# Patient Record
Sex: MALE | Race: WHITE | NOT HISPANIC OR LATINO | Employment: OTHER | URBAN - METROPOLITAN AREA
[De-identification: names, ages, dates, MRNs, and addresses within clinical notes are randomized per-mention and may not be internally consistent; named-entity substitution may affect disease eponyms.]

---

## 2017-01-24 ENCOUNTER — HOSPITAL ENCOUNTER (OUTPATIENT)
Dept: NON INVASIVE DIAGNOSTICS | Facility: CLINIC | Age: 72
Discharge: HOME | End: 2017-01-24
Payer: MEDICARE

## 2017-01-24 DIAGNOSIS — I71.4 ABDOMINAL AORTIC ANEURYSM WITHOUT RUPTURE (HCC): ICD-10-CM

## 2017-01-24 PROCEDURE — 93978 VASCULAR STUDY: CPT

## 2017-01-27 ENCOUNTER — ALLSCRIPTS OFFICE VISIT (OUTPATIENT)
Dept: OTHER | Facility: OTHER | Age: 72
End: 2017-01-27

## 2017-07-27 ENCOUNTER — HOSPITAL ENCOUNTER (OUTPATIENT)
Dept: NON INVASIVE DIAGNOSTICS | Facility: CLINIC | Age: 72
Discharge: HOME | End: 2017-07-27
Payer: MEDICARE

## 2017-07-27 DIAGNOSIS — I71.4 ABDOMINAL AORTIC ANEURYSM WITHOUT RUPTURE (HCC): ICD-10-CM

## 2017-07-27 PROCEDURE — 93978 VASCULAR STUDY: CPT

## 2018-01-13 VITALS
BODY MASS INDEX: 38.36 KG/M2 | DIASTOLIC BLOOD PRESSURE: 62 MMHG | SYSTOLIC BLOOD PRESSURE: 130 MMHG | RESPIRATION RATE: 18 BRPM | WEIGHT: 315 LBS | HEIGHT: 76 IN | HEART RATE: 78 BPM

## 2018-02-05 ENCOUNTER — TRANSCRIBE ORDERS (OUTPATIENT)
Dept: ADMINISTRATIVE | Facility: HOSPITAL | Age: 73
End: 2018-02-05

## 2018-02-05 DIAGNOSIS — I71.4 AAA (ABDOMINAL AORTIC ANEURYSM) WITHOUT RUPTURE (HCC): Primary | ICD-10-CM

## 2018-02-06 ENCOUNTER — HOSPITAL ENCOUNTER (OUTPATIENT)
Dept: NON INVASIVE DIAGNOSTICS | Facility: CLINIC | Age: 73
Discharge: HOME | End: 2018-02-06
Payer: MEDICARE

## 2018-02-06 DIAGNOSIS — I71.4 AAA (ABDOMINAL AORTIC ANEURYSM) WITHOUT RUPTURE (HCC): ICD-10-CM

## 2018-02-06 PROCEDURE — 93978 VASCULAR STUDY: CPT

## 2018-02-06 PROCEDURE — 93979 VASCULAR STUDY: CPT | Performed by: SURGERY

## 2018-09-11 DIAGNOSIS — I71.4 AAA (ABDOMINAL AORTIC ANEURYSM) WITHOUT RUPTURE (HCC): Primary | ICD-10-CM

## 2018-09-18 ENCOUNTER — HOSPITAL ENCOUNTER (OUTPATIENT)
Dept: NON INVASIVE DIAGNOSTICS | Facility: CLINIC | Age: 73
Discharge: HOME | End: 2018-09-18
Payer: MEDICARE

## 2018-09-18 DIAGNOSIS — I71.4 AAA (ABDOMINAL AORTIC ANEURYSM) WITHOUT RUPTURE (HCC): ICD-10-CM

## 2018-09-18 PROCEDURE — 93978 VASCULAR STUDY: CPT | Performed by: SURGERY

## 2018-09-18 PROCEDURE — 93978 VASCULAR STUDY: CPT

## 2019-03-14 ENCOUNTER — TELEPHONE (OUTPATIENT)
Dept: ADMINISTRATIVE | Facility: HOSPITAL | Age: 74
End: 2019-03-14

## 2019-03-14 DIAGNOSIS — I71.4 ABDOMINAL AORTIC ANEURYSM WITHOUT RUPTURE (HCC): Primary | ICD-10-CM

## 2019-04-02 ENCOUNTER — HOSPITAL ENCOUNTER (OUTPATIENT)
Dept: NON INVASIVE DIAGNOSTICS | Facility: CLINIC | Age: 74
Discharge: HOME | End: 2019-04-02
Payer: MEDICARE

## 2019-04-02 DIAGNOSIS — I71.4 ABDOMINAL AORTIC ANEURYSM WITHOUT RUPTURE (HCC): ICD-10-CM

## 2019-04-02 PROCEDURE — 93978 VASCULAR STUDY: CPT

## 2019-04-02 PROCEDURE — 93978 VASCULAR STUDY: CPT | Performed by: SURGERY

## 2019-04-04 ENCOUNTER — OFFICE VISIT (OUTPATIENT)
Dept: VASCULAR SURGERY | Facility: CLINIC | Age: 74
End: 2019-04-04
Payer: MEDICARE

## 2019-04-04 VITALS
BODY MASS INDEX: 38.36 KG/M2 | HEIGHT: 76 IN | DIASTOLIC BLOOD PRESSURE: 50 MMHG | WEIGHT: 315 LBS | HEART RATE: 78 BPM | SYSTOLIC BLOOD PRESSURE: 120 MMHG | TEMPERATURE: 98.9 F

## 2019-04-04 DIAGNOSIS — R09.89 DECREASED PEDAL PULSES: ICD-10-CM

## 2019-04-04 DIAGNOSIS — I71.4 ABDOMINAL AORTIC ANEURYSM (AAA) WITHOUT RUPTURE (HCC): Primary | ICD-10-CM

## 2019-04-04 PROCEDURE — 99213 OFFICE O/P EST LOW 20 MIN: CPT | Performed by: NURSE PRACTITIONER

## 2019-04-04 RX ORDER — METOPROLOL SUCCINATE 100 MG/1
100 TABLET, EXTENDED RELEASE ORAL DAILY
Refills: 1 | COMMUNITY
Start: 2019-02-19 | End: 2020-06-24

## 2019-04-04 RX ORDER — ATORVASTATIN CALCIUM 40 MG/1
TABLET, FILM COATED ORAL
Refills: 3 | COMMUNITY
Start: 2019-02-24 | End: 2020-09-10 | Stop reason: SDUPTHER

## 2019-04-04 RX ORDER — INSULIN DETEMIR 100 [IU]/ML
INJECTION, SOLUTION SUBCUTANEOUS
Refills: 12 | COMMUNITY
Start: 2019-03-07 | End: 2020-07-31 | Stop reason: SDUPTHER

## 2019-04-04 RX ORDER — PIOGLITAZONEHYDROCHLORIDE 30 MG/1
30 TABLET ORAL DAILY
Refills: 12 | COMMUNITY
Start: 2019-02-14 | End: 2020-12-16

## 2019-04-04 RX ORDER — OMEPRAZOLE 20 MG/1
CAPSULE, DELAYED RELEASE ORAL
COMMUNITY
End: 2020-10-26 | Stop reason: SDUPTHER

## 2019-04-04 RX ORDER — INSULIN ASPART 100 [IU]/ML
5 INJECTION, SOLUTION INTRAVENOUS; SUBCUTANEOUS DAILY
Refills: 2 | COMMUNITY
Start: 2019-02-15 | End: 2021-01-29 | Stop reason: ALTCHOICE

## 2019-04-04 RX ORDER — LISINOPRIL 20 MG/1
TABLET ORAL
COMMUNITY
End: 2020-10-26 | Stop reason: SDUPTHER

## 2019-04-04 RX ORDER — FUROSEMIDE 20 MG/1
TABLET ORAL
COMMUNITY
End: 2020-07-31 | Stop reason: SDUPTHER

## 2019-04-04 RX ORDER — SEMAGLUTIDE 1.34 MG/ML
INJECTION, SOLUTION SUBCUTANEOUS
COMMUNITY
Start: 2019-01-25 | End: 2020-05-19

## 2019-04-04 RX ORDER — APIXABAN 5 MG/1
TABLET, FILM COATED ORAL
COMMUNITY
Start: 2019-03-21 | End: 2020-05-29

## 2019-10-03 ENCOUNTER — TELEPHONE (OUTPATIENT)
Dept: VASCULAR SURGERY | Facility: CLINIC | Age: 74
End: 2019-10-03

## 2019-10-03 NOTE — TELEPHONE ENCOUNTER
Scheduling Instructions   please schedule appt to review georgina and aoil in october     Called to schedule follow up but number is disconnected

## 2019-10-07 ENCOUNTER — HOSPITAL ENCOUNTER (OUTPATIENT)
Dept: NON INVASIVE DIAGNOSTICS | Facility: CLINIC | Age: 74
Discharge: HOME | End: 2019-10-07
Payer: MEDICARE

## 2019-10-07 DIAGNOSIS — I71.4 ABDOMINAL AORTIC ANEURYSM (AAA) WITHOUT RUPTURE (HCC): ICD-10-CM

## 2019-10-07 DIAGNOSIS — R09.89 DECREASED PEDAL PULSES: ICD-10-CM

## 2019-10-07 PROCEDURE — 93978 VASCULAR STUDY: CPT

## 2019-10-07 PROCEDURE — 93978 VASCULAR STUDY: CPT | Performed by: SURGERY

## 2020-03-03 LAB — HBA1C MFR BLD HPLC: 7.4 %

## 2020-04-23 DIAGNOSIS — R60.0 LOCALIZED EDEMA: Primary | ICD-10-CM

## 2020-04-24 RX ORDER — FUROSEMIDE 40 MG/1
TABLET ORAL
Qty: 90 TABLET | Refills: 1 | Status: SHIPPED | OUTPATIENT
Start: 2020-04-24 | End: 2020-07-31 | Stop reason: SDUPTHER

## 2020-05-19 DIAGNOSIS — E11.65 UNCONTROLLED TYPE 2 DIABETES MELLITUS WITH HYPERGLYCEMIA (HCC): Primary | ICD-10-CM

## 2020-05-19 RX ORDER — SEMAGLUTIDE 1.34 MG/ML
INJECTION, SOLUTION SUBCUTANEOUS
COMMUNITY
Start: 2020-02-15 | End: 2020-05-19

## 2020-05-20 RX ORDER — SEMAGLUTIDE 1.34 MG/ML
1 INJECTION, SOLUTION SUBCUTANEOUS WEEKLY
Qty: 9 PEN | Refills: 0 | Status: SHIPPED | OUTPATIENT
Start: 2020-05-20 | End: 2020-08-13

## 2020-05-29 ENCOUNTER — TELEPHONE (OUTPATIENT)
Dept: FAMILY MEDICINE CLINIC | Facility: CLINIC | Age: 75
End: 2020-05-29

## 2020-05-29 DIAGNOSIS — E11.65 TYPE 2 DIABETES MELLITUS WITH HYPERGLYCEMIA, WITH LONG-TERM CURRENT USE OF INSULIN (HCC): Primary | ICD-10-CM

## 2020-05-29 DIAGNOSIS — Z79.4 TYPE 2 DIABETES MELLITUS WITH HYPERGLYCEMIA, WITH LONG-TERM CURRENT USE OF INSULIN (HCC): Primary | ICD-10-CM

## 2020-05-29 DIAGNOSIS — I48.11 LONGSTANDING PERSISTENT ATRIAL FIBRILLATION (HCC): Primary | ICD-10-CM

## 2020-05-29 RX ORDER — APIXABAN 5 MG/1
TABLET, FILM COATED ORAL
Qty: 60 TABLET | Refills: 3 | Status: SHIPPED | OUTPATIENT
Start: 2020-05-29 | End: 2020-09-14

## 2020-06-01 RX ORDER — PEN NEEDLE, DIABETIC 31 GX5/16"
NEEDLE, DISPOSABLE MISCELLANEOUS DAILY
Qty: 100 EACH | Refills: 1 | Status: SHIPPED | OUTPATIENT
Start: 2020-06-01 | End: 2020-09-04 | Stop reason: SDUPTHER

## 2020-06-01 RX ORDER — PEN NEEDLE, DIABETIC 31 GX5/16"
NEEDLE, DISPOSABLE MISCELLANEOUS
COMMUNITY
Start: 2020-04-09 | End: 2020-06-01 | Stop reason: SDUPTHER

## 2020-06-24 DIAGNOSIS — I10 ESSENTIAL HYPERTENSION: Primary | ICD-10-CM

## 2020-06-24 RX ORDER — METOPROLOL SUCCINATE 100 MG/1
TABLET, EXTENDED RELEASE ORAL
Qty: 90 TABLET | Refills: 2 | Status: SHIPPED | OUTPATIENT
Start: 2020-06-24 | End: 2021-02-18 | Stop reason: SDUPTHER

## 2020-07-20 ENCOUNTER — TELEPHONE (OUTPATIENT)
Dept: ADMINISTRATIVE | Facility: HOSPITAL | Age: 75
End: 2020-07-20

## 2020-07-20 DIAGNOSIS — I71.4 ABDOMINAL AORTIC ANEURYSM WITHOUT RUPTURE (HCC): Primary | ICD-10-CM

## 2020-07-31 ENCOUNTER — OFFICE VISIT (OUTPATIENT)
Dept: FAMILY MEDICINE CLINIC | Facility: CLINIC | Age: 75
End: 2020-07-31
Payer: MEDICARE

## 2020-07-31 VITALS
RESPIRATION RATE: 18 BRPM | DIASTOLIC BLOOD PRESSURE: 64 MMHG | WEIGHT: 315 LBS | TEMPERATURE: 96.8 F | OXYGEN SATURATION: 94 % | HEIGHT: 76 IN | SYSTOLIC BLOOD PRESSURE: 138 MMHG | BODY MASS INDEX: 38.36 KG/M2 | HEART RATE: 92 BPM

## 2020-07-31 DIAGNOSIS — E11.65 TYPE 2 DIABETES MELLITUS WITH HYPERGLYCEMIA, WITH LONG-TERM CURRENT USE OF INSULIN (HCC): Primary | ICD-10-CM

## 2020-07-31 DIAGNOSIS — Z79.4 TYPE 2 DIABETES MELLITUS WITH HYPERGLYCEMIA, WITH LONG-TERM CURRENT USE OF INSULIN (HCC): Primary | ICD-10-CM

## 2020-07-31 DIAGNOSIS — G89.29 CHRONIC PAIN OF BOTH KNEES: ICD-10-CM

## 2020-07-31 DIAGNOSIS — R60.0 LOCALIZED EDEMA: ICD-10-CM

## 2020-07-31 DIAGNOSIS — M25.562 CHRONIC PAIN OF BOTH KNEES: ICD-10-CM

## 2020-07-31 DIAGNOSIS — Z28.39 IMMUNIZATION DEFICIENCY: ICD-10-CM

## 2020-07-31 DIAGNOSIS — I10 ESSENTIAL HYPERTENSION: ICD-10-CM

## 2020-07-31 DIAGNOSIS — L57.0 ACTINIC KERATOSIS OF SCALP: ICD-10-CM

## 2020-07-31 DIAGNOSIS — E78.2 MIXED HYPERLIPIDEMIA: ICD-10-CM

## 2020-07-31 DIAGNOSIS — I48.11 LONGSTANDING PERSISTENT ATRIAL FIBRILLATION (HCC): ICD-10-CM

## 2020-07-31 DIAGNOSIS — M25.561 CHRONIC PAIN OF BOTH KNEES: ICD-10-CM

## 2020-07-31 DIAGNOSIS — I71.4 ABDOMINAL AORTIC ANEURYSM (AAA) WITHOUT RUPTURE (HCC): ICD-10-CM

## 2020-07-31 DIAGNOSIS — C67.9 MALIGNANT NEOPLASM OF URINARY BLADDER, UNSPECIFIED SITE (HCC): ICD-10-CM

## 2020-07-31 PROCEDURE — 99214 OFFICE O/P EST MOD 30 MIN: CPT | Performed by: FAMILY MEDICINE

## 2020-07-31 PROCEDURE — 1160F RVW MEDS BY RX/DR IN RCRD: CPT | Performed by: FAMILY MEDICINE

## 2020-07-31 PROCEDURE — 3075F SYST BP GE 130 - 139MM HG: CPT | Performed by: FAMILY MEDICINE

## 2020-07-31 PROCEDURE — 1036F TOBACCO NON-USER: CPT | Performed by: FAMILY MEDICINE

## 2020-07-31 PROCEDURE — 3008F BODY MASS INDEX DOCD: CPT | Performed by: FAMILY MEDICINE

## 2020-07-31 PROCEDURE — 17003 DESTRUCT PREMALG LES 2-14: CPT | Performed by: FAMILY MEDICINE

## 2020-07-31 PROCEDURE — 17000 DESTRUCT PREMALG LESION: CPT | Performed by: FAMILY MEDICINE

## 2020-07-31 PROCEDURE — 4040F PNEUMOC VAC/ADMIN/RCVD: CPT | Performed by: FAMILY MEDICINE

## 2020-07-31 PROCEDURE — 3078F DIAST BP <80 MM HG: CPT | Performed by: FAMILY MEDICINE

## 2020-07-31 RX ORDER — INSULIN DETEMIR 100 [IU]/ML
INJECTION, SOLUTION SUBCUTANEOUS
Qty: 5 PEN | Refills: 12
Start: 2020-07-31 | End: 2020-09-27 | Stop reason: SDUPTHER

## 2020-07-31 RX ORDER — FUROSEMIDE 40 MG/1
TABLET ORAL
Qty: 90 TABLET | Refills: 1 | Status: SHIPPED | OUTPATIENT
Start: 2020-07-31 | End: 2020-08-18 | Stop reason: SDUPTHER

## 2020-07-31 NOTE — PROGRESS NOTES
Lesion Destruction  Date/Time: 7/31/2020 6:25 PM  Performed by: Mag Epps MD  Authorized by: Mag Epps MD     Procedure Details - Lesion Destruction:     Number of Lesions:  5  Lesion 1:     Body area:  Head/neck    Head/neck location:  Scalp    Initial size (mm):  5    Malignancy: pre-malignant lesion      Destruction method: cryotherapy    Lesion 2:     Body area:  [address]    Head/neck location:  Scalp    Malignancy: pre-malignant lesion      Destruction method: cryotherapy    Lesion 3:     Body area:  [address]    Head/neck location:  Scalp    Malignancy: pre-malignant lesion    Lesion 4:     Body area:  Head/neck    Head/neck location:  Scalp    Malignancy: pre-malignant lesion      Destruction method: cryotherapy    Lesion 5:     Body area:  Head/neck    Head/neck location:  Scalp    Malignancy: pre-malignant lesion      Destruction method: cryotherapy        Liquid nitrogen was applied for 10-12 seconds to the skin lesions= 5 AK on scalp and the expected blistering or scabbing reaction explained  Do not pick at the areas  Patient reminded to expect hypopigmented scars from the procedure  Return if lesions fail to fully resolve

## 2020-07-31 NOTE — PROGRESS NOTES
Assessment/Plan:    Treatment for AK on his scalp done today in office with verruca freeze 5 over noted total   Advised to decrease back on furosemide just 20 mg daily since he is in dry status now  Advised to follow-up in 3 months with blood work  Refer patient to urologist locally was seeing Mariah Nayak also refer patient to Orthopedic for knee pain     Problem List Items Addressed This Visit        Endocrine    Diabetes ([facility] ) - Primary    Relevant Medications    LEVEMIR FLEXTOUCH 100 units/mL injection pen    Other Relevant Orders    HEMOGLOBIN A1C W/ EAG ESTIMATION    Basic metabolic panel    CBC and differential    TSH, 3rd generation with Free T4 reflex       Cardiovascular and Mediastinum    Hypertension    Relevant Medications    furosemide (LASIX) 40 mg tablet    Other Relevant Orders    TSH, 3rd generation with Free T4 reflex    A-fib ([facility] )    AAA (abdominal aortic aneurysm) ([facility] )       Musculoskeletal and Integument    Actinic keratosis of scalp       Genitourinary    Bladder cancer ([facility] )    Relevant Orders    Ambulatory referral to Urology       Other    Hyperlipemia    Chronic pain of both knees    Relevant Orders    Ambulatory referral to Orthopedic Surgery      Other Visit Diagnoses     Immunization deficiency        Relevant Orders    Hepatitis A antibody, total    Hepatitis B surface antibody    Measles/Mumps/Rubella Immunity    Localized edema        Relevant Medications    furosemide (LASIX) 40 mg tablet            Subjective:      Patient ID: Juvencio Morgan is a 67 y o  male  76-year-old male follow-up with wife for type 2 diabetes hypertension congestive heart failure hyperlipidemia    He takes metoprolol 150 mg daily lisinopril 20 mg daily furosemide 40 mg daily for blood pressure he takes atorvastatin 40 mg daily for cholesterol he is on Eliquis 5 mg twice a day for AFib he takes metformin 1000 mg twice a day Ozempic 1 mg once a week pioglitazone 30 mg daily for his blood sugar hemoglobin A1c is 7 4 he complained feeling tired a lot lately and very dry mouth he uses CPAP at night and that even difficult to use because it is so dry  Recent does furosemide was increased from 20 mg daily to 40 mg daily due to edema in his legs has been using that for the past 6 months  He follow-up with cardiologist frequently for AFib  He has multiple dry scaly spot her scalp worsening in the summer he lives with his wife at home he is up-to-date with his vaccines he had history of bladder cancer with diagnosed in 2010 he had 3 years with BCG treatment and he was well used to follow with Dr Joe Donaldson and now he had to get cystoscope yearly however he does not want to go all the way to [place] to see Mar goal is since he moved he would like to have a low go urologist   He also complained of bilateral knee pain he does not want surgery but he would like to have other methods to help like injection versus stem cell replacement      The following portions of the patient's history were reviewed and updated as appropriate:   He has a past medical history of A-fib ([facility] ), AAA (abdominal aortic aneurysm) ([facility] ), Actinic keratosis of right temple (7/31/2020), Actinic keratosis of scalp (7/31/2020), Arthritis, Lay's esophagus, Bladder cancer ([address] ), CAD (coronary artery disease), Chronic low back pain, Chronic pain of both knees (7/31/2020), Diabetes ([address] ), Hyperlipemia, and Hypertension  ,  does not have any pertinent problems on file  ,   has a past surgical history that includes Back surgery; Bladder surgery; Cataract extraction, bilateral; Colonoscopy (01/29/2019); Coronary artery bypass graft (04/2016); Total shoulder replacement (Right, 2013); Total hip arthroplasty (Right, 2012); and EGD (06/2017)  ,  family history includes Alzheimer's disease in his mother; Arthritis in his father; Heart attack in his father; Heart disease in his father  ,   reports that he has quit smoking  His smoking use included cigarettes   He smoked 0 25 packs per day  He has never used smokeless tobacco  He reports that he drank alcohol  His drug history is not on file  ,  has No Known Allergies     Current Outpatient Medications   Medication Sig Dispense Refill    atorvastatin (LIPITOR) 40 mg tablet TAKE 1 TABLET(S) EVERY DAY BY ORAL ROUTE AS DIRECTED   3    ELIQUIS 5 MG TAKE 1 TABLET BY MOUTH TWICE A DAY 60 tablet 3    furosemide (LASIX) 40 mg tablet 0 5 tab daily (20 mg) 90 tablet 1    LEVEMIR FLEXTOUCH 100 units/mL injection pen 65 units bid 5 pen 12    lisinopril (ZESTRIL) 20 mg tablet Take by mouth      metFORMIN (GLUCOPHAGE) 1000 MG tablet Take 1,000 mg by mouth 2 (two) times a day  3    metoprolol succinate (TOPROL-XL) 100 mg 24 hr tablet TAKE 1 TABLET BY MOUTH EVERY DAY 90 tablet 2    omeprazole (PriLOSEC) 20 mg delayed release capsule Take by mouth      OZEMPIC, 1 MG/DOSE, 2 MG/1 5ML SOPN Inject 1 mg under the skin once a week 9 pen 0    pioglitazone (ACTOS) 30 mg tablet Take 30 mg by mouth daily  12    UNIFINE PENTIPS 31G X 8 MM MISC Inject under the skin daily Use as directed 100 each 1     No current facility-administered medications for this visit  Review of Systems   Constitutional: Positive for fatigue  Respiratory: Negative for cough, chest tightness and shortness of breath  Cardiovascular: Negative for chest pain and leg swelling  Endocrine: Negative for cold intolerance  Genitourinary: Negative for difficulty urinating and dysuria  Musculoskeletal: Positive for arthralgias  Skin: Negative for rash  Neurological: Negative for dizziness  Psychiatric/Behavioral: The patient is not nervous/anxious  Objective:  Vitals:    07/31/20 1216   BP: 138/64   BP Location: Left arm   Patient Position: Sitting   Cuff Size: Standard   Pulse: 92   Resp: 18   Temp: (!) 96 8 °F (36 °C)   TempSrc: Temporal   SpO2: 94%   Weight: (!) 148 kg (325 lb 9 6 oz)   Height: 6' 4" (1 93 m)     Body mass index is 39 63 kg/m²  Physical Exam   Constitutional: He is oriented to person, place, and time  He appears well-developed and well-nourished  HENT:   Head: Normocephalic and atraumatic  Eyes: EOM are normal    Cardiovascular: Normal rate, regular rhythm and normal heart sounds  Pulmonary/Chest: Effort normal and breath sounds normal    Abdominal: Soft  Bowel sounds are normal    Musculoskeletal: Normal range of motion  He exhibits no edema  Neurological: He is alert and oriented to person, place, and time  Skin: Skin is warm and dry  Multiple scaly raise skin lesion on his scalp resemble actinic keratosis about 5 noted   Psychiatric: He has a normal mood and affect  His behavior is normal  Judgment and thought content normal    Nursing note and vitals reviewed

## 2020-08-11 ENCOUNTER — APPOINTMENT (OUTPATIENT)
Dept: LAB | Facility: HOSPITAL | Age: 75
End: 2020-08-11
Payer: MEDICARE

## 2020-08-11 DIAGNOSIS — Z28.39 IMMUNIZATION DEFICIENCY: ICD-10-CM

## 2020-08-11 DIAGNOSIS — I10 ESSENTIAL HYPERTENSION: ICD-10-CM

## 2020-08-11 DIAGNOSIS — Z79.4 TYPE 2 DIABETES MELLITUS WITH HYPERGLYCEMIA, WITH LONG-TERM CURRENT USE OF INSULIN (HCC): ICD-10-CM

## 2020-08-11 DIAGNOSIS — E11.65 TYPE 2 DIABETES MELLITUS WITH HYPERGLYCEMIA, WITH LONG-TERM CURRENT USE OF INSULIN (HCC): ICD-10-CM

## 2020-08-11 LAB
ANION GAP SERPL CALCULATED.3IONS-SCNC: 8 MMOL/L (ref 4–13)
BASOPHILS # BLD AUTO: 0.02 THOUSANDS/ΜL (ref 0–0.1)
BASOPHILS NFR BLD AUTO: 0 % (ref 0–1)
BUN SERPL-MCNC: 15 MG/DL (ref 6–20)
CALCIUM SERPL-MCNC: 9.8 MG/DL (ref 8.4–10.2)
CHLORIDE SERPL-SCNC: 102 MMOL/L (ref 96–108)
CO2 SERPL-SCNC: 32 MMOL/L (ref 22–33)
CREAT SERPL-MCNC: 0.57 MG/DL (ref 0.5–1.2)
EOSINOPHIL # BLD AUTO: 0.15 THOUSAND/ΜL (ref 0–0.61)
EOSINOPHIL NFR BLD AUTO: 2 % (ref 0–6)
ERYTHROCYTE [DISTWIDTH] IN BLOOD BY AUTOMATED COUNT: 15.4 % (ref 11.6–15.1)
EST. AVERAGE GLUCOSE BLD GHB EST-MCNC: 157 MG/DL
GFR SERPL CREATININE-BSD FRML MDRD: 100 ML/MIN/1.73SQ M
GLUCOSE P FAST SERPL-MCNC: 120 MG/DL (ref 70–100)
HAV AB SER QL IA: NORMAL
HBA1C MFR BLD: 7.1 %
HBV SURFACE AB SER-ACNC: <3.1 MIU/ML
HCT VFR BLD AUTO: 43.8 % (ref 36.5–49.3)
HGB BLD-MCNC: 13.8 G/DL (ref 12–17)
IMM GRANULOCYTES # BLD AUTO: 0.04 THOUSAND/UL (ref 0–0.2)
IMM GRANULOCYTES NFR BLD AUTO: 1 % (ref 0–2)
LYMPHOCYTES # BLD AUTO: 2.21 THOUSANDS/ΜL (ref 0.6–4.47)
LYMPHOCYTES NFR BLD AUTO: 26 % (ref 14–44)
MCH RBC QN AUTO: 27.7 PG (ref 26.8–34.3)
MCHC RBC AUTO-ENTMCNC: 31.5 G/DL (ref 31.4–37.4)
MCV RBC AUTO: 88 FL (ref 82–98)
MONOCYTES # BLD AUTO: 0.68 THOUSAND/ΜL (ref 0.17–1.22)
MONOCYTES NFR BLD AUTO: 8 % (ref 4–12)
NEUTROPHILS # BLD AUTO: 5.41 THOUSANDS/ΜL (ref 1.85–7.62)
NEUTS SEG NFR BLD AUTO: 63 % (ref 43–75)
PLATELET # BLD AUTO: 223 THOUSANDS/UL (ref 149–390)
PMV BLD AUTO: 10 FL (ref 8.9–12.7)
POTASSIUM SERPL-SCNC: 4.2 MMOL/L (ref 3.5–5)
RBC # BLD AUTO: 4.98 MILLION/UL (ref 3.88–5.62)
RUBV IGG SERPL IA-ACNC: >175 IU/ML
SODIUM SERPL-SCNC: 142 MMOL/L (ref 133–145)
TSH SERPL DL<=0.05 MIU/L-ACNC: 1.18 UIU/ML (ref 0.34–5.6)
WBC # BLD AUTO: 8.51 THOUSAND/UL (ref 4.31–10.16)

## 2020-08-11 PROCEDURE — 86708 HEPATITIS A ANTIBODY: CPT

## 2020-08-11 PROCEDURE — 80048 BASIC METABOLIC PNL TOTAL CA: CPT

## 2020-08-11 PROCEDURE — 85025 COMPLETE CBC W/AUTO DIFF WBC: CPT

## 2020-08-11 PROCEDURE — 36415 COLL VENOUS BLD VENIPUNCTURE: CPT

## 2020-08-11 PROCEDURE — 86765 RUBEOLA ANTIBODY: CPT

## 2020-08-11 PROCEDURE — 86735 MUMPS ANTIBODY: CPT

## 2020-08-11 PROCEDURE — 86762 RUBELLA ANTIBODY: CPT

## 2020-08-11 PROCEDURE — 83036 HEMOGLOBIN GLYCOSYLATED A1C: CPT

## 2020-08-11 PROCEDURE — 86706 HEP B SURFACE ANTIBODY: CPT

## 2020-08-11 PROCEDURE — 84443 ASSAY THYROID STIM HORMONE: CPT

## 2020-08-13 DIAGNOSIS — E11.65 UNCONTROLLED TYPE 2 DIABETES MELLITUS WITH HYPERGLYCEMIA (HCC): ICD-10-CM

## 2020-08-13 LAB
MEV IGG SER QL: NORMAL
MUV IGG SER QL: NORMAL

## 2020-08-13 RX ORDER — SEMAGLUTIDE 1.34 MG/ML
1 INJECTION, SOLUTION SUBCUTANEOUS WEEKLY
Qty: 12 PEN | Refills: 3 | Status: SHIPPED | OUTPATIENT
Start: 2020-08-13 | End: 2020-12-16

## 2020-08-18 DIAGNOSIS — R60.0 LOCALIZED EDEMA: ICD-10-CM

## 2020-08-18 NOTE — TELEPHONE ENCOUNTER
Pt LM looking for a refill on furosemide  Can you please review since Dr Nessa Paiz is out of the office?

## 2020-08-19 RX ORDER — FUROSEMIDE 40 MG/1
TABLET ORAL
Qty: 90 TABLET | Refills: 1 | Status: SHIPPED | OUTPATIENT
Start: 2020-08-19 | End: 2020-10-30 | Stop reason: SDUPTHER

## 2020-08-31 ENCOUNTER — HOSPITAL ENCOUNTER (OUTPATIENT)
Dept: NON INVASIVE DIAGNOSTICS | Facility: CLINIC | Age: 75
Discharge: HOME | End: 2020-08-31
Payer: MEDICARE

## 2020-08-31 DIAGNOSIS — I71.4 ABDOMINAL AORTIC ANEURYSM WITHOUT RUPTURE (HCC): ICD-10-CM

## 2020-08-31 PROCEDURE — 93925 LOWER EXTREMITY STUDY: CPT

## 2020-08-31 PROCEDURE — 93925 LOWER EXTREMITY STUDY: CPT | Performed by: SURGERY

## 2020-08-31 PROCEDURE — 93978 VASCULAR STUDY: CPT | Performed by: SURGERY

## 2020-08-31 PROCEDURE — 93923 UPR/LXTR ART STDY 3+ LVLS: CPT

## 2020-08-31 PROCEDURE — 93922 UPR/L XTREMITY ART 2 LEVELS: CPT | Performed by: SURGERY

## 2020-08-31 PROCEDURE — 93978 VASCULAR STUDY: CPT

## 2020-09-02 ENCOUNTER — TELEPHONE (OUTPATIENT)
Dept: VASCULAR SURGERY | Facility: CLINIC | Age: 75
End: 2020-09-02

## 2020-09-03 ENCOUNTER — OFFICE VISIT (OUTPATIENT)
Dept: VASCULAR SURGERY | Facility: CLINIC | Age: 75
End: 2020-09-03
Payer: MEDICARE

## 2020-09-03 VITALS
WEIGHT: 315 LBS | SYSTOLIC BLOOD PRESSURE: 148 MMHG | HEART RATE: 89 BPM | TEMPERATURE: 99.6 F | DIASTOLIC BLOOD PRESSURE: 82 MMHG | BODY MASS INDEX: 38.36 KG/M2 | HEIGHT: 76 IN

## 2020-09-03 DIAGNOSIS — I71.4 ABDOMINAL AORTIC ANEURYSM (AAA) WITHOUT RUPTURE (HCC): Primary | ICD-10-CM

## 2020-09-03 PROCEDURE — 99214 OFFICE O/P EST MOD 30 MIN: CPT | Performed by: SURGERY

## 2020-09-03 RX ORDER — ASPIRIN 81 MG/1
81 TABLET ORAL
COMMUNITY
End: 2020-09-03 | Stop reason: ALTCHOICE

## 2020-09-03 NOTE — PROGRESS NOTES
Assessment/Plan:     Presents for risk factor management update and evaluation of AAA  He remains approximately 4 3 centimeters for several years now and is asymptomatic  No intervention at this time  Plan:  Follow-up AAA ultrasound in 6 months and as per patient request is recommending a [facility] cardiologist to assume his care as he continues to request [employer]  Diagnoses and all orders for this visit:    Abdominal aortic aneurysm (AAA) without rupture ([address] )  -     VAS abdominal aorta/iliacs; complete study; Future  -     Ambulatory referral to Cardiology; Future    Other orders  -     Discontinue: aspirin (Aspirin 81) 81 mg EC tablet; Take 81 mg by mouth        Subjective:      Patient ID: Giovani South is a 67 y o  male  Patient is here to [address] and BRAEDEN done on 8/31  Patient is asymptomatic at this time  Patient currently takes Eliquis, Lisinopril  HPI    The following portions of the patient's history were reviewed and updated as appropriate: allergies, current medications, past family history, past medical history, past social history, past surgical history and problem list     Review of Systems   Constitutional: Negative  Negative for chills and fever  HENT: Negative  Eyes: Negative  Respiratory: Negative  Negative for chest tightness and shortness of breath  Cardiovascular: Negative  Negative for chest pain  Gastrointestinal: Negative  Negative for abdominal distention and abdominal pain  Endocrine: Negative  Genitourinary: Negative  Negative for flank pain  Musculoskeletal: Negative  Negative for back pain  Skin: Negative  Negative for color change and wound  Allergic/Immunologic: Negative  Neurological: Negative  Negative for numbness  Hematological: Negative  Psychiatric/Behavioral: Negative            Objective:      Ht 6' 4" (1 93 m)   BMI 39 63 kg/m²          Physical Exam      Oriented x3 no evidence of clinical depression  Eyes:  Sclera non-icteric    Skin: normal without evidence of inflammation    Neck is supple carotid pulses equal bilaterally no bruits heard    Chest lungs clear, heart regular rhythm  Abdomen soft nontender no evidence of pulsatile masses  Globus abdomen    Pulses are palpable bilateral Femoral     Neurological exam intact cranial nerves 2-12 grossly intact no gross motor sensory deficits detected  Imaging viewed and reviewed with Patient    I have reviewed and made appropriate changes to the review of systems input by the medical assistant      Vitals:    09/03/20 1420   BP: 148/82   BP Location: Right arm   Patient Position: Sitting   Cuff Size: Large   Pulse: 89   Temp: 99 6 °F (37 6 °C)   TempSrc: Tympanic   Weight: (!) 150 kg (330 lb)   Height: 6' 4" (1 93 m)       Patient Active Problem List   Diagnosis    Hypertension    Hyperlipemia    Diabetes (HCC)    Chronic low back pain    CAD (coronary artery disease)    Bladder cancer (HCC)    Arthritis    A-fib (HCC)    AAA (abdominal aortic aneurysm) (HCC)    Decreased pedal pulses    Chronic pain of both knees    Actinic keratosis of right temple    Actinic keratosis of scalp       Past Surgical History:   Procedure Laterality Date    BACK SURGERY      BLADDER SURGERY      CATARACT EXTRACTION, BILATERAL      COLONOSCOPY  01/29/2019    next [address] BYPASS GRAFT  04/2016    Quadruple per Rita    EGD  06/2017    TOTAL HIP ARTHROPLASTY Right 2012    TOTAL SHOULDER REPLACEMENT Right 2013       Family History   Problem Relation Age of Onset    Heart disease Father     Arthritis Father     Heart attack Father     Alzheimer's disease Mother        Social History     Socioeconomic History    Marital status: /Civil Union     Spouse name: Not on file    Number of children: Not on file    Years of education: Not on file    Highest education level: Not on file   Occupational History    Not on file   Social Needs    Financial resource strain: Not on file    Food insecurity     Worry: Not on file     Inability: Not on file    Transportation needs     Medical: Not on file     Non-medical: Not on file   Tobacco Use    Smoking status: Former Smoker     Packs/day: 0 25     Types: Cigarettes    Smokeless tobacco: Never Used    Tobacco comment: smoked since age 21; per [place]   Substance and Sexual Activity    Alcohol use: Not Currently     Comment: No use    Drug use: Not on file     Comment: No use    Sexual activity: Not on file   Lifestyle    Physical activity     Days per week: Not on file     Minutes per session: Not on file    Stress: Not on file   Relationships    Social connections     Talks on phone: Not on file     Gets together: Not on file     Attends Jew service: Not on file     Active member of club or organization: Not on file     Attends meetings of clubs or organizations: Not on file     Relationship status: Not on file    Intimate partner violence     Fear of current or ex partner: Not on file     Emotionally abused: Not on file     Physically abused: Not on file     Forced sexual activity: Not on file   Other Topics Concern    Not on file   Social History Narrative    · Most recent tobacco use screenin2020      · Do you currently or have you served in [employer]:   No      · Were you activated, into active duty, as a member of the [employer] or as a Reservist:   No      · Live alone or with others:   with others        · Caffeine intake:   Occasional      · Illicit drugs:   No      · Diet:   Regular      · Exercise level: Moderate      · Advance directive: Yes      · Marital status:         · General stress level:   Medium      · Single or multi-level home/work:   multi level home      · Guns present in home:   No      · Seat belts used routinely:   Yes      · Sunscreen used routinely:   Yes      · Smoke alarm in home:    Yes No Known Allergies      Current Outpatient Medications:     atorvastatin (LIPITOR) 40 mg tablet, TAKE 1 TABLET(S) EVERY DAY BY ORAL ROUTE AS DIRECTED , Disp: , Rfl: 3    ELIQUIS 5 MG, TAKE 1 TABLET BY MOUTH TWICE A DAY, Disp: 60 tablet, Rfl: 3    furosemide (LASIX) 40 mg tablet, 0 5 tab daily (20 mg), Disp: 90 tablet, Rfl: 1    LEVEMIR FLEXTOUCH 100 units/mL injection pen, 65 units bid, Disp: 5 pen, Rfl: 12    lisinopril (ZESTRIL) 20 mg tablet, Take by mouth, Disp: , Rfl:     metFORMIN (GLUCOPHAGE) 1000 MG tablet, Take 1,000 mg by mouth 2 (two) times a day, Disp: , Rfl: 3    metoprolol succinate (TOPROL-XL) 100 mg 24 hr tablet, TAKE 1 TABLET BY MOUTH EVERY DAY, Disp: 90 tablet, Rfl: 2    omeprazole (PriLOSEC) 20 mg delayed release capsule, Take by mouth, Disp: , Rfl:     Ozempic, 1 MG/DOSE, 2 MG/1 5ML SOPN, INJECT 1 MG UNDER THE SKIN ONCE A WEEK, Disp: 12 pen, Rfl: 3    pioglitazone (ACTOS) 30 mg tablet, Take 30 mg by mouth daily, Disp: , Rfl: 12    UNIFINE PENTIPS 31G X 8 MM MISC, Inject under the skin daily Use as directed, Disp: 100 each, Rfl: 1

## 2020-09-03 NOTE — ASSESSMENT & PLAN NOTE
Presents for risk factor management update and evaluation of AAA  He remains approximately 4 3 centimeters for several years now and is asymptomatic  No intervention at this time  Plan:  Follow-up AAA ultrasound in 6 months and as per patient request is recommending a [facility]ero's cardiologist to assume his care as he continues to request [employer]

## 2020-09-03 NOTE — LETTER
September 3, 2020     Citlali Antunez MD  [address]  [address] [facility]  [address] [place] [address]    Patient: Raciel Garcia   YOB: 1945   Date of Visit: 9/3/2020       Dear Dr Marlon Parker:    Thank you for referring Sony Chairez to me for evaluation  Below are my notes for this consultation  If you have questions, please do not hesitate to call me  I look forward to following your patient along with you           Sincerely,        Philip Phelps MD        CC: No Recipients

## 2020-09-03 NOTE — PATIENT INSTRUCTIONS
Presents for risk factor management update and evaluation of AAA  He remains approximately 4 3 centimeters for several years now and is asymptomatic  No intervention at this time  Plan:  Follow-up AAA ultrasound in 6 months and as per patient request is recommending a [facility]ero's cardiologist to assume his care as he continues to request [employer]

## 2020-09-04 DIAGNOSIS — E11.65 TYPE 2 DIABETES MELLITUS WITH HYPERGLYCEMIA, WITH LONG-TERM CURRENT USE OF INSULIN (HCC): ICD-10-CM

## 2020-09-04 DIAGNOSIS — Z79.4 TYPE 2 DIABETES MELLITUS WITH HYPERGLYCEMIA, WITH LONG-TERM CURRENT USE OF INSULIN (HCC): ICD-10-CM

## 2020-09-04 RX ORDER — PEN NEEDLE, DIABETIC 31 GX5/16"
NEEDLE, DISPOSABLE MISCELLANEOUS DAILY
Qty: 100 EACH | Refills: 2 | Status: SHIPPED | OUTPATIENT
Start: 2020-09-04 | End: 2021-06-19 | Stop reason: SDUPTHER

## 2020-09-10 DIAGNOSIS — I25.10 CORONARY ARTERY DISEASE DUE TO LIPID RICH PLAQUE: Primary | ICD-10-CM

## 2020-09-10 DIAGNOSIS — I25.83 CORONARY ARTERY DISEASE DUE TO LIPID RICH PLAQUE: Primary | ICD-10-CM

## 2020-09-10 RX ORDER — ATORVASTATIN CALCIUM 40 MG/1
40 TABLET, FILM COATED ORAL DAILY
Qty: 90 TABLET | Refills: 3 | Status: SHIPPED | OUTPATIENT
Start: 2020-09-10 | End: 2021-11-22

## 2020-09-14 DIAGNOSIS — I48.11 LONGSTANDING PERSISTENT ATRIAL FIBRILLATION (HCC): ICD-10-CM

## 2020-09-14 RX ORDER — APIXABAN 5 MG/1
TABLET, FILM COATED ORAL
Qty: 60 TABLET | Refills: 3 | Status: SHIPPED | OUTPATIENT
Start: 2020-09-14 | End: 2021-01-04

## 2020-09-27 DIAGNOSIS — Z79.4 TYPE 2 DIABETES MELLITUS WITH HYPERGLYCEMIA, WITH LONG-TERM CURRENT USE OF INSULIN (HCC): ICD-10-CM

## 2020-09-27 DIAGNOSIS — E11.65 TYPE 2 DIABETES MELLITUS WITH HYPERGLYCEMIA, WITH LONG-TERM CURRENT USE OF INSULIN (HCC): ICD-10-CM

## 2020-09-30 RX ORDER — INSULIN DETEMIR 100 [IU]/ML
INJECTION, SOLUTION SUBCUTANEOUS
Qty: 5 PEN | Refills: 0
Start: 2020-09-30 | End: 2020-10-07 | Stop reason: SDUPTHER

## 2020-10-07 DIAGNOSIS — Z79.4 TYPE 2 DIABETES MELLITUS WITH HYPERGLYCEMIA, WITH LONG-TERM CURRENT USE OF INSULIN (HCC): ICD-10-CM

## 2020-10-07 DIAGNOSIS — E11.65 TYPE 2 DIABETES MELLITUS WITH HYPERGLYCEMIA, WITH LONG-TERM CURRENT USE OF INSULIN (HCC): ICD-10-CM

## 2020-10-07 RX ORDER — INSULIN DETEMIR 100 [IU]/ML
INJECTION, SOLUTION SUBCUTANEOUS
Qty: 15 PEN | Refills: 2 | Status: SHIPPED | OUTPATIENT
Start: 2020-10-07 | End: 2020-12-23 | Stop reason: SDUPTHER

## 2020-10-14 ENCOUNTER — TELEPHONE (OUTPATIENT)
Dept: CARDIOLOGY CLINIC | Facility: CLINIC | Age: 75
End: 2020-10-14

## 2020-10-15 ENCOUNTER — OFFICE VISIT (OUTPATIENT)
Dept: CARDIOLOGY CLINIC | Facility: CLINIC | Age: 75
End: 2020-10-15
Payer: MEDICARE

## 2020-10-15 VITALS
HEIGHT: 76 IN | SYSTOLIC BLOOD PRESSURE: 162 MMHG | WEIGHT: 315 LBS | DIASTOLIC BLOOD PRESSURE: 88 MMHG | BODY MASS INDEX: 38.36 KG/M2 | HEART RATE: 94 BPM | OXYGEN SATURATION: 91 %

## 2020-10-15 DIAGNOSIS — I25.10 CORONARY ARTERY DISEASE INVOLVING NATIVE CORONARY ARTERY OF NATIVE HEART WITHOUT ANGINA PECTORIS: ICD-10-CM

## 2020-10-15 DIAGNOSIS — I48.11 LONGSTANDING PERSISTENT ATRIAL FIBRILLATION (HCC): Primary | ICD-10-CM

## 2020-10-15 DIAGNOSIS — E11.65 TYPE 2 DIABETES MELLITUS WITH HYPERGLYCEMIA, WITH LONG-TERM CURRENT USE OF INSULIN (HCC): ICD-10-CM

## 2020-10-15 DIAGNOSIS — I10 ESSENTIAL HYPERTENSION: ICD-10-CM

## 2020-10-15 DIAGNOSIS — E78.2 MIXED HYPERLIPIDEMIA: ICD-10-CM

## 2020-10-15 DIAGNOSIS — Z79.4 TYPE 2 DIABETES MELLITUS WITH HYPERGLYCEMIA, WITH LONG-TERM CURRENT USE OF INSULIN (HCC): ICD-10-CM

## 2020-10-15 DIAGNOSIS — I71.4 ABDOMINAL AORTIC ANEURYSM (AAA) WITHOUT RUPTURE (HCC): ICD-10-CM

## 2020-10-15 PROCEDURE — 93000 ELECTROCARDIOGRAM COMPLETE: CPT | Performed by: INTERNAL MEDICINE

## 2020-10-15 PROCEDURE — 99204 OFFICE O/P NEW MOD 45 MIN: CPT | Performed by: INTERNAL MEDICINE

## 2020-10-15 RX ORDER — EZETIMIBE 10 MG/1
10 TABLET ORAL DAILY
Qty: 90 TABLET | Refills: 3 | Status: SHIPPED | OUTPATIENT
Start: 2020-10-15 | End: 2021-07-26 | Stop reason: SDUPTHER

## 2020-10-20 ENCOUNTER — OFFICE VISIT (OUTPATIENT)
Dept: UROLOGY | Facility: CLINIC | Age: 75
End: 2020-10-20
Payer: MEDICARE

## 2020-10-20 VITALS
HEART RATE: 106 BPM | SYSTOLIC BLOOD PRESSURE: 190 MMHG | HEIGHT: 76 IN | TEMPERATURE: 97.2 F | DIASTOLIC BLOOD PRESSURE: 82 MMHG | BODY MASS INDEX: 40.73 KG/M2

## 2020-10-20 DIAGNOSIS — C67.9 MALIGNANT NEOPLASM OF URINARY BLADDER, UNSPECIFIED SITE (HCC): Primary | ICD-10-CM

## 2020-10-20 LAB — POST-VOID RESIDUAL VOLUME, ML POC: 56 ML

## 2020-10-20 PROCEDURE — 99204 OFFICE O/P NEW MOD 45 MIN: CPT | Performed by: UROLOGY

## 2020-10-20 PROCEDURE — 51798 US URINE CAPACITY MEASURE: CPT | Performed by: UROLOGY

## 2020-10-20 RX ORDER — A/SINGAPORE/GP1908/2015 IVR-180 (AN A/MICHIGAN/45/2015 (H1N1)PDM09-LIKE VIRUS, A/HONG KONG/4801/2014, NYMC X-263B (H3N2) (AN A/HONG KONG/4801/2014-LIKE VIRUS), AND B/BRISBANE/60/2008, WILD TYPE (A B/BRISBANE/60/2008-LIKE VIRUS) 15; 15; 15 UG/.5ML; UG/.5ML; UG/.5ML
INJECTION, SUSPENSION INTRAMUSCULAR
COMMUNITY
Start: 2020-10-13 | End: 2021-12-30 | Stop reason: HOSPADM

## 2020-10-25 ENCOUNTER — PATIENT MESSAGE (OUTPATIENT)
Dept: FAMILY MEDICINE CLINIC | Facility: CLINIC | Age: 75
End: 2020-10-25

## 2020-10-25 DIAGNOSIS — K21.9 GASTROESOPHAGEAL REFLUX DISEASE WITHOUT ESOPHAGITIS: Primary | ICD-10-CM

## 2020-10-26 ENCOUNTER — TELEPHONE (OUTPATIENT)
Dept: CARDIOLOGY CLINIC | Facility: CLINIC | Age: 75
End: 2020-10-26

## 2020-10-26 DIAGNOSIS — I10 ESSENTIAL HYPERTENSION: Primary | ICD-10-CM

## 2020-10-26 RX ORDER — OMEPRAZOLE 20 MG/1
20 CAPSULE, DELAYED RELEASE ORAL DAILY
Qty: 30 CAPSULE | Refills: 1 | Status: SHIPPED | OUTPATIENT
Start: 2020-10-26 | End: 2021-10-13

## 2020-10-26 RX ORDER — LISINOPRIL 20 MG/1
20 TABLET ORAL DAILY
Qty: 90 TABLET | Refills: 3 | Status: SHIPPED | OUTPATIENT
Start: 2020-10-26 | End: 2021-07-26 | Stop reason: SDUPTHER

## 2020-10-30 ENCOUNTER — OFFICE VISIT (OUTPATIENT)
Dept: FAMILY MEDICINE CLINIC | Facility: CLINIC | Age: 75
End: 2020-10-30
Payer: MEDICARE

## 2020-10-30 VITALS
TEMPERATURE: 98.9 F | OXYGEN SATURATION: 92 % | WEIGHT: 315 LBS | DIASTOLIC BLOOD PRESSURE: 82 MMHG | HEART RATE: 101 BPM | HEIGHT: 76 IN | RESPIRATION RATE: 18 BRPM | SYSTOLIC BLOOD PRESSURE: 152 MMHG | BODY MASS INDEX: 38.36 KG/M2

## 2020-10-30 DIAGNOSIS — I48.11 LONGSTANDING PERSISTENT ATRIAL FIBRILLATION (HCC): ICD-10-CM

## 2020-10-30 DIAGNOSIS — I10 ESSENTIAL HYPERTENSION: ICD-10-CM

## 2020-10-30 DIAGNOSIS — Z28.39 IMMUNIZATION DEFICIENCY: ICD-10-CM

## 2020-10-30 DIAGNOSIS — Z23 ENCOUNTER FOR IMMUNIZATION: ICD-10-CM

## 2020-10-30 DIAGNOSIS — I71.4 ABDOMINAL AORTIC ANEURYSM (AAA) WITHOUT RUPTURE (HCC): ICD-10-CM

## 2020-10-30 DIAGNOSIS — E11.65 TYPE 2 DIABETES MELLITUS WITH HYPERGLYCEMIA, WITH LONG-TERM CURRENT USE OF INSULIN (HCC): ICD-10-CM

## 2020-10-30 DIAGNOSIS — L57.0 ACTINIC KERATOSIS OF SCALP: ICD-10-CM

## 2020-10-30 DIAGNOSIS — Z79.4 TYPE 2 DIABETES MELLITUS WITH HYPERGLYCEMIA, WITH LONG-TERM CURRENT USE OF INSULIN (HCC): ICD-10-CM

## 2020-10-30 DIAGNOSIS — Z78.9 NONIMMUNE TO HEPATITIS B VIRUS: Primary | ICD-10-CM

## 2020-10-30 DIAGNOSIS — R60.0 LOCALIZED EDEMA: ICD-10-CM

## 2020-10-30 PROCEDURE — 90746 HEPB VACCINE 3 DOSE ADULT IM: CPT | Performed by: FAMILY MEDICINE

## 2020-10-30 PROCEDURE — G0010 ADMIN HEPATITIS B VACCINE: HCPCS | Performed by: FAMILY MEDICINE

## 2020-10-30 PROCEDURE — 99214 OFFICE O/P EST MOD 30 MIN: CPT | Performed by: FAMILY MEDICINE

## 2020-10-30 RX ORDER — FUROSEMIDE 40 MG/1
40 TABLET ORAL DAILY
Qty: 90 TABLET | Refills: 1 | Status: ON HOLD
Start: 2020-10-30 | End: 2021-03-31

## 2020-12-03 ENCOUNTER — OFFICE VISIT (OUTPATIENT)
Dept: FAMILY MEDICINE CLINIC | Facility: CLINIC | Age: 75
End: 2020-12-03
Payer: MEDICARE

## 2020-12-03 ENCOUNTER — HOSPITAL ENCOUNTER (OUTPATIENT)
Dept: CT IMAGING | Facility: HOSPITAL | Age: 75
Discharge: HOME | End: 2020-12-03
Attending: FAMILY MEDICINE
Payer: MEDICARE

## 2020-12-03 ENCOUNTER — TELEPHONE (OUTPATIENT)
Dept: OTHER | Facility: OTHER | Age: 75
End: 2020-12-03

## 2020-12-03 VITALS
DIASTOLIC BLOOD PRESSURE: 84 MMHG | SYSTOLIC BLOOD PRESSURE: 180 MMHG | HEIGHT: 76 IN | BODY MASS INDEX: 38.36 KG/M2 | OXYGEN SATURATION: 91 % | TEMPERATURE: 98.7 F | HEART RATE: 85 BPM | RESPIRATION RATE: 20 BRPM | WEIGHT: 315 LBS

## 2020-12-03 DIAGNOSIS — I71.4 ABDOMINAL AORTIC ANEURYSM (AAA) WITHOUT RUPTURE (HCC): ICD-10-CM

## 2020-12-03 DIAGNOSIS — R14.3 EXCESSIVE GAS: ICD-10-CM

## 2020-12-03 DIAGNOSIS — R10.32 LEFT LOWER QUADRANT ABDOMINAL PAIN: ICD-10-CM

## 2020-12-03 DIAGNOSIS — I10 ESSENTIAL HYPERTENSION: ICD-10-CM

## 2020-12-03 DIAGNOSIS — I48.11 LONGSTANDING PERSISTENT ATRIAL FIBRILLATION (HCC): ICD-10-CM

## 2020-12-03 DIAGNOSIS — E66.01 MORBID OBESITY (HCC): ICD-10-CM

## 2020-12-03 DIAGNOSIS — Z79.4 TYPE 2 DIABETES MELLITUS WITH HYPERGLYCEMIA, WITH LONG-TERM CURRENT USE OF INSULIN (HCC): Primary | ICD-10-CM

## 2020-12-03 DIAGNOSIS — E11.65 TYPE 2 DIABETES MELLITUS WITH HYPERGLYCEMIA, WITH LONG-TERM CURRENT USE OF INSULIN (HCC): Primary | ICD-10-CM

## 2020-12-03 PROCEDURE — 74176 CT ABD & PELVIS W/O CONTRAST: CPT

## 2020-12-03 PROCEDURE — 99214 OFFICE O/P EST MOD 30 MIN: CPT | Performed by: FAMILY MEDICINE

## 2020-12-03 PROCEDURE — G1004 CDSM NDSC: HCPCS

## 2020-12-03 RX ADMIN — IOHEXOL 50 ML: 240 INJECTION, SOLUTION INTRATHECAL; INTRAVASCULAR; INTRAVENOUS; ORAL at 17:51

## 2020-12-04 ENCOUNTER — TELEPHONE (OUTPATIENT)
Dept: FAMILY MEDICINE CLINIC | Facility: CLINIC | Age: 75
End: 2020-12-04

## 2020-12-04 DIAGNOSIS — E27.8 MASS OF RIGHT ADRENAL GLAND (HCC): ICD-10-CM

## 2020-12-04 DIAGNOSIS — K52.9 COLITIS: Primary | ICD-10-CM

## 2020-12-04 RX ORDER — METRONIDAZOLE 500 MG/1
500 TABLET ORAL EVERY 8 HOURS SCHEDULED
Qty: 21 TABLET | Refills: 0 | Status: SHIPPED | OUTPATIENT
Start: 2020-12-04 | End: 2020-12-11

## 2020-12-16 ENCOUNTER — CONSULT (OUTPATIENT)
Dept: ENDOCRINOLOGY | Facility: CLINIC | Age: 75
End: 2020-12-16
Payer: MEDICARE

## 2020-12-16 VITALS
WEIGHT: 315 LBS | DIASTOLIC BLOOD PRESSURE: 78 MMHG | HEART RATE: 82 BPM | SYSTOLIC BLOOD PRESSURE: 180 MMHG | TEMPERATURE: 95 F | HEIGHT: 76 IN | BODY MASS INDEX: 38.36 KG/M2

## 2020-12-16 DIAGNOSIS — E27.8 MASS OF RIGHT ADRENAL GLAND (HCC): ICD-10-CM

## 2020-12-16 DIAGNOSIS — E11.65 TYPE 2 DIABETES MELLITUS WITH HYPERGLYCEMIA, WITH LONG-TERM CURRENT USE OF INSULIN (HCC): ICD-10-CM

## 2020-12-16 DIAGNOSIS — E27.9 LESION OF ADRENAL GLAND (HCC): Primary | ICD-10-CM

## 2020-12-16 DIAGNOSIS — Z79.4 TYPE 2 DIABETES MELLITUS WITH HYPERGLYCEMIA, WITH LONG-TERM CURRENT USE OF INSULIN (HCC): ICD-10-CM

## 2020-12-16 PROCEDURE — 99204 OFFICE O/P NEW MOD 45 MIN: CPT | Performed by: INTERNAL MEDICINE

## 2020-12-16 RX ORDER — DEXAMETHASONE 1 MG
TABLET ORAL
Qty: 1 TABLET | Refills: 0 | Status: SHIPPED | OUTPATIENT
Start: 2020-12-16 | End: 2021-01-18 | Stop reason: SDUPTHER

## 2020-12-21 ENCOUNTER — PROCEDURE VISIT (OUTPATIENT)
Dept: UROLOGY | Facility: CLINIC | Age: 75
End: 2020-12-21
Payer: MEDICARE

## 2020-12-21 ENCOUNTER — TELEPHONE (OUTPATIENT)
Dept: UROLOGY | Facility: CLINIC | Age: 75
End: 2020-12-21

## 2020-12-21 VITALS
HEIGHT: 76 IN | DIASTOLIC BLOOD PRESSURE: 90 MMHG | SYSTOLIC BLOOD PRESSURE: 142 MMHG | BODY MASS INDEX: 40.78 KG/M2 | HEART RATE: 103 BPM

## 2020-12-21 DIAGNOSIS — N40.0 BENIGN PROSTATIC HYPERPLASIA WITHOUT LOWER URINARY TRACT SYMPTOMS: ICD-10-CM

## 2020-12-21 DIAGNOSIS — C67.9 MALIGNANT NEOPLASM OF URINARY BLADDER, UNSPECIFIED SITE (HCC): Primary | ICD-10-CM

## 2020-12-21 LAB
SL AMB  POCT GLUCOSE, UA: NORMAL
SL AMB LEUKOCYTE ESTERASE,UA: NORMAL
SL AMB POCT BILIRUBIN,UA: NORMAL
SL AMB POCT BLOOD,UA: NORMAL
SL AMB POCT CLARITY,UA: CLEAR
SL AMB POCT COLOR,UA: YELLOW
SL AMB POCT KETONES,UA: NORMAL
SL AMB POCT NITRITE,UA: NORMAL
SL AMB POCT PH,UA: 6
SL AMB POCT SPECIFIC GRAVITY,UA: 1.01
SL AMB POCT URINE PROTEIN: NORMAL
SL AMB POCT UROBILINOGEN: 0.2

## 2020-12-21 PROCEDURE — 81002 URINALYSIS NONAUTO W/O SCOPE: CPT | Performed by: UROLOGY

## 2020-12-21 PROCEDURE — 99214 OFFICE O/P EST MOD 30 MIN: CPT | Performed by: UROLOGY

## 2020-12-21 PROCEDURE — 52000 CYSTOURETHROSCOPY: CPT | Performed by: UROLOGY

## 2020-12-21 RX ORDER — FINASTERIDE 5 MG/1
5 TABLET, FILM COATED ORAL DAILY
Qty: 90 TABLET | Refills: 3 | Status: SHIPPED | OUTPATIENT
Start: 2020-12-21 | End: 2021-12-30 | Stop reason: HOSPADM

## 2020-12-23 ENCOUNTER — LAB (OUTPATIENT)
Dept: LAB | Facility: HOSPITAL | Age: 75
End: 2020-12-23
Attending: INTERNAL MEDICINE
Payer: MEDICARE

## 2020-12-23 DIAGNOSIS — Z79.4 TYPE 2 DIABETES MELLITUS WITH HYPERGLYCEMIA, WITH LONG-TERM CURRENT USE OF INSULIN (HCC): ICD-10-CM

## 2020-12-23 DIAGNOSIS — E27.8 MASS OF RIGHT ADRENAL GLAND (HCC): ICD-10-CM

## 2020-12-23 DIAGNOSIS — E27.9 LESION OF ADRENAL GLAND (HCC): ICD-10-CM

## 2020-12-23 DIAGNOSIS — E11.65 TYPE 2 DIABETES MELLITUS WITH HYPERGLYCEMIA, WITH LONG-TERM CURRENT USE OF INSULIN (HCC): ICD-10-CM

## 2020-12-23 LAB
ALBUMIN SERPL BCP-MCNC: 4 G/DL (ref 3.4–4.8)
ALP SERPL-CCNC: 67.7 U/L (ref 10–129)
ALT SERPL W P-5'-P-CCNC: 21 U/L (ref 5–63)
ANION GAP SERPL CALCULATED.3IONS-SCNC: 6 MMOL/L (ref 4–13)
AST SERPL W P-5'-P-CCNC: 15 U/L (ref 15–41)
BACTERIA UR QL AUTO: ABNORMAL /HPF
BILIRUB SERPL-MCNC: 0.41 MG/DL (ref 0.3–1.2)
BILIRUB UR QL STRIP: NEGATIVE
BUN SERPL-MCNC: 16 MG/DL (ref 6–20)
CALCIUM SERPL-MCNC: 9.6 MG/DL (ref 8.4–10.2)
CHLORIDE SERPL-SCNC: 99 MMOL/L (ref 96–108)
CHOLEST SERPL-MCNC: 120 MG/DL
CLARITY UR: ABNORMAL
CO2 SERPL-SCNC: 37 MMOL/L (ref 22–33)
COLOR UR: ABNORMAL
CORTIS AM PEAK SERPL-MCNC: 12.2 UG/DL (ref 4.2–22.4)
CREAT SERPL-MCNC: 0.62 MG/DL (ref 0.5–1.2)
EST. AVERAGE GLUCOSE BLD GHB EST-MCNC: 169 MG/DL
GFR SERPL CREATININE-BSD FRML MDRD: 96 ML/MIN/1.73SQ M
GLUCOSE P FAST SERPL-MCNC: 163 MG/DL (ref 70–105)
GLUCOSE UR STRIP-MCNC: ABNORMAL MG/DL
HBA1C MFR BLD: 7.5 %
HDLC SERPL-MCNC: 38 MG/DL
HGB UR QL STRIP.AUTO: ABNORMAL
KETONES UR STRIP-MCNC: NEGATIVE MG/DL
LDLC SERPL CALC-MCNC: 53 MG/DL (ref 0–100)
LEUKOCYTE ESTERASE UR QL STRIP: ABNORMAL
NITRITE UR QL STRIP: NEGATIVE
NON-SQ EPI CELLS URNS QL MICRO: ABNORMAL /HPF
NONHDLC SERPL-MCNC: 82 MG/DL
PH UR STRIP.AUTO: 6 [PH]
POTASSIUM SERPL-SCNC: 4.4 MMOL/L (ref 3.5–5)
PROT SERPL-MCNC: 7.4 G/DL (ref 6.4–8.3)
PROT UR STRIP-MCNC: ABNORMAL MG/DL
RBC #/AREA URNS AUTO: ABNORMAL /HPF
SODIUM SERPL-SCNC: 142 MMOL/L (ref 133–145)
SP GR UR STRIP.AUTO: >=1.03 (ref 1–1.03)
TRIGL SERPL-MCNC: 146.8 MG/DL
UROBILINOGEN UR QL STRIP.AUTO: 1 E.U./DL
WBC #/AREA URNS AUTO: ABNORMAL /HPF

## 2020-12-23 PROCEDURE — 83835 ASSAY OF METANEPHRINES: CPT

## 2020-12-23 PROCEDURE — 83036 HEMOGLOBIN GLYCOSYLATED A1C: CPT

## 2020-12-23 PROCEDURE — 82043 UR ALBUMIN QUANTITATIVE: CPT

## 2020-12-23 PROCEDURE — 36415 COLL VENOUS BLD VENIPUNCTURE: CPT

## 2020-12-23 PROCEDURE — 82533 TOTAL CORTISOL: CPT

## 2020-12-23 PROCEDURE — 82570 ASSAY OF URINE CREATININE: CPT

## 2020-12-23 PROCEDURE — 81001 URINALYSIS AUTO W/SCOPE: CPT | Performed by: FAMILY MEDICINE

## 2020-12-23 PROCEDURE — 80061 LIPID PANEL: CPT

## 2020-12-23 PROCEDURE — 80053 COMPREHEN METABOLIC PANEL: CPT

## 2020-12-23 RX ORDER — INSULIN DETEMIR 100 [IU]/ML
INJECTION, SOLUTION SUBCUTANEOUS
Qty: 15 PEN | Refills: 0 | Status: SHIPPED | OUTPATIENT
Start: 2020-12-23 | End: 2021-01-23 | Stop reason: SDUPTHER

## 2020-12-24 LAB
CREAT UR-MCNC: 191 MG/DL
MICROALBUMIN UR-MCNC: 1020 MG/L (ref 0–20)
MICROALBUMIN/CREAT 24H UR: 534 MG/G CREATININE (ref 0–30)

## 2020-12-28 ENCOUNTER — TRANSCRIBE ORDERS (OUTPATIENT)
Dept: LAB | Facility: HOSPITAL | Age: 75
End: 2020-12-28

## 2020-12-28 DIAGNOSIS — E27.9 LESION OF ADRENAL GLAND (HCC): ICD-10-CM

## 2020-12-28 DIAGNOSIS — E11.65 TYPE 2 DIABETES MELLITUS WITH HYPERGLYCEMIA, WITH LONG-TERM CURRENT USE OF INSULIN (HCC): ICD-10-CM

## 2020-12-28 DIAGNOSIS — Z79.4 TYPE 2 DIABETES MELLITUS WITH HYPERGLYCEMIA, WITH LONG-TERM CURRENT USE OF INSULIN (HCC): ICD-10-CM

## 2020-12-28 DIAGNOSIS — E27.8 MASS OF RIGHT ADRENAL GLAND (HCC): Primary | ICD-10-CM

## 2020-12-29 ENCOUNTER — LAB (OUTPATIENT)
Dept: LAB | Facility: HOSPITAL | Age: 75
End: 2020-12-29
Attending: INTERNAL MEDICINE
Payer: MEDICARE

## 2020-12-29 DIAGNOSIS — E27.9 LESION OF ADRENAL GLAND (HCC): ICD-10-CM

## 2020-12-29 DIAGNOSIS — E11.65 TYPE 2 DIABETES MELLITUS WITH HYPERGLYCEMIA, WITH LONG-TERM CURRENT USE OF INSULIN (HCC): ICD-10-CM

## 2020-12-29 DIAGNOSIS — Z79.4 TYPE 2 DIABETES MELLITUS WITH HYPERGLYCEMIA, WITH LONG-TERM CURRENT USE OF INSULIN (HCC): ICD-10-CM

## 2020-12-29 DIAGNOSIS — E27.8 MASS OF RIGHT ADRENAL GLAND (HCC): ICD-10-CM

## 2020-12-29 PROCEDURE — 36415 COLL VENOUS BLD VENIPUNCTURE: CPT

## 2020-12-30 LAB
METANEPH FREE SERPL-MCNC: <10 PG/ML (ref 0–88)
NORMETANEPHRINE SERPL-MCNC: 37.1 PG/ML (ref 0–191.8)

## 2021-01-03 DIAGNOSIS — I48.11 LONGSTANDING PERSISTENT ATRIAL FIBRILLATION (HCC): ICD-10-CM

## 2021-01-04 RX ORDER — APIXABAN 5 MG/1
TABLET, FILM COATED ORAL
Qty: 60 TABLET | Refills: 3 | Status: SHIPPED | OUTPATIENT
Start: 2021-01-04 | End: 2021-05-10

## 2021-01-05 ENCOUNTER — HOSPITAL ENCOUNTER (OUTPATIENT)
Dept: CT IMAGING | Facility: HOSPITAL | Age: 76
Discharge: HOME | End: 2021-01-05
Attending: INTERNAL MEDICINE
Payer: MEDICARE

## 2021-01-05 DIAGNOSIS — E11.65 TYPE 2 DIABETES MELLITUS WITH HYPERGLYCEMIA, WITH LONG-TERM CURRENT USE OF INSULIN (HCC): ICD-10-CM

## 2021-01-05 DIAGNOSIS — E27.9 LESION OF ADRENAL GLAND (HCC): ICD-10-CM

## 2021-01-05 DIAGNOSIS — E27.8 MASS OF RIGHT ADRENAL GLAND (HCC): ICD-10-CM

## 2021-01-05 DIAGNOSIS — Z79.4 TYPE 2 DIABETES MELLITUS WITH HYPERGLYCEMIA, WITH LONG-TERM CURRENT USE OF INSULIN (HCC): ICD-10-CM

## 2021-01-05 PROCEDURE — 74170 CT ABD WO CNTRST FLWD CNTRST: CPT

## 2021-01-05 PROCEDURE — G1004 CDSM NDSC: HCPCS

## 2021-01-05 RX ADMIN — IOHEXOL 100 ML: 350 INJECTION, SOLUTION INTRAVENOUS at 18:23

## 2021-01-06 ENCOUNTER — TELEPHONE (OUTPATIENT)
Dept: ENDOCRINOLOGY | Facility: CLINIC | Age: 76
End: 2021-01-06

## 2021-01-06 NOTE — TELEPHONE ENCOUNTER
Please let patient know I reviewed blood sugar log, blood sugars look stable continue same regimen for now    Bring blood sugar log to next appointment in March

## 2021-01-08 ENCOUNTER — TELEPHONE (OUTPATIENT)
Dept: ENDOCRINOLOGY | Facility: CLINIC | Age: 76
End: 2021-01-08

## 2021-01-09 NOTE — TELEPHONE ENCOUNTER
Please let pt know his CT scan results    Right adrenal gland lesion is 4cm and is consistent with benign adrenal growth    His plasma metanephrines were normal, his cortisol was mildly high 12 2, did he take the dexamethasone pill the night before getting the labs done?     Aldosterone level still pending    A1C is stable 7 5%    Urine test shows some protein in it but hard to tell if relevant given he had some blood in the urine sample- follow up with urologist     Lung on CT scan showed calcified pleural plaques- see pulmonology for further evaluation    There was some fatty attenuation between atria in the heart- see cardiology for further evaluation

## 2021-01-11 NOTE — TELEPHONE ENCOUNTER
Spoke to patient, went over all lab results and CT results  advised to f/u with cardiology and urology  He advised he did take the dexamethasone pill the night before the labs were done

## 2021-01-18 DIAGNOSIS — Z79.4 TYPE 2 DIABETES MELLITUS WITH HYPERGLYCEMIA, WITH LONG-TERM CURRENT USE OF INSULIN (HCC): ICD-10-CM

## 2021-01-18 DIAGNOSIS — E27.8 MASS OF RIGHT ADRENAL GLAND (HCC): ICD-10-CM

## 2021-01-18 DIAGNOSIS — E27.8 MASS OF RIGHT ADRENAL GLAND (HCC): Primary | ICD-10-CM

## 2021-01-18 DIAGNOSIS — E27.9 LESION OF ADRENAL GLAND (HCC): ICD-10-CM

## 2021-01-18 DIAGNOSIS — E11.65 TYPE 2 DIABETES MELLITUS WITH HYPERGLYCEMIA, WITH LONG-TERM CURRENT USE OF INSULIN (HCC): ICD-10-CM

## 2021-01-18 RX ORDER — DEXAMETHASONE 1 MG
TABLET ORAL
Qty: 1 TABLET | Refills: 0 | Status: SHIPPED | OUTPATIENT
Start: 2021-01-18 | End: 2021-01-29 | Stop reason: ALTCHOICE

## 2021-01-18 NOTE — TELEPHONE ENCOUNTER
Repeat dexamethasone suppression testing    Please order 1 mg dexamethasone to be taken at 11:00 p m  And have 8:00 a m   Cortisol drawn the next morning

## 2021-01-18 NOTE — TELEPHONE ENCOUNTER
Spoke to patient, advised doctor wants to repeat dexamethasone test  Order has been placed and prescription will be sent to pharmacy  He understood

## 2021-01-20 ENCOUNTER — LAB (OUTPATIENT)
Dept: LAB | Facility: HOSPITAL | Age: 76
End: 2021-01-20
Attending: INTERNAL MEDICINE
Payer: MEDICARE

## 2021-01-20 DIAGNOSIS — E27.9 LESION OF ADRENAL GLAND (HCC): ICD-10-CM

## 2021-01-20 DIAGNOSIS — Z23 ENCOUNTER FOR IMMUNIZATION: ICD-10-CM

## 2021-01-20 DIAGNOSIS — E27.8 MASS OF RIGHT ADRENAL GLAND (HCC): ICD-10-CM

## 2021-01-20 LAB — CORTIS AM PEAK SERPL-MCNC: 23.1 UG/DL (ref 4.2–22.4)

## 2021-01-20 PROCEDURE — 36415 COLL VENOUS BLD VENIPUNCTURE: CPT

## 2021-01-20 PROCEDURE — 82533 TOTAL CORTISOL: CPT

## 2021-01-21 ENCOUNTER — IMMUNIZATIONS (OUTPATIENT)
Dept: FAMILY MEDICINE CLINIC | Facility: HOSPITAL | Age: 76
End: 2021-01-21

## 2021-01-21 DIAGNOSIS — Z23 ENCOUNTER FOR IMMUNIZATION: Primary | ICD-10-CM

## 2021-01-21 PROCEDURE — 91301 SARS-COV-2 / COVID-19 MRNA VACCINE (MODERNA) 100 MCG: CPT | Performed by: INTERNAL MEDICINE

## 2021-01-21 PROCEDURE — 0011A SARS-COV-2 / COVID-19 MRNA VACCINE (MODERNA) 100 MCG: CPT | Performed by: INTERNAL MEDICINE

## 2021-01-22 ENCOUNTER — LAB (OUTPATIENT)
Dept: LAB | Facility: AMBULARY SURGERY CENTER | Age: 76
End: 2021-01-22
Payer: MEDICARE

## 2021-01-22 ENCOUNTER — TELEPHONE (OUTPATIENT)
Dept: FAMILY MEDICINE CLINIC | Facility: CLINIC | Age: 76
End: 2021-01-22

## 2021-01-22 DIAGNOSIS — R14.3 EXCESSIVE GAS: ICD-10-CM

## 2021-01-22 DIAGNOSIS — R10.32 LEFT LOWER QUADRANT ABDOMINAL PAIN: ICD-10-CM

## 2021-01-22 LAB
ALBUMIN SERPL BCP-MCNC: 3.4 G/DL (ref 3.5–5)
ALP SERPL-CCNC: 91 U/L (ref 46–116)
ALT SERPL W P-5'-P-CCNC: 28 U/L (ref 12–78)
AMYLASE SERPL-CCNC: 26 IU/L (ref 25–115)
ANION GAP SERPL CALCULATED.3IONS-SCNC: 5 MMOL/L (ref 4–13)
AST SERPL W P-5'-P-CCNC: 14 U/L (ref 5–45)
BASOPHILS # BLD AUTO: 0.05 THOUSANDS/ΜL (ref 0–0.1)
BASOPHILS NFR BLD AUTO: 1 % (ref 0–1)
BILIRUB SERPL-MCNC: 0.52 MG/DL (ref 0.2–1)
BUN SERPL-MCNC: 18 MG/DL (ref 5–25)
CALCIUM ALBUM COR SERPL-MCNC: 10.4 MG/DL (ref 8.3–10.1)
CALCIUM SERPL-MCNC: 9.9 MG/DL (ref 8.3–10.1)
CHLORIDE SERPL-SCNC: 101 MMOL/L (ref 100–108)
CO2 SERPL-SCNC: 33 MMOL/L (ref 21–32)
CREAT SERPL-MCNC: 0.64 MG/DL (ref 0.6–1.3)
EOSINOPHIL # BLD AUTO: 0.12 THOUSAND/ΜL (ref 0–0.61)
EOSINOPHIL NFR BLD AUTO: 1 % (ref 0–6)
ERYTHROCYTE [DISTWIDTH] IN BLOOD BY AUTOMATED COUNT: 15.1 % (ref 11.6–15.1)
GFR SERPL CREATININE-BSD FRML MDRD: 94 ML/MIN/1.73SQ M
GLUCOSE P FAST SERPL-MCNC: 166 MG/DL (ref 65–99)
HCT VFR BLD AUTO: 46.5 % (ref 36.5–49.3)
HGB BLD-MCNC: 13.6 G/DL (ref 12–17)
IMM GRANULOCYTES # BLD AUTO: 0.03 THOUSAND/UL (ref 0–0.2)
IMM GRANULOCYTES NFR BLD AUTO: 0 % (ref 0–2)
LIPASE SERPL-CCNC: 73 U/L (ref 73–393)
LYMPHOCYTES # BLD AUTO: 2.03 THOUSANDS/ΜL (ref 0.6–4.47)
LYMPHOCYTES NFR BLD AUTO: 22 % (ref 14–44)
MCH RBC QN AUTO: 26.4 PG (ref 26.8–34.3)
MCHC RBC AUTO-ENTMCNC: 29.2 G/DL (ref 31.4–37.4)
MCV RBC AUTO: 90 FL (ref 82–98)
MONOCYTES # BLD AUTO: 0.85 THOUSAND/ΜL (ref 0.17–1.22)
MONOCYTES NFR BLD AUTO: 9 % (ref 4–12)
NEUTROPHILS # BLD AUTO: 6.05 THOUSANDS/ΜL (ref 1.85–7.62)
NEUTS SEG NFR BLD AUTO: 67 % (ref 43–75)
NRBC BLD AUTO-RTO: 0 /100 WBCS
PLATELET # BLD AUTO: 187 THOUSANDS/UL (ref 149–390)
PMV BLD AUTO: 10.8 FL (ref 8.9–12.7)
POTASSIUM SERPL-SCNC: 3.9 MMOL/L (ref 3.5–5.3)
PROT SERPL-MCNC: 7.4 G/DL (ref 6.4–8.2)
RBC # BLD AUTO: 5.15 MILLION/UL (ref 3.88–5.62)
SODIUM SERPL-SCNC: 139 MMOL/L (ref 136–145)
WBC # BLD AUTO: 9.13 THOUSAND/UL (ref 4.31–10.16)

## 2021-01-22 PROCEDURE — 82150 ASSAY OF AMYLASE: CPT

## 2021-01-22 PROCEDURE — 36415 COLL VENOUS BLD VENIPUNCTURE: CPT

## 2021-01-22 PROCEDURE — 80053 COMPREHEN METABOLIC PANEL: CPT

## 2021-01-22 PROCEDURE — 85025 COMPLETE CBC W/AUTO DIFF WBC: CPT

## 2021-01-22 PROCEDURE — 83690 ASSAY OF LIPASE: CPT

## 2021-01-22 NOTE — TELEPHONE ENCOUNTER
[facility] at [place] called - pt's wife dropped of a urine to be run but the order was marked as cancelled in the system  Should they run it or dump?

## 2021-01-23 DIAGNOSIS — Z79.4 TYPE 2 DIABETES MELLITUS WITH HYPERGLYCEMIA, WITH LONG-TERM CURRENT USE OF INSULIN (HCC): ICD-10-CM

## 2021-01-23 DIAGNOSIS — E11.65 TYPE 2 DIABETES MELLITUS WITH HYPERGLYCEMIA, WITH LONG-TERM CURRENT USE OF INSULIN (HCC): ICD-10-CM

## 2021-01-25 RX ORDER — INSULIN DETEMIR 100 [IU]/ML
INJECTION, SOLUTION SUBCUTANEOUS
Qty: 15 PEN | Refills: 3 | Status: SHIPPED | OUTPATIENT
Start: 2021-01-25 | End: 2021-03-17 | Stop reason: SDUPTHER

## 2021-01-29 ENCOUNTER — OFFICE VISIT (OUTPATIENT)
Dept: FAMILY MEDICINE CLINIC | Facility: CLINIC | Age: 76
End: 2021-01-29
Payer: MEDICARE

## 2021-01-29 VITALS
TEMPERATURE: 97.3 F | OXYGEN SATURATION: 92 % | WEIGHT: 315 LBS | HEIGHT: 76 IN | SYSTOLIC BLOOD PRESSURE: 126 MMHG | HEART RATE: 92 BPM | DIASTOLIC BLOOD PRESSURE: 64 MMHG | RESPIRATION RATE: 18 BRPM | BODY MASS INDEX: 38.36 KG/M2

## 2021-01-29 DIAGNOSIS — E11.65 TYPE 2 DIABETES MELLITUS WITH HYPERGLYCEMIA, WITH LONG-TERM CURRENT USE OF INSULIN (HCC): ICD-10-CM

## 2021-01-29 DIAGNOSIS — Z79.4 TYPE 2 DIABETES MELLITUS WITH HYPERGLYCEMIA, WITH LONG-TERM CURRENT USE OF INSULIN (HCC): ICD-10-CM

## 2021-01-29 DIAGNOSIS — R63.8 INCREASED BMI: ICD-10-CM

## 2021-01-29 DIAGNOSIS — L57.0 ACTINIC KERATOSIS OF SCALP: ICD-10-CM

## 2021-01-29 DIAGNOSIS — Z78.9 NONIMMUNE TO HEPATITIS B VIRUS: ICD-10-CM

## 2021-01-29 DIAGNOSIS — I74.3 EMBOLISM AND THROMBOSIS OF ARTERIES OF THE LOWER EXTREMITIES (HCC): ICD-10-CM

## 2021-01-29 DIAGNOSIS — I71.4 ABDOMINAL AORTIC ANEURYSM (AAA) WITHOUT RUPTURE (HCC): ICD-10-CM

## 2021-01-29 DIAGNOSIS — I10 ESSENTIAL HYPERTENSION: Primary | ICD-10-CM

## 2021-01-29 DIAGNOSIS — G47.33 OSA (OBSTRUCTIVE SLEEP APNEA): ICD-10-CM

## 2021-01-29 DIAGNOSIS — R10.32 LEFT LOWER QUADRANT ABDOMINAL PAIN: ICD-10-CM

## 2021-01-29 DIAGNOSIS — I25.10 CORONARY ARTERY DISEASE INVOLVING NATIVE CORONARY ARTERY OF NATIVE HEART WITHOUT ANGINA PECTORIS: ICD-10-CM

## 2021-01-29 DIAGNOSIS — E66.01 MORBID OBESITY (HCC): ICD-10-CM

## 2021-01-29 DIAGNOSIS — C67.9 MALIGNANT NEOPLASM OF URINARY BLADDER, UNSPECIFIED SITE (HCC): ICD-10-CM

## 2021-01-29 DIAGNOSIS — Z79.899 LONG-TERM USE OF HIGH-RISK MEDICATION: ICD-10-CM

## 2021-01-29 DIAGNOSIS — Z79.899 ON LONG TERM DRUG THERAPY: ICD-10-CM

## 2021-01-29 DIAGNOSIS — E27.8 MASS OF RIGHT ADRENAL GLAND (HCC): ICD-10-CM

## 2021-01-29 DIAGNOSIS — Z87.310 PERSONAL HISTORY OF (HEALED) OSTEOPOROSIS FRACTURE: ICD-10-CM

## 2021-01-29 DIAGNOSIS — K22.70 BARRETT'S ESOPHAGUS WITHOUT DYSPLASIA: ICD-10-CM

## 2021-01-29 DIAGNOSIS — R14.3 EXCESSIVE GAS: ICD-10-CM

## 2021-01-29 DIAGNOSIS — K52.9 COLITIS: ICD-10-CM

## 2021-01-29 DIAGNOSIS — Z13.820 SCREENING FOR OSTEOPOROSIS: ICD-10-CM

## 2021-01-29 DIAGNOSIS — I48.11 LONGSTANDING PERSISTENT ATRIAL FIBRILLATION (HCC): ICD-10-CM

## 2021-01-29 PROCEDURE — G0439 PPPS, SUBSEQ VISIT: HCPCS | Performed by: FAMILY MEDICINE

## 2021-01-29 PROCEDURE — 99214 OFFICE O/P EST MOD 30 MIN: CPT | Performed by: FAMILY MEDICINE

## 2021-01-29 RX ORDER — METRONIDAZOLE 500 MG/1
500 TABLET ORAL EVERY 8 HOURS SCHEDULED
Qty: 30 TABLET | Refills: 0 | Status: SHIPPED | OUTPATIENT
Start: 2021-01-29 | End: 2021-02-08

## 2021-01-29 NOTE — PROGRESS NOTES
Assessment and Plan:     Problem List Items Addressed This Visit        Digestive    Colitis    Relevant Medications    metroNIDAZOLE (FLAGYL) 500 mg tablet    Other Relevant Orders    Ambulatory referral to Gastroenterology       Endocrine    Diabetes ([facility] )       Respiratory    LUCIA (obstructive sleep apnea)    Relevant Orders    Ambulatory referral to Pulmonology       Cardiovascular and Mediastinum    Hypertension - Primary    CAD (coronary artery disease)    A-fib (HCC)    AAA (abdominal aortic aneurysm) (HCC)    Embolism and thrombosis of arteries of the lower extremities (HCC)       Musculoskeletal and Integument    Actinic keratosis of scalp       Genitourinary    Bladder cancer ([facility] )       Other    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    Left lower quadrant abdominal pain    Relevant Orders    Ambulatory referral to Gastroenterology    Excessive gas    Morbid obesity ([facility] )    Mass of right adrenal gland ([place] [address] )      Other Visit Diagnoses     Screening for osteoporosis        Lay's esophagus without dysplasia        Relevant Orders    Ambulatory referral to Gastroenterology    On long term drug therapy        Relevant Orders    DXA bone density spine hip and pelvis    Long-term use of high-risk medication        Relevant Orders    DXA bone density spine hip and pelvis    Personal history of (healed) osteoporosis fracture         Relevant Orders    DXA bone density spine hip and pelvis    Increased BMI            BMI Counseling: Body mass index is 38 71 kg/m²  The BMI is above normal  Nutrition recommendations include decreasing portion sizes, encouraging healthy choices of fruits and vegetables, limiting drinks that contain sugar and reducing intake of cholesterol  Exercise recommendations include exercising 3-5 times per week  No pharmacotherapy was ordered  Falls Plan of Care: balance, strength, and gait training instructions were provided  Recommended assistive device to help with gait and balance  Patient assessed for orthostatic hypotension  Medications that increase falls were reviewed  Assessed feet and footwear  Vitamin D supplementation was recommended  Assessed visual acuity  Home safety evaluation by OT recommended  Cognitive screening performed  Home safety education provided  Preventive health issues were discussed with patient, and age appropriate screening tests were ordered as noted in patient's After Visit Summary  Personalized health advice and appropriate referrals for health education or preventive services given if needed, as noted in patient's After Visit Summary       History of Present Illness:     Patient presents for Medicare Annual Wellness visit    Patient Care Team:  Gloria Otero MD as PCP - General (Family Medicine)  MD Anca Perdomo, MD Syd Griffith MD     Problem List:     Patient Active Problem List   Diagnosis    Hypertension    Hyperlipemia    Diabetes ([facility] )    Chronic low back pain    CAD (coronary artery disease)    Bladder cancer ([facility] )    Arthritis    A-fib ([facility] )    AAA (abdominal aortic aneurysm) ([facility] )    Decreased pedal pulses    Chronic pain of both knees    Actinic keratosis of right temple    Actinic keratosis of scalp    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    Immunization deficiency    Left lower quadrant abdominal pain    Excessive gas    Morbid obesity ([facility] )    Colitis    Mass of right adrenal gland ([facility] )    LUCIA (obstructive sleep apnea)    Embolism and thrombosis of arteries of the lower extremities ([facility] )      Past Medical and Surgical History:     Past Medical History:   Diagnosis Date    [facility])     AAA (abdominal aortic aneurysm) ([facility] )     Actinic keratosis of right temple 7/31/2020    Actinic keratosis of scalp 7/31/2020    Arthritis     Lay's esophagus     Bladder cancer ([facility] )     CAD (coronary artery disease)     Chronic low back pain     Chronic pain of both knees 7/31/2020    Colitis 12/4/2020    Diabetes [facility] )     Excessive gas 12/3/2020    Hyperlipemia     Hypertension     Immunization deficiency 10/30/2020    Hep a nonimmune    Left lower quadrant abdominal pain 12/3/2020    Mass of right adrenal gland ([facility] ) 12/4/2020    4 1 cm    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    LUCIA (obstructive sleep apnea) 1/29/2021     Past Surgical History:   Procedure Laterality Date    BACK SURGERY      BLADDER SURGERY      CATARACT EXTRACTION, BILATERAL      COLONOSCOPY  01/29/2019    next 2022 [id number] CORONARY ARTERY BYPASS GRAFT  04/2016    Quadruple per Chance Mercado    EGD  06/2017    TOTAL HIP ARTHROPLASTY Right 2012    TOTAL SHOULDER REPLACEMENT Right 2013      Family History:     Family History   Problem Relation Age of Onset    Heart disease Father     Arthritis Father     Heart attack Father     Alzheimer's disease Mother       Social History:        Social History     Socioeconomic History    Marital status: /Civil Union     Spouse name: None    Number of children: None    Years of education: None    Highest education level: None   Occupational History    None   Social Needs    Financial resource strain: None    Food insecurity     Worry: None     Inability: None    Transportation needs     Medical: None     Non-medical: None   Tobacco Use    Smoking status: Former Smoker     Packs/day: 0 25     Types: Cigarettes    Smokeless tobacco: Never Used    Tobacco comment: smoked since age 21; davon Mercado   Substance and Sexual Activity    Alcohol use: Not Currently     Comment: No use    Drug use: Never     Comment: No use    Sexual activity: None   Lifestyle    Physical activity     Days per week: None     Minutes per session: None    Stress: None   Relationships    Social connections     Talks on phone: None     Gets together: None     Attends Mandaen service: None     Active member of club or organization: None     Attends meetings of clubs or organizations: None     Relationship status: None    Intimate partner violence     Fear of current or ex partner: None     Emotionally abused: None     Physically abused: None     Forced sexual activity: None   Other Topics Concern    None   Social History Narrative    · Most recent tobacco use screenin2020      · Do you currently or have you served in [employer]:   No      · Were you activated, into active duty, as a member of the [employer] or as a Reservist:   No      · Live alone or with others:   with others        · Caffeine intake:   Occasional      · Illicit drugs:   No      · Diet:   Regular      · Exercise level: Moderate      · Advance directive:    Yes      · Marital status:         · General stress level:   Medium      · Single or multi-level home/work:   multi level home      · Guns present in home:   No      · Seat belts used routinely:   Yes      · Sunscreen used routinely:   Yes      · Smoke alarm in home:   Yes       Medications and Allergies:     Current Outpatient Medications   Medication Sig Dispense Refill    atorvastatin (LIPITOR) 40 mg tablet Take 1 tablet (40 mg total) by mouth daily 90 tablet 3    Eliquis 5 MG TAKE 1 TABLET BY MOUTH TWICE A DAY 60 tablet 3    ezetimibe (ZETIA) 10 mg tablet Take 1 tablet (10 mg total) by mouth daily 90 tablet 3    finasteride (PROSCAR) 5 mg tablet Take 1 tablet (5 mg total) by mouth daily 90 tablet 3    Fluad Quadrivalent 0 5 ML PRSY PHARMACY ADMINISTERED      furosemide (LASIX) 40 mg tablet Take 1 tablet (40 mg total) by mouth daily 90 tablet 1    Levemir FlexTouch 100 units/mL injection pen 65 units bid 15 pen 3    lisinopril (ZESTRIL) 20 mg tablet Take 1 tablet (20 mg total) by mouth daily 90 tablet 3    metFORMIN (GLUCOPHAGE) 1000 MG tablet Take 500 mg by mouth 2 (two) times a day   3    metoprolol succinate (TOPROL-XL) 100 mg 24 hr tablet TAKE 1 TABLET BY MOUTH EVERY DAY (Patient taking differently: Hold for heart rate less than 50 beats per minute ) 90 tablet 2    omeprazole (PriLOSEC) 20 mg delayed release capsule Take 1 capsule (20 mg total) by mouth daily 30 capsule 1    Unifine Pentips 31G X 8 MM MISC Inject under the skin daily Use as directed 100 each 2    metroNIDAZOLE (FLAGYL) 500 mg tablet Take 1 tablet (500 mg total) by mouth every 8 (eight) hours for 10 days 30 tablet 0     No current facility-administered medications for this visit  No Known Allergies   Immunizations:     Immunization History   Administered Date(s) Administered    Hep B, adult 10/30/2020    INFLUENZA 09/07/2011, 10/17/2012, 09/17/2013, 10/20/2014, 11/24/2015, 09/29/2016, 09/30/2016, 11/29/2018, 10/13/2020    Pneumococcal Conjugate 13-Valent 09/20/2016, 09/29/2016    Pneumococcal Polysaccharide PPV23 10/06/2009    SARS-CoV-2 / COVID-19 mRNA IM (Moderna) 01/21/2021    Td (adult), adsorbed 06/03/2011    Zoster 03/01/2010, 03/09/2010      Health Maintenance: There are no preventive care reminders to display for this patient  Topic Date Due    DTaP,Tdap,and Td Vaccines (1 - Tdap) 01/10/1966      Medicare Health Risk Assessment:     /64 (BP Location: Left arm, Patient Position: Sitting, Cuff Size: Standard)   Pulse 92   Temp (!) 97 3 °F (36 3 °C) (Temporal)   Resp 18   Ht 6' 4" (1 93 m)   Wt (!) 144 kg (318 lb)   SpO2 92%   BMI 38 71 kg/m²      Willian Suarez is here for his Subsequent Wellness visit  Last Medicare Wellness visit information reviewed, patient interviewed and updates made to the record today  Health Risk Assessment:   Patient rates overall health as fair  Patient feels that their physical health rating is same  Eyesight was rated as same  Hearing was rated as same  Patient feels that their emotional and mental health rating is same  Pain experienced in the last 7 days has been a lot  Patient's pain rating has been 7/10  Patient states that he has experienced weight loss or gain in last 6 months  Fall Risk Screening: In the past year, patient has experienced: no history of falling in past year      Home Safety:  Patient has trouble with stairs inside or outside of their home  Patient has working smoke alarms and has no working carbon monoxide detector  Home safety hazards include: none  Nutrition:   Current diet is Regular, Diabetic and Low Cholesterol  Medications:   Patient is currently taking over-the-counter supplements  OTC medications include: see medication list  Patient is able to manage medications  Activities of Daily Living (ADLs)/Instrumental Activities of Daily Living (IADLs):   Walk and transfer into and out of bed and chair?: Yes  Dress and groom yourself?: Yes    Bathe or shower yourself?: Yes    Feed yourself? Yes  Do your laundry/housekeeping?: Yes  Manage your money, pay your bills and track your expenses?: Yes  Make your own meals?: Yes    Do your own shopping?: Yes    Previous Hospitalizations:   Any hospitalizations or ED visits within the last 12 months?: No      Advance Care Planning:   Living will: Yes    Durable POA for healthcare:  Yes    Advanced directive: Yes    Five wishes given: Yes    End of Life Decisions reviewed with patient: Yes      Cognitive Screening:   Provider or family/friend/caregiver concerned regarding cognition?: No    PREVENTIVE SCREENINGS      Cardiovascular Screening:    General: History Lipid Disorder and Screening Current      Diabetes Screening:     General: History Diabetes and Screening Current      Colorectal Cancer Screening:     General: Screening Current      Prostate Cancer Screening:    General: Screening Current      Osteoporosis Screening:    General: Risks and Benefits Discussed    Due for: Bone Density Ultrasound      Abdominal Aortic Aneurysm (AAA) Screening:    Risk factors include: tobacco use        General: History AAA and Screening Current      Lung Cancer Screening:     General: Screening Current    Due for: Low Dose CT (LDCT)      Hepatitis C Screening:    General: Screening Current and Risks and Benefits Discussed    Hep C Screening Accepted: Yes        Preventive Screening Comments: Colonoscopy 2019=next 2022=dr susie rueda [facility]  Pneumovax 23 due 9/2021  dexa ordered  Monitor AAA 4 7 cm=f/u w vascular Dr Angelo Hammer  Psa=f/u Jonathan Fallen Dr Emma Wilcox for h/o bladder cancer  Non immune to hep a/b=check insurance for coverage  covid vaccine 1/2 done Moderna    Other Counseling Topics:   Alcohol use counseling, car/seat belt/driving safety, skin self-exam, sunscreen and calcium and vitamin D intake and regular weightbearing exercise         Carson Reyes MD

## 2021-01-29 NOTE — PATIENT INSTRUCTIONS

## 2021-01-29 NOTE — PROGRESS NOTES
Assessment/Plan:      Will refer patient to pulmonologist for evaluation of sleep and evaluation for calcified pleural plaque on his lung field  Refer patient back to gastroenterologist for GERD enlarged liver and left lower quadrant pain  Will do another round of metronidazole to check for colitis  Since that did improve last time  If GI evaluation normal will have to do vascular workup again since he had significant atherosclerosis will check for ischemia  Will check with urology for lymph node the pelvic region with history of bladder cancer  Appreciate endocrinology evaluation for the endocrine mass and diabetes  Patient will check with insurance for hep B and hep a coverage he will need that  However he needs to finish COVID vaccine dose series  Close monitor needed  I have spent 25 minutes with Patient and family today in which greater than 50% of this time was spent in counseling/coordination of care regarding Risks and benefits of tx options           Problem List Items Addressed This Visit        Digestive    Colitis    Relevant Medications    metroNIDAZOLE (FLAGYL) 500 mg tablet    Other Relevant Orders    Ambulatory referral to Gastroenterology       Endocrine    Diabetes ([facility] )       Respiratory    LUCIA (obstructive sleep apnea)    Relevant Orders    Ambulatory referral to Pulmonology       Cardiovascular and Mediastinum    Hypertension - Primary    CAD (coronary artery disease)    A-fib (HCC)    AAA (abdominal aortic aneurysm) (HCC)    Embolism and thrombosis of arteries of the lower extremities (HCC)       Musculoskeletal and Integument    Actinic keratosis of scalp       Genitourinary    Bladder cancer (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 )    Mass of right adrenal gland ([facility] )      Other Visit Diagnoses     Screening for osteoporosis Lay's esophagus without dysplasia        Relevant Orders    Ambulatory referral to Gastroenterology    On long term drug therapy        Relevant Orders    DXA bone density spine hip and pelvis    Long-term use of high-risk medication        Relevant Orders    DXA bone density spine hip and pelvis    Personal history of (healed) osteoporosis fracture         Relevant Orders    DXA bone density spine hip and pelvis    Increased BMI                Subjective:      Patient ID: Singh Ambrose is a 67 y o  male  25-year-old male follow-up type 2 diabetes hypertension abdominal pain  Patient recently been follow-up with endocrinologist for adrenal gland mass 4 0 cm, hormonal testing was normal   Dexamethasone suppression test came back normal   Decision was to conservative management due to patient's medical conditions  He had history of bladder cancer follow-up with Dr Courtney Townsend urologist   CT scan done last month due to left lower quadrant pain with poor appetite showed lymph node 2 cm  in the pelvic region  Kidney stone bilateral kidneys but no obstruction  His liver enlarged at 22 cm but no ascites  4 7 cm aortic abdominal aneurysm that is known he has follow-up with vascular surgeon Dr Rosenda Montes  Has been follow-up with [facility] doctor susie cheney for Lay's esophagus and had colonoscopy done 2019  Was diagnosed with GERD and he is on omeprazole for  Recently the past couple months he has been having symptom GERD again with burning sensation when he eats last about 15 minutes  Still  left lower quadrant pain achy pressure constant  He was treated with metronidazole last month in the pain did subside but now is returning although not as severe  He has sleep apnea will seen Dr Avelino Centeno and has sleep study done 8 years ago  He is on CPAP machine at 12 cm but recently he felt that is not working as well and he does not get a good night's sleep the pressures not the same    CT also showed calcified pleural plaque on the lung field suggest could be asbestosis  He also follow-up Dr Chelita Cole for cardiologist   His blood sugar has been stable  Actos was stopped  Ozempic was stopped due to abdominal pain  His liver enzyme lipase amylase is normal       The following portions of the patient's history were reviewed and updated as appropriate:   Past Medical History:  He has a past medical history of A-fib ([facility] ), AAA (abdominal aortic aneurysm) ([facility] ), Actinic keratosis of right temple (7/31/2020), Actinic keratosis of scalp (7/31/2020), Arthritis, Lay's esophagus, Bladder cancer ([facility] ), CAD (coronary artery disease), Chronic low back pain, Chronic pain of both knees (7/31/2020), Colitis (12/4/2020), [facility] ), Excessive gas (12/3/2020), Hyperlipemia, Hypertension, Immunization deficiency (10/30/2020), Left lower quadrant abdominal pain (12/3/2020), Mass of right adrenal gland ([facility] ) (12/4/2020), 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), and LUCIA (obstructive sleep apnea) (1/29/2021)  ,  _______________________________________________________________________  Medical Problems:  does not have any pertinent problems on file ,  _______________________________________________________________________  Past Surgical History:   has a past surgical history that includes Back surgery; Bladder surgery; Cataract extraction, bilateral; Colonoscopy (01/29/2019); Coronary artery bypass graft (04/2016); Total shoulder replacement (Right, 2013); Total hip arthroplasty (Right, 2012); and EGD (06/2017)  ,  _______________________________________________________________________  Family History:  family history includes Alzheimer's disease in his mother; Arthritis in his father; Heart attack in his father; Heart disease in his father ,  _______________________________________________________________________  Social History:   reports that he has quit smoking  His smoking use included cigarettes   He smoked 0 25 packs per day  He has never used smokeless tobacco  He reports previous alcohol use  He reports that he does not use drugs  ,  _______________________________________________________________________  Allergies:  has No Known Allergies     _______________________________________________________________________  Current Outpatient Medications   Medication Sig Dispense Refill    atorvastatin (LIPITOR) 40 mg tablet Take 1 tablet (40 mg total) by mouth daily 90 tablet 3    Eliquis 5 MG TAKE 1 TABLET BY MOUTH TWICE A DAY 60 tablet 3    ezetimibe (ZETIA) 10 mg tablet Take 1 tablet (10 mg total) by mouth daily 90 tablet 3    finasteride (PROSCAR) 5 mg tablet Take 1 tablet (5 mg total) by mouth daily 90 tablet 3    Fluad Quadrivalent 0 5 ML PRSY PHARMACY ADMINISTERED      furosemide (LASIX) 40 mg tablet Take 1 tablet (40 mg total) by mouth daily 90 tablet 1    Levemir FlexTouch 100 units/mL injection pen 65 units bid 15 pen 3    lisinopril (ZESTRIL) 20 mg tablet Take 1 tablet (20 mg total) by mouth daily 90 tablet 3    metFORMIN (GLUCOPHAGE) 1000 MG tablet Take 500 mg by mouth 2 (two) times a day   3    metoprolol succinate (TOPROL-XL) 100 mg 24 hr tablet TAKE 1 TABLET BY MOUTH EVERY DAY (Patient taking differently: Hold for heart rate less than 50 beats per minute ) 90 tablet 2    omeprazole (PriLOSEC) 20 mg delayed release capsule Take 1 capsule (20 mg total) by mouth daily 30 capsule 1    Unifine Pentips 31G X 8 MM MISC Inject under the skin daily Use as directed 100 each 2    metroNIDAZOLE (FLAGYL) 500 mg tablet Take 1 tablet (500 mg total) by mouth every 8 (eight) hours for 10 days 30 tablet 0     No current facility-administered medications for this visit       _______________________________________________________________________  Review of Systems   Constitutional: Negative for activity change, appetite change, fatigue, fever and unexpected weight change     HENT: Negative for dental problem and trouble swallowing  Eyes: Negative for photophobia and visual disturbance  Respiratory: Negative for cough and chest tightness  Cardiovascular: Positive for leg swelling  Negative for chest pain and palpitations  Gastrointestinal: Positive for abdominal pain  Negative for constipation and vomiting  Endocrine: Negative for cold intolerance, polydipsia and polyuria  Genitourinary: Negative for difficulty urinating, frequency and urgency  Musculoskeletal: Negative for arthralgias, joint swelling, myalgias and neck pain  Skin: Negative for color change, rash and wound  Allergic/Immunologic: Negative for environmental allergies  Neurological: Negative for dizziness, weakness and numbness  Hematological: Does not bruise/bleed easily  Psychiatric/Behavioral: Negative for decreased concentration, dysphoric mood, self-injury, sleep disturbance and suicidal ideas  Objective:  Vitals:    01/29/21 1456   BP: 126/64   BP Location: Left arm   Patient Position: Sitting   Cuff Size: Standard   Pulse: 92   Resp: 18   Temp: (!) 97 3 °F (36 3 °C)   TempSrc: Temporal   SpO2: 92%   Weight: (!) 144 kg (318 lb)   Height: 6' 4" (1 93 m)     Body mass index is 38 71 kg/m²  Physical Exam  Vitals signs and nursing note reviewed  Constitutional:       Appearance: He is well-developed  He is obese  HENT:      Head: Normocephalic and atraumatic  Eyes:      Pupils: Pupils are equal, round, and reactive to light  Neck:      Musculoskeletal: Normal range of motion and neck supple  Cardiovascular:      Rate and Rhythm: Normal rate and regular rhythm  Heart sounds: Normal heart sounds  Pulmonary:      Effort: Pulmonary effort is normal       Breath sounds: Normal breath sounds  Abdominal:      General: Bowel sounds are normal       Palpations: Abdomen is soft  Tenderness: There is abdominal tenderness        Comments: LLQ tender mild on palpation, no guarding   Musculoskeletal: Normal range of motion  Right lower leg: Edema present  Left lower leg: Edema present  Skin:     General: Skin is warm and dry  Neurological:      Mental Status: He is alert and oriented to person, place, and time  Mental status is at baseline  Psychiatric:         Mood and Affect: Mood normal          Behavior: Behavior normal          Thought Content:  Thought content normal          Judgment: Judgment normal

## 2021-02-03 ENCOUNTER — TELEPHONE (OUTPATIENT)
Dept: ENDOCRINOLOGY | Facility: CLINIC | Age: 76
End: 2021-02-03

## 2021-02-03 DIAGNOSIS — R79.89 ELEVATED MORNING SERUM CORTISOL LEVEL: ICD-10-CM

## 2021-02-03 DIAGNOSIS — E27.8 MASS OF RIGHT ADRENAL GLAND (HCC): Primary | ICD-10-CM

## 2021-02-03 DIAGNOSIS — I10 ESSENTIAL HYPERTENSION: ICD-10-CM

## 2021-02-03 DIAGNOSIS — I10 ESSENTIAL HYPERTENSION: Primary | ICD-10-CM

## 2021-02-03 RX ORDER — METOPROLOL SUCCINATE 50 MG/1
50 TABLET, EXTENDED RELEASE ORAL DAILY
Qty: 90 TABLET | Refills: 1 | Status: SHIPPED | OUTPATIENT
Start: 2021-02-03 | End: 2021-03-31 | Stop reason: HOSPADM

## 2021-02-03 RX ORDER — METOPROLOL SUCCINATE 100 MG/1
100 TABLET, EXTENDED RELEASE ORAL DAILY
Qty: 90 TABLET | Refills: 2 | OUTPATIENT
Start: 2021-02-03

## 2021-02-03 NOTE — TELEPHONE ENCOUNTER
Please let patient know his cortisol level in the morning was mildly elevated at 23, did he take the dexamethasone pill the night before?

## 2021-02-03 NOTE — TELEPHONE ENCOUNTER
Patient needs his Metoprolol Succinate 50mg refilled   Request sent to Dr Katerina Tadeo for approval

## 2021-02-03 NOTE — TELEPHONE ENCOUNTER
I would like him to have  2 midnight salivary cortisols done (ordered)- take slip to lab to get the saliva collection kits    Please do not eat at least 1 hour before having it done and do not brush the teeth before having it done     We are assessing if the adrenal gland lesion is producing too much cortisol which is a steroid which can result in higher blood sugars and blood pressure and weight gain

## 2021-02-03 NOTE — TELEPHONE ENCOUNTER
Spoke with patient and reviewed his lab results  He understood  He said he took the dexamethasone pill the night before

## 2021-02-08 ENCOUNTER — TELEPHONE (OUTPATIENT)
Dept: GASTROENTEROLOGY | Facility: CLINIC | Age: 76
End: 2021-02-08

## 2021-02-08 NOTE — TELEPHONE ENCOUNTER
Pt has referral in his chart to see Dr Rekha Ward  I LMOM for pt to call to get scheduled for OV  Pt is due for EGD, but referral states LLQ pain and colitis which does not go under the EGD  Pt needs an OV  Will try calling him back in 1 week

## 2021-02-08 NOTE — TELEPHONE ENCOUNTER
Patient had left message returning call  I returned his call and had to leave message for him to call back  Thanks!

## 2021-02-11 ENCOUNTER — LAB (OUTPATIENT)
Dept: LAB | Facility: AMBULARY SURGERY CENTER | Age: 76
End: 2021-02-11
Payer: MEDICARE

## 2021-02-11 DIAGNOSIS — R79.89 ELEVATED MORNING SERUM CORTISOL LEVEL: ICD-10-CM

## 2021-02-11 DIAGNOSIS — E27.8 MASS OF RIGHT ADRENAL GLAND (HCC): ICD-10-CM

## 2021-02-11 PROCEDURE — 82533 TOTAL CORTISOL: CPT

## 2021-02-12 DIAGNOSIS — I10 ESSENTIAL HYPERTENSION: ICD-10-CM

## 2021-02-12 RX ORDER — METOPROLOL SUCCINATE 100 MG/1
TABLET, EXTENDED RELEASE ORAL
Qty: 90 TABLET | Refills: 2 | OUTPATIENT
Start: 2021-02-12

## 2021-02-18 DIAGNOSIS — I10 ESSENTIAL HYPERTENSION: ICD-10-CM

## 2021-02-18 LAB
CORTIS BS SAL-MCNC: 0.18 UG/DL
CORTIS SP1 P CHAL SAL-MCNC: 0.09 UG/DL

## 2021-02-18 RX ORDER — METOPROLOL SUCCINATE 100 MG/1
100 TABLET, EXTENDED RELEASE ORAL DAILY
Qty: 90 TABLET | Refills: 0 | Status: SHIPPED | OUTPATIENT
Start: 2021-02-18 | End: 2021-03-31 | Stop reason: HOSPADM

## 2021-03-03 ENCOUNTER — IMMUNIZATIONS (OUTPATIENT)
Dept: FAMILY MEDICINE CLINIC | Facility: HOSPITAL | Age: 76
End: 2021-03-03

## 2021-03-03 DIAGNOSIS — Z23 ENCOUNTER FOR IMMUNIZATION: Primary | ICD-10-CM

## 2021-03-03 PROCEDURE — 0012A SARS-COV-2 / COVID-19 MRNA VACCINE (MODERNA) 100 MCG: CPT

## 2021-03-03 PROCEDURE — 91301 SARS-COV-2 / COVID-19 MRNA VACCINE (MODERNA) 100 MCG: CPT

## 2021-03-05 ENCOUNTER — OFFICE VISIT (OUTPATIENT)
Dept: GASTROENTEROLOGY | Facility: CLINIC | Age: 76
End: 2021-03-05
Payer: MEDICARE

## 2021-03-05 VITALS
SYSTOLIC BLOOD PRESSURE: 166 MMHG | HEART RATE: 106 BPM | HEIGHT: 76 IN | DIASTOLIC BLOOD PRESSURE: 86 MMHG | WEIGHT: 315 LBS | BODY MASS INDEX: 38.36 KG/M2

## 2021-03-05 DIAGNOSIS — K22.70 BARRETT'S ESOPHAGUS WITHOUT DYSPLASIA: ICD-10-CM

## 2021-03-05 DIAGNOSIS — K52.9 COLITIS: Primary | ICD-10-CM

## 2021-03-05 DIAGNOSIS — Z86.010 HISTORY OF COLON POLYPS: ICD-10-CM

## 2021-03-05 DIAGNOSIS — K21.9 GASTROESOPHAGEAL REFLUX DISEASE, UNSPECIFIED WHETHER ESOPHAGITIS PRESENT: ICD-10-CM

## 2021-03-05 DIAGNOSIS — K44.9 HIATAL HERNIA: ICD-10-CM

## 2021-03-05 DIAGNOSIS — K57.30 DIVERTICULOSIS LARGE INTESTINE W/O PERFORATION OR ABSCESS W/O BLEEDING: ICD-10-CM

## 2021-03-05 DIAGNOSIS — R10.32 LEFT LOWER QUADRANT ABDOMINAL PAIN: ICD-10-CM

## 2021-03-05 PROCEDURE — 99214 OFFICE O/P EST MOD 30 MIN: CPT | Performed by: INTERNAL MEDICINE

## 2021-03-05 RX ORDER — METRONIDAZOLE 500 MG/1
500 TABLET ORAL EVERY 8 HOURS SCHEDULED
Qty: 21 TABLET | Refills: 0 | Status: SHIPPED | OUTPATIENT
Start: 2021-03-05 | End: 2021-03-12

## 2021-03-05 RX ORDER — CIPROFLOXACIN 500 MG/1
500 TABLET, FILM COATED ORAL EVERY 12 HOURS SCHEDULED
Qty: 14 TABLET | Refills: 0 | Status: SHIPPED | OUTPATIENT
Start: 2021-03-05 | End: 2021-03-12

## 2021-03-05 NOTE — PATIENT INSTRUCTIONS
· Follow low residue diet x2 weeks, then increase to high fiber  · Take full course of antibiotics  · Increase Prilosec to twice a day before meals  · We will schedule for EGD/Colonoscopy to look at your stomach and colon at the end of April    Diverticulitis Diet   WHAT YOU NEED TO KNOW:   What is a diverticulitis diet? A diverticulitis diet includes foods that allow your intestines to rest while you have diverticulitis  Diverticulitis is a condition that causes diverticula (small pockets) along your intestine to become inflamed or infected  This is caused by hard bowel movement, food, or bacteria that get stuck in the pockets  Which foods may be recommended while I have diverticulitis? · A clear liquid diet may be recommended for 2 to 3 days  A clear liquid diet includes clear liquids, and foods that are liquid at room temperature  Examples include the following:     ? Water and clear juices (such as apple, cranberry, or grape), strained citrus juices or fruit punch    ? Coffee or tea (without cream or milk)    ? Clear sports drinks or soft drinks, such as ginger ale, lemon-lime soda, or club soda (no cola or root beer)    ? Clear broth, bouillon, or consommé    ? Plain popsicles (no popsicles with pureed fruit or fiber)    ? Flavored gelatin without fruit    · Low-fiber foods may be recommended until your symptoms improve  Examples include the following:     ? Cream of wheat and finely ground grits    ? White bread, white pasta, and white rice    ? Canned and well-cooked fruit without skins or seeds, and juice without pulp    ? Canned and well-cooked vegetables without skins or seeds, and vegetable juice    ? Cow's milk, lactose-free milk, soy milk, and rice milk    ? Yogurt, cottage cheese, and sherbet    ? Eggs, poultry (such as chicken and turkey), fish, and tender, ground, well-cooked beef     ? Tofu and smooth nut butters, such as peanut butter    ?  Broth and strained soups made of low-fiber foods    What do I need to know about high-fiber foods? High-fiber foods can help prevent diverticulosis and diverticulitis  Your healthcare provider will tell you when you can add high-fiber foods back into your diet  Examples include the following:  · Whole grains and breads, and cereals made with whole grains    · Dried fruit, fresh fruit with skin, and fruit pulp    · Raw vegetables    · Cooked greens, such as spinach    · Tough meat and meat with gristle    · Legumes, such as alfred beans and lentils    When should I contact my healthcare provider? · Your symptoms get worse or do not go away  · You have questions about the foods you should eat  · You have questions or concerns about your condition or care  CARE AGREEMENT:   You have the right to help plan your care  Learn about your health condition and how it may be treated  Discuss treatment options with your healthcare providers to decide what care you want to receive  You always have the right to refuse treatment  The above information is an  only  It is not intended as medical advice for individual conditions or treatments  Talk to your doctor, nurse or pharmacist before following any medical regimen to see if it is safe and effective for you  © Copyright [address] [place] [address] Drive Information is for End User's use only and may not be sold, redistributed or otherwise used for commercial purposes   All illustrations and images included in CareNotes® are the copyrighted property of [employer]  or [address] [place] [address]

## 2021-03-05 NOTE — PROGRESS NOTES
[facility] - Outpatient Follow-up Note  Aneta Rinaldi 67 y o  male MRN: [id number]  Encounter: [id number]          ASSESSMENT AND PLAN:      1  Colitis  -     Ambulatory referral to Gastroenterology  -     Colonoscopy; Future; Expected date: 03/05/2021    2  Left lower quadrant abdominal pain  -     Ambulatory referral to Gastroenterology  -     Colonoscopy; Future; Expected date: 03/05/2021  -     ciprofloxacin (CIPRO) 500 mg tablet; Take 1 tablet (500 mg total) by mouth every 12 (twelve) hours for 7 days  -     metroNIDAZOLE (FLAGYL) 500 mg tablet; Take 1 tablet (500 mg total) by mouth every 8 (eight) hours for 7 days    Patient complains of left lower quadrant pain for the last several months, he was treated with 2 courses of Flagyl for suspected diverticulitis versus colitis with initial improvement, but now he states the pain is worsening  Sometimes takes his breath away  He is tender on exam in the left lower quadrant and has history of sigmoid diverticulosis  He has been having intermittent loose stools  He has had 2  CT scans of the abdomen recently showing colonic diverticulosis but no evidence of diverticulitis  -  Low residue diet x2 weeks,  Education given  - complete 7 day course of Cipro 500 mg twice daily and Flagyl 500 mg 3 times daily  - We will do colonoscopy along with EGD at the end of April for further evaluation of the colon to rule out malignancy  MiraLax and Dulcolax prep handout given  This will be done at [facility] given patient has many co morbidities  - will need clearance to hold Eliquis for a day prior to procedure, patient states his primary doctor supplies his Eliquis for him  3  History of colon polyps  -     Colonoscopy;  Future; Expected date: 03/05/2021    History of tubular adenoma on last colonoscopy in January 2019, will repeat colonoscopy due to persistent left lower quadrant pain at the end of April after course of antibiotics  4  Diverticulosis large intestine w/o perforation or abscess w/o bleeding  -     Colonoscopy; Future; Expected date: 03/05/2021   History of diverticulosis in the sigmoid colon as seen on last colonoscopy and CT imaging  Patient complaining of left lower quadrant pain, see above  5  Gastroesophageal reflux disease, unspecified whether esophagitis present  -     EGD; Future; Expected date: 03/05/2021    Patient denies any reflux or heartburn on current regimen of Prilosec 20 mg daily  He does note epigastric burning which started a week ago and starts consistently 15-20 minutes after meals lasting 15-20 minutes  Epigastric burning may be secondary to reflux esophagitis, gastritis, PUD, or H pylori infection  Will obtain repeat EGD for Lay's esophagus surveillance as well as further evaluation of epigastric burning  Prep and procedure explained  Patient had 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 on last EGD in 2017  -   Increase Prilosec 20 mg to twice daily until EGD  - further recommendations pending EGD results    6  Lay's esophagus without dysplasia  -     Ambulatory referral to Gastroenterology  -     EGD; Future; Expected date: 03/05/2021   History of Lay's esophagus without dysplasia  He underwent RFA   in 2017 and he is due for repeat EGD  EGD ordered  7  Hiatal hernia  -     EGD; Future; Expected date: 03/05/2021     small sliding hiatal hernia noted on EGD from February 2017     ______________________________________________________________________    SUBJECTIVE:    This is a 79-year-old male with past medical history of AFib on Eliquis (Dr Keaton Onofre), bladder cancer treated with BCG 9 years ago, in remission, CAD,  CABG, diabetes, right hip arthroplasty, AAA, GERD with hiatal hernia, Lay's esophagus status post RFA, LUCIA, right adrenal mass who presents for follow-up with wife Mauricio Nyhan   Recently had a CT scan for left lower quadrant pain in January which showed 4 1 cm indeterminate right adrenal gland mass, atherosclerotic changes present in the infrarenal aortic aneurysm measuring 4 6 x 4 2,  Enlarged prostate gland, nonspecific subcutaneous edema in the lower ventral abdominal wall, common bilateral nonobstructing nephrolithiasis measuring up to 9 mm in the lower pole of the left kidney  No hydronephrosis  There is colonic diverticulosis without evidence of acute diverticulitis  He had repeat CT scan in January showing stable last 20 cm right adrenal nodule with imaging features consistent with benign adrenal adenoma  Patient is due for repeat EGD for history of Lay's esophagus status post RFA in 2017, however wanted to defer this due to left lower quadrant pain  LLQ cramping pain for several month about three times a day, progressively getting worse, sometimes it's so severe it makes him hold his breath  He was treated with a course of Flagyl which helped a little but didn't make the pain go away  Then, was treated with another course of Flagyl which was ineffective  He moves his bowels daily, no straining, no blood or black discoloration of stool  Few days ago he noticed stool was bright yellow and loose, but then it returned back to normal  Denies fevers/chills, no sick contacts  Denies any aggravating or alleviating factors to LLQ pain, he notices it more in the evening  Denies NSAID use, has been taking tylenol for LLQ pain  Last colonoscopy was 01/29/2019, he had evidence of left-sided diverticulosis and small to medium internal hemorrhoid,  2 polyps were removed ( 1 was benign colonic mucosa and 1 was tubular adenoma, with no dysplasia), repeat colonoscopy recommended in January 2022    He also notes a burning sensation in his epigastric region, started a week ago, it's after he eats  About 15-20min after  Lasts for 15-20 minutes  He has tried Tums which doesn't work  He takes Omeprazole 20mg daily on an empty stomach   Denies any heartburn or reflux  Denies any nausea vomiting  He has lost 17 pounds cutting back on calories  Last EGD was done 02/02/2017 showing small sliding hiatal hernia ultra short segment of Lay's esophagus and small duodenal polyp which was removed, he was recommended to repeat  EGD in February 2020  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, negative for malignancy  Antral biopsy showed mild chronic inactive gastritis negative for H pylori  Lower esophageal biopsy positive for Lay's esophagus without dysplasia          REVIEW OF SYSTEMS IS OTHERWISE NEGATIVE        Historical Information   Past Medical History:   Diagnosis Date    [facility]-fib [facility])     [facility]     Actinic keratosis of right temple 7/31/2020    Actinic keratosis of scalp 7/31/2020    Arthritis     Lay's esophagus     Bladder cancer (HCC)     CAD (coronary artery disease)     Chronic low back pain     Chronic pain of both knees 7/31/2020    Colitis 12/4/2020    Diabetes [facility]     Excessive gas 12/3/2020    Hyperlipemia     Hypertension     Immunization deficiency 10/30/2020    Hep a nonimmune    Left lower quadrant abdominal pain 12/3/2020    Mass of right adrenal gland [facility] ) 12/4/2020    4 1 cm    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    LUCIA (obstructive sleep apnea) 1/29/2021     Past Surgical History:   Procedure Laterality Date    BACK SURGERY      BLADDER SURGERY      CATARACT EXTRACTION, BILATERAL      COLONOSCOPY  01/29/2019    next 2022 [address] GRAFT  04/2016    Quadruple per [place]    EGD  06/2017    TOTAL HIP ARTHROPLASTY Right 2012    TOTAL SHOULDER REPLACEMENT Right 2013     Social History   Social History     Substance and Sexual Activity   Alcohol Use Not Currently    Comment: No use     Social History     Substance and Sexual Activity   Drug Use Never    Comment: No use     Social History     Tobacco Use   Smoking Status Former Smoker    Packs/day: 0 25    Types: Cigarettes   Smokeless Tobacco Never Used   Tobacco Comment    smoked since age 21; per [place]     Family History   Problem Relation Age of Onset    Heart disease Father     Arthritis Father     Heart attack Father     Alzheimer's disease Mother        Meds/Allergies       Current Outpatient Medications:     Eliquis 5 MG    ezetimibe (ZETIA) 10 mg tablet    finasteride (PROSCAR) 5 mg tablet    Fluad Quadrivalent 0 5 ML PRSY    Levemir FlexTouch 100 units/mL injection pen    lisinopril (ZESTRIL) 20 mg tablet    metFORMIN (GLUCOPHAGE) 1000 MG tablet    metoprolol succinate (TOPROL-XL) 100 mg 24 hr tablet    metoprolol succinate (TOPROL-XL) 50 mg 24 hr tablet    Unifine Pentips 31G X 8 MM MISC    atorvastatin (LIPITOR) 40 mg tablet    ciprofloxacin (CIPRO) 500 mg tablet    furosemide (LASIX) 40 mg tablet    metroNIDAZOLE (FLAGYL) 500 mg tablet    omeprazole (PriLOSEC) 20 mg delayed release capsule    No Known Allergies        Objective     Blood pressure 166/86, pulse (!) 106, height 6' 4" (1 93 m), weight (!) 144 kg (318 lb)  Body mass index is 38 71 kg/m²  PHYSICAL EXAM:      General Appearance:   Alert, cooperative, no distress   HEENT:   Normocephalic, atraumatic, anicteric  Neck:  Supple, symmetrical, trachea midline   Lungs:   Clear to auscultation bilaterally; no rales, rhonchi or wheezing; respirations unlabored    Heart[de-identified]   Regular rate and rhythm; no murmur  Abdomen:   Soft, non-tender, non-distended; normal bowel sounds; no masses, no organomegaly    Genitalia:   Deferred    Rectal:   Deferred    Extremities:  No cyanosis, clubbing or edema    Skin:  No jaundice, rashes, or lesions    Lymph nodes:  No palpable cervical lymphadenopathy        Lab Results:   No visits with results within 1 Day(s) from this visit     Latest known visit with results is:   Lab on 02/11/2021   Component Date Value    1 Salivary Cortisol 02/11/2021 0 184     2 SALIVARY CORTISOL 02/11/2021 0 088          Radiology Results:   No results found

## 2021-03-09 ENCOUNTER — TELEPHONE (OUTPATIENT)
Dept: GASTROENTEROLOGY | Facility: CLINIC | Age: 76
End: 2021-03-09

## 2021-03-09 NOTE — TELEPHONE ENCOUNTER
Sent cardiac clearance to hold eliquis one day prior to procedure to dr torri huerta   Will check back

## 2021-03-10 LAB
LEFT EYE DIABETIC RETINOPATHY: NORMAL
RIGHT EYE DIABETIC RETINOPATHY: NORMAL

## 2021-03-12 ENCOUNTER — APPOINTMENT (OUTPATIENT)
Dept: LAB | Facility: AMBULARY SURGERY CENTER | Age: 76
End: 2021-03-12
Payer: MEDICARE

## 2021-03-12 ENCOUNTER — TRANSCRIBE ORDERS (OUTPATIENT)
Dept: LAB | Facility: AMBULARY SURGERY CENTER | Age: 76
End: 2021-03-12

## 2021-03-12 DIAGNOSIS — E11.65 UNCONTROLLED TYPE 2 DIABETES MELLITUS WITH HYPERGLYCEMIA (HCC): ICD-10-CM

## 2021-03-12 DIAGNOSIS — E27.8 ABNORMALITY OF CORTISOL-BINDING GLOBULIN (HCC): ICD-10-CM

## 2021-03-12 DIAGNOSIS — Z79.4 ENCOUNTER FOR LONG-TERM (CURRENT) USE OF INSULIN (HCC): ICD-10-CM

## 2021-03-12 DIAGNOSIS — E27.8 ADRENAL MASS, RIGHT (HCC): Primary | ICD-10-CM

## 2021-03-12 DIAGNOSIS — E78.2 MIXED HYPERLIPIDEMIA: ICD-10-CM

## 2021-03-12 DIAGNOSIS — E27.9 LESION OF ADRENAL GLAND (HCC): ICD-10-CM

## 2021-03-12 LAB
ALBUMIN SERPL BCP-MCNC: 3.1 G/DL (ref 3.5–5)
ALP SERPL-CCNC: 81 U/L (ref 46–116)
ALT SERPL W P-5'-P-CCNC: 27 U/L (ref 12–78)
ANION GAP SERPL CALCULATED.3IONS-SCNC: 5 MMOL/L (ref 4–13)
AST SERPL W P-5'-P-CCNC: 15 U/L (ref 5–45)
BILIRUB SERPL-MCNC: 0.39 MG/DL (ref 0.2–1)
BUN SERPL-MCNC: 17 MG/DL (ref 5–25)
CALCIUM ALBUM COR SERPL-MCNC: 10 MG/DL (ref 8.3–10.1)
CALCIUM SERPL-MCNC: 9.3 MG/DL (ref 8.3–10.1)
CHLORIDE SERPL-SCNC: 103 MMOL/L (ref 100–108)
CHOLEST SERPL-MCNC: 122 MG/DL (ref 50–200)
CO2 SERPL-SCNC: 34 MMOL/L (ref 21–32)
CREAT SERPL-MCNC: 0.75 MG/DL (ref 0.6–1.3)
CREAT UR-MCNC: 33.7 MG/DL
EST. AVERAGE GLUCOSE BLD GHB EST-MCNC: 220 MG/DL
GFR SERPL CREATININE-BSD FRML MDRD: 88 ML/MIN/1.73SQ M
GLUCOSE P FAST SERPL-MCNC: 157 MG/DL (ref 65–99)
HBA1C MFR BLD: 9.3 %
HDLC SERPL-MCNC: 39 MG/DL
LDLC SERPL CALC-MCNC: 49 MG/DL (ref 0–100)
MICROALBUMIN UR-MCNC: 118 MG/L (ref 0–20)
MICROALBUMIN/CREAT 24H UR: 350 MG/G CREATININE (ref 0–30)
NONHDLC SERPL-MCNC: 83 MG/DL
POTASSIUM SERPL-SCNC: 4.2 MMOL/L (ref 3.5–5.3)
PROT SERPL-MCNC: 7.3 G/DL (ref 6.4–8.2)
SODIUM SERPL-SCNC: 142 MMOL/L (ref 136–145)
TRIGL SERPL-MCNC: 171 MG/DL

## 2021-03-12 PROCEDURE — 80053 COMPREHEN METABOLIC PANEL: CPT

## 2021-03-12 PROCEDURE — 82043 UR ALBUMIN QUANTITATIVE: CPT

## 2021-03-12 PROCEDURE — 80061 LIPID PANEL: CPT

## 2021-03-12 PROCEDURE — 82570 ASSAY OF URINE CREATININE: CPT

## 2021-03-12 PROCEDURE — 83036 HEMOGLOBIN GLYCOSYLATED A1C: CPT

## 2021-03-12 PROCEDURE — 36415 COLL VENOUS BLD VENIPUNCTURE: CPT

## 2021-03-12 NOTE — TELEPHONE ENCOUNTER
Received completed cardiac clearance form- pt is cleared to hold Eliquis 1 day prior to procedure -scanned into -[facility]

## 2021-03-17 ENCOUNTER — OFFICE VISIT (OUTPATIENT)
Dept: ENDOCRINOLOGY | Facility: CLINIC | Age: 76
End: 2021-03-17
Payer: MEDICARE

## 2021-03-17 VITALS
BODY MASS INDEX: 38.36 KG/M2 | WEIGHT: 315 LBS | HEART RATE: 85 BPM | DIASTOLIC BLOOD PRESSURE: 80 MMHG | TEMPERATURE: 97 F | SYSTOLIC BLOOD PRESSURE: 160 MMHG | HEIGHT: 76 IN

## 2021-03-17 DIAGNOSIS — R60.0 LOWER EXTREMITY EDEMA: ICD-10-CM

## 2021-03-17 DIAGNOSIS — E11.65 TYPE 2 DIABETES MELLITUS WITH HYPERGLYCEMIA, WITH LONG-TERM CURRENT USE OF INSULIN (HCC): ICD-10-CM

## 2021-03-17 DIAGNOSIS — E27.8 MASS OF RIGHT ADRENAL GLAND (HCC): Primary | ICD-10-CM

## 2021-03-17 DIAGNOSIS — Z79.4 TYPE 2 DIABETES MELLITUS WITH HYPERGLYCEMIA, WITH LONG-TERM CURRENT USE OF INSULIN (HCC): ICD-10-CM

## 2021-03-17 DIAGNOSIS — E27.9 LESION OF ADRENAL GLAND (HCC): ICD-10-CM

## 2021-03-17 PROCEDURE — 99214 OFFICE O/P EST MOD 30 MIN: CPT | Performed by: INTERNAL MEDICINE

## 2021-03-17 RX ORDER — GLIPIZIDE 5 MG/1
5 TABLET, FILM COATED, EXTENDED RELEASE ORAL DAILY
Qty: 90 TABLET | Refills: 1 | Status: SHIPPED | OUTPATIENT
Start: 2021-03-17 | End: 2021-07-28 | Stop reason: SDUPTHER

## 2021-03-17 RX ORDER — INSULIN DETEMIR 100 [IU]/ML
INJECTION, SOLUTION SUBCUTANEOUS
Qty: 120 ML | Refills: 1 | Status: SHIPPED | OUTPATIENT
Start: 2021-03-17 | End: 2021-07-28 | Stop reason: SDUPTHER

## 2021-03-17 NOTE — PATIENT INSTRUCTIONS
Continue same dose of levemir    Reduce levemir to 50 units in the morning and at bedtime starting the day before prep     Send me blood sugar log in 2-3 weeks    Have 24 hr urine cortisol test done    Have rest of labs done in 3 months

## 2021-03-17 NOTE — PROGRESS NOTES
ENDOCRINOLOGY  FOLLOW UP VISIT      Reason for Endocrine Consult/Chief Complaint: adrenal disorder follow up    ? Medical Decision Making:     Impression:  1  Right adrenal mass 4 1cm  2  HTN  3  HLD  4  Type 2 Diabetes  5  AAA  6  Atrial fibrillation on AC  7  Hx of non muscle invasive bladder CA in remission  8  Obesity BMI 40  9  CAD s/p CABG  10  LE edema b/l     Recommendations:     Right adrenal adenoma benign on CT with washout  Aldosterone and plasma metanephrines level normal  2 x dexamethasone suppression testing was elevated, 1 salivary cortisol was mildly elevated the other was normal, will check 24hr urine cortisol to assess for Cushing's syndrome      Repeat CT scan abdomen without contrast for size re-evaluation 1/2022     Type 2 DM- BGs are higher along with A1C 9 2% March 2021, will continue levemir 65 units qAM and qHS and metformin 500mg BID AC, added glipizide XL 5mg qday, instructed to send me BG log in 2-3 weeks for review, had nausea with ozempic in the past, had weight gain and fluid retention with actos, having colonoscopy done instructed to reduce levemir to 50 units the night before prep and continue 50 units twice a day until after the colonoscopy is complete     LE edema b/l- instructed to notify cardiologist to see if diuretic dose needs adjustment, has had significant >10lbs weight gain recently      RTC 3 months  Lydia LEO  Endocrinology        History of Present Illness:   Mr Fernandez Collier is a 67year old male who presents for adrenal disorder follow up   ?  PMH-adrenal mass, HTN, HLD, DM2, AAA, atrial fibrillation, bladder CA, Obesity BMI 40, CAD  PSH-CABG, shoulder surgery, bladder surgery, hip surgery  FHx-aunt with diabetes  SHx-former smoker quit 12 years, no ETOH use, retired worked at [employer] since last visit:     On levemir 65 units twice a day, metformin 500mg BID AC  This morning , 170-180s at bedtime   No hypoglycemia reported    ?   Review of Systems:     Review of Systems   Constitutional: Negative for appetite change, chills, diaphoresis, fatigue, fever and unexpected weight change  HENT: Negative for congestion, ear pain, hearing loss, rhinorrhea, sinus pressure, sinus pain, sore throat, trouble swallowing and voice change  Eyes: Negative for photophobia, redness and visual disturbance  Respiratory: Negative for apnea, cough, chest tightness, shortness of breath, wheezing and stridor  Cardiovascular: Negative for chest pain, palpitations and leg swelling  Gastrointestinal: Negative for abdominal distention, abdominal pain, constipation, diarrhea, nausea and vomiting  Endocrine: Negative for cold intolerance, heat intolerance, polydipsia, polyphagia and polyuria  Genitourinary: Negative for difficulty urinating, dysuria, flank pain, frequency, hematuria and urgency  Musculoskeletal: Negative for arthralgias, back pain, gait problem, joint swelling and myalgias  +LE edema b/l  Skin: Negative for color change, pallor, rash and wound  Allergic/Immunologic: Negative for immunocompromised state  Neurological: Negative for dizziness, tremors, syncope, weakness, light-headedness and headaches  Hematological: Negative for adenopathy  Does not bruise/bleed easily  Psychiatric/Behavioral: Negative for confusion and sleep disturbance  The patient is not nervous/anxious  ?   Patient History:     Past Medical History:   Diagnosis Date    [facility]fib [facility])     AAA (abdominal aortic aneurysm) ([address] )     Actinic keratosis of right temple 7/31/2020    Actinic keratosis of scalp 7/31/2020    Arthritis     Lay's esophagus     Bladder cancer (HCC)     CAD (coronary artery disease)     Chronic low back pain     Chronic pain of both knees 7/31/2020    Colitis 12/4/2020    Diabetes ([facility] [facility] )     Excessive gas 12/3/2020    Hyperlipemia     Hypertension     Immunization deficiency 10/30/2020    Hep a nonimmune    Left lower quadrant abdominal pain 12/3/2020    Mass of right adrenal gland ([address] ) 12/4/2020    4 1 cm    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    LUCIA (obstructive sleep apnea) 1/29/2021     Past Surgical History:   Procedure Laterality Date    BACK SURGERY      BLADDER SURGERY      CATARACT EXTRACTION, BILATERAL      COLONOSCOPY  01/29/2019    next 2022 [address] GRAFT  04/2016    Quadruple per [place]    EGD  06/2017    TOTAL HIP ARTHROPLASTY Right 2012    TOTAL SHOULDER REPLACEMENT Right 2013     Social History     Socioeconomic History    Marital status: /Civil Union     Spouse name: Not on file    Number of children: Not on file    Years of education: Not on file    Highest education level: Not on file   Occupational History    Not on file   Social Needs    Financial resource strain: Not on file    Food insecurity     Worry: Not on file     Inability: Not on file   [employer] needs     Medical: Not on file     Non-medical: Not on file   Tobacco Use    Smoking status: Former Smoker     Packs/day: 0 25     Types: Cigarettes    Smokeless tobacco: Never Used    Tobacco comment: smoked since age 21; per [place]   Substance and Sexual Activity    Alcohol use: Not Currently     Comment: No use    Drug use: Never     Comment: No use    Sexual activity: Not on file   Lifestyle    Physical activity     Days per week: Not on file     Minutes per session: Not on file    Stress: Not on file   Relationships    Social connections     Talks on phone: Not on file     Gets together: Not on file     Attends [employer] service: Not on file     Active member of club or organization: Not on file     Attends meetings of clubs or organizations: Not on file     Relationship status: Not on file    Intimate partner violence     Fear of current or ex partner: Not on file     Emotionally abused: Not on file     Physically abused: Not on file     Forced sexual activity: Not on file   Other Topics Concern    Not on file   Social History Narrative    · Most recent tobacco use screenin2020      · Do you currently or have you served in the [employer]:   No      · Were you activated, into active duty, as a member of the [employer] or as a Reservist:   No      · Live alone or with others:   with others        · Caffeine intake:   Occasional      · Illicit drugs:   No      · Diet:   Regular      · Exercise level: Moderate      · Advance directive: Yes      · Marital status:         · General stress level:   Medium      · Single or multi-level home/work:   multi level home      · Guns present in home:   No      · Seat belts used routinely:   Yes      · Sunscreen used routinely:   Yes      · Smoke alarm in home:   Yes      Family History   Problem Relation Age of Onset    Heart disease Father     Arthritis Father     Heart attack Father     Alzheimer's disease Mother        Current Medications: At the time this note was written these were the medications the patient was on    Current Outpatient Medications   Medication Sig Dispense Refill    atorvastatin (LIPITOR) 40 mg tablet Take 1 tablet (40 mg total) by mouth daily 90 tablet 3    Eliquis 5 MG TAKE 1 TABLET BY MOUTH TWICE A DAY 60 tablet 3    ezetimibe (ZETIA) 10 mg tablet Take 1 tablet (10 mg total) by mouth daily 90 tablet 3    finasteride (PROSCAR) 5 mg tablet Take 1 tablet (5 mg total) by mouth daily 90 tablet 3    Fluad Quadrivalent 0 5 ML PRSY PHARMACY ADMINISTERED      furosemide (LASIX) 40 mg tablet Take 1 tablet (40 mg total) by mouth daily 90 tablet 1    Levemir FlexTouch 100 units/mL injection pen 65 units bid 15 pen 3    lisinopril (ZESTRIL) 20 mg tablet Take 1 tablet (20 mg total) by mouth daily 90 tablet 3    metFORMIN (GLUCOPHAGE) 1000 MG tablet Take 500 mg by mouth 2 (two) times a day   3    metoprolol succinate (TOPROL-XL) 100 mg 24 hr tablet Take 1 tablet (100 mg total) by mouth daily 90 tablet 0    metoprolol succinate (TOPROL-XL) 50 mg 24 hr tablet Take 1 tablet (50 mg total) by mouth daily 50 mg daily in afternoon 90 tablet 1    omeprazole (PriLOSEC) 20 mg delayed release capsule Take 1 capsule (20 mg total) by mouth daily 30 capsule 1    Unifine Pentips 31G X 8 MM MISC Inject under the skin daily Use as directed 100 each 2     No current facility-administered medications for this visit  Allergies: Patient has no known allergies  Physical Exam:   Vital Signs:   /80   Pulse 85   Temp (!) 97 °F (36 1 °C)   Ht 6' 4" (1 93 m)   Wt (!) 151 kg (332 lb)   BMI 40 41 kg/m²     Physical Exam  Vitals signs reviewed  Constitutional:       General: He is not in acute distress  Appearance: Normal appearance  He is not ill-appearing, toxic-appearing or diaphoretic  HENT:      Head: Normocephalic and atraumatic  Right Ear: External ear normal       Left Ear: External ear normal       Nose: Nose normal    Eyes:      General: No scleral icterus  Extraocular Movements: Extraocular movements intact  Conjunctiva/sclera: Conjunctivae normal    Neck:      Musculoskeletal: Normal range of motion and neck supple  No muscular tenderness  Comments: No thyromegaly or nodules  Cardiovascular:      Rate and Rhythm: Normal rate and regular rhythm  Heart sounds: Normal heart sounds  No murmur  No friction rub  No gallop  Pulmonary:      Effort: Pulmonary effort is normal  No respiratory distress  Breath sounds: Normal breath sounds  No stridor  No wheezing, rhonchi or rales  Abdominal:      General: Bowel sounds are normal  There is no distension  Palpations: Abdomen is soft  There is no mass  Tenderness: There is no abdominal tenderness  There is no guarding or rebound  Hernia: No hernia is present  Musculoskeletal: Normal range of motion  General: Swelling present        Comments: 2+ LE edema b/l   Lymphadenopathy:      Cervical: No cervical adenopathy  Skin:     General: Skin is warm and dry  Coloration: Skin is not pale  Findings: No erythema or rash  Comments: +1cm abdominal striae light pink   Neurological:      General: No focal deficit present  Mental Status: He is alert and oriented to person, place, and time  Psychiatric:         Mood and Affect: Mood normal          Behavior: Behavior normal          Thought Content: Thought content normal          Judgment: Judgment normal             Labs and Imaging:      Component      Latest Ref Rng & Units 2/11/2021 3/12/2021   Sodium      136 - 145 mmol/L  142   Potassium      3 5 - 5 3 mmol/L  4 2   Chloride      100 - 108 mmol/L  103   CO2      21 - 32 mmol/L  34 (H)   Anion Gap      4 - 13 mmol/L  5   BUN      5 - 25 mg/dL  17   Creatinine      0 60 - 1 30 mg/dL  0 75   GLUCOSE FASTING      65 - 99 mg/dL  157 (H)   Calcium      8 3 - 10 1 mg/dL  9 3   CORRECTED CALCIUM      8 3 - 10 1 mg/dL  10 0   AST      5 - 45 U/L  15   ALT      12 - 78 U/L  27   Alkaline Phosphatase      46 - 116 U/L  81   Total Protein      6 4 - 8 2 g/dL  7 3   Albumin      3 5 - 5 0 g/dL  3 1 (L)   TOTAL BILIRUBIN      0 20 - 1 00 mg/dL  0 39   eGFR      ml/min/1 73sq m  88   Cholesterol      50 - 200 mg/dL  122   Triglycerides      <=150 mg/dL  171 (H)   HDL      >=40 mg/dL  39 (L)   LDL Calculated      0 - 100 mg/dL  49   Non-HDL Cholesterol      mg/dl  83   EXT Creatinine Urine      mg/dL  33 7   MICROALBUM ,U,RANDOM      0 0 - 20 0 mg/L  118 0 (H)   MICROALBUMIN/CREATININE RATIO      0 - 30 mg/g creatinine  350 (H)   1 SALIVARY CORTISOL      ug/dL 0 184    2 SALIVARY CORTISOL      ug/dL 0 088    Hemoglobin A1C      Normal 3 8-5 6%; PreDiabetic 5 7-6 4%;  Diabetic >=6 5%; Glycemic control for adults with diabetes <7 0% %  9 3 (H)   eAG, EST AVG Glucose      mg/dl  220

## 2021-03-18 DIAGNOSIS — R60.0 BILATERAL EDEMA OF LOWER EXTREMITY: Primary | ICD-10-CM

## 2021-03-22 ENCOUNTER — APPOINTMENT (OUTPATIENT)
Dept: LAB | Facility: AMBULARY SURGERY CENTER | Age: 76
End: 2021-03-22
Payer: MEDICARE

## 2021-03-22 DIAGNOSIS — E27.8 MASS OF RIGHT ADRENAL GLAND (HCC): ICD-10-CM

## 2021-03-22 DIAGNOSIS — Z79.4 TYPE 2 DIABETES MELLITUS WITH HYPERGLYCEMIA, WITH LONG-TERM CURRENT USE OF INSULIN (HCC): ICD-10-CM

## 2021-03-22 DIAGNOSIS — E27.9 LESION OF ADRENAL GLAND (HCC): ICD-10-CM

## 2021-03-22 DIAGNOSIS — E11.65 TYPE 2 DIABETES MELLITUS WITH HYPERGLYCEMIA, WITH LONG-TERM CURRENT USE OF INSULIN (HCC): ICD-10-CM

## 2021-03-22 PROCEDURE — 82530 CORTISOL FREE: CPT

## 2021-03-23 ENCOUNTER — HOSPITAL ENCOUNTER (OUTPATIENT)
Dept: RADIOLOGY | Age: 76
Discharge: HOME | DRG: 291 | End: 2021-03-23
Payer: MEDICARE

## 2021-03-23 ENCOUNTER — HOSPITAL ENCOUNTER (OUTPATIENT)
Dept: NON INVASIVE DIAGNOSTICS | Facility: CLINIC | Age: 76
Discharge: HOME | End: 2021-03-23
Payer: MEDICARE

## 2021-03-23 DIAGNOSIS — Z79.899 LONG-TERM USE OF HIGH-RISK MEDICATION: ICD-10-CM

## 2021-03-23 DIAGNOSIS — R60.0 BILATERAL EDEMA OF LOWER EXTREMITY: ICD-10-CM

## 2021-03-23 DIAGNOSIS — Z79.899 ON LONG TERM DRUG THERAPY: ICD-10-CM

## 2021-03-23 DIAGNOSIS — Z87.310 PERSONAL HISTORY OF (HEALED) OSTEOPOROSIS FRACTURE: ICD-10-CM

## 2021-03-23 PROCEDURE — C8929 TTE W OR WO FOL WCON,DOPPLER: HCPCS

## 2021-03-23 PROCEDURE — 77080 DXA BONE DENSITY AXIAL: CPT

## 2021-03-23 RX ADMIN — PERFLUTREN 0.4 ML/MIN: 6.52 INJECTION, SUSPENSION INTRAVENOUS at 15:59

## 2021-03-24 ENCOUNTER — OFFICE VISIT (OUTPATIENT)
Dept: PULMONOLOGY | Facility: CLINIC | Age: 76
End: 2021-03-24
Payer: MEDICARE

## 2021-03-24 VITALS
HEIGHT: 72 IN | BODY MASS INDEX: 42.66 KG/M2 | HEART RATE: 76 BPM | DIASTOLIC BLOOD PRESSURE: 70 MMHG | OXYGEN SATURATION: 87 % | SYSTOLIC BLOOD PRESSURE: 140 MMHG | WEIGHT: 315 LBS | TEMPERATURE: 98.2 F

## 2021-03-24 DIAGNOSIS — R09.02 HYPOXEMIA: Primary | ICD-10-CM

## 2021-03-24 DIAGNOSIS — G47.33 OSA (OBSTRUCTIVE SLEEP APNEA): ICD-10-CM

## 2021-03-24 LAB
Lab: NORMAL
Lab: NORMAL
MISCELLANEOUS LAB TEST RESULT: NORMAL

## 2021-03-24 PROCEDURE — 99204 OFFICE O/P NEW MOD 45 MIN: CPT | Performed by: INTERNAL MEDICINE

## 2021-03-24 PROCEDURE — 93306 TTE W/DOPPLER COMPLETE: CPT | Performed by: INTERNAL MEDICINE

## 2021-03-24 NOTE — PROGRESS NOTES
Assessment/Plan:    Hypoxemia  Patient saturation low on today's visit  I am concerned that his sleep apnea has been inadequately treated leading to obesity hypoventilation syndrome  Venous blood gas requested  Adjustments made on his the CPAP treatment and a new machine requested  We will follow through with compliance to ensure that there is improvement in sleep apnea treatment  Follow-up also on recently requested echo regarding pulmonary hypertension  Chest x-ray ordered as a screening study  Note made that recent abdominal CT seems to show some vague abnormalities in the lower chest and I will review those  LUCIA (obstructive sleep apnea)  Patient on therapy since 2014  Recently his recorded AHI has risen considerably  He also senses that his machine is not working well and that shots off a overnight reducing the airflow  New machine requested  Switch to auto CPAP and follow through with compliance to ensure that AHI is controlled  I am worried that he is developing obesity hypoventilation from inadequate sleep apnea therapy  Diagnoses and all orders for this visit:    Hypoxemia  -     Blood gas, venous; Future  -     XR chest pa & lateral; Future    LUCIA (obstructive sleep apnea)  -     CPAP Auto New DME  -     Blood gas, venous; Future          Subjective:      Patient ID: Will Jones is a 67 y o  male  This patient is here for review of sleep apnea treatment in specifically to request a new machine  He feels recently that his machine has woken him up at night several times because of inadequate airflow  Machine apparently is 9years old  In addition patient machine record AHI and had been routinely 2 or 3 and now recently has risen into the teens  I sense that his sleep apnea is not well treated at this point  However he does not really have the symptoms of daytime sleepiness headache or nocturia  Note made of hypoxemia on today's saturation    Patient does not really sense dyspnea on exertion under usual circumstances but his activities are considerably limited by severe back pain  Recently has had considerable increase in swelling in both legs  Not known to have liver or kidney disease  Previous heart disease requiring bypass surgery  Note echocardiogram just done yesterday demonstrating evidence of pulmonary hypertension and some mild diastolic dysfunction  The following portions of the patient's history were reviewed and updated as appropriate: allergies, current medications, past family history, past medical history, past social history, past surgical history and problem list     Review of Systems   Constitutional: Negative for activity change, appetite change and unexpected weight change  HENT: Positive for postnasal drip, sinus pressure and sneezing  Negative for congestion  Eyes: Positive for discharge ( watery at times)  Negative for redness and itching  Respiratory: Positive for apnea, cough and wheezing (Occasional)  Negative for shortness of breath  Cardiovascular: Positive for leg swelling  Negative for chest pain and palpitations  Gastrointestinal: Positive for abdominal pain ( intermittent pain, scheduled to undergo endoscopy soon)  Genitourinary: Negative for difficulty urinating and enuresis  Musculoskeletal: Positive for back pain ( severe)  Negative for arthralgias  Allergic/Immunologic: Positive for environmental allergies  Neurological: Negative  Hematological: Negative for adenopathy  Objective:      /70 (BP Location: Left arm, Patient Position: Sitting, Cuff Size: Large)   Pulse 76   Temp 98 2 °F (36 8 °C) (Tympanic)   Ht 6' (1 829 m)   Wt (!) 150 kg (331 lb 6 oz)   SpO2 (!) 87%   BMI 44 94 kg/m²          Physical Exam  Vitals signs reviewed  Constitutional:       Appearance: He is obese  He is not ill-appearing  Comments: Examined in wheelchair   Eyes:      General: No scleral icterus  Conjunctiva/sclera: Conjunctivae normal    Neck:      Musculoskeletal: No neck rigidity or muscular tenderness  Vascular: No JVD  Cardiovascular:      Rate and Rhythm: Normal rate and regular rhythm  Pulses:           Radial pulses are 2+ on the right side and 2+ on the left side  Heart sounds: Normal heart sounds  Comments: No obvious signs of pulmonary hypertension  Pulmonary:      Effort: Pulmonary effort is normal       Breath sounds: Rales (Faint by basilar) present  No wheezing or rhonchi  Abdominal:      General: There is no distension  Palpations: Abdomen is soft  There is no hepatomegaly or splenomegaly  Musculoskeletal:         General: Swelling ( left thigh) present  Right lower le+ Edema present  Left lower le+ Edema present  Lymphadenopathy:      Cervical: No cervical adenopathy  Skin:     General: Skin is warm and dry  Neurological:      Mental Status: He is alert and oriented to person, place, and time     Psychiatric:         Mood and Affect: Mood normal          Behavior: Behavior normal

## 2021-03-24 NOTE — ASSESSMENT & PLAN NOTE
Patient saturation low on today's visit  I am concerned that his sleep apnea has been inadequately treated leading to obesity hypoventilation syndrome  Venous blood gas requested  Adjustments made on his the CPAP treatment and a new machine requested  We will follow through with compliance to ensure that there is improvement in sleep apnea treatment  Follow-up also on recently requested echo regarding pulmonary hypertension  Chest x-ray ordered as a screening study  Note made that recent abdominal CT seems to show some vague abnormalities in the lower chest and I will review those

## 2021-03-24 NOTE — ASSESSMENT & PLAN NOTE
Patient on therapy since 2014  Recently his recorded AHI has risen considerably  He also senses that his machine is not working well and that shots off a overnight reducing the airflow  New machine requested  Switch to auto CPAP and follow through with compliance to ensure that AHI is controlled  I am worried that he is developing obesity hypoventilation from inadequate sleep apnea therapy

## 2021-03-25 ENCOUNTER — APPOINTMENT (EMERGENCY)
Dept: RADIOLOGY | Facility: HOSPITAL | Age: 76
DRG: 291 | End: 2021-03-25
Payer: MEDICARE

## 2021-03-25 ENCOUNTER — TELEPHONE (OUTPATIENT)
Dept: ENDOCRINOLOGY | Facility: CLINIC | Age: 76
End: 2021-03-25

## 2021-03-25 ENCOUNTER — TELEPHONE (OUTPATIENT)
Dept: GASTROENTEROLOGY | Facility: CLINIC | Age: 76
End: 2021-03-25

## 2021-03-25 ENCOUNTER — HOSPITAL ENCOUNTER (INPATIENT)
Facility: HOSPITAL | Age: 76
LOS: 6 days | Discharge: HOME HEALTH | DRG: 291 | End: 2021-03-31
Attending: EMERGENCY MEDICINE | Admitting: INTERNAL MEDICINE
Payer: MEDICARE

## 2021-03-25 ENCOUNTER — OFFICE VISIT (OUTPATIENT)
Dept: CARDIOLOGY CLINIC | Facility: CLINIC | Age: 76
End: 2021-03-25
Payer: MEDICARE

## 2021-03-25 VITALS
OXYGEN SATURATION: 87 % | HEART RATE: 86 BPM | SYSTOLIC BLOOD PRESSURE: 168 MMHG | BODY MASS INDEX: 42.66 KG/M2 | HEIGHT: 72 IN | WEIGHT: 315 LBS | DIASTOLIC BLOOD PRESSURE: 78 MMHG

## 2021-03-25 DIAGNOSIS — I50.31 ACUTE DIASTOLIC HEART FAILURE (HCC): Primary | ICD-10-CM

## 2021-03-25 DIAGNOSIS — G47.33 OSA (OBSTRUCTIVE SLEEP APNEA): ICD-10-CM

## 2021-03-25 DIAGNOSIS — R60.0 BILATERAL LEG EDEMA: Primary | ICD-10-CM

## 2021-03-25 DIAGNOSIS — I50.32 CHRONIC HEART FAILURE WITH PRESERVED EJECTION FRACTION (HCC): ICD-10-CM

## 2021-03-25 DIAGNOSIS — I48.11 LONGSTANDING PERSISTENT ATRIAL FIBRILLATION (HCC): ICD-10-CM

## 2021-03-25 DIAGNOSIS — J96.11 CHRONIC RESPIRATORY FAILURE WITH HYPOXIA (HCC): ICD-10-CM

## 2021-03-25 DIAGNOSIS — I50.33 ACUTE ON CHRONIC DIASTOLIC HEART FAILURE (HCC): ICD-10-CM

## 2021-03-25 DIAGNOSIS — E88.09 HYPOALBUMINEMIA: ICD-10-CM

## 2021-03-25 DIAGNOSIS — I48.91 ATRIAL FIBRILLATION, UNSPECIFIED TYPE (HCC): ICD-10-CM

## 2021-03-25 DIAGNOSIS — R60.0 LOCALIZED EDEMA: ICD-10-CM

## 2021-03-25 DIAGNOSIS — J96.01 ACUTE RESPIRATORY FAILURE WITH HYPOXIA (HCC): ICD-10-CM

## 2021-03-25 PROBLEM — R09.02 HYPOXIA: Status: ACTIVE | Noted: 2021-03-25

## 2021-03-25 PROBLEM — R91.1 LEFT UPPER LOBE PULMONARY NODULE: Status: ACTIVE | Noted: 2021-03-25

## 2021-03-25 LAB
ALBUMIN SERPL BCP-MCNC: 3 G/DL (ref 3.5–5)
ALP SERPL-CCNC: 82 U/L (ref 46–116)
ALT SERPL W P-5'-P-CCNC: 22 U/L (ref 12–78)
ANION GAP SERPL CALCULATED.3IONS-SCNC: 3 MMOL/L (ref 4–13)
AST SERPL W P-5'-P-CCNC: 12 U/L (ref 5–45)
BILIRUB SERPL-MCNC: 0.38 MG/DL (ref 0.2–1)
BUN SERPL-MCNC: 16 MG/DL (ref 5–25)
CALCIUM ALBUM COR SERPL-MCNC: 10.2 MG/DL (ref 8.3–10.1)
CALCIUM SERPL-MCNC: 9.4 MG/DL (ref 8.3–10.1)
CHLORIDE SERPL-SCNC: 102 MMOL/L (ref 100–108)
CO2 SERPL-SCNC: 35 MMOL/L (ref 21–32)
CREAT SERPL-MCNC: 0.7 MG/DL (ref 0.6–1.3)
ERYTHROCYTE [DISTWIDTH] IN BLOOD BY AUTOMATED COUNT: 15 % (ref 11.6–15.1)
GFR SERPL CREATININE-BSD FRML MDRD: 91 ML/MIN/1.73SQ M
GLUCOSE SERPL-MCNC: 159 MG/DL (ref 65–140)
HCT VFR BLD AUTO: 41.2 % (ref 36.5–49.3)
HGB BLD-MCNC: 12.5 G/DL (ref 12–17)
MCH RBC QN AUTO: 26.8 PG (ref 26.8–34.3)
MCHC RBC AUTO-ENTMCNC: 30.3 G/DL (ref 31.4–37.4)
MCV RBC AUTO: 88 FL (ref 82–98)
NT-PROBNP SERPL-MCNC: 127 PG/ML
PLATELET # BLD AUTO: 214 THOUSANDS/UL (ref 149–390)
PMV BLD AUTO: 10.3 FL (ref 8.9–12.7)
POTASSIUM SERPL-SCNC: 3.7 MMOL/L (ref 3.5–5.3)
PROT SERPL-MCNC: 7.3 G/DL (ref 6.4–8.2)
RBC # BLD AUTO: 4.67 MILLION/UL (ref 3.88–5.62)
SODIUM SERPL-SCNC: 140 MMOL/L (ref 136–145)
TROPONIN I SERPL-MCNC: <0.02 NG/ML
WBC # BLD AUTO: 9.23 THOUSAND/UL (ref 4.31–10.16)

## 2021-03-25 PROCEDURE — 93000 ELECTROCARDIOGRAM COMPLETE: CPT | Performed by: NURSE PRACTITIONER

## 2021-03-25 PROCEDURE — 71275 CT ANGIOGRAPHY CHEST: CPT

## 2021-03-25 PROCEDURE — 85027 COMPLETE CBC AUTOMATED: CPT | Performed by: EMERGENCY MEDICINE

## 2021-03-25 PROCEDURE — 83880 ASSAY OF NATRIURETIC PEPTIDE: CPT | Performed by: EMERGENCY MEDICINE

## 2021-03-25 PROCEDURE — 93005 ELECTROCARDIOGRAM TRACING: CPT

## 2021-03-25 PROCEDURE — 99223 1ST HOSP IP/OBS HIGH 75: CPT | Performed by: INTERNAL MEDICINE

## 2021-03-25 PROCEDURE — 84484 ASSAY OF TROPONIN QUANT: CPT | Performed by: EMERGENCY MEDICINE

## 2021-03-25 PROCEDURE — 1123F ACP DISCUSS/DSCN MKR DOCD: CPT | Performed by: EMERGENCY MEDICINE

## 2021-03-25 PROCEDURE — 71046 X-RAY EXAM CHEST 2 VIEWS: CPT

## 2021-03-25 PROCEDURE — 99285 EMERGENCY DEPT VISIT HI MDM: CPT

## 2021-03-25 PROCEDURE — 80053 COMPREHEN METABOLIC PANEL: CPT | Performed by: EMERGENCY MEDICINE

## 2021-03-25 PROCEDURE — G1004 CDSM NDSC: HCPCS

## 2021-03-25 PROCEDURE — 99285 EMERGENCY DEPT VISIT HI MDM: CPT | Performed by: EMERGENCY MEDICINE

## 2021-03-25 PROCEDURE — 99215 OFFICE O/P EST HI 40 MIN: CPT | Performed by: NURSE PRACTITIONER

## 2021-03-25 PROCEDURE — 36415 COLL VENOUS BLD VENIPUNCTURE: CPT | Performed by: EMERGENCY MEDICINE

## 2021-03-25 RX ORDER — FUROSEMIDE 10 MG/ML
40 INJECTION INTRAMUSCULAR; INTRAVENOUS ONCE
Status: COMPLETED | OUTPATIENT
Start: 2021-03-25 | End: 2021-03-25

## 2021-03-25 RX ADMIN — FUROSEMIDE 40 MG: 10 INJECTION, SOLUTION INTRAMUSCULAR; INTRAVENOUS at 22:52

## 2021-03-25 RX ADMIN — IOHEXOL 100 ML: 350 INJECTION, SOLUTION INTRAVENOUS at 21:14

## 2021-03-25 NOTE — ED PROVIDER NOTES
History  Chief Complaint   Patient presents with    Shortness of Breath     pt presents by hospital wheelchair with c/o dyspnea secondary to acute heart failure      Francoise Gomez is a 67y o  year old male with PMH of Afib, HTN, HLD, HFpEF presenting to the [facility] ED for dyspnea and bilateral leg swelling  Symptoms started one month prior to arrival  Patient reports progressive swelling in bilateral legs  Patient also feels short of breath with exertion and while laying flat  Patient on CPAP for sleep apnea though stills wakes up at night with dyspnea  No associated chest pain  Patient denies associated N/V/D  Patient denies fevers/chills, chest pain, change in frequency/consistency of cough  Patient went for 2D ECHO two days ago which demonstrated EF 65% and G1DD  Patient does not wear oxygen at home  Patient has been compliant with previously prescribed 40mg lasix daily  Patient also compliant with eliquis for Afib  Denies history of DVT/PE  History provided by:  Patient and medical records   used: No    Shortness of Breath  Associated symptoms: no abdominal pain, no chest pain, no cough, no fever, no headaches, no rash, no vomiting and no wheezing        Prior to Admission Medications   Prescriptions Last Dose Informant Patient Reported? Taking?    Eliquis 5 MG  Self No No   Sig: TAKE 1 TABLET BY MOUTH TWICE A DAY   Fluad Quadrivalent 0 5 ML PRSY  Self Yes No   Sig: PHARMACY ADMINISTERED   Levemir FlexTouch 100 units/mL injection pen  Self No No   Sig: Inject under the skin 65 units in the morning and at bedtime   Unifine Pentips 31G X 8 MM MISC  Self No No   Sig: Inject under the skin daily Use as directed   atorvastatin (LIPITOR) 40 mg tablet  Self No No   Sig: Take 1 tablet (40 mg total) by mouth daily   ezetimibe (ZETIA) 10 mg tablet  Self No No   Sig: Take 1 tablet (10 mg total) by mouth daily   finasteride (PROSCAR) 5 mg tablet  Self No No   Sig: Take 1 tablet (5 mg total) by mouth daily   furosemide (LASIX) 40 mg tablet  Self No No   Sig: Take 1 tablet (40 mg total) by mouth daily   glipiZIDE (GLUCOTROL XL) 5 mg 24 hr tablet  Self No No   Sig: Take 1 tablet (5 mg total) by mouth daily   lisinopril (ZESTRIL) 20 mg tablet  Self No No   Sig: Take 1 tablet (20 mg total) by mouth daily   metFORMIN (GLUCOPHAGE) 1000 MG tablet  Self Yes No   Sig: Take 500 mg by mouth 2 (two) times a day    metoprolol succinate (TOPROL-XL) 100 mg 24 hr tablet  Self No No   Sig: Take 1 tablet (100 mg total) by mouth daily   metoprolol succinate (TOPROL-XL) 50 mg 24 hr tablet  Self No No   Sig: Take 1 tablet (50 mg total) by mouth daily 50 mg daily in afternoon   omeprazole (PriLOSEC) 20 mg delayed release capsule  Self No No   Sig: Take 1 capsule (20 mg total) by mouth daily      Facility-Administered Medications: None       Past Medical History:   Diagnosis Date    A-fib [facility])     AAA (abdominal aortic aneurysm) (HCC)     Actinic keratosis of right temple 7/31/2020    Actinic keratosis of scalp 7/31/2020    Arthritis     Lay's esophagus     Bladder cancer (HCC)     CAD (coronary artery disease)     Chronic low back pain     Chronic pain of both knees 7/31/2020    Colitis 12/4/2020    Diabetes ([address] )     Excessive gas 12/3/2020    Hyperlipemia     Hypertension     Immunization deficiency 10/30/2020    Hep a nonimmune    Left lower quadrant abdominal pain 12/3/2020    Mass of right adrenal gland ([facility] [address] ) 12/4/2020    4 1 cm    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    LUCIA (obstructive sleep apnea) 1/29/2021       Past Surgical History:   Procedure Laterality Date    BACK SURGERY      BLADDER SURGERY      CATARACT EXTRACTION, BILATERAL      COLONOSCOPY  01/29/2019    next [address]22 [address] GRAFT  04/2016    Quadruple per [place]    EGD  06/2017    TOTAL HIP ARTHROPLASTY Right 2012    TOTAL SHOULDER REPLACEMENT Right 2013       Family History   Problem Relation Age of Onset    Heart disease Father     Arthritis Father     Heart attack Father     Alzheimer's disease Mother      I have reviewed and agree with the history as documented  E-Cigarette/Vaping    E-Cigarette Use Never User      E-Cigarette/Vaping Substances    Nicotine No     THC No     CBD No     Flavoring No     Other No     Unknown No      Social History     Tobacco Use    Smoking status: Former Smoker     Packs/day: 0 25     Types: Cigarettes     Quit date: 2009     Years since quittin 2    Smokeless tobacco: Never Used    Tobacco comment: smoked since age 21; per [place]   Substance Use Topics    Alcohol use: Not Currently     Comment: No use    Drug use: Never     Comment: No use        Review of Systems   Constitutional: Negative for chills, fatigue and fever  HENT: Negative for congestion, nosebleeds and rhinorrhea  Eyes: Negative for visual disturbance  Respiratory: Positive for shortness of breath  Negative for cough and wheezing  Cardiovascular: Positive for leg swelling  Negative for chest pain and palpitations  Gastrointestinal: Negative for abdominal distention, abdominal pain, diarrhea, nausea and vomiting  Endocrine: Negative for polyuria  Genitourinary: Negative for dysuria and hematuria  Musculoskeletal: Negative for arthralgias, gait problem and joint swelling  Skin: Negative for rash  Neurological: Negative for seizures, syncope, weakness, light-headedness and headaches  Hematological: Does not bruise/bleed easily  Psychiatric/Behavioral: Negative for behavioral problems and confusion  All other systems reviewed and are negative        Physical Exam  ED Triage Vitals [21 1806]   Temperature Pulse Respirations Blood Pressure SpO2   97 5 °F (36 4 °C) 74 20 152/73 (!) 87 %      Temp Source Heart Rate Source Patient Position - Orthostatic VS BP Location FiO2 (%)   Oral Monitor -- -- --      Pain Score       --             Orthostatic Vital Signs  Vitals:    03/25/21 2052 03/25/21 2100 03/25/21 2230 03/25/21 2252   BP: 158/79 132/75 149/82 152/70   Pulse: 80 72 76 76       Physical Exam  Vitals signs and nursing note reviewed  Constitutional:       General: He is not in acute distress  Appearance: He is well-developed  He is obese  He is not ill-appearing, toxic-appearing or diaphoretic  HENT:      Head: Normocephalic and atraumatic  Nose: No congestion or rhinorrhea  Eyes:      General:         Right eye: No discharge  Left eye: No discharge  Neck:      Musculoskeletal: Normal range of motion and neck supple  No neck rigidity  Vascular: No JVD  Cardiovascular:      Rate and Rhythm: Normal rate  Rhythm irregular  Heart sounds: No murmur  Pulmonary:      Effort: Pulmonary effort is normal  No tachypnea, accessory muscle usage, respiratory distress or retractions  Breath sounds: Examination of the right-lower field reveals decreased breath sounds  Examination of the left-lower field reveals decreased breath sounds  Decreased breath sounds present  No wheezing, rhonchi or rales  Abdominal:      General: Bowel sounds are normal       Palpations: Abdomen is soft  Tenderness: There is no abdominal tenderness  There is no right CVA tenderness, left CVA tenderness, guarding or rebound  Musculoskeletal:      Right lower leg: 3+ Edema present  Left lower leg: 3+ Edema present  Skin:     General: Skin is warm  Capillary Refill: Capillary refill takes less than 2 seconds  Findings: Rash (stasis dermatitis b/l LE) present  Neurological:      Mental Status: He is alert and oriented to person, place, and time     Psychiatric:         Mood and Affect: Mood normal          Behavior: Behavior normal          ED Medications  Medications   insulin detemir (LEVEMIR) subcutaneous injection 65 Units (has no administration in time range)   iohexol (OMNIPAQUE) 350 MG/ML injection (MULTI-DOSE) 100 mL (100 mL Intravenous Given 3/25/21 2114)   furosemide (LASIX) injection 40 mg (40 mg Intravenous Given 3/25/21 2252)       Diagnostic Studies  Results Reviewed     Procedure Component Value Units Date/Time    Troponin I [[id number]]  (Normal) Collected: 03/25/21 1857    Lab Status: Final result Specimen: Blood from Arm, Right Updated: 03/25/21 1940     Troponin I <0 02 ng/mL     NT-BNP PRO [[id number]]  (Normal) Collected: 03/25/21 1857    Lab Status: Final result Specimen: Blood from Arm, Right Updated: 03/25/21 1929     NT-proBNP 127 pg/mL     Comprehensive metabolic panel [[id number]]  (Abnormal) Collected: 03/25/21 1857    Lab Status: Final result Specimen: Blood from Arm, Right Updated: 03/25/21 1928     Sodium 140 mmol/L      Potassium 3 7 mmol/L      Chloride 102 mmol/L      CO2 35 mmol/L      ANION GAP 3 mmol/L      BUN 16 mg/dL      Creatinine 0 70 mg/dL      Glucose 159 mg/dL      Calcium 9 4 mg/dL      Corrected Calcium 10 2 mg/dL      AST 12 U/L      ALT 22 U/L      Alkaline Phosphatase 82 U/L      Total Protein 7 3 g/dL      Albumin 3 0 g/dL      Total Bilirubin 0 38 mg/dL      eGFR 91 ml/min/1 73sq m     Narrative:      [facility] guidelines for Chronic Kidney Disease (CKD):     Stage 1 with normal or high GFR (GFR > 90 mL/min/1 73 square meters)    Stage 2 Mild CKD (GFR = 60-89 mL/min/1 73 square meters)    Stage 3A Moderate CKD (GFR = 45-59 mL/min/1 73 square meters)    Stage 3B Moderate CKD (GFR = 30-44 mL/min/1 73 square meters)    Stage 4 Severe CKD (GFR = 15-29 mL/min/1 73 square meters)    Stage 5 End Stage CKD (GFR <15 mL/min/1 73 square meters)  Note: GFR calculation is accurate only with a steady state creatinine    CBC and Platelet [[id number]]  (Abnormal) Collected: 03/25/21 1857    Lab Status: Final result Specimen: Blood from Arm, Right Updated: 03/25/21 1910     WBC 9 23 Thousand/uL      RBC 4 67 Million/uL      Hemoglobin 12 5 g/dL      Hematocrit 41 2 %      MCV 88 fL MCH 26 8 pg      MCHC 30 3 g/dL      RDW 15 0 %      Platelets 394 Thousands/uL      MPV 10 3 fL                  CTA ED chest PE Study   Final Result by Lai Andrea MD (03/25 2215)      1  Motion limited examination  No evidence of pulmonary embolism to the proximal segmental level  2   Bibasilar atelectasis/scarring similar to prior examination  3   7 mm left upper lobe nodule   Based on current Fleischner Society 2017 Guidelines on incidental pulmonary nodule, followup non-contrast CT is recommended at 6-12 months from the initial examination and, if stable at that time, an additional followup    is recommended for 18-24 months from the initial examination  4   Stable calcified pleural plaques which may be due to asbestos exposure  5   Cardiomegaly  Lipomatous hypertrophy of the interatrial septum  6   Stable 4 cm right adrenal nodule  Workstation performed: [id number]         XR chest 2 views    (Results Pending)         Procedures  Procedures      ED Course  ED Course as of Mar 25 2335   Thu Mar 25, 2021   1900 Procedure Note: EKG  Date/Time: 03/25/21 6:58 PM   Interpreted by: Subhash Medrano DO  Indications / Diagnosis: Dyspnea  ECG reviewed by me, the ED Provider: yes   The EKG demonstrates:  Rhythm: atrial fibrillation 86 BPM  Intervals: QT interval prolonged 497ms  Axis: Right axis deviation  QRS/Blocks: RBBB  ST Changes: No acute ST/T waves changes  No CARRIE   No TWI  Comparison: Compared to prior EKG performed on 05/07/2014 1930 NT-proBNP: 127   1931 Creatinine: 0 70   1946 Troponin I: <0 02   2127 Albumin(!): 3 0                                 Wells' Criteria for PE      Most Recent Value   Wells' Criteria for PE   Clinical signs and symptoms of DVT  3 Filed at: 03/25/2021 2024   PE is primary diagnosis or equally likely  0 Filed at: 03/25/2021 2024   HR >100  0 Filed at: 03/25/2021 2024   Immobilization at least 3 days or Surgery in the previous 4 weeks  0 Filed at: 03/25/2021 2024   Previous, objectively diagnosed PE or DVT  0 Filed at: 03/25/2021 2024   Hemoptysis  0 Filed at: 03/25/2021 2024   Malignancy with treatment within 6 months or palliative  0 Filed at: 03/25/2021 2024   [place]' Criteria Total  3 Filed at: 03/25/2021 2024            MDM  Number of Diagnoses or Management Options  Bilateral leg edema:   Chronic heart failure with preserved ejection fraction [facility]):   Chronic respiratory failure with hypoxia [facility]):   Hypoalbuminemia:   Diagnosis management comments: 67 y o  male presenting for dyspnea and leg swelling  Chart reviewed, chronically hypoxic and not wearing O2 supplementation at baseline  Will order CBC, CMP, troponin, BNP, EKG, CXR, CTA PE to evaluate for ACS, PTX, pulmonary disease, widened mediastinum, PE, CHF  Edema possibly due to hypoalbuminemia  Possibly also pulmonary hypertension in the setting of pulmonary HTN  Due to progressive swelling and symptomatic hypoxia, will admit for further evaluation and management  Patient care discussed with Dr Esequiel Mittal who will assume care and admit patient to the hospital  I have discussed with the patient our recommendation of inpatient admission for further medical care  I have answered all of the patient's questions and concerns   The patient is in agreement with the plan to proceed with admission to the hospital         Amount and/or Complexity of Data Reviewed  Clinical lab tests: ordered and reviewed  Tests in the radiology section of CPT®: ordered and reviewed  Review and summarize past medical records: yes  Discuss the patient with other providers: yes  Independent visualization of images, tracings, or specimens: yes    Patient Progress  Patient progress: stable      Disposition  Final diagnoses:   Bilateral leg edema   Chronic respiratory failure with hypoxia (HCC)   Hypoalbuminemia   Chronic heart failure with preserved ejection fraction ([address] )     Time reflects when diagnosis was documented in both MDM as applicable and the Disposition within this note     Time User Action Codes Description Comment    3/25/2021  8:05 PM Xander Michel Add [J90] Pleural effusion on left     3/25/2021  8:05 PM Xander Michel Add [H74 632] Varicose veins of leg with swelling, bilateral     3/25/2021  8:05 PM Xander Michel Remove [W02 330] Varicose veins of leg with swelling, bilateral     3/25/2021  8:06 PM Xander Michel Add [R60 0] Bilateral leg edema     3/25/2021  8:06 PM Xander Michel Add [N11 27] Chronic respiratory failure with hypoxia ([facility] [address] )     3/25/2021 11:15 PM Nettie LEO Add [I50 33] Acute on chronic diastolic heart failure ([facility] [address] )     3/25/2021 11:32 PM Xander Michel Modify [R60 0] Bilateral leg edema     3/25/2021 11:32 PM Xander Michel Remove [J90] Pleural effusion on left     3/25/2021 11:32 PM Xander Michel Add [E88 09] Hypoalbuminemia     3/25/2021 11:33 PM Xander Michel Add [I50 32] Chronic heart failure with preserved ejection fraction [facility])       ED Disposition     ED Disposition Condition Date/Time Comment    Admit Stable Thu Mar 25, 2021  8:05 PM Case was discussed with Dr Jordan Manley and the patient's admission status was agreed to be Admission Status: inpatient status to the service of Dr Jordan Manley  Follow-up Information    None         Patient's Medications   Discharge Prescriptions    No medications on file     No discharge procedures on file  PDMP Review       Value Time User    PDMP Reviewed  Yes 3/25/2021 11:14 PM Joanne Crouch DO           ED Provider  Attending physically available and evaluated Albaro Meter  I managed the patient along with the ED Attending      Electronically Signed by         Edie Brittle, DO  03/25/21 5930

## 2021-03-25 NOTE — PROGRESS NOTES
Cardiology Follow Up    Bindu Villalobos  1945  [id number]  [address] [facility]  [address] [address] [phone]    No diagnosis found  Interval History:   Mr Lisa Browne presents to our office due to worsening rafa LE edema  According to Mr Steve Sanchez he has been experiencing worsening rafa LE over the past 6 week  He admits to occasional shortness with abdominal pain  He denies shortness of breath with exertion  Jane Brandt admits to recent orthopnea and then lying in his recliner at night to sleep  His CPAP is not working correctly  It appears Jane Brandt is eating foods high in sodium and over drinking fluids  According to Jane Brandt his weight is stable at 330 pounds  He does not weight himself at home  HPI:  Persistent atrial fibrillation  CAD  Hyperlipidemia 3/12/21 , , HDL 39, LDL 49   DM2 HgbA1C 9 3   CABG x 3 in 2016 LIM[place] to LAD, SVG to OM3, SVG to PDA  AAA 43mm  Morbid Obesity BMI 44 94  LUCIA on CPAP   3/23/21 LVEF 60%, grade 1 DD, RV mildly dilated, RV systolic function mildly reduced, PAP 45mmHg, LA mildly dilated, RA mildly dilated, trace MR, mod TR      Patient Active Problem List   Diagnosis    Hypertension    Hyperlipemia    Diabetes ([facility] )    Chronic low back pain    CAD (coronary artery disease)    Bladder cancer (HCC)    Arthritis    A-fib ([facility] )    AAA (abdominal aortic aneurysm) (HCC)    Decreased pedal pulses    Chronic pain of both knees    Actinic keratosis of right temple    Actinic keratosis of scalp    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    Immunization deficiency    Left lower quadrant abdominal pain    Excessive gas    Morbid obesity (HCC)    Colitis    Mass of right adrenal gland (HCC)    LUCIA (obstructive sleep apnea)    Embolism and thrombosis of arteries of the lower extremities (HCC)    Bilateral edema of lower extremity    Hypoxemia     Past Medical History: Diagnosis Date    A-fib [facility])     [facility]     Actinic keratosis of right temple 2020    Actinic keratosis of scalp 2020    Arthritis     Lay's esophagus     Bladder cancer (HCC)     CAD (coronary artery disease)     Chronic low back pain     Chronic pain of both knees 2020    Colitis 2020    Diabetes ([place] [address] )     Excessive gas 12/3/2020    Hyperlipemia     Hypertension     Immunization deficiency 10/30/2020    Hep a nonimmune    Left lower quadrant abdominal pain 12/3/2020    Mass of right adrenal gland ([place] [address] ) 2020    4 1 cm    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    LUCIA (obstructive sleep apnea) 2021     Social History     Socioeconomic History    Marital status: /Civil Union     Spouse name: Not on file    Number of children: Not on file    Years of education: Not on file    Highest education level: Not on file   Occupational History    Not on file   Social Needs    Financial resource strain: Not on file    Food insecurity     Worry: Not on file     Inability: Not on file   [employer] needs     Medical: Not on file     Non-medical: Not on file   Tobacco Use    Smoking status: Former Smoker     Packs/day: 0 25     Types: Cigarettes     Quit date: 2009     Years since quittin 2    Smokeless tobacco: Never Used    Tobacco comment: smoked since age 21; per [place]   Substance and Sexual Activity    Alcohol use: Not Currently     Comment: No use    Drug use: Never     Comment: No use    Sexual activity: Not on file   Lifestyle    Physical activity     Days per week: Not on file     Minutes per session: Not on file    Stress: Not on file   Relationships    Social connections     Talks on phone: Not on file     Gets together: Not on file     Attends Hoahaoism service: Not on file     Active member of club or organization: Not on file     Attends meetings of clubs or organizations: Not on file Relationship status: Not on file    Intimate partner violence     Fear of current or ex partner: Not on file     Emotionally abused: Not on file     Physically abused: Not on file     Forced sexual activity: Not on file   Other Topics Concern    Not on file   Social History Narrative    · Most recent tobacco use screenin2020      · Do you currently or have you served in the [employer]:   No      · Were you activated, into active duty, as a member of the [employer] or as a Reservist:   No      · Live alone or with others:   with others        · Caffeine intake:   Occasional      · Illicit drugs:   No      · Diet:   Regular      · Exercise level: Moderate      · Advance directive:    Yes      · Marital status:         · General stress level:   Medium      · Single or multi-level home/work:   multi level home      · Guns present in home:   No      · Seat belts used routinely:   Yes      · Sunscreen used routinely:   Yes      · Smoke alarm in home:   Yes       Family History   Problem Relation Age of Onset    Heart disease Father     Arthritis Father     Heart attack Father     Alzheimer's disease Mother      Past Surgical History:   Procedure Laterality Date    BACK SURGERY      BLADDER SURGERY      CATARACT EXTRACTION, BILATERAL      COLONOSCOPY  2019    next  [address] GRAFT  2016    Quadruple per Jade Santizo    EGD  2017    TOTAL HIP ARTHROPLASTY Right 2012    TOTAL SHOULDER REPLACEMENT Right 2013       Current Outpatient Medications:     atorvastatin (LIPITOR) 40 mg tablet, Take 1 tablet (40 mg total) by mouth daily, Disp: 90 tablet, Rfl: 3    Eliquis 5 MG, TAKE 1 TABLET BY MOUTH TWICE A DAY, Disp: 60 tablet, Rfl: 3    ezetimibe (ZETIA) 10 mg tablet, Take 1 tablet (10 mg total) by mouth daily, Disp: 90 tablet, Rfl: 3    finasteride (PROSCAR) 5 mg tablet, Take 1 tablet (5 mg total) by mouth daily, Disp: 90 tablet, Rfl: 3    Fluad Quadrivalent 0 5 ML PRSY, PHARMACY ADMINISTERED, Disp: , Rfl:     furosemide (LASIX) 40 mg tablet, Take 1 tablet (40 mg total) by mouth daily, Disp: 90 tablet, Rfl: 1    glipiZIDE (GLUCOTROL XL) 5 mg 24 hr tablet, Take 1 tablet (5 mg total) by mouth daily, Disp: 90 tablet, Rfl: 1    Levemir FlexTouch 100 units/mL injection pen, Inject under the skin 65 units in the morning and at bedtime, Disp: 120 mL, Rfl: 1    lisinopril (ZESTRIL) 20 mg tablet, Take 1 tablet (20 mg total) by mouth daily, Disp: 90 tablet, Rfl: 3    metFORMIN (GLUCOPHAGE) 1000 MG tablet, Take 500 mg by mouth 2 (two) times a day , Disp: , Rfl: 3    metoprolol succinate (TOPROL-XL) 100 mg 24 hr tablet, Take 1 tablet (100 mg total) by mouth daily, Disp: 90 tablet, Rfl: 0    metoprolol succinate (TOPROL-XL) 50 mg 24 hr tablet, Take 1 tablet (50 mg total) by mouth daily 50 mg daily in afternoon, Disp: 90 tablet, Rfl: 1    omeprazole (PriLOSEC) 20 mg delayed release capsule, Take 1 capsule (20 mg total) by mouth daily, Disp: 30 capsule, Rfl: 1    Unifine Pentips 31G X 8 MM MISC, Inject under the skin daily Use as directed, Disp: 100 each, Rfl: 2  No Known Allergies    Labs:  Appointment on 03/12/2021   Component Date Value    Sodium 03/12/2021 142     Potassium 03/12/2021 4 2     Chloride 03/12/2021 103     CO2 03/12/2021 34*    ANION GAP 03/12/2021 5     BUN 03/12/2021 17     Creatinine 03/12/2021 0 75     Glucose, Fasting 03/12/2021 157*    Calcium 03/12/2021 9 3     Corrected Calcium 03/12/2021 10 0     AST 03/12/2021 15     ALT 03/12/2021 27     Alkaline Phosphatase 03/12/2021 81     Total Protein 03/12/2021 7 3     Albumin 03/12/2021 3 1*    Total Bilirubin 03/12/2021 0 39     eGFR 03/12/2021 88     Hemoglobin A1C 03/12/2021 9 3*    EAG 03/12/2021 220     Cholesterol 03/12/2021 122     Triglycerides 03/12/2021 171*    HDL, Direct 03/12/2021 39*    LDL Calculated 03/12/2021 49     Non-HDL-Chol (CHOL-HDL) 03/12/2021 83  Creatinine, Ur 03/12/2021 33 7     Microalbum  ,U,Random 03/12/2021 118 0*    Microalb Creat Ratio 03/12/2021 350*   Lab on 02/11/2021   Component Date Value    1 Salivary Cortisol 02/11/2021 0 184     2 SALIVARY CORTISOL 02/11/2021 0 088      Imaging: Dxa Bone Density Spine Hip And Pelvis    Result Date: 3/25/2021  Narrative: DXA SCAN CLINICAL HISTORY:  50-year-old male  OTHER RISK FACTORS:  IDDM  Furosemide therapy for hypertension  Limited mobility  PHARMACOLOGIC THERAPY FOR OSTEOPOROSIS:  None  TECHNIQUE: Bone densitometry was performed using a Hologic Horizon A  bone densitometer  Regions of interest appear properly placed  COMPARISON: There are no prior DXA studies performed on this unit for comparison  RESULTS: LUMBAR SPINE: Not assessed because  scoliosis and generalized spondylosis result in fewer than two evaluable vertebrae  Russell Pipe LEFT  TOTAL HIP: BMD:  1 235  gm/cm2  T-score:  1 3 LEFT  FEMORAL NECK: BMD:  1 000  gm/cm2 T score: 0 5 LEFT  FOREARM:  33% RADIUS BMD:  0 981  gm/cm2 T-score:  2 7     Impression: 1  Normal bone density  2   The 10 year future fracture risk as calculated by the [facility]/WHO fracture risk assessment tool (FRAX) cannot be determined because the patient's weight exceeds the range included in the [facility] database  3   The current NOF guidelines recommend treating patients with a T-score of -2 5 or less in the lumbar spine or hips, or in post-menopausal women and men over the age of 48 with low bone mass (osteopenia) and a FRAX 10 year risk score of >3% for hip fracture and/or >20% for major osteoporotic fracture  4   The NOF recommends follow-up DXA in 1-2 years after initiating therapy for osteoporosis and every 2 years thereafter  More frequent evaluation is appropriate for patients with conditions associated with rapid bone loss, such as glucocorticoid therapy   The interval between DXA screenings may be longer for individuals without major risk factors and initial T-score in the normal or upper low bone mass range  WHO CLASSIFICATION: Normal (a T-score of -1 0 or higher) Low bone mineral density (a T-score of less than -1 0 but higher than -2 5) Osteoporosis (a T-score of -2 5 or less) Severe osteoporosis (a T-score of -2 5 or less with a fragility fracture) Workstation performed: [id number]       Review of Systems:  Review of Systems   Constitutional: Positive for fatigue  Respiratory: Positive for shortness of breath  Cardiovascular: Positive for leg swelling  Gastrointestinal: Positive for abdominal pain  Musculoskeletal: Positive for arthralgias, back pain, gait problem and myalgias  All other systems reviewed and are negative  Physical Exam:  Physical Exam  Vitals signs reviewed  Constitutional:       Appearance: He is obese  Eyes:      Pupils: Pupils are equal, round, and reactive to light  Neck:      Musculoskeletal: Normal range of motion  Comments: + JVD  Cardiovascular:      Rate and Rhythm: Rhythm irregular  Pulses: Normal pulses  Heart sounds: Normal heart sounds  Pulmonary:      Comments: Short of breath with talking, left base with rales   Abdominal:      General: Bowel sounds are normal       Palpations: Abdomen is soft  Musculoskeletal:      Right lower leg: Edema present  Left lower leg: Edema present  Comments: 3+ rafa LE edema to knees    Skin:     General: Skin is warm and dry  Capillary Refill: Capillary refill takes less than 2 seconds  Comments: rafa LE with erythema L>R   Neurological:      Mental Status: He is alert and oriented to person, place, and time  Mental status is at baseline  Psychiatric:         Mood and Affect: Mood normal          Discussion/Summary:  1  Acute combined RV systolic and LV diastolic heart failure LVEF 60%  NYHA Class III stage C - due to dietary indiscretion, over drinking fluids, CPAP not working correctly  Possible rafa LE cellulitis    I have instructed Mr Susi Gonzales to present to the emergency room for admission to Internal medicine for acute combined heart failure  Heart failure team consultation   2   Persistent atrial fibrillation rate controlled 86 BPM on  Metoprolol succinate 150 mg  Daily, Eliquis 5 mg b i d  for stroke prevention

## 2021-03-25 NOTE — TELEPHONE ENCOUNTER
Patient left message that he is going into the hospital for a few days so he wants to c/x procedure  Please return his call   Thank you

## 2021-03-25 NOTE — ED ATTENDING ATTESTATION
3/25/2021  I, Meera Hicks MD, saw and evaluated the patient  I have discussed the patient with the resident/non-physician practitioner and agree with the resident's/non-physician practitioner's findings, Plan of Care, and MDM as documented in the resident's/non-physician practitioner's note, except where noted  All available labs and Radiology studies were reviewed  I was present for key portions of any procedure(s) performed by the resident/non-physician practitioner and I was immediately available to provide assistance  At this point I agree with the current assessment done in the Emergency Department    I have conducted an independent evaluation of this patient a history and physical is as follows:  Here with sob and respiratory failure   CHF   On lasix 40 a day  History of afib on anticoagulants    Has had increased weight gain increased lower extremities Will swelling denies chest pain patient has chronic shortness of breath  The patient had echocardiogram which showed diastolic dysfunction left ventricular hypertrophy normal ejection fraction  Patient has had no fever  Exam:  The patient is in no acute distress is hypoxic 87% on room air up to 94% on 4 L  Neck no JVD lungs with diminished breath sounds on the right base approximately prison up left side with crackles  Heart irregular abdomen soft  Positive bowel sounds nontender  Extremities bilateral lower extremity edema significant no calf tenderness evidence of chronic venous stasis  Impression shortness of breath with hypoxia concern for pleural effusion on the right likely secondary to volume overload and heart failure  ED Course     Chest x-ray shows abnormalities in bilateral lung bases there is a small effusion on the right  No obvious explanation for the patient's hypoxia so CT scan of the chest pulmonary embolism protocol was obtained  No pulmonary embolism pleural plaques were noted consistent with asbestosis atelectasis small effusion  Patient will require admission for his hypoxia and O2 requirement  Critical Care Time  Procedures  The patient presented with a condition in which there was a high probability of imminent or life-threatening deterioration, and critical care services (excluding separately billable procedures and total teaching time ) total 31  minutes

## 2021-03-26 PROBLEM — J96.01 ACUTE RESPIRATORY FAILURE WITH HYPOXIA (HCC): Status: ACTIVE | Noted: 2021-03-25

## 2021-03-26 PROBLEM — E83.52 HYPERCALCEMIA: Status: ACTIVE | Noted: 2021-03-26

## 2021-03-26 LAB
ATRIAL RATE: 144 BPM
ERYTHROCYTE [DISTWIDTH] IN BLOOD BY AUTOMATED COUNT: 15.3 % (ref 11.6–15.1)
FLUAV RNA RESP QL NAA+PROBE: NEGATIVE
FLUBV RNA RESP QL NAA+PROBE: NEGATIVE
GLUCOSE SERPL-MCNC: 100 MG/DL (ref 65–140)
GLUCOSE SERPL-MCNC: 106 MG/DL (ref 65–140)
GLUCOSE SERPL-MCNC: 124 MG/DL (ref 65–140)
GLUCOSE SERPL-MCNC: 155 MG/DL (ref 65–140)
GLUCOSE SERPL-MCNC: 163 MG/DL (ref 65–140)
HCT VFR BLD AUTO: 42.2 % (ref 36.5–49.3)
HGB BLD-MCNC: 12.3 G/DL (ref 12–17)
MCH RBC QN AUTO: 26.3 PG (ref 26.8–34.3)
MCHC RBC AUTO-ENTMCNC: 29.1 G/DL (ref 31.4–37.4)
MCV RBC AUTO: 90 FL (ref 82–98)
PLATELET # BLD AUTO: 216 THOUSANDS/UL (ref 149–390)
PMV BLD AUTO: 10.6 FL (ref 8.9–12.7)
QRS AXIS: 192 DEGREES
QRSD INTERVAL: 162 MS
QT INTERVAL: 416 MS
QTC INTERVAL: 497 MS
RBC # BLD AUTO: 4.67 MILLION/UL (ref 3.88–5.62)
RSV RNA RESP QL NAA+PROBE: NEGATIVE
SARS-COV-2 RNA RESP QL NAA+PROBE: NEGATIVE
T WAVE AXIS: 14 DEGREES
VENTRICULAR RATE: 86 BPM
WBC # BLD AUTO: 9.67 THOUSAND/UL (ref 4.31–10.16)

## 2021-03-26 PROCEDURE — 94002 VENT MGMT INPAT INIT DAY: CPT

## 2021-03-26 PROCEDURE — 36415 COLL VENOUS BLD VENIPUNCTURE: CPT | Performed by: INTERNAL MEDICINE

## 2021-03-26 PROCEDURE — 82948 REAGENT STRIP/BLOOD GLUCOSE: CPT

## 2021-03-26 PROCEDURE — 0241U HB NFCT DS VIR RESP RNA 4 TRGT: CPT | Performed by: INTERNAL MEDICINE

## 2021-03-26 PROCEDURE — 99222 1ST HOSP IP/OBS MODERATE 55: CPT | Performed by: INTERNAL MEDICINE

## 2021-03-26 PROCEDURE — 93010 ELECTROCARDIOGRAM REPORT: CPT | Performed by: INTERNAL MEDICINE

## 2021-03-26 PROCEDURE — 94760 N-INVAS EAR/PLS OXIMETRY 1: CPT

## 2021-03-26 PROCEDURE — 99232 SBSQ HOSP IP/OBS MODERATE 35: CPT | Performed by: INTERNAL MEDICINE

## 2021-03-26 PROCEDURE — 85027 COMPLETE CBC AUTOMATED: CPT | Performed by: INTERNAL MEDICINE

## 2021-03-26 RX ORDER — LISINOPRIL 20 MG/1
20 TABLET ORAL DAILY
Status: DISCONTINUED | OUTPATIENT
Start: 2021-03-26 | End: 2021-03-28

## 2021-03-26 RX ORDER — ONDANSETRON 2 MG/ML
4 INJECTION INTRAMUSCULAR; INTRAVENOUS EVERY 6 HOURS PRN
Status: DISCONTINUED | OUTPATIENT
Start: 2021-03-26 | End: 2021-03-31 | Stop reason: HOSPADM

## 2021-03-26 RX ORDER — ACETAMINOPHEN 325 MG/1
650 TABLET ORAL EVERY 4 HOURS PRN
Status: DISCONTINUED | OUTPATIENT
Start: 2021-03-26 | End: 2021-03-31 | Stop reason: HOSPADM

## 2021-03-26 RX ORDER — METOPROLOL SUCCINATE 100 MG/1
100 TABLET, EXTENDED RELEASE ORAL DAILY
Status: DISCONTINUED | OUTPATIENT
Start: 2021-03-26 | End: 2021-03-28

## 2021-03-26 RX ORDER — METOPROLOL SUCCINATE 50 MG/1
50 TABLET, EXTENDED RELEASE ORAL DAILY
Status: DISCONTINUED | OUTPATIENT
Start: 2021-03-26 | End: 2021-03-28

## 2021-03-26 RX ORDER — ATORVASTATIN CALCIUM 40 MG/1
40 TABLET, FILM COATED ORAL
Status: DISCONTINUED | OUTPATIENT
Start: 2021-03-26 | End: 2021-03-31 | Stop reason: HOSPADM

## 2021-03-26 RX ORDER — FINASTERIDE 5 MG/1
5 TABLET, FILM COATED ORAL DAILY
Status: DISCONTINUED | OUTPATIENT
Start: 2021-03-26 | End: 2021-03-31 | Stop reason: HOSPADM

## 2021-03-26 RX ORDER — PANTOPRAZOLE SODIUM 40 MG/1
40 TABLET, DELAYED RELEASE ORAL
Status: DISCONTINUED | OUTPATIENT
Start: 2021-03-26 | End: 2021-03-31 | Stop reason: HOSPADM

## 2021-03-26 RX ORDER — EZETIMIBE 10 MG/1
10 TABLET ORAL DAILY
Status: DISCONTINUED | OUTPATIENT
Start: 2021-03-26 | End: 2021-03-31 | Stop reason: HOSPADM

## 2021-03-26 RX ORDER — FUROSEMIDE 10 MG/ML
40 INJECTION INTRAMUSCULAR; INTRAVENOUS
Status: DISCONTINUED | OUTPATIENT
Start: 2021-03-26 | End: 2021-03-28

## 2021-03-26 RX ADMIN — PANTOPRAZOLE SODIUM 40 MG: 40 TABLET, DELAYED RELEASE ORAL at 06:47

## 2021-03-26 RX ADMIN — METOPROLOL SUCCINATE 100 MG: 100 TABLET, EXTENDED RELEASE ORAL at 08:45

## 2021-03-26 RX ADMIN — EZETIMIBE 10 MG: 10 TABLET ORAL at 08:45

## 2021-03-26 RX ADMIN — ATORVASTATIN CALCIUM 40 MG: 40 TABLET, FILM COATED ORAL at 18:32

## 2021-03-26 RX ADMIN — METOPROLOL SUCCINATE 50 MG: 50 TABLET, EXTENDED RELEASE ORAL at 16:39

## 2021-03-26 RX ADMIN — INSULIN LISPRO 4 UNITS: 100 INJECTION, SOLUTION INTRAVENOUS; SUBCUTANEOUS at 16:39

## 2021-03-26 RX ADMIN — INSULIN DETEMIR 65 UNITS: 100 INJECTION, SOLUTION SUBCUTANEOUS at 21:41

## 2021-03-26 RX ADMIN — LISINOPRIL 20 MG: 20 TABLET ORAL at 08:45

## 2021-03-26 RX ADMIN — APIXABAN 5 MG: 5 TABLET, FILM COATED ORAL at 08:44

## 2021-03-26 RX ADMIN — INSULIN DETEMIR 65 UNITS: 100 INJECTION, SOLUTION SUBCUTANEOUS at 08:45

## 2021-03-26 RX ADMIN — FINASTERIDE 5 MG: 5 TABLET, FILM COATED ORAL at 08:45

## 2021-03-26 RX ADMIN — FUROSEMIDE 40 MG: 10 INJECTION, SOLUTION INTRAVENOUS at 16:39

## 2021-03-26 RX ADMIN — APIXABAN 5 MG: 5 TABLET, FILM COATED ORAL at 18:32

## 2021-03-26 RX ADMIN — ACETAMINOPHEN 650 MG: 325 TABLET, FILM COATED ORAL at 21:45

## 2021-03-26 RX ADMIN — FUROSEMIDE 40 MG: 10 INJECTION, SOLUTION INTRAVENOUS at 18:32

## 2021-03-26 RX ADMIN — FUROSEMIDE 40 MG: 10 INJECTION, SOLUTION INTRAVENOUS at 06:48

## 2021-03-26 RX ADMIN — INSULIN DETEMIR 65 UNITS: 100 INJECTION, SOLUTION SUBCUTANEOUS at 00:31

## 2021-03-26 NOTE — ASSESSMENT & PLAN NOTE
· Noted with hypoxia and recent pulmonology and Cardiology outpatient visits, with hypoxia on ED arrival requiring 4 L NC (normally on room air)  · CTA PE study without evidence of pulmonary embolism but with evidence of bilateral atelectasis, pleural plaques  · May be related to acute on chronic diastolic heart failure, additionally with suspected obesity hypoventilation and known LUCIA  · Diuresis as above  · Continue supplemental oxygen as needed to maintain SaO2 greater than 88%  · Will obtain ambulatory pulse oximetry once improved from volume standpoint

## 2021-03-26 NOTE — ASSESSMENT & PLAN NOTE
· Noted with hypoxia and recent pulmonology and Cardiology outpatient visits, with hypoxia on ED arrival requiring 4 L NC (normally on room air)  · CTA PE study without evidence of pulmonary embolism but with evidence of bilateral atelectasis, pleural plaques  · May be related to acute on chronic diastolic heart failure, additionally with suspected obesity hypoventilation and known LUCIA  · Diuresis as above  · Continue supplemental oxygen as needed to maintain SaO2 greater than 88%; according the patient, he is not being maintained on any oxygen at home  When I saw the patient, patient was still on 4 L of oxygen via nasal cannula with good oxygen saturations  Possibly titrate down and eventually wean off from the oxygen, keep oxygen saturations 90% and above    · May need ambulatory pulse oximetry or home oxygen portability study once improved from volume standpoint

## 2021-03-26 NOTE — INCIDENTAL FINDINGS
The following findings require follow up:  Radiographic finding   Findin mm left upper lobe nodule   Follow up required: Non-contrast CT chest   Follow up should be done within 6-12 month(s)    Please notify the following clinician to assist with the follow up:   Dr Irwin Lambert MD

## 2021-03-26 NOTE — PROGRESS NOTES
[address] [facility]  Progress Note - Reginold Fast 1945, 67 y o  male MRN: [id number]  Unit/Bed#: ED 02 Encounter: [id number]  Primary Care Provider: Mag Epps MD   Date and time admitted to hospital: 3/25/2021  6:14 PM    * Acute on chronic diastolic heart failure [facility])  Assessment & Plan  Wt Readings from Last 3 Encounters:   03/25/21 (!) 150 kg (331 lb)   03/25/21 (!) 150 kg (331 lb 8 oz)   03/24/21 (!) 150 kg (331 lb 6 oz)     · Several week history of increasing lower extremity edema, dyspnea, orthopnea  Sent in from Cardiology office with concern for acute exacerbation  Patient appearing volume overloaded on examination  · Echo 03/23/2021 EF 60% with evidence of G1DD  · Continue IV Lasix  · Heart failure Team consultation  · Monitor daily weights, I/O  · Low-sodium, fluid-restricted diet  · Monitor electrolytes, creatinine  · Oxygen p r n           Acute respiratory failure with hypoxia (HCC)  Assessment & Plan  · Noted with hypoxia and recent pulmonology and Cardiology outpatient visits, with hypoxia on ED arrival requiring 4 L NC (normally on room air)  · CTA PE study without evidence of pulmonary embolism but with evidence of bilateral atelectasis, pleural plaques  · May be related to acute on chronic diastolic heart failure, additionally with suspected obesity hypoventilation and known LUCIA  · Diuresis as above  · Continue supplemental oxygen as needed to maintain SaO2 greater than 88%; according the patient, he is not being maintained on any oxygen at home  When I saw the patient, patient was still on 4 L of oxygen via nasal cannula with good oxygen saturations  Possibly titrate down and eventually wean off from the oxygen, keep oxygen saturations 90% and above    · May need ambulatory pulse oximetry or home oxygen portability study once improved from volume standpoint    Left upper lobe pulmonary nodule  Assessment & Plan  · Incidentally noted on CTA PE study  · There also other incidental findings: Stable calcified pleural plaques which may be due to asbestos exposure  Stable 4 cm right adrenal nodule  · Recommend repeat CT imaging in 6-12 months  · According to the patient, he knows about this pulmonary nodule as well as pleural plaques even before  And that these been monitored before and he claims that there are no changes with these findings through the years  Patient admits to asbestos exposure in the past   I would still recommend monitoring these incidental findings as mentioned above  From my discussion with the patient, he will discuss this with his primary care physician if these findings still need to be monitored in the outpatient  Outpatient follow-up with primary care physician  LUCIA (obstructive sleep apnea)  Assessment & Plan  · Has been off CPAP due to malfunctioning machine; is to be re-initiated in outpatient setting    Morbid obesity ([facility] [address] )  Assessment & Plan  · Dietary and lifestyle modification discussed    A-fib ([facility] [address] )  Assessment & Plan  · Rate controlled on metoprolol  · Anticoagulated on Eliquis and will continue    CAD (coronary artery disease)  Assessment & Plan  · S/p prior CABG, without chest pain at this time  · Continue metoprolol and atorvastatin    Type 2 diabetes mellitus with hyperglycemia, with long-term current use of insulin [facility])  Assessment & Plan  Lab Results   Component Value Date    HGBA1C 9 3 (H) 03/12/2021       Recent Labs     03/26/21  0030 03/26/21  0658 03/26/21  1139   POCGLU 124 106 155*       Blood Sugar Average: Last 72 hrs:  · (P) 881 [id number] not controlled as noted with elevated A1c  · Continue Levemir 65 units b i d , hold home oral medication  · Add SSI with Accu-Cheks, carb controlled diet  · Presently, blood sugars at acceptable levels  · Adjust treatment accordingly  · Initiate hypoglycemia protocol    Hypercalcemia:  Very mild  Monitor    For further workup and management if this worsens significantly  VTE Pharmacologic Prophylaxis:   Pharmacologic: Apixaban (Eliquis)  Mechanical VTE Prophylaxis in Place: Yes    Patient Centered Rounds: I have performed bedside rounds with nursing staff today  Discussions with Specialists or Other Care Team Provider:     Education and Discussions with Family / Patient:  I discussed with the patient our findings, management and plans  Answered questions and concerns  He is okay with the management and plans  I offered to call patient's family, but patient declined  Time Spent for Care: 30 minutes  More than 50% of total time spent on counseling and coordination of care as described above  Current Length of Stay: 1 day(s)    Current Patient Status: Inpatient   Certification Statement: The patient will continue to require additional inpatient hospital stay due to Above findings and plans  Discharge Plan:  None yet today  Code Status: Level 3 - DNAR and DNI      Subjective:   Patient feels fine and better today  At the time I saw him, patient denies any shortness of breath at rest   However, patient was still in the ER at the time I saw him, thus patient really has not really move around yet  Patient still on oxygen at 4 L at the time I saw him  Patient told me that is not being maintained on any home oxygen  Patient still was noted to have significant bilateral lower extremity edema  Otherwise, patient does not have any other complaints  No pains  Objective:     Vitals:   Temp (24hrs), Av 5 °F (36 4 °C), Min:97 5 °F (36 4 °C), Max:97 5 °F (36 4 °C)    Temp:  [97 5 °F (36 4 °C)] 97 5 °F (36 4 °C)  HR:  [69-96] 69  Resp:  [15-20] 17  BP: (110-168)/(55-84) 110/55  SpO2:  [87 %-99 %] 93 %  Body mass index is 44 89 kg/m²  Input and Output Summary (last 24 hours):        Intake/Output Summary (Last 24 hours) at 3/26/2021 1202  Last data filed at 3/26/2021 0723  Gross per 24 hour   Intake 16 ml   Output 1100 ml   Net -1084 ml Physical Exam:     Physical Exam  Vitals signs reviewed  Constitutional:       General: He is not in acute distress  Appearance: He is obese  He is not ill-appearing, toxic-appearing or diaphoretic  Cardiovascular:      Rate and Rhythm: Normal rate  Rhythm irregular  Heart sounds: Normal heart sounds  No murmur  No friction rub  No gallop  Pulmonary:      Effort: Pulmonary effort is normal  No respiratory distress  Breath sounds: No stridor  Wheezing and rales present  No rhonchi  Musculoskeletal:         General: No tenderness  Right lower leg: Edema present  Left lower leg: Edema present  Skin:     General: Skin is warm  Coloration: Skin is not pale  Findings: Erythema present  No rash  Comments: Patient also has bilateral lower extremity erythema (possible chronic venous stasis skin changes/dermatitis), no abnormal warmth or tenderness  Neurological:      General: No focal deficit present  Mental Status: He is alert and oriented to person, place, and time  Psychiatric:         Mood and Affect: Mood normal          Behavior: Behavior normal          Thought Content: Thought content normal             Additional Data:     Labs:    Results from last 7 days   Lab Units 03/26/21  0655   WBC Thousand/uL 9 67   HEMOGLOBIN g/dL 12 3   HEMATOCRIT % 42 2   PLATELETS Thousands/uL 216     Results from last 7 days   Lab Units 03/25/21  1857   POTASSIUM mmol/L 3 7   CHLORIDE mmol/L 102   CO2 mmol/L 35*   BUN mg/dL 16   CREATININE mg/dL 0 70   CALCIUM mg/dL 9 4   ALK PHOS U/L 82   ALT U/L 22   AST U/L 12           * I Have Reviewed All Lab Data Listed Above  * Additional Pertinent Lab Tests Reviewed: [address] Admission Reviewed    Imaging:    Imaging Reports Reviewed Today Include:  Diagnostic imaging studies that were done on this admission  Imaging Personally Reviewed by Myself Includes:  None      Recent Cultures (last 7 days): Last 24 Hours Medication List:   Current Facility-Administered Medications   Medication Dose Route Frequency Provider Last Rate    acetaminophen  650 mg Oral Q4H PRN Joann Oyster, DO      apixaban  5 mg Oral BID Joann Oyster, DO      atorvastatin  40 mg Oral After Ardean Lady, DO      ezetimibe  10 mg Oral Daily Joann Oyster, DO      finasteride  5 mg Oral Daily Joann Oyster, DO      furosemide  40 mg Intravenous TID (diuretic) Joann Oyster, DO      insulin detemir  65 Units Subcutaneous Q12H [address] Joann Oyster, DO      insulin lispro  2-12 Units Subcutaneous HS Joann Oyster, DO      insulin lispro  4-20 Units Subcutaneous TID AC Joann Oyster, DO      lisinopril  20 mg Oral Daily Joann Oyster, DO      metoprolol succinate  100 mg Oral Daily Joann Oyster, DO      metoprolol succinate  50 mg Oral Daily Joann Oyster, DO      ondansetron  4 mg Intravenous Q6H PRN Joann Oyster, DO      pantoprazole  40 mg Oral Early Morning Joann Oyster, DO          Today, Patient Was Seen By: Amy Clinton MD    ** Please Note: Dragon 360 Dictation voice to text software may have been used in the creation of this document   **

## 2021-03-26 NOTE — ED NOTES
Pt placed in hospital bed, provided crackers and water    Pt denies any complaints at present time, VSS,pt states he feels pretty good, pt decreased to 4L NC prior to me taking over care     [address], RN  03/26/21 [id number]

## 2021-03-26 NOTE — ASSESSMENT & PLAN NOTE
Wt Readings from Last 3 Encounters:   03/25/21 (!) 150 kg (331 lb)   03/25/21 (!) 150 kg (331 lb 8 oz)   03/24/21 (!) 150 kg (331 lb 6 oz)     · Several week history of increasing lower extremity edema, dyspnea, orthopnea  Sent in from Cardiology office with concern for acute exacerbation  Patient appearing volume overloaded on examination  · Echo 03/23/2021 EF 60% with evidence of G1DD  · Continue IV Lasix  · Heart failure Team consultation  · Monitor daily weights, I/O  · Low-sodium, fluid-restricted diet  · Monitor electrolytes, creatinine  · Oxygen p r n  Kathrin Alba

## 2021-03-26 NOTE — ASSESSMENT & PLAN NOTE
Wt Readings from Last 3 Encounters:   03/25/21 (!) 150 kg (331 lb)   03/25/21 (!) 150 kg (331 lb 8 oz)   03/24/21 (!) 150 kg (331 lb 6 oz)     · Several week history of increasing lower extremity edema, dyspnea, orthopnea  Sent in from Cardiology office with concern for acute exacerbation    Patient appearing volume overloaded on examination  · Echo 03/23/2021 EF 60% with evidence of G1DD  · To received 40 mg IV Lasix, will continue IV diuresis  · Heart failure Team consultation  · Monitor daily weights, I/O  · Low-sodium, fluid-restricted diet  · Monitor electrolytes, creatinine

## 2021-03-26 NOTE — H&P
[facility]  H&P- Cole Rodas 1945, 67 y o  male MRN: [id number]  Unit/Bed#: ED 02 Encounter: [id number]  Primary Care Provider: Christiano Dumont MD   Date and time admitted to hospital: 3/25/2021  6:14 PM    * Acute on chronic diastolic heart failure [facility])  Assessment & Plan  Wt Readings from Last 3 Encounters:   03/25/21 (!) 150 kg (331 lb)   03/25/21 (!) 150 kg (331 lb 8 oz)   03/24/21 (!) 150 kg (331 lb 6 oz)     · Several week history of increasing lower extremity edema, dyspnea, orthopnea  Sent in from Cardiology office with concern for acute exacerbation    Patient appearing volume overloaded on examination  · Echo 03/23/2021 EF 60% with evidence of G1DD  · To received 40 mg IV Lasix, will continue IV diuresis  · Heart failure Team consultation  · Monitor daily weights, I/O  · Low-sodium, fluid-restricted diet  · Monitor electrolytes, creatinine        Hypoxia  Assessment & Plan  · Noted with hypoxia and recent pulmonology and Cardiology outpatient visits, with hypoxia on ED arrival requiring 4 L NC (normally on room air)  · CTA PE study without evidence of pulmonary embolism but with evidence of bilateral atelectasis, pleural plaques  · May be related to acute on chronic diastolic heart failure, additionally with suspected obesity hypoventilation and known LUCIA  · Diuresis as above  · Continue supplemental oxygen as needed to maintain SaO2 greater than 88%  · Will obtain ambulatory pulse oximetry once improved from volume standpoint    Left upper lobe pulmonary nodule  Assessment & Plan  · Incidentally noted on CTA PE study  · Recommend repeat CT imaging in 6-12 months    LUCIA (obstructive sleep apnea)  Assessment & Plan  · Has been off CPAP due to malfunctioning machine; is to be re-initiated in outpatient setting    Morbid obesity ([address] )  Assessment & Plan  · Dietary and lifestyle modification discussed    A-fib ([address] )  Assessment & Plan  · Rate controlled on metoprolol  · Anticoagulated on Eliquis and will continue    CAD (coronary artery disease)  Assessment & Plan  · S/p prior CABG, without chest pain at this time  · Continue metoprolol and atorvastatin    Type 2 diabetes mellitus with hyperglycemia, with long-term current use of insulin (HCC)  Assessment & Plan  Lab Results   Component Value Date    HGBA1C 9 3 (H) 03/12/2021       No results for input(s): POCGLU in the last 72 hours  Blood Sugar Average: Last 72 hrs:  ·  not controlled as noted with elevated A1c  · Continue Levemir 65 units b i d , hold home oral medication  · Add SSI with Accu-Cheks, carb controlled diet  · Initiate hypoglycemia protocol      VTE Prophylaxis: Apixaban (Eliquis)  / sequential compression device   Code Status: Level 3 - DNAR and DNI  POLST: POLST form is not discussed and not completed at this time  Anticipated Length of Stay:  Patient will be admitted on an Inpatient basis with an anticipated length of stay of  Greater than 2 midnights  Justification for Hospital Stay: Please see detailed plans noted above  Chief Complaint:     Shortness of breath and edema  History of Present Illness:  Chandrika Funes is a 67 y o  male who has a past medical history significant for CAD s/p CABG, chronic diastolic heart failure, type 2 diabetes on insulin therapy, hypertension, atrial fibrillation anticoagulated on Eliquis, and LUCIA not currently on CPAP due to broken machine  Presented from Cardiology office with concern for acute on chronic heart failure  For the past 6 weeks has noticed increasing bilateral lower extremity edema, occasional shortness of breath particularly with exertion or lying flat  Additionally endorsed orthopnea requiring use of recliner to sleep  States he is compliant with all medications, but does admit to some dietary indiscretion  Denies fevers/chills, chest pain/pressure, dizziness/lightheadedness, cough/wheezing, or nausea/vomiting/diarrhea    Additionally was noted hypoxic during outpatient pulmonology appointment 03/24/2021 for which CXR was to be obtained, and was noted hypoxic during cardiology appointment and given concerns was sent to ED for further evaluation and admission  He underwent CTA PE study which was negative for pulmonary embolism but reveal evidence of cardiomegaly and bilateral atelectasis  Received 40 mg IV Lasix is admitted for further management of suspected acute on chronic heart failure  Currently, patient is sitting up in bed in no acute distress wearing 4 L NC  Notes no shortness of breath at this time while at rest, and without other acute complaints at this time      Review of Systems:    Constitutional:  Denies fever or chills but endorses fatigue  Eyes:  Denies change in visual acuity   HENT:  Denies nasal congestion or sore throat   Respiratory:  Denies cough but endorses shortness of breath  Cardiovascular:  Denies chest pain but endorses edema  GI:  Denies abdominal pain, nausea, vomiting, bloody stools or diarrhea   :  Denies dysuria   Musculoskeletal:  Endorses back pain and arthralgias  Integument:  Denies rash   Neurologic:  Denies headache, focal weakness or sensory changes   Endocrine:  Denies polyuria or polydipsia   Lymphatic:  Denies swollen glands   Psychiatric:  Denies depression or anxiety     Past Medical and Surgical History:   Past Medical History:   Diagnosis Date    A-fib [facility])     AAA (abdominal aortic aneurysm) [address] [address]     Actinic keratosis of right temple 7/31/2020    Actinic keratosis of scalp 7/31/2020    Arthritis     Lay's esophagus     Bladder cancer (HCC)     CAD (coronary artery disease)     Chronic low back pain     Chronic pain of both knees 7/31/2020    Colitis 12/4/2020    Diabetes ([facility] )     Excessive gas 12/3/2020    Hyperlipemia     Hypertension     Immunization deficiency 10/30/2020    Hep a nonimmune    Left lower quadrant abdominal pain 12/3/2020    Mass of right adrenal gland ([facility] [address] ) 12/4/2020    4 1 cm    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    LUCIA (obstructive sleep apnea) 1/29/2021     Past Surgical History:   Procedure Laterality Date    BACK SURGERY      BLADDER SURGERY      CATARACT EXTRACTION, BILATERAL      COLONOSCOPY  01/29/2019    next [address]22 [address] GRAFT  04/2016    Quadruple per [place]    EGD  06/2017    TOTAL HIP ARTHROPLASTY Right 2012    TOTAL SHOULDER REPLACEMENT Right 2013       Meds/Allergies:    Current Facility-Administered Medications:     insulin detemir (LEVEMIR) subcutaneous injection 65 Units, 65 Units, Subcutaneous, Q12H [address], Daylene Falling, DO, 65 Units at 03/26/21 0031    Current Outpatient Medications:     atorvastatin (LIPITOR) 40 mg tablet, Take 1 tablet (40 mg total) by mouth daily, Disp: 90 tablet, Rfl: 3    Eliquis 5 MG, TAKE 1 TABLET BY MOUTH TWICE A DAY, Disp: 60 tablet, Rfl: 3    ezetimibe (ZETIA) 10 mg tablet, Take 1 tablet (10 mg total) by mouth daily, Disp: 90 tablet, Rfl: 3    finasteride (PROSCAR) 5 mg tablet, Take 1 tablet (5 mg total) by mouth daily, Disp: 90 tablet, Rfl: 3    Fluad Quadrivalent 0 5 ML PRSY, PHARMACY ADMINISTERED, Disp: , Rfl:     furosemide (LASIX) 40 mg tablet, Take 1 tablet (40 mg total) by mouth daily, Disp: 90 tablet, Rfl: 1    glipiZIDE (GLUCOTROL XL) 5 mg 24 hr tablet, Take 1 tablet (5 mg total) by mouth daily, Disp: 90 tablet, Rfl: 1    Levemir FlexTouch 100 units/mL injection pen, Inject under the skin 65 units in the morning and at bedtime, Disp: 120 mL, Rfl: 1    lisinopril (ZESTRIL) 20 mg tablet, Take 1 tablet (20 mg total) by mouth daily, Disp: 90 tablet, Rfl: 3    metFORMIN (GLUCOPHAGE) 1000 MG tablet, Take 500 mg by mouth 2 (two) times a day , Disp: , Rfl: 3    metoprolol succinate (TOPROL-XL) 100 mg 24 hr tablet, Take 1 tablet (100 mg total) by mouth daily, Disp: 90 tablet, Rfl: 0    metoprolol succinate (TOPROL-XL) 50 mg 24 hr tablet, Take 1 tablet (50 mg total) by mouth daily 50 mg daily in afternoon, Disp: 90 tablet, Rfl: 1    omeprazole (PriLOSEC) 20 mg delayed release capsule, Take 1 capsule (20 mg total) by mouth daily, Disp: 30 capsule, Rfl: 1    Unifine Pentips 31G X 8 MM MISC, Inject under the skin daily Use as directed, Disp: 100 each, Rfl: 2    Allergies: No Known Allergies  History:  Marital Status: /Civil Union     Substance Use History:   Social History     Substance and Sexual Activity   Alcohol Use Not Currently    Comment: No use     Social History     Tobacco Use   Smoking Status Former Smoker    Packs/day: 0 25    Types: Cigarettes    Quit date: 2009    Years since quittin 2   Smokeless Tobacco Never Used   Tobacco Comment    smoked since age 21; per Jessica Payne     Social History     Substance and Sexual Activity   Drug Use Never    Comment: No use       Family History:  Family History   Problem Relation Age of Onset    Heart disease Father     Arthritis Father     Heart attack Father     Alzheimer's disease Mother        Physical Exam:     Vitals:   Blood Pressure: 152/70 (21)  Pulse: 76 (21)  Temperature: 97 5 °F (36 4 °C) (21)  Temp Source: Oral (21)  Respirations: 20 (21)  Weight - Scale: (!) 150 kg (331 lb) (21)  SpO2: 99 % (21)    Constitutional:  Well developed, morbidly obese, no acute distress, non-toxic appearance   Eyes:  PERRL, conjunctiva normal   HENT:  Atraumatic, external ears normal, nose normal, oropharynx moist, no pharyngeal exudates   Neck- normal range of motion, no tenderness, supple   Respiratory:  No respiratory distress, decreased breath sounds bilaterally with left basilar rales, no wheezing   Cardiovascular:  Irregularly irregular rate and rhythm, no murmurs, no gallops, no rubs   GI:  Soft, nondistended, normal bowel sounds, nontender, no organomegaly, no mass, no rebound, no guarding   :  No costovertebral angle tenderness   Musculoskeletal:  Bilateral lower extremity edema to knees, no tenderness, no deformities  Back- no tenderness  Integument:  Well hydrated, bilateral lower extremity erythema   Lymphatic:  No lymphadenopathy noted   Neurologic:  Alert &awake, communicative, CN 2-12 normal, normal motor function, normal sensory function, no focal deficits noted   Psychiatric:  Speech and behavior appropriate       Lab Results: I have personally reviewed pertinent reports  Results from last 7 days   Lab Units 03/25/21  1857   WBC Thousand/uL 9 23   HEMOGLOBIN g/dL 12 5   HEMATOCRIT % 41 2   PLATELETS Thousands/uL 214     Results from last 7 days   Lab Units 03/25/21  1857   POTASSIUM mmol/L 3 7   CHLORIDE mmol/L 102   CO2 mmol/L 35*   BUN mg/dL 16   CREATININE mg/dL 0 70   CALCIUM mg/dL 9 4   ALK PHOS U/L 82   ALT U/L 22   AST U/L 12           EKG:  Atrial fibrillation, RBBB    Imaging: I have personally reviewed pertinent reports  Dxa Bone Density Spine Hip And Pelvis    Result Date: 3/25/2021  Narrative: DXA SCAN CLINICAL HISTORY:  24-year-old male  OTHER RISK FACTORS:  IDDM  Furosemide therapy for hypertension  Limited mobility  PHARMACOLOGIC THERAPY FOR OSTEOPOROSIS:  None  TECHNIQUE: Bone densitometry was performed using a Hologic Horizon A  bone densitometer  Regions of interest appear properly placed  COMPARISON: There are no prior DXA studies performed on this unit for comparison  RESULTS: LUMBAR SPINE: Not assessed because  scoliosis and generalized spondylosis result in fewer than two evaluable vertebrae  [place] Simple LEFT  TOTAL HIP: BMD:  1 235  gm/cm2  T-score:  1 3 LEFT  FEMORAL NECK: BMD:  1 000  gm/cm2 T score: 0 5 LEFT  FOREARM:  33% RADIUS BMD:  0 981  gm/cm2 T-score:  2 7     Impression: 1  Normal bone density   2   The 10 year future fracture risk as calculated by the [place]/WHO fracture risk assessment tool (FRAX) cannot be determined because the patient's weight exceeds the range included in the [facility] database  3   The current NOF guidelines recommend treating patients with a T-score of -2 5 or less in the lumbar spine or hips, or in post-menopausal women and men over the age of 48 with low bone mass (osteopenia) and a FRAX 10 year risk score of >3% for hip fracture and/or >20% for major osteoporotic fracture  4   The NOF recommends follow-up DXA in 1-2 years after initiating therapy for osteoporosis and every 2 years thereafter  More frequent evaluation is appropriate for patients with conditions associated with rapid bone loss, such as glucocorticoid therapy  The interval between DXA screenings may be longer for individuals without major risk factors and initial T-score in the normal or upper low bone mass range  WHO CLASSIFICATION: Normal (a T-score of -1 0 or higher) Low bone mineral density (a T-score of less than -1 0 but higher than -2 5) Osteoporosis (a T-score of -2 5 or less) Severe osteoporosis (a T-score of -2 5 or less with a fragility fracture) Workstation performed: [id number]     Cta Ed Chest Pe Study    Result Date: 3/25/2021  Narrative: CTA - CHEST WITH IV CONTRAST - PULMONARY ANGIOGRAM INDICATION:   Shortness of breath Hypoxia, dyspnea  COMPARISON: CT of abdomen on January 5, 2021  TECHNIQUE: CTA examination of the chest was performed using angiographic technique according to a protocol specifically tailored to evaluate for pulmonary embolism  Axial, sagittal, and coronal 2D reformatted images were created from the source data and  submitted for interpretation  In addition, coronal 3D MIP postprocessing was performed on the acquisition scanner  Radiation dose length product (DLP) for this visit:  1747 12 mGy-cm   This examination, like all CT scans performed in the [facility], was performed utilizing techniques to minimize radiation dose exposure, including the use of iterative reconstruction and automated exposure control   IV Contrast:  100 mL of iohexol (OMNIPAQUE)  was administered intravenously without immediate adverse reaction  FINDINGS: PULMONARY ARTERIAL TREE:  Motion limited examination  No main, lobar, or proximal segmental pulmonary embolism  LUNGS:  Bibasilar atelectasis/scarring similar to prior examination  Large calcification within the right lower lobe compatible with granuloma  There is a 0 7 x 0 5 cm juxtapleural nodule in the left upper lobe (series 2, image 70)  Minimal secretions  in the posterior trachea  PLEURA:  There are calcified pleural plaques which may be due to asbestos exposure  No pneumothorax or pleural effusion  HEART/GREAT VESSELS:  Cardiomegaly  Lipomatous hypertrophy of the interatrial septum  Coronary artery calcifications  No pericardial effusion  Status post median sternotomy  MEDIASTINUM AND SILKE:  Subcarinal lymph nodes measure up to 1 cm in short axis (series 2, image 119)  CHEST WALL AND LOWER NECK:   Unremarkable  VISUALIZED STRUCTURES IN THE UPPER ABDOMEN:  Stable 4 cm right adrenal nodule appears OSSEOUS STRUCTURES:  No acute fracture or destructive osseous lesion  Degenerative changes the osseous structures  Impression: 1  Motion limited examination  No evidence of pulmonary embolism to the proximal segmental level  2   Bibasilar atelectasis/scarring similar to prior examination  3   7 mm left upper lobe nodule   Based on current Fleischner Society 2017 Guidelines on incidental pulmonary nodule, followup non-contrast CT is recommended at 6-12 months from the initial examination and, if stable at that time, an additional followup is recommended for 18-24 months from the initial examination  4   Stable calcified pleural plaques which may be due to asbestos exposure  5   Cardiomegaly  Lipomatous hypertrophy of the interatrial septum  6   Stable 4 cm right adrenal nodule   Workstation performed: [id number]         ** Please Note: Dragon 360 Dictation voice to text software was used in the creation of this document   **

## 2021-03-26 NOTE — TELEPHONE ENCOUNTER
Left message for patient that I canceled procedure and I also to call back and reschedule when able  Also spoke w/Rebeca at [facility]

## 2021-03-26 NOTE — ASSESSMENT & PLAN NOTE
· Incidentally noted on CTA PE study  · There also other incidental findings: Stable calcified pleural plaques which may be due to asbestos exposure  Stable 4 cm right adrenal nodule  · Recommend repeat CT imaging in 6-12 months  · According to the patient, he knows about this pulmonary nodule as well as pleural plaques even before  And that these been monitored before and he claims that there are no changes with these findings through the years  Patient admits to asbestos exposure in the past   I would still recommend monitoring these incidental findings as mentioned above  From my discussion with the patient, he will discuss this with his primary care physician if these findings still need to be monitored in the outpatient  Outpatient follow-up with primary care physician

## 2021-03-26 NOTE — ASSESSMENT & PLAN NOTE
Lab Results   Component Value Date    HGBA1C 9 3 (H) 03/12/2021       Recent Labs     03/26/21  0030 03/26/21  0658 03/26/21  1139   POCGLU 124 106 155*       Blood Sugar Average: Last 72 hrs:  · (P) 499 [id number] not controlled as noted with elevated A1c  · Continue Levemir 65 units b i d , hold home oral medication  · Add SSI with Accu-Cheks, carb controlled diet  · Presently, blood sugars at acceptable levels  · Adjust treatment accordingly    · Initiate hypoglycemia protocol

## 2021-03-26 NOTE — CONSULTS
[facility], [address] 67 y o  male MRN: [id number]  Unit/Bed#: ED 02 Encounter: [id number]    Code Status: Level 3 - DNAR and DNI  Reason for Admission / Principal Problem:  Shortness of breath and bilateral lower extremity swelling  Reason for Consult:  Acute on chronic heart failure exacerbation      A/P:    Acute on chronic heart failure exacerbation- EF 60%  Acute respiratory failure from heart failure exacerbation  Type 2 diabetes  CAD status post CABG in 2016- LIMA to LAD SVG to OM 3 and SVG to PDA  Paroxysmal Afib  Left upper lobe pulmonary nodule, pleural plaques  LUCIA- recently noncompliant with CPAP  Obesity  Mild-to-moderate TR based on recent echo      1  Acute on chronic heart failure exacerbation  Patient presenting with bilateral lower extremity swelling and shortness of breath  Likely noncompliance with diet  NT pro  although has JVD and bilateral lower extremity swelling 2+  Home regimen includes Lasix 40 mg daily  Home weight 325 lbs  On presentation was 331 lbs  Plan:  · Will continue IV Lasix 40 mg t i d   · Continue home lisinopril 20 mg daily, Toprol  mg in the morning and 50 mg in the afternoon  Also continue Lipitor and ezetimibe  · Strict ins and outs  Daily weights  · Continue to monitor renal function and electrolytes daily  Replete as needed  · On tele  2  CAD status post CABG in 2016 LIMA to LAD SVG to OM 3 and SVG to PDA   :  Continue Lipitor 40 mg daily, ezetimibe 10 mg daily  Will inquire about why he is not on aspirin  3  Paroxysmal Afib:  Continue home Toprol  mg in the morning and 50 mg in the afternoon  On Eliquis 5 mg b i d       4  Recent echo showing mild-to-moderate TR:  Likely from right-sided volume overload and possibly noncompliance with CPAP for his LUCIA contributing          5  LUCIA:  Admits that he has not been using his CPAP since it is not working  Patient educated on compliance with it and was asked to call PCP to order a new one  HPI: Jesus Wakefield is a 67 y o  male with past medical history of CAD status post CABG LIMA to LAD SVG to OM 3 and SVG to PDA, paroxysmal AFib , mild-to-moderate TR, LUCIA on CPAP, type 2 diabetes, left pulmonary nodule, pleural plaques, obesity who presents with shortness of breath and bilateral lower extremity swelling  Patient was at his outpatient cardiology office where he was noticed to be short of breath and worsening lower extremity swelling  He was sent to ER for further evaluation  Patient has a history of heart failure although he admits that he does not follow low-salt diet  Admits to being compliant with his Lasix but not all the time  Has notice 1 week of worsening bilateral lower extremity swelling and shortness of breath  On presentation, NT proBNP not significantly elevated but patient has bilateral low extremity swelling with pulmonary congestion on x-ray and JVD  Patient received 40 mg Lasix with mild improvement  Has urinated 1 L as of this morning  EKG showed AFib without RVR and QT prolongation  Patient recently had an echo which showed EF 60% with grade 1 diastolic dysfunction, mild RV dilatation, PA pressure 45, mild calcification of mitral valve, mild-to-moderate TR  CTA showed left-sided 7 mm upper lobe nodule, no PE, bibasilar atelectasis/scarring, cardiomegaly, right adrenal nodule, stable calcified pleural plaques  Patient was admitted for acute on chronic heart failure exacerbation and is currently on IV Lasix  During my evaluation of the patient, patient was on 2 L nasal cannula and was comfortable  No crackles although he has JVD when sitting upright and has bilateral lower extremity swelling 2+  He overall feel comfortable  History obtained from patient and chart review      PMH:  Past Medical History:   Diagnosis Date  A-fib ([facility] )     AAA (abdominal aortic aneurysm) (HCC)     Actinic keratosis of right temple 2020    Actinic keratosis of scalp 2020    Arthritis     Lay's esophagus     Bladder cancer (HCC)     CAD (coronary artery disease)     Chronic low back pain     Chronic pain of both knees 2020    Colitis 2020    Diabetes ([facility] )     Excessive gas 12/3/2020    Hyperlipemia     Hypertension     Immunization deficiency 10/30/2020    Hep a nonimmune    Left lower quadrant abdominal pain 12/3/2020    [facility] 2020    4 1 cm    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    LUCIA (obstructive sleep apnea) 2021        PSH:  Past Surgical History:   Procedure Laterality Date    BACK SURGERY      BLADDER SURGERY      CATARACT EXTRACTION, BILATERAL      COLONOSCOPY  2019    next  [place]    CORONARY ARTERY BYPASS GRAFT  2016    Quadruple per [place]    EGD  2017    TOTAL HIP ARTHROPLASTY Right 2012    TOTAL SHOULDER REPLACEMENT Right 2013       Allergies: No Known Allergies    Family History:   Family History   Problem Relation Age of Onset    Heart disease Father     Arthritis Father     Heart attack Father     Alzheimer's disease Mother        Social History:   Social History     Tobacco Use   Smoking Status Former Smoker    Packs/day: 0 25    Types: Cigarettes    Quit date: 2009    Years since quittin 2   Smokeless Tobacco Never Used   Tobacco Comment    smoked since age 21; per Anna Hornbeck      Social History     Substance and Sexual Activity   Alcohol Use Not Currently    Comment: No use      Social History     Substance and Sexual Activity   Drug Use Never    Comment: No use        Home Medications:   Prior to Admission medications    Medication Sig Start Date End Date Taking?  Authorizing Provider   atorvastatin (LIPITOR) 40 mg tablet Take 1 tablet (40 mg total) by mouth daily 9/10/20 3/25/21  Edith Ferris MD Eliquis 5 MG TAKE 1 TABLET BY MOUTH TWICE A DAY 1/4/21   Bryson Shea MD   ezetimibe (ZETIA) 10 mg tablet Take 1 tablet (10 mg total) by mouth daily 10/15/20   Karl Dyer MD   finasteride (PROSCAR) 5 mg tablet Take 1 tablet (5 mg total) by mouth daily 12/21/20   Diana Hernández MD   Fluad Quadrivalent 0 5 ML [facility] ADMINISTERED 10/13/20   Historical Provider, MD   furosemide (LASIX) 40 mg tablet Take 1 tablet (40 mg total) by mouth daily 10/30/20 3/25/21  Bryson Shea MD   glipiZIDE (GLUCOTROL XL) 5 mg 24 hr tablet Take 1 tablet (5 mg total) by mouth daily 3/17/21   Akila Aguilar MD   Levemir FlexTouch 100 units/mL injection pen Inject under the skin 65 units in the morning and at bedtime 3/17/21   Akila Aguilar MD   lisinopril (ZESTRIL) 20 mg tablet Take 1 tablet (20 mg total) by mouth daily 10/26/20   Karl Dyer MD   metFORMIN (GLUCOPHAGE) 1000 MG tablet Take 500 mg by mouth 2 (two) times a day  2/17/19   Historical Provider, MD   metoprolol succinate (TOPROL-XL) 100 mg 24 hr tablet Take 1 tablet (100 mg total) by mouth daily 2/18/21   Bryson Shea MD   metoprolol succinate (TOPROL-XL) 50 mg 24 hr tablet Take 1 tablet (50 mg total) by mouth daily 50 mg daily in afternoon 2/3/21   Bryson Shea MD   omeprazole (PriLOSEC) 20 mg delayed release capsule Take 1 capsule (20 mg total) by mouth daily 10/26/20 3/25/21  KACEY Melo Pentips 31G X 8 MM MISC Inject under the skin daily Use as directed 9/4/20   Bryson Shea MD       ROS:   Review of Systems   Constitutional: Negative for activity change, chills, diaphoresis, fatigue and fever  HENT: Negative for congestion, rhinorrhea, sinus pressure and sinus pain  Respiratory: Positive for shortness of breath  Negative for apnea, cough and stridor  Cardiovascular: Negative for chest pain, palpitations and leg swelling     Gastrointestinal: Negative for abdominal distention, abdominal pain, blood in stool, constipation and diarrhea  Endocrine: Negative for cold intolerance, heat intolerance and polyuria  Genitourinary: Negative for difficulty urinating  Musculoskeletal: Negative for arthralgias, back pain, joint swelling and myalgias  Skin: Negative for color change, pallor, rash and wound  Neurological: Negative for dizziness, seizures, syncope, light-headedness, numbness and headaches  Psychiatric/Behavioral: Negative for agitation and hallucinations  The patient is not nervous/anxious and is not hyperactive  Vitals:   Vitals:    03/25/21 2230 03/25/21 2252 03/26/21 0212 03/26/21 0415   BP: 149/82 152/70 126/59 131/84   BP Location:   Right arm Right arm   Pulse: 76 76 96 84   Resp: 18 20 17 15   Temp:       TempSrc:       SpO2: 93% 99% 91% 92%   Weight:         Temp  Min: 97 5 °F (36 4 °C)  Max: 97 5 °F (36 4 °C)  IBW: -88 kg  Body mass index is 44 89 kg/m²  PHYSICAL EXAM:  Physical Exam  Constitutional:       General: He is not in acute distress  Appearance: He is not diaphoretic  HENT:      Head: Normocephalic and atraumatic  Nose: Nose normal       Mouth/Throat:      Pharynx: No oropharyngeal exudate  Eyes:      General: No scleral icterus  Conjunctiva/sclera: Conjunctivae normal    Neck:      Musculoskeletal: Neck supple  Thyroid: No thyromegaly  Vascular: No JVD  Trachea: No tracheal deviation  Cardiovascular:      Rate and Rhythm: Normal rate  Heart sounds: Normal heart sounds  No murmur  No friction rub  No gallop  Comments: JVD and b/l leg swelling  Pulmonary:      Effort: Pulmonary effort is normal  No respiratory distress  Breath sounds: Normal breath sounds  No stridor  No wheezing or rales  Chest:      Chest wall: No tenderness  Abdominal:      General: Bowel sounds are normal  There is no distension  Palpations: Abdomen is soft  There is no mass  Tenderness: There is no abdominal tenderness  There is no guarding     Skin: Coloration: Skin is not pale  Findings: No erythema or rash  Labs:   Results from last 7 days   Lab Units 03/26/21  0655 03/25/21  1857   WBC Thousand/uL 9 67 9 23   HEMOGLOBIN g/dL 12 3 12 5   HEMATOCRIT % 42 2 41 2   PLATELETS Thousands/uL 216 214     Results from last 7 days   Lab Units 03/25/21  1857   POTASSIUM mmol/L 3 7   CHLORIDE mmol/L 102   CO2 mmol/L 35*   BUN mg/dL 16   CREATININE mg/dL 0 70   CALCIUM mg/dL 9 4   ALK PHOS U/L 82   ALT U/L 22   AST U/L 12         No results found for: PHOS       No results found for: TROPONINT  ABG:No results found for: PHART, GIV8VUG, PO2ART, NBH0NPR, Q7NOCXVW, BEART, SOURCE    Imaging: I have personally reviewed pertinent reports  EKG: This was personally reviewed by myself     Micro:  Blood Culture: No results found for: BLOODCX  Urine Culture: No results found for: URINECX  Sputum Culture: No components found for: SPUTUMCX  Wound Culure: No results found for: WOUNDCULT    VTE Pharmacologic Prophylaxis: Reason for no pharmacologic prophylaxis on Eliquis  VTE Mechanical Prophylaxis: sequential compression device    Hayley Boone DO  3/26/2021 9:50 AM

## 2021-03-26 NOTE — ASSESSMENT & PLAN NOTE
Lab Results   Component Value Date    HGBA1C 9 3 (H) 03/12/2021       No results for input(s): POCGLU in the last 72 hours      Blood Sugar Average: Last 72 hrs:  ·  not controlled as noted with elevated A1c  · Continue Levemir 65 units b i d , hold home oral medication  · Add SSI with Accu-Cheks, carb controlled diet  · Initiate hypoglycemia protocol

## 2021-03-27 LAB
ALBUMIN SERPL BCP-MCNC: 3.1 G/DL (ref 3.5–5)
ALP SERPL-CCNC: 77 U/L (ref 46–116)
ALT SERPL W P-5'-P-CCNC: 20 U/L (ref 12–78)
ANION GAP SERPL CALCULATED.3IONS-SCNC: 1 MMOL/L (ref 4–13)
AST SERPL W P-5'-P-CCNC: 16 U/L (ref 5–45)
BILIRUB SERPL-MCNC: 0.39 MG/DL (ref 0.2–1)
BUN SERPL-MCNC: 16 MG/DL (ref 5–25)
CALCIUM ALBUM COR SERPL-MCNC: 10.2 MG/DL (ref 8.3–10.1)
CALCIUM SERPL-MCNC: 9.5 MG/DL (ref 8.3–10.1)
CHLORIDE SERPL-SCNC: 99 MMOL/L (ref 100–108)
CO2 SERPL-SCNC: 42 MMOL/L (ref 21–32)
CREAT SERPL-MCNC: 0.78 MG/DL (ref 0.6–1.3)
GFR SERPL CREATININE-BSD FRML MDRD: 87 ML/MIN/1.73SQ M
GLUCOSE SERPL-MCNC: 101 MG/DL (ref 65–140)
GLUCOSE SERPL-MCNC: 111 MG/DL (ref 65–140)
GLUCOSE SERPL-MCNC: 126 MG/DL (ref 65–140)
GLUCOSE SERPL-MCNC: 64 MG/DL (ref 65–140)
GLUCOSE SERPL-MCNC: 80 MG/DL (ref 65–140)
GLUCOSE SERPL-MCNC: 85 MG/DL (ref 65–140)
MAGNESIUM SERPL-MCNC: 2.1 MG/DL (ref 1.6–2.6)
POTASSIUM SERPL-SCNC: 3.9 MMOL/L (ref 3.5–5.3)
PROT SERPL-MCNC: 7.7 G/DL (ref 6.4–8.2)
SODIUM SERPL-SCNC: 142 MMOL/L (ref 136–145)

## 2021-03-27 PROCEDURE — 80053 COMPREHEN METABOLIC PANEL: CPT | Performed by: INTERNAL MEDICINE

## 2021-03-27 PROCEDURE — 82948 REAGENT STRIP/BLOOD GLUCOSE: CPT

## 2021-03-27 PROCEDURE — 99232 SBSQ HOSP IP/OBS MODERATE 35: CPT | Performed by: INTERNAL MEDICINE

## 2021-03-27 PROCEDURE — 94760 N-INVAS EAR/PLS OXIMETRY 1: CPT

## 2021-03-27 PROCEDURE — 83735 ASSAY OF MAGNESIUM: CPT | Performed by: INTERNAL MEDICINE

## 2021-03-27 PROCEDURE — 94660 CPAP INITIATION&MGMT: CPT

## 2021-03-27 PROCEDURE — 99232 SBSQ HOSP IP/OBS MODERATE 35: CPT | Performed by: NURSE PRACTITIONER

## 2021-03-27 RX ORDER — ACETAZOLAMIDE 500 MG/5ML
500 INJECTION, POWDER, LYOPHILIZED, FOR SOLUTION INTRAVENOUS ONCE
Status: COMPLETED | OUTPATIENT
Start: 2021-03-27 | End: 2021-03-27

## 2021-03-27 RX ADMIN — ATORVASTATIN CALCIUM 40 MG: 40 TABLET, FILM COATED ORAL at 17:19

## 2021-03-27 RX ADMIN — EZETIMIBE 10 MG: 10 TABLET ORAL at 08:54

## 2021-03-27 RX ADMIN — FUROSEMIDE 40 MG: 10 INJECTION, SOLUTION INTRAVENOUS at 05:36

## 2021-03-27 RX ADMIN — ACETAZOLAMIDE SODIUM 500 MG: 500 INJECTION, POWDER, LYOPHILIZED, FOR SOLUTION INTRAVENOUS at 15:29

## 2021-03-27 RX ADMIN — PANTOPRAZOLE SODIUM 40 MG: 40 TABLET, DELAYED RELEASE ORAL at 05:37

## 2021-03-27 RX ADMIN — FINASTERIDE 5 MG: 5 TABLET, FILM COATED ORAL at 08:54

## 2021-03-27 RX ADMIN — INSULIN DETEMIR 30 UNITS: 100 INJECTION, SOLUTION SUBCUTANEOUS at 21:34

## 2021-03-27 RX ADMIN — METOPROLOL SUCCINATE 100 MG: 100 TABLET, EXTENDED RELEASE ORAL at 08:53

## 2021-03-27 RX ADMIN — METOPROLOL SUCCINATE 50 MG: 50 TABLET, EXTENDED RELEASE ORAL at 15:28

## 2021-03-27 RX ADMIN — FUROSEMIDE 40 MG: 10 INJECTION, SOLUTION INTRAVENOUS at 12:09

## 2021-03-27 RX ADMIN — FUROSEMIDE 40 MG: 10 INJECTION, SOLUTION INTRAVENOUS at 17:19

## 2021-03-27 RX ADMIN — APIXABAN 5 MG: 5 TABLET, FILM COATED ORAL at 17:19

## 2021-03-27 RX ADMIN — LISINOPRIL 20 MG: 20 TABLET ORAL at 08:54

## 2021-03-27 RX ADMIN — APIXABAN 5 MG: 5 TABLET, FILM COATED ORAL at 08:53

## 2021-03-27 RX ADMIN — INSULIN DETEMIR 30 UNITS: 100 INJECTION, SOLUTION SUBCUTANEOUS at 09:04

## 2021-03-27 NOTE — PLAN OF CARE
Problem: Potential for Falls  Goal: Patient will remain free of falls  Description: INTERVENTIONS:  - Assess patient frequently for physical needs  -  Identify cognitive and physical deficits and behaviors that affect risk of falls    -  [place] fall precautions as indicated by assessment   - Educate patient/family on patient safety including physical limitations  - Instruct patient to call for assistance with activity based on assessment  - Modify environment to reduce risk of injury  - Consider OT/PT consult to assist with strengthening/mobility  Outcome: Progressing     Problem: CARDIOVASCULAR - ADULT  Goal: Maintains optimal cardiac output and hemodynamic stability  Description: INTERVENTIONS:  - Monitor I/O, vital signs and rhythm  - Monitor for S/S and trends of decreased cardiac output  - Administer and titrate ordered vasoactive medications to optimize hemodynamic stability  - Assess quality of pulses, skin color and temperature  - Assess for signs of decreased coronary artery perfusion  - Instruct patient to report change in severity of symptoms  Outcome: Progressing  Goal: Absence of cardiac dysrhythmias or at baseline rhythm  Description: INTERVENTIONS:  - Continuous cardiac monitoring, vital signs, obtain 12 lead EKG if ordered  - Administer antiarrhythmic and heart rate control medications as ordered  - Monitor electrolytes and administer replacement therapy as ordered  Outcome: Progressing     Problem: METABOLIC, FLUID AND ELECTROLYTES - ADULT  Goal: Electrolytes maintained within normal limits  Description: INTERVENTIONS:  - Monitor labs and assess patient for signs and symptoms of electrolyte imbalances  - Administer electrolyte replacement as ordered  - Monitor response to electrolyte replacements, including repeat lab results as appropriate  - Instruct patient on fluid and nutrition as appropriate  Outcome: Progressing  Goal: Fluid balance maintained  Description: INTERVENTIONS:  - Monitor labs - Monitor I/O and WT  - Instruct patient on fluid and nutrition as appropriate  - Assess for signs & symptoms of volume excess or deficit  Outcome: Progressing  Goal: Glucose maintained within target range  Description: INTERVENTIONS:  - Monitor Blood Glucose as ordered  - Assess for signs and symptoms of hyperglycemia and hypoglycemia  - Administer ordered medications to maintain glucose within target range  - Assess nutritional intake and initiate nutrition service referral as needed  Outcome: Progressing

## 2021-03-27 NOTE — ASSESSMENT & PLAN NOTE
· Incidentally noted on CTA PE study  · There also other incidental findings: Stable calcified pleural plaques which may be due to asbestos exposure  Stable 4 cm right adrenal nodule  · Per patient, he is aware of these pulmonary nodules as well as pleural plaques, notes prior asbestos exposure  · Recommend continued outpatient monitoring, repeat CT imaging in 6-12 months

## 2021-03-27 NOTE — ASSESSMENT & PLAN NOTE
· Slightly tachycardic today in the 100's  Prior to a m  Medication administration  Monitor    · Anticoagulated on Eliquis and will continue

## 2021-03-27 NOTE — DISCHARGE INSTR - DIET
Suggest outpatient diet counseling for management of diabetes to ensure pt is on track  Insurance/medicare will cover the cost, requires MD order for diabetic diet counseling  Pt can call Central Scheduling [phone] to make an appointment  and to discuss most convenient location for patient

## 2021-03-27 NOTE — PROGRESS NOTES
Urine output confirmed and verified with patient and PCA by this RN  Urine output from 0700- 1700 reflects accurate documentation  SLIM aware  Will continue to monitor UO and respiratory status

## 2021-03-27 NOTE — ASSESSMENT & PLAN NOTE
Wt Readings from Last 3 Encounters:   03/27/21 (!) 148 kg (325 lb 6 4 oz)   03/25/21 (!) 150 kg (331 lb 8 oz)   03/24/21 (!) 150 kg (331 lb 6 oz)     · Presented with several week history of increasing lower extremity edema, dyspnea, orthopnea  Sent in from Cardiology office with concern for acute exacerbation  Patient appearing volume overloaded on examination  · Echo 03/23/2021 EF 60% with evidence of G1DD  · Cardiology following, continue IV lasix     · Continue BB and ACE-inhibitor  · Monitor daily weights, I/O  · Low-sodium, fluid-restricted diet  · Monitor volume status

## 2021-03-27 NOTE — ASSESSMENT & PLAN NOTE
Lab Results   Component Value Date    HGBA1C 9 3 (H) 03/12/2021       Recent Labs     03/26/21  1139 03/26/21  1528 03/26/21  2103 03/27/21  0630   POCGLU 155* 163* 100 85       · Uncontrolled as evidenced by A1c   · Home dose Levemir 65 units b i d, decrase to 30 BID for now given BG below goal   Oral medications on hold  · Continue SSI with Accu-Cheks, carb controlled diet  · Adjust treatment accordingly    · Hypoglycemia protocol

## 2021-03-27 NOTE — PROGRESS NOTES
[facility]  Progress Note - Taj Lopez 1945, 67 y o  male MRN: [id number]  Unit/Bed#: -01 Encounter: [id number]  Primary Care Provider: Taty Ann MD   Date and time admitted to hospital: 3/25/2021  6:14 PM    * Acute on chronic diastolic heart failure [facility])  Assessment & Plan  Wt Readings from Last 3 Encounters:   03/27/21 (!) 148 kg (325 lb 6 4 oz)   03/25/21 (!) 150 kg (331 lb 8 oz)   03/24/21 (!) 150 kg (331 lb 6 oz)     · Presented with several week history of increasing lower extremity edema, dyspnea, orthopnea  Sent in from Cardiology office with concern for acute exacerbation  Patient appearing volume overloaded on examination  · Echo 03/23/2021 EF 60% with evidence of G1DD  · Cardiology following, continue IV lasix  · Continue BB and ACE-inhibitor  · Monitor daily weights, I/O  · Low-sodium, fluid-restricted diet  · Monitor volume status        Acute respiratory failure with hypoxia (HCC)  Assessment & Plan  · Noted with hypoxia and recent pulmonology and Cardiology outpatient visits  Hypoxic in ED arrival requiring 4 L NC (normally on room air)  Continues to require 4L  · CTA PE negative PE  Did show bilateral atelectasis, pleural plaques  · Possibly in the setting of volume overload however suspected obesity hypoventilation syndrome and does have known LUCIA  · Wean oxygen as tolerated, once adequately diuresed, will plan for home oxygen evaluation  A-fib ([facility] [address] )  Assessment & Plan  · Slightly tachycardic today in the 100's  Prior to a m  Medication administration  Monitor  · Anticoagulated on Eliquis and will continue    CAD (coronary artery disease)  Assessment & Plan  · S/p prior CABG no complaints of chest pain    · Continue metoprolol and atorvastatin    Left upper lobe pulmonary nodule  Assessment & Plan  · Incidentally noted on CTA PE study  · There also other incidental findings: Stable calcified pleural plaques which may be due to asbestos exposure  Stable 4 cm right adrenal nodule  · Per patient, he is aware of these pulmonary nodules as well as pleural plaques, notes prior asbestos exposure  · Recommend continued outpatient monitoring, repeat CT imaging in 6-12 months  Morbid obesity ([address] )  Assessment & Plan  · Dietary and lifestyle modification discussed    LUCIA (obstructive sleep apnea)  Assessment & Plan  · Has been off CPAP due to malfunctioning machine; is to be re-initiated in outpatient setting    Hypertension  Assessment & Plan  · Blood pressure acceptable  Continue lisinopril, metoprolol  Type 2 diabetes mellitus with hyperglycemia, with long-term current use of insulin [facility])  Assessment & Plan  Lab Results   Component Value Date    HGBA1C 9 3 (H) 03/12/2021       Recent Labs     03/26/21  1139 03/26/21  1528 03/26/21  2103 03/27/21  0630   POCGLU 155* 163* 100 85       · Uncontrolled as evidenced by A1c   · Home dose Levemir 65 units b i d, decrase to 30 BID for now given BG below goal   Oral medications on hold  · Continue SSI with Accu-Cheks, carb controlled diet  · Adjust treatment accordingly  · Hypoglycemia protocol      VTE Pharmacologic Prophylaxis: VTE Score: 6 Moderate Risk (Score 3-4) - Pharmacological DVT Prophylaxis Ordered: apixaban (Eliquis)  Patient Centered Rounds: I performed bedside rounds with nursing staff today  Discussions with Specialists or Other Care Team Provider: nursing    Education and Discussions with Family / Patient: Patient declined call to   Time Spent for Care: 30 minutes  More than 50% of total time spent on counseling and coordination of care as described above      Current Length of Stay: 2 day(s)  Current Patient Status: Inpatient   Certification Statement: The patient will continue to require additional inpatient hospital stay due to IV lasix, treatment of acute heart failure  Discharge Plan: likely several more days    Code Status: Level 3 - DNAR and DNI    Subjective:   Patient states that he feels a little shaky and lightheaded which he attributes to his blood sugar being in the 80s  Although this is a normal blood sugar, he typically runs much higher which is evidenced by his A1c being 9  He denies any shortness of breath, chest pain or palpitations  Feels that lower extremity edema has slightly improved  Objective:     Vitals:   Temp (24hrs), Av 1 °F (36 7 °C), Min:98 1 °F (36 7 °C), Max:98 1 °F (36 7 °C)    Temp:  [98 1 °F (36 7 °C)] 98 1 °F (36 7 °C)  HR:  [65-98] 98  Resp:  [17-20] 20  BP: (108-157)/(55-63) 127/61  SpO2:  [20 %-98 %] 90 %  Body mass index is 44 13 kg/m²  Input and Output Summary (last 24 hours): Intake/Output Summary (Last 24 hours) at 3/27/2021 0858  Last data filed at 3/27/2021 7183  Gross per 24 hour   Intake 480 ml   Output 1600 ml   Net -1120 ml       Physical Exam:   Physical Exam  Vitals signs and nursing note reviewed  Constitutional:       General: He is not in acute distress  Appearance: He is obese  Interventions: Nasal cannula in place  HENT:      Head: Normocephalic  Cardiovascular:      Rate and Rhythm: Tachycardia present  Rhythm irregular  Heart sounds: No murmur  Pulmonary:      Breath sounds: Decreased breath sounds present  Abdominal:      Palpations: Abdomen is soft  Tenderness: There is no abdominal tenderness  Musculoskeletal:         General: Swelling (Plus two pitting bilateral lower extremities) present  Skin:     General: Skin is warm  Findings: Erythema (Bilateral lower extremities) present  Neurological:      Mental Status: He is alert and oriented to person, place, and time  Mental status is at baseline     Psychiatric:         Mood and Affect: Mood normal          Additional Data:     Labs:  Results from last 7 days   Lab Units 21  0655   WBC Thousand/uL 9 67   HEMOGLOBIN g/dL 12 3   HEMATOCRIT % 42 2   PLATELETS Thousands/uL 216     Results from last 7 days   Lab Units 03/27/21  0447   SODIUM mmol/L 142   POTASSIUM mmol/L 3 9   CHLORIDE mmol/L 99*   CO2 mmol/L 42*   BUN mg/dL 16   CREATININE mg/dL 0 78   ANION GAP mmol/L 1*   CALCIUM mg/dL 9 5   ALBUMIN g/dL 3 1*   TOTAL BILIRUBIN mg/dL 0 39   ALK PHOS U/L 77   ALT U/L 20   AST U/L 16   GLUCOSE RANDOM mg/dL 64*         Results from last 7 days   Lab Units 03/27/21  0630 03/26/21  2103 03/26/21  1528 03/26/21  1139 03/26/21  0658 03/26/21  0030   POC GLUCOSE mg/dl 85 100 163* 155* 106 124               Lines/Drains:  Invasive Devices     Peripheral Intravenous Line            Peripheral IV 03/25/21 Right Antecubital 1 day                      Imaging: No pertinent imaging reviewed  Recent Cultures (last 7 days):         Last 24 Hours Medication List:   Current Facility-Administered Medications   Medication Dose Route Frequency Provider Last Rate    acetaminophen  650 mg Oral Q4H PRN Paulina Phan DO      apixaban  5 mg Oral BID Paulina Phan DO      atorvastatin  40 mg Oral After Don Esquivel, DO      ezetimibe  10 mg Oral Daily Paulina Phan DO      finasteride  5 mg Oral Daily Paulina Phan, DO      furosemide  40 mg Intravenous TID (diuretic) Paulina Phan DO      insulin detemir  30 Units Subcutaneous Q12H [facility] & half-way KACEY Jacobsen      insulin lispro  2-12 Units Subcutaneous HS Paulina Phan DO      insulin lispro  4-20 Units Subcutaneous TID AC Paulina Phan DO      lisinopril  20 mg Oral Daily Paulina Phan DO      metoprolol succinate  100 mg Oral Daily Paulina Phan DO      metoprolol succinate  50 mg Oral Daily Paulina Phan DO      ondansetron  4 mg Intravenous Q6H PRN Paulina Phan DO      pantoprazole  40 mg Oral Early Morning Paulina Phan DO          Today, Patient Was Seen By: KACEY Elizabeth    **Please Note: This note may have been constructed using a voice recognition system  **

## 2021-03-27 NOTE — PROGRESS NOTES
General Cardiology Progress Note   Yamilka Hockey 67 y o  male MRN: [id number]  Unit/Bed#: -01 Encounter: [id number]      Assessment:  Principal Problem:    Acute on chronic diastolic heart failure (HCC)  Active Problems:    Type 2 diabetes mellitus with hyperglycemia, with long-term current use of insulin (HCC)    CAD (coronary artery disease)    A-fib (HCC)    Morbid obesity (HCC)    LUCIA (obstructive sleep apnea)    Left upper lobe pulmonary nodule    Acute respiratory failure with hypoxia (HCC)    Hypercalcemia      Impression:     AoC Diastolic CHF  o 1 9 L urine output, possibly underestimated  o Bicarbonate up to 42   RV dilation  o Due to obesity and LUCIA   LUCIA , CPAP dysfunctional, sleep Physician aware outpt   CAD  o History of CABG   Afib - paroxysmal, in afib with CHF decompensation  o Considering RV dysfunction, likely to progress to persistent Afib    Plan:     1 dose of Diamox 500 mg today   Continue furosemide 40 mg IV t i d    Continue fluid restriction, sodium restriction   Remains grossly edematous   Weight is only down 1 lb    Subjective:   Patient seen and examined  Still swollen, a little bit dizzy, denies shortness of breath, has had reasonable urine output    Review of Systems   Constitution: Negative for diaphoresis, fever, malaise/fatigue and night sweats  Cardiovascular: Negative for chest pain, dyspnea on exertion, leg swelling, orthopnea, palpitations and syncope  Respiratory: Negative for cough, shortness of breath, sputum production and wheezing  Skin: Negative for itching and rash  Gastrointestinal: Negative for abdominal pain, change in bowel habit and diarrhea  Neurological: Negative for dizziness, focal weakness, light-headedness and weakness  Objective   Vitals: Blood pressure 128/61, pulse 84, temperature 97 5 °F (36 4 °C), temperature source Oral, resp  rate 18, height 6' (1 829 m), weight (!) 148 kg (325 lb 6 4 oz), SpO2 94 %  , Body mass index is 44 13 kg/m² , I/O last 3 completed shifts: In: 496 [P O :496]  Out: 2700 [Urine:2700]  No intake/output data recorded  Wt Readings from Last 3 Encounters:   03/27/21 (!) 148 kg (325 lb 6 4 oz)   03/25/21 (!) 150 kg (331 lb 8 oz)   03/24/21 (!) 150 kg (331 lb 6 oz)       Intake/Output Summary (Last 24 hours) at 3/27/2021 0800  Last data filed at 3/27/2021 0552  Gross per 24 hour   Intake 480 ml   Output 1600 ml   Net -1120 ml     I/O last 3 completed shifts: In: 80 [P O :496]  Out: 2700 [Urine:2700]    Telemetry shows controlled ventricular response in atrial fibrillation    Physical Exam  Vitals signs reviewed  Constitutional:       General: He is not in acute distress  Appearance: He is obese  He is not diaphoretic  HENT:      Head: Normocephalic and atraumatic  Neck:      Musculoskeletal: Normal range of motion and neck supple  Vascular: JVD present  Cardiovascular:      Rate and Rhythm: Normal rate  Rhythm irregular  Heart sounds: Normal heart sounds  No murmur  Pulmonary:      Effort: Pulmonary effort is normal  No respiratory distress  Breath sounds: Rales present  No wheezing  Abdominal:      General: Bowel sounds are normal  There is no distension  Palpations: Abdomen is soft  Tenderness: There is no abdominal tenderness  Musculoskeletal:      Right lower leg: Edema present  Left lower leg: Edema present  Skin:     General: Skin is warm and dry  Neurological:      Mental Status: He is alert and oriented to person, place, and time           Meds/Allergies   No Known Allergies    Current Facility-Administered Medications:     acetaminophen (TYLENOL) tablet 650 mg, 650 mg, Oral, Q4H PRN, Sandro Loyda, DO, 650 mg at 03/26/21 2145    apixaban (ELIQUIS) tablet 5 mg, 5 mg, Oral, BID, Sandro Loyda, DO, 5 mg at 03/26/21 1832    atorvastatin (LIPITOR) tablet 40 mg, 40 mg, Oral, After Danne Favio, DO, 40 mg at 03/26/21 1832    ezetimibe (ZETIA) tablet 10 mg, 10 mg, Oral, Daily, Sandro Murphyey, DO, 10 mg at 03/26/21 0845    finasteride (PROSCAR) tablet 5 mg, 5 mg, Oral, Daily, Sandro Murphyey, DO, 5 mg at 03/26/21 0845    furosemide (LASIX) injection 40 mg, 40 mg, Intravenous, TID (diuretic), Sandro Scales, DO, 40 mg at 03/27/21 0536    insulin detemir (LEVEMIR) subcutaneous injection 65 Units, 65 Units, Subcutaneous, Q12H [address], Sandro Murphyey, DO, 65 Units at 03/26/21 2141    insulin lispro (HumaLOG) 100 units/mL subcutaneous injection 2-12 Units, 2-12 Units, Subcutaneous, HS, Sandro Murphyey, DO, Stopped at 03/26/21 0120    insulin lispro (HumaLOG) 100 units/mL subcutaneous injection 4-20 Units, 4-20 Units, Subcutaneous, TID AC, 4 Units at 03/26/21 1639 **AND** Fingerstick Glucose (POCT), , , TID AC, Sandro Murphyey, DO    lisinopril (ZESTRIL) tablet 20 mg, 20 mg, Oral, Daily, Sandro Murphyey, DO, 20 mg at 03/26/21 0845    metoprolol succinate (TOPROL-XL) 24 hr tablet 100 mg, 100 mg, Oral, Daily, Sandro Loyda, DO, 100 mg at 03/26/21 0845    metoprolol succinate (TOPROL-XL) 24 hr tablet 50 mg, 50 mg, Oral, Daily, Sandro Murphyey, DO, 50 mg at 03/26/21 1639    ondansetron (ZOFRAN) injection 4 mg, 4 mg, Intravenous, Q6H PRN, Sandro Murphyey, DO    pantoprazole (PROTONIX) EC tablet 40 mg, 40 mg, Oral, Early Morning, Sandro Loyda, DO, 40 mg at 03/27/21 0537    Laboratory Results:  Results from last 7 days   Lab Units 03/25/21  1857   TROPONIN I ng/mL <0 02       CBC with diff:   Results from last 7 days   Lab Units 03/26/21  0655 03/25/21  1857   WBC Thousand/uL 9 67 9 23   HEMOGLOBIN g/dL 12 3 12 5   HEMATOCRIT % 42 2 41 2   MCV fL 90 88   PLATELETS Thousands/uL 216 214   MCH pg 26 3* 26 8   MCHC g/dL 29 1* 30 3*   RDW % 15 3* 15 0   MPV fL 10 6 10 3       CMP:  Results from last 7 days   Lab Units 03/27/21  0447 03/25/21  1857   POTASSIUM mmol/L 3 9 3 7   CHLORIDE mmol/L 99* 102   CO2 mmol/L 42* 35*   BUN mg/dL 16 16   CREATININE mg/dL 0 78 0 70   CALCIUM mg/dL 9 5 9 4   AST U/L 16 12   ALT U/L 20 22   ALK PHOS U/L 77 82   EGFR ml/min/1 73sq m 87 91       BMP:  Results from last 7 days   Lab Units 21  0447 21  1857   POTASSIUM mmol/L 3 9 3 7   CHLORIDE mmol/L 99* 102   CO2 mmol/L 42* 35*   BUN mg/dL 16 16   CREATININE mg/dL 0 78 0 70   CALCIUM mg/dL 9 5 9 4       NT-proBNP:   Recent Labs     21  1857   NTBNP 127        Magnesium:   Results from last 7 days   Lab Units 21  0447   MAGNESIUM mg/dL 2 1       Coags:       TSH:        Hemoglobin A1C )      Lipid Profile:   No results found for: CHOL  Lab Results   Component Value Date    HDL 39 (L) 2021    HDL 38 (L) 2020     Lab Results   Component Value Date    LDLCALC 49 2021    LDLCALC 53 2020     No results found for: LDLDIRECT  Lab Results   Component Value Date    TRIG 171 (H) 2021    TRIG 146 8 2020       Cardiac testing:   EKG personally reviewed by Theresa Salas MD      Results for orders placed during the hospital encounter of 21   Echo complete with contrast if indicated    Narrative [address]  [phone]    Transthoracic Echocardiogram  2D, M-mode, Doppler, and Color Doppler    Study date:  23-Mar-2021    Patient: Hortencia Koroma  MR number: [id number]  Account number: [de-identified]  : [id number]  Age: 67 years  Gender: Male  Status: Outpatient  Location: [address] [place] [address] [facility]  Height: 76 in  Weight: 331 3 lb  BP: 160/ 80 mmHg    Indications: Assess left ventricular function  Diagnoses: R60 9 - Edema, unspecified    Sonographer:  RODRIGO Deleon  Primary Physician:  Mag Epps MD  Referring Physician:  Theresa Salas MD  Group:  [facility]  Interpreting Physician:  Gable Leventhal, MD    SUMMARY    LEFT VENTRICLE:  Systolic function was normal by visual assessment  Ejection fraction was estimated to be 60 %    There were no regional wall motion abnormalities  Wall thickness was mildly increased  Doppler parameters were consistent with abnormal left ventricular relaxation (grade 1 diastolic dysfunction)  RIGHT VENTRICLE:  The ventricle was mildly dilated  Systolic function was mildly reduced  Systolic pressure was mildly increased  Estimated peak pressure was 45 mmHg  LEFT ATRIUM:  The atrium was mildly dilated  RIGHT ATRIUM:  The atrium was mildly dilated  MITRAL VALVE:  There was mild annular calcification  There was trace regurgitation  TRICUSPID VALVE:  There was mild to moderate regurgitation  HISTORY: PRIOR HISTORY: Leg edema, CAD, Afib, Hypertension, Hyperlipidemia    PROCEDURE: The study was performed in the [address] [place] [address] and [facility]  This was a routine study  The transthoracic approach was used  The study included complete 2D imaging, M-mode, complete spectral Doppler, and color Doppler  The  heart rate was 108 bpm, at the start of the study  Images were obtained from the parasternal, apical, subcostal, and suprasternal notch acoustic windows  Intravenous contrast ( 0 4 ml Definity/NSS) was administered  This was a technically  difficult study  LEFT VENTRICLE: Size was normal  Systolic function was normal by visual assessment  Ejection fraction was estimated to be 60 %  There were no regional wall motion abnormalities  Wall thickness was mildly increased  DOPPLER: There was an  increased relative contribution of atrial contraction to ventricular filling  Doppler parameters were consistent with abnormal left ventricular relaxation (grade 1 diastolic dysfunction)  RIGHT VENTRICLE: The ventricle was mildly dilated  Systolic function was mildly reduced  DOPPLER: Systolic pressure was mildly increased  Estimated peak pressure was 45 mmHg  LEFT ATRIUM: The atrium was mildly dilated  RIGHT ATRIUM: The atrium was mildly dilated  MITRAL VALVE: There was mild annular calcification   Valve structure was normal  There was normal leaflet separation  DOPPLER: The transmitral velocity was within the normal range  There was no evidence for stenosis  There was trace  regurgitation  AORTIC VALVE: The valve was trileaflet  Leaflets exhibited normal thickness, mild calcification, and normal cuspal separation  DOPPLER: Transaortic velocity was within the normal range  There was no evidence for stenosis  There was no  regurgitation  TRICUSPID VALVE: The valve structure was normal  There was normal leaflet separation  DOPPLER: The transtricuspid velocity was within the normal range  There was no evidence for stenosis  There was mild to moderate regurgitation  PULMONIC VALVE: Leaflets exhibited normal thickness, no calcification, and normal cuspal separation  DOPPLER: The transpulmonic velocity was within the normal range  There was no regurgitation  PERICARDIUM: There was no pericardial effusion  AORTA: The root exhibited normal size  SYSTEMIC VEINS: IVC: The inferior vena cava was normal in size and course   Respirophasic changes were normal     SYSTEM MEASUREMENT TABLES    2D  %FS: 36 5 %  Ao Diam: 3 93 cm  EDV(Teich): 161 22 ml  EF(Teich): 65 53 %  ESV(Teich): 55 57 ml  IVSd: 1 2 cm  LA Area: 28 91 cm2  LA Diam: 5 52 cm  LVEDV MOD A4C: 89 05 ml  LVEF MOD A4C: 60 77 %  LVESV MOD A4C: 34 93 ml  LVIDd: 5 72 cm  LVIDs: 3 63 cm  LVLd A4C: 7 78 cm  LVLs A4C: 6 46 cm  LVPWd: 1 21 cm  RA Area: 24 49 cm2  RVIDd: 4 38 cm  SV MOD A4C: 54 11 ml  SV(Teich): 105 65 ml    CW  TR MaxP 73 mmHg  TR Vmax: 3 34 m/s    MM  TAPSE: 1 91 cm    PW  E' Sept: 0 07 m/s  E/E' Sept: 13 16  MV A Teja: 1 14 m/s  MV Dec Kay: 4 59 m/s2  MV DecT: 193 44 ms  MV E Teja: 0 89 m/s  MV E/A Ratio: 0 78  MV PHT: 56 1 ms  MVA By PHT: 3 92 cm2    [employer] Accredited Echocardiography Laboratory    Prepared and electronically signed by    Yonis Meza MD  Signed 24-Mar-2021 08:42:46       No results found for this or any previous visit  No results found for this or any previous visit  No results found for this or any previous visit  No results found for this or any previous visit  No results found for this or any previous visit  Amanda Gao MD    Portions of the record may have been created with voice recognition software  Occasional wrong word or "sound a like" substitutions may have occurred due to the inherent limitations of voice recognition software  Read the chart carefully and recognize, using context, where substitutions have occurred

## 2021-03-27 NOTE — ASSESSMENT & PLAN NOTE
· Noted with hypoxia and recent pulmonology and Cardiology outpatient visits  Hypoxic in ED arrival requiring 4 L NC (normally on room air)  Continues to require 4L  · CTA PE negative PE  Did show bilateral atelectasis, pleural plaques  · Possibly in the setting of volume overload however suspected obesity hypoventilation syndrome and does have known LUCIA  · Wean oxygen as tolerated, once adequately diuresed, will plan for home oxygen evaluation

## 2021-03-28 LAB
ANION GAP SERPL CALCULATED.3IONS-SCNC: 2 MMOL/L (ref 4–13)
ATRIAL RATE: 79 BPM
BUN SERPL-MCNC: 22 MG/DL (ref 5–25)
CALCIUM SERPL-MCNC: 9.2 MG/DL (ref 8.3–10.1)
CHLORIDE SERPL-SCNC: 97 MMOL/L (ref 100–108)
CO2 SERPL-SCNC: 39 MMOL/L (ref 21–32)
CORTIS F 24H UR-MRATE: 47 UG/24 HR (ref 5–64)
CORTIS F UR-MCNC: 47 UG/L
CREAT SERPL-MCNC: 0.97 MG/DL (ref 0.6–1.3)
GFR SERPL CREATININE-BSD FRML MDRD: 75 ML/MIN/1.73SQ M
GLUCOSE SERPL-MCNC: 117 MG/DL (ref 65–140)
GLUCOSE SERPL-MCNC: 131 MG/DL (ref 65–140)
GLUCOSE SERPL-MCNC: 159 MG/DL (ref 65–140)
GLUCOSE SERPL-MCNC: 181 MG/DL (ref 65–140)
GLUCOSE SERPL-MCNC: 193 MG/DL (ref 65–140)
P AXIS: 62 DEGREES
POTASSIUM SERPL-SCNC: 4 MMOL/L (ref 3.5–5.3)
PR INTERVAL: 170 MS
QRS AXIS: 71 DEGREES
QRSD INTERVAL: 160 MS
QT INTERVAL: 426 MS
QTC INTERVAL: 488 MS
SODIUM SERPL-SCNC: 138 MMOL/L (ref 136–145)
T WAVE AXIS: 22 DEGREES
VENTRICULAR RATE: 79 BPM

## 2021-03-28 PROCEDURE — 80048 BASIC METABOLIC PNL TOTAL CA: CPT | Performed by: NURSE PRACTITIONER

## 2021-03-28 PROCEDURE — 94760 N-INVAS EAR/PLS OXIMETRY 1: CPT

## 2021-03-28 PROCEDURE — 93010 ELECTROCARDIOGRAM REPORT: CPT | Performed by: INTERNAL MEDICINE

## 2021-03-28 PROCEDURE — 99232 SBSQ HOSP IP/OBS MODERATE 35: CPT | Performed by: NURSE PRACTITIONER

## 2021-03-28 PROCEDURE — 94660 CPAP INITIATION&MGMT: CPT

## 2021-03-28 PROCEDURE — 82948 REAGENT STRIP/BLOOD GLUCOSE: CPT

## 2021-03-28 PROCEDURE — 99232 SBSQ HOSP IP/OBS MODERATE 35: CPT | Performed by: INTERNAL MEDICINE

## 2021-03-28 PROCEDURE — 93005 ELECTROCARDIOGRAM TRACING: CPT

## 2021-03-28 RX ORDER — DIGOXIN 125 MCG
125 TABLET ORAL DAILY
Status: DISCONTINUED | OUTPATIENT
Start: 2021-03-29 | End: 2021-03-31 | Stop reason: HOSPADM

## 2021-03-28 RX ORDER — DIGOXIN 0.25 MG/ML
500 INJECTION INTRAMUSCULAR; INTRAVENOUS ONCE
Status: COMPLETED | OUTPATIENT
Start: 2021-03-28 | End: 2021-03-28

## 2021-03-28 RX ORDER — FUROSEMIDE 10 MG/ML
80 INJECTION INTRAMUSCULAR; INTRAVENOUS ONCE
Status: COMPLETED | OUTPATIENT
Start: 2021-03-28 | End: 2021-03-28

## 2021-03-28 RX ORDER — FUROSEMIDE 10 MG/ML
80 INJECTION INTRAMUSCULAR; INTRAVENOUS
Status: DISCONTINUED | OUTPATIENT
Start: 2021-03-28 | End: 2021-03-31

## 2021-03-28 RX ADMIN — FUROSEMIDE 80 MG: 10 INJECTION, SOLUTION INTRAVENOUS at 17:29

## 2021-03-28 RX ADMIN — FUROSEMIDE 40 MG: 10 INJECTION, SOLUTION INTRAVENOUS at 05:48

## 2021-03-28 RX ADMIN — INSULIN DETEMIR 30 UNITS: 100 INJECTION, SOLUTION SUBCUTANEOUS at 09:04

## 2021-03-28 RX ADMIN — PANTOPRAZOLE SODIUM 40 MG: 40 TABLET, DELAYED RELEASE ORAL at 05:48

## 2021-03-28 RX ADMIN — APIXABAN 5 MG: 5 TABLET, FILM COATED ORAL at 17:29

## 2021-03-28 RX ADMIN — EZETIMIBE 10 MG: 10 TABLET ORAL at 09:06

## 2021-03-28 RX ADMIN — INSULIN LISPRO 4 UNITS: 100 INJECTION, SOLUTION INTRAVENOUS; SUBCUTANEOUS at 12:52

## 2021-03-28 RX ADMIN — INSULIN DETEMIR 30 UNITS: 100 INJECTION, SOLUTION SUBCUTANEOUS at 21:11

## 2021-03-28 RX ADMIN — FINASTERIDE 5 MG: 5 TABLET, FILM COATED ORAL at 09:06

## 2021-03-28 RX ADMIN — DIGOXIN 500 MCG: 250 INJECTION, SOLUTION INTRAMUSCULAR; INTRAVENOUS; PARENTERAL at 12:09

## 2021-03-28 RX ADMIN — INSULIN LISPRO 2 UNITS: 100 INJECTION, SOLUTION INTRAVENOUS; SUBCUTANEOUS at 21:10

## 2021-03-28 RX ADMIN — FUROSEMIDE 80 MG: 10 INJECTION, SOLUTION INTRAVENOUS at 12:16

## 2021-03-28 RX ADMIN — ATORVASTATIN CALCIUM 40 MG: 40 TABLET, FILM COATED ORAL at 17:29

## 2021-03-28 RX ADMIN — APIXABAN 5 MG: 5 TABLET, FILM COATED ORAL at 09:06

## 2021-03-28 NOTE — CASE MANAGEMENT
Not a readmission  Not a bundle  Met with pt & explained CM role  Pt lives with wife in ranch home with 2 dimitri  Reports was Joyce Mcguire, retired & drives  DME rw  No h/o vna  H/o OP cardiac rehab  Denies any MH,D&A tx  Uses [facility]  Emergency contact, wife Wendie Dan   CM reviewed d/c planning process including the following: identifying help at home, patient preference for d/c planning needs, Discharge Lounge, [facility] Meds to Bed program, availability of treatment team to discuss questions or concerns patient and/or family may have regarding understanding medications and recognizing signs and symptoms once discharged  CM also encouraged patient to follow up with all recommended appointments after discharge  Patient advised of importance for patient and family to participate in managing patients medical well being

## 2021-03-28 NOTE — ASSESSMENT & PLAN NOTE
· Has been off CPAP due to malfunctioning machine; is to be re-initiated in outpatient setting  · Continue CPAP q h s  here in hospital

## 2021-03-28 NOTE — ASSESSMENT & PLAN NOTE
Lab Results   Component Value Date    HGBA1C 9 3 (H) 03/12/2021       Recent Labs     03/27/21  1118 03/27/21  1612 03/27/21 2056 03/28/21  0634   POCGLU 111 101 126 131       · Uncontrolled as evidenced by A1c   · Home dose Levemir 65 units b i d, decreased to 30 BID for now given BG below goal   Oral medications on hold  · Continue SSI with Accu-Cheks, carb controlled diet  · Adjust treatment accordingly    · Hypoglycemia protocol

## 2021-03-28 NOTE — ASSESSMENT & PLAN NOTE
Wt Readings from Last 3 Encounters:   03/28/21 (!) 148 kg (325 lb 13 4 oz)   03/25/21 (!) 150 kg (331 lb 8 oz)   03/24/21 (!) 150 kg (331 lb 6 oz)     · Presented with several week history of increasing lower extremity edema, dyspnea, orthopnea  Sent in from Cardiology office with concern for acute exacerbation  Patient continues to examine significantly volume overloaded  · Echo 03/23/2021 EF 60% with evidence of G1DD  · Cardiology following, continue IV lasix  Was given additional dose of Diamox yesterday  No change in weight today  Minimal diuresis with only 800 out  Will discuss with Cardiology    · Continue BB and ACE-inhibitor  · Monitor daily weights, I/O  · Low-sodium, fluid-restricted diet  · Monitor volume status

## 2021-03-28 NOTE — PROGRESS NOTES
General Cardiology Progress Note   Giovani South 67 y o  male MRN: [id number]  Unit/Bed#: -01 Encounter: [id number]      Assessment:  Principal Problem:    Acute on chronic diastolic heart failure ([address] )  Active Problems:    Hypertension    Type 2 diabetes mellitus with hyperglycemia, with long-term current use of insulin (HCC)    CAD (coronary artery disease)    A-fib (HCC)    Morbid obesity (HCC)    LUCIA (obstructive sleep apnea)    Left upper lobe pulmonary nodule    Acute respiratory failure with hypoxia (HCC)      Impression:     AoC Diastolic CHF  o  minimal urine output, only 1 L despite IV furosemide 40 mg t i d  and 1 dose of Diamox   o Weights have unchanged    RV dilation  o Due to obesity and LUCIA   LUCIA , CPAP dysfunctional, sleep Physician aware outpt   CAD  o History of CABG  o No ischemic symptoms   o LV function  Normal   Afib - paroxysmal, in afib with CHF decompensation  o Likely to become persistent/ permanent atrial fibrillation considering his RV dysfunction, obesity and longstanding sleep apnea    Plan:    Hold beta-blocker  Check EKG as rhythm sounds regular  Consider digoxin for rate control considering hypotension  Hold lisinopril  Trial furosemide 80 mg IV x1, urine output to be reported to me within 2 hours  Placed patient in bed with legs up    Subjective:   Patient seen and examined  Still a little bit dizzy, hypotensive this morning, beta-blocker, lisinopril, diuretic held  Most recent blood pressure 709 systolic  Still very edematous  No significant response to diuresis yesterday, although bicarb improved    Review of Systems   Constitution: Positive for malaise/fatigue  Negative for diaphoresis, fever and night sweats  Cardiovascular: Positive for leg swelling  Negative for chest pain, dyspnea on exertion, orthopnea, palpitations and syncope  Respiratory: Positive for shortness of breath  Negative for cough, sputum production and wheezing      Skin: Negative for itching and rash  Gastrointestinal: Negative for abdominal pain, change in bowel habit and diarrhea  Neurological: Positive for dizziness  Negative for focal weakness, light-headedness and weakness  Objective   Vitals: Blood pressure 107/60, pulse 70, temperature (!) 97 3 °F (36 3 °C), resp  rate 19, height 6' (1 829 m), weight (!) 148 kg (325 lb 13 4 oz), SpO2 91 % , Body mass index is 44 19 kg/m² , I/O last 3 completed shifts: In: 1320 [P O :1320]  Out: 1765 [Urine:1765]  No intake/output data recorded  Wt Readings from Last 3 Encounters:   03/28/21 (!) 148 kg (325 lb 13 4 oz)   03/25/21 (!) 150 kg (331 lb 8 oz)   03/24/21 (!) 150 kg (331 lb 6 oz)       Intake/Output Summary (Last 24 hours) at 3/28/2021 0853  Last data filed at 3/28/2021 0601  Gross per 24 hour   Intake 840 ml   Output 1165 ml   Net -325 ml     I/O last 3 completed shifts: In: 36 [P O :1320]  Out: 1396 [Urine:1765]    No longer on telemetry    Physical Exam  Constitutional:       General: He is not in acute distress  Appearance: He is well-developed  He is not diaphoretic  HENT:      Head: Normocephalic and atraumatic  Nose: Nose normal    Eyes:      General: No scleral icterus  Conjunctiva/sclera: Conjunctivae normal       Pupils: Pupils are equal, round, and reactive to light  Neck:      Musculoskeletal: Normal range of motion and neck supple  Thyroid: No thyromegaly  Vascular: Hepatojugular reflux present  No JVD  Trachea: No tracheal deviation  Cardiovascular:      Rate and Rhythm: Normal rate  Rhythm irregular  Heart sounds: Normal heart sounds  No murmur  No gallop  Pulmonary:      Effort: Pulmonary effort is normal  No respiratory distress  Breath sounds: Normal breath sounds  No wheezing or rales  Abdominal:      General: Bowel sounds are normal  There is no distension  Palpations: Abdomen is soft  Tenderness: There is no abdominal tenderness     Musculoskeletal: Normal range of motion  General: No tenderness or deformity  Right lower leg: Edema present  Left lower leg: Edema present  Skin:     General: Skin is warm and dry  Capillary Refill: Capillary refill takes less than 2 seconds  Findings: No erythema or rash  Neurological:      Mental Status: He is alert and oriented to person, place, and time  Cranial Nerves: No cranial nerve deficit        Coordination: Coordination normal    Psychiatric:         Behavior: Behavior normal          Meds/Allergies   No Known Allergies    Current Facility-Administered Medications:     acetaminophen (TYLENOL) tablet 650 mg, 650 mg, Oral, Q4H PRN, Divine Rehmane, DO, 650 mg at 03/26/21 2145    apixaban (ELIQUIS) tablet 5 mg, 5 mg, Oral, BID, Divine Rehmane, DO, 5 mg at 03/27/21 1719    atorvastatin (LIPITOR) tablet 40 mg, 40 mg, Oral, [employer], DO, 40 mg at 03/27/21 1719    ezetimibe (ZETIA) tablet 10 mg, 10 mg, Oral, Daily, Divine Rehmane, DO, 10 mg at 03/27/21 0854    finasteride (PROSCAR) tablet 5 mg, 5 mg, Oral, Daily, Divine Rehmane, DO, 5 mg at 03/27/21 0854    furosemide (LASIX) injection 80 mg, 80 mg, Intravenous, Once, KACEY Jacobsen    insulin detemir (LEVEMIR) subcutaneous injection 30 Units, 30 Units, Subcutaneous, Q12H [facility], KACEY Jacobsen, 30 Units at 03/27/21 2134    insulin lispro (HumaLOG) 100 units/mL subcutaneous injection 2-12 Units, 2-12 Units, Subcutaneous, HS, Divine Burris DO, Stopped at 03/26/21 0120    insulin lispro (HumaLOG) 100 units/mL subcutaneous injection 4-20 Units, 4-20 Units, Subcutaneous, TID AC, 4 Units at 03/26/21 1639 **AND** Fingerstick Glucose (POCT), , , TID AC, Divine Burris DO    lisinopril (ZESTRIL) tablet 20 mg, 20 mg, Oral, Daily, Divine Burris, DO, 20 mg at 03/27/21 0854    metoprolol succinate (TOPROL-XL) 24 hr tablet 100 mg, 100 mg, Oral, Daily, Divine Burris DO, 100 mg at 03/27/21 0853    metoprolol succinate (TOPROL-XL) 24 hr tablet 50 mg, 50 mg, Oral, Daily, Demetrius Hails, DO, 50 mg at 03/27/21 1528    ondansetron (ZOFRAN) injection 4 mg, 4 mg, Intravenous, Q6H PRN, Demetrius Hails, DO    pantoprazole (PROTONIX) EC tablet 40 mg, 40 mg, Oral, Early Morning, Demetrius Hails, DO, 40 mg at 03/28/21 0548    Laboratory Results:  Results from last 7 days   Lab Units 03/25/21  1857   TROPONIN I ng/mL <0 02       CBC with diff:   Results from last 7 days   Lab Units 03/26/21  0655 03/25/21  1857   WBC Thousand/uL 9 67 9 23   HEMOGLOBIN g/dL 12 3 12 5   HEMATOCRIT % 42 2 41 2   MCV fL 90 88   PLATELETS Thousands/uL 216 214   MCH pg 26 3* 26 8   MCHC g/dL 29 1* 30 3*   RDW % 15 3* 15 0   MPV fL 10 6 10 3       CMP:  Results from last 7 days   Lab Units 03/28/21  0445 03/27/21  0447 03/25/21  1857   POTASSIUM mmol/L 4 0 3 9 3 7   CHLORIDE mmol/L 97* 99* 102   CO2 mmol/L 39* 42* 35*   BUN mg/dL 22 16 16   CREATININE mg/dL 0 97 0 78 0 70   CALCIUM mg/dL 9 2 9 5 9 4   AST U/L  --  16 12   ALT U/L  --  20 22   ALK PHOS U/L  --  77 82   EGFR ml/min/1 73sq m 75 87 91       BMP:  Results from last 7 days   Lab Units 03/28/21  0445 03/27/21 0447 03/25/21  1857   POTASSIUM mmol/L 4 0 3 9 3 7   CHLORIDE mmol/L 97* 99* 102   CO2 mmol/L 39* 42* 35*   BUN mg/dL 22 16 16   CREATININE mg/dL 0 97 0 78 0 70   CALCIUM mg/dL 9 2 9 5 9 4       NT-proBNP:   Recent Labs     03/25/21  1857   NTBNP 127        Magnesium:   Results from last 7 days   Lab Units 03/27/21  0447   MAGNESIUM mg/dL 2 1       Coags:       TSH:        Hemoglobin A1C )      Lipid Profile:   No results found for: CHOL  Lab Results   Component Value Date    HDL 39 (L) 03/12/2021    HDL 38 (L) 12/23/2020     Lab Results   Component Value Date    LDLCALC 49 03/12/2021    LDLCALC 53 12/23/2020     No results found for: LDLDIRECT  Lab Results   Component Value Date    TRIG 171 (H) 03/12/2021    TRIG 146 8 12/23/2020       Cardiac testing:   EKG personally reviewed by Kashmir Evans Teresa Parisi MD      Results for orders placed during the hospital encounter of 21   Echo complete with contrast if indicated    Narrative [facility], [facility]  [phone]    Transthoracic Echocardiogram  2D, M-mode, Doppler, and Color Doppler    Study date:  23-Mar-2021    Patient: Vamsi Morel  MR number: [id number]  Account number: [de-identified]  : [id number]  Age: 67 years  Gender: Male  Status: Outpatient  Location: [address] [place] [address]  Height: 76 in  Weight: 331 3 lb  BP: 160/ 80 mmHg    Indications: Assess left ventricular function  Diagnoses: R60 9 - Edema, unspecified    Sonographer:  RODRIGO Natarajan  Primary Physician:  Ann Griffith MD  Referring Physician:  Gilberto Montgomery MD  Group:  [facility]  Interpreting Physician:  Sergio Sanchez MD    SUMMARY    LEFT VENTRICLE:  Systolic function was normal by visual assessment  Ejection fraction was estimated to be 60 %  There were no regional wall motion abnormalities  Wall thickness was mildly increased  Doppler parameters were consistent with abnormal left ventricular relaxation (grade 1 diastolic dysfunction)  RIGHT VENTRICLE:  The ventricle was mildly dilated  Systolic function was mildly reduced  Systolic pressure was mildly increased  Estimated peak pressure was 45 mmHg  LEFT ATRIUM:  The atrium was mildly dilated  RIGHT ATRIUM:  The atrium was mildly dilated  MITRAL VALVE:  There was mild annular calcification  There was trace regurgitation  TRICUSPID VALVE:  There was mild to moderate regurgitation  HISTORY: PRIOR HISTORY: Leg edema, CAD, Afib, Hypertension, Hyperlipidemia    PROCEDURE: The study was performed in the [address] [place] [address]  This was a routine study  The transthoracic approach was used  The study included complete 2D imaging, M-mode, complete spectral Doppler, and color Doppler   The  heart rate was 108 bpm, at the start of the study  Images were obtained from the parasternal, apical, subcostal, and suprasternal notch acoustic windows  Intravenous contrast ( 0 4 ml Definity/NSS) was administered  This was a technically  difficult study  LEFT VENTRICLE: Size was normal  Systolic function was normal by visual assessment  Ejection fraction was estimated to be 60 %  There were no regional wall motion abnormalities  Wall thickness was mildly increased  DOPPLER: There was an  increased relative contribution of atrial contraction to ventricular filling  Doppler parameters were consistent with abnormal left ventricular relaxation (grade 1 diastolic dysfunction)  RIGHT VENTRICLE: The ventricle was mildly dilated  Systolic function was mildly reduced  DOPPLER: Systolic pressure was mildly increased  Estimated peak pressure was 45 mmHg  LEFT ATRIUM: The atrium was mildly dilated  RIGHT ATRIUM: The atrium was mildly dilated  MITRAL VALVE: There was mild annular calcification  Valve structure was normal  There was normal leaflet separation  DOPPLER: The transmitral velocity was within the normal range  There was no evidence for stenosis  There was trace  regurgitation  AORTIC VALVE: The valve was trileaflet  Leaflets exhibited normal thickness, mild calcification, and normal cuspal separation  DOPPLER: Transaortic velocity was within the normal range  There was no evidence for stenosis  There was no  regurgitation  TRICUSPID VALVE: The valve structure was normal  There was normal leaflet separation  DOPPLER: The transtricuspid velocity was within the normal range  There was no evidence for stenosis  There was mild to moderate regurgitation  PULMONIC VALVE: Leaflets exhibited normal thickness, no calcification, and normal cuspal separation  DOPPLER: The transpulmonic velocity was within the normal range  There was no regurgitation  PERICARDIUM: There was no pericardial effusion      AORTA: The root exhibited normal size  SYSTEMIC VEINS: IVC: The inferior vena cava was normal in size and course  Respirophasic changes were normal     SYSTEM MEASUREMENT TABLES    2D  %FS: 36 5 %  Ao Diam: 3 93 cm  EDV(Teich): 161 22 ml  EF(Teich): 65 53 %  ESV(Teich): 55 57 ml  IVSd: 1 2 cm  LA Area: 28 91 cm2  LA Diam: 5 52 cm  LVEDV MOD A4C: 89 05 ml  LVEF MOD A4C: 60 77 %  LVESV MOD A4C: 34 93 ml  LVIDd: 5 72 cm  LVIDs: 3 63 cm  LVLd A4C: 7 78 cm  LVLs A4C: 6 46 cm  LVPWd: 1 21 cm  RA Area: 24 49 cm2  RVIDd: 4 38 cm  SV MOD A4C: 54 11 ml  SV(Teich): 105 65 ml    CW  TR MaxP 73 mmHg  TR Vmax: 3 34 m/s    MM  TAPSE: 1 91 cm    PW  E' Sept: 0 07 m/s  E/E' Sept: 13 16  MV A Teja: 1 14 m/s  MV Dec [place]: 4 59 m/s2  MV DecT: 193 44 ms  MV E Teja: 0 89 m/s  MV E/A Ratio: 0 78  MV PHT: 56 1 ms  MVA By PHT: 3 92 cm2    [employer] Accredited Echocardiography Laboratory    Prepared and electronically signed by    Yonis Meza MD  Signed 24-Mar-2021 08:42:46       No results found for this or any previous visit  No results found for this or any previous visit  No results found for this or any previous visit  No results found for this or any previous visit  No results found for this or any previous visit  Hieu Patterson MD    Portions of the record may have been created with voice recognition software  Occasional wrong word or "sound a like" substitutions may have occurred due to the inherent limitations of voice recognition software  Read the chart carefully and recognize, using context, where substitutions have occurred

## 2021-03-28 NOTE — PROGRESS NOTES
[facility]  Progress Note - Antonino Alcantar 1945, 67 y o  male MRN: [id number]  Unit/Bed#: -01 Encounter: [id number]  Primary Care Provider: Ed Osuna MD   Date and time admitted to hospital: 3/25/2021  6:14 PM    * Acute on chronic diastolic heart failure [facility])  Assessment & Plan  Wt Readings from Last 3 Encounters:   03/28/21 (!) 148 kg (325 lb 13 4 oz)   03/25/21 (!) 150 kg (331 lb 8 oz)   03/24/21 (!) 150 kg (331 lb 6 oz)     · Presented with several week history of increasing lower extremity edema, dyspnea, orthopnea  Sent in from Cardiology office with concern for acute exacerbation  Patient continues to examine significantly volume overloaded  · Echo 03/23/2021 EF 60% with evidence of G1DD  · Cardiology following, continue IV lasix  Was given additional dose of Diamox yesterday  No change in weight today  Minimal diuresis with only 800 out  Will discuss with Cardiology  · Continue BB and ACE-inhibitor  · Monitor daily weights, I/O  · Low-sodium, fluid-restricted diet  · Monitor volume status        Acute respiratory failure with hypoxia (HCC)  Assessment & Plan  · Noted with hypoxia and recent pulmonology and Cardiology outpatient visits  Hypoxic in ED arrival requiring 4 L NC (normally on room air)  Continues to require 4L  · CTA PE negative PE  Did show bilateral atelectasis, pleural plaques  · Possibly in the setting of volume overload however suspected obesity hypoventilation syndrome and does have known LUCIA  · Wean oxygen as tolerated, once adequately diuresed, will plan for home oxygen evaluation  A-fib [facility])  Assessment & Plan  · Rate controlled on metoprolol  · Anticoagulated on Eliquis and will continue    CAD (coronary artery disease)  Assessment & Plan  · S/p prior CABG no complaints of chest pain    · Continue metoprolol and atorvastatin    Left upper lobe pulmonary nodule  Assessment & Plan  · Incidentally noted on CTA PE study  · There also other incidental findings: Stable calcified pleural plaques which may be due to asbestos exposure  Stable 4 cm right adrenal nodule  · Per patient, he is aware of these pulmonary nodules as well as pleural plaques, notes prior asbestos exposure  · Recommend continued outpatient monitoring, repeat CT imaging in 6-12 months  Morbid obesity ([address] )  Assessment & Plan  · Dietary and lifestyle modification discussed    LUCIA (obstructive sleep apnea)  Assessment & Plan  · Has been off CPAP due to malfunctioning machine; is to be re-initiated in outpatient setting  · Continue CPAP q h s  here in hospital    Hypertension  Assessment & Plan  · Blood pressure acceptable  Continue lisinopril, metoprolol  Type 2 diabetes mellitus with hyperglycemia, with long-term current use of insulin [facility])  Assessment & Plan  Lab Results   Component Value Date    HGBA1C 9 3 (H) 03/12/2021       Recent Labs     03/27/21  1118 03/27/21  1612 03/27/21  2056 03/28/21  0634   POCGLU 111 101 126 131       · Uncontrolled as evidenced by A1c   · Home dose Levemir 65 units b i d, decreased to 30 BID for now given BG below goal   Oral medications on hold  · Continue SSI with Accu-Cheks, carb controlled diet  · Adjust treatment accordingly  · Hypoglycemia protocol      VTE Pharmacologic Prophylaxis: VTE Score: 6 Moderate Risk (Score 3-4) - Pharmacological DVT Prophylaxis Ordered: apixaban (Eliquis)  Patient Centered Rounds: I performed bedside rounds with nursing staff today  Discussions with Specialists or Other Care Team Provider: nursing, cardiology     Education and Discussions with Family / Patient: Patient declined call to   Time Spent for Care: 30 minutes  More than 50% of total time spent on counseling and coordination of care as described above      Current Length of Stay: 3 day(s)  Current Patient Status: Inpatient   Certification Statement: The patient will continue to require additional inpatient hospital stay due to Intravenous diuresis  Discharge Plan: Anticipate discharge in 48-72 hrs to home  Code Status: Level 3 - DNAR and DNI    Subjective:   Patient seen and examined sitting out of bed in the chair  Denies any shortness of breath but has not been doing much activities  He denies any chest pain  Continues to have lower extremity edema which remains unchanged  Objective:     Vitals:   Temp (24hrs), Av 7 °F (36 5 °C), Min:97 2 °F (36 2 °C), Max:98 1 °F (36 7 °C)    Temp:  [97 2 °F (36 2 °C)-98 1 °F (36 7 °C)] 97 3 °F (36 3 °C)  HR:  [41-83] 70  Resp:  [15-19] 19  BP: (103-141)/(46-65) 107/60  SpO2:  [91 %-98 %] 91 %  Body mass index is 44 19 kg/m²  Input and Output Summary (last 24 hours): Intake/Output Summary (Last 24 hours) at 3/28/2021 0829  Last data filed at 3/28/2021 0601  Gross per 24 hour   Intake 840 ml   Output 1165 ml   Net -325 ml       Physical Exam:   Physical Exam  Vitals signs and nursing note reviewed  Constitutional:       General: He is not in acute distress  Appearance: He is obese  Interventions: Nasal cannula in place  Cardiovascular:      Rate and Rhythm: Normal rate  Rhythm irregular  Pulmonary:      Breath sounds: Decreased breath sounds present  Abdominal:      Tenderness: There is no abdominal tenderness  Musculoskeletal:         General: Swelling (Plus two pitting edema bilateral lower extremities) present  Skin:     Findings: Erythema (Bilateral lower extremities, somewhat chronic per patient however worse with edema) present  Neurological:      Mental Status: He is alert and oriented to person, place, and time  Mental status is at baseline     Psychiatric:         Mood and Affect: Mood normal          Additional Data:     Labs:  Results from last 7 days   Lab Units 21  0655   WBC Thousand/uL 9 67   HEMOGLOBIN g/dL 12 3   HEMATOCRIT % 42 2   PLATELETS Thousands/uL 216     Results from last 7 days   Lab Units 03/28/21  0445 03/27/21  0447   SODIUM mmol/L 138 142   POTASSIUM mmol/L 4 0 3 9   CHLORIDE mmol/L 97* 99*   CO2 mmol/L 39* 42*   BUN mg/dL 22 16   CREATININE mg/dL 0 97 0 78   ANION GAP mmol/L 2* 1*   CALCIUM mg/dL 9 2 9 5   ALBUMIN g/dL  --  3 1*   TOTAL BILIRUBIN mg/dL  --  0 39   ALK PHOS U/L  --  77   ALT U/L  --  20   AST U/L  --  16   GLUCOSE RANDOM mg/dL 117 64*         Results from last 7 days   Lab Units 03/28/21  0634 03/27/21  2056 03/27/21  1612 03/27/21  1118 03/27/21  0846 03/27/21  0630 03/26/21  2103 03/26/21  1528 03/26/21  1139 03/26/21  0658 03/26/21  0030   POC GLUCOSE mg/dl 131 126 101 111 80 85 100 163* 155* 106 124               Lines/Drains:  Invasive Devices     Peripheral Intravenous Line            Peripheral IV 03/28/21 Distal;Left;Upper;Ventral (anterior) Arm less than 1 day                      Imaging: No pertinent imaging reviewed      Recent Cultures (last 7 days):         Last 24 Hours Medication List:   Current Facility-Administered Medications   Medication Dose Route Frequency Provider Last Rate    acetaminophen  650 mg Oral Q4H PRN Milta Halsted, DO      apixaban  5 mg Oral BID Milta Halsted, DO      atorvastatin  40 mg Oral After Patrick Barboza, DO      ezetimibe  10 mg Oral Daily Milta Halsted, DO      finasteride  5 mg Oral Daily Milta Halsted, DO      furosemide  40 mg Intravenous TID (diuretic) Milta Halsted, DO      insulin detemir  30 Units Subcutaneous Q12H [facility] KACEY Jacobsen      insulin lispro  2-12 Units Subcutaneous HS Milta Halsted, DO      insulin lispro  4-20 Units Subcutaneous TID AC Milta Halsted, DO      lisinopril  20 mg Oral Daily Milta Halsted, DO      metoprolol succinate  100 mg Oral Daily Milta Halsted, DO      metoprolol succinate  50 mg Oral Daily Milta Halsted, DO      ondansetron  4 mg Intravenous Q6H PRN Milta Halsted, DO      pantoprazole  40 mg Oral Early Morning Milta Halsted, DO          Today, Patient Was Seen By: KACEY Moura    **Please Note: This note may have been constructed using a voice recognition system  **

## 2021-03-29 PROBLEM — R23.4: Status: ACTIVE | Noted: 2021-03-29

## 2021-03-29 LAB
ALBUMIN SERPL BCP-MCNC: 3.1 G/DL (ref 3.5–5)
ALP SERPL-CCNC: 74 U/L (ref 46–116)
ALT SERPL W P-5'-P-CCNC: 19 U/L (ref 12–78)
ANION GAP SERPL CALCULATED.3IONS-SCNC: 1 MMOL/L (ref 4–13)
AST SERPL W P-5'-P-CCNC: 18 U/L (ref 5–45)
BASOPHILS # BLD AUTO: 0.05 THOUSANDS/ΜL (ref 0–0.1)
BASOPHILS NFR BLD AUTO: 1 % (ref 0–1)
BILIRUB SERPL-MCNC: 0.48 MG/DL (ref 0.2–1)
BUN SERPL-MCNC: 24 MG/DL (ref 5–25)
CALCIUM ALBUM COR SERPL-MCNC: 9.7 MG/DL (ref 8.3–10.1)
CALCIUM SERPL-MCNC: 9 MG/DL (ref 8.3–10.1)
CHLORIDE SERPL-SCNC: 98 MMOL/L (ref 100–108)
CO2 SERPL-SCNC: 40 MMOL/L (ref 21–32)
CREAT SERPL-MCNC: 0.74 MG/DL (ref 0.6–1.3)
EOSINOPHIL # BLD AUTO: 0.1 THOUSAND/ΜL (ref 0–0.61)
EOSINOPHIL NFR BLD AUTO: 1 % (ref 0–6)
ERYTHROCYTE [DISTWIDTH] IN BLOOD BY AUTOMATED COUNT: 15.2 % (ref 11.6–15.1)
GFR SERPL CREATININE-BSD FRML MDRD: 89 ML/MIN/1.73SQ M
GLUCOSE SERPL-MCNC: 123 MG/DL (ref 65–140)
GLUCOSE SERPL-MCNC: 142 MG/DL (ref 65–140)
GLUCOSE SERPL-MCNC: 154 MG/DL (ref 65–140)
GLUCOSE SERPL-MCNC: 159 MG/DL (ref 65–140)
GLUCOSE SERPL-MCNC: 167 MG/DL (ref 65–140)
HCT VFR BLD AUTO: 39.5 % (ref 36.5–49.3)
HGB BLD-MCNC: 11.5 G/DL (ref 12–17)
IMM GRANULOCYTES # BLD AUTO: 0.03 THOUSAND/UL (ref 0–0.2)
IMM GRANULOCYTES NFR BLD AUTO: 0 % (ref 0–2)
LYMPHOCYTES # BLD AUTO: 1.38 THOUSANDS/ΜL (ref 0.6–4.47)
LYMPHOCYTES NFR BLD AUTO: 18 % (ref 14–44)
MAGNESIUM SERPL-MCNC: 2.1 MG/DL (ref 1.6–2.6)
MCH RBC QN AUTO: 26.4 PG (ref 26.8–34.3)
MCHC RBC AUTO-ENTMCNC: 29.1 G/DL (ref 31.4–37.4)
MCV RBC AUTO: 91 FL (ref 82–98)
MONOCYTES # BLD AUTO: 0.92 THOUSAND/ΜL (ref 0.17–1.22)
MONOCYTES NFR BLD AUTO: 12 % (ref 4–12)
NEUTROPHILS # BLD AUTO: 5.19 THOUSANDS/ΜL (ref 1.85–7.62)
NEUTS SEG NFR BLD AUTO: 68 % (ref 43–75)
NRBC BLD AUTO-RTO: 0 /100 WBCS
PHOSPHATE SERPL-MCNC: 3.2 MG/DL (ref 2.3–4.1)
PLATELET # BLD AUTO: 184 THOUSANDS/UL (ref 149–390)
PMV BLD AUTO: 10.8 FL (ref 8.9–12.7)
POTASSIUM SERPL-SCNC: 3.8 MMOL/L (ref 3.5–5.3)
PROT SERPL-MCNC: 7.3 G/DL (ref 6.4–8.2)
RBC # BLD AUTO: 4.35 MILLION/UL (ref 3.88–5.62)
SODIUM SERPL-SCNC: 139 MMOL/L (ref 136–145)
WBC # BLD AUTO: 7.67 THOUSAND/UL (ref 4.31–10.16)

## 2021-03-29 PROCEDURE — 82948 REAGENT STRIP/BLOOD GLUCOSE: CPT

## 2021-03-29 PROCEDURE — 99232 SBSQ HOSP IP/OBS MODERATE 35: CPT | Performed by: NURSE PRACTITIONER

## 2021-03-29 PROCEDURE — 84100 ASSAY OF PHOSPHORUS: CPT | Performed by: STUDENT IN AN ORGANIZED HEALTH CARE EDUCATION/TRAINING PROGRAM

## 2021-03-29 PROCEDURE — 80053 COMPREHEN METABOLIC PANEL: CPT | Performed by: STUDENT IN AN ORGANIZED HEALTH CARE EDUCATION/TRAINING PROGRAM

## 2021-03-29 PROCEDURE — 83735 ASSAY OF MAGNESIUM: CPT | Performed by: STUDENT IN AN ORGANIZED HEALTH CARE EDUCATION/TRAINING PROGRAM

## 2021-03-29 PROCEDURE — 94660 CPAP INITIATION&MGMT: CPT

## 2021-03-29 PROCEDURE — 94760 N-INVAS EAR/PLS OXIMETRY 1: CPT

## 2021-03-29 PROCEDURE — 85025 COMPLETE CBC W/AUTO DIFF WBC: CPT | Performed by: STUDENT IN AN ORGANIZED HEALTH CARE EDUCATION/TRAINING PROGRAM

## 2021-03-29 PROCEDURE — 99232 SBSQ HOSP IP/OBS MODERATE 35: CPT | Performed by: INTERNAL MEDICINE

## 2021-03-29 RX ADMIN — DIGOXIN 125 MCG: 125 TABLET ORAL at 08:33

## 2021-03-29 RX ADMIN — FUROSEMIDE 80 MG: 10 INJECTION, SOLUTION INTRAVENOUS at 17:34

## 2021-03-29 RX ADMIN — FUROSEMIDE 80 MG: 10 INJECTION, SOLUTION INTRAVENOUS at 13:16

## 2021-03-29 RX ADMIN — EZETIMIBE 10 MG: 10 TABLET ORAL at 08:33

## 2021-03-29 RX ADMIN — FINASTERIDE 5 MG: 5 TABLET, FILM COATED ORAL at 08:32

## 2021-03-29 RX ADMIN — INSULIN DETEMIR 30 UNITS: 100 INJECTION, SOLUTION SUBCUTANEOUS at 08:34

## 2021-03-29 RX ADMIN — PANTOPRAZOLE SODIUM 40 MG: 40 TABLET, DELAYED RELEASE ORAL at 05:56

## 2021-03-29 RX ADMIN — INSULIN LISPRO 2 UNITS: 100 INJECTION, SOLUTION INTRAVENOUS; SUBCUTANEOUS at 22:02

## 2021-03-29 RX ADMIN — ACETAMINOPHEN 650 MG: 325 TABLET, FILM COATED ORAL at 15:00

## 2021-03-29 RX ADMIN — INSULIN LISPRO 4 UNITS: 100 INJECTION, SOLUTION INTRAVENOUS; SUBCUTANEOUS at 12:24

## 2021-03-29 RX ADMIN — INSULIN LISPRO 4 UNITS: 100 INJECTION, SOLUTION INTRAVENOUS; SUBCUTANEOUS at 08:34

## 2021-03-29 RX ADMIN — FUROSEMIDE 80 MG: 10 INJECTION, SOLUTION INTRAVENOUS at 05:56

## 2021-03-29 RX ADMIN — APIXABAN 5 MG: 5 TABLET, FILM COATED ORAL at 17:34

## 2021-03-29 RX ADMIN — ATORVASTATIN CALCIUM 40 MG: 40 TABLET, FILM COATED ORAL at 17:34

## 2021-03-29 RX ADMIN — INSULIN DETEMIR 30 UNITS: 100 INJECTION, SOLUTION SUBCUTANEOUS at 22:02

## 2021-03-29 RX ADMIN — APIXABAN 5 MG: 5 TABLET, FILM COATED ORAL at 08:32

## 2021-03-29 NOTE — ASSESSMENT & PLAN NOTE
Lab Results   Component Value Date    HGBA1C 9 3 (H) 03/12/2021       Recent Labs     03/28/21 2018 03/29/21  0654 03/29/21  1029 03/29/21  1700   POCGLU 193* 154* 167* 142*       · Uncontrolled as evidenced by A1c   · Home dose Levemir 65 units b i d, decreased to 30 BID for now given BG below goal   Oral medications on hold  · Continue SSI with Accu-Cheks, carb controlled diet  · Adjust treatment accordingly    · Hypoglycemia protocol

## 2021-03-29 NOTE — ASSESSMENT & PLAN NOTE
Wt Readings from Last 3 Encounters:   03/29/21 (!) 145 kg (319 lb 10 7 oz)   03/25/21 (!) 150 kg (331 lb 8 oz)   03/24/21 (!) 150 kg (331 lb 6 oz)     · Presented with several week history of increasing lower extremity edema, dyspnea, orthopnea  Sent in from Cardiology office with concern for acute exacerbation  Patient continues to examine significantly volume overloaded  · Echo 03/23/2021 EF 60% with evidence of G1DD  · Cardiology following, continue IV lasix  Was given additional dose of Diamox yesterday  No change in weight today  Minimal diuresis with only 800 out  Will discuss with Cardiology    · Continue BB and ACE-inhibitor  · Monitor daily weights, I/O  · Low-sodium, fluid-restricted diet  · Monitor volume status

## 2021-03-29 NOTE — ASSESSMENT & PLAN NOTE
bl lower extremities, likely secondary to acute on chronic heart failure  · Ongoing diuresis   · offloading   · Appreciate wound care :   - Plan:   1  Apply hydraguard to b/l heels, buttocks and sacrum BID and PRN for prevention and protection  2  Apply skin nourishing cream the entire skin daily for moisture  3  Turn and reposition patient every  2 hours   4  Elevate heels off of bed with pillows to offload pressure   · 5   Apply EHOB waffle cushion to chair when OOB, if able

## 2021-03-29 NOTE — PROGRESS NOTES
[address]    Progress note- Cardiology    Carmen Sahu 67 y o  male MRN: [id number]  Unit/Bed#: -01 Encounter: [id number]    Code Status: Level 3 - DNAR and DNI  Reason for Admission / Principal Problem:  Shortness of breath and bilateral lower extremity swelling  Reason for Consult:  Acute on chronic heart failure exacerbation      A/P:    Acute on chronic heart failure exacerbation- EF 60%  Acute respiratory failure from heart failure exacerbation  Type 2 diabetes  CAD status post CABG in 2016- LIMA to LAD SVG to OM 3 and SVG to PDA  Paroxysmal Afib  Left upper lobe pulmonary nodule, pleural plaques  LUCIA- recently noncompliant with CPAP  Obesity  Mild-to-moderate TR based on recent echo        1  Acute on chronic heart failure exacerbation  Patient presenting with bilateral lower extremity swelling and shortness of breath  Likely noncompliance with diet  NT pro  although has JVD and bilateral lower extremity swelling 2+  Home regimen includes Lasix 40 mg daily  Home weight 325 lbs  On presentation was 331 lbs  Plan:  · Will continue IV Lasix 80 mg t i d   · S/p single dose of Diamox for elevated bicarb  · Home medications lisinopril and beta-blocker on hold given hypotension over the weekend  · Patient loaded with digoxin on 03/28 and started on maintenance digoxin 125 mcg daily on 3/29 for AFib  · Strict ins and outs  · Daily weights  Wt seems to be improving now 319 lbs  · Continue to monitor renal function and electrolytes daily  Replete as needed  · On nasal cannula to keep oxygen >92%  2  CAD status post CABG in 2016 LIMA to LAD SVG to OM 3 and SVG to PDA :  Continue Lipitor 40 mg daily, ezetimibe 10 mg daily  3  Paroxysmal Afib:  Toprol  mg in the morning and 50 mg in the afternoon stopped given hypotension over the weekend  Now on digoxin 125 mcg daily    On Eliquis 5 mg b i d for anticoagulation  4  Recent echo showing mild-to-moderate TR:  Likely from right-sided volume overload and possibly noncompliance with CPAP for his LUCIA contributing  5  LUCIA:  Admits that he has not been using his CPAP since it is not working  Patient educated on compliance with it and was asked to call PCP to order a new one  Subjective: Raciel Garcia is a 67 y o  male with past medical history of CAD status post CABG LIMA to LAD SVG to OM 3 and SVG to PDA, paroxysmal AFib , mild-to-moderate TR, LUCIA on CPAP, type 2 diabetes, left pulmonary nodule, pleural plaques, obesity who presents with shortness of breath and bilateral lower extremity swelling  Patient was at his outpatient cardiology office where he was noticed to be short of breath and worsening lower extremity swelling  He was sent to ER for further evaluation  Patient has been admitted for acute on chronic heart failure exacerbation likely in setting of noncompliance with medications, diet and CPAP not working          PMH:  Past Medical History:   Diagnosis Date    A-fib [facility])     AAA (abdominal aortic aneurysm) ([facility] [address] )     Actinic keratosis of right temple 7/31/2020    Actinic keratosis of scalp 7/31/2020    Arthritis     Lay's esophagus     Bladder cancer (HCC)     CAD (coronary artery disease)     Chronic low back pain     Chronic pain of both knees 7/31/2020    Colitis 12/4/2020    Diabetes ([facility] [address] )     Excessive gas 12/3/2020    Hyperlipemia     Hypertension     Immunization deficiency 10/30/2020    Hep a nonimmune    Left lower quadrant abdominal pain 12/3/2020    Mass of right adrenal gland ([facility] [address] ) 12/4/2020    4 1 cm    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    LUCIA (obstructive sleep apnea) 1/29/2021        PSH:  Past Surgical History:   Procedure Laterality Date    BACK SURGERY      BLADDER SURGERY      CATARACT EXTRACTION, BILATERAL      COLONOSCOPY  01/29/2019    next 2022 [facility] [address] CORONARY ARTERY BYPASS GRAFT  2016    Quadruple per Andrés Neely    EGD  2017    TOTAL HIP ARTHROPLASTY Right 2012    TOTAL SHOULDER REPLACEMENT Right 2013       Allergies: No Known Allergies    Family History:   Family History   Problem Relation Age of Onset    Heart disease Father     Arthritis Father     Heart attack Father     Alzheimer's disease Mother        Social History:   Social History     Tobacco Use   Smoking Status Former Smoker    Packs/day: 0 25    Types: Cigarettes    Quit date: 2009    Years since quittin 2   Smokeless Tobacco Never Used   Tobacco Comment    smoked since age 21; per Andrés Neely      Social History     Substance and Sexual Activity   Alcohol Use Not Currently    Comment: No use      Social History     Substance and Sexual Activity   Drug Use Never    Comment: No use        Home Medications:   Prior to Admission medications    Medication Sig Start Date End Date Taking?  Authorizing Provider   atorvastatin (LIPITOR) 40 mg tablet Take 1 tablet (40 mg total) by mouth daily 9/10/20 3/25/21  Bryson Shea MD   Eliquis 5 MG TAKE 1 TABLET BY MOUTH TWICE A DAY 21   Bryson Shea MD   ezetimibe (ZETIA) 10 mg tablet Take 1 tablet (10 mg total) by mouth daily 10/15/20   Merary Hodgson MD   finasteride (PROSCAR) 5 mg tablet Take 1 tablet (5 mg total) by mouth daily 20   Eleonora Alejo MD   Fluad Quadrivalent 0 5 ML [facility] ADMINISTERED 10/13/20   Historical Provider, MD   furosemide (LASIX) 40 mg tablet Take 1 tablet (40 mg total) by mouth daily 10/30/20 3/25/21  Bryson Shea MD   glipiZIDE (GLUCOTROL XL) 5 mg 24 hr tablet Take 1 tablet (5 mg total) by mouth daily 3/17/21   Sendy Quinones MD   Levemir FlexTouch 100 units/mL injection pen Inject under the skin 65 units in the morning and at bedtime 3/17/21   Sendy Quinones MD   lisinopril (ZESTRIL) 20 mg tablet Take 1 tablet (20 mg total) by mouth daily 10/26/20   Merary Hodgson MD   metFORMIN (GLUCOPHAGE) 1000 MG tablet Take 500 mg by mouth 2 (two) times a day  2/17/19   Historical Provider, MD   metoprolol succinate (TOPROL-XL) 100 mg 24 hr tablet Take 1 tablet (100 mg total) by mouth daily 2/18/21   Bryson Shea MD   metoprolol succinate (TOPROL-XL) 50 mg 24 hr tablet Take 1 tablet (50 mg total) by mouth daily 50 mg daily in afternoon 2/3/21   Bryson Shea MD   omeprazole (PriLOSEC) 20 mg delayed release capsule Take 1 capsule (20 mg total) by mouth daily 10/26/20 3/25/21  KACEY Franco   Unifine Pentips 31G X 8 MM MISC Inject under the skin daily Use as directed 9/4/20   Bryson Shea MD       ROS:   Review of Systems   Constitutional: Negative for activity change, chills, diaphoresis, fatigue and fever  HENT: Negative for congestion, rhinorrhea, sinus pressure and sinus pain  Respiratory: Negative for apnea, cough, shortness of breath and stridor  Cardiovascular: Negative for chest pain, palpitations and leg swelling  Gastrointestinal: Negative for abdominal distention, abdominal pain, blood in stool, constipation and diarrhea  Endocrine: Negative for cold intolerance, heat intolerance and polyuria  Genitourinary: Negative for difficulty urinating  Musculoskeletal: Negative for arthralgias, back pain, joint swelling and myalgias  Skin: Negative for color change, pallor, rash and wound  Neurological: Negative for dizziness, seizures, syncope, light-headedness, numbness and headaches  Psychiatric/Behavioral: Negative for agitation and hallucinations  The patient is not nervous/anxious and is not hyperactive          Vitals:   Vitals:    03/28/21 1733 03/28/21 2354 03/29/21 0600 03/29/21 0737   BP: 118/71 127/65  128/64   BP Location:       Pulse: 71 78  67   Resp:  18  20   Temp:  97 8 °F (36 6 °C)  (!) 97 1 °F (36 2 °C)   TempSrc:       SpO2: 91% 90%  (!) 88%   Weight:   (!) 145 kg (319 lb 10 7 oz)    Height:         Temp  Min: 97 1 °F (36 2 °C)  Max: 98 5 °F (36 9 °C)  IBW: 77 6 kg  Body mass index is 43 35 kg/m²  PHYSICAL EXAM:  Physical Exam  Constitutional:       General: He is not in acute distress  Appearance: He is not diaphoretic  HENT:      Head: Normocephalic and atraumatic  Nose: Nose normal       Mouth/Throat:      Pharynx: No oropharyngeal exudate  Eyes:      General: No scleral icterus  Conjunctiva/sclera: Conjunctivae normal    Neck:      Musculoskeletal: Neck supple  Thyroid: No thyromegaly  Vascular: No JVD  Trachea: No tracheal deviation  Cardiovascular:      Rate and Rhythm: Normal rate  Heart sounds: Normal heart sounds  No murmur  No friction rub  No gallop  Comments: JVD and b/l leg swelling which seems to be improving compared to admission  Pulmonary:      Effort: Pulmonary effort is normal  No respiratory distress  Breath sounds: Normal breath sounds  No stridor  No wheezing or rales  Chest:      Chest wall: No tenderness  Abdominal:      General: Bowel sounds are normal  There is no distension  Palpations: Abdomen is soft  There is no mass  Tenderness: There is no abdominal tenderness  There is no guarding  Skin:     Coloration: Skin is not pale  Findings: No erythema or rash           Labs:   Results from last 7 days   Lab Units 03/29/21  0607 03/26/21  0655 03/25/21  1857   WBC Thousand/uL 7 67 9 67 9 23   HEMOGLOBIN g/dL 11 5* 12 3 12 5   HEMATOCRIT % 39 5 42 2 41 2   PLATELETS Thousands/uL 184 216 214   NEUTROS PCT % 68  --   --    MONOS PCT % 12  --   --      Results from last 7 days   Lab Units 03/29/21  0607 03/28/21  0445 03/27/21  0447 03/25/21  1857   POTASSIUM mmol/L 3 8 4 0 3 9 3 7   CHLORIDE mmol/L 98* 97* 99* 102   CO2 mmol/L 40* 39* 42* 35*   BUN mg/dL 24 22 16 16   CREATININE mg/dL 0 74 0 97 0 78 0 70   CALCIUM mg/dL 9 0 9 2 9 5 9 4   ALK PHOS U/L 74  --  77 82   ALT U/L 19  --  20 22   AST U/L 18  --  16 12     Results from last 7 days   Lab Units 03/29/21  0607 03/27/21  0447   MAGNESIUM mg/dL 2 1 2 1     Lab Results   Component Value Date    PHOS 3 2 03/29/2021          No results found for: TROPONINT  ABG:No results found for: PHART, LSO4DRC, PO2ART, QKV8YAC, O8EQGLTI, BEART, SOURCE    Imaging: I have personally reviewed pertinent reports  EKG: This was personally reviewed by myself     Micro:  Blood Culture: No results found for: BLOODCX  Urine Culture: No results found for: URINECX  Sputum Culture: No components found for: SPUTUMCX  Wound Culure: No results found for: WOUNDCULT    VTE Pharmacologic Prophylaxis: Reason for no pharmacologic prophylaxis on Eliquis  VTE Mechanical Prophylaxis: sequential compression device    Shruthi Galloway DO  3/29/2021 9:15 AM

## 2021-03-29 NOTE — PROGRESS NOTES
[address] [facility]  Progress Note - Lawerncbetty Rowe 1945, 67 y o  male MRN: [id number]  Unit/Bed#: -01 Encounter: [id number]  Primary Care Provider: Alice Delgado MD   Date and time admitted to hospital: 3/25/2021  6:14 PM    * Acute on chronic diastolic heart failure [facility])  Assessment & Plan  Wt Readings from Last 3 Encounters:   03/29/21 (!) 145 kg (319 lb 10 7 oz)   03/25/21 (!) 150 kg (331 lb 8 oz)   03/24/21 (!) 150 kg (331 lb 6 oz)     · Presented with several week history of increasing lower extremity edema, dyspnea, orthopnea  Sent in from Cardiology office with concern for acute exacerbation  Patient continues to examine significantly volume overloaded  · Echo 03/23/2021 EF 60% with evidence of G1DD  · Cardiology following, continue IV lasix  Was given additional dose of Diamox yesterday  No change in weight today  Minimal diuresis with only 800 out  Will discuss with Cardiology  · Continue BB and ACE-inhibitor  · Monitor daily weights, I/O  · Low-sodium, fluid-restricted diet  · Monitor volume status        Scaly skin on examination  Assessment & Plan  bl lower extremities, likely secondary to acute on chronic heart failure  · Ongoing diuresis   · offloading   · Appreciate wound care :   - Plan:   1  Apply hydraguard to b/l heels, buttocks and sacrum BID and PRN for prevention and protection  2  Apply skin nourishing cream the entire skin daily for moisture  3  Turn and reposition patient every  2 hours   4  Elevate heels off of bed with pillows to offload pressure   · 5  Apply EHOB waffle cushion to chair when OOB, if able      Acute respiratory failure with hypoxia [facility])  Assessment & Plan  · Noted with hypoxia and recent pulmonology and Cardiology outpatient visits  Hypoxic in ED arrival requiring 4 L NC (normally on room air)  Continues to require 4L  · CTA PE negative PE  Did show bilateral atelectasis, pleural plaques     · Possibly in the setting of volume overload however suspected obesity hypoventilation syndrome and does have known LUCIA  · Wean oxygen as tolerated, once adequately diuresed, will plan for home oxygen evaluation  Type 2 diabetes mellitus with hyperglycemia, with long-term current use of insulin [facility])  Assessment & Plan  Lab Results   Component Value Date    HGBA1C 9 3 (H) 03/12/2021       Recent Labs     03/28/21  2018 03/29/21  0654 03/29/21  1029 03/29/21  1700   POCGLU 193* 154* 167* 142*       · Uncontrolled as evidenced by A1c   · Home dose Levemir 65 units b i d, decreased to 30 BID for now given BG below goal   Oral medications on hold  · Continue SSI with Accu-Cheks, carb controlled diet  · Adjust treatment accordingly  · Hypoglycemia protocol    Left upper lobe pulmonary nodule  Assessment & Plan  · Incidentally noted on CTA PE study  · There also other incidental findings: Stable calcified pleural plaques which may be due to asbestos exposure  Stable 4 cm right adrenal nodule  · Per patient, he is aware of these pulmonary nodules as well as pleural plaques, notes prior asbestos exposure  · Recommend continued outpatient monitoring, repeat CT imaging in 6-12 months  LUCIA (obstructive sleep apnea)  Assessment & Plan  · Has been off CPAP due to malfunctioning machine; is to be re-initiated in outpatient setting  · Continue CPAP q h s  here in hospital    Morbid obesity ([facility] [address] )  Assessment & Plan  · Dietary and lifestyle modification discussed    A-fib ([facility] [address] )  Assessment & Plan  · Rate controlled on metoprolol  · Anticoagulated on Eliquis and will continue    CAD (coronary artery disease)  Assessment & Plan  · S/p prior CABG no complaints of chest pain  · Continue metoprolol and atorvastatin    Hypertension  Assessment & Plan  · Blood pressure acceptable  Continue lisinopril, metoprolol        VTE Pharmacologic Prophylaxis:   Pharmacologic: Apixaban (Eliquis)  Mechanical VTE Prophylaxis in Place: Yes    Patient Centered Rounds: I have performed bedside rounds with nursing staff today  Discussions with Specialists or Other Care Team Provider: nursing     Education and Discussions with Family / Patient: patient reports he is updated family     Time Spent for Care: 45 minutes  More than 50% of total time spent on counseling and coordination of care as described above  Current Length of Stay: 4 day(s)    Current Patient Status: Inpatient   Certification Statement: The patient will continue to require additional inpatient hospital stay due to ongoing diuresis     Discharge Plan: home when medically stable     Code Status: Level 3 - DNAR and DNI      Subjective:   ptoob in chair recliner legs elevated  Pt with generalized erythema bl legs   Recommend fu outpt heart failure  No n/v/d/ little sob on 4 liters oxygen not his baseline     Objective:     Vitals:   Temp (24hrs), Av 6 °F (36 4 °C), Min:97 1 °F (36 2 °C), Max:97 8 °F (36 6 °C)    Temp:  [97 1 °F (36 2 °C)-97 8 °F (36 6 °C)] 97 8 °F (36 6 °C)  HR:  [67-88] 88  Resp:  [18-20] 20  BP: (120-128)/(64-72) 120/72  SpO2:  [88 %-94 %] 94 %  Body mass index is 43 35 kg/m²  Input and Output Summary (last 24 hours): Intake/Output Summary (Last 24 hours) at 3/29/2021 2237  Last data filed at 3/29/2021 1940  Gross per 24 hour   Intake 180 ml   Output 3340 ml   Net -3160 ml       Physical Exam:     Physical Exam  Constitutional:       Appearance: He is obese  He is not toxic-appearing or diaphoretic  HENT:      Head: Normocephalic and atraumatic  Nose: No congestion or rhinorrhea  Eyes:      Conjunctiva/sclera: Conjunctivae normal    Cardiovascular:      Rate and Rhythm: Normal rate  Heart sounds: No murmur  No friction rub  No gallop  Pulmonary:      Effort: No respiratory distress  Breath sounds: No stridor  No wheezing, rhonchi or rales  Chest:      Chest wall: No tenderness  Abdominal:      General: There is no distension        Palpations: There is no mass  Tenderness: There is no abdominal tenderness  There is no right CVA tenderness, left CVA tenderness, guarding or rebound  Hernia: No hernia is present  Musculoskeletal:         General: Tenderness present  No swelling, deformity or signs of injury  Right lower leg: Edema present  Left lower leg: Edema present  Comments: bl pitting edema with erythema    Skin:     Coloration: Skin is not jaundiced or pale  Findings: Erythema present  No bruising, lesion or rash  Neurological:      Mental Status: He is oriented to person, place, and time  Mental status is at baseline  Psychiatric:         Behavior: Behavior normal            Additional Data:     Labs:    Results from last 7 days   Lab Units 03/29/21  0607   WBC Thousand/uL 7 67   HEMOGLOBIN g/dL 11 5*   HEMATOCRIT % 39 5   PLATELETS Thousands/uL 184   NEUTROS PCT % 68   LYMPHS PCT % 18   MONOS PCT % 12   EOS PCT % 1     Results from last 7 days   Lab Units 03/29/21  0607   SODIUM mmol/L 139   POTASSIUM mmol/L 3 8   CHLORIDE mmol/L 98*   CO2 mmol/L 40*   BUN mg/dL 24   CREATININE mg/dL 0 74   ANION GAP mmol/L 1*   CALCIUM mg/dL 9 0   ALBUMIN g/dL 3 1*   TOTAL BILIRUBIN mg/dL 0 48   ALK PHOS U/L 74   ALT U/L 19   AST U/L 18   GLUCOSE RANDOM mg/dL 123         Results from last 7 days   Lab Units 03/29/21  2130 03/29/21  1700 03/29/21  1029 03/29/21  0654 03/28/21  2018 03/28/21  1612 03/28/21  1059 03/28/21  0634 03/27/21  2056 03/27/21  1612 03/27/21  1118 03/27/21  0846   POC GLUCOSE mg/dl 159* 142* 167* 154* 193* 159* 181* 131 126 101 111 80                   * I Have Reviewed All Lab Data Listed Above  * Additional Pertinent Lab Tests Reviewed:  All Labs Within Last 24 Hours Reviewed    Imaging:    Imaging Reports Reviewed Today Include: reviewed     Recent Cultures (last 7 days):           Last 24 Hours Medication List:   Current Facility-Administered Medications   Medication Dose Route Frequency Provider Last Rate    acetaminophen  650 mg Oral Q4H PRN Divine Burris, DO      apixaban  5 mg Oral BID Divine Burris, DO      atorvastatin  40 mg Oral After Miladykathy Alvarado, DO      digoxin  125 mcg Oral Daily Fabiola Arndt MD      ezetimibe  10 mg Oral Daily Divine Burris, DO      finasteride  5 mg Oral Daily Divine Burris, DO      furosemide  80 mg Intravenous TID (diuretic) KACEY Webster      insulin detemir  30 Units Subcutaneous Q12H [address] KACEY Jacobsen      insulin lispro  2-12 Units Subcutaneous HS Divine Burris,       insulin lispro  4-20 Units Subcutaneous TID AC Divine Burris, DO      ondansetron  4 mg Intravenous Q6H PRN Divine Burris, DO      pantoprazole  40 mg Oral Early Morning Divine Burris DO          Today, Patient Was Seen By: KACEY Skinner    ** Please Note: Dictation voice to text software may have been used in the creation of this document   **

## 2021-03-29 NOTE — WOUND OSTOMY CARE
Progress Note - Wound   Tonie Devries 67 y o  male MRN: [id number]  Unit/Bed#: -01 Encounter: [id number]      Assessment:  Wound care consulted for assessment of b/l lower extremity venous dermatitis  Patient admitted with acute on chronic diastolic heart failure  Patient history of - a-fib, AAA, arthritis, vega's esophagus, bladder CA, CAD, chronic back pain, colitis, DM, HTN, HLD, and LUCIA  Patient seen in bed, cooperative and agreeable for the assessment  Alert and oriented x 4, continent of bowel and bladder and is ambulatory per self report  Patient declined a full skin assessment, no other wounds reported by the patient or noted in the chart  Patient is currently on IV lasix  B/l heels are intact with no redness or wounds, dry skin noted  1  B/l lower extremities are intact with dry scaly skin  No redness/warmth  No open aspects or weeping noted  -recommend to moisturize the skin with skin nourishing cream and elevation of the extremities for edema control      Educated patient on the importance of frequent offloading of pressure via turning, repositioning and weight-shifting  Verbalized understanding of plan of care  Plan:   1  Apply hydraguard to b/l heels, buttocks and sacrum BID and PRN for prevention and protection  2  Apply skin nourishing cream the entire skin daily for moisture  3  Turn and reposition patient every  2 hours   4  Elevate heels off of bed with pillows to offload pressure   5  Apply EHOB waffle cushion to chair when OOB, if able    Objective:    Vitals: Blood pressure 120/72, pulse 88, temperature 97 8 °F (36 6 °C), resp  rate 20, height 6' (1 829 m), weight (!) 145 kg (319 lb 10 7 oz), SpO2 92 %  ,Body mass index is 43 35 kg/m²  Wound care will sign off, please re-consult if needed    Yesenia Hopkins RN BSN [facility]

## 2021-03-30 ENCOUNTER — TELEPHONE (OUTPATIENT)
Dept: ENDOCRINOLOGY | Facility: CLINIC | Age: 76
End: 2021-03-30

## 2021-03-30 LAB
ANION GAP SERPL CALCULATED.3IONS-SCNC: 4 MMOL/L (ref 4–13)
BASOPHILS # BLD AUTO: 0.05 THOUSANDS/ΜL (ref 0–0.1)
BASOPHILS NFR BLD AUTO: 1 % (ref 0–1)
BUN SERPL-MCNC: 25 MG/DL (ref 5–25)
CALCIUM SERPL-MCNC: 9.3 MG/DL (ref 8.3–10.1)
CHLORIDE SERPL-SCNC: 97 MMOL/L (ref 100–108)
CO2 SERPL-SCNC: 38 MMOL/L (ref 21–32)
CREAT SERPL-MCNC: 0.72 MG/DL (ref 0.6–1.3)
EOSINOPHIL # BLD AUTO: 0.13 THOUSAND/ΜL (ref 0–0.61)
EOSINOPHIL NFR BLD AUTO: 2 % (ref 0–6)
ERYTHROCYTE [DISTWIDTH] IN BLOOD BY AUTOMATED COUNT: 15.1 % (ref 11.6–15.1)
GFR SERPL CREATININE-BSD FRML MDRD: 90 ML/MIN/1.73SQ M
GLUCOSE SERPL-MCNC: 138 MG/DL (ref 65–140)
GLUCOSE SERPL-MCNC: 141 MG/DL (ref 65–140)
GLUCOSE SERPL-MCNC: 146 MG/DL (ref 65–140)
GLUCOSE SERPL-MCNC: 183 MG/DL (ref 65–140)
GLUCOSE SERPL-MCNC: 185 MG/DL (ref 65–140)
HCT VFR BLD AUTO: 42.5 % (ref 36.5–49.3)
HGB BLD-MCNC: 12.4 G/DL (ref 12–17)
IMM GRANULOCYTES # BLD AUTO: 0.02 THOUSAND/UL (ref 0–0.2)
IMM GRANULOCYTES NFR BLD AUTO: 0 % (ref 0–2)
LYMPHOCYTES # BLD AUTO: 1.27 THOUSANDS/ΜL (ref 0.6–4.47)
LYMPHOCYTES NFR BLD AUTO: 17 % (ref 14–44)
MCH RBC QN AUTO: 26.1 PG (ref 26.8–34.3)
MCHC RBC AUTO-ENTMCNC: 29.2 G/DL (ref 31.4–37.4)
MCV RBC AUTO: 90 FL (ref 82–98)
MONOCYTES # BLD AUTO: 0.88 THOUSAND/ΜL (ref 0.17–1.22)
MONOCYTES NFR BLD AUTO: 12 % (ref 4–12)
NEUTROPHILS # BLD AUTO: 5.03 THOUSANDS/ΜL (ref 1.85–7.62)
NEUTS SEG NFR BLD AUTO: 68 % (ref 43–75)
NRBC BLD AUTO-RTO: 0 /100 WBCS
PLATELET # BLD AUTO: 194 THOUSANDS/UL (ref 149–390)
PMV BLD AUTO: 10.5 FL (ref 8.9–12.7)
POTASSIUM SERPL-SCNC: 3.9 MMOL/L (ref 3.5–5.3)
RBC # BLD AUTO: 4.75 MILLION/UL (ref 3.88–5.62)
SODIUM SERPL-SCNC: 139 MMOL/L (ref 136–145)
WBC # BLD AUTO: 7.38 THOUSAND/UL (ref 4.31–10.16)

## 2021-03-30 PROCEDURE — 99232 SBSQ HOSP IP/OBS MODERATE 35: CPT | Performed by: NURSE PRACTITIONER

## 2021-03-30 PROCEDURE — 94660 CPAP INITIATION&MGMT: CPT

## 2021-03-30 PROCEDURE — 82948 REAGENT STRIP/BLOOD GLUCOSE: CPT

## 2021-03-30 PROCEDURE — 99232 SBSQ HOSP IP/OBS MODERATE 35: CPT | Performed by: INTERNAL MEDICINE

## 2021-03-30 PROCEDURE — 85025 COMPLETE CBC W/AUTO DIFF WBC: CPT | Performed by: NURSE PRACTITIONER

## 2021-03-30 PROCEDURE — 94761 N-INVAS EAR/PLS OXIMETRY MLT: CPT

## 2021-03-30 PROCEDURE — 94760 N-INVAS EAR/PLS OXIMETRY 1: CPT

## 2021-03-30 PROCEDURE — 80048 BASIC METABOLIC PNL TOTAL CA: CPT | Performed by: NURSE PRACTITIONER

## 2021-03-30 RX ORDER — POTASSIUM CHLORIDE 20 MEQ/1
20 TABLET, EXTENDED RELEASE ORAL ONCE
Status: COMPLETED | OUTPATIENT
Start: 2021-03-30 | End: 2021-03-30

## 2021-03-30 RX ADMIN — DIGOXIN 125 MCG: 125 TABLET ORAL at 09:34

## 2021-03-30 RX ADMIN — INSULIN DETEMIR 30 UNITS: 100 INJECTION, SOLUTION SUBCUTANEOUS at 09:35

## 2021-03-30 RX ADMIN — FUROSEMIDE 80 MG: 10 INJECTION, SOLUTION INTRAVENOUS at 11:42

## 2021-03-30 RX ADMIN — FINASTERIDE 5 MG: 5 TABLET, FILM COATED ORAL at 09:32

## 2021-03-30 RX ADMIN — INSULIN LISPRO 4 UNITS: 100 INJECTION, SOLUTION INTRAVENOUS; SUBCUTANEOUS at 11:42

## 2021-03-30 RX ADMIN — INSULIN LISPRO 2 UNITS: 100 INJECTION, SOLUTION INTRAVENOUS; SUBCUTANEOUS at 21:44

## 2021-03-30 RX ADMIN — FUROSEMIDE 80 MG: 10 INJECTION, SOLUTION INTRAVENOUS at 17:30

## 2021-03-30 RX ADMIN — PANTOPRAZOLE SODIUM 40 MG: 40 TABLET, DELAYED RELEASE ORAL at 05:40

## 2021-03-30 RX ADMIN — APIXABAN 5 MG: 5 TABLET, FILM COATED ORAL at 09:31

## 2021-03-30 RX ADMIN — INSULIN DETEMIR 30 UNITS: 100 INJECTION, SOLUTION SUBCUTANEOUS at 21:44

## 2021-03-30 RX ADMIN — APIXABAN 5 MG: 5 TABLET, FILM COATED ORAL at 17:30

## 2021-03-30 RX ADMIN — EZETIMIBE 10 MG: 10 TABLET ORAL at 09:34

## 2021-03-30 RX ADMIN — POTASSIUM CHLORIDE 20 MEQ: 1500 TABLET, EXTENDED RELEASE ORAL at 09:33

## 2021-03-30 RX ADMIN — ATORVASTATIN CALCIUM 40 MG: 40 TABLET, FILM COATED ORAL at 17:31

## 2021-03-30 RX ADMIN — FUROSEMIDE 80 MG: 10 INJECTION, SOLUTION INTRAVENOUS at 05:49

## 2021-03-30 NOTE — ASSESSMENT & PLAN NOTE
Lab Results   Component Value Date    HGBA1C 9 3 (H) 03/12/2021       Recent Labs     03/29/21  1700 03/29/21  2130 03/30/21  0616 03/30/21  1112   POCGLU 142* 159* 141* 185*       · Uncontrolled as evidenced by A1c   · Home dose Levemir 65 units b i d, decreased to 30 BID for now given BG below goal   Oral medications on hold  · Continue SSI with Accu-Cheks, carb controlled diet  · Adjust treatment accordingly    · Hypoglycemia protocol

## 2021-03-30 NOTE — CASE MANAGEMENT
Per respiratory study, pt in need of 6L supplemental 02 at home  CM met with pt at bedside who informed that he gets his cpap through [address] and requested CM order home 02 through [address] as well  Order placed via [place] and sent to Dr Marietta Pallas for signature  Pt requested a referral to [facility] for skilled nursing and reported that he has no preference for VNA agency if [facility] unable to accept  Referral to [facility] placed via [facility], awaiting response  Pt stated that his wife is currently staying with their daughter, Sylvia Fairchild ([phone]) and asked CM to reach out to her so that family can assist in coordinating delivery of home 02  CM called and left a voicemail for pt's daughter requesting a return call  56    CM spoke with pt's daughter who reported that she is home all day tomorrow and can accept 02 delivery at pt's home  She also reported that she will be able to provide transportation for pt at time of discharge  Continuing to search for accepting VNA agency at this time

## 2021-03-30 NOTE — ASSESSMENT & PLAN NOTE
· Blood pressure acceptable  Hypotension over the weekend improved with discontinuing lisinopril and Toprol

## 2021-03-30 NOTE — PROGRESS NOTES
[facility] [facility]  Progress Note - Carin French 1945, 67 y o  male MRN: [id number]  Unit/Bed#: -01 Encounter: [id number]  Primary Care Provider: Barbara Santoyo MD   Date and time admitted to hospital: 3/25/2021  6:14 PM    * Acute on chronic diastolic heart failure [facility])  Assessment & Plan  Wt Readings from Last 3 Encounters:   03/30/21 (!) 143 kg (316 lb 3 2 oz)   03/25/21 (!) 150 kg (331 lb 8 oz)   03/24/21 (!) 150 kg (331 lb 6 oz)     · Presented with several week history of increasing lower extremity edema, dyspnea, orthopnea  Sent in from Cardiology office with concern for acute exacerbation  Patient continues to examine significantly volume overloaded  · Echo 03/23/2021 EF 60% with evidence of G1DD  · Cardiology following, continue IV lasix  · BB and ACEI on hold 2/2 low BP over weekend  · Monitor daily weights, I/O  · Low-sodium, fluid-restricted diet  · Monitor volume status        Acute respiratory failure with hypoxia (HCC)  Assessment & Plan  · Noted with hypoxia and recent pulmonology and Cardiology outpatient visits  Hypoxic in ED arrival requiring O2  · CTA PE negative PE  Did show bilateral atelectasis, pleural plaques  · Possibly in the setting of volume overload however suspected obesity hypoventilation syndrome and does have known LUCIA  · Continues to require 4L at rest and 6L with ambulation  · Will need home oxygen, case management following  A-fib [facility])  Assessment & Plan  · Now on digoxin  Beta-blocker on hold secondary to hypotension over the weekend which has now improved  · Anticoagulated on Eliquis and will continue    CAD (coronary artery disease)  Assessment & Plan  · S/p prior CABG no complaints of chest pain  · Continue statin  Beta-blocker on hold as above      Left upper lobe pulmonary nodule  Assessment & Plan  · Incidentally noted on CTA PE study  · There also other incidental findings: Stable calcified pleural plaques which may be due to asbestos exposure  Stable 4 cm right adrenal nodule  · Per patient, he is aware of these pulmonary nodules as well as pleural plaques, notes prior asbestos exposure  · Recommend continued outpatient monitoring, repeat CT imaging in 6-12 months  Morbid obesity ([address] )  Assessment & Plan  · Dietary and lifestyle modification discussed    LUCIA (obstructive sleep apnea)  Assessment & Plan  · Has been off CPAP due to malfunctioning machine; is to be re-initiated in outpatient setting  · Continue CPAP q h s  here in hospital    Hypertension  Assessment & Plan  · Blood pressure acceptable  Hypotension over the weekend improved with discontinuing lisinopril and Toprol  Type 2 diabetes mellitus with hyperglycemia, with long-term current use of insulin [facility])  Assessment & Plan  Lab Results   Component Value Date    HGBA1C 9 3 (H) 03/12/2021       Recent Labs     03/29/21  1700 03/29/21  2130 03/30/21  0616 03/30/21  1112   POCGLU 142* 159* 141* 185*       · Uncontrolled as evidenced by A1c   · Home dose Levemir 65 units b i d, decreased to 30 BID for now given BG below goal   Oral medications on hold  · Continue SSI with Accu-Cheks, carb controlled diet  · Adjust treatment accordingly  · Hypoglycemia protocol      VTE Pharmacologic Prophylaxis: VTE Score: 6 Moderate Risk (Score 3-4) - Pharmacological DVT Prophylaxis Ordered: apixaban (Eliquis)  Patient Centered Rounds: I performed bedside rounds with nursing staff today  Discussions with Specialists or Other Care Team Provider: nursing, cm, cards    Education and Discussions with Family / Patient: Patient declined call to   Time Spent for Care: 30 minutes  More than 50% of total time spent on counseling and coordination of care as described above      Current Length of Stay: 5 day(s)  Current Patient Status: Inpatient     Certification Statement: The patient will continue to require additional inpatient hospital stay due to Intravenous diuresis     Discharge Plan: Anticipate discharge tomorrow to home  if cleared by Cardiology and home oxygen coordinated  Code Status: Level 3 - DNAR and DNI    Subjective:   Patient reports that he was up ambulating with respiratory therapy this morning, he denies shortness of breath during this time but did feel slightly lightheaded  States this was his 1st time doing activity within the last several days and lightheadedness subsided after sitting down  Lower extremity edema improving  Agreeable to home oxygen  Objective:     Vitals:   Temp (24hrs), Av °F (36 7 °C), Min:97 8 °F (36 6 °C), Max:98 1 °F (36 7 °C)    Temp:  [97 8 °F (36 6 °C)-98 1 °F (36 7 °C)] 98 1 °F (36 7 °C)  HR:  [88] 88  Resp:  [16-20] 16  BP: (119-139)/(62-72) 137/71  SpO2:  [89 %-95 %] 89 %  Body mass index is 42 88 kg/m²  Input and Output Summary (last 24 hours): Intake/Output Summary (Last 24 hours) at 3/30/2021 1233  Last data filed at 3/30/2021 0901  Gross per 24 hour   Intake 120 ml   Output 2440 ml   Net -2320 ml       Physical Exam:   Physical Exam  Vitals signs and nursing note reviewed  Constitutional:       General: He is not in acute distress  Appearance: He is obese  Interventions: Nasal cannula in place  Cardiovascular:      Rate and Rhythm: Normal rate  Rhythm irregular  Pulmonary:      Breath sounds: Decreased breath sounds present  Abdominal:      Tenderness: There is no abdominal tenderness  Musculoskeletal:         General: Swelling (Lower extremities, improved) present  Skin:     General: Skin is warm  Neurological:      Mental Status: He is alert and oriented to person, place, and time  Mental status is at baseline     Psychiatric:         Mood and Affect: Mood normal           Additional Data:     Labs:  Results from last 7 days   Lab Units 21  0532   WBC Thousand/uL 7 38   HEMOGLOBIN g/dL 12 4   HEMATOCRIT % 42 5   PLATELETS Thousands/uL 194   NEUTROS PCT % 68 LYMPHS PCT % 17   MONOS PCT % 12   EOS PCT % 2     Results from last 7 days   Lab Units 03/30/21  0532 03/29/21  0607   SODIUM mmol/L 139 139   POTASSIUM mmol/L 3 9 3 8   CHLORIDE mmol/L 97* 98*   CO2 mmol/L 38* 40*   BUN mg/dL 25 24   CREATININE mg/dL 0 72 0 74   ANION GAP mmol/L 4 1*   CALCIUM mg/dL 9 3 9 0   ALBUMIN g/dL  --  3 1*   TOTAL BILIRUBIN mg/dL  --  0 48   ALK PHOS U/L  --  74   ALT U/L  --  19   AST U/L  --  18   GLUCOSE RANDOM mg/dL 138 123         Results from last 7 days   Lab Units 03/30/21  1112 03/30/21  0616 03/29/21  2130 03/29/21  1700 03/29/21  1029 03/29/21  0654 03/28/21  2018 03/28/21  1612 03/28/21  1059 03/28/21  0634 03/27/21 2056 03/27/21  1612   POC GLUCOSE mg/dl 185* 141* 159* 142* 167* 154* 193* 159* 181* 131 126 101               Lines/Drains:  Invasive Devices     Peripheral Intravenous Line            Peripheral IV 03/28/21 Distal;Left;Upper;Ventral (anterior) Arm 2 days                      Imaging: No pertinent imaging reviewed      Recent Cultures (last 7 days):         Last 24 Hours Medication List:   Current Facility-Administered Medications   Medication Dose Route Frequency Provider Last Rate    acetaminophen  650 mg Oral Q4H PRN Toula Slight, DO      apixaban  5 mg Oral BID Toula Slight, DO      atorvastatin  40 mg Oral After Trini Silevr, DO      digoxin  125 mcg Oral Daily Lamine Hanson MD      ezetimibe  10 mg Oral Daily T[place]a Slight, DO      finasteride  5 mg Oral Daily [place]a Slight, DO      furosemide  80 mg Intravenous TID (diuretic) KACEY Campbell      insulin detemir  30 Units Subcutaneous Q12H [address] KACEY Jacobsen      insulin lispro  2-12 Units Subcutaneous HS [place]a Slight, DO      insulin lispro  4-20 Units Subcutaneous TID AC Toula Slight, DO      ondansetron  4 mg Intravenous Q6H PRN Toula Slight, DO      pantoprazole  40 mg Oral Early Morning Toula Slight, DO          Today, Patient Was Seen By: Wero Grady KACEY    **Please Note: This note may have been constructed using a voice recognition system  **

## 2021-03-30 NOTE — ASSESSMENT & PLAN NOTE
· Now on digoxin  Beta-blocker on hold secondary to hypotension over the weekend which has now improved    · Anticoagulated on Eliquis and will continue

## 2021-03-30 NOTE — PLAN OF CARE
Problem: Potential for Falls  Goal: Patient will remain free of falls  Description: INTERVENTIONS:  - Assess patient frequently for physical needs  -  Identify cognitive and physical deficits and behaviors that affect risk of falls    -  [place] fall precautions as indicated by assessment   - Educate patient/family on patient safety including physical limitations  - Instruct patient to call for assistance with activity based on assessment  - Modify environment to reduce risk of injury  - Consider OT/PT consult to assist with strengthening/mobility  Outcome: Progressing     Problem: CARDIOVASCULAR - ADULT  Goal: Maintains optimal cardiac output and hemodynamic stability  Description: INTERVENTIONS:  - Monitor I/O, vital signs and rhythm  - Monitor for S/S and trends of decreased cardiac output  - Administer and titrate ordered vasoactive medications to optimize hemodynamic stability  - Assess quality of pulses, skin color and temperature  - Assess for signs of decreased coronary artery perfusion  - Instruct patient to report change in severity of symptoms  Outcome: Progressing  Goal: Absence of cardiac dysrhythmias or at baseline rhythm  Description: INTERVENTIONS:  - Continuous cardiac monitoring, vital signs, obtain 12 lead EKG if ordered  - Administer antiarrhythmic and heart rate control medications as ordered  - Monitor electrolytes and administer replacement therapy as ordered  Outcome: Progressing     Problem: METABOLIC, FLUID AND ELECTROLYTES - ADULT  Goal: Electrolytes maintained within normal limits  Description: INTERVENTIONS:  - Monitor labs and assess patient for signs and symptoms of electrolyte imbalances  - Administer electrolyte replacement as ordered  - Monitor response to electrolyte replacements, including repeat lab results as appropriate  - Instruct patient on fluid and nutrition as appropriate  Outcome: Progressing  Goal: Fluid balance maintained  Description: INTERVENTIONS:  - Monitor labs - Monitor I/O and WT  - Instruct patient on fluid and nutrition as appropriate  - Assess for signs & symptoms of volume excess or deficit  Outcome: Progressing  Goal: Glucose maintained within target range  Description: INTERVENTIONS:  - Monitor Blood Glucose as ordered  - Assess for signs and symptoms of hyperglycemia and hypoglycemia  - Administer ordered medications to maintain glucose within target range  - Assess nutritional intake and initiate nutrition service referral as needed  Outcome: Progressing

## 2021-03-30 NOTE — ASSESSMENT & PLAN NOTE
Wt Readings from Last 3 Encounters:   03/30/21 (!) 143 kg (316 lb 3 2 oz)   03/25/21 (!) 150 kg (331 lb 8 oz)   03/24/21 (!) 150 kg (331 lb 6 oz)     · Presented with several week history of increasing lower extremity edema, dyspnea, orthopnea  Sent in from Cardiology office with concern for acute exacerbation  Patient continues to examine significantly volume overloaded  · Echo 03/23/2021 EF 60% with evidence of G1DD  · Cardiology following, continue IV lasix    · BB and ACEI on hold 2/2 low BP over weekend  · Monitor daily weights, I/O  · Low-sodium, fluid-restricted diet  · Monitor volume status

## 2021-03-30 NOTE — ASSESSMENT & PLAN NOTE
· Noted with hypoxia and recent pulmonology and Cardiology outpatient visits  Hypoxic in ED arrival requiring O2  · CTA PE negative PE  Did show bilateral atelectasis, pleural plaques  · Possibly in the setting of volume overload however suspected obesity hypoventilation syndrome and does have known LUCIA  · Continues to require 4L at rest and 6L with ambulation  · Will need home oxygen, case management following

## 2021-03-30 NOTE — RESPIRATORY THERAPY NOTE
Home Oxygen Qualifying Test       Patient name: Analy Nicholson        : 1945   Date of Test:  2021  Diagnosis:      Home Oxygen Test:    **Medicare Guidelines require item(s) 1-5 on all ambulatory patients or 1 and 2 on non-ambulatory patients  1   Baseline SPO2 on Room Air at rest 86 %  2   SPO2 during exercise on Room Air 85 %  During exercise monitor SpO2  If SPO2 increases >=89% with ambulation do not add supplemental             oxygen  If <= 88% on room air add O2 via NC and titrate patient  Patient must be ambulated with O2 and titrated to > 88% with exertion  3   SPO2 on Oxygen at rest 91 % 4 lpm     4   SPO2 during exercise on Oxygen  89% a liter flow of 6 lpm     5   Exercise performed:         walking, duration 6 (min)          [x]  Supplemental Home Oxygen is indicated  []  Client does not qualify for home oxygen  Respiratory Additional Notes- Pt  Required 6L O2 for sats 89% walking     Ed Diaz, RT

## 2021-03-31 ENCOUNTER — TRANSITIONAL CARE MANAGEMENT (OUTPATIENT)
Dept: FAMILY MEDICINE CLINIC | Facility: CLINIC | Age: 76
End: 2021-03-31

## 2021-03-31 VITALS
BODY MASS INDEX: 42.34 KG/M2 | HEIGHT: 72 IN | DIASTOLIC BLOOD PRESSURE: 64 MMHG | TEMPERATURE: 97.9 F | HEART RATE: 96 BPM | RESPIRATION RATE: 19 BRPM | SYSTOLIC BLOOD PRESSURE: 132 MMHG | WEIGHT: 312.61 LBS | OXYGEN SATURATION: 94 %

## 2021-03-31 LAB
ANION GAP SERPL CALCULATED.3IONS-SCNC: 5 MMOL/L (ref 4–13)
BUN SERPL-MCNC: 26 MG/DL (ref 5–25)
CALCIUM SERPL-MCNC: 9.5 MG/DL (ref 8.3–10.1)
CHLORIDE SERPL-SCNC: 95 MMOL/L (ref 100–108)
CO2 SERPL-SCNC: 38 MMOL/L (ref 21–32)
CREAT SERPL-MCNC: 0.91 MG/DL (ref 0.6–1.3)
GFR SERPL CREATININE-BSD FRML MDRD: 81 ML/MIN/1.73SQ M
GLUCOSE SERPL-MCNC: 117 MG/DL (ref 65–140)
GLUCOSE SERPL-MCNC: 119 MG/DL (ref 65–140)
GLUCOSE SERPL-MCNC: 210 MG/DL (ref 65–140)
POTASSIUM SERPL-SCNC: 4.1 MMOL/L (ref 3.5–5.3)
SODIUM SERPL-SCNC: 138 MMOL/L (ref 136–145)

## 2021-03-31 PROCEDURE — 82948 REAGENT STRIP/BLOOD GLUCOSE: CPT

## 2021-03-31 PROCEDURE — 94760 N-INVAS EAR/PLS OXIMETRY 1: CPT

## 2021-03-31 PROCEDURE — 99232 SBSQ HOSP IP/OBS MODERATE 35: CPT | Performed by: INTERNAL MEDICINE

## 2021-03-31 PROCEDURE — 94660 CPAP INITIATION&MGMT: CPT

## 2021-03-31 PROCEDURE — 99239 HOSP IP/OBS DSCHRG MGMT >30: CPT | Performed by: NURSE PRACTITIONER

## 2021-03-31 PROCEDURE — 80048 BASIC METABOLIC PNL TOTAL CA: CPT | Performed by: NURSE PRACTITIONER

## 2021-03-31 RX ORDER — METOPROLOL SUCCINATE 100 MG/1
100 TABLET, EXTENDED RELEASE ORAL
Status: DISCONTINUED | OUTPATIENT
Start: 2021-03-31 | End: 2021-03-31

## 2021-03-31 RX ORDER — METOPROLOL SUCCINATE 50 MG/1
50 TABLET, EXTENDED RELEASE ORAL EVERY EVENING
Status: DISCONTINUED | OUTPATIENT
Start: 2021-03-31 | End: 2021-03-31

## 2021-03-31 RX ORDER — FUROSEMIDE 80 MG
80 TABLET ORAL 2 TIMES DAILY
Qty: 60 TABLET | Refills: 0 | Status: SHIPPED | OUTPATIENT
Start: 2021-03-31 | End: 2021-04-27 | Stop reason: SDUPTHER

## 2021-03-31 RX ORDER — POTASSIUM CHLORIDE 20 MEQ/1
20 TABLET, EXTENDED RELEASE ORAL DAILY
Qty: 30 TABLET | Refills: 0 | Status: SHIPPED | OUTPATIENT
Start: 2021-04-01 | End: 2021-04-16 | Stop reason: SDUPTHER

## 2021-03-31 RX ORDER — DIGOXIN 125 MCG
125 TABLET ORAL DAILY
Qty: 30 TABLET | Refills: 0 | Status: SHIPPED | OUTPATIENT
Start: 2021-04-01 | End: 2021-04-27 | Stop reason: SDUPTHER

## 2021-03-31 RX ORDER — LISINOPRIL 20 MG/1
20 TABLET ORAL DAILY
Status: DISCONTINUED | OUTPATIENT
Start: 2021-03-31 | End: 2021-03-31 | Stop reason: HOSPADM

## 2021-03-31 RX ORDER — POTASSIUM CHLORIDE 20 MEQ/1
20 TABLET, EXTENDED RELEASE ORAL DAILY
Status: DISCONTINUED | OUTPATIENT
Start: 2021-03-31 | End: 2021-03-31 | Stop reason: HOSPADM

## 2021-03-31 RX ORDER — FUROSEMIDE 80 MG
80 TABLET ORAL
Status: DISCONTINUED | OUTPATIENT
Start: 2021-03-31 | End: 2021-03-31 | Stop reason: HOSPADM

## 2021-03-31 RX ADMIN — LISINOPRIL 20 MG: 20 TABLET ORAL at 08:32

## 2021-03-31 RX ADMIN — METOPROLOL SUCCINATE 100 MG: 100 TABLET, EXTENDED RELEASE ORAL at 08:32

## 2021-03-31 RX ADMIN — APIXABAN 5 MG: 5 TABLET, FILM COATED ORAL at 08:32

## 2021-03-31 RX ADMIN — INSULIN LISPRO 8 UNITS: 100 INJECTION, SOLUTION INTRAVENOUS; SUBCUTANEOUS at 11:57

## 2021-03-31 RX ADMIN — PANTOPRAZOLE SODIUM 40 MG: 40 TABLET, DELAYED RELEASE ORAL at 05:01

## 2021-03-31 RX ADMIN — INSULIN DETEMIR 30 UNITS: 100 INJECTION, SOLUTION SUBCUTANEOUS at 08:33

## 2021-03-31 RX ADMIN — FUROSEMIDE 80 MG: 10 INJECTION, SOLUTION INTRAVENOUS at 05:01

## 2021-03-31 RX ADMIN — DIGOXIN 125 MCG: 125 TABLET ORAL at 08:33

## 2021-03-31 RX ADMIN — FINASTERIDE 5 MG: 5 TABLET, FILM COATED ORAL at 08:33

## 2021-03-31 RX ADMIN — POTASSIUM CHLORIDE 20 MEQ: 1500 TABLET, EXTENDED RELEASE ORAL at 12:15

## 2021-03-31 RX ADMIN — EZETIMIBE 10 MG: 10 TABLET ORAL at 08:33

## 2021-03-31 NOTE — CASE MANAGEMENT
BASILIO spoke with [employer] rep, [facility] ([phone]) who informed that she delivered tank to pt's room however needs CM to send another order through parachute  Order yesterday was sent for portable, however pt needs tanks as he requires 6L 02 on ambulation  Order updated via parachute and sent to Dr Tarik Easley for signature  Per [email or web address], Ivette Blair 77 able to accept for skilled nursing

## 2021-03-31 NOTE — PROGRESS NOTES
[address]    Progress note- Cardiology    Renetta Dorsey 67 y o  male MRN: [id number]  Unit/Bed#: -01 Encounter: [id number]    Code Status: Level 3 - DNAR and DNI  Reason for Admission / Principal Problem:  Shortness of breath and bilateral lower extremity swelling  Reason for Consult:  Acute on chronic heart failure exacerbation      A/P:    Acute on chronic heart failure exacerbation- EF 60%  Acute respiratory failure from heart failure exacerbation  Type 2 diabetes  CAD status post CABG in 2016- LIMA to LAD SVG to OM 3 and SVG to PDA  Paroxysmal Afib  Left upper lobe pulmonary nodule, pleural plaques  LUCIA- recently noncompliant with CPAP  Obesity  Mild-to-moderate TR based on recent echo        1  Acute on chronic heart failure exacerbation  Patient presenting with bilateral lower extremity swelling and shortness of breath  Likely noncompliance with diet  NT pro  although has JVD and bilateral lower extremity swelling 2+  Home regimen includes Lasix 40 mg daily  Home weight 325 lbs  On presentation was 331 lbs  Plan:  · Switched to PO Lasix 80 mg BID and restarted home Lisnopril 20 mg daily  · Patient loaded with digoxin on 03/28 and started on maintenance digoxin 125 mcg daily on 3/29 for AFib  · Strict ins and outs  · Daily weights  Wt seems to be improving now 312 lbs  · Continue to monitor renal function and electrolytes daily  Replete as needed  · On nasal cannula to keep oxygen >92%  Will be discharged with home oxygen  · Likely dc today from cardiology standpoint  · Patient educated on low-salt diet and fluid restriction at home  · Will set up outpatient appt with cardiology  2  CAD status post CABG in 2016 LIMA to LAD SVG to OM 3 and SVG to PDA :  Continue Lipitor 40 mg daily, ezetimibe 10 mg daily            3  Paroxysmal Afib:  Toprol  mg in the morning and 50 mg in the afternoon stopped given hypotension over the weekend  Now on digoxin 125 mcg daily  On Eliquis 5 mg b i d for anticoagulation  4  Recent echo showing mild-to-moderate TR:  Likely from right-sided volume overload and possibly noncompliance with CPAP for his LUCIA contributing  5  LUCIA:  Admits that he has not been using his CPAP since it is not working  Patient educated on compliance with it and was asked to call PCP to order a new one  Subjective: Elba Brown is a 67 y o  male with past medical history of CAD status post CABG LIMA to LAD SVG to OM 3 and SVG to PDA, paroxysmal AFib , mild-to-moderate TR, LUCIA on CPAP, type 2 diabetes, left pulmonary nodule, pleural plaques, obesity who presents with shortness of breath and bilateral lower extremity swelling  Patient was at his outpatient cardiology office where he was noticed to be short of breath and worsening lower extremity swelling  He was sent to ER for further evaluation  Patient has been admitted for acute on chronic heart failure exacerbation likely in setting of noncompliance with medications, diet and CPAP not working          PMH:  Past Medical History:   Diagnosis Date    A-fib [facility])     AAA (abdominal aortic aneurysm) [address] [address] [facility]     Actinic keratosis of right temple 7/31/2020    Actinic keratosis of scalp 7/31/2020    Arthritis     Lay's esophagus     Bladder cancer (HCC)     CAD (coronary artery disease)     Chronic low back pain     Chronic pain of both knees 7/31/2020    Colitis 12/4/2020    Diabetes ([facility] )     Excessive gas 12/3/2020    Hyperlipemia     Hypertension     Immunization deficiency 10/30/2020    Hep a nonimmune    Left lower quadrant abdominal pain 12/3/2020    [facility] 12/4/2020    4 1 cm    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    LUCIA (obstructive sleep apnea) 1/29/2021        PSH:  Past Surgical History:   Procedure Laterality Date    BACK SURGERY      BLADDER SURGERY      CATARACT EXTRACTION, BILATERAL      COLONOSCOPY  2019    next  [place]    CORONARY ARTERY BYPASS GRAFT  2016    Quadruple per [place]    EGD  2017    TOTAL HIP ARTHROPLASTY Right 2012    TOTAL SHOULDER REPLACEMENT Right 2013       Allergies: No Known Allergies    Family History:   Family History   Problem Relation Age of Onset    Heart disease Father     Arthritis Father     Heart attack Father     Alzheimer's disease Mother        Social History:   Social History     Tobacco Use   Smoking Status Former Smoker    Packs/day: 0 25    Types: Cigarettes    Quit date: 2009    Years since quittin 2   Smokeless Tobacco Never Used   Tobacco Comment    smoked since age 21; per [place]      Social History     Substance and Sexual Activity   Alcohol Use Not Currently    Comment: No use      Social History     Substance and Sexual Activity   Drug Use Never    Comment: No use        Home Medications:   Prior to Admission medications    Medication Sig Start Date End Date Taking?  Authorizing Provider   atorvastatin (LIPITOR) 40 mg tablet Take 1 tablet (40 mg total) by mouth daily 9/10/20 3/25/21  Bryson Shea MD   Eliquis 5 MG TAKE 1 TABLET BY MOUTH TWICE A DAY 21   Bryson Shea MD   ezetimibe (ZETIA) 10 mg tablet Take 1 tablet (10 mg total) by mouth daily 10/15/20   Luis F Turcios MD   finasteride (PROSCAR) 5 mg tablet Take 1 tablet (5 mg total) by mouth daily 20   Gigi Rodriguez MD   Fluad Quadrivalent 0 5 ML [facility] ADMINISTERED 10/13/20   Historical Provider, MD   furosemide (LASIX) 40 mg tablet Take 1 tablet (40 mg total) by mouth daily 10/30/20 3/25/21  Bryson Shea MD   glipiZIDE (GLUCOTROL XL) 5 mg 24 hr tablet Take 1 tablet (5 mg total) by mouth daily 3/17/21   Helena Pickard MD   Levemir FlexTouch 100 units/mL injection pen Inject under the skin 65 units in the morning and at bedtime 3/17/21   Helena Pickard MD   lisinopril (ZESTRIL) 20 mg tablet Take 1 tablet (20 mg total) by mouth daily 10/26/20   Merayr Hodgson MD   metFORMIN (GLUCOPHAGE) 1000 MG tablet Take 500 mg by mouth 2 (two) times a day  2/17/19   Historical Provider, MD   metoprolol succinate (TOPROL-XL) 100 mg 24 hr tablet Take 1 tablet (100 mg total) by mouth daily 2/18/21   Bryson Shea MD   metoprolol succinate (TOPROL-XL) 50 mg 24 hr tablet Take 1 tablet (50 mg total) by mouth daily 50 mg daily in afternoon 2/3/21   Bryson Shea MD   omeprazole (PriLOSEC) 20 mg delayed release capsule Take 1 capsule (20 mg total) by mouth daily 10/26/20 3/25/21  KACEY Hyman Pentips 31G X 8 MM MISC Inject under the skin daily Use as directed 9/4/20   Bryson Shea MD       ROS:   Review of Systems   Constitutional: Negative for activity change, chills, diaphoresis, fatigue and fever  HENT: Negative for congestion, rhinorrhea, sinus pressure and sinus pain  Respiratory: Negative for apnea, cough, shortness of breath and stridor  Cardiovascular: Negative for chest pain, palpitations and leg swelling  Gastrointestinal: Negative for abdominal distention, abdominal pain, blood in stool, constipation and diarrhea  Endocrine: Negative for cold intolerance, heat intolerance and polyuria  Genitourinary: Negative for difficulty urinating  Musculoskeletal: Negative for arthralgias, back pain, joint swelling and myalgias  Skin: Negative for color change, pallor, rash and wound  Neurological: Negative for dizziness, seizures, syncope, light-headedness, numbness and headaches  Psychiatric/Behavioral: Negative for agitation and hallucinations  The patient is not nervous/anxious and is not hyperactive          Vitals:   Vitals:    03/31/21 0054 03/31/21 0451 03/31/21 0455 03/31/21 0656   BP:   133/64 132/64   Pulse:    96   Resp:    19   Temp:    97 9 °F (36 6 °C)   TempSrc:    Oral   SpO2: 93%   94%   Weight:  (!) 142 kg (312 lb 9 8 oz)     Height:         Temp  Min: 97 1 °F (36 2 °C)  Max: 98 5 °F (36 9 °C)  IBW: 77 6 kg  Body mass index is 42 4 kg/m²  PHYSICAL EXAM:  Physical Exam  Constitutional:       General: He is not in acute distress  Appearance: He is not diaphoretic  HENT:      Head: Normocephalic and atraumatic  Nose: Nose normal       Mouth/Throat:      Pharynx: No oropharyngeal exudate  Eyes:      General: No scleral icterus  Conjunctiva/sclera: Conjunctivae normal    Neck:      Musculoskeletal: Neck supple  Thyroid: No thyromegaly  Vascular: No JVD  Trachea: No tracheal deviation  Cardiovascular:      Rate and Rhythm: Normal rate  Heart sounds: Normal heart sounds  No murmur  No friction rub  No gallop  Comments: JVD and b/l leg swelling which seems to be improving significantly compared to admission  Leg swelling seems to be at baseline now  Pulmonary:      Effort: Pulmonary effort is normal  No respiratory distress  Breath sounds: Normal breath sounds  No stridor  No wheezing or rales  Chest:      Chest wall: No tenderness  Abdominal:      General: Bowel sounds are normal  There is no distension  Palpations: Abdomen is soft  There is no mass  Tenderness: There is no abdominal tenderness  There is no guarding  Skin:     Coloration: Skin is not pale  Findings: No erythema or rash           Labs:   Results from last 7 days   Lab Units 03/30/21  0532 03/29/21  0607 03/26/21  0655   WBC Thousand/uL 7 38 7 67 9 67   HEMOGLOBIN g/dL 12 4 11 5* 12 3   HEMATOCRIT % 42 5 39 5 42 2   PLATELETS Thousands/uL 194 184 216   NEUTROS PCT % 68 68  --    MONOS PCT % 12 12  --      Results from last 7 days   Lab Units 03/31/21  0448 03/30/21  0532 03/29/21  0607  03/27/21  0447 03/25/21  1857   POTASSIUM mmol/L 4 1 3 9 3 8   < > 3 9 3 7   CHLORIDE mmol/L 95* 97* 98*   < > 99* 102   CO2 mmol/L 38* 38* 40*   < > 42* 35*   BUN mg/dL 26* 25 24   < > 16 16   CREATININE mg/dL 0 91 0 72 0 74   < > 0 78 0 70 CALCIUM mg/dL 9 5 9 3 9 0   < > 9 5 9 4   ALK PHOS U/L  --   --  74  --  77 82   ALT U/L  --   --  19  --  20 22   AST U/L  --   --  18  --  16 12    < > = values in this interval not displayed  Results from last 7 days   Lab Units 03/29/21  0607 03/27/21  0447   MAGNESIUM mg/dL 2 1 2 1     Lab Results   Component Value Date    PHOS 3 2 03/29/2021          No results found for: TROPONINT  ABG:No results found for: PHART, SZD5IYV, PO2ART, PEL8IDX, W7SOMCJK, BEART, SOURCE    Imaging: I have personally reviewed pertinent reports  EKG: This was personally reviewed by myself     Micro:  Blood Culture: No results found for: BLOODCX  Urine Culture: No results found for: URINECX  Sputum Culture: No components found for: SPUTUMCX  Wound Culure: No results found for: WOUNDCULT    VTE Pharmacologic Prophylaxis: Reason for no pharmacologic prophylaxis on Eliquis  VTE Mechanical Prophylaxis: sequential compression device    Samantha Che DO  3/31/2021 7:19 AM

## 2021-03-31 NOTE — PROGRESS NOTES
Attempted to visit patient  Introduced self and cultivated a relationship of care and support  Patient declined  support today       03/31/21 1100   Clinical Encounter Type   Visited With Patient   Routine Visit Introduction

## 2021-03-31 NOTE — DISCHARGE INSTR - AVS FIRST PAGE
Dear Cole Rodas,     It was our pleasure to care for you here at [facility]  It is our hope that we were always able to exceed the expected standards for your care during your stay  You were hospitalized due to heart failure  You were cared for on the 5th  floor by KACEY Choi under the service of Baron Hicks MD with the [facility] who covers for your primary care physician (PCP), Christiano Dumont MD, while you were hospitalized  If you have any questions or concerns related to this hospitalization, you may contact us at [phone]  For follow up as well as any medication refills, we recommend that you follow up with your primary care physician  However, at this time we provide for you here, the most important instructions / recommendations at discharge:     · Notable Medication Adjustments -   · Stop metoprolol  · Start digoxin  · Start Potassium daily  · Increase Lasix to 80 mg 2 times per day  · Testing Required after Discharge -   · BMP (blood work) 1 day before cardiology follow-up appointment  Can go to any [facility]'s lab, the order is in the system  · Important follow up information -   · See below  · Other Instructions -   · Weigh  yourself daily  Call the office if you gain more than 2-3 lb in 24 hours or more than 5 lb in 1 week  · Please review this entire after visit summary as additional general instructions including medication list, appointments, activity, diet, any pertinent wound care, and other additional recommendations from your care team that may be provided for you        Sincerely,     Maeve Islas

## 2021-03-31 NOTE — DISCHARGE SUMMARY
Discharge Summary - [facility]    Patient Information: Brown Bloch 67 y o  male MRN: [id number]  Unit/Bed#: -01 Encounter: [id number]    Discharging Physician / Practitioner: KACEY Hampton  PCP: Irwin Lambert MD  Admission Date: 3/25/2021  Discharge Date: 03/31/21    Reason for Admission:  Acute on chronic diastolic heart failure, acute respiratory failure with hypoxia    Discharge Diagnoses:     Principal Problem:    Acute on chronic diastolic heart failure ([address] )  Active Problems:    Acute respiratory failure with hypoxia ([address] )    A-fib ([facility] )    CAD (coronary artery disease)    Left upper lobe pulmonary nodule    Morbid obesity ([facility] )    Hypertension    LUCIA (obstructive sleep apnea)    Type 2 diabetes mellitus with hyperglycemia, with long-term current use of insulin ([facility] )    Scaly skin on examination  Resolved Problems:    * No resolved hospital problems  *      Consultations During Hospital Stay:  · Cardiology  · Case management    Procedures Performed:     · Home oxygen screening    Significant Findings / Test Results:     · Chest x-ray mild pulmonary interstitial edema  Mild pulmonary inflation suggesting underlying emphysema  · CTA chest PE study: Motion limited examination  No evidence of pulmonary embolism to the proximal segmental level  Bibasilar atelectasis/scarring similar to prior examination  7 mm left upper lobe nodule   Based on current Fleischner Society 2017 Guidelines on incidental pulmonary nodule, followup non-contrast CT is recommended at 6-12 months from the initial examination and, if stable at that time, an additional followup  is recommended for 18-24 months from the initial examination  Stable calcified pleural plaques which may be due to asbestos exposure  Cardiomegaly  Lipomatous hypertrophy of the interatrial septum  Stable 4 cm right adrenal nodule  ·     Incidental Findings:   ·  7 mm left upper lobe nodule    Based on current [facility]  2017 Guidelines on incidental pulmonary nodule, followup non-contrast CT is recommended at 6-12 months from the initial examination and, if stable at that time, an additional followup  is recommended for 18-24 months from the initial examination  Test Results Pending at Discharge (will require follow up): · None     Outpatient Tests Requested:  · Outpatient follow-up with PCP  · Outpatient follow-up with Cardiology and Pulmonary as scheduled  Patient is aware that he needs BMP prior to cardiology follow-up  · VNA for home oxygen    Complications:  None    Hospital Course:     Yamilka Crockett is a 67 y o  male patient with past medical history of diastolic heart failure, atrial fibrillation on Eliquis, CAD status post prior CABG, left upper lobe pulmonary nodule, morbid obesity, LUCIA, hypertension, diabetes type 2 who originally presented to the hospital on 3/25/2021 due to shortness of breath and worsening lower extremity edema  Patient was noted to be volume overloaded, Cardiology was consulted  He was diuresed with intravenous diuretics  His metoprolol has been stopped secondary to hypotension and he has been started on digoxin given his history of atrial fibrillation  His diuretic dose will also be increased on discharge  Patient has continued to require oxygen  Notably, he was hypoxic on outpatient visits recently  Home oxygen desaturations screen done with respiratory therapist   Will require 4 L nasal cannula at rest and 6 with ambulation on discharge  I suspect this is secondary to heart failure, prior asbestos exposure with plaques noted on CT scan, obesity hypoventilation syndrome, LUCIA as well as possible emphysema  Patient is known to Pulmonary and has appointment scheduled for May  Case management has arranged VNA secondary to new oxygen requirements  Patient is medically stable for discharge with home oxygen and VNA    He will follow-up with Cardiology, these appointments have already been made  He will require a BMP prior to visit  This was discussed with patient  Medication changes were also discussed with patient  His insulin requirements were about half as what they are at home  Patient frequently monitors his blood glucose and adjust his regimen based on endocrinology recommendations  He was advised to continue doing same  Med changes on discharge:  Increase Lasix to 80 mg p o  B i d   Stop metoprolol  Start digoxin 125 mcg daily    Condition at Discharge: stable     Discharge Day Visit / Exam:     Subjective:  Patient offers no acute complaints  Feels significantly better than admission  No shortness of breath, lower extremity edema has almost completely resolved  Vitals: Blood Pressure: 132/64 (03/31/21 0656)  Pulse: 96 (03/31/21 0656)  Temperature: 97 9 °F (36 6 °C) (03/31/21 0656)  Temp Source: Oral (03/31/21 0656)  Respirations: 19 (03/31/21 0656)  Height: 6' (182 9 cm) (03/26/21 2024)  Weight - Scale: (!) 142 kg (312 lb 9 8 oz) (03/31/21 0451)  SpO2: 94 % (03/31/21 0656)     Exam:   Physical Exam  Vitals signs and nursing note reviewed  Constitutional:       General: He is not in acute distress  Appearance: He is obese  HENT:      Head: Normocephalic  Cardiovascular:      Rate and Rhythm: Normal rate  Rhythm irregular  Pulmonary:      Breath sounds: Decreased breath sounds present  Abdominal:      Tenderness: There is no abdominal tenderness  Musculoskeletal:         General: No swelling  Skin:     General: Skin is warm  Neurological:      Mental Status: He is alert and oriented to person, place, and time  Mental status is at baseline  Psychiatric:         Mood and Affect: Mood normal          Discussion with Family:  Patient  Refused family update  Discharge instructions/Information to patient and family:   See after visit summary for information provided to patient and family        Provisions for Follow-Up Care:  See after visit summary for information related to follow-up care and any pertinent home health orders  Disposition:     Home with VNA Services (Reminder: Complete face to face encounter)    For Discharges to [facility]:   · Not Applicable to this Patient - Not Applicable to this Patient    Planned Readmission: no     Discharge Statement:  I spent 40 minutes discharging the patient  This time was spent on the day of discharge  I had direct contact with the patient on the day of discharge  Greater than 50% of the total time was spent examining patient, answering all patient questions, arranging and discussing plan of care with patient as well as directly providing post-discharge instructions  Additional time then spent on discharge activities  Discharge Medications:  See after visit summary for reconciled discharge medications provided to patient and family        ** Please Note: This note has been constructed using a voice recognition system **

## 2021-03-31 NOTE — PLAN OF CARE
Problem: Potential for Falls  Goal: Patient will remain free of falls  Description: INTERVENTIONS:  - Assess patient frequently for physical needs  -  Identify cognitive and physical deficits and behaviors that affect risk of falls    -  [place] fall precautions as indicated by assessment   - Educate patient/family on patient safety including physical limitations  - Instruct patient to call for assistance with activity based on assessment  - Modify environment to reduce risk of injury  - Consider OT/PT consult to assist with strengthening/mobility  Outcome: Progressing     Problem: CARDIOVASCULAR - ADULT  Goal: Maintains optimal cardiac output and hemodynamic stability  Description: INTERVENTIONS:  - Monitor I/O, vital signs and rhythm  - Monitor for S/S and trends of decreased cardiac output  - Administer and titrate ordered vasoactive medications to optimize hemodynamic stability  - Assess quality of pulses, skin color and temperature  - Assess for signs of decreased coronary artery perfusion  - Instruct patient to report change in severity of symptoms  Outcome: Progressing  Goal: Absence of cardiac dysrhythmias or at baseline rhythm  Description: INTERVENTIONS:  - Continuous cardiac monitoring, vital signs, obtain 12 lead EKG if ordered  - Administer antiarrhythmic and heart rate control medications as ordered  - Monitor electrolytes and administer replacement therapy as ordered  Outcome: Progressing     Problem: METABOLIC, FLUID AND ELECTROLYTES - ADULT  Goal: Electrolytes maintained within normal limits  Description: INTERVENTIONS:  - Monitor labs and assess patient for signs and symptoms of electrolyte imbalances  - Administer electrolyte replacement as ordered  - Monitor response to electrolyte replacements, including repeat lab results as appropriate  - Instruct patient on fluid and nutrition as appropriate  Outcome: Progressing  Goal: Fluid balance maintained  Description: INTERVENTIONS:  - Monitor labs - Monitor I/O and WT  - Instruct patient on fluid and nutrition as appropriate  - Assess for signs & symptoms of volume excess or deficit  Outcome: Progressing  Goal: Glucose maintained within target range  Description: INTERVENTIONS:  - Monitor Blood Glucose as ordered  - Assess for signs and symptoms of hyperglycemia and hypoglycemia  - Administer ordered medications to maintain glucose within target range  - Assess nutritional intake and initiate nutrition service referral as needed  Outcome: Progressing

## 2021-04-01 ENCOUNTER — TELEPHONE (OUTPATIENT)
Dept: FAMILY MEDICINE CLINIC | Facility: CLINIC | Age: 76
End: 2021-04-01

## 2021-04-01 ENCOUNTER — PATIENT OUTREACH (OUTPATIENT)
Dept: CASE MANAGEMENT | Facility: HOSPITAL | Age: 76
End: 2021-04-01

## 2021-04-01 LAB
DME PARACHUTE DELIVERY DATE ACTUAL: NORMAL
DME PARACHUTE DELIVERY DATE REQUESTED: NORMAL
DME PARACHUTE ITEM DESCRIPTION: NORMAL
DME PARACHUTE ORDER STATUS: NORMAL
DME PARACHUTE SUPPLIER NAME: NORMAL
DME PARACHUTE SUPPLIER PHONE: NORMAL

## 2021-04-01 NOTE — TELEPHONE ENCOUNTER
Patient was released from hospital yesterday ekta alcantar called she would like him seen as soon as possible his 18 with ambulation goes down to 87   ---it does come back up with rest   ----she would like to make sure you will sign for nursing and physical therapy

## 2021-04-02 ENCOUNTER — TELEPHONE (OUTPATIENT)
Dept: PULMONOLOGY | Facility: CLINIC | Age: 76
End: 2021-04-02

## 2021-04-02 ENCOUNTER — TELEPHONE (OUTPATIENT)
Dept: FAMILY MEDICINE CLINIC | Facility: CLINIC | Age: 76
End: 2021-04-02

## 2021-04-02 ENCOUNTER — PATIENT OUTREACH (OUTPATIENT)
Dept: CASE MANAGEMENT | Facility: HOSPITAL | Age: 76
End: 2021-04-02

## 2021-04-02 NOTE — TELEPHONE ENCOUNTER
Tried calling patient to inform him he has an appointment with Dr Alease Severs on 4/30/21 for Medicare AW  He called requesting a follow up appointment with her after he is discharged from the hospital so I was trying to see if he was ok with keeping his original appointment or if he needs to be moved up  If he calls back, please ask patient and make changes if necessary

## 2021-04-02 NOTE — TELEPHONE ENCOUNTER
Per Manju order is in process  Patient was notified that he should get a call within the next couple of days

## 2021-04-02 NOTE — PROGRESS NOTES
CM contacted patient to introduce self, explain the role of the OP CM and purpose of this phone call is to assess patient's general wellbeing or for any assistance needed with follow-up care  Explanation provided about patient's enrollment in a free medicare bundle program that allows for added telephonic nursing support for 90 days to help coordinate and manage further care, as needed  CM informed patient that outreach would be weekly / biweekly / monthly to ensure patient/caregiver is progressing and has understanding about their illness and medications, provide appointment reminders along with working closely with the VNA to prevent unplanned readmission to the hospital   Also explained that CM would conduct a one time telephonic assessment to collect medical and social history of patient  Patient agreed to future outreach of this CM and encouraged to contact CM as needed  Direct call back number of CM provided to patient  Patient reports he was diagnosed with "heart failure "  States he had echo as outpatient and it was at his appointment with the vascular center he was recommended to go to the ED due to the swelling in his legs  Gisselle Bolanos reports he was told the right side of the heart wasn't working as it should and this was the first time being diagnosed with congestive heart failure  Patient admits to having the fluid build up for some time -- guesses at least one month  Gisselle Bolanos informed CM he was given material at the hospital on what to watch for and was able to state that he was told by his doctor to call a 2# weight gain in 1 day  CM emphasized patient to call doctor for 1) weight gain of 3 pounds in day or 5 pounds in 1 week, 2) edema to BLE or abdomen and 3) SOB that does not resolve with rest   Patient verbalized understanding stating her was told of the same from his doctor        Gisselle Bolanos reports compliance with his cardiac and diabetic diet, medication compliance, checking his blood sugar  before breakfast along with taking his blood pressure daily  States his pressure runs between 120-125s/60s  States his blood sugar is also in the 120 range  Education provided on hypo and hyperglycemia and when to call the doctor  Also discussed symptoms and interventions for each  Patient able to verbalize S&S and appropriate interventions for hypo / hyper glycemia  He reports he recently saw the endocrinologist and started taking the medications prescribed from that visit  Gisselle Bolanos reports he has been happily  for 61years old and is from the [facility], moved to [place] for his job and  worked for there for more than 25 years  He states he is now retired adding he and his wife have four children, three of which are in the [facility] and one in [place]  Patient is under the care of endocrinology, cardiology, pulmonologist, urology, gastro enterology, Podiatry, and ophthalmology  [facility] states he was scheduled for a colonoscopy and endoscopy the day he was admitted to the hospital  He states he needs to call an reschedule his appointment  He adds that he is also waiting for a return call from Dr Glenn Marcus office to schedule an appointment  At time of this call, patient denies any difficulty paying [id number] bills or prescriptions  CM encouraged patient to outreach as needed if prescriptions or medical costs become problematic as he may be eligible for prescription or financial assistance  Patient verbalized understanding  Patient agreeable to this CM outreaching next week to complete telephonic assessment  Patient encouraged to call CM with any questions or concerns

## 2021-04-08 NOTE — PATIENT INSTRUCTIONS
Complete blood work early next week  Please weigh yourself every day, and contact the Heart Failure program at [phone] if you gain 3 lbs overnight or 5 lbs in 5-7 days  Limit daily sodium/salt intake to 4193-2293 mg daily to prevent fluid retention  Avoid canned foods, [place] food, and processed meat (hot dogs, lunch meat, and sausage etc )  Limit daily fluid intake to 2000 mL or 2L (about 60 ounces) daily  Bring complete list of medications to your follow-up appointment

## 2021-04-08 NOTE — PROGRESS NOTES
General Cardiology Outpatient Progress Note    Francoise Race 67 y o  male   MRN: [id number]  Encounter: [id number]    Assessment:  Patient Active Problem List    Diagnosis Date Noted    Scaly skin on examination 03/29/2021    Acute on chronic diastolic heart failure ([facility] ) 03/25/2021    Left upper lobe pulmonary nodule 03/25/2021    Acute respiratory failure with hypoxia ([facility] ) 03/25/2021    Hypoxemia 03/24/2021    Bilateral edema of lower extremity 03/18/2021    LUCIA (obstructive sleep apnea) 01/29/2021    Embolism and thrombosis of arteries of the lower extremities ([facility] ) 01/29/2021    Colitis 12/04/2020    Mass of right adrenal gland ([facility] ) 12/04/2020    Left lower quadrant abdominal pain 12/03/2020    Excessive gas 12/03/2020    Morbid obesity ([facility] ) 12/03/2020    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    Immunization deficiency 10/30/2020    Chronic pain of both knees 07/31/2020    Actinic keratosis of right temple 07/31/2020    Actinic keratosis of scalp 07/31/2020    Decreased pedal pulses 04/04/2019    Hypertension     Hyperlipemia     Type 2 diabetes mellitus with hyperglycemia, with long-term current use of insulin (HCC)     Chronic low back pain     CAD (coronary artery disease)     Bladder cancer (HCC)     Arthritis     A-fib ([facility] )     AAA (abdominal aortic aneurysm) (HCC)        Today's Plan:   Continue current medications   BP today limiting addition of BB back to regimen   Check post hospital BMP in 5-7 days  Plan:  Chronic HFpEF; LVEF 60%; LVIDd 5 72 cm; NYHA II/III; ACC/AHA Stage B/C   Etiology: HTN, Afib, LUCIA phenotype  TTE 03/23/2021: LVEF 60%  LVIDd 5 72 cm  IVSd 1 20 cm  Grade 1 DD  Mildly dilated RV with mildly reduced RVSF  Mild JONNY  Trace MR  Mild to moderate TR  Reviewed importance of low sodium diet and fluid restriction  Weight of 312 lbs on 03/31 (day of discharge)  Today, weighs 318 lbs      Most recent BMP from 03/31/2021: sodium 138; potassium 4 1; BUN 26; creatinine 0 91; eGFR 81  Neurohormonal Blockade:  --Beta Blocker: No    --ARNi / ACEi / ARB: lisinopril 20 mg daily  --Aldosterone Antagonist: No    --SGLT2 Inhibitor: No    --Diuretic: Lasix 80 mg BID with potassium 20 mEq daily  Sudden Cardiac Death Risk Reduction:  --LVEF >35%  Electrical Resynchronization:  --Candidacy for BiV device: RBBB with  ms  Advanced Therapies: Will continue to monitor  Coronary artery disease   Without active chest pain  S/p CABG (LIMA-LAD; SVG-OM3; SVG-PDA) in 04/2016  Continues on statin  BB discontinued during 03/2021 admission due to hypotension  Atrial fibrillation, persistent   [id number] = 6 (age, HF, HTN, CAD, DM)  Anticoagulation on Eliquis  Continues on digoxin 125 mcg daily  Hypertension   BP of 100/58 mmHg in office today  Continue on medications as above  Chronic respiratory failure with hypoxia   Continues with continuous oxygen; 3-4L/min at rest and 6L/min with exertion  Hyperlipidemia, mixed   Most recent lipid panel from 03/12/2021: cholesterol 122; ; HDL 39; calculated LDL 49  Continue on atorvastatin 40 mg daily and ezetimibe 10 mg daily  Diabetes mellitus, type II     Insulin dependent  Most recent hemoglobin A1c of 9 3 from 03/12/2021  Abdominal aortic aneurysm  Obstructive sleep apnea: compliant with CPAP  Lay's esophagus  History of bladder cancer  History of asbestos exposure    HPI:   Carl Rico is a 76-year-old man with a PMH as above who presents to the office for hospital follow-up  Patient follows with KACEY Ruffin and Dr Fer Salvador as outpatient  Admitted to [facility] from 03/25 to 03/31/2021 after being seen in cardiology office by Milena Ruffin and was noted to be in an acute CHF exacerbation with 3+ LE edema, rales, JVD and possible LE cellulitis  Was started on IV Lasix, and cardiology was consulted   Lisinopril and BB were held due to hypotension  Was loaded with digoxin and started on daily dosing on 03/29  IV Lasix increased to 80 mg TID  Was transitioned to PO Lasix 80 mg BID, and lisinopril was restarted on day of discharge  Met criteria for home oxygen (4L/min at rest and 6L/min with exertion)  Lost 19 lbs during this admission  BB was discontinued  04/09/2021: Presents for hospital follow-up  Feeling well overall  Continues to have some LE swelling and abdominal bloating but notes this was significant reduced s/p hospitalization  Is completing daily weights; up and down 0 5-1 lbs from day to day  Has cut out salt and fast food from his diet  Using Mrs  Dash and KCl-based seasoning  Advised caution with KCl given PO potassium prescription  Drinking about 50-55 oz daily  Continues on continuous oxygen, decreased down to 3L/min per therapist recommendations  Hopeful that he can be weaned off oxygen  Visiting nurses to come to home starting next week  Past Medical History:   Diagnosis Date    A-fib [facility])     AAA (abdominal aortic aneurysm) ([facility] )     Actinic keratosis of right temple 7/31/2020    Actinic keratosis of scalp 7/31/2020    Arthritis     Lay's esophagus     Bladder cancer (HCC)     CAD (coronary artery disease)     Chronic low back pain     Chronic pain of both knees 7/31/2020    Colitis 12/4/2020    [facility]     Excessive gas 12/3/2020    Hyperlipemia     Hypertension     Immunization deficiency 10/30/2020    Hep a nonimmune    Left lower quadrant abdominal pain 12/3/2020    [facility] of [facility] gland [facility] ) 12/4/2020    4 1 cm    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    LUCIA (obstructive sleep apnea) 1/29/2021       Review of Systems   Constitutional: Negative for activity change, appetite change, fatigue, fever and unexpected weight change  HENT: Negative for congestion, postnasal drip, rhinorrhea, sneezing, sore throat and trouble swallowing  Eyes: Negative      Respiratory: Negative for cough, chest tightness and shortness of breath  Cardiovascular: Positive for leg swelling  Negative for chest pain and palpitations  Gastrointestinal: Positive for abdominal distention  Negative for abdominal pain, diarrhea, nausea and vomiting  Endocrine: Negative  Genitourinary: Negative for decreased urine volume, difficulty urinating, dysuria and urgency  Musculoskeletal: Negative  Skin: Negative  Allergic/Immunologic: Negative  Neurological: Negative for dizziness, tremors, syncope, weakness, light-headedness and headaches  Hematological: Negative  Psychiatric/Behavioral: Negative for agitation, confusion and sleep disturbance  The patient is not nervous/anxious  14-point ROS completed and negative except as stated above and/or in the HPI      No Known Allergies    Current Outpatient Medications:     atorvastatin (LIPITOR) 40 mg tablet, Take 1 tablet (40 mg total) by mouth daily, Disp: 90 tablet, Rfl: 3    digoxin (LANOXIN) 0 125 mg tablet, Take 1 tablet (125 mcg total) by mouth daily, Disp: 30 tablet, Rfl: 0    Eliquis 5 MG, TAKE 1 TABLET BY MOUTH TWICE A DAY, Disp: 60 tablet, Rfl: 3    ezetimibe (ZETIA) 10 mg tablet, Take 1 tablet (10 mg total) by mouth daily, Disp: 90 tablet, Rfl: 3    finasteride (PROSCAR) 5 mg tablet, Take 1 tablet (5 mg total) by mouth daily, Disp: 90 tablet, Rfl: 3    furosemide (LASIX) 80 mg tablet, Take 1 tablet (80 mg total) by mouth 2 (two) times a day, Disp: 60 tablet, Rfl: 0    glipiZIDE (GLUCOTROL XL) 5 mg 24 hr tablet, Take 1 tablet (5 mg total) by mouth daily, Disp: 90 tablet, Rfl: 1    Levemir FlexTouch 100 units/mL injection pen, Inject under the skin 65 units in the morning and at bedtime, Disp: 120 mL, Rfl: 1    lisinopril (ZESTRIL) 20 mg tablet, Take 1 tablet (20 mg total) by mouth daily, Disp: 90 tablet, Rfl: 3    metFORMIN (GLUCOPHAGE) 1000 MG tablet, Take 500 mg by mouth 2 (two) times a day , Disp: , Rfl: 3    omeprazole (PriLOSEC) 20 mg delayed release capsule, Take 1 capsule (20 mg total) by mouth daily, Disp: 30 capsule, Rfl: 1    potassium chloride (K-DUR,KLOR-CON) 20 mEq tablet, Take 1 tablet (20 mEq total) by mouth daily, Disp: 30 tablet, Rfl: 0    Fluad Quadrivalent 0 5 ML PRSY, PHARMACY ADMINISTERED, Disp: , Rfl:     Unifine Pentips 31G X 8 MM MISC, Inject under the skin daily Use as directed, Disp: 100 each, Rfl: 2    Social History     Socioeconomic History    Marital status: /Civil Union     Spouse name: Not on file    Number of children: Not on file    Years of education: Not on file    Highest education level: Not on file   Occupational History    Not on file   Social Needs    Financial resource strain: Not on file    Food insecurity     Worry: Not on file     Inability: Not on file   PANOSOL needs     Medical: Not on file     Non-medical: Not on file   Tobacco Use    Smoking status: Former Smoker     Packs/day: 0 25     Types: Cigarettes     Quit date: 2009     Years since quittin 2    Smokeless tobacco: Never Used    Tobacco comment: smoked since age 21; per [place]   Substance and Sexual Activity    Alcohol use: Not Currently     Comment: No use    Drug use: Never     Comment: No use    Sexual activity: Not on file   Lifestyle    Physical activity     Days per week: Not on file     Minutes per session: Not on file    Stress: Not on file   Relationships    Social connections     Talks on phone: Not on file     Gets together: Not on file     Attends [employer] service: Not on file     Active member of club or organization: Not on file     Attends meetings of clubs or organizations: Not on file     Relationship status: Not on file    Intimate partner violence     Fear of current or ex partner: Not on file     Emotionally abused: Not on file     Physically abused: Not on file     Forced sexual activity: Not on file   Other Topics Concern    Not on file   Social History Narrative    · Most recent tobacco use screenin2020      · Do you currently or have you served in the [employer]:   No      · Were you activated, into active duty, as a member of the [employer] or as a Reservist:   No      · Live alone or with others:   with others        · Caffeine intake:   Occasional      · Illicit drugs:   No      · Diet:   Regular      · Exercise level: Moderate      · Advance directive: Yes      · Marital status:         · General stress level:   Medium      · Single or multi-level home/work:   multi level home      · Guns present in home:   No      · Seat belts used routinely:   Yes      · Sunscreen used routinely:   Yes      · Smoke alarm in home:   Yes      Family History   Problem Relation Age of Onset    Heart disease Father     Arthritis Father     Heart attack Father     Alzheimer's disease Mother        Vitals:   Blood pressure 100/58, pulse 88, height 6' (1 829 m), weight (!) 144 kg (318 lb), SpO2 94 %  Body mass index is 43 13 kg/m²  Wt Readings from Last 3 Encounters:   21 (!) 144 kg (318 lb)   21 (!) 142 kg (312 lb 9 8 oz)   21 (!) 150 kg (331 lb 8 oz)     Vitals:    21 1003   BP: 100/58   BP Location: Right arm   Patient Position: Sitting   Cuff Size: Large   Pulse: 88   SpO2: 94%   Weight: (!) 144 kg (318 lb)   Height: 6' (1 829 m)       Physical Exam  Vitals signs reviewed  Constitutional:       General: He is awake  He is not in acute distress  Appearance: Normal appearance  He is well-developed and overweight  Interventions: Nasal cannula in place  Comments:   Face mask present  Seated in wheelchair   HENT:      Head: Normocephalic  Nose: Nose normal       Mouth/Throat:      Mouth: Mucous membranes are moist    Eyes:      General: No scleral icterus  Conjunctiva/sclera: Conjunctivae normal    Neck:      Musculoskeletal: Neck supple  Vascular: JVD present        Trachea: No tracheal deviation  Cardiovascular:      Rate and Rhythm: Normal rate and regular rhythm  No extrasystoles are present  Heart sounds: Murmur present  Pulmonary:      Effort: Pulmonary effort is normal  No tachypnea, bradypnea, accessory muscle usage or respiratory distress  Breath sounds: Normal air entry  Examination of the left-lower field reveals decreased breath sounds  Decreased breath sounds present  No wheezing or rales  Abdominal:      General: Bowel sounds are normal  There is distension (mild)  Palpations: Abdomen is soft  Tenderness: There is no abdominal tenderness  Musculoskeletal:      Right lower le+ Edema (hitting mid calf) present  Left lower le+ Edema (hitting mid calf) present  Skin:     General: Skin is warm and dry  Coloration: Skin is not jaundiced or pale  Neurological:      General: No focal deficit present  Mental Status: He is alert and oriented to person, place, and time  Psychiatric:         Attention and Perception: Attention normal          Mood and Affect: Mood and affect normal          Speech: Speech normal          Behavior: Behavior normal  Behavior is cooperative  Thought Content: Thought content normal        Labs & Results:  Lab Results   Component Value Date    WBC 7 38 2021    HGB 12 4 2021    HCT 42 5 2021    MCV 90 2021     2021     Lab Results   Component Value Date    SODIUM 138 2021    K 4 1 2021    CL 95 (L) 2021    CO2 38 (H) 2021    BUN 26 (H) 2021    CREATININE 0 91 2021    GLUC 117 2021    CALCIUM 9 5 2021     No results found for: INR, PROTIME  Lab Results   Component Value Date    NTBNP 127 2021      EKG personally reviewed by RILEY Finn PA-C

## 2021-04-09 ENCOUNTER — OFFICE VISIT (OUTPATIENT)
Dept: CARDIOLOGY CLINIC | Facility: CLINIC | Age: 76
End: 2021-04-09
Payer: MEDICARE

## 2021-04-09 ENCOUNTER — OFFICE VISIT (OUTPATIENT)
Dept: FAMILY MEDICINE CLINIC | Facility: CLINIC | Age: 76
End: 2021-04-09
Payer: MEDICARE

## 2021-04-09 VITALS
BODY MASS INDEX: 42.66 KG/M2 | WEIGHT: 315 LBS | SYSTOLIC BLOOD PRESSURE: 126 MMHG | DIASTOLIC BLOOD PRESSURE: 72 MMHG | OXYGEN SATURATION: 98 % | TEMPERATURE: 97 F | HEIGHT: 72 IN | HEART RATE: 100 BPM

## 2021-04-09 VITALS
DIASTOLIC BLOOD PRESSURE: 58 MMHG | HEART RATE: 88 BPM | BODY MASS INDEX: 42.66 KG/M2 | SYSTOLIC BLOOD PRESSURE: 100 MMHG | OXYGEN SATURATION: 94 % | WEIGHT: 315 LBS | HEIGHT: 72 IN

## 2021-04-09 DIAGNOSIS — E11.65 TYPE 2 DIABETES MELLITUS WITH HYPERGLYCEMIA, WITH LONG-TERM CURRENT USE OF INSULIN (HCC): ICD-10-CM

## 2021-04-09 DIAGNOSIS — Z79.4 TYPE 2 DIABETES MELLITUS WITH HYPERGLYCEMIA, WITH LONG-TERM CURRENT USE OF INSULIN (HCC): Primary | ICD-10-CM

## 2021-04-09 DIAGNOSIS — I48.11 LONGSTANDING PERSISTENT ATRIAL FIBRILLATION (HCC): ICD-10-CM

## 2021-04-09 DIAGNOSIS — Z79.4 TYPE 2 DIABETES MELLITUS WITH HYPERGLYCEMIA, WITH LONG-TERM CURRENT USE OF INSULIN (HCC): ICD-10-CM

## 2021-04-09 DIAGNOSIS — J96.01 ACUTE RESPIRATORY FAILURE WITH HYPOXIA (HCC): ICD-10-CM

## 2021-04-09 DIAGNOSIS — E78.2 MIXED HYPERLIPIDEMIA: ICD-10-CM

## 2021-04-09 DIAGNOSIS — C67.9 MALIGNANT NEOPLASM OF URINARY BLADDER, UNSPECIFIED SITE (HCC): ICD-10-CM

## 2021-04-09 DIAGNOSIS — E66.01 MORBID OBESITY (HCC): ICD-10-CM

## 2021-04-09 DIAGNOSIS — I50.32 CHRONIC HEART FAILURE WITH PRESERVED EJECTION FRACTION (HCC): Primary | ICD-10-CM

## 2021-04-09 DIAGNOSIS — Z09 HOSPITAL DISCHARGE FOLLOW-UP: ICD-10-CM

## 2021-04-09 DIAGNOSIS — I25.10 CORONARY ARTERY DISEASE INVOLVING NATIVE CORONARY ARTERY OF NATIVE HEART WITHOUT ANGINA PECTORIS: ICD-10-CM

## 2021-04-09 DIAGNOSIS — J96.11 CHRONIC RESPIRATORY FAILURE WITH HYPOXIA (HCC): ICD-10-CM

## 2021-04-09 DIAGNOSIS — I10 HYPERTENSION, UNSPECIFIED TYPE: ICD-10-CM

## 2021-04-09 DIAGNOSIS — E11.65 TYPE 2 DIABETES MELLITUS WITH HYPERGLYCEMIA, WITH LONG-TERM CURRENT USE OF INSULIN (HCC): Primary | ICD-10-CM

## 2021-04-09 DIAGNOSIS — R60.0 BILATERAL EDEMA OF LOWER EXTREMITY: ICD-10-CM

## 2021-04-09 DIAGNOSIS — I48.19 PERSISTENT ATRIAL FIBRILLATION (HCC): ICD-10-CM

## 2021-04-09 DIAGNOSIS — R91.1 LEFT UPPER LOBE PULMONARY NODULE: ICD-10-CM

## 2021-04-09 DIAGNOSIS — I10 ESSENTIAL HYPERTENSION: ICD-10-CM

## 2021-04-09 PROCEDURE — 99496 TRANSJ CARE MGMT HIGH F2F 7D: CPT | Performed by: FAMILY MEDICINE

## 2021-04-09 PROCEDURE — 99215 OFFICE O/P EST HI 40 MIN: CPT | Performed by: PHYSICIAN ASSISTANT

## 2021-04-09 NOTE — PROGRESS NOTES
Assessment/Plan:     No problem-specific Assessment & Plan notes found for this encounter  Diagnoses and all orders for this visit:    Type 2 diabetes mellitus with hyperglycemia, with long-term current use of insulin ([facility] )    Acute respiratory failure with hypoxia ([facility] )    Essential hypertension    Coronary artery disease involving native coronary artery of native heart without angina pectoris    Longstanding persistent atrial fibrillation (HCC)    Malignant neoplasm of urinary bladder, unspecified site [facility])    Morbid obesity (HCC)    Bilateral edema of lower extremity    Left upper lobe pulmonary nodule         Subjective:     Patient ID: Chandrika Funes is a 67 y o  male  78-year-old male for transition care management  Patient was admitted March 25th discharged March 31st for acute on chronic congestive heart failure  Patient's baseline weight is 325 lb admitted weight was 331 lb  Episode shortness of breath came on suddenly with couple days history of lower extremity edema  in the hospital he was admitted under the service hospitalist and Cardiology consult  He was put on Lasix 80 mg 3 times a day IV then transition to b i d  Oral Lasix 80 mg  He was put back on lisinopril  His blood pressure run low so metoprolol was stopped and digoxin was started  He has AFib on Eliquis  He was found to have hypoxia oxygen dropped to 88%  He was put on oxygen supplementation 3-4 L at rest and 6 L on exertion  Since being home his oxygenation run from 92-98% he never had to use 6 L of oxygen  CT lung was negative for pulm embolism  He feels better he just saw cardiologist today  He feels that he still has fluid in his lungs and still fluid in his legs but he feels better than before  Home visiting nurse will come tomorrow to help him with physical therapy  He has type 2 diabetes he could not tolerate Ozempic, it gave him abdominal pain therefore he is only on metformin and glipizide    Hemoglobin A1c was 7 went up to 9 8 recently  He followed Dr Elen Lombardi for  bladder cancer  He follow-up with Dr Jamison Payan for abdominal aortic aneurysm  Follow-up with Dr Lindsey Jones for GERD and colitis  There was recommendation to get EGD colonoscopy however his colitis episode resolved after being on antibiotic, at this point he is not in any rush to get EGD colonoscopy yet due to high risk   He follow-up with dr Austin Figueroa for Cardiology  He has an appointment next week  Follow-up with pulmonologist Dr Areli Carroll for emphysema he was found to have a 7 mm lung nodule in the left upper lobe recommendation follow-up CT lung in 6 months  Option for medication to help prevent heart failure and with optimize his blood sugar and protect his heart will be Ev Martinez  Concern for insurance coverage  Will notify his cardiologist and endocrinologist of this option  If it is a possible option then he can be off glipizide because that tend to cause hypoglycemic events  Right now his GFR is above 50  Concern for Lasix high dose can cause increase in creatinine and caused worsening of dehydration  Hopefully we can lower the oxygen need for him that he can be maintained on just 2 L of oxygen or less  Will need to monitor bmp/dig level  Review of Systems   Constitutional: Negative for activity change, appetite change, fatigue, fever and unexpected weight change  HENT: Negative for dental problem and trouble swallowing  Eyes: Negative for photophobia and visual disturbance  Respiratory: Positive for cough  Negative for chest tightness  Cardiovascular: Positive for leg swelling  Negative for chest pain and palpitations  Gastrointestinal: Negative for abdominal pain, constipation and vomiting  Endocrine: Negative for cold intolerance, polydipsia and polyuria  Genitourinary: Negative for difficulty urinating, frequency and urgency  Musculoskeletal: Negative for arthralgias, joint swelling, myalgias and neck pain  Skin: Negative for color change, rash and wound  Allergic/Immunologic: Negative for environmental allergies  Neurological: Negative for dizziness, weakness and numbness  Hematological: Does not bruise/bleed easily  Psychiatric/Behavioral: Negative for decreased concentration, dysphoric mood, self-injury, sleep disturbance and suicidal ideas  Objective:     Physical Exam  Vitals signs and nursing note reviewed  Constitutional:       Appearance: Normal appearance  He is well-developed  He is obese  HENT:      Head: Normocephalic and atraumatic  Right Ear: Tympanic membrane normal       Left Ear: Tympanic membrane normal    Eyes:      Pupils: Pupils are equal, round, and reactive to light  Neck:      Musculoskeletal: Normal range of motion and neck supple  Cardiovascular:      Rate and Rhythm: Normal rate and regular rhythm  Pulses: Normal pulses  Heart sounds: Normal heart sounds  Pulmonary:      Effort: Pulmonary effort is normal       Breath sounds: Normal breath sounds  Abdominal:      General: Bowel sounds are normal       Palpations: Abdomen is soft  Musculoskeletal: Normal range of motion  General: Swelling present  Right lower leg: Edema present  Left lower leg: Edema present  Skin:     General: Skin is warm and dry  Capillary Refill: Capillary refill takes less than 2 seconds  Neurological:      General: No focal deficit present  Mental Status: He is alert and oriented to person, place, and time  Mental status is at baseline  Psychiatric:         Mood and Affect: Mood normal          Behavior: Behavior normal          Thought Content:  Thought content normal          Judgment: Judgment normal            Vitals:    04/09/21 1609   BP: 126/72   BP Location: Left arm   Patient Position: Sitting   Cuff Size: Large   Pulse: 100   Temp: (!) 97 °F (36 1 °C)   TempSrc: Temporal   SpO2: 98%   Weight: (!) 145 kg (319 lb)   Height: 6' (1 829 m)       Transitional Care Management Review:  Elba Brown is a 67 y o  male here for TCM follow up       During the TCM phone call patient stated:    TCM Call (since 3/9/2021)     Date and time call was made  3/31/2021  4:28 PM    Hospital care reviewed  Records reviewed    [facility]        Date of Admission  03/26/21    Date of discharge  03/31/21    Diagnosis  Acute on chronic heart failure    Disposition  Home; Home health services    Were the patients medications reviewed and updated  No      TCM Call (since 3/9/2021)     Patients specialists  Cardiologist    Cardiologist name  Dr Keyon Mcneill    Cardiologist contact #  [phone]    I have advised the patient to call PCP with any new or worsening symptoms  RAMONE Ordonez MD

## 2021-04-13 ENCOUNTER — PATIENT OUTREACH (OUTPATIENT)
Dept: CASE MANAGEMENT | Facility: HOSPITAL | Age: 76
End: 2021-04-13

## 2021-04-13 ENCOUNTER — APPOINTMENT (OUTPATIENT)
Dept: LAB | Facility: AMBULARY SURGERY CENTER | Age: 76
End: 2021-04-13
Payer: MEDICARE

## 2021-04-13 DIAGNOSIS — I50.33 ACUTE ON CHRONIC DIASTOLIC HEART FAILURE (HCC): ICD-10-CM

## 2021-04-13 DIAGNOSIS — I48.11 LONGSTANDING PERSISTENT ATRIAL FIBRILLATION (HCC): ICD-10-CM

## 2021-04-13 DIAGNOSIS — R91.1 LEFT UPPER LOBE PULMONARY NODULE: ICD-10-CM

## 2021-04-13 DIAGNOSIS — G47.33 OSA (OBSTRUCTIVE SLEEP APNEA): Primary | ICD-10-CM

## 2021-04-13 DIAGNOSIS — J96.01 ACUTE RESPIRATORY FAILURE WITH HYPOXIA (HCC): ICD-10-CM

## 2021-04-13 DIAGNOSIS — I50.32 CHRONIC HEART FAILURE WITH PRESERVED EJECTION FRACTION (HCC): ICD-10-CM

## 2021-04-13 LAB
ANION GAP SERPL CALCULATED.3IONS-SCNC: 4 MMOL/L (ref 4–13)
BUN SERPL-MCNC: 28 MG/DL (ref 5–25)
CALCIUM SERPL-MCNC: 9.8 MG/DL (ref 8.3–10.1)
CHLORIDE SERPL-SCNC: 102 MMOL/L (ref 100–108)
CHOLEST SERPL-MCNC: 121 MG/DL (ref 50–200)
CO2 SERPL-SCNC: 32 MMOL/L (ref 21–32)
CREAT SERPL-MCNC: 0.88 MG/DL (ref 0.6–1.3)
DME PARACHUTE DELIVERY DATE ACTUAL: NORMAL
DME PARACHUTE DELIVERY DATE REQUESTED: NORMAL
DME PARACHUTE ITEM DESCRIPTION: NORMAL
DME PARACHUTE ORDER STATUS: NORMAL
DME PARACHUTE SUPPLIER NAME: NORMAL
DME PARACHUTE SUPPLIER PHONE: NORMAL
GFR SERPL CREATININE-BSD FRML MDRD: 83 ML/MIN/1.73SQ M
GLUCOSE P FAST SERPL-MCNC: 159 MG/DL (ref 65–99)
HDLC SERPL-MCNC: 35 MG/DL
LDLC SERPL CALC-MCNC: 43 MG/DL (ref 0–100)
NONHDLC SERPL-MCNC: 86 MG/DL
POTASSIUM SERPL-SCNC: 4.6 MMOL/L (ref 3.5–5.3)
SODIUM SERPL-SCNC: 138 MMOL/L (ref 136–145)
TRIGL SERPL-MCNC: 216 MG/DL

## 2021-04-13 PROCEDURE — 80048 BASIC METABOLIC PNL TOTAL CA: CPT

## 2021-04-13 PROCEDURE — 80061 LIPID PANEL: CPT

## 2021-04-13 PROCEDURE — 36415 COLL VENOUS BLD VENIPUNCTURE: CPT

## 2021-04-13 NOTE — PROGRESS NOTES
Patient returned my call  We dicussed oxygen portability options when a lower liter flow is sufficient  Ammy Salinas currently uses @ 4-5 lpm O2 and doesn't qualify for portability, based on the intermittent flow of the conserving device / POC  Ammy Salinas will see his Pulmonologist on May 3rd, at which time he may be tested to see if his O2 needs have decreased  He is compliant with his new CPAP    We reviewed the need to take all medication as directed and follow MD advice  Once the swelling decreases, he may not require as much oxygen, and that will be determined as he recovers from his hospitalization and follows up with Pulmonary  We reviewed the testing process and intermittent flow of the portable O2 devices  Ammy Salinas understands and is hopeful  I will help facilitate this process if he will be eligible for portable O2 in the future

## 2021-04-13 NOTE — PROGRESS NOTES
Attempted to contact patient as per referral from Ishan Dinh, [facility] Anil RN to discuss oxygenation, portable O2 and use of CPAP  Message left on voicemail with contact information for return call

## 2021-04-13 NOTE — PROGRESS NOTES
Chart review completed  Outside records through [address] [place] [address] Loop requested and refreshed  Future appointments:  4/16   Cardiology  4/30  PCP  5/3   Pulmonology    CM spoke with patient today who reports he is doing well, is compliant with taking all prescribed medications, checks his FBS each am, adheres to a cardiac and diabetic diet, and weighs himself regularly  He denies any FOB, Edema, or weight gain  Yamileth Marcial informed CM he just saw his PCP a couple days ago replying the appointment went well replying  PCP will talk with cardiologist and endocrinologist regarding a potential diabetic medication  He stated there were no medication changes at that time  Furthermore, he acknowledges the above appointments  CM reminded patient to call doctor for 1) weight gain of 3 pounds in day or 5 pounds in 1 week, 2) edema to BLE or abdomen and 3) SOB that does not resolve with rest  Patient verbalized same  Patient states compliance with wearing oxygen at 4 liters continuously and wearing his see pap faithfully each evening  Yamileth Marcial informed CM he has been wearing a CPAP for 7 years and recently Dr Gordon Harris with getting a newer machine that starts and stops automatically and records stuff    since receiving the newer machine, patient admits to sleeping better and longer  Yamileth Marcial is agreeable for CM to place referral to Department respiratory therapist, Aimee Duran, to assist with managing oxygen equipment  Patient stated goal of reducing oxygen down to at least 2 L if not getting rid of it altogether  He informed the CM that he is not eligible for a portable oxygen concentrator since his flow rate is too high  He states it would need to be 2 liters or below for a POC  Telephonic assessment completed during this encounter (except review of medications)  Patient requested CM call back next to discuss medications, CM agreed        Placed to Ambulatory referral to Respiratory Therapist Care Management Program   BASILIO routed this encounter to Dawson Minor, Joaquint OP CM RT       This CM will continue to monitor electronically via chart review, outreach and [facility]

## 2021-04-15 ENCOUNTER — TELEPHONE (OUTPATIENT)
Dept: CARDIOLOGY CLINIC | Facility: CLINIC | Age: 76
End: 2021-04-15

## 2021-04-15 NOTE — TELEPHONE ENCOUNTER
----- Message from Nini Golden MD sent at 4/14/2021  9:43 AM EDT -----  TG mildly elevated otherwise look okay   Dietary changes would help TG>

## 2021-04-16 ENCOUNTER — OFFICE VISIT (OUTPATIENT)
Dept: CARDIOLOGY CLINIC | Facility: CLINIC | Age: 76
End: 2021-04-16
Payer: MEDICARE

## 2021-04-16 VITALS
DIASTOLIC BLOOD PRESSURE: 66 MMHG | HEIGHT: 72 IN | OXYGEN SATURATION: 96 % | HEART RATE: 101 BPM | SYSTOLIC BLOOD PRESSURE: 132 MMHG | BODY MASS INDEX: 42.66 KG/M2 | WEIGHT: 315 LBS

## 2021-04-16 DIAGNOSIS — R60.0 BILATERAL EDEMA OF LOWER EXTREMITY: ICD-10-CM

## 2021-04-16 DIAGNOSIS — I25.10 CORONARY ARTERY DISEASE INVOLVING NATIVE CORONARY ARTERY OF NATIVE HEART WITHOUT ANGINA PECTORIS: ICD-10-CM

## 2021-04-16 DIAGNOSIS — I71.4 ABDOMINAL AORTIC ANEURYSM (AAA) WITHOUT RUPTURE (HCC): ICD-10-CM

## 2021-04-16 DIAGNOSIS — I50.33 ACUTE ON CHRONIC DIASTOLIC HEART FAILURE (HCC): Primary | ICD-10-CM

## 2021-04-16 DIAGNOSIS — E78.2 MIXED HYPERLIPIDEMIA: ICD-10-CM

## 2021-04-16 DIAGNOSIS — I48.0 PAROXYSMAL ATRIAL FIBRILLATION (HCC): ICD-10-CM

## 2021-04-16 PROBLEM — J96.01 ACUTE RESPIRATORY FAILURE WITH HYPOXIA (HCC): Status: RESOLVED | Noted: 2021-03-25 | Resolved: 2021-04-16

## 2021-04-16 PROCEDURE — 99215 OFFICE O/P EST HI 40 MIN: CPT | Performed by: INTERNAL MEDICINE

## 2021-04-16 PROCEDURE — 93000 ELECTROCARDIOGRAM COMPLETE: CPT | Performed by: INTERNAL MEDICINE

## 2021-04-16 RX ORDER — METOPROLOL SUCCINATE 25 MG/1
12.5 TABLET, EXTENDED RELEASE ORAL DAILY
Qty: 45 TABLET | Refills: 3 | Status: SHIPPED | OUTPATIENT
Start: 2021-04-16 | End: 2021-05-06

## 2021-04-16 RX ORDER — METOLAZONE 2.5 MG/1
TABLET ORAL
Qty: 15 TABLET | Refills: 6 | Status: SHIPPED | OUTPATIENT
Start: 2021-04-16 | End: 2021-04-28 | Stop reason: ALTCHOICE

## 2021-04-16 RX ORDER — POTASSIUM CHLORIDE 20 MEQ/1
TABLET, EXTENDED RELEASE ORAL
Qty: 135 TABLET | Refills: 3 | Status: SHIPPED | OUTPATIENT
Start: 2021-04-16 | End: 2021-04-27 | Stop reason: SDUPTHER

## 2021-04-16 NOTE — PROGRESS NOTES
55 [address] [facility]  Office progress note  Raciel Garcia 67 y o  male MRN: [id number]        Problems    1  Acute on chronic diastolic heart failure (HCC)  metoprolol succinate (TOPROL-XL) 25 mg 24 hr tablet    potassium chloride (K-DUR,KLOR-CON) 20 mEq tablet    metolazone (ZAROXOLYN) 2 5 mg tablet    Basic metabolic panel   2  Paroxysmal atrial fibrillation (HCC)  POCT ECG   3  Coronary artery disease involving native coronary artery of native heart without angina pectoris     4  Mixed hyperlipidemia     5  Abdominal aortic aneurysm (AAA) without rupture ([facility] [address] )     6   Bilateral edema of lower extremity         Impression:       Coronary artery disease  o CABG x3 in 2016, LIMA to LAD, SVG to OM3, SVG to PDA  o His moderate ischemic cardiomyopathy recovered by 2017 up to 55%  o He has no recent complaints of ischemic disease  o Echo 3/23/2021-LVEF 60%, but RV mildly dilated with mildly reduced function   Acute on chronic diastolic/right-sided CHF  o Recent admission 3/21  o Was in atrial fibrillation at the time which may have exacerbated the development of CHF  o He did revert to normal sinus rhythm with aggressive diuresis  o Obesity, obstructive sleep apnea and recent CPAP device dysfunction might have precipitated the above as well  o Discharge weight was 312 lb, discharge creatinine 0 91  o Home weights are stable at 318 lb  o Significant edema remains, 2 to 3+, with a small wound due to minor trauma to the shin without induration, redness, and he denies fever   AAA  o Up to 47 mm by recent testing by CT of the abdomen 1/5/2021, but was a noncontrast study  o A slow change in size  o Follows with vascular   Lower extremity atherosclerosis  o Occluded dorsalis pedis bilaterally  o Followed closely by vascular   Mixed hyperlipidemia  o Much improved, LDL 43, HDL 35, total 121, triglycerides remain slightly elevated 216   Type 2 diabetes  o uncontrolled at 7 5 in the setting of significant obesity   Hypertension  o Well controlled  o Metoprolol was discontinued during hospitalization because of low blood pressure likely due to aggressive diuresis in the presence of atrial fibrillation at the time   Atrial fibrillation  o Paroxysmal  o Occurred post CABG  o Short course of amiodarone in 2016  o Had not had any recent recurrence until his hospitalization 3/21  o Continues on chads Vasc high risk appropriate Eliquis  o Appears to be in sinus rhythm with a heart rate of 100 today  o Currently taking digoxin    Plan     I am very happy that his weight is stable at home, but he still has pretty significant 2 to 3+ edema to the knees bilaterally, and now was small leg wound on the right side due to mild trauma, with a bit of weeping, but no significant redness or induration to suggest infection   He is going to need additional diuresis overnight above the furosemide 80 mg twice a day   He does have complaints of dry mouth which unfortunately a side effect of the current diuretic doses   I am recommending he add metolazone 3 times a week 2 5 mg, 30 minutes before the a m  Furosemide dose, and double up the potassium on the days he is taking metolazone   With a basic metabolic panel planned for 1 week after initiation   Reintroduce metoprolol succinate at 12 5 mg daily      I have spent 40 minutes with Patient and family today in which greater than 50% of this time was spent in counseling/coordination of care regarding Diagnostic results, Prognosis, Risks and benefits of tx options, Intructions for management, Patient and family education and Importance of tx compliance  HPI: Parminder Rowe is a 67y o  year old male who had been cared for by [facility], Dr Carmencita Borjas, switching care to my office today    His history is detailed, with commentary below, he has no acute complaints for me today, but does say that he is unable to do much exercise due to significant orthopedic limitations, gait instability, and use of a walker  He also has a shoulder replacement  He was recently hospitalized for acute on chronic diastolic/right-sided heart failure, LVEF 60% by echo, RV mildly dysfunctional with mild dilation  Preceding this he had dysfunctional CPAP, had used it for about 6 weeks  He also presented with AFib with RVR, and with aggressive diuresis hypotension precluded ongoing use of his metoprolol which was discontinued in favor of digoxin  On the day of discharge he did revert to sinus rhythm, digoxin was kept on board in case he should recur, and metoprolol has not been restarted yet  His follow-up blood work reveals a bicarbonate down to 32, it had been as high as 40, his creatinine is normal/stable, and he is currently taking furosemide 80 mg p o  B i d  With pretty significant edema still, although no significant respiratory symptoms, and by EKG appears to be in sinus rhythm albeit with a heart rate of 100 today   Review of Systems   Constitutional: Negative  HENT: Negative  Eyes: Negative  Respiratory: Negative for chest tightness and shortness of breath  Cardiovascular: Positive for leg swelling  Negative for chest pain and palpitations  Gastrointestinal: Negative  Endocrine: Negative  Genitourinary: Negative  Musculoskeletal: Negative  Skin: Negative  Neurological: Negative  Hematological: Negative  Psychiatric/Behavioral: Negative            Past Medical History:   Diagnosis Date    [facility]-fib [facility])     [facility]     Actinic keratosis of right temple 7/31/2020    Actinic keratosis of scalp 7/31/2020    Acute respiratory failure with hypoxia (HCC) 3/25/2021    Arthritis     Lay's esophagus     Bladder cancer (HCC)     CAD (coronary artery disease)     Chronic low back pain     Chronic pain of both knees 7/31/2020    Colitis 12/4/2020    Diabetes ([facility] [address] )     Excessive gas 12/3/2020  Hyperlipemia     Hypertension     Immunization deficiency 10/30/2020    Hep a nonimmune    Left lower quadrant abdominal pain 12/3/2020    Mass of right adrenal gland ([address] ) 2020    4 1 cm    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    LUCIA (obstructive sleep apnea) 2021     Past Surgical History:   Procedure Laterality Date    BACK SURGERY      BLADDER SURGERY      CATARACT EXTRACTION, BILATERAL      COLONOSCOPY  2019    next  [address] GRAFT  2016    Quadruple per [place]    EGD  2017    TOTAL HIP ARTHROPLASTY Right 2012    TOTAL SHOULDER REPLACEMENT Right 2013     Social History     Substance and Sexual Activity   Alcohol Use Not Currently    Comment: No use     Social History     Substance and Sexual Activity   Drug Use Never    Comment: No use     Social History     Tobacco Use   Smoking Status Former Smoker    Packs/day: 0 25    Types: Cigarettes    Quit date: 2009    Years since quittin 2   Smokeless Tobacco Never Used   Tobacco Comment    smoked since age 21; per [place]     Family History   Problem Relation Age of Onset    Heart disease Father     Arthritis Father     Heart attack Father     Alzheimer's disease Mother        Allergies:  No Known Allergies    Medications:     Current Outpatient Medications:     atorvastatin (LIPITOR) 40 mg tablet, Take 1 tablet (40 mg total) by mouth daily, Disp: 90 tablet, Rfl: 3    digoxin (LANOXIN) 0 125 mg tablet, Take 1 tablet (125 mcg total) by mouth daily, Disp: 30 tablet, Rfl: 0    Eliquis 5 MG, TAKE 1 TABLET BY MOUTH TWICE A DAY, Disp: 60 tablet, Rfl: 3    ezetimibe (ZETIA) 10 mg tablet, Take 1 tablet (10 mg total) by mouth daily, Disp: 90 tablet, Rfl: 3    finasteride (PROSCAR) 5 mg tablet, Take 1 tablet (5 mg total) by mouth daily, Disp: 90 tablet, Rfl: 3    Fluad Quadrivalent 0 5 ML PRSY, PHARMACY ADMINISTERED, Disp: , Rfl:     furosemide (LASIX) 80 mg tablet, Take 1 tablet (80 mg total) by mouth 2 (two) times a day, Disp: 60 tablet, Rfl: 0    glipiZIDE (GLUCOTROL XL) 5 mg 24 hr tablet, Take 1 tablet (5 mg total) by mouth daily, Disp: 90 tablet, Rfl: 1    Levemir FlexTouch 100 units/mL injection pen, Inject under the skin 65 units in the morning and at bedtime, Disp: 120 mL, Rfl: 1    lisinopril (ZESTRIL) 20 mg tablet, Take 1 tablet (20 mg total) by mouth daily, Disp: 90 tablet, Rfl: 3    metFORMIN (GLUCOPHAGE) 1000 MG tablet, Take 500 mg by mouth 2 (two) times a day , Disp: , Rfl: 3    omeprazole (PriLOSEC) 20 mg delayed release capsule, Take 1 capsule (20 mg total) by mouth daily, Disp: 30 capsule, Rfl: 1    potassium chloride (K-DUR,KLOR-CON) 20 mEq tablet, Take 2 tablets M, W, F and 1 tablet Tues, Thurs, Sat, Sun, Disp: 135 tablet, Rfl: 3    Unifine Pentips 31G X 8 MM MISC, Inject under the skin daily Use as directed, Disp: 100 each, Rfl: 2    metolazone (ZAROXOLYN) 2 5 mg tablet, Take 30min before AM furosemide on M, W, F , Disp: 15 tablet, Rfl: 6    metoprolol succinate (TOPROL-XL) 25 mg 24 hr tablet, Take 0 5 tablets (12 5 mg total) by mouth daily, Disp: 45 tablet, Rfl: 3      Vitals:    04/16/21 0902   BP: 132/66   Pulse: 101   SpO2: 96%     Weight (last 2 days)     Date/Time   Weight    04/16/21 0902   (!) 146 (322)            Physical Exam  Vitals signs reviewed  Constitutional:       General: He is not in acute distress  Appearance: He is well-developed  He is obese  He is not diaphoretic  HENT:      Head: Normocephalic and atraumatic  Eyes:      General: No scleral icterus  Conjunctiva/sclera: Conjunctivae normal       Pupils: Pupils are equal, round, and reactive to light  Neck:      Musculoskeletal: Normal range of motion and neck supple  Thyroid: No thyromegaly  Vascular: No JVD  Cardiovascular:      Rate and Rhythm: Normal rate and regular rhythm  Heart sounds: Normal heart sounds  No murmur  No friction rub  No gallop  Pulmonary:      Effort: Pulmonary effort is normal  No respiratory distress  Breath sounds: Normal breath sounds  No wheezing or rales  Abdominal:      General: Bowel sounds are normal  There is no distension  Palpations: Abdomen is soft  Tenderness: There is no abdominal tenderness  Musculoskeletal: Normal range of motion  General: No deformity  Right lower leg: Edema present  Left lower leg: Edema present  Skin:     General: Skin is warm and dry  Findings: No erythema or rash  Neurological:      General: No focal deficit present  Mental Status: He is alert and oriented to person, place, and time  Cranial Nerves: No cranial nerve deficit  Motor: No weakness  Laboratory Studies:    Laboratory studies were all personally reviewed    Cardiac testing:     EKG reviewed personally:   10/20-sinus rhythm, frequent ectopy, right bundle branch block  4/21-sinus tachycardia, right bundle branch block    Echocardiogram:  2017-EF 55%  3/21-EF 60%, mild LVH, mild RV dilation and mild RV dysfunction, mild-to-moderate TR with mild-to-moderate pulmonary hypertension    Stress test:      Holter:      Cardiac catheterization:        Polo Mcdonald MD    Portions of the record may have been created with voice recognition software  Occasional wrong word or "sound a like" substitutions may have occurred due to the inherent limitations of voice recognition software  Read the chart carefully and recognize, using context, where substitutions have occurred

## 2021-04-16 NOTE — PATIENT INSTRUCTIONS
Starting tomorrow morning, I want you to take metolazone 2 5 mg 30 minutes before the morning furosemide dose  Then only take it Monday, Wednesday, Friday morning  Blood work due next week Friday  Double up the potassium on Monday, Wednesday, Friday, as well as tomorrow because were going to have you take the metolazone starting tomorrow  I think an additional 10-15 lb of water is still in your legs  Additionally, to change her risk of all of this reoccurring, significant weight loss is needed  I am keeping you on digoxin for now, even though it appears you probably are normal rhythm today, as your blood pressure during hospitalization was quite low in your beta-blocker/metoprolol had to be discontinued    I would like you to try to get back on a little bit of metoprolol, I am prescribing 25 mg, please take half a tablet a day  I will have you return to see me in about 2 weeks

## 2021-04-19 ENCOUNTER — APPOINTMENT (OUTPATIENT)
Dept: LAB | Facility: AMBULARY SURGERY CENTER | Age: 76
End: 2021-04-19
Payer: MEDICARE

## 2021-04-19 DIAGNOSIS — E27.9 LESION OF ADRENAL GLAND (HCC): ICD-10-CM

## 2021-04-19 DIAGNOSIS — I50.33 ACUTE ON CHRONIC DIASTOLIC HEART FAILURE (HCC): ICD-10-CM

## 2021-04-19 DIAGNOSIS — R60.0 BILATERAL EDEMA OF LOWER EXTREMITY: ICD-10-CM

## 2021-04-19 DIAGNOSIS — E11.65 TYPE 2 DIABETES MELLITUS WITH HYPERGLYCEMIA, WITH LONG-TERM CURRENT USE OF INSULIN (HCC): ICD-10-CM

## 2021-04-19 DIAGNOSIS — R09.02 HYPOXEMIA: ICD-10-CM

## 2021-04-19 DIAGNOSIS — G47.33 OSA (OBSTRUCTIVE SLEEP APNEA): ICD-10-CM

## 2021-04-19 DIAGNOSIS — E27.8 MASS OF RIGHT ADRENAL GLAND (HCC): ICD-10-CM

## 2021-04-19 DIAGNOSIS — Z79.4 TYPE 2 DIABETES MELLITUS WITH HYPERGLYCEMIA, WITH LONG-TERM CURRENT USE OF INSULIN (HCC): ICD-10-CM

## 2021-04-19 LAB
BASE EX.OXY STD BLDV CALC-SCNC: 70.6 % (ref 60–80)
BASE EXCESS BLDV CALC-SCNC: 3.3 MMOL/L
HCO3 BLDV-SCNC: 31.2 MMOL/L (ref 24–30)
O2 CT BLDV-SCNC: 12.6 ML/DL
PCO2 BLDV: 63.6 MM HG (ref 42–50)
PH BLDV: 7.31 [PH] (ref 7.3–7.4)
PO2 BLDV: 40.8 MM HG (ref 35–45)

## 2021-04-19 PROCEDURE — 36415 COLL VENOUS BLD VENIPUNCTURE: CPT

## 2021-04-19 PROCEDURE — 82805 BLOOD GASES W/O2 SATURATION: CPT

## 2021-04-21 ENCOUNTER — TELEPHONE (OUTPATIENT)
Dept: ENDOCRINOLOGY | Facility: CLINIC | Age: 76
End: 2021-04-21

## 2021-04-22 ENCOUNTER — APPOINTMENT (EMERGENCY)
Dept: RADIOLOGY | Facility: HOSPITAL | Age: 76
DRG: 872 | End: 2021-04-22
Payer: MEDICARE

## 2021-04-22 ENCOUNTER — APPOINTMENT (EMERGENCY)
Dept: CT IMAGING | Facility: HOSPITAL | Age: 76
DRG: 872 | End: 2021-04-22
Payer: MEDICARE

## 2021-04-22 ENCOUNTER — HOSPITAL ENCOUNTER (INPATIENT)
Facility: HOSPITAL | Age: 76
LOS: 1 days | DRG: 872 | End: 2021-04-23
Attending: SURGERY | Admitting: INTERNAL MEDICINE
Payer: MEDICARE

## 2021-04-22 DIAGNOSIS — A02.1: ICD-10-CM

## 2021-04-22 DIAGNOSIS — G72.81: ICD-10-CM

## 2021-04-22 DIAGNOSIS — N20.1 URETERAL CALCULI: ICD-10-CM

## 2021-04-22 DIAGNOSIS — N17.9 AKI (ACUTE KIDNEY INJURY) (HCC): ICD-10-CM

## 2021-04-22 DIAGNOSIS — R65.20: ICD-10-CM

## 2021-04-22 DIAGNOSIS — I50.22 CHRONIC SYSTOLIC CONGESTIVE HEART FAILURE (HCC): ICD-10-CM
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
## 2021-04-22 LAB
BASE EXCESS BLDA CALC-SCNC: 2 MMOL/L (ref -2–3)
GLUCOSE SERPL-MCNC: 237 MG/DL (ref 65–140)
HCO3 BLDA-SCNC: 28.3 MMOL/L (ref 24–30)
HCT VFR BLD CALC: 39 % (ref 36.5–49.3)
HGB BLDA-MCNC: 13.3 G/DL (ref 12–17)
PCO2 BLD: 30 MMOL/L (ref 21–32)
PCO2 BLD: 48.1 MM HG (ref 42–50)
PH BLD: 7.38 [PH] (ref 7.3–7.4)
PO2 BLD: 30 MM HG (ref 35–45)
POTASSIUM BLD-SCNC: 4.8 MMOL/L (ref 3.5–5.3)
SAO2 % BLD FROM PO2: 55 % (ref 60–85)
SODIUM BLD-SCNC: 132 MMOL/L (ref 136–145)
SPECIMEN SOURCE: ABNORMAL

## 2021-04-22 PROCEDURE — NC001 PR NO CHARGE: Performed by: EMERGENCY MEDICINE

## 2021-04-22 PROCEDURE — 80048 BASIC METABOLIC PNL TOTAL CA: CPT | Performed by: PHYSICIAN ASSISTANT

## 2021-04-22 PROCEDURE — 84295 ASSAY OF SERUM SODIUM: CPT

## 2021-04-22 PROCEDURE — 83880 ASSAY OF NATRIURETIC PEPTIDE: CPT | Performed by: PHYSICIAN ASSISTANT

## 2021-04-22 PROCEDURE — 76705 ECHO EXAM OF ABDOMEN: CPT | Performed by: SURGERY

## 2021-04-22 PROCEDURE — 99285 EMERGENCY DEPT VISIT HI MDM: CPT

## 2021-04-22 PROCEDURE — 72125 CT NECK SPINE W/O DYE: CPT

## 2021-04-22 PROCEDURE — 85014 HEMATOCRIT: CPT

## 2021-04-22 PROCEDURE — 71045 X-RAY EXAM CHEST 1 VIEW: CPT

## 2021-04-22 PROCEDURE — 80061 LIPID PANEL: CPT | Performed by: INTERNAL MEDICINE

## 2021-04-22 PROCEDURE — 99285 EMERGENCY DEPT VISIT HI MDM: CPT | Performed by: SURGERY

## 2021-04-22 PROCEDURE — 93308 TTE F-UP OR LMTD: CPT | Performed by: SURGERY

## 2021-04-22 PROCEDURE — 82803 BLOOD GASES ANY COMBINATION: CPT

## 2021-04-22 PROCEDURE — 84132 ASSAY OF SERUM POTASSIUM: CPT

## 2021-04-22 PROCEDURE — 70450 CT HEAD/BRAIN W/O DYE: CPT

## 2021-04-22 PROCEDURE — 74177 CT ABD & PELVIS W/CONTRAST: CPT

## 2021-04-22 PROCEDURE — 85025 COMPLETE CBC W/AUTO DIFF WBC: CPT | Performed by: PHYSICIAN ASSISTANT

## 2021-04-22 PROCEDURE — 82947 ASSAY GLUCOSE BLOOD QUANT: CPT

## 2021-04-22 PROCEDURE — 36415 COLL VENOUS BLD VENIPUNCTURE: CPT | Performed by: PHYSICIAN ASSISTANT

## 2021-04-22 PROCEDURE — 83036 HEMOGLOBIN GLYCOSYLATED A1C: CPT | Performed by: INTERNAL MEDICINE

## 2021-04-22 PROCEDURE — 71260 CT THORAX DX C+: CPT

## 2021-04-22 PROCEDURE — 80076 HEPATIC FUNCTION PANEL: CPT | Performed by: PHYSICIAN ASSISTANT

## 2021-04-22 RX ADMIN — IOHEXOL 100 ML: 350 INJECTION, SOLUTION INTRAVENOUS at 23:21

## 2021-04-23 ENCOUNTER — HOSPITAL ENCOUNTER (INPATIENT)
Facility: HOSPITAL | Age: 76
LOS: 1 days | Discharge: HOME HEALTH | DRG: 854 | End: 2021-04-25
Attending: FAMILY MEDICINE | Admitting: FAMILY MEDICINE
Payer: MEDICARE

## 2021-04-23 ENCOUNTER — PATIENT OUTREACH (OUTPATIENT)
Dept: CASE MANAGEMENT | Facility: HOSPITAL | Age: 76
End: 2021-04-23

## 2021-04-23 VITALS
TEMPERATURE: 98.2 F | DIASTOLIC BLOOD PRESSURE: 55 MMHG | BODY MASS INDEX: 42.64 KG/M2 | OXYGEN SATURATION: 94 % | HEIGHT: 72 IN | RESPIRATION RATE: 18 BRPM | SYSTOLIC BLOOD PRESSURE: 125 MMHG | WEIGHT: 314.82 LBS | HEART RATE: 103 BPM

## 2021-04-23 DIAGNOSIS — N20.1 URETERAL CALCULI: Primary | ICD-10-CM

## 2021-04-23 DIAGNOSIS — R10.32 LEFT LOWER QUADRANT ABDOMINAL PAIN: ICD-10-CM

## 2021-04-23 DIAGNOSIS — N17.9 AKI (ACUTE KIDNEY INJURY) (HCC): ICD-10-CM

## 2021-04-23 PROBLEM — A41.9 SEPSIS (HCC): Status: ACTIVE | Noted: 2021-04-23

## 2021-04-23 PROBLEM — E87.1 HYPONATREMIA: Status: ACTIVE | Noted: 2021-04-23

## 2021-04-23 PROBLEM — I50.9 CHF (CONGESTIVE HEART FAILURE) (HCC): Status: ACTIVE | Noted: 2021-04-23
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
## 2021-04-23 PROBLEM — E83.52 HYPERCALCEMIA: Status: ACTIVE | Noted: 2021-04-23

## 2021-04-23 LAB
ALBUMIN SERPL BCP-MCNC: 3.4 G/DL (ref 3.5–5)
ALP SERPL-CCNC: 85 U/L (ref 46–116)
ALT SERPL W P-5'-P-CCNC: 24 U/L (ref 12–78)
AMORPH PHOS CRY URNS QL MICRO: NORMAL /HPF
ANION GAP SERPL CALCULATED.3IONS-SCNC: 9 MMOL/L (ref 4–13)
AST SERPL W P-5'-P-CCNC: 17 U/L (ref 5–45)
ATRIAL RATE: 136 BPM
BACTERIA UR QL AUTO: NORMAL /HPF
BASOPHILS # BLD AUTO: 0.05 THOUSANDS/ΜL (ref 0–0.1)
BASOPHILS NFR BLD AUTO: 0 % (ref 0–1)
BILIRUB DIRECT SERPL-MCNC: 0.11 MG/DL (ref 0–0.2)
BILIRUB SERPL-MCNC: 0.31 MG/DL (ref 0.2–1)
BILIRUB UR QL STRIP: NEGATIVE
BUN SERPL-MCNC: 44 MG/DL (ref 5–25)
CALCIUM SERPL-MCNC: 10.3 MG/DL (ref 8.3–10.1)
CHLORIDE SERPL-SCNC: 94 MMOL/L (ref 100–108)
CHOLEST SERPL-MCNC: 147 MG/DL (ref 50–200)
CLARITY UR: CLEAR
CO2 SERPL-SCNC: 28 MMOL/L (ref 21–32)
COLOR UR: ABNORMAL
CREAT SERPL-MCNC: 1.66 MG/DL (ref 0.6–1.3)
EOSINOPHIL # BLD AUTO: 0.04 THOUSAND/ΜL (ref 0–0.61)
EOSINOPHIL NFR BLD AUTO: 0 % (ref 0–6)
ERYTHROCYTE [DISTWIDTH] IN BLOOD BY AUTOMATED COUNT: 16.1 % (ref 11.6–15.1)
EST. AVERAGE GLUCOSE BLD GHB EST-MCNC: 194 MG/DL
GFR SERPL CREATININE-BSD FRML MDRD: 39 ML/MIN/1.73SQ M
GLUCOSE SERPL-MCNC: 209 MG/DL (ref 65–140)
GLUCOSE SERPL-MCNC: 213 MG/DL (ref 65–140)
GLUCOSE SERPL-MCNC: 215 MG/DL (ref 65–140)
GLUCOSE SERPL-MCNC: 229 MG/DL (ref 65–140)
GLUCOSE SERPL-MCNC: 235 MG/DL (ref 65–140)
GLUCOSE SERPL-MCNC: 261 MG/DL (ref 65–140)
GLUCOSE UR STRIP-MCNC: NEGATIVE MG/DL
HBA1C MFR BLD: 8.4 %
HCT VFR BLD AUTO: 38.2 % (ref 36.5–49.3)
HDLC SERPL-MCNC: 39 MG/DL
HGB BLD-MCNC: 11.9 G/DL (ref 12–17)
HGB UR QL STRIP.AUTO: ABNORMAL
IMM GRANULOCYTES # BLD AUTO: 0.12 THOUSAND/UL (ref 0–0.2)
IMM GRANULOCYTES NFR BLD AUTO: 1 % (ref 0–2)
INR PPP: 1.11 (ref 0.84–1.19)
KETONES UR STRIP-MCNC: NEGATIVE MG/DL
LACTATE SERPL-SCNC: 1.4 MMOL/L (ref 0.5–2)
LDLC SERPL CALC-MCNC: 44 MG/DL (ref 0–100)
LEUKOCYTE ESTERASE UR QL STRIP: NEGATIVE
LYMPHOCYTES # BLD AUTO: 1.53 THOUSANDS/ΜL (ref 0.6–4.47)
LYMPHOCYTES NFR BLD AUTO: 10 % (ref 14–44)
MCH RBC QN AUTO: 26.7 PG (ref 26.8–34.3)
MCHC RBC AUTO-ENTMCNC: 31.2 G/DL (ref 31.4–37.4)
MCV RBC AUTO: 86 FL (ref 82–98)
MONOCYTES # BLD AUTO: 1.72 THOUSAND/ΜL (ref 0.17–1.22)
MONOCYTES NFR BLD AUTO: 11 % (ref 4–12)
NEUTROPHILS # BLD AUTO: 11.75 THOUSANDS/ΜL (ref 1.85–7.62)
NEUTS SEG NFR BLD AUTO: 78 % (ref 43–75)
NITRITE UR QL STRIP: NEGATIVE
NON-SQ EPI CELLS URNS QL MICRO: NORMAL /HPF
NRBC BLD AUTO-RTO: 0 /100 WBCS
NT-PROBNP SERPL-MCNC: 165 PG/ML
PH UR STRIP.AUTO: 5.5 [PH]
PLATELET # BLD AUTO: 191 THOUSANDS/UL (ref 149–390)
PMV BLD AUTO: 10.6 FL (ref 8.9–12.7)
POTASSIUM SERPL-SCNC: 4.8 MMOL/L (ref 3.5–5.3)
PROCALCITONIN SERPL-MCNC: 0.23 NG/ML
PROT SERPL-MCNC: 8.5 G/DL (ref 6.4–8.2)
PROT UR STRIP-MCNC: NEGATIVE MG/DL
PROTHROMBIN TIME: 14.4 SECONDS (ref 11.6–14.5)
QRS AXIS: 104 DEGREES
QRSD INTERVAL: 162 MS
QT INTERVAL: 424 MS
QTC INTERVAL: 597 MS
RBC # BLD AUTO: 4.45 MILLION/UL (ref 3.88–5.62)
RBC #/AREA URNS AUTO: NORMAL /HPF
SARS-COV-2 RNA RESP QL NAA+PROBE: NEGATIVE
SODIUM SERPL-SCNC: 131 MMOL/L (ref 136–145)
SP GR UR STRIP.AUTO: 1.01 (ref 1–1.03)
T WAVE AXIS: 20 DEGREES
TRIGL SERPL-MCNC: 319 MG/DL
UROBILINOGEN UR QL STRIP.AUTO: 0.2 E.U./DL
VENTRICULAR RATE: 119 BPM
WBC # BLD AUTO: 15.21 THOUSAND/UL (ref 4.31–10.16)
WBC #/AREA URNS AUTO: NORMAL /HPF

## 2021-04-23 PROCEDURE — 93010 ELECTROCARDIOGRAM REPORT: CPT | Performed by: INTERNAL MEDICINE

## 2021-04-23 PROCEDURE — 97167 OT EVAL HIGH COMPLEX 60 MIN: CPT

## 2021-04-23 PROCEDURE — U0003 INFECTIOUS AGENT DETECTION BY NUCLEIC ACID (DNA OR RNA); SEVERE ACUTE RESPIRATORY SYNDROME CORONAVIRUS 2 (SARS-COV-2) (CORONAVIRUS DISEASE [COVID-19]), AMPLIFIED PROBE TECHNIQUE, MAKING USE OF HIGH THROUGHPUT TECHNOLOGIES AS DESCRIBED BY CMS-2020-01-R: HCPCS | Performed by: PHYSICIAN ASSISTANT

## 2021-04-23 PROCEDURE — 82397 CHEMILUMINESCENT ASSAY: CPT | Performed by: INTERNAL MEDICINE

## 2021-04-23 PROCEDURE — 93005 ELECTROCARDIOGRAM TRACING: CPT

## 2021-04-23 PROCEDURE — 81001 URINALYSIS AUTO W/SCOPE: CPT | Performed by: PHYSICIAN ASSISTANT

## 2021-04-23 PROCEDURE — 82948 REAGENT STRIP/BLOOD GLUCOSE: CPT

## 2021-04-23 PROCEDURE — 99223 1ST HOSP IP/OBS HIGH 75: CPT | Performed by: INTERNAL MEDICINE

## 2021-04-23 PROCEDURE — 90715 TDAP VACCINE 7 YRS/> IM: CPT | Performed by: PHYSICIAN ASSISTANT

## 2021-04-23 PROCEDURE — 97163 PT EVAL HIGH COMPLEX 45 MIN: CPT

## 2021-04-23 PROCEDURE — 87040 BLOOD CULTURE FOR BACTERIA: CPT | Performed by: PHYSICIAN ASSISTANT

## 2021-04-23 PROCEDURE — 36415 COLL VENOUS BLD VENIPUNCTURE: CPT | Performed by: PHYSICIAN ASSISTANT

## 2021-04-23 PROCEDURE — 99223 1ST HOSP IP/OBS HIGH 75: CPT | Performed by: NURSE PRACTITIONER

## 2021-04-23 PROCEDURE — 83605 ASSAY OF LACTIC ACID: CPT | Performed by: PHYSICIAN ASSISTANT

## 2021-04-23 PROCEDURE — 85610 PROTHROMBIN TIME: CPT | Performed by: PHYSICIAN ASSISTANT

## 2021-04-23 PROCEDURE — 84145 PROCALCITONIN (PCT): CPT

## 2021-04-23 PROCEDURE — U0005 INFEC AGEN DETEC AMPLI PROBE: HCPCS | Performed by: PHYSICIAN ASSISTANT

## 2021-04-23 PROCEDURE — 97116 GAIT TRAINING THERAPY: CPT

## 2021-04-23 PROCEDURE — 99222 1ST HOSP IP/OBS MODERATE 55: CPT | Performed by: INTERNAL MEDICINE

## 2021-04-23 PROCEDURE — 99233 SBSQ HOSP IP/OBS HIGH 50: CPT | Performed by: SURGERY

## 2021-04-23 RX ORDER — GLIPIZIDE 2.5 MG/1
5 TABLET, EXTENDED RELEASE ORAL DAILY
Status: DISCONTINUED | OUTPATIENT
Start: 2021-04-23 | End: 2021-04-23

## 2021-04-23 RX ORDER — FUROSEMIDE 40 MG/1
80 TABLET ORAL
Status: DISCONTINUED | OUTPATIENT
Start: 2021-04-23 | End: 2021-04-23

## 2021-04-23 RX ORDER — ACETAMINOPHEN 325 MG/1
650 TABLET ORAL EVERY 6 HOURS PRN
Status: DISCONTINUED | OUTPATIENT
Start: 2021-04-23 | End: 2021-04-23 | Stop reason: HOSPADM

## 2021-04-23 RX ORDER — ATORVASTATIN CALCIUM 40 MG/1
40 TABLET, FILM COATED ORAL DAILY
Status: CANCELLED | OUTPATIENT
Start: 2021-04-24

## 2021-04-23 RX ORDER — TAMSULOSIN HYDROCHLORIDE 0.4 MG/1
0.4 CAPSULE ORAL ONCE
Status: COMPLETED | OUTPATIENT
Start: 2021-04-23 | End: 2021-04-23

## 2021-04-23 RX ORDER — EZETIMIBE 10 MG/1
10 TABLET ORAL DAILY
Status: CANCELLED | OUTPATIENT
Start: 2021-04-24

## 2021-04-23 RX ORDER — FINASTERIDE 5 MG/1
5 TABLET, FILM COATED ORAL DAILY
Status: DISCONTINUED | OUTPATIENT
Start: 2021-04-23 | End: 2021-04-23 | Stop reason: HOSPADM

## 2021-04-23 RX ORDER — METOPROLOL SUCCINATE 25 MG/1
12.5 TABLET, EXTENDED RELEASE ORAL DAILY
Status: CANCELLED | OUTPATIENT
Start: 2021-04-24

## 2021-04-23 RX ORDER — DIGOXIN 125 MCG
125 TABLET ORAL DAILY
Status: DISCONTINUED | OUTPATIENT
Start: 2021-04-23 | End: 2021-04-23 | Stop reason: HOSPADM

## 2021-04-23 RX ORDER — SODIUM CHLORIDE, SODIUM GLUCONATE, SODIUM ACETATE, POTASSIUM CHLORIDE, MAGNESIUM CHLORIDE, SODIUM PHOSPHATE, DIBASIC, AND POTASSIUM PHOSPHATE .53; .5; .37; .037; .03; .012; .00082 G/100ML; G/100ML; G/100ML; G/100ML; G/100ML; G/100ML; G/100ML
75 INJECTION, SOLUTION INTRAVENOUS CONTINUOUS
Status: DISCONTINUED | OUTPATIENT
Start: 2021-04-23 | End: 2021-04-23 | Stop reason: HOSPADM

## 2021-04-23 RX ORDER — ATORVASTATIN CALCIUM 40 MG/1
40 TABLET, FILM COATED ORAL DAILY
Status: DISCONTINUED | OUTPATIENT
Start: 2021-04-23 | End: 2021-04-23 | Stop reason: HOSPADM

## 2021-04-23 RX ORDER — PANTOPRAZOLE SODIUM 20 MG/1
20 TABLET, DELAYED RELEASE ORAL
Status: CANCELLED | OUTPATIENT
Start: 2021-04-24

## 2021-04-23 RX ORDER — EZETIMIBE 10 MG/1
10 TABLET ORAL DAILY
Status: DISCONTINUED | OUTPATIENT
Start: 2021-04-23 | End: 2021-04-23 | Stop reason: HOSPADM

## 2021-04-23 RX ORDER — ACETAMINOPHEN 325 MG/1
650 TABLET ORAL EVERY 6 HOURS PRN
Status: CANCELLED | OUTPATIENT
Start: 2021-04-23

## 2021-04-23 RX ORDER — DIGOXIN 125 MCG
125 TABLET ORAL DAILY
Status: CANCELLED | OUTPATIENT
Start: 2021-04-24

## 2021-04-23 RX ORDER — FINASTERIDE 5 MG/1
5 TABLET, FILM COATED ORAL DAILY
Status: CANCELLED | OUTPATIENT
Start: 2021-04-24

## 2021-04-23 RX ORDER — FUROSEMIDE 80 MG
80 TABLET ORAL
Status: DISCONTINUED | OUTPATIENT
Start: 2021-04-23 | End: 2021-04-23

## 2021-04-23 RX ORDER — PANTOPRAZOLE SODIUM 20 MG/1
20 TABLET, DELAYED RELEASE ORAL
Status: DISCONTINUED | OUTPATIENT
Start: 2021-04-23 | End: 2021-04-23 | Stop reason: HOSPADM

## 2021-04-23 RX ORDER — METOPROLOL SUCCINATE 25 MG/1
12.5 TABLET, EXTENDED RELEASE ORAL DAILY
Status: DISCONTINUED | OUTPATIENT
Start: 2021-04-23 | End: 2021-04-23 | Stop reason: HOSPADM

## 2021-04-23 RX ORDER — SODIUM CHLORIDE, SODIUM GLUCONATE, SODIUM ACETATE, POTASSIUM CHLORIDE, MAGNESIUM CHLORIDE, SODIUM PHOSPHATE, DIBASIC, AND POTASSIUM PHOSPHATE .53; .5; .37; .037; .03; .012; .00082 G/100ML; G/100ML; G/100ML; G/100ML; G/100ML; G/100ML; G/100ML
75 INJECTION, SOLUTION INTRAVENOUS CONTINUOUS
Status: CANCELLED | OUTPATIENT
Start: 2021-04-23

## 2021-04-23 RX ADMIN — TETANUS TOXOID, REDUCED DIPHTHERIA TOXOID AND ACELLULAR PERTUSSIS VACCINE, ADSORBED 0.5 ML: 5; 2.5; 8; 8; 2.5 SUSPENSION INTRAMUSCULAR at 01:04

## 2021-04-23 RX ADMIN — APIXABAN 5 MG: 5 TABLET, FILM COATED ORAL at 09:37

## 2021-04-23 RX ADMIN — FINASTERIDE 5 MG: 5 TABLET, FILM COATED ORAL at 09:37

## 2021-04-23 RX ADMIN — INSULIN DETEMIR 65 UNITS: 100 INJECTION, SOLUTION SUBCUTANEOUS at 03:36

## 2021-04-23 RX ADMIN — SODIUM CHLORIDE, SODIUM GLUCONATE, SODIUM ACETATE, POTASSIUM CHLORIDE, MAGNESIUM CHLORIDE, SODIUM PHOSPHATE, DIBASIC, AND POTASSIUM PHOSPHATE 75 ML/HR: .53; .5; .37; .037; .03; .012; .00082 INJECTION, SOLUTION INTRAVENOUS at 20:35

## 2021-04-23 RX ADMIN — ATORVASTATIN CALCIUM 40 MG: 40 TABLET, FILM COATED ORAL at 09:37

## 2021-04-23 RX ADMIN — INSULIN DETEMIR 65 UNITS: 100 INJECTION, SOLUTION SUBCUTANEOUS at 22:20

## 2021-04-23 RX ADMIN — SODIUM CHLORIDE, SODIUM GLUCONATE, SODIUM ACETATE, POTASSIUM CHLORIDE, MAGNESIUM CHLORIDE, SODIUM PHOSPHATE, DIBASIC, AND POTASSIUM PHOSPHATE 75 ML/HR: .53; .5; .37; .037; .03; .012; .00082 INJECTION, SOLUTION INTRAVENOUS at 01:44

## 2021-04-23 RX ADMIN — INSULIN LISPRO 2 UNITS: 100 INJECTION, SOLUTION INTRAVENOUS; SUBCUTANEOUS at 17:52

## 2021-04-23 RX ADMIN — CEFTRIAXONE 2000 MG: 2 INJECTION, POWDER, FOR SOLUTION INTRAMUSCULAR; INTRAVENOUS at 01:52

## 2021-04-23 RX ADMIN — PANTOPRAZOLE SODIUM 20 MG: 20 TABLET, DELAYED RELEASE ORAL at 06:35

## 2021-04-23 RX ADMIN — EZETIMIBE 10 MG: 10 TABLET ORAL at 09:37

## 2021-04-23 RX ADMIN — TAMSULOSIN HYDROCHLORIDE 0.4 MG: 0.4 CAPSULE ORAL at 17:52

## 2021-04-23 RX ADMIN — INSULIN LISPRO 2 UNITS: 100 INJECTION, SOLUTION INTRAVENOUS; SUBCUTANEOUS at 09:44

## 2021-04-23 RX ADMIN — APIXABAN 5 MG: 5 TABLET, FILM COATED ORAL at 17:50

## 2021-04-23 RX ADMIN — ACETAMINOPHEN 650 MG: 325 TABLET, FILM COATED ORAL at 04:36

## 2021-04-23 RX ADMIN — DIGOXIN 125 MCG: 125 TABLET ORAL at 09:37

## 2021-04-23 NOTE — PLAN OF CARE
Problem: PHYSICAL THERAPY ADULT  Goal: Performs mobility at highest level of function for planned discharge setting  See evaluation for individualized goals  Description: Treatment/Interventions: Functional transfer training, LE strengthening/ROM, Elevations, Therapeutic exercise, Endurance training, Patient/family training, Equipment eval/education, Bed mobility, Gait training  Equipment Recommended: Mary Socks       See flowsheet documentation for full assessment, interventions and recommendations  Note: Prognosis: Fair  Problem List: Decreased strength, Decreased endurance, Impaired balance, Decreased mobility, Obesity, Pain, Impaired sensation  Assessment: Giovani South is a 67 y o  Male who [facility] on 4/22/2021 from home w/ c/o s/p fall on bottom and diagnosis of sepsis  Orders for PT eval and treat received, w/ fall precautions  Pt presents w/ comorbidities of DMII, CAD, Afib, LUCIA, CHF, hx of AAA, hx of bladder cancer and personal factors including: advanced age, mobilizing w/ assistive device, stair(s) to enter home and positive fall history  At baseline, pt mobilizes independently w/ rollator, and reports this as his only fall in the last 6 months  Upon evaluation, pt presents w/ the following deficits: weakness, edema of extremities, impaired balance, decreased endurance and pain limiting functional mobility  Pt requires S for bed mobility, S for transfers, and S for gait and S for stairs  Pt's clinical presentation is unstable/unpredictable due to need for assist w/ all phases of mobility when usually mobilizing independently, need for supplemental oxygen in order to maintain oxygen saturation, need for input for task focus and mobility technique and recent history of falls  Pt is at an increased risk of falls due to physical deficits  Given the above findings, discharge recommendation is for Home w/ HHPT   During this admission, pt would benefit from skilled acute inpatient PT in order to address the abovementioned deficits to maximize function and mobility before DC from acute care  PT Discharge Recommendation: Home with home health rehabilitation          See flowsheet documentation for full assessment

## 2021-04-23 NOTE — PLAN OF CARE
Problem: Potential for Falls  Goal: Patient will remain free of falls  Description: INTERVENTIONS:  - Assess patient frequently for physical needs  -  Identify cognitive and physical deficits and behaviors that affect risk of falls    -  [place] fall precautions as indicated by assessment   - Educate patient/family on patient safety including physical limitations  - Instruct patient to call for assistance with activity based on assessment  - Modify environment to reduce risk of injury  - Consider OT/PT consult to assist with strengthening/mobility  Outcome: Progressing     Problem: PAIN - ADULT  Goal: Verbalizes/displays adequate comfort level or baseline comfort level  Description: Interventions:  - Encourage patient to monitor pain and request assistance  - Assess pain using appropriate pain scale  - Administer analgesics based on type and severity of pain and evaluate response  - Implement non-pharmacological measures as appropriate and evaluate response  - Consider cultural and social influences on pain and pain management  - Notify physician/advanced practitioner if interventions unsuccessful or patient reports new pain  Outcome: Progressing     Problem: INFECTION - ADULT  Goal: Absence or prevention of progression during hospitalization  Description: INTERVENTIONS:  - Assess and monitor for signs and symptoms of infection  - Monitor lab/diagnostic results  - Monitor all insertion sites, i e  indwelling lines, tubes, and drains  - Monitor endotracheal if appropriate and nasal secretions for changes in amount and color  - [place] appropriate cooling/warming therapies per order  - Administer medications as ordered  - Instruct and encourage patient and family to use good hand hygiene technique  - Identify and instruct in appropriate isolation precautions for identified infection/condition  Outcome: Progressing     Problem: SAFETY ADULT  Goal: Patient will remain free of falls  Description: INTERVENTIONS:  - Assess patient frequently for physical needs  -  Identify cognitive and physical deficits and behaviors that affect risk of falls    -  [place] fall precautions as indicated by assessment   - Educate patient/family on patient safety including physical limitations  - Instruct patient to call for assistance with activity based on assessment  - Modify environment to reduce risk of injury  - Consider OT/PT consult to assist with strengthening/mobility  Outcome: Progressing  Goal: Maintain or return to baseline ADL function  Description: INTERVENTIONS:  -  Assess patient's ability to carry out ADLs; assess patient's baseline for ADL function and identify physical deficits which impact ability to perform ADLs (bathing, care of mouth/teeth, toileting, grooming, dressing, etc )  - Assess/evaluate cause of self-care deficits   - Assess range of motion  - Assess patient's mobility; develop plan if impaired  - Assess patient's need for assistive devices and provide as appropriate  - Encourage maximum independence but intervene and supervise when necessary  - Involve family in performance of ADLs  - Assess for home care needs following discharge   - Consider OT consult to assist with ADL evaluation and planning for discharge  - Provide patient education as appropriate  Outcome: Progressing  Goal: Maintain or return mobility status to optimal level  Description: INTERVENTIONS:  - Assess patient's baseline mobility status (ambulation, transfers, stairs, etc )    - Identify cognitive and physical deficits and behaviors that affect mobility  - Identify mobility aids required to assist with transfers and/or ambulation (gait belt, sit-to-stand, lift, walker, cane, etc )  - [place] fall precautions as indicated by assessment  - Record patient progress and toleration of activity level on Mobility SBAR; progress patient to next Phase/Stage  - Instruct patient to call for assistance with activity based on assessment  - Consider rehabilitation consult to assist with strengthening/weightbearing, etc   Outcome: Progressing     Problem: DISCHARGE PLANNING  Goal: Discharge to home or other facility with appropriate resources  Description: INTERVENTIONS:  - Identify barriers to discharge w/patient and caregiver  - Arrange for needed discharge resources and transportation as appropriate  - Identify discharge learning needs (meds, wound care, etc )  - Arrange for interpretive services to assist at discharge as needed  - Refer to Case Management Department for coordinating discharge planning if the patient needs post-hospital services based on physician/advanced practitioner order or complex needs related to functional status, cognitive ability, or social support system  Outcome: Progressing     Problem: Knowledge Deficit  Goal: Patient/family/caregiver demonstrates understanding of disease process, treatment plan, medications, and discharge instructions  Description: Complete learning assessment and assess knowledge base    Interventions:  - Provide teaching at level of understanding  - Provide teaching via preferred learning methods  Outcome: Progressing

## 2021-04-23 NOTE — ASSESSMENT & PLAN NOTE
· Fall on Eliquis  · Patient reports he stood up from urinating and fell backwards after his legs gave out  · He denies head strike or LOC  · Admit to medical service with significant cardiac history and recent admission for CHF and aggressive outpatient diuresis    · PT/OT  · Recommend geriatric consult

## 2021-04-23 NOTE — QUICK NOTE
Cervical Collar Clearance: The patient had a CT scan of the cervical spine demonstrating no acute injury  On exam, the patient had no midline point tenderness or paresthesias/numbness/weakness in the extremities  The patient had full range of motion (was then able to flex, extend, and rotate head laterally) without pain  There were no distracting injuries and the patient was not intoxicated  The patient's cervical spine was cleared radiologically and clinically  Cervical collar removed at this time       Yareli Maya PA-C  4/23/2021 1:17 AM

## 2021-04-23 NOTE — PROCEDURES
POC FAST US    Date/Time: 4/23/2021 1:09 AM  Performed by: Marisa Hein PA-C  Authorized by: Marisa Hein PA-C     Patient location:  Trauma  Procedure details:     Exam Type:  Diagnostic    Indications: blunt abdominal trauma      Assess for:  Intra-abdominal fluid and pericardial effusion    Technique: FAST      Views obtained:  Heart - Pericardial sac, RUQ - Denny's Pouch, LUQ - Splenorenal space, Suprapubic - Pouch of [place] and Left thorax    Image quality: diagnostic      Image availability:  Images available in PACS  FAST Findings:     RUQ (Hepatorenal) free fluid: absent      LUQ (Splenorenal) free fluid: absent      Suprapubic free fluid: absent      Cardiac wall motion: identified      Pericardial effusion: absent    Interpretation:     Impressions: negative

## 2021-04-23 NOTE — ASSESSMENT & PLAN NOTE
· Hypoxemia with persistent home O2 requirement since hospitalization 1 month ago suspected a mix of LUCIA, emphysema, obesity hypoventilation symptom, and CHF   · Continue O2 as needed to keep oxygen sats >88%

## 2021-04-23 NOTE — ASSESSMENT & PLAN NOTE
Wt Readings from Last 3 Encounters:   04/22/21 (!) 143 kg (314 lb 13 1 oz)   04/16/21 (!) 146 kg (322 lb)   04/09/21 (!) 145 kg (319 lb)     · Admitted late March to [facility] with CHF exacerbation with A fib which converted to normal sinus with aggressive diuresis  He was seen by his cardiologist 1 week ago who started him on metolazone 3 times a week with his lasix 80mg BID doses  Current weight is down 7lbs from office visit 1 week ago     · Currently in sinus tach

## 2021-04-23 NOTE — H&P
[facility]  H&P- Juvencio Morgan 1945, 67 y o  male MRN: [id number]  Unit/Bed#: ED 06 Encounter: [id number]  Primary Care Provider: Virginie Hare MD   Date and time admitted to hospital: 4/22/2021 10:56 PM    * Sepsis [facility])  Assessment & Plan  POA  Febrile at 100 6 (resolved without intervention), Tachycardia, leukocytosis and end-organ damage a/e/b LACY  Lactic acid within normal limits  Likely reactive verses less likely UTI verses other infectious process    Plan:   Follow-up urine culture, blood culture, procalcitonin   Antibiotics:  Patient started on ceftriaxone  Will hold off further antibiotics until urine culture, blood culture or procalcitonin come back positive   IV fluid hydration: Isolyte at 75 mL/hour for 12 hours   Monitor WBC and fever curve       Fall  Assessment & Plan  Patient had fall without loss of consciousness and without hitting his head  Patient fell on his buttocks  No hematoma present, hemoglobin stable  No evidence of fractures on imaging  Patient's fall is likely secondary to orthostatic hypotension 2/2 over-diuresis verses    Plan:  · Trauma on board, will evaluate tomorrow a m  · Will hold diuretics  · PT/OT  · Continue ambulation with walker    LACY (acute kidney injury) ([facility] [address] )  Assessment & Plan  POA  Creatinine of: 1 66, baseline creatinine 0 8-0 9   Urinalysis:  bland   CT abdomen: bilateral nephrolithiasis causing obstructive uropathy    Etiology:   This is likely pre renal from dehydration 2/2 over-diuresis versus postrenal obstruction    Plan:   UA with microscopic, Urinary retention protocol, Bladder scan, Daily weights, Intake and output   IV Fluids:  Isolyte at 75 ml/hr   Monitor BMP daily and observe for downward trend of creatinine   Avoid hypoperfusion of the kidneys, minimize nephrotoxins  o Medications being held:  Lisinopril      Paroxysmal atrial fibrillation (HCC)  Assessment & Plan  · Currently tachycardic and sinus rhythm  · Anticoagulated with Eliquis 5 mg b i d  Martha Clements · Continue Metoprolol 12 5 mg q d  · Continue digoxin 125 mcg  · Will check dig level    CHF (congestive heart failure) [facility])  Assessment & Plan  Patient was recently discharged from [facility] 1 week ago  Patient was diuresed, and had increase Lasix to 80 mg b i d , and recent addition of 5 mg of metolazone 3 times a week by patient's cardiologist  Patient's weight at discharge was 322 lb  Patient's weight at home has been between 315-317 lb  Patient's weight on admission is 314 lb  Patient examines euvolemic, JVD not present, trace chronic edema       · It is likely that the patient was over diuresed  · Will hold metolazone  · Will hold morning dose of Lasix  · Gentle hydration  · Re-evaluate tomorrow morning        Type 2 diabetes mellitus with hyperglycemia, with long-term current use of insulin ([facility] [address] )  Assessment & Plan  · Patient's last A1c is 9 3  · Will recheck A1c and lipid panel  · Will hold oral antihyperglycemics  · Currently on 65 units of Levemir b i d  · Sliding scale algorithm 1  · Carb restricted diet  · Hypoglycemic protocol    Hyponatremia  Assessment & Plan  · Likely secondary to decreased p o  Intake  · IV fluids  · BMP tomorrow a m  Hypoxemia  Assessment & Plan  Patient is chronically hypoxic and is on 2-3 L at home  Continue home oxygen  CAD (coronary artery disease)  Assessment & Plan  Prior history of MI s/p CABG x4  Continue lipitor 40      VTE Prophylaxis: Apixaban (Eliquis)  / sequential compression device   Code Status:  Level 3  POLST: POLST form is not discussed and not completed at this time  Anticipated Length of Stay:  Patient will be admitted on an Inpatient basis with an anticipated length of stay of  < 2 midnights     Justification for Hospital Stay:  Sepsis with LACY    Chief Complaint:   Fall    History of Present Illness:    Taj Lopez is a 67 y o  male past medical history of ischemic cardiomyopathy, CHFpEF 60%, atrial fibrillation on anticoagulation, T2DM who presents with a fall  Patient stated that he was getting up to walk to the bathroom, after a couple of steps he felt his legs giving out, before he could get something to set on he had fallen down on the his buttocks  Patient did not lose consciousness at this time, did not hit his head  Patient had recently been admitted at [facility] for acute heart failure  Patient was diuresed, and sent home with Lasix 80 mg b i d  Patient also had the addition of metolazone 3 times a week  Patient stated that every time he was taking the metolazone he started to feel lightheaded  Patient did not take the metolazone today  Since patient's discharge patient has been weighing himself and has been ranging between 315-317 lb  Patient also has completely stopped his salt intake, and has reduced his water intake to 1 bottle of water a day  On admission patient was found to be febrile, tachycardic with leukocytosis, CT abdomen showed bilateral nephrolithiasis with obstructive uropathy  Patient denies any complaints of fevers, chills, dysuria, pyuria or urinary urgency  Review of Systems:    Review of Systems   Constitutional: Negative for chills, fatigue and fever  HENT: Negative for ear discharge, ear pain and sore throat  Eyes: Negative for pain and discharge  Respiratory: Negative for apnea, chest tightness, shortness of breath and wheezing  Cardiovascular: Negative for chest pain and palpitations  Gastrointestinal: Negative for abdominal distention and abdominal pain  Endocrine: Negative for polyphagia and polyuria  Genitourinary: Negative for difficulty urinating, dysuria and frequency  Musculoskeletal: Negative for arthralgias and joint swelling  Neurological: Negative for dizziness, tremors, syncope, facial asymmetry, weakness, light-headedness and headaches  Hematological: Does not bruise/bleed easily  Psychiatric/Behavioral: Negative for agitation and behavioral problems  Past Medical and Surgical History:     Past Medical History:   Diagnosis Date    A-fib [facility])     AAA (abdominal aortic aneurysm) ([facility] [address] )     Actinic keratosis of right temple 7/31/2020    Actinic keratosis of scalp 7/31/2020    Acute respiratory failure with hypoxia (HCC) 3/25/2021    Arthritis     Lay's esophagus     Bladder cancer (HCC)     CAD (coronary artery disease)     Chronic low back pain     Chronic pain of both knees 7/31/2020    Colitis 12/4/2020    [facility]     Excessive gas 12/3/2020    Hyperlipemia     Hypertension     Immunization deficiency 10/30/2020    Hep a nonimmune    Left lower quadrant abdominal pain 12/3/2020    Mass of right adrenal gland [facility] ) 12/4/2020    4 1 cm    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    LUCIA (obstructive sleep apnea) 1/29/2021       Past Surgical History:   Procedure Laterality Date    BACK SURGERY      BLADDER SURGERY      CATARACT EXTRACTION, BILATERAL      COLONOSCOPY  01/29/2019    next [address]22 [address] GRAFT  04/2016    Quadruple per Rita    EGD  06/2017    TOTAL HIP ARTHROPLASTY Right 2012    TOTAL SHOULDER REPLACEMENT Right 2013       Meds/Allergies:    Prior to Admission medications    Medication Sig Start Date End Date Taking?  Authorizing Provider   atorvastatin (LIPITOR) 40 mg tablet Take 1 tablet (40 mg total) by mouth daily 9/10/20  Yes Bryson Shea MD   digoxin (LANOXIN) 0 125 mg tablet Take 1 tablet (125 mcg total) by mouth daily 4/1/21  Yes KACEY Jacobsen   Eliquis 5 MG TAKE 1 TABLET BY MOUTH TWICE A DAY 1/4/21  Yes Bryson Shea MD   ezetimibe (ZETIA) 10 mg tablet Take 1 tablet (10 mg total) by mouth daily 10/15/20  Yes Gilberto Montgomery MD   finasteride (PROSCAR) 5 mg tablet Take 1 tablet (5 mg total) by mouth daily 12/21/20  Yes Carlie Ashraf MD   furosemide (LASIX) 80 mg tablet Take 1 tablet (80 mg total) by mouth 2 (two) times a day 3/31/21  Yes KACEY Jacobsen   glipiZIDE (GLUCOTROL XL) 5 mg 24 hr tablet Take 1 tablet (5 mg total) by mouth daily 3/17/21  Yes Sheryle Hamlet, MD   Levemir FlexTouch 100 units/mL injection pen Inject under the skin 65 units in the morning and at bedtime 3/17/21  Yes Earl OKEEFE MD   metFORMIN (GLUCOPHAGE) 1000 MG tablet Take 500 mg by mouth 2 (two) times a day  19  Yes Historical Provider, MD   metolazone (ZAROXOLYN) 2 5 mg tablet Take 30min before AM furosemide on M, W, F  21  Yes Jamie Hinson MD   metoprolol succinate (TOPROL-XL) 25 mg 24 hr tablet Take 0 5 tablets (12 5 mg total) by mouth daily  Patient taking differently: Take 12 5 mg by mouth daily 12 5 mg two times a day 21  Yes Jamie Hinson MD   omeprazole (PriLOSEC) 20 mg delayed release capsule Take 1 capsule (20 mg total) by mouth daily 10/26/20  Yes Author KACEY Aguilar   Fluad Quadrivalent 0 5 ML R [address] 10/13/20   Historical Provider, MD   lisinopril (ZESTRIL) 20 mg tablet Take 1 tablet (20 mg total) by mouth daily 10/26/20   Jamie Hinson MD   potassium chloride (K-DUR,KLOR-CON) 20 mEq tablet Take 2 tablets M, W, F and 1 tablet Tues, Thurs, Sat, Sun 21   Jamie Hinson MD   Unifine Pentips 31G X 8 MM MISC Inject under the skin daily Use as directed 20   Coleen Degroot MD     I have reviewed home medications with patient personally      Allergies: No Known Allergies    Social History:     Marital Status: /Civil Union   Occupation:   Patient Pre-hospital Living Situation:   Patient Pre-hospital Level of Mobility:   Patient Pre-hospital Diet Restrictions:   Substance Use History:   Social History     Substance and Sexual Activity   Alcohol Use Not Currently    Comment: No use     Social History     Tobacco Use   Smoking Status Former Smoker    Packs/day: 0 25    Types: Cigarettes    Quit date: 2009    Years since quittin 3   Smokeless Tobacco Never Used   Tobacco Comment    smoked since age 21; per Mychal Lange     Social History     Substance and Sexual Activity   Drug Use Never    Comment: No use       Family History:    non-contributory    Physical Exam:     Vitals:   Blood Pressure: 102/52 (04/23/21 0230)  Pulse: (!) 106 (04/23/21 0230)  Temperature: 98 1 °F (36 7 °C) (04/22/21 2345)  Temp Source: Oral (04/22/21 2345)  Respirations: 20 (04/23/21 0230)  Height: 6' (182 9 cm) (04/23/21 0100)  Weight - Scale: (!) 143 kg (314 lb 13 1 oz) (04/22/21 2259)  SpO2: 93 % (04/23/21 0230)    Physical Exam  Vitals signs reviewed  Constitutional:       General: He is not in acute distress  Appearance: Normal appearance  HENT:      Head: Normocephalic and atraumatic  Right Ear: Tympanic membrane normal       Left Ear: Tympanic membrane normal       Nose: Nose normal       Mouth/Throat:      Mouth: Mucous membranes are dry  Eyes:      Extraocular Movements: Extraocular movements intact  Pupils: Pupils are equal, round, and reactive to light  Cardiovascular:      Rate and Rhythm: Normal rate and regular rhythm  Pulses: Normal pulses  Heart sounds: No murmur  No gallop  Pulmonary:      Effort: Pulmonary effort is normal  No respiratory distress  Breath sounds: No stridor  No wheezing  Chest:      Chest wall: No tenderness  Abdominal:      General: Abdomen is flat  Bowel sounds are normal  There is no distension  Palpations: Abdomen is soft  Tenderness: There is no abdominal tenderness  Musculoskeletal:         General: No swelling  Right lower leg: No edema  Left lower leg: No edema  Skin:     General: Skin is warm and dry  Neurological:      General: No focal deficit present  Mental Status: He is alert and oriented to person, place, and time  Cranial Nerves: No cranial nerve deficit  Motor: No weakness     Psychiatric:         Mood and Affect: Mood normal          Behavior: Behavior normal  Additional Data:     Lab Results: I have personally reviewed pertinent reports  Results from last 7 days   Lab Units 04/22/21  2342   WBC Thousand/uL 15 21*   HEMOGLOBIN g/dL 11 9*   HEMATOCRIT % 38 2   PLATELETS Thousands/uL 191   NEUTROS PCT % 78*   LYMPHS PCT % 10*   MONOS PCT % 11   EOS PCT % 0     Results from last 7 days   Lab Units 04/22/21  2342 04/22/21  2312   POTASSIUM mmol/L 4 8  --    CHLORIDE mmol/L 94*  --    CO2 mmol/L 28  --    CO2, I-STAT mmol/L  --  30   BUN mg/dL 44*  --    CREATININE mg/dL 1 66*  --    CALCIUM mg/dL 10 3*  --    ALK PHOS U/L 85  --    ALT U/L 24  --    AST U/L 17  --    GLUCOSE, ISTAT mg/dl  --  237*     Results from last 7 days   Lab Units 04/23/21  0109   INR  1 11       Imaging: I have personally reviewed pertinent reports  Xr Chest 2 Views    Result Date: 3/26/2021  Narrative: CHEST INDICATION:   Dyspnea, hypoxia  Patient has suspected COVID-19  COMPARISON:  6/1/2012; 5/30/2012 EXAM PERFORMED/VIEWS:  XR CHEST PA & LATERAL FINDINGS: Sternotomy wires and mediastinal clips are noted, compatible with prior CABG  Heart is enlarged  Aorta atherosclerotic  Mildly prominence of the pulmonary interstitial markings  Small amount of bandlike densities in the left midlung may represent subsegmental atelectasis and/or scarring  There is slight blunting of the right costophrenic sulcus  Lateral view demonstrates pulmonary hyperinflation  Mild to moderate spondylotic changes noted  Impression: 1  Mild pulmonary interstitial edema suspected  2   Mild pulmonary inflation suggestive of underlying emphysema  Workstation performed: [id number]     Trauma - Ct Head Wo Contrast    Result Date: 4/23/2021  Narrative: CT BRAIN - WITHOUT CONTRAST INDICATION:   TRAUMA  COMPARISON:  None  TECHNIQUE:  CT examination of the brain was performed  In addition to axial images, sagittal and coronal 2D reformatted images were created and submitted for interpretation   Radiation dose length product (DLP) for this visit:  [phone] 1558-663 mGy-cm  (accession [id number]), 1534 mGy-cm  (accession [id number]), 640 mGy-cm  (accession [id number])  This examination, like all CT scans performed in the [facility], was performed utilizing techniques to minimize radiation dose exposure, including the use of iterative reconstruction and automated exposure control  IMAGE QUALITY:  Diagnostic  FINDINGS: PARENCHYMA: Decreased attenuation is noted in periventricular and subcortical white matter demonstrating an appearance that is statistically most likely to represent mild microangiopathic change  Gray-white differentiation appears maintained  No CT signs of acute territorial infarction  No intracranial mass, mass effect or midline shift  No acute parenchymal hemorrhage  Mild parenchymal atrophy VENTRICLES AND EXTRA-AXIAL SPACES:  Ventricles and extra-axial CSF spaces are prominent commensurate with the degree of volume loss  No hydrocephalus  No acute extra-axial hemorrhage  VISUALIZED ORBITS AND PARANASAL SINUSES:  Unremarkable  CALVARIUM AND EXTRACRANIAL SOFT TISSUES:  Normal      Impression: No calvarial fracture or acute intracranial abnormality is seen  Other findings as above  CT CERVICAL SPINE - WITHOUT CONTRAST INDICATION:   TRAUMA  Fall COMPARISON:  None  TECHNIQUE:  CT examination of the cervical spine was performed without intravenous contrast   Contiguous axial images were obtained  Sagittal and coronal reconstructions were performed  Radiation dose length product (DLP) for this visit:  [phone] 1558-663 mGy-cm  (accession [id number]), 4049 mGy-cm  (accession [id number]), 640 mGy-cm  (accession [id number])  This examination, like all CT scans performed in the [facility], was performed utilizing techniques to minimize radiation dose exposure, including the use of iterative reconstruction and automated exposure control  IMAGE QUALITY:  Diagnostic   FINDINGS: ALIGNMENT:  Normal alignment of the cervical spine  No subluxation  VERTEBRAL BODIES:  No acute fracture  DEGENERATIVE CHANGES:  Intervertebral disc space narrowing at C5/C6, C6/C7, and C7/T1 with anterior, marginal, and uncovertebral osteophytosis at these levels  Multilevel facet joint arthropathy  PREVERTEBRAL AND PARASPINAL SOFT TISSUES:  Unremarkable  THORACIC INLET:  Please refer to the concurrent chest, abdomen, and pelvic CT report for description of the thoracic inlet findings  IMPRESSION: Degenerative changes as described no evidence of acute cervical spine injury  CT CHEST, ABDOMEN AND PELVIS WITH IV CONTRAST INDICATION: Trauma  Fall COMPARISON: None  TECHNIQUE: CT examination of the chest, abdomen and pelvis was performed  Reformatted images were created in axial, sagittal, and coronal planes  Radiation dose length product (DLP) for this visit:  [phone] 1558-663 mGy-cm  (accession [id number]), 4304 mGy-cm  (accession [id number]), 640 mGy-cm  (accession [id number])  This examination, like all CT scans performed in the [facility], was performed utilizing techniques to minimize radiation dose exposure, including the use of iterative reconstruction and automated exposure control  IV Contrast:  100 mL of iohexol (OMNIPAQUE) Enteric Contrast:  Enteric contrast was not administered  3 CHEST LUNGS:  Suspected round atelectasis in the posterior right lower lobe  Linear atelectasis/scarring noted bilaterally  5 mm subpleural nodule in the left upper lung field (axial image 17, series 2)  Several subcentimeter calcified granulomas scattered in the right lung  Lungs otherwise appear grossly clear  PLEURA:  Pleural thickening with pleural calcifications in the dependent portion of the right mid and lower lung field, 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  HEART/GREAT VESSELS: Moderate to severe coronary atherosclerosis  Mild aortic atherosclerosis  No thoracic aortic aneurysm  The heart appears normal in size    Suspected lipoma of the interatrial septum measures approximately 5 3 cm in size  MEDIASTINUM AND SILKE:  Several subcentimeter calcified lymph nodes in the right hilum  Large airways appear patent  No mediastinal mass is seen  Evidence of prior CABG  CHEST WALL AND LOWER NECK: Right shoulder arthroplasty  Chest wall appears intact  Median sternotomy wires are noted  Otherwise grossly unremarkable  ABDOMEN LIVER/BILIARY TREE:  Unremarkable  GALLBLADDER:  No calcified gallstones  No pericholecystic inflammatory change  SPLEEN:  Unremarkable  PANCREAS:  Mild atrophy; otherwise grossly unremarkable ADRENAL GLANDS:  4 cm right adrenal nodule redemonstrated, stable  Left adrenal gland appears normal  KIDNEYS/URETERS:  Several subcentimeter nonobstructing stones in the left kidney  Right kidney otherwise appears grossly unremarkable  Several subcentimeter nonobstructing stones in the left kidney  Several obstructing stones in the distal left ureter  measuring approximately 6 mm in size  Mild left hydroureteronephrosis and periureteral and perinephric fat stranding  STOMACH AND BOWEL:  Colonic diverticulosis  No discrete evidence of acute diverticulitis  No evidence of bowel obstruction  Otherwise grossly unremarkable  APPENDIX:  No findings to suggest appendicitis  ABDOMINOPELVIC CAVITY:  No ascites or free intraperitoneal air  No lymphadenopathy  VESSELS:  Moderate atherosclerosis  Fusiform aneurysm of the distal abdominal aorta measures approximately 5 cm in maximal dimensions  PELVIS REPRODUCTIVE ORGANS:  Enlarged prostate with prostate volume estimated at 93 mL  URINARY BLADDER:  Grossly unremarkable ABDOMINAL WALL/INGUINAL REGIONS:  Mild body wall edema OSSEOUS STRUCTURES:  Multilevel degenerative changes of the spine, most prominent in the lumbar region  Total right hip arthroplasty, visualized hardware appears intact  Moderate to severe degenerative changes of the left hip    No acute fracture or destructive osseous lesion identified  IMPRESSION:  No evidence of acute traumatic injury in the chest, abdomen, or pelvis  Bilateral nephrolithiasis with several obstructing stones in the distal left ureter, measuring approximately 6 mm in size with associated left hydroureteronephrosis and obstructive uropathy  Pleural thickening with pleural calcifications in the dependent portion of the right mid and lower lung field, 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  Suspected round atelectasis in the posterior right lower lobe  Coronary atherosclerosis, history of prior CABG  4 cm right adrenal nodule redemonstrated, stable  Moderate aortic atherosclerosis  Fusiform aneurysm of the distal abdominal aorta measures approximately 5 cm in maximal dimensions  5 mm subpleural nodule in the left upper lung field (axial image 17, series 2)  Based on current Fleischner Society 2017 Guidelines on incidental pulmonary nodule, 12 month follow-up non-contrast chest CT is recommended  Enlarged prostate, degenerative changes of the spine, right shoulder and right total hip arthroplasty, and other findings as above  Findings discussed with Dr Emmie Waters by Dr Justin Garibay at 12:30 PM on 4/23/2021  Workstation performed: [id number]     Trauma - Ct Spine Cervical Wo Contrast    Result Date: 4/23/2021  Narrative: CT BRAIN - WITHOUT CONTRAST INDICATION:   TRAUMA  COMPARISON:  None  TECHNIQUE:  CT examination of the brain was performed  In addition to axial images, sagittal and coronal 2D reformatted images were created and submitted for interpretation  Radiation dose length product (DLP) for this visit:  [phone] 1558-663 mGy-cm  (accession [id number]), 1642 mGy-cm  (accession [id number]), 640 mGy-cm  (accession [id number])  This examination, like all CT scans performed in the [facility], was performed utilizing techniques to minimize radiation dose exposure, including the use of iterative reconstruction and automated exposure control    IMAGE QUALITY:  Diagnostic  FINDINGS: PARENCHYMA: Decreased attenuation is noted in periventricular and subcortical white matter demonstrating an appearance that is statistically most likely to represent mild microangiopathic change  Gray-white differentiation appears maintained  No CT signs of acute territorial infarction  No intracranial mass, mass effect or midline shift  No acute parenchymal hemorrhage  Mild parenchymal atrophy VENTRICLES AND EXTRA-AXIAL SPACES:  Ventricles and extra-axial CSF spaces are prominent commensurate with the degree of volume loss  No hydrocephalus  No acute extra-axial hemorrhage  VISUALIZED ORBITS AND PARANASAL SINUSES:  Unremarkable  CALVARIUM AND EXTRACRANIAL SOFT TISSUES:  Normal      Impression: No calvarial fracture or acute intracranial abnormality is seen  Other findings as above  CT CERVICAL SPINE - WITHOUT CONTRAST INDICATION:   TRAUMA  Fall COMPARISON:  None  TECHNIQUE:  CT examination of the cervical spine was performed without intravenous contrast   Contiguous axial images were obtained  Sagittal and coronal reconstructions were performed  Radiation dose length product (DLP) for this visit:  [phone] 1558-663 mGy-cm  (accession [id number]), 9413 mGy-cm  (accession [id number]), 640 mGy-cm  (accession [id number])  This examination, like all CT scans performed in the [facility], was performed utilizing techniques to minimize radiation dose exposure, including the use of iterative reconstruction and automated exposure control  IMAGE QUALITY:  Diagnostic  FINDINGS: ALIGNMENT:  Normal alignment of the cervical spine  No subluxation  VERTEBRAL BODIES:  No acute fracture  DEGENERATIVE CHANGES:  Intervertebral disc space narrowing at C5/C6, C6/C7, and C7/T1 with anterior, marginal, and uncovertebral osteophytosis at these levels  Multilevel facet joint arthropathy  PREVERTEBRAL AND PARASPINAL SOFT TISSUES:  Unremarkable   THORACIC INLET:  Please refer to the concurrent chest, abdomen, and pelvic CT report for description of the thoracic inlet findings  IMPRESSION: Degenerative changes as described no evidence of acute cervical spine injury  CT CHEST, ABDOMEN AND PELVIS WITH IV CONTRAST INDICATION: Trauma  Fall COMPARISON: None  TECHNIQUE: CT examination of the chest, abdomen and pelvis was performed  Reformatted images were created in axial, sagittal, and coronal planes  Radiation dose length product (DLP) for this visit:  [phone] 1558-663 mGy-cm  (accession [id number]), 1368 mGy-cm  (accession [id number]), 640 mGy-cm  (accession [id number])  This examination, like all CT scans performed in the [facility], was performed utilizing techniques to minimize radiation dose exposure, including the use of iterative reconstruction and automated exposure control  IV Contrast:  100 mL of iohexol (OMNIPAQUE) Enteric Contrast:  Enteric contrast was not administered  3 CHEST LUNGS:  Suspected round atelectasis in the posterior right lower lobe  Linear atelectasis/scarring noted bilaterally  5 mm subpleural nodule in the left upper lung field (axial image 17, series 2)  Several subcentimeter calcified granulomas scattered in the right lung  Lungs otherwise appear grossly clear  PLEURA:  Pleural thickening with pleural calcifications in the dependent portion of the right mid and lower lung field, 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  HEART/GREAT VESSELS: Moderate to severe coronary atherosclerosis  Mild aortic atherosclerosis  No thoracic aortic aneurysm  The heart appears normal in size  Suspected lipoma of the interatrial septum measures approximately 5 3 cm in size  MEDIASTINUM AND SILKE:  Several subcentimeter calcified lymph nodes in the right hilum  Large airways appear patent  No mediastinal mass is seen  Evidence of prior CABG  CHEST WALL AND LOWER NECK: Right shoulder arthroplasty  Chest wall appears intact  Median sternotomy wires are noted  Otherwise grossly unremarkable  ABDOMEN LIVER/BILIARY TREE:  Unremarkable  GALLBLADDER:  No calcified gallstones  No pericholecystic inflammatory change  SPLEEN:  Unremarkable  PANCREAS:  Mild atrophy; otherwise grossly unremarkable ADRENAL GLANDS:  4 cm right adrenal nodule redemonstrated, stable  Left adrenal gland appears normal  KIDNEYS/URETERS:  Several subcentimeter nonobstructing stones in the left kidney  Right kidney otherwise appears grossly unremarkable  Several subcentimeter nonobstructing stones in the left kidney  Several obstructing stones in the distal left ureter  measuring approximately 6 mm in size  Mild left hydroureteronephrosis and periureteral and perinephric fat stranding  STOMACH AND BOWEL:  Colonic diverticulosis  No discrete evidence of acute diverticulitis  No evidence of bowel obstruction  Otherwise grossly unremarkable  APPENDIX:  No findings to suggest appendicitis  ABDOMINOPELVIC CAVITY:  No ascites or free intraperitoneal air  No lymphadenopathy  VESSELS:  Moderate atherosclerosis  Fusiform aneurysm of the distal abdominal aorta measures approximately 5 cm in maximal dimensions  PELVIS REPRODUCTIVE ORGANS:  Enlarged prostate with prostate volume estimated at 93 mL  URINARY BLADDER:  Grossly unremarkable ABDOMINAL WALL/INGUINAL REGIONS:  Mild body wall edema OSSEOUS STRUCTURES:  Multilevel degenerative changes of the spine, most prominent in the lumbar region  Total right hip arthroplasty, visualized hardware appears intact  Moderate to severe degenerative changes of the left hip  No acute fracture or destructive osseous lesion identified  IMPRESSION:  No evidence of acute traumatic injury in the chest, abdomen, or pelvis  Bilateral nephrolithiasis with several obstructing stones in the distal left ureter, measuring approximately 6 mm in size with associated left hydroureteronephrosis and obstructive uropathy   Pleural thickening with pleural calcifications in the dependent portion of the right mid and lower lung field, 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  Suspected round atelectasis in the posterior right lower lobe  Coronary atherosclerosis, history of prior CABG  4 cm right adrenal nodule redemonstrated, stable  Moderate aortic atherosclerosis  Fusiform aneurysm of the distal abdominal aorta measures approximately 5 cm in maximal dimensions  5 mm subpleural nodule in the left upper lung field (axial image 17, series 2)  Based on current Fleischner Society 2017 Guidelines on incidental pulmonary nodule, 12 month follow-up non-contrast chest CT is recommended  Enlarged prostate, degenerative changes of the spine, right shoulder and right total hip arthroplasty, and other findings as above  Findings discussed with Dr Lindsey Pantoja by Dr Ida Cesar at 12:30 PM on 4/23/2021  Workstation performed: [id number]     Ct Chest Abdomen Pelvis W Contrast    Result Date: 4/23/2021  Narrative: CT BRAIN - WITHOUT CONTRAST INDICATION:   TRAUMA  COMPARISON:  None  TECHNIQUE:  CT examination of the brain was performed  In addition to axial images, sagittal and coronal 2D reformatted images were created and submitted for interpretation  Radiation dose length product (DLP) for this visit:  [phone] 1558-663 mGy-cm  (accession [id number]), 6660 mGy-cm  (accession [id number]), 640 mGy-cm  (accession [id number])  This examination, like all CT scans performed in the [facility], was performed utilizing techniques to minimize radiation dose exposure, including the use of iterative reconstruction and automated exposure control  IMAGE QUALITY:  Diagnostic  FINDINGS: PARENCHYMA: Decreased attenuation is noted in periventricular and subcortical white matter demonstrating an appearance that is statistically most likely to represent mild microangiopathic change  Gray-white differentiation appears maintained  No CT signs of acute territorial infarction  No intracranial mass, mass effect or midline shift    No acute parenchymal hemorrhage  Mild parenchymal atrophy VENTRICLES AND EXTRA-AXIAL SPACES:  Ventricles and extra-axial CSF spaces are prominent commensurate with the degree of volume loss  No hydrocephalus  No acute extra-axial hemorrhage  VISUALIZED ORBITS AND PARANASAL SINUSES:  Unremarkable  CALVARIUM AND EXTRACRANIAL SOFT TISSUES:  Normal      Impression: No calvarial fracture or acute intracranial abnormality is seen  Other findings as above  CT CERVICAL SPINE - WITHOUT CONTRAST INDICATION:   TRAUMA  Fall COMPARISON:  None  TECHNIQUE:  CT examination of the cervical spine was performed without intravenous contrast   Contiguous axial images were obtained  Sagittal and coronal reconstructions were performed  Radiation dose length product (DLP) for this visit:  [phone] 1558-663 mGy-cm  (accession [id number]), 9401 mGy-cm  (accession [id number]), 640 mGy-cm  (accession [id number])  This examination, like all CT scans performed in the [facility], was performed utilizing techniques to minimize radiation dose exposure, including the use of iterative reconstruction and automated exposure control  IMAGE QUALITY:  Diagnostic  FINDINGS: ALIGNMENT:  Normal alignment of the cervical spine  No subluxation  VERTEBRAL BODIES:  No acute fracture  DEGENERATIVE CHANGES:  Intervertebral disc space narrowing at C5/C6, C6/C7, and C7/T1 with anterior, marginal, and uncovertebral osteophytosis at these levels  Multilevel facet joint arthropathy  PREVERTEBRAL AND PARASPINAL SOFT TISSUES:  Unremarkable  THORACIC INLET:  Please refer to the concurrent chest, abdomen, and pelvic CT report for description of the thoracic inlet findings  IMPRESSION: Degenerative changes as described no evidence of acute cervical spine injury  CT CHEST, ABDOMEN AND PELVIS WITH IV CONTRAST INDICATION: Trauma  Fall COMPARISON: None  TECHNIQUE: CT examination of the chest, abdomen and pelvis was performed   Reformatted images were created in axial, sagittal, and coronal planes  Radiation dose length product (DLP) for this visit:  [phone] 1558-663 mGy-cm  (accession [id number]), 6438 mGy-cm  (accession [id number]), 640 mGy-cm  (accession [id number])  This examination, like all CT scans performed in the [facility], was performed utilizing techniques to minimize radiation dose exposure, including the use of iterative reconstruction and automated exposure control  IV Contrast:  100 mL of iohexol (OMNIPAQUE) Enteric Contrast:  Enteric contrast was not administered  3 CHEST LUNGS:  Suspected round atelectasis in the posterior right lower lobe  Linear atelectasis/scarring noted bilaterally  5 mm subpleural nodule in the left upper lung field (axial image 17, series 2)  Several subcentimeter calcified granulomas scattered in the right lung  Lungs otherwise appear grossly clear  PLEURA:  Pleural thickening with pleural calcifications in the dependent portion of the right mid and lower lung field, 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  HEART/GREAT VESSELS: Moderate to severe coronary atherosclerosis  Mild aortic atherosclerosis  No thoracic aortic aneurysm  The heart appears normal in size  Suspected lipoma of the interatrial septum measures approximately 5 3 cm in size  MEDIASTINUM AND SILKE:  Several subcentimeter calcified lymph nodes in the right hilum  Large airways appear patent  No mediastinal mass is seen  Evidence of prior CABG  CHEST WALL AND LOWER NECK: Right shoulder arthroplasty  Chest wall appears intact  Median sternotomy wires are noted  Otherwise grossly unremarkable  ABDOMEN LIVER/BILIARY TREE:  Unremarkable  GALLBLADDER:  No calcified gallstones  No pericholecystic inflammatory change  SPLEEN:  Unremarkable  PANCREAS:  Mild atrophy; otherwise grossly unremarkable ADRENAL GLANDS:  4 cm right adrenal nodule redemonstrated, stable  Left adrenal gland appears normal  KIDNEYS/URETERS:  Several subcentimeter nonobstructing stones in the left kidney    Right kidney otherwise appears grossly unremarkable  Several subcentimeter nonobstructing stones in the left kidney  Several obstructing stones in the distal left ureter  measuring approximately 6 mm in size  Mild left hydroureteronephrosis and periureteral and perinephric fat stranding  STOMACH AND BOWEL:  Colonic diverticulosis  No discrete evidence of acute diverticulitis  No evidence of bowel obstruction  Otherwise grossly unremarkable  APPENDIX:  No findings to suggest appendicitis  ABDOMINOPELVIC CAVITY:  No ascites or free intraperitoneal air  No lymphadenopathy  VESSELS:  Moderate atherosclerosis  Fusiform aneurysm of the distal abdominal aorta measures approximately 5 cm in maximal dimensions  PELVIS REPRODUCTIVE ORGANS:  Enlarged prostate with prostate volume estimated at 93 mL  URINARY BLADDER:  Grossly unremarkable ABDOMINAL WALL/INGUINAL REGIONS:  Mild body wall edema OSSEOUS STRUCTURES:  Multilevel degenerative changes of the spine, most prominent in the lumbar region  Total right hip arthroplasty, visualized hardware appears intact  Moderate to severe degenerative changes of the left hip  No acute fracture or destructive osseous lesion identified  IMPRESSION:  No evidence of acute traumatic injury in the chest, abdomen, or pelvis  Bilateral nephrolithiasis with several obstructing stones in the distal left ureter, measuring approximately 6 mm in size with associated left hydroureteronephrosis and obstructive uropathy  Pleural thickening with pleural calcifications in the dependent portion of the right mid and lower lung field, 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  Suspected round atelectasis in the posterior right lower lobe  Coronary atherosclerosis, history of prior CABG  4 cm right adrenal nodule redemonstrated, stable  Moderate aortic atherosclerosis  Fusiform aneurysm of the distal abdominal aorta measures approximately 5 cm in maximal dimensions   5 mm subpleural nodule in the left upper lung field (axial image 17, series 2)  Based on current Fleischner Society 2017 Guidelines on incidental pulmonary nodule, 12 month follow-up non-contrast chest CT is recommended  Enlarged prostate, degenerative changes of the spine, right shoulder and right total hip arthroplasty, and other findings as above  Findings discussed with Dr Mavis Barclay by Dr Sagar Bryant at 12:30 PM on 4/23/2021  Workstation performed: [id number]     Cta Ed Chest Pe Study    Result Date: 3/25/2021  Narrative: CTA - CHEST WITH IV CONTRAST - PULMONARY ANGIOGRAM INDICATION:   Shortness of breath Hypoxia, dyspnea  COMPARISON: CT of abdomen on January 5, 2021  TECHNIQUE: CTA examination of the chest was performed using angiographic technique according to a protocol specifically tailored to evaluate for pulmonary embolism  Axial, sagittal, and coronal 2D reformatted images were created from the source data and  submitted for interpretation  In addition, coronal 3D MIP postprocessing was performed on the acquisition scanner  Radiation dose length product (DLP) for this visit:  1747 12 mGy-cm   This examination, like all CT scans performed in the [facility], was performed utilizing techniques to minimize radiation dose exposure, including the use of iterative reconstruction and automated exposure control  IV Contrast:  100 mL of iohexol (OMNIPAQUE)  was administered intravenously without immediate adverse reaction  FINDINGS: PULMONARY ARTERIAL TREE:  Motion limited examination  No main, lobar, or proximal segmental pulmonary embolism  LUNGS:  Bibasilar atelectasis/scarring similar to prior examination  Large calcification within the right lower lobe compatible with granuloma  There is a 0 7 x 0 5 cm juxtapleural nodule in the left upper lobe (series 2, image 70)  Minimal secretions  in the posterior trachea  PLEURA:  There are calcified pleural plaques which may be due to asbestos exposure   No pneumothorax or pleural effusion  HEART/GREAT VESSELS:  Cardiomegaly  Lipomatous hypertrophy of the interatrial septum  Coronary artery calcifications  No pericardial effusion  Status post median sternotomy  MEDIASTINUM AND SILKE:  Subcarinal lymph nodes measure up to 1 cm in short axis (series 2, image 119)  CHEST WALL AND LOWER NECK:   Unremarkable  VISUALIZED STRUCTURES IN THE UPPER ABDOMEN:  Stable 4 cm right adrenal nodule appears OSSEOUS STRUCTURES:  No acute fracture or destructive osseous lesion  Degenerative changes the osseous structures  Impression: 1  Motion limited examination  No evidence of pulmonary embolism to the proximal segmental level  2   Bibasilar atelectasis/scarring similar to prior examination  3   7 mm left upper lobe nodule   Based on current Fleischner Society 2017 Guidelines on incidental pulmonary nodule, followup non-contrast CT is recommended at 6-12 months from the initial examination and, if stable at that time, an additional followup is recommended for 18-24 months from the initial examination  4   Stable calcified pleural plaques which may be due to asbestos exposure  5   Cardiomegaly  Lipomatous hypertrophy of the interatrial septum  6   Stable 4 cm right adrenal nodule  Workstation performed: [id number]     Us Bedside Procedure    Result Date: 4/22/2021  Narrative: 1 2 [id number]  4 [id number]  8 [id number]      EKG, Pathology, and Other Studies Reviewed on Admission:   · EKG:  Sinus tachycardia    Epic / Care Everywhere Records Reviewed: Yes     ** Please Note: This note has been constructed using a voice recognition system   **

## 2021-04-23 NOTE — ASSESSMENT & PLAN NOTE
· With prior history of CABG x 4  · CHF and PAF  · Metoprolol dose initially stopped due to hypotension and now restarted to 12 5mg   · Continue digoxin and atorvastatin

## 2021-04-23 NOTE — ASSESSMENT & PLAN NOTE
Patient was recently discharged from [facility] 1 week ago  Patient was diuresed, and had increase Lasix to 80 mg b i d , and recent addition of 5 mg of metolazone 3 times a week by patient's cardiologist  Patient's weight at discharge was 322 lb  Patient's weight at home has been between 315-317 lb  Patient's weight on admission is 314 lb  Patient examines euvolemic, JVD not present, trace chronic edema       · It is likely that the patient was over diuresed  · Will hold metolazone  · Will hold morning dose of Lasix  · Gentle hydration  · Re-evaluate tomorrow morning

## 2021-04-23 NOTE — H&P (VIEW-ONLY)
CONSULT    Patient Name: Carmen Sahu  Patient MRN: [id number]  Date of Service: 4/23/2021   Date / Time Note Created: 4/23/2021 3:26 PM   Referring Provider:   Provider Creating Note: Milena Manzano    PCP: Taz Cervantes  Attending Provider:  Alexandru Lee MD    Reason for Consult: Flank Pain    HPI:  Carmen Sahu is a pleasant 50-year-old male, previously known to Dr Seymour Youngblood for history of non muscle invasive bladder cancer diagnosed approximately 12 years ago for outpatient workup for microscopic hematuria  He underwent regular surveillance cystoscopic examinations and subsequently developed disease recurrence He completed induction and maintenance BCG and recently transferred urologic care to [facility] and is under the management of Dr Leann Muhammad  He is status post cystoscopic examination 12/21/2020 without evidence of abnormal or malignant findings  He was admitted to the Internal Medicine service after sustaining fall  Patient denied any fever, chills, flank, abdominal, suprapubic, groin or testicular pain, dysuria or gross hematuria; but expressed sensation of general unwellness, malaise and weakness prior to arrival   He is maintain low-grade fevers  However, he remains hemodynamically stable and minimally symptomatic  He had mild leukocytosis  But overall urinalysis did not strongly suggest UTI  Nevertheless, he was started on empiric ceftriaxone  CT was obtained to rule out trauma with incidental finding of bilateral nephrolithiasis and several obstructing stones measuring up to approximately 6 mm with resultant left hydronephrosis  Our service was contacted against CT findings for further management recommendations         Active Problems:    Patient Active Problem List   Diagnosis    Hypertension    Hyperlipemia    Type 2 diabetes mellitus with hyperglycemia, with long-term current use of insulin (HCC)    Chronic low back pain    CAD (coronary artery disease)    Bladder cancer ([facility]    Arthritis    Paroxysmal atrial fibrillation ([facility] )    AAA (abdominal aortic aneurysm) (HCC)    Decreased pedal pulses    Chronic pain of both knees    Actinic keratosis of right temple    Actinic keratosis of scalp    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    Immunization deficiency    Left lower quadrant abdominal pain    Excessive gas    Morbid obesity (HCC)    Colitis    Mass of right adrenal gland (HCC)    LUCIA (obstructive sleep apnea)    Embolism and thrombosis of arteries of the lower extremities (HCC)    Bilateral edema of lower extremity    Hypoxemia    Acute on chronic diastolic heart failure (HCC)    Left upper lobe pulmonary nodule    Scaly skin on examination    CHF (congestive heart failure) (HCC)    Fall    Sepsis ([facility]    [facility]    Hyponatremia            Impressions  · Left Ureteral Calculi  · Left Hydronephrosis  · History of bladder cancer    Recommendations  1  Initiate aggressive IVFs   2  Flomax  3  Analgesia/Narcotics   4  Anti-emetics   5  ATBs empirically while awaiting culture   6  Strain urine   7  NPO for OR if no spontaneous expulsion achieved  Will necessitate transfer to [facility] for  surgeon availability  Explained risk, benefits and potential complications of ureteroscopic stone extraction  Patient has verbalized understanding of possible need for ureteral stent only due to infection  requiring staged ureteroscopy electively once recovered and infection free as OP  Formal consent by surgeon          Past Medical History:   Diagnosis Date    A-fib [facility])     AAA (abdominal aortic aneurysm) ([facility] )     Actinic keratosis of right temple 7/31/2020    Actinic keratosis of scalp 7/31/2020    Acute respiratory failure with hypoxia (HCC) 3/25/2021    Arthritis     Lay's esophagus     Bladder cancer (HCC)     CAD (coronary artery disease)     Chronic low back pain     Chronic pain of both knees 2020    Colitis 2020    Diabetes ([facility]     Excessive gas 12/3/2020    Hyperlipemia     Hypertension     Immunization deficiency 10/30/2020    Hep a nonimmune    Left lower quadrant abdominal pain 12/3/2020    Mass of right adrenal gland ([facility] ) 2020    4 1 cm    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    LUCIA (obstructive sleep apnea) 2021       Past Surgical History:   Procedure Laterality Date    BACK SURGERY      BLADDER SURGERY      CATARACT EXTRACTION, BILATERAL      COLONOSCOPY  2019    next 2022 [id number] CORONARY ARTERY BYPASS GRAFT  2016    Quadruple per Rita    EGD  2017    TOTAL HIP ARTHROPLASTY Right 2012    TOTAL SHOULDER REPLACEMENT Right 2013       Family History   Problem Relation Age of Onset    Heart disease Father     Arthritis Father     Heart attack Father     Alzheimer's disease Mother        Social History     Socioeconomic History    Marital status: /Civil Union     Spouse name: Janneth Landa Number of children: 3    Years of education: Not on file    Highest education level: 12th grade   Occupational History    Not on file   Social Needs    Financial resource strain: Not hard at all   [place] insecurity     Worry: Never true     Inability: Never true    Transportation needs     Medical: No     Non-medical: No   Tobacco Use    Smoking status: Former Smoker     Packs/day: 0 25     Types: Cigarettes     Quit date: 2009     Years since quittin 3    Smokeless tobacco: Never Used    Tobacco comment: smoked since age 21; per Jessica Payne   Substance and Sexual Activity    Alcohol use: Not Currently     Comment: No use    Drug use: Never     Comment: No use    Sexual activity: Not Currently   Lifestyle    Physical activity     Days per week: 4 days     Minutes per session: 10 min    Stress: Not at all   Relationships    Social connections     Talks on phone: More than three times a week     Gets together: Twice a week Attends [employer] service: Never     Active member of club or organization: No     Attends meetings of clubs or organizations: Never     Relationship status:     Intimate partner violence     Fear of current or ex partner: No     Emotionally abused: No     Physically abused: No     Forced sexual activity: No   Other Topics Concern    Not on file   Social History Narrative    · Most recent tobacco use screenin2020      · Do you currently or have you served in the [employer]:   No      · Were you activated, into active duty, as a member of the [employer] or as a Reservist:   No      · Live alone or with others:   with others        · Caffeine intake:   Occasional      · Illicit drugs:   No      · Diet:   Regular      · Exercise level: Moderate      · Advance directive:    Yes      · Marital status:         · General stress level:   Medium      · Single or multi-level home/work:   multi level home      · Guns present in home:   No      · Seat belts used routinely:   Yes      · Sunscreen used routinely:   Yes      · Smoke alarm in home:   Yes        No Known Allergies    Review of Systems  10 point review of systems negative except as noted in HPI  Constitutional:   positive for  - chills and fever  negative for - fatigue or malaise  Cardiovascular:   no chest pain or dyspnea on exertion  Gastrointestinal:   no abdominal pain, change in bowel habits, or black or bloody stools  Genito-Urinary:   no dysuria, trouble voiding, or hematuria  Neurological:   positive for - dizziness, gait disturbance and weakness     Chart Review   Allergies, current medications, history, problem list    Vital Signs  /52   Pulse 102   Temp 99 9 °F (37 7 °C)   Resp 18   Ht 6' (1 829 m)   Wt (!) 143 kg (314 lb 13 1 oz)   SpO2 91%   BMI 42 70 kg/m²     Physical Exam  General appearance: alert and oriented, in no acute distress, appears stated age, cooperative and morbidly obese  Head: Normocephalic, without obvious abnormality, atraumatic  Neck: no adenopathy, no carotid bruit, no JVD, supple, symmetrical, trachea midline and thyroid not enlarged, symmetric, no tenderness/mass/nodules  Lungs: diminished breath sounds  Heart: regular rate and rhythm, S1, S2 normal, no murmur, click, rub or gallop  Abdomen: soft, non-tender; bowel sounds normal; no masses,  no organomegaly  Extremities: extremities normal, warm and well-perfused; no cyanosis, clubbing, or edema  Pulses: 2+ and symmetric  Neurologic: Grossly normal  No urinary drains     Laboratory Studies  Lab Results   Component Value Date    HGBA1C 8 4 (H) 04/22/2021    K 4 8 04/22/2021    CL 94 (L) 04/22/2021    CO2 28 04/22/2021    CO2 30 04/22/2021    GLUCOSE 237 (H) 04/22/2021    CREATININE 1 66 (H) 04/22/2021    BUN 44 (H) 04/22/2021    MG 2 1 03/29/2021    PHOS 3 2 03/29/2021     Lab Results   Component Value Date    WBC 15 21 (H) 04/22/2021    RBC 4 45 04/22/2021    HGB 11 9 (L) 04/22/2021    HCT 38 2 04/22/2021    MCV 86 04/22/2021    MCH 26 7 (L) 04/22/2021    RDW 16 1 (H) 04/22/2021     04/22/2021         Imaging and Other Studies  )  Xr Chest 2 Views    Result Date: 3/26/2021  Narrative: CHEST INDICATION:   Dyspnea, hypoxia  Patient has suspected COVID-19  COMPARISON:  6/1/2012; 5/30/2012 EXAM PERFORMED/VIEWS:  XR CHEST PA & LATERAL FINDINGS: Sternotomy wires and mediastinal clips are noted, compatible with prior CABG  Heart is enlarged  Aorta atherosclerotic  Mildly prominence of the pulmonary interstitial markings  Small amount of bandlike densities in the left midlung may represent subsegmental atelectasis and/or scarring  There is slight blunting of the right costophrenic sulcus  Lateral view demonstrates pulmonary hyperinflation  Mild to moderate spondylotic changes noted  Impression: 1  Mild pulmonary interstitial edema suspected  2   Mild pulmonary inflation suggestive of underlying emphysema   Workstation performed: [id number]     Trauma - Ct Head Wo Contrast    Result Date: 4/23/2021  Narrative: CT BRAIN - WITHOUT CONTRAST INDICATION:   TRAUMA  COMPARISON:  None  TECHNIQUE:  CT examination of the brain was performed  In addition to axial images, sagittal and coronal 2D reformatted images were created and submitted for interpretation  Radiation dose length product (DLP) for this visit:  [phone] 1558-663 mGy-cm  (accession [id number]), 5268 mGy-cm  (accession [id number]), 640 mGy-cm  (accession [id number])  This examination, like all CT scans performed in the [facility], was performed utilizing techniques to minimize radiation dose exposure, including the use of iterative reconstruction and automated exposure control  IMAGE QUALITY:  Diagnostic  FINDINGS: PARENCHYMA: Decreased attenuation is noted in periventricular and subcortical white matter demonstrating an appearance that is statistically most likely to represent mild microangiopathic change  Gray-white differentiation appears maintained  No CT signs of acute territorial infarction  No intracranial mass, mass effect or midline shift  No acute parenchymal hemorrhage  Mild parenchymal atrophy VENTRICLES AND EXTRA-AXIAL SPACES:  Ventricles and extra-axial CSF spaces are prominent commensurate with the degree of volume loss  No hydrocephalus  No acute extra-axial hemorrhage  VISUALIZED ORBITS AND PARANASAL SINUSES:  Unremarkable  CALVARIUM AND EXTRACRANIAL SOFT TISSUES:  Normal      Impression: No calvarial fracture or acute intracranial abnormality is seen  Other findings as above  CT CERVICAL SPINE - WITHOUT CONTRAST INDICATION:   TRAUMA  Fall COMPARISON:  None  TECHNIQUE:  CT examination of the cervical spine was performed without intravenous contrast   Contiguous axial images were obtained  Sagittal and coronal reconstructions were performed    Radiation dose length product (DLP) for this visit:  3149 mGy-cm  (accession [id number]), 1620 mGy-cm (accession [id number]), 640 mGy-cm  (accession [id number])  This examination, like all CT scans performed in the [facility], was performed utilizing techniques to minimize radiation dose exposure, including the use of iterative reconstruction and automated exposure control  IMAGE QUALITY:  Diagnostic  FINDINGS: ALIGNMENT:  Normal alignment of the cervical spine  No subluxation  VERTEBRAL BODIES:  No acute fracture  DEGENERATIVE CHANGES:  Intervertebral disc space narrowing at C5/C6, C6/C7, and C7/T1 with anterior, marginal, and uncovertebral osteophytosis at these levels  Multilevel facet joint arthropathy  PREVERTEBRAL AND PARASPINAL SOFT TISSUES:  Unremarkable  THORACIC INLET:  Please refer to the concurrent chest, abdomen, and pelvic CT report for description of the thoracic inlet findings  IMPRESSION: Degenerative changes as described no evidence of acute cervical spine injury  CT CHEST, ABDOMEN AND PELVIS WITH IV CONTRAST INDICATION: Trauma  Fall COMPARISON: None  TECHNIQUE: CT examination of the chest, abdomen and pelvis was performed  Reformatted images were created in axial, sagittal, and coronal planes  Radiation dose length product (DLP) for this visit:  [phone] 1558-663 mGy-cm  (accession [id number]), 4719 mGy-cm  (accession [id number]), 640 mGy-cm  (accession [id number])  This examination, like all CT scans performed in the [facility], was performed utilizing techniques to minimize radiation dose exposure, including the use of iterative reconstruction and automated exposure control  IV Contrast:  100 mL of iohexol (OMNIPAQUE) Enteric Contrast:  Enteric contrast was not administered  3 CHEST LUNGS:  Suspected round atelectasis in the posterior right lower lobe  Linear atelectasis/scarring noted bilaterally  5 mm subpleural nodule in the left upper lung field (axial image 17, series 2)  Several subcentimeter calcified granulomas scattered in the right lung    Lungs otherwise appear grossly clear  PLEURA:  Pleural thickening with pleural calcifications in the dependent portion of the right mid and lower lung field, 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  HEART/GREAT VESSELS: Moderate to severe coronary atherosclerosis  Mild aortic atherosclerosis  No thoracic aortic aneurysm  The heart appears normal in size  Suspected lipoma of the interatrial septum measures approximately 5 3 cm in size  MEDIASTINUM AND SILKE:  Several subcentimeter calcified lymph nodes in the right hilum  Large airways appear patent  No mediastinal mass is seen  Evidence of prior CABG  CHEST WALL AND LOWER NECK: Right shoulder arthroplasty  Chest wall appears intact  Median sternotomy wires are noted  Otherwise grossly unremarkable  ABDOMEN LIVER/BILIARY TREE:  Unremarkable  GALLBLADDER:  No calcified gallstones  No pericholecystic inflammatory change  SPLEEN:  Unremarkable  PANCREAS:  Mild atrophy; otherwise grossly unremarkable ADRENAL GLANDS:  4 cm right adrenal nodule redemonstrated, stable  Left adrenal gland appears normal  KIDNEYS/URETERS:  Several subcentimeter nonobstructing stones in the left kidney  Right kidney otherwise appears grossly unremarkable  Several subcentimeter nonobstructing stones in the left kidney  Several obstructing stones in the distal left ureter  measuring approximately 6 mm in size  Mild left hydroureteronephrosis and periureteral and perinephric fat stranding  STOMACH AND BOWEL:  Colonic diverticulosis  No discrete evidence of acute diverticulitis  No evidence of bowel obstruction  Otherwise grossly unremarkable  APPENDIX:  No findings to suggest appendicitis  ABDOMINOPELVIC CAVITY:  No ascites or free intraperitoneal air  No lymphadenopathy  VESSELS:  Moderate atherosclerosis  Fusiform aneurysm of the distal abdominal aorta measures approximately 5 cm in maximal dimensions  PELVIS REPRODUCTIVE ORGANS:  Enlarged prostate with prostate volume estimated at 93 mL  URINARY BLADDER:  Grossly unremarkable ABDOMINAL WALL/INGUINAL REGIONS:  Mild body wall edema OSSEOUS STRUCTURES:  Multilevel degenerative changes of the spine, most prominent in the lumbar region  Total right hip arthroplasty, visualized hardware appears intact  Moderate to severe degenerative changes of the left hip  No acute fracture or destructive osseous lesion identified  IMPRESSION:  No evidence of acute traumatic injury in the chest, abdomen, or pelvis  Bilateral nephrolithiasis with several obstructing stones in the distal left ureter, measuring approximately 6 mm in size with associated left hydroureteronephrosis and obstructive uropathy  Pleural thickening with pleural calcifications in the dependent portion of the right mid and lower lung field, 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  Suspected round atelectasis in the posterior right lower lobe  Coronary atherosclerosis, history of prior CABG  4 cm right adrenal nodule redemonstrated, stable  Moderate aortic atherosclerosis  Fusiform aneurysm of the distal abdominal aorta measures approximately 5 cm in maximal dimensions  5 mm subpleural nodule in the left upper lung field (axial image 17, series 2)  Based on current Fleischner Society 2017 Guidelines on incidental pulmonary nodule, 12 month follow-up non-contrast chest CT is recommended  Enlarged prostate, degenerative changes of the spine, right shoulder and right total hip arthroplasty, and other findings as above  Findings discussed with Dr Giovani Rdz by Dr Dalton Lewis at 12:30 PM on 4/23/2021  Workstation performed: [id number]     Trauma - Ct Spine Cervical Wo Contrast    Result Date: 4/23/2021  Narrative: CT BRAIN - WITHOUT CONTRAST INDICATION:   TRAUMA  COMPARISON:  None  TECHNIQUE:  CT examination of the brain was performed  In addition to axial images, sagittal and coronal 2D reformatted images were created and submitted for interpretation   Radiation dose length product (DLP) for this visit:  [phone] 1558-663 mGy-cm  (accession [id number]), 0202 mGy-cm  (accession [id number]), 640 mGy-cm  (accession [id number])  This examination, like all CT scans performed in the [facility], was performed utilizing techniques to minimize radiation dose exposure, including the use of iterative reconstruction and automated exposure control  IMAGE QUALITY:  Diagnostic  FINDINGS: PARENCHYMA: Decreased attenuation is noted in periventricular and subcortical white matter demonstrating an appearance that is statistically most likely to represent mild microangiopathic change  Gray-white differentiation appears maintained  No CT signs of acute territorial infarction  No intracranial mass, mass effect or midline shift  No acute parenchymal hemorrhage  Mild parenchymal atrophy VENTRICLES AND EXTRA-AXIAL SPACES:  Ventricles and extra-axial CSF spaces are prominent commensurate with the degree of volume loss  No hydrocephalus  No acute extra-axial hemorrhage  VISUALIZED ORBITS AND PARANASAL SINUSES:  Unremarkable  CALVARIUM AND EXTRACRANIAL SOFT TISSUES:  Normal      Impression: No calvarial fracture or acute intracranial abnormality is seen  Other findings as above  CT CERVICAL SPINE - WITHOUT CONTRAST INDICATION:   TRAUMA  Fall COMPARISON:  None  TECHNIQUE:  CT examination of the cervical spine was performed without intravenous contrast   Contiguous axial images were obtained  Sagittal and coronal reconstructions were performed  Radiation dose length product (DLP) for this visit:  [phone] 1558-663 mGy-cm  (accession [id number]), 0918 mGy-cm  (accession [id number]), 640 mGy-cm  (accession [id number])  This examination, like all CT scans performed in the [facility], was performed utilizing techniques to minimize radiation dose exposure, including the use of iterative reconstruction and automated exposure control  IMAGE QUALITY:  Diagnostic  FINDINGS: ALIGNMENT:  Normal alignment of the cervical spine   No subluxation  VERTEBRAL BODIES:  No acute fracture  DEGENERATIVE CHANGES:  Intervertebral disc space narrowing at C5/C6, C6/C7, and C7/T1 with anterior, marginal, and uncovertebral osteophytosis at these levels  Multilevel facet joint arthropathy  PREVERTEBRAL AND PARASPINAL SOFT TISSUES:  Unremarkable  THORACIC INLET:  Please refer to the concurrent chest, abdomen, and pelvic CT report for description of the thoracic inlet findings  IMPRESSION: Degenerative changes as described no evidence of acute cervical spine injury  CT CHEST, ABDOMEN AND PELVIS WITH IV CONTRAST INDICATION: Trauma  Fall COMPARISON: None  TECHNIQUE: CT examination of the chest, abdomen and pelvis was performed  Reformatted images were created in axial, sagittal, and coronal planes  Radiation dose length product (DLP) for this visit:  [phone]) 1558-663 mGy-cm  (accession [id number]), 1947 mGy-cm  (accession [id number]), 640 mGy-cm  (accession [id number])  This examination, like all CT scans performed in the [facility], was performed utilizing techniques to minimize radiation dose exposure, including the use of iterative reconstruction and automated exposure control  IV Contrast:  100 mL of iohexol (OMNIPAQUE) Enteric Contrast:  Enteric contrast was not administered  3 CHEST LUNGS:  Suspected round atelectasis in the posterior right lower lobe  Linear atelectasis/scarring noted bilaterally  5 mm subpleural nodule in the left upper lung field (axial image 17, series 2)  Several subcentimeter calcified granulomas scattered in the right lung  Lungs otherwise appear grossly clear  PLEURA:  Pleural thickening with pleural calcifications in the dependent portion of the right mid and lower lung field, 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  HEART/GREAT VESSELS: Moderate to severe coronary atherosclerosis  Mild aortic atherosclerosis  No thoracic aortic aneurysm  The heart appears normal in size    Suspected lipoma of the interatrial septum measures approximately 5 3 cm in size  MEDIASTINUM AND SILKE:  Several subcentimeter calcified lymph nodes in the right hilum  Large airways appear patent  No mediastinal mass is seen  Evidence of prior CABG  CHEST WALL AND LOWER NECK: Right shoulder arthroplasty  Chest wall appears intact  Median sternotomy wires are noted  Otherwise grossly unremarkable  ABDOMEN LIVER/BILIARY TREE:  Unremarkable  GALLBLADDER:  No calcified gallstones  No pericholecystic inflammatory change  SPLEEN:  Unremarkable  PANCREAS:  Mild atrophy; otherwise grossly unremarkable ADRENAL GLANDS:  4 cm right adrenal nodule redemonstrated, stable  Left adrenal gland appears normal  KIDNEYS/URETERS:  Several subcentimeter nonobstructing stones in the left kidney  Right kidney otherwise appears grossly unremarkable  Several subcentimeter nonobstructing stones in the left kidney  Several obstructing stones in the distal left ureter  measuring approximately 6 mm in size  Mild left hydroureteronephrosis and periureteral and perinephric fat stranding  STOMACH AND BOWEL:  Colonic diverticulosis  No discrete evidence of acute diverticulitis  No evidence of bowel obstruction  Otherwise grossly unremarkable  APPENDIX:  No findings to suggest appendicitis  ABDOMINOPELVIC CAVITY:  No ascites or free intraperitoneal air  No lymphadenopathy  VESSELS:  Moderate atherosclerosis  Fusiform aneurysm of the distal abdominal aorta measures approximately 5 cm in maximal dimensions  PELVIS REPRODUCTIVE ORGANS:  Enlarged prostate with prostate volume estimated at 93 mL  URINARY BLADDER:  Grossly unremarkable ABDOMINAL WALL/INGUINAL REGIONS:  Mild body wall edema OSSEOUS STRUCTURES:  Multilevel degenerative changes of the spine, most prominent in the lumbar region  Total right hip arthroplasty, visualized hardware appears intact  Moderate to severe degenerative changes of the left hip  No acute fracture or destructive osseous lesion identified  IMPRESSION:  No evidence of acute traumatic injury in the chest, abdomen, or pelvis  Bilateral nephrolithiasis with several obstructing stones in the distal left ureter, measuring approximately 6 mm in size with associated left hydroureteronephrosis and obstructive uropathy  Pleural thickening with pleural calcifications in the dependent portion of the right mid and lower lung field, 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  Suspected round atelectasis in the posterior right lower lobe  Coronary atherosclerosis, history of prior CABG  4 cm right adrenal nodule redemonstrated, stable  Moderate aortic atherosclerosis  Fusiform aneurysm of the distal abdominal aorta measures approximately 5 cm in maximal dimensions  5 mm subpleural nodule in the left upper lung field (axial image 17, series 2)  Based on current Fleischner Society 2017 Guidelines on incidental pulmonary nodule, 12 month follow-up non-contrast chest CT is recommended  Enlarged prostate, degenerative changes of the spine, right shoulder and right total hip arthroplasty, and other findings as above  Findings discussed with Dr Jimi Mancini by Dr Ena Araujo at 12:30 PM on 4/23/2021  Workstation performed: [id number]     Ct Chest Abdomen Pelvis W Contrast    Result Date: 4/23/2021  Narrative: CT BRAIN - WITHOUT CONTRAST INDICATION:   TRAUMA  COMPARISON:  None  TECHNIQUE:  CT examination of the brain was performed  In addition to axial images, sagittal and coronal 2D reformatted images were created and submitted for interpretation  Radiation dose length product (DLP) for this visit:  [phone] [phone] mGy-cm  (accession [id number]), 1983 mGy-cm  (accession [id number]), 640 mGy-cm  (accession [id number])  This examination, like all CT scans performed in the [facility], was performed utilizing techniques to minimize radiation dose exposure, including the use of iterative reconstruction and automated exposure control  IMAGE QUALITY:  Diagnostic   FINDINGS: PARENCHYMA: Decreased attenuation is noted in periventricular and subcortical white matter demonstrating an appearance that is statistically most likely to represent mild microangiopathic change  Gray-white differentiation appears maintained  No CT signs of acute territorial infarction  No intracranial mass, mass effect or midline shift  No acute parenchymal hemorrhage  Mild parenchymal atrophy VENTRICLES AND EXTRA-AXIAL SPACES:  Ventricles and extra-axial CSF spaces are prominent commensurate with the degree of volume loss  No hydrocephalus  No acute extra-axial hemorrhage  VISUALIZED ORBITS AND PARANASAL SINUSES:  Unremarkable  CALVARIUM AND EXTRACRANIAL SOFT TISSUES:  Normal      Impression: No calvarial fracture or acute intracranial abnormality is seen  Other findings as above  CT CERVICAL SPINE - WITHOUT CONTRAST INDICATION:   TRAUMA  Fall COMPARISON:  None  TECHNIQUE:  CT examination of the cervical spine was performed without intravenous contrast   Contiguous axial images were obtained  Sagittal and coronal reconstructions were performed  Radiation dose length product (DLP) for this visit:  [phone] 1558-663 mGy-cm  (accession [id number]), 4409 mGy-cm  (accession [id number]), 640 mGy-cm  (accession [id number])  This examination, like all CT scans performed in the [facility], was performed utilizing techniques to minimize radiation dose exposure, including the use of iterative reconstruction and automated exposure control  IMAGE QUALITY:  Diagnostic  FINDINGS: ALIGNMENT:  Normal alignment of the cervical spine  No subluxation  VERTEBRAL BODIES:  No acute fracture  DEGENERATIVE CHANGES:  Intervertebral disc space narrowing at C5/C6, C6/C7, and C7/T1 with anterior, marginal, and uncovertebral osteophytosis at these levels  Multilevel facet joint arthropathy  PREVERTEBRAL AND PARASPINAL SOFT TISSUES:  Unremarkable   THORACIC INLET:  Please refer to the concurrent chest, abdomen, and pelvic CT report for description of the thoracic inlet findings  IMPRESSION: Degenerative changes as described no evidence of acute cervical spine injury  CT CHEST, ABDOMEN AND PELVIS WITH IV CONTRAST INDICATION: Trauma  Fall COMPARISON: None  TECHNIQUE: CT examination of the chest, abdomen and pelvis was performed  Reformatted images were created in axial, sagittal, and coronal planes  Radiation dose length product (DLP) for this visit:  [phone] 1558-663 mGy-cm  (accession [id number]), 4348 mGy-cm  (accession [id number]), 640 mGy-cm  (accession [id number])  This examination, like all CT scans performed in the [facility], was performed utilizing techniques to minimize radiation dose exposure, including the use of iterative reconstruction and automated exposure control  IV Contrast:  100 mL of iohexol (OMNIPAQUE) Enteric Contrast:  Enteric contrast was not administered  3 CHEST LUNGS:  Suspected round atelectasis in the posterior right lower lobe  Linear atelectasis/scarring noted bilaterally  5 mm subpleural nodule in the left upper lung field (axial image 17, series 2)  Several subcentimeter calcified granulomas scattered in the right lung  Lungs otherwise appear grossly clear  PLEURA:  Pleural thickening with pleural calcifications in the dependent portion of the right mid and lower lung field, 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  HEART/GREAT VESSELS: Moderate to severe coronary atherosclerosis  Mild aortic atherosclerosis  No thoracic aortic aneurysm  The heart appears normal in size  Suspected lipoma of the interatrial septum measures approximately 5 3 cm in size  MEDIASTINUM AND SILKE:  Several subcentimeter calcified lymph nodes in the right hilum  Large airways appear patent  No mediastinal mass is seen  Evidence of prior CABG  CHEST WALL AND LOWER NECK: Right shoulder arthroplasty  Chest wall appears intact  Median sternotomy wires are noted  Otherwise grossly unremarkable   ABDOMEN LIVER/BILIARY TREE: Unremarkable  GALLBLADDER:  No calcified gallstones  No pericholecystic inflammatory change  SPLEEN:  Unremarkable  PANCREAS:  Mild atrophy; otherwise grossly unremarkable ADRENAL GLANDS:  4 cm right adrenal nodule redemonstrated, stable  Left adrenal gland appears normal  KIDNEYS/URETERS:  Several subcentimeter nonobstructing stones in the left kidney  Right kidney otherwise appears grossly unremarkable  Several subcentimeter nonobstructing stones in the left kidney  Several obstructing stones in the distal left ureter  measuring approximately 6 mm in size  Mild left hydroureteronephrosis and periureteral and perinephric fat stranding  STOMACH AND BOWEL:  Colonic diverticulosis  No discrete evidence of acute diverticulitis  No evidence of bowel obstruction  Otherwise grossly unremarkable  APPENDIX:  No findings to suggest appendicitis  ABDOMINOPELVIC CAVITY:  No ascites or free intraperitoneal air  No lymphadenopathy  VESSELS:  Moderate atherosclerosis  Fusiform aneurysm of the distal abdominal aorta measures approximately 5 cm in maximal dimensions  PELVIS REPRODUCTIVE ORGANS:  Enlarged prostate with prostate volume estimated at 93 mL  URINARY BLADDER:  Grossly unremarkable ABDOMINAL WALL/INGUINAL REGIONS:  Mild body wall edema OSSEOUS STRUCTURES:  Multilevel degenerative changes of the spine, most prominent in the lumbar region  Total right hip arthroplasty, visualized hardware appears intact  Moderate to severe degenerative changes of the left hip  No acute fracture or destructive osseous lesion identified  IMPRESSION:  No evidence of acute traumatic injury in the chest, abdomen, or pelvis  Bilateral nephrolithiasis with several obstructing stones in the distal left ureter, measuring approximately 6 mm in size with associated left hydroureteronephrosis and obstructive uropathy   Pleural thickening with pleural calcifications in the dependent portion of the right mid and lower lung field, 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  Suspected round atelectasis in the posterior right lower lobe  Coronary atherosclerosis, history of prior CABG  4 cm right adrenal nodule redemonstrated, stable  Moderate aortic atherosclerosis  Fusiform aneurysm of the distal abdominal aorta measures approximately 5 cm in maximal dimensions  5 mm subpleural nodule in the left upper lung field (axial image 17, series 2)  Based on current Fleischner Society 2017 Guidelines on incidental pulmonary nodule, 12 month follow-up non-contrast chest CT is recommended  Enlarged prostate, degenerative changes of the spine, right shoulder and right total hip arthroplasty, and other findings as above  Findings discussed with Dr Rodrigue Castellanos by Dr Larissa Reyes at 12:30 PM on 4/23/2021  Workstation performed: [id number]     Xr Trauma Multiple    Result Date: 4/23/2021  Narrative: TRAUMA SERIES INDICATION:  trauma  COMPARISON:  None VIEWS:  XR TRAUMA MULTIPLE  FINDINGS: CHEST: Supine frontal view of the chest is obtained  Cardiomediastinal silhouette is within normal limits accounting for technique and patient positioning  Bilateral pleural thickening noted with areas of pleural calcification as correlated with follow-up CT  No definite pneumothorax  No pneumothorax is seen on this supine film  Upright images are more sensitive to detect anterior pneumothoraces if relevant  Status post right shoulder arthroplasty  1 view reviewed  Impression: No acute cardiopulmonary disease within limitations of supine imaging  Workstation performed: [id number]     Cta Ed Chest Pe Study    Result Date: 3/25/2021  Narrative: CTA - CHEST WITH IV CONTRAST - PULMONARY ANGIOGRAM INDICATION:   Shortness of breath Hypoxia, dyspnea  COMPARISON: CT of abdomen on January 5, 2021  TECHNIQUE: CTA examination of the chest was performed using angiographic technique according to a protocol specifically tailored to evaluate for pulmonary embolism    Axial, sagittal, and coronal 2D reformatted images were created from the source data and  submitted for interpretation  In addition, coronal 3D MIP postprocessing was performed on the acquisition scanner  Radiation dose length product (DLP) for this visit:  1747 12 mGy-cm   This examination, like all CT scans performed in the [facility], was performed utilizing techniques to minimize radiation dose exposure, including the use of iterative reconstruction and automated exposure control  IV Contrast:  100 mL of iohexol (OMNIPAQUE)  was administered intravenously without immediate adverse reaction  FINDINGS: PULMONARY ARTERIAL TREE:  Motion limited examination  No main, lobar, or proximal segmental pulmonary embolism  LUNGS:  Bibasilar atelectasis/scarring similar to prior examination  Large calcification within the right lower lobe compatible with granuloma  There is a 0 7 x 0 5 cm juxtapleural nodule in the left upper lobe (series 2, image 70)  Minimal secretions  in the posterior trachea  PLEURA:  There are calcified pleural plaques which may be due to asbestos exposure  No pneumothorax or pleural effusion  HEART/GREAT VESSELS:  Cardiomegaly  Lipomatous hypertrophy of the interatrial septum  Coronary artery calcifications  No pericardial effusion  Status post median sternotomy  MEDIASTINUM AND SILKE:  Subcarinal lymph nodes measure up to 1 cm in short axis (series 2, image 119)  CHEST WALL AND LOWER NECK:   Unremarkable  VISUALIZED STRUCTURES IN THE UPPER ABDOMEN:  Stable 4 cm right adrenal nodule appears OSSEOUS STRUCTURES:  No acute fracture or destructive osseous lesion  Degenerative changes the osseous structures  Impression: 1  Motion limited examination  No evidence of pulmonary embolism to the proximal segmental level  2   Bibasilar atelectasis/scarring similar to prior examination  3   7 mm left upper lobe nodule    Based on current Fleischner Society 2017 Guidelines on incidental pulmonary nodule, followup non-contrast CT is recommended at 6-12 months from the initial examination and, if stable at that time, an additional followup is recommended for 18-24 months from the initial examination  4   Stable calcified pleural plaques which may be due to asbestos exposure  5   Cardiomegaly  Lipomatous hypertrophy of the interatrial septum  6   Stable 4 cm right adrenal nodule  Workstation performed: [id number]     Us Bedside Procedure    Result Date: 4/22/2021  Narrative: 1 [id number] [id number]  9 [id number]  1 [id number]      Medications   Current Facility-Administered Medications   Medication Dose Route Frequency Provider Last Rate    acetaminophen  650 mg Oral Q6H PRN Jessica Read MD      apixaban  5 mg Oral BID Jessica Read MD      atorvastatin  40 mg Oral Daily Jessica Read MD      digoxin  125 mcg Oral Daily Jessica Read MD      ezetimibe  10 mg Oral Daily Jessica Read MD      finasteride  5 mg Oral Daily Jessica Read MD      insulin detemir  65 Units Subcutaneous Q12H [facility] Jessica eRad MD      insulin lispro  1-6 Units Subcutaneous TID AC Jessica Read MD      metoprolol succinate  12 5 mg Oral Daily Jessica Read MD      multi-electrolyte  75 mL/hr Intravenous Continuous Chica Sarmiento PA-C 75 mL/hr (04/23/21 0144)    pantoprazole  20 mg Oral Early Morning MD Nancy Oneil CRNP

## 2021-04-23 NOTE — PHYSICAL THERAPY NOTE
PHYSICAL THERAPY EVALUATION NOTE    Patient Name: Raciel Garcia  CCITJ'O Date: 2021     AGE:   67 y o  Mrn:   [id number]  ADMIT DX:  Back pain [M54 9]  LACY (acute kidney injury) ([facility] ) [M79 0]  Chronic systolic congestive heart failure ([facility] ) [I50 22]  Fall, initial encounter [W19  XXXA]    Past Medical History:   Diagnosis Date    A-fib [facility])     AAA (abdominal aortic aneurysm) ([facility] )     Actinic keratosis of right temple 2020    Actinic keratosis of scalp 2020    Acute respiratory failure with hypoxia ([facility] ) 3/25/2021    Arthritis     Lay's esophagus     Bladder cancer (HCC)     CAD (coronary artery disease)     Chronic low back pain     Chronic pain of both knees 2020    Colitis 2020    Diabetes ([facility] )     Excessive gas 12/3/2020    Hyperlipemia     Hypertension     Immunization deficiency 10/30/2020    Hep a nonimmune    Left lower quadrant abdominal pain 12/3/2020    Mass of right adrenal gland ([facility] ) 2020    4 1 cm    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    LUCIA (obstructive sleep apnea) 2021     Length Of Stay: 0  PHYSICAL THERAPY EVALUATION :   Patient's identity confirmed via 2 patient identifiers (full name and ) at start of session       21 1336   PT Last Visit   PT Visit Date 21   Note Type   Note type Evaluation   Pain Assessment   Pain Assessment Tool FLACC   Pain Score No Pain   Pain Location/Orientation Location: Back   Pain Onset/Description   (chronic LBP)   Pain Rating: FLACC (Rest) - Face 0   Pain Rating: FLACC (Rest) - Legs 0   Pain Rating: FLACC (Rest) - Activity 0   Pain Rating: FLACC (Rest) - Cry 0   Pain Rating: FLACC (Rest) - Consolability 0   Score: FLACC (Rest) 0   Pain Rating: FLACC (Activity) - Face 0   Pain Rating: FLACC (Activity) - Legs 0   Pain Rating: FLACC (Activity) - Activity 0   Pain Rating: FLACC (Activity) - Cry 1   Pain Rating: FLACC (Activity) - Consolability 1   Score: FLACC (Activity) 2   Home Living   Type of Home House   Home Layout One level;Stairs to enter with rails   Bathroom Shower/Tub Tub/shower unit   Bathroom Toilet Raised   Home Equipment Walker  (RW and 2 rollators; O2 )   Additional Comments Pt reports having a RW, and 2 rollators ("a josephine and a eleazar"), uses the rollator for outside  Prior Function   Level of [place] Independent with ADLs and functional mobility   Lives With Spouse   Receives Help From Family;Home health   ADL Assistance Independent   IADLs Needs assistance   Falls in the last 6 months 1 to 4   Vocational Retired   Comments Pt reports just being placed on O2 since DC from [facility] the end of March  Restrictions/Precautions   Weight Bearing Precautions Per Order No   Other Precautions Chair Alarm; Bed Alarm;Telemetry;O2;Fall Risk  (5L NC O2)   General   Family/Caregiver Present Yes   Cognition   Overall Cognitive Status WFL   Arousal/Participation Alert   Orientation Level Oriented X4  (Pt identified by full name and )   Memory Within functional limits   Following Commands Follows multistep commands with increased time or repetition   Comments Pt seated EOB talking to family upon arrival   RLE Assessment   RLE Assessment WFL   Strength RLE   RLE Overall Strength 4/5   LLE Assessment   LLE Assessment WFL   Strength LLE   LLE Overall Strength 4/5   Bed Mobility   Supine to Sit Unable to assess   Transfers   Sit to Stand 5  Supervision   Additional items Assist x 1; Increased time required   Stand to Sit 5  Supervision   Additional items Assist x 1; Increased time required   Ambulation/Elevation   Gait pattern Decreased foot clearance; Short stride   Gait Assistance 5  Supervision   Additional items Assist x 1   Assistive Device Rolling walker   Distance 40 ft   Stair Management Assistance 4  Minimal assist   Additional items Assist x 1  (CGA/steadying A)   Balance   Static Sitting Good Static Standing Fair   Ambulatory Fair -  (w /RW)   Activity Tolerance   Activity Tolerance Patient limited by pain   Medical Staff Zenaida coordination w/ OT Cherelle Chanel to [facility] to DINAH Hernández   Assessment   Prognosis Fair   Problem List Decreased strength;Decreased endurance; Impaired balance;Decreased mobility;Obesity;Pain; Impaired sensation   Assessment Agnieszka Hdz is a 67 y o  Male who [facility]22[facility]2021 from home w/ c/o s/p fall on bottom and diagnosis of sepsis  Orders for PT eval and treat received, w/ fall precautions  Pt presents w/ comorbidities of DMII, CAD, Afib, LUCIA, CHF, hx of AAA, hx of bladder cancer and personal factors including: advanced age, mobilizing w/ assistive device, stair(s) to enter home and positive fall history  At baseline, pt mobilizes independently w/ rollator, and reports this as his only fall in the last 6 months  Upon evaluation, pt presents w/ the following deficits: weakness, edema of extremities, impaired balance, decreased endurance and pain limiting functional mobility  Pt requires S for bed mobility, S for transfers, and S for gait and S for stairs  Pt's clinical presentation is unstable/unpredictable due to need for assist w/ all phases of mobility when usually mobilizing independently, need for supplemental oxygen in order to maintain oxygen saturation, need for input for task focus and mobility technique and recent history of falls  Pt is at an increased risk of falls due to physical deficits  Given the above findings, discharge recommendation is for Home w/ HHPT  During this admission, pt would benefit from skilled acute inpatient PT in order to address the abovementioned deficits to maximize function and mobility before DC from acute care  Goals   Patient Goals to go home   STG Expiration Date 05/03/21   Short Term Goal #1 Patient will:  Increase bilateral LE strength 1/2 grade to facilitate independent mobility, Perform all bed mobility tasks independently to decrease fall risk factors, Perform all transfers independently to improve independence, Ambulate at least 150 ft  with roller walker modified independent w/o LOB, Navigate 2 stairs w/ supervision with bilateral handrails to facilitate return to previous living environment, Increase all balance 1/2 grade to decrease risk for falls and Complete exercise program independently   PT Treatment Day 1  (see treatment note below)   Plan   Treatment/Interventions Functional transfer training;LE strengthening/ROM; Elevations; Therapeutic exercise; Endurance training;Patient/family training;Equipment eval/education; Bed mobility;Gait training   PT Frequency Other (Comment)  (3-5x/wk)   Recommendation   PT Discharge Recommendation Home with home health rehabilitation   [address] [place] [address] Mobility Inpatient   Turning in Bed Without Bedrails 3   Lying on Back to Sitting on Edge of Flat Bed 3   Moving Bed to Chair 3   Standing Up From Chair 3   Walk in Room 3   Climb 3-5 Stairs 3   Basic Mobility Inpatient Raw Score 18   Basic Mobility Standardized Score 41 05   Barthel Index   Feeding 10   Bathing 0   Grooming Score 5   Dressing Score 5   Bladder Score 10   Bowels Score 10   Toilet Use Score 5   Transfers (Bed/Chair) Score 10   Mobility (Level Surface) Score 0   Stairs Score 5   Barthel Index Score 60            PHYSICAL THERAPY TREATMENT NOTE    Name: Elba Brown  : 1945  Time in: 1328  Time out: 1336  Total treat time: 8 min    S: Pt seated EOB  He reports some LBP but this is per baseline  SpO2 >90% throughout session on 5L    O: Pt S w/ increased time for STS from EOB  Pt is able to ambulate 75 ft w/ RW w/ supervision w/ multiple turns and navigating large obstacles in hallway  Pt returned to recliner chair after ambulation w/ supervision and cues for proper hand placement and controlled descent   Pt seated OOB in recliner chair w/ chair alarm activated, call bell and room phone within reach w/ all needs met  Pt reports he feels he is ambulating better than yesterday when he was feeling "off"  A: Pt is agreeable to participate in PT intervention  Pt is able to ambulate further this session than previous w/ S and RW  Pt continues to be limited due to his LBP but reports this is chronic  SpO2 is maintained at >90% throughout session on 5L NC O2  Recommend continued practice w/ RW and stairs in upcoming sessions to facilitate return to previous living environment       P: Continue per evaluation above    Skilled inpatient PT is recommended during this admission in order to progress patient toward goals so patient is able to maximize independence    Andrew Oakley, PT

## 2021-04-23 NOTE — ASSESSMENT & PLAN NOTE
· Currently tachycardic and sinus rhythm  · Anticoagulated with Eliquis 5 mg b i d  Mike Po · Continue Metoprolol 12 5 mg q d    · Continue digoxin 125 mcg  · Will check dig level

## 2021-04-23 NOTE — PLAN OF CARE
Problem: OCCUPATIONAL THERAPY ADULT  Goal: Performs self-care activities at highest level of function for planned discharge setting  See evaluation for individualized goals  Description: Treatment Interventions: ADL retraining, Functional transfer training, UE strengthening/ROM, Endurance training, Patient/family training, Equipment evaluation/education, Compensatory technique education, Energy conservation, Activityengagement          See flowsheet documentation for full assessment, interventions and recommendations  4/23/2021 1509 by VIRA Daigle  Note: Limitation: Decreased ADL status, Decreased UE strength, Decreased endurance, Decreased Safe judgement during ADL, Decreased high-level ADLs, Decreased self-care trans  Prognosis: Good  Assessment: Patient is a 67 y o  male admitted to [facility] on 4/22/2021 due to Sepsis [facility]  Comorbidities affecting pt's physical performance at time of assessment include DM II, CAD, a fib, LUCIA, hypoxemia, CHF, fall  Patient has active OT orders  PTA pt living with wife in [facility], pt (I) with ADLs and shares IADLs with wife, pt use of RW at baseline, (+)fall  Personal factors affecting pt at time of IE include:steps to enter environment, difficulty performing ADLS and difficulty performing IADLS   At the time of evaluation patient currently requires (S) for UB ADLs, (S) for LB ADLs, (S) for functional transfers, and (S) for functional mobility  The following deficits affected patient's occupational performance weakness, decreased functional strength, decreased functional balance, decreased activity tolerance, decreased safety awareness and decreased endurance  Patient would benefit from skilled OT services while in the hospital to address above deficits   Occupational performance areas to be addressed include ADL retraining, bed mobility, functional transfer training, endurance training, patient/family training, equipment evaluation/education, compensatory technique education, activity engagement and activity tolerance in order to maximize patient's level of function  The patient's raw score on the AM-PAC Daily Activity inpatient short form is 21, standardized score is 44 27, greater than 39 4  Patients at this level are likely to benefit from discharge to home  Recommend d/c to home with continued [facility] therapy when medically cleared  Will continue to follow 1-2/wk to address goals listed below        OT Discharge Recommendation: Home with home health rehabilitation

## 2021-04-23 NOTE — ASSESSMENT & PLAN NOTE
· With tachycardia and leukocytosis possible fever  · With LACY possibly secondary to sepsis vs obstructing ureteral stone  · Suspected source urine with left hydronephrosis and left ureteral stone, consider Covid

## 2021-04-23 NOTE — H&P
[facility]  H&P- Carl Rico 1945, 67 y o  male MRN: [id number]  Unit/Bed#: ED 06 Encounter: [id number]  Primary Care Provider: Edith Ferris MD   Date and time admitted to hospital: 4/22/2021 10:56 PM    Sepsis [facility])  Assessment & Plan  · With tachycardia and leukocytosis possible fever  · Suspected source urine with left hydronephrosis and left ureteral stone, consider Covid     Fall  Assessment & Plan  · Fall on Eliquis  · Patient reports he stood up from urinating and fell backwards after his legs gave out  · He denies head strike or LOC  · Admit to medical service with significant cardiac history and recent admission for CHF and aggressive outpatient diuresis  · PT/OT  · Recommend geriatric consult    CHF (congestive heart failure) ([facility] [address] )  Assessment & Plan  Wt Readings from Last 3 Encounters:   04/22/21 (!) 143 kg (314 lb 13 1 oz)   04/16/21 (!) 146 kg (322 lb)   04/09/21 (!) 145 kg (319 lb)     · Admitted late March to [facility] with CHF exacerbation with A fib which converted to normal sinus with aggressive diuresis  He was seen by his cardiologist 1 week ago who started him on metolazone 3 times a week with his lasix 80mg BID doses  Current weight is down 7lbs from office visit 1 week ago     · Currently in sinus tach    Hypoxemia  Assessment & Plan  · Hypoxemia with persistent home O2 requirement since hospitalization 1 month ago suspected a mix of LUCIA, emphysema, obesity hypoventilation symptom, and CHF   · Continue O2 as needed to keep oxygen sats >88%    A-fib ([facility])  Assessment & Plan  · History of PAF currently in sinus tach   · Previously treated with metoprolol which was stopped due to hypotension, restarted on 12 5mg last week  · Currently on digoxin and metoprolol 12 5mg    CAD (coronary artery disease)  Assessment & Plan  · With prior history of CABG x 4  · CHF and PAF  · Metoprolol dose initially stopped due to hypotension and now restarted to 12 5mg   · Continue digoxin and atorvastatin      Assessment/Plan   Trauma Alert: Level B  Model of Arrival: Ambulance  Trauma Team: Attending Meera Chow and ADAM Adkins 49  Consultants: Trauma    Chief Complaint: Patient denies complaints    History of Present Illness   HPI:  Giovani South is a 67 y o  male with PMH of CHF, PAF, Hypoxemia, Obesity, CAD, who [facility] to [facility] as a level B trauma following a fall  He reports he was standing up from the toilet after urinating today when he felt his legs give out and fell backwards to his buttock  He denies striking his head or lose consciousness  He was unable to get up on his own require the assistance of EMS  He denies chest pain, worsening shortness of breath, abdominal pain, nausea, vomiting, dysuria, or extremity pain  He denies recent illness  He does admit to "gas pains" on the left side of his abdomen which he described as intermittent and dull/achey  He reports the resolved on their own  Mechanism:Fall    Review of Systems   Constitutional: Negative for activity change, appetite change, chills, fatigue and fever  HENT: Negative for congestion, postnasal drip, rhinorrhea, sinus pressure and sinus pain  Eyes: Negative for photophobia and visual disturbance  Respiratory: Negative for cough, chest tightness, shortness of breath and wheezing  Cardiovascular: Negative for chest pain and palpitations  Gastrointestinal: Positive for abdominal pain (see HPI)  Negative for abdominal distention, blood in stool, constipation, diarrhea, nausea and vomiting  Genitourinary: Negative for dysuria, flank pain, hematuria and urgency  Musculoskeletal: Positive for back pain (chronic)  Negative for neck pain and neck stiffness  Neurological: Negative for dizziness, weakness, light-headedness, numbness and headaches  12-point, complete review of systems was reviewed and negative except as stated above         Historical Information       Past Medical History:   Diagnosis Date    A-fib [facility])     AAA (abdominal aortic aneurysm) ([facility] )     Actinic keratosis of right temple 2020    Actinic keratosis of scalp 2020    Acute respiratory failure with hypoxia ([facility] ) 3/25/2021    Arthritis     Lay's esophagus     Bladder cancer (HCC)     CAD (coronary artery disease)     Chronic low back pain     Chronic pain of both knees 2020    Colitis 2020    Diabetes ([facility] )     Excessive gas 12/3/2020    Hyperlipemia     Hypertension     Immunization deficiency 10/30/2020    Hep a nonimmune    Left lower quadrant abdominal pain 12/3/2020    Mass of right adrenal gland ([facility] ) 2020    4 1 cm    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    LUCIA (obstructive sleep apnea) 2021     Past Surgical History:   Procedure Laterality Date    BACK SURGERY      BLADDER SURGERY      CATARACT EXTRACTION, BILATERAL      COLONOSCOPY  2019    next  [address] GRAFT  2016    Quadruple per Tanisha Conteh    EGD  2017    TOTAL HIP ARTHROPLASTY Right 2012    TOTAL SHOULDER REPLACEMENT Right 2013     Social History   Social History     Substance and Sexual Activity   Alcohol Use Not Currently    Comment: No use     Social History     Substance and Sexual Activity   Drug Use Never    Comment: No use     Social History     Tobacco Use   Smoking Status Former Smoker    Packs/day: 0 25    Types: Cigarettes    Quit date: 2009    Years since quittin 3   Smokeless Tobacco Never Used   Tobacco Comment    smoked since age 21; per Tanisha Conteh     E-Cigarette/Vaping    E-Cigarette Use Never User      E-Cigarette/Vaping Substances    Nicotine No     THC No     CBD No     Flavoring No     Other No     Unknown No      Immunization History   Administered Date(s) Administered    Hep B, adult 10/30/2020    INFLUENZA 2011, 10/17/2012, 2013, 10/20/2014, 2015, 2016, 2016, 2018, 10/13/2020    Pneumococcal Conjugate 13-Valent 09/20/2016, 09/29/2016    Pneumococcal Polysaccharide PPV23 10/06/2009    SARS-CoV-2 / COVID-19 mRNA IM ([facility]) 01/21/2021, 03/03/2021    Td (adult), adsorbed 06/03/2011    Zoster 03/01/2010, 03/09/2010     Last Tetanus: 2011  Family History: Non-contributory      Meds/Allergies   all current active meds have been reviewed    No Known Allergies      PHYSICAL EXAM    Objective   Vitals:   First set: Temperature: 98 5 °F (36 9 °C) (04/22/21 2259)  Pulse: (!) 114 (04/22/21 2259)  Respirations: 20 (04/22/21 2259)  Blood Pressure: 130/62 (04/22/21 2259)    Primary Survey:   (A) Airway: Intact  (B) Breathing: Equal bilaterally  (C) Circulation: Pulses:   pedal  2/4, radial  2/4 and femoral  2/4  (D) Disabliity:  GCS Total:  15  (E) Expose:  Completed    Secondary Survey: (Click on Physical Exam tab above)  Physical Exam  Vitals signs and nursing note reviewed  Constitutional:       General: He is not in acute distress  Appearance: Normal appearance  He is not ill-appearing or toxic-appearing  HENT:      Head: Normocephalic and atraumatic  Right Ear: Tympanic membrane normal       Left Ear: Tympanic membrane normal       Nose: No congestion or rhinorrhea  Mouth/Throat:      Mouth: Mucous membranes are moist       Pharynx: Oropharynx is clear  No oropharyngeal exudate  Eyes:      Extraocular Movements: Extraocular movements intact  Pupils: Pupils are equal, round, and reactive to light  Neck:      Musculoskeletal: Normal range of motion  No neck rigidity or muscular tenderness  Cardiovascular:      Rate and Rhythm: Regular rhythm  Tachycardia present  Heart sounds: No murmur  No friction rub  No gallop  Pulmonary:      Effort: Pulmonary effort is normal       Breath sounds: No wheezing, rhonchi or rales  Abdominal:      Palpations: Abdomen is soft  Tenderness: There is no abdominal tenderness  There is no guarding or rebound     Musculoskeletal: General: No swelling, deformity or signs of injury  Skin:     General: Skin is warm and dry  Capillary Refill: Capillary refill takes less than 2 seconds  Neurological:      General: No focal deficit present  Mental Status: He is alert and oriented to person, place, and time  Motor: No weakness           Invasive Devices     Peripheral Intravenous Line            Peripheral IV 04/22/21 Left Antecubital less than 1 day                Lab Results:   BMP/CMP:   Lab Results   Component Value Date    SODIUM 131 (L) 04/22/2021    K 4 8 04/22/2021    CL 94 (L) 04/22/2021    CO2 28 04/22/2021    CO2 30 04/22/2021    BUN 44 (H) 04/22/2021    CREATININE 1 66 (H) 04/22/2021    GLUCOSE 237 (H) 04/22/2021    CALCIUM 10 3 (H) 04/22/2021    EGFR 39 04/22/2021   , CBC:   Lab Results   Component Value Date    WBC 15 21 (H) 04/22/2021    HGB 11 9 (L) 04/22/2021    HCT 38 2 04/22/2021    MCV 86 04/22/2021     04/22/2021    MCH 26 7 (L) 04/22/2021    MCHC 31 2 (L) 04/22/2021    RDW 16 1 (H) 04/22/2021    MPV 10 6 04/22/2021    NRBC 0 04/22/2021    and Urinalysis:   Lab Results   Component Value Date    COLORU Light Yellow 04/23/2021    CLARITYU Clear 04/23/2021    SPECGRAV 1 010 04/23/2021    PHUR 5 5 04/23/2021    LEUKOCYTESUR Negative 04/23/2021    NITRITE Negative 04/23/2021    GLUCOSEU Negative 04/23/2021    KETONESU Negative 04/23/2021    BILIRUBINUR Negative 04/23/2021    BLOODU Trace-Intact (A) 04/23/2021     Imaging/EKG Studies: Chest X-Ray: Pleural effusion, CT Scan Head: No intracrainal hemorrhage or skull fracture, CT Scan C-Spine: No fractures or traumatic malalignment, CT Chest: bilateral pleural effusions, no fractures, CT Scan Abdomen/Pelvis: renal stones with obstruction and hydronephritis on the left  Other Studies: none    Code Status: Prior

## 2021-04-23 NOTE — ASSESSMENT & PLAN NOTE
Patient had fall without loss of consciousness and without hitting his head  Patient fell on his buttocks  No hematoma present, hemoglobin stable  No evidence of fractures on imaging  Patient's orthostatics were positive  Patient's fall is likely secondary to orthostatic hypotension 2/2 over-diuresis verses    Plan:  · Trauma on board, will evaluate tomorrow a m    · Will hold diuretics  · PT/OT  · Continue ambulation with walker

## 2021-04-23 NOTE — ED PROVIDER NOTES
History  Chief Complaint   Patient presents with    Fall     per ems pt coming from home for a fall aprrox 1 hour ago  He was feeling off balance all day, he fell onto his butt  No LOC, no head strike  Pt takes eliquis daily  Pt chronically wears 3 L for CHF     HPI      Of note, performed initial evaluation of the patient to assess for possible trauma alert  NONE OF THE FOLLOWING CRITERIA TO MEET TRAUMA ALERT ON INITIAL ASSESSMENT:    LEVEL A   - GCS </= 13 or dropping GCS by 2 points    - A SBP < 90 or < than 70+ (2 x age) for pt <10 yo  - Respiratory rate <10 or >29  - Intubated patient and/or potential airway compromise  - Inhalation injury  - Any physiologic deterioration or lack of pre-hospital information  - Penetrating injury to the head, neck, torso, extremities proximal to the elbow/knee  - Suspected pneumothorax or chest wall deformity  - 2 or more long bone fractures  - Traumatic amputation proximal to the wrist/ankle  - Crush, degloved or mangled pulseless extremity  - Pelvis fracture  - Open or depressed skull fracture  - Paralysis or suspected spinal cord injury  - Transfer from another hospital receiving blood/intubated with respiratory compromise     LEVEL B   - Fall adults >20 ft fall or children >10 ft or 2-3 times the height of the child  - Falls on AC/anti-platelet therapy w/ head trauma, head strike or an abnormal neurological exam:  GCS < 15; reported LOC; amnesia; headaches; nausea/vomiting  - High risk MVC with   - Intrusion > 12 in on occupants side or greater than 18 in in any side   - Ejection from the vehicle, ATV or motorcycle   - Death of passenger in same passenger compartment   - Vehicle telemetry data consistent with high risk injury  - Auto versus pedestrian/bicyclist thrown or run over w/ >20 mph impact  - High energy electrical injury/blast/ explosion  - Burns > 10%  - Blunt abdominal injury with firmer distended abdomen or with seatbelt sign  - Suspicion for hypothermia, drowning, hanging, non accidental trauma  - Age <12 or >71  - AC/anti-platelet agents/aspirin bleeding disorders  - Unknown baseline mental status  - Known cardiac/respiratory disease, cirrhosis, diabetes  - Immunosuppression  - Pregnancy  - Morbid obesity  - MD/pre-hospital judgment     LEVEL C   Falls on aspirin with head trauma, head strike and GCS of >/= to 14             Prior to Admission Medications   Prescriptions Last Dose Informant Patient Reported? Taking?    Eliquis 5 MG  Self No No   Sig: TAKE 1 TABLET BY MOUTH TWICE A DAY   Fluad Quadrivalent 0 5 ML PRSY  Self Yes No   Sig: PHARMACY ADMINISTERED   Levemir FlexTouch 100 units/mL injection pen  Self No No   Sig: Inject under the skin 65 units in the morning and at bedtime   Unifine Pentips 31G X 8 MM MISC  Self No No   Sig: Inject under the skin daily Use as directed   atorvastatin (LIPITOR) 40 mg tablet  Self No No   Sig: Take 1 tablet (40 mg total) by mouth daily   digoxin (LANOXIN) 0 125 mg tablet  Self No No   Sig: Take 1 tablet (125 mcg total) by mouth daily   ezetimibe (ZETIA) 10 mg tablet  Self No No   Sig: Take 1 tablet (10 mg total) by mouth daily   finasteride (PROSCAR) 5 mg tablet  Self No No   Sig: Take 1 tablet (5 mg total) by mouth daily   furosemide (LASIX) 80 mg tablet  Self No No   Sig: Take 1 tablet (80 mg total) by mouth 2 (two) times a day   glipiZIDE (GLUCOTROL XL) 5 mg 24 hr tablet  Self No No   Sig: Take 1 tablet (5 mg total) by mouth daily   lisinopril (ZESTRIL) 20 mg tablet  Self No No   Sig: Take 1 tablet (20 mg total) by mouth daily   metFORMIN (GLUCOPHAGE) 1000 MG tablet  Self Yes No   Sig: Take 500 mg by mouth 2 (two) times a day    metolazone (ZAROXOLYN) 2 5 mg tablet   No No   Sig: Take 30min before AM furosemide on M, W, F    metoprolol succinate (TOPROL-XL) 25 mg 24 hr tablet   No No   Sig: Take 0 5 tablets (12 5 mg total) by mouth daily   omeprazole (PriLOSEC) 20 mg delayed release capsule  Self No No   Sig: Take 1 capsule (20 mg total) by mouth daily   potassium chloride (K-DUR,KLOR-CON) 20 mEq tablet   No No   Sig: Take 2 tablets M, W, F and 1 tablet Tues, Thurs, Sat, Sun      Facility-Administered Medications: None       Past Medical History:   Diagnosis Date    A-fib [facility])     AAA (abdominal aortic aneurysm) ([facility] )     Actinic keratosis of right temple 2020    Actinic keratosis of scalp 2020    [facility] 3[facility]25/2021    Arthritis     Lay's esophagus     Bladder cancer (HCC)     CAD (coronary artery disease)     Chronic low back pain     Chronic pain of both knees 2020    Colitis 2020    [facility]     Excessive gas 12/3/2020    Hyperlipemia     Hypertension     Immunization deficiency 10/30/2020    Hep a nonimmune    Left lower quadrant abdominal pain 12/3/2020    [facility] 2020    4 1 cm    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    LUCIA (obstructive sleep apnea) 2021       Past Surgical History:   Procedure Laterality Date    BACK SURGERY      BLADDER SURGERY      CATARACT EXTRACTION, BILATERAL      COLONOSCOPY  2019    next  [address] GRAFT  2016    Quadruple per [place]    EGD  2017    TOTAL HIP ARTHROPLASTY Right 2012    TOTAL SHOULDER REPLACEMENT Right 2013       Family History   Problem Relation Age of Onset    Heart disease Father     Arthritis Father     Heart attack Father     Alzheimer's disease Mother      I have reviewed and agree with the history as documented      E-Cigarette/Vaping    E-Cigarette Use Never User      E-Cigarette/Vaping Substances    Nicotine No     THC No     CBD No     Flavoring No     Other No     Unknown No      Social History     Tobacco Use    Smoking status: Former Smoker     Packs/day: 0 25     Types: Cigarettes     Quit date: 2009     Years since quittin 3    Smokeless tobacco: Never Used    Tobacco comment: smoked since age 21; per [place]   Substance Use Topics    Alcohol use: Not Currently     Comment: No use    Drug use: Never     Comment: No use       Review of Systems    Physical Exam  Physical Exam    Vital Signs  ED Triage Vitals [04/22/21 2259]   Temperature Pulse Respirations Blood Pressure SpO2   98 5 °F (36 9 °C) (!) 114 20 130/62 90 %      Temp Source Heart Rate Source Patient Position - Orthostatic VS BP Location FiO2 (%)   Oral -- Sitting Right arm --      Pain Score       No Pain           Vitals:    04/22/21 2259   BP: 130/62   Pulse: (!) 114   Patient Position - Orthostatic VS: Sitting         Visual Acuity      ED Medications  Medications - No data to display    Diagnostic Studies  Results Reviewed     None                 No orders to display              Procedures  Procedures         ED Course                                           MDM    Disposition  Final diagnoses:   None     ED Disposition     None      Follow-up Information    None         Patient's Medications   Discharge Prescriptions    No medications on file     No discharge procedures on file      PDMP Review       Value Time User    PDMP Reviewed  Yes 3/25/2021 11:14 PM Daylene Falling, DO          ED Provider  Electronically Signed by

## 2021-04-23 NOTE — ASSESSMENT & PLAN NOTE
POA   Febrile at 100 6 (resolved without intervention), Tachycardia, leukocytosis and end-organ damage a/e/b LACY  Lactic acid within normal limits  Likely reactive verses less likely UTI verses other infectious process    Plan:   Follow-up urine culture, blood culture, procalcitonin   Antibiotics:  Patient started on ceftriaxone  Will hold off further antibiotics until urine culture, blood culture or procalcitonin come back positive     IV fluid hydration: Isolyte at 75 mL/hour for 12 hours   Monitor WBC and fever curve

## 2021-04-23 NOTE — OCCUPATIONAL THERAPY NOTE
Occupational Therapy Evaluation     Patient Name: Elba Brown  [id number] Date: 4/23/2021  Problem List  Principal Problem:    Sepsis ([facility] )  Active Problems:    Type 2 diabetes mellitus with hyperglycemia, with long-term current use of insulin (HCC)    CAD (coronary artery disease)    Paroxysmal atrial fibrillation (HCC)    LUCIA (obstructive sleep apnea)    Hypoxemia    CHF (congestive heart failure) ([facility] )    Fall    LACY (acute kidney injury) ([facility] )    Hyponatremia    Past Medical History  Past Medical History:   Diagnosis Date    A-fib [facility])     AAA (abdominal aortic aneurysm) ([facility] )     Actinic keratosis of right temple 7/31/2020    Actinic keratosis of scalp 7/31/2020    Acute respiratory failure with hypoxia ([place] [address] ) 3/25/2021    Arthritis     Lay's esophagus     Bladder cancer (HCC)     CAD (coronary artery disease)     Chronic low back pain     Chronic pain of both knees 7/31/2020    Colitis 12/4/2020    Diabetes ([facility] )     Excessive gas 12/3/2020    Hyperlipemia     Hypertension     Immunization deficiency 10/30/2020    Hep a nonimmune    Left lower quadrant abdominal pain 12/3/2020    Mass of right adrenal gland ([facility] ) 12/4/2020    4 1 cm    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    LUCIA (obstructive sleep apnea) 1/29/2021     Past Surgical History  Past Surgical History:   Procedure Laterality Date    BACK SURGERY      BLADDER SURGERY      CATARACT EXTRACTION, BILATERAL      COLONOSCOPY  01/29/2019    next 2022 [address] GRAFT  04/2016    Quadruple per [employer]    EGD  06/2017    TOTAL HIP ARTHROPLASTY Right 2012    TOTAL SHOULDER REPLACEMENT Right 2013             04/23/21 1314   OT Last Visit   OT Visit Date 04/23/21   Note Type   Note type Evaluation   Restrictions/Precautions   Weight Bearing Precautions Per Order No   Other Precautions Chair Alarm; Bed Alarm;Telemetry;O2;Fall Risk  (5L O2 via NC)   Pain Assessment   Pain Assessment Tool FLACC Pain Score No Pain   Pain Location/Orientation Orientation: Lower; Location: Back   Pain Rating: FLACC (Rest) - Face 0   Pain Rating: FLACC (Rest) - Legs 0   Pain Rating: FLACC (Rest) - Activity 0   Pain Rating: FLACC (Rest) - Cry 0   Pain Rating: FLACC (Rest) - Consolability 0   Score: FLACC (Rest) 0   Pain Rating: FLACC (Activity) - Face 0   Pain Rating: FLACC (Activity) - Legs 0   Pain Rating: FLACC (Activity) - Activity 0   Pain Rating: FLACC (Activity) - Cry 1   Pain Rating: FLACC (Activity) - Consolability 1   Score: FLACC (Activity) 2   Home Living   Type of Home House   Home Layout One level;Stairs to enter with rails  (2 [address])   Bathroom Shower/Tub Tub/shower unit   [employer] Raised   Bathroom Equipment Grab bars in shower; Shower chair   [address] Walker  (2 rollators)   Additional Comments use of RW and rollator at baseline   Prior Function   Lives With [facility]   IADLs Needs assistance   Falls in the last 6 months 1 to 4  (reason for admission)   Vocational Retired  (worked in [place])   [address]   [address] [facility] PTA pt living with wife in [facility], pt (I) with ADLs and shares IADLs with wife, pt use of RW at baseline, (+)fall   Reciprocal Relationships supportive daughter and wife present in room at time of eval   [employer] retired, works 48 years for the [employer] "I am from [place], and moved to [place] and now here in this area to be with family and the good healthcare"   ADL   Eating Assistance 7  Independent   Grooming Assistance 7  [address]  5  Marv  66  5  Supervision/Setup   LB Dressing Deficit Don/doff R sock; Don/doff L sock   Toileting Assistance  5  Supervision/Setup   Bed Mobility   Additional Comments Sitting EOB upon arrival and in chair at end of session   Transfers   Sit to Stand 5  Supervision   Additional items Increased time required   Stand to Sit 5  Supervision   Additional items Increased time required   Additional Comments use of RW   Functional Mobility   Functional Mobility 5  Supervision   Additional Comments functional household distance   Additional items Rolling walker   Balance   Static Sitting Good   Dynamic Sitting Good   Static Standing Fair +   Dynamic Standing Fair   Ambulatory Fair -   Activity Tolerance   Activity Tolerance Patient limited by pain   Medical Staff Made Aware PT Julianne, BASILIO Grimes, DINAH Hernández   RUE Assessment   RUE Assessment WFL   LUE Assessment   LUE Assessment WFL   Hand Function   Gross Motor Coordination Functional   Fine Motor Coordination Functional   Sensation   Light Touch No apparent deficits   Sharp/Dull No apparent deficits   Cognition   Overall Cognitive Status WFL   Arousal/Participation Alert; Cooperative   Attention Within functional limits   Orientation Level Oriented X4   Memory Within functional limits   Following Commands Follows one step commands without difficulty   Comments Pleasant and cooperative   Assessment       Prognosis Good   Assessment: Patient is a 67 y o  male admitted to [address] [place] [address] on 4/22/2021 due to Sepsis [facility])  Comorbidities affecting pt's physical performance at time of assessment include DM II, CAD, a fib, LUCIA, hypoxemia, CHF, fall  Patient has active OT orders  PTA pt living with wife in [facility], pt (I) with ADLs and shares IADLs with wife, pt use of RW at baseline, (+)fall  Personal factors affecting pt at time of IE include:steps to enter environment, difficulty performing ADLS and difficulty performing IADLS   At the time of evaluation patient currently requires (S) for UB ADLs, (S) for LB ADLs, (S) for functional transfers, and (S) for functional mobility   The following deficits affected patient's occupational performance weakness, decreased functional strength, decreased functional balance, decreased activity tolerance, decreased safety awareness and decreased endurance  Patient would benefit from skilled OT services while in the hospital to address above deficits  Occupational performance areas to be addressed include ADL retraining, bed mobility, functional transfer training, endurance training, patient/family training, equipment evaluation/education, compensatory technique education, activity engagement and activity tolerance in order to maximize patient's level of function  The patient's raw score on the AM-PAC Daily Activity inpatient short form is 21, standardized score is 44 27, greater than 39 4  Patients at this level are likely to benefit from discharge to home  Recommend d/c to home with continued [facility] therapy when medically cleared  Will continue to follow 1-2/wk to address goals listed below  Goals   Patient Goals to go home   LTG Time Frame 10-14   Long Term Goal see goals listed below   Plan   Treatment Interventions ADL retraining;Functional transfer training;UE strengthening/ROM; Endurance training;Patient/family training;Equipment evaluation/education; Compensatory technique education; Energy conservation; Activityengagement   Goal Expiration Date 05/07/21   OT Treatment Day 0   OT Frequency 1-2x/wk   Recommendation   OT Discharge Recommendation Home with home health rehabilitation   AM-[facility] Daily Activity Inpatient   Lower Body Dressing 3   Bathing 3   Toileting 3   Upper Body Dressing 4   Grooming 4   Eating 4   Daily Activity Raw Score 21   Daily Activity Standardized Score (Calc for Raw Score >=11) 44 27   AM-[facility] Applied Cognition Inpatient   Following a Speech/Presentation 4   Understanding Ordinary Conversation 4   Taking Medications 4   Remembering Where Things Are Placed or Put Away 4   Remembering List of 4-5 Errands 4   Taking Care of Complicated Tasks 4   Applied Cognition Raw Score 24   Applied Cognition Standardized Score 62 21          Goals    -Patient will complete UB ADLs w/ mod I using AE and AD as needed    -Patient will complete LB ADLs w/ mod I using AE and AD as needed    -Patient will complete toileting w/ mod I w/ G hygiene/thoroughness    -Patient will complete bed mobility with Mod I without use of bed rails    -Patient will tolerate therapeutic activities for greater than 15 min, in order to increase tolerance for functional activities      -Patient will perform functional transfers with Mod I to/from all surfaces using DME as needed    -Patient will complete functional mobility during ADL/IADL/leisure tasks with Mod I             At the end of the session, all needs met and pt seated in bedside chair, chair alarm activated, LEs elevated  and call bell within reach    [facility], OTR/L

## 2021-04-23 NOTE — ASSESSMENT & PLAN NOTE
Results for Tere Verdin (MRN [id number]) as of 4/23/2021 08:52   Ref   Range 4/22/2021 23:42   BUN Latest Ref Range: 5 - 25 mg/dL 44 (H)   Creatinine Latest Ref Range: 0 60 - 1 30 mg/dL 1 66 (H)     Isolyte running at 75 cc/hr

## 2021-04-23 NOTE — ASSESSMENT & PLAN NOTE
Lab Results   Component Value Date    HGBA1C 8 4 (H) 04/22/2021       Recent Labs     04/23/21  0100 04/23/21  0706   POCGLU 261* 213*       Blood Sugar Average: Last 72 hrs:  (P) 237

## 2021-04-23 NOTE — ASSESSMENT & PLAN NOTE
· Patient's last A1c is 9 3    · Will recheck A1c and lipid panel  · Will hold oral antihyperglycemics  · Currently on 65 units of Levemir b i d  · Sliding scale algorithm 1  · Carb restricted diet  · Hypoglycemic protocol

## 2021-04-23 NOTE — ASSESSMENT & PLAN NOTE
· Currently tachycardic and sinus rhythm  · Anticoagulated with Eliquis 5 mg b i d  Yasmani Beckman · Continue Metoprolol 12 5 mg q d    · Continue digoxin 125 mcg  · Will check dig level

## 2021-04-23 NOTE — ASSESSMENT & PLAN NOTE
· History of PAF currently in sinus tach   · Previously treated with metoprolol which was stopped due to hypotension, restarted on 12 5mg last week  · Currently on digoxin and metoprolol 12 5mg

## 2021-04-23 NOTE — CONSULTS
Consultation - Cardiology   Daniel [place]son 67 y o  male MRN: [id number]  Unit/Bed#: S -01 Encounter: [id number]    Assessment/Plan     Principal Problem:    Sepsis ([place] [address] )  Active Problems:    Type 2 diabetes mellitus with hyperglycemia, with long-term current use of insulin (HCC)    CAD (coronary artery disease)    Paroxysmal atrial fibrillation (HCC)    LUCIA (obstructive sleep apnea)    Hypoxemia    CHF (congestive heart failure) ([place] [address] )    Fall    LACY (acute kidney injury) ([place] [address] )    Hyponatremia      Assessment/Plan    1  Sepsis-POA  Febrile, tachycardia, leukocytosis, AK I  Follow-up urine culture, blood culture  Procalcitonin-fairly low 0 23  Started on ceftriaxone  W/U per SLIM    2  AK I/hyponatremia-creatinine 1 6 with a baseline 0 8-0 9  Felt to be a component pre renal from  diuresis /decreased fluid intake and obstructive  Hold diuretics for now  Been taking Lasix 80 b i d  And metolazone 5 mg 3 times this past week  Has had increasing lightheadedness  Receiving fluids - isolyte 75 cc an hour, follow BMP  CT abdomen-bilateral nephrolithiasis with several obstructing stones in the distal left ureter with associated left hydroureteronephrosis and obstructive uropathy    Avoid nephrotoxic agents-lisinopril on hold    3  PAF-appears to be maintaining NSR with PACs-present was times tachycardia but with fluids heart rate improving  BB-Toprol 12 5 daily  Digoxin 125 mcg daily  AC-Eliquis 5 b i d     4  Chronic diastolic heart failure  ProBNP 165  Would hold diuretics  Patient was taking Lasix 80 b i d  And for the past week Zaroxolyn 5 mg 3 days a week  He has had 3 doses  Last hospital discharge weight 312  Currently 314 lb by bed scale  He does have mild leg edema but he said is pretty chronic  Legs/swelling are much improved from prior   5  Chronic hypoxic respiratory failure  Worse 2-3 L at home  Feels at baseline  Currently on 4 L  Does have some nasal congestion      6  CAD with prior CABG x3 2016  On beta-blocker/statin    7  HLD-atorvastatin 40 mg  LDL 44    8  Fall-said it felt like his knees got numb and he fell to the ground when he went to stand up to urinate, no LOC  Suspect orthostatic      History of Present Illness   Physician Requesting Consult: Polo Espinoza MD  Reason for Consult / Principal Problem:  Diuretic management    HPI: Taj Lopez is a 67y o  year old male with chronic hypoxic respiratory failure, obesity, LUCIA ,chronic diastolic heart failure, PAF, CAD s/p CABG X3 2016 with LIMA to LAD, SVG OM3 and SVG PDA), ICMP recovered by 2017-55%, HLD, AAA   , PAD , type 2 diabetes, HTN who presents with a fall  Legs got weak and he fell when he tried to stand up to urinate, no LOC  He is followed by Dr Grayson Quevedo and last seen in office 4/16/2021  Felt to be volume overload  Weight up a few lb from his recent hospital discharge  Two to 3+ pitting edema  He was prescribed Zaroxolyn 5 mg 3 times a week  Took 3 doses this past week  He has had increasing lightheadedness  Yesterday sort of out of the blue he felt his knees get weak and he fell to the ground  No LOC  He was unable to get  His wife called EMS  He was found to be  febrile, tachycardic and had a leukocytosis  CT abdomen showed bilateral nephrolithiasis with obstructive uropathy  No chest pain or pressure  Chronic stable exertional dyspnea  Sleeps with head of bed up about 45° chronically  Utilizes CPAP  Has nasal congestion  Recent admission for acute on chronic diastolic and right-sided heart failure  In atrial fibrillation at the time felt to exacerbate by  symptoms  Reverted to normal sinus rhythm with aggressive diuresis  Discharge weight 312 lb with a creatinine of 0 91  He said Dr Grayson Quevedo weight was 318 with 2 to 3+ pitting edema  Taking Lasix 80 mg b i d  Metolazone 3 times a week was added  He reintroduced Toprol 12 5 daily  This had previously been stopped due to hypotension  Patient also decreased his intake of fluid significantly  Inpatient consult to Cardiology  Consult performed by: KACEY Mccollum  Consult ordered by: Syed Olivares MD          Review of Systems   Constitutional: Negative  HENT: Positive for congestion  Eyes: Negative  Respiratory: Positive for shortness of breath  Cardiovascular: Positive for leg swelling  Negative for chest pain and palpitations  Gastrointestinal: Negative  Endocrine: Negative  Genitourinary: Negative  Musculoskeletal: Positive for back pain and gait problem  Skin: Negative  Neurological: Positive for weakness and light-headedness  Negative for syncope  Hematological: Bruises/bleeds easily  Psychiatric/Behavioral: Negative  All other systems reviewed and are negative        Historical Information   Past Medical History:   Diagnosis Date    A-fib [facility])     AAA (abdominal aortic aneurysm) ([facility] [address] )     Actinic keratosis of right temple 7/31/2020    Actinic keratosis of scalp 7/31/2020    Acute respiratory failure with hypoxia (HCC) 3/25/2021    Arthritis     Lay's esophagus     Bladder cancer (HCC)     CAD (coronary artery disease)     Chronic low back pain     Chronic pain of both knees 7/31/2020    Colitis 12/4/2020    Diabetes ([facility] [address] )     Excessive gas 12/3/2020    Hyperlipemia     Hypertension     Immunization deficiency 10/30/2020    Hep a nonimmune    Left lower quadrant abdominal pain 12/3/2020    Mass of right adrenal gland ([facility] [address] ) 12/4/2020    4 1 cm    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    LUCIA (obstructive sleep apnea) 1/29/2021     Past Surgical History:   Procedure Laterality Date    BACK SURGERY      BLADDER SURGERY      CATARACT EXTRACTION, BILATERAL      COLONOSCOPY  01/29/2019    next [address]22 [address] GRAFT  04/2016    Quadruple per Rita    EGD  06/2017    TOTAL HIP ARTHROPLASTY Right 2012    TOTAL SHOULDER REPLACEMENT Right 2013 Social History     Substance and Sexual Activity   Alcohol Use Not Currently    Comment: No use     Social History     Substance and Sexual Activity   Drug Use Never    Comment: No use     Social History     Tobacco Use   Smoking Status Former Smoker    Packs/day: 0 25    Types: Cigarettes    Quit date: 2009    Years since quittin 3   Smokeless Tobacco Never Used   Tobacco Comment    smoked since age 21; per Melvina Goodwin     Family History:   Family History   Problem Relation Age of Onset    Heart disease Father     Arthritis Father     Heart attack Father     Alzheimer's disease Mother        Meds/Allergies   current meds:   Current Facility-Administered Medications   Medication Dose Route Frequency    acetaminophen (TYLENOL) tablet 650 mg  650 mg Oral Q6H PRN    apixaban (ELIQUIS) tablet 5 mg  5 mg Oral BID    atorvastatin (LIPITOR) tablet 40 mg  40 mg Oral Daily    digoxin (LANOXIN) tablet 125 mcg  125 mcg Oral Daily    ezetimibe (ZETIA) tablet 10 mg  10 mg Oral Daily    finasteride (PROSCAR) tablet 5 mg  5 mg Oral Daily    furosemide (LASIX) tablet 80 mg  80 mg Oral BID (diuretic)    insulin detemir (LEVEMIR) subcutaneous injection 65 Units  65 Units Subcutaneous Q12H [facility]    insulin lispro (HumaLOG) 100 units/mL subcutaneous injection 1-6 Units  1-6 Units Subcutaneous TID AC    metoprolol succinate (TOPROL-XL) 24 hr tablet 12 5 mg  12 5 mg Oral Daily    multi-electrolyte (PLASMALYTE-A/ISOLYTE-S PH 7 4) IV solution  75 mL/hr Intravenous Continuous    pantoprazole (PROTONIX) EC tablet 20 mg  20 mg Oral Early Morning    and PTA meds:    Medications Prior to Admission   Medication    atorvastatin (LIPITOR) 40 mg tablet    digoxin (LANOXIN) 0 125 mg tablet    Eliquis 5 MG    ezetimibe (ZETIA) 10 mg tablet    finasteride (PROSCAR) 5 mg tablet    furosemide (LASIX) 80 mg tablet    glipiZIDE (GLUCOTROL XL) 5 mg 24 hr tablet    Levemir FlexTouch 100 units/mL injection pen    metFORMIN (GLUCOPHAGE) 1000 MG tablet    metolazone (ZAROXOLYN) 2 5 mg tablet    metoprolol succinate (TOPROL-XL) 25 mg 24 hr tablet    omeprazole (PriLOSEC) 20 mg delayed release capsule    Fluad Quadrivalent 0 5 ML PRSY    lisinopril (ZESTRIL) 20 mg tablet    potassium chloride (K-DUR,KLOR-CON) 20 mEq tablet    Unifine Pentips 31G X 8 MM MISC     No Known Allergies    Objective   Vitals: Blood pressure 124/60, pulse 91, temperature 97 8 °F (36 6 °C), temperature source Oral, resp  rate 18, height 6' (1 829 m), weight (!) 143 kg (314 lb 13 1 oz), SpO2 90 %    Orthostatic Blood Pressures      Most Recent Value   Blood Pressure  124/60 filed at 04/23/2021 0818   Patient Position - Orthostatic VS  Sitting filed at 04/23/2021 0818            Intake/Output Summary (Last 24 hours) at 4/23/2021 0851  Last data filed at 4/23/2021 0336  Gross per 24 hour   Intake 50 ml   Output 925 ml   Net -875 ml       Invasive Devices     Peripheral Intravenous Line            Peripheral IV 04/22/21 Left Antecubital less than 1 day    Peripheral IV 04/23/21 Left Forearm less than 1 day    Peripheral IV 04/23/21 Right Forearm less than 1 day                Physical Exam: /60 (BP Location: Left arm)   Pulse 91   Temp 97 8 °F (36 6 °C) (Oral)   Resp 18   Ht 6' (1 829 m)   Wt (!) 143 kg (314 lb 13 1 oz)   SpO2 90%   BMI 42 70 kg/m²      General Appearance:    Alert, cooperative, no distress, appears stated age   Head:    Normocephalic, no scleral icterus   Eyes:    PERRL   Nose:   Nares normal, septum midline, mucosa normal, no drainage    Throat:   Lips, mucosa, and tongue normal   Neck:   Supple, symmetrical, trachea midline     no   JVD         Lungs:     Few crackles r side to auscultation bilaterally, respirations unlabored        Heart:    Regular rate and rhythm, S1 and S2 normal, no murmur, rub   or gallop   Abdomen:     Soft, non-tender   Extremities:   Extremities normal, atraumatic, no cyanosis , mild  Edema knees and moderate feet       Skin:   Skin mild erythema lower extremities knees down   Neurologic:   Alert and oriented to person place and time   No focal deficits       Lab Results:   Recent Results (from the past 72 hour(s))   POCT Blood Gas (CG8+)    Collection Time: 04/22/21 11:12 PM   Result Value Ref Range    ph, Fran ISTAT 7 377 7 300 - 7 400    pCO2, Fran i-STAT 48 1 42 0 - 50 0 mm HG    pO2, Fran i-STAT 30 0 (L) 35 0 - 45 0 mm HG    BE, i-STAT 2 -2 - 3 mmol/L    HCO3, Fran i-STAT 28 3 24 0 - 30 0 mmol/L    CO2, i-STAT 30 21 - 32 mmol/L    O2 Sat, i-STAT 55 (L) 60 - 85 %    SODIUM, I-STAT 132 (L) 136 - 145 mmol/l    Potassium, i-STAT 4 8 3 5 - 5 3 mmol/L    Hct, i-STAT 39 36 5 - 49 3 %    Hgb, i-STAT 13 3 12 0 - 17 0 g/dl    Glucose, i-STAT 237 (H) 65 - 140 mg/dl    Specimen Type VENOUS    CBC and differential    Collection Time: 04/22/21 11:42 PM   Result Value Ref Range    WBC 15 21 (H) 4 31 - 10 16 Thousand/uL    RBC 4 45 3 88 - 5 62 Million/uL    Hemoglobin 11 9 (L) 12 0 - 17 0 g/dL    Hematocrit 38 2 36 5 - 49 3 %    MCV 86 82 - 98 fL    MCH 26 7 (L) 26 8 - 34 3 pg    MCHC 31 2 (L) 31 4 - 37 4 g/dL    RDW 16 1 (H) 11 6 - 15 1 %    MPV 10 6 8 9 - 12 7 fL    Platelets 975 265 - 461 Thousands/uL    nRBC 0 /100 WBCs    Neutrophils Relative 78 (H) 43 - 75 %    Immat GRANS % 1 0 - 2 %    Lymphocytes Relative 10 (L) 14 - 44 %    Monocytes Relative 11 4 - 12 %    Eosinophils Relative 0 0 - 6 %    Basophils Relative 0 0 - 1 %    Neutrophils Absolute 11 75 (H) 1 85 - 7 62 Thousands/µL    Immature Grans Absolute 0 12 0 00 - 0 20 Thousand/uL    Lymphocytes Absolute 1 53 0 60 - 4 47 Thousands/µL    Monocytes Absolute 1 72 (H) 0 17 - 1 22 Thousand/µL    Eosinophils Absolute 0 04 0 00 - 0 61 Thousand/µL    Basophils Absolute 0 05 0 00 - 0 10 Thousands/µL   Basic metabolic panel    Collection Time: 04/22/21 11:42 PM   Result Value Ref Range    Sodium 131 (L) 136 - 145 mmol/L    Potassium 4 8 3 5 - 5 3 mmol/L    Chloride 94 (L) 100 - 108 mmol/L    CO2 28 21 - 32 mmol/L    ANION GAP 9 4 - 13 mmol/L    BUN 44 (H) 5 - 25 mg/dL    Creatinine 1 66 (H) 0 60 - 1 30 mg/dL    Glucose 235 (H) 65 - 140 mg/dL    Calcium 10 3 (H) 8 3 - 10 1 mg/dL    eGFR 39 ml/min/1 73sq m   NT-BNP PRO    Collection Time: 04/22/21 11:42 PM   Result Value Ref Range    NT-proBNP 165 <450 pg/mL   Hepatic function panel    Collection Time: 04/22/21 11:42 PM   Result Value Ref Range    Total Bilirubin 0 31 0 20 - 1 00 mg/dL    Bilirubin, Direct 0 11 0 00 - 0 20 mg/dL    Alkaline Phosphatase 85 46 - 116 U/L    AST 17 5 - 45 U/L    ALT 24 12 - 78 U/L    Total Protein 8 5 (H) 6 4 - 8 2 g/dL    Albumin 3 4 (L) 3 5 - 5 0 g/dL   Lipid Panel with Direct LDL reflex    Collection Time: 04/22/21 11:42 PM   Result Value Ref Range    Cholesterol 147 50 - 200 mg/dL    Triglycerides 319 (H) <=150 mg/dL    HDL, Direct 39 (L) >=40 mg/dL    LDL Calculated 44 0 - 100 mg/dL   Hemoglobin A1C w/ EAG Estimation    Collection Time: 04/22/21 11:42 PM   Result Value Ref Range    Hemoglobin A1C 8 4 (H) Normal 3 8-5 6%; PreDiabetic 5 7-6 4%;  Diabetic >=6 5%; Glycemic control for adults with diabetes <7 0% %     mg/dl   ECG 12 lead    Collection Time: 04/23/21 12:21 AM   Result Value Ref Range    Ventricular Rate 119 BPM    Atrial Rate 136 BPM    ND Interval  ms    QRSD Interval 162 ms    QT Interval 424 ms    QTC Interval 597 ms    P Axis  degrees    QRS Axis 104 degrees    T Wave Axis 20 degrees   UA w Reflex to Microscopic w Reflex to Culture    Collection Time: 04/23/21 12:23 AM    Specimen: Urine, Clean Catch   Result Value Ref Range    Color, UA Light Yellow     Clarity, UA Clear     Specific [place], UA 1 010 1 003 - 1 030    pH, UA 5 5 4 5, 5 0, 5 5, 6 0, 6 5, 7 0, 7 5, 8 0    Leukocytes, UA Negative Negative    Nitrite, UA Negative Negative    Protein, UA Negative Negative mg/dl    Glucose, UA Negative Negative mg/dl    Ketones, UA Negative Negative mg/dl    Urobilinogen, UA 0 2 0 2, 1 0 E U /dl E U /dl    Bilirubin, UA Negative Negative    Blood, UA Trace-Intact (A) Negative   Urine Microscopic    Collection Time: 04/23/21 12:23 AM   Result Value Ref Range    RBC, UA 0-1 None Seen, 0-1, 1-2, 2-4, 0-5 /hpf    WBC, UA 2-4 None Seen, 0-1, 1-2, 0-5, 2-4 /hpf    Epithelial Cells Occasional None Seen, Occasional /hpf    Bacteria, UA Occasional None Seen, Occasional /hpf    AMORPH PHOSPATES Occasional /hpf   Fingerstick Glucose (POCT)    Collection Time: 04/23/21  1:00 AM   Result Value Ref Range    POC Glucose 261 (H) 65 - 140 mg/dl   Novel Coronavirus (Covid-19),PCR [facility]    Collection Time: 04/23/21  1:05 AM    Specimen: Nose; Nares   Result Value Ref Range    SARS-CoV-2 Negative Negative   Lactic acid, plasma    Collection Time: 04/23/21  1:09 AM   Result Value Ref Range    LACTIC ACID 1 4 0 5 - 2 0 mmol/L   Protime-INR    Collection Time: 04/23/21  1:09 AM   Result Value Ref Range    Protime 14 4 11 6 - 14 5 seconds    INR 1 11 0 84 - 1 19   Procalcitonin with AM Reflex    Collection Time: 04/23/21  1:09 AM   Result Value Ref Range    Procalcitonin 0 23 <=0 25 ng/ml   Fingerstick Glucose (POCT)    Collection Time: 04/23/21  7:06 AM   Result Value Ref Range    POC Glucose 213 (H) 65 - 140 mg/dl     Imaging: I have personally reviewed pertinent reports  EKG:  Sinus tachycardia PVCs RBBB  VTE Prophylaxis: eliquis    Code Status: Level 3 - DNAR and DNI  Advance Directive and Living Will:      Power of :    POLST:      Counseling / Coordination of Care  Total floor / unit time spent today 45 minutes  Greater than 50% of total time was spent with the patient and / or family counseling and / or coordination of care

## 2021-04-23 NOTE — ASSESSMENT & PLAN NOTE
Patient had fall without loss of consciousness and without hitting his head  Patient fell on his buttocks  No hematoma present, hemoglobin stable  No evidence of fractures on imaging  Patient's fall is likely secondary to orthostatic hypotension 2/2 over-diuresis verses    Plan:  · Trauma on board, will evaluate tomorrow a m    · Will hold diuretics  · PT/OT  · Continue ambulation with walker

## 2021-04-23 NOTE — PROGRESS NOTES
Review of ED encounter indicates Patient presented after mechanical fall at home after being off balance  Patient arrived via EMS, tachycardic and febrile at 100 6  WBC elevated along with glucose, BUN and creatinine  C Spine negative  Patient is admitted under Sepsis pathway  BPCI form and care coordination note updated  This CM will continue to monitor via chart review throughout hospitalization

## 2021-04-23 NOTE — PROGRESS NOTES
4/22/21:  ADT notification received for patient being triaged at [facility]) Emergency Department

## 2021-04-23 NOTE — ASSESSMENT & PLAN NOTE
Patient was recently discharged from [facility] 1 week ago  Patient was diuresed, and had increase Lasix to 80 mg b i d , and recent addition of 5 mg of metolazone 3 times a week by patient's cardiologist  Patient's weight at discharge was 322 lb  Patient's weight at home has been between 315-317 lb  Patient's weight on admission is 314 lb  Patient examines euvolemic, JVD not present, trace chronic edema       · It is likely that the patient was over diuresed  · Will hold metolazone  · Will hold morning dose of Lasix  · Gentle hydration  · Re-evaluate tomorrow morning

## 2021-04-23 NOTE — ASSESSMENT & PLAN NOTE
Wt Readings from Last 3 Encounters:   04/22/21 (!) 143 kg (314 lb 13 1 oz)   04/16/21 (!) 146 kg (322 lb)   04/09/21 (!) 145 kg (319 lb)     · [facility] with CHF exacerbation with A fib which converted to normal sinus with aggressive diuresis  He was seen by his cardiologist 1 week ago who started him on metolazone 3 times a week with his lasix 80mg BID doses  Current weight is down 7lbs from office visit 1 week ago     · Currently in sinus tach  · Today Heart rate is 80's to 90's

## 2021-04-23 NOTE — ASSESSMENT & PLAN NOTE
POA   Creatinine of: 1 66, baseline creatinine 0 8-0 9   Urinalysis:  bland   CT abdomen: bilateral nephrolithiasis causing obstructive uropathy    Etiology:   This is likely pre renal from dehydration 2/2 over-diuresis versus postrenal obstruction    Plan:   UA with microscopic, Urinary retention protocol, Bladder scan, Daily weights, Intake and output   IV Fluids:  Isolyte at 75 ml/hr   Monitor BMP daily and observe for downward trend of creatinine   Avoid hypoperfusion of the kidneys, minimize nephrotoxins  o Medications being held:  Lisinopril

## 2021-04-23 NOTE — PROGRESS NOTES
[facility]  Progress Note - Jesus Wakefield 1945, 67 y o  male MRN: [id number]  Unit/Bed#: S -01 Encounter: [id number]  Primary Care Provider: Ken Goel MD   Date and time admitted to hospital: 4/22/2021 10:56 PM    Hyponatremia  Assessment & Plan  No morning labs as of yet  Isolyte fluids  GCS - 15    LACY (acute kidney injury) [facility])  Assessment & Plan  Results for Camilo Burton (MRN [id number]) as of 4/23/2021 08:52   Ref  Range 4/22/2021 23:42   BUN Latest Ref Range: 5 - 25 mg/dL 44 (H)   Creatinine Latest Ref Range: 0 60 - 1 30 mg/dL 1 66 (H)     Isolyte running at 75 cc/hr    Fall  Assessment & Plan  · Fall on Eliquis  · Patient reports he stood up from urinating and fell backwards after his legs gave out  · He denies head strike or LOC  · Admit to medical service with significant cardiac history and recent admission for CHF and aggressive outpatient diuresis  · PT/OT  · Recommend geriatric consult    CHF (congestive heart failure) ([facility] [address] )  Assessment & Plan  Wt Readings from Last 3 Encounters:   04/22/21 (!) 143 kg (314 lb 13 1 oz)   04/16/21 (!) 146 kg (322 lb)   04/09/21 (!) 145 kg (319 lb)     · Admitted late March to [facility] with CHF exacerbation with A fib which converted to normal sinus with aggressive diuresis  He was seen by his cardiologist 1 week ago who started him on metolazone 3 times a week with his lasix 80mg BID doses  Current weight is down 7lbs from office visit 1 week ago     · Currently in sinus tach  · Today Heart rate is 80's to 90's    Hypoxemia  Assessment & Plan  · Hypoxemia with persistent home O2 requirement since hospitalization 1 month ago suspected a mix of LUCIA, emphysema, obesity hypoventilation symptom, and CHF   · Continue O2 as needed to keep oxygen sats >88%    Paroxysmal atrial fibrillation (HCC)  Assessment & Plan  · History of PAF currently in sinus tach   · Previously treated with metoprolol which was stopped due to hypotension, restarted on 12 5mg last week  · Currently on digoxin and metoprolol 12 5mg    CAD (coronary artery disease)  Assessment & Plan  · With prior history of CABG x 4  · CHF and PAF  · Metoprolol dose initially stopped due to hypotension and now restarted to 12 5mg   · Continue digoxin and atorvastatin    Type 2 diabetes mellitus with hyperglycemia, with long-term current use of insulin [facility])  Assessment & Plan  Lab Results   Component Value Date    HGBA1C 8 4 (H) 04/22/2021       Recent Labs     04/23/21  0100 04/23/21  0706   POCGLU 261* 213*       Blood Sugar Average: Last 72 hrs:  (P) 237    * Sepsis ([address] )  Assessment & Plan  · With tachycardia and leukocytosis possible fever  · With LACY possibly secondary to sepsis vs obstructing ureteral stone  · Suspected source urine with left hydronephrosis and left ureteral stone, consider Covid         TERTIARY TRAUMA SURVEY NOTE    Prophylaxis: Sequential compression device (Venodyne) , eliqous    Disposition: home vs rehab    Code status:  Level 3 - DNAR and DNI    Consultants: Medicine Primary, Geriatrics, Cardiology    Is the patient 72 years or older?: YES:    1  Before the illness or injury that brought you to the Emergency, did you need someone to help you on a regular basis? 1=Yes   2  Since the illness or injury that brought you to the Emergency, have you needed more help than usual to take care of yourself? 1=Yes   3  Have you been hospitalized for one or more nights during the past 6 months (excluding a stay in the Emergency Department)? 1=Yes   4  In general, do you see well? 0=Yes   5  In general, do you have serious problems with your memory? 0=No   6  Do you take more than three different medications everyday?  1=Yes   TOTAL   4     Did you order a geriatric consult if the score was 2 or greater?: yes          SUBJECTIVE:     Transfer from: home  Outside Films Received: no  Tertiary Exam Due on: 4/23/21    Mechanism of Injury:Fall    Details related to Injury: +LOC: no    Chief Complaint: weakness as legs gave out    HPI/Last 24 hour events:Arrived as a trauma, sepsis alert  No traumatic injuries at this time  Patient admitted to medicine and trauma completed a tertiary exam this morning  No new injuries and trauma will sign off      Active medications:           Current Facility-Administered Medications:     acetaminophen (TYLENOL) tablet 650 mg, 650 mg, Oral, Q6H PRN, 650 mg at 04/23/21 0436    apixaban (ELIQUIS) tablet 5 mg, 5 mg, Oral, BID    atorvastatin (LIPITOR) tablet 40 mg, 40 mg, Oral, Daily    digoxin (LANOXIN) tablet 125 mcg, 125 mcg, Oral, Daily    ezetimibe (ZETIA) tablet 10 mg, 10 mg, Oral, Daily    finasteride (PROSCAR) tablet 5 mg, 5 mg, Oral, Daily    furosemide (LASIX) tablet 80 mg, 80 mg, Oral, BID (diuretic)    insulin detemir (LEVEMIR) subcutaneous injection 65 Units, 65 Units, Subcutaneous, Q12H [facility], 65 Units at 04/23/21 0336    insulin lispro (HumaLOG) 100 units/mL subcutaneous injection 1-6 Units, 1-6 Units, Subcutaneous, TID AC **AND** Fingerstick Glucose (POCT), , , TID AC    metoprolol succinate (TOPROL-XL) 24 hr tablet 12 5 mg, 12 5 mg, Oral, Daily    multi-electrolyte (PLASMALYTE-A/ISOLYTE-S PH 7 4) IV solution, 75 mL/hr, Intravenous, Continuous, 75 mL/hr at 04/23/21 0144    pantoprazole (PROTONIX) EC tablet 20 mg, 20 mg, Oral, Early Morning, 20 mg at 04/23/21 0635      OBJECTIVE:     Vitals:   Vitals:    04/23/21 0821   BP:    Pulse:    Resp:    Temp:    SpO2: 90%       Physical Exam:   GENERAL APPEARANCE: comfortable, nasal O2 is on  NEURO: intact with GCS - 15  HEENT: EOm's intact  CV: RRR< no complaint of chest pain, heart sounds are distant  LUNGS: CTA bilaterally, no shortness of breath observed  GI: tolerating a diet  : voiding  MSK: moves extremities  SKIN: warm and dry    I/O:   I/O       04/21 0701 - 04/22 0700 04/22 0701 - 04/23 0700 04/23 0701 - 04/24 0700    IV Piggyback  50     Total Intake(mL/kg)  50 (0 3)     Urine (mL/kg/hr)  925     Total Output  925     Net  -875                  Invasive Devices: Invasive Devices     Peripheral Intravenous Line            Peripheral IV 04/22/21 Left Antecubital less than 1 day    Peripheral IV 04/23/21 Left Forearm less than 1 day    Peripheral IV 04/23/21 Right Forearm less than 1 day                  Imaging:   Trauma - Ct Head Wo Contrast    Result Date: 4/23/2021  Impression: No calvarial fracture or acute intracranial abnormality is seen  Other findings as above  CT CERVICAL SPINE - WITHOUT CONTRAST INDICATION:   TRAUMA  Fall COMPARISON:  None  TECHNIQUE:  CT examination of the cervical spine was performed without intravenous contrast   Contiguous axial images were obtained  Sagittal and coronal reconstructions were performed  Radiation dose length product (DLP) for this visit:  [phone] 1558-663 mGy-cm  (accession [id number]), 8435 mGy-cm  (accession [id number]), 640 mGy-cm  (accession [id number])  This examination, like all CT scans performed in the [facility], was performed utilizing techniques to minimize radiation dose exposure, including the use of iterative reconstruction and automated exposure control  IMAGE QUALITY:  Diagnostic  FINDINGS: ALIGNMENT:  Normal alignment of the cervical spine  No subluxation  VERTEBRAL BODIES:  No acute fracture  DEGENERATIVE CHANGES:  Intervertebral disc space narrowing at C5/C6, C6/C7, and C7/T1 with anterior, marginal, and uncovertebral osteophytosis at these levels  Multilevel facet joint arthropathy  PREVERTEBRAL AND PARASPINAL SOFT TISSUES:  Unremarkable  THORACIC INLET:  Please refer to the concurrent chest, abdomen, and pelvic CT report for description of the thoracic inlet findings  IMPRESSION: Degenerative changes as described no evidence of acute cervical spine injury  CT CHEST, ABDOMEN AND PELVIS WITH IV CONTRAST INDICATION: Trauma  Fall COMPARISON: None  TECHNIQUE: CT examination of the chest, abdomen and pelvis was performed  Reformatted images were created in axial, sagittal, and coronal planes  Radiation dose length product (DLP) for this visit:  [phone] 1558-663 mGy-cm  (accession [id number]), 8824 mGy-cm  (accession [id number]), 640 mGy-cm  (accession [id number])  This examination, like all CT scans performed in the [facility], was performed utilizing techniques to minimize radiation dose exposure, including the use of iterative reconstruction and automated exposure control  IV Contrast:  100 mL of iohexol (OMNIPAQUE) Enteric Contrast:  Enteric contrast was not administered  3 CHEST LUNGS:  Suspected round atelectasis in the posterior right lower lobe  Linear atelectasis/scarring noted bilaterally  5 mm subpleural nodule in the left upper lung field (axial image 17, series 2)  Several subcentimeter calcified granulomas scattered in the right lung  Lungs otherwise appear grossly clear  PLEURA:  Pleural thickening with pleural calcifications in the dependent portion of the right mid and lower lung field, 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  HEART/GREAT VESSELS: Moderate to severe coronary atherosclerosis  Mild aortic atherosclerosis  No thoracic aortic aneurysm  The heart appears normal in size  Suspected lipoma of the interatrial septum measures approximately 5 3 cm in size  MEDIASTINUM AND SILKE:  Several subcentimeter calcified lymph nodes in the right hilum  Large airways appear patent  No mediastinal mass is seen  Evidence of prior CABG  CHEST WALL AND LOWER NECK: Right shoulder arthroplasty  Chest wall appears intact  Median sternotomy wires are noted  Otherwise grossly unremarkable  ABDOMEN LIVER/BILIARY TREE:  Unremarkable  GALLBLADDER:  No calcified gallstones  No pericholecystic inflammatory change  SPLEEN:  Unremarkable  PANCREAS:  Mild atrophy; otherwise grossly unremarkable ADRENAL GLANDS:  4 cm right adrenal nodule redemonstrated, stable    Left adrenal gland appears normal  KIDNEYS/URETERS:  Several subcentimeter nonobstructing stones in the left kidney  Right kidney otherwise appears grossly unremarkable  Several subcentimeter nonobstructing stones in the left kidney  Several obstructing stones in the distal left ureter  measuring approximately 6 mm in size  Mild left hydroureteronephrosis and periureteral and perinephric fat stranding  STOMACH AND BOWEL:  Colonic diverticulosis  No discrete evidence of acute diverticulitis  No evidence of bowel obstruction  Otherwise grossly unremarkable  APPENDIX:  No findings to suggest appendicitis  ABDOMINOPELVIC CAVITY:  No ascites or free intraperitoneal air  No lymphadenopathy  VESSELS:  Moderate atherosclerosis  Fusiform aneurysm of the distal abdominal aorta measures approximately 5 cm in maximal dimensions  PELVIS REPRODUCTIVE ORGANS:  Enlarged prostate with prostate volume estimated at 93 mL  URINARY BLADDER:  Grossly unremarkable ABDOMINAL WALL/INGUINAL REGIONS:  Mild body wall edema OSSEOUS STRUCTURES:  Multilevel degenerative changes of the spine, most prominent in the lumbar region  Total right hip arthroplasty, visualized hardware appears intact  Moderate to severe degenerative changes of the left hip  No acute fracture or destructive osseous lesion identified  IMPRESSION:  No evidence of acute traumatic injury in the chest, abdomen, or pelvis  Bilateral nephrolithiasis with several obstructing stones in the distal left ureter, measuring approximately 6 mm in size with associated left hydroureteronephrosis and obstructive uropathy  Pleural thickening with pleural calcifications in the dependent portion of the right mid and lower lung field, 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  Suspected round atelectasis in the posterior right lower lobe  Coronary atherosclerosis, history of prior CABG  4 cm right adrenal nodule redemonstrated, stable  Moderate aortic atherosclerosis    Fusiform aneurysm of the distal abdominal aorta measures approximately 5 cm in maximal dimensions  5 mm subpleural nodule in the left upper lung field (axial image 17, series 2)  Based on current Fleischner Society 2017 Guidelines on incidental pulmonary nodule, 12 month follow-up non-contrast chest CT is recommended  Enlarged prostate, degenerative changes of the spine, right shoulder and right total hip arthroplasty, and other findings as above  Findings discussed with Dr Ana Allen by Dr Baron Bethea at 12:30 PM on 4/23/2021  Workstation performed: [id number]     Trauma - Ct Spine Cervical Wo Contrast    Result Date: 4/23/2021  Impression: No calvarial fracture or acute intracranial abnormality is seen  Other findings as above  CT CERVICAL SPINE - WITHOUT CONTRAST INDICATION:   TRAUMA  Fall COMPARISON:  None  TECHNIQUE:  CT examination of the cervical spine was performed without intravenous contrast   Contiguous axial images were obtained  Sagittal and coronal reconstructions were performed  Radiation dose length product (DLP) for this visit:  [phone] 1558-663 mGy-cm  (accession [id number]), 1190 mGy-cm  (accession [id number]), 640 mGy-cm  (accession [id number])  This examination, like all CT scans performed in the [facility], was performed utilizing techniques to minimize radiation dose exposure, including the use of iterative reconstruction and automated exposure control  IMAGE QUALITY:  Diagnostic  FINDINGS: ALIGNMENT:  Normal alignment of the cervical spine  No subluxation  VERTEBRAL BODIES:  No acute fracture  DEGENERATIVE CHANGES:  Intervertebral disc space narrowing at C5/C6, C6/C7, and C7/T1 with anterior, marginal, and uncovertebral osteophytosis at these levels  Multilevel facet joint arthropathy  PREVERTEBRAL AND PARASPINAL SOFT TISSUES:  Unremarkable  THORACIC INLET:  Please refer to the concurrent chest, abdomen, and pelvic CT report for description of the thoracic inlet findings   IMPRESSION: Degenerative changes as described no evidence of acute cervical spine injury  CT CHEST, ABDOMEN AND PELVIS WITH IV CONTRAST INDICATION: Trauma  Fall COMPARISON: None  TECHNIQUE: CT examination of the chest, abdomen and pelvis was performed  Reformatted images were created in axial, sagittal, and coronal planes  Radiation dose length product (DLP) for this visit:  [phone] 1558-663 mGy-cm  (accession [id number]), 2327 mGy-cm  (accession [id number]), 640 mGy-cm  (accession [id number])  This examination, like all CT scans performed in the [facility], was performed utilizing techniques to minimize radiation dose exposure, including the use of iterative reconstruction and automated exposure control  IV Contrast:  100 mL of iohexol (OMNIPAQUE) Enteric Contrast:  Enteric contrast was not administered  3 CHEST LUNGS:  Suspected round atelectasis in the posterior right lower lobe  Linear atelectasis/scarring noted bilaterally  5 mm subpleural nodule in the left upper lung field (axial image 17, series 2)  Several subcentimeter calcified granulomas scattered in the right lung  Lungs otherwise appear grossly clear  PLEURA:  Pleural thickening with pleural calcifications in the dependent portion of the right mid and lower lung field, 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  HEART/GREAT VESSELS: Moderate to severe coronary atherosclerosis  Mild aortic atherosclerosis  No thoracic aortic aneurysm  The heart appears normal in size  Suspected lipoma of the interatrial septum measures approximately 5 3 cm in size  MEDIASTINUM AND SILKE:  Several subcentimeter calcified lymph nodes in the right hilum  Large airways appear patent  No mediastinal mass is seen  Evidence of prior CABG  CHEST WALL AND LOWER NECK: Right shoulder arthroplasty  Chest wall appears intact  Median sternotomy wires are noted  Otherwise grossly unremarkable  ABDOMEN LIVER/BILIARY TREE:  Unremarkable  GALLBLADDER:  No calcified gallstones  No pericholecystic inflammatory change  SPLEEN:  Unremarkable  PANCREAS:  Mild atrophy; otherwise grossly unremarkable ADRENAL GLANDS:  4 cm right adrenal nodule redemonstrated, stable  Left adrenal gland appears normal  KIDNEYS/URETERS:  Several subcentimeter nonobstructing stones in the left kidney  Right kidney otherwise appears grossly unremarkable  Several subcentimeter nonobstructing stones in the left kidney  Several obstructing stones in the distal left ureter  measuring approximately 6 mm in size  Mild left hydroureteronephrosis and periureteral and perinephric fat stranding  STOMACH AND BOWEL:  Colonic diverticulosis  No discrete evidence of acute diverticulitis  No evidence of bowel obstruction  Otherwise grossly unremarkable  APPENDIX:  No findings to suggest appendicitis  ABDOMINOPELVIC CAVITY:  No ascites or free intraperitoneal air  No lymphadenopathy  VESSELS:  Moderate atherosclerosis  Fusiform aneurysm of the distal abdominal aorta measures approximately 5 cm in maximal dimensions  PELVIS REPRODUCTIVE ORGANS:  Enlarged prostate with prostate volume estimated at 93 mL  URINARY BLADDER:  Grossly unremarkable ABDOMINAL WALL/INGUINAL REGIONS:  Mild body wall edema OSSEOUS STRUCTURES:  Multilevel degenerative changes of the spine, most prominent in the lumbar region  Total right hip arthroplasty, visualized hardware appears intact  Moderate to severe degenerative changes of the left hip  No acute fracture or destructive osseous lesion identified  IMPRESSION:  No evidence of acute traumatic injury in the chest, abdomen, or pelvis  Bilateral nephrolithiasis with several obstructing stones in the distal left ureter, measuring approximately 6 mm in size with associated left hydroureteronephrosis and obstructive uropathy  Pleural thickening with pleural calcifications in the dependent portion of the right mid and lower lung field, 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  Suspected round atelectasis in the posterior right lower lobe   Coronary atherosclerosis, history of prior CABG  4 cm right adrenal nodule redemonstrated, stable  Moderate aortic atherosclerosis  Fusiform aneurysm of the distal abdominal aorta measures approximately 5 cm in maximal dimensions  5 mm subpleural nodule in the left upper lung field (axial image 17, series 2)  Based on current Fleischner Society 2017 Guidelines on incidental pulmonary nodule, 12 month follow-up non-contrast chest CT is recommended  Enlarged prostate, degenerative changes of the spine, right shoulder and right total hip arthroplasty, and other findings as above  Findings discussed with Dr Anne Kussmaul by Dr Syd Moay at 12:30 PM on 4/23/2021  Workstation performed: [id number]     Ct Chest Abdomen Pelvis W Contrast    Result Date: 4/23/2021  Impression: No calvarial fracture or acute intracranial abnormality is seen  Other findings as above  CT CERVICAL SPINE - WITHOUT CONTRAST INDICATION:   TRAUMA  Fall COMPARISON:  None  TECHNIQUE:  CT examination of the cervical spine was performed without intravenous contrast   Contiguous axial images were obtained  Sagittal and coronal reconstructions were performed  Radiation dose length product (DLP) for this visit:  [phone] 1558-663 mGy-cm  (accession [id number]), 8182 mGy-cm  (accession [id number]), 640 mGy-cm  (accession [id number])  This examination, like all CT scans performed in the [facility], was performed utilizing techniques to minimize radiation dose exposure, including the use of iterative reconstruction and automated exposure control  IMAGE QUALITY:  Diagnostic  FINDINGS: ALIGNMENT:  Normal alignment of the cervical spine  No subluxation  VERTEBRAL BODIES:  No acute fracture  DEGENERATIVE CHANGES:  Intervertebral disc space narrowing at C5/C6, C6/C7, and C7/T1 with anterior, marginal, and uncovertebral osteophytosis at these levels  Multilevel facet joint arthropathy  PREVERTEBRAL AND PARASPINAL SOFT TISSUES:  Unremarkable   THORACIC INLET:  Please refer to the concurrent chest, abdomen, and pelvic CT report for description of the thoracic inlet findings  IMPRESSION: Degenerative changes as described no evidence of acute cervical spine injury  CT CHEST, ABDOMEN AND PELVIS WITH IV CONTRAST INDICATION: Trauma  Fall COMPARISON: None  TECHNIQUE: CT examination of the chest, abdomen and pelvis was performed  Reformatted images were created in axial, sagittal, and coronal planes  Radiation dose length product (DLP) for this visit:  [phone] 1558-663 mGy-cm  (accession [id number]), 2679 mGy-cm  (accession [id number]), 640 mGy-cm  (accession [id number])  This examination, like all CT scans performed in the [facility], was performed utilizing techniques to minimize radiation dose exposure, including the use of iterative reconstruction and automated exposure control  IV Contrast:  100 mL of iohexol (OMNIPAQUE) Enteric Contrast:  Enteric contrast was not administered  3 CHEST LUNGS:  Suspected round atelectasis in the posterior right lower lobe  Linear atelectasis/scarring noted bilaterally  5 mm subpleural nodule in the left upper lung field (axial image 17, series 2)  Several subcentimeter calcified granulomas scattered in the right lung  Lungs otherwise appear grossly clear  PLEURA:  Pleural thickening with pleural calcifications in the dependent portion of the right mid and lower lung field, 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  HEART/GREAT VESSELS: Moderate to severe coronary atherosclerosis  Mild aortic atherosclerosis  No thoracic aortic aneurysm  The heart appears normal in size  Suspected lipoma of the interatrial septum measures approximately 5 3 cm in size  MEDIASTINUM AND SILKE:  Several subcentimeter calcified lymph nodes in the right hilum  Large airways appear patent  No mediastinal mass is seen  Evidence of prior CABG  CHEST WALL AND LOWER NECK: Right shoulder arthroplasty  Chest wall appears intact  Median sternotomy wires are noted  Otherwise grossly unremarkable  ABDOMEN LIVER/BILIARY TREE:  Unremarkable  GALLBLADDER:  No calcified gallstones  No pericholecystic inflammatory change  SPLEEN:  Unremarkable  PANCREAS:  Mild atrophy; otherwise grossly unremarkable ADRENAL GLANDS:  4 cm right adrenal nodule redemonstrated, stable  Left adrenal gland appears normal  KIDNEYS/URETERS:  Several subcentimeter nonobstructing stones in the left kidney  Right kidney otherwise appears grossly unremarkable  Several subcentimeter nonobstructing stones in the left kidney  Several obstructing stones in the distal left ureter  measuring approximately 6 mm in size  Mild left hydroureteronephrosis and periureteral and perinephric fat stranding  STOMACH AND BOWEL:  Colonic diverticulosis  No discrete evidence of acute diverticulitis  No evidence of bowel obstruction  Otherwise grossly unremarkable  APPENDIX:  No findings to suggest appendicitis  ABDOMINOPELVIC CAVITY:  No ascites or free intraperitoneal air  No lymphadenopathy  VESSELS:  Moderate atherosclerosis  Fusiform aneurysm of the distal abdominal aorta measures approximately 5 cm in maximal dimensions  PELVIS REPRODUCTIVE ORGANS:  Enlarged prostate with prostate volume estimated at 93 mL  URINARY BLADDER:  Grossly unremarkable ABDOMINAL WALL/INGUINAL REGIONS:  Mild body wall edema OSSEOUS STRUCTURES:  Multilevel degenerative changes of the spine, most prominent in the lumbar region  Total right hip arthroplasty, visualized hardware appears intact  Moderate to severe degenerative changes of the left hip  No acute fracture or destructive osseous lesion identified  IMPRESSION:  No evidence of acute traumatic injury in the chest, abdomen, or pelvis  Bilateral nephrolithiasis with several obstructing stones in the distal left ureter, measuring approximately 6 mm in size with associated left hydroureteronephrosis and obstructive uropathy   Pleural thickening with pleural calcifications in the dependent portion of the right mid and lower lung field, 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  Suspected round atelectasis in the posterior right lower lobe  Coronary atherosclerosis, history of prior CABG  4 cm right adrenal nodule redemonstrated, stable  Moderate aortic atherosclerosis  Fusiform aneurysm of the distal abdominal aorta measures approximately 5 cm in maximal dimensions  5 mm subpleural nodule in the left upper lung field (axial image 17, series 2)  Based on current Fleischner Society 2017 Guidelines on incidental pulmonary nodule, 12 month follow-up non-contrast chest CT is recommended  Enlarged prostate, degenerative changes of the spine, right shoulder and right total hip arthroplasty, and other findings as above  Findings discussed with Dr Champion Led by Dr Sushil Moura at 12:30 PM on 4/23/2021  Workstation performed: [id number]       Labs: Results for Debi Vargas (MRN [id number]) as of 4/23/2021 09:24   Ref  Range 4/23/2021 01:00 4/23/2021 01:05 4/23/2021 01:08 4/23/2021 01:09 4/23/2021 07:06   POC Glucose Latest Ref Range: 65 - 140 mg/dl 261 (H)    213 (H)   LACTIC ACID Latest Ref Range: 0 5 - 2 0 mmol/L    1 4    Protime Latest Ref Range: 11 6 - 14 5 seconds    14 4    INR Latest Ref Range: 0 84 - 1 19     1 11    SARS-COV-2 Latest Ref Range: Negative   Negative      BLOOD CULTURE Unknown   Rpt Rpt    NOVEL CORONAVIRUS (COVID-19), PCR [facility] Unknown  Rpt      Procalcitonin Latest Ref Range: <=0 25 ng/ml    0 23          Trauma will sign off for now  Please call if any questions or concerns

## 2021-04-23 NOTE — ED PROVIDER NOTES
Emergency Department Airway Evaluation and Management Form    History  Obtained from: patient  Patient has no known allergies  Chief Complaint   Patient presents with    Fall     per ems pt coming from home for a fall aprrox 1 hour ago  He was feeling off balance all day, he fell onto his butt  No LOC, no head strike  Pt takes eliquis daily  Pt chronically wears 3 L for CHF     HPI    Patient is a 67 yom who presents after a fall on eliquis  Estefania Hamilton back in his bathroom  No headache  No loc  MDM pleasant 67 yom, tachycardic, on 4L O2, febrile - airway intact, will hand off to trauma team, likely medical component as well           Past Medical History:   Diagnosis Date    [facility]-fib [facility])     AAA (abdominal aortic aneurysm) ([facility] [address] )     Actinic keratosis of right temple 7/31/2020    Actinic keratosis of scalp 7/31/2020    Acute respiratory failure with hypoxia (HCC) 3/25/2021    Arthritis     Lay's esophagus     Bladder cancer (HCC)     CAD (coronary artery disease)     Chronic low back pain     Chronic pain of both knees 7/31/2020    Colitis 12/4/2020    Diabetes ([address] )     Excessive gas 12/3/2020    Hyperlipemia     Hypertension     Immunization deficiency 10/30/2020    Hep a nonimmune    Left lower quadrant abdominal pain 12/3/2020    Mass of right adrenal gland ([facility] [address] ) 12/4/2020    4 1 cm    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    LUCIA (obstructive sleep apnea) 1/29/2021     Past Surgical History:   Procedure Laterality Date    BACK SURGERY      BLADDER SURGERY      CATARACT EXTRACTION, BILATERAL      COLONOSCOPY  01/29/2019    next 2022 [address] GRAFT  04/2016    Quadruple per [place]    EGD  06/2017    TOTAL HIP ARTHROPLASTY Right 2012    TOTAL SHOULDER REPLACEMENT Right 2013     Family History   Problem Relation Age of Onset    Heart disease Father     Arthritis Father     Heart attack Father     Alzheimer's disease Mother      Social History     Tobacco Use    Smoking status: Former Smoker     Packs/day: 0 25     Types: Cigarettes     Quit date: 2009     Years since quittin 3    Smokeless tobacco: Never Used    Tobacco comment: smoked since age 21; per Cinthya Canales   Substance Use Topics    Alcohol use: Not Currently     Comment: No use    Drug use: Never     Comment: No use     I have reviewed and agree with the history as documented  Review of Systems    See trauma h/p             Physical Exam  /69   Pulse (!) 109   Temp 98 5 °F (36 9 °C) (Oral)   Resp 20   Wt (!) 143 kg (314 lb 13 1 oz)   SpO2 91%   BMI 42 70 kg/m²     Physical Exam    See trauma h/p          ED Medications  Medications - No data to display    Intubation  Procedures    Notes      Final Diagnosis  Final diagnoses:   None       ED Provider  Electronically Signed by     Demarco Bah MD  [id number]

## 2021-04-23 NOTE — CONSULTS
CONSULT    Patient Name: Linda Borges  Patient MRN: [id number]  Date of Service: 4/23/2021   Date / Time Note Created: 4/23/2021 3:26 PM   Referring Provider:   Provider Creating Note: Carlton Zhao [address] Mag[address] Campos    PCP: Quincy Beth  Attending Provider:  Trixie Morales MD    Reason for Consult: Flank Pain    HPI:  Linda Borges is a pleasant 69-year-old male, previously known to Dr Miko Cartagena for history of non muscle invasive bladder cancer diagnosed approximately 12 years ago for outpatient workup for microscopic hematuria  He underwent regular surveillance cystoscopic examinations and subsequently developed disease recurrence He completed induction and maintenance BCG and recently transferred urologic care to [facility] and is under the management of Dr Myesha Espinal  He is status post cystoscopic examination 12/21/2020 without evidence of abnormal or malignant findings  He was admitted to the Internal Medicine service after sustaining fall  Patient denied any fever, chills, flank, abdominal, suprapubic, groin or testicular pain, dysuria or gross hematuria; but expressed sensation of general unwellness, malaise and weakness prior to arrival   He is maintain low-grade fevers  However, he remains hemodynamically stable and minimally symptomatic  He had mild leukocytosis  But overall urinalysis did not strongly suggest UTI  Nevertheless, he was started on empiric ceftriaxone  CT was obtained to rule out trauma with incidental finding of bilateral nephrolithiasis and several obstructing stones measuring up to approximately 6 mm with resultant left hydronephrosis  Our service was contacted against CT findings for further management recommendations         Active Problems:    Patient Active Problem List   Diagnosis    Hypertension    Hyperlipemia    Type 2 diabetes mellitus with hyperglycemia, with long-term current use of insulin (HCC)    Chronic low back pain    CAD (coronary artery disease)    Bladder cancer ([facility] )    Arthritis    Paroxysmal atrial fibrillation ([facility] )    AAA (abdominal aortic aneurysm) (HCC)    Decreased pedal pulses    Chronic pain of both knees    Actinic keratosis of right temple    Actinic keratosis of scalp    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    Immunization deficiency    Left lower quadrant abdominal pain    Excessive gas    Morbid obesity (HCC)    Colitis    Mass of right adrenal gland (HCC)    LUCIA (obstructive sleep apnea)    Embolism and thrombosis of arteries of the lower extremities (HCC)    Bilateral edema of lower extremity    Hypoxemia    Acute on chronic diastolic heart failure (HCC)    Left upper lobe pulmonary nodule    Scaly skin on examination    CHF (congestive heart failure) (HCC)    Fall    Sepsis ([facility]    LACY (acute kidney injury) ([facility] )    Hyponatremia            Impressions  · Left Ureteral Calculi  · Left Hydronephrosis  · History of bladder cancer    Recommendations  1  Initiate aggressive IVFs   2  Flomax  3  Analgesia/Narcotics   4  Anti-emetics   5  ATBs empirically while awaiting culture   6  Strain urine   7  NPO for OR if no spontaneous expulsion achieved  Will necessitate transfer to [place] for  surgeon availability  Explained risk, benefits and potential complications of ureteroscopic stone extraction  Patient has verbalized understanding of possible need for ureteral stent only due to infection  requiring staged ureteroscopy electively once recovered and infection free as OP  Formal consent by surgeon          Past Medical History:   Diagnosis Date    A-fib [facility])     AAA (abdominal aortic aneurysm) ([facility] )     Actinic keratosis of right temple 7/31/2020    Actinic keratosis of scalp 7/31/2020    Acute respiratory failure with hypoxia (HCC) 3/25/2021    Arthritis     Lay's esophagus     Bladder cancer (HCC)     CAD (coronary artery disease)     Chronic low back pain     Chronic pain of both knees 2020    Colitis 2020    Diabetes ([facility] )     Excessive gas 12/3/2020    Hyperlipemia     Hypertension     Immunization deficiency 10/30/2020    Hep a nonimmune    Left lower quadrant abdominal pain 12/3/2020    Mass of right adrenal gland ([facility] ) 2020    4 1 cm    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    LUCIA (obstructive sleep apnea) 2021       Past Surgical History:   Procedure Laterality Date    BACK SURGERY      BLADDER SURGERY      CATARACT EXTRACTION, BILATERAL      COLONOSCOPY  2019    next 2022 [address] CORONARY ARTERY BYPASS GRAFT  2016    Quadruple per Rita    EGD  2017    TOTAL HIP ARTHROPLASTY Right 2012    TOTAL SHOULDER REPLACEMENT Right 2013       Family History   Problem Relation Age of Onset    Heart disease Father     Arthritis Father     Heart attack Father     Alzheimer's disease Mother        Social History     Socioeconomic History    Marital status: /Civil Union     Spouse name: Ashly Tapia Number of children: 3    Years of education: Not on file    Highest education level: 12th grade   Occupational History    Not on file   Social Needs    Financial resource strain: Not hard at all   [place] insecurity     Worry: Never true     Inability: Never true    Transportation needs     Medical: No     Non-medical: No   Tobacco Use    Smoking status: Former Smoker     Packs/day: 0 25     Types: Cigarettes     Quit date: 2009     Years since quittin 3    Smokeless tobacco: Never Used    Tobacco comment: smoked since age 21; per [place]   Substance and Sexual Activity    Alcohol use: Not Currently     Comment: No use    Drug use: Never     Comment: No use    Sexual activity: Not Currently   Lifestyle    Physical activity     Days per week: 4 days     Minutes per session: 10 min    Stress: Not at all   Relationships    Social connections     Talks on phone: More than three times a week     Gets together: Twice a week Attends Sabianism service: Never     Active member of club or organization: No     Attends meetings of clubs or organizations: Never     Relationship status:     Intimate partner violence     Fear of current or ex partner: No     Emotionally abused: No     Physically abused: No     Forced sexual activity: No   Other Topics Concern    Not on file   Social History Narrative    · Most recent tobacco use screenin2020      · Do you currently or have you served in the [employer]:   No      · Were you activated, into active duty, as a member of the [employer] or as a Reservist:   No      · Live alone or with others:   with others        · Caffeine intake:   Occasional      · Illicit drugs:   No      · Diet:   Regular      · Exercise level: Moderate      · Advance directive:    Yes      · Marital status:         · General stress level:   Medium      · Single or multi-level home/work:   multi level home      · Guns present in home:   No      · Seat belts used routinely:   Yes      · Sunscreen used routinely:   Yes      · Smoke alarm in home:   Yes        No Known Allergies    Review of Systems  10 point review of systems negative except as noted in HPI  Constitutional:   positive for  - chills and fever  negative for - fatigue or malaise  Cardiovascular:   no chest pain or dyspnea on exertion  Gastrointestinal:   no abdominal pain, change in bowel habits, or black or bloody stools  Genito-Urinary:   no dysuria, trouble voiding, or hematuria  Neurological:   positive for - dizziness, gait disturbance and weakness     Chart Review   Allergies, current medications, history, problem list    Vital Signs  /52   Pulse 102   Temp 99 9 °F (37 7 °C)   Resp 18   Ht 6' (1 829 m)   Wt (!) 143 kg (314 lb 13 1 oz)   SpO2 91%   BMI 42 70 kg/m²     Physical Exam  General appearance: alert and oriented, in no acute distress, appears stated age, cooperative and morbidly obese  Head: Normocephalic, without obvious abnormality, atraumatic  Neck: no adenopathy, no carotid bruit, no JVD, supple, symmetrical, trachea midline and thyroid not enlarged, symmetric, no tenderness/mass/nodules  Lungs: diminished breath sounds  Heart: regular rate and rhythm, S1, S2 normal, no murmur, click, rub or gallop  Abdomen: soft, non-tender; bowel sounds normal; no masses,  no organomegaly  Extremities: extremities normal, warm and well-perfused; no cyanosis, clubbing, or edema  Pulses: 2+ and symmetric  Neurologic: Grossly normal  No urinary drains     Laboratory Studies  Lab Results   Component Value Date    HGBA1C 8 4 (H) 04/22/2021    K 4 8 04/22/2021    CL 94 (L) 04/22/2021    CO2 28 04/22/2021    CO2 30 04/22/2021    GLUCOSE 237 (H) 04/22/2021    CREATININE 1 66 (H) 04/22/2021    BUN 44 (H) 04/22/2021    MG 2 1 03/29/2021    PHOS 3 2 03/29/2021     Lab Results   Component Value Date    WBC 15 21 (H) 04/22/2021    RBC 4 45 04/22/2021    HGB 11 9 (L) 04/22/2021    HCT 38 2 04/22/2021    MCV 86 04/22/2021    MCH 26 7 (L) 04/22/2021    RDW 16 1 (H) 04/22/2021     04/22/2021         Imaging and Other Studies  )  Xr Chest 2 Views    Result Date: 3/26/2021  Narrative: CHEST INDICATION:   Dyspnea, hypoxia  Patient has suspected COVID-19  COMPARISON:  6/1/2012; 5/30/2012 EXAM PERFORMED/VIEWS:  XR CHEST PA & LATERAL FINDINGS: Sternotomy wires and mediastinal clips are noted, compatible with prior CABG  Heart is enlarged  Aorta atherosclerotic  Mildly prominence of the pulmonary interstitial markings  Small amount of bandlike densities in the left midlung may represent subsegmental atelectasis and/or scarring  There is slight blunting of the right costophrenic sulcus  Lateral view demonstrates pulmonary hyperinflation  Mild to moderate spondylotic changes noted  Impression: 1  Mild pulmonary interstitial edema suspected  2   Mild pulmonary inflation suggestive of underlying emphysema   Workstation performed: [id number]     Trauma - Ct Head Wo Contrast    Result Date: 4/23/2021  Narrative: CT BRAIN - WITHOUT CONTRAST INDICATION:   TRAUMA  COMPARISON:  None  TECHNIQUE:  CT examination of the brain was performed  In addition to axial images, sagittal and coronal 2D reformatted images were created and submitted for interpretation  Radiation dose length product (DLP) for this visit:  [phone] 1558-663 mGy-cm  (accession [id number]), 9700 mGy-cm  (accession [id number]), 640 mGy-cm  (accession [id number])  This examination, like all CT scans performed in the [facility], was performed utilizing techniques to minimize radiation dose exposure, including the use of iterative reconstruction and automated exposure control  IMAGE QUALITY:  Diagnostic  FINDINGS: PARENCHYMA: Decreased attenuation is noted in periventricular and subcortical white matter demonstrating an appearance that is statistically most likely to represent mild microangiopathic change  Gray-white differentiation appears maintained  No CT signs of acute territorial infarction  No intracranial mass, mass effect or midline shift  No acute parenchymal hemorrhage  Mild parenchymal atrophy VENTRICLES AND EXTRA-AXIAL SPACES:  Ventricles and extra-axial CSF spaces are prominent commensurate with the degree of volume loss  No hydrocephalus  No acute extra-axial hemorrhage  VISUALIZED ORBITS AND PARANASAL SINUSES:  Unremarkable  CALVARIUM AND EXTRACRANIAL SOFT TISSUES:  Normal      Impression: No calvarial fracture or acute intracranial abnormality is seen  Other findings as above  CT CERVICAL SPINE - WITHOUT CONTRAST INDICATION:   TRAUMA  Fall COMPARISON:  None  TECHNIQUE:  CT examination of the cervical spine was performed without intravenous contrast   Contiguous axial images were obtained  Sagittal and coronal reconstructions were performed    Radiation dose length product (DLP) for this visit:  5934 mGy-cm  (accession [id number]), 1620 mGy-cm (accession [id number]), 640 mGy-cm  (accession [id number])  This examination, like all CT scans performed in the [facility], was performed utilizing techniques to minimize radiation dose exposure, including the use of iterative reconstruction and automated exposure control  IMAGE QUALITY:  Diagnostic  FINDINGS: ALIGNMENT:  Normal alignment of the cervical spine  No subluxation  VERTEBRAL BODIES:  No acute fracture  DEGENERATIVE CHANGES:  Intervertebral disc space narrowing at C5/C6, C6/C7, and C7/T1 with anterior, marginal, and uncovertebral osteophytosis at these levels  Multilevel facet joint arthropathy  PREVERTEBRAL AND PARASPINAL SOFT TISSUES:  Unremarkable  THORACIC INLET:  Please refer to the concurrent chest, abdomen, and pelvic CT report for description of the thoracic inlet findings  IMPRESSION: Degenerative changes as described no evidence of acute cervical spine injury  CT CHEST, ABDOMEN AND PELVIS WITH IV CONTRAST INDICATION: Trauma  Fall COMPARISON: None  TECHNIQUE: CT examination of the chest, abdomen and pelvis was performed  Reformatted images were created in axial, sagittal, and coronal planes  Radiation dose length product (DLP) for this visit:  [phone] 1558-663 mGy-cm  (accession [id number]), 2181 mGy-cm  (accession [id number]), 640 mGy-cm  (accession [id number])  This examination, like all CT scans performed in the [facility], was performed utilizing techniques to minimize radiation dose exposure, including the use of iterative reconstruction and automated exposure control  IV Contrast:  100 mL of iohexol (OMNIPAQUE) Enteric Contrast:  Enteric contrast was not administered  3 CHEST LUNGS:  Suspected round atelectasis in the posterior right lower lobe  Linear atelectasis/scarring noted bilaterally  5 mm subpleural nodule in the left upper lung field (axial image 17, series 2)  Several subcentimeter calcified granulomas scattered in the right lung    Lungs otherwise appear grossly clear  PLEURA:  Pleural thickening with pleural calcifications in the dependent portion of the right mid and lower lung field, 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  HEART/GREAT VESSELS: Moderate to severe coronary atherosclerosis  Mild aortic atherosclerosis  No thoracic aortic aneurysm  The heart appears normal in size  Suspected lipoma of the interatrial septum measures approximately 5 3 cm in size  MEDIASTINUM AND SILKE:  Several subcentimeter calcified lymph nodes in the right hilum  Large airways appear patent  No mediastinal mass is seen  Evidence of prior CABG  CHEST WALL AND LOWER NECK: Right shoulder arthroplasty  Chest wall appears intact  Median sternotomy wires are noted  Otherwise grossly unremarkable  ABDOMEN LIVER/BILIARY TREE:  Unremarkable  GALLBLADDER:  No calcified gallstones  No pericholecystic inflammatory change  SPLEEN:  Unremarkable  PANCREAS:  Mild atrophy; otherwise grossly unremarkable ADRENAL GLANDS:  4 cm right adrenal nodule redemonstrated, stable  Left adrenal gland appears normal  KIDNEYS/URETERS:  Several subcentimeter nonobstructing stones in the left kidney  Right kidney otherwise appears grossly unremarkable  Several subcentimeter nonobstructing stones in the left kidney  Several obstructing stones in the distal left ureter  measuring approximately 6 mm in size  Mild left hydroureteronephrosis and periureteral and perinephric fat stranding  STOMACH AND BOWEL:  Colonic diverticulosis  No discrete evidence of acute diverticulitis  No evidence of bowel obstruction  Otherwise grossly unremarkable  APPENDIX:  No findings to suggest appendicitis  ABDOMINOPELVIC CAVITY:  No ascites or free intraperitoneal air  No lymphadenopathy  VESSELS:  Moderate atherosclerosis  Fusiform aneurysm of the distal abdominal aorta measures approximately 5 cm in maximal dimensions  PELVIS REPRODUCTIVE ORGANS:  Enlarged prostate with prostate volume estimated at 93 mL  URINARY BLADDER:  Grossly unremarkable ABDOMINAL WALL/INGUINAL REGIONS:  Mild body wall edema OSSEOUS STRUCTURES:  Multilevel degenerative changes of the spine, most prominent in the lumbar region  Total right hip arthroplasty, visualized hardware appears intact  Moderate to severe degenerative changes of the left hip  No acute fracture or destructive osseous lesion identified  IMPRESSION:  No evidence of acute traumatic injury in the chest, abdomen, or pelvis  Bilateral nephrolithiasis with several obstructing stones in the distal left ureter, measuring approximately 6 mm in size with associated left hydroureteronephrosis and obstructive uropathy  Pleural thickening with pleural calcifications in the dependent portion of the right mid and lower lung field, 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  Suspected round atelectasis in the posterior right lower lobe  Coronary atherosclerosis, history of prior CABG  4 cm right adrenal nodule redemonstrated, stable  Moderate aortic atherosclerosis  Fusiform aneurysm of the distal abdominal aorta measures approximately 5 cm in maximal dimensions  5 mm subpleural nodule in the left upper lung field (axial image 17, series 2)  Based on current Fleischner Society 2017 Guidelines on incidental pulmonary nodule, 12 month follow-up non-contrast chest CT is recommended  Enlarged prostate, degenerative changes of the spine, right shoulder and right total hip arthroplasty, and other findings as above  Findings discussed with Dr Rachelle Gonzalez by Dr Mariano Ott at 12:30 PM on 4/23/2021  Workstation performed: [id number]     Trauma - Ct Spine Cervical Wo Contrast    Result Date: 4/23/2021  Narrative: CT BRAIN - WITHOUT CONTRAST INDICATION:   TRAUMA  COMPARISON:  None  TECHNIQUE:  CT examination of the brain was performed  In addition to axial images, sagittal and coronal 2D reformatted images were created and submitted for interpretation   Radiation dose length product (DLP) for this visit:  [phone] 1558-663 mGy-cm  (accession [id number]), 7828 mGy-cm  (accession [id number]), 640 mGy-cm  (accession [id number])  This examination, like all CT scans performed in the [facility], was performed utilizing techniques to minimize radiation dose exposure, including the use of iterative reconstruction and automated exposure control  IMAGE QUALITY:  Diagnostic  FINDINGS: PARENCHYMA: Decreased attenuation is noted in periventricular and subcortical white matter demonstrating an appearance that is statistically most likely to represent mild microangiopathic change  Gray-white differentiation appears maintained  No CT signs of acute territorial infarction  No intracranial mass, mass effect or midline shift  No acute parenchymal hemorrhage  Mild parenchymal atrophy VENTRICLES AND EXTRA-AXIAL SPACES:  Ventricles and extra-axial CSF spaces are prominent commensurate with the degree of volume loss  No hydrocephalus  No acute extra-axial hemorrhage  VISUALIZED ORBITS AND PARANASAL SINUSES:  Unremarkable  CALVARIUM AND EXTRACRANIAL SOFT TISSUES:  Normal      Impression: No calvarial fracture or acute intracranial abnormality is seen  Other findings as above  CT CERVICAL SPINE - WITHOUT CONTRAST INDICATION:   TRAUMA  Fall COMPARISON:  None  TECHNIQUE:  CT examination of the cervical spine was performed without intravenous contrast   Contiguous axial images were obtained  Sagittal and coronal reconstructions were performed  Radiation dose length product (DLP) for this visit:  [phone] 1558-663 mGy-cm  (accession [id number]), 6765 mGy-cm  (accession [id number]), 640 mGy-cm  (accession [id number])  This examination, like all CT scans performed in the [facility], was performed utilizing techniques to minimize radiation dose exposure, including the use of iterative reconstruction and automated exposure control  IMAGE QUALITY:  Diagnostic  FINDINGS: ALIGNMENT:  Normal alignment of the cervical spine   No subluxation  VERTEBRAL BODIES:  No acute fracture  DEGENERATIVE CHANGES:  Intervertebral disc space narrowing at C5/C6, C6/C7, and C7/T1 with anterior, marginal, and uncovertebral osteophytosis at these levels  Multilevel facet joint arthropathy  PREVERTEBRAL AND PARASPINAL SOFT TISSUES:  Unremarkable  THORACIC INLET:  Please refer to the concurrent chest, abdomen, and pelvic CT report for description of the thoracic inlet findings  IMPRESSION: Degenerative changes as described no evidence of acute cervical spine injury  CT CHEST, ABDOMEN AND PELVIS WITH IV CONTRAST INDICATION: Trauma  Fall COMPARISON: None  TECHNIQUE: CT examination of the chest, abdomen and pelvis was performed  Reformatted images were created in axial, sagittal, and coronal planes  Radiation dose length product (DLP) for this visit:  [phone]) 1558-663 mGy-cm  (accession [id number]), 7824 mGy-cm  (accession [id number]), 640 mGy-cm  (accession [id number])  This examination, like all CT scans performed in the [facility], was performed utilizing techniques to minimize radiation dose exposure, including the use of iterative reconstruction and automated exposure control  IV Contrast:  100 mL of iohexol (OMNIPAQUE) Enteric Contrast:  Enteric contrast was not administered  3 CHEST LUNGS:  Suspected round atelectasis in the posterior right lower lobe  Linear atelectasis/scarring noted bilaterally  5 mm subpleural nodule in the left upper lung field (axial image 17, series 2)  Several subcentimeter calcified granulomas scattered in the right lung  Lungs otherwise appear grossly clear  PLEURA:  Pleural thickening with pleural calcifications in the dependent portion of the right mid and lower lung field, 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  HEART/GREAT VESSELS: Moderate to severe coronary atherosclerosis  Mild aortic atherosclerosis  No thoracic aortic aneurysm  The heart appears normal in size    Suspected lipoma of the interatrial septum measures approximately 5 3 cm in size  MEDIASTINUM AND SILKE:  Several subcentimeter calcified lymph nodes in the right hilum  Large airways appear patent  No mediastinal mass is seen  Evidence of prior CABG  CHEST WALL AND LOWER NECK: Right shoulder arthroplasty  Chest wall appears intact  Median sternotomy wires are noted  Otherwise grossly unremarkable  ABDOMEN LIVER/BILIARY TREE:  Unremarkable  GALLBLADDER:  No calcified gallstones  No pericholecystic inflammatory change  SPLEEN:  Unremarkable  PANCREAS:  Mild atrophy; otherwise grossly unremarkable ADRENAL GLANDS:  4 cm right adrenal nodule redemonstrated, stable  Left adrenal gland appears normal  KIDNEYS/URETERS:  Several subcentimeter nonobstructing stones in the left kidney  Right kidney otherwise appears grossly unremarkable  Several subcentimeter nonobstructing stones in the left kidney  Several obstructing stones in the distal left ureter  measuring approximately 6 mm in size  Mild left hydroureteronephrosis and periureteral and perinephric fat stranding  STOMACH AND BOWEL:  Colonic diverticulosis  No discrete evidence of acute diverticulitis  No evidence of bowel obstruction  Otherwise grossly unremarkable  APPENDIX:  No findings to suggest appendicitis  ABDOMINOPELVIC CAVITY:  No ascites or free intraperitoneal air  No lymphadenopathy  VESSELS:  Moderate atherosclerosis  Fusiform aneurysm of the distal abdominal aorta measures approximately 5 cm in maximal dimensions  PELVIS REPRODUCTIVE ORGANS:  Enlarged prostate with prostate volume estimated at 93 mL  URINARY BLADDER:  Grossly unremarkable ABDOMINAL WALL/INGUINAL REGIONS:  Mild body wall edema OSSEOUS STRUCTURES:  Multilevel degenerative changes of the spine, most prominent in the lumbar region  Total right hip arthroplasty, visualized hardware appears intact  Moderate to severe degenerative changes of the left hip  No acute fracture or destructive osseous lesion identified  IMPRESSION:  No evidence of acute traumatic injury in the chest, abdomen, or pelvis  Bilateral nephrolithiasis with several obstructing stones in the distal left ureter, measuring approximately 6 mm in size with associated left hydroureteronephrosis and obstructive uropathy  Pleural thickening with pleural calcifications in the dependent portion of the right mid and lower lung field, 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  Suspected round atelectasis in the posterior right lower lobe  Coronary atherosclerosis, history of prior CABG  4 cm right adrenal nodule redemonstrated, stable  Moderate aortic atherosclerosis  Fusiform aneurysm of the distal abdominal aorta measures approximately 5 cm in maximal dimensions  5 mm subpleural nodule in the left upper lung field (axial image 17, series 2)  Based on current Fleischner Society 2017 Guidelines on incidental pulmonary nodule, 12 month follow-up non-contrast chest CT is recommended  Enlarged prostate, degenerative changes of the spine, right shoulder and right total hip arthroplasty, and other findings as above  Findings discussed with Dr Emmie Waters by Dr Justin Garibay at 12:30 PM on 4/23/2021  Workstation performed: [id number]     Ct Chest Abdomen Pelvis W Contrast    Result Date: 4/23/2021  Narrative: CT BRAIN - WITHOUT CONTRAST INDICATION:   TRAUMA  COMPARISON:  None  TECHNIQUE:  CT examination of the brain was performed  In addition to axial images, sagittal and coronal 2D reformatted images were created and submitted for interpretation  Radiation dose length product (DLP) for this visit:  [phone] 1558-663 mGy-cm  (accession [id number]), 3621 mGy-cm  (accession [id number]), 640 mGy-cm  (accession [id number])  This examination, like all CT scans performed in the [facility], was performed utilizing techniques to minimize radiation dose exposure, including the use of iterative reconstruction and automated exposure control  IMAGE QUALITY:  Diagnostic   FINDINGS: PARENCHYMA: Decreased attenuation is noted in periventricular and subcortical white matter demonstrating an appearance that is statistically most likely to represent mild microangiopathic change  Gray-white differentiation appears maintained  No CT signs of acute territorial infarction  No intracranial mass, mass effect or midline shift  No acute parenchymal hemorrhage  Mild parenchymal atrophy VENTRICLES AND EXTRA-AXIAL SPACES:  Ventricles and extra-axial CSF spaces are prominent commensurate with the degree of volume loss  No hydrocephalus  No acute extra-axial hemorrhage  VISUALIZED ORBITS AND PARANASAL SINUSES:  Unremarkable  CALVARIUM AND EXTRACRANIAL SOFT TISSUES:  Normal      Impression: No calvarial fracture or acute intracranial abnormality is seen  Other findings as above  CT CERVICAL SPINE - WITHOUT CONTRAST INDICATION:   TRAUMA  Fall COMPARISON:  None  TECHNIQUE:  CT examination of the cervical spine was performed without intravenous contrast   Contiguous axial images were obtained  Sagittal and coronal reconstructions were performed  Radiation dose length product (DLP) for this visit:  [phone] 1558-663 mGy-cm  (accession [id number]), 4782 mGy-cm  (accession [id number]), 640 mGy-cm  (accession [id number])  This examination, like all CT scans performed in the [facility], was performed utilizing techniques to minimize radiation dose exposure, including the use of iterative reconstruction and automated exposure control  IMAGE QUALITY:  Diagnostic  FINDINGS: ALIGNMENT:  Normal alignment of the cervical spine  No subluxation  VERTEBRAL BODIES:  No acute fracture  DEGENERATIVE CHANGES:  Intervertebral disc space narrowing at C5/C6, C6/C7, and C7/T1 with anterior, marginal, and uncovertebral osteophytosis at these levels  Multilevel facet joint arthropathy  PREVERTEBRAL AND PARASPINAL SOFT TISSUES:  Unremarkable   THORACIC INLET:  Please refer to the concurrent chest, abdomen, and pelvic CT report for description of the thoracic inlet findings  IMPRESSION: Degenerative changes as described no evidence of acute cervical spine injury  CT CHEST, ABDOMEN AND PELVIS WITH IV CONTRAST INDICATION: Trauma  Fall COMPARISON: None  TECHNIQUE: CT examination of the chest, abdomen and pelvis was performed  Reformatted images were created in axial, sagittal, and coronal planes  Radiation dose length product (DLP) for this visit:  [phone] 1558-663 mGy-cm  (accession [id number]), 7079 mGy-cm  (accession [id number]), 640 mGy-cm  (accession [id number])  This examination, like all CT scans performed in the [facility], was performed utilizing techniques to minimize radiation dose exposure, including the use of iterative reconstruction and automated exposure control  IV Contrast:  100 mL of iohexol (OMNIPAQUE) Enteric Contrast:  Enteric contrast was not administered  3 CHEST LUNGS:  Suspected round atelectasis in the posterior right lower lobe  Linear atelectasis/scarring noted bilaterally  5 mm subpleural nodule in the left upper lung field (axial image 17, series 2)  Several subcentimeter calcified granulomas scattered in the right lung  Lungs otherwise appear grossly clear  PLEURA:  Pleural thickening with pleural calcifications in the dependent portion of the right mid and lower lung field, 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  HEART/GREAT VESSELS: Moderate to severe coronary atherosclerosis  Mild aortic atherosclerosis  No thoracic aortic aneurysm  The heart appears normal in size  Suspected lipoma of the interatrial septum measures approximately 5 3 cm in size  MEDIASTINUM AND SILKE:  Several subcentimeter calcified lymph nodes in the right hilum  Large airways appear patent  No mediastinal mass is seen  Evidence of prior CABG  CHEST WALL AND LOWER NECK: Right shoulder arthroplasty  Chest wall appears intact  Median sternotomy wires are noted  Otherwise grossly unremarkable   ABDOMEN LIVER/BILIARY TREE: Unremarkable  GALLBLADDER:  No calcified gallstones  No pericholecystic inflammatory change  SPLEEN:  Unremarkable  PANCREAS:  Mild atrophy; otherwise grossly unremarkable ADRENAL GLANDS:  4 cm right adrenal nodule redemonstrated, stable  Left adrenal gland appears normal  KIDNEYS/URETERS:  Several subcentimeter nonobstructing stones in the left kidney  Right kidney otherwise appears grossly unremarkable  Several subcentimeter nonobstructing stones in the left kidney  Several obstructing stones in the distal left ureter  measuring approximately 6 mm in size  Mild left hydroureteronephrosis and periureteral and perinephric fat stranding  STOMACH AND BOWEL:  Colonic diverticulosis  No discrete evidence of acute diverticulitis  No evidence of bowel obstruction  Otherwise grossly unremarkable  APPENDIX:  No findings to suggest appendicitis  ABDOMINOPELVIC CAVITY:  No ascites or free intraperitoneal air  No lymphadenopathy  VESSELS:  Moderate atherosclerosis  Fusiform aneurysm of the distal abdominal aorta measures approximately 5 cm in maximal dimensions  PELVIS REPRODUCTIVE ORGANS:  Enlarged prostate with prostate volume estimated at 93 mL  URINARY BLADDER:  Grossly unremarkable ABDOMINAL WALL/INGUINAL REGIONS:  Mild body wall edema OSSEOUS STRUCTURES:  Multilevel degenerative changes of the spine, most prominent in the lumbar region  Total right hip arthroplasty, visualized hardware appears intact  Moderate to severe degenerative changes of the left hip  No acute fracture or destructive osseous lesion identified  IMPRESSION:  No evidence of acute traumatic injury in the chest, abdomen, or pelvis  Bilateral nephrolithiasis with several obstructing stones in the distal left ureter, measuring approximately 6 mm in size with associated left hydroureteronephrosis and obstructive uropathy   Pleural thickening with pleural calcifications in the dependent portion of the right mid and lower lung field, 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  Suspected round atelectasis in the posterior right lower lobe  Coronary atherosclerosis, history of prior CABG  4 cm right adrenal nodule redemonstrated, stable  Moderate aortic atherosclerosis  Fusiform aneurysm of the distal abdominal aorta measures approximately 5 cm in maximal dimensions  5 mm subpleural nodule in the left upper lung field (axial image 17, series 2)  Based on current Fleischner Society 2017 Guidelines on incidental pulmonary nodule, 12 month follow-up non-contrast chest CT is recommended  Enlarged prostate, degenerative changes of the spine, right shoulder and right total hip arthroplasty, and other findings as above  Findings discussed with Dr Claudean Croak by Dr Junita Severance at 12:30 PM on 4/23/2021  Workstation performed: [id number]     Xr Trauma Multiple    Result Date: 4/23/2021  Narrative: TRAUMA SERIES INDICATION:  trauma  COMPARISON:  None VIEWS:  XR TRAUMA MULTIPLE  FINDINGS: CHEST: Supine frontal view of the chest is obtained  Cardiomediastinal silhouette is within normal limits accounting for technique and patient positioning  Bilateral pleural thickening noted with areas of pleural calcification as correlated with follow-up CT  No definite pneumothorax  No pneumothorax is seen on this supine film  Upright images are more sensitive to detect anterior pneumothoraces if relevant  Status post right shoulder arthroplasty  1 view reviewed  Impression: No acute cardiopulmonary disease within limitations of supine imaging  Workstation performed: [id number]     Cta Ed Chest Pe Study    Result Date: 3/25/2021  Narrative: CTA - CHEST WITH IV CONTRAST - PULMONARY ANGIOGRAM INDICATION:   Shortness of breath Hypoxia, dyspnea  COMPARISON: CT of abdomen on January 5, 2021  TECHNIQUE: CTA examination of the chest was performed using angiographic technique according to a protocol specifically tailored to evaluate for pulmonary embolism    Axial, sagittal, and coronal 2D reformatted images were created from the source data and  submitted for interpretation  In addition, coronal 3D MIP postprocessing was performed on the acquisition scanner  Radiation dose length product (DLP) for this visit:  1747 12 mGy-cm   This examination, like all CT scans performed in the [facility], was performed utilizing techniques to minimize radiation dose exposure, including the use of iterative reconstruction and automated exposure control  IV Contrast:  100 mL of iohexol (OMNIPAQUE)  was administered intravenously without immediate adverse reaction  FINDINGS: PULMONARY ARTERIAL TREE:  Motion limited examination  No main, lobar, or proximal segmental pulmonary embolism  LUNGS:  Bibasilar atelectasis/scarring similar to prior examination  Large calcification within the right lower lobe compatible with granuloma  There is a 0 7 x 0 5 cm juxtapleural nodule in the left upper lobe (series 2, image 70)  Minimal secretions  in the posterior trachea  PLEURA:  There are calcified pleural plaques which may be due to asbestos exposure  No pneumothorax or pleural effusion  HEART/GREAT VESSELS:  Cardiomegaly  Lipomatous hypertrophy of the interatrial septum  Coronary artery calcifications  No pericardial effusion  Status post median sternotomy  MEDIASTINUM AND SILKE:  Subcarinal lymph nodes measure up to 1 cm in short axis (series 2, image 119)  CHEST WALL AND LOWER NECK:   Unremarkable  VISUALIZED STRUCTURES IN THE UPPER ABDOMEN:  Stable 4 cm right adrenal nodule appears OSSEOUS STRUCTURES:  No acute fracture or destructive osseous lesion  Degenerative changes the osseous structures  Impression: 1  Motion limited examination  No evidence of pulmonary embolism to the proximal segmental level  2   Bibasilar atelectasis/scarring similar to prior examination  3   7 mm left upper lobe nodule    Based on current Fleischner Society 2017 Guidelines on incidental pulmonary nodule, followup non-contrast CT is recommended at 6-12 months from the initial examination and, if stable at that time, an additional followup is recommended for 18-24 months from the initial examination  4   Stable calcified pleural plaques which may be due to asbestos exposure  5   Cardiomegaly  Lipomatous hypertrophy of the interatrial septum  6   Stable 4 cm right adrenal nodule  Workstation performed: [id number]     Us Bedside Procedure    Result Date: 4/22/2021  Narrative: 1 2 [id number]  2 [id number]  1 [id number]      Medications   Current Facility-Administered Medications   Medication Dose Route Frequency Provider Last Rate    acetaminophen  650 mg Oral Q6H PRN Arminda Edmond MD      apixaban  5 mg Oral BID Arminda Edmond MD      atorvastatin  40 mg Oral Daily Arminda Edmond MD      digoxin  125 mcg Oral Daily Arminda Edmond MD      ezetimibe  10 mg Oral Daily Arminda Edmond MD      finasteride  5 mg Oral Daily Arminda Edmond MD      insulin detemir  65 Units Subcutaneous Q12H [facility] Arminda Edmond MD      insulin lispro  1-6 Units Subcutaneous TID AC Arminda Edmond MD      metoprolol succinate  12 5 mg Oral Daily Arminda Edmond MD      multi-electrolyte  75 mL/hr Intravenous Continuous Chica Sarmiento PA-C 75 mL/hr (04/23/21 0144)    pantoprazole  20 mg Oral Early Morning MD Josh Hammer CRNP

## 2021-04-23 NOTE — ASSESSMENT & PLAN NOTE
· With tachycardia and leukocytosis possible fever  · Suspected source urine with left hydronephrosis and left ureteral stone, consider Covid

## 2021-04-24 ENCOUNTER — ANESTHESIA EVENT (INPATIENT)
Dept: PERIOP | Facility: HOSPITAL | Age: 76
DRG: 854 | End: 2021-04-24
Payer: MEDICARE

## 2021-04-24 ENCOUNTER — ANESTHESIA (INPATIENT)
Dept: PERIOP | Facility: HOSPITAL | Age: 76
DRG: 854 | End: 2021-04-24
Payer: MEDICARE

## 2021-04-24 ENCOUNTER — APPOINTMENT (INPATIENT)
Dept: RADIOLOGY | Facility: HOSPITAL | Age: 76
DRG: 854 | End: 2021-04-24
Payer: MEDICARE

## 2021-04-24 PROBLEM — N20.0 NEPHROLITHIASIS: Status: ACTIVE | Noted: 2021-04-24

## 2021-04-24 LAB
ALBUMIN SERPL BCP-MCNC: 2.7 G/DL (ref 3.5–5)
ALP SERPL-CCNC: 68 U/L (ref 46–116)
ALT SERPL W P-5'-P-CCNC: 19 U/L (ref 12–78)
ANION GAP SERPL CALCULATED.3IONS-SCNC: 4 MMOL/L (ref 4–13)
AST SERPL W P-5'-P-CCNC: 10 U/L (ref 5–45)
BILIRUB SERPL-MCNC: 0.32 MG/DL (ref 0.2–1)
BUN SERPL-MCNC: 48 MG/DL (ref 5–25)
CALCIUM ALBUM COR SERPL-MCNC: 10.1 MG/DL (ref 8.3–10.1)
CALCIUM SERPL-MCNC: 9.1 MG/DL (ref 8.3–10.1)
CHLORIDE SERPL-SCNC: 101 MMOL/L (ref 100–108)
CO2 SERPL-SCNC: 29 MMOL/L (ref 21–32)
CREAT SERPL-MCNC: 1.3 MG/DL (ref 0.6–1.3)
ERYTHROCYTE [DISTWIDTH] IN BLOOD BY AUTOMATED COUNT: 16.3 % (ref 11.6–15.1)
GFR SERPL CREATININE-BSD FRML MDRD: 53 ML/MIN/1.73SQ M
GLUCOSE SERPL-MCNC: 171 MG/DL (ref 65–140)
GLUCOSE SERPL-MCNC: 173 MG/DL (ref 65–140)
GLUCOSE SERPL-MCNC: 176 MG/DL (ref 65–140)
GLUCOSE SERPL-MCNC: 178 MG/DL (ref 65–140)
GLUCOSE SERPL-MCNC: 191 MG/DL (ref 65–140)
GLUCOSE SERPL-MCNC: 238 MG/DL (ref 65–140)
HCT VFR BLD AUTO: 34.9 % (ref 36.5–49.3)
HGB BLD-MCNC: 10.8 G/DL (ref 12–17)
MCH RBC QN AUTO: 26.7 PG (ref 26.8–34.3)
MCHC RBC AUTO-ENTMCNC: 30.9 G/DL (ref 31.4–37.4)
MCV RBC AUTO: 86 FL (ref 82–98)
PLATELET # BLD AUTO: 160 THOUSANDS/UL (ref 149–390)
PMV BLD AUTO: 10.3 FL (ref 8.9–12.7)
POTASSIUM SERPL-SCNC: 4.6 MMOL/L (ref 3.5–5.3)
PROCALCITONIN SERPL-MCNC: 0.26 NG/ML
PROT SERPL-MCNC: 7.6 G/DL (ref 6.4–8.2)
RBC # BLD AUTO: 4.05 MILLION/UL (ref 3.88–5.62)
SODIUM SERPL-SCNC: 134 MMOL/L (ref 136–145)
WBC # BLD AUTO: 14.58 THOUSAND/UL (ref 4.31–10.16)

## 2021-04-24 PROCEDURE — 0T778DZ DILATION OF LEFT URETER WITH INTRALUMINAL DEVICE, VIA NATURAL OR ARTIFICIAL OPENING ENDOSCOPIC: ICD-10-PCS | Performed by: UROLOGY

## 2021-04-24 PROCEDURE — 87086 URINE CULTURE/COLONY COUNT: CPT | Performed by: FAMILY MEDICINE

## 2021-04-24 PROCEDURE — 99223 1ST HOSP IP/OBS HIGH 75: CPT | Performed by: FAMILY MEDICINE

## 2021-04-24 PROCEDURE — C1758 CATHETER, URETERAL: HCPCS | Performed by: UROLOGY

## 2021-04-24 PROCEDURE — C1769 GUIDE WIRE: HCPCS | Performed by: UROLOGY

## 2021-04-24 PROCEDURE — 80053 COMPREHEN METABOLIC PANEL: CPT | Performed by: FAMILY MEDICINE

## 2021-04-24 PROCEDURE — 85027 COMPLETE CBC AUTOMATED: CPT | Performed by: FAMILY MEDICINE

## 2021-04-24 PROCEDURE — NC001 PR NO CHARGE: Performed by: NURSE PRACTITIONER

## 2021-04-24 PROCEDURE — C2617 STENT, NON-COR, TEM W/O DEL: HCPCS | Performed by: UROLOGY

## 2021-04-24 PROCEDURE — 74018 RADEX ABDOMEN 1 VIEW: CPT

## 2021-04-24 PROCEDURE — 52332 CYSTOSCOPY AND TREATMENT: CPT | Performed by: UROLOGY

## 2021-04-24 PROCEDURE — 82948 REAGENT STRIP/BLOOD GLUCOSE: CPT

## 2021-04-24 PROCEDURE — 99233 SBSQ HOSP IP/OBS HIGH 50: CPT | Performed by: INTERNAL MEDICINE

## 2021-04-24 PROCEDURE — 84145 PROCALCITONIN (PCT): CPT | Performed by: FAMILY MEDICINE

## 2021-04-24 DEVICE — INLAY OPTIMA URETERAL STENT W/O GUIDEWIRE
Type: IMPLANTABLE DEVICE | Site: URETER | Status: FUNCTIONAL
Brand: BARD® INLAY OPTIMA® URETERAL STENT

## 2021-04-24 RX ORDER — HYDROCODONE BITARTRATE AND ACETAMINOPHEN 5; 325 MG/1; MG/1
1-2 TABLET ORAL EVERY 4 HOURS PRN
Qty: 20 TABLET | Refills: 0 | Status: SHIPPED | OUTPATIENT
Start: 2021-04-24 | End: 2021-04-28 | Stop reason: ALTCHOICE

## 2021-04-24 RX ORDER — PROPOFOL 10 MG/ML
INJECTION, EMULSION INTRAVENOUS AS NEEDED
Status: DISCONTINUED | OUTPATIENT
Start: 2021-04-24 | End: 2021-04-24

## 2021-04-24 RX ORDER — SODIUM CHLORIDE, SODIUM GLUCONATE, SODIUM ACETATE, POTASSIUM CHLORIDE, MAGNESIUM CHLORIDE, SODIUM PHOSPHATE, DIBASIC, AND POTASSIUM PHOSPHATE .53; .5; .37; .037; .03; .012; .00082 G/100ML; G/100ML; G/100ML; G/100ML; G/100ML; G/100ML; G/100ML
50 INJECTION, SOLUTION INTRAVENOUS CONTINUOUS
Status: DISCONTINUED | OUTPATIENT
Start: 2021-04-24 | End: 2021-04-24

## 2021-04-24 RX ORDER — PANTOPRAZOLE SODIUM 20 MG/1
20 TABLET, DELAYED RELEASE ORAL
Status: DISCONTINUED | OUTPATIENT
Start: 2021-04-24 | End: 2021-04-25 | Stop reason: HOSPADM

## 2021-04-24 RX ORDER — EZETIMIBE 10 MG/1
10 TABLET ORAL DAILY
Status: DISCONTINUED | OUTPATIENT
Start: 2021-04-24 | End: 2021-04-25 | Stop reason: HOSPADM

## 2021-04-24 RX ORDER — DIGOXIN 125 MCG
125 TABLET ORAL DAILY
Status: DISCONTINUED | OUTPATIENT
Start: 2021-04-24 | End: 2021-04-25 | Stop reason: HOSPADM

## 2021-04-24 RX ORDER — FENTANYL CITRATE/PF 50 MCG/ML
25 SYRINGE (ML) INJECTION
Status: DISCONTINUED | OUTPATIENT
Start: 2021-04-24 | End: 2021-04-24 | Stop reason: HOSPADM

## 2021-04-24 RX ORDER — ECHINACEA PURPUREA EXTRACT 125 MG
1 TABLET ORAL
Status: DISCONTINUED | OUTPATIENT
Start: 2021-04-24 | End: 2021-04-25 | Stop reason: HOSPADM

## 2021-04-24 RX ORDER — LIDOCAINE HYDROCHLORIDE 10 MG/ML
INJECTION, SOLUTION EPIDURAL; INFILTRATION; INTRACAUDAL; PERINEURAL AS NEEDED
Status: DISCONTINUED | OUTPATIENT
Start: 2021-04-24 | End: 2021-04-24

## 2021-04-24 RX ORDER — MAGNESIUM HYDROXIDE 1200 MG/15ML
LIQUID ORAL AS NEEDED
Status: DISCONTINUED | OUTPATIENT
Start: 2021-04-24 | End: 2021-04-24 | Stop reason: HOSPADM

## 2021-04-24 RX ORDER — ONDANSETRON 2 MG/ML
4 INJECTION INTRAMUSCULAR; INTRAVENOUS ONCE AS NEEDED
Status: DISCONTINUED | OUTPATIENT
Start: 2021-04-24 | End: 2021-04-24 | Stop reason: HOSPADM

## 2021-04-24 RX ORDER — SODIUM CHLORIDE, SODIUM LACTATE, POTASSIUM CHLORIDE, CALCIUM CHLORIDE 600; 310; 30; 20 MG/100ML; MG/100ML; MG/100ML; MG/100ML
20 INJECTION, SOLUTION INTRAVENOUS CONTINUOUS
Status: DISCONTINUED | OUTPATIENT
Start: 2021-04-24 | End: 2021-04-25 | Stop reason: HOSPADM

## 2021-04-24 RX ORDER — ACETAMINOPHEN 325 MG/1
650 TABLET ORAL EVERY 6 HOURS PRN
Status: DISCONTINUED | OUTPATIENT
Start: 2021-04-24 | End: 2021-04-25 | Stop reason: HOSPADM

## 2021-04-24 RX ORDER — METOPROLOL SUCCINATE 25 MG/1
12.5 TABLET, EXTENDED RELEASE ORAL DAILY
Status: DISCONTINUED | OUTPATIENT
Start: 2021-04-24 | End: 2021-04-25 | Stop reason: HOSPADM

## 2021-04-24 RX ORDER — ATORVASTATIN CALCIUM 40 MG/1
40 TABLET, FILM COATED ORAL DAILY
Status: DISCONTINUED | OUTPATIENT
Start: 2021-04-24 | End: 2021-04-25 | Stop reason: HOSPADM

## 2021-04-24 RX ORDER — MIDAZOLAM HYDROCHLORIDE 2 MG/2ML
INJECTION, SOLUTION INTRAMUSCULAR; INTRAVENOUS AS NEEDED
Status: DISCONTINUED | OUTPATIENT
Start: 2021-04-24 | End: 2021-04-24

## 2021-04-24 RX ORDER — ONDANSETRON 2 MG/ML
INJECTION INTRAMUSCULAR; INTRAVENOUS AS NEEDED
Status: DISCONTINUED | OUTPATIENT
Start: 2021-04-24 | End: 2021-04-24

## 2021-04-24 RX ORDER — FINASTERIDE 5 MG/1
5 TABLET, FILM COATED ORAL DAILY
Status: DISCONTINUED | OUTPATIENT
Start: 2021-04-24 | End: 2021-04-25 | Stop reason: HOSPADM

## 2021-04-24 RX ADMIN — PROPOFOL 200 MG: 10 INJECTION, EMULSION INTRAVENOUS at 08:00

## 2021-04-24 RX ADMIN — ONDANSETRON 4 MG: 2 INJECTION INTRAMUSCULAR; INTRAVENOUS at 08:10

## 2021-04-24 RX ADMIN — INSULIN DETEMIR 20 UNITS: 100 INJECTION, SOLUTION SUBCUTANEOUS at 21:41

## 2021-04-24 RX ADMIN — CEFTRIAXONE SODIUM 2000 MG: 10 INJECTION, POWDER, FOR SOLUTION INTRAVENOUS at 01:57

## 2021-04-24 RX ADMIN — LIDOCAINE HYDROCHLORIDE 50 MG: 10 INJECTION, SOLUTION EPIDURAL; INFILTRATION; INTRACAUDAL; PERINEURAL at 08:00

## 2021-04-24 RX ADMIN — PHENYLEPHRINE HYDROCHLORIDE 200 MCG: 10 INJECTION INTRAVENOUS at 08:04

## 2021-04-24 RX ADMIN — APIXABAN 5 MG: 5 TABLET, FILM COATED ORAL at 17:13

## 2021-04-24 RX ADMIN — SODIUM CHLORIDE, SODIUM LACTATE, POTASSIUM CHLORIDE, AND CALCIUM CHLORIDE 20 ML/HR: .6; .31; .03; .02 INJECTION, SOLUTION INTRAVENOUS at 17:13

## 2021-04-24 RX ADMIN — PHENYLEPHRINE HYDROCHLORIDE 200 MCG: 10 INJECTION INTRAVENOUS at 08:09

## 2021-04-24 RX ADMIN — PHENYLEPHRINE HYDROCHLORIDE 200 MCG: 10 INJECTION INTRAVENOUS at 08:07

## 2021-04-24 RX ADMIN — PHENYLEPHRINE HYDROCHLORIDE 200 MCG: 10 INJECTION INTRAVENOUS at 08:11

## 2021-04-24 RX ADMIN — INSULIN LISPRO 2 UNITS: 100 INJECTION, SOLUTION INTRAVENOUS; SUBCUTANEOUS at 18:12

## 2021-04-24 RX ADMIN — MIDAZOLAM 2 MG: 1 INJECTION INTRAMUSCULAR; INTRAVENOUS at 07:54

## 2021-04-24 NOTE — ASSESSMENT & PLAN NOTE
· Present on admission    Creatinine was 1 66 at the time of presentation to [facility] with baseline creatinine 0 8-0 9  · CT scan showed bilateral nephrolithiasis causing hydronephrosis-possible obstructive uropathy  · Urinalysis reveals  · IV hydration  · improved

## 2021-04-24 NOTE — INTERVAL H&P NOTE
H&P reviewed  After examining the patient I find no changes in the patients condition since the H&P had been written  Plan cysto, left ureteral stent placement in OR for distal left ureteral calculi and leukocytosis,fever  Procedure and risks of bleeding, infection, inability to place stent and need for additional procedures explained  This is a staged procedure      Vitals:    04/24/21 0031   BP: 119/62   Pulse: 91   Resp: 16   Temp: (!) 97 2 °F (36 2 °C)   SpO2: (!) 88%

## 2021-04-24 NOTE — ASSESSMENT & PLAN NOTE
· Present on admission to [facility]  · Patient febrile at the time presentation 100 6 with tachycardia and leukocytosis with end-organ damage with acute kidney injury    Likely source is urinary tract infection given the CT scan finding  · Follow-up on the cultures  · Continue with the current antibiotics  · IV hydration  · Trend leukocytosis

## 2021-04-24 NOTE — ASSESSMENT & PLAN NOTE
· Patient with history of bladder off non invasive bladder cancer  · Recent cystoscopy showed no evidence of recurrence of the disease  · Follow-up with Urology as an outpatient

## 2021-04-24 NOTE — PROGRESS NOTES
[facility]  Progress Note - Carl Rico 1945, 67 y o  male MRN: [id number]  Unit/Bed#: [facility] 816-01 Encounter: [id number]  Primary Care Provider: Edith Ferris MD   Date and time admitted to hospital: 4/23/2021 11:50 PM    Nephrolithiasis  Assessment & Plan  CT scan showed -Bilateral nephrolithiasis with several obstructing stones in the distal left ureter, measuring approximately 6 mm in size with associated left hydroureteronephrosis and obstructive uropathy  · Patient was evaluated by Urology and plan for cystoscopy with stent placement noted      Hypercalcemia  Assessment & Plan  · Calcium is 10 3 at the time of presentation  · Secondary to dehydration  · Monitor with IV fluids    Hyponatremia  Assessment & Plan  · Sodium 131 at the time of presentation  · Monitor sodium  · Gentle hydration    LACY (acute kidney injury) ([facility] )  Assessment & Plan  · Present on admission  Creatinine was 1 66 at the time of presentation to [facility] with baseline creatinine 0 8-0 9  · CT scan showed bilateral nephrolithiasis causing hydronephrosis-possible obstructive uropathy  · Urinalysis reveals  · IV hydration  · improved    Fall  Assessment & Plan  · Patient had a fall before coming to the hospital without loss of consciousness  No head injury  Patient evaluated by trauma patient  · Trauma workup is negative  · PT OT evaluation    CHF (congestive heart failure) ([facility])  Assessment & Plan  Wt Readings from Last 3 Encounters:   04/24/21 (!) 140 kg (309 lb 8 4 oz)   04/22/21 (!) 143 kg (314 lb 13 1 oz)   04/16/21 (!) 146 kg (322 lb)         Patient with known history of congestive heart failure  Patient was admitted from 3/25-3/31 for CHF exacerbation  Patient was on Lasix 80 mg b i d   And also added Zaroxolyn as an outpatient by his cardiologist   Patient's discharge weight was 322 lb and at the time of presentation patient actually weighed 314 lb  Patient also with acute kidney injury  Holding diuretics  Gentle hydration  Recheck blood work in the morning  Intake and output  Daily weight  Patient was evaluated by Cardiology in [facility]  Do not appear to be in acute congestive heart failure  Recommended to start Lasix at 40 mg b i d   Once the creatinine normalizes and avoid Zaroxolyn  Patient was on IV fluid since last night we will DC IV fluids and monitor given lower extremity edema    Morbid obesity (HCC)  Assessment & Plan  · Lifestyle changes as outpatient    Paroxysmal atrial fibrillation (HCC)  Assessment & Plan  · Patient was tachycardic at the time of presentation to the hospital   · Currently in sinus rhythm  · Anticoagulated with Eliquis  · Continue metoprolol  · Continue digoxin    Bladder cancer ([facility] [address] )  Assessment & Plan  · Patient with history of bladder off non invasive bladder cancer  · Recent cystoscopy showed no evidence of recurrence of the disease  · Follow-up with Urology as an outpatient    CAD (coronary artery disease)  Assessment & Plan  · 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  · Continue Lipitor  · Follow-up with cardiology as an outpatient    Type 2 diabetes mellitus with hyperglycemia, with long-term current use of insulin [facility])  Assessment & Plan  Lab Results   Component Value Date    HGBA1C 8 4 (H) 04/22/2021       Recent Labs     04/23/21  2047 04/24/21  0636 04/24/21  0830 04/24/21  1133   POCGLU 229* 178* 173* 176*       Blood Sugar Average: Last 72 hrs:  (P) 664 [id number] patient's last hemoglobin A1c is 8 4 showing uncontrolled type 2 diabetes mellitus  Hold oral hypoglycemic agents while inpatient  Continue with the Levemir  Mealtime insulin started   Insulin sliding scale  Hypoglycemia protocol    Hypertension  Assessment & Plan  Monitor blood pressure  Continue with the current care    * Sepsis [facility])  Assessment & Plan  · Present on admission to [facility]  · Patient febrile at the time presentation 100 6 with tachycardia and leukocytosis with end-organ damage with acute kidney injury  Likely source is urinary tract infection given the CT scan finding  · Follow-up on the cultures  · Continue with the current antibiotics  · IV hydration  · Trend leukocytosis           VTE Pharmacologic Prophylaxis:   Pharmacologic: Apixaban (Eliquis)  Mechanical VTE Prophylaxis in Place: Yes    Patient Centered Rounds: I have performed bedside rounds with nursing staff today  Discussions with Specialists or Other Care Team Provider: urology     Education and Discussions with Family / Patient: patient     Time Spent for Care: 45 minutes  More than 50% of total time spent on counseling and coordination of care as described above  Current Length of Stay: 1 day(s)    Current Patient Status: Inpatient   Certification Statement: The patient will continue to require additional inpatient hospital stay due to possible infection     Discharge Plan: pending cultures     Code Status: Level 3 - DNAR and DNI      Subjective:   Patient is doing well  Objective:     Vitals:   Temp (24hrs), Av 7 °F (36 5 °C), Min:97 2 °F (36 2 °C), Max:98 2 °F (36 8 °C)    Temp:  [97 2 °F (36 2 °C)-98 2 °F (36 8 °C)] 98 1 °F (36 7 °C)  HR:  [] 75  Resp:  [16-22] 16  BP: (101-125)/(43-62) 114/50  SpO2:  [88 %-96 %] 90 %  Body mass index is 39 74 kg/m²  Input and Output Summary (last 24 hours): Intake/Output Summary (Last 24 hours) at 2021 1556  Last data filed at 2021 1300  Gross per 24 hour   Intake 660 ml   Output 1300 ml   Net -640 ml       Physical Exam:     Physical Exam  Vitals signs and nursing note reviewed  Constitutional:       Appearance: Normal appearance  HENT:      Head: Normocephalic and atraumatic  Nose: Nose normal  No congestion or rhinorrhea  Mouth/Throat:      Mouth: Mucous membranes are moist       Pharynx: No oropharyngeal exudate or posterior oropharyngeal erythema  Eyes:      General: No scleral icterus  Right eye: No discharge  Left eye: No discharge  Extraocular Movements: Extraocular movements intact  Conjunctiva/sclera: Conjunctivae normal       Pupils: Pupils are equal, round, and reactive to light  Neck:      Musculoskeletal: Normal range of motion and neck supple  Cardiovascular:      Rate and Rhythm: Normal rate  Pulmonary:      Effort: Pulmonary effort is normal       Breath sounds: Normal breath sounds  Abdominal:      General: Bowel sounds are normal  There is no distension  Palpations: Abdomen is soft  There is no mass  Tenderness: There is no abdominal tenderness  Hernia: No hernia is present  Musculoskeletal: Normal range of motion  General: No swelling, tenderness, deformity or signs of injury  Lymphadenopathy:      Cervical: No cervical adenopathy  Skin:     General: Skin is warm and dry  Coloration: Skin is not jaundiced or pale  Findings: No bruising or erythema  Neurological:      General: No focal deficit present  Mental Status: He is alert and oriented to person, place, and time  Cranial Nerves: No cranial nerve deficit  Sensory: No sensory deficit  Motor: No weakness  Coordination: Coordination normal    Psychiatric:         Mood and Affect: Mood normal          Behavior: Behavior normal          Thought Content:  Thought content normal          Judgment: Judgment normal          Additional Data:     Labs:    Results from last 7 days   Lab Units 04/24/21  0527 04/22/21  2342   WBC Thousand/uL 14 58* 15 21*   HEMOGLOBIN g/dL 10 8* 11 9*   HEMATOCRIT % 34 9* 38 2   PLATELETS Thousands/uL 160 191   NEUTROS PCT %  --  78*   LYMPHS PCT %  --  10*   MONOS PCT %  --  11   EOS PCT %  --  0     Results from last 7 days   Lab Units 04/24/21  0527  04/22/21  2312   POTASSIUM mmol/L 4 6   < >  --    CHLORIDE mmol/L 101   < >  --    CO2 mmol/L 29   < >  --    CO2, I-STAT mmol/L  --   --  30   BUN mg/dL 48*   < >  -- CREATININE mg/dL 1 30   < >  --    CALCIUM mg/dL 9 1   < >  --    ALK PHOS U/L 68   < >  --    ALT U/L 19   < >  --    AST U/L 10   < >  --    GLUCOSE, ISTAT mg/dl  --   --  237*    < > = values in this interval not displayed  Results from last 7 days   Lab Units 04/23/21  0109   INR  1 11       * I Have Reviewed All Lab Data Listed Above  * Additional Pertinent Lab Tests Reviewed: [address] Admission Reviewed    Imaging:    Imaging Reports Reviewed Today Include:    Imaging Personally Reviewed by Myself Includes:      Recent Cultures (last 7 days):     Results from last 7 days   Lab Units 04/23/21  0109 04/23/21  0108   BLOOD CULTURE  No Growth at 24 hrs  No Growth at 24 hrs  Last 24 Hours Medication List:   Current Facility-Administered Medications   Medication Dose Route Frequency Provider Last Rate    acetaminophen  650 mg Oral Q6H PRN Guillermina Antunez MD      apixaban  5 mg Oral BID Guillermina Antunez MD      atorvastatin  40 mg Oral Daily MD Parth Bolaños ON 4/25/2021] cefTRIAXone  1,000 mg Intravenous Q24H Guillermina Antunez MD      digoxin  125 mcg Oral Daily Guillermina Antunez MD      ezetimibe  10 mg Oral Daily Guillermina Antunez MD      finasteride  5 mg Oral Daily Guillermina Antunez MD      insulin detemir  20 Units Subcutaneous Q12H [address] Guillermina Antunez MD      insulin lispro  1-6 Units Subcutaneous TID [facility] Guillermina Antunez MD      lactated ringers  20 mL/hr Intravenous Continuous Kelvin Marus, CRNA      metoprolol succinate  12 5 mg Oral Daily Guillermina Antunez MD      pantoprazole  20 mg Oral Early Morning Guillermina Antunez MD      sodium chloride  1 spray Each Nare Q1H PRN Guillermina Antunez MD          Today, Patient Was Seen By: Eunice Theodore MD    ** Please Note: Dictation voice to text software may have been used in the creation of this document   **

## 2021-04-24 NOTE — ASSESSMENT & PLAN NOTE
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

## 2021-04-24 NOTE — OP NOTE
Brown Bloch     Date of birth 1945    [id number]    Date:  4/24/2021    Preoperative Diagnosis:Pre-Op Diagnosis Codes:     * Sepsis due to Salmonella species with critical illness myopathy, unspecified whether septic shock present ([facility] [address] ) [A02 1, R65 20, G72 81]     * Ureteral calculi [N20 1] left    Postoperative Diagnosis:  Left ureteral calculi, as above    Procedure: Cystoscopy, left Ureteral Stent Placement    Surgeon: Jannelle Goldberg MD    First Assistant: None                                                       Resident:None    Anesthesia: General    EBL: Minimal    Specimens:None    Findings:  Radiopaque stones in the ureter at the pelvic brim  Complications:None      Course of Procedure: Brown Bloch  has been scheduled for cystoscopy and decompressive stenting of the left ureter  The indications for the procedure are left ureteral calculi causing sepsis with fever    The risks of bleeding, infection, damage to the urinary tract and adjacent organs were discussed with the patient and informed consent was given  He understands that this is a staged procedure and he will need definitive cystoscopy left ureteroscopy laser lithotripsy and left ureteral stent exchange in the future  The patient was brought to the operating room and identified  After general anesthesia was induced, the patient was prepared and draped in the dorsolithotomy position in the usual fashion, with standard care taken to protect pressure points  A time out was performed  Rigid cystoscopy was carried out with a 22 fr cysto sheath with 30/70 degree lens  The urethra was normal without stricture  The prostate was nonobstructive in appearance  The bladder was 2+ trabeculated, and there were no stones, tumors, or other lesions-he has a history of bladder cancer and there was no recurrence    The 0 035" guide wire was fed into the left ureteral orifice and fed under direct and fluoroscopic guidance into the renal pelvis  The stone could/not be seen fluoroscopically  After the wire was placed, a 6 Polish JJ ureteral stent was placed over the wire with no string attached  Coils were established in the renal pelvis and bladder as the wire was withdrawn, confirmed fluoroscopically  The bladder was drained, the patient extubated and transferred to the PACU in good condition

## 2021-04-24 NOTE — ASSESSMENT & PLAN NOTE
Lab Results   Component Value Date    HGBA1C 8 4 (H) 04/22/2021       Recent Labs     04/23/21  0706 04/23/21  1108 04/23/21  1619 04/23/21 2047   POCGLU 213* 209* 215* 229*       Blood Sugar Average: Last 72 hrs:   patient's last hemoglobin A1c is 8 4 showing uncontrolled type 2 diabetes mellitus  Hold oral hypoglycemic agents while inpatient  Continue with the Levemir  Mealtime insulin once the patient is no longer  NPO  Insulin sliding scale  Hypoglycemia protocol

## 2021-04-24 NOTE — PLAN OF CARE
Problem: PAIN - ADULT  Goal: Verbalizes/displays adequate comfort level or baseline comfort level  Description: Interventions:  - Encourage patient to monitor pain and request assistance  - Assess pain using appropriate pain scale  - Administer analgesics based on type and severity of pain and evaluate response  - Implement non-pharmacological measures as appropriate and evaluate response  - Consider cultural and social influences on pain and pain management  - Notify physician/advanced practitioner if interventions unsuccessful or patient reports new pain  Outcome: Progressing     Problem: INFECTION - ADULT  Goal: Absence or prevention of progression during hospitalization  Description: INTERVENTIONS:  - Assess and monitor for signs and symptoms of infection  - Monitor lab/diagnostic results  - Monitor all insertion sites, i e  indwelling lines, tubes, and drains  - Monitor endotracheal if appropriate and nasal secretions for changes in amount and color  - [place] appropriate cooling/warming therapies per order  - Administer medications as ordered  - Instruct and encourage patient and family to use good hand hygiene technique  - Identify and instruct in appropriate isolation precautions for identified infection/condition  Outcome: Progressing  Goal: Absence of fever/infection during neutropenic period  Description: INTERVENTIONS:  - Monitor WBC    Outcome: Progressing     Problem: SAFETY ADULT  Goal: Patient will remain free of falls  Description: INTERVENTIONS:  - Assess patient frequently for physical needs  -  Identify cognitive and physical deficits and behaviors that affect risk of falls    -  [place] fall precautions as indicated by assessment   - Educate patient/family on patient safety including physical limitations  - Instruct patient to call for assistance with activity based on assessment  - Modify environment to reduce risk of injury  - Consider OT/PT consult to assist with strengthening/mobility  Outcome: Progressing  Goal: Maintain or return to baseline ADL function  Description: INTERVENTIONS:  -  Assess patient's ability to carry out ADLs; assess patient's baseline for ADL function and identify physical deficits which impact ability to perform ADLs (bathing, care of mouth/teeth, toileting, grooming, dressing, etc )  - Assess/evaluate cause of self-care deficits   - Assess range of motion  - Assess patient's mobility; develop plan if impaired  - Assess patient's need for assistive devices and provide as appropriate  - Encourage maximum independence but intervene and supervise when necessary  - Involve family in performance of ADLs  - Assess for home care needs following discharge   - Consider OT consult to assist with ADL evaluation and planning for discharge  - Provide patient education as appropriate  Outcome: Progressing  Goal: Maintain or return mobility status to optimal level  Description: INTERVENTIONS:  - Assess patient's baseline mobility status (ambulation, transfers, stairs, etc )    - Identify cognitive and physical deficits and behaviors that affect mobility  - Identify mobility aids required to assist with transfers and/or ambulation (gait belt, sit-to-stand, lift, walker, cane, etc )  - [place] fall precautions as indicated by assessment  - Record patient progress and toleration of activity level on Mobility SBAR; progress patient to next Phase/Stage  - Instruct patient to call for assistance with activity based on assessment  - Consider rehabilitation consult to assist with strengthening/weightbearing, etc   Outcome: Progressing     Problem: DISCHARGE PLANNING  Goal: Discharge to home or other facility with appropriate resources  Description: INTERVENTIONS:  - Identify barriers to discharge w/patient and caregiver  - Arrange for needed discharge resources and transportation as appropriate  - Identify discharge learning needs (meds, wound care, etc )  - Arrange for interpretive services to assist at discharge as needed  - Refer to Case Management Department for coordinating discharge planning if the patient needs post-hospital services based on physician/advanced practitioner order or complex needs related to functional status, cognitive ability, or social support system  Outcome: Progressing     Problem: Knowledge Deficit  Goal: Patient/family/caregiver demonstrates understanding of disease process, treatment plan, medications, and discharge instructions  Description: Complete learning assessment and assess knowledge base    Interventions:  - Provide teaching at level of understanding  - Provide teaching via preferred learning methods  Outcome: Progressing

## 2021-04-24 NOTE — ASSESSMENT & PLAN NOTE
Lab Results   Component Value Date    HGBA1C 8 4 (H) 04/22/2021       Recent Labs     04/23/21  2047 04/24/21  0636 04/24/21  0830 04/24/21  1133   POCGLU 229* 178* 173* 176*       Blood Sugar Average: Last 72 hrs:  (P) 233 [id number] patient's last hemoglobin A1c is 8 4 showing uncontrolled type 2 diabetes mellitus  Hold oral hypoglycemic agents while inpatient  Continue with the Levemir  Mealtime insulin started   Insulin sliding scale  Hypoglycemia protocol

## 2021-04-24 NOTE — ASSESSMENT & PLAN NOTE
· Present on admission to [address] [place] [address]  · Patient febrile at the time presentation 100 6 with tachycardia and leukocytosis with end-organ damage with acute kidney injury    Likely source is urinary tract infection given the CT scan finding  · Follow-up on the cultures  · Continue with the current antibiotics  · IV hydration  · Trend leukocytosis

## 2021-04-24 NOTE — ASSESSMENT & PLAN NOTE
· Patient had a fall before coming to the hospital without loss of consciousness  No head injury    Patient evaluated by trauma patient  · Trauma workup is negative  · PT OT evaluation

## 2021-04-24 NOTE — QUICK NOTE
Left ureteral stent successfully placed  He will need an outpatient cystoscopy, left ureteroscopy laser lithotripsy and left ureteral stent exchange  A case request has been placed for this and our office will call him to schedule this  They have already been messaged to do so  I have already called in a prescription for [place] to his pharmacy for pain  From my standpoint may be discharged when afebrile and stable  Postoperative instructions have been left in epic

## 2021-04-24 NOTE — ASSESSMENT & PLAN NOTE
· Patient was tachycardic at the time of presentation to the hospital   · Currently in sinus rhythm  · Anticoagulated with Eliquis  · Continue metoprolol  · Continue digoxin

## 2021-04-24 NOTE — ASSESSMENT & PLAN NOTE
Wt Readings from Last 3 Encounters:   04/22/21 (!) 143 kg (314 lb 13 1 oz)   04/16/21 (!) 146 kg (322 lb)   04/09/21 (!) 145 kg (319 lb)         Patient with known history of congestive heart failure  Patient was admitted from 3/25-3/31 for CHF exacerbation  Patient was on Lasix 80 mg b i d  And also added Zaroxolyn as an outpatient by his cardiologist   Patient's discharge weight was 322 lb and at the time of presentation patient actually weighed 314 lb  Patient also with acute kidney injury  Holding diuretics  Gentle hydration  Recheck blood work in the morning  Intake and output  Daily weight  Patient was evaluated by Cardiology in [address] [place]  Do not appear to be in acute congestive heart failure  Recommended to start Lasix at 40 mg b i d   Once the creatinine normalizes and avoid Zaroxolyn  Patient was on IV fluid since last night we will DC IV fluids and monitor given lower extremity edema

## 2021-04-24 NOTE — ASSESSMENT & PLAN NOTE
· Present on admission    Creatinine was 1 66 at the time of presentation to [address] [place] [address] with baseline creatinine 0 8-0 9  · CT scan showed bilateral nephrolithiasis causing hydronephrosis-possible obstructive uropathy  · Urinalysis reveals  · IV hydration  · Recheck BMP in the morning

## 2021-04-24 NOTE — ASSESSMENT & PLAN NOTE
CT scan showed -Bilateral nephrolithiasis with several obstructing stones in the distal left ureter, measuring approximately 6 mm in size with associated left hydroureteronephrosis and obstructive uropathy  · Patient was evaluated by Urology and plan for cystoscopy with stent placement noted

## 2021-04-24 NOTE — ANESTHESIA PREPROCEDURE EVALUATION
Procedure:  CYSTOSCOPY RETROGRADE PYELOGRAM WITH INSERTION STENT URETERAL (Left Bladder)    Relevant Problems   CARDIO   (+) AAA (abdominal aortic aneurysm) ([facility])   (+) CAD (coronary artery disease)   (+) CHF (congestive heart failure) ([facility])   (+) Hyperlipemia   (+) Hypertension   (+) Paroxysmal atrial fibrillation ([facility])      ENDO   (+) Type 2 diabetes mellitus with hyperglycemia, with long-term current use of insulin ([facility])      /RENAL   (+) LACY (acute kidney injury) ([facility] [address] )   (+) Nephrolithiasis      MUSCULOSKELETAL   (+) Arthritis   (+) Chronic low back pain      PULMONARY   (+) LUCIA (obstructive sleep apnea)      Other   (+) Sepsis ([facility])      Bilateral nephrolithiasis w/ left sided obstruction, hydroureteronephrosis, and obstructive uropathy  Sepsis criteria without progression to severe sepsis  CAD s/p CABGx4 2015    Lab Results   Component Value Date    WBC 14 58 (H) 04/24/2021    HGB 10 8 (L) 04/24/2021    HCT 34 9 (L) 04/24/2021    MCV 86 04/24/2021     04/24/2021     Lab Results   Component Value Date    K 4 6 04/24/2021    CO2 29 04/24/2021     04/24/2021    BUN 48 (H) 04/24/2021    CREATININE 1 30 04/24/2021     Lab Results   Component Value Date    INR 1 11 04/23/2021    PROTIME 14 4 04/23/2021     Lab Results   Component Value Date    GLUCOSE 237 (H) 04/22/2021     Lab Results   Component Value Date    HGBA1C 8 4 (H) 04/22/2021     TTE 3/23/21  SUMMARY     LEFT VENTRICLE:  Systolic function was normal by visual assessment  Ejection fraction was estimated to be 60 %  There were no regional wall motion abnormalities  Wall thickness was mildly increased  Doppler parameters were consistent with abnormal left ventricular relaxation (grade 1 diastolic dysfunction)      RIGHT VENTRICLE:  The ventricle was mildly dilated  Systolic function was mildly reduced  Systolic pressure was mildly increased   Estimated peak pressure was 45 mmHg      LEFT ATRIUM:  The atrium was mildly dilated      RIGHT ATRIUM:  The atrium was mildly dilated      MITRAL VALVE:  There was mild annular calcification  There was trace regurgitation      TRICUSPID VALVE:  There was mild to moderate regurgitation  Physical Exam    Airway    Mallampati score: III  TM Distance: >3 FB  Neck ROM: full     Dental   upper dentures,     Cardiovascular  Rhythm: irregular, Rate: normal,     Pulmonary      Other Findings        Anesthesia Plan  ASA Score- 3     Anesthesia Type- general with ASA Monitors  Additional Monitors:   Airway Plan: LMA  Plan Factors-Exercise tolerance (METS): >4 METS  Chart reviewed  Patient summary reviewed  Induction- intravenous  Postoperative Plan-     Informed Consent- Anesthetic plan and risks discussed with patient  I personally reviewed this patient with the CRNA  Discussed and agreed on the Anesthesia Plan with the CRNA  Jane Liang

## 2021-04-24 NOTE — H&P
[facility]  H&P- Carl Rico 1945, 67 y o  male MRN: [id number]  Unit/Bed#: [facility] 816-01 Encounter: [id number]  Primary Care Provider: Edith Ferris MD   Date and time admitted to hospital: 4/23/2021 11:50 PM    Nephrolithiasis  Assessment & Plan  CT scan showed -Bilateral nephrolithiasis with several obstructing stones in the distal left ureter, measuring approximately 6 mm in size with associated left hydroureteronephrosis and obstructive uropathy  · Patient was evaluated by Urology and plan for cystoscopy with stent placement noted      * Sepsis [facility])  Assessment & Plan  · Present on admission to [facility]  · Patient febrile at the time presentation 100 6 with tachycardia and leukocytosis with end-organ damage with acute kidney injury  Likely source is urinary tract infection given the CT scan finding  · Follow-up on the cultures  · Continue with the current antibiotics  · IV hydration  · Trend leukocytosis    Hypercalcemia  Assessment & Plan  · Calcium is 10 3 at the time of presentation  · Secondary to dehydration  · Monitor with IV fluids    Hyponatremia  Assessment & Plan  · Sodium 131 at the time of presentation  · Monitor sodium  · Gentle hydration    LACY (acute kidney injury) ([facility] [address] )  Assessment & Plan  · Present on admission  Creatinine was 1 66 at the time of presentation to [facility] with baseline creatinine 0 8-0 9  · CT scan showed bilateral nephrolithiasis causing hydronephrosis-possible obstructive uropathy  · Urinalysis reveals  · IV hydration  · Recheck BMP in the morning    Fall  Assessment & Plan  · Patient had a fall before coming to the hospital without loss of consciousness  No head injury    Patient evaluated by trauma patient  · Trauma workup is negative  · PT OT evaluation    CHF (congestive heart failure) ([facility] )  Assessment & Plan  Wt Readings from Last 3 Encounters:   04/22/21 (!) 143 kg (314 lb 13 1 oz)   04/16/21 (!) 146 kg (322 lb)   04/09/21 (!) 145 kg (319 lb)         Patient with known history of congestive heart failure  Patient was admitted from 3/25-3/31 for CHF exacerbation  Patient was on Lasix 80 mg b i d   And also added Zaroxolyn as an outpatient by his cardiologist   Patient's discharge weight was 322 lb and at the time of presentation patient actually weighed 314 lb  Patient also with acute kidney injury  Holding diuretics  Gentle hydration  Recheck blood work in the morning  Intake and output  Daily weight    Morbid obesity ([facility] )  Assessment & Plan  · Lifestyle changes as outpatient    Paroxysmal atrial fibrillation ([facility] )  Assessment & Plan  · Patient was tachycardic at the time of presentation to the hospital   · Currently in sinus rhythm  · Anticoagulated with Eliquis  · Continue metoprolol  · Continue digoxin    Bladder cancer ([facility] )  Assessment & Plan  · Patient with history of bladder off non invasive bladder cancer  · Recent cystoscopy showed no evidence of recurrence of the disease  · Follow-up with Urology as an outpatient    CAD (coronary artery disease)  Assessment & Plan  · 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  · Continue Lipitor  · Follow-up with cardiology as an outpatient    Type 2 diabetes mellitus with hyperglycemia, with long-term current use of insulin [facility])  Assessment & Plan  Lab Results   Component Value Date    HGBA1C 8 4 (H) 04/22/2021       Recent Labs     04/23/21  0706 04/23/21  1108 04/23/21  1619 04/23/21  2047   POCGLU 213* 209* 215* 229*       Blood Sugar Average: Last 72 hrs:   patient's last hemoglobin A1c is 8 4 showing uncontrolled type 2 diabetes mellitus  Hold oral hypoglycemic agents while inpatient  Continue with the Levemir  Mealtime insulin once the patient is no longer  NPO  Insulin sliding scale  Hypoglycemia protocol    Hypertension  Assessment & Plan  Monitor blood pressure  Continue with the current care    VTE Prophylaxis: Apixaban (Eliquis)  / sequential compression device Code Status:   POLST: POLST form is not discussed and not completed at this time  Discussion with family:     Anticipated Length of Stay:  Patient will be admitted on an Inpatient basis with an anticipated length of stay of  > 2 midnights  Justification for Hospital Stay:  Nephrolithiasis    Total Time for Visit, including Counseling / Coordination of Care: 70 minutes  Greater than 50% of this total time spent on direct patient counseling and coordination of care  Chief Complaint:   Status post fall    History of Present Illness:    Antonino Alcantar is a 67 y o  male who presents as a transfer from [address] [place] [address]  Patient initially presented to the hospital after a fall at home  Patient was febrile at the time of presentation and met the criteria for sepsis  Patient had trauma workup done and CT scan of the abdomen showed nephrolithiasis with left-sided hydronephrosis suggesting obstructive uropathy  Patient was evaluated by Urology and was sent to the hospital   Patient denied any flank pain /hematuria  Patient with history of bladder cancer treated with BCG in the past   Will keep the patient NPO after midnight  Trauma workup is essentially unremarkable  Patient was recently admitted to the hospital with CHF exacerbation  Patient was walking to the bathroom his legs gave out PE and he fell down  No head injury no loss of consciousness  Review of Systems:    Review of Systems   Constitutional: Positive for activity change and fatigue  Negative for appetite change  HENT: Negative  Eyes: Negative  Cardiovascular: Positive for leg swelling  Gastrointestinal: Negative  Endocrine: Negative  Genitourinary: Negative  Musculoskeletal: Positive for gait problem  Skin: Positive for wound  Neurological: Negative for dizziness  Hematological: Negative  Psychiatric/Behavioral: Negative          Past Medical and Surgical History:     Past Medical History: Diagnosis Date    A-fib [facility])     AAA (abdominal aortic aneurysm) ([facility] )     Actinic keratosis of right temple 7/31/2020    Actinic keratosis of scalp 7/31/2020    Acute respiratory failure with hypoxia (HCC) 3/25/2021    Arthritis     Lay's esophagus     Bladder cancer (HCC)     CAD (coronary artery disease)     Chronic low back pain     Chronic pain of both knees 7/31/2020    Colitis 12/4/2020    Diabetes ([facility] )     Excessive gas 12/3/2020    Hyperlipemia     Hypertension     Immunization deficiency 10/30/2020    Hep a nonimmune    Left lower quadrant abdominal pain 12/3/2020    Mass of right adrenal gland ([facility] ) 12/4/2020    4 1 cm    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    LUCIA (obstructive sleep apnea) 1/29/2021       Past Surgical History:   Procedure Laterality Date    BACK SURGERY      BLADDER SURGERY      CATARACT EXTRACTION, BILATERAL      COLONOSCOPY  01/29/2019    next [address] GRAFT  04/2016    Quadruple per [place]    EGD  06/2017    TOTAL HIP ARTHROPLASTY Right 2012    TOTAL SHOULDER REPLACEMENT Right 2013       Meds/Allergies:    Prior to Admission medications    Medication Sig Start Date End Date Taking?  Authorizing Provider   atorvastatin (LIPITOR) 40 mg tablet Take 1 tablet (40 mg total) by mouth daily 9/10/20   Bryson Shea MD   digoxin (LANOXIN) 0 125 mg tablet Take 1 tablet (125 mcg total) by mouth daily 4/1/21   KACEY Jacobsen   Eliquis 5 MG TAKE 1 TABLET BY MOUTH TWICE A DAY 1/4/21   Bryson Shea MD   ezetimibe (ZETIA) 10 mg tablet Take 1 tablet (10 mg total) by mouth daily 10/15/20   Cherelle Raman MD   finasteride (PROSCAR) 5 mg tablet Take 1 tablet (5 mg total) by mouth daily 12/21/20   Pepe Rao MD   Fluad Quadrivalent 0 5 ML R [address] 10/13/20   Historical Provider, MD   furosemide (LASIX) 80 mg tablet Take 1 tablet (80 mg total) by mouth 2 (two) times a day 3/31/21   Jorge A President, KACEY   glipiZIDE (GLUCOTROL XL) 5 mg 24 hr tablet Take 1 tablet (5 mg total) by mouth daily 3/17/21   Marjan Rea MD   Levemir FlexTouch 100 units/mL injection pen Inject under the skin 65 units in the morning and at bedtime 3/17/21   Marjan Rea MD   lisinopril (ZESTRIL) 20 mg tablet Take 1 tablet (20 mg total) by mouth daily 10/26/20   Polo Mcdonald MD   metFORMIN (GLUCOPHAGE) 1000 MG tablet Take 500 mg by mouth 2 (two) times a day  19   Historical Provider, MD   metolazone (ZAROXOLYN) 2 5 mg tablet Take 30min before AM furosemide on M, W, F  21   Polo Mcdonald MD   metoprolol succinate (TOPROL-XL) 25 mg 24 hr tablet Take 0 5 tablets (12 5 mg total) by mouth daily  Patient taking differently: Take 12 5 mg by mouth daily 12 5 mg two times a day 21   Polo Mcdonald MD   omeprazole (PriLOSEC) 20 mg delayed release capsule Take 1 capsule (20 mg total) by mouth daily 10/26/20   KACEY Ragsdale   potassium chloride (K-DUR,KLOR-CON) 20 mEq tablet Take 2 tablets , ,  and 1 tablet Mary Beth, Sat, Sun 21   Polo Mcdonald MD   Unifine Pentips 31G X 8 MM MISC Inject under the skin daily Use as directed 20   Barbara Santoyo MD     I have reviewed home medications with patient personally      Allergies: No Known Allergies    Social History:     Marital Status: /Civil Union   Occupation: retired  Patient Pre-hospital Living Situation:  Lives at home  Patient Pre-hospital Level of Mobility:  Independent  Patient Pre-hospital Diet Restrictions:   Substance Use History:   Social History     Substance and Sexual Activity   Alcohol Use Not Currently    Comment: No use     Social History     Tobacco Use   Smoking Status Former Smoker    Packs/day: 0 25    Types: Cigarettes    Quit date: 2009    Years since quittin 3   Smokeless Tobacco Never Used   Tobacco Comment    smoked since age 21; per [place]     Social History     Substance and Sexual Activity   Drug Use Never    Comment: No use       Family History:    Family History   Problem Relation Age of Onset    Heart disease Father     Arthritis Father     Heart attack Father     Alzheimer's disease Mother        Physical Exam:     Vitals:   Blood Pressure: 119/62 (04/24/21 0031)  Pulse: 91 (04/24/21 0031)  Temperature: (!) 97 2 °F (36 2 °C) (04/24/21 0031)  Respirations: 16 (04/24/21 0031)  Height: 6' 2" (188 cm) (04/24/21 0031)  Weight - Scale: (!) 140 kg (309 lb 8 4 oz) (04/24/21 0031)  SpO2: (!) 88 % (04/24/21 0031)    Physical Exam  Constitutional:       General: He is not in acute distress  Appearance: He is obese  He is not ill-appearing  HENT:      Head: Normocephalic and atraumatic  Nose: Nose normal    Eyes:      General: No scleral icterus  Neck:      Musculoskeletal: Normal range of motion and neck supple  Cardiovascular:      Rate and Rhythm: Normal rate and regular rhythm  Pulses: Normal pulses  Heart sounds: No murmur  Pulmonary:      Comments: Decreased breath sounds bilateral  Abdominal:      Tenderness: There is no abdominal tenderness  There is no right CVA tenderness or left CVA tenderness  Comments: Obese   Musculoskeletal:      Right lower leg: Edema present  Left lower leg: Edema present  Comments: Patient with bilateral chronic lower extremity edema with chronic venous stasis changes  Lower extremity wound covered in dressing   Skin:     General: Skin is warm  Neurological:      General: No focal deficit present  Mental Status: He is alert and oriented to person, place, and time  Additional Data:     Lab Results: I have personally reviewed pertinent reports        Results from last 7 days   Lab Units 04/22/21  2342   WBC Thousand/uL 15 21*   HEMOGLOBIN g/dL 11 9*   HEMATOCRIT % 38 2   PLATELETS Thousands/uL 191   NEUTROS PCT % 78*   LYMPHS PCT % 10*   MONOS PCT % 11   EOS PCT % 0     Results from last 7 days   Lab Units 04/22/21  2342   SODIUM mmol/L 131* POTASSIUM mmol/L 4 8   CHLORIDE mmol/L 94*   CO2 mmol/L 28   BUN mg/dL 44*   CREATININE mg/dL 1 66*   ANION GAP mmol/L 9   CALCIUM mg/dL 10 3*   ALBUMIN g/dL 3 4*   TOTAL BILIRUBIN mg/dL 0 31   ALK PHOS U/L 85   ALT U/L 24   AST U/L 17   GLUCOSE RANDOM mg/dL 235*     Results from last 7 days   Lab Units 04/23/21  0109   INR  1 11     Results from last 7 days   Lab Units 04/23/21  2047 04/23/21  1619 04/23/21  1108 04/23/21  0706 04/23/21  0100   POC GLUCOSE mg/dl 229* 215* 209* 213* 261*     Results from last 7 days   Lab Units 04/22/21  2342   HEMOGLOBIN A1C % 8 4*     Results from last 7 days   Lab Units 04/23/21  0109   LACTIC ACID mmol/L 1 4   PROCALCITONIN ng/ml 0 23       Imaging: I have personally reviewed pertinent reports  No orders to display       EKG, Pathology, and Other Studies Reviewed on Admission:   · EKG:     [facility] Records Reviewed: Yes     ** Please Note: This note has been constructed using a voice recognition system   **

## 2021-04-24 NOTE — DISCHARGE INSTRUCTIONS
Expect to see blood in the urine, and to experience urgency/frequency/burning with urination and dribbling  This is normal after urological procedures  Is also normal to experience some nausea after these procedures  Go back your regular diet carefully  It is normal to feel pain in the kidney when urinating and when the bladder is filling due to urine refluxing up to the kidney because of the open stent  The stent is necessary to keep the ureter open to allow clots and swelling to resolve and to allow the kidney to drain properly after instrumentation  You will need a second procedure for cystoscopy, left ureteroscopy laser lithotripsy and left ureteral stent exchange in the future after the infection has been treated  Call for fever greater that 101 5, inability to urinate, prolonged nausea and vomiting, or severe pain not relieved by pain medications         No driving/operating machinery for 24 hours, and while taking narcotics  Take over the counter remedy of choice to avoid constipation  Drink plenty of fluids

## 2021-04-24 NOTE — ANESTHESIA POSTPROCEDURE EVALUATION
Post-Op Assessment Note    CV Status:  Stable  Pain Score: 0    Pain management: adequate     Mental Status:  Alert   Hydration Status:  Stable   PONV Controlled:  None   Airway Patency:  Patent      Post Op Vitals Reviewed: Yes      Staff: CRNA         No complications documented      BP   111/54   Temp  36 6   Pulse  71   Resp   15   SpO2  98

## 2021-04-24 NOTE — ASSESSMENT & PLAN NOTE
Wt Readings from Last 3 Encounters:   04/24/21 (!) 140 kg (309 lb 8 4 oz)   04/22/21 (!) 143 kg (314 lb 13 1 oz)   04/16/21 (!) 146 kg (322 lb)         Patient with known history of congestive heart failure  Patient was admitted from 3/25-3/31 for CHF exacerbation  Patient was on Lasix 80 mg b i d  And also added Zaroxolyn as an outpatient by his cardiologist   Patient's discharge weight was 322 lb and at the time of presentation patient actually weighed 314 lb  Patient also with acute kidney injury  Holding diuretics  Gentle hydration  Recheck blood work in the morning  Intake and output  Daily weight  Patient was evaluated by Cardiology in [facility]  Do not appear to be in acute congestive heart failure  Recommended to start Lasix at 40 mg b i d   Once the creatinine normalizes and avoid Zaroxolyn  Patient was on IV fluid since last night we will DC IV fluids and monitor given lower extremity edema

## 2021-04-25 VITALS
WEIGHT: 309.53 LBS | RESPIRATION RATE: 16 BRPM | HEART RATE: 82 BPM | SYSTOLIC BLOOD PRESSURE: 113 MMHG | OXYGEN SATURATION: 93 % | DIASTOLIC BLOOD PRESSURE: 56 MMHG | BODY MASS INDEX: 39.72 KG/M2 | HEIGHT: 74 IN | TEMPERATURE: 98 F

## 2021-04-25 LAB
BACTERIA UR CULT: NORMAL
DIGITOXIN SERPL-MCNC: <5 NG/ML (ref 10–25)
GLUCOSE SERPL-MCNC: 208 MG/DL (ref 65–140)

## 2021-04-25 PROCEDURE — 99239 HOSP IP/OBS DSCHRG MGMT >30: CPT | Performed by: INTERNAL MEDICINE

## 2021-04-25 PROCEDURE — 99232 SBSQ HOSP IP/OBS MODERATE 35: CPT | Performed by: UROLOGY

## 2021-04-25 PROCEDURE — 82948 REAGENT STRIP/BLOOD GLUCOSE: CPT

## 2021-04-25 RX ADMIN — INSULIN LISPRO 2 UNITS: 100 INJECTION, SOLUTION INTRAVENOUS; SUBCUTANEOUS at 07:53

## 2021-04-25 RX ADMIN — CEFTRIAXONE SODIUM 1000 MG: 10 INJECTION, POWDER, FOR SOLUTION INTRAVENOUS at 00:28

## 2021-04-25 RX ADMIN — ATORVASTATIN CALCIUM 40 MG: 40 TABLET, FILM COATED ORAL at 07:54

## 2021-04-25 RX ADMIN — APIXABAN 5 MG: 5 TABLET, FILM COATED ORAL at 07:54

## 2021-04-25 RX ADMIN — METOPROLOL SUCCINATE 12.5 MG: 25 TABLET, FILM COATED, EXTENDED RELEASE ORAL at 07:55

## 2021-04-25 RX ADMIN — EZETIMIBE 10 MG: 10 TABLET ORAL at 07:54

## 2021-04-25 RX ADMIN — PANTOPRAZOLE SODIUM 20 MG: 20 TABLET, DELAYED RELEASE ORAL at 06:38

## 2021-04-25 RX ADMIN — INSULIN DETEMIR 20 UNITS: 100 INJECTION, SOLUTION SUBCUTANEOUS at 08:01

## 2021-04-25 RX ADMIN — FINASTERIDE 5 MG: 5 TABLET, FILM COATED ORAL at 07:55

## 2021-04-25 RX ADMIN — DIGOXIN 125 MCG: 125 TABLET ORAL at 07:54

## 2021-04-25 NOTE — CASE MANAGEMENT
CM confirmed with the Pt DC plan with the Pt  Pt will be DC home this afternoon  CM confirmed HHC is through [facility]  CM confirmed [facility] is willing to accept the Pt who is open to them previously  Pt will be picked up by his son at time of DC  CM is following Pt closely

## 2021-04-25 NOTE — PROGRESS NOTES
UROLOGY PROGRESS NOTE    Date:  4/25/2021    Gavin Fairchild [id number]    Assessment:  Left ureteral calculi, stented  Had infection with this  Is afebrile and cultures are negative  Plan:  Okay for discharge from my standpoint  Antibiotics have been stopped  My office will contact him with a surgery date for cystoscopy left ureteroscopy laser lithotripsy and left ureteral stent exchange in the near future  They already been contacted  Subjective:  No complaints    Objective:  Voiding well  Urine lv  Afebrile, vital signs stable  Stone postoperative day 1  Status post cystoscopy left ureteral stent placement for left ureteral calculi  PE:     Awake, alert and in no apparent distress  Bilateral lower extremity edema which is chronic  ROS:As per HPI    Labs, Imaging, other studies:I have personally reviewed pertinent lab results    , CBC: No results found for: WBC, HGB, HCT, MCV, PLT, ADJUSTEDWBC, MCH, MCHC, RDW, MPV, NRBC, CMP: No results found for: SODIUM, K, CL, CO2, ANIONGAP, BUN, CREATININE, GLUCOSE, CALCIUM, AST, ALT, ALKPHOS, PROT, BILITOT, EGFR, Coagulation: No results found for: PT, INR, APTT, Urinalysis: No results found for: Glenn Barroso, [address], [address], [address], NITRITE, PROTEINUA, GLUCOSEU, Cherylene Cuff, Alondra Mcallister

## 2021-04-25 NOTE — CASE MANAGEMENT
IMM reviewed with patient  patient agree with discharge determination  CM will file on behalf of the Pt in Medical Records

## 2021-04-25 NOTE — CASE MANAGEMENT
CM confirmed with MD Gabe Bourgeois that Pt will be medically stable for DC home today  Pt will need [facility] PT/OT- and visiting RN  CM will send referral with [facility], [facility] in which Pt is already open with  CM will wait for acceptance  Pt will need a ride from his son to get home via car pickup  CM will call son to arrange

## 2021-04-26 ENCOUNTER — PATIENT OUTREACH (OUTPATIENT)
Dept: CASE MANAGEMENT | Facility: HOSPITAL | Age: 76
End: 2021-04-26

## 2021-04-26 ENCOUNTER — TELEPHONE (OUTPATIENT)
Dept: CARDIOLOGY CLINIC | Facility: CLINIC | Age: 76
End: 2021-04-26

## 2021-04-26 ENCOUNTER — TRANSITIONAL CARE MANAGEMENT (OUTPATIENT)
Dept: FAMILY MEDICINE CLINIC | Facility: CLINIC | Age: 76
End: 2021-04-26

## 2021-04-26 DIAGNOSIS — Z79.4 TYPE 2 DIABETES MELLITUS WITH HYPERGLYCEMIA, WITH LONG-TERM CURRENT USE OF INSULIN (HCC): ICD-10-CM

## 2021-04-26 DIAGNOSIS — Z79.4 TYPE 2 DIABETES MELLITUS WITH HYPERGLYCEMIA, WITH LONG-TERM CURRENT USE OF INSULIN (HCC): Primary | ICD-10-CM

## 2021-04-26 DIAGNOSIS — E11.65 TYPE 2 DIABETES MELLITUS WITH HYPERGLYCEMIA, WITH LONG-TERM CURRENT USE OF INSULIN (HCC): Primary | ICD-10-CM

## 2021-04-26 DIAGNOSIS — E11.65 TYPE 2 DIABETES MELLITUS WITH HYPERGLYCEMIA, WITH LONG-TERM CURRENT USE OF INSULIN (HCC): ICD-10-CM

## 2021-04-26 NOTE — PROGRESS NOTES
Call placed to [facility] in which staff member Kiera Rich confirmed [facility] for tomorrow

## 2021-04-26 NOTE — TELEPHONE ENCOUNTER
Pt was in the hospital over the weekend for a fall after being lightheaded  They had told him they may switch him from Lasix to Torsemide  Also they put metolazone to prn instead of 3 days a week  They held digoxin in the hospital AVS states to resume  Will need refill  Has F/u appt on 5/6/21 with you  Did you want him to stay on Lasix or switch? Labs 4/24/21 Cr-1 30( 1 66 on 4/23/21) K 4 6(4 8)  Na 134( 131)      Please advise

## 2021-04-27 DIAGNOSIS — I48.11 LONGSTANDING PERSISTENT ATRIAL FIBRILLATION (HCC): ICD-10-CM

## 2021-04-27 DIAGNOSIS — I50.33 ACUTE ON CHRONIC DIASTOLIC HEART FAILURE (HCC): ICD-10-CM

## 2021-04-27 DIAGNOSIS — R60.0 LOCALIZED EDEMA: ICD-10-CM

## 2021-04-27 RX ORDER — POTASSIUM CHLORIDE 20 MEQ/1
20 TABLET, EXTENDED RELEASE ORAL DAILY
Qty: 30 TABLET | Refills: 11
Start: 2021-04-27 | End: 2021-10-13

## 2021-04-27 RX ORDER — FUROSEMIDE 80 MG
80 TABLET ORAL 2 TIMES DAILY
Qty: 60 TABLET | Refills: 11 | Status: SHIPPED | OUTPATIENT
Start: 2021-04-27 | End: 2022-02-09 | Stop reason: SDUPTHER

## 2021-04-27 RX ORDER — DIGOXIN 125 MCG
125 TABLET ORAL DAILY
Qty: 30 TABLET | Refills: 0
Start: 2021-04-27 | End: 2021-05-06

## 2021-04-27 NOTE — TELEPHONE ENCOUNTER
S/w Maynor advised of recommendations-- verbally understood     Needs refill of Lasix-- will send to co-sign

## 2021-04-27 NOTE — TELEPHONE ENCOUNTER
I called and s/w Darrius Rodarte  Torsemide was only recommended during admit but was not switched  Weight today 311 lbs  Weight at last o/v 4/16 322 lbs    Diuresing well  Voiding large amounts  Feels dry-- mouth is very dry  Denies any SOB or LE edema  Denies any lightheadedness or falls  's/50's-60's  HR 70's-80's    Currently taking Lasix 80 mg BID and Potassium 20 meq daily (ordered to take extra 20 meq on Metolazone days)  Patient does not want to resume Metolazone-- causes him to be lightheaded and unsteady on his feet  Do you still want to switch patient to Torsemide or decrease/continue  Lasix and monitor?

## 2021-04-28 ENCOUNTER — OFFICE VISIT (OUTPATIENT)
Dept: FAMILY MEDICINE CLINIC | Facility: CLINIC | Age: 76
End: 2021-04-28
Payer: MEDICARE

## 2021-04-28 ENCOUNTER — PATIENT OUTREACH (OUTPATIENT)
Dept: CASE MANAGEMENT | Facility: HOSPITAL | Age: 76
End: 2021-04-28

## 2021-04-28 ENCOUNTER — TELEPHONE (OUTPATIENT)
Dept: UROLOGY | Facility: CLINIC | Age: 76
End: 2021-04-28

## 2021-04-28 VITALS
HEIGHT: 74 IN | SYSTOLIC BLOOD PRESSURE: 132 MMHG | TEMPERATURE: 97 F | WEIGHT: 313.6 LBS | HEART RATE: 102 BPM | DIASTOLIC BLOOD PRESSURE: 60 MMHG | BODY MASS INDEX: 40.25 KG/M2 | OXYGEN SATURATION: 96 %
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
## 2021-04-28 DIAGNOSIS — S80.822A BLISTER OF LEFT LOWER EXTREMITY, INITIAL ENCOUNTER: ICD-10-CM

## 2021-04-28 DIAGNOSIS — N13.4 HYDROURETER ON LEFT: ICD-10-CM

## 2021-04-28 DIAGNOSIS — Z79.4 TYPE 2 DIABETES MELLITUS WITH HYPERGLYCEMIA, WITH LONG-TERM CURRENT USE OF INSULIN (HCC): ICD-10-CM

## 2021-04-28 DIAGNOSIS — I50.33 ACUTE ON CHRONIC DIASTOLIC HEART FAILURE (HCC): ICD-10-CM

## 2021-04-28 DIAGNOSIS — G72.81: ICD-10-CM

## 2021-04-28 DIAGNOSIS — A02.1: ICD-10-CM

## 2021-04-28 DIAGNOSIS — N20.0 NEPHROLITHIASIS: Primary | ICD-10-CM

## 2021-04-28 DIAGNOSIS — E11.65 TYPE 2 DIABETES MELLITUS WITH HYPERGLYCEMIA, WITH LONG-TERM CURRENT USE OF INSULIN (HCC): ICD-10-CM

## 2021-04-28 DIAGNOSIS — N17.9 AKI (ACUTE KIDNEY INJURY) (HCC): ICD-10-CM

## 2021-04-28 DIAGNOSIS — R65.20: ICD-10-CM

## 2021-04-28 LAB
BACTERIA BLD CULT: NORMAL
BACTERIA BLD CULT: NORMAL

## 2021-04-28 PROCEDURE — 99215 OFFICE O/P EST HI 40 MIN: CPT | Performed by: FAMILY MEDICINE

## 2021-04-28 NOTE — PROGRESS NOTES
Please call [facility] and have them pay attention to pt's blister on left anterior leg and cracked toe nail on R foot 4th toe   I called in silvadene to put on the blister

## 2021-04-28 NOTE — PROGRESS NOTES
In basket from PCP asking CM to contact [facility] regarding prescription called in for Jasmina  PCP would like [facility] staff to check blister on left anterior leg and cracked toe nail on R foot 4th toe  CM called agency and informed staff of above  Information given to [facility] who informed CM she will follow up with patient's  and clinical supervisor

## 2021-04-28 NOTE — TELEPHONE ENCOUNTER
----- Message from Keny Giordano MD sent at 4/28/2021 12:12 PM EDT -----  Note received from PCP  Pt had stent placedby Dr Abraham Urbina  Needs followup w AP for ureteroscopy planning      thx

## 2021-04-28 NOTE — PROGRESS NOTES
Assessment/Plan:   Patient currently has a blister on his left leg  Advised to use Silvadene on it will notify controlled home care to know to monitor patient  He also have a crack nail on his right foot suspect from the fall  Will keep an eye close follow-up to  At this point will start patient on for C go to see if insurance will cover  Advised to start once Urology clear him  Will monitor his CBC BMP in urine culture  Close monitor needed for patient will see him back in 1 month sooner if needed  Once insurance cover for C get and he get clearance from Urology then we can take him off other diabetic medication like glipizide  I have spent 50 minutes with Patient and family today in which greater than 50% of this time was spent in counseling/coordination of care regarding Diagnostic results  No problem-specific Assessment & Plan notes found for this encounter  Diagnoses and all orders for this visit:    Nephrolithiasis  -     Basic metabolic panel; Future  -     Urine culture; Future    LACY (acute kidney injury) ([facility] [address] )  -     Basic metabolic panel; Future  -     Urine culture; Future    Sepsis due to Salmonella species with critical illness myopathy, unspecified whether septic shock present ([facility] )  -     CBC and differential; Future  -     Basic metabolic panel; Future  -     Urine culture; Future    Fall, initial encounter    Type 2 diabetes mellitus with hyperglycemia, with long-term current use of insulin (HCC)  -     Dapagliflozin Propanediol 5 MG TABS; Take 1 tablet (5 mg total) by mouth daily    Acute on chronic diastolic heart failure (HCC)    Blister of left lower extremity, initial encounter  -     silver sulfadiazine (SILVADENE,SSD) 1 % cream; Apply topically 2 (two) times a day Apply to blister on left leg    Hydroureter on left         Subjective:     Patient ID: Giovani South is a 67 y o  male  27-year-old male for transition care visit    At home he fell his legs very weak feel like jelly and gave out on him and he fell on his buttock did not hit his head  He could not get up  EMS was called and thought he had head trauma and sent him to the ER  At hospitalization he found to have a high fever 100 8 and he was septic  He was admitted April 22nd to April 23rd for sepsis  CT scan show several obstructing stone on the left side of his kidney cause  hydronephrosis  Urine culture did not show any infection  White blood cell count very high at 14,000, creatinine high at 1 77 which is new for him  He has CT spine CT neck CT brain all normal   On April 23rd he was transferred to [facility] to be on the urology service  He had stent placed on the left side  He was placed on ceftriaxone for antibiotic coverage  [facility]  He needs outpatient cystoscopy with left ureteroscopy laser with  lithotripsy  and left ureteral stent exchange  His cardiologist discontinue digoxin chlorthalidone and metalazone  He is currently on Lasix 80 mg twice a day for congestive heart failure  He has admits to feeling very dry  He also have hypoxia oxygen-dependent  He supposedly in 4-6 L oxygen a merritt    since home physical therapy has been weaning down to 3 L of oxygen  Patient goal is to wean down to 2 L a day so be more convenient for portable oxygen carry  He has appointment May 3rd with pulmonology May 6 with cardiologist   Urologist have not contact patient for appointment follow-up  His blood sugar is improved he did lose 20 lb water weight  Currently he feels fine  no complaints this time  Review of Systems   Constitutional: Negative for activity change, appetite change, fatigue, fever and unexpected weight change  HENT: Negative for dental problem and trouble swallowing  Eyes: Negative for photophobia and visual disturbance  Respiratory: Negative for cough and chest tightness      Cardiovascular: Negative for chest pain, palpitations and leg swelling  Gastrointestinal: Negative for abdominal pain, constipation and vomiting  Endocrine: Negative for cold intolerance, polydipsia and polyuria  Genitourinary: Negative for difficulty urinating, frequency and urgency  Musculoskeletal: Negative for arthralgias, joint swelling, myalgias and neck pain  Skin: Negative for color change, rash and wound  Allergic/Immunologic: Negative for environmental allergies  Neurological: Negative for dizziness, weakness and numbness  Hematological: Does not bruise/bleed easily  Psychiatric/Behavioral: Negative for decreased concentration, dysphoric mood, self-injury, sleep disturbance and suicidal ideas  Objective:     Physical Exam  Vitals signs and nursing note reviewed  Constitutional:       Appearance: Normal appearance  He is well-developed  He is obese  HENT:      Head: Normocephalic and atraumatic  Right Ear: Tympanic membrane normal       Left Ear: Tympanic membrane normal    Eyes:      Pupils: Pupils are equal, round, and reactive to light  Neck:      Musculoskeletal: Normal range of motion and neck supple  Cardiovascular:      Rate and Rhythm: Normal rate and regular rhythm  Heart sounds: Normal heart sounds  Pulmonary:      Effort: Pulmonary effort is normal       Breath sounds: Normal breath sounds  Abdominal:      General: Bowel sounds are normal       Palpations: Abdomen is soft  Musculoskeletal: Normal range of motion  General: Swelling present  Right lower leg: Edema present  Left lower leg: Edema present  Skin:     General: Skin is warm and dry  Comments: 5 mm blister on left anterior leg  Dried blood with cracked 4th toe nail right foot   Neurological:      General: No focal deficit present  Mental Status: He is alert and oriented to person, place, and time  Mental status is at baseline     Psychiatric:         Mood and Affect: Mood normal          Behavior: Behavior normal          Thought Content: Thought content normal          Judgment: Judgment normal            Vitals:    04/28/21 0844   BP: 132/60   BP Location: Left arm   Patient Position: Sitting   Cuff Size: Standard   Pulse: 102   Temp: (!) 97 °F (36 1 °C)   TempSrc: Temporal   SpO2: 96%   Weight: (!) 142 kg (313 lb 9 6 oz)   Height: 6' 2" (1 88 m)       Transitional Care Management Review:  Anette Viera is a 67 y o  male here for TCM follow up  During the TCM phone call patient stated:    TCM Call (since 3/28/2021)     Date and time call was made  4/26/2021 12:13 PM    Hospital care reviewed  Records reviewed    Patient was hospitialized at  [facility]        Date of Admission  04/23/21    Date of discharge  04/25/21    Diagnosis  Sepsis    Disposition  Home    Were the patients medications reviewed and updated  No    Current Symptoms  None      TCM Call (since 3/28/2021)     Post hospital issues  None    Scheduled for follow up?   Yes    Patients specialists  Cardiologist    Cardiologist name  Dr Radha Lassiter    Cardiologist contact #  [phone]    Did you obtain your prescribed medications  No    Why were you unable to obtain your medications  Still waiting to hear from specialist    Do you need help managing your prescriptions or medications  No    Is transportation to your appointment needed  No    I have advised the patient to call PCP with any new or worsening symptoms  SHABBIR Tellez MD

## 2021-04-29 ENCOUNTER — APPOINTMENT (OUTPATIENT)
Dept: LAB | Facility: AMBULARY SURGERY CENTER | Age: 76
End: 2021-04-29
Payer: MEDICARE

## 2021-04-29 ENCOUNTER — TELEPHONE (OUTPATIENT)
Dept: UROLOGY | Facility: AMBULATORY SURGERY CENTER | Age: 76
End: 2021-04-29

## 2021-04-29 DIAGNOSIS — R65.20: ICD-10-CM

## 2021-04-29 DIAGNOSIS — N20.0 NEPHROLITHIASIS: ICD-10-CM

## 2021-04-29 DIAGNOSIS — G72.81: ICD-10-CM

## 2021-04-29 DIAGNOSIS — N17.9 AKI (ACUTE KIDNEY INJURY) (HCC): ICD-10-CM

## 2021-04-29 DIAGNOSIS — A02.1: ICD-10-CM

## 2021-04-29 LAB
ANION GAP SERPL CALCULATED.3IONS-SCNC: 3 MMOL/L (ref 4–13)
BASOPHILS # BLD AUTO: 0.07 THOUSANDS/ΜL (ref 0–0.1)
BASOPHILS NFR BLD AUTO: 1 % (ref 0–1)
BUN SERPL-MCNC: 29 MG/DL (ref 5–25)
CALCIUM SERPL-MCNC: 9.8 MG/DL (ref 8.3–10.1)
CHLORIDE SERPL-SCNC: 103 MMOL/L (ref 100–108)
CO2 SERPL-SCNC: 30 MMOL/L (ref 21–32)
CREAT SERPL-MCNC: 0.99 MG/DL (ref 0.6–1.3)
EOSINOPHIL # BLD AUTO: 0.24 THOUSAND/ΜL (ref 0–0.61)
EOSINOPHIL NFR BLD AUTO: 2 % (ref 0–6)
ERYTHROCYTE [DISTWIDTH] IN BLOOD BY AUTOMATED COUNT: 16 % (ref 11.6–15.1)
GFR SERPL CREATININE-BSD FRML MDRD: 73 ML/MIN/1.73SQ M
GLUCOSE SERPL-MCNC: 151 MG/DL (ref 65–140)
HCT VFR BLD AUTO: 37.8 % (ref 36.5–49.3)
HGB BLD-MCNC: 11.5 G/DL (ref 12–17)
IMM GRANULOCYTES # BLD AUTO: 0.15 THOUSAND/UL (ref 0–0.2)
IMM GRANULOCYTES NFR BLD AUTO: 1 % (ref 0–2)
LYMPHOCYTES # BLD AUTO: 2.41 THOUSANDS/ΜL (ref 0.6–4.47)
LYMPHOCYTES NFR BLD AUTO: 23 % (ref 14–44)
MCH RBC QN AUTO: 27.3 PG (ref 26.8–34.3)
MCHC RBC AUTO-ENTMCNC: 30.4 G/DL (ref 31.4–37.4)
MCV RBC AUTO: 90 FL (ref 82–98)
MONOCYTES # BLD AUTO: 0.78 THOUSAND/ΜL (ref 0.17–1.22)
MONOCYTES NFR BLD AUTO: 8 % (ref 4–12)
NEUTROPHILS # BLD AUTO: 6.74 THOUSANDS/ΜL (ref 1.85–7.62)
NEUTS SEG NFR BLD AUTO: 65 % (ref 43–75)
NRBC BLD AUTO-RTO: 0 /100 WBCS
PLATELET # BLD AUTO: 286 THOUSANDS/UL (ref 149–390)
PMV BLD AUTO: 10.6 FL (ref 8.9–12.7)
POTASSIUM SERPL-SCNC: 4.8 MMOL/L (ref 3.5–5.3)
RBC # BLD AUTO: 4.21 MILLION/UL (ref 3.88–5.62)
SODIUM SERPL-SCNC: 136 MMOL/L (ref 136–145)
WBC # BLD AUTO: 10.39 THOUSAND/UL (ref 4.31–10.16)

## 2021-04-29 PROCEDURE — 36415 COLL VENOUS BLD VENIPUNCTURE: CPT

## 2021-04-29 PROCEDURE — 80048 BASIC METABOLIC PNL TOTAL CA: CPT

## 2021-04-29 PROCEDURE — 85025 COMPLETE CBC W/AUTO DIFF WBC: CPT

## 2021-04-29 PROCEDURE — 87086 URINE CULTURE/COLONY COUNT: CPT

## 2021-04-29 PROCEDURE — 87186 SC STD MICRODIL/AGAR DIL: CPT

## 2021-04-29 PROCEDURE — 87077 CULTURE AEROBIC IDENTIFY: CPT

## 2021-04-29 NOTE — TELEPHONE ENCOUNTER
Patient called me back to discuss scheduling surgery  Patient stated that he would like to have his surgery done at the [facility]  I will review what is available and I will give him a call back to confirm  I know patient is waiting to be scheduled in the office to follow up, so I will keep a look out for that appt as well  Patient is aware that the office called and will be calling back to schedule that

## 2021-04-29 NOTE — TELEPHONE ENCOUNTER
----- Message from Darrian Restrepo MD sent at 4/24/2021  8:23 AM EDT -----  Please call patient with a surgery date for cystoscopy left ureteroscopy laser lithotripsy and left ureteral stent exchange  I stented him today  He is normally seen by Dr Corrine Meza and if he wishes to have it done by him he can have it done by him  Case request has been placed

## 2021-04-30 ENCOUNTER — PREP FOR PROCEDURE (OUTPATIENT)
Dept: UROLOGY | Facility: AMBULATORY SURGERY CENTER | Age: 76
End: 2021-04-30

## 2021-04-30 DIAGNOSIS — N20.0 CALCULUS OF KIDNEY: Primary | ICD-10-CM

## 2021-04-30 DIAGNOSIS — R39.89 SUSPECTED UTI: ICD-10-CM

## 2021-04-30 DIAGNOSIS — Z01.818 PRE-OPERATIVE EXAMINATION: ICD-10-CM

## 2021-04-30 NOTE — TELEPHONE ENCOUNTER
I called and spoke with Марина Ibrahim from [address] [place] [address] Cardiology regarding a cardiac clearance needed for patient prior to surgery on 5/21 with Dr Sylvain Higuera  Patient already has an appt scheduled on 5/6, and they can also clear him at that appt

## 2021-05-02 LAB — BACTERIA UR CULT: ABNORMAL

## 2021-05-03 ENCOUNTER — OFFICE VISIT (OUTPATIENT)
Dept: PULMONOLOGY | Facility: CLINIC | Age: 76
End: 2021-05-03
Payer: MEDICARE

## 2021-05-03 ENCOUNTER — TELEPHONE (OUTPATIENT)
Dept: ENDOCRINOLOGY | Facility: CLINIC | Age: 76
End: 2021-05-03

## 2021-05-03 VITALS
TEMPERATURE: 99.2 F | WEIGHT: 311 LBS | BODY MASS INDEX: 37.87 KG/M2 | DIASTOLIC BLOOD PRESSURE: 60 MMHG | HEART RATE: 85 BPM | RESPIRATION RATE: 18 BRPM | HEIGHT: 76 IN | SYSTOLIC BLOOD PRESSURE: 132 MMHG | OXYGEN SATURATION: 90 %

## 2021-05-03 DIAGNOSIS — N39.0 URINARY TRACT INFECTION WITH HEMATURIA, SITE UNSPECIFIED: Primary | ICD-10-CM

## 2021-05-03 DIAGNOSIS — G47.33 OSA (OBSTRUCTIVE SLEEP APNEA): Primary | ICD-10-CM

## 2021-05-03 DIAGNOSIS — R31.9 URINARY TRACT INFECTION WITH HEMATURIA, SITE UNSPECIFIED: Primary | ICD-10-CM

## 2021-05-03 PROCEDURE — 99213 OFFICE O/P EST LOW 20 MIN: CPT | Performed by: INTERNAL MEDICINE

## 2021-05-03 RX ORDER — CIPROFLOXACIN 250 MG/1
250 TABLET, FILM COATED ORAL EVERY 12 HOURS SCHEDULED
Qty: 14 TABLET | Refills: 0 | Status: SHIPPED | OUTPATIENT
Start: 2021-05-03 | End: 2021-05-10

## 2021-05-03 NOTE — TELEPHONE ENCOUNTER
I reviewed your prior note  Might want to consider holding off on starting Morven Nipper until urological procedures are complete and urine infection has cleared  I can certainly discuss with him further at his next appointment the risks and benefits of SGLT-2 inhibitors       Kayla LEO

## 2021-05-03 NOTE — TELEPHONE ENCOUNTER
Thank you! I didn't start him on it yet   I echoed same thought and told pt wanted uro to clear him first  I prescribed to see if insurance will cover med

## 2021-05-03 NOTE — ASSESSMENT & PLAN NOTE
Now sleep apnea adequately treated based on current compliance measurements  Measured AHI is 4 per hour of CPAP use  This is much improved from his previous machine which was not function well with AHI is in the 20s  I am convinced that his poorly treated sleep apnea led to obesity hypoventilation syndrome with hypoventilation and pulmonary hypertension  I am hopeful that with continued adequate treatment he will improve in this regard  Repeat venous blood gas in a month  For now he needs oxygen only with activity  I am hopeful the next visit will be able to get rid of the oxygen entirely

## 2021-05-03 NOTE — PROGRESS NOTES
Assessment/Plan:    LUCIA (obstructive sleep apnea)  Now sleep apnea adequately treated based on current compliance measurements  Measured AHI is 4 per hour of CPAP use  This is much improved from his previous machine which was not function well with AHI is in the 20s  I am convinced that his poorly treated sleep apnea led to obesity hypoventilation syndrome with hypoventilation and pulmonary hypertension  I am hopeful that with continued adequate treatment he will improve in this regard  A repeat venous blood gas in the a month  For now he needs oxygen only with activity  I am hopeful the next visit will be able to get rid of the oxygen entirely  Diagnoses and all orders for this visit:    LUCIA (obstructive sleep apnea)  -     Blood gas, venous; Future          Subjective:      Patient ID: Carmen Sahu is a 67 y o  male  This patient returns for obstructive sleep apnea and concern about obesity hypoventilation syndrome with hypercarbia and perhaps pulmonary hypertension  Since last visit patient was hospitalized for leg edema  This still is a major issue  Has lost considerable weight relative to diuresis  Now has a new CPAP machine which she has had since April 6  Senses that it works well and he is finds the sleep is quite restful  Pressures recorded on the auto CPAP very appropriate for the pressure range selected  AHI now is down to 4  Patient senses that his sleep is quite restful  Note that he has been not been using oxygen during CPAP therapy  Does not have any major respiratory symptoms at this point  Has been using oxygen during the daytime and asked about portable oxygen  Room air saturation today my check is 90-92%  For seems not likely the patient will require longstanding oxygen I suspect that his CO2 is coming down with adequate CPAP treatment          The following portions of the patient's history were reviewed and updated as appropriate: allergies, current medications, past family history, past medical history, past social history, past surgical history and problem list     Review of Systems   Constitutional: Positive for activity change  Negative for unexpected weight change  Respiratory: Positive for apnea  Negative for cough, shortness of breath and wheezing  Cardiovascular: Positive for leg swelling  Negative for palpitations  Objective:      /60 (BP Location: Right arm, Patient Position: Sitting, Cuff Size: Adult)   Pulse 85   Temp 99 2 °F (37 3 °C)   Resp 18   Ht 6' 4" (1 93 m)   Wt (!) 141 kg (311 lb)   SpO2 90% Comment: Room air at rest  BMI 37 86 kg/m²          Physical Exam  Vitals signs reviewed  Constitutional:       Appearance: He is obese  He is not ill-appearing  Neck:      Musculoskeletal: No neck rigidity or muscular tenderness  Cardiovascular:      Rate and Rhythm: Normal rate  Rhythm irregular  Pulses:           Radial pulses are 3+ on the right side and 3+ on the left side  Heart sounds: Normal heart sounds  Pulmonary:      Effort: Pulmonary effort is normal       Breath sounds: Normal breath sounds  No wheezing or rales  Musculoskeletal:         General: Swelling present  Right lower leg: Edema present  Left lower leg: Edema present  Lymphadenopathy:      Cervical: No cervical adenopathy  Skin:     General: Skin is warm and dry  Neurological:      Mental Status: He is alert and oriented to person, place, and time     Psychiatric:         Mood and Affect: Mood normal          Behavior: Behavior normal

## 2021-05-04 ENCOUNTER — PATIENT OUTREACH (OUTPATIENT)
Dept: CASE MANAGEMENT | Facility: HOSPITAL | Age: 76
End: 2021-05-04

## 2021-05-04 NOTE — PROGRESS NOTES
Attempted to contact Maynor to discuss oxygen needs and Pulmonary visit yesterday  Tiera Moncada is successfully using his new CPAP  It was noted that he may not need O2 in the future and that will be determined at the next Pulmonary appt  RT will help facilitate the Portable O2 if needed at that time  Detailed message left on VM with contact information

## 2021-05-05 ENCOUNTER — PATIENT OUTREACH (OUTPATIENT)
Dept: CASE MANAGEMENT | Facility: HOSPITAL | Age: 76
End: 2021-05-05

## 2021-05-05 NOTE — PHYSICIAN ADVISOR
Current patient class: Inpatient  The patient is currently on Hospital Day: 3 at [facility]      The patient was admitted to the hospital at [facility] on 4/24/21 for the following diagnosis:  Sepsis ([facility] [address] ) [A41 [id number]]       There was documentation in the medical record of an expected length of stay of at least 2 midnights  The patient was therefore expected to satisfy the 2 midnight benchmark and given the 2 midnight presumption was appropriate for INPATIENT ADMISSION  Given this expectation of a satisfying stay, CMS instructs us that the patient is most often appropriate for inpatient admission under part A provided medical necessity is documented in the chart  After review of the relevant documentation, labs, vital signs and test results, the patient is appropriate for INPATIENT ADMISSION  Admission to the hospital as an inpatient is a complex decision making process which requires the practitioner to consider the patients presenting complaint, history and physical examination and all relevant testing  With this in mind, in this case, the patient was deemed appropriate for INPATIENT ADMISSION  After review of the documentation and testing available at the time of the admission, I concur with this clinical determination of medical necessity  Rationale is as follows: The patient was hospitalized as an inpatient from April 22nd until April 23, 2021 at the [facility]  He arrived to the [facility] around midnight on April 24, 2021  He was transferred due to fever, sepsis, left-sided hydronephrosis suggesting obstructive uropathy  Upon arrival to [place], two midnights were anticipated  He has uncontrolled diabetes along with other risk factors and comorbidities  He was maintained on intravenous antibiotics and hydration  The patient underwent stent placement by Urology  The note does indicate that he had infection with the ureteral calculi    He was able to be discharged after one midnight at [place], but was hospitalized for three midnights total within the network for appropriate management of his conditions      The patients vitals on arrival were   ED Triage Vitals   Temperature Pulse Respirations Blood Pressure SpO2   04/24/21 0031 04/24/21 0031 04/24/21 0031 04/24/21 0031 04/24/21 0031   (!) 97 2 °F (36 2 °C) 91 16 119/62 (!) 88 %      Temp src Heart Rate Source Patient Position - Orthostatic VS BP Location FiO2 (%)   -- -- -- -- --             Pain Score       04/24/21 0100       No Pain           Past Medical History:   Diagnosis Date    A-fib [facility])     AAA (abdominal aortic aneurysm) (HCC)     Actinic keratosis of right temple 7/31/2020    Actinic keratosis of scalp 7/31/2020    Acute respiratory failure with hypoxia (HCC) 3/25/2021    Arthritis     Lay's esophagus     Bladder cancer (HCC)     Blister of left leg 4/28/2021    CAD (coronary artery disease)     Chronic low back pain     Chronic pain of both knees 7/31/2020    Colitis 12/4/2020    Diabetes ([address] )     Excessive gas 12/3/2020    Hydroureter on left 4/28/2021    Hyperlipemia     Hypertension     Immunization deficiency 10/30/2020    Hep a nonimmune    Left lower quadrant abdominal pain 12/3/2020    Mass of right adrenal gland ([address] ) 12/4/2020    4 1 cm    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    LUCIA (obstructive sleep apnea) 1/29/2021    Urinary tract infection with hematuria 5/3/2021    u cx 4/2021=pseudomonas R to bactrim and cefdinir, S to cipro     Past Surgical History:   Procedure Laterality Date    BACK SURGERY      BLADDER SURGERY      CATARACT EXTRACTION, BILATERAL      COLONOSCOPY  01/29/2019    next [address] GRAFT  04/2016    Quadruple per Rita    EGD  06/2017    IL CYSTOURETHROSCOPY,URETER CATHETER Left 4/24/2021    Procedure: CYSTOSCOPY  WITH INSERTION STENT URETERAL;  Surgeon: Jose Luis Lyons MD;  Location: [facility];  Service: Urology    TOTAL HIP ARTHROPLASTY Right 2012    TOTAL SHOULDER REPLACEMENT Right 2013           Consults have been placed to:   IP CONSULT TO UROLOGY    Vitals:    04/24/21 1557 04/24/21 1930 04/24/21 2214 04/25/21 0749   BP: 128/64  (!) 111/48 113/56   Pulse:  60 66 82   Resp:    16   Temp:   97 9 °F (36 6 °C) 98 °F (36 7 °C)   SpO2:  98% 95% 93%   Weight:       Height:           Most recent labs:    No results for input(s): WBC, HGB, HCT, PLT, K, NA, CALCIUM, BUN, CREATININE, LIPASE, AMYLASE, INR, TROPONINI, CKTOTAL, AST, ALT, ALKPHOS, BILITOT in the last 72 hours  Scheduled Meds:  Continuous Infusions:No current facility-administered medications for this encounter  PRN Meds:      Surgical procedures (if appropriate):  Procedure(s):  CYSTOSCOPY  WITH INSERTION STENT URETERAL

## 2021-05-05 NOTE — PROGRESS NOTES
Future appointments:  5/6 Cardiology  5/14  Urology  5/21  Cytoscopy/lithotripsy  5/23 PCP    CM spoke with patient who reports he is doing well, his breathing is much better and feels as if he is back to normal   States Dr Ayan Mcmahon informed him he does not have to wear the oxygen during the day unless he exerts himself with exercise or therapy in which he'll put it on at 2 liters  Gail Ott reports compliance with wearing the CPAP at night  He reports compliance with medications and diet and checks the following daily:  BP, HR, weight and blood sugar  Patient acknowledged the above appointments  Gail Ott encouraged to outreach CM as needed and patient agreed

## 2021-05-06 ENCOUNTER — OFFICE VISIT (OUTPATIENT)
Dept: CARDIOLOGY CLINIC | Facility: CLINIC | Age: 76
End: 2021-05-06
Payer: MEDICARE

## 2021-05-06 VITALS
BODY MASS INDEX: 37.75 KG/M2 | HEIGHT: 76 IN | HEART RATE: 90 BPM | DIASTOLIC BLOOD PRESSURE: 52 MMHG | OXYGEN SATURATION: 93 % | WEIGHT: 310 LBS | SYSTOLIC BLOOD PRESSURE: 112 MMHG

## 2021-05-06 DIAGNOSIS — N20.1 URETERAL CALCULI: ICD-10-CM

## 2021-05-06 DIAGNOSIS — Z01.818 PRE-OP EXAM: Primary | ICD-10-CM

## 2021-05-06 DIAGNOSIS — R60.0 BILATERAL EDEMA OF LOWER EXTREMITY: ICD-10-CM

## 2021-05-06 DIAGNOSIS — I50.33 ACUTE ON CHRONIC DIASTOLIC HEART FAILURE (HCC): ICD-10-CM

## 2021-05-06 DIAGNOSIS — I48.0 PAROXYSMAL ATRIAL FIBRILLATION (HCC): ICD-10-CM

## 2021-05-06 PROCEDURE — 93000 ELECTROCARDIOGRAM COMPLETE: CPT | Performed by: INTERNAL MEDICINE

## 2021-05-06 PROCEDURE — 99215 OFFICE O/P EST HI 40 MIN: CPT | Performed by: INTERNAL MEDICINE

## 2021-05-06 RX ORDER — METOPROLOL SUCCINATE 25 MG/1
12.5 TABLET, EXTENDED RELEASE ORAL DAILY
Qty: 45 TABLET | Refills: 3 | Status: SHIPPED | OUTPATIENT
Start: 2021-05-06 | End: 2021-08-06 | Stop reason: SDUPTHER

## 2021-05-06 NOTE — ASSESSMENT & PLAN NOTE
· Present on admission    Creatinine was 1 66 at the time of presentation to [facility] with baseline creatinine 0 8-0 9  · CT scan showed bilateral nephrolithiasis causing hydronephrosis-possible obstructive uropathy  · Urinalysis reveals  · IV hydration  · improved

## 2021-05-06 NOTE — ASSESSMENT & PLAN NOTE
Wt Readings from Last 3 Encounters:   05/03/21 (!) 141 kg (311 lb)   04/28/21 (!) 142 kg (313 lb 9 6 oz)   04/24/21 (!) 140 kg (309 lb 8 4 oz)         Patient with known history of congestive heart failure  Patient was admitted from 3/25-3/31 for CHF exacerbation  Patient was on Lasix 80 mg b i d  And also added Zaroxolyn as an outpatient by his cardiologist   Patient's discharge weight was 322 lb and at the time of presentation patient actually weighed 314 lb  Patient also with acute kidney injury  Holding diuretics  Gentle hydration  Recheck blood work in the morning  Intake and output  Daily weight  Patient was evaluated by Cardiology in [address]  Do not appear to be in acute congestive heart failure  Recommended to start Lasix at 40 mg b i d   Once the creatinine normalizes and avoid Zaroxolyn  Patient was on IV fluid since last night we will DC IV fluids and monitor given lower extremity edema

## 2021-05-06 NOTE — PROGRESS NOTES
[facility]  Office progress note  Cyndi Party 67 y o  male MRN: [id number]        Problems    1  Pre-op exam  POCT ECG   2  Ureteral calculi  Ambulatory referral to Cardiology   3  Acute on chronic diastolic heart failure (HCC)     4  Paroxysmal atrial fibrillation (HCC)         Impression:       Coronary artery disease  o CABG x3 in 2016, LIMA to LAD, SVG to OM3, SVG to PDA  o His moderate ischemic cardiomyopathy recovered by 2017 up to 55%  o No ischemic symptoms  o Echo 3/23/2021-LVEF 60%, but RV mildly dilated with mildly reduced function   Acute on chronic diastolic/right-sided CHF  o 1+ leg edema currently  o Metolazone intolerance with severe lightheadedness and falling  o Currently stable on furosemide 80 mg p o  B i d   o Continue to discuss the need for aggressive caloric weight loss to improve his heart failure symptoms  o Will not be overly aggressive with his current diuresis any further than he currently is taking as above  o Renal function is normal, electrolytes stable, taking potassium supplementation   AAA  o Up to 47 mm by recent testing by CT of the abdomen 1/5/2021, but was a noncontrast study  o Not addressed today   Lower extremity atherosclerosis  o Occluded dorsalis pedis bilaterally  o Continues to be followed by vascular   Mixed hyperlipidemia  o Improved but triglycerides still remain elevated   Type 2 diabetes  o Uncontrolled, recent A1c 7 5   Hypertension  o Low normal, but no lightheadedness after metolazone was discontinued   Atrial fibrillation  o Paroxysmal, asymptomatic, occurred post CABG, was on a short course of amiodarone at that time  o Recently recurred during CHF rehospitalization in March  o He is on Eliquis  o No longer on digoxin  o Low-dose metoprolol succinate 12 5 mg daily    Plan     Continue the furosemide 80 mg p o  B i d   With potassium supplementation   I am hoping with continued weight loss his now 1+ leg edema will continue to improve   I have asked him to use compression stockings or Ace wraps for the residual edema/occasional weeping   I would like him to undergo a ZIO Patch before the next appointment to assess the degree of recurrence of atrial fibrillation considering it is asymptomatic   If further diuretic therapy is necessary, we may discontinue potassium chloride and advocate spironolactone considering his intolerance to metolazone with lightheadedness   He may hold Eliquis for 2 days prior to cystoscopy/lithotripsy and he is cleared to proceed   2 month follow-up    I have spent 40 minutes with Patient and family today in which greater than 50% of this time was spent in counseling/coordination of care regarding Diagnostic results, Prognosis, Risks and benefits of tx options, Intructions for management, Patient and family education and Impressions  HPI: Giovani South is a 67y o  year old male with CAD, atrial fibrillation, diastolic/right-sided CHF, obesity, hypertension, diabetes, mixed hyperlipidemia, LUCIA returns for a follow-up appointment    Darrius Rodarte was hospitalized 2/94, right/diastolic CHF, PAF recurrence, started on Eliquis, started on digoxin, but has maintained sinus rhythm since then, and digoxin discontinued amidst recent return to the ER due to fall  He is taking low-dose metoprolol and tolerating it  Metolazone was poorly tolerated caused frequent and recurrent lightheadedness even after the 1st dose  This was discontinued  He continues on furosemide 80 mg p o  B i d  He still has 1+ leg edema and occasional weeping, he has not quite gotten his weight or legs down to the point to be able to use compression stockings, and we did discuss Ace wraps today  Electrolytes and renal function are normal   His blood pressure is low normal but asymptomatic  He is on lisinopril 20 mg daily  He denies any ischemic complaints  He will be going for cystoscopy with lithotripsy, requests an Eliquis 2 day hold    He denies palpitations, but his previous AFib was asymptomatic  Review of Systems   Respiratory: Positive for shortness of breath  Cardiovascular: Positive for leg swelling  All other systems reviewed and are negative          Past Medical History:   Diagnosis Date    A-fib [facility])     AAA (abdominal aortic aneurysm) ([facility] [address] )     Actinic keratosis of right temple 7/31/2020    Actinic keratosis of scalp 7/31/2020    Acute respiratory failure with hypoxia ([facility] [address] ) 3/25/2021    Arthritis     Lay's esophagus     Bladder cancer (HCC)     Blister of left leg 4/28/2021    CAD (coronary artery disease)     Chronic low back pain     Chronic pain of both knees 7/31/2020    Colitis 12/4/2020    Diabetes ([facility] [address] )     Excessive gas 12/3/2020    Hydroureter on left 4/28/2021    Hyperlipemia     Hypertension     Immunization deficiency 10/30/2020    Hep a nonimmune    Left lower quadrant abdominal pain 12/3/2020    Mass of right adrenal gland ([facility] [address] ) 12/4/2020    4 1 cm    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    LUCIA (obstructive sleep apnea) 1/29/2021    Urinary tract infection with hematuria 5/3/2021    u cx 4/2021=pseudomonas R to bactrim and cefdinir, S to cipro     Past Surgical History:   Procedure Laterality Date    BACK SURGERY      BLADDER SURGERY      CATARACT EXTRACTION, BILATERAL      COLONOSCOPY  01/29/2019    next [address]22 [address] GRAFT  04/2016    Quadruple per Rita    EGD  06/2017    CT CYSTOURETHROSCOPY,URETER CATHETER Left 4/24/2021    Procedure: CYSTOSCOPY  WITH INSERTION STENT URETERAL;  Surgeon: Yee Bills MD;  [facility];  Service: Urology    TOTAL HIP ARTHROPLASTY Right 2012    TOTAL SHOULDER REPLACEMENT Right 2013     Social History     Substance and Sexual Activity   Alcohol Use Not Currently    Comment: No use     Social History     Substance and Sexual Activity   Drug Use Never    Comment: No use     Social History     Tobacco Use Smoking Status Former Smoker    Packs/day: 0 25    Years: 21 00    Pack years: 5 25    Types: Cigarettes    Start date:     Quit date: 2009    Years since quittin 3   Smokeless Tobacco Never Used   Tobacco Comment    smoked since age 21; per Dominique Zelaya     Family History   Problem Relation Age of Onset    Heart disease Father     Arthritis Father     Heart attack Father     Alzheimer's disease Mother        Allergies:  No Known Allergies    Medications:     Current Outpatient Medications:     atorvastatin (LIPITOR) 40 mg tablet, Take 1 tablet (40 mg total) by mouth daily, Disp: 90 tablet, Rfl: 3    ciprofloxacin (CIPRO) 250 mg tablet, Take 1 tablet (250 mg total) by mouth every 12 (twelve) hours for 7 days, Disp: 14 tablet, Rfl: 0    Dapagliflozin Propanediol 5 MG TABS, Take 1 tablet (5 mg total) by mouth daily, Disp: 30 tablet, Rfl: 1    digoxin (LANOXIN) 0 125 mg tablet, Take 1 tablet (125 mcg total) by mouth daily On hold as of  per Dr Teddy Epley, Disp: 30 tablet, Rfl: 0    Eliquis 5 MG, TAKE 1 TABLET BY MOUTH TWICE A DAY, Disp: 60 tablet, Rfl: 3    ezetimibe (ZETIA) 10 mg tablet, Take 1 tablet (10 mg total) by mouth daily, Disp: 90 tablet, Rfl: 3    finasteride (PROSCAR) 5 mg tablet, Take 1 tablet (5 mg total) by mouth daily, Disp: 90 tablet, Rfl: 3    furosemide (LASIX) 80 mg tablet, Take 1 tablet (80 mg total) by mouth 2 (two) times a day, Disp: 60 tablet, Rfl: 11    glipiZIDE (GLUCOTROL XL) 5 mg 24 hr tablet, Take 1 tablet (5 mg total) by mouth daily, Disp: 90 tablet, Rfl: 1    Levemir FlexTouch 100 units/mL injection pen, Inject under the skin 65 units in the morning and at bedtime, Disp: 120 mL, Rfl: 1    lisinopril (ZESTRIL) 20 mg tablet, Take 1 tablet (20 mg total) by mouth daily, Disp: 90 tablet, Rfl: 3    metFORMIN (GLUCOPHAGE) 1000 MG tablet, TAKE ONE TABLET (500 MG) TWICE A DAY WITH MEALS, Disp: 180 tablet, Rfl: 1    metoprolol succinate (TOPROL-XL) 25 mg 24 hr tablet, Take 0 5 tablets (12 5 mg total) by mouth daily (Patient taking differently: Take 12 5 mg by mouth daily 12 5 mg two times a day), Disp: 45 tablet, Rfl: 3    omeprazole (PriLOSEC) 20 mg delayed release capsule, Take 1 capsule (20 mg total) by mouth daily, Disp: 30 capsule, Rfl: 1    potassium chloride (K-DUR,KLOR-CON) 20 mEq tablet, Take 1 tablet (20 mEq total) by mouth daily, Disp: 30 tablet, Rfl: 11    silver sulfadiazine (SILVADENE,SSD) 1 % cream, Apply topically 2 (two) times a day Apply to blister on left leg, Disp: 50 g, Rfl: 1    Unifine Pentips 31G X 8 MM MISC, Inject under the skin daily Use as directed, Disp: 100 each, Rfl: 2    Fluad Quadrivalent 0 5 ML PRSY, PHARMACY ADMINISTERED, Disp: , Rfl:       Vitals:    05/06/21 0836   BP: 112/52   Pulse: 90   SpO2: 93%     Weight (last 2 days)     Date/Time   Weight    05/06/21 0836   (!) 141 (310)            Physical Exam  Constitutional:       General: He is not in acute distress  Appearance: Normal appearance  He is well-developed  He is not diaphoretic  HENT:      Head: Normocephalic and atraumatic  Eyes:      General: No scleral icterus  Conjunctiva/sclera: Conjunctivae normal       Pupils: Pupils are equal, round, and reactive to light  Neck:      Musculoskeletal: Normal range of motion and neck supple  Thyroid: No thyromegaly  Vascular: No JVD  Cardiovascular:      Rate and Rhythm: Normal rate and regular rhythm  Heart sounds: Normal heart sounds  No murmur  No friction rub  No gallop  Pulmonary:      Effort: Pulmonary effort is normal  No respiratory distress  Breath sounds: Normal breath sounds  No wheezing or rales  Abdominal:      General: Bowel sounds are normal  There is no distension  Palpations: Abdomen is soft  Tenderness: There is no abdominal tenderness  Musculoskeletal: Normal range of motion  General: No deformity  Right lower leg: Edema present        Left lower leg: Edema present  Skin:     General: Skin is warm and dry  Findings: No erythema or rash  Neurological:      General: No focal deficit present  Mental Status: He is alert and oriented to person, place, and time  Cranial Nerves: No cranial nerve deficit  Motor: No weakness  Gait: Gait abnormal            Laboratory Studies:    Laboratory studies all personally reviewed    Cardiac testing:     EKG reviewed personally:   10/20-sinus rhythm, frequent ectopy, right bundle branch block  4/21-sinus tachycardia, right bundle branch block  Results for orders placed or performed in visit on 05/06/21   POCT ECG    Impression    Sinus rhythm, right bundle branch block, cannot rule out inferior infarct         Echocardiogram:  2017-EF 55%  3/21-EF 60%, mild LVH, mild RV dilation and mild RV dysfunction, mild-to-moderate TR with mild-to-moderate pulmonary hypertension    Stress test:      Holter:      Cardiac catheterization:        Amanda Gao MD    Portions of the record may have been created with voice recognition software  Occasional wrong word or "sound a like" substitutions may have occurred due to the inherent limitations of voice recognition software  Read the chart carefully and recognize, using context, where substitutions have occurred

## 2021-05-06 NOTE — ASSESSMENT & PLAN NOTE
Lab Results   Component Value Date    HGBA1C 8 4 (H) 04/22/2021       No results for input(s): POCGLU in the last 72 hours      Blood Sugar Average: Last 72 hrs:   patient's last hemoglobin A1c is 8 4 showing uncontrolled type 2 diabetes mellitus  Hold oral hypoglycemic agents while inpatient  Continue with the Levemir  Mealtime insulin started   Insulin sliding scale  Hypoglycemia protocol

## 2021-05-06 NOTE — DISCHARGE SUMMARY
[address] [facility]  Discharge- Carmelita Pickup 1945, 67 y o  male MRN: [id number]  Unit/Bed#: [facility] 816-01 Encounter: [id number]  Primary Care Provider: Jose West MD   Date and time admitted to hospital: 4/23/2021 11:50 PM    Nephrolithiasis  Assessment & Plan  CT scan showed -Bilateral nephrolithiasis with several obstructing stones in the distal left ureter, measuring approximately 6 mm in size with associated left hydroureteronephrosis and obstructive uropathy  · Patient was evaluated by Urology and plan for cystoscopy with stent placement noted      Hypercalcemia  Assessment & Plan  · Calcium is 10 3 at the time of presentation  · Secondary to dehydration  · Monitor with IV fluids    Hyponatremia  Assessment & Plan  · Sodium 131 at the time of presentation  · Monitor sodium  · Gentle hydration    LACY (acute kidney injury) ([facility] [address] )  Assessment & Plan  · Present on admission  Creatinine was 1 66 at the time of presentation to [facility] with baseline creatinine 0 8-0 9  · CT scan showed bilateral nephrolithiasis causing hydronephrosis-possible obstructive uropathy  · Urinalysis reveals  · IV hydration  · improved    Fall  Assessment & Plan  · Patient had a fall before coming to the hospital without loss of consciousness  No head injury  Patient evaluated by trauma patient  · Trauma workup is negative  · PT OT evaluation    CHF (congestive heart failure) ([facility])  Assessment & Plan  Wt Readings from Last 3 Encounters:   05/03/21 (!) 141 kg (311 lb)   04/28/21 (!) 142 kg (313 lb 9 6 oz)   04/24/21 (!) 140 kg (309 lb 8 4 oz)         Patient with known history of congestive heart failure  Patient was admitted from 3/25-3/31 for CHF exacerbation  Patient was on Lasix 80 mg b i d   And also added Zaroxolyn as an outpatient by his cardiologist   Patient's discharge weight was 322 lb and at the time of presentation patient actually weighed 314 lb  Patient also with acute kidney injury  Holding diuretics  Gentle hydration  Recheck blood work in the morning  Intake and output  Daily weight  Patient was evaluated by Cardiology in [facility]  Do not appear to be in acute congestive heart failure  Recommended to start Lasix at 40 mg b i d  Once the creatinine normalizes and avoid Zaroxolyn  Patient was on IV fluid since last night we will DC IV fluids and monitor given lower extremity edema    Morbid obesity (HCC)  Assessment & Plan  · Lifestyle changes as outpatient    Paroxysmal atrial fibrillation (HCC)  Assessment & Plan  · Patient was tachycardic at the time of presentation to the hospital   · Currently in sinus rhythm  · Anticoagulated with Eliquis  · Continue metoprolol  · Continue digoxin    Bladder cancer ([facility] )  Assessment & Plan  · Patient with history of bladder off non invasive bladder cancer  · Recent cystoscopy showed no evidence of recurrence of the disease  · Follow-up with Urology as an outpatient    Type 2 diabetes mellitus with hyperglycemia, with long-term current use of insulin ([facility] )  Assessment & Plan  Lab Results   Component Value Date    HGBA1C 8 4 (H) 04/22/2021       No results for input(s): POCGLU in the last 72 hours  Blood Sugar Average: Last 72 hrs:   patient's last hemoglobin A1c is 8 4 showing uncontrolled type 2 diabetes mellitus  Hold oral hypoglycemic agents while inpatient  Continue with the Levemir  Mealtime insulin started   Insulin sliding scale  Hypoglycemia protocol    Hypertension  Assessment & Plan  Monitor blood pressure  Continue with the current care    * Sepsis [facility])  Assessment & Plan  · Present on admission to [facility]  · Patient febrile at the time presentation 100 6 with tachycardia and leukocytosis with end-organ damage with acute kidney injury    Likely source is urinary tract infection given the CT scan finding  · Follow-up on the cultures  · Continue with the current antibiotics  · IV hydration  · Trend leukocytosis Discharging Physician / Practitioner: Heather Bower MD  PCP: Rachelle Downing MD  Admission Date:   Admission Orders (From admission, onward)     Ordered        04/24/21 0041  Inpatient Admission  Once                   Discharge Date: 04/25/2021    Resolved Problems  Date Reviewed: 5/5/2021    None          Consultations During Hospital Stay:  ·  urology     Procedures Performed:   · Cystoscopy with stent placement  · CT scan showed Bilateral nephrolithiasis with several obstructing stones in the distal left ureter, measuring approximately 6 mm in size with associated left hydroureteronephrosis and obstructive uropathy  · Hypercalcemia  · Hyponatremia  · LACY 1 66  · Head CT negative  · CT spine     Significant Findings / Test Results:   · As above     Incidental Findings:   · 5 mm of subpleural nodule repeat CT in 12 months  · Enlarged prostate  · Aortic atherosclerosis 5 cm dilated      Test Results Pending at Discharge (will require follow up):   ·  none      Outpatient Tests Requested:  *none   Complications:  None     Reason for Admission: fall     Hospital Course:     Mike Rodgers is a 67 y o  male patient who originally presented to the hospital on 4/23/2021 due to fall  He was cleared by trauma after CT head and C spine  He was seen by urology found to have hydronephrosis with bilateral nephrolithiasis with several obstructing stones in the distal ureter, measuring 6 mm  A stent was placed by urology  his pain is under control  Improved renal function  Antibiotics were stopped by urology and they recommended discharge home  Please see above list of diagnoses and related plan for additional information       Condition at Discharge: stable     Discharge Day Visit / Exam:     * Please refer to separate progress note for these details *    Discussion with Family: none    Discharge instructions/Information to patient and family:   See after visit summary for information provided to patient and family  Provisions for Follow-Up Care:  See after visit summary for information related to follow-up care and any pertinent home health orders  Disposition:     Home with [facility]NA Services (Reminder: Complete face to face encounter)    For Discharges to [facility]:   · Not Applicable to this Patient - Not Applicable to this Patient    Planned Readmission: no     Discharge Statement:  I spent 35 minutes discharging the patient  This time was spent on the day of discharge  I had direct contact with the patient on the day of discharge  Greater than 50% of the total time was spent examining patient, answering all patient questions, arranging and discussing plan of care with patient as well as directly providing post-discharge instructions  Additional time then spent on discharge activities  Discharge Medications:  See after visit summary for reconciled discharge medications provided to patient and family        ** Please Note: This note has been constructed using a voice recognition system **

## 2021-05-06 NOTE — ASSESSMENT & PLAN NOTE
· Present on admission to [facility]  · Patient febrile at the time presentation 100 6 with tachycardia and leukocytosis with end-organ damage with acute kidney injury    Likely source is urinary tract infection given the CT scan finding  · Follow-up on the cultures  · Continue with the current antibiotics  · IV hydration  · Trend leukocytosis

## 2021-05-09 DIAGNOSIS — I48.11 LONGSTANDING PERSISTENT ATRIAL FIBRILLATION (HCC): ICD-10-CM

## 2021-05-10 RX ORDER — APIXABAN 5 MG/1
TABLET, FILM COATED ORAL
Qty: 60 TABLET | Refills: 3 | Status: SHIPPED | OUTPATIENT
Start: 2021-05-10 | End: 2021-11-08

## 2021-05-13 ENCOUNTER — APPOINTMENT (OUTPATIENT)
Dept: LAB | Facility: AMBULARY SURGERY CENTER | Age: 76
End: 2021-05-13
Attending: UROLOGY
Payer: MEDICARE

## 2021-05-13 DIAGNOSIS — N20.0 CALCULUS OF KIDNEY: ICD-10-CM

## 2021-05-13 DIAGNOSIS — Z01.818 PRE-OPERATIVE EXAMINATION: ICD-10-CM

## 2021-05-13 DIAGNOSIS — R39.89 SUSPECTED UTI: ICD-10-CM

## 2021-05-13 PROCEDURE — 87086 URINE CULTURE/COLONY COUNT: CPT

## 2021-05-14 ENCOUNTER — OFFICE VISIT (OUTPATIENT)
Dept: UROLOGY | Facility: CLINIC | Age: 76
End: 2021-05-14
Payer: MEDICARE

## 2021-05-14 VITALS
SYSTOLIC BLOOD PRESSURE: 128 MMHG | DIASTOLIC BLOOD PRESSURE: 62 MMHG | HEIGHT: 76 IN | BODY MASS INDEX: 37.75 KG/M2 | WEIGHT: 310 LBS

## 2021-05-14 DIAGNOSIS — N20.1 URETERAL CALCULI: ICD-10-CM

## 2021-05-14 LAB — BACTERIA UR CULT: NORMAL

## 2021-05-14 PROCEDURE — 99213 OFFICE O/P EST LOW 20 MIN: CPT | Performed by: PHYSICIAN ASSISTANT

## 2021-05-14 NOTE — PROGRESS NOTES
1  Ureteral calculi  Ambulatory referral to Urology         Assessment and plan:       1  Non muscle invasive bladder cancer  -distant history of non muscle invasive disease with 1 recurrence  -treated with BCG 7 years ago  - no recurrence since that time  -MYA cystoscopy 12/2020    2  BPH with intermittent gross hematuria  - otherwise asymptomatic  - no upper tract sources of hematuria identified on recent CT  - recommended finasteride, prescribed today    3  4 1 cm right adrenal mass  - s/p workup and surveillance with endocrinology    4  6mm left ureteral stone s/p left ureteral stent 4/24/2021  - scheduled for left ureteroscopy 5/21/2021 with Dr Radha Martinez  -   Patient's urine culture still pending  Patient will be notified if antibiotics need initiation   -   We reviewed the risks of his upcoming procedure including but not limited to cardiopulmonary complications, VTE, bleeding, infection, ureteral stricture, need for additional procedures  Patient and spouse verbalized understanding  Charisma Rayo PA-C      Chief Complaint     Bladder cancer follow-up, new episode of gross hematuria and adrenal lesion      History of Present Illness     Carmne Sahu is a 67 y o  male returns in follow-up    In brief, He is a very pleasant 58-year-old male with a history of non muscle invasive bladder cancer  He was originally diagnosed approximately 12 years ago on evaluation for microscopic hematuria  He underwent regular cystoscopy after his initial resection ultimately did develop recurrence which was managed with a 2nd resection and subsequently induction BCG  He also completed maintenance BCG for total of 3 years  He was previously managed by Dr Seymour Youngblood    He does have a known history of BPH and has had hematuria from the level of the prostate in the past   He also recently completed a CT for evaluation of left lower quadrant pain    No upper tract renal parenchymal lesions were noted however incidental finding of a 4 1 cm indeterminate right adrenal mass was present  Patient is scheduled for an adrenal protocol CT appropriately by his PCP and referral to endocrinology has also been placed for metabolic evaluation  Underwent metabolic evaluation with endocrinology  More recently patient had acute fall and presented to the hospital   He was found to have a urinary infection in the setting an obstructing stone  Underwent cystoscopy and stent placement at the end of April  Is scheduled for upcoming ureteroscopy  Patient had urine culture yesterday which is still pending  Review of Systems     Review of Systems   Constitutional: Negative for activity change and fatigue  HENT: Negative for congestion  Eyes: Negative for visual disturbance  Respiratory: Negative for shortness of breath and wheezing  Cardiovascular: Negative for chest pain and leg swelling  Gastrointestinal: Negative for abdominal pain  Endocrine: Negative for polyuria  Genitourinary: Negative for dysuria, flank pain, hematuria and urgency  Musculoskeletal: Negative for back pain  Allergic/Immunologic: Negative for immunocompromised state  Neurological: Negative for dizziness and numbness  Psychiatric/Behavioral: Negative for dysphoric mood  All other systems reviewed and are negative  Allergies     No Known Allergies    Physical Exam     Physical Exam  Constitutional:       General: He is not in acute distress  Appearance: Normal appearance  He is well-developed  He is obese  He is not ill-appearing, toxic-appearing or diaphoretic  Comments: Ambulates with a walker   HENT:      Head: Normocephalic and atraumatic  Neck:      Musculoskeletal: Normal range of motion  Cardiovascular:      Rate and Rhythm: Normal rate and regular rhythm  Heart sounds: Normal heart sounds  No murmur  No friction rub        Comments: Obese  Pulmonary:      Effort: Pulmonary effort is normal  No respiratory distress  Breath sounds: Normal breath sounds  No stridor  No wheezing or rhonchi  Abdominal:      Palpations: Abdomen is soft  Genitourinary:     Comments: Negative CVA tenderness  Musculoskeletal:      Comments: Ambulating with wheelchair assistance   Skin:     General: Skin is warm  Neurological:      Mental Status: He is alert and oriented to person, place, and time     Psychiatric:         Behavior: Behavior normal              Vital Signs  Vitals:    05/14/21 0747   BP: 128/62   BP Location: Left arm   Patient Position: Sitting   Cuff Size: Adult   Weight: (!) 141 kg (310 lb)   Height: 6' 4" (1 93 m)         Current Medications       Current Outpatient Medications:     atorvastatin (LIPITOR) 40 mg tablet, Take 1 tablet (40 mg total) by mouth daily, Disp: 90 tablet, Rfl: 3    Eliquis 5 MG, TAKE 1 TABLET BY MOUTH TWICE A DAY, Disp: 60 tablet, Rfl: 3    ezetimibe (ZETIA) 10 mg tablet, Take 1 tablet (10 mg total) by mouth daily, Disp: 90 tablet, Rfl: 3    finasteride (PROSCAR) 5 mg tablet, Take 1 tablet (5 mg total) by mouth daily, Disp: 90 tablet, Rfl: 3    Fluad Quadrivalent 0 5 ML PRSY, PHARMACY ADMINISTERED, Disp: , Rfl:     furosemide (LASIX) 80 mg tablet, Take 1 tablet (80 mg total) by mouth 2 (two) times a day, Disp: 60 tablet, Rfl: 11    glipiZIDE (GLUCOTROL XL) 5 mg 24 hr tablet, Take 1 tablet (5 mg total) by mouth daily, Disp: 90 tablet, Rfl: 1    Levemir FlexTouch 100 units/mL injection pen, Inject under the skin 65 units in the morning and at bedtime, Disp: 120 mL, Rfl: 1    lisinopril (ZESTRIL) 20 mg tablet, Take 1 tablet (20 mg total) by mouth daily, Disp: 90 tablet, Rfl: 3    metFORMIN (GLUCOPHAGE) 1000 MG tablet, TAKE ONE TABLET (500 MG) TWICE A DAY WITH MEALS, Disp: 180 tablet, Rfl: 1    metoprolol succinate (TOPROL-XL) 25 mg 24 hr tablet, Take 0 5 tablets (12 5 mg total) by mouth daily, Disp: 45 tablet, Rfl: 3    omeprazole (PriLOSEC) 20 mg delayed release capsule, Take 1 capsule (20 mg total) by mouth daily, Disp: 30 capsule, Rfl: 1    potassium chloride (K-DUR,KLOR-CON) 20 mEq tablet, Take 1 tablet (20 mEq total) by mouth daily, Disp: 30 tablet, Rfl: 11    silver sulfadiazine (SILVADENE,SSD) 1 % cream, Apply topically 2 (two) times a day Apply to blister on left leg, Disp: 50 g, Rfl: 1    Unifine Pentips 31G X 8 MM MISC, Inject under the skin daily Use as directed, Disp: 100 each, Rfl: 2    Dapagliflozin Propanediol 5 MG TABS, Take 1 tablet (5 mg total) by mouth daily (Patient not taking: Reported on 5/14/2021), Disp: 30 tablet, Rfl: 1      Active Problems     Patient Active Problem List   Diagnosis    Hypertension    Hyperlipemia    Type 2 diabetes mellitus with hyperglycemia, with long-term current use of insulin (HCC)    Chronic low back pain    CAD (coronary artery disease)    Bladder cancer (HCC)    Arthritis    Paroxysmal atrial fibrillation (HCC)    AAA (abdominal aortic aneurysm) (HCC)    Decreased pedal pulses    Chronic pain of both knees    Actinic keratosis of right temple    Actinic keratosis of scalp    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    Immunization deficiency    Left lower quadrant abdominal pain    Excessive gas    Morbid obesity (HCC)    Colitis    Mass of right adrenal gland (HCC)    LUCIA (obstructive sleep apnea)    Embolism and thrombosis of arteries of the lower extremities (HCC)    Bilateral edema of lower extremity    Hypoxemia    Acute on chronic diastolic heart failure (HCC)    Left upper lobe pulmonary nodule    Scaly skin on examination    CHF (congestive heart failure) ([address] )    Fall    Sepsis ([address] )    LACY (acute kidney injury) ([address] )    Hyponatremia    Hypercalcemia    Nephrolithiasis    Blister of left leg    Hydroureter on left    Urinary tract infection with hematuria         Past Medical History     Past Medical History:   Diagnosis Date    A-fib [facility])     AAA (abdominal aortic aneurysm) ([address] )  Actinic keratosis of right temple 2020    Actinic keratosis of scalp 2020    Acute respiratory failure with hypoxia (HCC) 3/25/2021    Arthritis     Lay's esophagus     Bladder cancer (HCC)     Blister of left leg 2021    CAD (coronary artery disease)     Chronic low back pain     Chronic pain of both knees 2020    Colitis 2020    Diabetes ([address] )     Excessive gas 12/3/2020    Hydroureter on left 2021    Hyperlipemia     Hypertension     Immunization deficiency 10/30/2020    Hep a nonimmune    Left lower quadrant abdominal pain 12/3/2020    Mass of right adrenal gland ([address] ) 2020    4 1 cm    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    LUCIA (obstructive sleep apnea) 2021    Urinary tract infection with hematuria 5/3/2021    u cx 2021=pseudomonas R to bactrim and cefdinir, S to cipro         Surgical History     Past Surgical History:   Procedure Laterality Date    BACK SURGERY      BLADDER SURGERY      CATARACT EXTRACTION, BILATERAL      COLONOSCOPY  2019    next  [address] GRAFT  2016    Quadruple per Rita    EGD  2017    TN CYSTOURETHROSCOPY,URETER CATHETER Left 2021    Procedure: CYSTOSCOPY  WITH INSERTION STENT URETERAL;  Surgeon: Samantha Bejarano MD;  Location[facility];  Service: Urology    TOTAL HIP ARTHROPLASTY Right 2012    TOTAL SHOULDER REPLACEMENT Right 2013         Family History     Family History   Problem Relation Age of Onset    Heart disease Father     Arthritis Father     Heart attack Father     Alzheimer's disease Mother          Social History     Social History     Social History     Tobacco Use   Smoking Status Former Smoker    Packs/day: 0 25    Years: 21 00    Pack years: 5 25    Types: Cigarettes    Start date:     Quit date: 2009    Years since quittin 3   Smokeless Tobacco Never Used   Tobacco Comment    smoked since age 21; per [place] Pertinent Lab Values     Lab Results   Component Value Date    CREATININE 0 99 04/29/2021       No results found for: PSA    @RESULTRCNT(1H])@      Pertinent Imaging      - n/a    Portions of the record may have been created with voice recognition software   Occasional wrong word or "sound a like" substitutions may have occurred due to the inherent limitations of voice recognition software   Read the chart carefully and recognize, using context, where substitutions have occurred

## 2021-05-19 NOTE — PRE-PROCEDURE INSTRUCTIONS
Pre-Surgery Instructions:   Medication Instructions    atorvastatin (LIPITOR) 40 mg tablet Instructed patient per Anesthesia Guidelines   Eliquis 5 MG Patient was instructed by Physician and understands   ezetimibe (ZETIA) 10 mg tablet Instructed patient per Anesthesia Guidelines   finasteride (PROSCAR) 5 mg tablet Instructed patient per Anesthesia Guidelines   furosemide (LASIX) 80 mg tablet Instructed patient per Anesthesia Guidelines   glipiZIDE (GLUCOTROL XL) 5 mg 24 hr tablet Instructed patient per Anesthesia Guidelines   Levemir FlexTouch 100 units/mL injection pen Instructed patient per Anesthesia Guidelines   lisinopril (ZESTRIL) 20 mg tablet Instructed patient per Anesthesia Guidelines   metFORMIN (GLUCOPHAGE) 1000 MG tablet Instructed patient per Anesthesia Guidelines   metoprolol succinate (TOPROL-XL) 25 mg 24 hr tablet Patient was instructed to contact Physician for medication instruction   omeprazole (PriLOSEC) 20 mg delayed release capsule Instructed patient per Anesthesia Guidelines   potassium chloride (K-DUR,KLOR-CON) 20 mEq tablet Instructed patient per Anesthesia Guidelines  Spoke with patient medication list reviewed  Pt will stop all vitamins and nsaids he has already stopped elaquis as instructed by MD  Pt will ONLY  take levemir and omeprozole DOS  Pt understands shower instructions 1 adult visitor policy and the need for a ride home after surgery  Denies covid symptoms has been fully vaccinated 3/2021 npo after midnight  [facility] will call with arrival time on thrusday between 2p-7p   Directions were given

## 2021-05-20 ENCOUNTER — ANESTHESIA EVENT (OUTPATIENT)
Dept: PERIOP | Facility: HOSPITAL | Age: 76
End: 2021-05-20
Payer: MEDICARE

## 2021-05-20 NOTE — ANESTHESIA PREPROCEDURE EVALUATION
Procedure:  CYSTOSCOPY URETEROSCOPY WITH LITHOTRIPSY HOLMIUM LASER, RETROGRADE PYELOGRAM AND INSERTION STENT URETERAL (Left Ureter)    Relevant Problems   ANESTHESIA (within normal limits)      CARDIO   (+) AAA (abdominal aortic aneurysm) (HCC)   (+) CAD (coronary artery disease) (CABG x3 in 2016, LIMA to LAD, SVG to OM3, SVG to PDA)   (+) CHF (congestive heart failure) (HCC)   (+) Embolism and thrombosis of arteries of the lower extremities (HCC)   (+) Hyperlipemia   (+) Hypertension   (+) Myocardial infarction (HCC)   (+) Paroxysmal atrial fibrillation (HCC)      ENDO   (+) Type 2 diabetes mellitus with hyperglycemia, with long-term current use of insulin (HCC)      /RENAL   (+) LACY (acute kidney injury) ([address] )   (+) Nephrolithiasis      HEMATOLOGY (within normal limits)      MUSCULOSKELETAL   (+) Arthritis   (+) Chronic low back pain      NEURO/PSYCH (within normal limits)      PULMONARY   (+) LUCIA (obstructive sleep apnea)      Other   (+) Acute on chronic diastolic heart failure (HCC)   (+) Bilateral edema of lower extremity   (+) Bladder cancer (HCC)   (+) Left upper lobe pulmonary nodule   (+) Mass of right adrenal gland (HCC)   (+) Morbid obesity (HCC)     Levemir 32 U this AM (1/2 usual dose)   Eliquis last taken Tuesday PM    EKG 4/23/2021:  Normal sinus rhythm  Right bundle branch block  Possible Inferior infarct (cited on or before 25-MAR-2021)  Abnormal ECG  When compared with ECG of 28-MAR-2021 12:02,  Premature atrial complexes are no longer Present  Vent  rate has increased BY  40 BPM    TTE 3/2021:  LEFT VENTRICLE:  Systolic function was normal by visual assessment  Ejection fraction was estimated to be 60 %  There were no regional wall motion abnormalities  Wall thickness was mildly increased  Doppler parameters were consistent with abnormal left ventricular relaxation (grade 1 diastolic dysfunction)      RIGHT VENTRICLE:  The ventricle was mildly dilated    Systolic function was mildly reduced  Systolic pressure was mildly increased  Estimated peak pressure was 45 mmHg      LEFT ATRIUM:  The atrium was mildly dilated      RIGHT ATRIUM:  The atrium was mildly dilated      MITRAL VALVE:  There was mild annular calcification  There was trace regurgitation      TRICUSPID VALVE:  There was mild to moderate regurgitation  Lab Results   Component Value Date    WBC 10 39 (H) 04/29/2021    HGB 11 5 (L) 04/29/2021    HCT 37 8 04/29/2021    MCV 90 04/29/2021     04/29/2021     Lab Results   Component Value Date    SODIUM 136 04/29/2021    K 4 8 04/29/2021     04/29/2021    CO2 30 04/29/2021    BUN 29 (H) 04/29/2021    CREATININE 0 99 04/29/2021    GLUC 151 (H) 04/29/2021    CALCIUM 9 8 04/29/2021     Lab Results   Component Value Date    INR 1 11 04/23/2021    PROTIME 14 4 04/23/2021     Lab Results   Component Value Date    HGBA1C 8 4 (H) 04/22/2021            Physical Exam    Airway    Mallampati score: II  TM Distance: >3 FB  Neck ROM: full     Dental   upper dentures,     Cardiovascular  Cardiovascular exam normal    Pulmonary  Pulmonary exam normal     Other Findings        Anesthesia Plan  ASA Score- 3     Anesthesia Type- general with ASA Monitors  Additional Monitors:   Airway Plan: LMA  Plan Factors-    Chart reviewed  EKG reviewed  Existing labs reviewed  Patient summary reviewed  Induction- intravenous  Postoperative Plan-     Informed Consent- Anesthetic plan and risks discussed with patient  I personally reviewed this patient with the CRNA  Discussed and agreed on the Anesthesia Plan with the CRNA  Raghu Mendieta

## 2021-05-21 ENCOUNTER — HOSPITAL ENCOUNTER (OUTPATIENT)
Facility: HOSPITAL | Age: 76
Setting detail: OUTPATIENT SURGERY
Discharge: HOME | End: 2021-05-21
Attending: UROLOGY | Admitting: UROLOGY
Payer: MEDICARE

## 2021-05-21 ENCOUNTER — PATIENT OUTREACH (OUTPATIENT)
Dept: CASE MANAGEMENT | Facility: HOSPITAL | Age: 76
End: 2021-05-21

## 2021-05-21 ENCOUNTER — APPOINTMENT (OUTPATIENT)
Dept: RADIOLOGY | Facility: HOSPITAL | Age: 76
End: 2021-05-21
Payer: MEDICARE

## 2021-05-21 ENCOUNTER — ANESTHESIA (OUTPATIENT)
Dept: PERIOP | Facility: HOSPITAL | Age: 76
End: 2021-05-21
Payer: MEDICARE

## 2021-05-21 ENCOUNTER — TELEPHONE (OUTPATIENT)
Dept: UROLOGY | Facility: MEDICAL CENTER | Age: 76
End: 2021-05-21

## 2021-05-21 VITALS
DIASTOLIC BLOOD PRESSURE: 63 MMHG | TEMPERATURE: 96.2 F | HEART RATE: 94 BPM | SYSTOLIC BLOOD PRESSURE: 128 MMHG | OXYGEN SATURATION: 96 % | RESPIRATION RATE: 16 BRPM

## 2021-05-21 DIAGNOSIS — N20.1 URETERAL CALCULI: ICD-10-CM

## 2021-05-21 DIAGNOSIS — C67.9 MALIGNANT NEOPLASM OF URINARY BLADDER, UNSPECIFIED SITE (HCC): Primary | ICD-10-CM

## 2021-05-21 LAB
GLUCOSE SERPL-MCNC: 166 MG/DL (ref 65–140)
GLUCOSE SERPL-MCNC: 198 MG/DL (ref 65–140)

## 2021-05-21 PROCEDURE — 74018 RADEX ABDOMEN 1 VIEW: CPT

## 2021-05-21 PROCEDURE — C2617 STENT, NON-COR, TEM W/O DEL: HCPCS | Performed by: UROLOGY

## 2021-05-21 PROCEDURE — C1769 GUIDE WIRE: HCPCS | Performed by: UROLOGY

## 2021-05-21 PROCEDURE — 82948 REAGENT STRIP/BLOOD GLUCOSE: CPT

## 2021-05-21 PROCEDURE — C1758 CATHETER, URETERAL: HCPCS | Performed by: UROLOGY

## 2021-05-21 PROCEDURE — NC001 PR NO CHARGE: Performed by: UROLOGY

## 2021-05-21 PROCEDURE — 52356 CYSTO/URETERO W/LITHOTRIPSY: CPT | Performed by: UROLOGY

## 2021-05-21 RX ORDER — SODIUM CHLORIDE, SODIUM LACTATE, POTASSIUM CHLORIDE, CALCIUM CHLORIDE 600; 310; 30; 20 MG/100ML; MG/100ML; MG/100ML; MG/100ML
125 INJECTION, SOLUTION INTRAVENOUS CONTINUOUS
Status: DISCONTINUED | OUTPATIENT
Start: 2021-05-21 | End: 2021-05-21 | Stop reason: HOSPADM

## 2021-05-21 RX ORDER — PROPOFOL 10 MG/ML
INJECTION, EMULSION INTRAVENOUS AS NEEDED
Status: DISCONTINUED | OUTPATIENT
Start: 2021-05-21 | End: 2021-05-21

## 2021-05-21 RX ORDER — PHENAZOPYRIDINE HYDROCHLORIDE 100 MG/1
200 TABLET, FILM COATED ORAL ONCE
Status: DISCONTINUED | OUTPATIENT
Start: 2021-05-21 | End: 2021-05-21 | Stop reason: HOSPADM

## 2021-05-21 RX ORDER — FENTANYL CITRATE/PF 50 MCG/ML
25 SYRINGE (ML) INJECTION
Status: DISCONTINUED | OUTPATIENT
Start: 2021-05-21 | End: 2021-05-21 | Stop reason: HOSPADM

## 2021-05-21 RX ORDER — LEVOFLOXACIN 5 MG/ML
750 INJECTION, SOLUTION INTRAVENOUS EVERY 24 HOURS
Status: CANCELLED | OUTPATIENT
Start: 2021-05-21

## 2021-05-21 RX ORDER — FENTANYL CITRATE 50 UG/ML
INJECTION, SOLUTION INTRAMUSCULAR; INTRAVENOUS AS NEEDED
Status: DISCONTINUED | OUTPATIENT
Start: 2021-05-21 | End: 2021-05-21

## 2021-05-21 RX ORDER — MAGNESIUM HYDROXIDE 1200 MG/15ML
LIQUID ORAL AS NEEDED
Status: DISCONTINUED | OUTPATIENT
Start: 2021-05-21 | End: 2021-05-21 | Stop reason: HOSPADM

## 2021-05-21 RX ORDER — HYDROCODONE BITARTRATE AND ACETAMINOPHEN 5; 325 MG/1; MG/1
1 TABLET ORAL EVERY 6 HOURS PRN
Status: DISCONTINUED | OUTPATIENT
Start: 2021-05-21 | End: 2021-05-21 | Stop reason: HOSPADM

## 2021-05-21 RX ORDER — PHENAZOPYRIDINE HYDROCHLORIDE 200 MG/1
200 TABLET, FILM COATED ORAL 3 TIMES DAILY PRN
Qty: 30 TABLET | Refills: 0 | Status: SHIPPED | OUTPATIENT
Start: 2021-05-21 | End: 2021-10-13

## 2021-05-21 RX ORDER — ONDANSETRON 2 MG/ML
INJECTION INTRAMUSCULAR; INTRAVENOUS AS NEEDED
Status: DISCONTINUED | OUTPATIENT
Start: 2021-05-21 | End: 2021-05-21

## 2021-05-21 RX ORDER — LEVOFLOXACIN 500 MG/1
500 TABLET, FILM COATED ORAL EVERY 24 HOURS
Qty: 3 TABLET | Refills: 0 | Status: SHIPPED | OUTPATIENT
Start: 2021-05-21 | End: 2021-05-24

## 2021-05-21 RX ORDER — LIDOCAINE HYDROCHLORIDE 10 MG/ML
INJECTION, SOLUTION EPIDURAL; INFILTRATION; INTRACAUDAL; PERINEURAL AS NEEDED
Status: DISCONTINUED | OUTPATIENT
Start: 2021-05-21 | End: 2021-05-21

## 2021-05-21 RX ORDER — LIDOCAINE HYDROCHLORIDE 10 MG/ML
0.5 INJECTION, SOLUTION EPIDURAL; INFILTRATION; INTRACAUDAL; PERINEURAL ONCE AS NEEDED
Status: DISCONTINUED | OUTPATIENT
Start: 2021-05-21 | End: 2021-05-21 | Stop reason: HOSPADM

## 2021-05-21 RX ORDER — HYDROCODONE BITARTRATE AND ACETAMINOPHEN 5; 325 MG/1; MG/1
1-2 TABLET ORAL EVERY 4 HOURS PRN
Qty: 20 TABLET | Refills: 0 | Status: SHIPPED | OUTPATIENT
Start: 2021-05-21 | End: 2021-05-31

## 2021-05-21 RX ORDER — ONDANSETRON 2 MG/ML
4 INJECTION INTRAMUSCULAR; INTRAVENOUS ONCE AS NEEDED
Status: DISCONTINUED | OUTPATIENT
Start: 2021-05-21 | End: 2021-05-21 | Stop reason: HOSPADM

## 2021-05-21 RX ADMIN — PHENYLEPHRINE HYDROCHLORIDE 100 MCG: 10 INJECTION INTRAVENOUS at 13:51

## 2021-05-21 RX ADMIN — PHENYLEPHRINE HYDROCHLORIDE 100 MCG: 10 INJECTION INTRAVENOUS at 13:42

## 2021-05-21 RX ADMIN — FENTANYL CITRATE 50 MCG: 50 INJECTION INTRAMUSCULAR; INTRAVENOUS at 13:36

## 2021-05-21 RX ADMIN — ONDANSETRON 4 MG: 2 INJECTION INTRAMUSCULAR; INTRAVENOUS at 14:26

## 2021-05-21 RX ADMIN — SODIUM CHLORIDE, SODIUM LACTATE, POTASSIUM CHLORIDE, AND CALCIUM CHLORIDE: .6; .31; .03; .02 INJECTION, SOLUTION INTRAVENOUS at 13:33

## 2021-05-21 RX ADMIN — PHENYLEPHRINE HYDROCHLORIDE 100 MCG: 10 INJECTION INTRAVENOUS at 13:59

## 2021-05-21 RX ADMIN — PROPOFOL 150 MG: 10 INJECTION, EMULSION INTRAVENOUS at 13:42

## 2021-05-21 RX ADMIN — PHENYLEPHRINE HYDROCHLORIDE 100 MCG: 10 INJECTION INTRAVENOUS at 13:46

## 2021-05-21 RX ADMIN — LEVOFLOXACIN 750 MG: 5 INJECTION, SOLUTION INTRAVENOUS at 13:46

## 2021-05-21 RX ADMIN — PHENYLEPHRINE HYDROCHLORIDE 50 MCG/MIN: 10 INJECTION INTRAVENOUS at 13:59

## 2021-05-21 RX ADMIN — LIDOCAINE HYDROCHLORIDE 50 MG: 10 INJECTION, SOLUTION EPIDURAL; INFILTRATION; INTRACAUDAL; PERINEURAL at 13:42

## 2021-05-21 NOTE — TELEPHONE ENCOUNTER
----- Message from Zheng Castanon MD sent at 5/21/2021  2:50 PM EDT -----  Please make sure the patient has an appointment for stent removal by pulling on the string at the [facility] office next week, then follow-up with his regular urologist in 6 months for cystoscopy and renal ultrasound done before

## 2021-05-21 NOTE — OP NOTE
OPERATIVE REPORT  PATIENT NAME: Holland Sutton    :  1945  MRN: [id number]  Pt Location: [facility] CYSTO ROOM 01    SURGERY DATE: 2021    Surgeon(s) and Role:     * Kym Wheat MD - Primary    Preop Diagnosis:  Ureteral calculi [N20 1] left    Post-Op Diagnosis Codes:     * Ureteral calculi [N20 1] left    Procedure(s) (LRB):  CYSTOSCOPY URETEROSCOPY WITH LITHOTRIPSY HOLMIUM LASER, AND INSERTION STENT URETERAL/EXCHANGE (Left)    Specimen(s):  * No specimens in log *    Estimated Blood Loss:   Minimal    Drains:  * No LDAs found *    Anesthesia Type:   General    Operative Indications:  Ureteral calculi [N20 1]  Left    Operative Findings:  A total of 3 fairly large ureteral calculi, all broken up into tiny fragments  Too large to basket  Complications:   None    Procedure and Technique:  65-year-old man status post decompressive stenting by me a month ago for an obstructing left ureteral calculus with fever  He has recovered from that and he is here now for definitive therapy for cystoscopy left ureteroscopy laser lithotripsy and left ureteral stent exchange  The risks of bleeding infection damage to the urinary tract and need for additional procedures have been explained he gives informed consent  Patient was brought to the operating room and identified properly  LMA was induced, the patient is prepped and draped in dorsal lithotomy position usual fashion  A time-out was performed, and cystoscopy was carried out with a 22 [place] cystoscopy sheath and 30 degree lens  The urethra was normal without stricture  The prostate was nonocclusive  The bladder was somewhat trabeculated and showed no signs of recurrence of the bladder cancer that he is known to have  Left ureteral stent was visualized, and on fluoroscopy I thought I could see some radiopaque stones along the course of the ureter down below the pelvic brim    I removed the left ureteral stent and I tried to do this over wire but I lost access so I had to feed another wire up to the left renal pelvis without difficulty  I then performed rigid ureteroscopy and saw a stone approximately 2 cm above the UVJ  I saw another 1 just behind it and I tried to basket the larger 1 but this was unsuccessful  I therefore took the 200 micron holmium laser fiber and broke up all the stones I found there was a total of 3  These were left to be passed on their own  I then placed a 6 [place] by 26 cm stent over the wire and coils were established in renal pelvis and the bladder  The stent string was taped to the dorsal penis the bladder was drained with the cystoscopy sheath       I was present for the entire procedure and A qualified resident physician was not available    Patient Disposition:  PACU  and extubated and stable    SIGNATURE: Cheryl Scott MD  DATE: May 21, 2021  TIME: 2:43 PM

## 2021-05-21 NOTE — PROGRESS NOTES
ADT notification received for patient admission for scheduled Cystoscopy ureteroscopy with lithotripsy procedure today  This CM will continue to monitor via chart review throughout hospitalization

## 2021-05-21 NOTE — TELEPHONE ENCOUNTER
Pt is currently and inpatient at this time       Will route to the [facility] clinical team to monitor for discharge and to arrange for stent with string removal

## 2021-05-21 NOTE — ANESTHESIA POSTPROCEDURE EVALUATION
Post-Op Assessment Note    CV Status:  Stable    Pain management: adequate     Mental Status:  Alert, awake and sleepy   Hydration Status:  Euvolemic   PONV Controlled:  Controlled   Airway Patency:  Patent      Post Op Vitals Reviewed: Yes      Staff: Anesthesiologist, CRNA         No complications documented      /54 (05/21/21 1435)    Temp (!) 97 1 °F (36 2 °C) (05/21/21 1435)    Pulse 90 (05/21/21 1435)   Resp 20 (05/21/21 1435)    SpO2 95 % (05/21/21 1435)
None

## 2021-05-21 NOTE — H&P
Slade Andersen PA-C   Physician Assistant   Specialty:  Urology   Progress Notes       Attested   Encounter Date:  5/14/2021            H/P- updated by Anette Horan MD 5/21/2021- no changes    Attestation signed by Anette Horan MD at 5/14/2021 3:39 PM   I was the supervising/collaborating physician for this visit     I agree with the provider's documentation and plan  Expand All Collapse All      1  Ureteral calculi  Ambulatory referral to Urology            Assessment and plan:         1  Non muscle invasive bladder cancer  -distant history of non muscle invasive disease with 1 recurrence  -treated with BCG 7 years ago  - no recurrence since that time  -MYA cystoscopy 12/2020     2  BPH with intermittent gross hematuria  - otherwise asymptomatic  - no upper tract sources of hematuria identified on recent CT  - recommended finasteride, prescribed today     3  4 1 cm right adrenal mass  - s/p workup and surveillance with endocrinology     4  6mm left ureteral stone s/p left ureteral stent 4/24/2021  - scheduled for left ureteroscopy 5/21/2021 with Dr Tamar Harvey  -   Patient's urine culture still pending  Patient will be notified if antibiotics need initiation   -   We reviewed the risks of his upcoming procedure including but not limited to cardiopulmonary complications, VTE, bleeding, infection, ureteral stricture, need for additional procedures  Patient and spouse verbalized understanding         Subhash Miles PA-C        Chief Complaint      Bladder cancer follow-up, new episode of gross hematuria and adrenal lesion        History of Present Illness      Cali Sanchez is a 67 y o  male returns in follow-up     In brief, He is a very pleasant 53-year-old male with a history of non muscle invasive bladder cancer  He was originally diagnosed approximately 12 years ago on evaluation for microscopic hematuria    He underwent regular cystoscopy after his initial resection ultimately did develop recurrence which was managed with a 2nd resection and subsequently induction BCG  He also completed maintenance BCG for total of 3 years  He was previously managed by Dr Melvi Birmingham     He does have a known history of BPH and has had hematuria from the level of the prostate in the past   He also recently completed a CT for evaluation of left lower quadrant pain  No upper tract renal parenchymal lesions were noted however incidental finding of a 4 1 cm indeterminate right adrenal mass was present  Patient is scheduled for an adrenal protocol CT appropriately by his PCP and referral to endocrinology has also been placed for metabolic evaluation  Underwent metabolic evaluation with endocrinology        More recently patient had acute fall and presented to the hospital   He was found to have a urinary infection in the setting an obstructing stone  Underwent cystoscopy and stent placement at the end of April  Is scheduled for upcoming ureteroscopy      Patient had urine culture yesterday which is still pending       Review of Systems      Review of Systems   Constitutional: Negative for activity change and fatigue  HENT: Negative for congestion  Eyes: Negative for visual disturbance  Respiratory: Negative for shortness of breath and wheezing  Cardiovascular: Negative for chest pain and leg swelling  Gastrointestinal: Negative for abdominal pain  Endocrine: Negative for polyuria  Genitourinary: Negative for dysuria, flank pain, hematuria and urgency  Musculoskeletal: Negative for back pain  Allergic/Immunologic: Negative for immunocompromised state  Neurological: Negative for dizziness and numbness  Psychiatric/Behavioral: Negative for dysphoric mood  All other systems reviewed and are negative         Allergies      No Known Allergies     Physical Exam      Physical Exam  Constitutional:       General: He is not in acute distress  Appearance: Normal appearance   He is well-developed  He is obese  He is not ill-appearing, toxic-appearing or diaphoretic  Comments: Ambulates with a walker   HENT:      Head: Normocephalic and atraumatic  Neck:      Musculoskeletal: Normal range of motion  Cardiovascular:      Rate and Rhythm: Normal rate and regular rhythm  Heart sounds: Normal heart sounds  No murmur  No friction rub  Comments: Obese  Pulmonary:      Effort: Pulmonary effort is normal  No respiratory distress  Breath sounds: Normal breath sounds  No stridor  No wheezing or rhonchi  Abdominal:      Palpations: Abdomen is soft  Genitourinary:     Comments: Negative CVA tenderness  Musculoskeletal:      Comments: Ambulating with wheelchair assistance   Skin:     General: Skin is warm  Neurological:      Mental Status: He is alert and oriented to person, place, and time     Psychiatric:         Behavior: Behavior normal                   Vital Signs  Vitals       Vitals:     05/14/21 0747   BP: 128/62   BP Location: Left arm   Patient Position: Sitting   Cuff Size: Adult   Weight: (!) 141 kg (310 lb)   Height: 6' 4" (1 93 m)              Current Medications         Current Outpatient Medications:     atorvastatin (LIPITOR) 40 mg tablet, Take 1 tablet (40 mg total) by mouth daily, Disp: 90 tablet, Rfl: 3    Eliquis 5 MG, TAKE 1 TABLET BY MOUTH TWICE A DAY, Disp: 60 tablet, Rfl: 3    ezetimibe (ZETIA) 10 mg tablet, Take 1 tablet (10 mg total) by mouth daily, Disp: 90 tablet, Rfl: 3    finasteride (PROSCAR) 5 mg tablet, Take 1 tablet (5 mg total) by mouth daily, Disp: 90 tablet, Rfl: 3    Fluad Quadrivalent 0 5 ML PRSY, PHARMACY ADMINISTERED, Disp: , Rfl:     furosemide (LASIX) 80 mg tablet, Take 1 tablet (80 mg total) by mouth 2 (two) times a day, Disp: 60 tablet, Rfl: 11    glipiZIDE (GLUCOTROL XL) 5 mg 24 hr tablet, Take 1 tablet (5 mg total) by mouth daily, Disp: 90 tablet, Rfl: 1    Levemir FlexTouch 100 units/mL injection pen, Inject under the skin 65 units in the morning and at bedtime, Disp: 120 mL, Rfl: 1    lisinopril (ZESTRIL) 20 mg tablet, Take 1 tablet (20 mg total) by mouth daily, Disp: 90 tablet, Rfl: 3    metFORMIN (GLUCOPHAGE) 1000 MG tablet, TAKE ONE TABLET (500 MG) TWICE A DAY WITH MEALS, Disp: 180 tablet, Rfl: 1    metoprolol succinate (TOPROL-XL) 25 mg 24 hr tablet, Take 0 5 tablets (12 5 mg total) by mouth daily, Disp: 45 tablet, Rfl: 3    omeprazole (PriLOSEC) 20 mg delayed release capsule, Take 1 capsule (20 mg total) by mouth daily, Disp: 30 capsule, Rfl: 1    potassium chloride (K-DUR,KLOR-CON) 20 mEq tablet, Take 1 tablet (20 mEq total) by mouth daily, Disp: 30 tablet, Rfl: 11    silver sulfadiazine (SILVADENE,SSD) 1 % cream, Apply topically 2 (two) times a day Apply to blister on left leg, Disp: 50 g, Rfl: 1    Unifine Pentips 31G X 8 MM MISC, Inject under the skin daily Use as directed, Disp: 100 each, Rfl: 2    Dapagliflozin Propanediol 5 MG TABS, Take 1 tablet (5 mg total) by mouth daily (Patient not taking: Reported on 5/14/2021), Disp: 30 tablet, Rfl: 1        Active Problems          Patient Active Problem List   Diagnosis    Hypertension    Hyperlipemia    Type 2 diabetes mellitus with hyperglycemia, with long-term current use of insulin (HCC)    Chronic low back pain    CAD (coronary artery disease)    Bladder cancer (HCC)    Arthritis    Paroxysmal atrial fibrillation (HCC)    AAA (abdominal aortic aneurysm) (HCC)    Decreased pedal pulses    Chronic pain of both knees    Actinic keratosis of right temple    Actinic keratosis of scalp    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    Immunization deficiency    Left lower quadrant abdominal pain    Excessive gas    Morbid obesity (HCC)    Colitis    Mass of right adrenal gland (HCC)    LUCIA (obstructive sleep apnea)    Embolism and thrombosis of arteries of the lower extremities (HCC)    Bilateral edema of lower extremity    Hypoxemia    Acute on chronic diastolic heart failure (HCC)    Left upper lobe pulmonary nodule    Scaly skin on examination    [facility]congestive heart failure) [facility]    Fall    Sepsis ([facility]    LACY (acute kidney injury) ([facility] )    Hyponatremia    Hypercalcemia    Nephrolithiasis    Blister of left leg    Hydroureter on left    Urinary tract infection with hematuria            Past Medical History      Medical History        Past Medical History:   Diagnosis Date    A-fib [facility])      AAA (abdominal aortic aneurysm) (HCC)      Actinic keratosis of right temple 7/31/2020    Actinic keratosis of scalp 7/31/2020    Acute respiratory failure with hypoxia (HCC) 3/25/2021    Arthritis      Lay's esophagus      Bladder cancer (HCC)      Blister of left leg 4/28/2021    CAD (coronary artery disease)      Chronic low back pain      Chronic pain of both knees 7/31/2020    Colitis 12/4/2020    Diabetes ([facility] )      Excessive gas 12/3/2020    Hydroureter on left 4/28/2021    Hyperlipemia      Hypertension      Immunization deficiency 10/30/2020     Hep a nonimmune    Left lower quadrant abdominal pain 12/3/2020    Mass of right adrenal gland ([facility] ) 12/4/2020     4 1 cm    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    LUCIA (obstructive sleep apnea) 1/29/2021    Urinary tract infection with hematuria 5/3/2021     u cx 4/2021=pseudomonas R to bactrim and cefdinir, S to cipro              Surgical History      Surgical History         Past Surgical History:   Procedure Laterality Date    BACK SURGERY        BLADDER SURGERY        CATARACT EXTRACTION, BILATERAL        COLONOSCOPY   01/29/2019     next [address] GRAFT   04/2016     Quadruple per [place]    EGD   06/2017    FL CYSTOURETHROSCOPY,URETER CATHETER Left 4/24/2021     Procedure: CYSTOSCOPY  WITH INSERTION STENT URETERAL;  Surgeon: Kristina Mcdonald MD;  [facility];  Service: Urology    TOTAL HIP ARTHROPLASTY Right 2012    TOTAL SHOULDER REPLACEMENT Right 2013              Family History      Family History   Problem Relation Age of Onset    Heart disease Father      Arthritis Father      Heart attack Father      Alzheimer's disease Mother              Social History      Social History            Social History           Tobacco Use   Smoking Status Former Smoker    Packs/day: 0 25    Years: 21 00    Pack years: 5 25    Types: Cigarettes    Start date:     Quit date: 2009    Years since quittin 3   Smokeless Tobacco Never Used   Tobacco Comment     smoked since age 21; per Rita            Pertinent Lab Values            Lab Results   Component Value Date     CREATININE 0 99 2021         No results found for: PSA     @RESULTRCNT(1H])@        Pertinent Imaging       - n/a     Portions of the record may have been created with voice recognition software   Occasional wrong word or "sound a like" substitutions may have occurred due to the inherent limitations of voice recognition software   Read the chart carefully and recognize, using context, where substitutions have occurred        Electronically signed by Julia Castillo PA-C at 2021  9:40 AM   Electronically signed by Deepti See MD at 2021  3:39 PM

## 2021-05-21 NOTE — DISCHARGE INSTRUCTIONS
Expect to see blood in the urine, and to experience urgency/frequency/burning with urination and dribbling  This is normal after urological procedures  Is also normal to experience some nausea after these procedures  Go back your regular diet carefully  It is normal to feel pain in the kidney when urinating and when the bladder is filling due to urine refluxing up to the kidney because of the open stent  The stent is necessary to keep the ureter open to allow clots and swelling to resolve and to allow the kidney to drain properly after instrumentation  Some stones may pass around the stent, but most stones pass after the stent is removed  The presence of the stent makes the ureter wider while it is in and after has been removed, allowing passage of larger fragments  Call for fever greater that 101 5, inability to urinate, prolonged nausea and vomiting, or severe pain not relieved by pain medications  Ann Whyte Keep stent string taped- if it becomes dislodged or gets pulled out early, that is OK- simply remove it completely by pulling on string until it is completely out  No driving/operating machinery for 24 hours, and while taking narcotics  Take over the counter remedy of choice to avoid constipation  Drink plenty of fluids  Our office will call you with the appointment to have the stent removed by pulling on the string, then see your regular urologist for cystoscopy and a renal ultrasound in 6 months

## 2021-05-24 ENCOUNTER — PATIENT OUTREACH (OUTPATIENT)
Dept: CASE MANAGEMENT | Facility: HOSPITAL | Age: 76
End: 2021-05-24

## 2021-05-24 NOTE — PROGRESS NOTES
ADT notification received for patient discharged from [facility]    triaged at [facility]  Patient spent (4 hours) 5/21/2021 at [address] for cystoscopy left ureteroscopy laser lithotripsy and left ureteral stent exchange  No complications noted  Future appointments:  5/26  PCP  6/14  [facility]  6/15   [facility] [address] with patient today who reports everything is just fine  Jose pain with urination or bleeding  Patient encouraged to call urologist for any unusual symptoms --patient agreed  Gail Ott states he will be contacting the urologist today to schedule his one week follow up for stent removal        He states he stopped wearing the oxygen during the day about two weeks ago and denies any shortness of breath  Additionally he reports that his weight is the same daily and he denies edema to BLE  This CM will continue to monitor throughout bundle episode

## 2021-05-25 NOTE — TELEPHONE ENCOUNTER
Called and left detailed message per communication consent  Advised office calling to ensure stent removed successfully, asking for a call back to confirm  When patient returns call please ensure stent removed intact

## 2021-05-25 NOTE — TELEPHONE ENCOUNTER
Pt called to inform clinical he removed stent intact without any problems,no pain no discomfort,no questions for clinical

## 2021-05-26 ENCOUNTER — OFFICE VISIT (OUTPATIENT)
Dept: FAMILY MEDICINE CLINIC | Facility: CLINIC | Age: 76
End: 2021-05-26
Payer: MEDICARE

## 2021-05-26 VITALS
HEART RATE: 105 BPM | BODY MASS INDEX: 37.8 KG/M2 | HEIGHT: 76 IN | SYSTOLIC BLOOD PRESSURE: 130 MMHG | TEMPERATURE: 97 F | WEIGHT: 310.4 LBS | OXYGEN SATURATION: 93 % | DIASTOLIC BLOOD PRESSURE: 58 MMHG

## 2021-05-26 DIAGNOSIS — E11.65 TYPE 2 DIABETES MELLITUS WITH HYPERGLYCEMIA, WITH LONG-TERM CURRENT USE OF INSULIN (HCC): Primary | ICD-10-CM

## 2021-05-26 DIAGNOSIS — R60.0 BILATERAL EDEMA OF LOWER EXTREMITY: ICD-10-CM

## 2021-05-26 DIAGNOSIS — I25.10 CORONARY ARTERY DISEASE INVOLVING NATIVE CORONARY ARTERY OF NATIVE HEART WITHOUT ANGINA PECTORIS: ICD-10-CM

## 2021-05-26 DIAGNOSIS — S80.821A BLISTER OF RIGHT LOWER EXTREMITY, INITIAL ENCOUNTER: ICD-10-CM

## 2021-05-26 DIAGNOSIS — L89.891: ICD-10-CM

## 2021-05-26 DIAGNOSIS — I50.22 CHRONIC SYSTOLIC CONGESTIVE HEART FAILURE (HCC): ICD-10-CM

## 2021-05-26 DIAGNOSIS — I50.33 ACUTE ON CHRONIC DIASTOLIC HEART FAILURE (HCC): ICD-10-CM

## 2021-05-26 DIAGNOSIS — G47.33 OSA (OBSTRUCTIVE SLEEP APNEA): ICD-10-CM

## 2021-05-26 DIAGNOSIS — I48.0 PAROXYSMAL ATRIAL FIBRILLATION (HCC): ICD-10-CM

## 2021-05-26 DIAGNOSIS — I87.2 VENOUS STASIS DERMATITIS OF BOTH LOWER EXTREMITIES: ICD-10-CM

## 2021-05-26 DIAGNOSIS — I10 ESSENTIAL HYPERTENSION: ICD-10-CM

## 2021-05-26 DIAGNOSIS — R31.9 URINARY TRACT INFECTION WITH HEMATURIA, SITE UNSPECIFIED: ICD-10-CM

## 2021-05-26 DIAGNOSIS — N39.0 URINARY TRACT INFECTION WITH HEMATURIA, SITE UNSPECIFIED: ICD-10-CM

## 2021-05-26 DIAGNOSIS — Z79.4 TYPE 2 DIABETES MELLITUS WITH HYPERGLYCEMIA, WITH LONG-TERM CURRENT USE OF INSULIN (HCC): Primary | ICD-10-CM

## 2021-05-26 PROCEDURE — 99214 OFFICE O/P EST MOD 30 MIN: CPT | Performed by: FAMILY MEDICINE

## 2021-06-01 ENCOUNTER — TELEPHONE (OUTPATIENT)
Dept: ENDOCRINOLOGY | Facility: CLINIC | Age: 76
End: 2021-06-01

## 2021-06-03 ENCOUNTER — APPOINTMENT (OUTPATIENT)
Dept: LAB | Facility: CLINIC | Age: 76
End: 2021-06-03
Payer: MEDICARE

## 2021-06-03 ENCOUNTER — TRANSCRIBE ORDERS (OUTPATIENT)
Dept: LAB | Facility: CLINIC | Age: 76
End: 2021-06-03

## 2021-06-03 DIAGNOSIS — G47.33 OSA (OBSTRUCTIVE SLEEP APNEA): ICD-10-CM

## 2021-06-03 LAB
ALBUMIN SERPL BCP-MCNC: 3 G/DL (ref 3.5–5)
ALP SERPL-CCNC: 86 U/L (ref 46–116)
ALT SERPL W P-5'-P-CCNC: 23 U/L (ref 12–78)
ANION GAP SERPL CALCULATED.3IONS-SCNC: 5 MMOL/L (ref 4–13)
AST SERPL W P-5'-P-CCNC: 15 U/L (ref 5–45)
BASE EX.OXY STD BLDV CALC-SCNC: 44.8 % (ref 60–80)
BASE EXCESS BLDV CALC-SCNC: 1.8 MMOL/L
BILIRUB SERPL-MCNC: 0.23 MG/DL (ref 0.2–1)
BUN SERPL-MCNC: 49 MG/DL (ref 5–25)
CALCIUM ALBUM COR SERPL-MCNC: 10.7 MG/DL (ref 8.3–10.1)
CALCIUM SERPL-MCNC: 9.9 MG/DL (ref 8.3–10.1)
CHLORIDE SERPL-SCNC: 104 MMOL/L (ref 100–108)
CHOLEST SERPL-MCNC: 127 MG/DL (ref 50–200)
CO2 SERPL-SCNC: 30 MMOL/L (ref 21–32)
CREAT SERPL-MCNC: 1.22 MG/DL (ref 0.6–1.3)
EST. AVERAGE GLUCOSE BLD GHB EST-MCNC: 197 MG/DL
GFR SERPL CREATININE-BSD FRML MDRD: 57 ML/MIN/1.73SQ M
GLUCOSE SERPL-MCNC: 187 MG/DL (ref 65–140)
HBA1C MFR BLD: 8.5 %
HCO3 BLDV-SCNC: 28.9 MMOL/L (ref 24–30)
HDLC SERPL-MCNC: 35 MG/DL
LDLC SERPL CALC-MCNC: 57 MG/DL (ref 0–100)
NONHDLC SERPL-MCNC: 92 MG/DL
NT-PROBNP SERPL-MCNC: 87 PG/ML
O2 CT BLDV-SCNC: 7.7 ML/DL
PCO2 BLDV: 57.3 MM HG (ref 42–50)
PH BLDV: 7.32 [PH] (ref 7.3–7.4)
PO2 BLDV: 27.3 MM HG (ref 35–45)
POTASSIUM SERPL-SCNC: 5.4 MMOL/L (ref 3.5–5.3)
PROT SERPL-MCNC: 8.3 G/DL (ref 6.4–8.2)
SODIUM SERPL-SCNC: 139 MMOL/L (ref 136–145)
TRIGL SERPL-MCNC: 173 MG/DL

## 2021-06-03 PROCEDURE — 83036 HEMOGLOBIN GLYCOSYLATED A1C: CPT

## 2021-06-03 PROCEDURE — 82805 BLOOD GASES W/O2 SATURATION: CPT

## 2021-06-03 PROCEDURE — 83880 ASSAY OF NATRIURETIC PEPTIDE: CPT

## 2021-06-03 PROCEDURE — 80061 LIPID PANEL: CPT

## 2021-06-03 PROCEDURE — 36415 COLL VENOUS BLD VENIPUNCTURE: CPT

## 2021-06-03 PROCEDURE — 80053 COMPREHEN METABOLIC PANEL: CPT

## 2021-06-04 ENCOUNTER — TELEPHONE (OUTPATIENT)
Dept: CARDIOLOGY CLINIC | Facility: CLINIC | Age: 76
End: 2021-06-04

## 2021-06-04 ENCOUNTER — TELEPHONE (OUTPATIENT)
Dept: ENDOCRINOLOGY | Facility: CLINIC | Age: 76
End: 2021-06-04

## 2021-06-04 DIAGNOSIS — E83.52 HYPERCALCEMIA: Primary | ICD-10-CM

## 2021-06-04 NOTE — TELEPHONE ENCOUNTER
Called and spoke with Gail Ott in regards to stopping his potassium chloride as per Dr Venus Ott verbalized understanding and will call the office with any further questions or concerns

## 2021-06-04 NOTE — TELEPHONE ENCOUNTER
Spoke with patient and reviewed lab results    He mentioned that he wasn't having all the labs done and he didn't fast   Repeat orders mailed to be done in 4 weeks

## 2021-06-04 NOTE — TELEPHONE ENCOUNTER
Please let pt know his labs    Potassium level mildly elevated 5 4- reduce intake of tomatoes, potatoes, spinach, oranges, bananas--- contact your cardiologist to modify your lasix, lisinopril and potassium supplementation    LDL bad cholesterol was low at 57, triglycerides mildly elevated 173    A1C 8 5%     Calcium corrected level was mildly elevated 10 7, stay well hydrated, will repeat calcium labs in 4 weeks before next appt (ordered) No

## 2021-06-04 NOTE — TELEPHONE ENCOUNTER
----- Message from Andrey Parada MD sent at 6/3/2021  5:15 PM EDT -----  Please inform patient to stop potassium chloride immediately

## 2021-06-14 ENCOUNTER — OFFICE VISIT (OUTPATIENT)
Dept: PULMONOLOGY | Facility: CLINIC | Age: 76
End: 2021-06-14
Payer: MEDICARE

## 2021-06-14 VITALS
HEIGHT: 76 IN | DIASTOLIC BLOOD PRESSURE: 64 MMHG | OXYGEN SATURATION: 93 % | HEART RATE: 103 BPM | BODY MASS INDEX: 38.11 KG/M2 | SYSTOLIC BLOOD PRESSURE: 148 MMHG | WEIGHT: 313 LBS | TEMPERATURE: 99.1 F

## 2021-06-14 DIAGNOSIS — G47.33 OSA (OBSTRUCTIVE SLEEP APNEA): Primary | ICD-10-CM

## 2021-06-14 PROCEDURE — 99213 OFFICE O/P EST LOW 20 MIN: CPT | Performed by: INTERNAL MEDICINE

## 2021-06-14 NOTE — ASSESSMENT & PLAN NOTE
Improved symptoms with new machine,  as the old 1 was probably malfunctioning  Compliance with this machine is excellent and measured AHI is low at 2 7  Uses the machine nearly 6 hours per night  Last venous CO2 was slightly reduced at 57 from previous 61  Oxygen saturation today satisfactory at 93%  I would predict that with adequate treatment of sleep apnea his the hypoventilation will improve slowly over months  This also allow his oxygen saturation to increase  As patient does not use oxygen he will discontinue it  It is possible that the metabolic alkalosis from high doses of furosemide may perpetuate some of the CO2 retention

## 2021-06-14 NOTE — PROGRESS NOTES
Assessment/Plan:    LUCIA (obstructive sleep apnea)  Improved symptoms with new machine,  as the old 1 was probably malfuncting  Compliance with this machine is excellent and measured AHI is low at 2 7  Uses the machine nearly 6 hours per night  Last venous CO2 was slightly reduced at 57 from previous 61  Oxygen saturation today satisfactory at 93%  I would predict that with adequate treatment of sleep apnea his the hypoventilation will improve slowly over months  This also allow his oxygen saturation to increase  As patient does not use oxygen he more will discontinue it  It is possible that the metabolic alkalosis from high doses of furosemide may perpetuate some of the CO2 retention  Diagnoses and all orders for this visit:    LUCIA (obstructive sleep apnea)  -     Discontinue home oxygen          Subjective:      Patient ID: Chandrika Funes is a 67 y o  male  This patient returns for obstructive sleep apnea  Concern was that poor treatment of this disorder was causing hypoventilation and pulmonary hypertension  Sleep zaira has been going much better recently on auto CPAP  Patient does awaken several times over the night but is able to go right back asleep  His main complaint is dry mouth  We talked about some of the technical aspects of the humidity control and tubing temperature control  Patient's senses that there is no daytime sleepiness at this point  No headaches and no significant nocturia  Breathing seems satisfactory  Able to do moderate activity, the latter limited only by back pain  No apparent episodes of true heart failure in the last several months  Recent outpatient urologic procedure without incident  Note made of pleural plaques on recent CT of chest   Rarely pleural plaques or pleural thickening from asbestos exposure can cause for restrictive impairment of pulmonary function        The following portions of the patient's history were reviewed and updated as appropriate: allergies, current medications, past family history, past medical history, past social history, past surgical history and problem list     Review of Systems   Constitutional: Negative for activity change and unexpected weight change  Respiratory: Positive for apnea  Negative for shortness of breath and wheezing  Cardiovascular: Positive for palpitations  Negative for leg swelling (Improving)  Genitourinary: Negative for enuresis  Neurological: Negative for headaches  Objective:      /64 (BP Location: Left arm, Patient Position: Sitting, Cuff Size: Large)   Pulse 103   Temp 99 1 °F (37 3 °C) (Tympanic)   Ht 6' 4" (1 93 m)   Wt (!) 142 kg (313 lb)   SpO2 93%   BMI 38 10 kg/m²          Physical Exam  Vitals reviewed  Constitutional:       Appearance: He is obese  He is not ill-appearing  Cardiovascular:      Rate and Rhythm: Normal rate and regular rhythm  Pulses:           Radial pulses are 3+ on the right side and 3+ on the left side  Heart sounds: Normal heart sounds  Comments:  No definite evidence pulmonary hypertension  Pulmonary:      Effort: Pulmonary effort is normal       Breath sounds: Normal breath sounds  No wheezing or rales  Musculoskeletal:         General: No swelling  Cervical back: No rigidity or tenderness  Right lower leg: No edema  Left lower leg: No edema  Lymphadenopathy:      Cervical: No cervical adenopathy  Skin:     General: Skin is warm and dry  Neurological:      Mental Status: He is alert and oriented to person, place, and time     Psychiatric:         Mood and Affect: Mood normal          Behavior: Behavior normal

## 2021-06-15 ENCOUNTER — HOSPITAL ENCOUNTER (OUTPATIENT)
Dept: NON INVASIVE DIAGNOSTICS | Facility: CLINIC | Age: 76
Discharge: HOME | End: 2021-06-15
Payer: MEDICARE

## 2021-06-15 ENCOUNTER — PATIENT OUTREACH (OUTPATIENT)
Dept: CASE MANAGEMENT | Facility: HOSPITAL | Age: 76
End: 2021-06-15

## 2021-06-15 DIAGNOSIS — I71.4 ABDOMINAL AORTIC ANEURYSM (AAA) WITHOUT RUPTURE (HCC): ICD-10-CM

## 2021-06-15 PROCEDURE — 93978 VASCULAR STUDY: CPT | Performed by: SURGERY

## 2021-06-15 PROCEDURE — 93978 VASCULAR STUDY: CPT

## 2021-06-15 NOTE — PROGRESS NOTES
Chart review completed  Outside records through [facility] requested and refreshed  Patient presented 5/26 to PCP appointment-- no med changes noted  Provider placed order for Urine Culture  [facility]  Provider indicates patient with noted improved sleep apnea symptoms - likely due to new machine  Patient is no longer using O2 throughout the day therefore provider discontinued it  Future appointments:   6/15/2021 9:00 AM VAS ABDOMINAL AORTA/ILIACS; COMPLETE STUDY   7/7/2021 12:50 PM  Mehran Perales MD at [facility] Aissatou Shah 7/14/2021 9:30 AM  Bryson Bunch MD at [facility]     Unsuccessful attempt at reaching patient  Left name, call back number and message informing patient that Medicare bundled program will soon close and this is the final outreach of this CM  Message included callback number and encouragement for patient to outreach as needed beyond June 28th  This CM will continue to monitor via chart review throughout bundle episode

## 2021-06-16 ENCOUNTER — PATIENT OUTREACH (OUTPATIENT)
Dept: CASE MANAGEMENT | Facility: HOSPITAL | Age: 76
End: 2021-06-16

## 2021-06-16 NOTE — PROGRESS NOTES
Chart review indicates successful use of new CPAP, and follow-up appointment with Pulmonary  Oxygen D/c'd  No further RT needs at this time

## 2021-06-18 ENCOUNTER — TELEPHONE (OUTPATIENT)
Dept: PULMONOLOGY | Facility: CLINIC | Age: 76
End: 2021-06-18

## 2021-06-18 NOTE — TELEPHONE ENCOUNTER
6/18 Pt LM saying Dr Wilfredo Monson told him he could discontinue oxygen, but his DME co said they need a script from Dr Wilfredo Monson to stop the oxygen and return the equipment  Please review and ask Dr to submit script if appropriate

## 2021-06-19 DIAGNOSIS — E11.65 TYPE 2 DIABETES MELLITUS WITH HYPERGLYCEMIA, WITH LONG-TERM CURRENT USE OF INSULIN (HCC): ICD-10-CM

## 2021-06-19 DIAGNOSIS — Z79.4 TYPE 2 DIABETES MELLITUS WITH HYPERGLYCEMIA, WITH LONG-TERM CURRENT USE OF INSULIN (HCC): ICD-10-CM

## 2021-06-21 ENCOUNTER — CLINICAL SUPPORT (OUTPATIENT)
Dept: CARDIOLOGY CLINIC | Facility: CLINIC | Age: 76
End: 2021-06-21
Payer: MEDICARE

## 2021-06-21 DIAGNOSIS — I48.0 PAROXYSMAL ATRIAL FIBRILLATION (HCC): ICD-10-CM

## 2021-06-21 PROCEDURE — 93248 EXT ECG>7D<15D REV&INTERPJ: CPT | Performed by: INTERNAL MEDICINE

## 2021-06-21 RX ORDER — PEN NEEDLE, DIABETIC 31 GX5/16"
NEEDLE, DISPOSABLE MISCELLANEOUS DAILY
Qty: 100 EACH | Refills: 0 | Status: SHIPPED | OUTPATIENT
Start: 2021-06-21 | End: 2021-10-13

## 2021-06-21 RX ORDER — PEN NEEDLE, DIABETIC 31 GX5/16"
NEEDLE, DISPOSABLE MISCELLANEOUS DAILY
Qty: 100 EACH | Refills: 2 | Status: SHIPPED | OUTPATIENT
Start: 2021-06-21 | End: 2022-02-07 | Stop reason: SDUPTHER

## 2021-06-28 ENCOUNTER — PATIENT OUTREACH (OUTPATIENT)
Dept: CASE MANAGEMENT | Facility: HOSPITAL | Age: 76
End: 2021-06-28

## 2021-07-02 ENCOUNTER — APPOINTMENT (OUTPATIENT)
Dept: LAB | Facility: AMBULARY SURGERY CENTER | Age: 76
End: 2021-07-02
Payer: MEDICARE

## 2021-07-02 DIAGNOSIS — E83.52 HYPERCALCEMIA: ICD-10-CM

## 2021-07-02 LAB
25(OH)D3 SERPL-MCNC: 30.7 NG/ML (ref 30–100)
ALBUMIN SERPL BCP-MCNC: 3 G/DL (ref 3.5–5)
ALP SERPL-CCNC: 77 U/L (ref 46–116)
ALT SERPL W P-5'-P-CCNC: 22 U/L (ref 12–78)
ANION GAP SERPL CALCULATED.3IONS-SCNC: 5 MMOL/L (ref 4–13)
AST SERPL W P-5'-P-CCNC: 13 U/L (ref 5–45)
BILIRUB SERPL-MCNC: 0.48 MG/DL (ref 0.2–1)
BUN SERPL-MCNC: 31 MG/DL (ref 5–25)
CALCIUM ALBUM COR SERPL-MCNC: 10.6 MG/DL (ref 8.3–10.1)
CALCIUM SERPL-MCNC: 9.8 MG/DL (ref 8.3–10.1)
CHLORIDE SERPL-SCNC: 105 MMOL/L (ref 100–108)
CO2 SERPL-SCNC: 28 MMOL/L (ref 21–32)
CREAT SERPL-MCNC: 0.82 MG/DL (ref 0.6–1.3)
GFR SERPL CREATININE-BSD FRML MDRD: 85 ML/MIN/1.73SQ M
GLUCOSE P FAST SERPL-MCNC: 138 MG/DL (ref 65–99)
MAGNESIUM SERPL-MCNC: 1.8 MG/DL (ref 1.6–2.6)
PHOSPHATE SERPL-MCNC: 3.5 MG/DL (ref 2.3–4.1)
POTASSIUM SERPL-SCNC: 3.9 MMOL/L (ref 3.5–5.3)
PROT SERPL-MCNC: 8 G/DL (ref 6.4–8.2)
PTH-INTACT SERPL-MCNC: 65.5 PG/ML (ref 18.4–80.1)
SODIUM SERPL-SCNC: 138 MMOL/L (ref 136–145)

## 2021-07-02 PROCEDURE — 80053 COMPREHEN METABOLIC PANEL: CPT

## 2021-07-02 PROCEDURE — 83970 ASSAY OF PARATHORMONE: CPT

## 2021-07-02 PROCEDURE — 83735 ASSAY OF MAGNESIUM: CPT

## 2021-07-02 PROCEDURE — 84100 ASSAY OF PHOSPHORUS: CPT

## 2021-07-02 PROCEDURE — 36415 COLL VENOUS BLD VENIPUNCTURE: CPT

## 2021-07-02 PROCEDURE — 82306 VITAMIN D 25 HYDROXY: CPT

## 2021-07-06 ENCOUNTER — APPOINTMENT (OUTPATIENT)
Dept: LAB | Facility: AMBULARY SURGERY CENTER | Age: 76
End: 2021-07-06
Payer: MEDICARE

## 2021-07-06 DIAGNOSIS — N39.0 URINARY TRACT INFECTION WITH HEMATURIA, SITE UNSPECIFIED: ICD-10-CM

## 2021-07-06 DIAGNOSIS — R31.9 URINARY TRACT INFECTION WITH HEMATURIA, SITE UNSPECIFIED: ICD-10-CM

## 2021-07-06 PROCEDURE — 87086 URINE CULTURE/COLONY COUNT: CPT

## 2021-07-07 ENCOUNTER — OFFICE VISIT (OUTPATIENT)
Dept: ENDOCRINOLOGY | Facility: CLINIC | Age: 76
End: 2021-07-07
Payer: MEDICARE

## 2021-07-07 VITALS
HEIGHT: 76 IN | DIASTOLIC BLOOD PRESSURE: 80 MMHG | TEMPERATURE: 97 F | WEIGHT: 312 LBS | BODY MASS INDEX: 37.99 KG/M2 | HEART RATE: 100 BPM | SYSTOLIC BLOOD PRESSURE: 160 MMHG

## 2021-07-07 DIAGNOSIS — E27.8 MASS OF RIGHT ADRENAL GLAND (HCC): ICD-10-CM

## 2021-07-07 DIAGNOSIS — E11.65 TYPE 2 DIABETES MELLITUS WITH HYPERGLYCEMIA, WITH LONG-TERM CURRENT USE OF INSULIN (HCC): ICD-10-CM

## 2021-07-07 DIAGNOSIS — Z79.4 TYPE 2 DIABETES MELLITUS WITH HYPERGLYCEMIA, WITH LONG-TERM CURRENT USE OF INSULIN (HCC): ICD-10-CM

## 2021-07-07 DIAGNOSIS — E27.9 LESION OF ADRENAL GLAND (HCC): ICD-10-CM

## 2021-07-07 DIAGNOSIS — E83.52 HYPERCALCEMIA: Primary | ICD-10-CM

## 2021-07-07 DIAGNOSIS — N20.0 KIDNEY STONES: ICD-10-CM

## 2021-07-07 LAB — BACTERIA UR CULT: NORMAL

## 2021-07-07 PROCEDURE — 99214 OFFICE O/P EST MOD 30 MIN: CPT | Performed by: INTERNAL MEDICINE

## 2021-07-07 RX ORDER — OMEPRAZOLE 20 MG/1
20 CAPSULE, DELAYED RELEASE ORAL DAILY
COMMUNITY
End: 2021-12-08

## 2021-07-07 NOTE — PATIENT INSTRUCTIONS
See kidney doctor    Have 24hr urine calcium done    Have Labs done in 2 months    Follow up in 2 months

## 2021-07-07 NOTE — PROGRESS NOTES
ENDOCRINOLOGY  FOLLOW UP VISIT      Reason for Endocrine Consult/Chief Complaint: adrenal and diabetes follow up     ? Medical Decision Making:     Impression:  1  Right adrenal mass 4 1cm  2  HTN  3  HLD  4  Type 2 Diabetes  5  AAA  6  Atrial fibrillation on AC  7  Hx of non muscle invasive bladder CA in remission  8  Obesity BMI 37  9  CAD s/p CABG  10  LE edema b/l  11  Hypercalcemia , recent kidney stones      Recommendations:     Right adrenal adenoma benign on CT with washout  Aldosterone and plasma metanephrines level normal  2 x dexamethasone suppression testing was elevated, 1 salivary cortisol was mildly elevated the other was normal, 24hr urine cortisol was normal   Will check DHEAS and ACTH level to see if low which would indicate adrenal excess cortisol production       Repeat CT scan abdomen without contrast for size re-evaluation 1/2022     Type 2 DM- A1C 8 5% June 2021, will continue levemir 65 units qAM and qHS and metformin 500mg BID AC, glipizide XL 5mg qday,  had nausea with ozempic in the past, had weight gain and fluid retention with actos, BGs reported are improved continue same regimen for now, would avoid SGLT-2 inhibitors given recent kidney stones and urosepsis     Hypercalcemia- repeat calcium still mildly elevated corrected 10 6 July 2021, PTH 65, possibly has primary hyperparathyroidism,  DEXA normal density 3/2021 including forearm, surgical criteria met given recent kidney stones, will check 24hr urine calcium, not on vitamin D or calcium supplements, referred to nephrology for stone evaluation and given has proteinuria      RTC 2 months  Macy LEO    Endocrinology           History of Present Illness:   Mr Guy Murray is a 67year old male who presents for adrenal disorder follow up   ?  PMH-adrenal mass, HTN, HLD, DM2, AAA, atrial fibrillation, bladder CA, Obesity BMI 40, CAD  PSH-CABG, shoulder surgery, bladder surgery, hip surgery  FHx-aunt with diabetes  SHx-former smoker quit 12 years, no ETOH use, retired worked at [employer] since last visit:     On levemir 65 units twice a day, metformin 500mg BID AC, glipizide 5mg XL qday  Average 7 day 119, 14 day average 129, 30 day average 135, 90 day average 160 FBG numbers   No hypoglycemia reported    No kidney stone issues in the past      Weight stable over several years, does have some abdominal striae      ? Review of Systems:     Review of Systems   Constitutional: Negative for appetite change, chills, diaphoresis, fatigue, fever and unexpected weight change  HENT: Negative for congestion, ear pain, hearing loss, rhinorrhea, sinus pressure, sinus pain, sore throat, trouble swallowing and voice change  Eyes: Negative for photophobia, redness and visual disturbance  Respiratory: Negative for apnea, cough, chest tightness, shortness of breath, wheezing and stridor  Cardiovascular: Negative for chest pain, palpitations and leg swelling  Gastrointestinal: Negative for abdominal distention, abdominal pain, constipation, diarrhea, nausea and vomiting  Endocrine: Negative for cold intolerance, heat intolerance, polydipsia, polyphagia and polyuria  Genitourinary: Negative for difficulty urinating, dysuria, flank pain, frequency, hematuria and urgency  Musculoskeletal: Negative for arthralgias, back pain, gait problem, joint swelling and myalgias  Skin: Negative for color change, pallor, rash and wound  Allergic/Immunologic: Negative for immunocompromised state  Neurological: Negative for dizziness, tremors, syncope, weakness, light-headedness and headaches  Hematological: Negative for adenopathy  Does not bruise/bleed easily  Psychiatric/Behavioral: Negative for confusion and sleep disturbance  The patient is not nervous/anxious  ?   Patient History:     Past Medical History:   Diagnosis Date    [facility])     [facility]     Actinic keratosis of right temple 7/31/2020  Actinic keratosis of scalp 7/31/2020    Acute respiratory failure with hypoxia (HCC) 3/25/2021    Arthritis     Lay's esophagus     Bladder cancer (HCC)     Blister of left leg 4/28/2021    Blister of right leg 5/26/2021    CAD (coronary artery disease)     Chronic low back pain     Chronic pain of both knees 7/31/2020    Colitis 12/4/2020    CPAP (continuous positive airway pressure) dependence     Diabetes ([address] )     Excessive gas 12/3/2020    Hydroureter on left 4/28/2021    Hyperlipemia     Hypertension     Immunization deficiency 10/30/2020    Hep a nonimmune    Left lower quadrant abdominal pain 12/3/2020    Mass of right adrenal gland ([facility] ) 12/4/2020    4 1 cm    Myocardial infarction ([facility] )     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    LUCIA (obstructive sleep apnea) 1/29/2021    Pressure ulcer of right leg, stage 1 5/26/2021    Urinary tract infection with hematuria 5/3/2021    u cx 4/2021=pseudomonas R to bactrim and cefdinir, S to cipro    Venous stasis dermatitis of both lower extremities 5/26/2021     Past Surgical History:   Procedure Laterality Date    BACK SURGERY      BLADDER SURGERY      CATARACT EXTRACTION, BILATERAL      COLONOSCOPY  01/29/2019    next 2022 [address] GRAFT  04/2016    Quadruple per [place]    EGD  06/2017    [place] CYSTO/URETERO W/LITHOTRIPSY &INDWELL STENT INSRT Left 5/21/2021    Procedure: CYSTOSCOPY URETEROSCOPY WITH LITHOTRIPSY HOLMIUM LASER, AND INSERTION STENT URETERAL/EXCHANGE;  Surgeon: Robert Flood MD;  [facility];  Service: Urology    [place] CYSTOURETHROSCOPY,URETER CATHETER Left 4/24/2021    Procedure: CYSTOSCOPY  WITH INSERTION STENT URETERAL;  Surgeon: Robert Flood MD;  [facility];  Service: Urology    TOTAL HIP ARTHROPLASTY Right 2012    TOTAL SHOULDER REPLACEMENT Right 2013     Social History     Socioeconomic History    Marital status: /Civil Union     Spouse name: Beltran Mason Number of children: 3    Years of education: Not on file    Highest education level: 12th grade   Occupational History    Not on file   Tobacco Use    Smoking status: Former Smoker     Packs/day: 0 25     Years: 21 00     Pack years: 5 25     Types: Cigarettes     Start date: 64     Quit date: 2009     Years since quittin 5    Smokeless tobacco: Never Used    Tobacco comment: smoked since age 21; per David Bruce   Vaping Use    Vaping Use: Never used   Substance and Sexual Activity    Alcohol use: Not Currently     Comment: No use    Drug use: Never     Comment: No use    Sexual activity: Not Currently   Other Topics Concern    Not on file   Social History Narrative    · Most recent tobacco use screenin2020      · Do you currently or have you served in the [employer]:   No      · Were you activated, into active duty, as a member of the [employer] or as a Reservist:   No      · Live alone or with others:   with others        · Caffeine intake:   Occasional      · Illicit drugs:   No      · Diet:   Regular      · Exercise level: Moderate      · Advance directive: Yes      · Marital status:         · General stress level:   Medium      · Single or multi-level home/work:   multi level home      · Guns present in home:   No      · Seat belts used routinely:   Yes      · Sunscreen used routinely:   Yes      · Smoke alarm in home:   Yes      Social Determinants of Health     Financial Resource Strain: Low Risk     Difficulty of Paying Living Expenses: Not hard at all   Food Insecurity: No Food Insecurity    Worried About Running Out of Food in the Last Year: Never true    Fabi of Food in the Last Year: Never true   Transportation Needs: No Transportation Needs    Lack of Transportation (Medical): No    Lack of Transportation (Non-Medical):  No   Physical Activity: Insufficiently Active    Days of Exercise per Week: 4 days    Minutes of Exercise per Session: 10 min Stress: No Stress Concern Present    Feeling of Stress : Not at all   Social Connections: Moderately Isolated    Frequency of Communication with Friends and Family: More than three times a week    Frequency of Social Gatherings with Friends and Family: Twice a week    Attends Congregation Services: Never    Active Member of Clubs or Organizations: No    Attends Club or Organization Meetings: Never    Marital Status:    Intimate Partner Violence: Not At Risk    Fear of Current or Ex-Partner: No    Emotionally Abused: No    Physically Abused: No    Sexually Abused: No     Family History   Problem Relation Age of Onset    Heart disease Father     Arthritis Father     Heart attack Father     Alzheimer's disease Mother        Current Medications: At the time this note was written these were the medications the patient was on    Current Outpatient Medications   Medication Sig Dispense Refill    atorvastatin (LIPITOR) 40 mg tablet Take 1 tablet (40 mg total) by mouth daily 90 tablet 3    Eliquis 5 MG TAKE 1 TABLET BY MOUTH TWICE A DAY 60 tablet 3    ezetimibe (ZETIA) 10 mg tablet Take 1 tablet (10 mg total) by mouth daily 90 tablet 3    finasteride (PROSCAR) 5 mg tablet Take 1 tablet (5 mg total) by mouth daily 90 tablet 3    Fluad Quadrivalent 0 5 ML PRSY PHARMACY ADMINISTERED      furosemide (LASIX) 80 mg tablet Take 1 tablet (80 mg total) by mouth 2 (two) times a day 60 tablet 11    glipiZIDE (GLUCOTROL XL) 5 mg 24 hr tablet Take 1 tablet (5 mg total) by mouth daily 90 tablet 1    Levemir FlexTouch 100 units/mL injection pen Inject under the skin 65 units in the morning and at bedtime (Patient taking differently: every 12 (twelve) hours Inject under the skin 65 units in the morning and at bedtime) 120 mL 1    lisinopril (ZESTRIL) 20 mg tablet Take 1 tablet (20 mg total) by mouth daily 90 tablet 3    metFORMIN (GLUCOPHAGE) 1000 MG tablet TAKE ONE TABLET (500 MG) TWICE A DAY WITH MEALS 180 tablet 1    metoprolol succinate (TOPROL-XL) 25 mg 24 hr tablet Take 0 5 tablets (12 5 mg total) by mouth daily 45 tablet 3    omeprazole (PriLOSEC) 20 mg delayed release capsule Take 1 capsule (20 mg total) by mouth daily 30 capsule 1    phenazopyridine (PYRIDIUM) 200 mg tablet Take 1 tablet (200 mg total) by mouth 3 (three) times a day as needed for bladder spasms 30 tablet 0    potassium chloride (K-DUR,KLOR-CON) 20 mEq tablet Take 1 tablet (20 mEq total) by mouth daily 30 tablet 11    Unifine Pentips 31G X 8 MM MISC INJECT UNDER THE SKIN DAILY USE AS DIRECTED 100 each 2    Unifine Pentips 31G X 8 MM MISC Inject under the skin daily Use as directed 100 each 0     No current facility-administered medications for this visit  Allergies: Patient has no known allergies  Physical Exam:   Vital Signs:   /80   Pulse 100   Temp (!) 97 °F (36 1 °C)   Ht 6' 4" (1 93 m)   Wt (!) 142 kg (312 lb)   BMI 37 98 kg/m²     Physical Exam  Vitals reviewed  Constitutional:       General: He is not in acute distress  Appearance: Normal appearance  He is not ill-appearing, toxic-appearing or diaphoretic  HENT:      Head: Normocephalic and atraumatic  Right Ear: External ear normal       Left Ear: External ear normal       Nose: Nose normal    Eyes:      General: No scleral icterus  Extraocular Movements: Extraocular movements intact  Conjunctiva/sclera: Conjunctivae normal    Neck:      Comments: No thyromegaly or nodules  Cardiovascular:      Rate and Rhythm: Normal rate and regular rhythm  Heart sounds: Normal heart sounds  No murmur heard  No friction rub  No gallop  Pulmonary:      Effort: Pulmonary effort is normal  No respiratory distress  Breath sounds: Normal breath sounds  No stridor  No wheezing, rhonchi or rales  Abdominal:      General: Bowel sounds are normal  There is no distension  Palpations: Abdomen is soft  There is no mass  Tenderness:  There is no abdominal tenderness  There is no guarding or rebound  Hernia: No hernia is present  Musculoskeletal:         General: No swelling  Normal range of motion  Cervical back: Normal range of motion and neck supple  No tenderness  Lymphadenopathy:      Cervical: No cervical adenopathy  Skin:     General: Skin is warm and dry  Coloration: Skin is not pale  Findings: No erythema or rash  Neurological:      General: No focal deficit present  Mental Status: He is alert and oriented to person, place, and time  Psychiatric:         Mood and Affect: Mood normal          Behavior: Behavior normal          Thought Content:  Thought content normal          Judgment: Judgment normal             Labs and Imaging:      Component      Latest Ref Rng & Units 7/2/2021   Sodium      136 - 145 mmol/L 138   Potassium      3 5 - 5 3 mmol/L 3 9   Chloride      100 - 108 mmol/L 105   CO2      21 - 32 mmol/L 28   Anion Gap      4 - 13 mmol/L 5   BUN      5 - 25 mg/dL 31 (H)   Creatinine      0 60 - 1 30 mg/dL 0 82   GLUCOSE FASTING      65 - 99 mg/dL 138 (H)   Calcium      8 3 - 10 1 mg/dL 9 8   CORRECTED CALCIUM      8 3 - 10 1 mg/dL 10 6 (H)   AST      5 - 45 U/L 13   ALT      12 - 78 U/L 22   Alkaline Phosphatase      46 - 116 U/L 77   Total Protein      6 4 - 8 2 g/dL 8 0   Albumin      3 5 - 5 0 g/dL 3 0 (L)   TOTAL BILIRUBIN      0 20 - 1 00 mg/dL 0 48   eGFR      ml/min/1 73sq m 85   Vit D, 25-Hydroxy      30 0 - 100 0 ng/mL 30 7   Magnesium      1 6 - 2 6 mg/dL 1 8   Phosphorus      2 3 - 4 1 mg/dL 3 5   PARATHYROID HORMONE      18 4 - 80 1 pg/mL 65 5

## 2021-07-09 ENCOUNTER — TELEPHONE (OUTPATIENT)
Dept: FAMILY MEDICINE CLINIC | Facility: CLINIC | Age: 76
End: 2021-07-09

## 2021-07-09 NOTE — TELEPHONE ENCOUNTER
----- Message from Kiah Awad MD sent at 7/8/2021  6:06 PM EDT -----  Please notify pt of negative urine culture, infection is gone

## 2021-07-12 ENCOUNTER — APPOINTMENT (OUTPATIENT)
Dept: LAB | Facility: AMBULARY SURGERY CENTER | Age: 76
End: 2021-07-12
Payer: MEDICARE

## 2021-07-12 DIAGNOSIS — E83.52 HYPERCALCEMIA: ICD-10-CM

## 2021-07-12 DIAGNOSIS — Z79.4 TYPE 2 DIABETES MELLITUS WITH HYPERGLYCEMIA, WITH LONG-TERM CURRENT USE OF INSULIN (HCC): ICD-10-CM

## 2021-07-12 DIAGNOSIS — E27.9 LESION OF ADRENAL GLAND (HCC): ICD-10-CM

## 2021-07-12 DIAGNOSIS — E11.65 TYPE 2 DIABETES MELLITUS WITH HYPERGLYCEMIA, WITH LONG-TERM CURRENT USE OF INSULIN (HCC): ICD-10-CM

## 2021-07-12 DIAGNOSIS — E27.8 MASS OF RIGHT ADRENAL GLAND (HCC): ICD-10-CM

## 2021-07-12 LAB
CALCIUM 24H UR-MCNC: 126.5 MG/24 HRS (ref 42–353)
CREAT 24H UR-MRATE: 1.3 G/24HR (ref 0.8–1.8)
SPECIMEN VOL UR: 2300 ML
SPECIMEN VOL UR: 2300 ML

## 2021-07-12 PROCEDURE — 82570 ASSAY OF URINE CREATININE: CPT

## 2021-07-12 PROCEDURE — 82340 ASSAY OF CALCIUM IN URINE: CPT

## 2021-07-14 ENCOUNTER — OFFICE VISIT (OUTPATIENT)
Dept: FAMILY MEDICINE CLINIC | Facility: CLINIC | Age: 76
End: 2021-07-14
Payer: MEDICARE

## 2021-07-14 VITALS
OXYGEN SATURATION: 92 % | SYSTOLIC BLOOD PRESSURE: 102 MMHG | HEART RATE: 109 BPM | BODY MASS INDEX: 38.24 KG/M2 | DIASTOLIC BLOOD PRESSURE: 60 MMHG | WEIGHT: 314 LBS | TEMPERATURE: 98.5 F | HEIGHT: 76 IN

## 2021-07-14 DIAGNOSIS — I87.2 VENOUS STASIS DERMATITIS OF BOTH LOWER EXTREMITIES: ICD-10-CM

## 2021-07-14 DIAGNOSIS — Z23 NEED FOR PNEUMOCOCCAL VACCINATION: ICD-10-CM

## 2021-07-14 DIAGNOSIS — S80.821A BLISTER OF RIGHT LOWER EXTREMITY, INITIAL ENCOUNTER: ICD-10-CM

## 2021-07-14 DIAGNOSIS — I48.0 PAROXYSMAL ATRIAL FIBRILLATION (HCC): ICD-10-CM

## 2021-07-14 DIAGNOSIS — Z79.4 TYPE 2 DIABETES MELLITUS WITH HYPERGLYCEMIA, WITH LONG-TERM CURRENT USE OF INSULIN (HCC): Primary | ICD-10-CM

## 2021-07-14 DIAGNOSIS — L73.9 FOLLICULITIS: ICD-10-CM

## 2021-07-14 DIAGNOSIS — N64.4 NIPPLE PAIN: ICD-10-CM

## 2021-07-14 DIAGNOSIS — Z23 NEED FOR VACCINATION: ICD-10-CM

## 2021-07-14 DIAGNOSIS — Z78.9 NONIMMUNE TO HEPATITIS B VIRUS: ICD-10-CM

## 2021-07-14 DIAGNOSIS — E11.65 TYPE 2 DIABETES MELLITUS WITH HYPERGLYCEMIA, WITH LONG-TERM CURRENT USE OF INSULIN (HCC): Primary | ICD-10-CM

## 2021-07-14 PROCEDURE — 99214 OFFICE O/P EST MOD 30 MIN: CPT | Performed by: FAMILY MEDICINE

## 2021-07-14 PROCEDURE — G0009 ADMIN PNEUMOCOCCAL VACCINE: HCPCS | Performed by: FAMILY MEDICINE

## 2021-07-14 PROCEDURE — G0010 ADMIN HEPATITIS B VACCINE: HCPCS | Performed by: FAMILY MEDICINE

## 2021-07-14 PROCEDURE — 90732 PPSV23 VACC 2 YRS+ SUBQ/IM: CPT | Performed by: FAMILY MEDICINE

## 2021-07-14 PROCEDURE — 90746 HEPB VACCINE 3 DOSE ADULT IM: CPT | Performed by: FAMILY MEDICINE

## 2021-07-14 RX ORDER — LANCETS
1 EACH MISCELLANEOUS 2 TIMES DAILY
Qty: 102 EACH | Refills: 3 | Status: SHIPPED | OUTPATIENT
Start: 2021-07-14 | End: 2021-08-03 | Stop reason: SDUPTHER

## 2021-07-14 RX ORDER — LANCETS
1 EACH MISCELLANEOUS 2 TIMES DAILY
COMMUNITY
End: 2021-07-14 | Stop reason: SDUPTHER

## 2021-07-14 RX ORDER — CLINDAMYCIN PHOSPHATE 10 MG/G
GEL TOPICAL 2 TIMES DAILY
Qty: 60 G | Refills: 2 | Status: SHIPPED | OUTPATIENT
Start: 2021-07-14 | End: 2021-10-14 | Stop reason: SDUPTHER

## 2021-07-14 NOTE — PROGRESS NOTES
Assessment/Plan:  Second hep B vaccine given to him today 3rd hep B vaccine will be done 4 months from now  Pneumovax 23 given to him today will be done every 5 years  Order for ultrasound left breast with nipple pain  Folliculitis will put him on clindamycin gel for breast and for folliculitis  It advised follow-up 3 months for legs swelling blood pressure  Meds refill for him  I have spent 25 minutes with Patient and family today in which greater than 50% of this time was spent in counseling/coordination of care regarding Prognosis, Risks and benefits of tx options and Intructions for management  Problem List Items Addressed This Visit        Endocrine    Type 2 diabetes mellitus with hyperglycemia, with long-term current use of insulin (HCC) - Primary    Relevant Medications    glucose blood test strip    Lancets (accu-chek multiclix) lancets       Cardiovascular and Mediastinum    Paroxysmal atrial fibrillation (HCC)       Musculoskeletal and Integument    Venous stasis dermatitis of both lower extremities    Relevant Medications    clindamycin (CLINDAGEL) 1 % gel    Blister of right leg    Relevant Medications    clindamycin (CLINDAGEL) 1 % gel    Folliculitis    Relevant Medications    clindamycin (CLINDAGEL) 1 % gel       Other    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    Nipple pain    Relevant Medications    clindamycin (CLINDAGEL) 1 % gel    Other Relevant Orders    US breast left complete (abus)      Other Visit Diagnoses     Need for pneumococcal vaccination        Relevant Orders    PNEUMOCOCCAL POLYSACCHARIDE VACCINE 23-VALENT =>1YO SQ IM (Completed)    Need for vaccination        Relevant Orders    HEPATITIS B VACCINE ADULT IM (Completed)            Subjective:      Patient ID: Kelsey Ramirez is a 67 y o  male  19-year-old male follow-up type 2 diabetes hypertension AFib and lower extremity edema and stasis dermatitis    He has visiting nurse visit twice a week to check his legs he is wearing compression stocking and is much better than before blisters have healed  He complained of left nipple pain for the past couple weeks  Burning sensation no discharge  He also have recurred rash on his buttock that will bleed and irritate  He saw endocrinologist recently  Had more blood work done and refer to nephrologist   Was advised against SG LT 2 due to kidney stones urosepsis in the past   He is doing well on atorvastatin 40 mg daily Eliquis 5 mg twice a day Zetia Proscar Lasix glipizide Levemir 65 units twice a day lisinopril 20 mg daily metformin metoprolol Prilosec  He is not immune to hepatitis-B vaccine  He has 1 dose already  He is due for Pneumovax  The following portions of the patient's history were reviewed and updated as appropriate:   Past Medical History:  He has a past medical history of A-fib ([facility] ), AAA (abdominal aortic aneurysm) ([facility] ), Actinic keratosis of right temple (7/31/2020), Actinic keratosis of scalp (7/31/2020), Acute respiratory failure with hypoxia ([facility] ) (3/25/2021), Arthritis, Lay's esophagus, Bladder cancer ([facility] ), Blister of left leg (4/28/2021), Blister of right leg (5/26/2021), CAD (coronary artery disease), Chronic low back pain, Chronic pain of both knees (7/31/2020), Colitis (12/4/2020), CPAP (continuous positive airway pressure) dependence, Diabetes ([facility] ), Excessive gas (12/3/2020), Hydroureter on left (4/28/2021), Hyperlipemia, Hypertension, Immunization deficiency (10/30/2020), Left lower quadrant abdominal pain (12/3/2020), Mass of right adrenal gland ([facility] ) (12/4/2020), Myocardial infarction ([facility] ), 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), LUCIA (obstructive sleep apnea) (1/29/2021), Pressure ulcer of right leg, stage 1 (5/26/2021), Urinary tract infection with hematuria (5/3/2021), and Venous stasis dermatitis of both lower extremities (5/26/2021)  ,  _______________________________________________________________________  Medical Problems:  does not have any pertinent problems on file ,  _______________________________________________________________________  Past Surgical History:   has a past surgical history that includes Back surgery; Bladder surgery; Cataract extraction, bilateral; Colonoscopy (01/29/2019); Coronary artery bypass graft (04/2016); Total shoulder replacement (Right, 2013); Total hip arthroplasty (Right, 2012); EGD (06/2017); pr cystourethroscopy,ureter catheter (Left, 4/24/2021); and pr cysto/uretero w/lithotripsy &indwell stent insrt (Left, 5/21/2021)  ,  _______________________________________________________________________  Family History:  family history includes Alzheimer's disease in his mother; Arthritis in his father; Heart attack in his father; Heart disease in his father ,  _______________________________________________________________________  Social History:   reports that he quit smoking about 12 years ago  His smoking use included cigarettes  He started smoking about 56 years ago  He has a 5 25 pack-year smoking history  He has never used smokeless tobacco  He reports previous alcohol use  He reports that he does not use drugs  ,  _______________________________________________________________________  Allergies:  has No Known Allergies     _______________________________________________________________________  Current Outpatient Medications   Medication Sig Dispense Refill    atorvastatin (LIPITOR) 40 mg tablet Take 1 tablet (40 mg total) by mouth daily 90 tablet 3    Eliquis 5 MG TAKE 1 TABLET BY MOUTH TWICE A DAY 60 tablet 3    ezetimibe (ZETIA) 10 mg tablet Take 1 tablet (10 mg total) by mouth daily 90 tablet 3    finasteride (PROSCAR) 5 mg tablet Take 1 tablet (5 mg total) by mouth daily 90 tablet 3    Fluad Quadrivalent 0 5 ML PRSY PHARMACY ADMINISTERED      furosemide (LASIX) 80 mg tablet Take 1 tablet (80 mg total) by mouth 2 (two) times a day 60 tablet 11    glipiZIDE (GLUCOTROL XL) 5 mg 24 hr tablet Take 1 tablet (5 mg total) by mouth daily 90 tablet 1    glucose blood test strip Use 2 each daily 100 strip 2    Lancets (accu-chek multiclix) lancets Use 1 each 2 (two) times a day Use 2 times daily to test blood glucose 102 each 3    Levemir FlexTouch 100 units/mL injection pen Inject under the skin 65 units in the morning and at bedtime (Patient taking differently: every 12 (twelve) hours Inject under the skin 65 units in the morning and at bedtime) 120 mL 1    lisinopril (ZESTRIL) 20 mg tablet Take 1 tablet (20 mg total) by mouth daily 90 tablet 3    metFORMIN (GLUCOPHAGE) 1000 MG tablet TAKE ONE TABLET (500 MG) TWICE A DAY WITH MEALS 180 tablet 1    metoprolol succinate (TOPROL-XL) 25 mg 24 hr tablet Take 0 5 tablets (12 5 mg total) by mouth daily 45 tablet 3    omeprazole (PriLOSEC) 20 mg delayed release capsule Take 1 capsule (20 mg total) by mouth daily 30 capsule 1    omeprazole (PriLOSEC) 20 mg delayed release capsule Take 20 mg by mouth daily       Unifine Pentips 31G X 8 MM MISC Inject under the skin daily Use as directed 100 each 0    clindamycin (CLINDAGEL) 1 % gel Apply topically 2 (two) times a day Apply on left nipple and buttock 60 g 2    phenazopyridine (PYRIDIUM) 200 mg tablet Take 1 tablet (200 mg total) by mouth 3 (three) times a day as needed for bladder spasms 30 tablet 0    potassium chloride (K-DUR,KLOR-CON) 20 mEq tablet Take 1 tablet (20 mEq total) by mouth daily 30 tablet 11    Unifine Pentips 31G X 8 MM MISC INJECT UNDER THE SKIN DAILY USE AS DIRECTED 100 each 2     No current facility-administered medications for this visit      _______________________________________________________________________  Review of Systems   Constitutional: Negative for activity change, appetite change, fatigue, fever and unexpected weight change  HENT: Negative for dental problem and trouble swallowing  Eyes: Negative for photophobia and visual disturbance     Respiratory: Negative for cough and chest tightness  Cardiovascular: Negative for chest pain, palpitations and leg swelling  Gastrointestinal: Negative for abdominal pain, constipation and vomiting  Endocrine: Negative for cold intolerance, polydipsia and polyuria  Genitourinary: Negative for difficulty urinating, frequency and urgency  Musculoskeletal: Negative for arthralgias, joint swelling, myalgias and neck pain  Skin: Negative for color change, rash and wound  Allergic/Immunologic: Negative for environmental allergies  Neurological: Negative for dizziness, weakness and numbness  Hematological: Does not bruise/bleed easily  Psychiatric/Behavioral: Negative for decreased concentration, dysphoric mood, self-injury, sleep disturbance and suicidal ideas  Objective:  Vitals:    07/14/21 0945   BP: 102/60   BP Location: Left arm   Patient Position: Sitting   Cuff Size: Standard   Pulse: (!) 109   Temp: 98 5 °F (36 9 °C)   TempSrc: Tympanic   SpO2: 92%   Weight: (!) 142 kg (314 lb)   Height: 6' 4" (1 93 m)     Body mass index is 38 22 kg/m²  Physical Exam  Vitals and nursing note reviewed  Constitutional:       Appearance: He is well-developed  He is obese  HENT:      Head: Normocephalic and atraumatic  Eyes:      Pupils: Pupils are equal, round, and reactive to light  Cardiovascular:      Rate and Rhythm: Normal rate  Rhythm irregular  Heart sounds: Normal heart sounds  Pulmonary:      Effort: Pulmonary effort is normal       Breath sounds: Normal breath sounds  Chest:      Comments: Left nipple no mass mild tender on palpation no erythema no warmth no drainage  Abdominal:      General: Bowel sounds are normal       Palpations: Abdomen is soft  Musculoskeletal:         General: Normal range of motion  Cervical back: Normal range of motion and neck supple  Skin:     General: Skin is warm and dry        Comments: Dry scaly skin irritated bilateral buttock with 1 irritated follicles   Neurological: General: No focal deficit present  Mental Status: He is alert and oriented to person, place, and time  Mental status is at baseline  Psychiatric:         Mood and Affect: Mood normal          Behavior: Behavior normal          Thought Content:  Thought content normal          Judgment: Judgment normal

## 2021-07-19 ENCOUNTER — TELEPHONE (OUTPATIENT)
Dept: ENDOCRINOLOGY | Facility: CLINIC | Age: 76
End: 2021-07-19

## 2021-07-20 ENCOUNTER — CONSULT (OUTPATIENT)
Dept: NEPHROLOGY | Facility: CLINIC | Age: 76
End: 2021-07-20
Payer: MEDICARE

## 2021-07-20 VITALS
HEART RATE: 61 BPM | WEIGHT: 312 LBS | SYSTOLIC BLOOD PRESSURE: 118 MMHG | HEIGHT: 76 IN | BODY MASS INDEX: 37.99 KG/M2 | DIASTOLIC BLOOD PRESSURE: 62 MMHG

## 2021-07-20 DIAGNOSIS — R80.1 PERSISTENT PROTEINURIA: ICD-10-CM

## 2021-07-20 DIAGNOSIS — I50.32 CHRONIC DIASTOLIC CHF (CONGESTIVE HEART FAILURE) (HCC): ICD-10-CM

## 2021-07-20 DIAGNOSIS — E83.52 HYPERCALCEMIA: Primary | ICD-10-CM

## 2021-07-20 DIAGNOSIS — N20.0 NEPHROLITHIASIS: ICD-10-CM

## 2021-07-20 DIAGNOSIS — I10 ESSENTIAL HYPERTENSION: ICD-10-CM

## 2021-07-20 PROCEDURE — 99204 OFFICE O/P NEW MOD 45 MIN: CPT | Performed by: INTERNAL MEDICINE

## 2021-07-20 NOTE — PROGRESS NOTES
NEPHROLOGY OUTPATIENT CONSULTATION   Kaia Bedolla 67 y o  male MRN: [id number]  Date: 7/20/2021  Reason for consultation:   Chief Complaint   Patient presents with    Consult    Nephrolithiasis    Abnormal Calcium       ASSESSMENT AND PLAN:  Hypercalcemia  -calcium has been elevated since January 2021  Last calcium level on correction was 10 6 , previous 1 from June 2021 was 10 7  -DEXA scan with normal intensity  -ionized calcium not checked, will check with next blood work  -PTH level was 65 5, most likely non PTH mediated hypercalcemia  Also 24 hour urine calcium was 126 5 mg  Patient denies any family history of hypercalcemia  -vitamin D wnl  -would consider further workup for non PTH mediated hypercalcemia, checking SPEP UPEP light chain ratio, checking thyroid panel, 125 vitamin-D  Will also check ionized calcium   -adrenal insufficiency could also be a rare cause for hypercalcemia but patient has finding suggestive of elevated a m  cortisol   -CT chest abd pelvis:  Was suggestive of  pleural thickening with calcification also finding of calcified granuloma scattered in the right lung  Nephrolithiasis  -CT abdomen from April 2021 suggestive of several subcentimeter nonobstructing stone in the left kidney  Several obstructing stones in the distal left ureter  measuring approximately 6 mm in size  Mild left hydroureteronephrosis and periureteral and perinephric fat stranding  -s/p 6mm left ureteral calculus left ureteral stent in April 24th 2021  -status post ureteroscopy with lithotripsy and stent on the left side on 05/21/21 with Dr April Martinez  -stone analysis was not done  -checking 24 hour urine stone risk profile, also checking uric acid level, PTH was within normal limit, so less likely primary hyperparathyroidism  Urine 24 hour calcium was within normal limit   -not able to stress on oral hydration as patient requiring high dose of Lasix for CHF    Did discuss about low oxalate diet, list was provided, discussed about common types of kidney stones  Further recommendations after 24 hour urine stone risk profile    Primary Hypertension:   -Current medication: lisinopril 20 mg, toprol xl 12 5 mg daily and lasix 80 mg bid      -Current blood pressure: Controlled and at goal  -Plan:    ·  continue same medications  Continue home monitoring of blood pressure  -Recommend 2 g sodium diet  -Recommend daily exercise and weight loss  -Discussed home monitoring of BP and maintaining a log of blood pressure   -Recommend goal BP less than 130/80  Proteinuria, persistent  -found to have elevated microalbumin creatinine ratio, last micro albumin creatinine ratio was 350 milligram/gram in March 2021  -likely due to diabetic nephropathy as well as hypertensive nephrosclerosis  -continue to monitor, continue ACE inhibitor  -quantify proteinuria again     Chronic diastolic CHF  - on lasix 80 mg bid   -clinically appears euvolemic, continue follow-up with Cardiology    Hyperlipidemia , mixed  -Lipitor 40 mg+ zetia  -last lipid panel from Lisa 3, 2021 showed LDL at goal at 57, goal LDL for CKD is less than 100  -triglyceride slightly elevated, recommend lifestyle modification, continue to monitor per PCP    Bladder cancer diagnosed in 2010-s/p BCG 7 yrs ago   -negative cystoscopy in dec 2020   -currently on Proscar per Urology    Adrenal mass   - CT abdomen from April 2021 showed ADRENAL GLANDS:  4 cm right adrenal nodule redemonstrated, stable  Left adrenal gland appears normal   -following with endocrinology    Reviewed endocrinology note from July 7, 2021-found to have normal aldosterone and plasma metanephrines but found to have slightly elevated dexamethasone suppression testing  -noted plan for repeat CT abdomen pelvis in January 2022 by endocrinology and plan to monitor DHEA S and ACTH level    LUCIA on CPAP    Diagnoses and all orders for this visit:    Hypercalcemia  -     Ambulatory referral to Nephrology  - Basic metabolic panel; Future  -     Protein / creatinine ratio, urine; Future  -     Protein electrophoresis, urine; Future  -     Protein electrophoresis, serum; Future  -     Immunoglobulin free LT chains blood; Future  -     Uric acid; Future  -     Calcium, ionized; Future  -     Vitamin D 1,25 dihydroxy; Future  -     TSH, 3rd generation with Free T4 reflex; Future  -     Basic metabolic panel; Future  -     Calcium, ionized; Future  -     Basic metabolic panel; Future  -     Phosphorus; Future  -     Protein / creatinine ratio, urine; Future  -     PTH, intact; Future  -     PTH-related peptide; Future    Essential hypertension  -     Basic metabolic panel; Future  -     Basic metabolic panel; Future  -     Magnesium; Future  -     Phosphorus; Future  -     Protein / creatinine ratio, urine; Future  -     PTH, intact; Future    Persistent proteinuria  -     Protein / creatinine ratio, urine; Future  -     Protein / creatinine ratio, urine; Future    Nephrolithiasis  -     Stone risk profile; Future    Chronic diastolic CHF (congestive heart failure) ([facility] [address] )        Patient Instructions     Ordered work up for hypercalcemia  BP stable  Continue lasix   Ordered 24 hrs urine test for kidney stones    Repeat labs in one month and follow up in October with labs       Kidney Stones   AMBULATORY CARE:   Kidney stones  form in the urinary system when the water and waste in your urine are out of balance  When this happens, certain types of waste crystals separate from the urine  The crystals build up and form kidney stones  Kidney stones can be made of uric acid, calcium, phosphate, or oxalate crystals  You may have 1 or more kidney stones       Common symptoms include the following:   · Pain in the middle of your back that moves across to your side or that may spread to your groin    · Nausea and vomiting    · Urge to urinate often, burning feeling when you urinate, or pink or red urine    · Tenderness in your lower back, side, or stomach    Seek care immediately if:   · You are vomiting and it is not relieved with medicine  Call your doctor or kidney specialist if:   · You have a fever  · You have trouble urinating  · You see blood in your urine  · You have severe pain  · You have any questions or concerns about your condition or care  Treatment for kidney stones  may include any of the following:  · NSAIDs , such as ibuprofen, help decrease swelling, pain, and fever  This medicine is available with or without a doctor's order  NSAIDs can cause stomach bleeding or kidney problems in certain people  If you take blood thinner medicine, always ask your healthcare provider if NSAIDs are safe for you  Always read the medicine label and follow directions  · Acetaminophen  decreases pain and fever  It is available without a doctor's order  Ask how much to take and how often to take it  Follow directions  Read the labels of all other medicines you are using to see if they also contain acetaminophen, or ask your doctor or pharmacist  Acetaminophen can cause liver damage if not taken correctly  Do not use more than 4 grams (4,000 milligrams) total of acetaminophen in one day  · Prescription pain medicine  may be given  Ask your healthcare provider how to take this medicine safely  Some prescription pain medicines contain acetaminophen  Do not take other medicines that contain acetaminophen without talking to your healthcare provider  Too much acetaminophen may cause liver damage  Prescription pain medicine may cause constipation  Ask your healthcare provider how to prevent or treat constipation  · Medicines  to balance your electrolytes may be needed  · A procedure or surgery  to remove the kidney stones may be needed if they do not pass on their own  Your treatment will depend on the size and location of your kidney stones  What you can do to manage kidney stones:   · Drink more liquids    Your healthcare provider may tell you to drink at least 8 to 12 (eight-ounce) cups of liquids each day  This helps flush out the kidney stones when you urinate  Water is the best liquid to drink  · Strain your urine every time you go to the bathroom  Urinate through a strainer or a piece of thin cloth to catch the stones  Take the stones to your healthcare provider so they can be sent to the lab for tests  This will help your healthcare providers plan the best treatment for you  · Eat a variety of healthy foods  Healthy foods include fruits, vegetables, whole-grain breads, low-fat dairy products, beans, and fish  You may need to limit how much sodium (salt) or protein you eat  Ask for information about the best foods for you  · Be physically active as directed  Your stones may pass more easily if you stay active  Physical activity can also help you manage your weight  Ask about the best activities for you  After you pass the kidney stones: Your healthcare provider may  order a 24-hour urine test  Results from a 24-hour urine test will help your healthcare provider plan ways to prevent more stones from forming  Your healthcare provider will give you more instructions  Follow up with your doctor or kidney specialist as directed:  Write down your questions so you remember to ask them during your visits  © Copyright [employer] 2021 Information is for End User's use only and may not be sold, redistributed or otherwise used for commercial purposes  All illustrations and images included in CareNotes® are the copyrighted property of A D A M , Inc  or [facility] Jeffy French   The above information is an  only  It is not intended as medical advice for individual conditions or treatments  Talk to your doctor, nurse or pharmacist before following any medical regimen to see if it is safe and effective for you  Low Oxalate Diet   WHAT YOU NEED TO KNOW:   What is a low-oxalate diet?   A low-oxalate diet is a meal plan that is low in oxalate  Oxalate is a chemical found in plant foods  You may need to eat foods that are low in oxalate to help clear kidney stones or prevent them from forming  People who have had kidney stones are at a higher risk of forming kidney stones again  The most common type of kidney stone is made up of crystals that contain calcium and oxalate  Your healthcare provider or dietitian may recommend that you limit oxalate if you get this type of kidney stone often  What foods should I include? Include the following foods that have a low to medium amount of oxalate  · Grains:      ? Egg noodles    ? Daniel crackers    ? Pancakes and waffles    ? Cooked and dry cereals without nuts or bran    ? White or wild rice    ? White bread, cornbread, bagels, and white English muffins (medium oxalate)    ? Saltine or soda crackers and vanilla wafers (medium oxalate)    ? Brown rice, spaghetti, and other noodles and pastas (medium oxalate)    · Fruit:      ? Apples, bananas, grapes    ? Grapefruit and cranberries    ? Peaches, nectarines, apricots, and pears    ? Papayas and strawberries    ? Melons and pineapples    ? Blackberries, blueberries, mangoes, and prunes (medium oxalate)    · Vegetables:      ? Artichokes, asparagus, and brussels sprouts    ? Broccoli and cauliflower    ? Kale, endive, cabbage, and lettuce    ? Cucumbers, peas, and zucchini    ? Mushrooms, onions, and peppers    ? Potatoes and corn    ? Carrots, celery, and green beans (medium oxalate)    ? Parsnips, summer squash, tomatoes, and turnips (medium oxalate)    · Dairy:      ? American cheese, Swiss cheese, cottage cheese, ricotta cheese, and cheddar cheese    ? Milk and buttermilk    ? Yogurt    · Protein foods:      ? Meat, fish, shellfish, chicken, and turkey    ? Lunch meat and ham (medium oxalate)    ? Hot dogs, bratwurst, mauro, and sausage (medium oxalate)    · Drinks and desserts:      ? Coffee    ?  Fruit punch and lemonade or limeade without added vitamin C    · Desserts:      ? Cookies, cakes, and ice cream    ? Pudding without chocolate    What foods should I limit or avoid? Limit the following foods that are high in oxalate  · Grains:      ? Wheat bran, wheat germ, and barley    ? Grits and bran cereal    ? White corn flour and buckwheat flour    ? Whole wheat bread    · Fruit:      ? Dried apricots    ? Red currants, figs, and rhubarb    ? Kiwi    · Vegetables:      ? Jeison greens, leeks, okra, and spinach    ? Wax beans     ? Eggplant    ? Beets and beet greens    ? Swiss chard, escarole, parsley, and rutabagas    ? Tomato paste    · Protein foods:      ? Baked beans with tomato sauce    ? Nut butters and nuts (peanuts, almonds, pecans, cashews, hazelnuts)    ? Soy burgers    ? Miso    ? Dried beans    · Desserts:      ? Fruitcake    ? Chocolate    ? Carob and marmalade    · Beverages:      ? Chocolate drink mixes    ? Soy milk    ? Instant iced tea    · Other foods:      ? Sesame seeds and tahini (paste made of sesame seeds)    ? Poppy seeds    What other dietary guidelines should I follow? · Drink about 12 to 16 (eight-ounce) cups of liquid each day  Liquids help clear kidney stones and prevent them from forming again  Water is the best liquid to drink  You may need more liquid if you are physically active  Ask your healthcare provider or dietitian how much liquid you need to drink each day  · Your healthcare provider may suggest that you make other diet changes to help prevent kidney stones  This may include decreasing the amount of sodium you eat each day  CARE AGREEMENT:   You have the right to help plan your care  Discuss treatment options with your healthcare provider to decide what care you want to receive  You always have the right to refuse treatment  The above information is an  only  It is not intended as medical advice for individual conditions or treatments   Talk to your doctor, nurse or pharmacist before following any medical regimen to see if it is safe and effective for you  © Copyright [employer] 2021 Information is for End User's use only and may not be sold, redistributed or otherwise used for commercial purposes  All illustrations and images included in CareNotes® are the copyrighted property of A D A M , Inc  or [address]      24 Hour Urine Collection   AMBULATORY CARE:   What you need to know about a 24 hour urine collection:  A 24 hour urine collection is a test to save and measure all the urine you pass in a 24 hour period  The collection may be used to help your healthcare provider identify or treat a medical condition  How to prepare for a 24 hour urine collection:  Your healthcare provider will explain the test and why you need it  He will tell you when to start the urine test and any instructions you need  He will give you a container to collect and store your urine  Eat and drink what you usually would during the collection  How a 24 hour urine collection is done:  Keep your stored urine cool  Put the container in the refrigerator or in a pan with ice  Do not let your urine come in contact with toilet paper or your bowel movement  These will make the urine unusable  · On the morning you start the collection, urinate in the toilet, and flush the first urine you pass  Write down the date and time  That is the start time for the collection  · Collect all urine you pass throughout the day and night  Include the first urine you pass the following morning  That is the end of the collection  · Give the container with the collected urine to your healthcare provider  Make sure to keep it cool  Do not let it sit at room temperature for more than 2 hours  What you need to know about your test results: Your healthcare provider will discuss your test results with you     © [employer] 2021 Information is for End User's use only and may not be sold, redistributed or otherwise used for commercial purposes  All illustrations and images included in CareNotes® are the copyrighted property of [employer]  or Mikki French   The above information is an  only  It is not intended as medical advice for individual conditions or treatments  Talk to your doctor, nurse or pharmacist before following any medical regimen to see if it is safe and effective for you  HISTORY OF PRESENT ILLNESS:  Ady Marie is a 67y o  year old male with medical issues of hypertension x 10 yrs, diabetes mellitus type 2 x 1994, hyperlipidemia, AAA, AFib on anticoagulation, history of muscle invasive bladder cancer, CHF, CAD status post CABG, right adrenal adenoma, nephrolithiasis s/p left ureteral stent in 4/24/21 who presents for initial consultation for hypercalcemia    Patient has been following with endocrinology for right adrenal adenoma 4 1 cm in size, aldosterone and metanephrines level normal as per endocrinology note  Was found to have dexamethasone suppression testing elevated and cell a very cortisol level mildly elevated but 24 hour urine cortisol normal   Noted plan to check DHEA S and ACTH level  Also patient was having hypercalcemia with corrected calcium level 10 6, PTH was 65  As per endocrinology note, possibly primary hyperparathyroidism but urine calcium level was not elevated  Was referred to Nephrology for stone evaluation and proteinuria  It has been on Lasix 80 mg twice daily since hospital admission with CHF exacerbation in March 2021  Has elevated calcium from jan 2021  Labs from 7/2/21: cr 0 82 , calcium 10 6  Denies any flank pain  Denies any lower extremity edema  No shortness of breath    REVIEW OF SYSTEMS:    Review of Systems   Constitutional: Negative for chills and fever  HENT: Negative for ear pain and sore throat  Eyes: Negative for pain and visual disturbance     Respiratory: Negative for cough and shortness of breath  Cardiovascular: Negative for chest pain and palpitations  Gastrointestinal: Negative for abdominal pain and vomiting  Genitourinary: Negative for dysuria and hematuria  Musculoskeletal: Negative for arthralgias and back pain  Skin: Negative for color change and rash  Neurological: Negative for seizures and syncope  All other systems reviewed and are negative  More than 10 point review of systems were obtained and discussed in length with the patient       PAST MEDICAL HISTORY:  Past Medical History:   Diagnosis Date    A-fib [facility])     AAA [facility]abdominal aortic aneurysm) ([facility] [address] )     Actinic keratosis of right temple 7/31/2020    Actinic keratosis of scalp 7/31/2020    Acute respiratory failure with hypoxia ([facility] [address] ) 3/25/2021    Arthritis     Lay's esophagus     Bladder cancer (HCC)     Blister of left leg 4/28/2021    Blister of right leg 5/26/2021    CAD (coronary artery disease)     Cancer (HCC) 02/2010    Bladder    CHF [facility]congestive heart failure) ([facility])     Chronic low back pain     Chronic pain of both knees 7/31/2020    Colitis 12/4/2020    CPAP (continuous positive airway pressure) dependence     Diabetes ([facility] [address] )     Diabetes mellitus ([facility] [address] ) 10/1993    Excessive gas 12/3/2020    Hydroureter on left 4/28/2021    Hyperlipemia     Hypertension     Immunization deficiency 10/30/2020    Hep a nonimmune    Kidney stone     Left lower quadrant abdominal pain 12/3/2020    Mass of right adrenal gland ([facility] [address] ) 12/4/2020    4 1 cm    Myocardial infarction ([facility] )     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    LUCIA (obstructive sleep apnea) 1/29/2021    Pressure ulcer of right leg, stage 1 5/26/2021    Urinary tract infection with hematuria 5/3/2021    u cx 4/2021=pseudomonas R to bactrim and cefdinir, S to cipro    Venous stasis dermatitis of both lower extremities 5/26/2021       PAST SURGICAL HISTORY:  Past Surgical History:   Procedure Laterality Date    BACK SURGERY      BLADDER SURGERY      CATARACT EXTRACTION, BILATERAL      COLONOSCOPY  2019    next  [place]    CORONARY ARTERY BYPASS GRAFT  2016    Quadruple per [place]    EGD  2017    KIDNEY STONE SURGERY      IN CYSTO/URETERO W/LITHOTRIPSY &INDWELL STENT INSRT Left 2021    Procedure: CYSTOSCOPY URETEROSCOPY WITH LITHOTRIPSY HOLMIUM LASER, AND INSERTION STENT URETERAL/EXCHANGE;  Surgeon: Radha Rubio MD;  Location: [facility];  Service: Urology    IN CYSTOURETHROSCOPY,URETER CATHETER Left 2021    Procedure: CYSTOSCOPY  WITH INSERTION STENT URETERAL;  Surgeon: Radha Rubio MD;  [facility];  Service: Urology    TOTAL HIP ARTHROPLASTY Right 2012    TOTAL SHOULDER REPLACEMENT Right 2013       ALLERGIES:  No Known Allergies    SOCIAL HISTORY:  Social History     Substance and Sexual Activity   Alcohol Use Not Currently    Alcohol/week: 0 0 standard drinks    Comment: No use     Social History     Substance and Sexual Activity   Drug Use Never    Comment: No use     Social History     Tobacco Use   Smoking Status Former Smoker    Packs/day: 0 25    Years: 20 00    Pack years: 5 00    Types: Cigarettes    Start date:     Quit date: 2010    Years since quittin 5   Smokeless Tobacco Never Used   Tobacco Comment    Quit several times for up to 6 years         FAMILY HISTORY:  Family History   Problem Relation Age of Onset    Heart disease Father     Arthritis Father     Heart attack Father     Alzheimer's disease Mother        MEDICATIONS:    Current Outpatient Medications:     atorvastatin (LIPITOR) 40 mg tablet, Take 1 tablet (40 mg total) by mouth daily, Disp: 90 tablet, Rfl: 3    clindamycin (CLINDAGEL) 1 % gel, Apply topically 2 (two) times a day Apply on left nipple and buttock, Disp: 60 g, Rfl: 2    Eliquis 5 MG, TAKE 1 TABLET BY MOUTH TWICE A DAY, Disp: 60 tablet, Rfl: 3    ezetimibe (ZETIA) 10 mg tablet, Take 1 tablet (10 mg total) by mouth daily, Disp: 90 tablet, Rfl: 3    finasteride (PROSCAR) 5 mg tablet, Take 1 tablet (5 mg total) by mouth daily, Disp: 90 tablet, Rfl: 3    furosemide (LASIX) 80 mg tablet, Take 1 tablet (80 mg total) by mouth 2 (two) times a day, Disp: 60 tablet, Rfl: 11    glipiZIDE (GLUCOTROL XL) 5 mg 24 hr tablet, Take 1 tablet (5 mg total) by mouth daily, Disp: 90 tablet, Rfl: 1    glucose blood test strip, Use 2 each daily, Disp: 100 strip, Rfl: 2    Lancets (accu-chek multiclix) lancets, Use 1 each 2 (two) times a day Use 2 times daily to test blood glucose, Disp: 102 each, Rfl: 3    Levemir FlexTouch 100 units/mL injection pen, Inject under the skin 65 units in the morning and at bedtime (Patient taking differently: every 12 (twelve) hours Inject under the skin 65 units in the morning and at bedtime), Disp: 120 mL, Rfl: 1    lisinopril (ZESTRIL) 20 mg tablet, Take 1 tablet (20 mg total) by mouth daily, Disp: 90 tablet, Rfl: 3    metFORMIN (GLUCOPHAGE) 1000 MG tablet, TAKE ONE TABLET (500 MG) TWICE A DAY WITH MEALS, Disp: 180 tablet, Rfl: 1    metoprolol succinate (TOPROL-XL) 25 mg 24 hr tablet, Take 0 5 tablets (12 5 mg total) by mouth daily, Disp: 45 tablet, Rfl: 3    omeprazole (PriLOSEC) 20 mg delayed release capsule, Take 1 capsule (20 mg total) by mouth daily, Disp: 30 capsule, Rfl: 1    Unifine Pentips 31G X 8 MM MISC, Inject under the skin daily Use as directed, Disp: 100 each, Rfl: 0    Fluad Quadrivalent 0 5 ML PRSY, PHARMACY ADMINISTERED (Patient not taking: Reported on 7/20/2021), Disp: , Rfl:     omeprazole (PriLOSEC) 20 mg delayed release capsule, Take 20 mg by mouth daily  (Patient not taking: Reported on 7/20/2021), Disp: , Rfl:     phenazopyridine (PYRIDIUM) 200 mg tablet, Take 1 tablet (200 mg total) by mouth 3 (three) times a day as needed for bladder spasms, Disp: 30 tablet, Rfl: 0    potassium chloride (K-DUR,KLOR-CON) 20 mEq tablet, Take 1 tablet (20 mEq total) by mouth daily (Patient not taking: Reported on 7/20/2021), Disp: 30 tablet, Rfl: 11    Unifine Pentips 31G X 8 MM MISC, INJECT UNDER THE SKIN DAILY USE AS DIRECTED, Disp: 100 each, Rfl: 2      PHYSICAL EXAM:  Vitals:    07/20/21 1319   BP: 118/62   BP Location: Left arm   Patient Position: Sitting   Cuff Size: Large   Pulse: 61   Weight: (!) 142 kg (312 lb)   Height: 6' 4" (1 93 m)     Body mass index is 37 98 kg/m²  Physical Exam  Constitutional:       General: He is not in acute distress  Appearance: He is well-developed  He is not diaphoretic  HENT:      Head: Normocephalic and atraumatic  Mouth/Throat:      Mouth: Mucous membranes are moist    Eyes:      General: No scleral icterus  Conjunctiva/sclera: Conjunctivae normal       Pupils: Pupils are equal, round, and reactive to light  Neck:      Thyroid: No thyromegaly  Cardiovascular:      Rate and Rhythm: Normal rate and regular rhythm  Heart sounds: Normal heart sounds  No murmur heard  No friction rub  Pulmonary:      Effort: Pulmonary effort is normal  No respiratory distress  Breath sounds: Normal breath sounds  No wheezing or rales  Abdominal:      General: Bowel sounds are normal  There is no distension  Palpations: Abdomen is soft  Tenderness: There is no abdominal tenderness  Musculoskeletal:         General: No deformity  Cervical back: Neck supple  Right lower leg: No edema  Left lower leg: No edema  Lymphadenopathy:      Cervical: No cervical adenopathy  Skin:     Coloration: Skin is not pale  Nails: There is no clubbing  Neurological:      Mental Status: He is alert and oriented to person, place, and time  He is not disoriented  Psychiatric:         Mood and Affect: Mood normal  Mood is not anxious  Affect is not inappropriate  Behavior: Behavior normal          Thought Content:  Thought content normal            Lab Results:   Results for orders placed or performed in visit on 07/12/21 Calcium, urine, 24 hour Lab Collect   Result Value Ref Range    24H Urine Volume 2,300 mL    Calcium, 24H Urine 126 5 42 - 353 mg/24 hrs   Creatinine, urine, 24 hour Lab Collect   Result Value Ref Range    Creatinine, 24H Ur 1 3 0 8 - 1 8 g/24Hr    TOTAL URINE VOLUME 2,300 ml             Invalid input(s): ALBUMIN    Lab Results:         Invalid input(s): ALBUMIN    Laboratory Results:  Lab Results   Component Value Date    WBC 10 39 (H) 04/29/2021    HGB 11 5 (L) 04/29/2021    HCT 37 8 04/29/2021    MCV 90 04/29/2021     04/29/2021     Lab Results   Component Value Date    SODIUM 138 07/02/2021    K 3 9 07/02/2021     07/02/2021    CO2 28 07/02/2021    BUN 31 (H) 07/02/2021    CREATININE 0 82 07/02/2021    GLUC 187 (H) 06/03/2021    CALCIUM 9 8 07/02/2021     Lab Results   Component Value Date    CALCIUM 9 8 07/02/2021    PHOS 3 5 07/02/2021     No results found for: LABPROT  [unfilled]  Lab Results   Component Value Date    PTH 65 5 07/02/2021    CALCIUM 9 8 07/02/2021    PHOS 3 5 07/02/2021     [unfilled]    Portions of the record may have been created with voice recognition software  Occasional wrong word or "sound a like" substitutions may have occurred due to the inherent limitations of voice recognition software  Read the chart carefully and recognize, using context, where substitutions have occurred

## 2021-07-20 NOTE — PATIENT INSTRUCTIONS
Ordered work up for hypercalcemia  BP stable  Continue lasix   Ordered 24 hrs urine test for kidney stones    Repeat labs in one month and follow up in October with labs       Kidney Stones   AMBULATORY CARE:   Kidney stones  form in the urinary system when the water and waste in your urine are out of balance  When this happens, certain types of waste crystals separate from the urine  The crystals build up and form kidney stones  Kidney stones can be made of uric acid, calcium, phosphate, or oxalate crystals  You may have 1 or more kidney stones  Common symptoms include the following:   · Pain in the middle of your back that moves across to your side or that may spread to your groin    · Nausea and vomiting    · Urge to urinate often, burning feeling when you urinate, or pink or red urine    · Tenderness in your lower back, side, or stomach    Seek care immediately if:   · You are vomiting and it is not relieved with medicine  Call your doctor or kidney specialist if:   · You have a fever  · You have trouble urinating  · You see blood in your urine  · You have severe pain  · You have any questions or concerns about your condition or care  Treatment for kidney stones  may include any of the following:  · NSAIDs , such as ibuprofen, help decrease swelling, pain, and fever  This medicine is available with or without a doctor's order  NSAIDs can cause stomach bleeding or kidney problems in certain people  If you take blood thinner medicine, always ask your healthcare provider if NSAIDs are safe for you  Always read the medicine label and follow directions  · Acetaminophen  decreases pain and fever  It is available without a doctor's order  Ask how much to take and how often to take it  Follow directions  Read the labels of all other medicines you are using to see if they also contain acetaminophen, or ask your doctor or pharmacist  Acetaminophen can cause liver damage if not taken correctly  Do not use more than 4 grams (4,000 milligrams) total of acetaminophen in one day  · Prescription pain medicine  may be given  Ask your healthcare provider how to take this medicine safely  Some prescription pain medicines contain acetaminophen  Do not take other medicines that contain acetaminophen without talking to your healthcare provider  Too much acetaminophen may cause liver damage  Prescription pain medicine may cause constipation  Ask your healthcare provider how to prevent or treat constipation  · Medicines  to balance your electrolytes may be needed  · A procedure or surgery  to remove the kidney stones may be needed if they do not pass on their own  Your treatment will depend on the size and location of your kidney stones  What you can do to manage kidney stones:   · Drink more liquids  Your healthcare provider may tell you to drink at least 8 to 12 (eight-ounce) cups of liquids each day  This helps flush out the kidney stones when you urinate  Water is the best liquid to drink  · Strain your urine every time you go to the bathroom  Urinate through a strainer or a piece of thin cloth to catch the stones  Take the stones to your healthcare provider so they can be sent to the lab for tests  This will help your healthcare providers plan the best treatment for you  · Eat a variety of healthy foods  Healthy foods include fruits, vegetables, whole-grain breads, low-fat dairy products, beans, and fish  You may need to limit how much sodium (salt) or protein you eat  Ask for information about the best foods for you  · Be physically active as directed  Your stones may pass more easily if you stay active  Physical activity can also help you manage your weight  Ask about the best activities for you  After you pass the kidney stones:   Your healthcare provider may  order a 24-hour urine test  Results from a 24-hour urine test will help your healthcare provider plan ways to prevent more stones from forming  Your healthcare provider will give you more instructions  Follow up with your doctor or kidney specialist as directed:  Write down your questions so you remember to ask them during your visits  © Copyright [employer] 2021 Information is for End User's use only and may not be sold, redistributed or otherwise used for commercial purposes  All illustrations and images included in CareNotes® are the copyrighted property of A D A M [employer]  or [facility] Jeffy French   The above information is an  only  It is not intended as medical advice for individual conditions or treatments  Talk to your doctor, nurse or pharmacist before following any medical regimen to see if it is safe and effective for you  Low Oxalate Diet   WHAT YOU NEED TO KNOW:   What is a low-oxalate diet? A low-oxalate diet is a meal plan that is low in oxalate  Oxalate is a chemical found in plant foods  You may need to eat foods that are low in oxalate to help clear kidney stones or prevent them from forming  People who have had kidney stones are at a higher risk of forming kidney stones again  The most common type of kidney stone is made up of crystals that contain calcium and oxalate  Your healthcare provider or dietitian may recommend that you limit oxalate if you get this type of kidney stone often  What foods should I include? Include the following foods that have a low to medium amount of oxalate  · Grains:      ? Egg noodles    ? Daniel crackers    ? Pancakes and waffles    ? Cooked and dry cereals without nuts or bran    ? White or wild rice    ? White bread, cornbread, bagels, and white English muffins (medium oxalate)    ? Saltine or soda crackers and vanilla wafers (medium oxalate)    ? Brown rice, spaghetti, and other noodles and pastas (medium oxalate)    · Fruit:      ? Apples, bananas, grapes    ? Grapefruit and cranberries    ? Peaches, nectarines, apricots, and pears    ?  Papayas and strawberries    ? Melons and pineapples    ? Blackberries, blueberries, mangoes, and prunes (medium oxalate)    · Vegetables:      ? Artichokes, asparagus, and brussels sprouts    ? Broccoli and cauliflower    ? Kale, endive, cabbage, and lettuce    ? Cucumbers, peas, and zucchini    ? Mushrooms, onions, and peppers    ? Potatoes and corn    ? Carrots, celery, and green beans (medium oxalate)    ? Parsnips, summer squash, tomatoes, and turnips (medium oxalate)    · Dairy:      ? American cheese, Swiss cheese, cottage cheese, ricotta cheese, and cheddar cheese    ? Milk and buttermilk    ? Yogurt    · Protein foods:      ? Meat, fish, shellfish, chicken, and turkey    ? Lunch meat and ham (medium oxalate)    ? Hot dogs, bratwurst, mauro, and sausage (medium oxalate)    · Drinks and desserts:      ? Coffee    ? Fruit punch and lemonade or limeade without added vitamin C    · Desserts:      ? Cookies, cakes, and ice cream    ? Pudding without chocolate    What foods should I limit or avoid? Limit the following foods that are high in oxalate  · Grains:      ? Wheat bran, wheat germ, and barley    ? Grits and bran cereal    ? White corn flour and buckwheat flour    ? Whole wheat bread    · Fruit:      ? Dried apricots    ? Red currants, figs, and rhubarb    ? Kiwi    · Vegetables:      ? Jeison greens, leeks, okra, and spinach    ? Wax beans     ? Eggplant    ? Beets and beet greens    ? Swiss chard, escarole, parsley, and rutabagas    ? Tomato paste    · Protein foods:      ? Baked beans with tomato sauce    ? Nut butters and nuts (peanuts, almonds, pecans, cashews, hazelnuts)    ? Soy burgers    ? Miso    ? Dried beans    · Desserts:      ? Fruitcake    ? Chocolate    ? Carob and marmalade    · Beverages:      ? Chocolate drink mixes    ? Soy milk    ? Instant iced tea    · Other foods:      ? Sesame seeds and tahini (paste made of sesame seeds)    ? Poppy seeds    What other dietary guidelines should I follow? · Drink about 12 to 16 (eight-ounce) cups of liquid each day  Liquids help clear kidney stones and prevent them from forming again  Water is the best liquid to drink  You may need more liquid if you are physically active  Ask your healthcare provider or dietitian how much liquid you need to drink each day  · Your healthcare provider may suggest that you make other diet changes to help prevent kidney stones  This may include decreasing the amount of sodium you eat each day  CARE AGREEMENT:   You have the right to help plan your care  Discuss treatment options with your healthcare provider to decide what care you want to receive  You always have the right to refuse treatment  The above information is an  only  It is not intended as medical advice for individual conditions or treatments  Talk to your doctor, nurse or pharmacist before following any medical regimen to see if it is safe and effective for you  © Copyright [employer] 2021 Information is for End User's use only and may not be sold, redistributed or otherwise used for commercial purposes  All illustrations and images included in CareNotes® are the copyrighted property of A D A M , Inc  or [address] [place] [address]      24 Hour Urine Collection   AMBULATORY CARE:   What you need to know about a 24 hour urine collection:  A 24 hour urine collection is a test to save and measure all the urine you pass in a 24 hour period  The collection may be used to help your healthcare provider identify or treat a medical condition  How to prepare for a 24 hour urine collection:  Your healthcare provider will explain the test and why you need it  He will tell you when to start the urine test and any instructions you need  He will give you a container to collect and store your urine  Eat and drink what you usually would during the collection  How a 24 hour urine collection is done:  Keep your stored urine cool   Put the container in the refrigerator or in a pan with ice  Do not let your urine come in contact with toilet paper or your bowel movement  These will make the urine unusable  · On the morning you start the collection, urinate in the toilet, and flush the first urine you pass  Write down the date and time  That is the start time for the collection  · Collect all urine you pass throughout the day and night  Include the first urine you pass the following morning  That is the end of the collection  · Give the container with the collected urine to your healthcare provider  Make sure to keep it cool  Do not let it sit at room temperature for more than 2 hours  What you need to know about your test results: Your healthcare provider will discuss your test results with you  © Copyright [employer] 2021 Information is for End User's use only and may not be sold, redistributed or otherwise used for commercial purposes  All illustrations and images included in CareNotes® are the copyrighted property of A [employer] M [employer] Inc  or [facility] Jeffy French   The above information is an  only  It is not intended as medical advice for individual conditions or treatments  Talk to your doctor, nurse or pharmacist before following any medical regimen to see if it is safe and effective for you

## 2021-07-22 ENCOUNTER — HOSPITAL ENCOUNTER (OUTPATIENT)
Dept: RADIOLOGY | Facility: HOSPITAL | Age: 76
Discharge: HOME | End: 2021-07-22
Payer: MEDICARE

## 2021-07-22 VITALS — BODY MASS INDEX: 37.99 KG/M2 | HEIGHT: 76 IN | WEIGHT: 312 LBS

## 2021-07-22 DIAGNOSIS — N64.4 NIPPLE PAIN: ICD-10-CM

## 2021-07-22 DIAGNOSIS — N64.4 BREAST PAIN IN MALE: ICD-10-CM

## 2021-07-22 PROCEDURE — 77066 DX MAMMO INCL CAD BI: CPT

## 2021-07-22 PROCEDURE — 76642 ULTRASOUND BREAST LIMITED: CPT

## 2021-07-22 PROCEDURE — G0279 TOMOSYNTHESIS, MAMMO: HCPCS

## 2021-07-23 ENCOUNTER — TELEPHONE (OUTPATIENT)
Dept: FAMILY MEDICINE CLINIC | Facility: CLINIC | Age: 76
End: 2021-07-23

## 2021-07-23 NOTE — TELEPHONE ENCOUNTER
----- Message from Mahsa Holm MD sent at 7/22/2021  8:06 PM EDT -----  Please notify pt of normal breast imagings   No cause found for pt's pain, most likely muscular

## 2021-07-26 ENCOUNTER — PATIENT MESSAGE (OUTPATIENT)
Dept: FAMILY MEDICINE CLINIC | Facility: CLINIC | Age: 76
End: 2021-07-26

## 2021-07-26 ENCOUNTER — LAB (OUTPATIENT)
Dept: LAB | Facility: AMBULARY SURGERY CENTER | Age: 76
End: 2021-07-26
Payer: MEDICARE

## 2021-07-26 DIAGNOSIS — E27.9 LESION OF ADRENAL GLAND (HCC): ICD-10-CM

## 2021-07-26 DIAGNOSIS — I10 ESSENTIAL HYPERTENSION: ICD-10-CM

## 2021-07-26 DIAGNOSIS — E83.52 HYPERCALCEMIA: ICD-10-CM

## 2021-07-26 DIAGNOSIS — N20.0 KIDNEY STONES: ICD-10-CM

## 2021-07-26 DIAGNOSIS — N20.0 NEPHROLITHIASIS: ICD-10-CM

## 2021-07-26 DIAGNOSIS — Z79.4 TYPE 2 DIABETES MELLITUS WITH HYPERGLYCEMIA, WITH LONG-TERM CURRENT USE OF INSULIN (HCC): ICD-10-CM

## 2021-07-26 DIAGNOSIS — E27.8 MASS OF RIGHT ADRENAL GLAND (HCC): ICD-10-CM

## 2021-07-26 DIAGNOSIS — E78.2 MIXED HYPERLIPIDEMIA: ICD-10-CM

## 2021-07-26 DIAGNOSIS — E11.65 TYPE 2 DIABETES MELLITUS WITH HYPERGLYCEMIA, WITH LONG-TERM CURRENT USE OF INSULIN (HCC): ICD-10-CM

## 2021-07-26 DIAGNOSIS — R80.1 PERSISTENT PROTEINURIA: ICD-10-CM

## 2021-07-26 LAB
ANION GAP SERPL CALCULATED.3IONS-SCNC: 2 MMOL/L (ref 4–13)
BUN SERPL-MCNC: 30 MG/DL (ref 5–25)
CA-I BLD-SCNC: 1.19 MMOL/L (ref 1.12–1.32)
CALCIUM SERPL-MCNC: 9.8 MG/DL (ref 8.3–10.1)
CHLORIDE SERPL-SCNC: 103 MMOL/L (ref 100–108)
CO2 SERPL-SCNC: 32 MMOL/L (ref 21–32)
CREAT SERPL-MCNC: 0.95 MG/DL (ref 0.6–1.3)
CREAT UR-MCNC: 71.1 MG/DL
GFR SERPL CREATININE-BSD FRML MDRD: 77 ML/MIN/1.73SQ M
GLUCOSE P FAST SERPL-MCNC: 140 MG/DL (ref 65–99)
POTASSIUM SERPL-SCNC: 4.2 MMOL/L (ref 3.5–5.3)
PROT UR-MCNC: 10 MG/DL
PROT/CREAT UR: 0.14 MG/G{CREAT} (ref 0–0.1)
SODIUM SERPL-SCNC: 137 MMOL/L (ref 136–145)
TSH SERPL DL<=0.05 MIU/L-ACNC: 1.3 UIU/ML (ref 0.36–3.74)
URATE SERPL-MCNC: 8.6 MG/DL (ref 4.2–8)

## 2021-07-26 PROCEDURE — 82131 AMINO ACIDS SINGLE QUANT: CPT

## 2021-07-26 PROCEDURE — 84300 ASSAY OF URINE SODIUM: CPT

## 2021-07-26 PROCEDURE — 82507 ASSAY OF CITRATE: CPT

## 2021-07-26 PROCEDURE — 81003 URINALYSIS AUTO W/O SCOPE: CPT

## 2021-07-26 PROCEDURE — 84165 PROTEIN E-PHORESIS SERUM: CPT | Performed by: PATHOLOGY

## 2021-07-26 PROCEDURE — 84443 ASSAY THYROID STIM HORMONE: CPT

## 2021-07-26 PROCEDURE — 83945 ASSAY OF OXALATE: CPT

## 2021-07-26 PROCEDURE — 36415 COLL VENOUS BLD VENIPUNCTURE: CPT

## 2021-07-26 PROCEDURE — 84105 ASSAY OF URINE PHOSPHORUS: CPT

## 2021-07-26 PROCEDURE — 84156 ASSAY OF PROTEIN URINE: CPT

## 2021-07-26 PROCEDURE — 84550 ASSAY OF BLOOD/URIC ACID: CPT

## 2021-07-26 PROCEDURE — 83735 ASSAY OF MAGNESIUM: CPT

## 2021-07-26 PROCEDURE — 84133 ASSAY OF URINE POTASSIUM: CPT

## 2021-07-26 PROCEDURE — 83883 ASSAY NEPHELOMETRY NOT SPEC: CPT

## 2021-07-26 PROCEDURE — 82652 VIT D 1 25-DIHYDROXY: CPT

## 2021-07-26 PROCEDURE — 84392 ASSAY OF URINE SULFATE: CPT

## 2021-07-26 PROCEDURE — 82340 ASSAY OF CALCIUM IN URINE: CPT

## 2021-07-26 PROCEDURE — 84166 PROTEIN E-PHORESIS/URINE/CSF: CPT

## 2021-07-26 PROCEDURE — 82140 ASSAY OF AMMONIA: CPT

## 2021-07-26 PROCEDURE — 82436 ASSAY OF URINE CHLORIDE: CPT

## 2021-07-26 PROCEDURE — 84560 ASSAY OF URINE/URIC ACID: CPT

## 2021-07-26 PROCEDURE — 82397 CHEMILUMINESCENT ASSAY: CPT

## 2021-07-26 PROCEDURE — 84166 PROTEIN E-PHORESIS/URINE/CSF: CPT | Performed by: PATHOLOGY

## 2021-07-26 PROCEDURE — 80048 BASIC METABOLIC PNL TOTAL CA: CPT

## 2021-07-26 PROCEDURE — 82330 ASSAY OF CALCIUM: CPT

## 2021-07-26 PROCEDURE — 83935 ASSAY OF URINE OSMOLALITY: CPT

## 2021-07-26 PROCEDURE — 82570 ASSAY OF URINE CREATININE: CPT

## 2021-07-26 PROCEDURE — 84165 PROTEIN E-PHORESIS SERUM: CPT

## 2021-07-27 LAB
KAPPA LC FREE SER-MCNC: 81.8 MG/L (ref 3.3–19.4)
KAPPA LC FREE/LAMBDA FREE SER: 2.57 {RATIO} (ref 0.26–1.65)
LAMBDA LC FREE SERPL-MCNC: 31.8 MG/L (ref 5.7–26.3)

## 2021-07-28 ENCOUNTER — TELEPHONE (OUTPATIENT)
Dept: NEPHROLOGY | Facility: CLINIC | Age: 76
End: 2021-07-28

## 2021-07-28 LAB
1,25(OH)2D3 SERPL-MCNC: 51 PG/ML (ref 19.9–79.3)
ALBUMIN SERPL ELPH-MCNC: 4.01 G/DL (ref 3.5–5)
ALBUMIN SERPL ELPH-MCNC: 50.8 % (ref 52–65)
ALBUMIN UR ELPH-MCNC: 100 %
ALPHA1 GLOB MFR UR ELPH: 0 %
ALPHA1 GLOB SERPL ELPH-MCNC: 0.37 G/DL (ref 0.1–0.4)
ALPHA1 GLOB SERPL ELPH-MCNC: 4.7 % (ref 2.5–5)
ALPHA2 GLOB MFR UR ELPH: 0 %
ALPHA2 GLOB SERPL ELPH-MCNC: 1.08 G/DL (ref 0.4–1.2)
ALPHA2 GLOB SERPL ELPH-MCNC: 13.7 % (ref 7–13)
B-GLOBULIN MFR UR ELPH: 0 %
BETA GLOB ABNORMAL SERPL ELPH-MCNC: 0.51 G/DL (ref 0.4–0.8)
BETA1 GLOB SERPL ELPH-MCNC: 6.5 % (ref 5–13)
BETA2 GLOB SERPL ELPH-MCNC: 6.4 % (ref 2–8)
BETA2+GAMMA GLOB SERPL ELPH-MCNC: 0.51 G/DL (ref 0.2–0.5)
GAMMA GLOB ABNORMAL SERPL ELPH-MCNC: 1.41 G/DL (ref 0.5–1.6)
GAMMA GLOB MFR UR ELPH: 0 %
GAMMA GLOB SERPL ELPH-MCNC: 17.9 % (ref 12–22)
IGG/ALB SER: 1.03 {RATIO} (ref 1.1–1.8)
PROT PATTERN SERPL ELPH-IMP: ABNORMAL
PROT PATTERN UR ELPH-IMP: NORMAL
PROT SERPL-MCNC: 7.9 G/DL (ref 6.4–8.2)
PROT UR-MCNC: 10 MG/DL

## 2021-07-28 NOTE — TELEPHONE ENCOUNTER
Discussed labs with patient, and calcium is still high at 10 6 using albumin from previous blood work from 3 0, uncorrected calcium was 9 8, ionized calcium within normal range  Renal function is stable, uric acid was slightly elevated at 8 6, will follow-up results of 24 hour urine stone risk profile before deciding on allopurinol    Recommended decreasing intake of high purine diet    Still pending, will 125 vitamin-D and PTH level

## 2021-07-30 LAB — PTH RELATED PROT SERPL-SCNC: <2 PMOL/L

## 2021-08-02 RX ORDER — EZETIMIBE 10 MG/1
10 TABLET ORAL DAILY
Qty: 30 TABLET | Refills: 11 | Status: SHIPPED | OUTPATIENT
Start: 2021-08-02 | End: 2022-02-05 | Stop reason: SDUPTHER

## 2021-08-02 RX ORDER — LISINOPRIL 20 MG/1
20 TABLET ORAL DAILY
Qty: 30 TABLET | Refills: 11 | Status: SHIPPED | OUTPATIENT
Start: 2021-08-02 | End: 2022-08-10

## 2021-08-03 DIAGNOSIS — Z79.4 TYPE 2 DIABETES MELLITUS WITH HYPERGLYCEMIA, WITH LONG-TERM CURRENT USE OF INSULIN (HCC): ICD-10-CM

## 2021-08-03 DIAGNOSIS — E11.65 TYPE 2 DIABETES MELLITUS WITH HYPERGLYCEMIA, WITH LONG-TERM CURRENT USE OF INSULIN (HCC): ICD-10-CM

## 2021-08-03 NOTE — TELEPHONE ENCOUNTER
----- Message from Smith Herr on behalf of Loius Lyle sent at 8/3/2021  8:50 AM EDT -----  Regarding: Prescription Question  Contact: [phone]  This message is being sent by mSith Herr on behalf of Daljit Tadeo and I need refills for the following medications:  Gabriela= Klor-Con 20 mg  Home= Accu check lancets fast clix drum, Accu check Guide test strips  Please sent refills to [facility]    Thank you

## 2021-08-04 LAB
AMMONIA 24H UR-MRATE: 27 MEQ/24 HR
AMMONIA UR-SCNC: ABNORMAL UG/DL
CA H2 PHOS DIHYD CRY URNS QL MICRO: 0.08 RATIO (ref 0–3)
CALCIUM 24H UR-MCNC: 3.4 MG/DL
CALCIUM 24H UR-MRATE: 61.2 MG/24 HR (ref 100–300)
CHLORIDE 24H UR-SCNC: 48 MMOL/L
CHLORIDE 24H UR-SRATE: 86 MMOL/24 HR (ref 110–250)
CITRATE 24H UR-MCNC: 64 MG/L
CITRATE 24H UR-MRATE: 115 MG/24 HR (ref 320–1240)
COM CRY STONE QL IR: 4.19 RATIO (ref 0–6)
CREAT 24H UR-MCNC: 68.5 MG/DL
CREAT 24H UR-MRATE: 1233 MG/24 HR (ref 1000–2000)
CYSTINE 24H UR-MCNC: 12.14 MG/L
CYSTINE 24H UR-MRATE: 21.85 MG/24 HR (ref 10–100)
MAGNESIUM 24H UR-MRATE: 59 MG/24 HR (ref 12–293)
MAGNESIUM UR-MCNC: 3.3 MG/DL
NA URATE CRY STONE QL IR: 0.57 RATIO (ref 0–4)
OSMOLALITY UR: 373 MOSMOL/KG (ref 300–900)
OXALATE 24H UR-MRATE: 47 MG/24 HR (ref 7–44)
OXALATE UR-MCNC: 26 MG/L
PH 24H UR: 5.3 [PH]
PHOSPHATE 24H UR-MCNC: 31.9 MG/DL
PHOSPHATE 24H UR-MRATE: 574.2 MG/24 HR (ref 400–1300)
PLEASE NOTE (STONE RISK): ABNORMAL
POTASSIUM 24H UR-SCNC: 48.6 MMOL/24 HR (ref 25–125)
POTASSIUM UR-SCNC: 27 MMOL/L
PRESERVED URINE: 1800 ML/24 HR (ref 800–1800)
SODIUM 24H UR-SCNC: 60 MMOL/L
SODIUM 24H UR-SRATE: 108 MMOL/24 HR (ref 58–337)
SPECIMEN VOL 24H UR: 1800 ML/24 HR (ref 800–1800)
SULFATE 24H UR-MCNC: 23 MEQ/24 HR (ref 0–30)
SULFATE UR-MCNC: 13 MEQ/L
TRI-PHOS CRY STONE MICRO: 0.01 RATIO (ref 0–1)
URATE 24H UR-MCNC: 15.8 MG/DL
URATE 24H UR-MRATE: 284 MG/24 HR (ref 250–750)
URATE DIHYD CRY STONE QL IR: 1.89 RATIO (ref 0–1.2)

## 2021-08-04 RX ORDER — LANCETS
1 EACH MISCELLANEOUS 2 TIMES DAILY
Qty: 102 EACH | Refills: 3 | Status: SHIPPED | OUTPATIENT
Start: 2021-08-04

## 2021-08-05 ENCOUNTER — TELEPHONE (OUTPATIENT)
Dept: NEPHROLOGY | Facility: CLINIC | Age: 76
End: 2021-08-05

## 2021-08-05 NOTE — TELEPHONE ENCOUNTER
8/5/2021 LM for patient to call and schedule October f/u appointment w/ Dr Raoul Rodriges in [facility]

## 2021-08-06 ENCOUNTER — TELEPHONE (OUTPATIENT)
Dept: OTHER | Facility: HOSPITAL | Age: 76
End: 2021-08-06

## 2021-08-06 ENCOUNTER — OFFICE VISIT (OUTPATIENT)
Dept: CARDIOLOGY CLINIC | Facility: CLINIC | Age: 76
End: 2021-08-06
Payer: MEDICARE

## 2021-08-06 VITALS
HEIGHT: 76 IN | BODY MASS INDEX: 38.11 KG/M2 | SYSTOLIC BLOOD PRESSURE: 118 MMHG | OXYGEN SATURATION: 93 % | DIASTOLIC BLOOD PRESSURE: 60 MMHG | HEART RATE: 99 BPM | WEIGHT: 313 LBS

## 2021-08-06 DIAGNOSIS — I48.0 PAROXYSMAL ATRIAL FIBRILLATION (HCC): Primary | ICD-10-CM

## 2021-08-06 DIAGNOSIS — I25.10 CORONARY ARTERY DISEASE INVOLVING NATIVE CORONARY ARTERY OF NATIVE HEART WITHOUT ANGINA PECTORIS: ICD-10-CM

## 2021-08-06 DIAGNOSIS — R82.991 HYPOCITRATURIA: Primary | ICD-10-CM

## 2021-08-06 DIAGNOSIS — I21.A9 OTHER TYPE OF MYOCARDIAL INFARCTION (HCC): ICD-10-CM

## 2021-08-06 DIAGNOSIS — E78.2 MIXED HYPERLIPIDEMIA: ICD-10-CM

## 2021-08-06 DIAGNOSIS — R60.0 BILATERAL EDEMA OF LOWER EXTREMITY: ICD-10-CM

## 2021-08-06 DIAGNOSIS — I71.4 ABDOMINAL AORTIC ANEURYSM (AAA) WITHOUT RUPTURE (HCC): ICD-10-CM

## 2021-08-06 DIAGNOSIS — S80.822A BLISTER OF LEFT LOWER EXTREMITY, INITIAL ENCOUNTER: ICD-10-CM

## 2021-08-06 DIAGNOSIS — I50.32 CHRONIC DIASTOLIC CHF (CONGESTIVE HEART FAILURE) (HCC): ICD-10-CM

## 2021-08-06 DIAGNOSIS — I50.33 ACUTE ON CHRONIC DIASTOLIC HEART FAILURE (HCC): ICD-10-CM

## 2021-08-06 PROBLEM — I50.9 CHF (CONGESTIVE HEART FAILURE) (HCC): Status: RESOLVED | Noted: 2021-04-23 | Resolved: 2021-08-06

## 2021-08-06 PROCEDURE — 93000 ELECTROCARDIOGRAM COMPLETE: CPT | Performed by: INTERNAL MEDICINE

## 2021-08-06 PROCEDURE — 99215 OFFICE O/P EST HI 40 MIN: CPT | Performed by: INTERNAL MEDICINE

## 2021-08-06 RX ORDER — POTASSIUM CITRATE 10 MEQ/1
10 TABLET, EXTENDED RELEASE ORAL 2 TIMES DAILY
Qty: 180 TABLET | Refills: 3 | Status: SHIPPED | OUTPATIENT
Start: 2021-08-06

## 2021-08-06 RX ORDER — METOPROLOL SUCCINATE 25 MG/1
25 TABLET, EXTENDED RELEASE ORAL DAILY
Qty: 90 TABLET | Refills: 3 | Status: SHIPPED | OUTPATIENT
Start: 2021-08-06 | End: 2021-12-30 | Stop reason: HOSPADM

## 2021-08-06 NOTE — TELEPHONE ENCOUNTER
Not able to reach patient, left message  Started on potassium citrate 10 meq twice daily  Stressed on fluid intake as urine volume was only is 1800 mL  Recommend low oxalate diet as 24 hour urine oxalate was slightly elevated    Check BMP in 1 month to monitor potassium level, if potassium level trends up, would stop oral potassium chloride    Based on medication decreased it appears patient is not taking potassium chloride at this time

## 2021-08-06 NOTE — PROGRESS NOTES
[facility]  Office progress note  Desiree Umaña 67 y o  male MRN: [id number]        Problems    1  Paroxysmal atrial fibrillation (HCC)  POCT ECG   2  Acute on chronic diastolic heart failure (HCC)  metoprolol succinate (TOPROL-XL) 25 mg 24 hr tablet   3  Coronary artery disease involving native coronary artery of native heart without angina pectoris     4  Chronic diastolic CHF (congestive heart failure) ([facility] )     5  Mixed hyperlipidemia     6  Other type of myocardial infarction ([facility] )     7  Abdominal aortic aneurysm (AAA) without rupture ([facility] )     8  Bilateral edema of lower extremity     9   Blister of left lower extremity, initial encounter         Impression:       Coronary artery disease  o CABG x3 in 2016, LIMA to LAD, SVG to OM3, SVG to PDA  o He had full recovery of his moderate ischemic cardiomyopathy  o At this time he experiences no ischemic symptoms  o Echo 3/23/2021-LVEF 60%, but RV mildly dilated with mildly reduced function   Chronic diastolic/right-sided CHF  o Still has about 1+ leg edema with occasional blistering on the left, using compression stockings but only at 15-20 mmHg  o Metolazone cause severe lightheadedness, is not well tolerated  o Currently fairly stable on furosemide 80 mg p o  B i d   o Still needs aggressive calorie restriction, dietary modification as his weight is really have not changed at about 311 lb  o Creatinine stable  o Electrolytes stable  o Advised Ace wraps or higher compression stockings to his legs   AAA  o Unchanged at 47 mm by last CT, noncontrast of the abdomen 1/5/2021  o Eventually will need follow-up in 2022   Lower extremity atherosclerosis  o Follows with vascular, he has occluded dorsalis pedis bilaterally but no significant claudication   Mixed hyperlipidemia  o Under reasonable control other than elevated triglycerides which remain elevated due to obesity   Type 2 diabetes  o Continues to worsen, previously 7 5, now 8 5   Hypertension  o Well controlled   Atrial fibrillation  o Paroxysmal, asymptomatic  o Recurred during heart failure exacerbation 3/21  o Continues on Eliquis  o Only on low-dose metoprolol succinate, had previously been on digoxin which was discontinued  o Heart rates show room for escalation of metoprolol today    Plan    Will increase Toprol to 25 mg daily  Recommend Ace wraps/compression stockings of 20-30 mmHg  Continue leg elevation  Continue low-sodium diet  Continue calorie restriction and working on dietary modification for overall weight loss  Three-month follow-up with labs    I have spent 40 minutes with Patient and family today in which greater than 50% of this time was spent in counseling/coordination of care regarding Diagnostic results, Risks and benefits of tx options, Intructions for management, Patient and family education, Importance of tx compliance and Impressions  HPI: Tomasz Decker is a 67y o  year old male with CAD, atrial fibrillation, diastolic/right-sided CHF, obesity, hypertension, diabetes, mixed hyperlipidemia, LUCIA returns for a follow-up appointment    AFib has not recurred, at least from a symptomatic standpoint, sinus rhythm with PACs on EKG today  Stable on Eliquis  Only on low-dose Toprol, previously on digoxin which was stopped due to maintaining sinus rhythm, heart rates are slightly higher today around 85, and certainly room to increased Toprol from a heart failure standpoint  Denies lightheadedness or dizziness  Still has leg edema, occasional blistering of the left leg, nursing is still dealing with wound care at the house, he is wearing 15/20 mm per mercury compression stockings, keeping his legs elevated as much as possible, but at 311 lb remains still severely overweight unsuccessful weight loss despite voicing to me his attempts at changing his diet  No recent hospitalizations from a CHF standpoint    Renal function is stable, electrolytes stable  Unfortunately diabetes is worse with an A1c up to 8 5 despite what he tells me is dietary modification        Review of Systems   Respiratory: Positive for shortness of breath  Cardiovascular: Positive for leg swelling  All other systems reviewed and are negative          Past Medical History:   Diagnosis Date    [facility]-fib [facility])     AAA (abdominal aortic aneurysm) ([facility] )     Actinic keratosis of right temple 7/31/2020    Actinic keratosis of scalp 7/31/2020    Acute respiratory failure with hypoxia (HCC) 3/25/2021    Arthritis     Lay's esophagus     Bladder cancer (HCC)     Blister of left leg 4/28/2021    Blister of right leg 5/26/2021    CAD (coronary artery disease)     Cancer (HCC) 02/2010    Bladder    [facility]     Chronic low back pain     Chronic pain of both knees 7/31/2020    Colitis 12/4/2020    CPAP (continuous positive airway pressure) dependence     Diabetes ([facility] )     Diabetes mellitus ([facility] ) 10/1993    Excessive gas 12/3/2020    History of chemotherapy     Hydroureter on left 4/28/2021    Hyperlipemia     Hypertension     Immunization deficiency 10/30/2020    Hep a nonimmune    Kidney stone     Left lower quadrant abdominal pain 12/3/2020    Mass of right adrenal gland ([facility] ) 12/4/2020    4 1 cm    Myocardial infarction ([facility] )     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    LUCIA (obstructive sleep apnea) 1/29/2021    Pressure ulcer of right leg, stage 1 5/26/2021    Urinary tract infection with hematuria 5/3/2021    u cx 4/2021=pseudomonas R to bactrim and cefdinir, S to cipro    Venous stasis dermatitis of both lower extremities 5/26/2021     Past Surgical History:   Procedure Laterality Date    BACK SURGERY      BLADDER SURGERY      CATARACT EXTRACTION, BILATERAL      COLONOSCOPY  01/29/2019    next 2022 [address] GRAFT  04/2016    Quadruple per [place]    EGD  06/2017    KIDNEY STONE SURGERY  SD CYSTO/URETERO W/LITHOTRIPSY &INDWELL STENT INSRT Left 2021    Procedure: CYSTOSCOPY URETEROSCOPY WITH LITHOTRIPSY HOLMIUM LASER, AND INSERTION STENT URETERAL/EXCHANGE;  Surgeon: Padma Del Angel MD;  [facility]: [facility];  Service: Urology    SD CYSTOURETHROSCOPY,URETER CATHETER Left 2021    Procedure: CYSTOSCOPY  WITH INSERTION STENT URETERAL;  Surgeon: Padma Del Angel MD;  Location: [facility];  Service: Urology    TOTAL HIP ARTHROPLASTY Right 2012    TOTAL SHOULDER REPLACEMENT Right 2013     Social History     Substance and Sexual Activity   Alcohol Use Not Currently    Alcohol/week: 0 0 standard drinks    Comment: No use     Social History     Substance and Sexual Activity   Drug Use Never    Comment: No use     Social History     Tobacco Use   Smoking Status Former Smoker    Packs/day: 0 25    Years: 20 00    Pack years: 5 00    Types: Cigarettes    Start date:     Quit date: 2010    Years since quittin 6   Smokeless Tobacco Never Used   Tobacco Comment    Quit several times for up to 6 years       Family History   Problem Relation Age of Onset    Heart disease Father     Arthritis Father     Heart attack Father     Alzheimer's disease Mother        Allergies:  No Known Allergies    Medications:     Current Outpatient Medications:     atorvastatin (LIPITOR) 40 mg tablet, Take 1 tablet (40 mg total) by mouth daily, Disp: 90 tablet, Rfl: 3    Eliquis 5 MG, TAKE 1 TABLET BY MOUTH TWICE A DAY, Disp: 60 tablet, Rfl: 3    ezetimibe (ZETIA) 10 mg tablet, Take 1 tablet (10 mg total) by mouth daily, Disp: 30 tablet, Rfl: 11    finasteride (PROSCAR) 5 mg tablet, Take 1 tablet (5 mg total) by mouth daily, Disp: 90 tablet, Rfl: 3    furosemide (LASIX) 80 mg tablet, Take 1 tablet (80 mg total) by mouth 2 (two) times a day, Disp: 60 tablet, Rfl: 11    glipiZIDE (GLUCOTROL XL) 5 mg 24 hr tablet, Take 1 tablet (5 mg total) by mouth daily, Disp: 90 tablet, Rfl: 1    glucose blood test strip, Use 2 each daily, Disp: 100 strip, Rfl: 2    Lancets (accu-chek multiclix) lancets, Use 1 each 2 (two) times a day Use 2 times daily to test blood glucose, Disp: 102 each, Rfl: 3    Levemir FlexTouch 100 units/mL injection pen, Inject 65 Units under the skin every 12 (twelve) hours Inject under the skin 65 units in the morning and at bedtime, Disp: 117 mL, Rfl: 1    lisinopril (ZESTRIL) 20 mg tablet, Take 1 tablet (20 mg total) by mouth daily, Disp: 30 tablet, Rfl: 11    metFORMIN (GLUCOPHAGE) 1000 MG tablet, TAKE ONE TABLET (500 MG) TWICE A DAY WITH MEALS, Disp: 180 tablet, Rfl: 1    metoprolol succinate (TOPROL-XL) 25 mg 24 hr tablet, Take 1 tablet (25 mg total) by mouth daily, Disp: 90 tablet, Rfl: 3    omeprazole (PriLOSEC) 20 mg delayed release capsule, Take 1 capsule (20 mg total) by mouth daily, Disp: 30 capsule, Rfl: 1    Unifine Pentips 31G X 8 MM MISC, INJECT UNDER THE SKIN DAILY USE AS DIRECTED, Disp: 100 each, Rfl: 2    Unifine Pentips 31G X 8 MM MISC, Inject under the skin daily Use as directed, Disp: 100 each, Rfl: 0    clindamycin (CLINDAGEL) 1 % gel, Apply topically 2 (two) times a day Apply on left nipple and buttock (Patient not taking: Reported on 8/6/2021), Disp: 60 g, Rfl: 2    Fluad Quadrivalent 0 5 ML PRSY, PHARMACY ADMINISTERED (Patient not taking: Reported on 7/20/2021), Disp: , Rfl:     omeprazole (PriLOSEC) 20 mg delayed release capsule, Take 20 mg by mouth daily  (Patient not taking: Reported on 7/20/2021), Disp: , Rfl:     phenazopyridine (PYRIDIUM) 200 mg tablet, Take 1 tablet (200 mg total) by mouth 3 (three) times a day as needed for bladder spasms (Patient not taking: Reported on 8/6/2021), Disp: 30 tablet, Rfl: 0    potassium chloride (K-DUR,KLOR-CON) 20 mEq tablet, Take 1 tablet (20 mEq total) by mouth daily (Patient not taking: Reported on 7/20/2021), Disp: 30 tablet, Rfl: 11      Vitals:    08/06/21 1124   BP: 118/60   Pulse: 99   SpO2: 93%     Weight (last 2 days)     Date/Time   Weight    08/06/21 1124   (!) 142 (313)            Physical Exam  Constitutional:       General: He is not in acute distress  Appearance: Normal appearance  He is well-developed  He is not diaphoretic  HENT:      Head: Normocephalic and atraumatic  Eyes:      General: No scleral icterus  Conjunctiva/sclera: Conjunctivae normal       Pupils: Pupils are equal, round, and reactive to light  Neck:      Thyroid: No thyromegaly  Vascular: No JVD  Cardiovascular:      Rate and Rhythm: Normal rate and regular rhythm  Heart sounds: Normal heart sounds  No murmur heard  No friction rub  No gallop  Pulmonary:      Effort: Pulmonary effort is normal  No respiratory distress  Breath sounds: Normal breath sounds  No wheezing or rales  Abdominal:      General: Bowel sounds are normal  There is no distension  Palpations: Abdomen is soft  Tenderness: There is no abdominal tenderness  Musculoskeletal:         General: No deformity  Normal range of motion  Cervical back: Normal range of motion and neck supple  Right lower leg: Edema present  Left lower leg: Edema present  Skin:     General: Skin is warm and dry  Findings: No erythema or rash  Neurological:      General: No focal deficit present  Mental Status: He is alert and oriented to person, place, and time  Cranial Nerves: No cranial nerve deficit  Motor: No weakness  Gait: Gait abnormal            Laboratory Studies:    Laboratory testing personally reviewed    Cardiac testing:     EKG reviewed personally:   10/20-sinus rhythm, frequent ectopy, right bundle branch block  4/21-sinus tachycardia, right bundle branch block  Results for orders placed or performed in visit on 08/06/21   POCT ECG    Impression    NSR, PAC's, RBBB   LPFB         Echocardiogram:  2017-EF 55%  3/21-EF 60%, mild LVH, mild RV dilation and mild RV dysfunction, mild-to-moderate TR with mild-to-moderate pulmonary hypertension    Stress test:      Holter:      Cardiac catheterization:        Yazan Lomas MD    Portions of the record may have been created with voice recognition software  Occasional wrong word or "sound a like" substitutions may have occurred due to the inherent limitations of voice recognition software  Read the chart carefully and recognize, using context, where substitutions have occurred

## 2021-08-09 NOTE — TELEPHONE ENCOUNTER
Patient called the office I went over all instructions , patient verbalized understanding  And had no further questions       Labs and diet have been mailed out

## 2021-08-09 NOTE — TELEPHONE ENCOUNTER
I left detailed message for patient asking to mya our office back at earliest convenience so that we can go over message  Mailed script and low oxalate diet  Thank you

## 2021-08-10 ENCOUNTER — APPOINTMENT (OUTPATIENT)
Dept: LAB | Facility: AMBULARY SURGERY CENTER | Age: 76
End: 2021-08-10
Payer: MEDICARE

## 2021-08-10 ENCOUNTER — TELEPHONE (OUTPATIENT)
Dept: CARDIOLOGY CLINIC | Facility: CLINIC | Age: 76
End: 2021-08-10

## 2021-08-10 DIAGNOSIS — I50.32 CHRONIC DIASTOLIC CHF (CONGESTIVE HEART FAILURE) (HCC): ICD-10-CM

## 2021-08-10 LAB
ANION GAP SERPL CALCULATED.3IONS-SCNC: 9 MMOL/L (ref 4–13)
BUN SERPL-MCNC: 21 MG/DL (ref 5–25)
CALCIUM SERPL-MCNC: 9.7 MG/DL (ref 8.3–10.1)
CHLORIDE SERPL-SCNC: 104 MMOL/L (ref 100–108)
CO2 SERPL-SCNC: 27 MMOL/L (ref 21–32)
CREAT SERPL-MCNC: 0.84 MG/DL (ref 0.6–1.3)
GFR SERPL CREATININE-BSD FRML MDRD: 84 ML/MIN/1.73SQ M
GLUCOSE SERPL-MCNC: 161 MG/DL (ref 65–140)
POTASSIUM SERPL-SCNC: 4 MMOL/L (ref 3.5–5.3)
SODIUM SERPL-SCNC: 140 MMOL/L (ref 136–145)

## 2021-08-10 PROCEDURE — 36415 COLL VENOUS BLD VENIPUNCTURE: CPT

## 2021-08-10 PROCEDURE — 80048 BASIC METABOLIC PNL TOTAL CA: CPT

## 2021-08-10 NOTE — TELEPHONE ENCOUNTER
Received a call from Morgan, a [facility] nurse who is with Marquis Mendoza at his home today  Pt was seen on 8/6 for an ov  Weight at ov  was 313 lbs  Today at home, weight is 312, without clothing  Marquis Ledesmangoc said that on 8/2 he weighed 309 lbs at home  Nurse said on arrival, pt's O2 sat was between 83% - 87%  Not more sob, but c/o increased orthopnea  By the time I spoke to the nurse, sat was up to 91%  At ov it was 93%  Nurse thought he might benefit from an extra dose of diuretic  Currently taking:    Lasix 80 mg BID    potassium citrate 10 meq BID  You ordered a BMP which he did not have drawn  Last creatinine on 7/26- 0 95,  K 4 2      Please advise

## 2021-08-20 ENCOUNTER — APPOINTMENT (OUTPATIENT)
Dept: LAB | Facility: AMBULARY SURGERY CENTER | Age: 76
End: 2021-08-20
Payer: MEDICARE

## 2021-08-20 LAB
ANION GAP SERPL CALCULATED.3IONS-SCNC: 6 MMOL/L (ref 4–13)
BUN SERPL-MCNC: 21 MG/DL (ref 5–25)
CA-I BLD-SCNC: 1.19 MMOL/L (ref 1.12–1.32)
CALCIUM SERPL-MCNC: 9.5 MG/DL (ref 8.3–10.1)
CHLORIDE SERPL-SCNC: 100 MMOL/L (ref 100–108)
CO2 SERPL-SCNC: 31 MMOL/L (ref 21–32)
CREAT SERPL-MCNC: 0.86 MG/DL (ref 0.6–1.3)
GFR SERPL CREATININE-BSD FRML MDRD: 84 ML/MIN/1.73SQ M
GLUCOSE SERPL-MCNC: 240 MG/DL (ref 65–140)
POTASSIUM SERPL-SCNC: 3.8 MMOL/L (ref 3.5–5.3)
SODIUM SERPL-SCNC: 137 MMOL/L (ref 136–145)

## 2021-08-20 PROCEDURE — 36415 COLL VENOUS BLD VENIPUNCTURE: CPT

## 2021-08-20 PROCEDURE — 82330 ASSAY OF CALCIUM: CPT

## 2021-08-20 PROCEDURE — 80048 BASIC METABOLIC PNL TOTAL CA: CPT

## 2021-09-02 DIAGNOSIS — G47.33 OSA (OBSTRUCTIVE SLEEP APNEA): Primary | ICD-10-CM

## 2021-09-07 ENCOUNTER — APPOINTMENT (OUTPATIENT)
Dept: LAB | Facility: AMBULARY SURGERY CENTER | Age: 76
End: 2021-09-07
Payer: MEDICARE

## 2021-09-07 DIAGNOSIS — R82.991 HYPOCITRATURIA: ICD-10-CM

## 2021-09-07 LAB
ANION GAP SERPL CALCULATED.3IONS-SCNC: 4 MMOL/L (ref 4–13)
BUN SERPL-MCNC: 21 MG/DL (ref 5–25)
CALCIUM SERPL-MCNC: 9.3 MG/DL (ref 8.3–10.1)
CHLORIDE SERPL-SCNC: 101 MMOL/L (ref 100–108)
CO2 SERPL-SCNC: 33 MMOL/L (ref 21–32)
CREAT SERPL-MCNC: 0.78 MG/DL (ref 0.6–1.3)
GFR SERPL CREATININE-BSD FRML MDRD: 87 ML/MIN/1.73SQ M
GLUCOSE SERPL-MCNC: 150 MG/DL (ref 65–140)
POTASSIUM SERPL-SCNC: 3.3 MMOL/L (ref 3.5–5.3)
SODIUM SERPL-SCNC: 138 MMOL/L (ref 136–145)

## 2021-09-07 PROCEDURE — 80048 BASIC METABOLIC PNL TOTAL CA: CPT

## 2021-09-07 PROCEDURE — 36415 COLL VENOUS BLD VENIPUNCTURE: CPT

## 2021-09-10 ENCOUNTER — TELEPHONE (OUTPATIENT)
Dept: NEPHROLOGY | Facility: CLINIC | Age: 76
End: 2021-09-10

## 2021-09-10 DIAGNOSIS — R80.1 PERSISTENT PROTEINURIA: Primary | ICD-10-CM

## 2021-09-10 DIAGNOSIS — E87.6 HYPOKALEMIA: ICD-10-CM

## 2021-09-10 NOTE — TELEPHONE ENCOUNTER
----- Message from [place], [place] sent at 9/9/2021 10:12 AM EDT -----  Please call patient  Looks like he is only taking potassium citrate (not potassium chloride)- please clarify this is accurate  Potassium level is low  Have him increase potassium in his diet- can provide list of high potassium foods to eat  If unable to do this, we can start potassium chloride 10meq daily  Repeat BMP in 2 weeks

## 2021-09-10 NOTE — TELEPHONE ENCOUNTER
Confirmed with patient he is currently taking the Potassium citrate 10 meq BID- Patient states he was having stomach issues and did stop it but started taking it again as of a few weeks ago  States he will increase potassium in his diet and get repeat BMP in 2 weeks  Thank you

## 2021-09-23 ENCOUNTER — TELEPHONE (OUTPATIENT)
Dept: CARDIOLOGY CLINIC | Facility: CLINIC | Age: 76
End: 2021-09-23

## 2021-09-27 DIAGNOSIS — N39.0 URINARY TRACT INFECTION WITH HEMATURIA, SITE UNSPECIFIED: ICD-10-CM

## 2021-09-27 DIAGNOSIS — Z12.5 SCREENING PSA (PROSTATE SPECIFIC ANTIGEN): ICD-10-CM

## 2021-09-27 DIAGNOSIS — N17.9 AKI (ACUTE KIDNEY INJURY) (HCC): ICD-10-CM

## 2021-09-27 DIAGNOSIS — E11.65 TYPE 2 DIABETES MELLITUS WITH HYPERGLYCEMIA, WITH LONG-TERM CURRENT USE OF INSULIN (HCC): ICD-10-CM

## 2021-09-27 DIAGNOSIS — R31.9 URINARY TRACT INFECTION WITH HEMATURIA, SITE UNSPECIFIED: ICD-10-CM

## 2021-09-27 DIAGNOSIS — Z79.4 TYPE 2 DIABETES MELLITUS WITH HYPERGLYCEMIA, WITH LONG-TERM CURRENT USE OF INSULIN (HCC): ICD-10-CM

## 2021-09-27 DIAGNOSIS — I50.32 CHRONIC DIASTOLIC CHF (CONGESTIVE HEART FAILURE) (HCC): ICD-10-CM

## 2021-09-27 DIAGNOSIS — E27.8 MASS OF RIGHT ADRENAL GLAND (HCC): Primary | ICD-10-CM

## 2021-10-06 ENCOUNTER — APPOINTMENT (OUTPATIENT)
Dept: LAB | Facility: AMBULARY SURGERY CENTER | Age: 76
End: 2021-10-06
Payer: MEDICARE

## 2021-10-06 DIAGNOSIS — E87.6 HYPOKALEMIA: ICD-10-CM

## 2021-10-06 DIAGNOSIS — Z79.4 TYPE 2 DIABETES MELLITUS WITH HYPERGLYCEMIA, WITH LONG-TERM CURRENT USE OF INSULIN (HCC): ICD-10-CM

## 2021-10-06 DIAGNOSIS — N17.9 AKI (ACUTE KIDNEY INJURY) (HCC): ICD-10-CM

## 2021-10-06 DIAGNOSIS — N39.0 URINARY TRACT INFECTION WITH HEMATURIA, SITE UNSPECIFIED: ICD-10-CM

## 2021-10-06 DIAGNOSIS — E83.52 HYPERCALCEMIA: ICD-10-CM

## 2021-10-06 DIAGNOSIS — E78.2 MIXED HYPERLIPIDEMIA: ICD-10-CM

## 2021-10-06 DIAGNOSIS — R80.1 PERSISTENT PROTEINURIA: ICD-10-CM

## 2021-10-06 DIAGNOSIS — E27.9 LESION OF ADRENAL GLAND (HCC): ICD-10-CM

## 2021-10-06 DIAGNOSIS — E27.8 MASS OF RIGHT ADRENAL GLAND (HCC): ICD-10-CM

## 2021-10-06 DIAGNOSIS — E11.65 TYPE 2 DIABETES MELLITUS WITH HYPERGLYCEMIA, WITH LONG-TERM CURRENT USE OF INSULIN (HCC): ICD-10-CM

## 2021-10-06 DIAGNOSIS — I50.32 CHRONIC DIASTOLIC CHF (CONGESTIVE HEART FAILURE) (HCC): ICD-10-CM

## 2021-10-06 DIAGNOSIS — R31.9 URINARY TRACT INFECTION WITH HEMATURIA, SITE UNSPECIFIED: ICD-10-CM

## 2021-10-06 DIAGNOSIS — Z12.5 SCREENING PSA (PROSTATE SPECIFIC ANTIGEN): ICD-10-CM

## 2021-10-06 LAB
25(OH)D3 SERPL-MCNC: 25.6 NG/ML (ref 30–100)
ALBUMIN SERPL BCP-MCNC: 3.3 G/DL (ref 3.5–5)
ALP SERPL-CCNC: 92 U/L (ref 46–116)
ALT SERPL W P-5'-P-CCNC: 22 U/L (ref 12–78)
ANION GAP SERPL CALCULATED.3IONS-SCNC: 2 MMOL/L (ref 4–13)
AST SERPL W P-5'-P-CCNC: 13 U/L (ref 5–45)
BACTERIA UR QL AUTO: ABNORMAL /HPF
BASOPHILS # BLD AUTO: 0.06 THOUSANDS/ΜL (ref 0–0.1)
BASOPHILS NFR BLD AUTO: 1 % (ref 0–1)
BILIRUB SERPL-MCNC: 0.46 MG/DL (ref 0.2–1)
BILIRUB UR QL STRIP: NEGATIVE
BUN SERPL-MCNC: 17 MG/DL (ref 5–25)
CALCIUM ALBUM COR SERPL-MCNC: 10.2 MG/DL (ref 8.3–10.1)
CALCIUM SERPL-MCNC: 9.6 MG/DL (ref 8.3–10.1)
CAOX CRY URNS QL MICRO: ABNORMAL /HPF
CHLORIDE SERPL-SCNC: 100 MMOL/L (ref 100–108)
CHOLEST SERPL-MCNC: 142 MG/DL (ref 50–200)
CLARITY UR: CLEAR
CO2 SERPL-SCNC: 33 MMOL/L (ref 21–32)
COLOR UR: YELLOW
CORTIS AM PEAK SERPL-MCNC: 19.3 UG/DL (ref 4.2–22.4)
CREAT SERPL-MCNC: 0.87 MG/DL (ref 0.6–1.3)
CREAT UR-MCNC: 72.5 MG/DL
EOSINOPHIL # BLD AUTO: 0.14 THOUSAND/ΜL (ref 0–0.61)
EOSINOPHIL NFR BLD AUTO: 2 % (ref 0–6)
ERYTHROCYTE [DISTWIDTH] IN BLOOD BY AUTOMATED COUNT: 15.3 % (ref 11.6–15.1)
EST. AVERAGE GLUCOSE BLD GHB EST-MCNC: 212 MG/DL
GFR SERPL CREATININE-BSD FRML MDRD: 83 ML/MIN/1.73SQ M
GLUCOSE P FAST SERPL-MCNC: 145 MG/DL (ref 65–99)
GLUCOSE UR STRIP-MCNC: ABNORMAL MG/DL
HBA1C MFR BLD: 9 %
HCT VFR BLD AUTO: 42.2 % (ref 36.5–49.3)
HDLC SERPL-MCNC: 37 MG/DL
HGB BLD-MCNC: 12.5 G/DL (ref 12–17)
HGB UR QL STRIP.AUTO: NEGATIVE
HYALINE CASTS #/AREA URNS LPF: ABNORMAL /LPF
IMM GRANULOCYTES # BLD AUTO: 0.03 THOUSAND/UL (ref 0–0.2)
IMM GRANULOCYTES NFR BLD AUTO: 0 % (ref 0–2)
KETONES UR STRIP-MCNC: NEGATIVE MG/DL
LDLC SERPL CALC-MCNC: 65 MG/DL (ref 0–100)
LEUKOCYTE ESTERASE UR QL STRIP: ABNORMAL
LYMPHOCYTES # BLD AUTO: 2.38 THOUSANDS/ΜL (ref 0.6–4.47)
LYMPHOCYTES NFR BLD AUTO: 28 % (ref 14–44)
MAGNESIUM SERPL-MCNC: 1.9 MG/DL (ref 1.6–2.6)
MCH RBC QN AUTO: 25.5 PG (ref 26.8–34.3)
MCHC RBC AUTO-ENTMCNC: 29.6 G/DL (ref 31.4–37.4)
MCV RBC AUTO: 86 FL (ref 82–98)
MONOCYTES # BLD AUTO: 0.8 THOUSAND/ΜL (ref 0.17–1.22)
MONOCYTES NFR BLD AUTO: 10 % (ref 4–12)
MUCOUS THREADS UR QL AUTO: ABNORMAL
NEUTROPHILS # BLD AUTO: 4.96 THOUSANDS/ΜL (ref 1.85–7.62)
NEUTS SEG NFR BLD AUTO: 59 % (ref 43–75)
NITRITE UR QL STRIP: NEGATIVE
NON-SQ EPI CELLS URNS QL MICRO: ABNORMAL /HPF
NONHDLC SERPL-MCNC: 105 MG/DL
NRBC BLD AUTO-RTO: 0 /100 WBCS
NT-PROBNP SERPL-MCNC: 37 PG/ML
PH UR STRIP.AUTO: 6 [PH]
PHOSPHATE SERPL-MCNC: 3.5 MG/DL (ref 2.3–4.1)
PLATELET # BLD AUTO: 237 THOUSANDS/UL (ref 149–390)
PMV BLD AUTO: 10.8 FL (ref 8.9–12.7)
POTASSIUM SERPL-SCNC: 3.7 MMOL/L (ref 3.5–5.3)
PROT SERPL-MCNC: 8 G/DL (ref 6.4–8.2)
PROT UR STRIP-MCNC: NEGATIVE MG/DL
PROT UR-MCNC: 12 MG/DL
PROT/CREAT UR: 0.17 MG/G{CREAT} (ref 0–0.1)
PSA SERPL-MCNC: 0.9 NG/ML (ref 0–4)
PTH-INTACT SERPL-MCNC: 111.5 PG/ML (ref 18.4–80.1)
RBC # BLD AUTO: 4.9 MILLION/UL (ref 3.88–5.62)
RBC #/AREA URNS AUTO: ABNORMAL /HPF
SODIUM SERPL-SCNC: 135 MMOL/L (ref 136–145)
SP GR UR STRIP.AUTO: 1.01 (ref 1–1.03)
TRIGL SERPL-MCNC: 201 MG/DL
UROBILINOGEN UR QL STRIP.AUTO: 0.2 E.U./DL
WBC # BLD AUTO: 8.37 THOUSAND/UL (ref 4.31–10.16)
WBC #/AREA URNS AUTO: ABNORMAL /HPF

## 2021-10-06 PROCEDURE — 82306 VITAMIN D 25 HYDROXY: CPT

## 2021-10-06 PROCEDURE — 85025 COMPLETE CBC W/AUTO DIFF WBC: CPT

## 2021-10-06 PROCEDURE — 83735 ASSAY OF MAGNESIUM: CPT

## 2021-10-06 PROCEDURE — 80053 COMPREHEN METABOLIC PANEL: CPT

## 2021-10-06 PROCEDURE — 36415 COLL VENOUS BLD VENIPUNCTURE: CPT

## 2021-10-06 PROCEDURE — 82626 DEHYDROEPIANDROSTERONE: CPT

## 2021-10-06 PROCEDURE — 83970 ASSAY OF PARATHORMONE: CPT

## 2021-10-06 PROCEDURE — 82570 ASSAY OF URINE CREATININE: CPT

## 2021-10-06 PROCEDURE — 83036 HEMOGLOBIN GLYCOSYLATED A1C: CPT

## 2021-10-06 PROCEDURE — 81001 URINALYSIS AUTO W/SCOPE: CPT

## 2021-10-06 PROCEDURE — G0103 PSA SCREENING: HCPCS

## 2021-10-06 PROCEDURE — 83880 ASSAY OF NATRIURETIC PEPTIDE: CPT

## 2021-10-06 PROCEDURE — 82627 DEHYDROEPIANDROSTERONE: CPT

## 2021-10-06 PROCEDURE — 84100 ASSAY OF PHOSPHORUS: CPT

## 2021-10-06 PROCEDURE — 82024 ASSAY OF ACTH: CPT

## 2021-10-06 PROCEDURE — 80061 LIPID PANEL: CPT

## 2021-10-06 PROCEDURE — 82533 TOTAL CORTISOL: CPT

## 2021-10-06 PROCEDURE — 84156 ASSAY OF PROTEIN URINE: CPT

## 2021-10-07 ENCOUNTER — TELEPHONE (OUTPATIENT)
Dept: NEPHROLOGY | Facility: CLINIC | Age: 76
End: 2021-10-07

## 2021-10-07 LAB
ACTH PLAS-MCNC: 11.3 PG/ML (ref 7.2–63.3)
DHEA-S SERPL-MCNC: 24.5 UG/DL (ref 20.8–226.4)

## 2021-10-08 ENCOUNTER — TELEPHONE (OUTPATIENT)
Dept: ENDOCRINOLOGY | Facility: CLINIC | Age: 76
End: 2021-10-08

## 2021-10-11 ENCOUNTER — OFFICE VISIT (OUTPATIENT)
Dept: ENDOCRINOLOGY | Facility: CLINIC | Age: 76
End: 2021-10-11
Payer: MEDICARE

## 2021-10-11 VITALS — SYSTOLIC BLOOD PRESSURE: 128 MMHG | DIASTOLIC BLOOD PRESSURE: 62 MMHG | HEART RATE: 95 BPM

## 2021-10-11 DIAGNOSIS — E27.9 LESION OF ADRENAL GLAND (HCC): ICD-10-CM

## 2021-10-11 DIAGNOSIS — E27.8 MASS OF RIGHT ADRENAL GLAND (HCC): ICD-10-CM

## 2021-10-11 DIAGNOSIS — E11.65 TYPE 2 DIABETES MELLITUS WITH HYPERGLYCEMIA, WITH LONG-TERM CURRENT USE OF INSULIN (HCC): Primary | ICD-10-CM

## 2021-10-11 DIAGNOSIS — E78.2 MIXED HYPERLIPIDEMIA: ICD-10-CM

## 2021-10-11 DIAGNOSIS — I10 PRIMARY HYPERTENSION: ICD-10-CM

## 2021-10-11 DIAGNOSIS — E55.9 VITAMIN D DEFICIENCY: ICD-10-CM

## 2021-10-11 DIAGNOSIS — Z79.4 TYPE 2 DIABETES MELLITUS WITH HYPERGLYCEMIA, WITH LONG-TERM CURRENT USE OF INSULIN (HCC): Primary | ICD-10-CM

## 2021-10-11 DIAGNOSIS — E83.52 HYPERCALCEMIA: ICD-10-CM

## 2021-10-11 PROCEDURE — 99215 OFFICE O/P EST HI 40 MIN: CPT | Performed by: PHYSICIAN ASSISTANT

## 2021-10-11 RX ORDER — INSULIN ASPART 100 [IU]/ML
15 INJECTION, SOLUTION INTRAVENOUS; SUBCUTANEOUS
Qty: 15 ML | Refills: 1 | Status: SHIPPED | OUTPATIENT
Start: 2021-10-11 | End: 2021-10-25 | Stop reason: SDUPTHER

## 2021-10-11 RX ORDER — INSULIN DETEMIR 100 [IU]/ML
35 INJECTION, SOLUTION SUBCUTANEOUS EVERY 12 HOURS SCHEDULED
Qty: 117 ML | Refills: 1
Start: 2021-10-11 | End: 2021-10-19 | Stop reason: SDUPTHER

## 2021-10-13 ENCOUNTER — OFFICE VISIT (OUTPATIENT)
Dept: FAMILY MEDICINE CLINIC | Facility: CLINIC | Age: 76
End: 2021-10-13
Payer: MEDICARE

## 2021-10-13 VITALS
HEART RATE: 102 BPM | OXYGEN SATURATION: 93 % | TEMPERATURE: 99.1 F | DIASTOLIC BLOOD PRESSURE: 72 MMHG | SYSTOLIC BLOOD PRESSURE: 138 MMHG | BODY MASS INDEX: 37.53 KG/M2 | HEIGHT: 76 IN | WEIGHT: 308.2 LBS

## 2021-10-13 DIAGNOSIS — I50.32 CHRONIC DIASTOLIC CHF (CONGESTIVE HEART FAILURE) (HCC): ICD-10-CM

## 2021-10-13 DIAGNOSIS — E83.52 HYPERCALCEMIA: ICD-10-CM

## 2021-10-13 DIAGNOSIS — E66.01 MORBID OBESITY (HCC): ICD-10-CM

## 2021-10-13 DIAGNOSIS — E78.2 MIXED HYPERLIPIDEMIA: ICD-10-CM

## 2021-10-13 DIAGNOSIS — Z79.4 TYPE 2 DIABETES MELLITUS WITH HYPERGLYCEMIA, WITH LONG-TERM CURRENT USE OF INSULIN (HCC): Primary | ICD-10-CM

## 2021-10-13 DIAGNOSIS — Z23 NEED FOR INFLUENZA VACCINATION: ICD-10-CM

## 2021-10-13 DIAGNOSIS — I87.2 VENOUS STASIS DERMATITIS OF BOTH LOWER EXTREMITIES: ICD-10-CM

## 2021-10-13 DIAGNOSIS — I10 PRIMARY HYPERTENSION: ICD-10-CM

## 2021-10-13 DIAGNOSIS — I48.0 PAROXYSMAL ATRIAL FIBRILLATION (HCC): ICD-10-CM

## 2021-10-13 DIAGNOSIS — E11.65 TYPE 2 DIABETES MELLITUS WITH HYPERGLYCEMIA, WITH LONG-TERM CURRENT USE OF INSULIN (HCC): Primary | ICD-10-CM

## 2021-10-13 LAB — DHEA SERPL-MCNC: 28 NG/DL (ref 31–701)

## 2021-10-13 PROCEDURE — G0008 ADMIN INFLUENZA VIRUS VAC: HCPCS

## 2021-10-13 PROCEDURE — 90662 IIV NO PRSV INCREASED AG IM: CPT

## 2021-10-13 PROCEDURE — 99214 OFFICE O/P EST MOD 30 MIN: CPT

## 2021-10-14 DIAGNOSIS — N64.4 NIPPLE PAIN: ICD-10-CM

## 2021-10-14 DIAGNOSIS — L73.9 FOLLICULITIS: ICD-10-CM

## 2021-10-14 RX ORDER — CLINDAMYCIN PHOSPHATE 10 MG/G
GEL TOPICAL 2 TIMES DAILY
Qty: 60 G | Refills: 2 | Status: SHIPPED | OUTPATIENT
Start: 2021-10-14 | End: 2021-12-30 | Stop reason: HOSPADM

## 2021-10-15 ENCOUNTER — TELEPHONE (OUTPATIENT)
Dept: CARDIOLOGY CLINIC | Facility: CLINIC | Age: 76
End: 2021-10-15

## 2021-10-15 RX ORDER — GLIPIZIDE 5 MG/1
5 TABLET ORAL DAILY
COMMUNITY
End: 2021-10-15 | Stop reason: ALTCHOICE

## 2021-10-15 RX ORDER — CALCIUM CARBONATE 200(500)MG
1 TABLET,CHEWABLE ORAL DAILY
COMMUNITY
End: 2021-10-25

## 2021-10-15 RX ORDER — DIGOXIN 125 MCG
125 TABLET ORAL DAILY
COMMUNITY
End: 2022-02-02 | Stop reason: SDUPTHER

## 2021-10-19 ENCOUNTER — OFFICE VISIT (OUTPATIENT)
Dept: NEPHROLOGY | Facility: CLINIC | Age: 76
End: 2021-10-19
Payer: MEDICARE

## 2021-10-19 VITALS
DIASTOLIC BLOOD PRESSURE: 70 MMHG | SYSTOLIC BLOOD PRESSURE: 140 MMHG | HEART RATE: 68 BPM | BODY MASS INDEX: 37.52 KG/M2 | HEIGHT: 76 IN

## 2021-10-19 DIAGNOSIS — Z79.4 TYPE 2 DIABETES MELLITUS WITH HYPERGLYCEMIA, WITH LONG-TERM CURRENT USE OF INSULIN (HCC): ICD-10-CM

## 2021-10-19 DIAGNOSIS — E87.6 HYPOKALEMIA: ICD-10-CM

## 2021-10-19 DIAGNOSIS — N20.0 NEPHROLITHIASIS: ICD-10-CM

## 2021-10-19 DIAGNOSIS — R80.1 PERSISTENT PROTEINURIA: ICD-10-CM

## 2021-10-19 DIAGNOSIS — E83.52 HYPERCALCEMIA: Primary | ICD-10-CM

## 2021-10-19 DIAGNOSIS — E27.8 MASS OF RIGHT ADRENAL GLAND (HCC): ICD-10-CM

## 2021-10-19 DIAGNOSIS — I50.32 CHRONIC DIASTOLIC CHF (CONGESTIVE HEART FAILURE) (HCC): ICD-10-CM

## 2021-10-19 DIAGNOSIS — E27.9 LESION OF ADRENAL GLAND (HCC): ICD-10-CM

## 2021-10-19 DIAGNOSIS — E11.65 TYPE 2 DIABETES MELLITUS WITH HYPERGLYCEMIA, WITH LONG-TERM CURRENT USE OF INSULIN (HCC): ICD-10-CM

## 2021-10-19 PROCEDURE — 99214 OFFICE O/P EST MOD 30 MIN: CPT | Performed by: INTERNAL MEDICINE

## 2021-10-19 RX ORDER — INSULIN DETEMIR 100 [IU]/ML
INJECTION, SOLUTION SUBCUTANEOUS
Qty: 117 ML | Refills: 1
Start: 2021-10-19 | End: 2021-11-05 | Stop reason: SDUPTHER

## 2021-10-20 ENCOUNTER — TELEPHONE (OUTPATIENT)
Dept: UROLOGY | Facility: MEDICAL CENTER | Age: 76
End: 2021-10-20

## 2021-10-21 DIAGNOSIS — Z79.4 TYPE 2 DIABETES MELLITUS WITH HYPERGLYCEMIA, WITH LONG-TERM CURRENT USE OF INSULIN (HCC): Primary | ICD-10-CM

## 2021-10-21 DIAGNOSIS — E11.65 TYPE 2 DIABETES MELLITUS WITH HYPERGLYCEMIA, WITH LONG-TERM CURRENT USE OF INSULIN (HCC): Primary | ICD-10-CM

## 2021-10-25 DIAGNOSIS — Z79.4 TYPE 2 DIABETES MELLITUS WITH HYPERGLYCEMIA, WITH LONG-TERM CURRENT USE OF INSULIN (HCC): ICD-10-CM

## 2021-10-25 DIAGNOSIS — E11.65 TYPE 2 DIABETES MELLITUS WITH HYPERGLYCEMIA, WITH LONG-TERM CURRENT USE OF INSULIN (HCC): ICD-10-CM

## 2021-10-25 RX ORDER — INSULIN ASPART 100 [IU]/ML
20 INJECTION, SOLUTION INTRAVENOUS; SUBCUTANEOUS
Qty: 18 ML | Refills: 0 | Status: SHIPPED | OUTPATIENT
Start: 2021-10-25 | End: 2021-11-05 | Stop reason: SDUPTHER

## 2021-11-02 NOTE — PROGRESS NOTES
Assessment/Plan: At this point will put bacitracin ointment w Silvadene and triple paste on the ulcer in his right leg suspect due to stasis dermatitis and edema  Will have visiting nurse given watch out  for the ulcer if it is worse will consider wound care  Will see  patient back in 6 weeks at that time will do urine culture prior if urine culture is negative will start for C get for him  Close monitor needed  I have spent 25 minutes with Patient and family today in which greater than 50% of this time was spent in counseling/coordination of care regarding Risks and benefits of tx options  Problem List Items Addressed This Visit        Endocrine    Type 2 diabetes mellitus with hyperglycemia, with long-term current use of insulin ([facility]) - Primary       Respiratory    LUCIA (obstructive sleep apnea)       Cardiovascular and Mediastinum    Hypertension    CAD (coronary artery disease)    Paroxysmal atrial fibrillation ([facility])    Acute on chronic diastolic heart failure ([facility])    CHF (congestive heart failure) ([facility])       Musculoskeletal and Integument    Venous stasis dermatitis of both lower extremities    Blister of right leg    RESOLVED: Pressure ulcer of right leg, stage 1       Genitourinary    Urinary tract infection with hematuria    Relevant Orders    Urine culture       Other    Bilateral edema of lower extremity            Subjective:      Patient ID: Carmelita Katz is a 67 y o  male  11-year-old male follow-up with wife for UTI diabetes hypertension edema blister in his legs  Since he saw pulmonologist Dr Dae Jc had his CPAP adjusted it was faulty the whole time  Thought that it was causing him to have heart failure  He saw cardiologist had ZIO patch placed  His diuretic was adjusted  He recently saw Dr Frederic Niño urologist had his stent  removed had a kidney stone repeat moved there were 2 of them  Urine culture prior to cystoscope show Pseudomonas resistant to Bactrim and cefdinir    He was treated  Urine culture repeat was negative  He is off oxygen supplementation  He start wearing compression stocking  The blister on his left leg resolved  He has been using Silvadene  He feels better  He is losing weight  His blood sugar stable  There was consideration for him to start for C get to help decrease heart failure and diabetes controlled will get a green light from cardiologist however will need the green light from urologist       The following portions of the patient's history were reviewed and updated as appropriate:   Past Medical History:  He has a past medical history of A-fib ([facility] ), AAA (abdominal aortic aneurysm) ([facility] ), Actinic keratosis of right temple (7/31/2020), Actinic keratosis of scalp (7/31/2020), Acute respiratory failure with hypoxia ([facility] ) (3/25/2021), Arthritis, Lay's esophagus, Bladder cancer ([facility] ), Blister of left leg (4/28/2021), Blister of right leg (5/26/2021), CAD (coronary artery disease), Chronic low back pain, Chronic pain of both knees (7/31/2020), Colitis (12/4/2020), CPAP (continuous positive airway pressure) dependence, Diabetes ([facility] ), Excessive gas (12/3/2020), Hydroureter on left (4/28/2021), Hyperlipemia, Hypertension, Immunization deficiency (10/30/2020), Left lower quadrant abdominal pain (12/3/2020), Mass of right adrenal gland ([facility] ) (12/4/2020), Myocardial infarction ([facility] ), 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), LUCIA (obstructive sleep apnea) (1/29/2021), Pressure ulcer of right leg, stage 1 (5/26/2021), Urinary tract infection with hematuria (5/3/2021), and Venous stasis dermatitis of both lower extremities (5/26/2021)  ,  _______________________________________________________________________  Medical Problems:  does not have any pertinent problems on file ,  _______________________________________________________________________  Past Surgical History:   has a past surgical history that includes Back surgery; Bladder surgery;  Cataract extraction, bilateral; Colonoscopy (01/29/2019); Coronary artery bypass graft (04/2016); Total shoulder replacement (Right, 2013); Total hip arthroplasty (Right, 2012); EGD (06/2017); pr cystourethroscopy,ureter catheter (Left, 4/24/2021); and pr cysto/uretero w/lithotripsy &indwell stent insrt (Left, 5/21/2021)  ,  _______________________________________________________________________  Family History:  family history includes Alzheimer's disease in his mother; Arthritis in his father; Heart attack in his father; Heart disease in his father ,  _______________________________________________________________________  Social History:   reports that he quit smoking about 12 years ago  His smoking use included cigarettes  He started smoking about 56 years ago  He has a 5 25 pack-year smoking history  He has never used smokeless tobacco  He reports previous alcohol use  He reports that he does not use drugs  ,  _______________________________________________________________________  Allergies:  has No Known Allergies     _______________________________________________________________________  Current Outpatient Medications   Medication Sig Dispense Refill    atorvastatin (LIPITOR) 40 mg tablet Take 1 tablet (40 mg total) by mouth daily 90 tablet 3    Eliquis 5 MG TAKE 1 TABLET BY MOUTH TWICE A DAY 60 tablet 3    ezetimibe (ZETIA) 10 mg tablet Take 1 tablet (10 mg total) by mouth daily 90 tablet 3    finasteride (PROSCAR) 5 mg tablet Take 1 tablet (5 mg total) by mouth daily 90 tablet 3    Fluad Quadrivalent 0 5 ML PRSY PHARMACY ADMINISTERED      furosemide (LASIX) 80 mg tablet Take 1 tablet (80 mg total) by mouth 2 (two) times a day 60 tablet 11    glipiZIDE (GLUCOTROL XL) 5 mg 24 hr tablet Take 1 tablet (5 mg total) by mouth daily 90 tablet 1    HYDROcodone-acetaminophen (NORCO) 5-325 mg per tablet Take 1-2 tablets by mouth every 4 (four) hours as needed for pain for up to 10 daysMax Daily Amount: 12 tablets 20 tablet 0    Levemir FlexTouch 100 units/mL injection pen Inject under the skin 65 units in the morning and at bedtime (Patient taking differently: every 12 (twelve) hours Inject under the skin 65 units in the morning and at bedtime) 120 mL 1    lisinopril (ZESTRIL) 20 mg tablet Take 1 tablet (20 mg total) by mouth daily 90 tablet 3    metFORMIN (GLUCOPHAGE) 1000 MG tablet TAKE ONE TABLET (500 MG) TWICE A DAY WITH MEALS 180 tablet 1    metoprolol succinate (TOPROL-XL) 25 mg 24 hr tablet Take 0 5 tablets (12 5 mg total) by mouth daily 45 tablet 3    omeprazole (PriLOSEC) 20 mg delayed release capsule Take 1 capsule (20 mg total) by mouth daily 30 capsule 1    phenazopyridine (PYRIDIUM) 200 mg tablet Take 1 tablet (200 mg total) by mouth 3 (three) times a day as needed for bladder spasms 30 tablet 0    potassium chloride (K-DUR,KLOR-CON) 20 mEq tablet Take 1 tablet (20 mEq total) by mouth daily 30 tablet 11    Unifine Pentips 31G X 8 MM MISC Inject under the skin daily Use as directed 100 each 2     No current facility-administered medications for this visit       _______________________________________________________________________  Review of Systems   Constitutional: Negative for chills and fever  HENT: Negative for ear pain and sore throat  Eyes: Negative for pain and visual disturbance  Respiratory: Negative for cough and shortness of breath  Cardiovascular: Positive for leg swelling  Negative for chest pain and palpitations  Gastrointestinal: Negative for abdominal pain and vomiting  Genitourinary: Negative for dysuria and hematuria  Musculoskeletal: Negative for arthralgias and back pain  Skin: Negative for color change and rash  Neurological: Negative for seizures and syncope  All other systems reviewed and are negative          Objective:  Vitals:    05/26/21 1004   BP: 130/58   BP Location: Left arm   Patient Position: Sitting   Cuff Size: Standard   Pulse: 105   Temp: (!) 97 °F (36 1 °C)   TempSrc: Temporal   SpO2: 93%   Weight: (!) 141 kg (310 lb 6 4 oz)   Height: 6' 4" (1 93 m)     Body mass index is 37 78 kg/m²  Physical Exam  Vitals signs and nursing note reviewed  Constitutional:       Appearance: Normal appearance  He is well-developed  He is obese  HENT:      Head: Normocephalic and atraumatic  Right Ear: Tympanic membrane, ear canal and external ear normal       Left Ear: Tympanic membrane, ear canal and external ear normal    Eyes:      Pupils: Pupils are equal, round, and reactive to light  Neck:      Musculoskeletal: Normal range of motion and neck supple  Cardiovascular:      Rate and Rhythm: Normal rate  Rhythm irregular  Heart sounds: Normal heart sounds  Pulmonary:      Effort: Pulmonary effort is normal       Breath sounds: Normal breath sounds  Abdominal:      General: Bowel sounds are normal       Palpations: Abdomen is soft  Musculoskeletal: Normal range of motion  Skin:     General: Skin is warm and dry  Findings: Erythema and lesion present  Comments: Right leg 10x3 mm ulcer anterior shin stage I granulation tissues   Neurological:      General: No focal deficit present  Mental Status: He is alert and oriented to person, place, and time  Mental status is at baseline  Psychiatric:         Behavior: Behavior normal          Thought Content:  Thought content normal          Judgment: Judgment normal  Otolaryngologist Procedure Text (A): After obtaining clear surgical margins the patient was sent to otolaryngology for surgical repair.  The patient understands they will receive post-surgical care and follow-up from the referring physician's office.

## 2021-11-04 NOTE — CASE MANAGEMENT
LOS: 0 DAYS  PATIENT IS NOT A BUNDLE  PATIENT IS A READMISSION  UNPLANNED RISK OF READMISSION: 21     CM met with patient and spouse at bedside to introduce self, explain role, complete CM assessment, and discuss DC planning  Patient alert and oriented and sitting in recliner  Lives [facility]: [facility]  Lives with: spouse  Supports: spouse, son who lives nearby, [facility]  Home Type & Entry: ranch home with 2 [address]  DME: utilizes a RW at home, also has CPAP and O2 through [facility]  He reports he's on 3L 24/7, has home concentrator and portable tanks  ADLs: self  VNA history: current with [facility] for SN/PT/OT    STR history: none  Pharmacy Preference & Coverage: [facility]  Has Rx plan and is able to afford most medications  He does report the Eliquis is costly until he hits the donut hole  Will discuss with PCP re: alternatives if he can no longer afford this  Mental Health/Drug/ETOH history: none  POA/AD/LW: patient identifies his spouse Sherre Scheuermann as Sophie [employer] Juliet; also has completed AD  Copy requested for his records  Income/Employment: SSI, 2 pension  Transportation: patient normally drives in the community  PCP: Dr Jossue Henry; normally follows up every few months  Transport Home: son     DCP: CM reviewed PT/OT evaluation and recommendation for [place] [id number] services  Patient would like to continue same with [facility] and referral sent via [facility] for SN/PT/OT at his request  CM reviewed 30 day readmission with patient as well  Patient discharged from hospitals on 3/31 following an admission for volume overload/CHF  [facility] for an unrelated cause, which patient identifies as a fall at home  Patient doesn't believe he was prescribed any new medications upon discharge but reports he's compliant with all that is prescribed and would like any discharge medications sent to his [facility] pharmacy  CM Dept will continue to follow patient      CM reviewed discharge planning process including the following: identifying caregivers at [Dear  ___] : Dear  [unfilled], [Courtesy Letter:] : I had the pleasure of seeing your patient, [unfilled], in my office today. home, preference for d/c planning needs, availability of treatment team to discuss questions or concerns patient and/or family may have regarding diagnosis, plan of care, old or new medications and discharge planning   CM will continue to follow for care coordination and update assessment as appropriate  [Please see my note below.] : Please see my note below. [Consult Closing:] : Thank you very much for allowing me to participate in the care of this patient.  If you have any questions, please do not hesitate to contact me. [Sincerely,] : Sincerely, [FreeTextEntry3] : Heather Montana MD\par Director, Pediatric Endocrinology\par [facility]\par  of Pediatrics \par [employer] [facility]\par

## 2021-11-05 DIAGNOSIS — E27.8 MASS OF RIGHT ADRENAL GLAND (HCC): ICD-10-CM

## 2021-11-05 DIAGNOSIS — Z79.4 TYPE 2 DIABETES MELLITUS WITH HYPERGLYCEMIA, WITH LONG-TERM CURRENT USE OF INSULIN (HCC): ICD-10-CM

## 2021-11-05 DIAGNOSIS — E11.65 TYPE 2 DIABETES MELLITUS WITH HYPERGLYCEMIA, WITH LONG-TERM CURRENT USE OF INSULIN (HCC): ICD-10-CM

## 2021-11-05 DIAGNOSIS — E27.9 LESION OF ADRENAL GLAND (HCC): ICD-10-CM

## 2021-11-05 RX ORDER — INSULIN DETEMIR 100 [IU]/ML
INJECTION, SOLUTION SUBCUTANEOUS
Qty: 117 ML | Refills: 1
Start: 2021-11-05 | End: 2021-12-21 | Stop reason: SDUPTHER

## 2021-11-05 RX ORDER — INSULIN ASPART 100 [IU]/ML
22 INJECTION, SOLUTION INTRAVENOUS; SUBCUTANEOUS
Qty: 19.8 ML | Refills: 0 | Status: ON HOLD
Start: 2021-11-05 | End: 2021-12-30

## 2021-11-06 DIAGNOSIS — I48.11 LONGSTANDING PERSISTENT ATRIAL FIBRILLATION (HCC): ICD-10-CM

## 2021-11-08 RX ORDER — APIXABAN 5 MG/1
TABLET, FILM COATED ORAL
Qty: 60 TABLET | Refills: 0 | Status: SHIPPED | OUTPATIENT
Start: 2021-11-08 | End: 2021-12-06

## 2021-11-20 DIAGNOSIS — I25.10 CORONARY ARTERY DISEASE DUE TO LIPID RICH PLAQUE: ICD-10-CM

## 2021-11-20 DIAGNOSIS — I25.83 CORONARY ARTERY DISEASE DUE TO LIPID RICH PLAQUE: ICD-10-CM

## 2021-11-21 ENCOUNTER — HOSPITAL ENCOUNTER (OUTPATIENT)
Dept: ULTRASOUND IMAGING | Facility: HOSPITAL | Age: 76
Discharge: HOME | End: 2021-11-21
Attending: UROLOGY
Payer: MEDICARE

## 2021-11-21 DIAGNOSIS — N20.1 URETERAL CALCULI: ICD-10-CM

## 2021-11-21 PROCEDURE — 76770 US EXAM ABDO BACK WALL COMP: CPT

## 2021-11-22 RX ORDER — ATORVASTATIN CALCIUM 40 MG/1
TABLET, FILM COATED ORAL
Qty: 90 TABLET | Refills: 0 | Status: SHIPPED | OUTPATIENT
Start: 2021-11-22 | End: 2022-02-21

## 2021-12-04 DIAGNOSIS — I48.11 LONGSTANDING PERSISTENT ATRIAL FIBRILLATION (HCC): ICD-10-CM

## 2021-12-06 RX ORDER — APIXABAN 5 MG/1
TABLET, FILM COATED ORAL
Qty: 60 TABLET | Refills: 0 | Status: SHIPPED | OUTPATIENT
Start: 2021-12-06 | End: 2022-01-10

## 2021-12-08 DIAGNOSIS — K21.9 GASTROESOPHAGEAL REFLUX DISEASE, UNSPECIFIED WHETHER ESOPHAGITIS PRESENT: Primary | ICD-10-CM

## 2021-12-08 RX ORDER — OMEPRAZOLE 20 MG/1
CAPSULE, DELAYED RELEASE ORAL
Qty: 90 CAPSULE | Refills: 0 | Status: SHIPPED | OUTPATIENT
Start: 2021-12-08 | End: 2022-03-04 | Stop reason: SDUPTHER

## 2021-12-15 ENCOUNTER — PROCEDURE VISIT (OUTPATIENT)
Dept: UROLOGY | Facility: CLINIC | Age: 76
End: 2021-12-15
Payer: MEDICARE

## 2021-12-15 VITALS
BODY MASS INDEX: 37.52 KG/M2 | HEIGHT: 76 IN | RESPIRATION RATE: 20 BRPM | HEART RATE: 112 BPM | DIASTOLIC BLOOD PRESSURE: 68 MMHG | SYSTOLIC BLOOD PRESSURE: 130 MMHG

## 2021-12-15 DIAGNOSIS — N20.1 URETERAL CALCULI: Primary | ICD-10-CM

## 2021-12-15 DIAGNOSIS — C67.9 MALIGNANT NEOPLASM OF URINARY BLADDER, UNSPECIFIED SITE (HCC): ICD-10-CM

## 2021-12-15 DIAGNOSIS — E27.8 ADRENAL NODULE (HCC): ICD-10-CM

## 2021-12-15 DIAGNOSIS — N40.0 BENIGN PROSTATIC HYPERPLASIA WITHOUT LOWER URINARY TRACT SYMPTOMS: ICD-10-CM

## 2021-12-15 PROBLEM — E27.9 ADRENAL NODULE (HCC): Status: ACTIVE | Noted: 2020-12-04

## 2021-12-15 PROCEDURE — 99214 OFFICE O/P EST MOD 30 MIN: CPT | Performed by: UROLOGY

## 2021-12-15 PROCEDURE — 52000 CYSTOURETHROSCOPY: CPT | Performed by: UROLOGY

## 2021-12-21 DIAGNOSIS — E27.9 LESION OF ADRENAL GLAND (HCC): ICD-10-CM

## 2021-12-21 DIAGNOSIS — E11.65 TYPE 2 DIABETES MELLITUS WITH HYPERGLYCEMIA, WITH LONG-TERM CURRENT USE OF INSULIN (HCC): ICD-10-CM

## 2021-12-21 DIAGNOSIS — E27.8 MASS OF RIGHT ADRENAL GLAND (HCC): ICD-10-CM

## 2021-12-21 DIAGNOSIS — Z79.4 TYPE 2 DIABETES MELLITUS WITH HYPERGLYCEMIA, WITH LONG-TERM CURRENT USE OF INSULIN (HCC): ICD-10-CM

## 2021-12-21 RX ORDER — INSULIN DETEMIR 100 [IU]/ML
INJECTION, SOLUTION SUBCUTANEOUS
Qty: 117 ML | Refills: 1
Start: 2021-12-21 | End: 2021-12-21 | Stop reason: SDUPTHER

## 2021-12-21 RX ORDER — INSULIN DETEMIR 100 [IU]/ML
INJECTION, SOLUTION SUBCUTANEOUS
Qty: 117 ML | Refills: 1
Start: 2021-12-21 | End: 2022-02-02 | Stop reason: ALTCHOICE

## 2021-12-27 ENCOUNTER — HOSPITAL ENCOUNTER (INPATIENT)
Facility: HOSPITAL | Age: 76
LOS: 3 days | Discharge: HOME | DRG: 603 | End: 2021-12-30
Attending: EMERGENCY MEDICINE | Admitting: INTERNAL MEDICINE
Payer: MEDICARE

## 2021-12-27 ENCOUNTER — APPOINTMENT (EMERGENCY)
Dept: RADIOLOGY | Facility: HOSPITAL | Age: 76
DRG: 603 | End: 2021-12-27
Payer: MEDICARE

## 2021-12-27 DIAGNOSIS — Z79.4 TYPE 2 DIABETES MELLITUS WITH HYPERGLYCEMIA, WITH LONG-TERM CURRENT USE OF INSULIN (HCC): ICD-10-CM

## 2021-12-27 DIAGNOSIS — L02.416 CELLULITIS AND ABSCESS OF LEFT LOWER EXTREMITY: ICD-10-CM

## 2021-12-27 DIAGNOSIS — E83.42 HYPOMAGNESEMIA: ICD-10-CM

## 2021-12-27 DIAGNOSIS — E11.65 TYPE 2 DIABETES MELLITUS WITH HYPERGLYCEMIA, WITH LONG-TERM CURRENT USE OF INSULIN (HCC): ICD-10-CM

## 2021-12-27 DIAGNOSIS — L03.116 CELLULITIS AND ABSCESS OF LEFT LOWER EXTREMITY: ICD-10-CM

## 2021-12-27 DIAGNOSIS — I48.91 ATRIAL FIBRILLATION WITH RAPID VENTRICULAR RESPONSE (HCC): Primary | ICD-10-CM

## 2021-12-27 DIAGNOSIS — L02.416 ABSCESS OF LEFT LEG: ICD-10-CM

## 2021-12-27 LAB
2HR DELTA HS TROPONIN: 10 NG/L
4HR DELTA HS TROPONIN: 10 NG/L
ALBUMIN SERPL BCP-MCNC: 3.5 G/DL (ref 3.4–4.8)
ALP SERPL-CCNC: 69.3 U/L (ref 10–129)
ALT SERPL W P-5'-P-CCNC: 12 U/L (ref 5–63)
ANION GAP SERPL CALCULATED.3IONS-SCNC: 10 MMOL/L (ref 4–13)
AST SERPL W P-5'-P-CCNC: 13 U/L (ref 15–41)
ATRIAL RATE: 138 BPM
BACTERIA UR QL AUTO: ABNORMAL /HPF
BASE EX.OXY STD BLDV CALC-SCNC: 83.5 % (ref 60–80)
BASE EXCESS BLDV CALC-SCNC: 3.1 MMOL/L
BETA-HYDROXYBUTYRATE: 0.3 MMOL/L
BILIRUB SERPL-MCNC: 0.42 MG/DL (ref 0.3–1.2)
BILIRUB UR QL STRIP: NEGATIVE
BUN SERPL-MCNC: 26 MG/DL (ref 6–20)
CALCIUM SERPL-MCNC: 9.7 MG/DL (ref 8.4–10.2)
CARDIAC TROPONIN I PNL SERPL HS: 34 NG/L
CARDIAC TROPONIN I PNL SERPL HS: 44 NG/L
CARDIAC TROPONIN I PNL SERPL HS: 44 NG/L
CHLORIDE SERPL-SCNC: 99 MMOL/L (ref 96–108)
CLARITY UR: CLEAR
CO2 SERPL-SCNC: 29 MMOL/L (ref 22–33)
COLOR UR: YELLOW
CREAT SERPL-MCNC: 0.87 MG/DL (ref 0.5–1.2)
DIGOXIN SERPL-MCNC: <0.5 NG/ML (ref 0.8–2)
ERYTHROCYTE [DISTWIDTH] IN BLOOD BY AUTOMATED COUNT: 16.3 % (ref 11.6–15.1)
GFR SERPL CREATININE-BSD FRML MDRD: 83 ML/MIN/1.73SQ M
GLUCOSE SERPL-MCNC: 216 MG/DL (ref 65–140)
GLUCOSE SERPL-MCNC: 293 MG/DL (ref 65–140)
GLUCOSE SERPL-MCNC: 306 MG/DL (ref 65–140)
GLUCOSE UR STRIP-MCNC: ABNORMAL MG/DL
HCO3 BLDV-SCNC: 29 MMOL/L (ref 24–30)
HCT VFR BLD AUTO: 39.6 % (ref 36.5–49.3)
HGB BLD-MCNC: 12.3 G/DL (ref 12–17)
HGB UR QL STRIP.AUTO: NEGATIVE
HYALINE CASTS #/AREA URNS LPF: ABNORMAL /LPF
KETONES UR STRIP-MCNC: NEGATIVE MG/DL
LACTATE SERPL-SCNC: 1.5 MMOL/L (ref 0–2)
LACTATE SERPL-SCNC: 2.1 MMOL/L (ref 0–2)
LACTATE SERPL-SCNC: 2.4 MMOL/L (ref 0–2)
LEUKOCYTE ESTERASE UR QL STRIP: ABNORMAL
MAGNESIUM SERPL-MCNC: 1.4 MG/DL (ref 1.6–2.6)
MCH RBC QN AUTO: 25.3 PG (ref 26.8–34.3)
MCHC RBC AUTO-ENTMCNC: 31.1 G/DL (ref 31.4–37.4)
MCV RBC AUTO: 81 FL (ref 82–98)
MUCOUS THREADS UR QL AUTO: ABNORMAL
NITRITE UR QL STRIP: NEGATIVE
NON-SQ EPI CELLS URNS QL MICRO: ABNORMAL /HPF
O2 CT BLDV-SCNC: 15.6 ML/DL
P AXIS: -6 DEGREES
PCO2 BLDV: 49.6 MM HG (ref 42–50)
PH BLDV: 7.38 [PH] (ref 7.3–7.4)
PH UR STRIP.AUTO: 6 [PH]
PLATELET # BLD AUTO: 238 THOUSANDS/UL (ref 149–390)
PMV BLD AUTO: 10.8 FL (ref 8.9–12.7)
PO2 BLDV: 51.6 MM HG (ref 35–45)
POTASSIUM SERPL-SCNC: 3.8 MMOL/L (ref 3.5–5)
PR INTERVAL: 61 MS
PROT SERPL-MCNC: 7.2 G/DL (ref 6.4–8.3)
PROT UR STRIP-MCNC: NEGATIVE MG/DL
QRS AXIS: 85 DEGREES
QRSD INTERVAL: 171 MS
QT INTERVAL: 372 MS
QTC INTERVAL: 558 MS
RBC # BLD AUTO: 4.87 MILLION/UL (ref 3.88–5.62)
RBC #/AREA URNS AUTO: ABNORMAL /HPF
SODIUM SERPL-SCNC: 138 MMOL/L (ref 133–145)
SP GR UR STRIP.AUTO: 1.01 (ref 1–1.03)
T WAVE AXIS: -28 DEGREES
UROBILINOGEN UR QL STRIP.AUTO: 0.2 E.U./DL
VENTRICULAR RATE: 135 BPM
WBC # BLD AUTO: 11.65 THOUSAND/UL (ref 4.31–10.16)
WBC #/AREA URNS AUTO: ABNORMAL /HPF

## 2021-12-27 PROCEDURE — 83605 ASSAY OF LACTIC ACID: CPT | Performed by: PHYSICIAN ASSISTANT

## 2021-12-27 PROCEDURE — 82010 KETONE BODYS QUAN: CPT | Performed by: PHYSICIAN ASSISTANT

## 2021-12-27 PROCEDURE — 99291 CRITICAL CARE FIRST HOUR: CPT | Performed by: PHYSICIAN ASSISTANT

## 2021-12-27 PROCEDURE — 96375 TX/PRO/DX INJ NEW DRUG ADDON: CPT

## 2021-12-27 PROCEDURE — 93005 ELECTROCARDIOGRAM TRACING: CPT

## 2021-12-27 PROCEDURE — 80162 ASSAY OF DIGOXIN TOTAL: CPT | Performed by: PHYSICIAN ASSISTANT

## 2021-12-27 PROCEDURE — 71045 X-RAY EXAM CHEST 1 VIEW: CPT

## 2021-12-27 PROCEDURE — 87070 CULTURE OTHR SPECIMN AEROBIC: CPT | Performed by: PHYSICIAN ASSISTANT

## 2021-12-27 PROCEDURE — 10061 I&D ABSCESS COMP/MULTIPLE: CPT | Performed by: PHYSICIAN ASSISTANT

## 2021-12-27 PROCEDURE — 87040 BLOOD CULTURE FOR BACTERIA: CPT | Performed by: PHYSICIAN ASSISTANT

## 2021-12-27 PROCEDURE — 85027 COMPLETE CBC AUTOMATED: CPT | Performed by: PHYSICIAN ASSISTANT

## 2021-12-27 PROCEDURE — 80053 COMPREHEN METABOLIC PANEL: CPT | Performed by: PHYSICIAN ASSISTANT

## 2021-12-27 PROCEDURE — 0241U HB NFCT DS VIR RESP RNA 4 TRGT: CPT | Performed by: PHYSICIAN ASSISTANT

## 2021-12-27 PROCEDURE — 87205 SMEAR GRAM STAIN: CPT | Performed by: PHYSICIAN ASSISTANT

## 2021-12-27 PROCEDURE — 96366 THER/PROPH/DIAG IV INF ADDON: CPT

## 2021-12-27 PROCEDURE — 84484 ASSAY OF TROPONIN QUANT: CPT | Performed by: PHYSICIAN ASSISTANT

## 2021-12-27 PROCEDURE — 36415 COLL VENOUS BLD VENIPUNCTURE: CPT | Performed by: PHYSICIAN ASSISTANT

## 2021-12-27 PROCEDURE — 83735 ASSAY OF MAGNESIUM: CPT | Performed by: PHYSICIAN ASSISTANT

## 2021-12-27 PROCEDURE — 96365 THER/PROPH/DIAG IV INF INIT: CPT

## 2021-12-27 PROCEDURE — 84443 ASSAY THYROID STIM HORMONE: CPT | Performed by: INTERNAL MEDICINE

## 2021-12-27 PROCEDURE — 93010 ELECTROCARDIOGRAM REPORT: CPT | Performed by: INTERNAL MEDICINE

## 2021-12-27 PROCEDURE — 82805 BLOOD GASES W/O2 SATURATION: CPT | Performed by: PHYSICIAN ASSISTANT

## 2021-12-27 PROCEDURE — 82948 REAGENT STRIP/BLOOD GLUCOSE: CPT

## 2021-12-27 PROCEDURE — 81001 URINALYSIS AUTO W/SCOPE: CPT | Performed by: PHYSICIAN ASSISTANT

## 2021-12-27 PROCEDURE — 99285 EMERGENCY DEPT VISIT HI MDM: CPT

## 2021-12-27 PROCEDURE — 0J9P0ZZ DRAINAGE OF LEFT LOWER LEG SUBCUTANEOUS TISSUE AND FASCIA, OPEN APPROACH: ICD-10-PCS | Performed by: PHYSICIAN ASSISTANT

## 2021-12-27 PROCEDURE — 99223 1ST HOSP IP/OBS HIGH 75: CPT | Performed by: INTERNAL MEDICINE

## 2021-12-27 PROCEDURE — 96361 HYDRATE IV INFUSION ADD-ON: CPT

## 2021-12-27 RX ORDER — PANTOPRAZOLE SODIUM 40 MG/1
40 TABLET, DELAYED RELEASE ORAL
Status: DISCONTINUED | OUTPATIENT
Start: 2021-12-28 | End: 2021-12-30 | Stop reason: HOSPADM

## 2021-12-27 RX ORDER — METOPROLOL TARTRATE 5 MG/5ML
5 INJECTION INTRAVENOUS ONCE
Status: COMPLETED | OUTPATIENT
Start: 2021-12-27 | End: 2021-12-27

## 2021-12-27 RX ORDER — SODIUM CHLORIDE 9 MG/ML
2000 INJECTION, SOLUTION INTRAVENOUS CONTINUOUS
Status: DISCONTINUED | OUTPATIENT
Start: 2021-12-27 | End: 2021-12-27

## 2021-12-27 RX ORDER — DIGOXIN 0.25 MG/ML
250 INJECTION INTRAMUSCULAR; INTRAVENOUS ONCE
Status: COMPLETED | OUTPATIENT
Start: 2021-12-27 | End: 2021-12-27

## 2021-12-27 RX ORDER — LISINOPRIL 20 MG/1
20 TABLET ORAL DAILY
Status: DISCONTINUED | OUTPATIENT
Start: 2021-12-28 | End: 2021-12-28

## 2021-12-27 RX ORDER — DIGOXIN 125 MCG
125 TABLET ORAL DAILY
Status: DISCONTINUED | OUTPATIENT
Start: 2021-12-28 | End: 2021-12-30 | Stop reason: HOSPADM

## 2021-12-27 RX ORDER — MAGNESIUM SULFATE HEPTAHYDRATE 40 MG/ML
2 INJECTION, SOLUTION INTRAVENOUS ONCE
Status: COMPLETED | OUTPATIENT
Start: 2021-12-27 | End: 2021-12-27

## 2021-12-27 RX ORDER — BUPIVACAINE HYDROCHLORIDE 5 MG/ML
10 INJECTION, SOLUTION EPIDURAL; INTRACAUDAL ONCE
Status: COMPLETED | OUTPATIENT
Start: 2021-12-27 | End: 2021-12-27

## 2021-12-27 RX ORDER — LIDOCAINE HYDROCHLORIDE 10 MG/ML
10 INJECTION, SOLUTION EPIDURAL; INFILTRATION; INTRACAUDAL; PERINEURAL ONCE
Status: COMPLETED | OUTPATIENT
Start: 2021-12-27 | End: 2021-12-27

## 2021-12-27 RX ORDER — SODIUM CHLORIDE 9 MG/ML
3 INJECTION INTRAVENOUS
Status: DISCONTINUED | OUTPATIENT
Start: 2021-12-27 | End: 2021-12-30 | Stop reason: HOSPADM

## 2021-12-27 RX ORDER — EZETIMIBE 10 MG/1
10 TABLET ORAL DAILY
Status: DISCONTINUED | OUTPATIENT
Start: 2021-12-28 | End: 2021-12-30 | Stop reason: HOSPADM

## 2021-12-27 RX ORDER — METOPROLOL SUCCINATE 25 MG/1
25 TABLET, EXTENDED RELEASE ORAL DAILY
Status: DISCONTINUED | OUTPATIENT
Start: 2021-12-28 | End: 2021-12-28

## 2021-12-27 RX ORDER — ACETAMINOPHEN 325 MG/1
650 TABLET ORAL EVERY 6 HOURS PRN
Status: DISCONTINUED | OUTPATIENT
Start: 2021-12-27 | End: 2021-12-30 | Stop reason: HOSPADM

## 2021-12-27 RX ORDER — ATORVASTATIN CALCIUM 40 MG/1
40 TABLET, FILM COATED ORAL DAILY
Status: DISCONTINUED | OUTPATIENT
Start: 2021-12-28 | End: 2021-12-30 | Stop reason: HOSPADM

## 2021-12-27 RX ORDER — FINASTERIDE 5 MG/1
5 TABLET, FILM COATED ORAL DAILY
Status: DISCONTINUED | OUTPATIENT
Start: 2021-12-28 | End: 2021-12-30 | Stop reason: HOSPADM

## 2021-12-27 RX ORDER — DILTIAZEM HYDROCHLORIDE 5 MG/ML
15 INJECTION INTRAVENOUS ONCE
Status: DISCONTINUED | OUTPATIENT
Start: 2021-12-27 | End: 2021-12-27

## 2021-12-27 RX ORDER — DILTIAZEM HYDROCHLORIDE 5 MG/ML
25 INJECTION INTRAVENOUS ONCE
Status: COMPLETED | OUTPATIENT
Start: 2021-12-27 | End: 2021-12-27

## 2021-12-27 RX ORDER — SODIUM CHLORIDE 9 MG/ML
500 INJECTION, SOLUTION INTRAVENOUS CONTINUOUS
Status: DISCONTINUED | OUTPATIENT
Start: 2021-12-27 | End: 2021-12-27

## 2021-12-27 RX ORDER — LIDOCAINE 50 MG/G
1 PATCH TOPICAL
Status: DISCONTINUED | OUTPATIENT
Start: 2021-12-27 | End: 2021-12-30 | Stop reason: HOSPADM

## 2021-12-27 RX ORDER — FUROSEMIDE 80 MG
80 TABLET ORAL 2 TIMES DAILY
Status: DISCONTINUED | OUTPATIENT
Start: 2021-12-27 | End: 2021-12-30 | Stop reason: HOSPADM

## 2021-12-27 RX ORDER — SODIUM CHLORIDE 9 MG/ML
250 INJECTION, SOLUTION INTRAVENOUS CONTINUOUS
Status: DISCONTINUED | OUTPATIENT
Start: 2021-12-27 | End: 2021-12-27

## 2021-12-27 RX ADMIN — METOROPROLOL TARTRATE 5 MG: 5 INJECTION, SOLUTION INTRAVENOUS at 15:08

## 2021-12-27 RX ADMIN — INSULIN LISPRO 3 UNITS: 100 INJECTION, SOLUTION INTRAVENOUS; SUBCUTANEOUS at 23:20

## 2021-12-27 RX ADMIN — FUROSEMIDE 80 MG: 80 TABLET ORAL at 22:40

## 2021-12-27 RX ADMIN — ACETAMINOPHEN 650 MG: 325 TABLET, FILM COATED ORAL at 19:37

## 2021-12-27 RX ADMIN — SODIUM CHLORIDE 250 ML/HR: 0.9 INJECTION, SOLUTION INTRAVENOUS at 19:03

## 2021-12-27 RX ADMIN — LIDOCAINE HYDROCHLORIDE 10 ML: 10 INJECTION, SOLUTION EPIDURAL; INFILTRATION; INTRACAUDAL; PERINEURAL at 11:38

## 2021-12-27 RX ADMIN — DILTIAZEM HYDROCHLORIDE 5 MG/HR: 5 INJECTION INTRAVENOUS at 12:12

## 2021-12-27 RX ADMIN — SODIUM CHLORIDE 2000 ML/HR: 0.9 INJECTION, SOLUTION INTRAVENOUS at 11:39

## 2021-12-27 RX ADMIN — SODIUM CHLORIDE 500 ML/HR: 0.9 INJECTION, SOLUTION INTRAVENOUS at 14:59

## 2021-12-27 RX ADMIN — MAGNESIUM SULFATE HEPTAHYDRATE 2 G: 40 INJECTION, SOLUTION INTRAVENOUS at 16:50

## 2021-12-27 RX ADMIN — VANCOMYCIN HYDROCHLORIDE 1500 MG: 1 INJECTION, POWDER, LYOPHILIZED, FOR SOLUTION INTRAVENOUS at 11:43

## 2021-12-27 RX ADMIN — APIXABAN 5 MG: 5 TABLET, FILM COATED ORAL at 22:40

## 2021-12-27 RX ADMIN — PIPERACILLIN AND TAZOBACTAM 3.38 G: 3; .375 INJECTION, POWDER, FOR SOLUTION INTRAVENOUS at 16:33

## 2021-12-27 RX ADMIN — BUPIVACAINE HYDROCHLORIDE 10 ML: 5 INJECTION, SOLUTION EPIDURAL; INTRACAUDAL at 11:38

## 2021-12-27 RX ADMIN — DIGOXIN 250 MCG: 0.25 INJECTION INTRAMUSCULAR; INTRAVENOUS at 13:48

## 2021-12-27 NOTE — PROGRESS NOTES
Vancomycin Assessment    Brown Bloch is a 67 y o  male who is currently receiving vancomycin 2000mg IV q12h for skin-soft tissue infection     Relevant clinical data and objective history reviewed:  Creatinine   Date Value Ref Range Status   12/27/2021 0 87 0 50 - 1 20 mg/dL Final     Comment:     Standardized to IDMS reference method   10/06/2021 0 87 0 60 - 1 30 mg/dL Final     Comment:     Standardized to IDMS reference method   09/07/2021 0 78 0 60 - 1 30 mg/dL Final     Comment:     Standardized to IDMS reference method     BP 95/66 (BP Location: Right arm)   Pulse 103   Temp 99 °F (37 2 °C) (Oral)   Resp 15   SpO2 96%   No intake/output data recorded  Lab Results   Component Value Date/Time    BUN 26 (H) 12/27/2021 11:18 AM    WBC 11 65 (H) 12/27/2021 11:18 AM    HGB 12 3 12/27/2021 11:18 AM    HCT 39 6 12/27/2021 11:18 AM    MCV 81 (L) 12/27/2021 11:18 AM     12/27/2021 11:18 AM     Temp Readings from Last 3 Encounters:   12/27/21 99 °F (37 2 °C) (Oral)   10/13/21 99 1 °F (37 3 °C) (Tympanic)   07/14/21 98 5 °F (36 9 °C) (Tympanic)     Vancomycin Days of Therapy: 1    Assessment/Plan  The patient is currently on vancomycin utilizing scheduled dosing based on adjusted body weight (due to obesity)  Baseline risks associated with therapy include: concomitant nephrotoxic medications and advanced age  The patient is currently receiving 2000mg IV q12h and is clinically appropriate and dose will be continued  Pharmacy will also follow closely for s/sx of nephrotoxicity, infusion reactions, and appropriateness of therapy  BMP and CBC will be ordered per protocol  Plan for trough as patient approaches steady state, prior to the 5th  dose at approximately 11:30 on 12/29/2021  Due to infection severity, will target a trough of 15-20 (appropriate for most indications)   Pharmacy will continue to follow the patients culture results and clinical progress daily      Luh Topete, Pharmacist

## 2021-12-27 NOTE — ASSESSMENT & PLAN NOTE
Wt Readings from Last 3 Encounters:   10/13/21 (!) 140 kg (308 lb 3 2 oz)   08/06/21 (!) 142 kg (313 lb)   07/22/21 (!) 142 kg (312 lb)         Currently euvolemic  Continue home meds

## 2021-12-27 NOTE — H&P
Ana M U  65   H&P- Ivis Hinton 1945, 67 y o  male MRN: [id number]  Unit/Bed#: ED 15 Encounter: [id number]  Primary Care Provider: Aruna Garcia MD   Date and time admitted to hospital: 12/27/2021 10:46 AM    * Atrial fibrillation with rapid ventricular response (HCC)  Assessment & Plan  Presented with heart rate approximately 140  Currently on Cardizem drip  Heart rate improving  Cardiology consult  Check echo  Continuous tele    Cellulitis and abscess of left lower extremity  Assessment & Plan  Presented with left lower extremity cellulitis with abscess formation  I and D performed emergency department  Follow-up fluid culture  Will place on IV vancomycin for now    Chronic diastolic CHF (congestive heart failure) ([facility] )  Assessment & Plan  Wt Readings from Last 3 Encounters:   10/13/21 (!) 140 kg (308 lb 3 2 oz)   08/06/21 (!) 142 kg (313 lb)   07/22/21 (!) 142 kg (312 lb)         Currently euvolemic  Continue home meds    CAD (coronary artery disease)  Assessment & Plan  Continue home beta-blocker, statin    Type 2 diabetes mellitus with hyperglycemia, with long-term current use of insulin ([facility])  Assessment & Plan    Lab Results   Component Value Date    HGBA1C 9 0 (H) 10/06/2021   Continue home insulin regimen  Sliding scale insulin    Hyperlipemia  Assessment & Plan  Continue home statin    Hypertension  Assessment & Plan  Continue home BP meds  BP currently controlled    VTE Prophylaxis: Apixaban (Eliquis)  / sequential compression device   Code Status: full code  POLST: POLST form is on file already (pre-hospital)  Discussion with family: pt    Anticipated Length of Stay:  Patient will be admitted on an Emergency basis with an anticipated length of stay of  > 2 midnights  Justification for Hospital Stay:  Atrial fibrillation with RVR    Total Time for Visit, including Counseling / Coordination of Care: 60 minutes    Greater than 50% of this total time spent on direct patient counseling and coordination of care  Chief Complaint:   Shortness of breath    History of Present Illness:    Ayaz Jaramillo is a 67 y o  male with past medical history significant congestive heart failure, coronary artery disease, atrial fibrillation on Eliquis initially presented with dizziness  Patient reports dizziness for the past 2 days  He otherwise denies acute complaints  Review of Systems:    Review of Systems   Constitutional: Negative for appetite change, chills, diaphoresis, fatigue, fever and unexpected weight change  HENT: Negative for congestion, rhinorrhea and sore throat  Eyes: Negative for photophobia and visual disturbance  Respiratory: Negative for cough, shortness of breath and wheezing  Cardiovascular: Negative for chest pain, palpitations and leg swelling  Gastrointestinal: Negative for abdominal pain, anal bleeding, blood in stool, constipation, diarrhea, nausea and vomiting  Genitourinary: Negative for decreased urine volume, difficulty urinating, dysuria, flank pain, frequency, hematuria and urgency  Musculoskeletal: Negative for arthralgias, back pain, joint swelling and myalgias  Skin: Negative for color change and rash  Neurological: Positive for dizziness  Negative for seizures, facial asymmetry, speech difficulty, numbness and headaches  Psychiatric/Behavioral: Negative for agitation, confusion and decreased concentration  The patient is not nervous/anxious          Past Medical and Surgical History:     Past Medical History:   Diagnosis Date    A-fib [facility])     AAA [facility]     Actinic keratosis of right temple 7/31/2020    Actinic keratosis of scalp 7/31/2020    Acute respiratory failure with hypoxia ([facility] ) 3/25/2021    Allergic 1960    Arthritis     Lay's esophagus     Bladder cancer (HCC)     Blister of left leg 4/28/2021    Blister of right leg 5/26/2021    CAD (coronary artery disease)     Cancer [facility] 02/2010 Bladder    CHF (congestive heart failure) (HCC)     Chronic low back pain     Chronic pain of both knees 7/31/2020    Colitis 12/4/2020    CPAP (continuous positive airway pressure) dependence     Diabetes ([facility] )     Diabetes mellitus ([facility] ) 10/1993    Excessive gas 12/3/2020    Heart murmur     History of chemotherapy     Hydroureter on left 4/28/2021    Hyperlipemia     Hypertension     Immunization deficiency 10/30/2020    Hep a nonimmune    Kidney stone     Left lower quadrant abdominal pain 12/3/2020    Mass of right adrenal gland ([facility] ) 12/4/2020    4 1 cm    Myocardial infarction ([facility] )     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    Obesity 2000    LUCIA (obstructive sleep apnea) 1/29/2021    Pressure ulcer of right leg, stage 1 5/26/2021    Urinary tract infection with hematuria 5/3/2021    u cx 4/2021=pseudomonas R to bactrim and cefdinir, S to cipro    Venous stasis dermatitis of both lower extremities 5/26/2021       Past Surgical History:   Procedure Laterality Date    BACK SURGERY      BLADDER SURGERY      CATARACT EXTRACTION, BILATERAL      COLONOSCOPY  01/29/2019    next [address]22 [address] GRAFT  04/2016    Quadruple per [place]    EGD  06/2017    EYE SURGERY  11/2016    Cataracts    JOINT REPLACEMENT  [id number]    Hip and shoulder    KIDNEY STONE SURGERY      CT CYSTO/URETERO W/LITHOTRIPSY &INDWELL STENT INSRT Left 5/21/2021    Procedure: CYSTOSCOPY URETEROSCOPY WITH LITHOTRIPSY HOLMIUM LASER, AND INSERTION STENT URETERAL/EXCHANGE;  Surgeon: Jose Luis Lyons MD;  Location: [facility];  Service: Urology    CT CYSTOURETHROSCOPY,URETER CATHETER Left 4/24/2021    Procedure: CYSTOSCOPY  WITH INSERTION STENT URETERAL;  Surgeon: Jose Luis Lyons MD;  Location: [facility];  Service: Urology    TOTAL HIP ARTHROPLASTY Right 2012    TOTAL SHOULDER REPLACEMENT Right 2013    VASECTOMY  1971       Meds/Allergies:    Prior to Admission medications    Medication Sig Start Date End Date Taking?  Authorizing Provider   atorvastatin (LIPITOR) 40 mg tablet TAKE ONE TABLET BY MOUTH ONCE DAILY 11/22/21   Bryson Shea MD   clindamycin (CLINDAGEL) 1 % gel Apply topically 2 (two) times a day Apply on left nipple and buttock 10/14/21   Bryson Shea MD   digoxin (Digox) 0 125 mg tablet Take 125 mcg by mouth daily  Patient not taking: Reported on 10/19/2021     Historical Provider, MD   Eliquis 5 MG TAKE ONE TABLET BY MOUTH TWICE DAILY 12/6/21   Bryson Shea MD   ezetimibe (ZETIA) 10 mg tablet Take 1 tablet (10 mg total) by mouth daily 8/2/21   Nury Modi MD   finasteride (PROSCAR) 5 mg tablet Take 1 tablet (5 mg total) by mouth daily 12/21/20   Verner Squires, MD   Fluad Quadrivalent 0 5  [facility] [address] ADMINISTERED 10/13/20   Historical Provider, MD   furosemide (LASIX) 80 mg tablet Take 1 tablet (80 mg total) by mouth 2 (two) times a day 4/27/21   Nury Modi MD   glucose blood test strip Use 2 each daily 8/4/21   Bryson Shea MD   insulin aspart (NovoLOG FlexPen) 100 UNIT/ML injection pen Inject 22 Units under the skin 3 (three) times a day with meals 11/5/21 12/5/21  Alayna Rodriguez PA-C   Lancets (accu-chek multiclix) lancets Use 1 each 2 (two) times a day Use 2 times daily to test blood glucose 8/4/21   Bryson Shea MD   Levemir FlexTouch 100 units/mL injection pen 50 units twice per day 12/21/21   Alayna Rodriguez PA-C   lisinopril (ZESTRIL) 20 mg tablet Take 1 tablet (20 mg total) by mouth daily 8/2/21   Nury Modi MD   metFORMIN (GLUCOPHAGE) 500 mg tablet Take 1 tablet (500 mg total) by mouth 2 (two) times a day with meals 10/21/21 4/19/22  Alayna Rodriguez PA-C   metoprolol succinate (TOPROL-XL) 25 mg 24 hr tablet Take 1 tablet (25 mg total) by mouth daily 8/6/21   Nury Modi MD   omeprazole (PriLOSEC) 20 mg delayed release capsule TAKE ONE CAPSULE BY MOUTH ONCE DAILY 12/8/21   Estevan Valentine Mc, MD   potassium citrate (UROCIT-K) 10 mEq Take 1 tablet (10 mEq total) by mouth 2 (two) times a day 21   Buzz Philip MD   Unifine Pentips 31G X 8 MM MISC INJECT UNDER THE SKIN DAILY USE AS DIRECTED 21   Bryson Shea MD     I have reviewed home medications with patient personally  Allergies: No Known Allergies    Social History:     Marital Status: /Civil Union     Patient Pre-hospital Living Situation: home  Patient Pre-hospital Level of Mobility: independent  Patient Pre-hospital Diet Restrictions: none  Substance Use History:   Social History     Substance and Sexual Activity   Alcohol Use Not Currently    Alcohol/week: 0 0 standard drinks    Comment: No use     Social History     Tobacco Use   Smoking Status Former Smoker    Packs/day: 0 25    Years: 20 00    Pack years: 5 00    Types: Cigarettes    Start date:     Quit date: 2010    Years since quittin 9   Smokeless Tobacco Never Used   Tobacco Comment    Quit several times for up to 6 years  Social History     Substance and Sexual Activity   Drug Use Never    Comment: No use       Family History:    Family History   Problem Relation Age of Onset    Heart disease Father     Arthritis Father     Heart attack Father     Alzheimer's disease Mother     Dementia Mother        Physical Exam:     Vitals:   Blood Pressure: 95/66 (21 1635)  Pulse: 103 (21 1635)  Temperature: 99 °F (37 2 °C) (21 1638)  Temp Source: Oral (21 1638)  Respirations: 15 (21 1635)  SpO2: 96 % (21 1635)    Physical Exam  Constitutional:       General: He is not in acute distress  Appearance: He is well-developed  He is not diaphoretic  HENT:      Head: Normocephalic and atraumatic  Nose: Nose normal       Mouth/Throat:      Pharynx: No oropharyngeal exudate  Eyes:      General: No scleral icterus  Conjunctiva/sclera: Conjunctivae normal    Cardiovascular:      Rate and Rhythm: Normal rate and regular rhythm        Heart sounds: Normal heart sounds  No murmur heard  No friction rub  No gallop  Pulmonary:      Effort: Pulmonary effort is normal  No respiratory distress  Breath sounds: Normal breath sounds  No wheezing or rales  Chest:      Chest wall: No tenderness  Abdominal:      General: Bowel sounds are normal  There is no distension  Palpations: Abdomen is soft  Tenderness: There is no abdominal tenderness  There is no guarding  Musculoskeletal:         General: No tenderness or deformity  Normal range of motion  Cervical back: Normal range of motion and neck supple  Skin:     General: Skin is warm and dry  Findings: No erythema  Neurological:      Mental Status: He is alert  Mental status is at baseline  Additional Data:     Lab Results: I have personally reviewed pertinent reports  Results from last 7 days   Lab Units 12/27/21  1118   WBC Thousand/uL 11 65*   HEMOGLOBIN g/dL 12 3   HEMATOCRIT % 39 6   PLATELETS Thousands/uL 238     Results from last 7 days   Lab Units 12/27/21  1118   SODIUM mmol/L 138   POTASSIUM mmol/L 3 8   CHLORIDE mmol/L 99   CO2 mmol/L 29   BUN mg/dL 26*   CREATININE mg/dL 0 87   ANION GAP mmol/L 10   CALCIUM mg/dL 9 7   ALBUMIN g/dL 3 5   TOTAL BILIRUBIN mg/dL 0 42   ALK PHOS U/L 69 3   ALT U/L 12   AST U/L 13*   GLUCOSE RANDOM mg/dL 306*                 Results from last 7 days   Lab Units 12/27/21  1339 12/27/21  1118   LACTIC ACID mmol/L 2 1* 2 4*       Imaging: I have personally reviewed pertinent reports  XR chest 1 view portable   Final Result by Sumi Cabral MD (12/27 1202)      Development of mild vascular congestion      Status post median sternotomy  Persistent mild cardiomegaly                  Workstation performed: [id number]             EKG, Pathology, and Other Studies Reviewed on Admission:   · EKG: reviewed    Allscripts / Epic Records Reviewed: Yes     ** Please Note: This note has been constructed using a voice recognition system   **

## 2021-12-27 NOTE — ASSESSMENT & PLAN NOTE
Presented with heart rate approximately 140  Currently on Cardizem drip  Heart rate improving  Cardiology consult  Check echo  Continuous tele

## 2021-12-27 NOTE — ED PROVIDER NOTES
History  Chief Complaint   Patient presents with    Rapid Heart Rate     pt with a hx of afib, presents with a two day hx of dizziness, palpitations and sob     48-year-old male with history of AFib anticoagulated on Eliquis, diabetes, hypertension, hyperlipidemia, congestive heart failure, prior MI comes in today complaining of lightheadedness that began last night associated with dyspnea on exertion  He found his heart rate to be in the 130s to 140s  EMS found him to be the same  He received Cardizem 25 mg prior to arrival for his AFib with RVR  Patient also notes he has a "boil" on his LLE x 3 days  His BS has been running "high" around 200s, but he reports today it was the highest it has been  EMS found it to be 370s  He notes he did not take his novolog after eating breakfast           Prior to Admission Medications   Prescriptions Last Dose Informant Patient Reported? Taking?    Eliquis 5 MG  Self No No   Sig: TAKE ONE TABLET BY MOUTH TWICE DAILY   Fluad Quadrivalent 0 5 ML PRSY  Self Yes No   Sig: PHARMACY ADMINISTERED   Lancets (accu-chek multiclix) lancets  Self No No   Sig: Use 1 each 2 (two) times a day Use 2 times daily to test blood glucose   Levemir FlexTouch 100 units/mL injection pen   No No   Si units twice per day   Unifine Pentips 31G X 8 MM MISC  Self No No   Sig: INJECT UNDER THE SKIN DAILY USE AS DIRECTED   atorvastatin (LIPITOR) 40 mg tablet 2021 at Unknown time Self No Yes   Sig: TAKE ONE TABLET BY MOUTH ONCE DAILY   clindamycin (CLINDAGEL) 1 % gel  Self No No   Sig: Apply topically 2 (two) times a day Apply on left nipple and buttock   digoxin (Digox) 0 125 mg tablet  Self Yes No   Sig: Take 125 mcg by mouth daily   Patient not taking: Reported on 10/19/2021    ezetimibe (ZETIA) 10 mg tablet  Self No No   Sig: Take 1 tablet (10 mg total) by mouth daily   finasteride (PROSCAR) 5 mg tablet  Self No No   Sig: Take 1 tablet (5 mg total) by mouth daily   furosemide (LASIX) 80 mg tablet  Self No No   Sig: Take 1 tablet (80 mg total) by mouth 2 (two) times a day   glucose blood test strip  Self No No   Sig: Use 2 each daily   insulin aspart (NovoLOG FlexPen) 100 UNIT/ML injection pen   No No   Sig: Inject 22 Units under the skin 3 (three) times a day with meals   lisinopril (ZESTRIL) 20 mg tablet  Self No No   Sig: Take 1 tablet (20 mg total) by mouth daily   metFORMIN (GLUCOPHAGE) 500 mg tablet  Self No No   Sig: Take 1 tablet (500 mg total) by mouth 2 (two) times a day with meals   metoprolol succinate (TOPROL-XL) 25 mg 24 hr tablet  Self No No   Sig: Take 1 tablet (25 mg total) by mouth daily   omeprazole (PriLOSEC) 20 mg delayed release capsule  Self No No   Sig: TAKE ONE CAPSULE BY MOUTH ONCE DAILY   potassium citrate (UROCIT-K) 10 mEq  Self No No   Sig: Take 1 tablet (10 mEq total) by mouth 2 (two) times a day      Facility-Administered Medications: None       Past Medical History:   Diagnosis Date    A-fib ([place] [address] )     AAA [facility]ab[facility] aneurysm) ([facility])     Actinic keratosis of right temple 7/31/2020    Actinic keratosis of scalp 7/31/2020    Acute respiratory failure with hypoxia ([facility]) 3/25/2021    Allergic 1960    Arthritis     Lay's esophagus     Bladder cancer ([facility])     Blister of left leg 4/28/2021    Blister of right leg 5/26/2021    CAD (coronary artery disease)     Cancer ([facility]) 02/2010    Bladder    CHF (congestive heart failure) ([facility])     Chronic low back pain     Chronic pain of both knees 7/31/2020    Colitis 12/4/2020    CPAP (continuous positive airway pressure) dependence     [facility]     Diabetes mellitus ([facility] ) 10/1993    Excessive gas 12/3/2020    Heart murmur     History of chemotherapy     Hydroureter on left 4/28/2021    Hyperlipemia     Hypertension     Immunization deficiency 10/30/2020    Hep a nonimmune    Kidney stone     Left lower quadrant abdominal pain 12/3/2020    Mass of right adrenal gland ([facility] ) 12/4/2020    4 1 cm  Myocardial infarction ([facility] )     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    Obesity 2000    LUCIA (obstructive sleep apnea) 2021    Pressure ulcer of right leg, stage 1 2021    Urinary tract infection with hematuria 5/3/2021    u cx 2021=pseudomonas R to bactrim and cefdinir, S to cipro    Venous stasis dermatitis of both lower extremities 2021       Past Surgical History:   Procedure Laterality Date    BACK SURGERY      BLADDER SURGERY      CATARACT EXTRACTION, BILATERAL      COLONOSCOPY  2019    next  [address] GRAFT  2016    Quadruple per [place]    EGD  2017    EYE SURGERY  2016    Cataracts    JOINT REPLACEMENT  [id number]    Hip and shoulder    KIDNEY STONE SURGERY      MN CYSTO/URETERO W/LITHOTRIPSY &INDWELL STENT INSRT Left 2021    Procedure: CYSTOSCOPY URETEROSCOPY WITH LITHOTRIPSY HOLMIUM LASER, AND INSERTION STENT URETERAL/EXCHANGE;  Surgeon: Zheng Castanon MD;  [facility]: [facility];  Service: Urology    MN CYSTOURETHROSCOPY,URETER CATHETER Left 2021    Procedure: CYSTOSCOPY  WITH INSERTION STENT URETERAL;  Surgeon: Zheng Castanon MD;  [facility]: [facility];  Service: Urology    TOTAL HIP ARTHROPLASTY Right 2012    TOTAL SHOULDER REPLACEMENT Right 2013    VASECTOMY  1971       Family History   Problem Relation Age of Onset    Heart disease Father     Arthritis Father     Heart attack Father     Alzheimer's disease Mother     Dementia Mother      I have reviewed and agree with the history as documented      E-Cigarette/Vaping    E-Cigarette Use Never User      E-Cigarette/Vaping Substances    Nicotine No     THC No     CBD No     Flavoring No     Other No     Unknown No      Social History     Tobacco Use    Smoking status: Former Smoker     Packs/day: 0 25     Years: 20 00     Pack years: 5 00     Types: Cigarettes     Start date:      Quit date: 2010     Years since quittin 9    Smokeless tobacco: Never Used    Tobacco comment: Quit several times for up to 6 years  Vaping Use    Vaping Use: Never used   Substance Use Topics    Alcohol use: Not Currently     Alcohol/week: 0 0 standard drinks     Comment: No use    Drug use: Never     Comment: No use       Review of Systems   Constitutional: Negative for fever  HENT: Negative for nosebleeds  Eyes: Negative for redness  Respiratory: Positive for shortness of breath  Cardiovascular: Negative for chest pain and palpitations  Gastrointestinal: Negative for blood in stool  Genitourinary: Negative for hematuria  Musculoskeletal: Negative for gait problem  Skin: Negative for rash  Neurological: Positive for dizziness and light-headedness  Negative for seizures  Psychiatric/Behavioral: Negative for behavioral problems  Physical Exam  Physical Exam  Constitutional:       Appearance: Normal appearance  He is morbidly obese  HENT:      Head: Normocephalic and atraumatic  Right Ear: External ear normal       Left Ear: External ear normal    Eyes:      Extraocular Movements: Extraocular movements intact  Cardiovascular:      Rate and Rhythm: Tachycardia present  Rhythm irregular  Pulmonary:      Effort: Respiratory distress (mild with exertion) present  Musculoskeletal:         General: Normal range of motion  Cervical back: Normal range of motion  Skin:     General: Skin is warm and dry  Findings: Erythema (Venous stasis changes to bilateral lower extremities) and lesion (Left lower extremity proximal lateral thigh there is an abscess about 6 cm in diameter) present  Neurological:      General: No focal deficit present  Mental Status: He is alert     Psychiatric:         Mood and Affect: Mood normal          Behavior: Behavior normal              Vital Signs  ED Triage Vitals   Temperature Pulse Respirations Blood Pressure SpO2   12/27/21 1043 12/27/21 1043 12/27/21 1043 12/27/21 1043 12/27/21 1043 98 °F (36 7 °C) (!) 138 18 130/57 93 %      Temp Source Heart Rate Source Patient Position - Orthostatic VS BP Location FiO2 (%)   12/27/21 1043 12/27/21 1043 12/27/21 1043 12/27/21 1043 --   Oral Monitor Lying Right arm       Pain Score       12/27/21 1937       7           Vitals:    12/27/21 1900 12/27/21 2241 12/28/21 0050 12/28/21 0500   BP: 97/64 110/61 118/58 99/57   Pulse: (!) 140 (!) 138 89 (!) 120   Patient Position - Orthostatic VS: Lying            Visual Acuity      ED Medications  Medications   sodium chloride (PF) 0 9 % injection 3 mL (has no administration in time range)   diltiazem (CARDIZEM) 125 mg in sodium chloride 0 9 % 125 mL infusion (10 mg/hr Intravenous Rate/Dose Change 12/28/21 0400)   acetaminophen (TYLENOL) tablet 650 mg (650 mg Oral Given 12/27/21 1937)   insulin lispro (HumaLOG) 100 units/mL subcutaneous injection 1-6 Units (has no administration in time range)   insulin lispro (HumaLOG) 100 units/mL subcutaneous injection 1-5 Units (3 Units Subcutaneous Given 12/27/21 2320)   atorvastatin (LIPITOR) tablet 40 mg (has no administration in time range)   digoxin (LANOXIN) tablet 125 mcg (has no administration in time range)   apixaban (ELIQUIS) tablet 5 mg (5 mg Oral Given 12/27/21 2240)   ezetimibe (ZETIA) tablet 10 mg (has no administration in time range)   finasteride (PROSCAR) tablet 5 mg (has no administration in time range)   furosemide (LASIX) tablet 80 mg (80 mg Oral Given 12/27/21 2240)   insulin detemir (LEVEMIR) subcutaneous injection 50 Units (50 Units Subcutaneous Given 12/28/21 0019)   lisinopril (ZESTRIL) tablet 20 mg (has no administration in time range)   metoprolol succinate (TOPROL-XL) 24 hr tablet 25 mg (has no administration in time range)   pantoprazole (PROTONIX) EC tablet 40 mg (40 mg Oral Given 12/28/21 0600)   vancomycin (VANCOCIN) 2,000 mg in sodium chloride 0 9 % 500 mL IVPB (2,000 mg Intravenous New Bag 12/28/21 0200)   lidocaine (LIDODERM) 5 % patch 1 patch (has no administration in time range)   diltiazem (CARDIZEM) injection 25 mg (0 mg Intravenous Given to EMS 12/27/21 1103)   lidocaine (PF) (XYLOCAINE-MPF) 1 % injection 10 mL (10 mL Infiltration Given 12/27/21 1138)   bupivacaine (PF) (MARCAINE) 0 5 % injection 10 mL (10 mL Infiltration Given 12/27/21 1138)   vancomycin (VANCOCIN) 1500 mg in sodium chloride 0 9% 250 mL IVPB (0 mg/kg × 108 kg (Adjusted) Intravenous Stopped 12/27/21 1337)   diltiazem (CARDIZEM) 125 mg in sodium chloride 0 9 % 125 mL infusion (15 mg/hr Intravenous Rate/Dose Change 12/27/21 1500)   magnesium sulfate 2 g/50 mL IVPB (premix) 2 g (0 g Intravenous Stopped 12/27/21 1900)   digoxin (LANOXIN) injection 250 mcg (250 mcg Intravenous Given 12/27/21 1348)   metoprolol (LOPRESSOR) injection 5 mg (5 mg Intravenous Given 12/27/21 1508)   piperacillin-tazobactam (ZOSYN) 3 375 g in sodium chloride 0 9 % 100 mL IVPB (0 g Intravenous Stopped 12/27/21 1650)       Diagnostic Studies  Results Reviewed     Procedure Component Value Units Date/Time    Basic metabolic panel [[id number]]     Lab Status: No result Specimen: Blood     CBC and differential [[id number]]     Lab Status: No result Specimen: Blood     COVID/FLU/RSV [[id number]]  (Normal) Collected: 12/27/21 7422    Lab Status: Final result Specimen: Nares from Nasopharyngeal Swab Updated: 12/28/21 0056     SARS-CoV-2 Negative     INFLUENZA A PCR Negative     INFLUENZA B PCR Negative     RSV PCR Negative    Narrative:      FOR PEDIATRIC PATIENTS - copy/paste COVID Guidelines URL to browser: [email or web address]  [employer]     This test has been authorized by FDA under an EUA (Emergency Use Assay) for use by authorized laboratories  Clinical caution and judgement should be used with the interpretation of these results with consideration of the clinical impression and other laboratory testing    Testing reported as "Positive" or "Negative" has been proven to be accurate according to standard laboratory validation requirements  All testing is performed with control materials showing appropriate reactivity at standard intervals  Fingerstick Glucose (POCT) [[id number]]  (Abnormal) Collected: 12/27/21 2322    Lab Status: Final result Updated: 12/27/21 2331     POC Glucose 293 mg/dl     Fingerstick Glucose (POCT) [[id number]]  (Abnormal) Collected: 12/27/21 1950    Lab Status: Final result Updated: 12/27/21 2030     POC Glucose 216 mg/dl     Lactic acid [[id number]]  (Normal) Collected: 12/27/21 1642    Lab Status: Final result Specimen: Blood from Arm, Right Updated: 12/27/21 1707     LACTIC ACID 1 5 mmol/L     Narrative:      Result may be elevated if tourniquet was used during collection  Blood culture - 1st Set [[id number]] Collected: 12/27/21 1118    Lab Status: Preliminary result Specimen: Blood from Arm, Right Updated: 12/27/21 1701     Blood Culture Received in Microbiology Lab  Culture in Progress  Blood culture - 2nd set [[id number]] Collected: 12/27/21 1118    Lab Status: Preliminary result Specimen: Blood from Hand, Right Updated: 12/27/21 1701     Blood Culture Received in Microbiology Lab  Culture in Progress  HS Troponin I 4hr [[id number]] Collected: 12/27/21 1529    Lab Status: Final result Specimen: Blood from Arm, Right Updated: 12/27/21 1604     hs TnI 4hr 44 ng/L      Delta 4hr hsTnI 10 ng/L     HS Troponin I 2hr [[id number]] Collected: 12/27/21 1339    Lab Status: Final result Specimen: Blood from Arm, Right Updated: 12/27/21 1415     hs TnI 2hr 44 ng/L      Delta 2hr hsTnI 10 ng/L     Lactic acid 2 Hours [[id number]]  (Abnormal) Collected: 12/27/21 1339    Lab Status: Final result Specimen: Blood from Arm, Right Updated: 12/27/21 1408     LACTIC ACID 2 1 mmol/L     Narrative:      Result may be elevated if tourniquet was used during collection      Urine Microscopic [[id number]]  (Abnormal) Collected: 12/27/21 1330    Lab Status: Final result Specimen: Urine, Clean Catch Updated: 12/27/21 1401     RBC, UA 0-1 /hpf      WBC, UA 10-20 /hpf      Epithelial Cells None Seen /hpf      Bacteria, UA Occasional /hpf      Hyaline Casts, UA 4-10 /lpf      MUCUS THREADS Occasional    UA (URINE) with reflex to Scope [[id number]]  (Abnormal) Collected: 12/27/21 1330    Lab Status: Final result Specimen: Urine, Clean Catch Updated: 12/27/21 1338     Color, UA Yellow     Clarity, UA Clear     Specific Gravity, UA 1 015     pH, UA 6 0     Leukocytes, UA Trace     Nitrite, UA Negative     Protein, UA Negative mg/dl      Glucose, UA 2+ mg/dl      Ketones, UA Negative mg/dl      Urobilinogen, UA 0 2 E U /dl      Bilirubin, UA Negative     Blood, UA Negative    Wound culture and Gram stain [[id number]] Collected: 12/27/21 1235    Lab Status:  In process Specimen: Wound from Leg, Left Updated: 12/27/21 1244    Digoxin level [[id number]]  (Abnormal) Collected: 12/27/21 1118    Lab Status: Final result Specimen: Blood from Arm, Right Updated: 12/27/21 1206     Digoxin Lvl <0 5 ng/mL     HS Troponin 0hr (reflex protocol) [[id number]]  (Normal) Collected: 12/27/21 1118    Lab Status: Final result Specimen: Blood from Arm, Right Updated: 12/27/21 1200     hs TnI 0hr 34 ng/L     Comprehensive metabolic panel [[id number]]  (Abnormal) Collected: 12/27/21 1118    Lab Status: Final result Specimen: Blood from Arm, Right Updated: 12/27/21 1156     Sodium 138 mmol/L      Potassium 3 8 mmol/L      Chloride 99 mmol/L      CO2 29 mmol/L      ANION GAP 10 mmol/L      BUN 26 mg/dL      Creatinine 0 87 mg/dL      Glucose 306 mg/dL      Calcium 9 7 mg/dL      AST 13 U/L      ALT 12 U/L      Alkaline Phosphatase 69 3 U/L      Total Protein 7 2 g/dL      Albumin 3 5 g/dL      Total Bilirubin 0 42 mg/dL      eGFR 83 ml/min/1 73sq m     Narrative:      [facility] guidelines for Chronic Kidney Disease (CKD):     Stage 1 with normal or high GFR (GFR > 90 mL/min/1 73 square meters)    Stage 2 Mild CKD (GFR = 60-89 mL/min/1 73 square meters)    Stage 3A Moderate CKD (GFR = 45-59 mL/min/1 73 square meters)    Stage 3B Moderate CKD (GFR = 30-44 mL/min/1 73 square meters)    Stage 4 Severe CKD (GFR = 15-29 mL/min/1 73 square meters)    Stage 5 End Stage CKD (GFR <15 mL/min/1 73 square meters)  Note: GFR calculation is accurate only with a steady state creatinine    Magnesium [[id number]]  (Abnormal) Collected: 12/27/21 1118    Lab Status: Final result Specimen: Blood from Arm, Right Updated: 12/27/21 1156     Magnesium 1 4 mg/dL     Beta Hydroxybutyrate [[id number]]  (Normal) Collected: 12/27/21 1118    Lab Status: Final result Specimen: Blood from Arm, Right Updated: 12/27/21 1155     BETA-HYDROXYBUTYRATE 0 3 mmol/L     Lactic acid, plasma [[id number]]  (Abnormal) Collected: 12/27/21 1118    Lab Status: Final result Specimen: Blood from Arm, Right Updated: 12/27/21 1152     LACTIC ACID 2 4 mmol/L     Narrative:      Result may be elevated if tourniquet was used during collection  Blood gas, venous [[id number]]  (Abnormal) Collected: 12/27/21 1118    Lab Status: Final result Specimen: Blood from Arm, Right Updated: 12/27/21 1139     pH, Fran 7 385     pCO2, Fran 49 6 mm Hg      pO2, Fran 51 6 mm Hg      HCO3, Fran 29 0 mmol/L      Base Excess, Fran 3 1 mmol/L      O2 Content, Fran 15 6 ml/dL      O2 HGB, VENOUS 83 5 %     CBC [[id number]]  (Abnormal) Collected: 12/27/21 1118    Lab Status: Final result Specimen: Blood from Arm, Right Updated: 12/27/21 1137     WBC 11 65 Thousand/uL      RBC 4 87 Million/uL      Hemoglobin 12 3 g/dL      Hematocrit 39 6 %      MCV 81 fL      MCH 25 3 pg      MCHC 31 1 g/dL      RDW 16 3 %      Platelets 756 Thousands/uL      MPV 10 8 fL                  XR chest 1 view portable   Final Result by Carol Aquino MD (12/27 1202)      Development of mild vascular congestion      Status post median sternotomy   Persistent mild cardiomegaly                  Workstation performed: [id number]                    Procedures  Incision and drain    Date/Time: 12/27/2021 12:07 PM  Performed by: Buck Steinberg PA-C  Authorized by: Buck Steinberg PA-C   Universal Protocol:  Consent: Verbal consent obtained  Risks and benefits: risks, benefits and alternatives were discussed  Consent given by: patient  Patient identity confirmed: verbally with patient      Patient location:  Bedside  Location:     Type:  Abscess    Size:  6    Location:  Lower extremity    Lower extremity location:  L leg  Pre-procedure details:     Skin preparation:  Betadine  Anesthesia (see MAR for exact dosages): Anesthesia method:  Local infiltration    Local anesthetic:  Lidocaine 1% w/o epi and bupivacaine 0 5% w/o epi  Procedure details:     Complexity:  Simple    Needle aspiration: no      Incision types:  Single straight (T)    Scalpel blade:  11    Approach:  Open    Incision depth:  Subcutaneous    Wound management:  Probed and deloculated and irrigated with saline    Irrigation with saline:  100 cc    Drainage:  Bloody and purulent (sebaceous sac)    Drainage amount:  Copious    Wound treatment:  Packing placed    Packing material: 1" iodoform  Post-procedure details:     Patient tolerance of procedure:   Tolerated well, no immediate complications    CriticalCare Time  Performed by: Buck Steinberg PA-C  Authorized by: Buck Steinberg PA-C     Critical care provider statement:     Critical care time (minutes):  60    Critical care time was exclusive of:  Separately billable procedures and treating other patients    Critical care was necessary to treat or prevent imminent or life-threatening deterioration of the following conditions:  Cardiac failure, circulatory failure, shock, sepsis and dehydration    Critical care was time spent personally by me on the following activities:  Blood draw for specimens, obtaining history from patient or surrogate, development of treatment plan with patient or surrogate, discussions with consultants, discussions with primary provider, evaluation of patient's response to treatment, examination of patient, review of old charts, re-evaluation of patient's condition, ordering and review of radiographic studies, ordering and review of laboratory studies, ordering and performing treatments and interventions and interpretation of cardiac output measurements             ED Course                            Initial Sepsis Screening     Row Name 12/27/21 1200                Is the patient's history suggestive of a new or worsening infection? Yes (Proceed)  -SS        Suspected source of infection soft tissue  -SS        Are two or more of the following signs & symptoms of infection both present and new to the patient? No  -SS        Indicate SIRS criteria Tachycardia > 90 bpm  -SS        If the answer is yes to both questions, suspicion of sepsis is present --        If severe sepsis is present AND tissue hypoperfusion perists in the hour after fluid resuscitation or lactate > 4, the patient meets criteria for SEPTIC SHOCK --        Are any of the following organ dysfunction criteria present within 6 hours of suspected infection and SIRS criteria that are NOT considered to be chronic conditions?  --        Organ dysfunction --        Date of presentation of severe sepsis --        Time of presentation of severe sepsis --        Tissue hypoperfusion persists in the hour after crystalloid fluid administration, evidenced, by either: --        Was hypotension present within one hour of the conclusion of crystalloid fluid administration? --        Date of presentation of septic shock --        Time of presentation of septic shock --              User Key  (r) = Recorded By, (t) = Taken By, (c) = Cosigned By    [address] Name Provider Type    EVA Woodall PA-C Physician Assistant                SBIRT 20yo+      Most Recent Value   SBIRT (23 yo +)    In order to provide better care to our patients, we are screening all of our patients for alcohol and drug use  Would it be okay to ask you these screening questions? Yes Filed at: 12/27/2021 1104   Initial Alcohol Screen: US AUDIT-C     1  How often do you have a drink containing alcohol? 0 Filed at: 12/27/2021 1104   2  How many drinks containing alcohol do you have on a typical day you are drinking? 0 Filed at: 12/27/2021 1104   3a  Male UNDER 65: How often do you have five or more drinks on one occasion? 0 Filed at: 12/27/2021 1104   3b  FEMALE Any Age, or MALE 65+: How often do you have 4 or more drinks on one occassion? 0 Filed at: 12/27/2021 1104   Audit-C Score 0 Filed at: 12/27/2021 1104   CHRIS: How many times in the past year have you    Used an illegal drug or used a prescription medication for non-medical reasons? Never Filed at: 12/27/2021 1104                    MDM  Number of Diagnoses or Management Options  Atrial fibrillation with rapid ventricular response (HCC)  Diagnosis management comments: 67 y o  male with known hx of afib anticoagulated on Eliquis  Has been compliant with meds  No CP  Presents in afib with RVR likely secondary to abscess on LLE which I I&D'd in the ED  He is a diabetic, not in DKA  Likely once infection is better controlled, his HR will come down  Pt given vanco to empirically treat for MRSA  Wound culture obtained  Patient was discussed with Dr Julia Alvarado, cardiology who agreed a reasonable HR goal was 110-120  After dig   25 mg and metoprolol 5 mg and fluids, HR is 100-120  Will admit for close monitoring        Disposition  Final diagnoses:   Atrial fibrillation with rapid ventricular response ([address] )   Abscess of left leg   Hypomagnesemia     Time reflects when diagnosis was documented in both MDM as applicable and the Disposition within this note     Time User Action Codes Description Comment    12/27/2021  4:46 PM Edu Marquez Add [I48 91] Atrial fibrillation with rapid [facility] response [facility]     12/27/2021  4:50 PM Anselmo Molina Modify [L14 01] Atrial fibrillation with rapid ventricular response ([facility] )     12/28/2021  7:42 AM Anselmo Molina Add [L02 416] Abscess of left leg     12/28/2021  7:42 AM Anselmo Molina Add [E83 42] Hypomagnesemia       ED Disposition     ED Disposition Condition Date/Time Comment    Admit Stable Mon Dec 27, 2021  4:50 PM Case was discussed with Dr Peggy Leblanc and the patient's admission status was agreed to be Admission Status: inpatient status to the service of Dr Peggy Leblanc   Follow-up Information    None         Patient's Medications   Discharge Prescriptions    No medications on file       No discharge procedures on file      PDMP Review       Value Time User    PDMP Reviewed  Yes 5/21/2021  2:46 PM Guillermina Antunez MD          ED Provider  Electronically Signed by           Jaydon Burgess PA-C  12/28/21 0664

## 2021-12-27 NOTE — ASSESSMENT & PLAN NOTE
Lab Results   Component Value Date    HGBA1C 9 0 (H) 10/06/2021   Continue home insulin regimen  Sliding scale insulin

## 2021-12-27 NOTE — ASSESSMENT & PLAN NOTE
Presented with left lower extremity cellulitis with abscess formation  I and D performed emergency department  Follow-up fluid culture  Will place on IV vancomycin for now

## 2021-12-28 ENCOUNTER — APPOINTMENT (INPATIENT)
Dept: NON INVASIVE DIAGNOSTICS | Facility: HOSPITAL | Age: 76
DRG: 603 | End: 2021-12-28
Payer: MEDICARE

## 2021-12-28 LAB
ANION GAP SERPL CALCULATED.3IONS-SCNC: 7 MMOL/L (ref 4–13)
AORTIC ROOT: 3.9 CM
APICAL FOUR CHAMBER EJECTION FRACTION: 57 %
BASOPHILS # BLD AUTO: 0.03 THOUSANDS/ΜL (ref 0–0.1)
BASOPHILS NFR BLD AUTO: 0 % (ref 0–1)
BUN SERPL-MCNC: 15 MG/DL (ref 6–20)
CALCIUM SERPL-MCNC: 8.9 MG/DL (ref 8.4–10.2)
CHLORIDE SERPL-SCNC: 103 MMOL/L (ref 96–108)
CO2 SERPL-SCNC: 30 MMOL/L (ref 22–33)
CREAT SERPL-MCNC: 0.82 MG/DL (ref 0.5–1.2)
EOSINOPHIL # BLD AUTO: 0.17 THOUSAND/ΜL (ref 0–0.61)
EOSINOPHIL NFR BLD AUTO: 2 % (ref 0–6)
ERYTHROCYTE [DISTWIDTH] IN BLOOD BY AUTOMATED COUNT: 16.4 % (ref 11.6–15.1)
FLUAV RNA RESP QL NAA+PROBE: NEGATIVE
FLUBV RNA RESP QL NAA+PROBE: NEGATIVE
FRACTIONAL SHORTENING: 28 % (ref 28–44)
GFR SERPL CREATININE-BSD FRML MDRD: 85 ML/MIN/1.73SQ M
GLUCOSE SERPL-MCNC: 106 MG/DL (ref 65–140)
GLUCOSE SERPL-MCNC: 245 MG/DL (ref 65–140)
GLUCOSE SERPL-MCNC: 260 MG/DL (ref 65–140)
GLUCOSE SERPL-MCNC: 276 MG/DL (ref 65–140)
GLUCOSE SERPL-MCNC: 94 MG/DL (ref 65–140)
HCT VFR BLD AUTO: 36.4 % (ref 36.5–49.3)
HGB BLD-MCNC: 11.2 G/DL (ref 12–17)
IMM GRANULOCYTES # BLD AUTO: 0.02 THOUSAND/UL (ref 0–0.2)
IMM GRANULOCYTES NFR BLD AUTO: 0 % (ref 0–2)
INTERVENTRICULAR SEPTUM IN DIASTOLE (PARASTERNAL SHORT AXIS VIEW): 1.4 CM
LAAS-AP4: 28.9 CM2
LEFT INTERNAL DIMENSION IN SYSTOLE: 3.9 CM (ref 2.1–4)
LEFT VENTRICULAR INTERNAL DIMENSION IN DIASTOLE: 5.4 CM (ref 28.12–41.93)
LEFT VENTRICULAR POSTERIOR WALL IN END DIASTOLE: 1.4 CM
LEFT VENTRICULAR STROKE VOLUME: 77 ML
LYMPHOCYTES # BLD AUTO: 1.48 THOUSANDS/ΜL (ref 0.6–4.47)
LYMPHOCYTES NFR BLD AUTO: 19 % (ref 14–44)
MCH RBC QN AUTO: 25.6 PG (ref 26.8–34.3)
MCHC RBC AUTO-ENTMCNC: 30.8 G/DL (ref 31.4–37.4)
MCV RBC AUTO: 83 FL (ref 82–98)
MONOCYTES # BLD AUTO: 0.77 THOUSAND/ΜL (ref 0.17–1.22)
MONOCYTES NFR BLD AUTO: 10 % (ref 4–12)
NEUTROPHILS # BLD AUTO: 5.44 THOUSANDS/ΜL (ref 1.85–7.62)
NEUTS SEG NFR BLD AUTO: 69 % (ref 43–75)
NRBC BLD AUTO-RTO: 0 /100 WBCS
PLATELET # BLD AUTO: 218 THOUSANDS/UL (ref 149–390)
PMV BLD AUTO: 10.1 FL (ref 8.9–12.7)
POTASSIUM SERPL-SCNC: 3.8 MMOL/L (ref 3.5–5)
RBC # BLD AUTO: 4.38 MILLION/UL (ref 3.88–5.62)
RIGHT ATRIAL 2D VOLUME: 119 ML
RIGHT ATRIUM AREA SYSTOLE A4C: 34.2 CM2
RIGHT VENTRICLE ID DIMENSION: 4.9 CM
RSV RNA RESP QL NAA+PROBE: NEGATIVE
RV PSP: 39 MMHG
SARS-COV-2 RNA RESP QL NAA+PROBE: NEGATIVE
SL CV LV EF: 55
SL CV PED ECHO LEFT VENTRICLE DIASTOLIC VOLUME (MOD BIPLANE) 2D: 142 ML
SL CV PED ECHO LEFT VENTRICLE SYSTOLIC VOLUME (MOD BIPLANE) 2D: 65 ML
SODIUM SERPL-SCNC: 140 MMOL/L (ref 133–145)
TRICUSPID VALVE PEAK REGURGITATION VELOCITY: 2.92 M/S
TRICUSPID VALVE S': 80 CM/S
TSH SERPL DL<=0.05 MIU/L-ACNC: 0.98 UIU/ML (ref 0.34–5.6)
TV PEAK GRADIENT: 34 MMHG
WBC # BLD AUTO: 7.91 THOUSAND/UL (ref 4.31–10.16)
Z-SCORE OF LEFT VENTRICULAR DIMENSION IN END SYSTOLE: -18.5

## 2021-12-28 PROCEDURE — C8929 TTE W OR WO FOL WCON,DOPPLER: HCPCS

## 2021-12-28 PROCEDURE — 99233 SBSQ HOSP IP/OBS HIGH 50: CPT | Performed by: INTERNAL MEDICINE

## 2021-12-28 PROCEDURE — 36415 COLL VENOUS BLD VENIPUNCTURE: CPT | Performed by: INTERNAL MEDICINE

## 2021-12-28 PROCEDURE — 80048 BASIC METABOLIC PNL TOTAL CA: CPT | Performed by: INTERNAL MEDICINE

## 2021-12-28 PROCEDURE — 99222 1ST HOSP IP/OBS MODERATE 55: CPT | Performed by: INTERNAL MEDICINE

## 2021-12-28 PROCEDURE — 85025 COMPLETE CBC W/AUTO DIFF WBC: CPT | Performed by: INTERNAL MEDICINE

## 2021-12-28 PROCEDURE — 93306 TTE W/DOPPLER COMPLETE: CPT | Performed by: INTERNAL MEDICINE

## 2021-12-28 PROCEDURE — 94660 CPAP INITIATION&MGMT: CPT

## 2021-12-28 PROCEDURE — 82948 REAGENT STRIP/BLOOD GLUCOSE: CPT

## 2021-12-28 RX ORDER — METOPROLOL TARTRATE 5 MG/5ML
5 INJECTION INTRAVENOUS EVERY 6 HOURS PRN
Status: DISCONTINUED | OUTPATIENT
Start: 2021-12-28 | End: 2021-12-30 | Stop reason: HOSPADM

## 2021-12-28 RX ADMIN — INSULIN LISPRO 4 UNITS: 100 INJECTION, SOLUTION INTRAVENOUS; SUBCUTANEOUS at 09:24

## 2021-12-28 RX ADMIN — METOPROLOL TARTRATE 25 MG: 25 TABLET, FILM COATED ORAL at 23:33

## 2021-12-28 RX ADMIN — FUROSEMIDE 80 MG: 80 TABLET ORAL at 09:20

## 2021-12-28 RX ADMIN — EZETIMIBE 10 MG: 10 TABLET ORAL at 09:20

## 2021-12-28 RX ADMIN — VANCOMYCIN HYDROCHLORIDE 2000 MG: 1 INJECTION, POWDER, LYOPHILIZED, FOR SOLUTION INTRAVENOUS at 02:00

## 2021-12-28 RX ADMIN — FUROSEMIDE 80 MG: 80 TABLET ORAL at 17:23

## 2021-12-28 RX ADMIN — DIGOXIN 125 MCG: 0.12 TABLET ORAL at 09:21

## 2021-12-28 RX ADMIN — APIXABAN 5 MG: 5 TABLET, FILM COATED ORAL at 17:23

## 2021-12-28 RX ADMIN — INSULIN DETEMIR 16 UNITS: 100 INJECTION, SOLUTION SUBCUTANEOUS at 21:49

## 2021-12-28 RX ADMIN — APIXABAN 5 MG: 5 TABLET, FILM COATED ORAL at 09:20

## 2021-12-28 RX ADMIN — PANTOPRAZOLE SODIUM 40 MG: 40 TABLET, DELAYED RELEASE ORAL at 06:00

## 2021-12-28 RX ADMIN — METOPROLOL TARTRATE 25 MG: 25 TABLET, FILM COATED ORAL at 17:23

## 2021-12-28 RX ADMIN — DILTIAZEM HYDROCHLORIDE 15 MG/HR: 5 INJECTION INTRAVENOUS at 17:16

## 2021-12-28 RX ADMIN — VANCOMYCIN HYDROCHLORIDE 2000 MG: 1 INJECTION, POWDER, LYOPHILIZED, FOR SOLUTION INTRAVENOUS at 14:05

## 2021-12-28 RX ADMIN — METOPROLOL TARTRATE 25 MG: 25 TABLET, FILM COATED ORAL at 11:40

## 2021-12-28 RX ADMIN — INSULIN LISPRO 15 UNITS: 100 INJECTION, SOLUTION INTRAVENOUS; SUBCUTANEOUS at 13:49

## 2021-12-28 RX ADMIN — INSULIN LISPRO 4 UNITS: 100 INJECTION, SOLUTION INTRAVENOUS; SUBCUTANEOUS at 13:47

## 2021-12-28 RX ADMIN — INSULIN DETEMIR 50 UNITS: 100 INJECTION, SOLUTION SUBCUTANEOUS at 09:25

## 2021-12-28 RX ADMIN — METOPROLOL SUCCINATE 25 MG: 25 TABLET, EXTENDED RELEASE ORAL at 09:21

## 2021-12-28 RX ADMIN — ATORVASTATIN CALCIUM 40 MG: 40 TABLET, FILM COATED ORAL at 09:20

## 2021-12-28 RX ADMIN — PERFLUTREN 0.6 ML/MIN: 6.52 INJECTION, SUSPENSION INTRAVENOUS at 12:22

## 2021-12-28 RX ADMIN — INSULIN DETEMIR 50 UNITS: 100 INJECTION, SOLUTION SUBCUTANEOUS at 00:19

## 2021-12-28 NOTE — CONSULTS
[facility]                                              Cardiology Consult  Parminder Rowe 67 y o  male   YOB: 1945 MRN: [id number]  Unit/Bed#: ED 15 Encounter: [id number]      Physician Requesting Consult: Lilliam Alejandro MD  Reason for Consult / Principal Problem: Atrial flutter    Assessments  Principal Problem:    Atrial fibrillation with rapid ventricular response ([address] )  Active Problems:    Hypertension    Hyperlipemia    Type 2 diabetes mellitus with hyperglycemia, with long-term current use of insulin ([facility])    CAD (coronary artery disease)    Chronic diastolic CHF (congestive heart failure) ([facility])    Cellulitis and abscess of left lower extremity      Plan  Paroxysmal Atrial flutter with RVR  · Currently in atrial flutter with RVR, based on review of telemetry  · HR fixed in 140s, 120s, 90s for short periods indicating the degree of AV blocks  · HR remains difficult to control, was down to 100-120s overnight, but now back up to 140s when cardizem drip was titrated down overnight  · Currently on Cardizem drip @15, Metoprolol XL 25  · Continue cardizem drip for now  · Titrate up metoprolol in the meantime - increase to metoprolol 25 q6h + use additional IV metoprolol 5 mg q6h PRN for persistent HR > 130  · Wean down cardizem drip as able, and will bridge to PO cardizem as well  · Also on Digoxin 0 125 mg daily  · Treatment of abscess or infection of lower extremity per primary service  · Continue eliquis for anticoagulation    Chronic diastolic heart failure  · Appears mildly volume overloaded  · Home diuretic: Lasix 80 mg bid  · No shortness of breath, orthopnea or PND  · Continue same for now    ECG: Personally reviewed  Atrial flutter with RVR  Telemetry: Personally reviewed  Atrial flutter  HR in       History of Present Illness   HPI: Parminder Rowe is a 67y o  year old male who presents with symptoms of palpitations and dizziness which has been ongoing for about 2 days   He was in his usual state of health a few days ago, when he 1st noted palpitations and elevated heart rates similar to what he had experienced several months ago  It continued on and worsened on the day of presentation with heart rate being persistently elevated in the 130s to 140s  This was associated with some shortness of breath with exertion  No other complains of chest pain, near-syncope or syncope  Due to persistent symptoms, EMS was called and he was brought into the ED for evaluation  He was noted to be in atrial fibrillation and was started on Cardizem drip  Additionally was noted to have a boil on his left lower extremity, which had worsened to an abscess and this was treated in the ED as well        Past Medical History:   Diagnosis Date    A-fib [facility])     AAA (abdominal aortic aneurysm) [facility]     Actinic keratosis of right temple 7/31/2020    Actinic keratosis of scalp 7/31/2020    Acute respiratory failure with hypoxia ([address] ) 3/25/2021    Allergic 1960    Arthritis     Lay's esophagus     Bladder cancer (HCC)     Blister of left leg 4/28/2021    Blister of right leg 5/26/2021    CAD (coronary artery disease)     Cancer (HCC) 02/2010    Bladder    CHF (congestive heart failure) (HCC)     Chronic low back pain     Chronic pain of both knees 7/31/2020    Colitis 12/4/2020    CPAP (continuous positive airway pressure) dependence     Diabetes ([address] )     Diabetes mellitus ([address] ) 10/1993    Excessive gas 12/3/2020    Heart murmur     History of chemotherapy     Hydroureter on left 4/28/2021    Hyperlipemia     Hypertension     Immunization deficiency 10/30/2020    Hep a nonimmune    Kidney stone     Left lower quadrant abdominal pain 12/3/2020    Mass of right adrenal gland ([address] ) 12/4/2020    4 1 cm    Myocardial infarction ([address] )     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    Obesity 2000    LUCIA (obstructive sleep apnea) 1/29/2021    Pressure ulcer of right leg, stage 1 5/26/2021    Urinary tract infection with hematuria 5/3/2021    u cx 4/2021=pseudomonas R to bactrim and cefdinir, S to cipro    Venous stasis dermatitis of both lower extremities 5/26/2021     Past Surgical History:   Procedure Laterality Date    BACK SURGERY      BLADDER SURGERY      CATARACT EXTRACTION, BILATERAL      COLONOSCOPY  01/29/2019    next [address]22 [address] GRAFT  04/2016    Quadruple per Rita    EGD  06/2017    EYE SURGERY  11/2016    Cataracts    JOINT REPLACEMENT  [id number]    Hip and shoulder    KIDNEY STONE SURGERY      AK CYSTO/URETERO W/LITHOTRIPSY &INDWELL STENT INSRT Left 5/21/2021    Procedure: CYSTOSCOPY URETEROSCOPY WITH LITHOTRIPSY HOLMIUM LASER, AND INSERTION STENT URETERAL/EXCHANGE;  Surgeon: Zheng Castanon MD;  [facility];  Service: Urology    AK CYSTOURETHROSCOPY,URETER CATHETER Left 4/24/2021    Procedure: CYSTOSCOPY  WITH INSERTION STENT URETERAL;  Surgeon: Zheng Castanon MD;  [facility];  Service: Urology    TOTAL HIP ARTHROPLASTY Right 2012    TOTAL SHOULDER REPLACEMENT Right 2013    VASECTOMY  1971     Family History   Problem Relation Age of Onset    Heart disease Father     Arthritis Father     Heart attack Father     Alzheimer's disease Mother     Dementia Mother      Meds/Allergies   all current active meds have been reviewed and current meds:   Current Facility-Administered Medications   Medication Dose Route Frequency    acetaminophen (TYLENOL) tablet 650 mg  650 mg Oral Q6H PRN    apixaban (ELIQUIS) tablet 5 mg  5 mg Oral BID    atorvastatin (LIPITOR) tablet 40 mg  40 mg Oral Daily    digoxin (LANOXIN) tablet 125 mcg  125 mcg Oral Daily    diltiazem (CARDIZEM) 125 mg in sodium chloride 0 9 % 125 mL infusion  1-15 mg/hr Intravenous Continuous    ezetimibe (ZETIA) tablet 10 mg  10 mg Oral Daily    finasteride (PROSCAR) tablet 5 mg  5 mg Oral Daily    furosemide (LASIX) tablet 80 mg  80 mg Oral BID    insulin detemir (LEVEMIR) subcutaneous injection 50 Units  50 Units Subcutaneous Q12H [facility]    insulin lispro (HumaLOG) 100 units/mL subcutaneous injection 1-5 Units  1-5 Units Subcutaneous HS    insulin lispro (HumaLOG) 100 units/mL subcutaneous injection 1-6 Units  1-6 Units Subcutaneous TID AC    lidocaine (LIDODERM) 5 % patch 1 patch  1 patch Topical HS    lisinopril (ZESTRIL) tablet 20 mg  20 mg Oral Daily    metoprolol succinate (TOPROL-XL) 24 hr tablet 25 mg  25 mg Oral Daily    pantoprazole (PROTONIX) EC tablet 40 mg  40 mg Oral Early Morning    sodium chloride (PF) 0 9 % injection 3 mL  3 mL Intravenous Q1H PRN    vancomycin (VANCOCIN) 2,000 mg in sodium chloride 0 9 % 500 mL IVPB  2,000 mg Intravenous Q12H     (Not in a hospital admission)    No Known Allergies  Social History     Socioeconomic History    Marital status: /Civil Union     Spouse name: Gayatri Lobo Number of children: 3    Years of education: Not on file    Highest education level: 12th grade   Occupational History    Not on file   Tobacco Use    Smoking status: Former Smoker     Packs/day: 0 25     Years: 20 00     Pack years: 5 00     Types: Cigarettes     Start date:      Quit date: 2010     Years since quittin 9    Smokeless tobacco: Never Used    Tobacco comment: Quit several times for up to 6 years     Vaping Use    Vaping Use: Never used   Substance and Sexual Activity    Alcohol use: Not Currently     Alcohol/week: 0 0 standard drinks     Comment: No use    Drug use: Never     Comment: No use    Sexual activity: Not Currently     Partners: Female   Other Topics Concern    Not on file   Social History Narrative    · Most recent tobacco use screenin2020      · Do you currently or have you served in [employer]:   No      · Were you activated, into active duty, as a member of the [employer] or as a Reservist:   No      · Live alone or with others:   with others · Caffeine intake:   Occasional      · Illicit drugs:   No      · Diet:   Regular      · Exercise level: Moderate      · Advance directive: Yes      · Marital status:         · General stress level:   Medium      · Single or multi-level home/work:   multi level home      · Guns present in home:   No      · Seat belts used routinely:   Yes      · Sunscreen used routinely:   Yes      · Smoke alarm in home:   Yes      Social Determinants of Health     Financial Resource Strain: Low Risk     Difficulty of Paying Living Expenses: Not hard at all   Food Insecurity: No Food Insecurity    Worried About Running Out of Food in the Last Year: Never true    Fabi of Food in the Last Year: Never true   Transportation Needs: No Transportation Needs    Lack of Transportation (Medical): No    Lack of Transportation (Non-Medical): No   Physical Activity: Insufficiently Active    Days of Exercise per Week: 4 days    Minutes of Exercise per Session: 10 min   Stress: No Stress Concern Present    Feeling of Stress : Not at all   Social Connections: Moderately Isolated    Frequency of Communication with Friends and Family: More than three times a week    Frequency of Social Gatherings with Friends and Family: Twice a week    Attends Scientology Services: Never    Active Member of Clubs or Organizations: No    Attends Club or Organization Meetings: Never    Marital Status:    Intimate Partner Violence: Not At Risk    Fear of Current or Ex-Partner: No    Emotionally Abused: No    Physically Abused: No    Sexually Abused: No   Housing Stability: Not on file         Review of Systems   All other systems reviewed and are negative          Vitals:    12/28/21 0050 12/28/21 0500 12/28/21 0731 12/28/21 0831   BP: 118/58 99/57  109/66   BP Location:       Pulse: 89 (!) 120  (!) 140   Resp: 18   16   Temp:       TempSrc:       SpO2: 97%  96% 92%     Orthostatic Blood Pressures      Most Recent Value   Blood Pressure 109/66 filed at 12/28/2021 0831   Patient Position - Orthostatic VS Lying filed at 12/27/2021 1900        There is no height or weight on file to calculate BMI  Wt Readings from Last 5 Encounters:   10/13/21 (!) 140 kg (308 lb 3 2 oz)   08/06/21 (!) 142 kg (313 lb)   07/22/21 (!) 142 kg (312 lb)   07/20/21 (!) 142 kg (312 lb)   07/14/21 (!) 142 kg (314 lb)     I/O last 3 completed shifts: In: 0253 [I V :5000; IV Piggyback:150]  Out: -       Physical Exam  Vitals and nursing note reviewed  Constitutional:       General: He is not in acute distress  Appearance: He is well-developed  He is obese  He is not ill-appearing or diaphoretic  HENT:      Head: Normocephalic and atraumatic  Nose: No congestion  Eyes:      General: No scleral icterus  Conjunctiva/sclera: Conjunctivae normal    Neck:      Vascular: No carotid bruit or JVD  Cardiovascular:      Rate and Rhythm: Regular rhythm  Tachycardia present  Heart sounds: Normal heart sounds  No murmur heard  No friction rub  No gallop  Pulmonary:      Effort: Pulmonary effort is normal  No respiratory distress  Breath sounds: Normal breath sounds  No wheezing or rales  Chest:      Chest wall: No tenderness  Abdominal:      General: There is no distension  Palpations: Abdomen is soft  Tenderness: There is no abdominal tenderness  Musculoskeletal:         General: No swelling, tenderness or deformity  Cervical back: Neck supple  No muscular tenderness  Right lower leg: Edema present  Left lower leg: Edema present  Comments: B/L 1+ edema, erythema over both shins   Skin:     General: Skin is warm  Neurological:      General: No focal deficit present  Mental Status: He is alert and oriented to person, place, and time  Mental status is at baseline  Psychiatric:         Mood and Affect: Mood normal          Behavior: Behavior normal          Thought Content:  Thought content normal  Labs:  Results from last 7 days   Lab Units 21  1118   WBC Thousand/uL 11 65*   HEMOGLOBIN g/dL 12 3   HEMATOCRIT % 39 6   RDW % 16 3*   PLATELETS Thousands/uL 238     Results from last 7 days   Lab Units 21  1118   POTASSIUM mmol/L 3 8   CHLORIDE mmol/L 99   CO2 mmol/L 29   MAGNESIUM mg/dL 1 4*   BUN mg/dL 26*   CREATININE mg/dL 0 87   CALCIUM mg/dL 9 7   AST U/L 13*   ALT U/L 12   ALK PHOS U/L 69 3                         Imaging: XR chest 1 view portable    Result Date: 2021  Narrative: CHEST INDICATION: Tachycardia COMPARISON:  3/25/2021 EXAM PERFORMED/VIEWS:  XR CHEST PORTABLE Single view FINDINGS: Persistent midline sternotomy and mild cardiomegaly Development of mild vascular congestion  Persistent linear scarring left mid chest  No pneumothorax or pleural effusion  Osseous structures appear within normal limits for patient age  Impression: Development of mild vascular congestion Status post median sternotomy  Persistent mild cardiomegaly Workstation performed: [id number]       Cardiac testing:   Results for orders placed during the hospital encounter of 21    Echo complete with contrast if indicated    Narrative  [facility], [address] [facility]  [phone]    Transthoracic Echocardiogram  2D, M-mode, Doppler, and Color Doppler    Study date:  23-Mar-2021    Patient: Narayan Gipson  MR number: [id number]  Account number: [de-identified]  : 10-Herson-1945  Age: 67 years  Gender: Male  Status: Outpatient  Location: [address] [place] [address] and Vascular Center  Height: 76 in  Weight: 331 3 lb  BP: 160/ 80 mmHg    Indications: Assess left ventricular function      Diagnoses: R60 9 - Edema, unspecified    Sonographer:  RODRIGO French  Primary Physician:  Aruna Garcia MD  Referring Physician:  Hattie Milligan MD  Group:  [facility]  Interpreting Physician:  Gustavo Villalba MD    SUMMARY    LEFT VENTRICLE:  Systolic function was normal by visual assessment  Ejection fraction was estimated to be 60 %  There were no regional wall motion abnormalities  Wall thickness was mildly increased  Doppler parameters were consistent with abnormal left ventricular relaxation (grade 1 diastolic dysfunction)  RIGHT VENTRICLE:  The ventricle was mildly dilated  Systolic function was mildly reduced  Systolic pressure was mildly increased  Estimated peak pressure was 45 mmHg  LEFT ATRIUM:  The atrium was mildly dilated  RIGHT ATRIUM:  The atrium was mildly dilated  MITRAL VALVE:  There was mild annular calcification  There was trace regurgitation  TRICUSPID VALVE:  There was mild to moderate regurgitation  HISTORY: PRIOR HISTORY: Leg edema, CAD, Afib, Hypertension, Hyperlipidemia    PROCEDURE: The study was performed in the [facility] and [facility]  This was a routine study  The transthoracic approach was used  The study included complete 2D imaging, M-mode, complete spectral Doppler, and color Doppler  The  heart rate was 108 bpm, at the start of the study  Images were obtained from the parasternal, apical, subcostal, and suprasternal notch acoustic windows  Intravenous contrast ( 0 4 ml Definity/NSS) was administered  This was a technically  difficult study  LEFT VENTRICLE: Size was normal  Systolic function was normal by visual assessment  Ejection fraction was estimated to be 60 %  There were no regional wall motion abnormalities  Wall thickness was mildly increased  DOPPLER: There was an  increased relative contribution of atrial contraction to ventricular filling  Doppler parameters were consistent with abnormal left ventricular relaxation (grade 1 diastolic dysfunction)  RIGHT VENTRICLE: The ventricle was mildly dilated  Systolic function was mildly reduced  DOPPLER: Systolic pressure was mildly increased  Estimated peak pressure was 45 mmHg  LEFT ATRIUM: The atrium was mildly dilated      RIGHT ATRIUM: The atrium was mildly dilated  MITRAL VALVE: There was mild annular calcification  Valve structure was normal  There was normal leaflet separation  DOPPLER: The transmitral velocity was within the normal range  There was no evidence for stenosis  There was trace  regurgitation  AORTIC VALVE: The valve was trileaflet  Leaflets exhibited normal thickness, mild calcification, and normal cuspal separation  DOPPLER: Transaortic velocity was within the normal range  There was no evidence for stenosis  There was no  regurgitation  TRICUSPID VALVE: The valve structure was normal  There was normal leaflet separation  DOPPLER: The transtricuspid velocity was within the normal range  There was no evidence for stenosis  There was mild to moderate regurgitation  PULMONIC VALVE: Leaflets exhibited normal thickness, no calcification, and normal cuspal separation  DOPPLER: The transpulmonic velocity was within the normal range  There was no regurgitation  PERICARDIUM: There was no pericardial effusion  AORTA: The root exhibited normal size  SYSTEMIC VEINS: IVC: The inferior vena cava was normal in size and course   Respirophasic changes were normal     SYSTEM MEASUREMENT TABLES    2D  %FS: 36 5 %  Ao Diam: 3 93 cm  EDV(Teich): 161 22 ml  EF(Teich): 65 53 %  ESV(Teich): 55 57 ml  IVSd: 1 2 cm  LA Area: 28 91 cm2  LA Diam: 5 52 cm  LVEDV MOD A4C: 89 05 ml  LVEF MOD A4C: 60 77 %  LVESV MOD A4C: 34 93 ml  LVIDd: 5 72 cm  LVIDs: 3 63 cm  LVLd A4C: 7 78 cm  LVLs A4C: 6 46 cm  LVPWd: 1 21 cm  RA Area: 24 49 cm2  RVIDd: 4 38 cm  SV MOD A4C: 54 11 ml  SV(Teich): 105 65 ml    CW  TR MaxP 73 mmHg  TR Vmax: 3 34 m/s    MM  TAPSE: 1 91 cm    PW  E' Sept: 0 07 m/s  E/E' Sept: 13 16  MV A Teja: 1 14 m/s  MV Dec Dukes: 4 59 m/s2  MV DecT: 193 44 ms  MV E Teja: 0 89 m/s  MV E/A Ratio: 0 78  MV PHT: 56 1 ms  MVA By PHT: 3 92 cm2    [employer] Accredited Echocardiography Laboratory    Prepared and electronically signed by    Beck Hurd MD  Signed 24-Mar-2021 08:42:46    No results found for this or any previous visit  No results found for this or any previous visit  No results found for this or any previous visit

## 2021-12-28 NOTE — PROGRESS NOTES
Vancomycin IV Pharmacy-to-Dose Consultation    Elba Brown is a 67 y o  male who is currently receiving Vancomycin IV with management by the Pharmacy Consult service  Assessment/Plan:  The patient was reviewed  Renal function is stable and no signs or symptoms of nephrotoxicity and/or infusion reactions were documented in the chart  Based on todays assessment, continue current vancomycin (day # 2) dosing of 2000mg IV q12h, with a plan for trough to be drawn at 11:30 on 12/29/2021  We will continue to follow the patients culture results and clinical progress daily      Alondra Izaguirre, Pharmacist

## 2021-12-28 NOTE — PROGRESS NOTES
[place] [facility]  65   Progress Note - Ivis Hinton 1945, 67 y o  male MRN: [id number]  Unit/Bed#: ED 15 Encounter: [id number]  Primary Care Provider: Aruna Garcia MD   Date and time admitted to hospital: 12/27/2021 10:46 AM    Cellulitis and abscess of left lower extremity  Assessment & Plan  Presented with left lower extremity cellulitis with abscess formation  I and D performed emergency department  Follow-up fluid culture  Continue IV vancomycin for now     Chronic diastolic CHF (congestive heart failure) (HCC)  Assessment & Plan  Wt Readings from Last 3 Encounters:   10/13/21 (!) 140 kg (308 lb 3 2 oz)   08/06/21 (!) 142 kg (313 lb)   07/22/21 (!) 142 kg (312 lb)         Currently euvolemic  Continue home meds    CAD (coronary artery disease)  Assessment & Plan  Continue home beta-blocker, statin    Type 2 diabetes mellitus with hyperglycemia, with long-term current use of insulin [facility])  Assessment & Plan  Lab Results   Component Value Date    HGBA1C 9 0 (H) 10/06/2021       Recent Labs     12/27/21  1950 12/27/21  2322 12/28/21  0842   POCGLU 216* 293* 245*       Blood Sugar Average: Last 72 hrs:  (P) 425 [id number]   Continue Levemir 50 u BID, will start Lispro 15 u TIDAC  Continue SSI   Monitor glucose levels qid     Hyperlipemia  Assessment & Plan  Continue home statin    Hypertension  Assessment & Plan  Continue home BP meds  BP currently controlled    * Atrial fibrillation with rapid ventricular response (HCC)  Assessment & Plan  Presented with heart rate approximately 140  Was on Cardizem drip  Heart rate improving  Cardiology consult, appreciate their recommendations   Check echo  Continuous tele          VTE Pharmacologic Prophylaxis:   VTE Score: 6 Moderate Risk (Score 3-4) - Pharmacological DVT Prophylaxis Ordered: Apixaban (Eliquis)      Mechanical VTE Prophylaxis in Place: Yes    Patient Centered Rounds: I have performed bedside rounds with nursing staff today     Discussions with Specialists or Other Care Team Provider: yes    Education and Discussions with Family / Patient: yes    Current Length of Stay: 1 day(s)    Current Patient Status: Inpatient     Discharge Plan / Estimated Discharge Date: Anticipate discharge in 48-72 hrs to discharge location to be determined pending rehab evaluations  Code Status: Level 1 - Full Code      Subjective:   Patient was seen and examined this morning, he feels better after I&D of the abscess  No chest pain or SOB  No other additional complains  Objective:     Vitals:   Temp (24hrs), Av 6 °F (37 °C), Min:98 2 °F (36 8 °C), Max:99 °F (37 2 °C)    Temp:  [98 2 °F (36 8 °C)-99 °F (37 2 °C)] 99 °F (37 2 °C)  HR:  [] 144  Resp:  [15-22] 18  BP: ()/(54-80) 127/80  SpO2:  [92 %-97 %] 95 %  There is no height or weight on file to calculate BMI  Input and Output Summary (last 24 hours): Intake/Output Summary (Last 24 hours) at 2021 1048  Last data filed at 2021 1900  Gross per 24 hour   Intake 5150 ml   Output --   Net 5150 ml       Physical Exam:     Physical Exam  Constitutional:       General: He is not in acute distress  Appearance: He is obese  He is not ill-appearing, toxic-appearing or diaphoretic  HENT:      Head: Normocephalic  Nose: Nose normal       Mouth/Throat:      Mouth: Mucous membranes are moist    Eyes:      Conjunctiva/sclera: Conjunctivae normal    Cardiovascular:      Rate and Rhythm: Tachycardia present  Rhythm irregular  Pulmonary:      Breath sounds: No wheezing, rhonchi or rales  Abdominal:      General: Bowel sounds are normal  There is no distension  Palpations: Abdomen is soft  Tenderness: There is no abdominal tenderness  There is no guarding  Musculoskeletal:      Cervical back: No tenderness  Right lower leg: Edema present  Left lower leg: Edema present  Skin:     General: Skin is warm        Capillary Refill: Capillary refill takes less than 2 seconds  Findings: Erythema present  Neurological:      General: No focal deficit present  Mental Status: He is alert and oriented to person, place, and time  Psychiatric:         Mood and Affect: Mood normal          Behavior: Behavior normal           Additional Data:     Labs:  Results from last 7 days   Lab Units 12/27/21  1118   WBC Thousand/uL 11 65*   HEMOGLOBIN g/dL 12 3   HEMATOCRIT % 39 6   PLATELETS Thousands/uL 238     Results from last 7 days   Lab Units 12/27/21  1118   SODIUM mmol/L 138   POTASSIUM mmol/L 3 8   CHLORIDE mmol/L 99   CO2 mmol/L 29   BUN mg/dL 26*   CREATININE mg/dL 0 87   ANION GAP mmol/L 10   CALCIUM mg/dL 9 7   ALBUMIN g/dL 3 5   TOTAL BILIRUBIN mg/dL 0 42   ALK PHOS U/L 69 3   ALT U/L 12   AST U/L 13*   GLUCOSE RANDOM mg/dL 306*         Results from last 7 days   Lab Units 12/28/21  0842 12/27/21  2322 12/27/21  1950   POC GLUCOSE mg/dl 245* 293* 216*         Results from last 7 days   Lab Units 12/27/21  1642 12/27/21  1339 12/27/21  1118   LACTIC ACID mmol/L 1 5 2 1* 2 4*       Imaging: Reviewed radiology reports from this admission including: chest xray    Recent Cultures (last 7 days):     Results from last 7 days   Lab Units 12/27/21  1235 12/27/21  1118   BLOOD CULTURE   --  Received in Microbiology Lab  Culture in Progress  Received in Microbiology Lab  Culture in Progress     GRAM STAIN RESULT  Rare Polys*  4+ Gram positive cocci in pairs, chains and clusters*  1+ Gram negative rods*  Rare Gram positive rods*  --        Lines/Drains:  Invasive Devices  Report    Peripheral Intravenous Line            Peripheral IV 12/27/21 Left Antecubital <1 day    Peripheral IV 12/27/21 Right Forearm <1 day                Telemetry:   Telemetry Orders (From admission, onward)             48 Hour Telemetry Monitoring  Continuous x 48 hours        References:    Telemetry Guidelines   Question:  Reason for 48 Hour Telemetry  Answer:  Arrhythmias Requiring Medical Therapy (eg  SVT, Vtach/fib, Bradycardia, Uncontrolled A-fib)                    Last 24 Hours Medication List:   Current Facility-Administered Medications   Medication Dose Route Frequency Provider Last Rate    acetaminophen  650 mg Oral Q6H PRN Edu Marquez MD      apixaban  5 mg Oral BID Edu Marquez MD      atorvastatin  40 mg Oral Daily Edu Marquez MD      digoxin  125 mcg Oral Daily Edu Marquez MD      diltiazem  1-15 mg/hr Intravenous Continuous Edu Marquez MD 10 mg/hr (12/28/21 0400)    ezetimibe  10 mg Oral Daily Edu Marquez MD      finasteride  5 mg Oral Daily Edu Marquez MD      furosemide  80 mg Oral BID Edu Marquez MD      insulin detemir  50 Units Subcutaneous Q12H [facility] Edu Marquez MD      insulin lispro  1-5 Units Subcutaneous HS Edu Marquez MD      insulin lispro  1-6 Units Subcutaneous TID AC Edu Marquez MD      insulin lispro  15 Units Subcutaneous TID With Meals Caitlyn Chance MD      lidocaine  1 patch Topical HS Matteo Medrano PA-C      lisinopril  20 mg Oral Daily Edu Marquez MD      metoprolol succinate  25 mg Oral Daily Edu Marquez MD      pantoprazole  40 mg Oral Early Morning Edu Marquez MD      sodium chloride (PF)  3 mL Intravenous Q1H PRN Chris Hagen PA-C      vancomycin  2,000 mg Intravenous Q12H Edu Marquez MD 2,000 mg (12/28/21 0200)        Today, Patient Was Seen By: Connor Baltazar MD    ** Please Note: This note has been constructed using a voice recognition system   **

## 2021-12-28 NOTE — ASSESSMENT & PLAN NOTE
Presented with heart rate approximately 140  Was on Cardizem drip  Heart rate improving  Cardiology consult, appreciate their recommendations   Check echo  Continuous tele

## 2021-12-28 NOTE — SEPSIS NOTE
Sepsis Note   Daniel Gonzalez 67 y o  male MRN: [id number]  Unit/Bed#: ED 15 Encounter: [id number]       qSOFA     [address] Name 12/28/21 0500 12/28/21 0050 12/27/21 2241 12/27/21 1900 12/27/21 1635    Altered mental status GCS < 15 -- -- -- -- --    Respiratory Rate > / =22 -- 0 0 0 0    Systolic BP < / =825 1 0 0 1 1    Q Sofa Score -- 0 0 1 1    Row Name 12/27/21 1426 12/27/21 1335 12/27/21 1334 12/27/21 1306 12/27/21 1043    Altered mental status GCS < 15 -- -- -- -- --    Respiratory Rate > / =22 0 1 0 -- 0    Systolic BP < / =178 0 0 0 1 0    Q Sofa Score 0 1 0 1 0               Initial Sepsis Screening     Row Name 12/27/21 1200                Is the patient's history suggestive of a new or worsening infection? Yes (Proceed)  -SS        Suspected source of infection soft tissue  -SS        Are two or more of the following signs & symptoms of infection both present and new to the patient? No  -SS        Indicate SIRS criteria Tachycardia > 90 bpm  -SS        If the answer is yes to both questions, suspicion of sepsis is present --        If severe sepsis is present AND tissue hypoperfusion perists in the hour after fluid resuscitation or lactate > 4, the patient meets criteria for SEPTIC SHOCK --        Are any of the following organ dysfunction criteria present within 6 hours of suspected infection and SIRS criteria that are NOT considered to be chronic conditions?  --        Organ dysfunction --        Date of presentation of severe sepsis --        Time of presentation of severe sepsis --        Tissue hypoperfusion persists in the hour after crystalloid fluid administration, evidenced, by either: --        Was hypotension present within one hour of the conclusion of crystalloid fluid administration? --        Date of presentation of septic shock --        Time of presentation of septic shock --              User Key  (r) = Recorded By, (t) = Taken By, (c) = Cosigned By    Initials Name Provider Type    SS Radha Cortes PA-C Physician Assistant

## 2021-12-28 NOTE — ASSESSMENT & PLAN NOTE
Presented with left lower extremity cellulitis with abscess formation  I and D performed emergency department  Follow-up fluid culture  Continue IV vancomycin for now

## 2021-12-28 NOTE — PLAN OF CARE
Problem: MOBILITY - ADULT  Goal: Maintain or return to baseline ADL function  Description: INTERVENTIONS:  -  Assess patient's ability to carry out ADLs; assess patient's baseline for ADL function and identify physical deficits which impact ability to perform ADLs (bathing, care of mouth/teeth, toileting, grooming, dressing, etc )  - Assess/evaluate cause of self-care deficits   - Assess range of motion  - Assess patient's mobility; develop plan if impaired  - Assess patient's need for assistive devices and provide as appropriate  - Encourage maximum independence but intervene and supervise when necessary  - Involve family in performance of ADLs  - Assess for home care needs following discharge   - Consider OT consult to assist with ADL evaluation and planning for discharge  - Provide patient education as appropriate  Outcome: Progressing  Goal: Maintains/Returns to pre admission functional level  Description: INTERVENTIONS:  - Perform BMAT or MOVE assessment daily    - Set and communicate daily mobility goal to care team and patient/family/caregiver  - Collaborate with rehabilitation services on mobility goals if consulted  - Perform Range of Motion 3 times a day  - Reposition patient every 2 hours    - Out of bed for meals 3 times a day  - Out of bed for toileting  - Record patient progress and toleration of activity level   Outcome: Progressing

## 2021-12-28 NOTE — ASSESSMENT & PLAN NOTE
Lab Results   Component Value Date    HGBA1C 9 0 (H) 10/06/2021       Recent Labs     12/27/21  1950 12/27/21  2322 12/28/21  0842   POCGLU 216* 293* 245*       Blood Sugar Average: Last 72 hrs:  (P) 243 [id number]   Continue Levemir 50 u BID, will start Lispro 15 u TIDAC  Continue SSI   Monitor glucose levels qid

## 2021-12-29 LAB
ANION GAP SERPL CALCULATED.3IONS-SCNC: 11 MMOL/L (ref 4–13)
ATRIAL RATE: 288 BPM
BASOPHILS # BLD AUTO: 0.06 THOUSANDS/ΜL (ref 0–0.1)
BASOPHILS NFR BLD AUTO: 1 % (ref 0–1)
BUN SERPL-MCNC: 16 MG/DL (ref 6–20)
CALCIUM SERPL-MCNC: 9.2 MG/DL (ref 8.4–10.2)
CHLORIDE SERPL-SCNC: 103 MMOL/L (ref 96–108)
CO2 SERPL-SCNC: 30 MMOL/L (ref 22–33)
CREAT SERPL-MCNC: 0.98 MG/DL (ref 0.5–1.2)
EOSINOPHIL # BLD AUTO: 0.23 THOUSAND/ΜL (ref 0–0.61)
EOSINOPHIL NFR BLD AUTO: 2 % (ref 0–6)
ERYTHROCYTE [DISTWIDTH] IN BLOOD BY AUTOMATED COUNT: 16.1 % (ref 11.6–15.1)
GFR SERPL CREATININE-BSD FRML MDRD: 74 ML/MIN/1.73SQ M
GLUCOSE SERPL-MCNC: 149 MG/DL (ref 65–140)
GLUCOSE SERPL-MCNC: 155 MG/DL (ref 65–140)
GLUCOSE SERPL-MCNC: 158 MG/DL (ref 65–140)
GLUCOSE SERPL-MCNC: 193 MG/DL (ref 65–140)
GLUCOSE SERPL-MCNC: 211 MG/DL (ref 65–140)
HCT VFR BLD AUTO: 37.3 % (ref 36.5–49.3)
HGB BLD-MCNC: 11.2 G/DL (ref 12–17)
IMM GRANULOCYTES # BLD AUTO: 0.03 THOUSAND/UL (ref 0–0.2)
IMM GRANULOCYTES NFR BLD AUTO: 0 % (ref 0–2)
LYMPHOCYTES # BLD AUTO: 2.38 THOUSANDS/ΜL (ref 0.6–4.47)
LYMPHOCYTES NFR BLD AUTO: 25 % (ref 14–44)
MAGNESIUM SERPL-MCNC: 1.7 MG/DL (ref 1.6–2.6)
MCH RBC QN AUTO: 25.5 PG (ref 26.8–34.3)
MCHC RBC AUTO-ENTMCNC: 30 G/DL (ref 31.4–37.4)
MCV RBC AUTO: 85 FL (ref 82–98)
MONOCYTES # BLD AUTO: 0.96 THOUSAND/ΜL (ref 0.17–1.22)
MONOCYTES NFR BLD AUTO: 10 % (ref 4–12)
NEUTROPHILS # BLD AUTO: 6.07 THOUSANDS/ΜL (ref 1.85–7.62)
NEUTS SEG NFR BLD AUTO: 62 % (ref 43–75)
NRBC BLD AUTO-RTO: 0 /100 WBCS
P AXIS: 253 DEGREES
PLATELET # BLD AUTO: 267 THOUSANDS/UL (ref 149–390)
PMV BLD AUTO: 11 FL (ref 8.9–12.7)
POTASSIUM SERPL-SCNC: 3.6 MMOL/L (ref 3.5–5)
PR INTERVAL: 46 MS
QRS AXIS: 70 DEGREES
QRSD INTERVAL: 179 MS
QT INTERVAL: 362 MS
QTC INTERVAL: 503 MS
RBC # BLD AUTO: 4.4 MILLION/UL (ref 3.88–5.62)
SODIUM SERPL-SCNC: 144 MMOL/L (ref 133–145)
T WAVE AXIS: 9 DEGREES
VANCOMYCIN TROUGH SERPL-MCNC: 16.7 UG/ML (ref 10–20)
VENTRICULAR RATE: 116 BPM
WBC # BLD AUTO: 9.73 THOUSAND/UL (ref 4.31–10.16)

## 2021-12-29 PROCEDURE — 82948 REAGENT STRIP/BLOOD GLUCOSE: CPT

## 2021-12-29 PROCEDURE — 93005 ELECTROCARDIOGRAM TRACING: CPT

## 2021-12-29 PROCEDURE — 80202 ASSAY OF VANCOMYCIN: CPT | Performed by: INTERNAL MEDICINE

## 2021-12-29 PROCEDURE — 94660 CPAP INITIATION&MGMT: CPT

## 2021-12-29 PROCEDURE — 99232 SBSQ HOSP IP/OBS MODERATE 35: CPT | Performed by: INTERNAL MEDICINE

## 2021-12-29 PROCEDURE — 99232 SBSQ HOSP IP/OBS MODERATE 35: CPT | Performed by: PHYSICIAN ASSISTANT

## 2021-12-29 PROCEDURE — 83735 ASSAY OF MAGNESIUM: CPT | Performed by: INTERNAL MEDICINE

## 2021-12-29 PROCEDURE — 93010 ELECTROCARDIOGRAM REPORT: CPT | Performed by: INTERNAL MEDICINE

## 2021-12-29 PROCEDURE — 80048 BASIC METABOLIC PNL TOTAL CA: CPT | Performed by: INTERNAL MEDICINE

## 2021-12-29 PROCEDURE — 85025 COMPLETE CBC W/AUTO DIFF WBC: CPT | Performed by: INTERNAL MEDICINE

## 2021-12-29 RX ORDER — DILTIAZEM HYDROCHLORIDE 60 MG/1
60 TABLET, FILM COATED ORAL EVERY 6 HOURS SCHEDULED
Status: DISCONTINUED | OUTPATIENT
Start: 2021-12-29 | End: 2021-12-30

## 2021-12-29 RX ORDER — MAGNESIUM SULFATE 1 G/100ML
1 INJECTION INTRAVENOUS ONCE
Status: COMPLETED | OUTPATIENT
Start: 2021-12-29 | End: 2021-12-29

## 2021-12-29 RX ORDER — METOPROLOL TARTRATE 50 MG/1
50 TABLET, FILM COATED ORAL EVERY 6 HOURS
Status: DISCONTINUED | OUTPATIENT
Start: 2021-12-29 | End: 2021-12-30

## 2021-12-29 RX ADMIN — METOPROLOL TARTRATE 50 MG: 50 TABLET, FILM COATED ORAL at 17:18

## 2021-12-29 RX ADMIN — INSULIN LISPRO 15 UNITS: 100 INJECTION, SOLUTION INTRAVENOUS; SUBCUTANEOUS at 11:56

## 2021-12-29 RX ADMIN — EZETIMIBE 10 MG: 10 TABLET ORAL at 08:49

## 2021-12-29 RX ADMIN — APIXABAN 5 MG: 5 TABLET, FILM COATED ORAL at 17:18

## 2021-12-29 RX ADMIN — METOPROLOL TARTRATE 25 MG: 25 TABLET, FILM COATED ORAL at 05:42

## 2021-12-29 RX ADMIN — INSULIN LISPRO 15 UNITS: 100 INJECTION, SOLUTION INTRAVENOUS; SUBCUTANEOUS at 08:50

## 2021-12-29 RX ADMIN — INSULIN LISPRO 1 UNITS: 100 INJECTION, SOLUTION INTRAVENOUS; SUBCUTANEOUS at 17:18

## 2021-12-29 RX ADMIN — MAGNESIUM SULFATE HEPTAHYDRATE 1 G: 1 INJECTION, SOLUTION INTRAVENOUS at 08:57

## 2021-12-29 RX ADMIN — DIGOXIN 125 MCG: 0.12 TABLET ORAL at 08:49

## 2021-12-29 RX ADMIN — DILTIAZEM HYDROCHLORIDE 10 MG/HR: 5 INJECTION INTRAVENOUS at 09:24

## 2021-12-29 RX ADMIN — INSULIN LISPRO 2 UNITS: 100 INJECTION, SOLUTION INTRAVENOUS; SUBCUTANEOUS at 08:49

## 2021-12-29 RX ADMIN — FUROSEMIDE 80 MG: 80 TABLET ORAL at 08:49

## 2021-12-29 RX ADMIN — FUROSEMIDE 80 MG: 80 TABLET ORAL at 17:18

## 2021-12-29 RX ADMIN — INSULIN DETEMIR 40 UNITS: 100 INJECTION, SOLUTION SUBCUTANEOUS at 23:15

## 2021-12-29 RX ADMIN — INSULIN LISPRO 2 UNITS: 100 INJECTION, SOLUTION INTRAVENOUS; SUBCUTANEOUS at 11:56

## 2021-12-29 RX ADMIN — INSULIN DETEMIR 50 UNITS: 100 INJECTION, SOLUTION SUBCUTANEOUS at 08:49

## 2021-12-29 RX ADMIN — VANCOMYCIN HYDROCHLORIDE 2000 MG: 1 INJECTION, POWDER, LYOPHILIZED, FOR SOLUTION INTRAVENOUS at 00:52

## 2021-12-29 RX ADMIN — DILTIAZEM HYDROCHLORIDE 60 MG: 60 TABLET, FILM COATED ORAL at 17:18

## 2021-12-29 RX ADMIN — METOPROLOL TARTRATE 50 MG: 50 TABLET, FILM COATED ORAL at 23:14

## 2021-12-29 RX ADMIN — DILTIAZEM HYDROCHLORIDE 60 MG: 60 TABLET, FILM COATED ORAL at 12:50

## 2021-12-29 RX ADMIN — METOPROLOL TARTRATE 50 MG: 50 TABLET, FILM COATED ORAL at 11:55

## 2021-12-29 RX ADMIN — PANTOPRAZOLE SODIUM 40 MG: 40 TABLET, DELAYED RELEASE ORAL at 05:42

## 2021-12-29 RX ADMIN — INSULIN LISPRO 15 UNITS: 100 INJECTION, SOLUTION INTRAVENOUS; SUBCUTANEOUS at 17:19

## 2021-12-29 RX ADMIN — ACETAMINOPHEN 650 MG: 325 TABLET, FILM COATED ORAL at 03:20

## 2021-12-29 RX ADMIN — ATORVASTATIN CALCIUM 40 MG: 40 TABLET, FILM COATED ORAL at 08:49

## 2021-12-29 RX ADMIN — VANCOMYCIN HYDROCHLORIDE 2000 MG: 1 INJECTION, POWDER, LYOPHILIZED, FOR SOLUTION INTRAVENOUS at 12:50

## 2021-12-29 RX ADMIN — APIXABAN 5 MG: 5 TABLET, FILM COATED ORAL at 08:49

## 2021-12-29 NOTE — ASSESSMENT & PLAN NOTE
Wt Readings from Last 3 Encounters:   12/28/21 (!) 143 kg (316 lb 5 8 oz)   10/13/21 (!) 140 kg (308 lb 3 2 oz)   08/06/21 (!) 142 kg (313 lb)     · Home diuretic: Lasix 80 mg BID  · ECHO showed EF 84%, normal systolic function  · Continue home Lasix, Cardiology following

## 2021-12-29 NOTE — PLAN OF CARE
Problem: MOBILITY - ADULT  Goal: Maintain or return to baseline ADL function  Description: INTERVENTIONS:  -  Assess patient's ability to carry out ADLs; assess patient's baseline for ADL function and identify physical deficits which impact ability to perform ADLs (bathing, care of mouth/teeth, toileting, grooming, dressing, etc )  - Assess/evaluate cause of self-care deficits   - Assess range of motion  - Assess patient's mobility; develop plan if impaired  - Assess patient's need for assistive devices and provide as appropriate  - Encourage maximum independence but intervene and supervise when necessary  - Involve family in performance of ADLs  - Assess for home care needs following discharge   - Consider OT consult to assist with ADL evaluation and planning for discharge  - Provide patient education as appropriate  12/29/2021 1239 by Pili Londono RN  Outcome: Progressing  12/29/2021 1238 by Pili Londono RN  Outcome: Progressing  Goal: Maintains/Returns to pre admission functional level  Description: INTERVENTIONS:  - Perform BMAT or MOVE assessment daily    - Set and communicate daily mobility goal to care team and patient/family/caregiver  - Collaborate with rehabilitation services on mobility goals if consulted  - Perform Range of Motion 3 times a day  - Reposition patient every 2 hours    - Dangle patient 3 times a day  - Stand patient 3 times a day  - Ambulate patient 3  times a day  - Out of bed to chair 3 times a day   - Out of bed for meals  3  times a day  - Out of bed for toileting  - Record patient progress and toleration of activity level   12/29/2021 1239 by Pili Londono RN  Outcome: Progressing  12/29/2021 1238 by Pili Londono RN  Outcome: Progressing     Problem: Potential for Falls  Goal: Patient will remain free of falls  Description: INTERVENTIONS:  - Educate patient/family on patient safety including physical limitations  - Instruct patient to call for assistance with activity   - Consult OT/PT to assist with strengthening/mobility   - Keep Call bell within reach  - Keep bed low and locked with side rails adjusted as appropriate  - Keep care items and personal belongings within reach  - Initiate and maintain comfort rounds  - Make Fall Risk Sign visible to staff  - Offer Toileting every 2 Hours, in advance of need  - Initiate/Maintain  bed and chair alarm  - Apply yellow socks and bracelet for high fall risk patients  - Consider moving patient to room near nurses station  12/29/2021 1239 by Colton Orozco RN  Outcome: Progressing  12/29/2021 1238 by Colton Orozco RN  Outcome: Progressing     Problem: CARDIOVASCULAR - ADULT  Goal: Maintains optimal cardiac output and hemodynamic stability  Description: INTERVENTIONS:  - Monitor I/O, vital signs and rhythm  - Monitor for S/S and trends of decreased cardiac output  - Administer and titrate ordered vasoactive medications to optimize hemodynamic stability  - Assess quality of pulses, skin color and temperature  - Assess for signs of decreased coronary artery perfusion  - Instruct patient to report change in severity of symptoms  12/29/2021 1239 by Colton Orozco RN  Outcome: Progressing  12/29/2021 1238 by Colton Orozco RN  Outcome: Progressing  Goal: Absence of cardiac dysrhythmias or at baseline rhythm  Description: INTERVENTIONS:  - Continuous cardiac monitoring, vital signs, obtain 12 lead EKG if ordered  - Administer antiarrhythmic and heart rate control medications as ordered  - Monitor electrolytes and administer replacement therapy as ordered  12/29/2021 1239 by Colton Orozco RN  Outcome: Progressing  12/29/2021 1238 by Colton Orozco RN  Outcome: Progressing     Problem: PAIN - ADULT  Goal: Verbalizes/displays adequate comfort level or baseline comfort level  Description: Interventions:  - Encourage patient to monitor pain and request assistance  - Assess pain using appropriate pain scale  - Administer analgesics based on type and severity of pain and evaluate response  - Implement non-pharmacological measures as appropriate and evaluate response  - Consider cultural and social influences on pain and pain management  - Notify physician/advanced practitioner if interventions unsuccessful or patient reports new pain  Outcome: Progressing     Problem: INFECTION - ADULT  Goal: Absence or prevention of progression during hospitalization  Description: INTERVENTIONS:  - Assess and monitor for signs and symptoms of infection  - Monitor lab/diagnostic results  - Administer medications as ordered- Instruct and encourage patient and family to use good hand hygiene technique  - Identify and instruct in appropriate isolation precautions for identified infection/condition  Outcome: Progressing  Goal: Absence of fever/infection during neutropenic period  Description: INTERVENTIONS:  - Monitor WBC    Outcome: Progressing     Problem: DISCHARGE PLANNING  Goal: Discharge to home or other facility with appropriate resources  Description: INTERVENTIONS:  - Identify barriers to discharge w/patient and caregiver  - Arrange for needed discharge resources and transportation as appropriate  - Identify discharge learning needs (meds, wound care, etc )  - Arrange for interpretive services to assist at discharge as needed  - Refer to Case Management Department for coordinating discharge planning if the patient needs post-hospital services based on physician/advanced practitioner order or complex needs related to functional status, cognitive ability, or social support system  Outcome: Progressing     Problem: Knowledge Deficit  Goal: Patient/family/caregiver demonstrates understanding of disease process, treatment plan, medications, and discharge instructions  Description: Complete learning assessment and assess knowledge base    Interventions:  - Provide teaching at level of understanding  - Provide teaching via preferred learning methods  Outcome: Progressing

## 2021-12-29 NOTE — WOUND OSTOMY CARE
Progress Note - Wound   Yoon Asai 67 y o  male MRN: [id number]  Unit/Bed#: -01 Encounter: [id number]      Assessment:  Wound care consulted for assessment of L thigh abscess s/p I&D in the ED  Patient admitted with a-fib with RVR  History of - CHF, CAD, DM, HLD, obesity, and HTN  Patient seen in bed, alert and oriented x 4, cooperative and agreeable for the assessment  Patient is independent for care, self reports he is ambulatory and continent  B/l heels, buttocks and sacrum are intact with no redness or wounds  No maceration in the sacral intergluteal crease  Will recommend a preventative skin care plan  Patient reports worsening pain to the abscess location prior to admission  Patient reports history of "boils" in the past and one to the same location a few years ago that spontaneously burst and drained  Patient is agreeable to following up with the wound care center  1  L lateral thigh - irregular shaped full thickness wound  Visible wound base is approx: 50% moist yellow tissue and 50% moist red tissue  Edges fragile and attached, no maceration  Undermining present circumferentially 12-12 ranging in depth from 1 5-2 0cm  Cynthia-wound is intact with blanchable pink skin, some dry peeling aspects noted from the swelling  Patient reports the area is no longer painful    -wound repacked, one piece of packing removed and one piece of packing replaced  -recommend 1/4 iodoform packing due to small opening/undermining, and foam dressing to cover   -patient will need VNA and/or outpatient wound care follow-up  Patient verbalized understanding of plan of care  No induration, fluctuance, odor, warmth/temperature differences, redness, or purulence noted to the above noted wounds and skin areas assssed  New dressings applied  Patient tolerated well- denies pain  Primary nurse aware of plan of care  See flow sheets for more detailed assessment findings  Will follow along      Discussed assessment findings, and plan of care/recommendations with VERÓNICA Bhatt provider  Plan:   1  Apply hydraguard to b/l heels, buttocks and sacrum BID and PRN for prevention and protection  2  Apply skin nourishing cream the entire skin daily for moisture  3  Turn and reposition patient every  2 hours   4  Elevate heels off of bed with pillows to offload pressure   5  Apply EHOB waffle cushion to chair when OOB, if able  6  Cleanse L lateral thigh wound with NSS, pat dry, and gently pack the wound with iodoform packing  Tape tail to rachel-wound area  Cover with small square bordered foam dressing  Amari dressing with  Change dressings daily and PRN for soilage/dislodgement  7  Wound care will follow along with patient weekly, please call or tiger text with questions and concerns  8  Follow-up with the [facility] as an outpatient - please call [phone] for an appointment  Objective:    Vitals: Blood pressure 132/66, pulse (!) 140, temperature 98 2 °F (36 8 °C), temperature source Tympanic, resp  rate 20, height 6' 4" (1 93 m), weight (!) 143 kg (316 lb 5 8 oz), SpO2 90 %  ,Body mass index is 38 51 kg/m²  Wound 12/29/21 Thigh Left;Posterior;Proximal (Active)   Wound Image   12/29/21 1234   Wound Description Beefy red;Yellow 12/29/21 1234   Rachel-wound Assessment Dry; Intact;Fragile;Pink 12/29/21 1234   Wound Length (cm) 1 3 cm 12/29/21 1234   Wound Width (cm) 1 2 cm 12/29/21 1234   Wound Depth (cm) 0 9 cm 12/29/21 1234   Wound Surface Area (cm^2) 1 56 cm^2 12/29/21 1234   Wound Volume (cm^3) 1 404 cm^3 12/29/21 1234   Calculated Wound Volume (cm^3) 1 4 cm^3 12/29/21 1234   Tunneling 0 cm 12/29/21 1234   Tunneling in depth located at 0 12/29/21 1234   Undermining 2 12/29/21 1234   Undermining is depth extending from 12-12 12/29/21 1234   Drainage Amount Small 12/29/21 1234   Drainage Description Serosanguineous; Bloody 12/29/21 1234   Non-staged Wound Description Full thickness 12/29/21 1231 Treatments Cleansed;Irrigation with NSS;Site care 12/29/21 1234   Dressing Packings; Foam, Silicon (eg  Allevyn, etc) 12/29/21 1234   Wound packed? Yes 12/29/21 1234   Packing- # removed 1 12/29/21 1234   Packing- # inserted 1 12/29/21 1234   Dressing Changed New 12/29/21 1234   Patient Tolerance Tolerated well 12/29/21 1234   Dressing Status Clean;Dry; Intact 12/29/21 1234     Recommendations written as orders  AVS updated    Wilson Hyde RN BSN CWON

## 2021-12-29 NOTE — ASSESSMENT & PLAN NOTE
· Continue home beta-blocker, statin  · Patient appears to have possible P 80 on lower extremities, consider evaluation

## 2021-12-29 NOTE — DISCHARGE INSTR - OTHER ORDERS
1  Apply hydraguard to b/l heels, buttocks and sacrum BID and PRN for prevention and protection  2  Apply skin nourishing cream the entire skin daily for moisture  3  Turn and reposition patient every  2 hours   4  Elevate heels off of bed with pillows to offload pressure   5  Apply EHOB waffle cushion to chair when OOB, if able  6  Cleanse L lateral thigh wound with NSS, pat dry, and gently pack the wound with iodoform packing  Tape tail to rachel-wound area  Cover with small square bordered foam dressing  Amari dressing with  Change dressings daily and PRN for soilage/dislodgement  7  Follow-up with the [facility] wound care center as an outpatient - please call [phone] for an appointment

## 2021-12-29 NOTE — PROGRESS NOTES
Vancomycin Assessment    Carin French is a 67 y o  male who is currently receiving vancomycin 2000mg IV q12hrs for skin-soft tissue infection  Goal trough 15-20  Vanco trough today was 16 7 ug/mL  Relevant clinical data and objective history reviewed:  Creatinine   Date Value Ref Range Status   12/29/2021 0 98 0 50 - 1 20 mg/dL Final     Comment:     Standardized to IDMS reference method   12/28/2021 0 82 0 50 - 1 20 mg/dL Final     Comment:     Standardized to IDMS reference method   12/27/2021 0 87 0 50 - 1 20 mg/dL Final     Comment:     Standardized to IDMS reference method     /66   Pulse (!) 140   Temp 98 2 °F (36 8 °C) (Tympanic)   Resp 20   Ht 6' 4" (1 93 m)   Wt (!) 143 kg (316 lb 5 8 oz)   SpO2 90%   BMI 38 51 kg/m²   No intake/output data recorded  Lab Results   Component Value Date/Time    BUN 16 12/29/2021 05:32 AM    WBC 9 73 12/29/2021 05:32 AM    HGB 11 2 (L) 12/29/2021 05:32 AM    HCT 37 3 12/29/2021 05:32 AM    MCV 85 12/29/2021 05:32 AM     12/29/2021 05:32 AM     Temp Readings from Last 3 Encounters:   12/29/21 98 2 °F (36 8 °C) (Tympanic)   10/13/21 99 1 °F (37 3 °C) (Tympanic)   07/14/21 98 5 °F (36 9 °C) (Tympanic)     Vancomycin Days of Therapy: 3    Assessment/Plan  The patient is currently on vancomycin utilizing scheduled dosing  Baseline risks associated with therapy include: concomitant nephrotoxic medications and advanced age  The patient is receiving 2000mg IV q12hrs with the most recent vancomycin level being at steady-state and therapeutic based on a goal of 15-20 (appropriate for most indications) ; therefore, is clinically appropriate and dose will be continued   Pharmacy will continue to follow closely for s/sx of nephrotoxicity, infusion reactions, and appropriateness of therapy  BMP and CBC will be ordered per protocol  Plan for trough as patient approaches steady state, prior to the other  dose at approximately 1100 on 12/31/21   Pharmacy will continue to follow the patients culture results and clinical progress daily      Amber Dewitt, Pharmacist

## 2021-12-29 NOTE — PROGRESS NOTES
[facility]    Cardiology Progress Note  Alisa Pitt 67 y o  male   YOB: 1945 MRN: [id number]  Unit/Bed#: -Cathie Encounter: [id number]      Subjective:   No significant events overnight  No acute complains of chest pain, shortness of breath, palpitations or dizziness  Overall feels well    Assessments  Principal Problem:    Atrial fibrillation with rapid ventricular response (HCC)  Active Problems:    Hypertension    Hyperlipemia    Type 2 diabetes mellitus with hyperglycemia, with long-term current use of insulin ([facility])    CAD (coronary artery disease)    Chronic diastolic CHF (congestive heart failure) ([facility])    Cellulitis and abscess of left lower extremity      Plan  Paroxysmal Atrial flutter with RVR  · Currently in atrial flutter with RVR, based on review of telemetry  · HR fixed in 140s, 120s, 90s for short periods indicating the degree of AV blocks  · Heart rate improving and appears to be responding better to the metoprolol  · Cardizem drip has been weaned down, has been 5-10 over the last 24 hours, and is currently down to 5 mg/hr --> will switch to PO cardizem 60 q6h, and titrate off drip thereafter  · Heart rate reasonably controlled, but still elevated  · Will increase metoprolol further to 50 mg q 6h  · Also on Digoxin 0 125 mg daily  · Treatment of abscess or infection of lower extremity per primary service  · Continue eliquis for anticoagulation     Chronic diastolic heart failure  · Home diuretic: Lasix 80 mg bid  · No shortness of breath, orthopnea or PND  · He jaime have mild lower extremity edema  · Continue lasix 80 bid for now    D/w primary service    Review of Systems   All other systems reviewed and are negative  Telemetry Review: Atrial flutter  HR in   Mostly in 90-100s, goes up to 120s with activity  Objective:   Vitals: Blood pressure 113/74, pulse 59, temperature 98 3 °F (36 8 °C), temperature source Tympanic, resp   rate 20, height 6' 4" (1 93 m), weight (!) 143 kg (316 lb 5 8 oz), SpO2 90 %  , Body mass index is 38 51 kg/m² ,   Orthostatic Blood Pressures      Most Recent Value   Blood Pressure 113/74 filed at 2021 0836   Patient Position - Orthostatic VS Sitting filed at 2021 3013         Systolic (09EOY), IKH:914 , Min:99 , UPO:753     Diastolic (88YGY), DZN:83, Min:49, Max:80    Wt Readings from Last 5 Encounters:   21 (!) 143 kg (316 lb 5 8 oz)   10/13/21 (!) 140 kg (308 lb 3 2 oz)   21 (!) 142 kg (313 lb)   21 (!) 142 kg (312 lb)   21 (!) 142 kg (312 lb)     I/O        0701   0700  0701   0700  0701   0700    I V  (mL/kg) 5000      IV Piggyback 150      Total Intake(mL/kg) 5150      Net +5150                       Physical Exam  Vitals and nursing note reviewed  Constitutional:       General: He is not in acute distress  Appearance: He is well-developed  He is obese  He is not ill-appearing or diaphoretic  HENT:      Head: Normocephalic and atraumatic  Nose: No congestion  Eyes:      General: No scleral icterus  Conjunctiva/sclera: Conjunctivae normal    Neck:      Vascular: No carotid bruit or JVD  Cardiovascular:      Rate and Rhythm: Tachycardia present  Rhythm irregular  Heart sounds: Normal heart sounds  No murmur heard  No friction rub  No gallop  Pulmonary:      Effort: Pulmonary effort is normal  No respiratory distress  Breath sounds: Normal breath sounds  No wheezing or rales  Chest:      Chest wall: No tenderness  Abdominal:      General: There is no distension  Palpations: Abdomen is soft  Tenderness: There is no abdominal tenderness  Musculoskeletal:         General: No swelling, tenderness or deformity  Cervical back: Neck supple  No muscular tenderness  Right lower le+ Pitting Edema present  Left lower le+ Pitting Edema present  Skin:     General: Skin is warm        Findings: Erythema present  Neurological:      General: No focal deficit present  Mental Status: He is alert and oriented to person, place, and time  Mental status is at baseline  Psychiatric:         Mood and Affect: Mood normal          Behavior: Behavior normal          Thought Content: Thought content normal            Laboratory Results: personally reviewed        CBC with diff:   Results from last 7 days   Lab Units 12/29/21  0532 12/28/21  1143 12/27/21  1118   WBC Thousand/uL 9 73 7 91 11 65*   HEMOGLOBIN g/dL 11 2* 11 2* 12 3   HEMATOCRIT % 37 3 36 4* 39 6   MCV fL 85 83 81*   PLATELETS Thousands/uL 267 218 238   MCH pg 25 5* 25 6* 25 3*   MCHC g/dL 30 0* 30 8* 31 1*   RDW % 16 1* 16 4* 16 3*   MPV fL 11 0 10 1 10 8   NRBC AUTO /100 WBCs 0 0  --          CMP:  Results from last 7 days   Lab Units 12/29/21  0532 12/28/21  1143 12/27/21  1118   POTASSIUM mmol/L 3 6 3 8 3 8   CHLORIDE mmol/L 103 103 99   CO2 mmol/L 30 30 29   BUN mg/dL 16 15 26*   CREATININE mg/dL 0 98 0 82 0 87   CALCIUM mg/dL 9 2 8 9 9 7   AST U/L  --   --  13*   ALT U/L  --   --  12   ALK PHOS U/L  --   --  69 3   EGFR ml/min/1 73sq m 74 85 83         BMP:  Results from last 7 days   Lab Units 12/29/21  0532 12/28/21  1143 12/27/21  1118   POTASSIUM mmol/L 3 6 3 8 3 8   CHLORIDE mmol/L 103 103 99   CO2 mmol/L 30 30 29   BUN mg/dL 16 15 26*   CREATININE mg/dL 0 98 0 82 0 87   CALCIUM mg/dL 9 2 8 9 9 7       BNP: No results for input(s): BNP in the last 72 hours      Magnesium:   Results from last 7 days   Lab Units 12/29/21  0532 12/27/21  1118   MAGNESIUM mg/dL 1 7 1 4*       Coags:       TSH:        Hemoglobin A1C       Lipid Profile:       Meds/Allergies   all current active meds have been reviewed and current meds:   Current Facility-Administered Medications   Medication Dose Route Frequency    acetaminophen (TYLENOL) tablet 650 mg  650 mg Oral Q6H PRN    apixaban (ELIQUIS) tablet 5 mg  5 mg Oral BID    atorvastatin (LIPITOR) tablet 40 mg  40 mg Oral Daily    digoxin (LANOXIN) tablet 125 mcg  125 mcg Oral Daily    diltiazem (CARDIZEM) 125 mg in sodium chloride 0 9 % 125 mL infusion  1-15 mg/hr Intravenous Continuous    ezetimibe (ZETIA) tablet 10 mg  10 mg Oral Daily    finasteride (PROSCAR) tablet 5 mg  5 mg Oral Daily    furosemide (LASIX) tablet 80 mg  80 mg Oral BID    insulin detemir (LEVEMIR) subcutaneous injection 50 Units  50 Units Subcutaneous Q12H MELISSA    insulin lispro (HumaLOG) 100 units/mL subcutaneous injection 1-5 Units  1-5 Units Subcutaneous HS    insulin lispro (HumaLOG) 100 units/mL subcutaneous injection 1-6 Units  1-6 Units Subcutaneous TID AC    insulin lispro (HumaLOG) 100 units/mL subcutaneous injection 15 Units  15 Units Subcutaneous TID With Meals    lidocaine (LIDODERM) 5 % patch 1 patch  1 patch Topical HS    metoprolol (LOPRESSOR) injection 5 mg  5 mg Intravenous Q6H PRN    metoprolol tartrate (LOPRESSOR) tablet 50 mg  50 mg Oral Q6H    pantoprazole (PROTONIX) EC tablet 40 mg  40 mg Oral Early Morning    sodium chloride (PF) 0 9 % injection 3 mL  3 mL Intravenous Q1H PRN    vancomycin (VANCOCIN) 2,000 mg in sodium chloride 0 9 % 500 mL IVPB  2,000 mg Intravenous Q12H     Medications Prior to Admission   Medication    atorvastatin (LIPITOR) 40 mg tablet    clindamycin (CLINDAGEL) 1 % gel    digoxin (Digox) 0 125 mg tablet    Eliquis 5 MG    ezetimibe (ZETIA) 10 mg tablet    finasteride (PROSCAR) 5 mg tablet    Fluad Quadrivalent 0 5 ML PRSY    furosemide (LASIX) 80 mg tablet    glucose blood test strip    insulin aspart (NovoLOG FlexPen) 100 UNIT/ML injection pen    Lancets (accu-chek multiclix) lancets    Levemir FlexTouch 100 units/mL injection pen    lisinopril (ZESTRIL) 20 mg tablet    metFORMIN (GLUCOPHAGE) 500 mg tablet    metoprolol succinate (TOPROL-XL) 25 mg 24 hr tablet    omeprazole (PriLOSEC) 20 mg delayed release capsule    potassium citrate (UROCIT-K) 10 mEq    Unifine Pentips 31G X 8 MM MISC     diltiazem, 1-15 mg/hr, Last Rate: 7 5 mg/hr (21 1003)          Cardiac testing: reviewed  Results for orders placed during the hospital encounter of 21    Echo complete with contrast if indicated    Narrative  [address], [facility]  [phone]    Transthoracic Echocardiogram  2D, M-mode, Doppler, and Color Doppler    Study date:  23-Mar-2021    Patient: Jensen Jauregui  MR number: [id number]  Account number: [de-identified]  : 10-Herson-1945  Age: 67 years  Gender: Male  Status: Outpatient  Location: [address] [place] [address]  Height: 76 in  Weight: 331 3 lb  BP: 160/ 80 mmHg    Indications: Assess left ventricular function  Diagnoses: R60 9 - Edema, unspecified    Sonographer:  RODRIGO Childers  Primary Physician:  Sanford Hart MD  Referring Physician:  Jose Borges MD  Group:  [facility]  Interpreting Physician:  Sonia Do MD    SUMMARY    LEFT VENTRICLE:  Systolic function was normal by visual assessment  Ejection fraction was estimated to be 60 %  There were no regional wall motion abnormalities  Wall thickness was mildly increased  Doppler parameters were consistent with abnormal left ventricular relaxation (grade 1 diastolic dysfunction)  RIGHT VENTRICLE:  The ventricle was mildly dilated  Systolic function was mildly reduced  Systolic pressure was mildly increased  Estimated peak pressure was 45 mmHg  LEFT ATRIUM:  The atrium was mildly dilated  RIGHT ATRIUM:  The atrium was mildly dilated  MITRAL VALVE:  There was mild annular calcification  There was trace regurgitation  TRICUSPID VALVE:  There was mild to moderate regurgitation  HISTORY: PRIOR HISTORY: Leg edema, CAD, Afib, Hypertension, Hyperlipidemia    PROCEDURE: The study was performed in the [address] [place] [address]  This was a routine study  The transthoracic approach was used   The study included complete 2D imaging, M-mode, complete spectral Doppler, and color Doppler  The  heart rate was 108 bpm, at the start of the study  Images were obtained from the parasternal, apical, subcostal, and suprasternal notch acoustic windows  Intravenous contrast ( 0 4 ml Definity/NSS) was administered  This was a technically  difficult study  LEFT VENTRICLE: Size was normal  Systolic function was normal by visual assessment  Ejection fraction was estimated to be 60 %  There were no regional wall motion abnormalities  Wall thickness was mildly increased  DOPPLER: There was an  increased relative contribution of atrial contraction to ventricular filling  Doppler parameters were consistent with abnormal left ventricular relaxation (grade 1 diastolic dysfunction)  RIGHT VENTRICLE: The ventricle was mildly dilated  Systolic function was mildly reduced  DOPPLER: Systolic pressure was mildly increased  Estimated peak pressure was 45 mmHg  LEFT ATRIUM: The atrium was mildly dilated  RIGHT ATRIUM: The atrium was mildly dilated  MITRAL VALVE: There was mild annular calcification  Valve structure was normal  There was normal leaflet separation  DOPPLER: The transmitral velocity was within the normal range  There was no evidence for stenosis  There was trace  regurgitation  AORTIC VALVE: The valve was trileaflet  Leaflets exhibited normal thickness, mild calcification, and normal cuspal separation  DOPPLER: Transaortic velocity was within the normal range  There was no evidence for stenosis  There was no  regurgitation  TRICUSPID VALVE: The valve structure was normal  There was normal leaflet separation  DOPPLER: The transtricuspid velocity was within the normal range  There was no evidence for stenosis  There was mild to moderate regurgitation  PULMONIC VALVE: Leaflets exhibited normal thickness, no calcification, and normal cuspal separation  DOPPLER: The transpulmonic velocity was within the normal range   There was no regurgitation  PERICARDIUM: There was no pericardial effusion  AORTA: The root exhibited normal size  SYSTEMIC VEINS: IVC: The inferior vena cava was normal in size and course  Respirophasic changes were normal     SYSTEM MEASUREMENT TABLES    2D  %FS: 36 5 %  Ao Diam: 3 93 cm  EDV(Teich): 161 22 ml  EF(Teich): 65 53 %  ESV(Teich): 55 57 ml  IVSd: 1 2 cm  LA Area: 28 91 cm2  LA Diam: 5 52 cm  LVEDV MOD A4C: 89 05 ml  LVEF MOD A4C: 60 77 %  LVESV MOD A4C: 34 93 ml  LVIDd: 5 72 cm  LVIDs: 3 63 cm  LVLd A4C: 7 78 cm  LVLs A4C: 6 46 cm  LVPWd: 1 21 cm  RA Area: 24 49 cm2  RVIDd: 4 38 cm  SV MOD A4C: 54 11 ml  SV(Teich): 105 65 ml    CW  TR MaxP 73 mmHg  TR Vmax: 3 34 m/s    MM  TAPSE: 1 91 cm    PW  E' Sept: 0 07 m/s  E/E' Sept: 13 16  MV A Teja: 1 14 m/s  MV Dec [place]: 4 59 m/s2  MV DecT: 193 44 ms  MV E Teja: 0 89 m/s  MV E/A Ratio: 0 78  MV PHT: 56 1 ms  MVA By PHT: 3 92 cm2    [employer] Accredited Echocardiography Laboratory    Prepared and electronically signed by    Luisa Alejandro MD  Signed 24-Mar-2021 08:42:46    No results found for this or any previous visit  No results found for this or any previous visit  No results found for this or any previous visit

## 2021-12-29 NOTE — PROGRESS NOTES
[facility]   Progress Note - Carl Rico 1945, 67 y o  male MRN: [id number]  Unit/Bed#: -01 Encounter: [id number]  Primary Care Provider: Edith Ferris MD   Date and time admitted to hospital: 12/27/2021 10:46 AM    * Atrial fibrillation with rapid ventricular response ([facility] )  Assessment & Plan  · Presented with heart rate approximately 140  · Started on Cardizem drip 12/27  · Cardiology following:  · Increase metoprolol to 50 milligram q 6 hours  · Will switch to p o  Cardizem 60 milligram q 6 hours, titrate off drip  · Continue digoxin, Eliquis  · Telemetry reviewed, shows AFib with HR fluctuating between 90-140s, although patient is asymptomatic  · Continuous tele    Cellulitis and abscess of left lower extremity  Assessment & Plan  · Presented with left lower extremity cellulitis with abscess formation  · I&D performed emergency department  · Blood cultures x2 show no growth to date  · Wound culture so far shows mixed skin lisandra, with 4+ Gram positive cocci in pairs, chains and clusters, 1+ Gram negative rods, Rare Gram positive rods  · Received 1 dose IV Zosyn in ED, Day 3 IV vancomycin  · Patient currently afebrile without leukocytosis, consider deescalating antibiotics if remains stable, for now continue IV vancomycin and check CBC in a m    · Wound care following    Chronic diastolic CHF (congestive heart failure) ([facility] )  Assessment & Plan  Wt Readings from Last 3 Encounters:   12/28/21 (!) 143 kg (316 lb 5 8 oz)   10/13/21 (!) 140 kg (308 lb 3 2 oz)   08/06/21 (!) 142 kg (313 lb)     · Home diuretic: Lasix 80 mg BID  · ECHO showed EF 46%, normal systolic function  · Continue home Lasix, Cardiology following    CAD (coronary artery disease)  Assessment & Plan  · Continue home beta-blocker, statin  · Patient appears to have possible P 80 on lower extremities, consider evaluation    Type 2 diabetes mellitus with hyperglycemia, with long-term current use of insulin [facility])  Assessment & Plan    Lab Results   Component Value Date    HGBA1C 9 0 (H) 10/06/2021   · Continue home insulin regimen  · Sliding scale insulin    Hyperlipemia  Assessment & Plan  · Continue home statin    Hypertension  Assessment & Plan  · Continue home BP meds  · BP currently controlled      VTE Pharmacologic Prophylaxis: VTE Score: 6 High Risk (Score >/= 5) - Pharmacological DVT Prophylaxis Ordered: apixaban (Eliquis)  Sequential Compression Devices Ordered  Patient Centered Rounds: I performed bedside rounds with nursing staff today  Discussions with Specialists or Other Care Team Provider:  Cardiology    Education and Discussions with Family / Patient: Patient declined call to   Time Spent for Care: 45 minutes  More than 50% of total time spent on counseling and coordination of care as described above  Current Length of Stay: 2 day(s)  Current Patient Status: Inpatient   Certification Statement: The patient will continue to require additional inpatient hospital stay due to AFib with RVR  Discharge Plan: Anticipate discharge in 48-72 hrs to Pending clinical improvement    Code Status: Level 1 - Full Code    Subjective:   Patient is seen at bedside this a m , denies chest pain, palpitations, does state that he feels mildly short of breath with ambulation/exertion  Denies lightheadedness/dizziness, fever, chills, sweats  Objective:     Vitals:   Temp (24hrs), Av 5 °F (36 9 °C), Min:97 9 °F (36 6 °C), Max:99 4 °F (37 4 °C)    Temp:  [97 9 °F (36 6 °C)-99 4 °F (37 4 °C)] 98 2 °F (36 8 °C)  HR:  [] 140  Resp:  [18-20] 20  BP: (113-144)/(60-80) 132/66  SpO2:  [90 %-96 %] 90 %  Body mass index is 38 51 kg/m²  Input and Output Summary (last 24 hours):   No intake or output data in the 24 hours ending 21 1414    Physical Exam:   Physical Exam  Constitutional:       General: He is not in acute distress  Appearance: Normal appearance   He is not ill-appearing, toxic-appearing or diaphoretic  Cardiovascular:      Rate and Rhythm: Tachycardia present  Rhythm irregular  Pulses: Normal pulses  Heart sounds: Normal heart sounds  Pulmonary:      Effort: Pulmonary effort is normal  No respiratory distress  Breath sounds: Normal breath sounds  Musculoskeletal:         General: Swelling present  No tenderness  Comments: Mild bilateral lower extremity edema   Skin:     General: Skin is warm  Coloration: Skin is not pale  Comments: Bilateral lower legs with shiny skin, mild dusky redness, pulses palpable without pain or coolness to touch   Neurological:      General: No focal deficit present  Mental Status: He is alert and oriented to person, place, and time  Psychiatric:         Mood and Affect: Mood normal          Behavior: Behavior normal            Additional Data:     Labs:  Results from last 7 days   Lab Units 12/29/21  0532   WBC Thousand/uL 9 73   HEMOGLOBIN g/dL 11 2*   HEMATOCRIT % 37 3   PLATELETS Thousands/uL 267   NEUTROS PCT % 62   LYMPHS PCT % 25   MONOS PCT % 10   EOS PCT % 2     Results from last 7 days   Lab Units 12/29/21  0532 12/28/21  1143 12/27/21  1118   SODIUM mmol/L 144   < > 138   POTASSIUM mmol/L 3 6   < > 3 8   CHLORIDE mmol/L 103   < > 99   CO2 mmol/L 30   < > 29   BUN mg/dL 16   < > 26*   CREATININE mg/dL 0 98   < > 0 87   ANION GAP mmol/L 11   < > 10   CALCIUM mg/dL 9 2   < > 9 7   ALBUMIN g/dL  --   --  3 5   TOTAL BILIRUBIN mg/dL  --   --  0 42   ALK PHOS U/L  --   --  69 3   ALT U/L  --   --  12   AST U/L  --   --  13*   GLUCOSE RANDOM mg/dL 155*   < > 306*    < > = values in this interval not displayed           Results from last 7 days   Lab Units 12/29/21  1118 12/29/21  0709 12/29/21  0203 12/28/21  2026 12/28/21  1659 12/28/21  1143 12/28/21  0842 12/27/21  2322 12/27/21  1950   POC GLUCOSE mg/dl 193* 211* 149* 106 94 276* 245* 293* 216*         Results from last 7 days   Lab Units 12/27/21  1642 12/27/21  1339 12/27/21  1118   LACTIC ACID mmol/L 1 5 2 1* 2 4*       Lines/Drains:  Invasive Devices  Report    Peripheral Intravenous Line            Peripheral IV 12/27/21 Left Antecubital 2 days    Peripheral IV 12/27/21 Right Forearm 2 days    Peripheral IV 12/28/21 Left;Ventral (anterior) Forearm 1 day                  Telemetry:  Telemetry Orders (From admission, onward)             48 Hour Telemetry Monitoring  Continuous x 48 hours        Expiring   References:    Telemetry Guidelines   Question:  Reason for 48 Hour Telemetry  Answer:  Arrhythmias Requiring Medical Therapy (eg  SVT, Vtach/fib, Bradycardia, Uncontrolled A-fib)                 Telemetry Reviewed: Atrial fibrillation  HR averaging 90-140s  Indication for Continued Telemetry Use: Arrthymias requiring medical therapy             Imaging: Reviewed radiology reports from this admission including: chest xray    Recent Cultures (last 7 days):   Results from last 7 days   Lab Units 12/27/21  1235 12/27/21  1118   BLOOD CULTURE   --  No Growth at 24 hrs  No Growth at 24 hrs     GRAM STAIN RESULT  Rare Polys*  4+ Gram positive cocci in pairs, chains and clusters*  1+ Gram negative rods*  Rare Gram positive rods*  --    WOUND CULTURE  3+ Growth of   --        Last 24 Hours Medication List:   Current Facility-Administered Medications   Medication Dose Route Frequency Provider Last Rate    acetaminophen  650 mg Oral Q6H PRN Edu Marquez MD      apixaban  5 mg Oral BID Edu Marquez MD      atorvastatin  40 mg Oral Daily Edu Marquez MD      digoxin  125 mcg Oral Daily Edu Marquez MD      diltiazem  60 mg Oral Q6H [address] Marlon Rich MD      ezetimibe  10 mg Oral Daily Edu Marquez MD      finasteride  5 mg Oral Daily Edu Marquez MD      furosemide  80 mg Oral BID Edu Marquez MD      insulin detemir  50 Units Subcutaneous Q12H [address] Edu Marquez MD      insulin lispro  1-5 Units Subcutaneous HS MD Renay Josue insulin lispro  1-6 Units Subcutaneous TID AC Edu Marquez MD      insulin lispro  15 Units Subcutaneous TID With Meals Ana Dirk Kawasaki, MD      lidocaine  1 patch Topical HS Matteo Medrano PA-C      metoprolol  5 mg Intravenous Q6H PRN Edwar Mixon MD      metoprolol tartrate  50 mg Oral Q6H Ewa Agee PA-C      pantoprazole  40 mg Oral Early Morning Edu Marquez MD      sodium chloride (PF)  3 mL Intravenous Q1H PRN Mabel Arreola PA-C      vancomycin  2,000 mg Intravenous Q12H Edu Marquez MD 2,000 mg (12/29/21 1250)        Today, Patient Was Seen By: Vanessa Murphy PA-C    **Please Note: This note may have been constructed using a voice recognition system  **

## 2021-12-29 NOTE — ASSESSMENT & PLAN NOTE
· Presented with left lower extremity cellulitis with abscess formation  · I&D performed emergency department  · Blood cultures x2 show no growth to date  · Wound culture so far shows mixed skin lisandra, with 4+ Gram positive cocci in pairs, chains and clusters, 1+ Gram negative rods, Rare Gram positive rods  · Received 1 dose IV Zosyn in ED, Day 3 IV vancomycin  · Patient currently afebrile without leukocytosis, consider deescalating antibiotics if remains stable, for now continue IV vancomycin and check CBC in a m    · Wound care following

## 2021-12-29 NOTE — ASSESSMENT & PLAN NOTE
Lab Results   Component Value Date    HGBA1C 9 0 (H) 10/06/2021   · Continue home insulin regimen  · Sliding scale insulin

## 2021-12-29 NOTE — ASSESSMENT & PLAN NOTE
· Presented with heart rate approximately 140  · Started on Cardizem drip 12/27  · Cardiology following:  · Increase metoprolol to 50 milligram q 6 hours  · Will switch to p o   Cardizem 60 milligram q 6 hours, titrate off drip  · Continue digoxin, Eliquis  · Telemetry reviewed, shows AFib with HR fluctuating between 90-140s, although patient is asymptomatic  · Continuous tele

## 2021-12-30 VITALS
SYSTOLIC BLOOD PRESSURE: 136 MMHG | RESPIRATION RATE: 20 BRPM | HEIGHT: 76 IN | DIASTOLIC BLOOD PRESSURE: 46 MMHG | OXYGEN SATURATION: 90 % | BODY MASS INDEX: 38.36 KG/M2 | TEMPERATURE: 98 F | WEIGHT: 315 LBS | HEART RATE: 75 BPM

## 2021-12-30 LAB
ANION GAP SERPL CALCULATED.3IONS-SCNC: 7 MMOL/L (ref 4–13)
BACTERIA WND AEROBE CULT: ABNORMAL
BASOPHILS # BLD AUTO: 0.05 THOUSANDS/ΜL (ref 0–0.1)
BASOPHILS NFR BLD AUTO: 1 % (ref 0–1)
BUN SERPL-MCNC: 18 MG/DL (ref 6–20)
CALCIUM SERPL-MCNC: 9 MG/DL (ref 8.4–10.2)
CHLORIDE SERPL-SCNC: 102 MMOL/L (ref 96–108)
CO2 SERPL-SCNC: 33 MMOL/L (ref 22–33)
CREAT SERPL-MCNC: 0.99 MG/DL (ref 0.5–1.2)
EOSINOPHIL # BLD AUTO: 0.21 THOUSAND/ΜL (ref 0–0.61)
EOSINOPHIL NFR BLD AUTO: 2 % (ref 0–6)
ERYTHROCYTE [DISTWIDTH] IN BLOOD BY AUTOMATED COUNT: 16 % (ref 11.6–15.1)
GFR SERPL CREATININE-BSD FRML MDRD: 73 ML/MIN/1.73SQ M
GLUCOSE SERPL-MCNC: 104 MG/DL (ref 65–140)
GLUCOSE SERPL-MCNC: 135 MG/DL (ref 65–140)
GLUCOSE SERPL-MCNC: 197 MG/DL (ref 65–140)
GLUCOSE SERPL-MCNC: 91 MG/DL (ref 65–140)
GLUCOSE SERPL-MCNC: 93 MG/DL (ref 65–140)
GRAM STN SPEC: ABNORMAL
HCT VFR BLD AUTO: 37.1 % (ref 36.5–49.3)
HGB BLD-MCNC: 11.1 G/DL (ref 12–17)
IMM GRANULOCYTES # BLD AUTO: 0.02 THOUSAND/UL (ref 0–0.2)
IMM GRANULOCYTES NFR BLD AUTO: 0 % (ref 0–2)
LYMPHOCYTES # BLD AUTO: 1.73 THOUSANDS/ΜL (ref 0.6–4.47)
LYMPHOCYTES NFR BLD AUTO: 18 % (ref 14–44)
MAGNESIUM SERPL-MCNC: 1.8 MG/DL (ref 1.6–2.6)
MCH RBC QN AUTO: 25 PG (ref 26.8–34.3)
MCHC RBC AUTO-ENTMCNC: 29.9 G/DL (ref 31.4–37.4)
MCV RBC AUTO: 84 FL (ref 82–98)
MONOCYTES # BLD AUTO: 1.05 THOUSAND/ΜL (ref 0.17–1.22)
MONOCYTES NFR BLD AUTO: 11 % (ref 4–12)
NEUTROPHILS # BLD AUTO: 6.32 THOUSANDS/ΜL (ref 1.85–7.62)
NEUTS SEG NFR BLD AUTO: 68 % (ref 43–75)
NRBC BLD AUTO-RTO: 0 /100 WBCS
PLATELET # BLD AUTO: 237 THOUSANDS/UL (ref 149–390)
PMV BLD AUTO: 10.5 FL (ref 8.9–12.7)
POTASSIUM SERPL-SCNC: 3.6 MMOL/L (ref 3.5–5)
RBC # BLD AUTO: 4.44 MILLION/UL (ref 3.88–5.62)
SODIUM SERPL-SCNC: 142 MMOL/L (ref 133–145)
WBC # BLD AUTO: 9.38 THOUSAND/UL (ref 4.31–10.16)

## 2021-12-30 PROCEDURE — 99239 HOSP IP/OBS DSCHRG MGMT >30: CPT | Performed by: INTERNAL MEDICINE

## 2021-12-30 PROCEDURE — 82948 REAGENT STRIP/BLOOD GLUCOSE: CPT

## 2021-12-30 PROCEDURE — 99232 SBSQ HOSP IP/OBS MODERATE 35: CPT | Performed by: INTERNAL MEDICINE

## 2021-12-30 PROCEDURE — 83735 ASSAY OF MAGNESIUM: CPT | Performed by: INTERNAL MEDICINE

## 2021-12-30 PROCEDURE — 80048 BASIC METABOLIC PNL TOTAL CA: CPT | Performed by: INTERNAL MEDICINE

## 2021-12-30 PROCEDURE — 94760 N-INVAS EAR/PLS OXIMETRY 1: CPT

## 2021-12-30 PROCEDURE — 85025 COMPLETE CBC W/AUTO DIFF WBC: CPT | Performed by: INTERNAL MEDICINE

## 2021-12-30 PROCEDURE — 94761 N-INVAS EAR/PLS OXIMETRY MLT: CPT

## 2021-12-30 RX ORDER — DOXYCYCLINE 100 MG/1
100 TABLET ORAL 2 TIMES DAILY
Qty: 10 TABLET | Refills: 0 | Status: SHIPPED | OUTPATIENT
Start: 2021-12-30 | End: 2022-01-04

## 2021-12-30 RX ORDER — METOPROLOL SUCCINATE 100 MG/1
100 TABLET, EXTENDED RELEASE ORAL EVERY 12 HOURS
Status: DISCONTINUED | OUTPATIENT
Start: 2021-12-30 | End: 2021-12-30 | Stop reason: HOSPADM

## 2021-12-30 RX ORDER — DIGOXIN 125 MCG
125 TABLET ORAL DAILY
Qty: 30 TABLET | Refills: 0 | Status: SHIPPED | OUTPATIENT
Start: 2021-12-31 | End: 2022-01-28

## 2021-12-30 RX ORDER — DILTIAZEM HYDROCHLORIDE 240 MG/1
240 CAPSULE, COATED, EXTENDED RELEASE ORAL DAILY
Status: DISCONTINUED | OUTPATIENT
Start: 2021-12-30 | End: 2021-12-30 | Stop reason: HOSPADM

## 2021-12-30 RX ORDER — INSULIN ASPART 100 [IU]/ML
10 INJECTION, SOLUTION INTRAVENOUS; SUBCUTANEOUS
Qty: 19.8 ML | Refills: 0
Start: 2021-12-30 | End: 2022-01-17

## 2021-12-30 RX ORDER — DILTIAZEM HYDROCHLORIDE 240 MG/1
240 CAPSULE, COATED, EXTENDED RELEASE ORAL DAILY
Qty: 30 CAPSULE | Refills: 0 | Status: SHIPPED | OUTPATIENT
Start: 2021-12-30 | End: 2022-01-28

## 2021-12-30 RX ORDER — METOPROLOL SUCCINATE 100 MG/1
100 TABLET, EXTENDED RELEASE ORAL EVERY 12 HOURS
Qty: 60 TABLET | Refills: 0 | Status: SHIPPED | OUTPATIENT
Start: 2021-12-30 | End: 2022-01-28

## 2021-12-30 RX ORDER — METOPROLOL TARTRATE 50 MG/1
50 TABLET, FILM COATED ORAL ONCE
Status: COMPLETED | OUTPATIENT
Start: 2021-12-30 | End: 2021-12-30

## 2021-12-30 RX ADMIN — ATORVASTATIN CALCIUM 40 MG: 40 TABLET, FILM COATED ORAL at 08:12

## 2021-12-30 RX ADMIN — FINASTERIDE 5 MG: 5 TABLET, FILM COATED ORAL at 08:12

## 2021-12-30 RX ADMIN — DILTIAZEM HYDROCHLORIDE 60 MG: 60 TABLET, FILM COATED ORAL at 05:30

## 2021-12-30 RX ADMIN — VANCOMYCIN HYDROCHLORIDE 2000 MG: 1 INJECTION, POWDER, LYOPHILIZED, FOR SOLUTION INTRAVENOUS at 12:11

## 2021-12-30 RX ADMIN — FUROSEMIDE 80 MG: 80 TABLET ORAL at 08:12

## 2021-12-30 RX ADMIN — APIXABAN 5 MG: 5 TABLET, FILM COATED ORAL at 08:12

## 2021-12-30 RX ADMIN — INSULIN LISPRO 2 UNITS: 100 INJECTION, SOLUTION INTRAVENOUS; SUBCUTANEOUS at 12:10

## 2021-12-30 RX ADMIN — VANCOMYCIN HYDROCHLORIDE 2000 MG: 1 INJECTION, POWDER, LYOPHILIZED, FOR SOLUTION INTRAVENOUS at 00:11

## 2021-12-30 RX ADMIN — PANTOPRAZOLE SODIUM 40 MG: 40 TABLET, DELAYED RELEASE ORAL at 05:30

## 2021-12-30 RX ADMIN — DIGOXIN 125 MCG: 0.12 TABLET ORAL at 08:12

## 2021-12-30 RX ADMIN — DILTIAZEM HYDROCHLORIDE 240 MG: 240 CAPSULE, COATED, EXTENDED RELEASE ORAL at 12:10

## 2021-12-30 RX ADMIN — METOPROLOL TARTRATE 50 MG: 50 TABLET, FILM COATED ORAL at 05:30

## 2021-12-30 RX ADMIN — METOPROLOL TARTRATE 50 MG: 50 TABLET, FILM COATED ORAL at 12:10

## 2021-12-30 RX ADMIN — INSULIN LISPRO 15 UNITS: 100 INJECTION, SOLUTION INTRAVENOUS; SUBCUTANEOUS at 12:10

## 2021-12-30 RX ADMIN — DILTIAZEM HYDROCHLORIDE 60 MG: 60 TABLET, FILM COATED ORAL at 00:11

## 2021-12-30 RX ADMIN — EZETIMIBE 10 MG: 10 TABLET ORAL at 08:12

## 2021-12-30 NOTE — RESPIRATORY THERAPY NOTE
Home Oxygen Qualifying Test       Patient name: Margaret Sandoval        : 1945   Date of Test:  2021  Diagnosis:      Home Oxygen Test:    **Medicare Guidelines require item(s) 1-5 on all ambulatory patients or 1 and 2 on non-ambulatory patients  1   Baseline SPO2 on Room Air at rest 92%  2   SPO2 during exercise on Room Air 90%  During exercise monitor SpO2  If SPO2 increases >=89% with ambulation do not add supplemental oxygen  If <= 88% on room air add O2 via NC and titrate patient  Patient must be ambulated with O2 and titrated to > 88% with exertion  3   SPO2 on Oxygen at rest N/A% N/A lpm     4   SPO2 during exercise on Oxygen N/A% a liter flow of N/A lpm     5   Exercise performed:          walking, distance 50 (feet)          []  Supplemental Home Oxygen is indicated  [x]  Client does not qualify for home oxygen  Respiratory Additional Notes- Found pt on RA with a SPO2 of 92%  Walked pt on RA  Pt walks with a walker  Pt was able to walk on RA and maintain oxygen saturations  Pt was only able to walk a short distance due to back pain  Walked pt back to the room  Pt does not qualify for home oxygen      López Ramirez RT

## 2021-12-30 NOTE — ASSESSMENT & PLAN NOTE
· Presented with heart rate approximately 140  · Started on Cardizem drip 12/27  · Cardiology following:  · Increase metoprolol to 50 milligram q 6 hours  · Will switch to p o  Cardizem 60 milligram q 6 hours, titrate off drip  · Continue digoxin, Eliquis  · Telemetry reviewed, shows AFib with HR fluctuating between 90-140s, although patient is asymptomatic  · Continuous tele  · Plan was discussed with Cardiology and change Cardizem to 240 mg daily and Toprol  mg b i d

## 2021-12-30 NOTE — ASSESSMENT & PLAN NOTE
Wt Readings from Last 3 Encounters:   12/28/21 (!) 143 kg (316 lb 5 8 oz)   10/13/21 (!) 140 kg (308 lb 3 2 oz)   08/06/21 (!) 142 kg (313 lb)     · Home diuretic: Lasix 80 mg BID  · ECHO showed EF 01%, normal systolic function  · Continue home Lasix, Cardiology following

## 2021-12-30 NOTE — DISCHARGE SUMMARY
[address]  Discharge- Jesus Wakefield 1945, 67 y o  male MRN: [id number]  Unit/Bed#: -01 Encounter: [id number]  Primary Care Provider: Ken Goel MD   Date and time admitted to hospital: 12/27/2021 10:46 AM    Cellulitis and abscess of left lower extremity  Assessment & Plan  · Presented with left lower extremity cellulitis with abscess formation  · I&D performed emergency department  · Blood cultures x2 show no growth to date  · Wound culture so far shows mixed skin lisandra, with 4+ Gram positive cocci in pairs, chains and clusters, 1+ Gram negative rods, Rare Gram positive rods  · Received 1 dose IV Zosyn in ED, Day 3 IV vancomycin  · Patient currently afebrile without leukocytosis, consider deescalating antibiotics if remains stable, for now continue IV vancomycin and check CBC in a m  · Wound care following  · Final cultures were reviewed from the wound which shows 4+ Gram-positive cocci in pairs chains and clusters and Gram-positive and negative rods  Will change IV antibiotic to doxycycline 400 mg b i d  For 5 days  Wound care advised      Chronic diastolic CHF (congestive heart failure) (HCC)  Assessment & Plan  Wt Readings from Last 3 Encounters:   12/28/21 (!) 143 kg (316 lb 5 8 oz)   10/13/21 (!) 140 kg (308 lb 3 2 oz)   08/06/21 (!) 142 kg (313 lb)     · Home diuretic: Lasix 80 mg BID  · ECHO showed EF 47%, normal systolic function  · Continue home Lasix, Cardiology following    CAD (coronary artery disease)  Assessment & Plan  · Continue home beta-blocker, statin  · Patient appears to have possible P 80 on lower extremities, consider evaluation    Type 2 diabetes mellitus with hyperglycemia, with long-term current use of insulin ([facility] [address] )  Assessment & Plan    Lab Results   Component Value Date    HGBA1C 9 0 (H) 10/06/2021   · Continue home insulin regimen  · Sliding scale insulin    Hyperlipemia  Assessment & Plan  · Continue home statin    Hypertension  Assessment & Plan  · Continue home BP meds  · BP currently controlled    * Atrial fibrillation with rapid ventricular response (HCC)  Assessment & Plan  · Presented with heart rate approximately 140  · Started on Cardizem drip 12/27  · Cardiology following:  · Increase metoprolol to 50 milligram q 6 hours  · Will switch to p o  Cardizem 60 milligram q 6 hours, titrate off drip  · Continue digoxin, Eliquis  · Telemetry reviewed, shows AFib with HR fluctuating between 90-140s, although patient is asymptomatic  · Continuous tele  · Plan was discussed with Cardiology and change Cardizem to 240 mg daily and Toprol  mg b i d               Medical Problems             Resolved Problems  Date Reviewed: 12/30/2021    None                Admission Date:   Admission Orders (From admission, onward)     Ordered        12/27/21 1646  Inpatient Admission  Once                        Admitting Diagnosis: Hypomagnesemia [E83 42]  Rapid heart rate [R00 0]  Atrial fibrillation with rapid ventricular response (HCC) [I48 91]  Abscess of left leg [L02 416]    HPI:   Carmen Sahu is a 67 y o  male with past medical history significant congestive heart failure, coronary artery disease, atrial fibrillation on Eliquis initially presented with dizziness  Patient reports dizziness for the past 2 days  He otherwise denies acute complaints  Procedures Performed:   Orders Placed This Encounter   Procedures    Critical Care    Incision and Drainage       Summary of Hospital Course:        patient admitted with left thigh cellulitis and abscess  Patient underwent I and D of the abscess and was placed on IV antibiotic with vancomycin  Clinically improved redness resolved and wound care followed  Patient noted to have atrial fibrillation with rapid ventricular rate initially and was on Cardizem drip and heart rate was controlled and cardiology input appreciated and medications adjusted with Cardizem p o  240 mg daily and Toprol- mg b i d  Patient also had home oxygen evaluation done and does not need oxygen at home  Antibiotics will be changed to p o  Doxycycline for 5 days  HEENT-PERRLA, moist oral mucosa  Neck-supple, no JVD elevation   Respiratory-equal air entry bilaterally, no rales or rhonchi  Cardiovascular system-S1, S2 heard, irregular   Abdomen-soft, nontender, no guarding or rigidity, bowel sounds heard  Extremities-b/l LE  pedal edema   Peripheral pulses palpable  Musculoskeletal-no contractures, left thigh wound is packed-appears clean and redness has improved  Central nervous system-no acute focal neurological deficit ,no sensory or motor deficit noted  Skin-no rash noted        Significant Findings, Care, Treatment and Services Provided: I and D OF ABCESS ON LEFT THIGH     Complications: nil  Condition at Discharge: fair         Discharge instructions/Information to patient and family:   See after visit summary for information provided to patient and family  Provisions for Follow-Up Care:  See after visit summary for information related to follow-up care and any pertinent home health orders  PCP: Emilee Andersen MD    Disposition: Home    Planned Readmission: No    Discharge Statement   I spent 35 minutes discharging the patient  This time was spent on the day of discharge  I had direct contact with the patient on the day of discharge  Additional documentation is required if more than 30 minutes were spent on discharge  Discharge Medications:  See after visit summary for reconciled discharge medications provided to patient and family

## 2021-12-30 NOTE — PROGRESS NOTES
[facility]    Cardiology Progress Note  Cyndi Party 67 y o  male   YOB: 1945 MRN: [id number]  Unit/Bed#: -Cathie Encounter: [id number]      Subjective:   No significant events overnight  No acute complains of chest pain, shortness of breath, palpitations or dizziness  Overall feels well    Assessments  Principal Problem:    Atrial fibrillation with rapid ventricular response (HCC)  Active Problems:    Hypertension    Hyperlipemia    Type 2 diabetes mellitus with hyperglycemia, with long-term current use of insulin ([facility])    CAD (coronary artery disease)    Chronic diastolic CHF (congestive heart failure) ([facility])    Cellulitis and abscess of left lower extremity      Plan  Paroxysmal Atrial flutter with RVR  · Currently in atrial flutter with RVR, based on review of telemetry  · HR much better controlled on current oral meds - 70-90 yesterday  · Cardizem drip was discontinued yesterday at 2 pm, and HR has continued to be controlled after this  · Currently on cardizem 60 q6, metoprolol 50 q6, digoxin 0 125  · Will switch meds to longer-acting - cardizem  mg, metoprolol  mg bid, digoxin 0 125  · Suggest eventual outpatient cardioversion attempt - he has follow up with his regular cardiologist Maicol Hanson on 1/6/22 - can follow up and setup after that  · Treatment of abscess or infection of lower extremity per primary service  · Continue eliquis for anticoagulation     Chronic diastolic heart failure  · Home diuretic: Lasix 80 mg bid  · No shortness of breath, orthopnea or PND  · He does have mild lower extremity edema, which appears to be chronic  · Continue lasix 80 bid for now    Stable for discharge from cardiac standpoint  D/w primary service Dr Bueno    Review of Systems   All other systems reviewed and are negative  Telemetry Review: Atrial flutter  HR in   Mostly in 90-100s, goes up to 120s with activity      Objective:   Vitals: Blood pressure (!) 136/46, pulse 75, temperature 98 °F (36 7 °C), temperature source Tympanic, resp  rate 20, height 6' 4" (1 93 m), weight (!) 143 kg (316 lb 5 8 oz), SpO2 90 %  , Body mass index is 38 51 kg/m² ,   Orthostatic Blood Pressures      Most Recent Value   Blood Pressure 136/46 filed at 2021 1126   Patient Position - Orthostatic VS Sitting filed at 2021 3788         Systolic (81CKL), SGH:747 , Min:109 , TYF:834     Diastolic (16GEA), LPC:06, Min:46, Max:81    Wt Readings from Last 5 Encounters:   21 (!) 143 kg (316 lb 5 8 oz)   10/13/21 (!) 140 kg (308 lb 3 2 oz)   21 (!) 142 kg (313 lb)   21 (!) 142 kg (312 lb)   21 (!) 142 kg (312 lb)     I/O        0701   0700  0701   0700  0701   0700    I V  (mL/kg) 5000      IV Piggyback 150      Total Intake(mL/kg) 5150      Net +5150                     Physical Exam  Vitals and nursing note reviewed  Constitutional:       General: He is not in acute distress  Appearance: He is well-developed  He is obese  He is not ill-appearing or diaphoretic  HENT:      Head: Normocephalic and atraumatic  Nose: No congestion  Eyes:      General: No scleral icterus  Conjunctiva/sclera: Conjunctivae normal    Neck:      Vascular: No carotid bruit or JVD  Cardiovascular:      Rate and Rhythm: Tachycardia present  Rhythm irregular  Heart sounds: Normal heart sounds  No murmur heard  No friction rub  No gallop  Pulmonary:      Effort: Pulmonary effort is normal  No respiratory distress  Breath sounds: Normal breath sounds  No wheezing or rales  Chest:      Chest wall: No tenderness  Abdominal:      General: There is no distension  Palpations: Abdomen is soft  Tenderness: There is no abdominal tenderness  Musculoskeletal:         General: No swelling, tenderness or deformity  Cervical back: Neck supple  No muscular tenderness  Right lower le+ Pitting Edema present  Left lower le+ Pitting Edema present  Skin:     General: Skin is warm  Findings: Erythema present  Neurological:      General: No focal deficit present  Mental Status: He is alert and oriented to person, place, and time  Mental status is at baseline  Psychiatric:         Mood and Affect: Mood normal          Behavior: Behavior normal          Thought Content: Thought content normal            Laboratory Results: personally reviewed        CBC with diff:   Results from last 7 days   Lab Units 21  0619 21  0532 21  1143 21  1118   WBC Thousand/uL 9 38 9 73 7 91 11 65*   HEMOGLOBIN g/dL 11 1* 11 2* 11 2* 12 3   HEMATOCRIT % 37 1 37 3 36 4* 39 6   MCV fL 84 85 83 81*   PLATELETS Thousands/uL 237 267 218 238   MCH pg 25 0* 25 5* 25 6* 25 3*   MCHC g/dL 29 9* 30 0* 30 8* 31 1*   RDW % 16 0* 16 1* 16 4* 16 3*   MPV fL 10 5 11 0 10 1 10 8   NRBC AUTO /100 WBCs 0 0 0  --          CMP:  Results from last 7 days   Lab Units 21  0619 21  0532 21  1143 21  1118   POTASSIUM mmol/L 3 6 3 6 3 8 3 8   CHLORIDE mmol/L 102 103 103 99   CO2 mmol/L 33 30 30 29   BUN mg/dL 18 16 15 26*   CREATININE mg/dL 0 99 0 98 0 82 0 87   CALCIUM mg/dL 9 0 9 2 8 9 9 7   AST U/L  --   --   --  13*   ALT U/L  --   --   --  12   ALK PHOS U/L  --   --   --  69 3   EGFR ml/min/1 73sq m 73 74 85 83         BMP:  Results from last 7 days   Lab Units 21  0619 21  0532 21  1143 21  1118   POTASSIUM mmol/L 3 6 3 6 3 8 3 8   CHLORIDE mmol/L 102 103 103 99   CO2 mmol/L 33 30 30 29   BUN mg/dL 18 16 15 26*   CREATININE mg/dL 0 99 0 98 0 82 0 87   CALCIUM mg/dL 9 0 9 2 8 9 9 7       BNP: No results for input(s): BNP in the last 72 hours      Magnesium:   Results from last 7 days   Lab Units 21  0619 21  0532 21  1118   MAGNESIUM mg/dL 1 8 1 7 1 4*       Coags:       TSH:        Hemoglobin A1C       Lipid Profile:       Meds/Allergies   all current active meds have been reviewed and current meds:   Current Facility-Administered Medications   Medication Dose Route Frequency    acetaminophen (TYLENOL) tablet 650 mg  650 mg Oral Q6H PRN    apixaban (ELIQUIS) tablet 5 mg  5 mg Oral BID    atorvastatin (LIPITOR) tablet 40 mg  40 mg Oral Daily    digoxin (LANOXIN) tablet 125 mcg  125 mcg Oral Daily    diltiazem (CARDIZEM) tablet 60 mg  60 mg Oral Q6H [address]    ezetimibe (ZETIA) tablet 10 mg  10 mg Oral Daily    finasteride (PROSCAR) tablet 5 mg  5 mg Oral Daily    furosemide (LASIX) tablet 80 mg  80 mg Oral BID    insulin detemir (LEVEMIR) subcutaneous injection 50 Units  50 Units Subcutaneous Q12H MELISSA    insulin lispro (HumaLOG) 100 units/mL subcutaneous injection 1-5 Units  1-5 Units Subcutaneous HS    insulin lispro (HumaLOG) 100 units/mL subcutaneous injection 1-6 Units  1-6 Units Subcutaneous TID AC    insulin lispro (HumaLOG) 100 units/mL subcutaneous injection 15 Units  15 Units Subcutaneous TID With Meals    lidocaine (LIDODERM) 5 % patch 1 patch  1 patch Topical HS    metoprolol (LOPRESSOR) injection 5 mg  5 mg Intravenous Q6H PRN    metoprolol tartrate (LOPRESSOR) tablet 50 mg  50 mg Oral Q6H    pantoprazole (PROTONIX) EC tablet 40 mg  40 mg Oral Early Morning    sodium chloride (PF) 0 9 % injection 3 mL  3 mL Intravenous Q1H PRN    vancomycin (VANCOCIN) 2,000 mg in sodium chloride 0 9 % 500 mL IVPB  2,000 mg Intravenous Q12H     Medications Prior to Admission   Medication    atorvastatin (LIPITOR) 40 mg tablet    clindamycin (CLINDAGEL) 1 % gel    digoxin (Digox) 0 125 mg tablet    Eliquis 5 MG    ezetimibe (ZETIA) 10 mg tablet    finasteride (PROSCAR) 5 mg tablet    Fluad Quadrivalent 0 5 ML PRSY    furosemide (LASIX) 80 mg tablet    glucose blood test strip    insulin aspart (NovoLOG FlexPen) 100 UNIT/ML injection pen    Lancets (accu-chek multiclix) lancets    Levemir FlexTouch 100 units/mL injection pen    lisinopril (ZESTRIL) 20 mg tablet    metFORMIN (GLUCOPHAGE) 500 mg tablet    metoprolol succinate (TOPROL-XL) 25 mg 24 hr tablet    omeprazole (PriLOSEC) 20 mg delayed release capsule    potassium citrate (UROCIT-K) 10 mEq    Unifine Pentips 31G X 8 MM MISC            Cardiac testing: reviewed  Results for orders placed during the hospital encounter of 21    Echo complete with contrast if indicated    Narrative  [facility], [address] [facility]  [phone]    Transthoracic Echocardiogram  2D, M-mode, Doppler, and Color Doppler    Study date:  23-Mar-2021    Patient: Haley Liang  MR number: [id number]  Account number: [de-identified]  : 10-Herson-1945  Age: 67 years  Gender: Male  Status: Outpatient  Location: [address] [place] [address] [facility]  Height: 76 in  Weight: 331 3 lb  BP: 160/ 80 mmHg    Indications: Assess left ventricular function  Diagnoses: R60 9 - Edema, unspecified    Sonographer:  RODRIGO Camacho  Primary Physician:  Edith Ferris MD  Referring Physician:  Heather Marmolejo MD  Group:  [facility]  Interpreting Physician:  Ryne Araujo MD    SUMMARY    LEFT VENTRICLE:  Systolic function was normal by visual assessment  Ejection fraction was estimated to be 60 %  There were no regional wall motion abnormalities  Wall thickness was mildly increased  Doppler parameters were consistent with abnormal left ventricular relaxation (grade 1 diastolic dysfunction)  RIGHT VENTRICLE:  The ventricle was mildly dilated  Systolic function was mildly reduced  Systolic pressure was mildly increased  Estimated peak pressure was 45 mmHg  LEFT ATRIUM:  The atrium was mildly dilated  RIGHT ATRIUM:  The atrium was mildly dilated  MITRAL VALVE:  There was mild annular calcification  There was trace regurgitation  TRICUSPID VALVE:  There was mild to moderate regurgitation      HISTORY: PRIOR HISTORY: Leg edema, CAD, Afib, Hypertension, Hyperlipidemia    PROCEDURE: The study was performed in the [facility]  This was a routine study  The transthoracic approach was used  The study included complete 2D imaging, M-mode, complete spectral Doppler, and color Doppler  The  heart rate was 108 bpm, at the start of the study  Images were obtained from the parasternal, apical, subcostal, and suprasternal notch acoustic windows  Intravenous contrast ( 0 4 ml Definity/NSS) was administered  This was a technically  difficult study  LEFT VENTRICLE: Size was normal  Systolic function was normal by visual assessment  Ejection fraction was estimated to be 60 %  There were no regional wall motion abnormalities  Wall thickness was mildly increased  DOPPLER: There was an  increased relative contribution of atrial contraction to ventricular filling  Doppler parameters were consistent with abnormal left ventricular relaxation (grade 1 diastolic dysfunction)  RIGHT VENTRICLE: The ventricle was mildly dilated  Systolic function was mildly reduced  DOPPLER: Systolic pressure was mildly increased  Estimated peak pressure was 45 mmHg  LEFT ATRIUM: The atrium was mildly dilated  RIGHT ATRIUM: The atrium was mildly dilated  MITRAL VALVE: There was mild annular calcification  Valve structure was normal  There was normal leaflet separation  DOPPLER: The transmitral velocity was within the normal range  There was no evidence for stenosis  There was trace  regurgitation  AORTIC VALVE: The valve was trileaflet  Leaflets exhibited normal thickness, mild calcification, and normal cuspal separation  DOPPLER: Transaortic velocity was within the normal range  There was no evidence for stenosis  There was no  regurgitation  TRICUSPID VALVE: The valve structure was normal  There was normal leaflet separation  DOPPLER: The transtricuspid velocity was within the normal range  There was no evidence for stenosis   There was mild to moderate regurgitation  PULMONIC VALVE: Leaflets exhibited normal thickness, no calcification, and normal cuspal separation  DOPPLER: The transpulmonic velocity was within the normal range  There was no regurgitation  PERICARDIUM: There was no pericardial effusion  AORTA: The root exhibited normal size  SYSTEMIC VEINS: IVC: The inferior vena cava was normal in size and course  Respirophasic changes were normal     SYSTEM MEASUREMENT TABLES    2D  %FS: 36 5 %  Ao Diam: 3 93 cm  EDV(Teich): 161 22 ml  EF(Teich): 65 53 %  ESV(Teich): 55 57 ml  IVSd: 1 2 cm  LA Area: 28 91 cm2  LA Diam: 5 52 cm  LVEDV MOD A4C: 89 05 ml  LVEF MOD A4C: 60 77 %  LVESV MOD A4C: 34 93 ml  LVIDd: 5 72 cm  LVIDs: 3 63 cm  LVLd A4C: 7 78 cm  LVLs A4C: 6 46 cm  LVPWd: 1 21 cm  RA Area: 24 49 cm2  RVIDd: 4 38 cm  SV MOD A4C: 54 11 ml  SV(Teich): 105 65 ml    CW  TR MaxP 73 mmHg  TR Vmax: 3 34 m/s    MM  TAPSE: 1 91 cm    PW  E' Sept: 0 07 m/s  E/E' Sept: 13 16  MV A Teja: 1 14 m/s  MV Dec Burnett: 4 59 m/s2  MV DecT: 193 44 ms  MV E Teja: 0 89 m/s  MV E/A Ratio: 0 78  MV PHT: 56 1 ms  MVA By PHT: 3 92 cm2    [facility] Accredited Echocardiography Laboratory    Prepared and electronically signed by    Surjit Garibay MD  Signed 24-Mar-2021 08:42:46    No results found for this or any previous visit  No results found for this or any previous visit  No results found for this or any previous visit

## 2021-12-30 NOTE — ASSESSMENT & PLAN NOTE
· Presented with left lower extremity cellulitis with abscess formation  · I&D performed emergency department  · Blood cultures x2 show no growth to date  · Wound culture so far shows mixed skin lisandra, with 4+ Gram positive cocci in pairs, chains and clusters, 1+ Gram negative rods, Rare Gram positive rods  · Received 1 dose IV Zosyn in ED, Day 3 IV vancomycin  · Patient currently afebrile without leukocytosis, consider deescalating antibiotics if remains stable, for now continue IV vancomycin and check CBC in a m  · Wound care following  · Final cultures were reviewed from the wound which shows 4+ Gram-positive cocci in pairs chains and clusters and Gram-positive and negative rods  Will change IV antibiotic to doxycycline 400 mg b i d  For 5 days  Wound care advised

## 2022-01-01 LAB
BACTERIA BLD CULT: NORMAL
BACTERIA BLD CULT: NORMAL

## 2022-01-03 ENCOUNTER — TRANSITIONAL CARE MANAGEMENT (OUTPATIENT)
Dept: FAMILY MEDICINE CLINIC | Facility: CLINIC | Age: 77
End: 2022-01-03

## 2022-01-06 ENCOUNTER — OFFICE VISIT (OUTPATIENT)
Dept: CARDIOLOGY CLINIC | Facility: CLINIC | Age: 77
End: 2022-01-06
Payer: MEDICARE

## 2022-01-06 ENCOUNTER — ANESTHESIA EVENT (OUTPATIENT)
Dept: NON INVASIVE DIAGNOSTICS | Facility: HOSPITAL | Age: 77
End: 2022-01-06
Payer: MEDICARE

## 2022-01-06 VITALS
WEIGHT: 309 LBS | BODY MASS INDEX: 37.63 KG/M2 | SYSTOLIC BLOOD PRESSURE: 88 MMHG | HEART RATE: 141 BPM | OXYGEN SATURATION: 92 % | DIASTOLIC BLOOD PRESSURE: 66 MMHG | HEIGHT: 76 IN

## 2022-01-06 DIAGNOSIS — Z79.4 TYPE 2 DIABETES MELLITUS WITH HYPERGLYCEMIA, WITH LONG-TERM CURRENT USE OF INSULIN (HCC): ICD-10-CM

## 2022-01-06 DIAGNOSIS — I48.0 PAROXYSMAL ATRIAL FIBRILLATION (HCC): Primary | ICD-10-CM

## 2022-01-06 DIAGNOSIS — I71.4 ABDOMINAL AORTIC ANEURYSM (AAA) WITHOUT RUPTURE (HCC): ICD-10-CM

## 2022-01-06 DIAGNOSIS — I50.32 CHRONIC DIASTOLIC CHF (CONGESTIVE HEART FAILURE) (HCC): ICD-10-CM

## 2022-01-06 DIAGNOSIS — I48.91 ATRIAL FIBRILLATION WITH RAPID VENTRICULAR RESPONSE (HCC): ICD-10-CM

## 2022-01-06 DIAGNOSIS — E66.01 MORBID OBESITY (HCC): ICD-10-CM

## 2022-01-06 DIAGNOSIS — I10 PRIMARY HYPERTENSION: ICD-10-CM

## 2022-01-06 DIAGNOSIS — I25.10 CORONARY ARTERY DISEASE INVOLVING NATIVE CORONARY ARTERY OF NATIVE HEART WITHOUT ANGINA PECTORIS: ICD-10-CM

## 2022-01-06 DIAGNOSIS — E78.00 PURE HYPERCHOLESTEROLEMIA: ICD-10-CM

## 2022-01-06 DIAGNOSIS — I21.A9 OTHER TYPE OF MYOCARDIAL INFARCTION (HCC): ICD-10-CM

## 2022-01-06 DIAGNOSIS — E11.65 TYPE 2 DIABETES MELLITUS WITH HYPERGLYCEMIA, WITH LONG-TERM CURRENT USE OF INSULIN (HCC): ICD-10-CM

## 2022-01-06 PROBLEM — A41.9 SEPSIS (HCC): Status: RESOLVED | Noted: 2021-04-23 | Resolved: 2022-01-06

## 2022-01-06 PROBLEM — N17.9 AKI (ACUTE KIDNEY INJURY) (HCC): Status: RESOLVED | Noted: 2021-04-23 | Resolved: 2022-01-06

## 2022-01-06 PROBLEM — E87.1 HYPONATREMIA: Status: RESOLVED | Noted: 2021-04-23 | Resolved: 2022-01-06

## 2022-01-06 PROCEDURE — 93000 ELECTROCARDIOGRAM COMPLETE: CPT | Performed by: INTERNAL MEDICINE

## 2022-01-06 PROCEDURE — 99215 OFFICE O/P EST HI 40 MIN: CPT | Performed by: INTERNAL MEDICINE

## 2022-01-06 NOTE — PATIENT INSTRUCTIONS
As soon as you get home, please drink 16 oz of water  Please take an extra digoxin 125 mcg dose today and tomorrow  Please do not take any more lisinopril until after the cardioversion  Please check her blood pressure at lunchtime, and again at 4:00 p m , the goal is to make sure your blood pressure comes back over 100 as the top number  I am going to schedule a cardioversion for you, please make sure you keep taking Eliquis without interruption  Please keep an eye on your Fitbit, if your heart rate will not stay under 100 or will not get under 100 through the day today, please call my office or go to the ER this evening

## 2022-01-06 NOTE — PROGRESS NOTES
[facility]  Office progress note  Holland Sutton 67 y o  male MRN: [id number]        Problems    1  Paroxysmal atrial fibrillation (HCC)  POCT ECG    Cardioversion (non invasive only)   2  Pure hypercholesterolemia     3  Coronary artery disease involving native coronary artery of native heart without angina pectoris     4  Chronic diastolic CHF (congestive heart failure) ([facility])     5  Other type of myocardial infarction ([place] [address] )     6  Atrial fibrillation with rapid ventricular response ([facility])     7  Primary hypertension     8  Type 2 diabetes mellitus with hyperglycemia, with long-term current use of insulin ([facility])     9  Abdominal aortic aneurysm (AAA) without rupture ([facility] )     10  Morbid obesity ([place] [address] )         Impression:    Hospitalized last week with AFib/RVR, diltiazem added, metoprolol up titrated, digoxin given but not fully loaded, and presents today with AFib/RVR, blood pressure hypotensive but asymptomatic, he assures me when he awoke this morning his blood pressure was 855 systolic and his heart rate was 90  This is the 1st elevated heart rate he has had  He wants to avoid hospitalization at this time considering the excessive weight times in the ER, and the fact that he feels relatively well       Coronary artery disease  o CABG x3 in 2016, LIMA to LAD, SVG to OM3, SVG to PDA  o He had full recovery of his moderate ischemic cardiomyopathy  o Repeat echo 12/28/2021 shows EF 60%, mildly dilated RV, mild MR  o He has no ischemic symptoms today   Chronic diastolic/right-sided CHF  o No exacerbation  o Baseline weight has been around 309 lb  o He received IV fluids during hospitalization and weight increased to 317 lb, but he has gotten about all of that off in the last week at home   o Compression stockings on the legs advised   o Furosemide 80 mg p o  b i d  is his baseline Khoi   AAA  o 45 mm by last abdominal ultrasound  o Was 47 mm by CT in early 2021   o Follows with vascular   Lower extremity atherosclerosis  o Continues to follow with vascular, does not offer any worsening or rest related claudication today   Mixed hyperlipidemia  o A mixed disorder which remains under reasonable control, with triglycerides 201, LDL 65   Type 2 diabetes  o Unfortunately A1c continues to worsen, most recently up to 9   Hypertension  o Hypotensive with recent changes due to AFib with RVR   Atrial fibrillation  o Paroxysmal, traditionally asymptomatic  o Recurred during most recent hospitalization 12/27/2021  o He had beta-blocker up titrated, diltiazem added, presents today in AFib with RVR with hypotension  o Continues on uninterrupted Eliquis  o Required digoxin in the past for appropriate rate controlled  o Ongoing significant obesity remains a barrier to rhythm maintenance  o Definitely in need of cardia    Plan    Hold lisinopril  Double digoxin today and tomorrow   Continue diltiazem and metoprolol at current dose   Drink 16 oz water immediately at home  Risk benefit of sending him to the ER now considering the high ER weight times, the COVID cases, and the fact that earlier this morning he was normotensive with a heart rate less than 100 at home, makes me feel like we can probably proceed with an outpatient cardioversion, the discussion with him and his wife this morning puts him in agreement with my plan      Risks and benefits of cardioversion were discussed in detail   I probably favor him having his cardioversion at [place] tomorrow just because of the dedicated staff  1 week follow-up        HPI: Albaro Figueredo is a 67y o  year old male with CAD, atrial fibrillation, diastolic/right-sided CHF, obesity, hypertension, diabetes, mixed hyperlipidemia, LUCIA returns for a follow-up appointment    Hospitalized last week with lightheadedness, palpitations and shortness of breath, EF remained normal, RV mildly dilated, mild MR, mildly elevated pulmonary pressures by echo, he had addition of Cardizem, increase metoprolol, addition of digoxin without complete loading, although did have improvement in heart rates, and at home heart rates have been high running 75-95 beats per minute on his Fitbit, but presents today mildly hypotensive, asymptomatic, with a heart rate of 141 in atrial flutter with 2-1 conduction, no complaints of palpitations, lightheadedness, dizziness, shortness of breath today  He has not drank any water today, he had a cup coffee this morning, he said since discharge last week his heart rates have been well controlled and his blood pressure quite stable  He was given IV fluids during hospitalization, weight was up to 317 lb by the time he got home with a baseline weight of 309 lb in chronic diastolic CHF, and dietary restrictions and continue his furosemide at home as dropped his weight back down to its appropriate baseline weight today  His edema is minimal   His lipids are well controlled, he has continued uninterrupted Eliquis  Review of Systems   Constitutional: Negative  HENT: Negative  Eyes: Negative  Respiratory: Negative  Cardiovascular: Negative  Gastrointestinal: Negative  Endocrine: Negative  Genitourinary: Negative  Musculoskeletal: Negative  Skin: Negative  Neurological: Negative  Hematological: Negative  Psychiatric/Behavioral: Negative            Past Medical History:   Diagnosis Date    A-fib [facility])     AAA (abdominal [facility] aneurysm) ([facility] )     Actinic keratosis of right temple 7/31/2020    Actinic keratosis of scalp 7/31/2020    Acute respiratory failure with hypoxia ([facility] ) 3/25/2021    Allergic 1960    Arthritis     Lay's esophagus     Bladder cancer (HCC)     Blister of left leg 4/28/2021    Blister of right leg 5/26/2021    CAD (coronary artery disease)     Cancer ([facility] ) 02/2010    Bladder    CHF (congestive heart failure) ([facility])     Chronic low back pain     Chronic pain of both knees 7/31/2020    Colitis 12/4/2020    CPAP (continuous positive airway pressure) dependence     Diabetes [facility] )     Diabetes mellitus ([facility] ) 10/1993    Excessive gas 12/3/2020    Heart murmur     History of chemotherapy     Hydroureter on left 4/28/2021    Hyperlipemia     Hypertension     Immunization deficiency 10/30/2020    Hep a nonimmune    Kidney stone     Left lower quadrant abdominal pain 12/3/2020    Mass of right adrenal gland ([facility] ) 12/4/2020    4 1 cm    Myocardial infarction ([facility] )     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    Obesity 2000    LUCIA (obstructive sleep apnea) 1/29/2021    Pressure ulcer of right leg, stage 1 5/26/2021    Urinary tract infection with hematuria 5/3/2021    u cx 4/2021=pseudomonas R to bactrim and cefdinir, S to cipro    Venous stasis dermatitis of both lower extremities 5/26/2021     Past Surgical History:   Procedure Laterality Date    BACK SURGERY      BLADDER SURGERY      CATARACT EXTRACTION, BILATERAL      COLONOSCOPY  01/29/2019    next 2022 [address] GRAFT  04/2016    Quadruple per Rita    EGD  06/2017    EYE SURGERY  11/2016    Cataracts    JOINT REPLACEMENT  [id number]    Hip and shoulder    KIDNEY STONE SURGERY      OH CYSTO/URETERO W/LITHOTRIPSY &INDWELL STENT INSRT Left 5/21/2021    Procedure: CYSTOSCOPY URETEROSCOPY WITH LITHOTRIPSY HOLMIUM LASER, AND INSERTION STENT URETERAL/EXCHANGE;  Surgeon: Rob Dudley MD;  Location: [facility];  Service: Urology    OH CYSTOURETHROSCOPY,URETER CATHETER Left 4/24/2021    Procedure: CYSTOSCOPY  WITH INSERTION STENT URETERAL;  Surgeon: Rob Dudley MD;  [facility];  Service: Urology    TOTAL HIP ARTHROPLASTY Right 2012    TOTAL SHOULDER REPLACEMENT Right 2013    VASECTOMY  1971     Social History     Substance and Sexual Activity   Alcohol Use Not Currently    Alcohol/week: 0 0 standard drinks    Comment: No use     Social History     Substance and Sexual Activity   Drug Use Never Comment: No use     Social History     Tobacco Use   Smoking Status Former Smoker    Packs/day: 0 25    Years: 20 00    Pack years: 5 00    Types: Cigarettes    Start date:     Quit date: 2010    Years since quittin 0   Smokeless Tobacco Never Used   Tobacco Comment    Quit several times for up to 6 years       Family History   Problem Relation Age of Onset    Heart disease Father     Arthritis Father     Heart attack Father     Alzheimer's disease Mother     Dementia Mother        Allergies:  No Known Allergies    Medications:     Current Outpatient Medications:     atorvastatin (LIPITOR) 40 mg tablet, TAKE ONE TABLET BY MOUTH ONCE DAILY, Disp: 90 tablet, Rfl: 0    digoxin (Digox) 0 125 mg tablet, Take 1 tablet (125 mcg total) by mouth daily, Disp: 30 tablet, Rfl: 0    diltiazem (CARDIZEM CD) 240 mg 24 hr capsule, Take 1 capsule (240 mg total) by mouth daily, Disp: 30 capsule, Rfl: 0    Eliquis 5 MG, TAKE ONE TABLET BY MOUTH TWICE DAILY, Disp: 60 tablet, Rfl: 0    ezetimibe (ZETIA) 10 mg tablet, Take 1 tablet (10 mg total) by mouth daily, Disp: 30 tablet, Rfl: 11    furosemide (LASIX) 80 mg tablet, Take 1 tablet (80 mg total) by mouth 2 (two) times a day, Disp: 60 tablet, Rfl: 11    insulin aspart (NovoLOG FlexPen) 100 UNIT/ML injection pen, Inject 10 Units under the skin 3 (three) times a day with meals, Disp: 19 8 mL, Rfl: 0    Lancets (accu-chek multiclix) lancets, Use 1 each 2 (two) times a day Use 2 times daily to test blood glucose, Disp: 102 each, Rfl: 3    Levemir FlexTouch 100 units/mL injection pen, 50 units twice per day, Disp: 117 mL, Rfl: 1    lisinopril (ZESTRIL) 20 mg tablet, Take 1 tablet (20 mg total) by mouth daily, Disp: 30 tablet, Rfl: 11    metFORMIN (GLUCOPHAGE) 500 mg tablet, Take 1 tablet (500 mg total) by mouth 2 (two) times a day with meals, Disp: 180 tablet, Rfl: 1    metoprolol succinate (TOPROL-XL) 100 mg 24 hr tablet, Take 1 tablet (100 mg total) by mouth every 12 (twelve) hours, Disp: 60 tablet, Rfl: 0    omeprazole (PriLOSEC) 20 mg delayed release capsule, TAKE ONE CAPSULE BY MOUTH ONCE DAILY, Disp: 90 capsule, Rfl: 0    potassium citrate (UROCIT-K) 10 mEq, Take 1 tablet (10 mEq total) by mouth 2 (two) times a day, Disp: 180 tablet, Rfl: 3    Unifine Pentips 31G X 8 MM MISC, INJECT UNDER THE SKIN DAILY USE AS DIRECTED, Disp: 100 each, Rfl: 2    digoxin (Digox) 0 125 mg tablet, Take 125 mcg by mouth daily (Patient not taking: Reported on 10/19/2021 ), Disp: , Rfl:       Vitals:    01/06/22 0950   BP: (!) 88/66   Pulse: (!) 141   SpO2: 92%     Weight (last 2 days)     Date/Time Weight    01/06/22 0950 140 (309)        Physical Exam  Vitals reviewed  Constitutional:       General: He is not in acute distress  Appearance: Normal appearance  He is well-developed  He is obese  He is not diaphoretic  HENT:      Head: Normocephalic and atraumatic  Eyes:      General: No scleral icterus  Conjunctiva/sclera: Conjunctivae normal       Pupils: Pupils are equal, round, and reactive to light  Neck:      Thyroid: No thyromegaly  Vascular: No JVD  Cardiovascular:      Rate and Rhythm: Tachycardia present  Rhythm irregular  Heart sounds: Normal heart sounds  No murmur heard  No friction rub  No gallop  Pulmonary:      Effort: Pulmonary effort is normal  No respiratory distress  Breath sounds: Normal breath sounds  No wheezing or rales  Abdominal:      General: Bowel sounds are normal  There is no distension  Palpations: Abdomen is soft  Tenderness: There is no abdominal tenderness  Musculoskeletal:         General: No deformity  Normal range of motion  Cervical back: Normal range of motion and neck supple  Right lower leg: Edema present  Left lower leg: Edema present  Skin:     General: Skin is warm and dry  Findings: No erythema or rash  Neurological:      General: No focal deficit present  Mental Status: He is alert and oriented to person, place, and time  Cranial Nerves: No cranial nerve deficit  Motor: No weakness  Laboratory Studies:    Laboratory testing personally reviewed    Cardiac testing:     EKG reviewed personally:   Results for orders placed or performed in visit on 01/06/22   POCT ECG    Impression    Atrial flutter with RVR, heart rate 141         Echocardiogram:  2017-EF 55%  3/21-EF 60%, mild LVH, mild RV dilation and mild RV dysfunction, mild-to-moderate TR with mild-to-moderate pulmonary hypertension  12/28/21-EF 55%, moderate concentric hypertrophy, RV dilated, left atrium dilated, right atrium dilated, mild MR, mild TR, mildly elevated pulmonary pressures, mildly dilated ascending aorta-personally reviewed    Stress test:      Holter:      Cardiac catheterization:        Teodora Dubin, MD    Portions of the record may have been created with voice recognition software  Occasional wrong word or "sound a like" substitutions may have occurred due to the inherent limitations of voice recognition software  Read the chart carefully and recognize, using context, where substitutions have occurred

## 2022-01-06 NOTE — H&P (VIEW-ONLY)
[facility]  Office progress note  Cyndi Party 67 y o  male MRN: [id number]        Problems    1  Paroxysmal atrial fibrillation (HCC)  POCT ECG    Cardioversion (non invasive only)   2  Pure hypercholesterolemia     3  Coronary artery disease involving native coronary artery of native heart without angina pectoris     4  Chronic diastolic CHF (congestive heart failure) ([facility])     5  Other type of myocardial infarction ([place] [address] )     6  Atrial fibrillation with rapid ventricular response ([facility])     7  Primary hypertension     8  Type 2 diabetes mellitus with hyperglycemia, with long-term current use of insulin ([facility])     9  Abdominal aortic aneurysm (AAA) without rupture ([place] [address] )     10  Morbid obesity ([place] [address] )         Impression:    Hospitalized last week with AFib/RVR, diltiazem added, metoprolol up titrated, digoxin given but not fully loaded, and presents today with AFib/RVR, blood pressure hypotensive but asymptomatic, he assures me when he awoke this morning his blood pressure was 320 systolic and his heart rate was 90  This is the 1st elevated heart rate he has had  He wants to avoid hospitalization at this time considering the excessive weight times in the ER, and the fact that he feels relatively well       Coronary artery disease  o CABG x3 in 2016, LIMA to LAD, SVG to OM3, SVG to PDA  o He had full recovery of his moderate ischemic cardiomyopathy  o Repeat echo 12/28/2021 shows EF 60%, mildly dilated RV, mild MR  o He has no ischemic symptoms today   Chronic diastolic/right-sided CHF  o No exacerbation  o Baseline weight has been around 309 lb  o He received IV fluids during hospitalization and weight increased to 317 lb, but he has gotten about all of that off in the last week at home   o Compression stockings on the legs advised   o Furosemide 80 mg p o  b i d  is his baseline Khoi   AAA  o 45 mm by last abdominal ultrasound  o Was 47 mm by CT in early 2021   o Follows with vascular   Lower extremity atherosclerosis  o Continues to follow with vascular, does not offer any worsening or rest related claudication today   Mixed hyperlipidemia  o A mixed disorder which remains under reasonable control, with triglycerides 201, LDL 65   Type 2 diabetes  o Unfortunately A1c continues to worsen, most recently up to 9   Hypertension  o Hypotensive with recent changes due to AFib with RVR   Atrial fibrillation  o Paroxysmal, traditionally asymptomatic  o Recurred during most recent hospitalization 12/27/2021  o He had beta-blocker up titrated, diltiazem added, presents today in AFib with RVR with hypotension  o Continues on uninterrupted Eliquis  o Required digoxin in the past for appropriate rate controlled  o Ongoing significant obesity remains a barrier to rhythm maintenance  o Definitely in need of cardia    Plan    Hold lisinopril  Double digoxin today and tomorrow   Continue diltiazem and metoprolol at current dose   Drink 16 oz water immediately at home  Risk benefit of sending him to the ER now considering the high ER weight times, the COVID cases, and the fact that earlier this morning he was normotensive with a heart rate less than 100 at home, makes me feel like we can probably proceed with an outpatient cardioversion, the discussion with him and his wife this morning puts him in agreement with my plan      Risks and benefits of cardioversion were discussed in detail   I probably favor him having his cardioversion at [facility] tomorrow just because of the dedicated staff  1 week follow-up        HPI: Xiomara Tarango is a 67y o  year old male with CAD, atrial fibrillation, diastolic/right-sided CHF, obesity, hypertension, diabetes, mixed hyperlipidemia, LUCIA returns for a follow-up appointment    Hospitalized last week with lightheadedness, palpitations and shortness of breath, EF remained normal, RV mildly dilated, mild MR, mildly elevated pulmonary pressures by echo, he had addition of Cardizem, increase metoprolol, addition of digoxin without complete loading, although did have improvement in heart rates, and at home heart rates have been high running 75-95 beats per minute on his Fitbit, but presents today mildly hypotensive, asymptomatic, with a heart rate of 141 in atrial flutter with 2-1 conduction, no complaints of palpitations, lightheadedness, dizziness, shortness of breath today  He has not drank any water today, he had a cup coffee this morning, he said since discharge last week his heart rates have been well controlled and his blood pressure quite stable  He was given IV fluids during hospitalization, weight was up to 317 lb by the time he got home with a baseline weight of 309 lb in chronic diastolic CHF, and dietary restrictions and continue his furosemide at home as dropped his weight back down to its appropriate baseline weight today  His edema is minimal   His lipids are well controlled, he has continued uninterrupted Eliquis  Review of Systems   Constitutional: Negative  HENT: Negative  Eyes: Negative  Respiratory: Negative  Cardiovascular: Negative  Gastrointestinal: Negative  Endocrine: Negative  Genitourinary: Negative  Musculoskeletal: Negative  Skin: Negative  Neurological: Negative  Hematological: Negative  Psychiatric/Behavioral: Negative            Past Medical History:   Diagnosis Date    A-fib [facility])     [facility]     Actinic keratosis of right temple 7/31/2020    Actinic keratosis of scalp 7/31/2020    Acute respiratory failure with hypoxia ([facility] ) 3/25/2021    Allergic 1960    Arthritis     Lay's esophagus     Bladder cancer (HCC)     Blister of left leg 4/28/2021    Blister of right leg 5/26/2021    CAD (coronary artery disease)     Cancer [facility] 02/2010    Bladder    [facility]     Chronic low back pain     Chronic pain of both knees 7/31/2020    Colitis 12/4/2020    CPAP (continuous positive airway pressure) dependence     [facility]     Diabetes mellitus ([facility] ) 10/1993    Excessive gas 12/3/2020    Heart murmur     History of chemotherapy     Hydroureter on left 4/28/2021    Hyperlipemia     Hypertension     Immunization deficiency 10/30/2020    Hep a nonimmune    Kidney stone     Left lower quadrant abdominal pain 12/3/2020    Mass of right adrenal gland ([facility] ) 12/4/2020    4 1 cm    [facility]     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    Obesity 2000    LUCIA (obstructive sleep apnea) 1/29/2021    Pressure ulcer of right leg, stage 1 5/26/2021    Urinary tract infection with hematuria 5/3/2021    u cx 4/2021=pseudomonas R to bactrim and cefdinir, S to cipro    Venous stasis dermatitis of both lower extremities 5/26/2021     Past Surgical History:   Procedure Laterality Date    BACK SURGERY      BLADDER SURGERY      CATARACT EXTRACTION, BILATERAL      COLONOSCOPY  01/29/2019    next [address]22 [address] GRAFT  04/2016    Quadruple per Rita    EGD  06/2017    EYE SURGERY  11/2016    Cataracts    JOINT REPLACEMENT  [id number]    Hip and shoulder    KIDNEY STONE SURGERY      WA CYSTO/URETERO W/LITHOTRIPSY &INDWELL STENT INSRT Left 5/21/2021    Procedure: CYSTOSCOPY URETEROSCOPY WITH LITHOTRIPSY HOLMIUM LASER, AND INSERTION STENT URETERAL/EXCHANGE;  Surgeon: Sameera Dockery MD;  [facility]: [facility];  Service: Urology    WA CYSTOURETHROSCOPY,URETER CATHETER Left 4/24/2021    Procedure: CYSTOSCOPY  WITH INSERTION STENT URETERAL;  Surgeon: Sameera Dockery MD;  [facility];  Service: Urology    TOTAL HIP ARTHROPLASTY Right 2012    TOTAL SHOULDER REPLACEMENT Right 2013    VASECTOMY  1971     Social History     Substance and Sexual Activity   Alcohol Use Not Currently    Alcohol/week: 0 0 standard drinks    Comment: No use     Social History     Substance and Sexual Activity   Drug Use Never Comment: No use     Social History     Tobacco Use   Smoking Status Former Smoker    Packs/day: 0 25    Years: 20 00    Pack years: 5 00    Types: Cigarettes    Start date:     Quit date: 2010    Years since quittin 0   Smokeless Tobacco Never Used   Tobacco Comment    Quit several times for up to 6 years       Family History   Problem Relation Age of Onset    Heart disease Father     Arthritis Father     Heart attack Father     Alzheimer's disease Mother     Dementia Mother        Allergies:  No Known Allergies    Medications:     Current Outpatient Medications:     atorvastatin (LIPITOR) 40 mg tablet, TAKE ONE TABLET BY MOUTH ONCE DAILY, Disp: 90 tablet, Rfl: 0    digoxin (Digox) 0 125 mg tablet, Take 1 tablet (125 mcg total) by mouth daily, Disp: 30 tablet, Rfl: 0    diltiazem (CARDIZEM CD) 240 mg 24 hr capsule, Take 1 capsule (240 mg total) by mouth daily, Disp: 30 capsule, Rfl: 0    Eliquis 5 MG, TAKE ONE TABLET BY MOUTH TWICE DAILY, Disp: 60 tablet, Rfl: 0    ezetimibe (ZETIA) 10 mg tablet, Take 1 tablet (10 mg total) by mouth daily, Disp: 30 tablet, Rfl: 11    furosemide (LASIX) 80 mg tablet, Take 1 tablet (80 mg total) by mouth 2 (two) times a day, Disp: 60 tablet, Rfl: 11    insulin aspart (NovoLOG FlexPen) 100 UNIT/ML injection pen, Inject 10 Units under the skin 3 (three) times a day with meals, Disp: 19 8 mL, Rfl: 0    Lancets (accu-chek multiclix) lancets, Use 1 each 2 (two) times a day Use 2 times daily to test blood glucose, Disp: 102 each, Rfl: 3    Levemir FlexTouch 100 units/mL injection pen, 50 units twice per day, Disp: 117 mL, Rfl: 1    lisinopril (ZESTRIL) 20 mg tablet, Take 1 tablet (20 mg total) by mouth daily, Disp: 30 tablet, Rfl: 11    metFORMIN (GLUCOPHAGE) 500 mg tablet, Take 1 tablet (500 mg total) by mouth 2 (two) times a day with meals, Disp: 180 tablet, Rfl: 1    metoprolol succinate (TOPROL-XL) 100 mg 24 hr tablet, Take 1 tablet (100 mg total) by mouth every 12 (twelve) hours, Disp: 60 tablet, Rfl: 0    omeprazole (PriLOSEC) 20 mg delayed release capsule, TAKE ONE CAPSULE BY MOUTH ONCE DAILY, Disp: 90 capsule, Rfl: 0    potassium citrate (UROCIT-K) 10 mEq, Take 1 tablet (10 mEq total) by mouth 2 (two) times a day, Disp: 180 tablet, Rfl: 3    Unifine Pentips 31G X 8 MM MISC, INJECT UNDER THE SKIN DAILY USE AS DIRECTED, Disp: 100 each, Rfl: 2    digoxin (Digox) 0 125 mg tablet, Take 125 mcg by mouth daily (Patient not taking: Reported on 10/19/2021 ), Disp: , Rfl:       Vitals:    01/06/22 0950   BP: (!) 88/66   Pulse: (!) 141   SpO2: 92%     Weight (last 2 days)     Date/Time Weight    01/06/22 0950 140 (309)        Physical Exam  Vitals reviewed  Constitutional:       General: He is not in acute distress  Appearance: Normal appearance  He is well-developed  He is obese  He is not diaphoretic  HENT:      Head: Normocephalic and atraumatic  Eyes:      General: No scleral icterus  Conjunctiva/sclera: Conjunctivae normal       Pupils: Pupils are equal, round, and reactive to light  Neck:      Thyroid: No thyromegaly  Vascular: No JVD  Cardiovascular:      Rate and Rhythm: Tachycardia present  Rhythm irregular  Heart sounds: Normal heart sounds  No murmur heard  No friction rub  No gallop  Pulmonary:      Effort: Pulmonary effort is normal  No respiratory distress  Breath sounds: Normal breath sounds  No wheezing or rales  Abdominal:      General: Bowel sounds are normal  There is no distension  Palpations: Abdomen is soft  Tenderness: There is no abdominal tenderness  Musculoskeletal:         General: No deformity  Normal range of motion  Cervical back: Normal range of motion and neck supple  Right lower leg: Edema present  Left lower leg: Edema present  Skin:     General: Skin is warm and dry  Findings: No erythema or rash  Neurological:      General: No focal deficit present  Mental Status: He is alert and oriented to person, place, and time  Cranial Nerves: No cranial nerve deficit  Motor: No weakness  Laboratory Studies:    Laboratory testing personally reviewed    Cardiac testing:     EKG reviewed personally:   Results for orders placed or performed in visit on 01/06/22   POCT ECG    Impression    Atrial flutter with RVR, heart rate 141         Echocardiogram:  2017-EF 55%  3/21-EF 60%, mild LVH, mild RV dilation and mild RV dysfunction, mild-to-moderate TR with mild-to-moderate pulmonary hypertension  12/28/21-EF 55%, moderate concentric hypertrophy, RV dilated, left atrium dilated, right atrium dilated, mild MR, mild TR, mildly elevated pulmonary pressures, mildly dilated ascending aorta-personally reviewed    Stress test:      Holter:      Cardiac catheterization:        Clyde Patrick MD    Portions of the record may have been created with voice recognition software  Occasional wrong word or "sound a like" substitutions may have occurred due to the inherent limitations of voice recognition software  Read the chart carefully and recognize, using context, where substitutions have occurred

## 2022-01-07 ENCOUNTER — HOSPITAL ENCOUNTER (OUTPATIENT)
Dept: NON INVASIVE DIAGNOSTICS | Facility: HOSPITAL | Age: 77
Discharge: HOME | End: 2022-01-07
Attending: INTERNAL MEDICINE | Admitting: INTERNAL MEDICINE
Payer: MEDICARE

## 2022-01-07 ENCOUNTER — ANESTHESIA (OUTPATIENT)
Dept: NON INVASIVE DIAGNOSTICS | Facility: HOSPITAL | Age: 77
End: 2022-01-07
Payer: MEDICARE

## 2022-01-07 VITALS
HEIGHT: 76 IN | WEIGHT: 309 LBS | HEART RATE: 74 BPM | SYSTOLIC BLOOD PRESSURE: 155 MMHG | RESPIRATION RATE: 17 BRPM | OXYGEN SATURATION: 92 % | BODY MASS INDEX: 37.63 KG/M2 | DIASTOLIC BLOOD PRESSURE: 67 MMHG | TEMPERATURE: 97.9 F

## 2022-01-07 DIAGNOSIS — I48.0 PAROXYSMAL ATRIAL FIBRILLATION (HCC): ICD-10-CM

## 2022-01-07 LAB
ATRIAL RATE: 277 BPM
ATRIAL RATE: 72 BPM
P AXIS: 64 DEGREES
P AXIS: 77 DEGREES
PR INTERVAL: 206 MS
QRS AXIS: 44 DEGREES
QRS AXIS: 68 DEGREES
QRSD INTERVAL: 172 MS
QRSD INTERVAL: 176 MS
QT INTERVAL: 444 MS
QT INTERVAL: 448 MS
QTC INTERVAL: 450 MS
QTC INTERVAL: 492 MS
T WAVE AXIS: 13 DEGREES
T WAVE AXIS: 39 DEGREES
VENTRICULAR RATE: 61 BPM
VENTRICULAR RATE: 74 BPM

## 2022-01-07 PROCEDURE — 92960 CARDIOVERSION ELECTRIC EXT: CPT | Performed by: INTERNAL MEDICINE

## 2022-01-07 PROCEDURE — 92960 CARDIOVERSION ELECTRIC EXT: CPT

## 2022-01-07 PROCEDURE — 93010 ELECTROCARDIOGRAM REPORT: CPT | Performed by: INTERNAL MEDICINE

## 2022-01-07 PROCEDURE — 93005 ELECTROCARDIOGRAM TRACING: CPT

## 2022-01-07 RX ORDER — SODIUM CHLORIDE 9 MG/ML
INJECTION, SOLUTION INTRAVENOUS CONTINUOUS PRN
Status: DISCONTINUED | OUTPATIENT
Start: 2022-01-07 | End: 2022-01-07

## 2022-01-07 RX ORDER — SODIUM CHLORIDE 9 MG/ML
30 INJECTION, SOLUTION INTRAVENOUS CONTINUOUS
Status: DISCONTINUED | OUTPATIENT
Start: 2022-01-07 | End: 2022-01-07 | Stop reason: HOSPADM

## 2022-01-07 RX ORDER — PROPOFOL 10 MG/ML
INJECTION, EMULSION INTRAVENOUS AS NEEDED
Status: DISCONTINUED | OUTPATIENT
Start: 2022-01-07 | End: 2022-01-07

## 2022-01-07 RX ADMIN — PROPOFOL 20 MG: 10 INJECTION, EMULSION INTRAVENOUS at 08:43

## 2022-01-07 RX ADMIN — PROPOFOL 50 MG: 10 INJECTION, EMULSION INTRAVENOUS at 08:17

## 2022-01-07 RX ADMIN — SODIUM CHLORIDE: 9 INJECTION, SOLUTION INTRAVENOUS at 08:17

## 2022-01-07 NOTE — INTERVAL H&P NOTE
H&P reviewed  After examining the patient I find no changes in the patients condition since the H&P had been written  Vitals:    01/07/22 0725   BP: 157/73   Pulse: 69   Resp: 17   Temp: 98 7 °F (37 1 °C)   SpO2: 93%        Patient reports feeling well today and has no complaints  Risks and benefits of cardioversion were reviewed with the patient and he agrees to proceed

## 2022-01-07 NOTE — DISCHARGE INSTRUCTIONS
Cardioversion   WHAT YOU NEED TO KNOW:   Cardioversion is a procedure that uses medicine or electrical shocks to correct arrhythmias  An arrhythmias is a heartbeat that is too slow, too fast, or irregular  It may prevent your body from getting the blood and oxygen it needs  Your heart has 4 chambers, called the atria and ventricles  The atria are at the top of your heart, and the ventricles are at the bottom of your heart  Most arrhythmias that need cardioversion start in the atria  DISCHARGE INSTRUCTIONS:   Call 911 for any of the following:   · You have any of the following signs of a heart attack:      ? Squeezing, pressure, or pain in your chest    ? You may  also have any of the following:     § Discomfort or pain in your back, neck, jaw, stomach, or arm    § Shortness of breath    § Nausea or vomiting    § Lightheadedness or a sudden cold sweat    · You have any of the following signs of a stroke:      ? Numbness or drooping on one side of your face     ? Weakness in an arm or leg    ? Confusion or difficulty speaking    ? Dizziness, a severe headache, or vision loss    · You feel lightheaded, short of breath, and have chest pain  · You cough up blood  · You have trouble breathing  Seek care immediately if:   · You feel your heart beating fast or fluttering  · You feel weak or faint  · Your leg or arm is larger than usual, painful, and warm  Contact your healthcare provider if:   · Your skin is itchy, swollen, or you have a rash  · You have questions or concerns about your condition or care  Medicines: You may need any of the following:  · Heart medicines  help control your heart rate and rhythm  · Blood thinners  help prevent blood clots  Clots can cause strokes, heart attacks, and death  The following are general safety guidelines to follow while you are taking a blood thinner:    ? Watch for bleeding and bruising while you take blood thinners   Watch for bleeding from your gums or nose  Watch for blood in your urine and bowel movements  Use a soft washcloth on your skin, and a soft toothbrush to brush your teeth  This can keep your skin and gums from bleeding  If you shave, use an electric shaver  Do not play contact sports  ? Tell your dentist and other healthcare providers that you take a blood thinner  Wear a bracelet or necklace that says you take this medicine  ? Do not start or stop any other medicines unless your healthcare provider tells you to  Many medicines cannot be used with blood thinners  ? Take your blood thinner exactly as prescribed by your healthcare provider  Do not skip does or take less than prescribed  Tell your provider right away if you forget to take your blood thinner, or if you take too much  ? Warfarin  is a blood thinner that you may need to take  The following are things you should be aware of if you take warfarin:     § Foods and medicines can affect the amount of warfarin in your blood  Do not make major changes to your diet while you take warfarin  Warfarin works best when you eat about the same amount of vitamin K every day  Vitamin K is found in green leafy vegetables and certain other foods  Ask for more information about what to eat when you are taking warfarin  § You will need to see your healthcare provider for follow-up visits when you are on warfarin  You will need regular blood tests  These tests are used to decide how much medicine you need  · Take your medicine as directed  Contact your healthcare provider if you think your medicine is not helping or if you have side effects  Tell him or her if you are allergic to any medicine  Keep a list of the medicines, vitamins, and herbs you take  Include the amounts, and when and why you take them  Bring the list or the pill bottles to follow-up visits  Carry your medicine list with you in case of an emergency  Self-care:   · Rest as directed    Do not drive for at least 24 hours  Ask your healthcare provider when you can return to your normal activities  · Check your heart rate and blood pressure as directed  Ask your healthcare provider what your heart rate and blood pressure should be  · Do not smoke  Nicotine and other chemicals in cigarettes and cigars can cause heart and lung damage  They can also increase your risk for another arrhythmia  Ask your healthcare provider for information if you currently smoke and need help to quit  E-cigarettes or smokeless tobacco still contain nicotine  Talk to your healthcare provider before you use these products  · Eat heart healthy foods  These include fruits, vegetables, whole-grain breads, low-fat dairy products, beans, lean meats, and fish  Replace butter and margarine with heart-healthy oils such as olive oil and canola oil  · Maintain a healthy weight  Ask your healthcare provider how much you should weigh  Ask him to help you create a weight loss plan if you are overweight  Follow up with your doctor as directed:  Write down your questions so you remember to ask them during your visits  © Copyright [address] 2021 Information is for End User's use only and may not be sold, redistributed or otherwise used for commercial purposes  All illustrations and images included in CareNotes® are the copyrighted property of [employer] M [employer]  or [employer]  The above information is an  only  It is not intended as medical advice for individual conditions or treatments  Talk to your doctor, nurse or pharmacist before following any medical regimen to see if it is safe and effective for you

## 2022-01-07 NOTE — ANESTHESIA PREPROCEDURE EVALUATION
Procedure:  CARDIOVERSION (NON INVASIVE ONLY)    Relevant Problems   CARDIO   (+) AAA (abdominal aortic aneurysm) (HCC)   (+) Atrial fibrillation with rapid ventricular response (HCC)   (+) CAD (coronary artery disease)   (+) Hyperlipemia   (+) Hypertension   (+) Myocardial infarction (HCC)   (+) Nipple pain   (+) Paroxysmal atrial fibrillation (HCC)      ENDO   (+) Type 2 diabetes mellitus with hyperglycemia, with long-term current use of insulin (HCC)      /RENAL   (+) Benign prostatic hyperplasia without lower urinary tract symptoms   (+) Nephrolithiasis      MUSCULOSKELETAL   (+) Arthritis   (+) Chronic low back pain      PULMONARY   (+) LUCIA (obstructive sleep apnea)        Physical Exam    Airway    Mallampati score: II         Dental   upper dentures and lower dentures,     Cardiovascular  Rhythm: regular, Rate: normal,     Pulmonary  Pulmonary exam normal     Other Findings        Anesthesia Plan  ASA Score- 3     Anesthesia Type- IV sedation with anesthesia with ASA Monitors  Additional Monitors:   Airway Plan:           Plan Factors-Exercise tolerance (METS): >4 METS  Chart reviewed  Patient summary reviewed  Patient is not a current smoker  Patient not instructed to abstain from smoking on day of procedure  Patient did not smoke on day of surgery  Induction- intravenous  Postoperative Plan-     Informed Consent- Anesthetic plan and risks discussed with patient  I personally reviewed this patient with the CRNA  Discussed and agreed on the Anesthesia Plan with the CRNA  Brett Fleming

## 2022-01-07 NOTE — ANESTHESIA POSTPROCEDURE EVALUATION
Post-Op Assessment Note    CV Status:  Stable  Pain Score: 0    Pain management: adequate     Mental Status:  Alert and awake   Hydration Status:  Euvolemic   PONV Controlled:  Controlled   Airway Patency:  Patent      Post Op Vitals Reviewed: Yes      Staff: CRNA         No complications documented      /72 (01/07/22 0852)    Temp      Pulse 62 (01/07/22 0852)   Resp 18 (01/07/22 0852)    SpO2 93 % (2L NC) (01/07/22 1407)

## 2022-01-09 DIAGNOSIS — I48.11 LONGSTANDING PERSISTENT ATRIAL FIBRILLATION (HCC): ICD-10-CM

## 2022-01-10 RX ORDER — APIXABAN 5 MG/1
TABLET, FILM COATED ORAL
Qty: 60 TABLET | Refills: 0 | Status: SHIPPED | OUTPATIENT
Start: 2022-01-10 | End: 2022-02-05 | Stop reason: SDUPTHER

## 2022-01-14 ENCOUNTER — APPOINTMENT (OUTPATIENT)
Dept: LAB | Facility: AMBULARY SURGERY CENTER | Age: 77
End: 2022-01-14
Payer: MEDICARE

## 2022-01-14 DIAGNOSIS — Z79.4 TYPE 2 DIABETES MELLITUS WITH HYPERGLYCEMIA, WITH LONG-TERM CURRENT USE OF INSULIN (HCC): ICD-10-CM

## 2022-01-14 DIAGNOSIS — E55.9 VITAMIN D DEFICIENCY: ICD-10-CM

## 2022-01-14 DIAGNOSIS — E11.65 TYPE 2 DIABETES MELLITUS WITH HYPERGLYCEMIA, WITH LONG-TERM CURRENT USE OF INSULIN (HCC): ICD-10-CM

## 2022-01-14 DIAGNOSIS — E83.52 HYPERCALCEMIA: ICD-10-CM

## 2022-01-14 LAB
25(OH)D3 SERPL-MCNC: 35.1 NG/ML (ref 30–100)
ALBUMIN SERPL BCP-MCNC: 3 G/DL (ref 3.5–5)
ALP SERPL-CCNC: 83 U/L (ref 46–116)
ALT SERPL W P-5'-P-CCNC: 16 U/L (ref 12–78)
ANION GAP SERPL CALCULATED.3IONS-SCNC: 7 MMOL/L (ref 4–13)
AST SERPL W P-5'-P-CCNC: 13 U/L (ref 5–45)
BILIRUB SERPL-MCNC: 0.63 MG/DL (ref 0.2–1)
BUN SERPL-MCNC: 19 MG/DL (ref 5–25)
CALCIUM ALBUM COR SERPL-MCNC: 10.5 MG/DL (ref 8.3–10.1)
CALCIUM SERPL-MCNC: 9.7 MG/DL (ref 8.3–10.1)
CHLORIDE SERPL-SCNC: 98 MMOL/L (ref 100–108)
CO2 SERPL-SCNC: 34 MMOL/L (ref 21–32)
CREAT SERPL-MCNC: 0.87 MG/DL (ref 0.6–1.3)
EST. AVERAGE GLUCOSE BLD GHB EST-MCNC: 214 MG/DL
GFR SERPL CREATININE-BSD FRML MDRD: 82 ML/MIN/1.73SQ M
GLUCOSE P FAST SERPL-MCNC: 209 MG/DL (ref 65–99)
HBA1C MFR BLD: 9.1 %
PHOSPHATE SERPL-MCNC: 2.7 MG/DL (ref 2.3–4.1)
POTASSIUM SERPL-SCNC: 3.4 MMOL/L (ref 3.5–5.3)
PROT SERPL-MCNC: 8.2 G/DL (ref 6.4–8.2)
PTH-INTACT SERPL-MCNC: 82.4 PG/ML (ref 18.4–80.1)
SODIUM SERPL-SCNC: 139 MMOL/L (ref 136–145)

## 2022-01-14 PROCEDURE — 82306 VITAMIN D 25 HYDROXY: CPT

## 2022-01-14 PROCEDURE — 36415 COLL VENOUS BLD VENIPUNCTURE: CPT

## 2022-01-14 PROCEDURE — 80053 COMPREHEN METABOLIC PANEL: CPT

## 2022-01-14 PROCEDURE — 83970 ASSAY OF PARATHORMONE: CPT

## 2022-01-14 PROCEDURE — 83036 HEMOGLOBIN GLYCOSYLATED A1C: CPT

## 2022-01-14 PROCEDURE — 84100 ASSAY OF PHOSPHORUS: CPT

## 2022-01-28 DIAGNOSIS — I48.91 ATRIAL FIBRILLATION WITH RAPID VENTRICULAR RESPONSE (HCC): ICD-10-CM

## 2022-01-28 RX ORDER — METOPROLOL SUCCINATE 100 MG/1
TABLET, EXTENDED RELEASE ORAL
Qty: 60 TABLET | Refills: 0 | Status: SHIPPED | OUTPATIENT
Start: 2022-01-28 | End: 2022-02-21

## 2022-01-28 RX ORDER — DILTIAZEM HYDROCHLORIDE 240 MG/1
CAPSULE, COATED, EXTENDED RELEASE ORAL
Qty: 30 CAPSULE | Refills: 0 | Status: SHIPPED | OUTPATIENT
Start: 2022-01-28 | End: 2022-02-02 | Stop reason: ALTCHOICE

## 2022-01-28 RX ORDER — DIGOXIN 125 MCG
TABLET ORAL
Qty: 30 TABLET | Refills: 0 | Status: SHIPPED | OUTPATIENT
Start: 2022-01-28 | End: 2022-02-21

## 2022-02-02 ENCOUNTER — OFFICE VISIT (OUTPATIENT)
Dept: NEPHROLOGY | Facility: CLINIC | Age: 77
End: 2022-02-02
Payer: MEDICARE

## 2022-02-02 ENCOUNTER — TELEPHONE (OUTPATIENT)
Dept: ADMINISTRATIVE | Facility: OTHER | Age: 77
End: 2022-02-02

## 2022-02-02 ENCOUNTER — TELEPHONE (OUTPATIENT)
Dept: ENDOCRINOLOGY | Facility: CLINIC | Age: 77
End: 2022-02-02

## 2022-02-02 ENCOUNTER — OFFICE VISIT (OUTPATIENT)
Dept: ENDOCRINOLOGY | Facility: CLINIC | Age: 77
End: 2022-02-02
Payer: MEDICARE

## 2022-02-02 VITALS
DIASTOLIC BLOOD PRESSURE: 70 MMHG | SYSTOLIC BLOOD PRESSURE: 110 MMHG | HEIGHT: 76 IN | BODY MASS INDEX: 37.61 KG/M2 | HEART RATE: 84 BPM

## 2022-02-02 VITALS — HEIGHT: 76 IN | BODY MASS INDEX: 37.61 KG/M2 | SYSTOLIC BLOOD PRESSURE: 120 MMHG | DIASTOLIC BLOOD PRESSURE: 62 MMHG

## 2022-02-02 DIAGNOSIS — N20.0 NEPHROLITHIASIS: ICD-10-CM

## 2022-02-02 DIAGNOSIS — E27.8 ADRENAL NODULE (HCC): ICD-10-CM

## 2022-02-02 DIAGNOSIS — R80.1 PERSISTENT PROTEINURIA: ICD-10-CM

## 2022-02-02 DIAGNOSIS — E78.2 MIXED HYPERLIPIDEMIA: ICD-10-CM

## 2022-02-02 DIAGNOSIS — I10 ESSENTIAL HYPERTENSION: ICD-10-CM

## 2022-02-02 DIAGNOSIS — E83.52 HYPERCALCEMIA: ICD-10-CM

## 2022-02-02 DIAGNOSIS — Z79.4 TYPE 2 DIABETES MELLITUS WITH HYPERGLYCEMIA, WITH LONG-TERM CURRENT USE OF INSULIN (HCC): Primary | ICD-10-CM

## 2022-02-02 DIAGNOSIS — E83.52 HYPERCALCEMIA: Primary | ICD-10-CM

## 2022-02-02 DIAGNOSIS — I10 PRIMARY HYPERTENSION: ICD-10-CM

## 2022-02-02 DIAGNOSIS — I50.32 CHRONIC DIASTOLIC CHF (CONGESTIVE HEART FAILURE) (HCC): ICD-10-CM

## 2022-02-02 DIAGNOSIS — E11.65 TYPE 2 DIABETES MELLITUS WITH HYPERGLYCEMIA, WITH LONG-TERM CURRENT USE OF INSULIN (HCC): Primary | ICD-10-CM

## 2022-02-02 DIAGNOSIS — E87.6 HYPOKALEMIA: ICD-10-CM

## 2022-02-02 PROCEDURE — 99215 OFFICE O/P EST HI 40 MIN: CPT

## 2022-02-02 PROCEDURE — 99214 OFFICE O/P EST MOD 30 MIN: CPT | Performed by: INTERNAL MEDICINE

## 2022-02-02 RX ORDER — INSULIN DEGLUDEC INJECTION 100 U/ML
85 INJECTION, SOLUTION SUBCUTANEOUS DAILY
Qty: 15 ML | Refills: 1 | Status: SHIPPED | OUTPATIENT
Start: 2022-02-02 | End: 2022-03-07 | Stop reason: SDUPTHER

## 2022-02-02 RX ORDER — INSULIN ASPART 100 [IU]/ML
25 INJECTION, SOLUTION INTRAVENOUS; SUBCUTANEOUS
Qty: 15 ML | Refills: 0 | Status: SHIPPED | OUTPATIENT
Start: 2022-02-02 | End: 2022-03-07 | Stop reason: SDUPTHER

## 2022-02-02 NOTE — PATIENT INSTRUCTIONS
Renal function stable  BP stable  Stressed on oral hydration   BMP and ionized calcium in two months  Follow up in 6 months with labs and 24 hrs stone risk profile  Hypokalemia   WHAT YOU NEED TO KNOW:   What is hypokalemia? Hypokalemia is a low level of potassium in your blood  Potassium helps control how your muscles, heart, and digestive system work  Hypokalemia occurs when your body loses too much potassium or does not absorb enough from food  What causes hypokalemia? · Diarrhea or vomiting    · Medicines, such as diuretics, blood pressure medicines, or antibiotics    · Excessive use of laxatives    · Anorexia or bulimia nervosa    · Medical conditions, such as Cushing syndrome or kidney disease    · Not eating enough foods that contain potassium    What are the signs and symptoms of hypokalemia? You may not have any signs or symptoms if you have mild hypokalemia  You may have any of the following if it is more severe:  · Fatigue    · Constipation    · Frequent urination or urinating large amounts    · Muscle cramps or skin tingling    · Muscle weakness    · Fast or irregular heartbeat    How is hypokalemia diagnosed? · An EKG  test records your heart rhythm and how fast your heart beats  It is used to check for an irregular heartbeat  · Blood tests  are done to check your potassium level  How is hypokalemia treated? You will receive potassium to bring your levels back to normal  This may be given as a pill or IV  The amount of potassium you will be given depends on your potassium level  What foods are high in potassium? Foods that are high in potassium include bananas, oranges, tomatoes, potatoes, and avocado  Carpenter beans, turkey, salmon, lean beef, yogurt, and milk are also high in potassium  Ask your healthcare provider or dietitian for more information about foods that are high in potassium  When should I seek immediate care? · You cannot move your arm or leg      · You have a fast or irregular heartbeat  · You are too tired or weak to stand up  When should I contact my healthcare provider? · You are vomiting, or you have diarrhea  · You have numbness or tingling in your arms or legs  · Your symptoms do not go away or they get worse  · You have questions or concerns about your condition or care  CARE AGREEMENT:   You have the right to help plan your care  Learn about your health condition and how it may be treated  Discuss treatment options with your healthcare providers to decide what care you want to receive  You always have the right to refuse treatment  The above information is an  only  It is not intended as medical advice for individual conditions or treatments  Talk to your doctor, nurse or pharmacist before following any medical regimen to see if it is safe and effective for you  © Copyright [employer] 2021 Information is for End User's use only and may not be sold, redistributed or otherwise used for commercial purposes   All illustrations and images included in CareNotes® are the copyrighted property of A D A M , [employer]  or [address] [place] [address]

## 2022-02-02 NOTE — LETTER
Diabetic Eye Exam Form    Date Requested: 22  Patient: Tonie Devries  Patient : [id number]   Referring Provider: Fady:  Emma Parks    Dilated Retinal Exam, Optomap-Iris Exam, or Fundus Photography Done         Yes ([place] one above)         No     Date of Diabetic Eye Exam ______________________________  Left Eye      Exam did show retinopathy    Exam did not show retinopathy         Mild       Moderate       None       Proliferative       Severe     Right Eye     Exam did show retinopathy    Exam did not show retinopathy         Mild       Moderate       None       Proliferative       Severe     Comments __________________________________________________________    Practice Providing Exam ______________________________________________    Exam Performed By (print name) _______________________________________      Provider Signature ___________________________________________________      These reports are needed for  compliance  Please fax this completed form and a copy of the Diabetic Eye Exam report to our office located at [place] [address] as soon as possible via [phone] attention Constance: Phone [phone]    We thank you for your assistance in treating our mutual patient     (Sent to [facility] for [place])

## 2022-02-02 NOTE — ASSESSMENT & PLAN NOTE
A1C still above goal  BGs still not adequately controlled  Will switch to [place] 85 units daily to see if better response over levemir  Increase Novolog dose before meals to 20-25 depending on carb intake  He is interested in CGM and would be a good candidate  The patient is a candidate for CGM use: Indications: Diabetes Type 2  The patient is treated with 3 or more injections of insulin daily  The patients performs SMBG at least 3 times daily   SMBG data is being used to make adjustments to the insulin regimen  Repeat A1C, CMP and microalbumin before next visit  Referral ordered for education once he receives [place] [employer] supplies     Lab Results   Component Value Date    HGBA1C 9 1 (H) 01/14/2022

## 2022-02-02 NOTE — PATIENT INSTRUCTIONS
Start Tresiba 85 units daily  Send blood sugar readings in 2 weeks after starting medication  Call if having persistent high or low blood sugars  Increase Novolog with meals 20-25 units

## 2022-02-02 NOTE — ASSESSMENT & PLAN NOTE
Corrected calcium slight increase to 10 5 from 10 2  In last year has been ranging from 10-10  6  PTH has decreased since last visit after starting 1,000 units vitamin d daily  24-hour urine showed normal calcium level  No new kidney stones since last visit  DEXA scan is unremarkable  At this time, I recommend to continue to monitor calcium and PTH levels  If continue to worsen or kidney stones continue to be an issue will think about parathyroid scan

## 2022-02-02 NOTE — TELEPHONE ENCOUNTER
----- Message from Suzi Benavidez sent at 2/2/2022  8:54 AM EST -----  Regarding: HM DM EYE EXAM  02/02/22 8:55 AM    Hello, our patient Linda Borges has had Diabetic Eye Exam completed/performed  Please assist in updating the patient chart by calling [facility] #[phone];  Fax: [phone]          Thank you,  Sagrario Bland  [facility]

## 2022-02-02 NOTE — TELEPHONE ENCOUNTER
Upon review of the In Basket request and the patient's chart, initial outreach has been made via fax, please see Contacts section for details       Thank you  Rehana Weaver MA

## 2022-02-02 NOTE — ASSESSMENT & PLAN NOTE
Work-up has been negative  Most recent imaging done 1/2021 showed stable adenoma  No further work up needed at this time

## 2022-02-02 NOTE — PROGRESS NOTES
Established Patient Progress Note      Chief Complaint   Patient presents with    Diabetes Type 2    Hyperparathyroidism        History of Present Illness:   Agnieszka Hdz is a 65 y o  male with HTN, HLD, sleep apnea, adrenal adenoma, and type 2 diabetes with long term use of insulin since about 27 years ago  Last seen in the office 10/11/2021  Reports complications of none  Last A1C was 9 1  [facility] 12/27/2021-12/30/2021 with left lower extremity cellulitis with abscess formation and rapid atrial fibrillation  I & D was done, started on antibiotics, and cardizem and Topol XL were adjusted  Also in the hospital 1/7/2022 for rapid atrial fibrillation, cardioversion was completed and was successful  He denies issues with atrial fibrillation since cardioversion  He wears a fitbit to monitor HR at all times  Denies recent severe hypoglycemic or severe hyperglycemic episodes  He feels that his current regimen is not bringing down BGs enough  He is rotating sites, holding insulin in for at least 10 seconds and taking with every meal before he eats  He did not tolerate higher doses of metformin  Previously treated with Pioglitazone which was discontinued due to edema  Did not tolerate GLP-1 agonist due to nausea  SGLT2 inhibitors were avoided due to history of urosepsis/nephrolithiasis  Home glucose monitoring: are performed regularly 3-4 times a day  He reports his BGs range 180-220  Log was not brought to visit  He is interested in CGM  He is really trying to work on diet, he is consuming more vegetables and less carbs  He has lost 30 pounds in last few months and would like to continue to lose weight  He usually consumes 15-30 carbs with each meal      He also has a 4 1cm adrenal adenoma which has been evaluated by Dr Sherry Augustin  Reviewed Dr Azam Cloud workup from feb/march 2021     Aldosterone and [place] metanephrines were normal  2/3/2021 dexamethasone suppression testing did not suppress x 2 so additional testing was performed  DHEAS and ACTH were normal  1 salivary cortisol mildly elevated, 1 was normal    24-hour urine cortisol was normal  Repeat imaging done 1/2021 showed unchanged right adrenal nodule  Calcium has been elevated  PTH elevated but has improved  24 hour urine was completed by nephrology and showed normal calcium level, which does not support diagnosis of primary hyperparathyroidism  Has a history of kidney stones  Has not had any kidney stones since last visit  Nephrology started him on potassium citrate  Vitamin D level was low at last visit, started OTC Vitamin D 1,000 units daily and increased dietary calcium intake  Vitamin D level has improved  DEXA done 3/2021 showed normal bone density  Denies recent falls or fractures  Current regimen:   Metformin 500 mg BID   Levemir 50 units BID   Novolog 15-20 units with every meal depending on carb amount    Last Eye Exam: apt   In April, reports he has no retinopathy   Last Foot Exam: 10/13/2021, sees podiatry every 6 weeks     Has hypertension: Taking lisinopril and Toprol XL  Has hyperlipidemia: Taking atorvastatin and zetia   Vitamin D Deficiency: Taking 1,000 units daily     Patient Active Problem List   Diagnosis    Hypertension    Hyperlipemia    Type 2 diabetes mellitus with hyperglycemia, with long-term current use of insulin (HCC)    Chronic low back pain    CAD (coronary artery disease)    Malignant neoplasm of urinary bladder (HCC)    Arthritis    Paroxysmal atrial fibrillation (HCC)    AAA (abdominal aortic aneurysm) (HCC)    Decreased pedal pulses    Chronic pain of both knees    Actinic keratosis of right temple    Actinic keratosis of scalp    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    Immunization deficiency    Left lower quadrant abdominal pain    Excessive gas    Morbid obesity (HCC)    Colitis    Adrenal nodule (HCC)    LUCIA (obstructive sleep apnea)    Embolism and thrombosis of arteries of the lower extremities ([address] )    Bilateral edema of lower extremity    Hypoxemia    Chronic diastolic CHF (congestive heart failure) (HCC)    Left upper lobe pulmonary nodule    Scaly skin on examination    Fall    Hypercalcemia    Nephrolithiasis    Blister of left leg    Hydroureter on left    Urinary tract infection with hematuria    Myocardial infarction (HCC)    Venous stasis dermatitis of both lower extremities    Blister of right leg    Nipple pain    Folliculitis    Persistent proteinuria    Hypokalemia    Ureteral calculi    Benign prostatic hyperplasia without lower urinary tract symptoms    Atrial fibrillation with rapid ventricular response (HCC)    Cellulitis and abscess of left lower extremity      Past Medical History:   Diagnosis Date    A-fib [facility])     AAA (abdominal aortic aneurysm) ([address] )     Actinic keratosis of right temple 7/31/2020    Actinic keratosis of scalp 7/31/2020    Acute respiratory failure with hypoxia ([address] ) 3/25/2021    Allergic 1960    Arthritis     Lay's esophagus     Bladder cancer (HCC)     Blister of left leg 4/28/2021    Blister of right leg 5/26/2021    CAD (coronary artery disease)     Cancer (HCC) 02/2010    Bladder    CHF (congestive heart failure) (HCC)     Chronic low back pain     Chronic pain of both knees 7/31/2020    Colitis 12/4/2020    CPAP (continuous positive airway pressure) dependence     Diabetes ([address] )     Diabetes mellitus ([facility] ) 10/1993    Excessive gas 12/3/2020    Heart murmur     History of chemotherapy     Hydroureter on left 4/28/2021    Hyperlipemia     Hypertension     Immunization deficiency 10/30/2020    Hep a nonimmune    Kidney stone     Left lower quadrant abdominal pain 12/3/2020    Mass of right adrenal gland ([facility] ) 12/4/2020    4 1 cm    Myocardial infarction ([facility] )     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    Obesity 2000    LUCIA (obstructive sleep apnea) 1/29/2021    Pressure ulcer of right leg, stage 1 5/26/2021  Urinary tract infection with hematuria 5/3/2021    u cx 2021=pseudomonas R to bactrim and cefdinir, S to cipro    Venous stasis dermatitis of both lower extremities 2021      Past Surgical History:   Procedure Laterality Date    BACK SURGERY      BLADDER SURGERY      CATARACT EXTRACTION, BILATERAL      COLONOSCOPY  2019    next [address] GRAFT  2016    Quadruple per [place]    EGD  2017    EYE SURGERY  2016    Cataracts    JOINT REPLACEMENT  [id number]    Hip and shoulder    KIDNEY STONE SURGERY      IL CYSTO/URETERO W/LITHOTRIPSY &INDWELL STENT INSRT Left 2021    Procedure: CYSTOSCOPY URETEROSCOPY WITH LITHOTRIPSY HOLMIUM LASER, AND INSERTION STENT URETERAL/EXCHANGE;  Surgeon: Raine Oakley MD;  Location: [facility];  Service: Urology    IL CYSTOURETHROSCOPY,URETER CATHETER Left 2021    Procedure: CYSTOSCOPY  WITH INSERTION STENT URETERAL;  Surgeon: Raine Oakley MD;  [facility]: [facility];  Service: Urology    TOTAL HIP ARTHROPLASTY Right 2012    TOTAL SHOULDER REPLACEMENT Right 2013    VASECTOMY  1971      Family History   Problem Relation Age of Onset    Heart disease Father     Arthritis Father     Heart attack Father     Alzheimer's disease Mother     Dementia Mother      Social History     Tobacco Use    Smoking status: Former Smoker     Packs/day: 0 25     Years: 20 00     Pack years: 5 00     Types: Cigarettes     Start date:      Quit date: 2010     Years since quittin 0    Smokeless tobacco: Never Used    Tobacco comment: Quit several times for up to 6 years     Substance Use Topics    Alcohol use: Not Currently     Alcohol/week: 0 0 standard drinks     Comment: No use     No Known Allergies      Current Outpatient Medications:     atorvastatin (LIPITOR) 40 mg tablet, TAKE ONE TABLET BY MOUTH ONCE DAILY, Disp: 90 tablet, Rfl: 0    digoxin (LANOXIN) 0 125 mg tablet, TAKE ONE TABLET BY MOUTH ONCE DAILY, Disp: 30 tablet, Rfl: 0    Eliquis 5 MG, TAKE ONE TABLET BY MOUTH TWICE DAILY, Disp: 60 tablet, Rfl: 0    ezetimibe (ZETIA) 10 mg tablet, Take 1 tablet (10 mg total) by mouth daily, Disp: 30 tablet, Rfl: 11    furosemide (LASIX) 80 mg tablet, Take 1 tablet (80 mg total) by mouth 2 (two) times a day, Disp: 60 tablet, Rfl: 11    insulin aspart (NovoLOG FlexPen) 100 UNIT/ML injection pen, Inject 25 Units under the skin 3 (three) times a day with meals Inject 20-25 units under the skin 3 times day with meals depending on carbohydrate amount , Disp: 15 mL, Rfl: 0    Lancets (accu-chek multiclix) lancets, Use 1 each 2 (two) times a day Use 2 times daily to test blood glucose, Disp: 102 each, Rfl: 3    lisinopril (ZESTRIL) 20 mg tablet, Take 1 tablet (20 mg total) by mouth daily, Disp: 30 tablet, Rfl: 11    metFORMIN (GLUCOPHAGE) 500 mg tablet, Take 1 tablet (500 mg total) by mouth 2 (two) times a day with meals, Disp: 180 tablet, Rfl: 1    metoprolol succinate (TOPROL-XL) 100 mg 24 hr tablet, TAKE 1 TABLET BY MOUTH EVERY 12 HOURS, Disp: 60 tablet, Rfl: 0    omeprazole (PriLOSEC) 20 mg delayed release capsule, TAKE ONE CAPSULE BY MOUTH ONCE DAILY, Disp: 90 capsule, Rfl: 0    potassium citrate (UROCIT-K) 10 mEq, Take 1 tablet (10 mEq total) by mouth 2 (two) times a day, Disp: 180 tablet, Rfl: 3    Unifine Pentips 31G X 8 MM MISC, INJECT UNDER THE SKIN DAILY USE AS DIRECTED, Disp: 100 each, Rfl: 2    insulin degludec Rheta Nunnery FlexTouch) 100 units/mL injection pen, Inject 85 Units under the skin daily, Disp: 15 mL, Rfl: 1    Review of Systems   Constitutional: Positive for activity change (walking more) and unexpected weight change (weight loss - trying )  Negative for appetite change, chills, fatigue and fever  HENT: Negative for sore throat, trouble swallowing and voice change  Eyes: Negative for visual disturbance  Respiratory: Negative for chest tightness and shortness of breath      Cardiovascular: Negative for chest pain, palpitations and leg swelling  Gastrointestinal: Negative for constipation, diarrhea, nausea and vomiting  Endocrine: Negative for cold intolerance, heat intolerance, polydipsia, polyphagia and polyuria  Genitourinary: Negative for frequency  Musculoskeletal: Positive for gait problem  Negative for arthralgias, back pain and joint swelling  Skin: Negative for color change, pallor, rash and wound  Neurological: Negative for dizziness, tremors, weakness, numbness and headaches  All other systems reviewed and are negative  Physical Exam:  Body mass index is 37 61 kg/m²  /70 (BP Location: Left arm, Patient Position: Sitting, Cuff Size: Large)   Pulse 84   Ht 6' 4" (1 93 m)   BMI 37 61 kg/m²    Wt Readings from Last 3 Encounters:   01/07/22 (!) 140 kg (309 lb)   01/06/22 (!) 140 kg (309 lb)   12/28/21 (!) 143 kg (316 lb 5 8 oz)       Physical Exam  Vitals reviewed  Constitutional:       Appearance: Normal appearance  He is obese  HENT:      Head: Normocephalic  Eyes:      Extraocular Movements: Extraocular movements intact  Conjunctiva/sclera: Conjunctivae normal       Pupils: Pupils are equal, round, and reactive to light  Cardiovascular:      Rate and Rhythm: Normal rate and regular rhythm  Pulses: Normal pulses  Heart sounds: Murmur heard  No friction rub  No gallop  Pulmonary:      Effort: Pulmonary effort is normal  No respiratory distress  Breath sounds: Normal breath sounds  No stridor  No wheezing, rhonchi or rales  Abdominal:      General: Bowel sounds are normal  There is no distension  Palpations: Abdomen is soft  Tenderness: There is no abdominal tenderness  Musculoskeletal:         General: No swelling, tenderness or signs of injury  Normal range of motion  Cervical back: Normal range of motion and neck supple  Right lower leg: No edema  Left lower leg: No edema        Comments: Uses wheelchair for long distances   Skin:     General: Skin is warm and dry  Coloration: Skin is not jaundiced  Findings: No bruising or erythema  Neurological:      General: No focal deficit present  Mental Status: He is alert and oriented to person, place, and time  Cranial Nerves: No cranial nerve deficit  Sensory: No sensory deficit  Motor: No weakness  Coordination: Coordination normal       Gait: Gait normal    Psychiatric:         Mood and Affect: Mood normal          Behavior: Behavior normal          Thought Content: Thought content normal          Judgment: Judgment normal        Labs:   Lab Results   Component Value Date    HGBA1C 9 1 (H) 01/14/2022    HGBA1C 9 0 (H) 10/06/2021    HGBA1C 8 5 (H) 06/03/2021     Lab Results   Component Value Date    CREATININE 0 87 01/14/2022    CREATININE 0 99 12/30/2021    CREATININE 0 98 12/29/2021    BUN 19 01/14/2022    K 3 4 (L) 01/14/2022    CL 98 (L) 01/14/2022    CO2 34 (H) 01/14/2022     eGFR   Date Value Ref Range Status   01/14/2022 82 ml/min/1 73sq m Final     Lab Results   Component Value Date    HDL 37 (L) 10/06/2021    TRIG 201 (H) 10/06/2021     Lab Results   Component Value Date    ALT 16 01/14/2022    AST 13 01/14/2022    ALKPHOS 83 01/14/2022     Lab Results   Component Value Date    UPL7TJZHGRJW 0 984 12/27/2021    SDS2FDGZNZXY 1 300 07/26/2021    NVS6JLLJYMXW 1 184 08/11/2020       Impression & Plan:    Problem List Items Addressed This Visit        Endocrine    Type 2 diabetes mellitus with hyperglycemia, with long-term current use of insulin (HCC) - Primary     A1C still above goal  BGs still not adequately controlled  Will switch to [place] 85 units daily to see if better response over levemir  Increase Novolog dose before meals to 20-25 depending on carb intake  He is interested in CGM and would be a good candidate  The patient is a candidate for CGM use:   Indications: Diabetes Type 2  The patient is treated with 3 or more injections of insulin daily  The patients performs SMBG at least 3 times daily   SMBG data is being used to make adjustments to the insulin regimen  Repeat A1C, CMP and microalbumin before next visit  Referral ordered for education once he receives [employer] supplies  Lab Results   Component Value Date    HGBA1C 9 1 (H) 01/14/2022            Relevant Medications    insulin degludec [place] FlexTouch) 100 units/mL injection pen    insulin aspart (NovoLOG FlexPen) 100 UNIT/ML injection pen    Other Relevant Orders    Ambulatory referral to Ophthalmology    Ambulatory referral to Diabetic Education    HEMOGLOBIN A1C W/ EAG ESTIMATION Lab Collect    Comprehensive metabolic panel Lab Collect    Microalbumin / creatinine urine ratio Lab Collect       Cardiovascular and Mediastinum    Hypertension     BP at goal today  Continue current medication regimen  Other    Hyperlipemia     Most recent lipid panel done 4/2021, LDL was at goal range  Continue medication regimen  Repeat lipid panel  Relevant Orders    Lipid panel Lab Collect Lab Collect    Adrenal nodule ([address] )     Work-up has been negative  Most recent imaging done 1/2021 showed stable adenoma  No further work up needed at this time  Hypercalcemia     Corrected calcium slight increase to 10 5 from 10 2  In last year has been ranging from 10-10  6  PTH has decreased since last visit after starting 1,000 units vitamin d daily  24-hour urine showed normal calcium level  No new kidney stones since last visit  DEXA scan is unremarkable  At this time, I recommend to continue to monitor calcium and PTH levels  If continue to worsen or kidney stones continue to be an issue will think about parathyroid scan            Relevant Orders    PTH, intact Lab Collect Lab Collect          Orders Placed This Encounter   Procedures    HEMOGLOBIN A1C W/ EAG ESTIMATION Lab Collect     Standing Status:   Future     Standing Expiration Date:   2/2/2023   [facility] Comprehensive metabolic panel Lab Collect     This is a patient instruction: Patient fasting for 8 hours or longer recommended  Standing Status:   Future     Standing Expiration Date:   2/2/2023    Lipid panel Lab Collect Lab Collect     This is a patient instruction: This test requires patient fasting for 10-12 hours or longer  Drinking of black coffee or black tea is acceptable  Standing Status:   Future     Standing Expiration Date:   2/2/2023    Microalbumin / creatinine urine ratio Lab Collect     Standing Status:   Future     Standing Expiration Date:   2/2/2023    PTH, intact Lab Collect Lab Collect     Standing Status:   Future     Standing Expiration Date:   2/2/2023   [facility] Ambulatory referral to Ophthalmology     Standing Status:   Future     Standing Expiration Date:   2/2/2023     Referral Priority:   Routine     Referral Type:   Consult - AMB     Referral Reason:   Specialty Services Required     Referred to Provider:   [address]     Requested Specialty:   Ophthalmology     Number of Visits Requested:   1     Expiration Date:   2/2/2023    Ambulatory referral to Diabetic Education     Standing Status:   Future     Standing Expiration Date:   2/2/2023     Referral Priority:   Routine     Referral Type:   Consult - AMB     Referral Reason:   Specialty Services Required     Requested Specialty:   Diabetes Services     Number of Visits Requested:   1     Expiration Date:   2/2/2023       Patient Instructions   Start [place] 85 units daily  Send blood sugar readings in 2 weeks after starting medication  Call if having persistent high or low blood sugars  Increase Novolog with meals 20-25 units       Discussed with the patient and all questioned fully answered  He will call me if any problems arise      KACEY Rosado

## 2022-02-02 NOTE — PROGRESS NOTES
NEPHROLOGY OUTPATIENT PROGRESS NOTE   Jesus Wakefield 65 y o  male MRN: [id number]  DATE: 2/2/2022  Reason for visit:   Chief Complaint   Patient presents with    Follow-up     hypercalcemia       ASSESSMENT and PLAN:  Hypercalcemia  -calcium has been elevated since January 2021     -corrected calcium level used to be 10 6-10 7 in June to July 2021, then calcium trended down to 10 2 with stopping multivitamin with calcium  -previously check ionized calcium was at normal range in July 2021  -workup shows PTH level  now again trending down to 82 4 from previous level of 111 5  Vitamin-D level was at normal range at 35 with use of oral vitamin-D  -24 hour urine calcium is actually on the lower side     -Recently check stone risk profile in July 2021 showed 24 hour urine calcium low at 61 2  Previously check 24 hour urine calcium was 126 5 mg  Patient denies any family history of hypercalcemia, otherwise would also consider possibility of familial hypocalciuric hypercalcemia  -other workup:  SPEP and UPEP negative for monoclonal protein  Kappa lambda ratio 2 57   1,25 vitamin-D 51 0 which is normal   TSH within normal limit  PTHrP less than 2 0  -CT chest abd pelvis:  Was suggestive of  pleural thickening with calcification also finding of calcified granuloma scattered in the right lung   -Plan:  Less likely primary hyperparathyroidism as PTH level trending down with vitamin-D supplementation which was started by endocrinology  Certainly,  low 24 hour urine calcium does not go in favor of primary hyperparathyroidism  Would consider possibility of familial hypercalciuric hypercalcemia  Would continue to monitor, check ionized calcium and BMP in 2 months follow-up in 6 months with repeat labs  Also offered genetic studies with renasite, patient will let me know if interested      Nephrolithiasis  -CT abdomen from April 2021 suggestive of several subcentimeter nonobstructing stone in the left kidney  Several obstructing stones in the distal left ureter  measuring approximately 6 mm in size   Mild left hydroureteronephrosis and periureteral and perinephric fat stranding  -s/p 6mm left ureteral calculus left ureteral stent in April 24th 2021  -status post ureteroscopy with lithotripsy and stent on the left side on 05/21/21 with Dr Sylvain Higuera  -stone analysis was not done  -24 hour urine study for stone risk profile, was found to have total urine volume 1800 mL and was told to increase oral hydration  Also found to have low 24 hour citrate at 115 mg and was started on potassium citrate 10 mEq twice daily in July 2021  24 hour urine calcium was low at 61 2   24 hour oxalate was also elevated at 47 mg and was recommended low oxalate diet  Uric acid super saturation was elevated but 24 hour urine uric acid was only 284 mg  -last renal ultrasound showed 6 millimeter right renal calculus in November 2021  -plan continue oral hydration, continue potassium citrate 10 mEq twice daily , urine pH is 6 0    not increasing the dose of potassium citrate as bicarb level is already at 33-34 mEq/liter  Continue low oxalate diet, list was previously provided  Will recheck 24 hour stone risk profile before the next office visit    Hypokalemia: Potassium level 3 4, stressed on high potassium diet, continue potassium citrate, check blood work in 2 months     Primary Hypertension:   -Current medication: lisinopril 20 mg, toprol xl 100 mg twice daily and lasix 80 mg bid      -Current blood pressure: stable and at goal   -Plan:    ? Continue same medications  Continue home monitoring of blood pressure  -Recommend 2 g sodium diet      -Recommend daily exercise and weight loss  -Discussed home monitoring of BP and maintaining a log of blood pressure   -Recommend goal BP less than 130/80      Proteinuria, persistent  -found to have elevated microalbumin creatinine ratio, micro albumin creatinine ratio was 350 milligram/gram in March 2021  -now [facility] 0 17 on last urine test in October 2021  -likely due to diabetic nephropathy as well as hypertensive nephrosclerosis  -continue to monitor, continue ACE inhibitor  -quantify proteinuria again before the next office visit     Chronic diastolic CHF  - on lasix 80 mg bid   -clinically appears euvolemic, continue follow-up with Cardiology     Hyperlipidemia , mixed  -Lipitor 40 mg+ zetia  -last lipid panel from October 2021 showed LDL 65, -TG trending  Stressed on daily exercise  -recommend lifestyle modification, continue to monitor per PCP     Bladder cancer diagnosed in 2010-s/p BCG 7 yrs ago   -negative cystoscopy in dec 2020   -currently on Proscar per Urology     Adrenal mass   - CT abdomen from April 2021 showed ADRENAL GLANDS:  4 cm right adrenal nodule redemonstrated, stable   Left adrenal gland appears normal   -following with endocrinology  Reviewed endocrinology note from July 7, 2021-found to have normal aldosterone and plasma metanephrines but found to have slightly elevated dexamethasone suppression testing  Reviewed last endocrine note from October 2021, was found to have normal DHEA S and ACTH level  Noted plan to repeat adrenal imaging in January 2022   -would endocrinology note from today, less likely primary hyperparathyroidism  Was recommended to continue same dose of vitamin-D  Imaging from January 2021 did not show any changes in the right adrenal nodule      LUCIA on CPAP    [id number] on insulin and metformin  -last A1c was 9 1  Continue to follow-up with endocrinology and management per Endocrine    Patient Instructions   Renal function stable  BP stable  Stressed on oral hydration   BMP and ionized calcium in two months  Follow up in 6 months with labs and 24 hrs stone risk profile  Hypokalemia   WHAT YOU NEED TO KNOW:   What is hypokalemia? Hypokalemia is a low level of potassium in your blood  Potassium helps control how your muscles, heart, and digestive system work  Hypokalemia occurs when your body loses too much potassium or does not absorb enough from food  What causes hypokalemia? · Diarrhea or vomiting    · Medicines, such as diuretics, blood pressure medicines, or antibiotics    · Excessive use of laxatives    · Anorexia or bulimia nervosa    · Medical conditions, such as Cushing syndrome or kidney disease    · Not eating enough foods that contain potassium    What are the signs and symptoms of hypokalemia? You may not have any signs or symptoms if you have mild hypokalemia  You may have any of the following if it is more severe:  · Fatigue    · Constipation    · Frequent urination or urinating large amounts    · Muscle cramps or skin tingling    · Muscle weakness    · Fast or irregular heartbeat    How is hypokalemia diagnosed? · An EKG  test records your heart rhythm and how fast your heart beats  It is used to check for an irregular heartbeat  · Blood tests  are done to check your potassium level  How is hypokalemia treated? You will receive potassium to bring your levels back to normal  This may be given as a pill or IV  The amount of potassium you will be given depends on your potassium level  What foods are high in potassium? Foods that are high in potassium include bananas, oranges, tomatoes, potatoes, and avocado  Carpenter beans, turkey, salmon, lean beef, yogurt, and milk are also high in potassium  Ask your healthcare provider or dietitian for more information about foods that are high in potassium  When should I seek immediate care? · You cannot move your arm or leg  · You have a fast or irregular heartbeat  · You are too tired or weak to stand up  When should I contact my healthcare provider? · You are vomiting, or you have diarrhea  · You have numbness or tingling in your arms or legs  · Your symptoms do not go away or they get worse  · You have questions or concerns about your condition or care      CARE AGREEMENT:   You have the right to help plan your care  Learn about your health condition and how it may be treated  Discuss treatment options with your healthcare providers to decide what care you want to receive  You always have the right to refuse treatment  The above information is an  only  It is not intended as medical advice for individual conditions or treatments  Talk to your doctor, nurse or pharmacist before following any medical regimen to see if it is safe and effective for you  © Copyright [employer] [facility] 2021 Information is for End User's use only and may not be sold, redistributed or otherwise used for commercial purposes  All illustrations and images included in CareNotes® are the copyrighted property of [employer] M , [employer]  or [address] [place] [address] St      Diagnoses and all orders for this visit:    Hypercalcemia  -     Basic metabolic panel; Future  -     Calcium, ionized; Future  -     Basic metabolic panel; Future  -     Protein / creatinine ratio, urine; Future  -     PTH, intact; Future  -     Vitamin D 25 hydroxy; Future  -     Urinalysis with microscopic; Future  -     Phosphorus; Future  -     Magnesium; Future  -     Stone risk profile; Future    Nephrolithiasis  -     Basic metabolic panel; Future  -     Calcium, ionized; Future  -     Basic metabolic panel; Future  -     Stone risk profile; Future    Essential hypertension  -     Basic metabolic panel; Future    Persistent proteinuria  -     Protein / creatinine ratio, urine; Future    Chronic diastolic CHF (congestive heart failure) (HCC)  -     Basic metabolic panel; Future    Hypokalemia  -     Basic metabolic panel;  Future            SUBJECTIVE / HPI:  Familia Tomlinson is a 65 y o   male  with medical issues of hypertension x 10 yrs, diabetes mellitus type 2 x 1994, hyperlipidemia, AAA, AFib on anticoagulation, history of muscle invasive bladder cancer, CHF, CAD status post CABG, right adrenal adenoma, nephrolithiasis s/p left ureteral stent in 4/24/21 who presents for follow-up of hypercalcemia and nephrolithiasis     Patient has been following with endocrinology for right adrenal adenoma 4 1 cm in size, aldosterone and metanephrines level normal as per endocrinology note  Was found to have dexamethasone suppression testing elevated and cell a very cortisol level mildly elevated but 24 hour urine cortisol normal    Also patient was having hypercalcemia with corrected calcium level 10 6, PTH was 65 and so was referred to Nephrology  As per endocrinology note, possibly primary hyperparathyroidism but urine calcium level was not elevated  Was referred to Nephrology for stone evaluation and proteinuria  Has elevated calcium since jan 2021  After initial office visit he underwent 24 hour urine study for stone risk profile, was found to have total urine volume 1800 mL and was told to increase oral hydration  Also found to have low 24 hour citrate at 115 mg and was started on potassium citrate 10 mEq twice daily in July 2021  24 hour urine calcium was low at 61 2   24 hour oxalate was also elevated at 47 mg and was recommended low oxalate diet  Uric acid super saturation was elevated but 24 hour urine uric acid was only 284 mg    Was at [facility] in dec for cellulitis on left leg, and also had Afib with RVR - was placed on digoxin and increased metoprolol     Reviewed blood work from January 14, 2022:  Renal function stable at creatinine 0 87, potassium was 3 4 with bicarb of 34  EGFR 82, calcium was elevated at 10 5  Vitamin-D 35 1  Hemoglobin A1c 9 1, PTH 82 4, trending down from previous level of 111 5  Upc ratio 0 17, urine microscopy showed 0-1 RBC per high-power field as well as 10-20 WBC per high-power field, 2+ glucose at A1c was 9 1    No new complaints    Denies any recent renal colic    REVIEW OF SYSTEMS:    Review of Systems   Constitutional: Negative for chills and fever  HENT: Negative for ear pain and sore throat      Eyes: Negative for pain and visual disturbance  Respiratory: Negative for cough and shortness of breath  Cardiovascular: Negative for chest pain and palpitations  Gastrointestinal: Negative for abdominal pain and vomiting  Genitourinary: Negative for dysuria and hematuria  Musculoskeletal: Negative for arthralgias and back pain  Skin: Negative for color change and rash  Neurological: Negative for seizures and syncope  All other systems reviewed and are negative  More than 10 point review of systems were obtained and discussed in length with the patient  Complete review of systems were negative / unremarkable except mentioned above         PAST MEDICAL HISTORY:  Past Medical History:   Diagnosis Date    A-fib [facility])     AAA (abdominal aortic aneurysm) ([address] )     Actinic keratosis of right temple 7/31/2020    Actinic keratosis of scalp 7/31/2020    Acute respiratory failure with hypoxia ([address] ) 3/25/2021    Allergic 1960    Arthritis     Lay's esophagus     Bladder cancer (HCC)     Blister of left leg 4/28/2021    Blister of right leg 5/26/2021    CAD (coronary artery disease)     Cancer (HCC) 02/2010    Bladder    CHF (congestive heart failure) (HCC)     Chronic low back pain     Chronic pain of both knees 7/31/2020    Colitis 12/4/2020    CPAP (continuous positive airway pressure) dependence     Diabetes ([facility] )     Diabetes mellitus ([facility] ) 10/1993    Excessive gas 12/3/2020    Heart murmur     History of chemotherapy     Hydroureter on left 4/28/2021    Hyperlipemia     Hypertension     Immunization deficiency 10/30/2020    Hep a nonimmune    Kidney stone     Left lower quadrant abdominal pain 12/3/2020    Mass of right adrenal gland ([facility] [address] [facility] ) 12/4/2020    4 1 cm    [facility]     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    Obesity 2000    LUCIA (obstructive sleep apnea) 1/29/2021    Pressure ulcer of right leg, stage 1 5/26/2021    Urinary tract infection with hematuria 5/3/2021    u cx 2021=pseudomonas R to bactrim and cefdinir, S to cipro    Venous stasis dermatitis of both lower extremities 2021       PAST SURGICAL HISTORY:  Past Surgical History:   Procedure Laterality Date    BACK SURGERY      BLADDER SURGERY      CATARACT EXTRACTION, BILATERAL      COLONOSCOPY  2019    next  [address] GRAFT  2016    Quadruple per Rita    EGD  2017    EYE SURGERY  2016    Cataracts    JOINT REPLACEMENT  [id number]    Hip and shoulder    KIDNEY STONE SURGERY      AL CYSTO/URETERO W/LITHOTRIPSY &INDWELL STENT INSRT Left 2021    Procedure: CYSTOSCOPY URETEROSCOPY WITH LITHOTRIPSY HOLMIUM LASER, AND INSERTION STENT URETERAL/EXCHANGE;  Surgeon: Sabina Lund MD;  [facility]: [facility];  Service: Urology    AL CYSTOURETHROSCOPY,URETER CATHETER Left 2021    Procedure: CYSTOSCOPY  WITH INSERTION STENT URETERAL;  Surgeon: Sabina Lund MD;  [facility];  Service: Urology    TOTAL HIP ARTHROPLASTY Right 2012    TOTAL SHOULDER REPLACEMENT Right 2013    VASECTOMY  1971       SOCIAL HISTORY:  Social History     Substance and Sexual Activity   Alcohol Use Not Currently    Alcohol/week: 0 0 standard drinks    Comment: No use     Social History     Substance and Sexual Activity   Drug Use Never    Comment: No use     Social History     Tobacco Use   Smoking Status Former Smoker    Packs/day: 0 25    Years: 20 00    Pack years: 5 00    Types: Cigarettes    Start date:     Quit date: 2010    Years since quittin 0   Smokeless Tobacco Never Used   Tobacco Comment    Quit several times for up to 6 years         FAMILY HISTORY:  Family History   Problem Relation Age of Onset    Heart disease Father     Arthritis Father     Heart attack Father     Alzheimer's disease Mother     Dementia Mother        MEDICATIONS:    Current Outpatient Medications:     atorvastatin (LIPITOR) 40 mg tablet, TAKE ONE TABLET BY MOUTH ONCE DAILY, Disp: 90 tablet, Rfl: 0    digoxin (LANOXIN) 0 125 mg tablet, TAKE ONE TABLET BY MOUTH ONCE DAILY, Disp: 30 tablet, Rfl: 0    Eliquis 5 MG, TAKE ONE TABLET BY MOUTH TWICE DAILY, Disp: 60 tablet, Rfl: 0    ezetimibe (ZETIA) 10 mg tablet, Take 1 tablet (10 mg total) by mouth daily, Disp: 30 tablet, Rfl: 11    furosemide (LASIX) 80 mg tablet, Take 1 tablet (80 mg total) by mouth 2 (two) times a day, Disp: 60 tablet, Rfl: 11    insulin aspart (NovoLOG FlexPen) 100 UNIT/ML injection pen, Inject 25 Units under the skin 3 (three) times a day with meals Inject 20-25 units under the skin 3 times day with meals depending on carbohydrate amount , Disp: 15 mL, Rfl: 0    insulin degludec Jetmore Gu FlexTouch) 100 units/mL injection pen, Inject 85 Units under the skin daily, Disp: 15 mL, Rfl: 1    Lancets (accu-chek multiclix) lancets, Use 1 each 2 (two) times a day Use 2 times daily to test blood glucose, Disp: 102 each, Rfl: 3    lisinopril (ZESTRIL) 20 mg tablet, Take 1 tablet (20 mg total) by mouth daily, Disp: 30 tablet, Rfl: 11    metFORMIN (GLUCOPHAGE) 500 mg tablet, Take 1 tablet (500 mg total) by mouth 2 (two) times a day with meals, Disp: 180 tablet, Rfl: 1    metoprolol succinate (TOPROL-XL) 100 mg 24 hr tablet, TAKE 1 TABLET BY MOUTH EVERY 12 HOURS, Disp: 60 tablet, Rfl: 0    omeprazole (PriLOSEC) 20 mg delayed release capsule, TAKE ONE CAPSULE BY MOUTH ONCE DAILY, Disp: 90 capsule, Rfl: 0    potassium citrate (UROCIT-K) 10 mEq, Take 1 tablet (10 mEq total) by mouth 2 (two) times a day, Disp: 180 tablet, Rfl: 3    Unifine Pentips 31G X 8 MM MISC, INJECT UNDER THE SKIN DAILY USE AS DIRECTED, Disp: 100 each, Rfl: 2      PHYSICAL EXAM:  Vitals:    02/02/22 1235   BP: 120/62   BP Location: Left arm   Patient Position: Sitting   Cuff Size: Large   Height: 6' 4" (1 93 m)     Body mass index is 37 61 kg/m²      Physical Exam  Constitutional:       General: He is not in acute distress  Appearance: He is well-developed  He is not diaphoretic  HENT:      Head: Normocephalic and atraumatic  Mouth/Throat:      Mouth: Mucous membranes are moist    Eyes:      General: No scleral icterus  Conjunctiva/sclera: Conjunctivae normal       Pupils: Pupils are equal, round, and reactive to light  Neck:      Thyroid: No thyromegaly  Cardiovascular:      Rate and Rhythm: Normal rate and regular rhythm  Heart sounds: Normal heart sounds  No murmur heard  No friction rub  Pulmonary:      Effort: Pulmonary effort is normal  No respiratory distress  Breath sounds: Normal breath sounds  No wheezing or rales  Abdominal:      General: Bowel sounds are normal  There is no distension  Palpations: Abdomen is soft  Tenderness: There is no abdominal tenderness  Musculoskeletal:         General: No deformity  Cervical back: Neck supple  Right lower leg: No edema  Left lower leg: No edema  Lymphadenopathy:      Cervical: No cervical adenopathy  Skin:     Coloration: Skin is not pale  Nails: There is no clubbing  Neurological:      Mental Status: He is alert and oriented to person, place, and time  He is not disoriented  Psychiatric:         Mood and Affect: Mood normal  Mood is not anxious  Affect is not inappropriate  Behavior: Behavior normal          Thought Content: Thought content normal          Lab Results:   Results for orders placed or performed in visit on 01/14/22   Hemoglobin A1C   Result Value Ref Range    Hemoglobin A1C 9 1 (H) Normal 3 8-5 6%; PreDiabetic 5 7-6 4%;  Diabetic >=6 5%; Glycemic control for adults with diabetes <7 0% %     mg/dl   Comprehensive metabolic panel   Result Value Ref Range    Sodium 139 136 - 145 mmol/L    Potassium 3 4 (L) 3 5 - 5 3 mmol/L    Chloride 98 (L) 100 - 108 mmol/L    CO2 34 (H) 21 - 32 mmol/L    ANION GAP 7 4 - 13 mmol/L    BUN 19 5 - 25 mg/dL    Creatinine 0 87 0 60 - 1 30 mg/dL Glucose, Fasting 209 (H) 65 - 99 mg/dL    Calcium 9 7 8 3 - 10 1 mg/dL    Corrected Calcium 10 5 (H) 8 3 - 10 1 mg/dL    AST 13 5 - 45 U/L    ALT 16 12 - 78 U/L    Alkaline Phosphatase 83 46 - 116 U/L    Total Protein 8 2 6 4 - 8 2 g/dL    Albumin 3 0 (L) 3 5 - 5 0 g/dL    Total Bilirubin 0 63 0 20 - 1 00 mg/dL    eGFR 82 ml/min/1 73sq m   PTH, intact Lab Collect Lab Collect   Result Value Ref Range    PTH 82 4 (H) 18 4 - 80 1 pg/mL   Phosphorus Lab Collect   Result Value Ref Range    Phosphorus 2 7 2 3 - 4 1 mg/dL   Vitamin D 25 hydroxy Lab Collect   Result Value Ref Range    Vit D, 25-Hydroxy 35 1 30 0 - 100 0 ng/mL     Lab Results:         Invalid input(s): ALBUMIN    Laboratory Results:  Lab Results   Component Value Date    WBC 9 38 12/30/2021    HGB 11 1 (L) 12/30/2021    HCT 37 1 12/30/2021    MCV 84 12/30/2021     12/30/2021     Lab Results   Component Value Date    SODIUM 139 01/14/2022    K 3 4 (L) 01/14/2022    CL 98 (L) 01/14/2022    CO2 34 (H) 01/14/2022    BUN 19 01/14/2022    CREATININE 0 87 01/14/2022    GLUC 104 12/30/2021    CALCIUM 9 7 01/14/2022     Lab Results   Component Value Date    CALCIUM 9 7 01/14/2022    PHOS 2 7 01/14/2022     No results found for: LABPROT  [unfilled]  Lab Results   Component Value Date    PTH 82 4 (H) 01/14/2022    CALCIUM 9 7 01/14/2022    PHOS 2 7 01/14/2022     [unfilled]

## 2022-02-02 NOTE — ASSESSMENT & PLAN NOTE
Most recent lipid panel done 4/2021, LDL was at goal range  Continue medication regimen  Repeat lipid panel

## 2022-02-02 NOTE — TELEPHONE ENCOUNTER
----- [facility]  [facility] sent at 2/2/2022 12:37 PM EST -----  Regarding: [facility] for Rosalie Mitchell  Can we please send a request to [facility] for this patient for Rosalie Mitchell 2  My note is finished for him and he injects himself 4 times a day for insulin  Thanks!

## 2022-02-05 DIAGNOSIS — I48.11 LONGSTANDING PERSISTENT ATRIAL FIBRILLATION (HCC): ICD-10-CM

## 2022-02-05 DIAGNOSIS — E78.2 MIXED HYPERLIPIDEMIA: ICD-10-CM

## 2022-02-07 DIAGNOSIS — E11.65 TYPE 2 DIABETES MELLITUS WITH HYPERGLYCEMIA, WITH LONG-TERM CURRENT USE OF INSULIN (HCC): ICD-10-CM

## 2022-02-07 DIAGNOSIS — I48.11 LONGSTANDING PERSISTENT ATRIAL FIBRILLATION (HCC): ICD-10-CM

## 2022-02-07 DIAGNOSIS — Z79.4 TYPE 2 DIABETES MELLITUS WITH HYPERGLYCEMIA, WITH LONG-TERM CURRENT USE OF INSULIN (HCC): ICD-10-CM

## 2022-02-07 RX ORDER — PEN NEEDLE, DIABETIC 31 GX5/16"
NEEDLE, DISPOSABLE MISCELLANEOUS DAILY
Qty: 100 EACH | Refills: 0 | Status: SHIPPED | OUTPATIENT
Start: 2022-02-07 | End: 2022-02-09 | Stop reason: SDUPTHER

## 2022-02-07 NOTE — TELEPHONE ENCOUNTER
Upon review of the In Basket request we were able to locate, review, and update the patient chart as requested for Diabetic Eye Exam     Any additional questions or concerns should be emailed to the Practice Liaisons via [email or web address] email, please do not reply via In Basket      Thank you  Paula Vásquez MA

## 2022-02-08 ENCOUNTER — TELEPHONE (OUTPATIENT)
Dept: FAMILY MEDICINE CLINIC | Facility: CLINIC | Age: 77
End: 2022-02-08

## 2022-02-08 DIAGNOSIS — I48.11 LONGSTANDING PERSISTENT ATRIAL FIBRILLATION (HCC): ICD-10-CM

## 2022-02-08 NOTE — TELEPHONE ENCOUNTER
[facility] Jimenez called they received a prescription for eliquest   Then it was cancelled  They need either confirmation it is cancelled or a new scrip sent over if it is not thank you   yelena   [phone]

## 2022-02-09 ENCOUNTER — OFFICE VISIT (OUTPATIENT)
Dept: CARDIOLOGY CLINIC | Facility: CLINIC | Age: 77
End: 2022-02-09
Payer: MEDICARE

## 2022-02-09 VITALS
WEIGHT: 302.1 LBS | BODY MASS INDEX: 36.79 KG/M2 | HEART RATE: 66 BPM | SYSTOLIC BLOOD PRESSURE: 116 MMHG | DIASTOLIC BLOOD PRESSURE: 56 MMHG | HEIGHT: 76 IN | OXYGEN SATURATION: 94 %

## 2022-02-09 DIAGNOSIS — I50.32 CHRONIC DIASTOLIC CHF (CONGESTIVE HEART FAILURE) (HCC): ICD-10-CM

## 2022-02-09 DIAGNOSIS — I74.3 EMBOLISM AND THROMBOSIS OF ARTERIES OF THE LOWER EXTREMITIES (HCC): ICD-10-CM

## 2022-02-09 DIAGNOSIS — I48.0 PAROXYSMAL ATRIAL FIBRILLATION (HCC): Primary | ICD-10-CM

## 2022-02-09 DIAGNOSIS — I48.11 LONGSTANDING PERSISTENT ATRIAL FIBRILLATION (HCC): ICD-10-CM

## 2022-02-09 DIAGNOSIS — E11.65 TYPE 2 DIABETES MELLITUS WITH HYPERGLYCEMIA, WITH LONG-TERM CURRENT USE OF INSULIN (HCC): ICD-10-CM

## 2022-02-09 DIAGNOSIS — I48.91 ATRIAL FIBRILLATION WITH RAPID VENTRICULAR RESPONSE (HCC): ICD-10-CM

## 2022-02-09 DIAGNOSIS — Z79.4 TYPE 2 DIABETES MELLITUS WITH HYPERGLYCEMIA, WITH LONG-TERM CURRENT USE OF INSULIN (HCC): ICD-10-CM

## 2022-02-09 DIAGNOSIS — I25.10 CORONARY ARTERY DISEASE INVOLVING NATIVE CORONARY ARTERY OF NATIVE HEART WITHOUT ANGINA PECTORIS: ICD-10-CM

## 2022-02-09 DIAGNOSIS — R60.0 LOCALIZED EDEMA: ICD-10-CM

## 2022-02-09 DIAGNOSIS — I10 PRIMARY HYPERTENSION: ICD-10-CM

## 2022-02-09 DIAGNOSIS — I21.A9 OTHER TYPE OF MYOCARDIAL INFARCTION (HCC): ICD-10-CM

## 2022-02-09 PROCEDURE — 99215 OFFICE O/P EST HI 40 MIN: CPT | Performed by: INTERNAL MEDICINE

## 2022-02-09 PROCEDURE — 93000 ELECTROCARDIOGRAM COMPLETE: CPT | Performed by: INTERNAL MEDICINE

## 2022-02-09 RX ORDER — FUROSEMIDE 80 MG
TABLET ORAL
Qty: 60 TABLET | Refills: 11 | Status: SHIPPED | OUTPATIENT
Start: 2022-02-09 | End: 2022-06-21 | Stop reason: SDUPTHER

## 2022-02-09 RX ORDER — EZETIMIBE 10 MG/1
10 TABLET ORAL DAILY
Qty: 90 TABLET | Refills: 3 | Status: SHIPPED | OUTPATIENT
Start: 2022-02-09 | End: 2022-06-21

## 2022-02-09 RX ORDER — PEN NEEDLE, DIABETIC 31 GX5/16"
NEEDLE, DISPOSABLE MISCELLANEOUS DAILY
Qty: 100 EACH | Refills: 3 | Status: SHIPPED | OUTPATIENT
Start: 2022-02-09 | End: 2022-06-19 | Stop reason: SDUPTHER

## 2022-02-09 NOTE — PROGRESS NOTES
[facility]  Office progress note  Yoon Villeda 65 y o  male MRN: [id number]        Problems    1  Paroxysmal atrial fibrillation (HCC)  POCT ECG   2  Atrial fibrillation with rapid ventricular response ([facility] )     3  Other type of myocardial infarction ([facility] [address] )     4  Chronic diastolic CHF (congestive heart failure) ([facility] )     5  Coronary artery disease involving native coronary artery of native heart without angina pectoris     6  Primary hypertension     7  Localized edema  furosemide (LASIX) 80 mg tablet   8  Embolism and thrombosis of arteries of the lower extremities (HCC)         Impression:    He was hospitalized in late December with AFib/RVR, diltiazem added but intolerant, metoprolol was uptitrated, digoxin was added but he presented to my office with AFib/RVR, when for immediate cardioversion the next day and successfully cardiovert  Diltiazem was discontinued, lisinopril was briefly held but resumed, digoxin was up titrated    He presents in normal sinus rhythm     Coronary artery disease  o CABG x3 in 2016, LIMA to LAD, SVG to OM3, SVG to PDA  o He had full recovery of his moderate ischemic cardiomyopathy  o Repeat echo 12/28/2021 shows EF 60%, mildly dilated RV, mild MR  o He has no recent ischemic cardiac symptoms   Chronic diastolic/right-sided CHF  o He has no recent exacerbation   o His weight however is down to 302 lb after restoring normal sinus rhythm  o He may have had improvement in his CHF with his atrial kick  o Furosemide 80 mg twice a day with recent sodium normal, potassium 3 4, BUN elevated, bicarbonate elevated   AAA  o 45 mm by last abdominal ultrasound  o Was 52 mm by CT in early 2021   o He continues to follow with vascular   Lower extremity atherosclerosis  o He does not offer any significant worsening claudication symptoms, nor has he any tissue loss or rest symptom   Mixed hyperlipidemia  o A mixed disorder which remains under reasonable control, with triglycerides 201, LDL 65   Type 2 diabetes  o 1 of his biggest recent concerns, his A1c continues to rise, recently up to 9   Hypertension  o Blood pressure currently stable   Atrial fibrillation  o Paroxysmal, traditionally asymptomatic  o But AFib/RVR with hypotension warranted proceeding with cardioversion 1/7/2022   o Maintaining sinus rhythm   o We discussed everything from continuing him only on current digoxin and metoprolol, which has partial rhythm controlling ability, if AFib were to recur, likely would be rate controlled and likely much less symptomatic  Also discussed escalating to rhythm control with class 3 antiarrhythmic drugs, he is apprehensive to do that at this time, has been on amiodarone in the distant past   We also discussed even going as far as ablation, but his morbid obesity, heart history, might make ablation less likely successful, and may be more challenging  Will hold on any further changes at this time and continue our current baseline Khoi    Plan    Weight is down to 302 lb, recent bicarbonate level up to 34, potassium down to 3 4, and lightheaded spells really argues to volume status being the cause  I will have him cut his furosemide down to 80 mg a m , 40 mg p m  He has follow-up blood work per his nephrologist in 2 months and more blood work thereafter  I will have him return to see me at approximately that time in 2 months unless of course none of his symptoms resolved any needs to be seen sooner          HPI: Parminder Rowe is a 65y o  year old male with CAD, paroxysmal atrial fibrillation, diastolic/right-sided CHF, obesity, hypertension, diabetes, mixed hyperlipidemia, LUCIA, hospitalized 12/21 for AFib/RVR, cardioversion 1/7/2022 urgently for hypotension and AFib/RVR, now returns for a follow-up visit    He feels relatively well but acknowledges episodes lasting up to an hour of lightheadedness, though never syncopal     His weight is as low as it has been, 297 lb at home, 302 lb here, he has minimal ankle edema, he continues on 80 mg furosemide b i d  He is not hypotensive, he is on lisinopril 20 mg daily, metoprolol succinate 100 mg twice a day, he does note occasional bradycardia but he does not necessarily correlate with his lightheaded episodes  He maintains anticoagulation with Eliquis  He maintains sinus rhythm at this time, with right bundle branch block  He denies any chest pain or chest pressure  Denies orthopnea  Recent blood work showed hypokalemia, mild contraction alkalosis, stable creatinine          Review of Systems   Constitutional: Negative for appetite change, diaphoresis, fatigue and fever  Respiratory: Negative for chest tightness, shortness of breath and wheezing  Cardiovascular: Negative for chest pain, palpitations and leg swelling  Gastrointestinal: Negative for abdominal pain and blood in stool  Musculoskeletal: Negative for arthralgias and joint swelling  Skin: Negative for rash  Neurological: Positive for light-headedness  Negative for dizziness and syncope  All other systems reviewed and are negative          Past Medical History:   Diagnosis Date    A-fib [facility])     AAA (abdominal aortic aneurysm) ([facility] )     Actinic keratosis of right temple 7/31/2020    Actinic keratosis of scalp 7/31/2020    Acute respiratory failure with hypoxia [facility] ) 3/25/2021    Allergic 1960    Arthritis     Lay's esophagus     Bladder cancer (HCC)     Blister of left leg 4/28/2021    Blister of right leg 5/26/2021    CAD (coronary artery disease)     Cancer (HCC) 02/2010    Bladder    CHF [facility]congestive heart failure) [facility])     Chronic low back pain     Chronic pain of both knees 7/31/2020    Colitis 12/4/2020    CPAP (continuous positive airway pressure) dependence     Diabetes ([facility] )     Diabetes mellitus ([facility] ) 10/1993    Excessive gas 12/3/2020    Heart murmur     History of chemotherapy     Hydroureter on left 4/28/2021    Hyperlipemia     Hypertension     Immunization deficiency 10/30/2020    Hep a nonimmune    Kidney stone     Left lower quadrant abdominal pain 12/3/2020    Mass of right adrenal gland ([address] ) 2020    4 1 cm    Myocardial infarction ([facility] )     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    Obesity 2000    LUCIA (obstructive sleep apnea) 2021    Pressure ulcer of right leg, stage 1 2021    Urinary tract infection with hematuria 5/3/2021    u cx 2021=pseudomonas R to bactrim and cefdinir, S to cipro    Venous stasis dermatitis of both lower extremities 2021     Past Surgical History:   Procedure Laterality Date    BACK SURGERY      BLADDER SURGERY      CATARACT EXTRACTION, BILATERAL      COLONOSCOPY  2019    next  [address] GRAFT  2016    Quadruple per Rita    EGD  2017    EYE SURGERY  2016    Cataracts    JOINT REPLACEMENT  [id number]    Hip and shoulder    KIDNEY STONE SURGERY      [place] CYSTO/URETERO W/LITHOTRIPSY &INDWELL STENT INSRT Left 2021    Procedure: CYSTOSCOPY URETEROSCOPY WITH LITHOTRIPSY HOLMIUM LASER, AND INSERTION STENT URETERAL/EXCHANGE;  Surgeon: Michelle Diaz MD;  Location: [facility];  Service: Urology    [place] CYSTOURETHROSCOPY,URETER CATHETER Left 2021    Procedure: CYSTOSCOPY  WITH INSERTION STENT URETERAL;  Surgeon: Michelle Diaz MD;  [facility];  Service: Urology    TOTAL HIP ARTHROPLASTY Right 2012    TOTAL SHOULDER REPLACEMENT Right 2013    VASECTOMY  1971     Social History     Substance and Sexual Activity   Alcohol Use Not Currently    Alcohol/week: 0 0 standard drinks    Comment: No use     Social History     Substance and Sexual Activity   Drug Use Never    Comment: No use     Social History     Tobacco Use   Smoking Status Former Smoker    Packs/day: 0 25    Years: 20 00    Pack years: 5 00    Types: Cigarettes    Start date: 64    Quit date: 2010    Years since quittin 1   Smokeless Tobacco Never Used   Tobacco Comment    Quit several times for up to 6 years       Family History   Problem Relation Age of Onset    Heart disease Father     Arthritis Father     Heart attack Father    Anupama Dunn Alzheimer's disease Mother     Dementia Mother        Allergies:  No Known Allergies    Medications:     Current Outpatient Medications:     apixaban (Eliquis) 5 mg, Take 1 tablet (5 mg total) by mouth 2 (two) times a day, Disp: 60 tablet, Rfl: 0    atorvastatin (LIPITOR) 40 mg tablet, TAKE ONE TABLET BY MOUTH ONCE DAILY, Disp: 90 tablet, Rfl: 0    digoxin (LANOXIN) 0 125 mg tablet, TAKE ONE TABLET BY MOUTH ONCE DAILY, Disp: 30 tablet, Rfl: 0    ezetimibe (ZETIA) 10 mg tablet, Take 1 tablet (10 mg total) by mouth daily, Disp: 90 tablet, Rfl: 3    furosemide (LASIX) 80 mg tablet, 1 full tablet every morning, 1/2 tablet every afternoon, Disp: 60 tablet, Rfl: 11    insulin aspart (NovoLOG FlexPen) 100 UNIT/ML injection pen, Inject 25 Units under the skin 3 (three) times a day with meals Inject 20-25 units under the skin 3 times day with meals depending on carbohydrate amount , Disp: 15 mL, Rfl: 0    insulin degludec (Tresiba FlexTouch) 100 units/mL injection pen, Inject 85 Units under the skin daily, Disp: 15 mL, Rfl: 1    Lancets (accu-chek multiclix) lancets, Use 1 each 2 (two) times a day Use 2 times daily to test blood glucose, Disp: 102 each, Rfl: 3    lisinopril (ZESTRIL) 20 mg tablet, Take 1 tablet (20 mg total) by mouth daily, Disp: 30 tablet, Rfl: 11    metFORMIN (GLUCOPHAGE) 500 mg tablet, Take 1 tablet (500 mg total) by mouth 2 (two) times a day with meals, Disp: 180 tablet, Rfl: 1    metoprolol succinate (TOPROL-XL) 100 mg 24 hr tablet, TAKE 1 TABLET BY MOUTH EVERY 12 HOURS, Disp: 60 tablet, Rfl: 0    omeprazole (PriLOSEC) 20 mg delayed release capsule, TAKE ONE CAPSULE BY MOUTH ONCE DAILY, Disp: 90 capsule, Rfl: 0    potassium citrate (UROCIT-K) 10 mEq, Take 1 tablet (10 mEq total) by mouth 2 (two) times a day, Disp: 180 tablet, Rfl: 3    Unifine Pentips 31G X 8 MM MISC, Inject under the skin daily Use as directed, Disp: 100 each, Rfl: 0      Vitals:    02/09/22 1551   BP: 116/56   Pulse: 66   SpO2: 94%     Weight (last 2 days)     Date/Time Weight    02/09/22 1551 137 (302 1)        Physical Exam  Vitals reviewed  Constitutional:       General: He is not in acute distress  Appearance: Normal appearance  He is well-developed  He is obese  He is not diaphoretic  HENT:      Head: Normocephalic and atraumatic  Eyes:      General: No scleral icterus  Conjunctiva/sclera: Conjunctivae normal       Pupils: Pupils are equal, round, and reactive to light  Neck:      Thyroid: No thyromegaly  Vascular: No JVD  Cardiovascular:      Rate and Rhythm: Normal rate and regular rhythm  Heart sounds: Normal heart sounds  No murmur heard  No friction rub  No gallop  Pulmonary:      Effort: Pulmonary effort is normal  No respiratory distress  Breath sounds: Normal breath sounds  No wheezing or rales  Abdominal:      General: Bowel sounds are normal  There is no distension  Palpations: Abdomen is soft  Tenderness: There is no abdominal tenderness  Musculoskeletal:         General: No deformity  Normal range of motion  Cervical back: Normal range of motion and neck supple  Right lower leg: No edema  Left lower leg: No edema  Skin:     General: Skin is warm and dry  Findings: No erythema or rash  Neurological:      General: No focal deficit present  Mental Status: He is alert and oriented to person, place, and time  Cranial Nerves: No cranial nerve deficit  Motor: No weakness             Laboratory Studies:    Laboratory testing personally reviewed    Cardiac testing:     EKG reviewed personally:   Results for orders placed or performed in visit on 02/09/22   POCT ECG    Impression    Sinus rhythm, right bundle branch block Echocardiogram:  2017-EF 55%  3/21-EF 60%, mild LVH, mild RV dilation and mild RV dysfunction, mild-to-moderate TR with mild-to-moderate pulmonary hypertension  12/28/21-EF 55%, moderate concentric hypertrophy, RV dilated, left atrium dilated, right atrium dilated, mild MR, mild TR, mildly elevated pulmonary pressures, mildly dilated ascending aorta-personally reviewed    Stress test:      Holter:      Cardiac catheterization:        Bib Talbot MD    Portions of the record may have been created with voice recognition software  Occasional wrong word or "sound a like" substitutions may have occurred due to the inherent limitations of voice recognition software  Read the chart carefully and recognize, using context, where substitutions have occurred

## 2022-02-11 ENCOUNTER — OFFICE VISIT (OUTPATIENT)
Dept: FAMILY MEDICINE CLINIC | Facility: CLINIC | Age: 77
End: 2022-02-11
Payer: MEDICARE

## 2022-02-11 VITALS
TEMPERATURE: 97.9 F | HEART RATE: 80 BPM | HEIGHT: 76 IN | RESPIRATION RATE: 18 BRPM | BODY MASS INDEX: 36.51 KG/M2 | WEIGHT: 299.8 LBS | SYSTOLIC BLOOD PRESSURE: 134 MMHG | OXYGEN SATURATION: 98 % | DIASTOLIC BLOOD PRESSURE: 72 MMHG

## 2022-02-11 DIAGNOSIS — L98.9 SKIN ABNORMALITIES: Primary | ICD-10-CM

## 2022-02-11 PROCEDURE — 99213 OFFICE O/P EST LOW 20 MIN: CPT | Performed by: NURSE PRACTITIONER

## 2022-02-11 RX ORDER — CLINDAMYCIN PHOSPHATE 10 MG/G
GEL TOPICAL 2 TIMES DAILY
Qty: 30 G | Refills: 1 | Status: SHIPPED | OUTPATIENT
Start: 2022-02-11

## 2022-02-11 NOTE — PROGRESS NOTES
BMI Counseling: Body mass index is 36 49 kg/m²  The BMI is above normal  Nutrition recommendations include decreasing portion sizes, encouraging healthy choices of fruits and vegetables, decreasing fast food intake, consuming healthier snacks, limiting drinks that contain sugar, moderation in carbohydrate intake, increasing intake of lean protein, reducing intake of saturated and trans fat and reducing intake of cholesterol  Exercise recommendations include exercising 3-5 times per week and strength training exercises  No pharmacotherapy was ordered  Patient referred to PCP  Rationale for BMI follow-up plan is due to patient being overweight or obese  Depression Screening and Follow-up Plan: Patient was screened for depression during today's encounter  They screened negative with a PHQ-2 score of 0  Falls Plan of Care: balance, strength, and gait training instructions were provided  Recommended assistive device to help with gait and balance  Medications that increase falls were reviewed  Assessed feet and footwear  Vitamin D supplementation was recommended  Home safety education provided  Assessment/Plan:  Presents in office for follow up skin abnormalities   noted on his forehead   Usually sees derm   recommended follow up   With them   I have ordered clindamycin gel for now   If any worsening symptoms until seen by Derm he is to let us know  Avoid sun exposer to the scalp      Problem List Items Addressed This Visit     None      Visit Diagnoses     Skin abnormalities    -  Primary    Relevant Medications    clindamycin (CLINDAGEL) 1 % gel    Other Relevant Orders    Ambulatory Referral to Dermatology            Subjective:      Patient ID: Ivis Hinton is a 65 y o  male      HPI    The following portions of the patient's history were reviewed and updated as appropriate:   Past Medical History:  He has a past medical history of A-fib ([address] ), AAA (abdominal aortic aneurysm) ([facility] ), Actinic keratosis of right temple (7/31/2020), Actinic keratosis of scalp (7/31/2020), Acute respiratory failure with hypoxia ([facility] ) (3/25/2021), Allergic (1960), Arthritis, Lay's esophagus, Bladder cancer ([place] [address] ), Blister of left leg (4/28/2021), Blister of right leg (5/26/2021), CAD (coronary artery disease), Cancer ([place] [address] ) (02/2010), CHF (congestive heart failure) ([place] [address] ), Chronic low back pain, Chronic pain of both knees (7/31/2020), Colitis (12/4/2020), CPAP (continuous positive airway pressure) dependence, Diabetes ([place] [address] ), Diabetes mellitus ([place] [address] ) (10/1993), Excessive gas (12/3/2020), Heart murmur, History of chemotherapy, Hydroureter on left (4/28/2021), Hyperlipemia, Hypertension, Immunization deficiency (10/30/2020), Kidney stone, Left lower quadrant abdominal pain (12/3/2020), Mass of right adrenal gland ([place] [address] ) (12/4/2020), Myocardial infarction ([place] [address] ), 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), Obesity (2000), LUCIA (obstructive sleep apnea) (1/29/2021), Pressure ulcer of right leg, stage 1 (5/26/2021), Urinary tract infection with hematuria (5/3/2021), and Venous stasis dermatitis of both lower extremities (5/26/2021)  ,  _______________________________________________________________________  Medical Problems:  does not have any pertinent problems on file ,  _______________________________________________________________________  Past Surgical History:   has a past surgical history that includes Back surgery; Bladder surgery; Cataract extraction, bilateral; Colonoscopy (01/29/2019); Coronary artery bypass graft (04/2016); Total shoulder replacement (Right, 2013); Total hip arthroplasty (Right, 2012); EGD (06/2017); pr cystourethroscopy,ureter catheter (Left, 4/24/2021); pr cysto/uretero w/lithotripsy &indwell stent insrt (Left, 5/21/2021); Kidney stone surgery; Eye surgery (11/2016); Joint replacement (4339-0231); and Vasectomy (1971)  ,  _______________________________________________________________________  [facility] History:  family history includes Alzheimer's disease in his mother; Arthritis in his father; Dementia in his mother; Heart attack in his father; Heart disease in his father ,  _______________________________________________________________________  Social History:   reports that he quit smoking about 12 years ago  His smoking use included cigarettes  He started smoking about 57 years ago  He has a 5 00 pack-year smoking history  He has never used smokeless tobacco  He reports previous alcohol use  He reports that he does not use drugs  ,  _______________________________________________________________________  Allergies:  has No Known Allergies     _______________________________________________________________________  Current Outpatient Medications   Medication Sig Dispense Refill    apixaban (Eliquis) 5 mg Take 1 tablet (5 mg total) by mouth 2 (two) times a day 60 tablet 3    atorvastatin (LIPITOR) 40 mg tablet TAKE ONE TABLET BY MOUTH ONCE DAILY 90 tablet 0    clindamycin (CLINDAGEL) 1 % gel Apply topically 2 (two) times a day 30 g 1    digoxin (LANOXIN) 0 125 mg tablet TAKE ONE TABLET BY MOUTH ONCE DAILY 30 tablet 0    ezetimibe (ZETIA) 10 mg tablet Take 1 tablet (10 mg total) by mouth daily 90 tablet 3    furosemide (LASIX) 80 mg tablet 1 full tablet every morning, 1/2 tablet every afternoon 60 tablet 11    insulin aspart (NovoLOG FlexPen) 100 UNIT/ML injection pen Inject 25 Units under the skin 3 (three) times a day with meals Inject 20-25 units under the skin 3 times day with meals depending on carbohydrate amount   15 mL 0    insulin degludec Jesenia Spray FlexTouch) 100 units/mL injection pen Inject 85 Units under the skin daily 15 mL 1    Lancets (accu-chek multiclix) lancets Use 1 each 2 (two) times a day Use 2 times daily to test blood glucose 102 each 3    lisinopril (ZESTRIL) 20 mg tablet Take 1 tablet (20 mg total) by mouth daily 30 tablet 11    metFORMIN (GLUCOPHAGE) 500 mg tablet Take 1 tablet (500 mg total) by mouth 2 (two) times a day with meals 180 tablet 1    metoprolol succinate (TOPROL-XL) 100 mg 24 hr tablet TAKE ONE TABLET BY MOUTH EVERY 12 HOURS 60 tablet 0    omeprazole (PriLOSEC) 20 mg delayed release capsule TAKE ONE CAPSULE BY MOUTH ONCE DAILY 90 capsule 0    potassium citrate (UROCIT-K) 10 mEq Take 1 tablet (10 mEq total) by mouth 2 (two) times a day 180 tablet 3    Unifine Pentips 31G X 8 MM MISC Inject under the skin daily Use as directed 100 each 3     No current facility-administered medications for this visit      _______________________________________________________________________  Review of Systems   Constitutional: Negative for activity change, appetite change, fatigue, fever and unexpected weight change  HENT: Negative for dental problem and trouble swallowing  Eyes: Negative for photophobia and visual disturbance  Respiratory: Negative for cough and chest tightness  Cardiovascular: Negative for chest pain, palpitations and leg swelling  Gastrointestinal: Negative for abdominal pain, constipation and vomiting  Endocrine: Negative for cold intolerance, polydipsia and polyuria  Genitourinary: Negative for difficulty urinating, frequency and urgency  Musculoskeletal: Negative for arthralgias, joint swelling, myalgias and neck pain  Skin: Negative for color change, rash and wound  Skin issues on his forehead and scalp    Allergic/Immunologic: Negative for environmental allergies  Neurological: Negative for dizziness, weakness and numbness  Hematological: Does not bruise/bleed easily  Psychiatric/Behavioral: Negative for decreased concentration, dysphoric mood, self-injury, sleep disturbance and suicidal ideas  Objective:  Vitals:    02/11/22 1118   BP: 134/72   Pulse: 80   Resp: 18   Temp: 97 9 °F (36 6 °C)   SpO2: 98%   Weight: 136 kg (299 lb 12 8 oz)   Height: 6' 4" (1 93 m)     Body mass index is 36 49 kg/m²       Physical Exam  Vitals and nursing note reviewed  HENT:      Head: Atraumatic  Cardiovascular:      Rate and Rhythm: Normal rate and regular rhythm  Heart sounds: Normal heart sounds  Pulmonary:      Effort: Pulmonary effort is normal    Abdominal:      Palpations: Abdomen is soft  Musculoskeletal:      Cervical back: Normal range of motion  Skin:     Comments: Skin  Issues    Neurological:      Mental Status: He is alert and oriented to person, place, and time     Psychiatric:         Mood and Affect: Mood normal          Behavior: Behavior normal

## 2022-02-16 ENCOUNTER — OFFICE VISIT (OUTPATIENT)
Dept: DIABETES SERVICES | Facility: CLINIC | Age: 77
End: 2022-02-16
Payer: MEDICARE

## 2022-02-16 DIAGNOSIS — Z79.4 TYPE 2 DIABETES MELLITUS WITH HYPERGLYCEMIA, WITH LONG-TERM CURRENT USE OF INSULIN (HCC): Primary | ICD-10-CM

## 2022-02-16 DIAGNOSIS — E11.65 TYPE 2 DIABETES MELLITUS WITH HYPERGLYCEMIA, WITH LONG-TERM CURRENT USE OF INSULIN (HCC): Primary | ICD-10-CM

## 2022-02-16 PROCEDURE — 95249 CONT GLUC MNTR PT PROV EQP: CPT | Performed by: DIETITIAN, REGISTERED

## 2022-02-16 NOTE — PROGRESS NOTES
Dipesh Lopez and wife were in the office today for Asif Sieve 2 placement  Dipesh Lopez already had his reader charged and correct time and date programmed  Educator instructed how to place Asif Sieve sensor on back of arm, sensor was inserted on Home's back of left arm, sensor session was started  Warm up was in progress when patient left  Educator explained following:  -Sensor is waterproof  -Triplett will provide glucose readings once 60 minute warm up period is finished  -Sensor will  in 14 days, reader will provide countdown to expiration  -Demonstrated how to remove  sensor and how to replace new sensor  -Rotate sensor placement from arm to arm    Patient and wife expressed understanding of all topics discussed  Patient is scheduled to come in two weeks for follow up education appointment      Time spent 15 minutes

## 2022-02-19 DIAGNOSIS — I48.91 ATRIAL FIBRILLATION WITH RAPID VENTRICULAR RESPONSE (HCC): ICD-10-CM

## 2022-02-19 DIAGNOSIS — I25.10 CORONARY ARTERY DISEASE DUE TO LIPID RICH PLAQUE: ICD-10-CM

## 2022-02-19 DIAGNOSIS — I25.83 CORONARY ARTERY DISEASE DUE TO LIPID RICH PLAQUE: ICD-10-CM

## 2022-02-21 RX ORDER — METOPROLOL SUCCINATE 100 MG/1
TABLET, EXTENDED RELEASE ORAL
Qty: 60 TABLET | Refills: 0 | Status: SHIPPED | OUTPATIENT
Start: 2022-02-21 | End: 2022-03-19 | Stop reason: SDUPTHER

## 2022-02-21 RX ORDER — ATORVASTATIN CALCIUM 40 MG/1
TABLET, FILM COATED ORAL
Qty: 90 TABLET | Refills: 0 | Status: SHIPPED | OUTPATIENT
Start: 2022-02-21 | End: 2022-05-21 | Stop reason: SDUPTHER

## 2022-02-21 RX ORDER — DIGOXIN 125 MCG
TABLET ORAL
Qty: 30 TABLET | Refills: 0 | Status: SHIPPED | OUTPATIENT
Start: 2022-02-21 | End: 2022-03-19 | Stop reason: SDUPTHER

## 2022-02-26 NOTE — PATIENT INSTRUCTIONS
Skin Tags   AMBULATORY CARE:   A skin tag  is a small growth that forms on the skin  The growth hangs off the skin from a small piece of tissue called a stalk  A skin tag often grows where skin rubs against skin and causes friction  Diabetes or obesity can increase your risk for skin tags  The risk also increases with age  Skin tags are usually harmless  Signs and symptoms of a skin tag: You may have a few skin tags in the same area  This is common  A skin tag is usually the same color as your skin, or it may be a little darker  A skin tag is usually small but can be as large as 1/2 inch (1 centimeter)  Skin tags usually do not cause pain, but they can cause irritation by rubbing against your clothes or jewelry  Common areas for skin tags to grow are your underarms, between folds of skin, eyelids, or inner thighs  They can also grow on your neck or other body areas  Contact your healthcare provider if:   · The skin tag changes in size, shape, or color  · You have a rash or other skin problem around the skin tag  · The skin tag bleeds  · Your skin tag is affecting your ability to do your daily activities  · You have questions or concerns about your condition or care  Treatment  may not be needed  The skin tag will not go away on its own, but you may not notice it or be bothered by it  You can help remove a skin tag by tying a string or dental floss around the skin tag  This will cut off the blood supply to the skin tag, and it will fall off after a few days  The following may be needed if the skin tag irritates your skin:  · Cryotherapy  is a procedure used to freeze the skin tag  Your healthcare provider uses liquid nitrogen to freeze the area  · Cauterization  is a procedure used to burn the skin tag off  Your healthcare provider uses a device to burn the area  · Surgery  may be used to remove the skin tag  Do not try to cut the skin tag off by yourself with a sharp object   Skin tags can bleed heavily when they still have a blood supply  Manage or prevent skin tags:   · You can put a bandage over the skin tag to help with irritation  Change the bandage every day or if it gets wet or dirty  · Skin tags often cannot be prevented  You may be able to lower your risk by losing weight  This will reduce skin folds where skin tags tend to form  Talk to your healthcare provider about how much you should weigh  He or she can help you create a safe weight loss plan  Follow up with your doctor as directed:  Write down your questions so you remember to ask them during your visits  © Copyright [address] 2022 Information is for End User's use only and may not be sold, redistributed or otherwise used for commercial purposes  All illustrations and images included in CareNotes® are the copyrighted property of [employer]  or Mikki Campos  The above information is an  only  It is not intended as medical advice for individual conditions or treatments  Talk to your doctor, nurse or pharmacist before following any medical regimen to see if it is safe and effective for you

## 2022-02-28 ENCOUNTER — TRANSCRIBE ORDERS (OUTPATIENT)
Dept: VASCULAR SURGERY | Facility: CLINIC | Age: 77
End: 2022-02-28

## 2022-02-28 DIAGNOSIS — I71.4 ABDOMINAL AORTIC ANEURYSM (AAA) WITHOUT RUPTURE (HCC): Primary | ICD-10-CM

## 2022-03-01 ENCOUNTER — TELEPHONE (OUTPATIENT)
Dept: VASCULAR SURGERY | Facility: CLINIC | Age: 77
End: 2022-03-01

## 2022-03-01 NOTE — TELEPHONE ENCOUNTER
----- Message from Hannah Gr RN sent at 2/28/2022  4:07 PM EST -----  Regarding: FW: Ultrasound  Pt is overdue for AOIL  Please call to schedule   ----- Message -----  From: Carmen Sahu  Sent: 2/28/2022   3:41 PM EST  To: [facility]  Subject: Ultrasound                                       This message is being sent by Phani Cabrera on behalf of Carmen Mayorga:  I think its time for my ultrasound of my aorta aneurysm  Do I need a authorization from you to schedule this?      Please advise  Dee Bailey

## 2022-03-02 ENCOUNTER — TELEPHONE (OUTPATIENT)
Dept: ENDOCRINOLOGY | Facility: CLINIC | Age: 77
End: 2022-03-02

## 2022-03-02 ENCOUNTER — OFFICE VISIT (OUTPATIENT)
Dept: DIABETES SERVICES | Facility: CLINIC | Age: 77
End: 2022-03-02
Payer: MEDICARE

## 2022-03-02 DIAGNOSIS — Z79.4 TYPE 2 DIABETES MELLITUS WITH HYPERGLYCEMIA, WITH LONG-TERM CURRENT USE OF INSULIN (HCC): Primary | ICD-10-CM

## 2022-03-02 DIAGNOSIS — E11.65 TYPE 2 DIABETES MELLITUS WITH HYPERGLYCEMIA, WITH LONG-TERM CURRENT USE OF INSULIN (HCC): Primary | ICD-10-CM

## 2022-03-02 PROCEDURE — G0108 DIAB MANAGE TRN  PER INDIV: HCPCS | Performed by: DIETITIAN, REGISTERED

## 2022-03-02 NOTE — TELEPHONE ENCOUNTER
Called patient about [place] report  BGs still running high  Increase Tresiba to 90 units daily and Novolog 25 units with meals  Advised to send updated [place] report in 2 weeks

## 2022-03-02 NOTE — PROGRESS NOTES
Adamaris Davies and his wife were seen for a [employer] and to be shown how to apply and start up a new sensor  They removed their old sensor prior to the visit  Patient's wife applied the new sensor without difficulty  They left with sensor in warm-up mode  [facility]  Adamaris Davies will bring his  to future visits for downloading  Please follow-up with patient as needed  Start- Stop: 8:30-9:10  Total Minutes: 40 Minutes  Group or Individual Instruction: DSMT-I  Other: [facility], [address] [place] [address] Way [address], [address] [place] [address]

## 2022-03-02 NOTE — TELEPHONE ENCOUNTER
Patient states that you called him regarding his rx    Please call him at [phone]  [facility]

## 2022-03-04 DIAGNOSIS — K21.9 GASTROESOPHAGEAL REFLUX DISEASE, UNSPECIFIED WHETHER ESOPHAGITIS PRESENT: ICD-10-CM

## 2022-03-05 RX ORDER — OMEPRAZOLE 20 MG/1
20 CAPSULE, DELAYED RELEASE ORAL DAILY
Qty: 90 CAPSULE | Refills: 0 | Status: SHIPPED | OUTPATIENT
Start: 2022-03-05 | End: 2022-03-05 | Stop reason: SDUPTHER

## 2022-03-05 RX ORDER — OMEPRAZOLE 20 MG/1
20 CAPSULE, DELAYED RELEASE ORAL DAILY
Qty: 90 CAPSULE | Refills: 0 | Status: SHIPPED | OUTPATIENT
Start: 2022-03-05 | End: 2022-05-29

## 2022-03-07 ENCOUNTER — RA CDI HCC (OUTPATIENT)
Dept: OTHER | Facility: HOSPITAL | Age: 77
End: 2022-03-07

## 2022-03-07 ENCOUNTER — HOSPITAL ENCOUNTER (OUTPATIENT)
Dept: NON INVASIVE DIAGNOSTICS | Facility: CLINIC | Age: 77
Discharge: HOME | End: 2022-03-07
Payer: MEDICARE

## 2022-03-07 DIAGNOSIS — I71.4 ABDOMINAL AORTIC ANEURYSM (AAA) WITHOUT RUPTURE (HCC): ICD-10-CM

## 2022-03-07 PROCEDURE — 93978 VASCULAR STUDY: CPT | Performed by: SURGERY

## 2022-03-07 PROCEDURE — 93978 VASCULAR STUDY: CPT

## 2022-03-07 NOTE — PROGRESS NOTES
[facility], [place] [facility]  coding opportunities             Chart Reviewed * (Number of) Inbasket suggestions sent to Provider: 1   I11 0    Appt:3/14/2022               Patients insurance company: [employer]

## 2022-03-10 DIAGNOSIS — Z79.4 TYPE 2 DIABETES MELLITUS WITH HYPERGLYCEMIA, WITH LONG-TERM CURRENT USE OF INSULIN (HCC): ICD-10-CM

## 2022-03-10 DIAGNOSIS — E11.65 TYPE 2 DIABETES MELLITUS WITH HYPERGLYCEMIA, WITH LONG-TERM CURRENT USE OF INSULIN (HCC): ICD-10-CM

## 2022-03-11 ENCOUNTER — TRANSCRIBE ORDERS (OUTPATIENT)
Dept: VASCULAR SURGERY | Facility: CLINIC | Age: 77
End: 2022-03-11

## 2022-03-11 DIAGNOSIS — I71.4 ABDOMINAL AORTIC ANEURYSM (AAA) WITHOUT RUPTURE (HCC): Primary | ICD-10-CM

## 2022-03-11 RX ORDER — INSULIN DEGLUDEC INJECTION 100 U/ML
90 INJECTION, SOLUTION SUBCUTANEOUS DAILY
Qty: 90 ML | Refills: 1 | Status: SHIPPED | OUTPATIENT
Start: 2022-03-11 | End: 2022-05-03

## 2022-03-14 ENCOUNTER — OFFICE VISIT (OUTPATIENT)
Dept: FAMILY MEDICINE CLINIC | Facility: CLINIC | Age: 77
End: 2022-03-14
Payer: MEDICARE

## 2022-03-14 VITALS
DIASTOLIC BLOOD PRESSURE: 68 MMHG | BODY MASS INDEX: 36.9 KG/M2 | HEIGHT: 76 IN | SYSTOLIC BLOOD PRESSURE: 116 MMHG | WEIGHT: 303 LBS | OXYGEN SATURATION: 94 % | HEART RATE: 78 BPM | TEMPERATURE: 98.1 F

## 2022-03-14 DIAGNOSIS — Z23 ENCOUNTER FOR IMMUNIZATION: ICD-10-CM

## 2022-03-14 DIAGNOSIS — I48.91 ATRIAL FIBRILLATION WITH RAPID VENTRICULAR RESPONSE (HCC): ICD-10-CM

## 2022-03-14 DIAGNOSIS — I25.10 CORONARY ARTERY DISEASE INVOLVING NATIVE CORONARY ARTERY OF NATIVE HEART WITHOUT ANGINA PECTORIS: ICD-10-CM

## 2022-03-14 DIAGNOSIS — Z78.9 NONIMMUNE TO HEPATITIS B VIRUS: ICD-10-CM

## 2022-03-14 DIAGNOSIS — I48.0 PAROXYSMAL ATRIAL FIBRILLATION (HCC): ICD-10-CM

## 2022-03-14 DIAGNOSIS — L57.0 ACTINIC KERATOSIS OF SCALP: ICD-10-CM

## 2022-03-14 DIAGNOSIS — G47.33 OSA (OBSTRUCTIVE SLEEP APNEA): ICD-10-CM

## 2022-03-14 DIAGNOSIS — I71.4 ABDOMINAL AORTIC ANEURYSM (AAA) WITHOUT RUPTURE (HCC): ICD-10-CM

## 2022-03-14 DIAGNOSIS — I50.32 CHRONIC DIASTOLIC CHF (CONGESTIVE HEART FAILURE) (HCC): ICD-10-CM

## 2022-03-14 DIAGNOSIS — E11.65 TYPE 2 DIABETES MELLITUS WITH HYPERGLYCEMIA, WITH LONG-TERM CURRENT USE OF INSULIN (HCC): Primary | ICD-10-CM

## 2022-03-14 DIAGNOSIS — Z79.4 TYPE 2 DIABETES MELLITUS WITH HYPERGLYCEMIA, WITH LONG-TERM CURRENT USE OF INSULIN (HCC): Primary | ICD-10-CM

## 2022-03-14 DIAGNOSIS — E78.00 PURE HYPERCHOLESTEROLEMIA: ICD-10-CM

## 2022-03-14 DIAGNOSIS — I10 PRIMARY HYPERTENSION: ICD-10-CM

## 2022-03-14 DIAGNOSIS — C67.9 MALIGNANT NEOPLASM OF URINARY BLADDER, UNSPECIFIED SITE (HCC): ICD-10-CM

## 2022-03-14 DIAGNOSIS — L57.0 ACTINIC KERATOSIS OF RIGHT TEMPLE: ICD-10-CM

## 2022-03-14 PROCEDURE — 90746 HEPB VACCINE 3 DOSE ADULT IM: CPT | Performed by: FAMILY MEDICINE

## 2022-03-14 PROCEDURE — G0010 ADMIN HEPATITIS B VACCINE: HCPCS | Performed by: FAMILY MEDICINE

## 2022-03-14 PROCEDURE — 1123F ACP DISCUSS/DSCN MKR DOCD: CPT | Performed by: FAMILY MEDICINE

## 2022-03-14 PROCEDURE — 99215 OFFICE O/P EST HI 40 MIN: CPT | Performed by: FAMILY MEDICINE

## 2022-03-14 PROCEDURE — 17003 DESTRUCT PREMALG LES 2-14: CPT | Performed by: FAMILY MEDICINE

## 2022-03-14 PROCEDURE — 17000 DESTRUCT PREMALG LESION: CPT | Performed by: FAMILY MEDICINE

## 2022-03-14 PROCEDURE — G0439 PPPS, SUBSEQ VISIT: HCPCS | Performed by: FAMILY MEDICINE

## 2022-03-14 RX ORDER — FLUOROURACIL 50 MG/G
CREAM TOPICAL 2 TIMES DAILY
Qty: 40 G | Refills: 1 | Status: SHIPPED | OUTPATIENT
Start: 2022-03-14 | End: 2022-08-09 | Stop reason: SDUPTHER

## 2022-03-14 NOTE — PATIENT INSTRUCTIONS
Medicare Preventive Visit Patient Instructions  Thank you for completing your Welcome to Medicare Visit or Medicare Annual Wellness Visit today  Your next wellness visit will be due in one year (3/15/2023)  The screening/preventive services that you may require over the next 5-10 years are detailed below  Some tests may not apply to you based off risk factors and/or age  Screening tests ordered at today's visit but not completed yet may show as past due  Also, please note that scanned in results may not display below  Preventive Screenings:  Service Recommendations Previous Testing/Comments   Colorectal Cancer Screening  · Colonoscopy    · Fecal Occult Blood Test (FOBT)/Fecal Immunochemical Test (FIT)  · Fecal DNA/Cologuard Test  · Flexible Sigmoidoscopy Age: 54-65 years old   Colonoscopy: every 10 years (May be performed more frequently if at higher risk)  OR  FOBT/FIT: every 1 year  OR  Cologuard: every 3 years  OR  Sigmoidoscopy: every 5 years  Screening may be recommended earlier than age 48 if at higher risk for colorectal cancer  Also, an individualized decision between you and your healthcare provider will decide whether screening between the ages of 74-80 would be appropriate   Colonoscopy: Not on file  FOBT/FIT: Not on file  Cologuard: Not on file  Sigmoidoscopy: Not on file          Prostate Cancer Screening Individualized decision between patient and health care provider in men between ages of 53-78   Medicare will cover every 12 months beginning on the day after your 50th birthday PSA: 0 9 ng/mL     Screening Not Indicated     Hepatitis C Screening Once for adults born between 1945 and 1965  More frequently in patients at high risk for Hepatitis C Hep C Antibody: Not on file        Diabetes Screening 1-2 times per year if you're at risk for diabetes or have pre-diabetes Fasting glucose: 209 mg/dL   A1C: 9 1 %    Screening Not Indicated  History Diabetes   Cholesterol Screening Once every 5 years if you don't have a lipid disorder  May order more often based on risk factors  Lipid panel: 10/06/2021    Screening Not Indicated  History Lipid Disorder      Other Preventive Screenings Covered by Medicare:  1  Abdominal Aortic Aneurysm (AAA) Screening: covered once if your at risk  You're considered to be at risk if you have a family history of AAA or a male between the age of 73-68 who smoking at least 100 cigarettes in your lifetime  2  Lung Cancer Screening: covers low dose CT scan once per year if you meet all of the following conditions: (1) Age 50-69; (2) No signs or symptoms of lung cancer; (3) Current smoker or have quit smoking within the last 15 years; (4) You have a tobacco smoking history of at least 30 pack years (packs per day x number of years you smoked); (5) You get a written order from a healthcare provider  3  Glaucoma Screening: covered annually if you're considered high risk: (1) You have diabetes OR (2) Family history of glaucoma OR (3)  aged 48 and older OR (3)  American aged 72 and older  3  Osteoporosis Screening: covered every 2 years if you meet one of the following conditions: (1) Have a vertebral abnormality; (2) On glucocorticoid therapy for more than 3 months; (3) Have primary hyperparathyroidism; (4) On osteoporosis medications and need to assess response to drug therapy  5  HIV Screening: covered annually if you're between the age of 12-76  Also covered annually if you are younger than 13 and older than 72 with risk factors for HIV infection  For pregnant patients, it is covered up to 3 times per pregnancy      Immunizations:  Immunization Recommendations   Influenza Vaccine Annual influenza vaccination during flu season is recommended for all persons aged >= 6 months who do not have contraindications   Pneumococcal Vaccine (Prevnar and Pneumovax)  * Prevnar = PCV13  * Pneumovax = PPSV23 Adults 25-60 years old: 1-3 doses may be recommended based on certain risk factors  Adults 72 years old: Prevnar (PCV13) vaccine recommended followed by Pneumovax (PPSV23) vaccine  If already received PPSV23 since turning 65, then PCV13 recommended at least one year after PPSV23 dose  Hepatitis B Vaccine 3 dose series if at intermediate or high risk (ex: diabetes, end stage renal disease, liver disease)   Tetanus (Td) Vaccine - COST NOT COVERED BY MEDICARE PART B Following completion of primary series, a booster dose should be given every 10 years to maintain immunity against tetanus  Td may also be given as tetanus wound prophylaxis  Tdap Vaccine - COST NOT COVERED BY MEDICARE PART B Recommended at least once for all adults  For pregnant patients, recommended with each pregnancy  Shingles Vaccine (Shingrix) - COST NOT COVERED BY MEDICARE PART B  2 shot series recommended in those aged 48 and above     Health Maintenance Due:      Topic Date Due    Hepatitis C Screening  Never done     Immunizations Due:      Topic Date Due    COVID-19 Vaccine (3 - Booster for Moderna series) 08/03/2021     Advance Directives   What are advance directives? Advance directives are legal documents that state your wishes and plans for medical care  These plans are made ahead of time in case you lose your ability to make decisions for yourself  Advance directives can apply to any medical decision, such as the treatments you want, and if you want to donate organs  What are the types of advance directives? There are many types of advance directives, and each state has rules about how to use them  You may choose a combination of any of the following:  · Living will: This is a written record of the treatment you want  You can also choose which treatments you do not want, which to limit, and which to stop at a certain time  This includes surgery, medicine, IV fluid, and tube feedings  · Durable power of  for healthcare [facility]):   This is a written record that states who you want to make healthcare choices for you when you are unable to make them for yourself  This person, called a proxy, is usually a family member or a friend  You may choose more than 1 proxy  · Do not resuscitate (DNR) order:  A DNR order is used in case your heart stops beating or you stop breathing  It is a request not to have certain forms of treatment, such as CPR  A DNR order may be included in other types of advance directives  · Medical directive: This covers the care that you want if you are in a coma, near death, or unable to make decisions for yourself  You can list the treatments you want for each condition  Treatment may include pain medicine, surgery, blood transfusions, dialysis, IV or tube feedings, and a ventilator (breathing machine)  · Values history: This document has questions about your views, beliefs, and how you feel and think about life  This information can help others choose the care that you would choose  Why are advance directives important? An advance directive helps you control your care  Although spoken wishes may be used, it is better to have your wishes written down  Spoken wishes can be misunderstood, or not followed  Treatments may be given even if you do not want them  An advance directive may make it easier for your family to make difficult choices about your care  Weight Management   Why it is important to manage your weight:  Being overweight increases your risk of health conditions such as heart disease, high blood pressure, type 2 diabetes, and certain types of cancer  It can also increase your risk for osteoarthritis, sleep apnea, and other respiratory problems  Aim for a slow, steady weight loss  Even a small amount of weight loss can lower your risk of health problems  How to lose weight safely:  A safe and healthy way to lose weight is to eat fewer calories and get regular exercise   You can lose up about 1 pound a week by decreasing the number of calories you eat by 500 calories each day  Healthy meal plan for weight management:  A healthy meal plan includes a variety of foods, contains fewer calories, and helps you stay healthy  A healthy meal plan includes the following:  · Eat whole-grain foods more often  A healthy meal plan should contain fiber  Fiber is the part of grains, fruits, and vegetables that is not broken down by your body  Whole-grain foods are healthy and provide extra fiber in your diet  Some examples of whole-grain foods are whole-wheat breads and pastas, oatmeal, brown rice, and bulgur  · Eat a variety of vegetables every day  Include dark, leafy greens such as spinach, kale, trinity greens, and mustard greens  Eat yellow and orange vegetables such as carrots, sweet potatoes, and winter squash  · Eat a variety of fruits every day  Choose fresh or canned fruit (canned in its own juice or light syrup) instead of juice  Fruit juice has very little or no fiber  · Eat low-fat dairy foods  Drink fat-free (skim) milk or 1% milk  Eat fat-free yogurt and low-fat cottage cheese  Try low-fat cheeses such as mozzarella and other reduced-fat cheeses  · Choose meat and other protein foods that are low in fat  Choose beans or other legumes such as split peas or lentils  Choose fish, skinless poultry (chicken or turkey), or lean cuts of red meat (beef or pork)  Before you cook meat or poultry, cut off any visible fat  · Use less fat and oil  Try baking foods instead of frying them  Add less fat, such as margarine, sour cream, regular salad dressing and mayonnaise to foods  Eat fewer high-fat foods  Some examples of high-fat foods include french fries, doughnuts, ice cream, and cakes  · Eat fewer sweets  Limit foods and drinks that are high in sugar  This includes candy, cookies, regular soda, and sweetened drinks  Exercise:  Exercise at least 30 minutes per day on most days of the week  Some examples of exercise include walking, biking, dancing, and swimming  You can also fit in more physical activity by taking the stairs instead of the elevator or parking farther away from stores  Ask your healthcare provider about the best exercise plan for you  © Copyright [employer] 2018 Information is for End User's use only and may not be sold, redistributed or otherwise used for commercial purposes  All illustrations and images included in CareNotes® are the copyrighted property of A D A M , Inc  or [facility] Jeffy French   Basic Carbohydrate Counting   AMBULATORY CARE:   Carbohydrate counting  is a way to plan your meals by counting the amount of carbohydrate in foods  Carbohydrates are the sugars, starches, and fiber found in fruit, grains, vegetables, and milk products  Carbohydrates increase your blood sugar levels  Carbohydrate counting can help you eat the right amount of carbohydrate to keep your blood sugar levels under control  What you need to know about planning meals using carbohydrate counting:  · A dietitian or healthcare provider will help you develop a healthy meal plan that works best for you  You will be taught how much carbohydrate to eat or drink for each meal and snack  Your meal plan will be based on your age, weight, usual food intake, and physical activity level  If you have diabetes, it will also include your blood sugar levels and diabetes medicine  Once you know how much carbohydrate you should eat, you can decide what type of food you want to eat  · You will need to know what foods contain carbohydrate and how much they contain  Keep track of the amount of carbohydrate in meals and snacks in order to follow your meal plan  Do not avoid carbohydrates or skip meals  Your blood sugar may fall too low if you do not eat enough carbohydrate or you skip meals  Foods that contain carbohydrate:   · Breads:  Each serving of food listed below contains about 15 g of carbohydrate       ? 1 slice of bread (1 ounce) or 1 flour or corn tortilla (6 inch)    ? ½ of a hamburger bun or ¼ of a large bagel (about 1 ounce)    ? 1 pancake (about 4 inches across and ¼ inch thick)    · Cereals and grains:  Serving sizes of ready-to-eat cereals vary  Look at the serving size and the total carbohydrate amount listed on the food label  Each serving of food listed below contains about 15 g of carbohydrate   ? ¾ cup of dry, unsweetened, ready-to-eat cereal or ¼ cup of low-fat granola     ? ½ cup of oatmeal or other cooked cereal     ? ? cup of cooked rice or pasta    · Starchy vegetables and beans:  Each serving of food listed below contains about 15 g of carbohydrate   ? ½ cup of corn, green peas, sweet potatoes, or mashed potatoes    ? ¼ of a large baked potato    ? ½ cup of beans, lentils, and peas (garbanzo, alfred, kidney, white, split, black-eyed)    · Crackers and snacks:  Each serving of food listed below contains about 15 g of carbohydrate   ? 3 chioma cracker squares or 8 animal crackers     ? 6 saltine-type crackers    ? 3 cups of popcorn or ¾ ounce of pretzels, potato chips, or tortilla chips    · Fruit:  Each serving of food listed below contains about 15 g of carbohydrate   ? 1 small (4 ounce) piece of fresh fruit or ¾ to 1 cup of fresh fruit    ? ½ cup of canned or frozen fruit, packed in natural juice    ? ½ cup (4 ounces) of unsweetened fruit juice    ? 2 tablespoons of dried fruit    · Desserts or sugary foods:  Each serving of food listed below contains about 15 g of carbohydrate   ? 2-inch square unfrosted cake or brownie     ? 2 small cookies    ? ½ cup of ice cream, frozen yogurt, or nondairy frozen yogurt    ? ¼ cup of sherbet or sorbet    ? 1 tablespoon of regular syrup, jam, or jelly    ? 2 tablespoons of light syrup    · Milk and yogurt:  Foods from the milk group contain about 12 g of carbohydrate per serving  ? 1 cup of fat-free or low-fat milk    ? 1 cup of soy milk    ?  ? cup of fat-free, yogurt sweetened with artificial sweetener    · Non-starchy vegetables:  Each serving contains about 5 g of carbohydrate   Three servings of non-starch vegetables count as 1 carbohydrate serving  ? ½ cup of cooked vegetables or 1 cup of raw vegetables  This includes beets, broccoli, cabbage, cauliflower, cucumber, mushrooms, tomatoes, and zucchini    ? ½ cup of vegetable juice    How to use carbohydrate counting to plan meals:   · Count carbohydrate amounts using serving sizes:      ? Pasta dinner example: You plan to have pasta, tossed salad, and an 8-ounce glass of milk  Your healthcare provider tells you that you may have 4 carbohydrate servings for dinner  One carbohydrate serving of pasta is ? cup  One cup of pasta will equal 3 carbohydrate servings  An 8-ounce glass of milk will count as 1 carbohydrate serving  These amounts of food would equal 4 carbohydrate servings  One cup of tossed salad does not count toward your carbohydrate servings  · Count carbohydrate amounts using food labels:  Find the total amount of carbohydrate in a packaged food by reading the food label  Food labels tell you the serving size of the food and the total carbohydrate amount in each serving  Find the serving size on the food label and then decide how many servings you will eat  Multiply the number of servings you plan to eat by the carbohydrate amount per serving  ? Granola bar snack example: Your meal plan allows you to have 2 carbohydrate servings (30 grams) of carbohydrate for a snack  You plan to eat 1 package of granola bars, which contains 2 bars  According to the food label, the serving size of food in this package is 1 bar  Each serving (1 bar) contains 25 grams of carbohydrate  The total amount of carbohydrate in this package of granola bars would be 50 g  Based on your meal plan, you should eat only 1 bar  Follow up with your doctor as directed:  Write down your questions so you remember to ask them during your visits     © Copyright [employer] 2022 Information is for [employer] use only and may not be sold, redistributed or otherwise used for commercial purposes  All illustrations and images included in CareNotes® are the copyrighted property of A D A M , Inc  or Mikki Campos  The above information is an  only  It is not intended as medical advice for individual conditions or treatments  Talk to your doctor, nurse or pharmacist before following any medical regimen to see if it is safe and effective for you  Foot Care for People with Diabetes   AMBULATORY CARE:   What you need to know about foot care:   · Foot care helps protect your feet and prevent foot ulcers or sores  Long-term high blood sugar levels can damage the blood vessels and nerves in your legs and feet  This damage makes it hard to feel pressure, pain, temperature, and touch  You may not be able to feel a cut or sore, or shoes that are too tight  Foot care is needed to prevent serious problems, such as an infection or amputation  · Diabetes may cause your toes to become crooked or curved under  These changes may affect the way you walk and can lead to increased pressure on your foot  The pressure can decrease blood flow to your feet  Lack of blood flow increases your risk for a foot ulcer  Do not ignore small problems, such as dry skin or small wounds  These can become life-threatening over time without proper care  Call your care team provider if:   · Your feet become numb, weak, or hard to move  · You have pus draining from a sore on your foot  · You have a wound on your foot that gets bigger, deeper, or does not heal      · You see blisters, cuts, scratches, calluses, or sores on your foot  · You have a fever, and your feet become red, warm, and swollen  · Your toenails become thick, curled, or yellow  · You find it hard to check your feet because your vision is poor       · You have questions or concerns about your condition or care  How to care for your feet:   · Check your feet each day  Look at your whole foot, including the bottom, and between and under your toes  Check for wounds, corns, and calluses  Use a mirror to see the bottom of your feet  The skin on your feet may be shiny, tight, or darker than normal  Your feet may also be cold and pale  Feel your feet by running your hands along the tops, bottoms, sides, and between your toes  Redness, swelling, and warmth are signs of blood flow problems that can lead to a foot ulcer  Do not try to remove corns or calluses yourself  · Wash your feet each day with soap and warm water  Do not use hot water, because this can injure your foot  Dry your feet gently with a towel after you wash them  Dry between and under your toes  · Apply lotion or a moisturizer on your dry feet  Ask your care team provider what lotions are best to use  Do not put lotion or moisturizer between your toes  Moisture between your toes could lead to skin breakdown  · Cut your toenails correctly  File or cut your toenails straight across  Use a soft brush to clean around your toenails  If your toenails are very thick, you may need to have a care team provider or specialist cut them  · Protect your feet  Do not walk barefoot or wear your shoes without socks  Check your shoes for rocks or other objects that can hurt your feet  Wear cotton socks to help keep your feet dry  Wear socks without toe seams, or wear them with the seams inside out  Change your socks each day  Do not wear socks that are dirty or damp  · Wear shoes that fit well  Wear shoes that do not rub against any area of your feet  Your shoes should be ½ to ¾ inch (1 to 2 centimeters) longer than your feet  Your shoes should also have extra space around the widest part of your feet   Walking or athletic shoes with laces or straps that adjust are best  Ask your care team provider for help to choose shoes that fit you best  Ask him or her if you need to wear an insert, orthotic, or bandage on your feet  · Go to your follow-up visits  Your care team provider will do a foot exam at least once a year  You may need a foot exam more often if you have nerve damage, foot deformities, or ulcers  He will check for nerve damage and how well you can feel your feet  He will check your shoes to see if they fit well  · Do not smoke  Smoking can damage your blood vessels and put you at increased risk for foot ulcers  Ask your care team provider for information if you currently smoke and need help to quit  E-cigarettes or smokeless tobacco still contain nicotine  Talk to your care team provider before you use these products  Follow up with your diabetes care team provider or foot specialist as directed: You will need to have your feet checked at least once a year  You may need a foot exam more often if you have nerve damage, foot deformities, or ulcers  Write down your questions so you remember to ask them during your visits  © [employer] 2022 Information is for End User's use only and may not be sold, redistributed or otherwise used for commercial purposes  All illustrations and images included in CareNotes® are the copyrighted property of [employer] M , Inc  or [facility] Jeffy courtney   The above information is an  only  It is not intended as medical advice for individual conditions or treatments  Talk to your doctor, nurse or pharmacist before following any medical regimen to see if it is safe and effective for you  What to Do if Your Blood Sugar is Low   AMBULATORY CARE:   Low blood sugar levels  (hypoglycemia) can happen with Type 1 and Type 2 diabetes  Low levels are more likely to happen if you use insulin  Hypoglycemia can cause you to have falls, accidents, and injuries  A blood sugar level that gets too low can lead to seizures, coma, and death   Learn to recognize the symptoms early so you can get treatment quickly  When your blood sugar is low you may feel:  · Sweaty    · Nervous or shaky    · Anxious or irritable    · Confused    · A fast, pounding heartbeat    · Extremely hungry    Have someone call your local emergency number (911 in the [address]) if:   · You cannot be woken  · You have a seizure  Call your doctor if:   · You have symptoms of a low blood sugar level, such as trouble thinking, sweating, or a pounding heartbeat  · Your blood sugar level is lower than normal and it does not improve with treatment  · You often have lower blood sugar levels than your target goals  · You have trouble coping with your illness, or you feel anxious or depressed  · You have questions or concerns about your condition or care  What to do if you have symptoms of low blood sugar:   · Check your blood sugar level, if possible  Your blood sugar level is too low if it is at or below 70 mg/dL  · Eat or drink 15 grams of fast-acting carbohydrate  Fast-acting carbohydrates will raise your blood sugar level quickly  Examples of 15 grams of fast-acting carbohydrates:     ? 4 ounces (½ cup) of fruit juice     ? 4 ounces of regular soda    ? 2 tablespoons of raisins     ? 1 tube of glucose gel or 3 to 4 glucose tablets       · Check your blood sugar level 15 minutes later  If the level is still low (less than 100 mg/dL), eat another 15 grams of carbohydrate  When the level returns to 100 mg/dL, eat a snack or meal that contains carbohydrates  This will help prevent another drop in blood sugar  · Teach people close to you how to use your glucagon kit  Your blood sugar may be too low for you to be awake  People need to know when and how to use your kit  Prevent low blood sugar levels:  Prevent low blood sugar by knowing what increases your risk  Ask your healthcare provider for ways to prevent low blood sugar levels   Any of the following can increase your risk of low blood sugar:  · Fasting for tests or procedures    · During or after intense exercise    · Late or postponed meals    · Sleeping (you may need a bedtime snack)     · Drinking alcohol if you use insulin or insulin releasing pills    Follow up with your doctor as directed:  Write down your questions so you remember to ask them during your visits  © Copyright [employer] 2022 Information is for End User's use only and may not be sold, redistributed or otherwise used for commercial purposes  All illustrations and images included in CareNotes® are the copyrighted property of [employer]  or [facility] Jeffy French   The above information is an  only  It is not intended as medical advice for individual conditions or treatments  Talk to your doctor, nurse or pharmacist before following any medical regimen to see if it is safe and effective for you

## 2022-03-14 NOTE — PROGRESS NOTES
Depression Screening and Follow-up Plan: Patient was screened for depression during today's encounter  They screened negative with a PHQ-2 score of 0  Assessment/Plan:    Will start effudex cream to help with topical actinic keratosis since there is a lot  Will see him back in 3 months for recheck skin and treatment  Advised for Claritin for allergies to thin out the mucus production  Advised for 3rd dose for hep B vaccine today  Advised for COVID booster 4  In 6 months from the last 3rd dose  Will see back in 3 months sooner if needed  Advised to get colonoscopy with Dr Nadine Lamb  I have spent 40 minutes with Patient and family today in which greater than 50% of this time was spent in counseling/coordination of care regarding Diagnostic results, Prognosis, Risks and benefits of tx options, Intructions for management, Patient and family education and Importance of tx compliance         Problem List Items Addressed This Visit        Endocrine    Type 2 diabetes mellitus with hyperglycemia, with long-term current use of insulin (HCC) - Primary       Respiratory    LUCIA (obstructive sleep apnea)       Cardiovascular and Mediastinum    Hypertension    CAD (coronary artery disease)    Paroxysmal atrial fibrillation (HCC)    AAA (abdominal aortic aneurysm) (HCC)    Chronic diastolic CHF (congestive heart failure) (HCC)    Atrial fibrillation with rapid ventricular response (HCC)       Musculoskeletal and Integument    Actinic keratosis of right temple    Relevant Medications    fluorouracil (EFUDEX) 5 % cream    Actinic keratosis of scalp    Relevant Medications    fluorouracil (EFUDEX) 5 % cream       Genitourinary    Malignant neoplasm of urinary bladder (HCC)       Other    Hyperlipemia    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    Relevant Orders    Hepatitis B Vaccine Adult IM (Completed)      Other Visit Diagnoses     Encounter for immunization         Relevant Orders    Hepatitis B Vaccine Adult IM (Completed) Subjective:      Patient ID: Tonie Devries is a 65 y o  male  57-year-old male follow-up Medicare wellness and medical condition  He has type 2 diabetes follow-up endocrinologist he is on Tresiba NovoLog metformin  A1c is 9 1  He has freestyle Peggy continuous glucose monitor is very happy with his results sugar remained to be 200  He follow-up with cardiologist he has AFib recently at a controlled headache and cardiovert and now he is doing better  He has chronic kidney disease follow-up nephrologist he is on your crit to watch his potassium  He has diabetic neuropathy follow-up with podiatrist Dr Kassidy Silva that his nails cut every 3 months  He has aneurysm in his abdomen aorta S follow-up with vascular surgeon  Overall he is doing better  Has a few lesion his scalp as try scaly and painful  He lives at home with his wife  He is up-to-date COVID vaccine and 3rd dose          The following portions of the patient's history were reviewed and updated as appropriate:   Past Medical History:  He has a past medical history of A-fib ([facility] [address] ), AAA (abdominal aortic aneurysm) ([facility] [address] ), Actinic keratosis of right temple (7/31/2020), Actinic keratosis of scalp (7/31/2020), Acute respiratory failure with hypoxia ([facility] [address] ) (3/25/2021), Allergic (1960), Arthritis, Lay's esophagus, Bladder cancer ([facility] [address] ), Blister of left leg (4/28/2021), Blister of right leg (5/26/2021), CAD (coronary artery disease), Cancer ([address] ) (02/2010), CHF (congestive heart failure) ([facility] [address] ), Chronic low back pain, Chronic pain of both knees (7/31/2020), Colitis (12/4/2020), CPAP (continuous positive airway pressure) dependence, Diabetes ([address] ), Diabetes mellitus ([address] ) (10/1993), Excessive gas (12/3/2020), Heart murmur, History of chemotherapy, Hydroureter on left (4/28/2021), Hyperlipemia, Hypertension, Immunization deficiency (10/30/2020), Kidney stone, Left lower quadrant abdominal pain (12/3/2020), Mass of right adrenal gland ([facility] [address] ) (12/4/2020), Myocardial infarction ([facility] [address] ), 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), Obesity (2000), LUCIA (obstructive sleep apnea) (1/29/2021), Pressure ulcer of right leg, stage 1 (5/26/2021), Urinary tract infection with hematuria (5/3/2021), and Venous stasis dermatitis of both lower extremities (5/26/2021)  ,  _______________________________________________________________________  Medical Problems:  does not have any pertinent problems on file ,  _______________________________________________________________________  Past Surgical History:   has a past surgical history that includes Back surgery; Bladder surgery; Cataract extraction, bilateral; Colonoscopy (01/29/2019); Coronary artery bypass graft (04/2016); Total shoulder replacement (Right, 2013); Total hip arthroplasty (Right, 2012); EGD (06/2017); pr cystourethroscopy,ureter catheter (Left, 4/24/2021); pr cysto/uretero w/lithotripsy &indwell stent insrt (Left, 5/21/2021); Kidney stone surgery; Eye surgery (11/2016); Joint replacement (0143-0326); and Vasectomy (1971)  ,  _______________________________________________________________________  Family History:  family history includes Alzheimer's disease in his mother; Arthritis in his father; Dementia in his mother; Heart attack in his father; Heart disease in his father ,  _______________________________________________________________________  Social History:   reports that he quit smoking about 12 years ago  His smoking use included cigarettes  He started smoking about 57 years ago  He has a 5 00 pack-year smoking history  He has never used smokeless tobacco  He reports previous alcohol use  He reports that he does not use drugs  ,  _______________________________________________________________________  Allergies:  has No Known Allergies     _______________________________________________________________________  Current Outpatient Medications   Medication Sig Dispense Refill    apixaban (Eliquis) 5 mg Take 1 tablet (5 mg total) by mouth 2 (two) times a day 60 tablet 3    atorvastatin (LIPITOR) 40 mg tablet TAKE ONE TABLET BY MOUTH ONCE DAILY 90 tablet 0    clindamycin (CLINDAGEL) 1 % gel Apply topically 2 (two) times a day 30 g 1    digoxin (LANOXIN) 0 125 mg tablet TAKE ONE TABLET BY MOUTH ONCE DAILY 30 tablet 0    ezetimibe (ZETIA) 10 mg tablet Take 1 tablet (10 mg total) by mouth daily 90 tablet 3    furosemide (LASIX) 80 mg tablet 1 full tablet every morning, 1/2 tablet every afternoon 60 tablet 11    insulin aspart (NovoLOG FlexPen) 100 UNIT/ML injection pen Inject 25 Units under the skin 3 (three) times a day with meals Inject 20-25 units under the skin 3 times day with meals depending on carbohydrate amount   15 mL 0    insulin degludec Danella Canavan FlexTouch) 100 units/mL injection pen Inject 90 Units under the skin daily 90 mL 1    Lancets (accu-chek multiclix) lancets Use 1 each 2 (two) times a day Use 2 times daily to test blood glucose 102 each 3    lisinopril (ZESTRIL) 20 mg tablet Take 1 tablet (20 mg total) by mouth daily 30 tablet 11    metFORMIN (GLUCOPHAGE) 500 mg tablet Take 1 tablet (500 mg total) by mouth 2 (two) times a day with meals 180 tablet 1    metoprolol succinate (TOPROL-XL) 100 mg 24 hr tablet TAKE ONE TABLET BY MOUTH EVERY 12 HOURS 60 tablet 0    omeprazole (PriLOSEC) 20 mg delayed release capsule Take 1 capsule (20 mg total) by mouth daily 90 capsule 0    potassium citrate (UROCIT-K) 10 mEq Take 1 tablet (10 mEq total) by mouth 2 (two) times a day 180 tablet 3    Unifine Pentips 31G X 8 MM MISC Inject under the skin daily Use as directed 100 each 3    fluorouracil (EFUDEX) 5 % cream Apply topically 2 (two) times a day 40 g 1     No current facility-administered medications for this visit      _______________________________________________________________________  Review of Systems   Constitutional: Negative for activity change, appetite change, fatigue, fever and unexpected weight change  HENT: Negative for dental problem and trouble swallowing  Eyes: Negative for photophobia and visual disturbance  Respiratory: Negative for cough and chest tightness  Cardiovascular: Negative for chest pain, palpitations and leg swelling  Gastrointestinal: Negative for abdominal pain, constipation and vomiting  Endocrine: Negative for cold intolerance, polydipsia and polyuria  Genitourinary: Negative for difficulty urinating, frequency and urgency  Musculoskeletal: Negative for arthralgias, joint swelling, myalgias and neck pain  Skin: Negative for color change, rash and wound  Allergic/Immunologic: Negative for environmental allergies  Neurological: Negative for dizziness, weakness and numbness  Hematological: Does not bruise/bleed easily  Psychiatric/Behavioral: Negative for decreased concentration, dysphoric mood, self-injury, sleep disturbance and suicidal ideas  Objective:  Vitals:    03/14/22 0945   BP: 116/68   BP Location: Left arm   Patient Position: Sitting   Cuff Size: Large   Pulse: 78   Temp: 98 1 °F (36 7 °C)   TempSrc: Tympanic   SpO2: 94%   Weight: (!) 137 kg (303 lb)   Height: 6' 4" (1 93 m)     Body mass index is 36 88 kg/m²  Physical Exam  Vitals and nursing note reviewed  Constitutional:       Appearance: Normal appearance  He is well-developed  He is obese  HENT:      Head: Normocephalic and atraumatic  Right Ear: Tympanic membrane, ear canal and external ear normal       Left Ear: Tympanic membrane, ear canal and external ear normal       Nose: Nose normal       Mouth/Throat:      Mouth: Mucous membranes are dry  Pharynx: Oropharynx is clear  Eyes:      Pupils: Pupils are equal, round, and reactive to light  Cardiovascular:      Rate and Rhythm: Normal rate and regular rhythm  Heart sounds: Normal heart sounds  Pulmonary:      Effort: Pulmonary effort is normal       Breath sounds: Rales present  Abdominal:      General: Bowel sounds are normal       Palpations: Abdomen is soft  Musculoskeletal:         General: Normal range of motion  Cervical back: Normal range of motion and neck supple  Skin:     General: Skin is warm and dry  Capillary Refill: Capillary refill takes less than 2 seconds  Comments: Multiple dry scaly raised lesions on his scalp right ear temple   Neurological:      General: No focal deficit present  Mental Status: He is alert and oriented to person, place, and time  Mental status is at baseline  Psychiatric:         Mood and Affect: Mood normal          Behavior: Behavior normal          Thought Content: Thought content normal          Judgment: Judgment normal          Lesion Destruction    Date/Time: 3/14/2022 10:56 AM  Performed by: Nikki Fontanez MD  Authorized by: Nikki Fontanez MD   Universal Protocol:  Consent: Verbal consent obtained  Risks and benefits: risks, benefits and alternatives were discussed  Consent given by: patient  Time out: Immediately prior to procedure a "time out" was called to verify the correct patient, procedure, equipment, support staff and site/side marked as required    Timeout called at: 3/14/2022 10:56 AM   Patient understanding: patient states understanding of the procedure being performed  Patient consent: the patient's understanding of the procedure matches consent given  Procedure consent: procedure consent matches procedure scheduled  Patient identity confirmed: verbally with patient      Procedure Details - Lesion Destruction:     Number of Lesions:  6  Lesion 1:     Body area:  Head/neck    Head/neck location:  Scalp    Initial size (mm):  3    Final defect size (mm):  3    Malignancy: pre-malignant lesion      Destruction method: cryotherapy    Lesion 2:     Body area:  [address] location:  Scalp    Initial size (mm):  3    Final defect size (mm):  3    Malignancy: pre-malignant lesion      Destruction method: cryotherapy    Lesion 3:     [facility]  [address] [facility] location:  Scalp    Initial size (mm):  2    Final defect size (mm):  2    Malignancy: pre-malignant lesion      Destruction method: cryotherapy    Lesion 4:     Body area:  Head/neck    Head/neck location:  Scalp    Initial size (mm):  3    Final defect size (mm):  3    Malignancy: pre-malignant lesion      Destruction method: cryotherapy    Lesion 5:     Body area:  Head/neck    Head/neck location:  R ear    Inital size (mm):  3    Final defect size (mm):  3    Malignancy: pre-malignant lesion      Destruction method: cryotherapy    Lesion 6:     Body area:  Head/neck    Head/neck location:  R ear    Initial size (mm):  2    Final defect size (mm):  2    Malignancy: pre-malignant lesion      Destruction method: cryotherapy

## 2022-03-14 NOTE — PROGRESS NOTES
Assessment and Plan:     Problem List Items Addressed This Visit        Endocrine    Type 2 diabetes mellitus with hyperglycemia, with long-term current use of insulin ([facility] [address] ) - Primary       Respiratory    LUCIA (obstructive sleep apnea)       Cardiovascular and Mediastinum    Hypertension    CAD (coronary artery disease)    Paroxysmal atrial fibrillation (HCC)    AAA (abdominal aortic aneurysm) (HCC)    Chronic diastolic CHF (congestive heart failure) (HCC)    Atrial fibrillation with rapid ventricular response (HCC)       Musculoskeletal and Integument    Actinic keratosis of right temple    Relevant Medications    fluorouracil (EFUDEX) 5 % cream    Actinic keratosis of scalp    Relevant Medications    fluorouracil (EFUDEX) 5 % cream       Genitourinary    Malignant neoplasm of urinary bladder (HCC)       Other    Hyperlipemia    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          Depression Screening and Follow-up Plan: Patient was screened for depression during today's encounter  They screened negative with a PHQ-2 score of 0  Preventive health issues were discussed with patient, and age appropriate screening tests were ordered as noted in patient's After Visit Summary  Personalized health advice and appropriate referrals for health education or preventive services given if needed, as noted in patient's After Visit Summary       History of Present Illness:     Patient presents for Medicare Annual Wellness visit    Patient Care Team:  Citlali Antunez MD as PCP - General (Family Medicine)  MD Sanjay Ragsdale MD Melvina Rudd, MD Sueanne Saa, MD as Cardiologist (Cardiology)  Jerome Winston MD (Endocrinology)  Myra Bazzi MD (Urology)  [address] (Ophthalmology)  Jolayne Fleischer, DPM (Podiatry)     Problem List:     Patient Active Problem List   Diagnosis    Hypertension    Hyperlipemia    Type 2 diabetes mellitus with hyperglycemia, with long-term current use of insulin (HCC)    Chronic low back pain    CAD (coronary artery disease)    Malignant neoplasm of urinary bladder (HCC)    Arthritis    Paroxysmal atrial fibrillation (HCC)    AAA (abdominal aortic aneurysm) (HCC)    Decreased pedal pulses    Chronic pain of both knees    Actinic keratosis of right temple    Actinic keratosis of scalp    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    Immunization deficiency    Left lower quadrant abdominal pain    Excessive gas    Morbid obesity (HCC)    Colitis    Adrenal nodule (HCC)    LUCIA (obstructive sleep apnea)    Embolism and thrombosis of arteries of the lower extremities (HCC)    Bilateral edema of lower extremity    Hypoxemia    Chronic diastolic CHF (congestive heart failure) (HCC)    Left upper lobe pulmonary nodule    Scaly skin on examination    Fall    Hypercalcemia    Nephrolithiasis    Blister of left leg    Hydroureter on left    Urinary tract infection with hematuria    Myocardial infarction ([facility] [address] )    Venous stasis dermatitis of both lower extremities    Blister of right leg    Nipple pain    Folliculitis    Persistent proteinuria    Hypokalemia    Ureteral calculi    Benign prostatic hyperplasia without lower urinary tract symptoms    Atrial fibrillation with rapid ventricular response (HCC)    Cellulitis and abscess of left lower extremity      Past Medical and Surgical History:     Past Medical History:   Diagnosis Date    A-fib [facility])     AAA (abdominal aortic aneurysm) ([facility] [address] )     Actinic keratosis of right temple 7/31/2020    Actinic keratosis of scalp 7/31/2020    Acute respiratory failure with hypoxia ([address] ) 3/25/2021    Allergic 1960    Arthritis     Lay's esophagus     Bladder cancer ([facility] [address] )     Blister of left leg 4/28/2021    Blister of right leg 5/26/2021    CAD (coronary artery disease)     Cancer (HCC) 02/2010    Bladder    CHF (congestive heart failure) (HCC)     Chronic low back pain  Chronic pain of both knees 7/31/2020    Colitis 12/4/2020    CPAP (continuous positive airway pressure) dependence     Diabetes ([facility] [address] )     Diabetes mellitus ([facility] [address] ) 10/1993    Excessive gas 12/3/2020    Heart murmur     History of chemotherapy     Hydroureter on left 4/28/2021    Hyperlipemia     Hypertension     Immunization deficiency 10/30/2020    Hep a nonimmune    Kidney stone     Left lower quadrant abdominal pain 12/3/2020    Mass of right adrenal gland ([facility] [address] ) 12/4/2020    4 1 cm    Myocardial infarction ([facility] [address] )     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    Obesity 2000    LUCIA (obstructive sleep apnea) 1/29/2021    Pressure ulcer of right leg, stage 1 5/26/2021    Urinary tract infection with hematuria 5/3/2021    u cx 4/2021=pseudomonas R to bactrim and cefdinir, S to cipro    Venous stasis dermatitis of both lower extremities 5/26/2021     Past Surgical History:   Procedure Laterality Date    BACK SURGERY      BLADDER SURGERY      CATARACT EXTRACTION, BILATERAL      COLONOSCOPY  01/29/2019    next [address] GRAFT  04/2016    Quadruple per Basile    EGD  06/2017    EYE SURGERY  11/2016    Cataracts    JOINT REPLACEMENT  [id number]    Hip and shoulder    KIDNEY STONE SURGERY      DC CYSTO/URETERO W/LITHOTRIPSY &INDWELL STENT INSRT Left 5/21/2021    Procedure: CYSTOSCOPY URETEROSCOPY WITH LITHOTRIPSY HOLMIUM LASER, AND INSERTION STENT URETERAL/EXCHANGE;  Surgeon: Anette Horan MD;  [facility]: [facility];  Service: Urology    DC CYSTOURETHROSCOPY,URETER CATHETER Left 4/24/2021    Procedure: CYSTOSCOPY  WITH INSERTION STENT URETERAL;  Surgeon: Anette Horan MD;  [facility];  Service: Urology    TOTAL HIP ARTHROPLASTY Right 2012    TOTAL SHOULDER REPLACEMENT Right 2013    VASECTOMY  1971      Family History:     Family History   Problem Relation Age of Onset    Heart disease Father     Arthritis Father     Heart attack Father    Abigail Florecita Alzheimer's disease Mother     Dementia Mother       Social History:     Social History     Socioeconomic History    Marital status: /Civil Union     Spouse name: Verona King Number of children: 3    Years of education: Not on file    Highest education level: 12th grade   Occupational History    Not on file   Tobacco Use    Smoking status: Former Smoker     Packs/day: 0 25     Years: 20 00     Pack years: 5 00     Types: Cigarettes     Start date:      Quit date: 2010     Years since quittin 2    Smokeless tobacco: Never Used    Tobacco comment: Quit several times for up to 6 years  Vaping Use    Vaping Use: Never used   Substance and Sexual Activity    Alcohol use: Not Currently     Alcohol/week: 0 0 standard drinks     Comment: No use    Drug use: Never     Comment: No use    Sexual activity: Not Currently     Partners: Female   Other Topics Concern    Not on file   Social History Narrative    · Most recent tobacco use screenin2020      · Do you currently or have you served in the [employer]:   No      · Were you activated, into active duty, as a member of the [employer] or as a Reservist:   No      · Live alone or with others:   with others        · Caffeine intake:   Occasional      · Illicit drugs:   No      · Diet:   Regular      · Exercise level: Moderate      · Advance directive:    Yes      · Marital status:         · General stress level:   Medium      · Single or multi-level home/work:   multi level home      · Guns present in home:   No      · Seat belts used routinely:   Yes      · Sunscreen used routinely:   Yes      · Smoke alarm in home:   Yes      Social Determinants of Health     Financial Resource Strain: Low Risk     Difficulty of Paying Living Expenses: Not hard at all   Food Insecurity: No Food Insecurity    Worried About Running Out of Food in the Last Year: Never true    Fabi of Food in the Last Year: Never true Transportation Needs: No Transportation Needs    Lack of Transportation (Medical): No    Lack of Transportation (Non-Medical): No   Physical Activity: Insufficiently Active    Days of Exercise per Week: 4 days    Minutes of Exercise per Session: 10 min   Stress: No Stress Concern Present    Feeling of Stress : Not at all   Social Connections: Moderately Isolated    Frequency of Communication with Friends and Family: More than three times a week    Frequency of Social Gatherings with Friends and Family: Twice a week    Attends Gnosticist Services: Never    Active Member of Clubs or Organizations: No    Attends Club or Organization Meetings: Never    Marital Status:    Intimate Partner Violence: Not At Risk    Fear of Current or Ex-Partner: No    Emotionally Abused: No    Physically Abused: No    Sexually Abused: No   Housing Stability: Not on file      Medications and Allergies:     Current Outpatient Medications   Medication Sig Dispense Refill    apixaban (Eliquis) 5 mg Take 1 tablet (5 mg total) by mouth 2 (two) times a day 60 tablet 3    atorvastatin (LIPITOR) 40 mg tablet TAKE ONE TABLET BY MOUTH ONCE DAILY 90 tablet 0    clindamycin (CLINDAGEL) 1 % gel Apply topically 2 (two) times a day 30 g 1    digoxin (LANOXIN) 0 125 mg tablet TAKE ONE TABLET BY MOUTH ONCE DAILY 30 tablet 0    ezetimibe (ZETIA) 10 mg tablet Take 1 tablet (10 mg total) by mouth daily 90 tablet 3    furosemide (LASIX) 80 mg tablet 1 full tablet every morning, 1/2 tablet every afternoon 60 tablet 11    insulin aspart (NovoLOG FlexPen) 100 UNIT/ML injection pen Inject 25 Units under the skin 3 (three) times a day with meals Inject 20-25 units under the skin 3 times day with meals depending on carbohydrate amount   15 mL 0    insulin degludec [employer] FlexTouch) 100 units/mL injection pen Inject 90 Units under the skin daily 90 mL 1    Lancets (accu-chek multiclix) lancets Use 1 each 2 (two) times a day Use 2 times daily to test blood glucose 102 each 3    lisinopril (ZESTRIL) 20 mg tablet Take 1 tablet (20 mg total) by mouth daily 30 tablet 11    metFORMIN (GLUCOPHAGE) 500 mg tablet Take 1 tablet (500 mg total) by mouth 2 (two) times a day with meals 180 tablet 1    metoprolol succinate (TOPROL-XL) 100 mg 24 hr tablet TAKE ONE TABLET BY MOUTH EVERY 12 HOURS 60 tablet 0    omeprazole (PriLOSEC) 20 mg delayed release capsule Take 1 capsule (20 mg total) by mouth daily 90 capsule 0    potassium citrate (UROCIT-K) 10 mEq Take 1 tablet (10 mEq total) by mouth 2 (two) times a day 180 tablet 3    Unifine Pentips 31G X 8 MM MISC Inject under the skin daily Use as directed 100 each 3    fluorouracil (EFUDEX) 5 % cream Apply topically 2 (two) times a day 40 g 1     No current facility-administered medications for this visit  No Known Allergies   Immunizations:     Immunization History   Administered Date(s) Administered    COVID-19 MODERNA VACC 0 5 ML IM 01/21/2021, 03/03/2021    Hep B, adult 10/30/2020, 07/14/2021    INFLUENZA 09/07/2011, 10/17/2012, 09/17/2013, 10/20/2014, 11/24/2015, 09/29/2016, 09/30/2016, 11/29/2018, 10/13/2020    Influenza, high dose seasonal 0 7 mL 10/13/2021    Pneumococcal Conjugate 13-Valent 09/20/2016, 09/29/2016    Pneumococcal Polysaccharide PPV23 10/06/2009, 07/14/2021    Td (adult), adsorbed 06/03/2011    Tdap 04/23/2021    Zoster 03/01/2010, 03/09/2010      Health Maintenance:         Topic Date Due    Hepatitis C Screening  Never done         Topic Date Due    COVID-19 Vaccine (3 - Booster for Moderna series) 08/03/2021      Medicare Health Risk Assessment:     /68 (BP Location: Left arm, Patient Position: Sitting, Cuff Size: Large)   Pulse 78   Temp 98 1 °F (36 7 °C) (Tympanic)   Ht 6' 4" (1 93 m)   Wt (!) 137 kg (303 lb)   SpO2 94%   BMI 36 88 kg/m²      Ana Black is here for his Subsequent Wellness visit   Last Medicare Wellness visit information reviewed, patient interviewed and updates made to the record today  Health Risk Assessment:   Patient rates overall health as good  Patient feels that their physical health rating is same  Patient is very satisfied with their life  Eyesight was rated as same  Hearing was rated as slightly worse  Patient feels that their emotional and mental health rating is much better  Patients states they are never, rarely angry  Patient states they are sometimes unusually tired/fatigued  Pain experienced in the last 7 days has been some  Patient's pain rating has been 4/10  Patient states that he has experienced no weight loss or gain in last 6 months  Depression Screening:   PHQ-2 Score: 0      Fall Risk Screening: In the past year, patient has experienced: no history of falling in past year      Home Safety:  Patient has trouble with stairs inside or outside of their home  Patient has working smoke alarms and has working carbon monoxide detector  Home safety hazards include: none  Nutrition:   Current diet is Diabetic  Medications:   Patient is currently taking over-the-counter supplements  OTC medications include: see medication list  Patient is able to manage medications  Activities of Daily Living (ADLs)/Instrumental Activities of Daily Living (IADLs):   Walk and transfer into and out of bed and chair?: Yes  Dress and groom yourself?: Yes    Bathe or shower yourself?: Yes    Feed yourself? Yes  Do your laundry/housekeeping?: Yes  Manage your money, pay your bills and track your expenses?: Yes  Make your own meals?: Yes    Do your own shopping?: Yes    Previous Hospitalizations:   Any hospitalizations or ED visits within the last 12 months?: Yes    How many hospitalizations have you had in the last year?: 1-2    Advance Care Planning:   Living will: Yes    Durable POA for healthcare:  Yes    Advanced directive: Yes    Five wishes given: Yes      Cognitive Screening:   Provider or family/friend/caregiver concerned regarding cognition?: No    PREVENTIVE SCREENINGS      Cardiovascular Screening:    General: History Lipid Disorder, Risks and Benefits Discussed and Screening Current      Diabetes Screening:     General: History Diabetes, Risks and Benefits Discussed and Screening Current      Colorectal Cancer Screening:     General: Risks and Benefits Discussed and Screening Current      Prostate Cancer Screening:    General: Risks and Benefits Discussed and Screening Current      Osteoporosis Screening:    General: Risks and Benefits Discussed and Screening Current      Abdominal Aortic Aneurysm (AAA) Screening:    Risk factors include: tobacco use        General: History AAA, Risks and Benefits Discussed and Screening Current      Lung Cancer Screening:     General: Screening Not Indicated and Risks and Benefits Discussed      Hepatitis C Screening:    General: Risks and Benefits Discussed    Screening, Brief Intervention, and Referral to Treatment (SBIRT)    Screening  Typical number of drinks in a day: 0    Single Item Drug Screening:  How often have you used an illegal drug (including marijuana) or a prescription medication for non-medical reasons in the past year? never    Single Item Drug Screen Score: 0  Interpretation: Negative screen for possible drug use disorder    Brief Intervention  Alcohol & drug use screenings were reviewed  No concerns regarding substance use disorder identified  Healthy alcohol use/limits discussed  Other Counseling Topics:   Car/seat belt/driving safety, skin self-exam, sunscreen and calcium and vitamin D intake and regular weightbearing exercise         Citlali Antunez MD

## 2022-03-19 DIAGNOSIS — I48.91 ATRIAL FIBRILLATION WITH RAPID VENTRICULAR RESPONSE (HCC): ICD-10-CM

## 2022-03-21 RX ORDER — DIGOXIN 125 MCG
125 TABLET ORAL DAILY
Qty: 30 TABLET | Refills: 0 | Status: SHIPPED | OUTPATIENT
Start: 2022-03-21 | End: 2022-04-21 | Stop reason: SDUPTHER

## 2022-03-21 RX ORDER — METOPROLOL SUCCINATE 100 MG/1
100 TABLET, EXTENDED RELEASE ORAL EVERY 12 HOURS
Qty: 60 TABLET | Refills: 0 | Status: SHIPPED | OUTPATIENT
Start: 2022-03-21 | End: 2022-04-28

## 2022-03-31 ENCOUNTER — OFFICE VISIT (OUTPATIENT)
Dept: VASCULAR SURGERY | Facility: CLINIC | Age: 77
End: 2022-03-31
Payer: MEDICARE

## 2022-03-31 VITALS
RESPIRATION RATE: 16 BRPM | DIASTOLIC BLOOD PRESSURE: 62 MMHG | SYSTOLIC BLOOD PRESSURE: 138 MMHG | WEIGHT: 305.6 LBS | HEART RATE: 75 BPM | TEMPERATURE: 99.3 F | HEIGHT: 76 IN | BODY MASS INDEX: 37.21 KG/M2

## 2022-03-31 DIAGNOSIS — I48.0 PAROXYSMAL ATRIAL FIBRILLATION (HCC): ICD-10-CM

## 2022-03-31 DIAGNOSIS — I71.4 ABDOMINAL AORTIC ANEURYSM (AAA) WITHOUT RUPTURE (HCC): ICD-10-CM

## 2022-03-31 DIAGNOSIS — I73.9 PAD (PERIPHERAL ARTERY DISEASE) (HCC): Primary | ICD-10-CM

## 2022-03-31 PROCEDURE — 99213 OFFICE O/P EST LOW 20 MIN: CPT | Performed by: NURSE PRACTITIONER

## 2022-03-31 NOTE — PROGRESS NOTES
Assessment/Plan:    AAA (abdominal aortic aneurysm) [facility])  74-year-old male with HTN, HLD, CAD, CABG, AFib on Eliquis, chronic heart failure DM, chronic back pain, AAA, PAD  He returns to the office for yearly visit to review aortoiliac duplex 3/7/22   -AOIL showed 4 4 cm infrarenal AAA   -Blood pressure adequately controlled   -Continue Q 6 month aortoiliac duplex   -Follow-up in the office in 1 year         PAD (peripheral artery disease) ([facility] [address] )  -LEAD 8/2020 showed RLE diffuse femoral-popliteal disease, no focal stenosis, popliteal ectatic 1 1 cm, tibial disease, right CROW 0 89/156/115 LLE diffuse femoral popliteal disease, no focal stenosis, tibial disease, left popliteal 0 9 cm, left CROW 0 9/25/124   -No significant claudication symptoms  Ambulates with a walker   -Repeat duplex in 6 months   -Follow up in the office in 1 year        Diagnoses and all orders for this visit:    PAD (peripheral artery disease) ([facility] [address] )  -     VAS lower limb arterial duplex, complete bilateral; Future    Paroxysmal atrial fibrillation (HCC)    Abdominal aortic aneurysm (AAA) without rupture (HCC)          Subjective:      Patient ID: Tonie Devries is a 65 y o  male  HPI  Patient is here to review AOIL done on 3/7/2022  Patient is asymptomatic at this time  Patient denied any pain with walking and / or leg swelling  Patient is on Eliquis and Atorvastatin  Patient is a former smoker  The following portions of the patient's history were reviewed and updated as appropriate: allergies, current medications, past family history, past medical history, past social history, past surgical history and problem list   ROS reviewed     Review of Systems   Constitutional: Negative  HENT: Negative  Eyes: Negative  Respiratory: Negative  Cardiovascular: Negative  Gastrointestinal: Negative  Endocrine: Negative  Genitourinary: Negative  Musculoskeletal: Negative  Skin: Negative  Allergic/Immunologic: Negative  Neurological: Negative  Hematological: Bruises/bleeds easily  Psychiatric/Behavioral: Negative  Objective:  I have reviewed and made appropriate changes to the review of systems input by the medical assistant      Vitals:    03/31/22 1455   BP: 138/62   BP Location: Right arm   Patient Position: Sitting   Cuff Size: Adult   Pulse: 75   Resp: 16   Temp: 99 3 °F (37 4 °C)   TempSrc: Tympanic   Weight: (!) 139 kg (305 lb 9 6 oz)   Height: 6' 4" (1 93 m)       Patient Active Problem List   Diagnosis    Hypertension    Hyperlipemia    Type 2 diabetes mellitus with hyperglycemia, with long-term current use of insulin (HCC)    Chronic low back pain    CAD (coronary artery disease)    Malignant neoplasm of urinary bladder (HCC)    Arthritis    Paroxysmal atrial fibrillation (HCC)    AAA (abdominal aortic aneurysm) (HCC)    Decreased pedal pulses    Chronic pain of both knees    Actinic keratosis of right temple    Actinic keratosis of scalp    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    Immunization deficiency    Left lower quadrant abdominal pain    Excessive gas    Morbid obesity (HCC)    Colitis    Adrenal nodule (HCC)    LUCIA (obstructive sleep apnea)    Embolism and thrombosis of arteries of the lower extremities (HCC)    Bilateral edema of lower extremity    Hypoxemia    Chronic diastolic CHF (congestive heart failure) (HCC)    Left upper lobe pulmonary nodule    Scaly skin on examination    Fall    Hypercalcemia    Nephrolithiasis    Blister of left leg    Hydroureter on left    Urinary tract infection with hematuria    Myocardial infarction ([address] )    Venous stasis dermatitis of both lower extremities    Blister of right leg    Nipple pain    Folliculitis    Persistent proteinuria    Hypokalemia    Ureteral calculi    Benign prostatic hyperplasia without lower urinary tract symptoms    Atrial fibrillation with rapid ventricular response (HCC)    Cellulitis and abscess of left lower extremity    PAD (peripheral artery disease) ([address] )       Past Surgical History:   Procedure Laterality Date    BACK SURGERY      BLADDER SURGERY      CATARACT EXTRACTION, BILATERAL      COLONOSCOPY  2019    next  [address] GRAFT  2016    Quadruple per Rita    EGD  2017    EYE SURGERY  2016    Cataracts    JOINT REPLACEMENT  [id number]    Hip and shoulder    KIDNEY STONE SURGERY      SC CYSTO/URETERO W/LITHOTRIPSY &INDWELL STENT INSRT Left 2021    Procedure: CYSTOSCOPY URETEROSCOPY WITH LITHOTRIPSY HOLMIUM LASER, AND INSERTION STENT URETERAL/EXCHANGE;  Surgeon: Monika Upton MD;  Location: [facility];  Service: Urology    SC CYSTOURETHROSCOPY,URETER CATHETER Left 2021    Procedure: CYSTOSCOPY  WITH INSERTION STENT URETERAL;  Surgeon: Monika Upton MD;  [facility];  Service: Urology    TOTAL HIP ARTHROPLASTY Right 2012    TOTAL SHOULDER REPLACEMENT Right 2013    VASECTOMY  1971       Family History   Problem Relation Age of Onset    Heart disease Father     Arthritis Father     Heart attack Father     Alzheimer's disease Mother     Dementia Mother        Social History     Socioeconomic History    Marital status: /Civil Union     Spouse name: Alex Carter Number of children: 3    Years of education: Not on file    Highest education level: 12th grade   Occupational History    Not on file   Tobacco Use    Smoking status: Former Smoker     Packs/day: 0 25     Years: 20 00     Pack years: 5 00     Types: Cigarettes     Start date:      Quit date: 2010     Years since quittin 2    Smokeless tobacco: Never Used    Tobacco comment: Quit several times for up to 6 years     Vaping Use    Vaping Use: Never used   Substance and Sexual Activity    Alcohol use: Not Currently     Alcohol/week: 0 0 standard drinks     Comment: No use    Drug use: Never     Comment: No use    Sexual activity: Not Currently Partners: Female   Other Topics Concern    Not on file   Social History Narrative    · Most recent tobacco use screenin2020      · Do you currently or have you served in the [employer]:   No      · Were you activated, into active duty, as a member of the [employer] or as a Reservist:   No      · Live alone or with others:   with others        · Caffeine intake:   Occasional      · Illicit drugs:   No      · Diet:   Regular      · Exercise level: Moderate      · Advance directive: Yes      · Marital status:         · General stress level:   Medium      · Single or multi-level home/work:   multi level home      · Guns present in home:   No      · Seat belts used routinely:   Yes      · Sunscreen used routinely:   Yes      · Smoke alarm in home:   Yes      Social Determinants of Health     Financial Resource Strain: Low Risk     Difficulty of Paying Living Expenses: Not hard at all   Food Insecurity: No Food Insecurity    Worried About Running Out of Food in the Last Year: Never true    Fabi of Food in the Last Year: Never true   Transportation Needs: No Transportation Needs    Lack of Transportation (Medical): No    Lack of Transportation (Non-Medical): No   Physical Activity: Insufficiently Active    Days of Exercise per Week: 4 days    Minutes of Exercise per Session: 10 min   Stress: No Stress Concern Present    Feeling of Stress : Not at all   Social Connections:  Moderately Isolated    Frequency of Communication with Friends and Family: More than three times a week    Frequency of Social Gatherings with Friends and Family: Twice a week    Attends Jewish Services: Never    Active Member of Clubs or Organizations: No    Attends Club or Organization Meetings: Never    Marital Status:    Intimate Partner Violence: Not At Risk    Fear of Current or Ex-Partner: No    Emotionally Abused: No    Physically Abused: No    Sexually Abused: No   Housing Stability: Not on file       No Known Allergies      Current Outpatient Medications:     apixaban (Eliquis) 5 mg, Take 1 tablet (5 mg total) by mouth 2 (two) times a day, Disp: 60 tablet, Rfl: 3    atorvastatin (LIPITOR) 40 mg tablet, TAKE ONE TABLET BY MOUTH ONCE DAILY, Disp: 90 tablet, Rfl: 0    clindamycin (CLINDAGEL) 1 % gel, Apply topically 2 (two) times a day, Disp: 30 g, Rfl: 1    digoxin (LANOXIN) 0 125 mg tablet, Take 1 tablet (125 mcg total) by mouth daily, Disp: 30 tablet, Rfl: 0    ezetimibe (ZETIA) 10 mg tablet, Take 1 tablet (10 mg total) by mouth daily, Disp: 90 tablet, Rfl: 3    furosemide (LASIX) 80 mg tablet, 1 full tablet every morning, 1/2 tablet every afternoon, Disp: 60 tablet, Rfl: 11    insulin aspart (NovoLOG FlexPen) 100 UNIT/ML injection pen, Inject 25 Units under the skin 3 (three) times a day with meals Inject 20-25 units under the skin 3 times day with meals depending on carbohydrate amount , Disp: 15 mL, Rfl: 0    insulin degludec (Tresiba FlexTouch) 100 units/mL injection pen, Inject 90 Units under the skin daily, Disp: 90 mL, Rfl: 1    Lancets (accu-chek multiclix) lancets, Use 1 each 2 (two) times a day Use 2 times daily to test blood glucose, Disp: 102 each, Rfl: 3    lisinopril (ZESTRIL) 20 mg tablet, Take 1 tablet (20 mg total) by mouth daily, Disp: 30 tablet, Rfl: 11    metFORMIN (GLUCOPHAGE) 500 mg tablet, Take 1 tablet (500 mg total) by mouth 2 (two) times a day with meals, Disp: 180 tablet, Rfl: 1    metoprolol succinate (TOPROL-XL) 100 mg 24 hr tablet, Take 1 tablet (100 mg total) by mouth every 12 (twelve) hours, Disp: 60 tablet, Rfl: 0    omeprazole (PriLOSEC) 20 mg delayed release capsule, Take 1 capsule (20 mg total) by mouth daily, Disp: 90 capsule, Rfl: 0    potassium citrate (UROCIT-K) 10 mEq, Take 1 tablet (10 mEq total) by mouth 2 (two) times a day, Disp: 180 tablet, Rfl: 3    Unifine Pentips 31G X 8 MM MISC, Inject under the skin daily Use as directed, Disp: 100 each, Rfl: 3    fluorouracil (EFUDEX) 5 % cream, Apply topically 2 (two) times a day (Patient not taking: Reported on 3/31/2022 ), Disp: 40 g, Rfl: 1      /62 (BP Location: Right arm, Patient Position: Sitting, Cuff Size: Adult)   Pulse 75   Temp 99 3 °F (37 4 °C) (Tympanic)   Resp 16   Ht 6' 4" (1 93 m)   Wt (!) 139 kg (305 lb 9 6 oz)   BMI 37 20 kg/m²          Physical Exam  Vitals and nursing note reviewed  Constitutional:       Appearance: He is well-developed  He is obese  HENT:      Head: Normocephalic and atraumatic  Eyes:      Extraocular Movements: Extraocular movements intact  Cardiovascular:      Pulses:           Femoral pulses are 2+ on the right side and 2+ on the left side  Dorsalis pedis pulses are 0 on the right side and 0 on the left side  Posterior tibial pulses are 0 on the right side and 0 on the left side  Heart sounds: Normal heart sounds  Abdominal:      Palpations: Abdomen is soft  Musculoskeletal:         General: No swelling  Normal range of motion  Cervical back: Neck supple  Skin:     General: Skin is warm  Neurological:      General: No focal deficit present  Mental Status: He is alert and oriented to person, place, and time     Psychiatric:         Mood and Affect: Mood normal          Behavior: Behavior normal

## 2022-03-31 NOTE — ASSESSMENT & PLAN NOTE
-LEAD 8/2020 showed RLE diffuse femoral-popliteal disease, no focal stenosis, popliteal ectatic 1 1 cm, tibial disease, right CROW 0 89/156/115 LLE diffuse femoral popliteal disease, no focal stenosis, tibial disease, left popliteal 0 9 cm, left CROW 0 9/25/124   -No significant claudication symptoms    Ambulates with a walker   -Repeat duplex in 6 months   -Follow up in the office in 1 year

## 2022-03-31 NOTE — PATIENT INSTRUCTIONS
Nonruptured Abdominal Aortic Aneurysm   WHAT YOU NEED TO KNOW:   What is an abdominal aortic aneurysm (AAA)? An AAA is a bulging or weak area in your abdominal aorta  Over time, the bulge may grow and is at risk for tearing or rupturing  The aorta is a large blood vessel that extends from your heart to your abdomen  The part of the aorta that extends into your abdomen is called your abdominal aorta  Your abdominal aorta brings blood to your stomach, pelvis, and legs  An AAA that ruptures is a life-threatening emergency  What increases my risk for an AAA? · Smoking    · High blood pressure or atherosclerosis (the buildup of fat and cholesterol in your arteries)    · Being overweight or obese    · A family or personal history of a AAA    · Age older than 61    · Being male    · A connective tissue disease such as Marfan syndrome    What are the signs and symptoms of an AAA? An AAA usually does not have signs or symptoms if it has not ruptured  If the AAA starts to leak or ruptures, you may have any of the following:  · Sudden pain in your abdomen, groin, back, legs, or buttocks    · Nausea and vomiting    · A lump or swelling in your abdomen    · Stiff abdominal muscles    · Numbness or tingling in your legs    · Pale, sweaty, or clammy skin    · Dizziness, fainting or loss of consciousness    What do I need to know about screening for a nonruptured AAA? Screening means you are checked 1 time based on AAA risk factors  Ultrasound pictures are commonly used  Screening is offered to adults 72to 76years old  · If you are a man:      ? Screening is recommended if you are a current or past smoker  ? Screening may be  recommended if you never smoked but have AAA risk factors  · If you are a woman:      ? Screening may be  recommended if you are a current or past smoker or have AAA risk factors  Your healthcare provider will recommend screening only if the benefits outweigh the risks for you     ?  Screening is not recommended if you never smoked  · Your provider will help you understand the benefits and risks of screening:     ? The benefit  is that your provider can monitor a small AAA or treat a large AAA sooner  ? The risk  is that surgery may be used to treat an AAA that would not have ruptured  Surgery for a nonruptured AAA has risks, but it is less risky than an AAA rupture  How is a nonruptured AAA diagnosed? An AAA is often found when you have a test or exam for another condition  Ultrasound, CT scan, MRI, or angiography pictures may be used to measure the size and location of your AAA  How is an AAA treated? Your AAA may not need treatment  Your healthcare provider may monitor the size of your AAA with tests, such as an ultrasound  You may be given medicines to prevent the AAA from growing  Examples include medicines to lower your blood pressure or cholesterol level  If your AAA gets bigger, starts to leak, or ruptures, you may need any of the following:  · Endovascular repair  is a procedure that uses a graft to repair your AAA  The graft stops blood flow to the aneurysm and protects your abdominal aorta  You may need to have more than 1 endovascular repair  · Open repair  is surgery to repair or remove an AAA  What can I do to manage a nonruptured AAA? You can help prevent your AAA from growing or rupturing by doing the following:  · Do not smoke  Nicotine and other chemicals in cigarettes and cigars can increase your blood pressure  It can also damage your aorta and increase the size of your AAA  Ask your healthcare provider for information if you currently smoke and need help to quit  E-cigarettes or smokeless tobacco still contain nicotine  Talk to your healthcare provider before you use these products  · Exercise as directed  Exercise can help control your blood pressure and cholesterol level   Ask your healthcare provider how much exercise you need each day and which exercises are best for you  · Follow the meal plan recommended by your healthcare provider  Talk to your dietitian about a heart-healthy or low-sodium eating plan  Meal plans will help you lower your cholesterol and blood pressure  They will also help you reach a healthy weight  · Do not lift anything heavier than 10 pounds  Heavy lifting can increase pressure in your abdominal aorta  This can increase your risk for a ruptured AAA  Call your local emergency number (911 in the [address] [facility]), or have someone else call if:   · You faint or lose consciousness  · You cannot be woken  When should I seek immediate care? The following signs or symptoms may mean the AAA is at risk of rupturing:  · You have sudden sharp pain in your abdomen, groin, back, legs, or buttocks  · You have nausea and are vomiting  · You feel dizzy  · You have stiffness or swelling in your abdomen, or a lump in your abdomen  · You have numbness or tingling in your legs  · Your skin is pale, sweaty, or clammy  When should I call my doctor? · You have questions or concerns about your condition or care  CARE AGREEMENT:   You have the right to help plan your care  Learn about your health condition and how it may be treated  Discuss treatment options with your healthcare providers to decide what care you want to receive  You always have the right to refuse treatment  The above information is an  only  It is not intended as medical advice for individual conditions or treatments  Talk to your doctor, nurse or pharmacist before following any medical regimen to see if it is safe and effective for you  © Copyright [employer] 2022 Information is for End User's use only and may not be sold, redistributed or otherwise used for commercial purposes   All illustrations and images included in CareNotes® are the copyrighted property of [employer]  or [employer]

## 2022-03-31 NOTE — ASSESSMENT & PLAN NOTE
59-year-old male with HTN, HLD, CAD, CABG, AFib on Eliquis, chronic heart failure DM, chronic back pain, AAA, PAD    He returns to the office for yearly visit to review aortoiliac duplex 3/7/22   -AOIL showed 4 4 cm infrarenal AAA   -Blood pressure adequately controlled   -Continue Q 6 month aortoiliac duplex   -Follow-up in the office in 1 year

## 2022-04-01 DIAGNOSIS — E11.65 TYPE 2 DIABETES MELLITUS WITH HYPERGLYCEMIA, WITH LONG-TERM CURRENT USE OF INSULIN (HCC): ICD-10-CM

## 2022-04-01 DIAGNOSIS — Z79.4 TYPE 2 DIABETES MELLITUS WITH HYPERGLYCEMIA, WITH LONG-TERM CURRENT USE OF INSULIN (HCC): ICD-10-CM

## 2022-04-01 RX ORDER — INSULIN ASPART 100 [IU]/ML
25 INJECTION, SOLUTION INTRAVENOUS; SUBCUTANEOUS
Qty: 15 ML | Refills: 0 | Status: SHIPPED | OUTPATIENT
Start: 2022-04-01 | End: 2022-04-23 | Stop reason: SDUPTHER

## 2022-04-04 ENCOUNTER — LAB (OUTPATIENT)
Dept: LAB | Facility: AMBULARY SURGERY CENTER | Age: 77
End: 2022-04-04
Payer: MEDICARE

## 2022-04-04 DIAGNOSIS — E83.52 HYPERCALCEMIA: ICD-10-CM

## 2022-04-04 DIAGNOSIS — N20.0 NEPHROLITHIASIS: ICD-10-CM

## 2022-04-04 LAB
ANION GAP SERPL CALCULATED.3IONS-SCNC: 4 MMOL/L (ref 4–13)
BUN SERPL-MCNC: 28 MG/DL (ref 5–25)
CA-I BLD-SCNC: 1.26 MMOL/L (ref 1.12–1.32)
CALCIUM SERPL-MCNC: 10 MG/DL (ref 8.3–10.1)
CHLORIDE SERPL-SCNC: 102 MMOL/L (ref 100–108)
CO2 SERPL-SCNC: 32 MMOL/L (ref 21–32)
CREAT SERPL-MCNC: 1.19 MG/DL (ref 0.6–1.3)
GFR SERPL CREATININE-BSD FRML MDRD: 58 ML/MIN/1.73SQ M
GLUCOSE SERPL-MCNC: 169 MG/DL (ref 65–140)
POTASSIUM SERPL-SCNC: 4.2 MMOL/L (ref 3.5–5.3)
SODIUM SERPL-SCNC: 138 MMOL/L (ref 136–145)

## 2022-04-04 PROCEDURE — 80048 BASIC METABOLIC PNL TOTAL CA: CPT

## 2022-04-04 PROCEDURE — 36415 COLL VENOUS BLD VENIPUNCTURE: CPT

## 2022-04-04 PROCEDURE — 82330 ASSAY OF CALCIUM: CPT

## 2022-04-05 ENCOUNTER — TELEPHONE (OUTPATIENT)
Dept: NEPHROLOGY | Facility: HOSPITAL | Age: 77
End: 2022-04-05

## 2022-04-05 NOTE — RESULT ENCOUNTER NOTE
Please inform patient that creatinine has trended up slightly to 1 1 but ionized calcium is at normal range  Please stress on oral hydration and order for repeat BMP in one month for elevated creatinine   Thank you

## 2022-04-05 NOTE — TELEPHONE ENCOUNTER
----- Message from Miguel Barros MD sent at 4/5/2022  7:59 AM EDT -----  Please inform patient that creatinine has trended up slightly to 1 1 but ionized calcium is at normal range  Please stress on oral hydration and order for repeat BMP in one month for elevated creatinine   Thank you

## 2022-04-10 ENCOUNTER — PATIENT MESSAGE (OUTPATIENT)
Dept: ENDOCRINOLOGY | Facility: CLINIC | Age: 77
End: 2022-04-10

## 2022-04-11 DIAGNOSIS — E11.65 TYPE 2 DIABETES MELLITUS WITH HYPERGLYCEMIA, WITH LONG-TERM CURRENT USE OF INSULIN (HCC): Primary | ICD-10-CM

## 2022-04-11 DIAGNOSIS — Z79.4 TYPE 2 DIABETES MELLITUS WITH HYPERGLYCEMIA, WITH LONG-TERM CURRENT USE OF INSULIN (HCC): Primary | ICD-10-CM

## 2022-04-11 RX ORDER — FLASH GLUCOSE SENSOR
KIT MISCELLANEOUS
Qty: 1 EACH | Refills: 0 | Status: SHIPPED | OUTPATIENT
Start: 2022-04-11

## 2022-04-17 DIAGNOSIS — E11.65 TYPE 2 DIABETES MELLITUS WITH HYPERGLYCEMIA, WITH LONG-TERM CURRENT USE OF INSULIN (HCC): ICD-10-CM

## 2022-04-17 DIAGNOSIS — Z79.4 TYPE 2 DIABETES MELLITUS WITH HYPERGLYCEMIA, WITH LONG-TERM CURRENT USE OF INSULIN (HCC): ICD-10-CM

## 2022-04-21 DIAGNOSIS — I48.91 ATRIAL FIBRILLATION WITH RAPID VENTRICULAR RESPONSE (HCC): ICD-10-CM

## 2022-04-22 RX ORDER — DIGOXIN 125 MCG
125 TABLET ORAL DAILY
Qty: 30 TABLET | Refills: 0 | Status: SHIPPED | OUTPATIENT
Start: 2022-04-22 | End: 2022-04-23 | Stop reason: SDUPTHER

## 2022-04-23 DIAGNOSIS — I48.91 ATRIAL FIBRILLATION WITH RAPID VENTRICULAR RESPONSE (HCC): ICD-10-CM

## 2022-04-23 DIAGNOSIS — Z79.4 TYPE 2 DIABETES MELLITUS WITH HYPERGLYCEMIA, WITH LONG-TERM CURRENT USE OF INSULIN (HCC): ICD-10-CM

## 2022-04-23 DIAGNOSIS — E11.65 TYPE 2 DIABETES MELLITUS WITH HYPERGLYCEMIA, WITH LONG-TERM CURRENT USE OF INSULIN (HCC): ICD-10-CM

## 2022-04-25 RX ORDER — INSULIN ASPART 100 [IU]/ML
25 INJECTION, SOLUTION INTRAVENOUS; SUBCUTANEOUS
Qty: 15 ML | Refills: 0 | Status: SHIPPED | OUTPATIENT
Start: 2022-04-25 | End: 2022-05-18

## 2022-04-26 DIAGNOSIS — I48.91 ATRIAL FIBRILLATION WITH RAPID VENTRICULAR RESPONSE (HCC): ICD-10-CM

## 2022-04-27 ENCOUNTER — LAB (OUTPATIENT)
Dept: LAB | Facility: AMBULARY SURGERY CENTER | Age: 77
End: 2022-04-27
Payer: MEDICARE

## 2022-04-27 DIAGNOSIS — N20.0 NEPHROLITHIASIS: ICD-10-CM

## 2022-04-27 DIAGNOSIS — Z79.4 TYPE 2 DIABETES MELLITUS WITH HYPERGLYCEMIA, WITH LONG-TERM CURRENT USE OF INSULIN (HCC): ICD-10-CM

## 2022-04-27 DIAGNOSIS — E11.65 TYPE 2 DIABETES MELLITUS WITH HYPERGLYCEMIA, WITH LONG-TERM CURRENT USE OF INSULIN (HCC): ICD-10-CM

## 2022-04-27 DIAGNOSIS — E83.52 HYPERCALCEMIA: ICD-10-CM

## 2022-04-27 DIAGNOSIS — I50.32 CHRONIC DIASTOLIC CHF (CONGESTIVE HEART FAILURE) (HCC): ICD-10-CM

## 2022-04-27 DIAGNOSIS — E78.2 MIXED HYPERLIPIDEMIA: ICD-10-CM

## 2022-04-27 DIAGNOSIS — E87.6 HYPOKALEMIA: ICD-10-CM

## 2022-04-27 DIAGNOSIS — R80.1 PERSISTENT PROTEINURIA: ICD-10-CM

## 2022-04-27 LAB
25(OH)D3 SERPL-MCNC: 38.4 NG/ML (ref 30–100)
ALBUMIN SERPL BCP-MCNC: 3.2 G/DL (ref 3.5–5)
ALP SERPL-CCNC: 88 U/L (ref 46–116)
ALT SERPL W P-5'-P-CCNC: 18 U/L (ref 12–78)
ANION GAP SERPL CALCULATED.3IONS-SCNC: 3 MMOL/L (ref 4–13)
AST SERPL W P-5'-P-CCNC: 14 U/L (ref 5–45)
BILIRUB SERPL-MCNC: 0.3 MG/DL (ref 0.2–1)
BUN SERPL-MCNC: 28 MG/DL (ref 5–25)
CA-I BLD-SCNC: 1.21 MMOL/L (ref 1.12–1.32)
CALCIUM ALBUM COR SERPL-MCNC: 10.4 MG/DL (ref 8.3–10.1)
CALCIUM SERPL-MCNC: 9.8 MG/DL (ref 8.3–10.1)
CHLORIDE SERPL-SCNC: 102 MMOL/L (ref 100–108)
CHOLEST SERPL-MCNC: 137 MG/DL
CO2 SERPL-SCNC: 33 MMOL/L (ref 21–32)
CREAT SERPL-MCNC: 1.04 MG/DL (ref 0.6–1.3)
CREAT UR-MCNC: 70.9 MG/DL
CREAT UR-MCNC: 75.4 MG/DL
EST. AVERAGE GLUCOSE BLD GHB EST-MCNC: 200 MG/DL
GFR SERPL CREATININE-BSD FRML MDRD: 68 ML/MIN/1.73SQ M
GLUCOSE P FAST SERPL-MCNC: 190 MG/DL (ref 65–99)
HBA1C MFR BLD: 8.6 %
HDLC SERPL-MCNC: 32 MG/DL
LDLC SERPL CALC-MCNC: 60 MG/DL (ref 0–100)
MAGNESIUM SERPL-MCNC: 1.7 MG/DL (ref 1.6–2.6)
MICROALBUMIN UR-MCNC: 38.3 MG/L (ref 0–20)
MICROALBUMIN/CREAT 24H UR: 51 MG/G CREATININE (ref 0–30)
NONHDLC SERPL-MCNC: 105 MG/DL
PHOSPHATE SERPL-MCNC: 3.1 MG/DL (ref 2.3–4.1)
POTASSIUM SERPL-SCNC: 4.2 MMOL/L (ref 3.5–5.3)
PROT SERPL-MCNC: 7.7 G/DL (ref 6.4–8.2)
PROT UR-MCNC: 14 MG/DL
PROT/CREAT UR: 0.2 MG/G{CREAT} (ref 0–0.1)
PTH-INTACT SERPL-MCNC: 116.9 PG/ML (ref 18.4–80.1)
SODIUM SERPL-SCNC: 138 MMOL/L (ref 136–145)
TRIGL SERPL-MCNC: 223 MG/DL

## 2022-04-27 PROCEDURE — 83970 ASSAY OF PARATHORMONE: CPT

## 2022-04-27 PROCEDURE — 80053 COMPREHEN METABOLIC PANEL: CPT

## 2022-04-27 PROCEDURE — 84100 ASSAY OF PHOSPHORUS: CPT

## 2022-04-27 PROCEDURE — 82570 ASSAY OF URINE CREATININE: CPT

## 2022-04-27 PROCEDURE — 82306 VITAMIN D 25 HYDROXY: CPT

## 2022-04-27 PROCEDURE — 36415 COLL VENOUS BLD VENIPUNCTURE: CPT

## 2022-04-27 PROCEDURE — 83735 ASSAY OF MAGNESIUM: CPT

## 2022-04-27 PROCEDURE — 80061 LIPID PANEL: CPT

## 2022-04-27 PROCEDURE — 82043 UR ALBUMIN QUANTITATIVE: CPT

## 2022-04-27 PROCEDURE — 84156 ASSAY OF PROTEIN URINE: CPT

## 2022-04-27 PROCEDURE — 83036 HEMOGLOBIN GLYCOSYLATED A1C: CPT

## 2022-04-27 PROCEDURE — 82330 ASSAY OF CALCIUM: CPT

## 2022-04-27 NOTE — RESULT ENCOUNTER NOTE
Please inform patient that renal function stable at creatinine 1 0  Calcium level 10 4 and improving from previous level of 10 5 mg/dl  continue same treatment  Continue oral hydration

## 2022-04-28 ENCOUNTER — TELEPHONE (OUTPATIENT)
Dept: NEPHROLOGY | Facility: CLINIC | Age: 77
End: 2022-04-28

## 2022-04-28 RX ORDER — METOPROLOL SUCCINATE 100 MG/1
TABLET, EXTENDED RELEASE ORAL
Qty: 60 TABLET | Refills: 0 | Status: SHIPPED | OUTPATIENT
Start: 2022-04-28 | End: 2022-05-03

## 2022-04-28 NOTE — TELEPHONE ENCOUNTER
Message left on patient's VM that kidney function is stable  Calcium level improving  No changes per Dr Colonel Nash   Continue oral hydration       ----- Message from Samantha Sotelo MD sent at 4/27/2022  1:49 PM EDT -----  Please inform patient that renal function stable at creatinine 1 0  Calcium level 10 4 and improving from previous level of 10 5 mg/dl  continue same treatment  Continue oral hydration

## 2022-04-29 RX ORDER — DIGOXIN 125 MCG
125 TABLET ORAL DAILY
Qty: 30 TABLET | Refills: 0 | Status: SHIPPED | OUTPATIENT
Start: 2022-04-29 | End: 2022-06-21 | Stop reason: SDUPTHER

## 2022-04-29 RX ORDER — METOPROLOL SUCCINATE 100 MG/1
100 TABLET, EXTENDED RELEASE ORAL EVERY 12 HOURS
Qty: 60 TABLET | Refills: 0 | Status: SHIPPED | OUTPATIENT
Start: 2022-04-29 | End: 2022-05-29 | Stop reason: SDUPTHER

## 2022-05-03 ENCOUNTER — OFFICE VISIT (OUTPATIENT)
Dept: ENDOCRINOLOGY | Facility: CLINIC | Age: 77
End: 2022-05-03
Payer: MEDICARE

## 2022-05-03 VITALS
DIASTOLIC BLOOD PRESSURE: 58 MMHG | HEART RATE: 62 BPM | HEIGHT: 76 IN | BODY MASS INDEX: 37.06 KG/M2 | SYSTOLIC BLOOD PRESSURE: 116 MMHG | WEIGHT: 304.38 LBS

## 2022-05-03 DIAGNOSIS — Z79.4 TYPE 2 DIABETES MELLITUS WITH HYPERGLYCEMIA, WITH LONG-TERM CURRENT USE OF INSULIN (HCC): ICD-10-CM

## 2022-05-03 DIAGNOSIS — E11.65 TYPE 2 DIABETES MELLITUS WITH HYPERGLYCEMIA, WITH LONG-TERM CURRENT USE OF INSULIN (HCC): ICD-10-CM

## 2022-05-03 DIAGNOSIS — E83.52 HYPERCALCEMIA: ICD-10-CM

## 2022-05-03 DIAGNOSIS — E27.8 MASS OF RIGHT ADRENAL GLAND (HCC): Primary | ICD-10-CM

## 2022-05-03 PROCEDURE — 95251 CONT GLUC MNTR ANALYSIS I&R: CPT | Performed by: INTERNAL MEDICINE

## 2022-05-03 PROCEDURE — 99214 OFFICE O/P EST MOD 30 MIN: CPT | Performed by: INTERNAL MEDICINE

## 2022-05-03 RX ORDER — INSULIN DEGLUDEC 200 U/ML
INJECTION, SOLUTION SUBCUTANEOUS
Qty: 12 ML | Refills: 5 | Status: SHIPPED | OUTPATIENT
Start: 2022-05-03

## 2022-05-03 NOTE — PROGRESS NOTES
Established Patient Progress Note      Chief Complaint   Patient presents with    Diabetes Type 2    Hyperparathyroidism    Adrenal Mass        History of Present Illness:   Baron Arreaga is a 65 y o  male with HTN, HLD, sleep apnea, adrenal adenoma, and type 2 diabetes with long term use of insulin since about 27-28 years ago  Last seen in the office 2/2022  Denies complications  He had a significant hospitalization from 12/27/2021-12/30/2021 with left lower extremity cellulitis with abscess formation and rapid atrial fibrillation  Also in the hospital 1/7/2022 for rapid atrial fibrillation, cardioversion was completed and was successful  He denies issues with atrial fibrillation since cardioversion  He wears a fitbit to monitor HR at all times  Denies recent severe hypoglycemic or severe hyperglycemic episodes  He start using a Peggy with good effect  He is rotating injection sites, holding insulin in for at least 10 seconds and taking with every meal before he eats  He did not tolerate higher doses of metformin  Previously treated with Pioglitazone which caused edema  Did not tolerate GLP-1 agonist due to nausea  SGLT2 inhibitors are avoided due to history of urosepsis/nephrolithiasis  ht  He usually consumes 15-30 carbs with each meal          Current regimen:   Metformin 500 mg twice daily  Tresiba 90units once daily  Novolog up to 25 units with every meal depending on carb amount    Baron Arreaga   Device used   Home use      Indication   Type 2 Diabetes    More than 72 hours of data was reviewed  Report to be scanned to chart  Date Range: 4/20/2022-5/3/2022    Analysis of data:   Average Glucose: 190  Coefficient of Variation:  SD : 18  Time in Target Range: 43%  Time Above Range: 52% (V high 5%)  Time Below Range:0%    Interpretation of data:   Fewer glycemic excursions after meals   Baseline and overnight glucoses     Last Eye Exam: 4/2022, reports he has no retinopathy   Last Foot Exam: 10/13/2021, sees podiatry every 6 weeks     Has hypertension: Taking lisinopril and Toprol XL  Has hyperlipidemia: Taking atorvastatin and zetia   Vitamin D Deficiency: Taking 1,000 units daily       He has seen nephrology, Dr Luisa Black      He also has a 4 1cm adrenal adenoma which has been evaluated by Dr Anette Cesar  Reviewed Dr Amanda Sierra workup from feb/march 2021  Aldosterone and Plama metanephrines were normal  2/3/2021 dexamethasone suppression testing did not suppress x 2 so additional testing was performed  DHEAS and ACTH were normal  1 salivary cortisol mildly elevated, 1 was normal    24-hour urine cortisol was normal  Repeat imaging done 1/2021 showed unchanged right adrenal nodule  Calcium has been elevated  PTH elevated but has improved  24 hour urine was completed by nephrology and showed normal calcium level, which does not support [address]  He has a history of kidney stones and he takes  potassium citrate  Vitamin D level was low at last visit, started OTC Vitamin D 1,000 units daily and increased dietary calcium intake  PTH was improving but is again elevated to 116 with calcium of 10 4 (corrected)     Vitamin D level has improved  DEXA done 3/2021 showed normal bone density  Denies recent falls or fractures        He also has a 4 1cm right adrenal adenoma which was evaluated by Dr Anette Cesar  Reviewed Dr Amanda Sierra workup from feb/march 2021  Aldosterone and Plama metanephrines were normal  2/3/2021 dexamethasone suppression testing did not suppress x 2 so additional testing was performed  DHEAS and ACTH were normal  1 salivary cortisol mildly elevated, 1 was normal    24-hour urine cortisol was normal  Repeat imaging not yet completed        Has extensive cardiac hx (sees Dr Juwan Bustillo)  Afib was cardioverted, but also hx of CAD with CABG and AAA       Patient Active Problem List   Diagnosis    Hypertension    Hyperlipemia    Type 2 diabetes mellitus with hyperglycemia, with long-term current use of insulin ([address] )    Chronic low back pain    CAD (coronary artery disease)    Malignant neoplasm of urinary bladder (HCC)    Arthritis    Paroxysmal atrial fibrillation (HCC)    AAA (abdominal aortic aneurysm) (HCC)    Decreased pedal pulses    Chronic pain of both knees    Actinic keratosis of right temple    Actinic keratosis of scalp    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    Immunization deficiency    Left lower quadrant abdominal pain    Excessive gas    Morbid obesity (HCC)    Colitis    Adrenal nodule (HCC)    LUCIA (obstructive sleep apnea)    Embolism and thrombosis of arteries of the lower extremities (HCC)    Bilateral edema of lower extremity    Hypoxemia    Chronic diastolic CHF (congestive heart failure) (HCC)    Left upper lobe pulmonary nodule    Scaly skin on examination    Fall    Hypercalcemia    Nephrolithiasis    Blister of left leg    Hydroureter on left    Urinary tract infection with hematuria    Myocardial infarction ([address] )    Venous stasis dermatitis of both lower extremities    Blister of right leg    Nipple pain    Folliculitis    Persistent proteinuria    Hypokalemia    Ureteral calculi    Benign prostatic hyperplasia without lower urinary tract symptoms    Atrial fibrillation with rapid ventricular response (HCC)    Cellulitis and abscess of left lower extremity    PAD (peripheral artery disease) (HCC)      Past Medical History:   Diagnosis Date    A-fib [facility])     AAA (abdominal aortic aneurysm) (HCC)     Actinic keratosis of right temple 7/31/2020    Actinic keratosis of scalp 7/31/2020    Acute respiratory failure with hypoxia ([address] ) 3/25/2021    Allergic 1960    Arthritis     Lay's esophagus     Bladder cancer (HCC)     Blister of left leg 4/28/2021    Blister of right leg 5/26/2021    CAD (coronary artery disease)     Cancer (HCC) 02/2010    Bladder    CHF (congestive heart failure) (HCC)     Chronic low back pain     Chronic pain of both knees 7/31/2020    Colitis 12/4/2020    CPAP (continuous positive airway pressure) dependence     Diabetes [facility]     Diabetes mellitus ([facility] ) 10/1993    Excessive gas 12/3/2020    Heart murmur     History of chemotherapy     Hydroureter on left 4/28/2021    Hyperlipemia     Hypertension     Immunization deficiency 10/30/2020    Hep a nonimmune    Kidney stone     Left lower quadrant abdominal pain 12/3/2020    Mass of right adrenal gland ([facility] ) 12/4/2020    4 1 cm    Myocardial infarction ([facility] )     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    Obesity 2000    LUCIA (obstructive sleep apnea) 1/29/2021    Pressure ulcer of right leg, stage 1 5/26/2021    Urinary tract infection with hematuria 5/3/2021    u cx 4/2021=pseudomonas R to bactrim and cefdinir, S to cipro    Venous stasis dermatitis of both lower extremities 5/26/2021      Past Surgical History:   Procedure Laterality Date    BACK SURGERY      BLADDER SURGERY      CATARACT EXTRACTION, BILATERAL      COLONOSCOPY  01/29/2019    next [address] GRAFT  04/2016    Quadruple per Rita    EGD  06/2017    EYE SURGERY  11/2016    Cataracts    JOINT REPLACEMENT  [id number]    Hip and shoulder    KIDNEY STONE SURGERY      NM CYSTO/URETERO W/LITHOTRIPSY &INDWELL STENT INSRT Left 5/21/2021    Procedure: CYSTOSCOPY URETEROSCOPY WITH LITHOTRIPSY HOLMIUM LASER, AND INSERTION STENT URETERAL/EXCHANGE;  Surgeon: Manuel Park MD;  [facility];  Service: Urology    NM CYSTOURETHROSCOPY,URETER CATHETER Left 4/24/2021    Procedure: CYSTOSCOPY  WITH INSERTION STENT URETERAL;  Surgeon: Manuel Park MD;  [facility];  Service: Urology    TOTAL HIP ARTHROPLASTY Right 2012    TOTAL SHOULDER REPLACEMENT Right 2013    VASECTOMY  1971      Family History   Problem Relation Age of Onset    Heart disease Father     Arthritis Father     Heart attack Father     Alzheimer's disease Mother     Dementia Mother Social History     Tobacco Use    Smoking status: Former Smoker     Packs/day: 0 25     Years: 20 00     Pack years: 5 00     Types: Cigarettes     Start date:      Quit date: 2010     Years since quittin 3    Smokeless tobacco: Never Used    Tobacco comment: Quit several times for up to 6 years     Substance Use Topics    Alcohol use: Not Currently     Alcohol/week: 0 0 standard drinks     Comment: No use     No Known Allergies      Current Outpatient Medications:     apixaban (Eliquis) 5 mg, Take 1 tablet (5 mg total) by mouth 2 (two) times a day, Disp: 60 tablet, Rfl: 3    atorvastatin (LIPITOR) 40 mg tablet, TAKE ONE TABLET BY MOUTH ONCE DAILY, Disp: 90 tablet, Rfl: 0    clindamycin (CLINDAGEL) 1 % gel, Apply topically 2 (two) times a day, Disp: 30 g, Rfl: 1    Continuous Blood Gluc Sensor (FreeStyle Peggy 2 Sensor) MISC, Change it every 14 days, Disp: 1 each, Rfl: 0    digoxin (LANOXIN) 0 125 mg tablet, Take 1 tablet (125 mcg total) by mouth daily, Disp: 30 tablet, Rfl: 0    ezetimibe (ZETIA) 10 mg tablet, Take 1 tablet (10 mg total) by mouth daily, Disp: 90 tablet, Rfl: 3    furosemide (LASIX) 80 mg tablet, 1 full tablet every morning, 1/2 tablet every afternoon, Disp: 60 tablet, Rfl: 11    insulin aspart (NovoLOG FlexPen) 100 UNIT/ML injection pen, Inject 25 Units under the skin 3 (three) times a day with meals Inject 20-25 units under the skin 3 times day with meals depending on carbohydrate amount , Disp: 15 mL, Rfl: 0    Lancets (accu-chek multiclix) lancets, Use 1 each 2 (two) times a day Use 2 times daily to test blood glucose, Disp: 102 each, Rfl: 3    lisinopril (ZESTRIL) 20 mg tablet, Take 1 tablet (20 mg total) by mouth daily, Disp: 30 tablet, Rfl: 11    metFORMIN (GLUCOPHAGE) 500 mg tablet, Take 1 tablet (500 mg total) by mouth 2 (two) times a day with meals, Disp: 180 tablet, Rfl: 0    metoprolol succinate (TOPROL-XL) 100 mg 24 hr tablet, Take 1 tablet (100 mg total) by mouth every 12 (twelve) hours, Disp: 60 tablet, Rfl: 0    omeprazole (PriLOSEC) 20 mg delayed release capsule, Take 1 capsule (20 mg total) by mouth daily, Disp: 90 capsule, Rfl: 0    potassium citrate (UROCIT-K) 10 mEq, Take 1 tablet (10 mEq total) by mouth 2 (two) times a day, Disp: 180 tablet, Rfl: 3    Unifine Pentips 31G X 8 MM MISC, Inject under the skin daily Use as directed, Disp: 100 each, Rfl: 3    fluorouracil (EFUDEX) 5 % cream, Apply topically 2 (two) times a day (Patient not taking: Reported on 3/31/2022 ), Disp: 40 g, Rfl: 1    insulin degludec Acquanetta Buttner FlexTouch) 200 units/mL CONCENTRATED U-200 injection pen, Use 95units subcut once daily, Disp: 12 mL, Rfl: 5    Review of Systems   Constitutional: Positive for fatigue  Negative for activity change (walking more) and unexpected weight change (weight loss - trying )  HENT: Negative for trouble swallowing and voice change  Eyes: Negative for visual disturbance  Respiratory: Negative for chest tightness and shortness of breath  Cardiovascular: Negative for chest pain and palpitations  Gastrointestinal: Negative for constipation, diarrhea and nausea  Endocrine: Positive for polyuria  Genitourinary: Negative for frequency  Musculoskeletal: Positive for gait problem  Neurological: Negative for tremors, weakness and numbness  Psychiatric/Behavioral: The patient is not nervous/anxious  All other systems reviewed and are negative  Physical Exam:  Body mass index is 37 05 kg/m²  /58 (BP Location: Left arm, Patient Position: Sitting, Cuff Size: Large)   Pulse 62   Ht 6' 4" (1 93 m)   Wt (!) 138 kg (304 lb 6 oz)   BMI 37 05 kg/m²    Wt Readings from Last 3 Encounters:   05/03/22 (!) 138 kg (304 lb 6 oz)   03/31/22 (!) 139 kg (305 lb 9 6 oz)   03/14/22 (!) 137 kg (303 lb)         Physical Exam   Gen: appears well-developed and well-nourished  No apparent distress   Seated in wheelchair  Head: Normocephalic and atraumatic  Eyes: no stare or proptosis, no periorbital edema  E/N/M nl facies, hearing decreased  Neck: range of motion  Pulmonary/Chest: breathing  comfortably, no accessory muscle use, effort normal    Musculoskeletal: moves upper extremities  Neurological: alert and oriented to person, place, and time  No upper ext tremor appreciated  Skin: does not appear diaphoretic, no facial plethora  Psychiatric: normal mood and affect; behavior is normal; no gross lapses in memory, answer questions appropriately    Labs:   Lab Results   Component Value Date    HGBA1C 8 6 (H) 04/27/2022    HGBA1C 9 1 (H) 01/14/2022    HGBA1C 9 0 (H) 10/06/2021     Lab Results   Component Value Date    CREATININE 1 04 04/27/2022    CREATININE 1 19 04/04/2022    CREATININE 0 87 01/14/2022    BUN 28 (H) 04/27/2022    K 4 2 04/27/2022     04/27/2022    CO2 33 (H) 04/27/2022     eGFR   Date Value Ref Range Status   04/27/2022 68 ml/min/1 73sq m Final     Lab Results   Component Value Date    HDL 32 (L) 04/27/2022    TRIG 223 (H) 04/27/2022     Lab Results   Component Value Date    ALT 18 04/27/2022    AST 14 04/27/2022    ALKPHOS 88 04/27/2022     Lab Results   Component Value Date    XNE2KHVNFSVF 0 984 12/27/2021    RGN3MJELAJXP 1 300 07/26/2021    OHG3VXYEDSVN 1 184 08/11/2020       Impression & Plan:    Problem List Items Addressed This Visit        Endocrine    Type 2 diabetes mellitus with hyperglycemia, with long-term current use of insulin (HCC)    Relevant Medications    insulin degludec [facility] FlexTouch) 200 units/mL CONCENTRATED U-200 injection pen    Other Relevant Orders    Hemoglobin A1C       Other    Hypercalcemia    Relevant Orders    NM parathyroid scan w spect      Other Visit Diagnoses     Mass of right adrenal gland ([address] )    -  Primary    Relevant Orders    CT abdomen wo contrast          Orders Placed This Encounter   Procedures    CT abdomen wo contrast     hx of adrenal  Adenoma with washout c/w adenom   Please eval for change in size only     Standing Status:   Future     Standing Expiration Date:   5/3/2026     Scheduling Instructions:      FOR PO CONTRAST:      The patient will need to drink barium for this test  The barium needs to be picked up in the registration area at least one day prior to the patients study  For out of network (non-[facility]) orders please bring your prescription when picking up oral contrast  Further instructions will be given at time of pickup  FOR ALL OTHER CONTRAST SCENARIOS:      Please refer to the Patient Instructions in the Appointment Review window at scheduling  If possible wear clothing without any metal in the abdomen area  Sweat suit, sports bra or bra without underwire may eliminate the need to change  Please bring your insurance cards, a form of photo ID and a list of your medications with you  Arrive 15 minutes prior to your appointment time in order to register  On the day of your test, please bring any prior CT or MRI studies of this area with you that were not performed at a [facility] facility  To schedule this appointment, please contact Central Scheduling at [phone]  Order Specific Question:   What is the patient's sedation requirement? Answer:   No Sedation     Order Specific Question:   Did the patient ever have bariatric surgery? Answer:   No     Order Specific Question:   Contrast information:     Answer:   No contrast     Order Specific Question:   [facility]     Answer:   Immediate     Order Specific Question:   Reason for Exam (FREE TEXT)     Answer:   hx of adrenal  adenoma    NM parathyroid scan w spect     hx of adrenal  adenoma     Standing Status:   Future     Standing Expiration Date:   5/3/2026     Scheduling Instructions:      Please bring your insurance cards, a form of photo ID and a list of your medications with you   Arrive 15 minutes prior to your appointment time in order to register  To schedule this appointment, please contact Central Scheduling at [phone]  Order Specific Question:   Reason for Exam:     Answer:   hypercalcemia, hyperparathyroidism    Hemoglobin A1C     Standing Status:   Future     Standing Expiration Date:   5/3/2023       1  T2DM: A1c has improved and he is using his Douglass well  I asked that he increase Tresiba to 95units and will order U-200 to inject less liquid  May need an adjustment to 100units daily if baseline Bgs remain elevated and Time in Range has not improved  Will continue Novolog 25units with meals and metformin 500mg twice daily  2  Right adrenal adenoma: biochemical eval was unremarkable  At 4cm, will order a anon-contrast CT to assess size, but will not order contrast    3  Hypercalcemia, hyperparathyroidism: PTH has again increased  Will order a sestamibi scan to evaluate neck in case there is an adenoma  Patient Instructions   Please increase the Li Hora to 95units  Keep the Novolog and metformin the same  Please get the CT scan for the adrenal and the sestamibi scan for the high calcium    Discussed with the patient and all questioned fully answered  He will call me if any problems arise      KACEY Parisi

## 2022-05-03 NOTE — PATIENT INSTRUCTIONS
Please increase the Tresiba to 95units  Keep the Novolog and metformin the same      Please get the CT scan for the adrenal and the sestamibi scan for the high calcium

## 2022-05-18 DIAGNOSIS — E11.65 TYPE 2 DIABETES MELLITUS WITH HYPERGLYCEMIA, WITH LONG-TERM CURRENT USE OF INSULIN (HCC): ICD-10-CM

## 2022-05-18 DIAGNOSIS — Z79.4 TYPE 2 DIABETES MELLITUS WITH HYPERGLYCEMIA, WITH LONG-TERM CURRENT USE OF INSULIN (HCC): ICD-10-CM

## 2022-05-18 RX ORDER — INSULIN ASPART 100 [IU]/ML
INJECTION, SOLUTION INTRAVENOUS; SUBCUTANEOUS
Qty: 15 ML | Refills: 0 | Status: SHIPPED | OUTPATIENT
Start: 2022-05-18 | End: 2022-06-13

## 2022-05-21 DIAGNOSIS — I25.10 CORONARY ARTERY DISEASE DUE TO LIPID RICH PLAQUE: ICD-10-CM

## 2022-05-21 DIAGNOSIS — I25.83 CORONARY ARTERY DISEASE DUE TO LIPID RICH PLAQUE: ICD-10-CM

## 2022-05-23 RX ORDER — ATORVASTATIN CALCIUM 40 MG/1
40 TABLET, FILM COATED ORAL DAILY
Qty: 90 TABLET | Refills: 0 | Status: SHIPPED | OUTPATIENT
Start: 2022-05-23

## 2022-05-29 DIAGNOSIS — I48.91 ATRIAL FIBRILLATION WITH RAPID VENTRICULAR RESPONSE (HCC): ICD-10-CM

## 2022-05-29 DIAGNOSIS — K21.9 GASTROESOPHAGEAL REFLUX DISEASE, UNSPECIFIED WHETHER ESOPHAGITIS PRESENT: ICD-10-CM

## 2022-05-29 RX ORDER — OMEPRAZOLE 20 MG/1
CAPSULE, DELAYED RELEASE ORAL
Qty: 90 CAPSULE | Refills: 0 | Status: SHIPPED | OUTPATIENT
Start: 2022-05-29 | End: 2022-08-05 | Stop reason: SDUPTHER

## 2022-05-31 ENCOUNTER — PATIENT MESSAGE (OUTPATIENT)
Dept: FAMILY MEDICINE CLINIC | Facility: CLINIC | Age: 77
End: 2022-05-31

## 2022-05-31 DIAGNOSIS — R10.84 GENERALIZED ABDOMINAL PAIN: Primary | ICD-10-CM

## 2022-05-31 RX ORDER — METOPROLOL SUCCINATE 100 MG/1
100 TABLET, EXTENDED RELEASE ORAL EVERY 12 HOURS
Qty: 60 TABLET | Refills: 0 | Status: SHIPPED | OUTPATIENT
Start: 2022-05-31 | End: 2022-06-27

## 2022-06-01 ENCOUNTER — HOSPITAL ENCOUNTER (OUTPATIENT)
Dept: RADIOLOGY | Facility: HOSPITAL | Age: 77
Discharge: HOME | End: 2022-06-01
Attending: INTERNAL MEDICINE
Payer: MEDICARE

## 2022-06-01 ENCOUNTER — TELEPHONE (OUTPATIENT)
Dept: GASTROENTEROLOGY | Facility: CLINIC | Age: 77
End: 2022-06-01

## 2022-06-01 DIAGNOSIS — E27.8 MASS OF RIGHT ADRENAL GLAND (HCC): ICD-10-CM

## 2022-06-01 PROCEDURE — 74150 CT ABDOMEN W/O CONTRAST: CPT

## 2022-06-01 PROCEDURE — G1004 CDSM NDSC: HCPCS

## 2022-06-01 RX ORDER — OMEPRAZOLE 20 MG/1
20 CAPSULE, DELAYED RELEASE ORAL DAILY
Qty: 90 CAPSULE | Refills: 0 | Status: SHIPPED | OUTPATIENT
Start: 2022-06-01 | End: 2022-08-08 | Stop reason: SDUPTHER

## 2022-06-01 NOTE — TELEPHONE ENCOUNTER
I lmom for pt to please call back to schedule an appt with Dr Sam Bassett  If pt does not call back, please call again in one week  Thank you!

## 2022-06-01 NOTE — TELEPHONE ENCOUNTER
----- Message from Lai Palmer MD sent at 6/1/2022 11:14 AM EDT -----  Regarding: FW: Referral  Please call him and schedule  OV  for abdominal pain   Thanks    ----- Message -----  From: Apurva Starks MD  Sent: 6/1/2022  10:29 AM EDT  To: Lai Palmer MD  Subject: FW: Referral                                     Hi Dr Billy Martell, can you see this pt for abdomnial cramp, he saw you in the past  Thank you    ----- Message -----  From: Fabiola Berumen  Sent: 5/31/2022   1:05 PM EDT  To: Apurva Starks MD, KACEY Greco  Subject: FW: Referral                                       ----- Message -----  From: Mary Alicia  Sent: 5/31/2022   9:46 AM EDT  To: [facility]  Subject: Referral                                         This message is being sent by Alysia Luong on behalf of Mary Alicia  Good morning:  Do I need a referral from you to schedule a appointment with Dr Billy Martell ? I am having a lot of pain and cramps in my stomach     Please advise  Ayaz Javier

## 2022-06-02 ENCOUNTER — OFFICE VISIT (OUTPATIENT)
Dept: DIABETES SERVICES | Facility: CLINIC | Age: 77
End: 2022-06-02
Payer: MEDICARE

## 2022-06-02 VITALS — BODY MASS INDEX: 37.25 KG/M2 | WEIGHT: 306 LBS

## 2022-06-02 DIAGNOSIS — Z79.4 TYPE 2 DIABETES MELLITUS WITH HYPERGLYCEMIA, WITH LONG-TERM CURRENT USE OF INSULIN (HCC): Primary | ICD-10-CM

## 2022-06-02 DIAGNOSIS — E11.65 TYPE 2 DIABETES MELLITUS WITH HYPERGLYCEMIA, WITH LONG-TERM CURRENT USE OF INSULIN (HCC): Primary | ICD-10-CM

## 2022-06-02 PROCEDURE — 97802 MEDICAL NUTRITION INDIV IN: CPT

## 2022-06-02 NOTE — PATIENT INSTRUCTIONS
Consume 3 meals a day and should be 4-5 hours apart  Try not to skip any meals  Include non starchy vegetables in the diet  Consume 45-60gms of carbohydrates in each meal and 15-30 gms of carbohydrates at snack time

## 2022-06-02 NOTE — TELEPHONE ENCOUNTER
I called patient and scheduled ov with Rola MARIE 6/7/22 and he has an ov with Dr Azam Nguyễn in July   Thank you

## 2022-06-02 NOTE — PROGRESS NOTES
Medical Nutrition Therapy        Assessment    Visit Type: Initial visit  Chief complaint/Medical Diagnosis/reason for visit E11 64 T2DM with hyperglycemia with long-term current use of insulin  HPI Kwadwo Blair was seen today for his initial MNT visit  Patient's wife was with him for the visit  Patient stated that he has a poor appetite and does not feel hungry most of the times  He informed that he is eating less carbohydrates since last year now and has also cut down on high sodium rich foods  He informed that he had congestive heart failure in the past and is watching his sodium content since then  Patient has a cherrie 2 freestyle sensor on and is checking his blood glucose levels immediately after his meals  He informed that he gets scared watching high blood glucose levels post meals and is trying to stay away from carbohydrates as much as he can  Patient shares that he would like to loose some weight as well  Problems identified in food recall include inconsistent carbohydrates with insufficient calories than recommended calorie intake  Provided patient with a 2000 calorie meal plan to assist with consistency, balance and portion control  Encouraged the consumption of regular meals at regular times  Advised patient to keep carbohydrate intake to 45-60 grams per meal and 15-30 per snack to assist with glycemic control  Suggested keeping protein intake to 10 ounces a day and fat to 5 servings daily to assist with lipid management and calorie control  Portion booklet and food labels were used to teach basic carbohydrate counting  Patient agreed to keep daily food logs and return them in one week for assessment  Patient will do a follow-up in 6 weeks  RD will remain available for further dietary questions/concerns       Ht Readings from Last 1 Encounters:   05/03/22 6' 4" (1 93 m)     Wt Readings from Last 3 Encounters:   06/02/22 (!) 139 kg (306 lb)   05/03/22 (!) 138 kg (304 lb 6 oz)   03/31/22 (!) 139 kg (305 lb 9 6 oz)     Weight Change: No, sometimes weight fluctuation of 3 lbs seen  Barriers to Learning: hearing    Do you follow any special diet presently?: No  Who shops: patient and spouse  Who cooks: patient and spouse    Food Log: Completed via the method of food recall     6:00 wake up  Breakfast 7:00: 3 scrambled eggs or fried eggs OR 2-3 strips of mauro, 1 toast with 1 tsp of jelly, coffee with 1 tbsp of milk and 2 splenda  Morning Snack 10:00: 1/2 apple with 1-2 tsp of peanut butter  Lunch 11 00-12 00: 2 toast, 1/2 cup of tuna made with light mayonnaise, diet soda  Afternoon Snack 3:00: not regular but if hungry, take 1 hotdog(low sodium) with or without hot dog roll  Dinner 5:00: 1 chicken leg, 1 ear of corn, 1 cookie  Evening Snack 9:00: 2 hard boiled egg, 2-3 strips of mauro  Beverages: coffee with 1 tbsp milk in the morning, diet soda, water  Eating out/Take out:none  Exercise ADL walks with a support walker    Calorie needs 2000 kcals/day Carbs: 45-60 g/meal, 15-30g/snack     Fat: 5 servings/day    Protein:10 ounces /day    Nutrition Diagnosis:  Food and nutrition related knowledge deficit  related to Lack of prior exposure to accurate nutrition related information as evidenced by Demonstrates inability to apply food and nutrition related information    Intervention: plate method, increased fiber intake, label reading, behavior modification strategies, carbohydrate counting, individualized meal plan, monitoring portion control and food diary     Treatment Goals: Patient will consume 3 meals a day, Patient will monitor portion control, Patient will consume 25-35 grams of fiber a day, Patient will count carbohydrates and Patient will monitor blood glucose    Monitoring and evaluation:    Term code indicator  FH 1 3 2 Food Intake Criteria: Consume 3 meals a day and should be 4-5 hours apart  Try not to skip any meals    Term code indicator  FH 1 3 2 Food Intake and FH 1 6 3 Carbohydrate Intake Criteria: Consume 45-60gms of carbohydrates in each meal and 15-30 gms of carbohydrates at snack time  Term code indicator  FH 1 6 4 Fiber Intake Criteria: Include non starchy vegetables in the diet  Materials Provided: portion booklet, food record    Patients Response to Instruction:  Comprehensiongood  Motivationgood  Expected Compliancegood    Start- Stop: 10:25-11:30  Total Minutes: 65 Minutes  Group or Individual Instruction: MNT-I  Other: DARIUSZ JosephC    Thank you for coming to the [facility] for education today  Please feel free to call with any questions or concerns      Jade Campos  [address] [place] [address] Drive [id number]

## 2022-06-05 DIAGNOSIS — I48.11 LONGSTANDING PERSISTENT ATRIAL FIBRILLATION (HCC): ICD-10-CM

## 2022-06-06 ENCOUNTER — HOSPITAL ENCOUNTER (OUTPATIENT)
Dept: RADIOLOGY | Facility: HOSPITAL | Age: 77
Discharge: HOME | End: 2022-06-06
Attending: INTERNAL MEDICINE
Payer: MEDICARE

## 2022-06-06 DIAGNOSIS — E83.52 HYPERCALCEMIA: ICD-10-CM

## 2022-06-06 PROCEDURE — G1004 CDSM NDSC: HCPCS

## 2022-06-06 PROCEDURE — A9500 TC99M SESTAMIBI: HCPCS

## 2022-06-06 PROCEDURE — 78071 PARATHYRD PLANAR W/WO SUBTRJ: CPT

## 2022-06-07 ENCOUNTER — TELEPHONE (OUTPATIENT)
Dept: GASTROENTEROLOGY | Facility: CLINIC | Age: 77
End: 2022-06-07

## 2022-06-07 ENCOUNTER — OFFICE VISIT (OUTPATIENT)
Dept: GASTROENTEROLOGY | Facility: CLINIC | Age: 77
End: 2022-06-07
Payer: MEDICARE

## 2022-06-07 VITALS
DIASTOLIC BLOOD PRESSURE: 61 MMHG | HEART RATE: 69 BPM | BODY MASS INDEX: 36.29 KG/M2 | WEIGHT: 298 LBS | HEIGHT: 76 IN | SYSTOLIC BLOOD PRESSURE: 145 MMHG

## 2022-06-07 DIAGNOSIS — Z11.59 ENCOUNTER FOR SCREENING FOR OTHER VIRAL DISEASES: ICD-10-CM

## 2022-06-07 DIAGNOSIS — Z86.010 HISTORY OF COLON POLYPS: ICD-10-CM

## 2022-06-07 DIAGNOSIS — K21.9 GASTROESOPHAGEAL REFLUX DISEASE, UNSPECIFIED WHETHER ESOPHAGITIS PRESENT: Primary | ICD-10-CM

## 2022-06-07 DIAGNOSIS — K74.60 HEPATIC CIRRHOSIS, UNSPECIFIED HEPATIC CIRRHOSIS TYPE, UNSPECIFIED WHETHER ASCITES PRESENT (HCC): ICD-10-CM

## 2022-06-07 DIAGNOSIS — K22.70 BARRETT'S ESOPHAGUS WITHOUT DYSPLASIA: ICD-10-CM

## 2022-06-07 DIAGNOSIS — K57.30 DIVERTICULOSIS LARGE INTESTINE W/O PERFORATION OR ABSCESS W/O BLEEDING: ICD-10-CM

## 2022-06-07 DIAGNOSIS — R10.13 EPIGASTRIC PAIN: ICD-10-CM

## 2022-06-07 DIAGNOSIS — R10.32 LEFT LOWER QUADRANT ABDOMINAL PAIN: ICD-10-CM

## 2022-06-07 PROCEDURE — 99214 OFFICE O/P EST MOD 30 MIN: CPT | Performed by: NURSE PRACTITIONER

## 2022-06-07 RX ORDER — DICYCLOMINE HYDROCHLORIDE 10 MG/1
10 CAPSULE ORAL 2 TIMES DAILY PRN
Qty: 60 CAPSULE | Refills: 1 | Status: SHIPPED | OUTPATIENT
Start: 2022-06-07

## 2022-06-07 NOTE — PATIENT INSTRUCTIONS
-get labs/US done  -increase omeprazole to twice a day  -try Bentyl for abdominal cramping-caution it can make you drowsy, cause constipation, dry mouth, dizziness  We will schedule EGD/Colonoscopy for further evaluation    GERD (Gastroesophageal Reflux Disease)   WHAT YOU NEED TO KNOW:   Gastroesophageal reflux disease (GERD) is reflux that happens more than 2 times a week for a few weeks  Reflux means acid and food in your stomach back up into your esophagus  GERD can cause other health problems over time if it is not treated  DISCHARGE INSTRUCTIONS:   Call your local emergency number (911 in the [address] [place] [address],[address]) if:   You have severe chest pain and sudden trouble breathing  Return to the emergency department if:   You have trouble breathing after you vomit  You have trouble swallowing, or pain with swallowing  Your bowel movements are black, bloody, or tarry-looking  Your vomit looks like coffee grounds or has blood in it  Call your doctor or gastroenterologist if:   You feel full and cannot burp or vomit  You vomit large amounts, or you vomit often  You are losing weight without trying  Your symptoms get worse or do not improve with treatment  You have questions or concerns about your condition or care  Medicines:   Medicines  are used to decrease stomach acid  Medicine may also be used to help your lower esophageal sphincter and stomach contract (tighten) more  Take your medicine as directed  Contact your healthcare provider if you think your medicine is not helping or if you have side effects  Tell him of her if you are allergic to any medicine  Keep a list of the medicines, vitamins, and herbs you take  Include the amounts, and when and why you take them  Bring the list or the pill bottles to follow-up visits  Carry your medicine list with you in case of an emergency  Manage GERD:       Do not have foods or drinks that may increase heartburn    These include chocolate, peppermint, fried or fatty foods, drinks that contain caffeine, or carbonated drinks (soda)  Other foods include spicy foods, onions, tomatoes, and tomato-based foods  Do not have foods or drinks that can irritate your esophagus, such as citrus fruits, juices, and alcohol  Do not eat large meals  When you eat a lot of food at one time, your stomach needs more acid to digest it  Eat 6 small meals each day instead of 3 large ones, and eat slowly  Do not eat meals 2 to 3 hours before bedtime  Elevate the head of your bed  Place 6-inch blocks under the head of your bed frame  You may also use more than one pillow under your head and shoulders while you sleep  Maintain a healthy weight  If you are overweight, weight loss may help relieve symptoms of GERD  Do not smoke  Smoking weakens the lower esophageal sphincter and increases the risk of GERD  Ask your healthcare provider for information if you currently smoke and need help to quit  E-cigarettes or smokeless tobacco still contain nicotine  Talk to your healthcare provider before you use these products  Do not put pressure on your abdomen  Pressure pushes acid up into your esophagus  Do not wear clothing that is tight around your waist  Do not bend over  Bend at the knees if you need to pick something up  Follow up with your doctor or gastroenterologist as directed:  Write down your questions so you remember to ask them during your visits  © Copyright [employer] 2022 Information is for End User's use only and may not be sold, redistributed or otherwise used for commercial purposes  All illustrations and images included in CareNotes® are the copyrighted property of [employer]  or [facility] Jeffy French   The above information is an  only  It is not intended as medical advice for individual conditions or treatments   Talk to your doctor, nurse or pharmacist before following any medical regimen to see if it is safe and effective for you     Cirrhosis   AMBULATORY CARE:   Cirrhosis  is long-term scarring of the liver  The liver makes enzymes and bile that help digest food and gives your body energy  It also removes harmful material from your body, such as alcohol and other chemicals  Cirrhosis is caused by repeated damage to your liver over time  Scar tissue starts to replace healthy liver tissue  The scar tissue prevents the liver from working properly  Common signs and symptoms of cirrhosis:  You may not have any signs or symptoms until your liver damage is severe  You may have any of the following:  Fatigue    Bleeding and bruising easily    Swelling of your feet, legs, or abdomen    Nausea, loss of appetite, and weight loss    Itching    Jaundice (yellowing of your skin or eyes)    Black bowel movements or dark urine    Seek care immediately if:   You have pain during a bowel movement and it is black or contains blood  You have a fast heart rate and fast breathing  You are dizzy or confused  You have severe pain in your abdomen  You have trouble breathing  Your vomit looks like it has coffee grinds or blood in it  Contact your healthcare provider if:   You have a fever  You have red or itchy skin  You are in pain and feel weak  You have questions or concerns about your condition or care  Treatment  may include any of the following:  Medicines  may be used to treat high blood pressure in the portal vein (the vein that goes to your liver)  You may also need medicine to decrease extra fluid that collects in an area such as your legs or abdomen  Medicines may be used to decrease itching, or to treat a bacterial or viral infection  Surgery  may be used to create a channel inside your liver to increase blood flow  This will help decrease swelling in your abdomen and lower blood pressure in the portal vein  Your risk for bleeding in your esophagus and stomach will also be decreased   You may need a liver transplant if your liver fails  Do not drink alcohol:  Alcohol will cause more damage to your liver  Manage cirrhosis:   Do not smoke  Nicotine and other chemicals in cigarettes and cigars can cause blood vessel and lung damage  Ask your healthcare provider for information if you currently smoke and need help to quit  E-cigarettes or smokeless tobacco still contain nicotine  Talk to your healthcare provider before you use these products  Eat a variety of healthy foods  Healthy foods include fruits, vegetables, whole-grain breads, low-fat dairy products, beans, lean meat, and fish  Ask if you need to be on a special diet  Reach or maintain a healthy weight  You may develop fatty liver disease if you are overweight  Ask your healthcare provider for a healthy weight for you  He can help you create a safe weight loss plan if you are overweight  Limit sodium (salt)  You may need to decrease the amount of sodium you eat if you have swelling caused by fluid buildup  Sodium is found in table salt and salty foods such as canned foods, frozen foods, and potato chips  Drink liquids as directed  Ask how much liquid to drink each day and which liquids are best for you  For most people, good liquids to drink are water, juice, and milk  Liquids can help your liver work better  Ask about vaccines  You may have a hard time fighting infection because of cirrhosis  Vaccines help protect you against viruses that can cause diseases such as the flu or hepatitis  Viral hepatitis is caused by a virus that leads to inflammation of the liver  You may need a hepatitis A or B vaccine  You may also need a pneumonia vaccine  Always get a flu vaccine each year as soon as it becomes available  Ask about medicines  Some medicines can harm your liver  Acetaminophen is an example  Talk to your healthcare provider about all your medicines   Do not take any over-the-counter medicine or herbal supplements until your healthcare provider says it is okay  Follow up with your doctor as directed:  Write down your questions so you remember to ask them during your visits  © Copyright [employer] 2022 Information is for End User's use only and may not be sold, redistributed or otherwise used for commercial purposes  All illustrations and images included in CareNotes® are the copyrighted property of [employer]  or [facility] Jeffy French   The above information is an  only  It is not intended as medical advice for individual conditions or treatments  Talk to your doctor, nurse or pharmacist before following any medical regimen to see if it is safe and effective for you

## 2022-06-07 NOTE — PROGRESS NOTES
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  3  Epigastric pain  History of GERD and Vega's treated with RFA in the past currently maintained on Prilosec 20mg daily c/o epigastric burning improved with eating also with epigastric pain on exam  Last EGD was done 02/02/2017 showing small sliding hiatal hernia ultra short segment of Vega's esophagus and small duodenal polyp which was removed, he was recommended to repeat  EGD in February 2020  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, negative for malignancy  Antral biopsy showed mild chronic inactive gastritis negative for H pylori  Lower esophageal biopsy positive for 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    -increase prilosec to 20mg BID  -obtain RUQ US/lipase  -EGD scheduled for further evaluation  Pt overdue for vega's surveillance  -     EGD; Future; Expected date: 06/07/2022  -     US right upper quadrant; Future; Expected date: 06/07/2022  -     Lipase; Future    4  Left lower quadrant abdominal pain  Pt with history of sigmoid diverticulosis c/o LLQ cramping pain which can be sharp at times taking his breath away  Denies any change in bowel habit, fevers/chills, nausea/vomiting  This has been ongoing over the last month and has not improved or worsened  CT scan was done wo contrast 6/1 showing unremarkable stomach/bowel      -low fiber diet  -Bentyl as needed  Side effects discussed  Instructed pt to use sparingly  -Will treat empirically with ciprofloxacin and flagyl for possible diverticulitis as pt reports this helped last time he had LLQ cramping  If no improvement, consider repeat CT scan with enteric contrast  -     Colonoscopy;  Future; Expected date: 06/07/2022  -     dicyclomine (BENTYL) 10 mg capsule; Take 1 capsule (10 mg total) by mouth 2 (two) times a day as needed (abdominal cramping)    5  Diverticulosis large intestine w/o perforation or abscess w/o bleeding  6  History of colon polyps  Last colonoscopy was 01/29/2019, he had evidence of left-sided diverticulosis and small to medium internal hemorrhoid,  2 polyps were removed ( 1 was benign colonic mucosa and 1 was tubular adenoma, with no dysplasia), repeat colonoscopy recommended in January 2022 and he is overdue     -     Colonoscopy; Future; Expected date: 06/07/2022    7  Hepatic cirrhosis, unspecified hepatic cirrhosis type, unspecified whether ascites present ([facility] [address] )  8  Encounter for screening for other viral diseases   CT abdomen wo contrast done a week ago for f/u for adrenal gland nodule which showed liver with cirrhotic morphology  He denies history of liver disease or any alcohol use  He used to drink 3-4 beers daily about 30 years ago  Last LFTs done in April were normal  Last platelet count normal in Dec 2021  His prior liver imaging last year noted unremarkable liver  Cirrhosis may be secondary to underlying heart disease, OROSCO, or other underlying etiology  Will do further workup as listed below  -     EGD; Future; Expected date: 06/07/2022  -     US right upper quadrant; Future; Expected date: 06/07/2022  -     LEENA w/Reflex; Future  -     Antimitochondrial antibody; Future  -     Anti-smooth muscle antibody, IgG; Future  -     Chronic Hepatitis Panel; Future  -     Alpha-1-antitrypsin; Future  -     Iron Panel (Includes Ferritin, Iron Sat%, Iron, and TIBC); Future  -     Protime-INR; Future  -     CBC and Platelet; Future  -     AFP tumor marker;  Future              ______________________________________________________________________    SUBJECTIVE:  Marvin Perkins is a 65 y o  male with history of AFib on Eliquis, bladder cancer treated with BCG 9 years ago, in remission, CAD,  CABG, diabetes, right hip arthroplasty, 4 4 cm AAA, GERD with hiatal hernia, Lay's esophagus status post RFA, LUCIA who presents for follow-up with wife Wenceslao De La Vega for abdominal cramping  Was seen for similar complaint last March and had a course of antbiotics at that time which seemed to improve symptoms and was ordered for EGD/Colonoscopy however pt cancelled due to hospitalization  He reports left lower abdominal pain for the past month which feels like cramping  He feels like it gets better when he eats and after bowel movements  The pain sometimes takes his breath away  He's had this cramping in the past but reports nothing was found on CT scan  Denies any changes to his bowel habits, constipation, diarrhea, melena, hematochezia  He's also having burning in his epigastric area before he eats, and then after he eats it's improved  Denies any dysphagia, odynophagia, nausea/vomiting  He's on Omeprazole 20 mg daily and is taking that on an empty stomach  Denies any NSAID use, he takes tylenol for aches/pains  Weight is stable  He feels like the burning in his stomach may have improved after stopping Metformin and starting insulin  He also had diarrhea with the Metformin  +Epigastric/LLQ pain on palpation      He had CT abdomen wo contrast done a week ago for f/u for adrenal gland which showed liver with cirrhotic morphology  No calcified gallstones or inflammatory changes  Unremarkable pancreas  Denies history of liver disease or any alcohol use  He used to drink 3-4 beers daily about 30 years ago  Last LFTs done in April were normal  Last platelet count normal in Dec 2021      Last colonoscopy was 01/29/2019, he had evidence of left-sided diverticulosis and small to medium internal hemorrhoid,  2 polyps were removed ( 1 was benign colonic mucosa and 1 was tubular adenoma, with no dysplasia), repeat colonoscopy recommended in January 2022    Last EGD was done 02/02/2017 showing small sliding hiatal hernia ultra short segment of Lay's esophagus and small duodenal polyp which was removed, he was recommended to repeat  EGD in February 2020  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, negative for malignancy  Antral biopsy showed mild chronic inactive gastritis negative for H pylori  Lower esophageal biopsy positive for Lay's esophagus without dysplasia    REVIEW OF SYSTEMS IS OTHERWISE NEGATIVE        Historical Information   Past Medical History:   Diagnosis Date    A-fib [facility])     AAA [facility]abdominal aortic aneurysm) ([facility] [address] )     Actinic keratosis of right temple 7/31/2020    Actinic keratosis of scalp 7/31/2020    Acute respiratory failure with hypoxia (HCC) 3/25/2021    Allergic 1960    Arthritis     Lay's esophagus     Bladder cancer (HCC)     Blister of left leg 4/28/2021    Blister of right leg 5/26/2021    CAD (coronary artery disease)     Cancer (HCC) 02/2010    Bladder    CHF (congestive heart failure) (HCC)     Chronic low back pain     Chronic pain of both knees 7/31/2020    Colitis 12/4/2020    CPAP (continuous positive airway pressure) dependence     Diabetes ([facility] [address] )     [facility] 10/1993    Excessive gas 12/3/2020    Heart murmur     History of chemotherapy     Hydroureter on left 4/28/2021    Hyperlipemia     Hypertension     Immunization deficiency 10/30/2020    Hep a nonimmune    Kidney stone     Left lower quadrant abdominal pain 12/3/2020    Mass of right adrenal gland ([facility] [address] ) 12/4/2020    4 1 cm    Myocardial infarction ([facility] )     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    Obesity 2000    LUCIA (obstructive sleep apnea) 1/29/2021    Pressure ulcer of right leg, stage 1 5/26/2021    Urinary tract infection with hematuria 5/3/2021    u cx 4/2021=pseudomonas R to bactrim and cefdinir, S to cipro    Venous stasis dermatitis of both lower extremities 5/26/2021     Past Surgical History:   Procedure Laterality Date    BACK SURGERY      BLADDER SURGERY      CATARACT EXTRACTION, BILATERAL      COLONOSCOPY  2019    next  [address] GRAFT  2016    Quadruple per Rita    EGD  2017    EYE SURGERY  2016    Cataracts    JOINT REPLACEMENT  [id number]    Hip and shoulder    KIDNEY STONE SURGERY      CO CYSTO/URETERO W/LITHOTRIPSY &INDWELL STENT INSRT Left 2021    Procedure: CYSTOSCOPY URETEROSCOPY WITH LITHOTRIPSY HOLMIUM LASER, AND INSERTION STENT URETERAL/EXCHANGE;  Surgeon: Morgan Grier MD;  Location: [facility];  Service: Urology    CO CYSTOURETHROSCOPY,URETER CATHETER Left 2021    Procedure: CYSTOSCOPY  WITH INSERTION STENT URETERAL;  Surgeon: Morgan Grier MD;  [facility]: [facility];  Service: Urology    TOTAL HIP ARTHROPLASTY Right 2012    TOTAL SHOULDER REPLACEMENT Right 2013    VASECTOMY  1971     Social History   Social History     Substance and Sexual Activity   Alcohol Use Not Currently    Alcohol/week: 0 0 standard drinks    Comment: No use     Social History     Substance and Sexual Activity   Drug Use Never    Comment: No use     Social History     Tobacco Use   Smoking Status Former Smoker    Packs/day: 0 25    Years: 20 00    Pack years: 5 00    Types: Cigarettes    Start date:     Quit date: 2010    Years since quittin 4   Smokeless Tobacco Never Used   Tobacco Comment    Quit several times for up to 6 years       Family History   Problem Relation Age of Onset    Heart disease Father     Arthritis Father     Heart attack Father     Alzheimer's disease Mother     Dementia Mother        Meds/Allergies       Current Outpatient Medications:     apixaban (Eliquis) 5 mg    atorvastatin (LIPITOR) 40 mg tablet    clindamycin (CLINDAGEL) 1 % gel    Continuous Blood Gluc Sensor (FreeStyle Peggy 2 Sensor) MISC    dicyclomine (BENTYL) 10 mg capsule    digoxin (LANOXIN) 0 125 mg tablet    ezetimibe (ZETIA) 10 mg tablet    furosemide (LASIX) 80 mg tablet    insulin degludec Zamora  FlexTouch) 200 units/mL CONCENTRATED U-200 injection pen    Lancets (accu-chek multiclix) lancets    lisinopril (ZESTRIL) 20 mg tablet    metoprolol succinate (TOPROL-XL) 100 mg 24 hr tablet    NovoLOG FlexPen 100 units/mL injection pen    omeprazole (PriLOSEC) 20 mg delayed release capsule    potassium citrate (UROCIT-K) 10 mEq    Unifine Pentips 31G X 8 MM MISC    fluorouracil (EFUDEX) 5 % cream    metFORMIN (GLUCOPHAGE) 500 mg tablet    omeprazole (PriLOSEC) 20 mg delayed release capsule    No Known Allergies        Objective     Blood pressure 145/61, pulse 69, height 6' 4" (1 93 m), weight 135 kg (298 lb)  Body mass index is 36 27 kg/m²  PHYSICAL EXAM:      General Appearance:   Alert, cooperative, no distress   HEENT:   Normocephalic, atraumatic, anicteric  Neck:  Supple, symmetrical, trachea midline   Lungs:   Clear to auscultation bilaterally; no rales, rhonchi or wheezing; respirations unlabored    Heart[de-identified]   Regular rate and rhythm; no murmur  Abdomen:   Soft, non-tender, non-distended; normal bowel sounds; no masses, no organomegaly    Genitalia:   Deferred    Rectal:   Deferred    Extremities:  No cyanosis, clubbing or edema    Skin:  No jaundice, rashes, or lesions    Lymph nodes:  No palpable cervical lymphadenopathy        Lab Results:   No visits with results within 1 Day(s) from this visit     Latest known visit with results is:   Lab on 04/27/2022   Component Date Value    Magnesium 04/27/2022 1 7     PTH 04/27/2022 116 9 (A)    Creatinine, Ur 04/27/2022 70 9     Protein Urine Random 04/27/2022 14     Prot/Creat Ratio, Ur 04/27/2022 0 20 (A)    Phosphorus 04/27/2022 3 1     Vit D, 25-Hydroxy 04/27/2022 38 4     Calcium, Ionized 04/27/2022 1 21     Hemoglobin A1C 04/27/2022 8 6 (A)    EAG 04/27/2022 200     Sodium 04/27/2022 138     Potassium 04/27/2022 4 2     Chloride 04/27/2022 102     CO2 04/27/2022 33 (A)    ANION GAP 04/27/2022 3 (A)    BUN 04/27/2022 28 (A)    Creatinine 04/27/2022 1 04     Glucose, Fasting 04/27/2022 190 (A)    Calcium 04/27/2022 9 8     Corrected Calcium 04/27/2022 10 4 (A)    AST 04/27/2022 14     ALT 04/27/2022 18     Alkaline Phosphatase 04/27/2022 88     Total Protein 04/27/2022 7 7     Albumin 04/27/2022 3 2 (A)    Total Bilirubin 04/27/2022 0 30     eGFR 04/27/2022 68     Cholesterol 04/27/2022 137     Triglycerides 04/27/2022 223 (A)    HDL, Direct 04/27/2022 32 (A)    LDL Calculated 04/27/2022 60     Non-HDL-Chol (CHOL-HDL) 04/27/2022 105     Creatinine, Ur 04/27/2022 75 4     Microalbum  ,U,Random 04/27/2022 38 3 (A)    Microalb Creat Ratio 04/27/2022 51 (A)         Radiology Results:   CT abdomen wo contrast    Result Date: 6/2/2022  Narrative: CT - ABDOMEN WITHOUT IV CONTRAST INDICATION:   E27 8: Other specified disorders of adrenal gland  COMPARISON:  Multiple prior CT scans of the abdomen from April 18, 2013 to April 22, 2021  TECHNIQUE: CT examination of the abdomen was performed without intravenous contrast  This examination was performed without intravenous contrast in the context of the critical nationwide Omnipaque shortage  Axial, sagittal, and coronal 2D reformatted images were created from the source data and submitted for interpretation  Radiation dose length product (DLP) for this visit:  1576 57 mGy-cm   This examination, like all CT scans performed in the [facility], was performed utilizing techniques to minimize radiation dose exposure, including the use of iterative reconstruction and automated exposure control  Enteric contrast was not administered  FINDINGS: ABDOMEN LOWER CHEST:  Mild bibasilar atelectasis or scarring  Right posterior pleural calcification  Calcified granulomas  LIVER/BILIARY TREE:  Liver demonstrates cirrhotic morphology  No noncontrast CT evidence of hepatic mass or biliary dilatation  GALLBLADDER:  No calcified gallstones  No pericholecystic inflammatory change   SPLEEN:  Small calcified granulomas  PANCREAS: Unremarkable  ADRENAL GLANDS:  Stable right adrenal nodule measuring 4 0 x 3 4 cm indeterminate on this exam, but previously shown to represent an adenoma by washout criteria  Normal left adrenal gland  KIDNEYS/URETERS:  Small bilateral nonobstructing renal stones measuring up to 6 mm  VISUALIZED STOMACH AND BOWEL:  Unremarkable  VISUALIZED ABDOMINAL CAVITY:  No pathologically enlarged periportal, mesenteric or upper retroperitoneal lymph nodes  No ascites  VISUALIZED BODY WALL:  Unremarkable  VESSELS:  Atherosclerotic changes are present  Infrarenal abdominal aortic aneurysm measuring approximately 5 cm in maximal diameter  Dilatation of the right common iliac artery to 2 2 cm  OSSEOUS STRUCTURES:  No acute fracture or destructive osseous lesion  Impression: Stable right adrenal nodule previously shown to represent an adenoma  Other stable incidental findings as above  Workstation performed: [id number]     NM parathyroid scan w spect    Result Date: 6/6/2022  Narrative: PARATHYROID SCAN INDICATION:  E83 52: Hypercalcemia COMPARISON:  None  TECHNIQUE:   Following the intravenous administration of 25 5 mCi Tc-99m Cardiolite, anterior and bilateral anterior oblique projection images of the neck and mediastinum were obtained at approximately 10 minutes post injection followed at 2 hours post injection by static anterior and bilateral oblique projections as well as SPECT/CT images in coronal, sagittal and axial projections  FINDINGS: Early static images demonstrate mild radiotracer uptake in the thyroid gland  Delayed images demonstrate washout of thyroid gland activity  No residual foci of radiotracer uptake seen  SPECT-CT images demonstrate no findings suspicious for parathyroid adenoma  Impression: 1  No scintigraphic findings for parathyroid adenoma   Workstation performed: [id number]

## 2022-06-07 NOTE — TELEPHONE ENCOUNTER
Cardiac Clearance for Eliquis faxed to Dr Jesus Leonardo at [phone]  Pt to have a Flip at [facility] on 7/20/22 w/ Dr Rekha Ward   Pt # [phone]

## 2022-06-08 RX ORDER — CIPROFLOXACIN 500 MG/1
500 TABLET, FILM COATED ORAL EVERY 12 HOURS SCHEDULED
Qty: 14 TABLET | Refills: 0 | Status: SHIPPED | OUTPATIENT
Start: 2022-06-08 | End: 2022-06-15

## 2022-06-08 RX ORDER — METRONIDAZOLE 500 MG/1
500 TABLET ORAL EVERY 8 HOURS SCHEDULED
Qty: 21 TABLET | Refills: 0 | Status: SHIPPED | OUTPATIENT
Start: 2022-06-08 | End: 2022-06-15

## 2022-06-12 DIAGNOSIS — Z79.4 TYPE 2 DIABETES MELLITUS WITH HYPERGLYCEMIA, WITH LONG-TERM CURRENT USE OF INSULIN (HCC): ICD-10-CM

## 2022-06-12 DIAGNOSIS — E11.65 TYPE 2 DIABETES MELLITUS WITH HYPERGLYCEMIA, WITH LONG-TERM CURRENT USE OF INSULIN (HCC): ICD-10-CM

## 2022-06-13 ENCOUNTER — APPOINTMENT (OUTPATIENT)
Dept: LAB | Facility: AMBULARY SURGERY CENTER | Age: 77
End: 2022-06-13
Payer: MEDICARE

## 2022-06-13 DIAGNOSIS — R10.13 EPIGASTRIC PAIN: ICD-10-CM

## 2022-06-13 DIAGNOSIS — K74.5 BILIARY CIRRHOSIS (HCC): ICD-10-CM

## 2022-06-13 DIAGNOSIS — K74.60 HEPATIC CIRRHOSIS, UNSPECIFIED HEPATIC CIRRHOSIS TYPE, UNSPECIFIED WHETHER ASCITES PRESENT (HCC): ICD-10-CM

## 2022-06-13 DIAGNOSIS — Z11.59 ENCOUNTER FOR SCREENING FOR OTHER VIRAL DISEASES: ICD-10-CM

## 2022-06-13 LAB
AFP-TM SERPL-MCNC: 2.2 NG/ML (ref 0.5–8)
ERYTHROCYTE [DISTWIDTH] IN BLOOD BY AUTOMATED COUNT: 15.6 % (ref 11.6–15.1)
FERRITIN SERPL-MCNC: 16 NG/ML (ref 8–388)
HBV CORE AB SER QL: NORMAL
HBV CORE IGM SER QL: NORMAL
HBV SURFACE AG SER QL: NORMAL
HCT VFR BLD AUTO: 40 % (ref 36.5–49.3)
HCV AB SER QL: NORMAL
HGB BLD-MCNC: 11.9 G/DL (ref 12–17)
INR PPP: 1.19 (ref 0.84–1.19)
IRON SATN MFR SERPL: 9 % (ref 20–50)
IRON SERPL-MCNC: 37 UG/DL (ref 65–175)
LIPASE SERPL-CCNC: 153 U/L (ref 73–393)
MCH RBC QN AUTO: 25.8 PG (ref 26.8–34.3)
MCHC RBC AUTO-ENTMCNC: 29.8 G/DL (ref 31.4–37.4)
MCV RBC AUTO: 87 FL (ref 82–98)
PLATELET # BLD AUTO: 218 THOUSANDS/UL (ref 149–390)
PMV BLD AUTO: 11.4 FL (ref 8.9–12.7)
PROTHROMBIN TIME: 14.6 SECONDS (ref 11.6–14.5)
RBC # BLD AUTO: 4.62 MILLION/UL (ref 3.88–5.62)
TIBC SERPL-MCNC: 425 UG/DL (ref 250–450)
WBC # BLD AUTO: 8.47 THOUSAND/UL (ref 4.31–10.16)

## 2022-06-13 PROCEDURE — 82105 ALPHA-FETOPROTEIN SERUM: CPT

## 2022-06-13 PROCEDURE — 85610 PROTHROMBIN TIME: CPT

## 2022-06-13 PROCEDURE — 86705 HEP B CORE ANTIBODY IGM: CPT

## 2022-06-13 PROCEDURE — 36415 COLL VENOUS BLD VENIPUNCTURE: CPT

## 2022-06-13 PROCEDURE — 86704 HEP B CORE ANTIBODY TOTAL: CPT

## 2022-06-13 PROCEDURE — 83690 ASSAY OF LIPASE: CPT

## 2022-06-13 PROCEDURE — 83550 IRON BINDING TEST: CPT

## 2022-06-13 PROCEDURE — 82728 ASSAY OF FERRITIN: CPT

## 2022-06-13 PROCEDURE — 83540 ASSAY OF IRON: CPT

## 2022-06-13 PROCEDURE — 86015 ACTIN ANTIBODY EACH: CPT

## 2022-06-13 PROCEDURE — 82103 ALPHA-1-ANTITRYPSIN TOTAL: CPT

## 2022-06-13 PROCEDURE — 86803 HEPATITIS C AB TEST: CPT

## 2022-06-13 PROCEDURE — 86381 MITOCHONDRIAL ANTIBODY EACH: CPT

## 2022-06-13 PROCEDURE — 85027 COMPLETE CBC AUTOMATED: CPT

## 2022-06-13 PROCEDURE — 86038 ANTINUCLEAR ANTIBODIES: CPT

## 2022-06-13 PROCEDURE — 87340 HEPATITIS B SURFACE AG IA: CPT

## 2022-06-13 RX ORDER — INSULIN ASPART 100 [IU]/ML
INJECTION, SOLUTION INTRAVENOUS; SUBCUTANEOUS
Qty: 15 ML | Refills: 0 | Status: SHIPPED | OUTPATIENT
Start: 2022-06-13 | End: 2022-06-27

## 2022-06-13 NOTE — TELEPHONE ENCOUNTER
MADISONOM for pt to call us back  If he calls back tell him he has been cleared for the procedure and to hold his Eliquis 1 day prior to the procedure

## 2022-06-14 ENCOUNTER — TELEPHONE (OUTPATIENT)
Dept: ENDOCRINOLOGY | Facility: CLINIC | Age: 77
End: 2022-06-14

## 2022-06-14 LAB
A1AT SERPL-MCNC: 172 MG/DL (ref 101–187)
ACTIN IGG SERPL-ACNC: 7 UNITS (ref 0–19)
MITOCHONDRIA M2 IGG SER-ACNC: <20 UNITS (ref 0–20)

## 2022-06-14 NOTE — TELEPHONE ENCOUNTER
Christiano Mcdonald [facility])  NovoLOG FlexPen 100UNIT/ML pen-injectors       Form  [employer] Non-Preferred Drug (non-PDL) Form     Plan Contact  [phone] phone  [phone] fax

## 2022-06-15 ENCOUNTER — TELEPHONE (OUTPATIENT)
Dept: ENDOCRINOLOGY | Facility: CLINIC | Age: 77
End: 2022-06-15

## 2022-06-15 LAB — RYE IGE QN: NEGATIVE

## 2022-06-15 NOTE — TELEPHONE ENCOUNTER
LMOM advising pt to hold his Eliquis 1 day prior to procedure and to call us if he has any questions

## 2022-06-21 ENCOUNTER — OFFICE VISIT (OUTPATIENT)
Dept: CARDIOLOGY CLINIC | Facility: CLINIC | Age: 77
End: 2022-06-21
Payer: MEDICARE

## 2022-06-21 VITALS
DIASTOLIC BLOOD PRESSURE: 48 MMHG | OXYGEN SATURATION: 86 % | HEART RATE: 76 BPM | WEIGHT: 310 LBS | SYSTOLIC BLOOD PRESSURE: 128 MMHG | HEIGHT: 76 IN | BODY MASS INDEX: 37.75 KG/M2

## 2022-06-21 DIAGNOSIS — I50.32 CHRONIC DIASTOLIC CHF (CONGESTIVE HEART FAILURE) (HCC): ICD-10-CM

## 2022-06-21 DIAGNOSIS — I71.4 ABDOMINAL AORTIC ANEURYSM (AAA) WITHOUT RUPTURE (HCC): ICD-10-CM

## 2022-06-21 DIAGNOSIS — I48.91 ATRIAL FIBRILLATION WITH RAPID VENTRICULAR RESPONSE (HCC): ICD-10-CM

## 2022-06-21 DIAGNOSIS — E78.00 PURE HYPERCHOLESTEROLEMIA: ICD-10-CM

## 2022-06-21 DIAGNOSIS — G47.33 OSA (OBSTRUCTIVE SLEEP APNEA): ICD-10-CM

## 2022-06-21 DIAGNOSIS — E66.01 MORBID OBESITY (HCC): ICD-10-CM

## 2022-06-21 DIAGNOSIS — R60.0 LOCALIZED EDEMA: ICD-10-CM

## 2022-06-21 DIAGNOSIS — I21.A9 OTHER TYPE OF MYOCARDIAL INFARCTION (HCC): ICD-10-CM

## 2022-06-21 DIAGNOSIS — I10 PRIMARY HYPERTENSION: ICD-10-CM

## 2022-06-21 DIAGNOSIS — Z79.4 TYPE 2 DIABETES MELLITUS WITH HYPERGLYCEMIA, WITH LONG-TERM CURRENT USE OF INSULIN (HCC): ICD-10-CM

## 2022-06-21 DIAGNOSIS — I48.0 PAROXYSMAL ATRIAL FIBRILLATION (HCC): Primary | ICD-10-CM

## 2022-06-21 DIAGNOSIS — E11.65 TYPE 2 DIABETES MELLITUS WITH HYPERGLYCEMIA, WITH LONG-TERM CURRENT USE OF INSULIN (HCC): ICD-10-CM

## 2022-06-21 DIAGNOSIS — I25.10 CORONARY ARTERY DISEASE INVOLVING NATIVE CORONARY ARTERY OF NATIVE HEART WITHOUT ANGINA PECTORIS: ICD-10-CM

## 2022-06-21 PROCEDURE — 99215 OFFICE O/P EST HI 40 MIN: CPT | Performed by: INTERNAL MEDICINE

## 2022-06-21 PROCEDURE — 93000 ELECTROCARDIOGRAM COMPLETE: CPT | Performed by: INTERNAL MEDICINE

## 2022-06-21 RX ORDER — FUROSEMIDE 80 MG
80 TABLET ORAL 2 TIMES DAILY
Qty: 180 TABLET | Refills: 3 | Status: SHIPPED | OUTPATIENT
Start: 2022-06-21

## 2022-06-21 RX ORDER — DIGOXIN 125 MCG
125 TABLET ORAL DAILY
Qty: 90 TABLET | Refills: 3 | Status: SHIPPED | OUTPATIENT
Start: 2022-06-21

## 2022-06-21 NOTE — PATIENT INSTRUCTIONS
Please increase your furosemide to 80 mg twice a day  Please be very mindful of your diet with regards to sodium, on the nutrition label less than 150 mg per serving is ideal, and less than 2000 mg an entire day    Your weight is 310 lb today, your oxygen level was slightly low, your legs a little bit more swollen, and we just need to get you down about 5-7 lb  It is still important to drink enough water, 50 oz in a day should be plenty  Please stop taking your is that him I would, will see if this has any improvement effects with regards to your abdominal cramping

## 2022-06-21 NOTE — PROGRESS NOTES
[facility]  Office progress note  Diogenes Fair 65 y o  male MRN: [id number]        Problems    1  Paroxysmal atrial fibrillation (HCC)  POCT ECG    Digoxin level   2  Chronic diastolic CHF (congestive heart failure) ([place] [address] )     3  Coronary artery disease involving native coronary artery of native heart without angina pectoris     4  Pure hypercholesterolemia     5  Atrial fibrillation with rapid ventricular response (HCC)  digoxin (LANOXIN) 0 125 mg tablet   6  Other type of myocardial infarction ([place] [address] )     7  Primary hypertension     8  Type 2 diabetes mellitus with hyperglycemia, with long-term current use of insulin ([facility])     9  Abdominal aortic aneurysm (AAA) without rupture ([place] [address] )     10  Morbid obesity ([place] [address] )     11  LUCIA (obstructive sleep apnea)     12  Localized edema  furosemide (LASIX) 80 mg tablet       Impression:    He has been dealing with abdominal pain and severe cramping multiple times a day over the last couple months, stopping metformin helped at the request of his endocrinologist, and antibiotics for empiric diverticulitis treatment per his PCP have helped somewhat but he still has 5-6 episodes a day    Coronary artery disease  CABG x3 in 2016, LIMA to LAD, SVG to OM3, SVG to PDA  He had full recovery of his moderate ischemic cardiomyopathy  Repeat echo 12/28/2021 shows EF 60%, mildly dilated RV, mild MR  He denies any recent new ischemic symptoms    Chronic diastolic/right-sided CHF  Weight is up about 8 pounds  Leg edema, JVD present, mildly hypoxemic  On 80 milligrams a m , 40 milligrams p m  of furosemide  He admits to a diet that is become more lax a days ago  AAA  47 millimeters in early 2021 by abdominal CT  50 millimeters by noncontrast abdominal CT 6/22  He follows closely with vascular  Lower extremity atherosclerosis  Denies worsening claudication  Mixed hyperlipidemia  Mixed disorder, mildly elevated triglycerides, but LDL is 60 and well controlled  Type 2 diabetes  A1c uncontrolled  Hypertension  Blood pressure is well controlled  Atrial fibrillation  Paroxysmal, asymptomatic but previously in December presented with hypotension  Status post cardioversion 1/22  Maintaining sinus rhythm, he is on digoxin and metoprolol  Eliquis from an anticoagulation standpoint    Plan    Discontinue Zetia, this may be causing abdominal cramping, I do not see any other causative medication  He is about 6-7 pounds over his previous dry weight, he has mild leg edema, mild JVD, mild hypoxemia  Will increase furosemide to 80 milligrams twice a day and advised him to take strict action at home with regards to limiting sodium intake  He has labs ordered for early August, I asked him to possibly do the is a little bit earlier may be in mid to late July to follow up on renal function/electrolytes  HPI: Ryan Spain is a 65y o  year old male with CAD, paroxysmal atrial fibrillation, diastolic/right-sided CHF, obesity, hypertension, diabetes, mixed hyperlipidemia, LUCIA, hospitalized 12/21 for AFib/RVR, cardioversion 1/7/2022 urgently for hypotension and AFib/RVR, now returns for a follow-up visit    He has been having a lot of abdominal cramping, metformin was discontinued, empiric antibiotics were given for possible diverticulitis  He still has 5-6 episodes a day  He has gained some weight, 310 pounds, prior weight in my office 302 pounds  He is trying to do about 8000 steps per day for exercise, denies any significant labored exercise, denies any anginal symptoms, says his main limitation is knee pain bilaterally  Blood pressure is well controlled   There is no recurrence of atrial fibrillation  He continues to tolerate Eliquis he denies any bleeding  Diabetes remains uncontrolled  Lipids are well controlled from an LDL standpoint at 60      Review of Systems   Constitutional: Negative for appetite change, diaphoresis, fatigue and fever     Respiratory: Negative for chest tightness, shortness of breath and wheezing  Cardiovascular: Positive for leg swelling  Negative for chest pain and palpitations  Gastrointestinal: Positive for abdominal pain  Negative for blood in stool  Musculoskeletal: Negative for arthralgias and joint swelling  Skin: Negative for rash  Neurological: Negative for dizziness, syncope and light-headedness  All other systems reviewed and are negative          Past Medical History:   Diagnosis Date    A-fib [facility])     AAA (abdominal aortic aneurysm) ([facility] )     Actinic keratosis of right temple 7/31/2020    Actinic keratosis of scalp 7/31/2020    Acute respiratory failure with hypoxia ([address] ) 3/25/2021    Allergic 1960    Arthritis     Lay's esophagus     Bladder cancer (HCC)     Blister of left leg 4/28/2021    Blister of right leg 5/26/2021    CAD (coronary artery disease)     Cancer (HCC) 02/2010    Bladder    CHF (congestive heart failure) (HCC)     Chronic low back pain     Chronic pain of both knees 7/31/2020    Colitis 12/4/2020    CPAP (continuous positive airway pressure) dependence     Diabetes ([facility] )     Diabetes mellitus [facility] ) 10/1993    Excessive gas 12/3/2020    Heart murmur     History of chemotherapy     Hydroureter on left 4/28/2021    Hyperlipemia     Hypertension     Immunization deficiency 10/30/2020    Hep a nonimmune    Kidney stone     Left lower quadrant abdominal pain 12/3/2020    Mass of right adrenal gland ([facility] ) 12/4/2020    4 1 cm    Myocardial infarction ([facility] )     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    Obesity 2000    LUCIA (obstructive sleep apnea) 1/29/2021    Pressure ulcer of right leg, stage 1 5/26/2021    Urinary tract infection with hematuria 5/3/2021    u cx 4/2021=pseudomonas R to bactrim and cefdinir, S to cipro    Venous stasis dermatitis of both lower extremities 5/26/2021     Past Surgical History:   Procedure Laterality Date    BACK SURGERY      BLADDER SURGERY      CATARACT EXTRACTION, BILATERAL      COLONOSCOPY  2019    next  [place]    CORONARY ARTERY BYPASS GRAFT  2016    Quadruple per [place]    EGD  2017    EYE SURGERY  2016    Cataracts    JOINT REPLACEMENT  [id number]    Hip and shoulder    KIDNEY STONE SURGERY      ID CYSTO/URETERO W/LITHOTRIPSY &INDWELL STENT INSRT Left 2021    Procedure: CYSTOSCOPY URETEROSCOPY WITH LITHOTRIPSY HOLMIUM LASER, AND INSERTION STENT URETERAL/EXCHANGE;  Surgeon: Papito Casillas MD;  [facility]: [facility];  Service: Urology    ID CYSTOURETHROSCOPY,URETER CATHETER Left 2021    Procedure: CYSTOSCOPY  WITH INSERTION STENT URETERAL;  Surgeon: Papito Casillas MD;  [facility];  Service: Urology    TOTAL HIP ARTHROPLASTY Right 2012    TOTAL SHOULDER REPLACEMENT Right 2013    VASECTOMY  1971     Social History     Substance and Sexual Activity   Alcohol Use Not Currently    Alcohol/week: 0 0 standard drinks    Comment: No use     Social History     Substance and Sexual Activity   Drug Use Never    Comment: No use     Social History     Tobacco Use   Smoking Status Former Smoker    Packs/day: 0 25    Years: 20 00    Pack years: 5 00    Types: Cigarettes    Start date: 64    Quit date: 2010    Years since quittin 4   Smokeless Tobacco Never Used   Tobacco Comment    Quit several times for up to 6 years  Family History   Problem Relation Age of Onset    Heart disease Father     Arthritis Father     Heart attack Father     Alzheimer's disease Mother     Dementia Mother        Allergies:   Allergies   Allergen Reactions    Diltiazem Abdominal Pain       Medications:     Current Outpatient Medications:     apixaban (Eliquis) 5 mg, Take 1 tablet (5 mg total) by mouth 2 (two) times a day, Disp: 60 tablet, Rfl: 0    atorvastatin (LIPITOR) 40 mg tablet, Take 1 tablet (40 mg total) by mouth in the morning , Disp: 90 tablet, Rfl: 0    clindamycin (CLINDAGEL) 1 % gel, Apply topically 2 (two) times a day, Disp: 30 g, Rfl: 1    Continuous Blood Gluc Sensor (FreeStyle Peggy 2 Sensor) MISC, Change it every 14 days, Disp: 1 each, Rfl: 0    dicyclomine (BENTYL) 10 mg capsule, Take 1 capsule (10 mg total) by mouth 2 (two) times a day as needed (abdominal cramping), Disp: 60 capsule, Rfl: 1    digoxin (LANOXIN) 0 125 mg tablet, Take 1 tablet (125 mcg total) by mouth daily, Disp: 90 tablet, Rfl: 3    furosemide (LASIX) 80 mg tablet, Take 1 tablet (80 mg total) by mouth 2 (two) times a day 1 full tablet every morning, 1/2 tablet every afternoon, Disp: 180 tablet, Rfl: 3    insulin degludec (Tresiba FlexTouch) 200 units/mL CONCENTRATED U-200 injection pen, Use 95units subcut once daily (Patient taking differently: Inject 100 Units under the skin daily Use 95units subcut once daily), Disp: 12 mL, Rfl: 5    Lancets (accu-chek multiclix) lancets, Use 1 each 2 (two) times a day Use 2 times daily to test blood glucose, Disp: 102 each, Rfl: 3    lisinopril (ZESTRIL) 20 mg tablet, Take 1 tablet (20 mg total) by mouth daily, Disp: 30 tablet, Rfl: 11    metoprolol succinate (TOPROL-XL) 100 mg 24 hr tablet, Take 1 tablet (100 mg total) by mouth every 12 (twelve) hours, Disp: 60 tablet, Rfl: 0    NovoLOG FlexPen 100 units/mL injection pen, inject 20-25 units under the skin three times a day with meals depending on carbohydrate amount, Disp: 15 mL, Rfl: 0    omeprazole (PriLOSEC) 20 mg delayed release capsule, TAKE ONE CAPSULE BY MOUTH DAILY, Disp: 90 capsule, Rfl: 0    potassium citrate (UROCIT-K) 10 mEq, Take 1 tablet (10 mEq total) by mouth 2 (two) times a day, Disp: 180 tablet, Rfl: 3    Unifine Pentips 31G X 8 MM MISC, Inject under the skin daily Use as directed, Disp: 100 each, Rfl: 3    fluorouracil (EFUDEX) 5 % cream, Apply topically 2 (two) times a day (Patient not taking: No sig reported), Disp: 40 g, Rfl: 1    omeprazole (PriLOSEC) 20 mg delayed release capsule, Take 1 capsule (20 mg total) by mouth daily (Patient not taking: Reported on 6/21/2022), Disp: 90 capsule, Rfl: 0      Vitals:    06/21/22 0853   BP: (!) 128/48   Pulse: 76   SpO2: (!) 86%     Weight (last 2 days)     Date/Time Weight    06/21/22 0853 141 (310)        Physical Exam  Vitals reviewed  Constitutional:       General: He is not in acute distress  Appearance: Normal appearance  He is well-developed  He is not diaphoretic  HENT:      Head: Normocephalic and atraumatic  Eyes:      General: No scleral icterus  Conjunctiva/sclera: Conjunctivae normal       Pupils: Pupils are equal, round, and reactive to light  Neck:      Thyroid: No thyromegaly  Vascular: No JVD  Cardiovascular:      Rate and Rhythm: Normal rate and regular rhythm  Heart sounds: Normal heart sounds  No murmur heard  No friction rub  No gallop  Pulmonary:      Effort: Pulmonary effort is normal  No respiratory distress  Breath sounds: Normal breath sounds  No wheezing or rales  Abdominal:      General: Bowel sounds are normal  There is no distension  Palpations: Abdomen is soft  Tenderness: There is no abdominal tenderness  Musculoskeletal:         General: No deformity  Normal range of motion  Cervical back: Normal range of motion and neck supple  Right lower leg: No edema  Left lower leg: No edema  Skin:     General: Skin is warm and dry  Findings: No erythema or rash  Neurological:      General: No focal deficit present  Mental Status: He is alert and oriented to person, place, and time  Cranial Nerves: No cranial nerve deficit  Motor: No weakness             Laboratory Studies:    Laboratory studies personally reviewed    Cardiac testing:     EKG reviewed personally:   Results for orders placed or performed in visit on 06/21/22   POCT ECG    Impression    Sinus rhythm with occasional PACs, right bundle branch block         Echocardiogram:  2017-EF 55%  3/21-EF 60%, mild LVH, mild RV dilation and mild RV dysfunction, mild-to-moderate TR with mild-to-moderate pulmonary hypertension  12/28/21-EF 55%, moderate concentric hypertrophy, RV dilated, left atrium dilated, right atrium dilated, mild MR, mild TR, mildly elevated pulmonary pressures, mildly dilated ascending aorta-personally reviewed    Stress test:      Holter:      Cardiac catheterization:        Guillermina Montes De Oca MD    Portions of the record may have been created with voice recognition software  Occasional wrong word or "sound a like" substitutions may have occurred due to the inherent limitations of voice recognition software  Read the chart carefully and recognize, using context, where substitutions have occurred

## 2022-06-22 ENCOUNTER — APPOINTMENT (OUTPATIENT)
Dept: LAB | Facility: AMBULARY SURGERY CENTER | Age: 77
End: 2022-06-22
Payer: MEDICARE

## 2022-06-22 ENCOUNTER — OFFICE VISIT (OUTPATIENT)
Dept: FAMILY MEDICINE CLINIC | Facility: CLINIC | Age: 77
End: 2022-06-22
Payer: MEDICARE

## 2022-06-22 VITALS
HEIGHT: 76 IN | HEART RATE: 81 BPM | TEMPERATURE: 97.8 F | BODY MASS INDEX: 37.38 KG/M2 | WEIGHT: 307 LBS | DIASTOLIC BLOOD PRESSURE: 68 MMHG | SYSTOLIC BLOOD PRESSURE: 136 MMHG | OXYGEN SATURATION: 90 %

## 2022-06-22 DIAGNOSIS — G47.33 OSA (OBSTRUCTIVE SLEEP APNEA): ICD-10-CM

## 2022-06-22 DIAGNOSIS — E66.01 MORBID OBESITY (HCC): ICD-10-CM

## 2022-06-22 DIAGNOSIS — K52.9 COLITIS: ICD-10-CM

## 2022-06-22 DIAGNOSIS — I48.0 PAROXYSMAL ATRIAL FIBRILLATION (HCC): ICD-10-CM

## 2022-06-22 DIAGNOSIS — L57.0 ACTINIC KERATOSIS OF SCALP: ICD-10-CM

## 2022-06-22 DIAGNOSIS — R25.2 JERKING MOVEMENTS OF EXTREMITIES: ICD-10-CM

## 2022-06-22 DIAGNOSIS — I50.32 CHRONIC DIASTOLIC CHF (CONGESTIVE HEART FAILURE) (HCC): ICD-10-CM

## 2022-06-22 DIAGNOSIS — I25.10 CORONARY ARTERY DISEASE INVOLVING NATIVE CORONARY ARTERY OF NATIVE HEART WITHOUT ANGINA PECTORIS: ICD-10-CM

## 2022-06-22 DIAGNOSIS — I87.2 VENOUS STASIS DERMATITIS OF BOTH LOWER EXTREMITIES: ICD-10-CM

## 2022-06-22 DIAGNOSIS — Z79.4 TYPE 2 DIABETES MELLITUS WITH HYPERGLYCEMIA, WITH LONG-TERM CURRENT USE OF INSULIN (HCC): Primary | ICD-10-CM

## 2022-06-22 DIAGNOSIS — E11.65 TYPE 2 DIABETES MELLITUS WITH HYPERGLYCEMIA, WITH LONG-TERM CURRENT USE OF INSULIN (HCC): Primary | ICD-10-CM

## 2022-06-22 LAB — DIGOXIN SERPL-MCNC: 0.5 NG/ML (ref 0.8–2)

## 2022-06-22 PROCEDURE — 17003 DESTRUCT PREMALG LES 2-14: CPT | Performed by: FAMILY MEDICINE

## 2022-06-22 PROCEDURE — 17000 DESTRUCT PREMALG LESION: CPT | Performed by: FAMILY MEDICINE

## 2022-06-22 PROCEDURE — 99215 OFFICE O/P EST HI 40 MIN: CPT | Performed by: FAMILY MEDICINE

## 2022-06-22 PROCEDURE — 36415 COLL VENOUS BLD VENIPUNCTURE: CPT

## 2022-06-22 PROCEDURE — 80162 ASSAY OF DIGOXIN TOTAL: CPT

## 2022-06-22 RX ORDER — AMOXICILLIN AND CLAVULANATE POTASSIUM 875; 125 MG/1; MG/1
1 TABLET, FILM COATED ORAL EVERY 12 HOURS SCHEDULED
Qty: 20 TABLET | Refills: 0 | Status: SHIPPED | OUTPATIENT
Start: 2022-06-22 | End: 2022-07-02

## 2022-06-22 NOTE — PROGRESS NOTES
Assessment/Plan:    Refer patient to neurologist for weakness in upper  extremity and jerking motion to evaluate for parkinson possibility  He is on a lot of medication   However will need to rule out neurological issues 1st  Consider checking for nh3 level, he has cirrhosis of the liver, working up w gi  Will put him on antibiotic 10 days prior to colonoscopy to help prevent infection  Patient is up-to-date with all vaccine  He has several actinic keratoses on his scalp and left   cheek that was treated today  Meds refilled  Will see him back in 3 months sooner if needed  I have spent 40 minutes with Patient and family today in which greater than 50% of this time was spent in counseling/coordination of care regarding Diagnostic results, Prognosis, Risks and benefits of tx options, Intructions for management, Patient and family education, Importance of tx compliance and Risk factor reductions  Problem List Items Addressed This Visit        Digestive    Colitis    Relevant Medications    amoxicillin-clavulanate (AUGMENTIN) 875-125 mg per tablet       Endocrine    Type 2 diabetes mellitus with hyperglycemia, with long-term current use of insulin (HCC) - Primary       Respiratory    LUCIA (obstructive sleep apnea)       Cardiovascular and Mediastinum    CAD (coronary artery disease)    Paroxysmal atrial fibrillation (HCC)    Chronic diastolic CHF (congestive heart failure) (HCC)       Musculoskeletal and Integument    Actinic keratosis of scalp    Relevant Orders    Lesion Destruction (Completed)    Venous stasis dermatitis of both lower extremities       Other    Morbid obesity ([address] )      Other Visit Diagnoses     Jerking movements of extremities        Relevant Orders    Ambulatory Referral to Neurology            Subjective:      Patient ID: Singh Ambrose is a 65 y o  male  23-year-old male follow-up diabetes type 2 essential hypertension CKD chronic heart failure    He follow-up endocrinologist cardiologist nephrologist and vascular specialist   He complained one-month history of randomly jerking motion in his arms legs  all over and last couple minutes and then it goes away but then other days ago come back more prolonged  He has a complained that he is dropping objects from holding them both hands  Denies any weakness  No loss of balance no shuffling gait no tremors no headaches no loss of memories  He has history of bladder cancer follow urologist   No new medication  He is on Eliquis for AFib Lipitor Bentyl digoxin level is therapeutic Lasix Lantus lisinopril metoprolol NovoLog Prilosec potassium  Workup with GI to get colonoscopy he was advised to hold Eliquis for 1 day  He has left lower quadrant pain CT scan did not show any diverticulitis  Antibiotic to treat colitis does help with the pain    No fever no chills      The following portions of the patient's history were reviewed and updated as appropriate:   Past Medical History:  He has a past medical history of A-fib ([facility] ), AAA (abdominal aortic aneurysm) ([facility] ), Actinic keratosis of right temple (7/31/2020), Actinic keratosis of scalp (7/31/2020), Acute respiratory failure with hypoxia ([facility] ) (3/25/2021), Allergic (1960), Arthritis, Lay's esophagus, Bladder cancer ([facility] ), Blister of left leg (4/28/2021), Blister of right leg (5/26/2021), CAD (coronary artery disease), Cancer ([facility] ) (02/2010), CHF (congestive heart failure) ([facility] ), Chronic low back pain, Chronic pain of both knees (7/31/2020), Colitis (12/4/2020), CPAP (continuous positive airway pressure) dependence, Diabetes ([facility] ), Diabetes mellitus ([facility] ) (10/1993), Excessive gas (12/3/2020), Heart murmur, History of chemotherapy, Hydroureter on left (4/28/2021), Hyperlipemia, Hypertension, Immunization deficiency (10/30/2020), Kidney stone, Left lower quadrant abdominal pain (12/3/2020), Mass of right adrenal gland ([facility] ) (12/4/2020), Myocardial infarction ([facility] ), 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), Obesity (2000), LUCIA (obstructive sleep apnea) (1/29/2021), Pressure ulcer of right leg, stage 1 (5/26/2021), Urinary tract infection with hematuria (5/3/2021), and Venous stasis dermatitis of both lower extremities (5/26/2021)  ,  _______________________________________________________________________  Medical Problems:  does not have any pertinent problems on file ,  _______________________________________________________________________  Past Surgical History:   has a past surgical history that includes Back surgery; Bladder surgery; Cataract extraction, bilateral; Colonoscopy (01/29/2019); Coronary artery bypass graft (04/2016); Total shoulder replacement (Right, 2013); Total hip arthroplasty (Right, 2012); EGD (06/2017); pr cystourethroscopy,ureter catheter (Left, 4/24/2021); pr cysto/uretero w/lithotripsy &indwell stent insrt (Left, 5/21/2021); Kidney stone surgery; Eye surgery (11/2016); Joint replacement (5725-3078); and Vasectomy (1971)  ,  _______________________________________________________________________  Family History:  family history includes Alzheimer's disease in his mother; Arthritis in his father; Dementia in his mother; Heart attack in his father; Heart disease in his father ,  _______________________________________________________________________  Social History:   reports that he quit smoking about 12 years ago  His smoking use included cigarettes  He started smoking about 57 years ago  He has a 5 00 pack-year smoking history  He has never used smokeless tobacco  He reports previous alcohol use  He reports that he does not use drugs  ,  _______________________________________________________________________  Allergies:  is allergic to diltiazem     _______________________________________________________________________  Current Outpatient Medications   Medication Sig Dispense Refill    amoxicillin-clavulanate (AUGMENTIN) 875-125 mg per tablet Take 1 tablet by mouth every 12 (twelve) hours for 10 days 20 tablet 0    apixaban (Eliquis) 5 mg Take 1 tablet (5 mg total) by mouth 2 (two) times a day 60 tablet 0    atorvastatin (LIPITOR) 40 mg tablet Take 1 tablet (40 mg total) by mouth in the morning   90 tablet 0    clindamycin (CLINDAGEL) 1 % gel Apply topically 2 (two) times a day 30 g 1    Continuous Blood Gluc Sensor (FreeStyle Peggy 2 Sensor) MISC Change it every 14 days 1 each 0    dicyclomine (BENTYL) 10 mg capsule Take 1 capsule (10 mg total) by mouth 2 (two) times a day as needed (abdominal cramping) 60 capsule 1    digoxin (LANOXIN) 0 125 mg tablet Take 1 tablet (125 mcg total) by mouth daily 90 tablet 3    furosemide (LASIX) 80 mg tablet Take 1 tablet (80 mg total) by mouth 2 (two) times a day 1 full tablet every morning, 1/2 tablet every afternoon 180 tablet 3    insulin degludec (Tresiba FlexTouch) 200 units/mL CONCENTRATED U-200 injection pen Use 95units subcut once daily (Patient taking differently: Inject 100 Units under the skin daily Use 95units subcut once daily) 12 mL 5    Lancets (accu-chek multiclix) lancets Use 1 each 2 (two) times a day Use 2 times daily to test blood glucose 102 each 3    lisinopril (ZESTRIL) 20 mg tablet Take 1 tablet (20 mg total) by mouth daily 30 tablet 11    metoprolol succinate (TOPROL-XL) 100 mg 24 hr tablet Take 1 tablet (100 mg total) by mouth every 12 (twelve) hours 60 tablet 0    NovoLOG FlexPen 100 units/mL injection pen inject 20-25 units under the skin three times a day with meals depending on carbohydrate amount 15 mL 0    omeprazole (PriLOSEC) 20 mg delayed release capsule TAKE ONE CAPSULE BY MOUTH DAILY 90 capsule 0    potassium citrate (UROCIT-K) 10 mEq Take 1 tablet (10 mEq total) by mouth 2 (two) times a day 180 tablet 3    Unifine Pentips 31G X 8 MM MISC Inject under the skin daily Use as directed 100 each 3    fluorouracil (EFUDEX) 5 % cream Apply topically 2 (two) times a day (Patient not taking: No sig reported) 40 g 1    omeprazole (PriLOSEC) 20 mg delayed release capsule Take 1 capsule (20 mg total) by mouth daily (Patient not taking: Reported on 6/21/2022) 90 capsule 0     No current facility-administered medications for this visit      _______________________________________________________________________  Review of Systems   Constitutional: Negative for activity change, appetite change, fatigue, fever and unexpected weight change  HENT: Negative for dental problem and trouble swallowing  Eyes: Negative for photophobia and visual disturbance  Respiratory: Negative for cough and chest tightness  Cardiovascular: Negative for chest pain, palpitations and leg swelling  Gastrointestinal: Negative for abdominal pain, constipation and vomiting  Endocrine: Negative for cold intolerance, polydipsia and polyuria  Genitourinary: Negative for difficulty urinating, frequency and urgency  Musculoskeletal: Negative for arthralgias, joint swelling, myalgias and neck pain  Skin: Negative for color change, rash and wound  Allergic/Immunologic: Negative for environmental allergies  Neurological: Positive for weakness  Negative for dizziness and numbness  Jerking movements   Hematological: Does not bruise/bleed easily  Psychiatric/Behavioral: Negative for decreased concentration, dysphoric mood, self-injury, sleep disturbance and suicidal ideas  Objective:  Vitals:    06/22/22 1301   BP: 136/68   BP Location: Left arm   Patient Position: Sitting   Cuff Size: Large   Pulse: 81   Temp: 97 8 °F (36 6 °C)   TempSrc: Tympanic   SpO2: 90%   Weight: (!) 139 kg (307 lb)   Height: 6' 4" (1 93 m)     Body mass index is 37 37 kg/m²  Physical Exam  Vitals and nursing note reviewed  Constitutional:       Appearance: Normal appearance  He is well-developed  He is obese  HENT:      Head: Normocephalic and atraumatic        Right Ear: Tympanic membrane, ear canal and external ear normal  Left Ear: Tympanic membrane, ear canal and external ear normal       Nose: Nose normal       Mouth/Throat:      Mouth: Mucous membranes are moist       Pharynx: Oropharynx is clear  Eyes:      Pupils: Pupils are equal, round, and reactive to light  Cardiovascular:      Rate and Rhythm: Normal rate and regular rhythm  Pulses: Normal pulses  Heart sounds: Normal heart sounds  Pulmonary:      Effort: Pulmonary effort is normal       Breath sounds: Normal breath sounds  Abdominal:      General: Bowel sounds are normal       Palpations: Abdomen is soft  Musculoskeletal:         General: Normal range of motion  Cervical back: Normal range of motion and neck supple  Skin:     General: Skin is warm and dry  Capillary Refill: Capillary refill takes less than 2 seconds  Comments: 6 AK on scalp   Neurological:      General: No focal deficit present  Mental Status: He is alert and oriented to person, place, and time  Mental status is at baseline  Psychiatric:         Mood and Affect: Mood normal          Behavior: Behavior normal          Thought Content: Thought content normal          Judgment: Judgment normal          Lesion Destruction    Date/Time: 6/22/2022 2:20 PM  Performed by: Gloria Otero MD  Authorized by: Gloria Otero MD   Universal Protocol:  Consent: Verbal consent obtained  Risks and benefits: risks, benefits and alternatives were discussed  Consent given by: patient  Time out: Immediately prior to procedure a "time out" was called to verify the correct patient, procedure, equipment, support staff and site/side marked as required    Timeout called at: 6/22/2022 2:20 PM   Patient understanding: patient states understanding of the procedure being performed  Patient consent: the patient's understanding of the procedure matches consent given  Procedure consent: procedure consent matches procedure scheduled  Patient identity confirmed: verbally with patient      Procedure Details - Lesion Destruction:     Number of Lesions:  6  Lesion 1:     Body area:  Head/neck    Head/neck location:  Scalp    Initial size (mm):  5    Final defect size (mm):  5    Malignancy: pre-malignant lesion      Destruction method: cryotherapy    Lesion 2:     Body area:  Head/neck    Head/neck location:  Scalp    Initial size (mm):  3    Final defect size (mm):  3    Malignancy: pre-malignant lesion      Destruction method: cryotherapy    Lesion 3:     Body area:  Head/neck    Head/neck location:  Scalp    Initial size (mm):  3    Final defect size (mm):  3    Malignancy: pre-malignant lesion      Destruction method: cryotherapy    Lesion 4:     Body area:  Head/neck    Head/neck location:  Scalp    Initial size (mm):  2    Final defect size (mm):  2    Malignancy: pre-malignant lesion      Destruction method: cryotherapy    Lesion 5:     Body area:  Head/neck    Head/neck location:  Scalp    Inital size (mm):  3    Final defect size (mm):  3    Malignancy: pre-malignant lesion      Destruction method: cryotherapy    Lesion 6:     Body area:  Head/neck    Head/neck location:  L cheek    Initial size (mm):  2    Final defect size (mm):  2    Malignancy: pre-malignant lesion      Destruction method: cryotherapy

## 2022-06-25 DIAGNOSIS — I48.91 ATRIAL FIBRILLATION WITH RAPID VENTRICULAR RESPONSE (HCC): ICD-10-CM

## 2022-06-27 DIAGNOSIS — Z79.4 TYPE 2 DIABETES MELLITUS WITH HYPERGLYCEMIA, WITH LONG-TERM CURRENT USE OF INSULIN (HCC): ICD-10-CM

## 2022-06-27 DIAGNOSIS — E11.65 TYPE 2 DIABETES MELLITUS WITH HYPERGLYCEMIA, WITH LONG-TERM CURRENT USE OF INSULIN (HCC): ICD-10-CM

## 2022-06-27 RX ORDER — METOPROLOL SUCCINATE 100 MG/1
TABLET, EXTENDED RELEASE ORAL
Qty: 60 TABLET | Refills: 0 | Status: SHIPPED | OUTPATIENT
Start: 2022-06-27 | End: 2022-07-24 | Stop reason: SDUPTHER

## 2022-06-27 RX ORDER — INSULIN ASPART 100 [IU]/ML
INJECTION, SOLUTION INTRAVENOUS; SUBCUTANEOUS
Qty: 15 ML | Refills: 0 | Status: SHIPPED | OUTPATIENT
Start: 2022-06-27 | End: 2022-08-01

## 2022-07-02 DIAGNOSIS — I48.11 LONGSTANDING PERSISTENT ATRIAL FIBRILLATION (HCC): ICD-10-CM

## 2022-07-05 RX ORDER — APIXABAN 5 MG/1
TABLET, FILM COATED ORAL
Qty: 60 TABLET | Refills: 0 | Status: SHIPPED | OUTPATIENT
Start: 2022-07-05 | End: 2022-08-07 | Stop reason: SDUPTHER

## 2022-07-14 ENCOUNTER — HOSPITAL ENCOUNTER (OUTPATIENT)
Dept: ULTRASOUND IMAGING | Facility: HOSPITAL | Age: 77
Discharge: HOME | End: 2022-07-14
Payer: MEDICARE

## 2022-07-14 DIAGNOSIS — K74.60 HEPATIC CIRRHOSIS, UNSPECIFIED HEPATIC CIRRHOSIS TYPE, UNSPECIFIED WHETHER ASCITES PRESENT (HCC): ICD-10-CM

## 2022-07-14 DIAGNOSIS — R10.13 EPIGASTRIC PAIN: ICD-10-CM

## 2022-07-14 PROCEDURE — 76705 ECHO EXAM OF ABDOMEN: CPT

## 2022-07-20 ENCOUNTER — ANESTHESIA EVENT (OUTPATIENT)
Dept: GASTROENTEROLOGY | Facility: HOSPITAL | Age: 77
End: 2022-07-20

## 2022-07-20 ENCOUNTER — ANESTHESIA (OUTPATIENT)
Dept: GASTROENTEROLOGY | Facility: HOSPITAL | Age: 77
End: 2022-07-20

## 2022-07-20 ENCOUNTER — HOSPITAL ENCOUNTER (OUTPATIENT)
Dept: GASTROENTEROLOGY | Facility: HOSPITAL | Age: 77
Setting detail: OUTPATIENT SURGERY
Discharge: HOME | End: 2022-07-20
Payer: MEDICARE

## 2022-07-20 VITALS
BODY MASS INDEX: 36.17 KG/M2 | WEIGHT: 297 LBS | SYSTOLIC BLOOD PRESSURE: 132 MMHG | HEIGHT: 76 IN | DIASTOLIC BLOOD PRESSURE: 61 MMHG | HEART RATE: 86 BPM | OXYGEN SATURATION: 89 % | RESPIRATION RATE: 14 BRPM | TEMPERATURE: 98.4 F

## 2022-07-20 DIAGNOSIS — K21.00 GASTROESOPHAGEAL REFLUX DISEASE WITH ESOPHAGITIS WITHOUT HEMORRHAGE: Primary | ICD-10-CM

## 2022-07-20 DIAGNOSIS — R10.32 LEFT LOWER QUADRANT ABDOMINAL PAIN: ICD-10-CM

## 2022-07-20 DIAGNOSIS — K74.60 HEPATIC CIRRHOSIS, UNSPECIFIED HEPATIC CIRRHOSIS TYPE, UNSPECIFIED WHETHER ASCITES PRESENT (HCC): ICD-10-CM

## 2022-07-20 DIAGNOSIS — R10.13 EPIGASTRIC PAIN: ICD-10-CM

## 2022-07-20 DIAGNOSIS — K21.9 GASTROESOPHAGEAL REFLUX DISEASE, UNSPECIFIED WHETHER ESOPHAGITIS PRESENT: ICD-10-CM

## 2022-07-20 DIAGNOSIS — Z86.010 HISTORY OF COLON POLYPS: ICD-10-CM

## 2022-07-20 DIAGNOSIS — K22.70 BARRETT'S ESOPHAGUS WITHOUT DYSPLASIA: ICD-10-CM

## 2022-07-20 LAB — GLUCOSE SERPL-MCNC: 198 MG/DL (ref 65–140)

## 2022-07-20 PROCEDURE — 82948 REAGENT STRIP/BLOOD GLUCOSE: CPT

## 2022-07-20 PROCEDURE — 43239 EGD BIOPSY SINGLE/MULTIPLE: CPT | Performed by: INTERNAL MEDICINE

## 2022-07-20 PROCEDURE — 88305 TISSUE EXAM BY PATHOLOGIST: CPT | Performed by: PATHOLOGY

## 2022-07-20 PROCEDURE — 43255 EGD CONTROL BLEEDING ANY: CPT | Performed by: INTERNAL MEDICINE

## 2022-07-20 PROCEDURE — 45380 COLONOSCOPY AND BIOPSY: CPT | Performed by: INTERNAL MEDICINE

## 2022-07-20 RX ORDER — SODIUM CHLORIDE, SODIUM LACTATE, POTASSIUM CHLORIDE, CALCIUM CHLORIDE 600; 310; 30; 20 MG/100ML; MG/100ML; MG/100ML; MG/100ML
INJECTION, SOLUTION INTRAVENOUS CONTINUOUS PRN
Status: DISCONTINUED | OUTPATIENT
Start: 2022-07-20 | End: 2022-07-20

## 2022-07-20 RX ORDER — PROPOFOL 10 MG/ML
INJECTION, EMULSION INTRAVENOUS AS NEEDED
Status: DISCONTINUED | OUTPATIENT
Start: 2022-07-20 | End: 2022-07-20

## 2022-07-20 RX ORDER — LIDOCAINE HYDROCHLORIDE 10 MG/ML
INJECTION, SOLUTION EPIDURAL; INFILTRATION; INTRACAUDAL; PERINEURAL AS NEEDED
Status: DISCONTINUED | OUTPATIENT
Start: 2022-07-20 | End: 2022-07-20

## 2022-07-20 RX ORDER — FAMOTIDINE 40 MG/1
40 TABLET, FILM COATED ORAL
Qty: 30 TABLET | Refills: 1 | Status: SHIPPED | OUTPATIENT
Start: 2022-07-20 | End: 2022-09-17

## 2022-07-20 RX ADMIN — PROPOFOL 20 MG: 10 INJECTION, EMULSION INTRAVENOUS at 09:44

## 2022-07-20 RX ADMIN — SODIUM CHLORIDE, SODIUM LACTATE, POTASSIUM CHLORIDE, AND CALCIUM CHLORIDE: .6; .31; .03; .02 INJECTION, SOLUTION INTRAVENOUS at 09:18

## 2022-07-20 RX ADMIN — PROPOFOL 80 MG: 10 INJECTION, EMULSION INTRAVENOUS at 09:24

## 2022-07-20 RX ADMIN — PROPOFOL 20 MG: 10 INJECTION, EMULSION INTRAVENOUS at 09:30

## 2022-07-20 RX ADMIN — PROPOFOL 20 MG: 10 INJECTION, EMULSION INTRAVENOUS at 09:35

## 2022-07-20 RX ADMIN — LIDOCAINE HYDROCHLORIDE 100 MG: 10 INJECTION, SOLUTION EPIDURAL; INFILTRATION; INTRACAUDAL; PERINEURAL at 09:24

## 2022-07-20 RX ADMIN — PROPOFOL 20 MG: 10 INJECTION, EMULSION INTRAVENOUS at 09:40

## 2022-07-20 RX ADMIN — PROPOFOL 20 MG: 10 INJECTION, EMULSION INTRAVENOUS at 09:26

## 2022-07-20 NOTE — H&P
History and Physical -  [facility]  Malia Oneill 65 y o  male MRN: [id number]                  HPI: Malia Oneill is a 65y o  year old male who presents for EGD and colonoscopy for abdominal pain which is in epigastric and left lower quadrant, history of GERD and Lay esophagus      REVIEW OF SYSTEMS: Per the HPI, and otherwise unremarkable      Historical Information   Past Medical History:   Diagnosis Date    A-fib [facility])     AAA (abdominal aortic aneurysm) ([facility] )     Actinic keratosis of right temple 7/31/2020    Actinic keratosis of scalp 7/31/2020    Acute respiratory failure with hypoxia (HCC) 3/25/2021    Allergic 1960    Arthritis     Lay's esophagus     Bladder cancer (HCC)     Blister of left leg 4/28/2021    Blister of right leg 5/26/2021    CAD (coronary artery disease)     Cancer (HCC) 02/2010    Bladder    CHF (congestive heart failure) (HCC)     Chronic low back pain     Chronic pain of both knees 7/31/2020    Colitis 12/4/2020    CPAP (continuous positive airway pressure) dependence     Diabetes ([facility] )     Diabetes mellitus ([facility] ) 10/1993    Excessive gas 12/3/2020    Heart murmur     History of chemotherapy     Hydroureter on left 4/28/2021    Hyperlipemia     Hypertension     Immunization deficiency 10/30/2020    Hep a nonimmune    Kidney stone     Left lower quadrant abdominal pain 12/3/2020    Mass of right adrenal gland ([facility] ) 12/4/2020    4 1 cm    Myocardial infarction ([facility] )     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    Obesity 2000    LUCIA (obstructive sleep apnea) 1/29/2021    Pressure ulcer of right leg, stage 1 5/26/2021    Urinary tract infection with hematuria 5/3/2021    u cx 4/2021=pseudomonas R to bactrim and cefdinir, S to cipro    Venous stasis dermatitis of both lower extremities 5/26/2021     Past Surgical History:   Procedure Laterality Date    BACK SURGERY      BLADDER SURGERY      CATARACT EXTRACTION, BILATERAL  COLONOSCOPY  2019    next  [address] GRAFT  2016    Quadruple per Rita    EGD  2017    EYE SURGERY  2016    Cataracts    JOINT REPLACEMENT  [id number]    Hip and shoulder    KIDNEY STONE SURGERY      MN CYSTO/URETERO W/LITHOTRIPSY &INDWELL STENT INSRT Left 2021    Procedure: CYSTOSCOPY URETEROSCOPY WITH LITHOTRIPSY HOLMIUM LASER, AND INSERTION STENT URETERAL/EXCHANGE;  Surgeon: Jose Luis Lyons MD;  Location: [facility];  Service: Urology    MN CYSTOURETHROSCOPY,URETER CATHETER Left 2021    Procedure: CYSTOSCOPY  WITH INSERTION STENT URETERAL;  Surgeon: Jose Luis Lyons MD;  [facility]: [facility];  Service: Urology    TOTAL HIP ARTHROPLASTY Right 2012    TOTAL SHOULDER REPLACEMENT Right 2013    VASECTOMY  1971     Social History   Social History     Substance and Sexual Activity   Alcohol Use Not Currently    Alcohol/week: 0 0 standard drinks    Comment: No use     Social History     Substance and Sexual Activity   Drug Use Never    Comment: No use     Social History     Tobacco Use   Smoking Status Former Smoker    Packs/day: 0 25    Years: 20 00    Pack years: 5 00    Types: Cigarettes    Start date:     Quit date: 2010    Years since quittin 5   Smokeless Tobacco Never Used   Tobacco Comment    Quit several times for up to 6 years       Family History   Problem Relation Age of Onset    Heart disease Father     Arthritis Father     Heart attack Father     Alzheimer's disease Mother     Dementia Mother        Meds/Allergies     (Not in a hospital admission)      Allergies   Allergen Reactions    Diltiazem Abdominal Pain       Objective     /70   Pulse 78   Temp 98 3 °F (36 8 °C) (Temporal)   Resp 15   Ht 6' 4" (1 93 m)   Wt 135 kg (297 lb)   SpO2 93%   BMI 36 15 kg/m²       PHYSICAL EXAM    Gen: NAD  CV: RRR  CHEST: Clear  ABD: soft, NT/ND  EXT: no edema      ASSESSMENT/PLAN:  This is a 65y o  year old male here for EGD and colonoscopy, and he is stable and optimized for his procedure

## 2022-07-20 NOTE — ANESTHESIA PREPROCEDURE EVALUATION
Procedure:  COLONOSCOPY  EGD    Relevant Problems   ANESTHESIA (within normal limits)      CARDIO   (+) AAA (abdominal aortic aneurysm) (HCC)   (+) Atrial fibrillation with rapid ventricular response (HCC)   (+) CAD (coronary artery disease)   (+) Hyperlipemia   (+) Hypertension   (+) Myocardial infarction (HCC)   (+) Nipple pain   (+) Paroxysmal atrial fibrillation (HCC)      ENDO   (+) Type 2 diabetes mellitus with hyperglycemia, with long-term current use of insulin (HCC)      GI/HEPATIC   (+) Gastroesophageal reflux disease      /RENAL   (+) Benign prostatic hyperplasia without lower urinary tract symptoms   (+) Nephrolithiasis      HEMATOLOGY (within normal limits)      MUSCULOSKELETAL   (+) Arthritis   (+) Chronic low back pain      NEURO/PSYCH   (+) Chronic low back pain      PULMONARY   (+) LUCIA (obstructive sleep apnea)   (-) Asthma      TTE 12/28/2021  Interpretation Summary         Left Ventricle: Left ventricular cavity size is normal  Wall thickness is mildly increased  The left ventricular ejection fraction is 55%  Systolic function is normal  Wall motion cannot be accurately assessed  There is moderate concentric hypertrophy    Right Ventricle: Right ventricular cavity size is mildly dilated    Left Atrium: The atrium is mildly dilated    Right Atrium: The atrium is mildly dilated    Aortic Valve: The aortic valve is trileaflet  The leaflets are mildly thickened  The leaflets are mildly calcified  There is mildly reduced mobility  Unable to assess aortic valve stenosis due to poor Doppler exam     Mitral Valve: There is mild annular calcification  There is mild regurgitation    Tricuspid Valve: There is mild regurgitation  The right ventricular systolic pressure is mildly elevated  The estimated right ventricular systolic pressure is 67 8 mmHg    Aorta: The aortic root is mildly dilated  The ascending aorta is mildly dilated    Pericardium: There is no pericardial effusion     Technically difficult study  Physical Exam    Airway    Mallampati score: III  TM Distance: >3 FB  Neck ROM: full     Dental   upper dentures,     Cardiovascular      Pulmonary      Other Findings        Anesthesia Plan  ASA Score- 3     Anesthesia Type- IV sedation with anesthesia with ASA Monitors  Additional Monitors:   Airway Plan:           Plan Factors-Exercise tolerance (METS): <4 METS  Chart reviewed  Existing labs reviewed  Patient summary reviewed  Patient is not a current smoker  Induction- intravenous  Postoperative Plan-     Informed Consent- Anesthetic plan and risks discussed with patient and spouse  I personally reviewed this patient with the CRNA  Discussed and agreed on the Anesthesia Plan with the CRNA  Alexander Lopez Patient's SpO2 88-91% in pre-op  Patient reports his SpO2 is 88-92% at home when he checks  He also reports he follows with pulmonology and they do not currently have him on home oxygen  Patient reports being in his usual state of health  Patient was placed on 4 lpm nasal cannula with improvement in his SpO2  I discussed with the patient that I feel we can safely proceed with his procedures today, but I strongly advise following up with his pulmonologist to re-evaluate his lung function   Patient my need repeat PFTs or 6 minute walk test

## 2022-07-20 NOTE — ANESTHESIA POSTPROCEDURE EVALUATION
Post-Op Assessment Note    CV Status:  Stable  Pain Score: 0    Pain management: adequate     Mental Status:  Arousable   Hydration Status:  Stable   PONV Controlled:  None   Airway Patency:  Patent      Post Op Vitals Reviewed: Yes      Staff: Anesthesiologist, CRNA         No complications documented      /60 (07/20/22 0950)    Temp 98 4 °F (36 9 °C) (07/20/22 0950)    Pulse 83 (07/20/22 0950)   Resp 12 (07/20/22 0950)    SpO2 97 % (07/20/22 0950)

## 2022-07-24 DIAGNOSIS — I48.91 ATRIAL FIBRILLATION WITH RAPID VENTRICULAR RESPONSE (HCC): ICD-10-CM

## 2022-07-25 RX ORDER — METOPROLOL SUCCINATE 100 MG/1
100 TABLET, EXTENDED RELEASE ORAL EVERY 12 HOURS
Qty: 60 TABLET | Refills: 0 | Status: SHIPPED | OUTPATIENT
Start: 2022-07-25 | End: 2022-08-20 | Stop reason: SDUPTHER

## 2022-07-30 DIAGNOSIS — Z79.4 TYPE 2 DIABETES MELLITUS WITH HYPERGLYCEMIA, WITH LONG-TERM CURRENT USE OF INSULIN (HCC): ICD-10-CM

## 2022-07-30 DIAGNOSIS — E11.65 TYPE 2 DIABETES MELLITUS WITH HYPERGLYCEMIA, WITH LONG-TERM CURRENT USE OF INSULIN (HCC): ICD-10-CM

## 2022-08-01 RX ORDER — INSULIN ASPART 100 [IU]/ML
INJECTION, SOLUTION INTRAVENOUS; SUBCUTANEOUS
Qty: 15 ML | Refills: 0 | Status: SHIPPED | OUTPATIENT
Start: 2022-08-01 | End: 2022-08-25 | Stop reason: SDUPTHER

## 2022-08-03 ENCOUNTER — TELEPHONE (OUTPATIENT)
Dept: NEPHROLOGY | Facility: HOSPITAL | Age: 77
End: 2022-08-03

## 2022-08-03 NOTE — TELEPHONE ENCOUNTER
Called patient and left a voicemail asking to please have blood work drawn before the follow up appointment

## 2022-08-04 ENCOUNTER — APPOINTMENT (OUTPATIENT)
Dept: LAB | Facility: AMBULARY SURGERY CENTER | Age: 77
End: 2022-08-04
Payer: MEDICARE

## 2022-08-04 DIAGNOSIS — E11.65 TYPE 2 DIABETES MELLITUS WITH HYPERGLYCEMIA, WITH LONG-TERM CURRENT USE OF INSULIN (HCC): ICD-10-CM

## 2022-08-04 DIAGNOSIS — I10 ESSENTIAL HYPERTENSION: ICD-10-CM

## 2022-08-04 DIAGNOSIS — R80.1 PERSISTENT PROTEINURIA: ICD-10-CM

## 2022-08-04 DIAGNOSIS — Z79.4 TYPE 2 DIABETES MELLITUS WITH HYPERGLYCEMIA, WITH LONG-TERM CURRENT USE OF INSULIN (HCC): ICD-10-CM

## 2022-08-04 DIAGNOSIS — I50.32 CHRONIC DIASTOLIC CHF (CONGESTIVE HEART FAILURE) (HCC): ICD-10-CM

## 2022-08-04 DIAGNOSIS — E83.52 HYPERCALCEMIA: ICD-10-CM

## 2022-08-04 DIAGNOSIS — N20.0 NEPHROLITHIASIS: ICD-10-CM

## 2022-08-04 DIAGNOSIS — E87.6 HYPOKALEMIA: ICD-10-CM

## 2022-08-04 LAB
25(OH)D3 SERPL-MCNC: 30.5 NG/ML (ref 30–100)
ANION GAP SERPL CALCULATED.3IONS-SCNC: 0 MMOL/L (ref 4–13)
BACTERIA UR QL AUTO: NORMAL /HPF
BILIRUB UR QL STRIP: NEGATIVE
BUN SERPL-MCNC: 19 MG/DL (ref 5–25)
CALCIUM SERPL-MCNC: 9.6 MG/DL (ref 8.3–10.1)
CHLORIDE SERPL-SCNC: 103 MMOL/L (ref 96–108)
CLARITY UR: CLEAR
CO2 SERPL-SCNC: 37 MMOL/L (ref 21–32)
COLOR UR: COLORLESS
CREAT SERPL-MCNC: 1.11 MG/DL (ref 0.6–1.3)
CREAT UR-MCNC: 15.7 MG/DL
EST. AVERAGE GLUCOSE BLD GHB EST-MCNC: 192 MG/DL
GFR SERPL CREATININE-BSD FRML MDRD: 63 ML/MIN/1.73SQ M
GLUCOSE P FAST SERPL-MCNC: 195 MG/DL (ref 65–99)
GLUCOSE UR STRIP-MCNC: NEGATIVE MG/DL
HBA1C MFR BLD: 8.3 %
HGB UR QL STRIP.AUTO: NEGATIVE
KETONES UR STRIP-MCNC: NEGATIVE MG/DL
LEUKOCYTE ESTERASE UR QL STRIP: NEGATIVE
MAGNESIUM SERPL-MCNC: 2 MG/DL (ref 1.6–2.6)
NITRITE UR QL STRIP: NEGATIVE
NON-SQ EPI CELLS URNS QL MICRO: NORMAL /HPF
PH UR STRIP.AUTO: 6.5 [PH]
PHOSPHATE SERPL-MCNC: 3.5 MG/DL (ref 2.3–4.1)
POTASSIUM SERPL-SCNC: 4.2 MMOL/L (ref 3.5–5.3)
PROT UR STRIP-MCNC: NEGATIVE MG/DL
PROT UR-MCNC: 8 MG/DL
PROT/CREAT UR: 0.51 MG/G{CREAT} (ref 0–0.1)
PTH-INTACT SERPL-MCNC: 100.2 PG/ML (ref 18.4–80.1)
RBC #/AREA URNS AUTO: NORMAL /HPF
SODIUM SERPL-SCNC: 140 MMOL/L (ref 135–147)
SP GR UR STRIP.AUTO: 1.01 (ref 1–1.03)
UROBILINOGEN UR STRIP-ACNC: <2 MG/DL
WBC #/AREA URNS AUTO: NORMAL /HPF

## 2022-08-04 PROCEDURE — 84156 ASSAY OF PROTEIN URINE: CPT

## 2022-08-04 PROCEDURE — 82306 VITAMIN D 25 HYDROXY: CPT

## 2022-08-04 PROCEDURE — 83970 ASSAY OF PARATHORMONE: CPT

## 2022-08-04 PROCEDURE — 80048 BASIC METABOLIC PNL TOTAL CA: CPT

## 2022-08-04 PROCEDURE — 84100 ASSAY OF PHOSPHORUS: CPT

## 2022-08-04 PROCEDURE — 36415 COLL VENOUS BLD VENIPUNCTURE: CPT

## 2022-08-04 PROCEDURE — 81001 URINALYSIS AUTO W/SCOPE: CPT

## 2022-08-04 PROCEDURE — 83036 HEMOGLOBIN GLYCOSYLATED A1C: CPT

## 2022-08-04 PROCEDURE — 82570 ASSAY OF URINE CREATININE: CPT

## 2022-08-04 PROCEDURE — 83735 ASSAY OF MAGNESIUM: CPT

## 2022-08-05 DIAGNOSIS — K21.9 GASTROESOPHAGEAL REFLUX DISEASE, UNSPECIFIED WHETHER ESOPHAGITIS PRESENT: ICD-10-CM

## 2022-08-06 ENCOUNTER — LAB (OUTPATIENT)
Dept: LAB | Facility: CLINIC | Age: 77
End: 2022-08-06
Payer: MEDICARE

## 2022-08-06 DIAGNOSIS — N20.0 NEPHROLITHIASIS: ICD-10-CM

## 2022-08-06 DIAGNOSIS — E83.52 HYPERCALCEMIA: ICD-10-CM

## 2022-08-06 PROCEDURE — 82140 ASSAY OF AMMONIA: CPT

## 2022-08-06 PROCEDURE — 84105 ASSAY OF URINE PHOSPHORUS: CPT

## 2022-08-06 PROCEDURE — 84133 ASSAY OF URINE POTASSIUM: CPT

## 2022-08-06 PROCEDURE — 84300 ASSAY OF URINE SODIUM: CPT

## 2022-08-06 PROCEDURE — 82570 ASSAY OF URINE CREATININE: CPT

## 2022-08-06 PROCEDURE — 83935 ASSAY OF URINE OSMOLALITY: CPT

## 2022-08-06 PROCEDURE — 83735 ASSAY OF MAGNESIUM: CPT

## 2022-08-06 PROCEDURE — 82340 ASSAY OF CALCIUM IN URINE: CPT

## 2022-08-06 PROCEDURE — 84560 ASSAY OF URINE/URIC ACID: CPT

## 2022-08-06 PROCEDURE — 82507 ASSAY OF CITRATE: CPT

## 2022-08-06 PROCEDURE — 82131 AMINO ACIDS SINGLE QUANT: CPT

## 2022-08-06 PROCEDURE — 81003 URINALYSIS AUTO W/O SCOPE: CPT

## 2022-08-06 PROCEDURE — 82436 ASSAY OF URINE CHLORIDE: CPT

## 2022-08-06 PROCEDURE — 83945 ASSAY OF OXALATE: CPT

## 2022-08-06 PROCEDURE — 84392 ASSAY OF URINE SULFATE: CPT

## 2022-08-07 DIAGNOSIS — I48.11 LONGSTANDING PERSISTENT ATRIAL FIBRILLATION (HCC): ICD-10-CM

## 2022-08-08 DIAGNOSIS — K21.9 GASTROESOPHAGEAL REFLUX DISEASE, UNSPECIFIED WHETHER ESOPHAGITIS PRESENT: ICD-10-CM

## 2022-08-08 RX ORDER — OMEPRAZOLE 20 MG/1
20 CAPSULE, DELAYED RELEASE ORAL DAILY
Qty: 90 CAPSULE | Refills: 0 | OUTPATIENT
Start: 2022-08-08

## 2022-08-08 RX ORDER — OMEPRAZOLE 20 MG/1
20 CAPSULE, DELAYED RELEASE ORAL
Qty: 180 CAPSULE | Refills: 1 | Status: SHIPPED | OUTPATIENT
Start: 2022-08-08

## 2022-08-08 RX ORDER — OMEPRAZOLE 20 MG/1
20 CAPSULE, DELAYED RELEASE ORAL DAILY
Qty: 90 CAPSULE | Refills: 0 | Status: SHIPPED | OUTPATIENT
Start: 2022-08-08 | End: 2022-09-21

## 2022-08-09 DIAGNOSIS — L57.0 ACTINIC KERATOSIS OF SCALP: ICD-10-CM

## 2022-08-09 DIAGNOSIS — L57.0 ACTINIC KERATOSIS OF RIGHT TEMPLE: ICD-10-CM

## 2022-08-10 ENCOUNTER — OFFICE VISIT (OUTPATIENT)
Dept: ENDOCRINOLOGY | Facility: CLINIC | Age: 77
End: 2022-08-10
Payer: MEDICARE

## 2022-08-10 VITALS
HEIGHT: 76 IN | DIASTOLIC BLOOD PRESSURE: 66 MMHG | WEIGHT: 307 LBS | BODY MASS INDEX: 37.38 KG/M2 | SYSTOLIC BLOOD PRESSURE: 130 MMHG | OXYGEN SATURATION: 88 % | HEART RATE: 77 BPM

## 2022-08-10 DIAGNOSIS — I10 ESSENTIAL HYPERTENSION: ICD-10-CM

## 2022-08-10 DIAGNOSIS — Z79.4 TYPE 2 DIABETES MELLITUS WITH HYPERGLYCEMIA, WITH LONG-TERM CURRENT USE OF INSULIN (HCC): Primary | ICD-10-CM

## 2022-08-10 DIAGNOSIS — I48.11 LONGSTANDING PERSISTENT ATRIAL FIBRILLATION (HCC): ICD-10-CM

## 2022-08-10 DIAGNOSIS — I10 PRIMARY HYPERTENSION: ICD-10-CM

## 2022-08-10 DIAGNOSIS — E55.9 VITAMIN D DEFICIENCY: ICD-10-CM

## 2022-08-10 DIAGNOSIS — E11.65 TYPE 2 DIABETES MELLITUS WITH HYPERGLYCEMIA, WITH LONG-TERM CURRENT USE OF INSULIN (HCC): Primary | ICD-10-CM

## 2022-08-10 DIAGNOSIS — E83.52 HYPERCALCEMIA: ICD-10-CM

## 2022-08-10 DIAGNOSIS — E78.2 MIXED HYPERLIPIDEMIA: ICD-10-CM

## 2022-08-10 DIAGNOSIS — E27.8 ADRENAL NODULE (HCC): ICD-10-CM

## 2022-08-10 PROCEDURE — 95251 CONT GLUC MNTR ANALYSIS I&R: CPT

## 2022-08-10 PROCEDURE — 99214 OFFICE O/P EST MOD 30 MIN: CPT

## 2022-08-10 RX ORDER — FLUOROURACIL 50 MG/G
CREAM TOPICAL 2 TIMES DAILY
Qty: 40 G | Refills: 0 | Status: SHIPPED | OUTPATIENT
Start: 2022-08-10

## 2022-08-10 RX ORDER — LISINOPRIL 20 MG/1
TABLET ORAL
Qty: 30 TABLET | Refills: 0 | Status: SHIPPED | OUTPATIENT
Start: 2022-08-10 | End: 2022-09-19

## 2022-08-10 RX ORDER — APIXABAN 5 MG/1
TABLET, FILM COATED ORAL
Qty: 60 TABLET | Refills: 0 | OUTPATIENT
Start: 2022-08-10

## 2022-08-10 NOTE — ASSESSMENT & PLAN NOTE
HGA1C improved, remains above goal of <8  Blood sugars are well controlled on CGM  He is in range 68% of the time with no hypoglycemia  Treatment regimen:  continue current medication regimen  Discussed risks/complications associated with uncontrolled diabetes  Advised to adhere to diabetic diet, and recommended staying active/exercising routinely  Keep carbohydrates consistent to limit blood glucose fluctuations  Advised to call if blood sugars less than 70 mg/dl or over 300 mg/dl  Discussed symptoms and treatment of hypoglycemia  Recommended routine follow-up with podiatry and ophthalmology  Ordered blood work to complete prior to next visit    Lab Results   Component Value Date    HGBA1C 8 3 (H) 08/04/2022

## 2022-08-10 NOTE — PROGRESS NOTES
Established Patient Progress Note      Chief Complaint   Patient presents with    Diabetes Type 2    Hyperparathyroidism    Adrenal Problem        History of Present Illness:   Tonie Devries is a 65 y o  male with hypertension, hyperlipidemia, sleep apnea, adrenal adenoma, and type 2 diabetes with long term use of insulin since about 28 years ago  Last seen in the office 5/3/22  Reports complications of neuropathy and microalbuminuria  Last A1C was 8 3  Denies recent illness or hospitalizations  Denies recent severe hypoglycemic or severe hyperglycemic episodes  Denies any issues with his current regimen  Home glucose monitoring: are performed regularly with CGM  Was having GI issues with Metformin  Previously treated with Pioglitazone which was discontinued due to edema   Did not tolerate GLP-1 agonist due to nausea  SGLT2 inhibitors were avoided due to history of urosepsis/nephrolithiasis  He also has a 4 1cm adrenal adenoma which has been evaluated by Dr Simone House  Reviewed Dr Sofy Stevenson workup from feb/march 2021  Aldosterone and [place] metanephrines were normal  2/3/2021 dexamethasone suppression testing did not suppress x 2 so additional testing was performed, DHEAS and ACTH were normal  1 salivary cortisol mildly elevated, 1 was normal  24-hour urine cortisol was normal  Repeat CT done 6/1/22 showed stable right adrenal nodule  For hypercalcemia, most recent calcium 9 6 (albumiun was not completed to correct) with PTH of 100 2, which is improved  24 hour urine was completed by nephrology and showed normal calcium level, which does not support diagnosis of [address]  Sestambi scan was completed and negative for parathyroid adenoma  Has a history of kidney stones and he takes potassium citrate  He takes 1000 units vitamin-D daily dietary calcium intake  DEXA done 3/2021 showed normal bone density  Denies recent falls or fractures       Tonie Devries   Device used: Peggy 2   Home use   Indication: Type 2 Diabetes  More than 72 hours of data was reviewed  Report to be scanned to chart  Date Range: 7/28/22-8/10/22  Analysis of data:   Average Glucose: 172  Coefficient of Variation: 21%   Time in Target Range: 68%   Time Above Range: 29% high, 3% very high    Time Below Range: 0%   Interpretation of data: BGs well controlled, some hyperglycemia later in the day, no hypoglycemia       Current regimen:   Tresiba 100 units once daily  NovoLog 20-25 units with each meal depending on carb amount, does not take any if not eating carbs    Last Eye Exam: 4/2022, reports he has no retinopathy, has apt next month  Last Foot Exam: 10/13/2021, sees podiatry every 6 weeks      Has hypertension: Taking lisinopril and Toprol XL  Has hyperlipidemia: Taking atorvastatin and zetia    Vitamin D Deficiency: Taking 1,000 units daily     Patient Active Problem List   Diagnosis    Hypertension    Hyperlipemia    Type 2 diabetes mellitus with hyperglycemia, with long-term current use of insulin (HCC)    Chronic low back pain    CAD (coronary artery disease)    Malignant neoplasm of urinary bladder (HCC)    Arthritis    Paroxysmal atrial fibrillation (HCC)    AAA (abdominal aortic aneurysm) (HCC)    Decreased pedal pulses    Chronic pain of both knees    Actinic keratosis of right temple    Actinic keratosis of scalp    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    Immunization deficiency    Left lower quadrant abdominal pain    Excessive gas    Morbid obesity (HCC)    Colitis    Adrenal nodule (HCC)    LUCIA (obstructive sleep apnea)    Embolism and thrombosis of arteries of the lower extremities (HCC)    Bilateral edema of lower extremity    Hypoxemia    Chronic diastolic CHF (congestive heart failure) ([address] )    Left upper lobe pulmonary nodule    Scaly skin on examination    Fall    Hypercalcemia    Nephrolithiasis    Blister of left leg    Hydroureter on left    Urinary tract infection with hematuria    Myocardial infarction ([facility] )    Venous stasis dermatitis of both lower extremities    Blister of right leg    Nipple pain    Folliculitis    Persistent proteinuria    Hypokalemia    Ureteral calculi    Benign prostatic hyperplasia without lower urinary tract symptoms    Atrial fibrillation with rapid ventricular response (HCC)    Cellulitis and abscess of left lower extremity    PAD (peripheral artery disease) (HCC)    Gastroesophageal reflux disease    Vitamin D deficiency      Past Medical History:   Diagnosis Date    A-fib [facility])     AAA (abdominal aortic aneurysm) ([facility] )     Actinic keratosis of right temple 7/31/2020    Actinic keratosis of scalp 7/31/2020    Acute respiratory failure with hypoxia ([facility] ) 3/25/2021    Allergic 1960    Arthritis     Lay's esophagus     Bladder cancer (HCC)     Blister of left leg 4/28/2021    Blister of right leg 5/26/2021    CAD (coronary artery disease)     Cancer (HCC) 02/2010    Bladder    CH[facility]congestive heart failure) ([facility]     Chronic low back pain     Chronic pain of both knees 7/31/2020    Colitis 12/4/2020    CPAP (continuous positive airway pressure) dependence     Diabetes ([facility] )     Diabetes mellitus ([facility] ) 10/1993    Excessive gas 12/3/2020    Heart murmur     History of chemotherapy     Hydroureter on left 4/28/2021    Hyperlipemia     Hypertension     Immunization deficiency 10/30/2020    Hep a nonimmune    Kidney stone     Left lower quadrant abdominal pain 12/3/2020    Mass of right adrenal gland ([facility] ) 12/4/2020    4 1 cm    Myocardial infarction ([facility] )     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    Obesity 2000    LUCIA (obstructive sleep apnea) 1/29/2021    Pressure ulcer of right leg, stage 1 5/26/2021    Urinary tract infection with hematuria 5/3/2021    u cx 4/2021=pseudomonas R to bactrim and cefdinir, S to cipro    Venous stasis dermatitis of both lower extremities 5/26/2021      Past Surgical History:   Procedure Laterality Date    BACK SURGERY      BLADDER SURGERY      CATARACT EXTRACTION, BILATERAL      COLONOSCOPY  2019    next  [address] GRAFT  2016    Quadruple per [employer]    EGD  2017    EYE SURGERY  2016    Cataracts    JOINT REPLACEMENT  [id number]    Hip and shoulder    KIDNEY STONE SURGERY      WI CYSTO/URETERO W/LITHOTRIPSY &INDWELL STENT INSRT Left 2021    Procedure: CYSTOSCOPY URETEROSCOPY WITH LITHOTRIPSY HOLMIUM LASER, AND INSERTION STENT URETERAL/EXCHANGE;  Surgeon: Rob Dudley MD;  [facility];  Service: Urology    WI CYSTOURETHROSCOPY,URETER CATHETER Left 2021    Procedure: CYSTOSCOPY  WITH INSERTION STENT URETERAL;  Surgeon: Rob Dudley MD;  [facility];  Service: Urology    TOTAL HIP ARTHROPLASTY Right 2012    TOTAL SHOULDER REPLACEMENT Right 2013    VASECTOMY  1971      Family History   Problem Relation Age of Onset    Heart disease Father     Arthritis Father     Heart attack Father     Alzheimer's disease Mother     Dementia Mother      Social History     Tobacco Use    Smoking status: Former Smoker     Packs/day: 0 25     Years: 20 00     Pack years: 5 00     Types: Cigarettes     Start date:      Quit date: 2010     Years since quittin 6    Smokeless tobacco: Never Used    Tobacco comment: Quit several times for up to 6 years     Substance Use Topics    Alcohol use: Not Currently     Alcohol/week: 0 0 standard drinks     Comment: No use     Allergies   Allergen Reactions    Diltiazem Abdominal Pain         Current Outpatient Medications:     apixaban (Eliquis) 5 mg, Take 1 tablet (5 mg total) by mouth 2 (two) times a day, Disp: 60 tablet, Rfl: 0    atorvastatin (LIPITOR) 40 mg tablet, Take 1 tablet (40 mg total) by mouth in the morning , Disp: 90 tablet, Rfl: 0    clindamycin (CLINDAGEL) 1 % gel, Apply topically 2 (two) times a day, Disp: 30 g, Rfl: 1    Continuous Blood Gluc Sensor (FreeStyle Peggy 2 Sensor) MISC, Change it every 14 days, Disp: 1 each, Rfl: 0    dicyclomine (BENTYL) 10 mg capsule, Take 1 capsule (10 mg total) by mouth 2 (two) times a day as needed (abdominal cramping), Disp: 60 capsule, Rfl: 1    digoxin (LANOXIN) 0 125 mg tablet, Take 1 tablet (125 mcg total) by mouth daily, Disp: 90 tablet, Rfl: 3    famotidine (PEPCID) 40 MG tablet, Take 1 tablet (40 mg total) by mouth daily at bedtime, Disp: 30 tablet, Rfl: 1    furosemide (LASIX) 80 mg tablet, Take 1 tablet (80 mg total) by mouth 2 (two) times a day 1 full tablet every morning, 1/2 tablet every afternoon, Disp: 180 tablet, Rfl: 3    insulin degludec (Tresiba FlexTouch) 200 units/mL CONCENTRATED U-200 injection pen, Use 95units subcut once daily (Patient taking differently: Inject 100 Units under the skin daily Use 95units subcut once daily), Disp: 12 mL, Rfl: 5    Lancets (accu-chek multiclix) lancets, Use 1 each 2 (two) times a day Use 2 times daily to test blood glucose, Disp: 102 each, Rfl: 3    lisinopril (ZESTRIL) 20 mg tablet, TAKE ONE TABLET BY MOUTH ONCE DAILY, Disp: 30 tablet, Rfl: 0    metoprolol succinate (TOPROL-XL) 100 mg 24 hr tablet, Take 1 tablet (100 mg total) by mouth every 12 (twelve) hours, Disp: 60 tablet, Rfl: 0    NovoLOG FlexPen 100 units/mL injection pen, Inject 20-25 units under the skin 3 times daily with meals depending on carbohydrate amount (Patient taking differently: 25 Units), Disp: 15 mL, Rfl: 0    omeprazole (PriLOSEC) 20 mg delayed release capsule, Take 1 capsule (20 mg total) by mouth daily, Disp: 90 capsule, Rfl: 0    omeprazole (PriLOSEC) 20 mg delayed release capsule, Take 1 capsule (20 mg total) by mouth 2 (two) times a day, Disp: 180 capsule, Rfl: 1    potassium citrate (UROCIT-K) 10 mEq, Take 1 tablet (10 mEq total) by mouth 2 (two) times a day, Disp: 180 tablet, Rfl: 3    Unifine Pentips 31G X 8 MM MISC, Inject under the skin daily Use as directed, Disp: 100 each, Rfl: 3    fluorouracil (EFUDEX) 5 % cream, Apply topically 2 (two) times a day (Patient not taking: No sig reported), Disp: 40 g, Rfl: 1    Review of Systems   Constitutional: Negative for activity change, appetite change, chills, diaphoresis, fatigue, fever and unexpected weight change  Eyes: Negative for visual disturbance  Respiratory: Negative for chest tightness and shortness of breath  Cardiovascular: Positive for leg swelling  Negative for chest pain and palpitations  Gastrointestinal: Positive for abdominal pain (intermittent abdominal pain in lower abdomen) and constipation (occasionally)  Negative for diarrhea, nausea and vomiting  Endocrine: Negative for polydipsia, polyphagia and polyuria  Skin: Negative for color change, pallor, rash and wound  Neurological: Positive for numbness  Negative for dizziness, tremors, weakness and light-headedness  All other systems reviewed and are negative  Physical Exam:  Body mass index is 37 37 kg/m²  /66   Pulse 77   Ht 6' 4" (1 93 m)   Wt (!) 139 kg (307 lb)   SpO2 (!) 88%   BMI 37 37 kg/m²    Wt Readings from Last 3 Encounters:   08/10/22 (!) 139 kg (307 lb)   07/20/22 135 kg (297 lb)   06/22/22 (!) 139 kg (307 lb)       Physical Exam  Vitals reviewed  Constitutional:       Appearance: Normal appearance  He is obese  HENT:      Head: Normocephalic  Cardiovascular:      Rate and Rhythm: Normal rate and regular rhythm  Pulses: Normal pulses  Heart sounds: Normal heart sounds  No murmur heard  Pulmonary:      Effort: Pulmonary effort is normal  No respiratory distress  Breath sounds: Normal breath sounds  No wheezing, rhonchi or rales  Musculoskeletal:      Right lower leg: Edema present  Left lower leg: Edema present  Skin:     General: Skin is warm and dry  Neurological:      Mental Status: He is alert and oriented to person, place, and time        Gait: Gait abnormal    Psychiatric:         Mood and Affect: Mood normal          Behavior: Behavior normal          Thought Content: Thought content normal          Judgment: Judgment normal        Labs:   Lab Results   Component Value Date    HGBA1C 8 3 (H) 08/04/2022    HGBA1C 8 6 (H) 04/27/2022    HGBA1C 9 1 (H) 01/14/2022     Lab Results   Component Value Date    CREATININE 1 11 08/04/2022    CREATININE 1 04 04/27/2022    CREATININE 1 19 04/04/2022    BUN 19 08/04/2022    K 4 2 08/04/2022     08/04/2022    CO2 37 (H) 08/04/2022     eGFR   Date Value Ref Range Status   08/04/2022 63 ml/min/1 73sq m Final     Lab Results   Component Value Date    HDL 32 (L) 04/27/2022    TRIG 223 (H) 04/27/2022     Lab Results   Component Value Date    ALT 18 04/27/2022    AST 14 04/27/2022    ALKPHOS 88 04/27/2022     Lab Results   Component Value Date    AJI7CKABQEMC 0 984 12/27/2021    HFC1AFQIWRWN 1 300 07/26/2021    PSJ4SUKOUTXI 1 184 08/11/2020     Impression & Plan:    Problem List Items Addressed This Visit        Endocrine    Type 2 diabetes mellitus with hyperglycemia, with long-term current use of insulin ([address] ) - Primary     HGA1C improved, remains above goal of <8  Blood sugars are well controlled on CGM  He is in range 68% of the time with no hypoglycemia  Treatment regimen:  continue current medication regimen  Discussed risks/complications associated with uncontrolled diabetes  Advised to adhere to diabetic diet, and recommended staying active/exercising routinely  Keep carbohydrates consistent to limit blood glucose fluctuations  Advised to call if blood sugars less than 70 mg/dl or over 300 mg/dl  Discussed symptoms and treatment of hypoglycemia  Recommended routine follow-up with podiatry and ophthalmology  Ordered blood work to complete prior to next visit    Lab Results   Component Value Date    HGBA1C 8 3 (H) 08/04/2022            Relevant Orders    Hemoglobin A1C    Comprehensive metabolic panel    Microalbumin / creatinine urine ratio Cardiovascular and Mediastinum    Hypertension     BP at goal  Continue current medication regimen  Other    Hyperlipemia     Continue statin  Check lipid panel  Relevant Orders    Lipid panel    Adrenal nodule ([address] )     This was stable on recent imaging  We will continue to monitor over time  Hypercalcemia     Calcium and parathyroid hormone have both improved  Sestamibi scan did not show a parathyroid adenoma  Will continue to monitor labs at this time  Relevant Orders    PTH, intact    Phosphorus    Vitamin D deficiency     Continue supplementation  Relevant Orders    Vitamin D 25 hydroxy          Orders Placed This Encounter   Procedures    Hemoglobin A1C     Standing Status:   Future     Standing Expiration Date:   8/10/2023    Comprehensive metabolic panel     This is a patient instruction: Patient fasting for 8 hours or longer recommended  Standing Status:   Future     Standing Expiration Date:   8/10/2023    PTH, intact     Standing Status:   Future     Standing Expiration Date:   8/10/2023    Microalbumin / creatinine urine ratio     Standing Status:   Future     Standing Expiration Date:   8/10/2023    Lipid panel     This is a patient instruction: This test requires patient fasting for 10-12 hours or longer  Drinking of black coffee or black tea is acceptable  Standing Status:   Future     Standing Expiration Date:   8/10/2023    Phosphorus     Standing Status:   Future     Standing Expiration Date:   8/10/2023    Vitamin D 25 hydroxy     Standing Status:   Future     Standing Expiration Date:   8/10/2023       There are no Patient Instructions on file for this visit  Discussed with the patient and all questioned fully answered  He will call me if any problems arise      KACEY Peters

## 2022-08-10 NOTE — ASSESSMENT & PLAN NOTE
Calcium and parathyroid hormone have both improved  Sestamibi scan did not show a parathyroid adenoma  Will continue to monitor labs at this time

## 2022-08-11 ENCOUNTER — TELEPHONE (OUTPATIENT)
Dept: NEPHROLOGY | Facility: CLINIC | Age: 77
End: 2022-08-11

## 2022-08-11 NOTE — PATIENT INSTRUCTIONS
All questions asked and answered  The patient has been instructed to call office at [phone] with any questions or concerns  Please obtain prescribed blood work and urine studies prior to next appointment   Avoid NSAID products which include:  Motrin, Advil, Ibuprofen, Aleve, Naprosyn, Naproxen, Mobic or Celebrex    Low sodium diet  Return in 6 months for follow up

## 2022-08-11 NOTE — TELEPHONE ENCOUNTER
Appointment Confirmation   Person confirmed appointment with  If not patient, name of the person Patient    Date and time of appointment 8/12  11:30    Patient acknowledged and will be at appointment? yes    Did you advise the patient that they will need a urine sample if they are a new patient?  N/A    Did you advise the patient to bring their current medications for verification? (including any OTC) Yes    Additional Information

## 2022-08-11 NOTE — PROGRESS NOTES
OFFICE FOLLOW UP - Nephrology   Francoise Race 65 y o  male MRN: [id number]               ASSESSMENT and PLAN:  Mita oHod was seen today for follow-up  Diagnoses and all orders for this visit:    Hypercalcemia    Persistent proteinuria  -     Basic metabolic panel; Future  -     PTH, intact; Future  -     Magnesium; Future  -     Phosphorus; Future  -     Protein / creatinine ratio, urine; Future    Hypokalemia    Vitamin D deficiency    Primary hypertension        Hypercalcemia  Assessment and Plan:  · Most recent calcium 9 6  · Recent Ionized calcium 4/27/2022-1 21   · Recent PTH-continues to improve with vit D  · As per Dr Deborah Hernandez last office note, " Less likely primary hyperparathyroidism as PTH level trending down with vitamin-D supplementation which was started by endocrinology  Certainly,  low 24 hour urine calcium does not go in favor of primary hyperparathyroidism  Would consider possibility of familial hypercalciuric hypercalcemia  Also offered genetic studies with renasite, patient will let me know if interested "  · Will continue to trend calcium levels, Ionized calcium     Nephrolithiasis  Assessment and Plan:  · No current episodes of kidney stones  · Continue to hydrate 2-2 5 liters daily  · Continue potassium citrate 10 mEq twice daily   · Current Bicarb 37  · Continue low oxalate diet, list was previously provided  ·  24 hour stone risk profile -pending  · No change in treatment     Hypokalemia:   Assessment and Plan  · Potassium level 4 2,   · Continue potassium citrate  · Continue to trend     Primary Hypertension:   Assessment and Plan  · Current BP acceptable   · Currently on lisinopril 20 mg, toprol xl 100 mg twice daily and lasix 80 mg bid     · Continue same medications    · Continue home monitoring of blood pressure  · Recommend 2 g sodium diet     · Recommend daily exercise and weight loss  · Discussed home monitoring of BP and maintaining a log of blood pressure   -Recommend goal BP less than 130/80      Proteinuria, persistent  Assessment and Plan:   Etiology: diabetic nephropathy as well as hypertensive nephrosclerosis  · UPCR slight increase 0 51   · Continue on ACEI lisinopril 20 mg daily  · If increases with next follow up may need to increase lisinopril  · A1C slowly decreasing  · continue to monitor  · Repeat UPCR in 6 months  · Creatinine stable at 1 11      Chronic diastolic CHF  Assessment and Plan:  · [facility] as outpatient  · on lasix 80 mg bid   · clinically appears euvolemic  · Oxygen saturation 90%  · Patient reports 89-92% at home is normal     Bladder cancer diagnosed in 2010-s/p BCG 7 yrs ago   Assessment and Plan:  · [facility] as outpatient  · negative cystoscopy in dec 2020   · currently on Proscar per Urology     Adrenal mass   Assessment and Plan:  · [facility] as outpatient  As per Dr Augustin Tran last office note, "CT abdomen from April 2021 showed ADRENAL GLANDS:  4 cm right adrenal nodule redemonstrated, stable   Left adrenal gland appears normal   -following with endocrinology  [facility] from July 7, 2021-found to have normal aldosterone and plasma metanephrines but found to have slightly elevated dexamethasone suppression testing  Reviewed last endocrine note from October 2021, was found to have normal DHEA S and ACTH level  Noted plan to repeat adrenal imaging in January 2022   -would endocrinology note from today, less likely primary hyperparathyroidism  Was recommended to continue same dose of vitamin-D  Imaging from January 2021 did not show any changes in the right adrenal nodule "     LUCIA on CPAP     [id number] on insulin and metformin  · Follows endocrinology as outpatient   · last A1c was 8 3   · Improving slowly  · Goal < 7 0 to slow the progression of CKD    Age related screening: Your primary caregiver may do yearly screening for colorectal cancer  It is recommended in all men and women over 48years old   You may have screening earlier if you have colon disease or a family history of colorectal cancer  Patient Instructions    All questions asked and answered  The patient has been instructed to call office at [phone] with any questions or concerns   Please obtain prescribed blood work and urine studies prior to next appointment    Avoid NSAID products which include: Motrin, Advil, Ibuprofen, Aleve, Naprosyn, Naproxen, Mobic or Celebrex     Low sodium diet   Return in 6 months for follow up          HPI:    I had the pleasure of seeing Isabel Briseno today in the renal clinic for the continued management of hypercalcemia and stones  He was last seen on 2/2/2022 with Dr Marion Concepcion and encouraged hydration  Please review last office note for full details  Since the last visit, there has been no ER visits or hospitilizations  Patient has no complaints at this time and is feeling well  Patient denies any chest pain, shortness of breath or swelling  The last blood work was done on 8/4/2022, which we have reviewed together  ROS:   Review of Systems   Constitutional: Negative  Negative for activity change, appetite change, chills, diaphoresis, fatigue and fever  HENT: Negative  Negative for congestion and facial swelling  Respiratory: Negative  Cardiovascular: Negative  Gastrointestinal: Negative  Endocrine: Negative  Genitourinary: Negative  Musculoskeletal: Negative  Skin: Negative  Allergic/Immunologic: Negative  Neurological: Negative  Hematological: Negative  Psychiatric/Behavioral: Negative           Allergies: Diltiazem    Medications:   Current Outpatient Medications:     apixaban (Eliquis) 5 mg, Take 1 tablet (5 mg total) by mouth 2 (two) times a day, Disp: 60 tablet, Rfl: 0    atorvastatin (LIPITOR) 40 mg tablet, Take 1 tablet (40 mg total) by mouth in the morning , Disp: 90 tablet, Rfl: 0    clindamycin (CLINDAGEL) 1 % gel, Apply topically 2 (two) times a day, Disp: 30 g, Rfl: 1    Continuous Blood Gluc Sensor (FreeStyle Peggy 2 Sensor) MISC, Change it every 14 days, Disp: 1 each, Rfl: 0    dicyclomine (BENTYL) 10 mg capsule, Take 1 capsule (10 mg total) by mouth 2 (two) times a day as needed (abdominal cramping), Disp: 60 capsule, Rfl: 1    digoxin (LANOXIN) 0 125 mg tablet, Take 1 tablet (125 mcg total) by mouth daily, Disp: 90 tablet, Rfl: 3    famotidine (PEPCID) 40 MG tablet, Take 1 tablet (40 mg total) by mouth daily at bedtime, Disp: 30 tablet, Rfl: 1    fluorouracil (EFUDEX) 5 % cream, Apply topically 2 (two) times a day, Disp: 40 g, Rfl: 0    furosemide (LASIX) 80 mg tablet, Take 1 tablet (80 mg total) by mouth 2 (two) times a day 1 full tablet every morning, 1/2 tablet every afternoon, Disp: 180 tablet, Rfl: 3    insulin degludec (Tresiba FlexTouch) 200 units/mL CONCENTRATED U-200 injection pen, Use 95units subcut once daily (Patient taking differently: Inject 100 Units under the skin daily Use 95units subcut once daily), Disp: 12 mL, Rfl: 5    Lancets (accu-chek multiclix) lancets, Use 1 each 2 (two) times a day Use 2 times daily to test blood glucose, Disp: 102 each, Rfl: 3    lisinopril (ZESTRIL) 20 mg tablet, TAKE ONE TABLET BY MOUTH ONCE DAILY, Disp: 30 tablet, Rfl: 0    metoprolol succinate (TOPROL-XL) 100 mg 24 hr tablet, Take 1 tablet (100 mg total) by mouth every 12 (twelve) hours, Disp: 60 tablet, Rfl: 0    NovoLOG FlexPen 100 units/mL injection pen, Inject 20-25 units under the skin 3 times daily with meals depending on carbohydrate amount (Patient taking differently: 25 Units), Disp: 15 mL, Rfl: 0    omeprazole (PriLOSEC) 20 mg delayed release capsule, Take 1 capsule (20 mg total) by mouth daily, Disp: 90 capsule, Rfl: 0    potassium citrate (UROCIT-K) 10 mEq, Take 1 tablet (10 mEq total) by mouth 2 (two) times a day, Disp: 180 tablet, Rfl: 3    Unifine Pentips 31G X 8 MM MISC, Inject under the skin daily Use as directed, Disp: 100 each, Rfl: 3    omeprazole (PriLOSEC) 20 mg delayed release capsule, Take 1 capsule (20 mg total) by mouth 2 (two) times a day (Patient not taking: Reported on 8/12/2022), Disp: 180 capsule, Rfl: 1    Past Medical History:   Diagnosis Date    A-fib [facility])     AAA (abdominal aortic aneurysm) ([facility] [address] )     Actinic keratosis of right temple 7/31/2020    Actinic keratosis of scalp 7/31/2020    Acute respiratory failure with hypoxia ([address] ) 3/25/2021    Allergic 1960    Arthritis     Lay's esophagus     Bladder cancer ([facility] [address] )     Blister of left leg 4/28/2021    Blister of right leg 5/26/2021    CAD (coronary artery disease)     Cancer ([facility] [address] ) 02/2010    Bladder    CHF (congestive heart failure) (HCC)     Chronic low back pain     Chronic pain of both knees 7/31/2020    Colitis 12/4/2020    CPAP (continuous positive airway pressure) dependence     Diabetes ([facility] [address] )     Diabetes mellitus ([facility] [address] ) 10/1993    Excessive gas 12/3/2020    Heart murmur     History of chemotherapy     Hydroureter on left 4/28/2021    Hyperlipemia     Hypertension     Immunization deficiency 10/30/2020    Hep a nonimmune    Kidney stone     Left lower quadrant abdominal pain 12/3/2020    Mass of right adrenal gland ([facility] [address] ) 12/4/2020    4 1 cm    [facility]     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    Obesity 2000    LUCIA (obstructive sleep apnea) 1/29/2021    Pressure ulcer of right leg, stage 1 5/26/2021    Urinary tract infection with hematuria 5/3/2021    u cx 4/2021=pseudomonas R to bactrim and cefdinir, S to cipro    Venous stasis dermatitis of both lower extremities 5/26/2021     Past Surgical History:   Procedure Laterality Date    BACK SURGERY      BLADDER SURGERY      CATARACT EXTRACTION, BILATERAL      COLONOSCOPY  01/29/2019    next [address]22 [address] GRAFT  04/2016    Quadruple per Rita    EGD  06/2017    EYE SURGERY  11/2016    Cataracts    JOINT REPLACEMENT  [id number]    Hip and shoulder    KIDNEY STONE SURGERY      SD CYSTO/URETERO W/LITHOTRIPSY &INDWELL STENT INSRT Left 5/21/2021    Procedure: CYSTOSCOPY URETEROSCOPY WITH LITHOTRIPSY HOLMIUM LASER, AND INSERTION STENT URETERAL/EXCHANGE;  Surgeon: Ronda Gamboa MD;  Location: [facility];  Service: Urology    SD CYSTOURETHROSCOPY,URETER CATHETER Left 4/24/2021    Procedure: CYSTOSCOPY  WITH INSERTION STENT URETERAL;  Surgeon: Ronda Gamboa MD;  Location: [facility];  Service: Urology    TOTAL HIP ARTHROPLASTY Right 2012    TOTAL SHOULDER REPLACEMENT Right 2013    VASECTOMY  1971     Family History   Problem Relation Age of Onset    Heart disease Father     Arthritis Father     Heart attack Father     Alzheimer's disease Mother     Dementia Mother       reports that he quit smoking about 12 years ago  His smoking use included cigarettes  He started smoking about 57 years ago  He has a 5 00 pack-year smoking history  He has never used smokeless tobacco  He reports previous alcohol use  He reports that he does not use drugs  Physical Exam:   Vitals:    08/12/22 1130 08/12/22 1152   BP: 128/68    BP Location: Left arm    Patient Position: Sitting    Cuff Size: Large    Pulse: 61    SpO2: (!) 88% 90%   Weight: (!) 138 kg (304 lb)    Height: 6' 4" (1 93 m)      Body mass index is 37 kg/m²  Physical Exam  Vitals reviewed  Constitutional:       Appearance: Normal appearance  He is obese  Comments: NAD in wheelchair   HENT:      Head: Normocephalic and atraumatic  Nose: Nose normal       Mouth/Throat:      Mouth: Mucous membranes are moist       Pharynx: Oropharynx is clear  Eyes:      Extraocular Movements: Extraocular movements intact  Conjunctiva/sclera: Conjunctivae normal       Pupils: Pupils are equal, round, and reactive to light  Cardiovascular:      Rate and Rhythm: Normal rate and regular rhythm  Pulses: Normal pulses  Heart sounds: Murmur heard     Pulmonary: Effort: Pulmonary effort is normal       Breath sounds: Normal breath sounds  Abdominal:      General: Bowel sounds are normal       Palpations: Abdomen is soft  Musculoskeletal:         General: Normal range of motion  Cervical back: Normal range of motion and neck supple  Comments: Trace edema-appears chronic   Skin:     General: Skin is warm and dry  Neurological:      General: No focal deficit present  Mental Status: He is alert and oriented to person, place, and time  Psychiatric:         Mood and Affect: Mood normal          Behavior: Behavior normal          Thought Content:  Thought content normal          Judgment: Judgment normal             Lab Results:  Results for orders placed or performed in visit on 93/13/17   Basic metabolic panel   Result Value Ref Range    Sodium 140 135 - 147 mmol/L    Potassium 4 2 3 5 - 5 3 mmol/L    Chloride 103 96 - 108 mmol/L    CO2 37 (H) 21 - 32 mmol/L    ANION GAP 0 (L) 4 - 13 mmol/L    BUN 19 5 - 25 mg/dL    Creatinine 1 11 0 60 - 1 30 mg/dL    Glucose, Fasting 195 (H) 65 - 99 mg/dL    Calcium 9 6 8 3 - 10 1 mg/dL    eGFR 63 ml/min/1 73sq m   Protein / creatinine ratio, urine   Result Value Ref Range    Creatinine, Ur 15 7 mg/dL    Protein Urine Random 8 mg/dL    Prot/Creat Ratio, Ur 0 51 (H) 0 00 - 0 10   PTH, intact   Result Value Ref Range     2 (H) 18 4 - 80 1 pg/mL   Vitamin D 25 hydroxy   Result Value Ref Range    Vit D, 25-Hydroxy 30 5 30 0 - 100 0 ng/mL   Urinalysis with microscopic   Result Value Ref Range    Color, UA Colorless     Clarity, UA Clear     Specific Gravity, UA 1 008 1 003 - 1 030    pH, UA 6 5 4 5, 5 0, 5 5, 6 0, 6 5, 7 0, 7 5, 8 0    Leukocytes, UA Negative Negative    Nitrite, UA Negative Negative    Protein, UA Negative Negative mg/dl    Glucose, UA Negative Negative mg/dl    Ketones, UA Negative Negative mg/dl    Urobilinogen, UA <2 0 <2 0 mg/dl mg/dl    Bilirubin, UA Negative Negative    Occult Blood, UA Negative Negative    RBC, UA None Seen None Seen, 1-2 /hpf    WBC, UA None Seen None Seen, 1-2 /hpf    Epithelial Cells None Seen None Seen, Occasional /hpf    Bacteria, UA None Seen None Seen, Occasional /hpf   Phosphorus   Result Value Ref Range    Phosphorus 3 5 2 3 - 4 1 mg/dL   Magnesium   Result Value Ref Range    Magnesium 2 0 1 6 - 2 6 mg/dL   Hemoglobin A1C   Result Value Ref Range    Hemoglobin A1C 8 3 (H) Normal 3 8-5 6%; PreDiabetic 5 7-6 4%; Diabetic >=6 5%; Glycemic control for adults with diabetes <7 0% %     mg/dl               Portions of the record may have been created with voice recognition software  Occasional wrong word or "sound a like" substitutions may have occurred due to the inherent limitations of voice recognition software   Read the chart carefully and recognize,

## 2022-08-12 ENCOUNTER — OFFICE VISIT (OUTPATIENT)
Dept: NEPHROLOGY | Facility: CLINIC | Age: 77
End: 2022-08-12
Payer: MEDICARE

## 2022-08-12 VITALS
BODY MASS INDEX: 37.02 KG/M2 | SYSTOLIC BLOOD PRESSURE: 128 MMHG | DIASTOLIC BLOOD PRESSURE: 68 MMHG | HEART RATE: 61 BPM | OXYGEN SATURATION: 90 % | HEIGHT: 76 IN | WEIGHT: 304 LBS

## 2022-08-12 DIAGNOSIS — R80.1 PERSISTENT PROTEINURIA: ICD-10-CM

## 2022-08-12 DIAGNOSIS — I10 PRIMARY HYPERTENSION: ICD-10-CM

## 2022-08-12 DIAGNOSIS — E55.9 VITAMIN D DEFICIENCY: ICD-10-CM

## 2022-08-12 DIAGNOSIS — E87.6 HYPOKALEMIA: ICD-10-CM

## 2022-08-12 DIAGNOSIS — E83.52 HYPERCALCEMIA: Primary | ICD-10-CM

## 2022-08-12 PROCEDURE — 99214 OFFICE O/P EST MOD 30 MIN: CPT | Performed by: NURSE PRACTITIONER

## 2022-08-13 DIAGNOSIS — R82.991 HYPOCITRATURIA: ICD-10-CM

## 2022-08-15 RX ORDER — POTASSIUM CITRATE 10 MEQ/1
10 TABLET, EXTENDED RELEASE ORAL 2 TIMES DAILY
Qty: 180 TABLET | Refills: 3 | Status: SHIPPED | OUTPATIENT
Start: 2022-08-15

## 2022-08-17 ENCOUNTER — TELEPHONE (OUTPATIENT)
Dept: ENDOCRINOLOGY | Facility: CLINIC | Age: 77
End: 2022-08-17

## 2022-08-19 LAB
AMMONIA 24H UR-MRATE: 23 MEQ/24 HR
AMMONIA UR-SCNC: ABNORMAL UG/DL
CA H2 PHOS DIHYD CRY URNS QL MICRO: 0.49 RATIO (ref 0–3)
CALCIUM 24H UR-MCNC: 4.1 MG/DL
CALCIUM 24H UR-MRATE: 96.4 MG/24 HR (ref 0–320)
CHLORIDE 24H UR-SCNC: 102 MMOL/L
CHLORIDE 24H UR-SRATE: 240 MMOL/24 HR (ref 52–264)
CITRATE 24H UR-MCNC: 88 MG/L
CITRATE 24H UR-MRATE: 207 MG/24 HR (ref 320–1240)
COM CRY STONE QL IR: 1.14 RATIO (ref 0–6)
CREAT 24H UR-MCNC: 43 MG/DL
CREAT 24H UR-MRATE: 1010.5 MG/24 HR (ref 1000–2000)
CYSTINE 24H UR-MCNC: 16.58 MG/L
CYSTINE 24H UR-MRATE: 38.96 MG/24 HR (ref 2.1–58)
MAGNESIUM 24H UR-MRATE: 52 MG/24 HR (ref 12–293)
MAGNESIUM UR-MCNC: 2.2 MG/DL
NA URATE CRY STONE QL IR: 2.54 RATIO (ref 0–4)
OSMOLALITY UR: 400 MOSMOL/KG (ref 300–900)
OXALATE 24H UR-MRATE: 14 MG/24 HR (ref 7–44)
OXALATE UR-MCNC: 6 MG/L
PH 24H UR: 6.3 [PH] (ref 4.5–8)
PHOSPHATE 24H UR-MCNC: 22.5 MG/DL
PHOSPHATE 24H UR-MRATE: 528.8 MG/24 HR (ref 390–1425)
PLEASE NOTE (STONE RISK): ABNORMAL
POTASSIUM 24H UR-SCNC: 47.5 MMOL/24 HR (ref 20–116)
POTASSIUM UR-SCNC: 20.2 MMOL/L
PRESERVED URINE: 2350 ML/24 HR (ref 800–1800)
SODIUM 24H UR-SCNC: 111 MMOL/L
SODIUM 24H UR-SRATE: 261 MMOL/24 HR (ref 58–337)
SPECIMEN VOL 24H UR: 2350 ML/24 HR (ref 800–1800)
SULFATE 24H UR-MCNC: 14 MEQ/24 HR (ref 0–30)
SULFATE UR-MCNC: 6 MEQ/L
TRI-PHOS CRY STONE MICRO: 0.01 RATIO (ref 0–1)
URATE 24H UR-MCNC: 20 MG/DL
URATE 24H UR-MRATE: 470 MG/24 HR (ref 136–771)
URATE DIHYD CRY STONE QL IR: 0.45 RATIO (ref 0–1.2)

## 2022-08-19 NOTE — RESULT ENCOUNTER NOTE
Please inform patient that 24 hour urine volume was adequate at 2 3 L  Continue current amount of oral hydration  24 hour urine citrate was low but improving from previous level of 115 to current level of 207 mg  Continue current dose of potassium citrate  last six months

## 2022-08-20 DIAGNOSIS — I25.10 CORONARY ARTERY DISEASE DUE TO LIPID RICH PLAQUE: ICD-10-CM

## 2022-08-20 DIAGNOSIS — I25.83 CORONARY ARTERY DISEASE DUE TO LIPID RICH PLAQUE: ICD-10-CM

## 2022-08-20 DIAGNOSIS — I48.91 ATRIAL FIBRILLATION WITH RAPID VENTRICULAR RESPONSE (HCC): ICD-10-CM

## 2022-08-22 ENCOUNTER — TELEPHONE (OUTPATIENT)
Dept: NEPHROLOGY | Facility: CLINIC | Age: 77
End: 2022-08-22

## 2022-08-22 RX ORDER — ATORVASTATIN CALCIUM 40 MG/1
40 TABLET, FILM COATED ORAL DAILY
Qty: 90 TABLET | Refills: 0 | Status: SHIPPED | OUTPATIENT
Start: 2022-08-22

## 2022-08-22 RX ORDER — METOPROLOL SUCCINATE 100 MG/1
100 TABLET, EXTENDED RELEASE ORAL EVERY 12 HOURS
Qty: 60 TABLET | Refills: 0 | Status: SHIPPED | OUTPATIENT
Start: 2022-08-22 | End: 2022-09-09

## 2022-08-22 NOTE — TELEPHONE ENCOUNTER
----- Message from Blaine Lees MD sent at 8/19/2022  3:22 PM EDT -----  Please inform patient that 24 hour urine volume was adequate at 2 3 L  Continue current amount of oral hydration  24 hour urine citrate was low but improving from previous level of 115 to current level of 207 mg  Continue current dose of potassium citrate

## 2022-09-02 ENCOUNTER — OFFICE VISIT (OUTPATIENT)
Dept: GASTROENTEROLOGY | Facility: CLINIC | Age: 77
End: 2022-09-02
Payer: MEDICARE

## 2022-09-02 VITALS
DIASTOLIC BLOOD PRESSURE: 49 MMHG | WEIGHT: 306 LBS | HEART RATE: 63 BPM | SYSTOLIC BLOOD PRESSURE: 140 MMHG | BODY MASS INDEX: 37.25 KG/M2

## 2022-09-02 DIAGNOSIS — K57.90 DIVERTICULOSIS: ICD-10-CM

## 2022-09-02 DIAGNOSIS — E66.01 MORBID OBESITY (HCC): ICD-10-CM

## 2022-09-02 DIAGNOSIS — K74.60 LIVER CIRRHOSIS SECONDARY TO NASH (HCC): ICD-10-CM

## 2022-09-02 DIAGNOSIS — K31.811 ANGIODYSPLASIA OF DUODENUM WITH HEMORRHAGE: ICD-10-CM

## 2022-09-02 DIAGNOSIS — K74.69 OTHER CIRRHOSIS OF LIVER (HCC): ICD-10-CM

## 2022-09-02 DIAGNOSIS — R10.33 PERIUMBILICAL ABDOMINAL PAIN: Primary | ICD-10-CM

## 2022-09-02 DIAGNOSIS — K75.81 LIVER CIRRHOSIS SECONDARY TO NASH (HCC): ICD-10-CM

## 2022-09-02 PROCEDURE — 99214 OFFICE O/P EST MOD 30 MIN: CPT | Performed by: INTERNAL MEDICINE

## 2022-09-02 NOTE — PROGRESS NOTES
[facility] - Outpatient Follow-up Note  Cali Sanchez 65 y o  male MRN: [id number]  Encounter: [id number]          ASSESSMENT AND PLAN:      1  Periumbilical abdominal pain  [de-identified] year male who had a recent EGD and colonoscopy now come for follow-up, he is still having lower abdominal, periumbilical pain  He had extensive workup including EGD, colonoscopy, CT scan without contrast, right upper quadrant ultrasound  He is taking omeprazole twice a day, famotidine at nighttime, somehow he did not start taking Bentyl  He denies any chronic constipation, his blood sugar is well controlled  Will plan for CT scan with IV contrast and then decide about further treatment plan  - CT abdomen pelvis w contrast; Future    2  Other cirrhosis of liver (HCC)  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, most likely fatty changes in the liver  Will monitor alpha-fetoprotein and right upper quadrant ultrasound to screen for [address]  every 6-12 months  - CT abdomen pelvis w contrast; Future    3  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 ([address] )  Advised to avoid alcohol, weight reduction, tight glycemic control  - CT abdomen pelvis w contrast; Future    4  Angiodysplasia of duodenum with hemorrhage  Status post EGD with cauterization of small bowel AVM, currently denies any melena or rectal bleeding    5  Diverticulosis  Advised for high-fiber diet  - CT abdomen pelvis w contrast; Future    6  Morbid obesity ([address] )  Long discussion with the patient regarding dietary restriction with cutting down the fat and carbohydrate removed from the diet, tight glycemic control , his mobility is limited   ______________________________________________________________________    SUBJECTIVE:  Patient seen and examined, he come for follow-up after EGD and colonoscopy, he is still complaining lower abdominal pain around periumbilical area    EGD and colonoscopy came back okay, there is evidence of diverticulosis, GERD with gastritis, small AVM which was cauterized, biopsy result came back okay  He is taking omeprazole 20 mg twice a day, famotidine 40 mg at nighttime, somehow he did not start taking Bentyl  He denies any chronic constipation  His blood sugar is well controlled, he also had right upper quadrant ultrasound which shows evidence of cirrhosis  He had a multiple question about liver cirrhosis and etiology which are answered      REVIEW OF SYSTEMS IS OTHERWISE NEGATIVE        Historical Information   Past Medical History:   Diagnosis Date    A-fib [facility])     AAA [facility]abdominal [facility]) [facility]     Actinic keratosis of right temple 7/31/2020    Actinic keratosis of scalp 7/31/2020    Acute respiratory failure with hypoxia (HCC) 3/25/2021    Allergic 1960    Arthritis     Lay's esophagus     Bladder cancer (HCC)     Blister of left leg 4/28/2021    Blister of right leg 5/26/2021    CAD (coronary artery disease)     Cancer (HCC) 02/2010    Bladder    CHF (congestive heart failure) (HCC)     Chronic low back pain     Chronic pain of both knees 7/31/2020    Colitis 12/4/2020    CPAP (continuous positive airway pressure) dependence     Diabetes ([facility] )     Diabetes mellitus ([facility] ) 10/1993    Excessive gas 12/3/2020    Heart murmur     History of chemotherapy     Hydroureter on left 4/28/2021    Hyperlipemia     Hypertension     Immunization deficiency 10/30/2020    Hep a nonimmune    Kidney stone     Left lower quadrant abdominal pain 12/3/2020    Mass of right adrenal gland ([facility] ) 12/4/2020    4 1 cm    Myocardial infarction ([facility] )     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    Obesity 2000    LUCIA (obstructive sleep apnea) 1/29/2021    Pressure ulcer of right leg, stage 1 5/26/2021    Urinary tract infection with hematuria 5/3/2021    u cx 4/2021=pseudomonas R to bactrim and cefdinir, S to cipro    Venous stasis dermatitis of both lower extremities 5/26/2021 Past Surgical History:   Procedure Laterality Date    BACK SURGERY      BLADDER SURGERY      CATARACT EXTRACTION, BILATERAL      COLONOSCOPY  2019    next  [place] [address] GRAFT  2016    Quadruple per [place]    EGD  2017    EYE SURGERY  2016    Cataracts    JOINT REPLACEMENT  [id number]    Hip and shoulder    KIDNEY STONE SURGERY      NC CYSTO/URETERO W/LITHOTRIPSY &INDWELL STENT INSRT Left 2021    Procedure: CYSTOSCOPY URETEROSCOPY WITH LITHOTRIPSY HOLMIUM LASER, AND INSERTION STENT URETERAL/EXCHANGE;  Surgeon: Kym Wheat MD;  Location: [facility];  Service: Urology    NC CYSTOURETHROSCOPY,URETER CATHETER Left 2021    Procedure: CYSTOSCOPY  WITH INSERTION STENT URETERAL;  Surgeon: Kym Wheat MD;  [facility];  Service: Urology    TOTAL HIP ARTHROPLASTY Right 2012    TOTAL SHOULDER REPLACEMENT Right 2013    VASECTOMY  1971     Social History   Social History     Substance and Sexual Activity   Alcohol Use Not Currently    Alcohol/week: 0 0 standard drinks    Comment: No use     Social History     Substance and Sexual Activity   Drug Use Never    Comment: No use     Social History     Tobacco Use   Smoking Status Former Smoker    Packs/day: 0 25    Years: 20 00    Pack years: 5 00    Types: Cigarettes    Start date:     Quit date: 2010    Years since quittin 6   Smokeless Tobacco Never Used   Tobacco Comment    Quit several times for up to 6 years       Family History   Problem Relation Age of Onset    Heart disease Father     Arthritis Father     Heart attack Father     Alzheimer's disease Mother     Dementia Mother        Meds/Allergies       Current Outpatient Medications:     apixaban (Eliquis) 5 mg    atorvastatin (LIPITOR) 40 mg tablet    clindamycin (CLINDAGEL) 1 % gel    Continuous Blood Gluc Sensor (FreeStyle Peggy 2 Sensor) MISC    dicyclomine (BENTYL) 10 mg capsule    digoxin (LANOXIN) 0 125 mg tablet    fluorouracil (EFUDEX) 5 % cream    furosemide (LASIX) 80 mg tablet    insulin aspart (NovoLOG FlexPen) 100 UNIT/ML injection pen    insulin degludec Cristi Vasquez FlexTouch) 200 units/mL CONCENTRATED U-200 injection pen    Lancets (accu-chek multiclix) lancets    lisinopril (ZESTRIL) 20 mg tablet    metoprolol succinate (TOPROL-XL) 100 mg 24 hr tablet    omeprazole (PriLOSEC) 20 mg delayed release capsule    potassium citrate (UROCIT-K) 10 mEq    Unifine Pentips 31G X 8 MM MISC    famotidine (PEPCID) 40 MG tablet    omeprazole (PriLOSEC) 20 mg delayed release capsule    Allergies   Allergen Reactions    Diltiazem Abdominal Pain           Objective     Blood pressure (!) 140/49, pulse 63, weight (!) 139 kg (306 lb)  Body mass index is 37 25 kg/m²  PHYSICAL EXAM:      General Appearance:   Alert, cooperative, no distress   HEENT:   Normocephalic, atraumatic, anicteric      Neck:  Supple, symmetrical, trachea midline   Lungs:   Clear to auscultation bilaterally; no rales, rhonchi or wheezing; respirations unlabored    Heart[de-identified]   Regular rate and rhythm; no murmur, rub, or gallop  Abdomen:   Soft, non-tender, non-distended; normal bowel sounds; no masses, no organomegaly    Genitalia:   Deferred    Rectal:   Deferred    Extremities:  No cyanosis, clubbing or edema    Pulses:  2+ and symmetric    Skin:  No jaundice, rashes, or lesions    Lymph nodes:  No palpable cervical lymphadenopathy        Lab Results:   No visits with results within 1 Day(s) from this visit     Latest known visit with results is:   Lab on 08/06/2022   Component Date Value    Total Volume, Ur 08/06/2022 2350 (A)    Preserved Urine Volume 08/06/2022 2350 (A)    Ammonia, Urine 08/06/2022 68051     Ammonia, 24HR Ur 08/06/2022 23     Calcium, Ur 08/06/2022 4 1     Calcium 24HR Ur 08/06/2022 96 4     Citrate, Ur 08/06/2022 88     Citrate 24H UR 08/06/2022 207 (A)    Chloride, Ur 08/06/2022 102     Chloride 24HR Ur 08/06/2022 240     Creatinine, Ur 08/06/2022 43 0     Creatinine,Ur 24hr 08/06/2022 1010 5     Cystine, Krunal  Urine 08/06/2022 16 58     Cystine, Quant, Ur, 24hr 08/06/2022 38 96     Magnesium, Ur 08/06/2022 2 2     Magnesium, 24H Ur 08/06/2022 52     Oxalate, Ur 08/06/2022 6     Oxalate, 24HR Ur 08/06/2022 14     Osmolality, Ur 08/06/2022 400     pH, 24 Hr Urine 08/06/2022 6 3     Phosphorus, Ur 08/06/2022 22 5     Phosphorus, 24HR Ur 08/06/2022 528 8     Potassium, Ur 08/06/2022 20 2     Potassium, 24HR Ur 08/06/2022 47 5     Sodium, Ur 08/06/2022 111     Sodium, 24HR Ur 08/06/2022 261     Sulfate, Ur, mEq/L 08/06/2022 6     Sulfate, 24H Ur 08/06/2022 14     Uric Acid, Ur 08/06/2022 20 0     Uric Acid, 24HR Ur 08/06/2022 470     Brushite 08/06/2022 0 49     Calcium Oxalate 08/06/2022 1 14     Monosodium Urate 08/06/2022 2 54     Struvite 08/06/2022 0 01     Uric Acid 08/06/2022 0 45     Please Note 08/06/2022 Comment          Radiology Results:   No results found

## 2022-09-08 ENCOUNTER — HOSPITAL ENCOUNTER (OUTPATIENT)
Dept: NON INVASIVE DIAGNOSTICS | Facility: CLINIC | Age: 77
Discharge: HOME | End: 2022-09-08
Payer: MEDICARE

## 2022-09-08 DIAGNOSIS — I71.40 ABDOMINAL AORTIC ANEURYSM (AAA) WITHOUT RUPTURE: ICD-10-CM

## 2022-09-08 PROCEDURE — 93978 VASCULAR STUDY: CPT

## 2022-09-08 PROCEDURE — 93978 VASCULAR STUDY: CPT | Performed by: SURGERY

## 2022-09-09 DIAGNOSIS — I48.91 ATRIAL FIBRILLATION WITH RAPID VENTRICULAR RESPONSE (HCC): ICD-10-CM

## 2022-09-09 RX ORDER — METOPROLOL SUCCINATE 100 MG/1
TABLET, EXTENDED RELEASE ORAL
Qty: 60 TABLET | Refills: 0 | Status: SHIPPED | OUTPATIENT
Start: 2022-09-09 | End: 2022-10-24

## 2022-09-14 ENCOUNTER — RA CDI HCC (OUTPATIENT)
Dept: OTHER | Facility: HOSPITAL | Age: 77
End: 2022-09-14

## 2022-09-14 NOTE — PROGRESS NOTES
[address]  coding opportunities          Chart Reviewed number of suggestions sent to Provider: 1   I11 0    Patients Insurance     Medicare Insurance: [employer]

## 2022-09-17 DIAGNOSIS — K21.00 GASTROESOPHAGEAL REFLUX DISEASE WITH ESOPHAGITIS WITHOUT HEMORRHAGE: ICD-10-CM

## 2022-09-17 RX ORDER — FAMOTIDINE 40 MG/1
TABLET, FILM COATED ORAL
Qty: 30 TABLET | Refills: 0 | Status: SHIPPED | OUTPATIENT
Start: 2022-09-17 | End: 2022-10-11 | Stop reason: SDUPTHER

## 2022-09-19 DIAGNOSIS — I10 ESSENTIAL HYPERTENSION: ICD-10-CM

## 2022-09-19 RX ORDER — LISINOPRIL 20 MG/1
TABLET ORAL
Qty: 30 TABLET | Refills: 0 | Status: SHIPPED | OUTPATIENT
Start: 2022-09-19 | End: 2022-10-24

## 2022-09-21 ENCOUNTER — OFFICE VISIT (OUTPATIENT)
Dept: FAMILY MEDICINE CLINIC | Facility: CLINIC | Age: 77
End: 2022-09-21
Payer: MEDICARE

## 2022-09-21 VITALS
BODY MASS INDEX: 37.63 KG/M2 | WEIGHT: 309 LBS | SYSTOLIC BLOOD PRESSURE: 120 MMHG | HEART RATE: 75 BPM | DIASTOLIC BLOOD PRESSURE: 50 MMHG | OXYGEN SATURATION: 91 % | HEIGHT: 76 IN | TEMPERATURE: 98 F

## 2022-09-21 DIAGNOSIS — I10 PRIMARY HYPERTENSION: ICD-10-CM

## 2022-09-21 DIAGNOSIS — K74.60 CIRRHOSIS OF LIVER WITHOUT ASCITES, UNSPECIFIED HEPATIC CIRRHOSIS TYPE (HCC): ICD-10-CM

## 2022-09-21 DIAGNOSIS — E11.65 TYPE 2 DIABETES MELLITUS WITH HYPERGLYCEMIA, WITH LONG-TERM CURRENT USE OF INSULIN (HCC): Primary | ICD-10-CM

## 2022-09-21 DIAGNOSIS — G47.33 OSA (OBSTRUCTIVE SLEEP APNEA): ICD-10-CM

## 2022-09-21 DIAGNOSIS — R44.3 HALLUCINATION: ICD-10-CM

## 2022-09-21 DIAGNOSIS — S80.822A BLISTER OF LEFT LOWER EXTREMITY, INITIAL ENCOUNTER: ICD-10-CM

## 2022-09-21 DIAGNOSIS — L02.416 CELLULITIS AND ABSCESS OF LEFT LOWER EXTREMITY: ICD-10-CM

## 2022-09-21 DIAGNOSIS — R25.2 JERKING MOVEMENTS OF EXTREMITIES: ICD-10-CM

## 2022-09-21 DIAGNOSIS — L03.116 CELLULITIS AND ABSCESS OF LEFT LOWER EXTREMITY: ICD-10-CM

## 2022-09-21 DIAGNOSIS — I50.32 CHRONIC DIASTOLIC CHF (CONGESTIVE HEART FAILURE) (HCC): ICD-10-CM

## 2022-09-21 DIAGNOSIS — R60.0 LOCALIZED EDEMA: ICD-10-CM

## 2022-09-21 DIAGNOSIS — I25.10 CORONARY ARTERY DISEASE INVOLVING NATIVE CORONARY ARTERY OF NATIVE HEART WITHOUT ANGINA PECTORIS: ICD-10-CM

## 2022-09-21 DIAGNOSIS — I48.0 PAROXYSMAL ATRIAL FIBRILLATION (HCC): ICD-10-CM

## 2022-09-21 DIAGNOSIS — Z79.4 TYPE 2 DIABETES MELLITUS WITH HYPERGLYCEMIA, WITH LONG-TERM CURRENT USE OF INSULIN (HCC): Primary | ICD-10-CM

## 2022-09-21 DIAGNOSIS — I71.40 ABDOMINAL AORTIC ANEURYSM (AAA) WITHOUT RUPTURE: ICD-10-CM

## 2022-09-21 PROCEDURE — 99215 OFFICE O/P EST HI 40 MIN: CPT | Performed by: FAMILY MEDICINE

## 2022-09-21 RX ORDER — FUROSEMIDE 80 MG
40 TABLET ORAL 2 TIMES DAILY
Qty: 90 TABLET | Refills: 1
Start: 2022-09-21

## 2022-09-21 RX ORDER — SPIRONOLACTONE 25 MG/1
25 TABLET ORAL DAILY
Qty: 30 TABLET | Refills: 1 | Status: SHIPPED | OUTPATIENT
Start: 2022-09-21 | End: 2022-10-17 | Stop reason: SDUPTHER

## 2022-09-21 NOTE — PROGRESS NOTES
Name: Alisa Pitt      :       MRN: [id number]  Encounter Provider: Edi Adams MD  Encounter Date: 2022   Encounter department: [address] [place] [address]    Assessment & Plan         Concern for patient with jerking motion of his arms and legs none on exam and hallucination with cirrhosis of the liver will check ammonia level check for hepatic encephalopathy  Advised to cut down eggs intake to only 2 X a day since egg product with large protein can worsening hepatic encephalopathy and cirrhosis  If ammonia level is high he will need lactulose  Add on spironolactone for him counseled for side effect gynecomastia  Decrease Lasix to 40 mg twice a day to help offset his kidney function  If his ammonia level is normal then will recommend to get MRI of his brain  Will need to contact his neurologist to see if he can be seen sooner than November  Will notify his gastroenterologist and kidney specialist for this  He follow-up with vascular surgeon for abdominal aortic aneurysm risk of rupture  Advised patient's symptoms of emergency he need to go to the ER if severe abdominal pain or  low blood pressure  Discussed blood test results patient detail  I have spent 50 minutes with Patient and family today in which greater than 50% of this time was spent in counseling/coordination of care regarding Diagnostic results, Prognosis, Risks and benefits of tx options, Intructions for management, Patient and family education, Importance of tx compliance, Risk factor reductions and Impressions  1  Type 2 diabetes mellitus with hyperglycemia, with long-term current use of insulin ([address] )    2  LUCIA (obstructive sleep apnea)    3  Coronary artery disease involving native coronary artery of native heart without angina pectoris    4  Primary hypertension    5  Paroxysmal atrial fibrillation (HCC)    6  Abdominal aortic aneurysm (AAA) without rupture ([address] )    7   Chronic diastolic CHF (congestive heart failure) ([facility] )    8  Blister of left lower extremity, initial encounter    9  Cirrhosis of liver without ascites, unspecified hepatic cirrhosis type ([facility] )  -     Comprehensive metabolic panel; Future  -     Ammonia; Future  -     spironolactone (ALDACTONE) 25 mg tablet; Take 1 tablet (25 mg total) by mouth daily    10  Jerking movements of extremities  -     Comprehensive metabolic panel; Future  -     Ammonia; Future    11  Hallucination  -     Comprehensive metabolic panel; Future  -     Ammonia; Future    12  Localized edema  -     spironolactone (ALDACTONE) 25 mg tablet; Take 1 tablet (25 mg total) by mouth daily  -     furosemide (LASIX) 80 mg tablet; Take 0 5 tablets (40 mg total) by mouth 2 (two) times a day    13  Cellulitis and abscess of left lower extremity  -     silver sulfadiazine (SILVADENE,SSD) 1 % cream; Apply topically daily         Subjective      68-year-old male follow-up type 2 diabetes follow endocrinologist he is on [place] and NovoLog  Has a Dexcom  A1c is 8 3 it was 9 much improved  Could not tolerate G LP 1 due to stomach pain  She follow-up with GI recently was found to have cirrhosis on exam no ascites  Was told is due to his diabetes and morbid obesity  He has AFib chronic congestive heart failure follow-up cardiologist on Eliquis and Lasix 80 milligram twice a day  He has chronic kidney disease follow-up with nephrologist   He also have chronic abdominal aortic aneurysm follow-up with vascular surgeon  Ultrasound recently showed increased dilation from 4 4 centimeter up to 4 9 centimeter  Complains of worsening jerking motion of his arms and legs  Also episodes of hallucination seeing objects that are not there example he thought he saw a dog when he woke up yesterday morning he does not have a dog at home sometimes  objects that not there  He has an appoint with neurologist in 2 months  Denies any headaches  He walks with a walker    He eats about 12 eggs a day since he has diabetes he try to avoid carbohydrates  No trouble with bowel movement  Review of Systems   Constitutional: Negative for activity change, appetite change, fatigue, fever and unexpected weight change  HENT: Negative for dental problem and trouble swallowing  Eyes: Negative for photophobia and visual disturbance  Respiratory: Negative for cough and chest tightness  Cardiovascular: Negative for chest pain, palpitations and leg swelling  Gastrointestinal: Negative for abdominal pain, constipation and vomiting  Endocrine: Negative for cold intolerance, polydipsia and polyuria  Genitourinary: Negative for difficulty urinating, frequency and urgency  Musculoskeletal: Negative for arthralgias, joint swelling, myalgias and neck pain  Skin: Negative for color change, rash and wound  Allergic/Immunologic: Negative for environmental allergies  Neurological: Positive for tremors  Negative for dizziness, weakness and numbness  Hallucination   Hematological: Does not bruise/bleed easily  Psychiatric/Behavioral: Negative for decreased concentration, dysphoric mood, self-injury, sleep disturbance and suicidal ideas         Current Outpatient Medications on File Prior to Visit   Medication Sig    atorvastatin (LIPITOR) 40 mg tablet Take 1 tablet (40 mg total) by mouth daily    clindamycin (CLINDAGEL) 1 % gel Apply topically 2 (two) times a day    Continuous Blood Gluc Sensor (FreeStyle Peggy 2 Sensor) MISC Change it every 14 days    dicyclomine (BENTYL) 10 mg capsule Take 1 capsule (10 mg total) by mouth 2 (two) times a day as needed (abdominal cramping)    digoxin (LANOXIN) 0 125 mg tablet Take 1 tablet (125 mcg total) by mouth daily    Eliquis 5 MG TAKE ONE TABLET BY MOUTH TWICE DAILY    famotidine (PEPCID) 40 MG tablet TAKE ONE TABLET BY MOUTH NIGHTLY AT BEDTIME    fluorouracil (EFUDEX) 5 % cream Apply topically 2 (two) times a day    insulin aspart (NovoLOG FlexPen) 100 UNIT/ML injection pen Inject 20-25 units of insulin with meals three times a day   insulin degludec Alba Tomer FlexTouch) 200 units/mL CONCENTRATED U-200 injection pen Use 95units subcut once daily (Patient taking differently: Inject 100 Units under the skin daily Use 95units subcut once daily)    Lancets (accu-chek multiclix) lancets Use 1 each 2 (two) times a day Use 2 times daily to test blood glucose    lisinopril (ZESTRIL) 20 mg tablet TAKE ONE TABLET BY MOUTH ONCE DAILY    metoprolol succinate (TOPROL-XL) 100 mg 24 hr tablet TAKE ONE TABLET BY MOUTH EVERY 12 HOURS    omeprazole (PriLOSEC) 20 mg delayed release capsule Take 1 capsule (20 mg total) by mouth 2 (two) times a day    potassium citrate (UROCIT-K) 10 mEq Take 1 tablet (10 mEq total) by mouth 2 (two) times a day    Unifine Pentips 31G X 8 MM MISC Inject under the skin daily Use as directed    [DISCONTINUED] furosemide (LASIX) 80 mg tablet Take 1 tablet (80 mg total) by mouth 2 (two) times a day 1 full tablet every morning, 1/2 tablet every afternoon    [DISCONTINUED] omeprazole (PriLOSEC) 20 mg delayed release capsule Take 1 capsule (20 mg total) by mouth daily       Objective     /50 (BP Location: Left arm, Patient Position: Sitting, Cuff Size: Large)   Pulse 75   Temp 98 °F (36 7 °C) (Temporal)   Ht 6' 4" (1 93 m)   Wt (!) 140 kg (309 lb)   SpO2 91%   BMI 37 61 kg/m²     Physical Exam  Vitals and nursing note reviewed  Constitutional:       Appearance: Normal appearance  He is well-developed  He is obese  HENT:      Head: Normocephalic and atraumatic  Right Ear: Tympanic membrane, ear canal and external ear normal       Left Ear: Tympanic membrane, ear canal and external ear normal       Nose: Nose normal       Mouth/Throat:      Mouth: Mucous membranes are moist       Pharynx: Oropharynx is clear  Eyes:      Extraocular Movements: Extraocular movements intact        Pupils: Pupils are equal, round, and reactive to light  Cardiovascular:      Rate and Rhythm: Normal rate and regular rhythm  Pulses: Normal pulses  Heart sounds: Normal heart sounds  Pulmonary:      Effort: Pulmonary effort is normal       Breath sounds: Normal breath sounds  Abdominal:      General: Bowel sounds are normal       Palpations: Abdomen is soft  Musculoskeletal:         General: Normal range of motion  Cervical back: Normal range of motion and neck supple  Right lower leg: Edema present  Left lower leg: Edema present  Skin:     General: Skin is warm and dry  Neurological:      General: No focal deficit present  Mental Status: He is alert and oriented to person, place, and time  Mental status is at baseline  Psychiatric:         Mood and Affect: Mood normal          Behavior: Behavior normal          Thought Content:  Thought content normal          Judgment: Judgment normal        Bryson Agrawal MD

## 2022-09-23 DIAGNOSIS — E11.65 TYPE 2 DIABETES MELLITUS WITH HYPERGLYCEMIA, WITH LONG-TERM CURRENT USE OF INSULIN (HCC): ICD-10-CM

## 2022-09-23 DIAGNOSIS — Z79.4 TYPE 2 DIABETES MELLITUS WITH HYPERGLYCEMIA, WITH LONG-TERM CURRENT USE OF INSULIN (HCC): ICD-10-CM

## 2022-09-23 RX ORDER — PEN NEEDLE, DIABETIC 31 GX5/16"
NEEDLE, DISPOSABLE MISCELLANEOUS DAILY
Qty: 100 EACH | Refills: 0 | Status: SHIPPED | OUTPATIENT
Start: 2022-09-23

## 2022-09-30 ENCOUNTER — APPOINTMENT (OUTPATIENT)
Dept: LAB | Facility: AMBULARY SURGERY CENTER | Age: 77
End: 2022-09-30
Payer: MEDICARE

## 2022-09-30 ENCOUNTER — APPOINTMENT (OUTPATIENT)
Dept: LAB | Facility: CLINIC | Age: 77
End: 2022-09-30
Payer: MEDICARE

## 2022-09-30 DIAGNOSIS — R25.2 JERKING MOVEMENTS OF EXTREMITIES: ICD-10-CM

## 2022-09-30 DIAGNOSIS — K74.60 CIRRHOSIS OF LIVER WITHOUT ASCITES, UNSPECIFIED HEPATIC CIRRHOSIS TYPE (HCC): ICD-10-CM

## 2022-09-30 DIAGNOSIS — R44.3 HALLUCINATION: ICD-10-CM

## 2022-09-30 LAB
ALBUMIN SERPL BCP-MCNC: 2.9 G/DL (ref 3.5–5)
ALP SERPL-CCNC: 89 U/L (ref 46–116)
ALT SERPL W P-5'-P-CCNC: 16 U/L (ref 12–78)
AMMONIA PLAS-SCNC: 23 UMOL/L (ref 18–72)
ANION GAP SERPL CALCULATED.3IONS-SCNC: 5 MMOL/L (ref 4–13)
AST SERPL W P-5'-P-CCNC: 11 U/L (ref 5–45)
BILIRUB SERPL-MCNC: 0.26 MG/DL (ref 0.2–1)
BUN SERPL-MCNC: 21 MG/DL (ref 5–25)
CALCIUM ALBUM COR SERPL-MCNC: 10 MG/DL (ref 8.3–10.1)
CALCIUM SERPL-MCNC: 9.1 MG/DL (ref 8.3–10.1)
CHLORIDE SERPL-SCNC: 102 MMOL/L (ref 96–108)
CO2 SERPL-SCNC: 33 MMOL/L (ref 21–32)
CREAT SERPL-MCNC: 1.01 MG/DL (ref 0.6–1.3)
GFR SERPL CREATININE-BSD FRML MDRD: 70 ML/MIN/1.73SQ M
GLUCOSE P FAST SERPL-MCNC: 177 MG/DL (ref 65–99)
POTASSIUM SERPL-SCNC: 4.4 MMOL/L (ref 3.5–5.3)
PROT SERPL-MCNC: 7.6 G/DL (ref 6.4–8.4)
SODIUM SERPL-SCNC: 140 MMOL/L (ref 135–147)

## 2022-09-30 PROCEDURE — 36415 COLL VENOUS BLD VENIPUNCTURE: CPT

## 2022-09-30 PROCEDURE — 80053 COMPREHEN METABOLIC PANEL: CPT

## 2022-09-30 PROCEDURE — 82140 ASSAY OF AMMONIA: CPT

## 2022-10-04 DIAGNOSIS — I71.40 ABDOMINAL AORTIC ANEURYSM (AAA) WITHOUT RUPTURE, UNSPECIFIED PART: Primary | ICD-10-CM

## 2022-10-04 NOTE — PROGRESS NOTES
KACEY Mejias   9/19/2022 12:43 PM EDT Back to Top        5 mm increase in size of AAA in comparison to prior  Mary Jane Judit will need CTA of abdomen and pelvis and follow-up with vascular surgeon to review  Please call to schedule   Had recent labs 8/4/22 with normal creatinine and GFR     Order placed  Fran in scheduling will schedule the appointments

## 2022-10-06 ENCOUNTER — HOSPITAL ENCOUNTER (OUTPATIENT)
Dept: NON INVASIVE DIAGNOSTICS | Facility: CLINIC | Age: 77
Discharge: HOME | End: 2022-10-06
Payer: MEDICARE

## 2022-10-06 DIAGNOSIS — I73.9 PAD (PERIPHERAL ARTERY DISEASE) (HCC): ICD-10-CM

## 2022-10-06 PROCEDURE — 93925 LOWER EXTREMITY STUDY: CPT | Performed by: SURGERY

## 2022-10-06 PROCEDURE — 93923 UPR/LXTR ART STDY 3+ LVLS: CPT | Performed by: SURGERY

## 2022-10-06 PROCEDURE — 93925 LOWER EXTREMITY STUDY: CPT

## 2022-10-06 PROCEDURE — 93923 UPR/LXTR ART STDY 3+ LVLS: CPT

## 2022-10-07 ENCOUNTER — HOSPITAL ENCOUNTER (OUTPATIENT)
Dept: CT IMAGING | Facility: HOSPITAL | Age: 77
Discharge: HOME | End: 2022-10-07
Attending: INTERNAL MEDICINE
Payer: MEDICARE

## 2022-10-07 DIAGNOSIS — K74.69 OTHER CIRRHOSIS OF LIVER (HCC): ICD-10-CM

## 2022-10-07 DIAGNOSIS — K57.90 DIVERTICULOSIS: ICD-10-CM

## 2022-10-07 DIAGNOSIS — R10.33 PERIUMBILICAL ABDOMINAL PAIN: ICD-10-CM

## 2022-10-07 DIAGNOSIS — K74.60 LIVER CIRRHOSIS SECONDARY TO NASH (HCC): ICD-10-CM

## 2022-10-07 DIAGNOSIS — K75.81 LIVER CIRRHOSIS SECONDARY TO NASH (HCC): ICD-10-CM

## 2022-10-07 PROCEDURE — 74177 CT ABD & PELVIS W/CONTRAST: CPT

## 2022-10-07 PROCEDURE — G1004 CDSM NDSC: HCPCS

## 2022-10-07 RX ADMIN — IOHEXOL 100 ML: 300 INJECTION, SOLUTION INTRAVENOUS at 19:16

## 2022-10-11 ENCOUNTER — TELEPHONE (OUTPATIENT)
Dept: GASTROENTEROLOGY | Facility: CLINIC | Age: 77
End: 2022-10-11

## 2022-10-11 DIAGNOSIS — K21.00 GASTROESOPHAGEAL REFLUX DISEASE WITH ESOPHAGITIS WITHOUT HEMORRHAGE: ICD-10-CM

## 2022-10-11 RX ORDER — FAMOTIDINE 40 MG/1
40 TABLET, FILM COATED ORAL
Qty: 30 TABLET | Refills: 1 | Status: SHIPPED | OUTPATIENT
Start: 2022-10-11

## 2022-10-12 PROBLEM — R31.9 URINARY TRACT INFECTION WITH HEMATURIA: Status: RESOLVED | Noted: 2021-05-03 | Resolved: 2022-10-12

## 2022-10-12 PROBLEM — N39.0 URINARY TRACT INFECTION WITH HEMATURIA: Status: RESOLVED | Noted: 2021-05-03 | Resolved: 2022-10-12

## 2022-10-13 DIAGNOSIS — Z79.4 TYPE 2 DIABETES MELLITUS WITH HYPERGLYCEMIA, WITH LONG-TERM CURRENT USE OF INSULIN (HCC): ICD-10-CM

## 2022-10-13 DIAGNOSIS — E11.65 TYPE 2 DIABETES MELLITUS WITH HYPERGLYCEMIA, WITH LONG-TERM CURRENT USE OF INSULIN (HCC): ICD-10-CM

## 2022-10-13 RX ORDER — INSULIN DEGLUDEC 200 U/ML
INJECTION, SOLUTION SUBCUTANEOUS
Qty: 12 ML | Refills: 0 | Status: SHIPPED | OUTPATIENT
Start: 2022-10-13

## 2022-10-17 DIAGNOSIS — R60.0 LOCALIZED EDEMA: ICD-10-CM

## 2022-10-17 DIAGNOSIS — K74.60 CIRRHOSIS OF LIVER WITHOUT ASCITES, UNSPECIFIED HEPATIC CIRRHOSIS TYPE (HCC): ICD-10-CM

## 2022-10-19 RX ORDER — SPIRONOLACTONE 25 MG/1
25 TABLET ORAL DAILY
Qty: 30 TABLET | Refills: 0 | Status: SHIPPED | OUTPATIENT
Start: 2022-10-19

## 2022-10-20 ENCOUNTER — RA CDI HCC (OUTPATIENT)
Dept: OTHER | Facility: HOSPITAL | Age: 77
End: 2022-10-20

## 2022-10-23 DIAGNOSIS — I48.91 ATRIAL FIBRILLATION WITH RAPID VENTRICULAR RESPONSE (HCC): ICD-10-CM

## 2022-10-23 DIAGNOSIS — I10 ESSENTIAL HYPERTENSION: ICD-10-CM

## 2022-10-24 DIAGNOSIS — I10 ESSENTIAL HYPERTENSION: ICD-10-CM

## 2022-10-24 DIAGNOSIS — I48.91 ATRIAL FIBRILLATION WITH RAPID VENTRICULAR RESPONSE (HCC): ICD-10-CM

## 2022-10-24 RX ORDER — LISINOPRIL 20 MG/1
20 TABLET ORAL DAILY
Qty: 30 TABLET | Refills: 0 | Status: SHIPPED | OUTPATIENT
Start: 2022-10-24

## 2022-10-24 RX ORDER — METOPROLOL SUCCINATE 100 MG/1
TABLET, EXTENDED RELEASE ORAL
Qty: 60 TABLET | Refills: 0 | Status: SHIPPED | OUTPATIENT
Start: 2022-10-24 | End: 2022-10-24 | Stop reason: SDUPTHER

## 2022-10-24 RX ORDER — LISINOPRIL 20 MG/1
TABLET ORAL
Qty: 30 TABLET | Refills: 0 | Status: SHIPPED | OUTPATIENT
Start: 2022-10-24 | End: 2022-10-24 | Stop reason: SDUPTHER

## 2022-10-24 RX ORDER — METOPROLOL SUCCINATE 100 MG/1
100 TABLET, EXTENDED RELEASE ORAL EVERY 12 HOURS
Qty: 60 TABLET | Refills: 0 | Status: SHIPPED | OUTPATIENT
Start: 2022-10-24

## 2022-10-26 ENCOUNTER — OFFICE VISIT (OUTPATIENT)
Dept: FAMILY MEDICINE CLINIC | Facility: CLINIC | Age: 77
End: 2022-10-26
Payer: MEDICARE

## 2022-10-26 VITALS
WEIGHT: 305.8 LBS | HEIGHT: 76 IN | RESPIRATION RATE: 16 BRPM | HEART RATE: 60 BPM | SYSTOLIC BLOOD PRESSURE: 110 MMHG | DIASTOLIC BLOOD PRESSURE: 70 MMHG | TEMPERATURE: 98.6 F | OXYGEN SATURATION: 92 % | BODY MASS INDEX: 37.24 KG/M2

## 2022-10-26 DIAGNOSIS — R60.0 BILATERAL EDEMA OF LOWER EXTREMITY: ICD-10-CM

## 2022-10-26 DIAGNOSIS — I48.0 PAROXYSMAL ATRIAL FIBRILLATION (HCC): ICD-10-CM

## 2022-10-26 DIAGNOSIS — E66.01 MORBID OBESITY (HCC): ICD-10-CM

## 2022-10-26 DIAGNOSIS — K74.60 CIRRHOSIS OF LIVER WITHOUT ASCITES, UNSPECIFIED HEPATIC CIRRHOSIS TYPE (HCC): ICD-10-CM

## 2022-10-26 DIAGNOSIS — I10 PRIMARY HYPERTENSION: ICD-10-CM

## 2022-10-26 DIAGNOSIS — Z23 ENCOUNTER FOR IMMUNIZATION: Primary | ICD-10-CM

## 2022-10-26 DIAGNOSIS — G47.33 OSA (OBSTRUCTIVE SLEEP APNEA): ICD-10-CM

## 2022-10-26 DIAGNOSIS — E11.65 TYPE 2 DIABETES MELLITUS WITH HYPERGLYCEMIA, WITH LONG-TERM CURRENT USE OF INSULIN (HCC): ICD-10-CM

## 2022-10-26 DIAGNOSIS — Z79.4 TYPE 2 DIABETES MELLITUS WITH HYPERGLYCEMIA, WITH LONG-TERM CURRENT USE OF INSULIN (HCC): ICD-10-CM

## 2022-10-26 DIAGNOSIS — I25.10 CORONARY ARTERY DISEASE INVOLVING NATIVE CORONARY ARTERY OF NATIVE HEART WITHOUT ANGINA PECTORIS: ICD-10-CM

## 2022-10-26 DIAGNOSIS — R09.02 HYPOXIA: ICD-10-CM

## 2022-10-26 DIAGNOSIS — I50.32 CHRONIC DIASTOLIC CHF (CONGESTIVE HEART FAILURE) (HCC): ICD-10-CM

## 2022-10-26 DIAGNOSIS — I89.0 CHRONIC ACQUIRED LYMPHEDEMA: ICD-10-CM

## 2022-10-26 LAB
LEFT EYE DIABETIC RETINOPATHY: POSITIVE
RIGHT EYE DIABETIC RETINOPATHY: POSITIVE

## 2022-10-26 PROCEDURE — 90662 IIV NO PRSV INCREASED AG IM: CPT

## 2022-10-26 PROCEDURE — G0008 ADMIN INFLUENZA VIRUS VAC: HCPCS

## 2022-10-26 PROCEDURE — 99214 OFFICE O/P EST MOD 30 MIN: CPT

## 2022-10-26 NOTE — PROGRESS NOTES
Name: Rose Mary Wilkins      : 1945      MRN: [id number]  Encounter Provider: Alvarado Pearce MD  Encounter Date: 10/26/2022   Encounter department: [address] [place] [address]     Interestingly in office his pulse ox only 80% but he has has no symptom no drowsiness nodule infusion he walked the office talking well mental status clear=repeat pulse ox on ear came to 94%, fingertips are cold  Continue same regimen for patient  He is in dry status currently  Regarding hypoxemia will notify patient's pulmonologist he will need to be seen to evaluate the need for oxygen treatment  Suspect he has hypoventilation syndrome progressed  He is very complicated with more complex medical condition with cardiomyopathy diabetes morbid obesity  Flu vaccine given today  Will refer patient to lymphedema clinic for chronic swelling in his legs hopefully that will offset the congestion  I have spent 30 minutes with Patient and family today in which greater than 50% of this time was spent in counseling/coordination of care regarding Diagnostic results, Prognosis, Risks and benefits of tx options, Intructions for management, Patient and family education, Importance of tx compliance and Risk factor reductions  1  Encounter for immunization  -     influenza vaccine, high-dose, PF 0 7 mL (FLUZONE HIGH-DOSE)    2  Type 2 diabetes mellitus with hyperglycemia, with long-term current use of insulin (HCC)    3  Cirrhosis of liver without ascites, unspecified hepatic cirrhosis type ([facility] )    4  LUCIA (obstructive sleep apnea)    5  Primary hypertension    6  Paroxysmal atrial fibrillation (HCC)    7  Coronary artery disease involving native coronary artery of native heart without angina pectoris    8  Chronic diastolic CHF (congestive heart failure) ([facility] [address] )    9  Morbid obesity ([facility] [address] )    10  Bilateral edema of lower extremity  -     Ambulatory Referral to PT/OT Lymphedema Therapy; Future    11  Hypoxia    12  Chronic acquired lymphedema  -     Ambulatory Referral to PT/OT Lymphedema Therapy; Future         Subjective      65year-old male follow-up edema and shaking tremors  Liver function is normal ammonia levels normal   He is on Lasix 40 mg twice a day and spironolactone 25 mg twice a day  He lost some weight  He has had a lot of swelling in his legs  His oxygen level is low when he wakes up it dropped to 80 but then slowly took couple hours to get up to 94%  He is chronically for fatigue  He was hospitalized and was on oxygen for 3 months and then his pulmonologist took him off oxygen  Recently his oxygen level dropped again  Lung exam is normal   He does had a CT scan of his abdomen showed no congestion in his lungs  He has sleep apnea and he wears a CPAP machine at night  He is on multiple medication for diabetes heart disease AFib cholesterol blood pressure  He follow-up gastroenterologist and vascular surgeon  Review of Systems   Constitutional: Positive for fatigue  Negative for activity change, appetite change, fever and unexpected weight change  HENT: Negative for dental problem and trouble swallowing  Eyes: Negative for photophobia and visual disturbance  Respiratory: Negative for cough and chest tightness  Cardiovascular: Negative for chest pain, palpitations and leg swelling  Gastrointestinal: Negative for abdominal pain, constipation and vomiting  Endocrine: Negative for cold intolerance, polydipsia and polyuria  Genitourinary: Negative for difficulty urinating, frequency and urgency  Musculoskeletal: Positive for arthralgias  Negative for joint swelling, myalgias and neck pain  Skin: Negative for color change, rash and wound  Allergic/Immunologic: Negative for environmental allergies  Neurological: Negative for dizziness, weakness and numbness  Hematological: Does not bruise/bleed easily     Psychiatric/Behavioral: Negative for decreased concentration, dysphoric mood, self-injury, sleep disturbance and suicidal ideas  Current Outpatient Medications on File Prior to Visit   Medication Sig   • atorvastatin (LIPITOR) 40 mg tablet Take 1 tablet (40 mg total) by mouth daily   • clindamycin (CLINDAGEL) 1 % gel Apply topically 2 (two) times a day   • Continuous Blood Gluc Sensor (FreeStyle Peggy 2 Sensor) MISC Change it every 14 days   • dicyclomine (BENTYL) 10 mg capsule Take 1 capsule (10 mg total) by mouth 2 (two) times a day as needed (abdominal cramping)   • digoxin (LANOXIN) 0 125 mg tablet Take 1 tablet (125 mcg total) by mouth daily   • Eliquis 5 MG TAKE ONE TABLET BY MOUTH TWICE DAILY   • famotidine (PEPCID) 40 MG tablet Take 1 tablet (40 mg total) by mouth daily at bedtime   • fluorouracil (EFUDEX) 5 % cream Apply topically 2 (two) times a day   • furosemide (LASIX) 80 mg tablet Take 0 5 tablets (40 mg total) by mouth 2 (two) times a day   • insulin aspart (NovoLOG FlexPen) 100 UNIT/ML injection pen Inject 20-25 units of insulin with meals three times a day     • insulin degludec Ettie Ella FlexTouch) 200 units/mL CONCENTRATED U-200 injection pen inject 95 units under the skin once daily   • Lancets (accu-chek multiclix) lancets Use 1 each 2 (two) times a day Use 2 times daily to test blood glucose   • lisinopril (ZESTRIL) 20 mg tablet Take 1 tablet (20 mg total) by mouth daily   • metoprolol succinate (TOPROL-XL) 100 mg 24 hr tablet Take 1 tablet (100 mg total) by mouth every 12 (twelve) hours   • omeprazole (PriLOSEC) 20 mg delayed release capsule Take 1 capsule (20 mg total) by mouth 2 (two) times a day   • potassium citrate (UROCIT-K) 10 mEq Take 1 tablet (10 mEq total) by mouth 2 (two) times a day   • silver sulfadiazine (SILVADENE,SSD) 1 % cream Apply topically daily   • spironolactone (ALDACTONE) 25 mg tablet Take 1 tablet (25 mg total) by mouth daily   • Unifine Pentips 31G X 8 MM MISC Inject under the skin daily Use as directed       Objective /70 (BP Location: Left arm, Patient Position: Sitting, Cuff Size: Large)   Pulse 60   Temp 98 6 °F (37 °C) (Temporal)   Resp 16   Ht 6' 4" (1 93 m)   Wt (!) 139 kg (305 lb 12 8 oz)   SpO2 92%   BMI 37 22 kg/m²     Physical Exam  Vitals and nursing note reviewed  Constitutional:       Appearance: Normal appearance  He is well-developed  He is obese  HENT:      Head: Normocephalic and atraumatic  Right Ear: Tympanic membrane, ear canal and external ear normal       Left Ear: Tympanic membrane, ear canal and external ear normal       Nose: Nose normal       Mouth/Throat:      Mouth: Mucous membranes are moist       Pharynx: Oropharynx is clear  Eyes:      Extraocular Movements: Extraocular movements intact  Pupils: Pupils are equal, round, and reactive to light  Cardiovascular:      Rate and Rhythm: Normal rate and regular rhythm  Pulses: Normal pulses  Heart sounds: Normal heart sounds  Pulmonary:      Effort: Pulmonary effort is normal       Breath sounds: Normal breath sounds  Abdominal:      General: Bowel sounds are normal       Palpations: Abdomen is soft  Musculoskeletal:         General: Normal range of motion  Cervical back: Normal range of motion and neck supple  Right lower leg: Edema present  Left lower leg: Edema present  Skin:     General: Skin is warm and dry  Capillary Refill: Capillary refill takes less than 2 seconds  Neurological:      General: No focal deficit present  Mental Status: He is alert and oriented to person, place, and time  Mental status is at baseline  Psychiatric:         Mood and Affect: Mood normal          Behavior: Behavior normal          Thought Content:  Thought content normal          Judgment: Judgment normal        Bryson Zhang MD

## 2022-10-27 ENCOUNTER — TELEPHONE (OUTPATIENT)
Dept: PULMONOLOGY | Facility: CLINIC | Age: 77
End: 2022-10-27

## 2022-10-28 ENCOUNTER — HOSPITAL ENCOUNTER (OUTPATIENT)
Dept: CT IMAGING | Facility: HOSPITAL | Age: 77
Discharge: HOME | End: 2022-10-28
Payer: MEDICARE

## 2022-10-28 DIAGNOSIS — I71.40 ABDOMINAL AORTIC ANEURYSM (AAA) WITHOUT RUPTURE, UNSPECIFIED PART: ICD-10-CM

## 2022-10-28 PROCEDURE — G1004 CDSM NDSC: HCPCS

## 2022-10-28 PROCEDURE — 74174 CTA ABD&PLVS W/CONTRAST: CPT

## 2022-10-28 RX ADMIN — IOHEXOL 100 ML: 350 INJECTION, SOLUTION INTRAVENOUS at 16:36

## 2022-11-02 ENCOUNTER — APPOINTMENT (OUTPATIENT)
Dept: LAB | Facility: AMBULARY SURGERY CENTER | Age: 77
End: 2022-11-02

## 2022-11-02 DIAGNOSIS — E83.52 HYPERCALCEMIA: ICD-10-CM

## 2022-11-02 DIAGNOSIS — E11.65 TYPE 2 DIABETES MELLITUS WITH HYPERGLYCEMIA, WITH LONG-TERM CURRENT USE OF INSULIN (HCC): ICD-10-CM

## 2022-11-02 DIAGNOSIS — E78.2 MIXED HYPERLIPIDEMIA: ICD-10-CM

## 2022-11-02 DIAGNOSIS — Z79.4 TYPE 2 DIABETES MELLITUS WITH HYPERGLYCEMIA, WITH LONG-TERM CURRENT USE OF INSULIN (HCC): ICD-10-CM

## 2022-11-02 LAB
ALBUMIN SERPL BCP-MCNC: 2.8 G/DL (ref 3.5–5)
ALP SERPL-CCNC: 84 U/L (ref 46–116)
ALT SERPL W P-5'-P-CCNC: 14 U/L (ref 12–78)
ANION GAP SERPL CALCULATED.3IONS-SCNC: 0 MMOL/L (ref 4–13)
AST SERPL W P-5'-P-CCNC: 12 U/L (ref 5–45)
BILIRUB SERPL-MCNC: 0.37 MG/DL (ref 0.2–1)
BUN SERPL-MCNC: 23 MG/DL (ref 5–25)
CALCIUM ALBUM COR SERPL-MCNC: 10.8 MG/DL (ref 8.3–10.1)
CALCIUM SERPL-MCNC: 9.8 MG/DL (ref 8.3–10.1)
CHLORIDE SERPL-SCNC: 100 MMOL/L (ref 96–108)
CHOLEST SERPL-MCNC: 132 MG/DL
CO2 SERPL-SCNC: 39 MMOL/L (ref 21–32)
CREAT SERPL-MCNC: 1.06 MG/DL (ref 0.6–1.3)
CREAT UR-MCNC: 103 MG/DL
EST. AVERAGE GLUCOSE BLD GHB EST-MCNC: 177 MG/DL
GFR SERPL CREATININE-BSD FRML MDRD: 66 ML/MIN/1.73SQ M
GLUCOSE P FAST SERPL-MCNC: 104 MG/DL (ref 65–99)
HBA1C MFR BLD: 7.8 %
HDLC SERPL-MCNC: 34 MG/DL
LDLC SERPL CALC-MCNC: 67 MG/DL (ref 0–100)
MICROALBUMIN UR-MCNC: 53 MG/L (ref 0–20)
MICROALBUMIN/CREAT 24H UR: 51 MG/G CREATININE (ref 0–30)
NONHDLC SERPL-MCNC: 98 MG/DL
PHOSPHATE SERPL-MCNC: 3.2 MG/DL (ref 2.3–4.1)
POTASSIUM SERPL-SCNC: 4.6 MMOL/L (ref 3.5–5.3)
PROT SERPL-MCNC: 7.8 G/DL (ref 6.4–8.4)
PTH-INTACT SERPL-MCNC: 129 PG/ML (ref 18.4–80.1)
SODIUM SERPL-SCNC: 139 MMOL/L (ref 135–147)
TRIGL SERPL-MCNC: 153 MG/DL

## 2022-11-02 RX ORDER — PEN NEEDLE, DIABETIC 31 GX5/16"
NEEDLE, DISPOSABLE MISCELLANEOUS DAILY
Qty: 100 EACH | Refills: 0 | Status: SHIPPED | OUTPATIENT
Start: 2022-11-02

## 2022-11-03 ENCOUNTER — OFFICE VISIT (OUTPATIENT)
Dept: VASCULAR SURGERY | Facility: CLINIC | Age: 77
End: 2022-11-03

## 2022-11-03 VITALS
BODY MASS INDEX: 37.02 KG/M2 | HEIGHT: 76 IN | DIASTOLIC BLOOD PRESSURE: 60 MMHG | WEIGHT: 304 LBS | HEART RATE: 60 BPM | SYSTOLIC BLOOD PRESSURE: 136 MMHG

## 2022-11-03 DIAGNOSIS — I71.40 ABDOMINAL AORTIC ANEURYSM (AAA) WITHOUT RUPTURE, UNSPECIFIED PART: Primary | ICD-10-CM

## 2022-11-03 NOTE — PROGRESS NOTES
Assessment/Plan:    Presents with growth of abdominal aortic aneurysm which is now 5 1 centimeters  He has no abdominal or back pain he has a globus abdomen therefore difficulty in palpation  He has no family history of AAA although he has several children which I spoke about the necessity for those over 48years old should be screened for AAA  Plan:  Follow-up AAA ultrasound in 6 months     Diagnoses and all orders for this visit:    Abdominal aortic aneurysm (AAA) without rupture, unspecified part  -     VAS abdominal aorta/iliacs; complete study; Future        Subjective:      Patient ID: Dolores Grace is a 65 y o  male  Pt presents to rev CTA of abd/ pelvis done on 10/28 for known AAA  Pt denies abd/ back pain  HPI    The following portions of the patient's history were reviewed and updated as appropriate: allergies, current medications, past family history, past medical history, past social history, past surgical history and problem list     Review of Systems   Constitutional: Negative  HENT: Negative  Eyes: Negative  Respiratory: Negative  Cardiovascular: Negative  Gastrointestinal: Negative  Endocrine: Negative  Genitourinary: Negative  Musculoskeletal: Negative  Skin: Negative  Allergic/Immunologic: Negative  Neurological: Negative  Hematological: Negative  Psychiatric/Behavioral: Negative  Objective: There were no vitals taken for this visit  Physical Exam      Oriented x3 no evidence of clinical depression  Eyes:  Sclera non-icteric    Skin: normal without evidence of inflammation    Neck is supple carotid pulses equal bilaterally no bruits heard    Chest lungs clear, heart regular rhythm      Abdomen soft nontender no evidence of pulsatile masses, patient has a large globus abdomen    Pulses are palpable bilateral Femoral  Popliteal, DP     Neurological exam intact cranial nerves 2-12 grossly intact no gross motor sensory deficits detected  Imaging viewed and reviewed with Patient    I have reviewed and made appropriate changes to the review of systems input by the medical assistant      Vitals:    11/03/22 1504   BP: 136/60   BP Location: Left arm   Patient Position: Sitting   Cuff Size: Standard   Pulse: 60   Weight: (!) 138 kg (304 lb)   Height: 6' 4" (1 93 m)       Patient Active Problem List   Diagnosis   • Hypertension   • Hyperlipemia   • Type 2 diabetes mellitus with hyperglycemia, with long-term current use of insulin (HCC)   • Chronic low back pain   • CAD (coronary artery disease)   • Malignant neoplasm of urinary bladder (HCC)   • Arthritis   • Paroxysmal atrial fibrillation (HCC)   • AAA (abdominal aortic aneurysm)   • Decreased pedal pulses   • Chronic pain of both knees   • Actinic keratosis of right temple   • Actinic keratosis of scalp   • 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   • Immunization deficiency   • Left lower quadrant abdominal pain   • Excessive gas   • Morbid obesity (HCC)   • Colitis   • Adrenal nodule (HCC)   • LUCIA (obstructive sleep apnea)   • Embolism and thrombosis of arteries of the lower extremities (HCC)   • Bilateral edema of lower extremity   • Hypoxia   • Chronic diastolic CHF (congestive heart failure) (HCC)   • Left upper lobe pulmonary nodule   • Scaly skin on examination   • Fall   • Hypercalcemia   • Nephrolithiasis   • Blister of left leg   • Hydroureter on left   • Myocardial infarction ([address] )   • Venous stasis dermatitis of both lower extremities   • Blister of right leg   • Nipple pain   • Folliculitis   • Persistent proteinuria   • Hypokalemia   • Ureteral calculi   • Benign prostatic hyperplasia without lower urinary tract symptoms   • Atrial fibrillation with rapid ventricular response (HCC)   • Cellulitis and abscess of left lower extremity   • PAD (peripheral artery disease) (HCC)   • Gastroesophageal reflux disease   • Vitamin D deficiency   • Cirrhosis of liver without ascites ([address] )   • Hallucination   • Jerking movements of extremities   • Chronic acquired lymphedema       Past Surgical History:   Procedure Laterality Date   • BACK SURGERY     • BLADDER SURGERY     • CATARACT EXTRACTION, BILATERAL     • COLONOSCOPY  2019    next  [place]   • CORONARY ARTERY BYPASS GRAFT  2016    Quadruple per [place]   • EGD  2017   • EYE SURGERY  2016    Cataracts   • JOINT REPLACEMENT  [id number]    Hip and shoulder   • KIDNEY STONE SURGERY     • NV CYSTO/URETERO W/LITHOTRIPSY &INDWELL STENT INSRT Left 2021    Procedure: CYSTOSCOPY URETEROSCOPY WITH LITHOTRIPSY HOLMIUM LASER, AND INSERTION STENT URETERAL/EXCHANGE;  Surgeon: Harsh Cummings MD;  Location: [facility];  Service: Urology   • NV CYSTOURETHROSCOPY,URETER CATHETER Left 2021    Procedure: CYSTOSCOPY  WITH INSERTION STENT URETERAL;  Surgeon: Harsh Cummings MD;  Location: [facility];  Service: Urology   • TOTAL HIP ARTHROPLASTY Right 2012   • TOTAL SHOULDER REPLACEMENT Right 2013   • VASECTOMY  1971       Family History   Problem Relation Age of Onset   • Heart disease Father    • Arthritis Father    • Heart attack Father    • Alzheimer's disease Mother    • Dementia Mother        Social History     Socioeconomic History   • Marital status: /Civil Union     Spouse name: [employer] [facility] [employer] [facility]   • Number of children: 4   • Years of education: Not on file   • Highest education level: 12th grade   Occupational History   • Not on file   Tobacco Use   • Smoking status: Former Smoker     Packs/day: 0 25     Years: 20 00     Pack years: 5 00     Types: Cigarettes     Start date: 64     Quit date: 2010     Years since quittin 8   • Smokeless tobacco: Never Used   • Tobacco comment: Quit several times for up to 6 years     Vaping Use   • Vaping Use: Never used   Substance and Sexual Activity   • Alcohol use: Not Currently     Alcohol/week: 0 0 standard drinks     Comment: No use   • Drug use: Never     Comment: No use   • Sexual activity: Not Currently     Partners: Female   Other Topics Concern   • Not on file   Social History Narrative    · Most recent tobacco use screenin2020      · Do you currently or have you served in the [employer]:   No      · Were you activated, into active duty, as a member of the [employer] or as a Reservist:   No      · Live alone or with others:   with others        · Caffeine intake:   Occasional      · Illicit drugs:   No      · Diet:   Regular      · Exercise level: Moderate      · Advance directive:    Yes      · Marital status:         · General stress level:   Medium      · Single or multi-level home/work:   multi level home      · Guns present in home:   No      · Seat belts used routinely:   Yes      · Sunscreen used routinely:   Yes      · Smoke alarm in home:   Yes      Social Determinants of Health     Financial Resource Strain: Not on file   Food Insecurity: Not on file   Transportation Needs: Not on file   Physical Activity: Not on file   Stress: Not on file   Social Connections: Not on file   Intimate Partner Violence: Not on file   Housing Stability: Not on file       Allergies   Allergen Reactions   • Diltiazem Abdominal Pain         Current Outpatient Medications:   •  atorvastatin (LIPITOR) 40 mg tablet, Take 1 tablet (40 mg total) by mouth daily, Disp: 90 tablet, Rfl: 0  •  clindamycin (CLINDAGEL) 1 % gel, Apply topically 2 (two) times a day, Disp: 30 g, Rfl: 1  •  Continuous Blood Gluc Sensor (FreeStyle Peggy 2 Sensor) MISC, Change it every 14 days, Disp: 1 each, Rfl: 0  •  dicyclomine (BENTYL) 10 mg capsule, Take 1 capsule (10 mg total) by mouth 2 (two) times a day as needed (abdominal cramping), Disp: 60 capsule, Rfl: 3  •  digoxin (LANOXIN) 0 125 mg tablet, Take 1 tablet (125 mcg total) by mouth daily, Disp: 90 tablet, Rfl: 3  •  Eliquis 5 MG, TAKE ONE TABLET BY MOUTH TWICE DAILY, Disp: 60 tablet, Rfl: 5  •  famotidine (PEPCID) 40 MG tablet, Take 1 tablet (40 mg total) by mouth daily at bedtime, Disp: 30 tablet, Rfl: 1  •  fluorouracil (EFUDEX) 5 % cream, Apply topically 2 (two) times a day, Disp: 40 g, Rfl: 0  •  furosemide (LASIX) 80 mg tablet, Take 0 5 tablets (40 mg total) by mouth 2 (two) times a day, Disp: 90 tablet, Rfl: 1  •  insulin aspart (NovoLOG FlexPen) 100 UNIT/ML injection pen, Inject 20-25 units of insulin with meals three times a day , Disp: 15 mL, Rfl: 2  •  insulin degludec Hedda Scarlet FlexTouch) 200 units/mL CONCENTRATED U-200 injection pen, inject 95 units under the skin once daily, Disp: 12 mL, Rfl: 0  •  Lancets (accu-chek multiclix) lancets, Use 1 each 2 (two) times a day Use 2 times daily to test blood glucose, Disp: 102 each, Rfl: 3  •  lisinopril (ZESTRIL) 20 mg tablet, Take 1 tablet (20 mg total) by mouth daily, Disp: 30 tablet, Rfl: 0  •  metoprolol succinate (TOPROL-XL) 100 mg 24 hr tablet, Take 1 tablet (100 mg total) by mouth every 12 (twelve) hours, Disp: 60 tablet, Rfl: 0  •  omeprazole (PriLOSEC) 20 mg delayed release capsule, Take 1 capsule (20 mg total) by mouth 2 (two) times a day, Disp: 180 capsule, Rfl: 1  •  potassium citrate (UROCIT-K) 10 mEq, Take 1 tablet (10 mEq total) by mouth 2 (two) times a day, Disp: 180 tablet, Rfl: 3  •  silver sulfadiazine (SILVADENE,SSD) 1 % cream, Apply topically daily, Disp: 400 g, Rfl: 1  •  spironolactone (ALDACTONE) 25 mg tablet, Take 1 tablet (25 mg total) by mouth daily, Disp: 30 tablet, Rfl: 0  •  Unifine Pentips 31G X 8 MM MISC, inject under the skin daily use as directed, Disp: 100 each, Rfl: 0

## 2022-11-03 NOTE — LETTER
November 3, 2022     Bryson Zhang MD  [address]  [address] [facility]  [address] [place] [address]    Patient: Acosta Cox   YOB: 1945   Date of Visit: 11/3/2022       Dear Dr Ashley Khan:    Thank you for referring Margarito Melendez to me for evaluation  Below are my notes for this consultation  If you have questions, please do not hesitate to call me  I look forward to following your patient along with you           Sincerely,        Jordon Granados MD        CC: No Recipients

## 2022-11-03 NOTE — PATIENT INSTRUCTIONS
Presents with growth of abdominal aortic aneurysm which is now 5 1 centimeters  He has no abdominal or back pain he has a globus abdomen therefore difficulty in palpation  He has no family history of AAA although he has several children which I spoke about the necessity for those over 48years old should be screened for AAA      Plan:  Follow-up AAA ultrasound in 6 months

## 2022-11-09 ENCOUNTER — OFFICE VISIT (OUTPATIENT)
Dept: ENDOCRINOLOGY | Facility: CLINIC | Age: 77
End: 2022-11-09

## 2022-11-09 ENCOUNTER — TELEPHONE (OUTPATIENT)
Dept: ENDOCRINOLOGY | Facility: CLINIC | Age: 77
End: 2022-11-09

## 2022-11-09 VITALS
HEART RATE: 59 BPM | DIASTOLIC BLOOD PRESSURE: 64 MMHG | BODY MASS INDEX: 37.02 KG/M2 | WEIGHT: 304 LBS | HEIGHT: 76 IN | SYSTOLIC BLOOD PRESSURE: 120 MMHG

## 2022-11-09 DIAGNOSIS — I10 PRIMARY HYPERTENSION: ICD-10-CM

## 2022-11-09 DIAGNOSIS — E11.65 TYPE 2 DIABETES MELLITUS WITH HYPERGLYCEMIA, WITH LONG-TERM CURRENT USE OF INSULIN (HCC): Primary | ICD-10-CM

## 2022-11-09 DIAGNOSIS — E83.52 HYPERCALCEMIA: ICD-10-CM

## 2022-11-09 DIAGNOSIS — E55.9 VITAMIN D DEFICIENCY: ICD-10-CM

## 2022-11-09 DIAGNOSIS — E27.8 ADRENAL NODULE (HCC): ICD-10-CM

## 2022-11-09 DIAGNOSIS — E78.00 PURE HYPERCHOLESTEROLEMIA: ICD-10-CM

## 2022-11-09 DIAGNOSIS — Z79.4 TYPE 2 DIABETES MELLITUS WITH HYPERGLYCEMIA, WITH LONG-TERM CURRENT USE OF INSULIN (HCC): Primary | ICD-10-CM

## 2022-11-09 RX ORDER — INSULIN DEGLUDEC 200 U/ML
INJECTION, SOLUTION SUBCUTANEOUS
Qty: 12 ML | Refills: 1 | Status: SHIPPED | OUTPATIENT
Start: 2022-11-09

## 2022-11-09 NOTE — PROGRESS NOTES
Established Patient Progress Note      Chief Complaint   Patient presents with   • Diabetes Type 2   • Adrenal Problem   • Hyperparathyroidism        History of Present Illness:   Juanita Vela is a 65 y o  male with hypertension, hyperlipidemia, sleep apnea, adrenal adenoma, hyperparathyroidism and type 2 diabetes with long term use of insulin for about 28 years  Last seen in the office 8/10/22  Reports complications of neuropathy and microalbuminuria  Last A1C was 7 8  Denies recent illness or hospitalizations  Denies recent severe hypoglycemic or severe hyperglycemic episodes  Denies any issues with his current regimen  Home glucose monitoring: are performed regularly with CGM  Was having GI issues with Metformin  Previously treated with Pioglitazone which was discontinued due to edema   Did not tolerate GLP-1 agonist due to nausea  SGLT2 inhibitors were avoided due to history of urosepsis/nephrolithiasis      He also has a 4 1cm adrenal adenoma which has been evaluated by Dr Bianca Gould  Reviewed Dr Dev Hsieh workup from feb/march 2021  Aldosterone and Plama metanephrines were normal  2/3/2021 dexamethasone suppression testing did not suppress x 2 so additional testing was performed, DHEAS and ACTH were normal  1 salivary cortisol mildly elevated, 1 was normal  24-hour urine cortisol was normal  Repeat CT done 10/28/22 showed stable right adrenal nodule       For hypercalcemia, most recent corrected calcium 10 8 with PTH of 129, which is worsened  24 hour urine was completed by nephrology and showed normal calcium level, which does not support diagnosis of FHH  S[place]i scan was completed and negative for parathyroid adenoma  Has a history of kidney stones and he takes potassium citrate  He takes 1000 units vitamin-D daily dietary calcium intake   DEXA done 3/2021 showed normal bone density  Denies recent falls or fractures      Juanita Vela   Device used: Peggy 2   Home use   Indication: Type 2 Diabetes  More than 72 hours of data was reviewed  Report to be scanned to chart  Date Range: Date is incorrect on Peggy   Analysis of data:   Average Glucose: 172  Coefficient of Variation: 21%   Time in Target Range: 68%   Time Above Range: 29% high, 3% very high    Time Below Range: 0%   Interpretation of data: BGs overall well controlled, no hypoglycemia        Current regimen:   Tresiba 100 units once daily  NovoLog 20-25 units with each meal depending on carb amount, does not take any if not eating carbs     Last Eye Exam: recently had exam, reports he has no retinopathy, will request records   Last Foot Exam: sees podiatry every 6 weeks      Has hypertension: Taking lisinopril and Toprol XL  Has hyperlipidemia: Taking atorvastatin and zetia    Vitamin D Deficiency: Taking 1,000 units daily     Patient Active Problem List   Diagnosis   • Hypertension   • Hyperlipemia   • Type 2 diabetes mellitus with hyperglycemia, with long-term current use of insulin ([facility])   • Chronic low back pain   • CAD (coronary artery disease)   • Malignant neoplasm of urinary bladder ([facility])   • Arthritis   • Paroxysmal atrial fibrillation ([facility])   • AAA (abdominal aortic aneurysm)   • Decreased pedal pulses   • Chronic pain of both knees   • Actinic keratosis of right temple   • Actinic keratosis of scalp   • 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   • Immunization deficiency   • Left lower quadrant abdominal pain   • Excessive gas   • Morbid obesity ([facility])   • Colitis   • Adrenal nodule ([facility])   • LUCIA (obstructive sleep apnea)   • Embolism and thrombosis of arteries of the lower extremities ([facility])   • Bilateral edema of lower extremity   • Hypoxia   • Chronic diastolic CHF (congestive heart failure) ([facility])   • Left upper lobe pulmonary nodule   • Scaly skin on examination   • Fall   • Hypercalcemia   • Nephrolithiasis   • Blister of left leg   • Hydroureter on left   • Myocardial infarction [facility])   • Venous stasis dermatitis of both lower extremities   • Blister of right leg   • Nipple pain   • Folliculitis   • Persistent proteinuria   • Hypokalemia   • Ureteral calculi   • Benign prostatic hyperplasia without lower urinary tract symptoms   • Atrial fibrillation with rapid ventricular response ([facility])   • Cellulitis and abscess of left lower extremity   • PAD (peripheral artery disease) ([facility])   • Gastroesophageal reflux disease   • Vitamin D deficiency   • Cirrhosis of liver without ascites (HCC)   • Hallucination   • Jerking movements of extremities   • Chronic acquired lymphedema      Past Medical History:   Diagnosis Date   • A-fib [facility])    • AAA (abdominal aortic aneurysm)    • Actinic keratosis of right temple 7/31/2020   • Actinic keratosis of scalp 7/31/2020   • Acute respiratory failure with hypoxia ([facility] [address] ) 3/25/2021   • Allergic 1960   • Arthritis    • Lay's esophagus    • Bladder cancer ([facility])    • Blister of left leg 4/28/2021   • Blister of right leg 5/26/2021   • CAD (coronary artery disease)    • Cancer ([facility] [address] ) 02/2010    Bladder   • CHF (congestive heart failure) ([facility])    • Chronic low back pain    • Chronic pain of both knees 7/31/2020   • Colitis 12/4/2020   • CPAP (continuous positive airway pressure) dependence    • Diabetes ([facility])    • Diabetes mellitus ([facility] [address] ) 10/1993   • Excessive gas 12/3/2020   • Heart murmur    • History of chemotherapy    • Hydroureter on left 4/28/2021   • Hyperlipemia    • Hypertension    • Immunization deficiency 10/30/2020    Hep a nonimmune   • Kidney stone    • Left lower quadrant abdominal pain 12/3/2020   • Mass of right adrenal gland ([facility] [address] ) 12/4/2020    4 1 cm   • Myocardial infarction [facility])    • 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   • Obesity 2000   • LUCIA (obstructive sleep apnea) 1/29/2021   • Pressure ulcer of right leg, stage 1 5/26/2021   • Urinary tract infection with hematuria 5/3/2021    u cx 4/2021=pseudomonas R to bactrim and cefdinir, S to cipro   • Venous stasis dermatitis of both lower extremities 5/26/2021 Past Surgical History:   Procedure Laterality Date   • BACK SURGERY     • BLADDER SURGERY     • CATARACT EXTRACTION, BILATERAL     • COLONOSCOPY  2019    next  [place]   • CORONARY ARTERY BYPASS GRAFT  2016    Quadruple per [place]   • EGD  2017   • EYE SURGERY  2016    Cataracts   • JOINT REPLACEMENT  [id number]    Hip and shoulder   • KIDNEY STONE SURGERY     • VT CYSTO/URETERO W/LITHOTRIPSY &INDWELL STENT INSRT Left 2021    Procedure: CYSTOSCOPY URETEROSCOPY WITH LITHOTRIPSY HOLMIUM LASER, AND INSERTION STENT URETERAL/EXCHANGE;  Surgeon: Santhosh Keith MD;  Location: [facility];  Service: Urology   • VT CYSTOURETHROSCOPY,URETER CATHETER Left 2021    Procedure: CYSTOSCOPY  WITH INSERTION STENT URETERAL;  Surgeon: Santhosh Keith MD;  Location: [facility];  Service: Urology   • TOTAL HIP ARTHROPLASTY Right 2012   • TOTAL SHOULDER REPLACEMENT Right 2013   • VASECTOMY  1971      Family History   Problem Relation Age of Onset   • Heart disease Father    • Arthritis Father    • Heart attack Father    • Alzheimer's disease Mother    • Dementia Mother      Social History     Tobacco Use   • Smoking status: Former Smoker     Packs/day: 0 25     Years: 20 00     Pack years: 5 00     Types: Cigarettes     Start date:      Quit date: 2010     Years since quittin 8   • Smokeless tobacco: Never Used   • Tobacco comment: Quit several times for up to 6 years     Substance Use Topics   • Alcohol use: Not Currently     Alcohol/week: 0 0 standard drinks     Comment: No use     Allergies   Allergen Reactions   • Diltiazem Abdominal Pain         Current Outpatient Medications:   •  atorvastatin (LIPITOR) 40 mg tablet, Take 1 tablet (40 mg total) by mouth daily, Disp: 90 tablet, Rfl: 0  •  clindamycin (CLINDAGEL) 1 % gel, Apply topically 2 (two) times a day, Disp: 30 g, Rfl: 1  •  Continuous Blood Gluc Sensor (FreeStyle Peggy 2 Sensor) MISC, Change it every 14 days, Disp: 1 each, Rfl: 0  • dicyclomine (BENTYL) 10 mg capsule, Take 1 capsule (10 mg total) by mouth 2 (two) times a day as needed (abdominal cramping), Disp: 60 capsule, Rfl: 3  •  digoxin (LANOXIN) 0 125 mg tablet, Take 1 tablet (125 mcg total) by mouth daily, Disp: 90 tablet, Rfl: 3  •  Eliquis 5 MG, TAKE ONE TABLET BY MOUTH TWICE DAILY, Disp: 60 tablet, Rfl: 5  •  famotidine (PEPCID) 40 MG tablet, Take 1 tablet (40 mg total) by mouth daily at bedtime, Disp: 30 tablet, Rfl: 1  •  fluorouracil (EFUDEX) 5 % cream, Apply topically 2 (two) times a day, Disp: 40 g, Rfl: 0  •  furosemide (LASIX) 80 mg tablet, Take 0 5 tablets (40 mg total) by mouth 2 (two) times a day, Disp: 90 tablet, Rfl: 1  •  insulin aspart (NovoLOG FlexPen) 100 UNIT/ML injection pen, Inject 20-25 units of insulin with meals three times a day , Disp: 15 mL, Rfl: 2  •  insulin degludec [place] Surekha FlexTouch) 200 units/mL CONCENTRATED U-200 injection pen, inject 100 units under the skin once daily, Disp: 12 mL, Rfl: 1  •  Lancets (accu-chek multiclix) lancets, Use 1 each 2 (two) times a day Use 2 times daily to test blood glucose, Disp: 102 each, Rfl: 3  •  lisinopril (ZESTRIL) 20 mg tablet, Take 1 tablet (20 mg total) by mouth daily, Disp: 30 tablet, Rfl: 0  •  metoprolol succinate (TOPROL-XL) 100 mg 24 hr tablet, Take 1 tablet (100 mg total) by mouth every 12 (twelve) hours, Disp: 60 tablet, Rfl: 0  •  omeprazole (PriLOSEC) 20 mg delayed release capsule, Take 1 capsule (20 mg total) by mouth 2 (two) times a day, Disp: 180 capsule, Rfl: 1  •  potassium citrate (UROCIT-K) 10 mEq, Take 1 tablet (10 mEq total) by mouth 2 (two) times a day, Disp: 180 tablet, Rfl: 3  •  silver sulfadiazine (SILVADENE,SSD) 1 % cream, Apply topically daily, Disp: 400 g, Rfl: 1  •  spironolactone (ALDACTONE) 25 mg tablet, Take 1 tablet (25 mg total) by mouth daily, Disp: 30 tablet, Rfl: 0  •  Unifine Pentips 31G X 8 MM MISC, inject under the skin daily use as directed, Disp: 100 each, Rfl: 0    Review of Systems   Constitutional: Negative for activity change, appetite change, chills, diaphoresis, fatigue, fever and unexpected weight change  Eyes: Negative for visual disturbance  Respiratory: Negative for chest tightness and shortness of breath  Cardiovascular: Positive for leg swelling  Negative for chest pain and palpitations  Gastrointestinal: Negative for abdominal pain, constipation, diarrhea, nausea and vomiting  Endocrine: Negative for polydipsia, polyphagia and polyuria  Musculoskeletal: Positive for gait problem  Skin: Negative for color change, pallor, rash and wound  Neurological: Positive for numbness  Negative for dizziness, tremors, weakness and light-headedness  All other systems reviewed and are negative  Physical Exam:  Body mass index is 37 kg/m²  /64   Pulse 59   Ht 6' 4" (1 93 m)   Wt (!) 138 kg (304 lb)   BMI 37 00 kg/m²    Wt Readings from Last 3 Encounters:   11/09/22 (!) 138 kg (304 lb)   11/03/22 (!) 138 kg (304 lb)   10/26/22 (!) 139 kg (305 lb 12 8 oz)       Physical Exam  Vitals reviewed  Constitutional:       Appearance: Normal appearance  He is obese  HENT:      Head: Normocephalic  Cardiovascular:      Rate and Rhythm: Normal rate and regular rhythm  Pulses: Normal pulses  Heart sounds: Normal heart sounds  No murmur heard  Pulmonary:      Effort: Pulmonary effort is normal  No respiratory distress  Breath sounds: Normal breath sounds  No wheezing, rhonchi or rales  Musculoskeletal:      Right lower leg: Edema present  Left lower leg: Edema present  Skin:     General: Skin is warm and dry  Neurological:      Mental Status: He is alert and oriented to person, place, and time  Gait: Gait abnormal (in wheelchair)  Psychiatric:         Mood and Affect: Mood normal          Behavior: Behavior normal          Thought Content:  Thought content normal          Judgment: Judgment normal        Labs:   Lab Results Component Value Date    HGBA1C 7 8 (H) 11/02/2022    HGBA1C 8 3 (H) 08/04/2022    HGBA1C 8 6 (H) 04/27/2022     Lab Results   Component Value Date    CREATININE 1 06 11/02/2022    CREATININE 1 01 09/30/2022    CREATININE 1 11 08/04/2022    BUN 23 11/02/2022    K 4 6 11/02/2022     11/02/2022    CO2 39 (H) 11/02/2022     eGFR   Date Value Ref Range Status   11/02/2022 66 ml/min/1 73sq m Final     Lab Results   Component Value Date    HDL 34 (L) 11/02/2022    TRIG 153 (H) 11/02/2022     Lab Results   Component Value Date    ALT 14 11/02/2022    AST 12 11/02/2022    ALKPHOS 84 11/02/2022     Lab Results   Component Value Date    XAY1FNVNAXEI 0 984 12/27/2021    JXZ8QRCEUBCG 1 300 07/26/2021    BVC1FOMPDTXY 1 184 08/11/2020     Impression & Plan:    Problem List Items Addressed This Visit        Endocrine    Type 2 diabetes mellitus with hyperglycemia, with long-term current use of insulin (HCC) - Primary     HGA1C improving, at goal for age  BGs in range, no hypoglycemia  Treatment regimen: Continue current medication regimen  Discussed risks/complications associated with uncontrolled diabetes  Advised to adhere to diabetic diet, and recommended staying active/exercising routinely  Keep carbohydrates consistent to limit blood glucose fluctuations  Advised to call if blood sugars less than 70 mg/dl or over 300 mg/dl  Continue with CGM  Discussed symptoms and treatment of hypoglycemia  Recommended routine follow-up with podiatry and ophthalmology  Ordered blood work to complete prior to next visit    Lab Results   Component Value Date    HGBA1C 7 8 (H) 11/02/2022            Relevant Medications    insulin degludec [employer] FlexTouch) 200 units/mL CONCENTRATED U-200 injection pen    Other Relevant Orders    Comprehensive metabolic panel    Hemoglobin A1C    Ambulatory referral to Ophthalmology    Ambulatory Referral to Podiatry       Cardiovascular and Mediastinum    Hypertension     BP at goal  Continue current medication regimen  Other    Hyperlipemia     Lipid panel at goal  Continue statin  Adrenal nodule (HCC)     Nodule was stable on recent abdominal CT scan  Will continue to monitor  Hypercalcemia     This has worsened - will continue to monitor  Nephrology is also managing  Vitamin D deficiency     Continue supplementation  Relevant Orders    Vitamin D 25 hydroxy          Orders Placed This Encounter   Procedures   • Comprehensive metabolic panel     This is a patient instruction: Patient fasting for 8 hours or longer recommended  Standing Status:   Future     Standing Expiration Date:   11/9/2023   • Hemoglobin A1C     Standing Status:   Future     Standing Expiration Date:   11/9/2023   • Vitamin D 25 hydroxy     Standing Status:   Future     Standing Expiration Date:   11/9/2023   • Ambulatory referral to Ophthalmology     Standing Status:   Future     Standing Expiration Date:   11/9/2023     Referral Priority:   Routine     Referral Type:   Consult - AMB     Referral Reason:   Specialty Services Required     Referred to Provider:   [address]     Requested Specialty:   Ophthalmology     Number of Visits Requested:   1     Expiration Date:   11/9/2023   • Ambulatory Referral to Podiatry     Standing Status:   Future     Standing Expiration Date:   11/9/2023     Referral Priority:   Routine     Referral Type:   Consult - AMB     Referral Reason:   Specialty Services Required     Referred to Provider:   Shani Byrne DPM     Requested Specialty:   Podiatry     Number of Visits Requested:   1     Expiration Date:   11/9/2023       There are no Patient Instructions on file for this visit  Discussed with the patient and all questioned fully answered  He will call me if any problems arise      KACEY Montes

## 2022-11-09 NOTE — ASSESSMENT & PLAN NOTE
HGA1C improving, at goal for age  BGs in range, no hypoglycemia  Treatment regimen: Continue current medication regimen  Discussed risks/complications associated with uncontrolled diabetes  Advised to adhere to diabetic diet, and recommended staying active/exercising routinely  Keep carbohydrates consistent to limit blood glucose fluctuations  Advised to call if blood sugars less than 70 mg/dl or over 300 mg/dl  Continue with CGM  Discussed symptoms and treatment of hypoglycemia  Recommended routine follow-up with podiatry and ophthalmology  Ordered blood work to complete prior to next visit    Lab Results   Component Value Date    HGBA1C 7 8 (H) 11/02/2022

## 2022-11-10 ENCOUNTER — TELEPHONE (OUTPATIENT)
Dept: NEUROLOGY | Facility: CLINIC | Age: 77
End: 2022-11-10

## 2022-11-10 NOTE — TELEPHONE ENCOUNTER
THE [facility] to confirm patient's 11/15/2022 @ 4 pm appointment with Dr Elena Perry at the [facility]  Call back number given [phone]

## 2022-11-14 DIAGNOSIS — Z79.4 TYPE 2 DIABETES MELLITUS WITH HYPERGLYCEMIA, WITH LONG-TERM CURRENT USE OF INSULIN (HCC): ICD-10-CM

## 2022-11-14 DIAGNOSIS — E11.65 TYPE 2 DIABETES MELLITUS WITH HYPERGLYCEMIA, WITH LONG-TERM CURRENT USE OF INSULIN (HCC): ICD-10-CM

## 2022-11-15 ENCOUNTER — CONSULT (OUTPATIENT)
Dept: NEUROLOGY | Facility: CLINIC | Age: 77
End: 2022-11-15

## 2022-11-15 VITALS
DIASTOLIC BLOOD PRESSURE: 60 MMHG | BODY MASS INDEX: 36.53 KG/M2 | SYSTOLIC BLOOD PRESSURE: 130 MMHG | WEIGHT: 300 LBS | HEIGHT: 76 IN | HEART RATE: 76 BPM

## 2022-11-15 DIAGNOSIS — R25.2 JERKING MOVEMENTS OF EXTREMITIES: ICD-10-CM

## 2022-11-15 RX ORDER — INSULIN ASPART 100 [IU]/ML
INJECTION, SOLUTION INTRAVENOUS; SUBCUTANEOUS
Qty: 15 ML | Refills: 0 | Status: SHIPPED | OUTPATIENT
Start: 2022-11-15

## 2022-11-15 NOTE — PROGRESS NOTES
Patient ID: Yoon Villeda is a 65 y o  male  Assessment/Plan:    Jerking movements of extremities  Assessment:  64 yo man with several months of dropping papers from both hands and brief episodes of myoclonus that can be either the right or left upper extremity but not at the same time  He does note some pain in the snuffbox area of both hands and parasthesias frequently in both hands, particularly first 3 digits  Exam demonstrates some loss of bulk in the thenar eminence, no tinel sign  He does recall an EMG which reportedly showed CTS about 6-8 years ago  Occasionally there has been a full body jerk not associated with weakness and usually at time of sleep likely secondary to sleep myoclonus as discussed with the patient  Bilateral hand symptoms most likely secondary to peripheral nerve (CTS or AIN impingement) versus cervical impingement  Plan:   - EMG of bilateral upper extremities  - consider MRI C spine after EMG if EMG is not revealing       Diagnoses and all orders for this visit:    Jerking movements of extremities  -     Ambulatory Referral to Neurology  -     EMG 2 Limb Upper Extremity; Future         Subjective:    Yoon Villeda is a 65 y o  male presenting as a new patient for evaluation of jerking motions of his extremities referred by his PCP  Medical history is complex but in terms of this appointment significant for arthritis, DM2 with neuropathy, afib, PAD, HLD, HTN, and cirrhosis  To review, Dewayne Fernandez presents with a few months of "jerking movements" of the upper > lower extremities  These movements are typically a small monophasic ballistic movement by description as was not noted during the visit to be most consistent with myoclonus  These movements happen unilaterally but can occur on either the right or left  He notes these movements typically occur at any time of day, occur a few times a day and are associated with dropping objects from his hands   He also notes pain along the snuffbox region of his bilateral hands and paraesthesias of the bilateral first 3 digits  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 , farmer and  (significant hand labor)  Was previously evaluated with EMG a few years ago which reportedly showed bilateral CTS  No MRI previously due to claustrophobia and negative experience with general anesthetic requiring prolonged intubation  He also has noted full body jerk previously but does not cause him to fall  He reports the full body episodes usually happen as he is falling asleep  The following portions of the patient's history were reviewed and updated as appropriate: allergies, current medications, past family history, past medical history, past social history, past surgical history and problem list        Objective:    Blood pressure 130/60, pulse 76, height 6' 4" (1 93 m), weight 136 kg (300 lb)  Physical Exam  Vitals and nursing note reviewed  Constitutional:       General: He is not in acute distress  HENT:      Head: Normocephalic and atraumatic  Eyes:      General: Lids are normal       Extraocular Movements: Extraocular movements intact  Pupils: Pupils are equal, round, and reactive to light  Cardiovascular:      Rate and Rhythm: Normal rate  Pulmonary:      Effort: No respiratory distress  Neurological:      Mental Status: He is alert  Coordination: Coordination is intact  Deep Tendon Reflexes: Reflexes are normal and symmetric  Psychiatric:         Speech: Speech normal          Neurological Exam  Mental Status  Awake, alert and oriented to person, place and time  Alert  Recent and remote memory are intact  Speech is normal  Language is fluent with no aphasia  Attention and concentration are normal  Fund of knowledge is appropriate for level of education  Cranial Nerves  CN II: Visual acuity is normal  Visual fields full to confrontation  CN III, IV, VI: Extraocular movements intact bilaterally   Normal lids and orbits bilaterally  Pupils equal round and reactive to light bilaterally  CN V: Facial sensation is normal   CN VII: Full and symmetric facial movement  CN VIII: Hearing is normal   CN IX, X: Palate elevates symmetrically  Normal gag reflex  CN XI: Shoulder shrug strength is normal   CN XII: Tongue midline without atrophy or fasciculations  Motor  Normal muscle bulk throughout  No fasciculations present  Normal muscle tone  No abnormal involuntary movements  Strength is 5/5 in all four extremities except as noted  Mild loss of muscle bulk in bilateral thenar eminence, no tinel sign  4/5 hand  bilateral, mild decrease in strength with paper  between fingers L > R  Sensory  Sensation is intact to light touch, pinprick, vibration and proprioception in all four extremities  Reflexes  Deep tendon reflexes are 2+ and symmetric in all four extremities  Coordination    Finger-to-nose, rapid alternating movements and heel-to-shin normal bilaterally without dysmetria  Gait  Casual gait: Wide stance  Reduced stride length  Antalgic gait  Unable to rise from chair without using arms  Ambulates with walker  ROS:    Review of Systems   Constitutional: Negative for fatigue  Eyes: Negative for visual disturbance  Respiratory: Negative for shortness of breath  Cardiovascular: Negative for chest pain  Gastrointestinal: Negative for abdominal pain and nausea  Genitourinary: Negative for frequency  Musculoskeletal: Positive for gait problem  Neurological: Negative for dizziness, facial asymmetry, weakness, light-headedness, numbness and headaches  Psychiatric/Behavioral: Negative for confusion  All other systems reviewed and are negative  Review of systems as documented by the MA was reviewed in full by myself, David Sauceda MD      More than 50% of this time spent with the patient was devoted to counseling and coordination of care   Issues addressed during this clinic visit included overall management, medication counseling or monitoring (including adverse effects, side effects and risks of medications)

## 2022-11-15 NOTE — ASSESSMENT & PLAN NOTE
Assessment:  64 yo man with several months of dropping papers from both hands and brief episodes of myoclonus that can be either the right or left upper extremity but not at the same time  He does note some pain in the snuffbox area of both hands and parasthesias frequently in both hands, particularly first 3 digits  Exam demonstrates some loss of bulk in the thenar eminence, no tinel sign  He does recall an EMG which reportedly showed CTS about 6-8 years ago  Occasionally there has been a full body jerk not associated with weakness and usually at time of sleep likely secondary to sleep myoclonus as discussed with the patient  Bilateral hand symptoms most likely secondary to peripheral nerve (CTS or AIN impingement) versus cervical impingement      Plan:   - EMG of bilateral upper extremities  - consider MRI C spine after EMG if EMG is not revealing

## 2022-11-20 DIAGNOSIS — I25.10 CORONARY ARTERY DISEASE DUE TO LIPID RICH PLAQUE: ICD-10-CM

## 2022-11-20 DIAGNOSIS — I25.83 CORONARY ARTERY DISEASE DUE TO LIPID RICH PLAQUE: ICD-10-CM

## 2022-11-21 ENCOUNTER — OFFICE VISIT (OUTPATIENT)
Dept: PULMONOLOGY | Facility: CLINIC | Age: 77
End: 2022-11-21

## 2022-11-21 VITALS
HEART RATE: 82 BPM | HEIGHT: 76 IN | TEMPERATURE: 98.2 F | SYSTOLIC BLOOD PRESSURE: 124 MMHG | WEIGHT: 304 LBS | BODY MASS INDEX: 37.02 KG/M2 | DIASTOLIC BLOOD PRESSURE: 70 MMHG | OXYGEN SATURATION: 94 %

## 2022-11-21 DIAGNOSIS — I27.20 PULMONARY HYPERTENSION (HCC): ICD-10-CM

## 2022-11-21 DIAGNOSIS — I48.91 ATRIAL FIBRILLATION WITH RAPID VENTRICULAR RESPONSE (HCC): ICD-10-CM

## 2022-11-21 DIAGNOSIS — G47.33 OSA (OBSTRUCTIVE SLEEP APNEA): Primary | ICD-10-CM

## 2022-11-21 DIAGNOSIS — I25.110 CORONARY ARTERY DISEASE INVOLVING NATIVE HEART WITH UNSTABLE ANGINA PECTORIS, UNSPECIFIED VESSEL OR LESION TYPE (HCC): ICD-10-CM

## 2022-11-21 RX ORDER — METOPROLOL SUCCINATE 100 MG/1
TABLET, EXTENDED RELEASE ORAL
Qty: 60 TABLET | Refills: 0 | Status: SHIPPED | OUTPATIENT
Start: 2022-11-21

## 2022-11-21 NOTE — PROGRESS NOTES
Assessment/Plan:    LUCIA (obstructive sleep apnea)  Unclear whether things are going well with his sleep apnea or not  His compliance is fairly good with use of this device more than 5 hours per night  However he has a lot of trouble with mask leaking and states that his machine shuts off at inappropriate times during the night  Will get help from the DME provider  I am concerned from his laboratory studies that he also is chronically hypercarbic and venous blood gas requested  Also would like to correlate this with a chest x-ray  I am concerned about inaccurate information from the oximeter with low levels in his hands but normal levels on his ear lobe  Pulmonary hypertension ([address] )  Report of this on his last echo  Seems to correlate with his dependent edema  Hard to know from the available information as to whether most of this is type 2 (left atrial hypertension) or type 3 related to sleep apnea  Diagnoses and all orders for this visit:    LUCIA (obstructive sleep apnea)  -     Mask fitting only; Future  -     Blood gas, venous; Future    Coronary artery disease involving native heart with unstable angina pectoris, unspecified vessel or lesion type (HCC)  -     XR chest pa & lateral; Future    Pulmonary hypertension (HCC)          Subjective:      Patient ID: Leigh Long is a 65 y o  male  This patient returns for evaluation of obstructive sleep apnea  Was last here 18 months ago  He senses at times that his sleep is normalized by the CPAP device  However he feels that he has a lot of leaking from his mask and also that the machine shuts off at inappropriate times  I gather that the current machine is only about a year old  Measured compliance actually is quite good with use of the device more than 5 hours per night on the average and appropriate the airway pressures  No however that he HA is high    The machine seems to think that most of this is [employer] respiration, obviously related to his underlying heart disease  He still has a fair amount of daytime sleepiness  I am concerned that on his recent laboratory tests is CO2 is quite elevated indicating possible chronic hypercarbia but he is also on quite significant diuretic therapy  The following portions of the patient's history were reviewed and updated as appropriate: allergies, current medications, past family history, past medical history, past social history, past surgical history and problem list     Review of Systems   Constitutional: Negative for appetite change and unexpected weight change  Respiratory: Positive for apnea and shortness of breath  Negative for cough and wheezing  Cardiovascular: Positive for leg swelling  Negative for chest pain and palpitations  Genitourinary: Positive for enuresis  Neurological: Negative for headaches  Objective:      /70   Pulse 82   Temp 98 2 °F (36 8 °C)   Ht 6' 4" (1 93 m)   Wt (!) 138 kg (304 lb)   SpO2 (!) 79% Comment: Fingers are cold, poor circulation  BMI 37 00 kg/m²          Physical Exam  Vitals reviewed  Constitutional:       General: He is not in acute distress  Appearance: He is obese  He is not ill-appearing  Cardiovascular:      Rate and Rhythm: Normal rate and regular rhythm  Pulses:           Radial pulses are 3+ on the right side and 3+ on the left side  Heart sounds: Normal heart sounds  Pulmonary:      Effort: Pulmonary effort is normal       Breath sounds: Normal breath sounds  No wheezing, rhonchi or rales  Musculoskeletal:         General: No swelling  Cervical back: No rigidity or tenderness  Right lower le+ Edema present  Left lower leg: 3+ Edema present  Lymphadenopathy:      Cervical: No cervical adenopathy  Skin:     General: Skin is warm and dry  Neurological:      Mental Status: He is alert and oriented to person, place, and time     Psychiatric:         Mood and Affect: Mood normal          Behavior: Behavior normal

## 2022-11-21 NOTE — ASSESSMENT & PLAN NOTE
Unclear whether things are going well with his sleep apnea or not  His compliance is fairly good with use of this device more than 5 hours per night  However he has a lot of trouble with mask leaking and states that his machine shuts off at inappropriate times during the night  Will get help from the DME provider  I am concerned from his laboratory studies that he also is chronically hypercarbic and venous blood gas requested  Also would like to correlate this with a chest x-ray  I am concerned about inaccurate information from the oximeter with low levels in his hands but normal levels on his ear lobe

## 2022-11-21 NOTE — ASSESSMENT & PLAN NOTE
Report of this on his last echo  Seems to correlate with his dependent edema  Hard to know from the available information as to whether most of this is type 2 (left atrial hypertension) or type 3 related to sleep apnea

## 2022-11-22 ENCOUNTER — TELEPHONE (OUTPATIENT)
Dept: ENDOCRINOLOGY | Facility: CLINIC | Age: 77
End: 2022-11-22

## 2022-11-22 RX ORDER — ATORVASTATIN CALCIUM 40 MG/1
40 TABLET, FILM COATED ORAL DAILY
Qty: 90 TABLET | Refills: 0 | Status: SHIPPED | OUTPATIENT
Start: 2022-11-22

## 2022-12-01 ENCOUNTER — APPOINTMENT (OUTPATIENT)
Dept: LAB | Facility: CLINIC | Age: 77
End: 2022-12-01

## 2022-12-01 ENCOUNTER — HOSPITAL ENCOUNTER (OUTPATIENT)
Dept: RADIOLOGY | Facility: HOSPITAL | Age: 77
Discharge: HOME | End: 2022-12-01
Attending: INTERNAL MEDICINE

## 2022-12-01 DIAGNOSIS — G47.33 OSA (OBSTRUCTIVE SLEEP APNEA): ICD-10-CM

## 2022-12-01 DIAGNOSIS — I25.110 CORONARY ARTERY DISEASE INVOLVING NATIVE HEART WITH UNSTABLE ANGINA PECTORIS, UNSPECIFIED VESSEL OR LESION TYPE (HCC): ICD-10-CM

## 2022-12-01 LAB
BASE EX.OXY STD BLDV CALC-SCNC: 53.3 % (ref 60–80)
BASE EXCESS BLDV CALC-SCNC: 11.5 MMOL/L
HCO3 BLDV-SCNC: 40.2 MMOL/L (ref 24–30)
O2 CT BLDV-SCNC: 8.7 ML/DL
PCO2 BLDV: 77.7 MM HG (ref 42–50)
PH BLDV: 7.33 [PH] (ref 7.3–7.4)
PO2 BLDV: 32.1 MM HG (ref 35–45)

## 2022-12-04 DIAGNOSIS — E11.65 TYPE 2 DIABETES MELLITUS WITH HYPERGLYCEMIA, WITH LONG-TERM CURRENT USE OF INSULIN (HCC): ICD-10-CM

## 2022-12-04 DIAGNOSIS — Z79.4 TYPE 2 DIABETES MELLITUS WITH HYPERGLYCEMIA, WITH LONG-TERM CURRENT USE OF INSULIN (HCC): ICD-10-CM

## 2022-12-05 RX ORDER — INSULIN ASPART 100 [IU]/ML
INJECTION, SOLUTION INTRAVENOUS; SUBCUTANEOUS
Qty: 15 ML | Refills: 0 | Status: SHIPPED | OUTPATIENT
Start: 2022-12-05

## 2022-12-06 ENCOUNTER — TELEPHONE (OUTPATIENT)
Dept: PULMONOLOGY | Facility: CLINIC | Age: 77
End: 2022-12-06

## 2022-12-08 ENCOUNTER — EVALUATION (OUTPATIENT)
Dept: OCCUPATIONAL THERAPY | Age: 77
End: 2022-12-08

## 2022-12-08 DIAGNOSIS — R53.1 WEAKNESS: ICD-10-CM

## 2022-12-08 DIAGNOSIS — R41.3 MEMORY LOSS: ICD-10-CM

## 2022-12-08 DIAGNOSIS — I89.0 LYMPHEDEMA: Primary | ICD-10-CM

## 2022-12-08 NOTE — PROGRESS NOTES
Daily Note     Today's date: 2022  Patient name: Carl Rico  : 1945  MRN: [id number]  Referring provider: Tomás Solorio MD  Dx:   Encounter Diagnosis     ICD-10-CM    1  Lymphedema  I89 0       2  Memory loss  R41 3       3  Weakness  R53 1           Start Time: 1400  Stop Time: 1515  Total time in clinic (min): 75 minutes    Subjective: "      Objective: See treatment diary below      Assessment: Tolerated treatment well  Educated and demonstrated to pt and spouse self MLD for carryover of Lymph drainage in home environment  Pt and spouse demo-G understanding as evidenced by participation in session  Patient would benefit from continued OT      Plan: Continue per plan of care

## 2022-12-08 NOTE — PROGRESS NOTES
Daily Note     Today's date: 2022  Patient name: Agnieszka Hdz  : 1945  MRN: [id number]  Referring provider: Tracey Pena MD  Dx:   Encounter Diagnosis     ICD-10-CM    1  Lymphedema  I89 0       2  Memory loss  R41 3       3  Weakness  R53 1           Start Time: 1400  Stop Time: 1515  Total time in clinic (min): 75 minutes    Subjective: "My legs do feel better"  Objective: See treatment diary below    Assessment: Tolerated treatment well  Educated and w/demonstrated provided to pt and spouse on self MLD for carryover of Lymph drainage in home environment  Pt and spouse demo-G understanding as evidenced by participation in session    Patient would benefit from continued OT      Plan: Continue per plan of care                                        Manuals  45'                                                                                             Neuro Re-Ed                                                                                                                                                                                               Ther Ex                                                                                                                                                                                                                       Ther Activity                                                                       Gait Training                                                                       Modalities

## 2022-12-08 NOTE — PROGRESS NOTES
OT Evaluation     Today's date: 2022  Patient name: Cole Rodas  : 1945  MRN: [id number]  Referring provider: Koby Cole MD  Dx:   Encounter Diagnosis     ICD-10-CM    1  Lymphedema  I89 0                      Assessment  Assessment details: Pt is a 66 y/o male who presents to skilled OT tx with Secondary Lymphedema, with chronic venous insuffiencey and ulcers  Pt also demonstrates Skin changes  Of dermal metaplasia and 2+ pitting edema in BLE  Pt noted with 2cm x 1cm water blister on L and R anterior calf  Pt would benefit from skilled OT lymphedema tx for treatment and edema reduction of BLE by regular elevation, exercise, MLD and Class 2 compression stockings for inc QOL and inc functional mobility of BLE  Impairments: abnormal or restricted ROM, activity intolerance, impaired physical strength, lacks appropriate home exercise program and pain with function    Symptom irritability: moderateUnderstanding of Dx/Px/POC: good   Prognosis: good    Goals  STGs:  1  Pt will reduce edema to BLE by 2%  2  Pt will demo G skin integrity  3  Pt will be independent with HEP  LTGs:  1  Pt will tolerate best compression garment wear for 8+ hrs  2  Pt will report dec pain to BLE by 75%  3  Pt will demo dec edema in BLE by 6%  4  Pt will inc FOTO score  5  Pt will utilize compression pumps at least 1x per day         Plan  Patient would benefit from: skilled occupational therapy  Planned therapy interventions: manual therapy, massage, ADL training, compression, self care, therapeutic exercise, functional ROM exercises and home exercise program  Frequency: 2x week  Duration in weeks: 8  Treatment plan discussed with: patient and family        Subjective Evaluation    History of Present Illness  Mechanism of injury: surgery and trauma  Mechanism of injury: Pt is a 66 y/o male who reports having cardiac and venous bypass 5-6 years ago, and his legs have been swollen ever since; L<R  Pt notes with continuous water blisters on/off causing pain and weeping     PMH:Hypertension   • Hyperlipemia  • Type 2 diabetes mellitus with hyperglycemia, with long-term current use of insulin (HCC)  • Chronic low back pain  • CAD (coronary artery disease)  • Malignant neoplasm of urinary bladder (HCC)  • Arthritis  • Paroxysmal atrial fibrillation (HCC)  • AAA (abdominal aortic aneurysm)  • Decreased pedal pulses  • Chronic pain of both knees  • Actinic keratosis of right temple  • Actinic keratosis of scalp  • 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  • Immunization deficiency  • Left lower quadrant abdominal pain  • Excessive gas  • Morbid obesity (HCC)  • Colitis  • Adrenal nodule (HCC)  • LUCIA (obstructive sleep apnea)  • Embolism and thrombosis of arteries of the lower extremities (HCC)  • Bilateral edema of lower extremity  • Hypoxia  • Chronic diastolic CHF (congestive heart failure) (HCC)  • Left upper lobe pulmonary nodule  • Scaly skin on examination  • Fall  • Hypercalcemia  • Nephrolithiasis  • Blister of left leg  • Hydroureter on left  • Myocardial infarction (HCC)  • Venous stasis dermatitis of both lower extremities  • Blister of right leg  • Nipple pain  • Folliculitis  • Persistent proteinuria  • Hypokalemia  • Ureteral calculi  • Benign prostatic hyperplasia without lower urinary tract symptoms  • Atrial fibrillation with rapid ventricular response (HCC)  • Cellulitis and abscess of left lower extremity  • PAD (peripheral artery disease) (HCC)  • Gastroesophageal reflux disease  • Vitamin D deficiency  • Cirrhosis of liver without ascites (HCC)  • Hallucination  • Jerking movements of extremities  • Chronic acquired lymphedema              Recurrent probem    Quality of life: fair    Pain  Current pain rating: 3  At best pain rating: 3  At worst pain ratin  Quality: throbbing and tight  Relieving factors: rest and support  Progression: no change    Treatments  Current treatment: occupational therapy  Patient Goals  Patient goals for therapy: decreased edema, decreased pain, increased strength and independence with ADLs/IADLs          Objective     General Comments:       Ankle/Foot Comments   12/8/22                    RLE           LLE  MTPs         27              27 3  Dorsum      28 6          28 6  Lat Mall       31 2          31 2  4cm            28 5           30 7  8cm            32              31 5  12cm          35 5           36  16cm          40               40 5  20cm           43              45 4  24cm           45              51 5  28cm            44 5          54  32cm            42 9          49 7  36cm            39 5           41 5  40cm            39 2           42  44cm            43               46 5               Precautions: Universal      Manuals             MLD ABD             MLD BLE                          Pneumatic pump             Neuro Re-Ed             Skin care             Compression garment edu donning/doffing                                                                              Ther Ex             BLE HEP:  Hip flex/abd  Knee kicks  Ankle pump  Ankle circles  Toe crunch             nustep                                                                                           Ther Activity                                       Gait Training                                       Modalities

## 2022-12-12 ENCOUNTER — TELEPHONE (OUTPATIENT)
Dept: PULMONOLOGY | Facility: CLINIC | Age: 77
End: 2022-12-12

## 2022-12-12 DIAGNOSIS — I10 ESSENTIAL HYPERTENSION: ICD-10-CM

## 2022-12-12 DIAGNOSIS — I48.91 ATRIAL FIBRILLATION WITH RAPID VENTRICULAR RESPONSE (HCC): ICD-10-CM

## 2022-12-12 DIAGNOSIS — G47.33 OSA (OBSTRUCTIVE SLEEP APNEA): Primary | ICD-10-CM

## 2022-12-12 RX ORDER — METOPROLOL SUCCINATE 100 MG/1
100 TABLET, EXTENDED RELEASE ORAL EVERY 12 HOURS
Qty: 60 TABLET | Refills: 0 | Status: SHIPPED | OUTPATIENT
Start: 2022-12-12

## 2022-12-12 RX ORDER — LISINOPRIL 20 MG/1
20 TABLET ORAL DAILY
Qty: 30 TABLET | Refills: 0 | Status: SHIPPED | OUTPATIENT
Start: 2022-12-12

## 2022-12-12 NOTE — TELEPHONE ENCOUNTER
Pt called in asking about the results of his blood work and Xray that he had gotten last week  Please advise

## 2022-12-14 LAB

## 2022-12-15 ENCOUNTER — OFFICE VISIT (OUTPATIENT)
Dept: GASTROENTEROLOGY | Facility: CLINIC | Age: 77
End: 2022-12-15

## 2022-12-15 ENCOUNTER — APPOINTMENT (OUTPATIENT)
Dept: OCCUPATIONAL THERAPY | Age: 77
End: 2022-12-15

## 2022-12-15 VITALS — WEIGHT: 306 LBS | BODY MASS INDEX: 37.26 KG/M2 | HEIGHT: 76 IN

## 2022-12-15 DIAGNOSIS — I71.40 ABDOMINAL AORTIC ANEURYSM (AAA) WITHOUT RUPTURE, UNSPECIFIED PART: ICD-10-CM

## 2022-12-15 DIAGNOSIS — N20.0 BILATERAL NEPHROLITHIASIS: ICD-10-CM

## 2022-12-15 DIAGNOSIS — K74.60 LIVER CIRRHOSIS SECONDARY TO NASH (HCC): ICD-10-CM

## 2022-12-15 DIAGNOSIS — K57.32 DIVERTICULITIS OF LARGE INTESTINE WITHOUT PERFORATION OR ABSCESS WITHOUT BLEEDING: ICD-10-CM

## 2022-12-15 DIAGNOSIS — K75.81 LIVER CIRRHOSIS SECONDARY TO NASH (HCC): ICD-10-CM

## 2022-12-15 DIAGNOSIS — R10.32 LEFT LOWER QUADRANT ABDOMINAL PAIN: Primary | ICD-10-CM

## 2022-12-15 RX ORDER — METRONIDAZOLE 500 MG/1
500 TABLET ORAL EVERY 8 HOURS SCHEDULED
Qty: 21 TABLET | Refills: 0 | Status: SHIPPED | OUTPATIENT
Start: 2022-12-15 | End: 2022-12-22

## 2022-12-15 RX ORDER — CIPROFLOXACIN 500 MG/1
500 TABLET, FILM COATED ORAL EVERY 12 HOURS SCHEDULED
Qty: 14 TABLET | Refills: 0 | Status: SHIPPED | OUTPATIENT
Start: 2022-12-15 | End: 2022-12-22

## 2022-12-15 NOTE — PROGRESS NOTES
[facility] - Outpatient Follow-up Note  Parminder Rowe 65 y o  male MRN: [id number]  Encounter: [id number]          ASSESSMENT AND PLAN:      1  Left lower quadrant abdominal pain  69-year-old male now come for follow-up after EGD, colonoscopy and CAT scan, he was referred to us for left lower quadrant abdominal pain, colonoscopy shows evidence of diverticulosis, no evidence of colitis, CAT scan of abdomen and pelvis was done which shows large aortic aneurysm which is enlarging in size, bilateral nephrolithiasis with obstructive uropathy  Patient has a history of multiple kidney stones in the past had a cystoscopy with stone removal in the past, he is scheduled to see urologist, he still having on and off left lower quadrant abdominal pain, no constipation, no fever, no chills, no hematuria  In the past we treated with antibiotic which helped him  We will give course of antibiotic, patient is not candidate for surgical intervention  - ciprofloxacin (CIPRO) 500 mg tablet; Take 1 tablet (500 mg total) by mouth every 12 (twelve) hours for 7 days  Dispense: 14 tablet; Refill: 0  - metroNIDAZOLE (FLAGYL) 500 mg tablet; Take 1 tablet (500 mg total) by mouth every 8 (eight) hours for 7 days  Dispense: 21 tablet; Refill: 0    2  Bilateral nephrolithiasis  Advised to follow-up with urologist for obstructive uropathy, advised to drink plenty of water    3  Diverticulitis of large intestine without perforation or abscess without bleeding    - ciprofloxacin (CIPRO) 500 mg tablet; Take 1 tablet (500 mg total) by mouth every 12 (twelve) hours for 7 days  Dispense: 14 tablet; Refill: 0  - metroNIDAZOLE (FLAGYL) 500 mg tablet; Take 1 tablet (500 mg total) by mouth every 8 (eight) hours for 7 days  Dispense: 21 tablet; Refill: 0    4  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 (HCC)  Meld score stable, no jaundice, and denying any alcohol use, blood sugar is well controlled    We will continue with alpha-fetoprotein and ultrasound of liver every 6 to 12-month for hepatocellular carcinoma surveillance    5  Abdominal aortic aneurysm (AAA) without rupture, unspecified part  And is following with Dr Sara Prieto, he was advised to have repeat scan in 6-month and then discuss about the treatment plan    ______________________________________________________________________    SUBJECTIVE: Patient seen and examined, he comes for follow-up, he is still having on and off left lower quadrant abdominal pain, he was referred to us for lower abdominal pain, had a EGD, colonoscopy which both come back okay and subsequently CAT scan of abdomen and pelvis was done which shows large abdominal aortic aneurysm which is enlarging along with bilateral nephrolithiasis and obstructive uropathy  Patient had episode of diverticulitis in the past which were treated with antibiotic which helped him, this time there is no evidence of colitis or diverticulitis on colonoscopy  He denying any fever, no chills, no chronic diarrhea, no melena, no rectal bleeding, he is taking Bentyl as needed, he is also taking PPI      REVIEW OF SYSTEMS IS OTHERWISE NEGATIVE        Historical Information   Past Medical History:   Diagnosis Date   • A-fib [facility])    • AAA (abdominal aortic aneurysm)    • Actinic keratosis of right temple 7/31/2020   • Actinic keratosis of scalp 7/31/2020   • Acute respiratory failure with hypoxia ([facility] ) 3/25/2021   • Allergic 1960   • Arthritis    • Lay's esophagus    • Bladder cancer (HCC)    • Blister of left leg 4/28/2021   • Blister of right leg 5/26/2021   • CAD (coronary artery disease)    • Cancer ([facility] ) 02/2010    Bladder   • CHF (congestive heart failure) (HCC)    • Chronic low back pain    • Chronic pain of both knees 7/31/2020   • Colitis 12/4/2020   • CPAP (continuous positive airway pressure) dependence    • Diabetes ([facility] )    • Diabetes mellitus ([facility] ) 10/1993   • Excessive gas 12/3/2020   • Heart murmur    • History of chemotherapy    • Hydroureter on left 4/28/2021   • Hyperlipemia    • Hypertension    • Immunization deficiency 10/30/2020    Hep a nonimmune   • Kidney stone    • Left lower quadrant abdominal pain 12/3/2020   • Mass of right adrenal gland ([address] ) 12/4/2020    4 1 cm   • Myocardial infarction [facility])    • 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   • Obesity 2000   • LUCIA (obstructive sleep apnea) 1/29/2021   • Pressure ulcer of right leg, stage 1 5/26/2021   • Urinary tract infection with hematuria 5/3/2021    u cx 4/2021=pseudomonas R to bactrim and cefdinir, S to cipro   • Venous stasis dermatitis of both lower extremities 5/26/2021     Past Surgical History:   Procedure Laterality Date   • BACK SURGERY     • BLADDER SURGERY     • CARDIAC CATHETERIZATION  04/2016   • CATARACT EXTRACTION, BILATERAL     • COLONOSCOPY  01/29/2019    next 2022 [place]   • CORONARY ARTERY BYPASS GRAFT  04/2016    Quadruple per Rita   • EGD  06/2017   • EYE SURGERY  11/2016    Cataracts   • JOINT REPLACEMENT  [id number]    Hip and shoulder   • KIDNEY STONE SURGERY     • AK CYSTO/URETERO W/LITHOTRIPSY &INDWELL STENT INSRT Left 05/21/2021    Procedure: CYSTOSCOPY URETEROSCOPY WITH LITHOTRIPSY HOLMIUM LASER, AND INSERTION STENT URETERAL/EXCHANGE;  Surgeon: Jaswant Lance MD;  Location: [facility];  Service: Urology   • AK CYSTOURETHROSCOPY,URETER CATHETER Left 04/24/2021    Procedure: CYSTOSCOPY  WITH INSERTION STENT URETERAL;  Surgeon: Jaswant Lance MD;  Location: [facility];  Service: Urology   • TOTAL HIP ARTHROPLASTY Right 2012   • TOTAL SHOULDER REPLACEMENT Right 2013   • VASECTOMY  1971     Social History   Social History     Substance and Sexual Activity   Alcohol Use Not Currently    Comment: No use     Social History     Substance and Sexual Activity   Drug Use Never    Comment: No use     Social History     Tobacco Use   Smoking Status Former   • Packs/day: 0 25   • Years: 20 00   • Pack years: 5 00   • Types: Cigarettes   • Start date: 1965   • Quit date: 2010   • Years since quittin 9   Smokeless Tobacco Never   Tobacco Comments    Quit several times for up to 6 years  Family History   Problem Relation Age of Onset   • Heart disease Father    • Arthritis Father    • Heart attack Father    • Alzheimer's disease Mother    • Dementia Mother        Meds/Allergies       Current Outpatient Medications:   •  atorvastatin (LIPITOR) 40 mg tablet  •  ciprofloxacin (CIPRO) 500 mg tablet  •  clindamycin (CLINDAGEL) 1 % gel  •  Continuous Blood Gluc Sensor (FreeStyle Peggy 2 Sensor) MISC  •  dicyclomine (BENTYL) 10 mg capsule  •  digoxin (LANOXIN) 0 125 mg tablet  •  Eliquis 5 MG  •  famotidine (PEPCID) 40 MG tablet  •  fluorouracil (EFUDEX) 5 % cream  •  furosemide (LASIX) 80 mg tablet  •  insulin aspart (NovoLOG FlexPen) 100 UNIT/ML injection pen  •  insulin degludec Belinda Frieze FlexTouch) 200 units/mL CONCENTRATED U-200 injection pen  •  Lancets (accu-chek multiclix) lancets  •  lisinopril (ZESTRIL) 20 mg tablet  •  metoprolol succinate (TOPROL-XL) 100 mg 24 hr tablet  •  metroNIDAZOLE (FLAGYL) 500 mg tablet  •  omeprazole (PriLOSEC) 20 mg delayed release capsule  •  potassium citrate (UROCIT-K) 10 mEq  •  silver sulfadiazine (SILVADENE,SSD) 1 % cream  •  spironolactone (ALDACTONE) 25 mg tablet  •  Unifine Pentips 31G X 8 MM MISC    Allergies   Allergen Reactions   • Diltiazem Abdominal Pain           Objective     Height 6' 4" (1 93 m), weight (!) 139 kg (306 lb)  Body mass index is 37 25 kg/m²  PHYSICAL EXAM:      General Appearance:   Alert, cooperative, no distress   HEENT:   Normocephalic, atraumatic, anicteric      Neck:  Supple, symmetrical, trachea midline   Lungs:   Clear to auscultation bilaterally; no rales, rhonchi or wheezing; respirations unlabored    Heart[de-identified]   Regular rate and rhythm; no murmur, rub, or gallop     Abdomen:   Soft, left lower quadrant tenderness ,non distended; normal bowel sounds; no masses, no organomegaly    Genitalia:   Deferred    Rectal:   Deferred    Extremities:  No cyanosis, clubbing or edema    Pulses:  2+ and symmetric    Skin:  No jaundice, rashes, or lesions    Lymph nodes:  No palpable cervical lymphadenopathy        Lab Results:   No visits with results within 1 Day(s) from this visit  Latest known visit with results is:   Orders Only on 12/13/2022   Component Date Value   • Supplier Name 12/13/2022 [facility]    • Supplier Phone Number 12/13/2022 [phone]    • Order Status 12/13/2022 Processing    • Delivery Request Date 12/13/2022 12/13/2022    • Item Description 12/13/2022 BiLevel Machine, Generic    • Item Description 12/13/2022 PAP Mask, Full Face, Fit Upon Setup, N/A, 1 per 3 months    • Item Description 12/13/2022 PAP Mask Interface Cushion, Full Face, 1 per 1 month    • Item Description 12/13/2022 PAP Headgear, 1 per 6 months    • Item Description 12/13/2022 PAP Humidifier, Heated    • Item Description 12/13/2022 Disposable PAP Filter, 2 per 1 month    • Item Description 12/13/2022 Non-Disposable PAP Filter, 1 per 6 months    • Item Description 12/13/2022 PAP Machine Tubing, Heated, 1 per 3 months    • Item Description 12/13/2022 No Nasal Pillows Included    • Item Description 12/13/2022 Humidifier Water Chamber, 1 per 6 months    • Item Description 12/13/2022 PAP Monitoring Modem    • Item Description 12/13/2022 PAP Chinstrap, 1 per 6 months          Radiology Results:   XR chest pa & lateral    Result Date: 12/3/2022  Narrative: CHEST INDICATION:   I25 110: Atherosclerotic heart disease of native coronary artery with unstable angina pectoris  Shortness of breath  History of CHF  COMPARISON:  CXR 12/27/2021 and chest CT 4/22/2021  EXAM PERFORMED/VIEWS:  XR CHEST PA & LATERAL  DUAL ENERGY SUBTRACTION  FINDINGS: Mild cardiomegaly, CABG  No definite acute disease  Mild bilateral atelectasis Jeaneen Sell  No effusion or pneumothorax   Osseous structures appear within normal limits for patient age  Impression: Bilateral scar with no definite acute pulmonary disease   Workstation performed: [id number]

## 2022-12-16 DIAGNOSIS — K21.00 GASTROESOPHAGEAL REFLUX DISEASE WITH ESOPHAGITIS WITHOUT HEMORRHAGE: ICD-10-CM

## 2022-12-16 RX ORDER — FAMOTIDINE 40 MG/1
TABLET, FILM COATED ORAL
Qty: 30 TABLET | Refills: 0 | Status: SHIPPED | OUTPATIENT
Start: 2022-12-16

## 2022-12-20 ENCOUNTER — OFFICE VISIT (OUTPATIENT)
Dept: OCCUPATIONAL THERAPY | Age: 77
End: 2022-12-20

## 2022-12-20 DIAGNOSIS — R53.1 WEAKNESS: ICD-10-CM

## 2022-12-20 DIAGNOSIS — R41.3 MEMORY LOSS: ICD-10-CM

## 2022-12-20 DIAGNOSIS — I89.0 LYMPHEDEMA: Primary | ICD-10-CM

## 2022-12-20 NOTE — PROGRESS NOTES
Daily Note     Today's date: 2022  Patient name: David Holly  : 1945  MRN: [id number]  Referring provider: Lubna Farias MD  Dx:   Encounter Diagnosis     ICD-10-CM    1  Lymphedema  I89 0       2  Memory loss  R41 3       3  Weakness  R53 1                      Subjective: "My water blister popped "      Objective: See treatment diary below      Assessment: Tolerated treatment fair  Pt ulcers opened over the weekend and now weeping  L ulcer larger in size  Pt and pt wife edu on f/u with wound care, for tx as well as circaid compression wraps  Therapist wrapped pt in rome boot with coban after bandaging to LLE for dec in edema and inc wound healing  Patient would benefit from continued OT      Plan: Continue per plan of care        Precautions: Universal        Manuals  12/20                     MLD ABD  5'                     MLD BLE  20'                                             Pneumatic pump                       Neuro Re-Ed                       Skin care  skin and wound care 15'                     Compression garment edu donning/doffing  15'                                                                                                                                             Ther Ex                       BLE HEP:  Hip flex/abd  Knee kicks  Ankle pump  Ankle circles  Toe crunch                       nustep                                                                                                                                                                       Ther Activity                                                                       Gait Training                                                                       Modalities

## 2022-12-22 ENCOUNTER — APPOINTMENT (OUTPATIENT)
Dept: OCCUPATIONAL THERAPY | Age: 77
End: 2022-12-22

## 2022-12-27 ENCOUNTER — OFFICE VISIT (OUTPATIENT)
Dept: OCCUPATIONAL THERAPY | Age: 77
End: 2022-12-27

## 2022-12-27 DIAGNOSIS — I89.0 LYMPHEDEMA: Primary | ICD-10-CM

## 2022-12-27 NOTE — PROGRESS NOTES
Daily Note     Today's date: 2022  Patient name: Francisca Garcia  : 1945  MRN: [id number]  Referring provider: Dewayne Higgins MD  Dx:   Encounter Diagnosis     ICD-10-CM    1  Lymphedema  I89 0                      Subjective: Sometimes they stop draining on their own"  Objective: See treatment diary below      Assessment: Tolerated treatment well  Weeping from small water blister on right shin and small open wounds on left lower leg, water blister healing on L lower leg w/minimal drainage noted  Wound wrapping completed using gauze due to increased drainage  Pt stated he is completing HEP and elevates BLE as able  Patient would benefit from continued OT    Plan: Continue per plan of care        Precautions: Universal        Manuals                     MLD ABD  5'  10'                   MLD BLE  20'  20''                                           Pneumatic pump                       Neuro Re-Ed                       Skin care  skin and wound care 13'  skin and wounand care 15'                   Compression garment edu donning/doffing  15'  tg sz G 10'                                                                                                                                           Ther Ex                       BLE HEP:  Hip flex/abd  Knee kicks  Ankle pump  Ankle circles  Toe crunch                       nustep                                                                                                                                                                       Ther Activity                                                                       Gait Training                                                                       Modalities

## 2022-12-29 ENCOUNTER — APPOINTMENT (OUTPATIENT)
Dept: OCCUPATIONAL THERAPY | Age: 77
End: 2022-12-29

## 2022-12-29 DIAGNOSIS — I89.0 LYMPHEDEMA: Primary | ICD-10-CM

## 2022-12-29 NOTE — PROGRESS NOTES
Daily Note     Today's date: 2022  Patient name: Cole Rodas  : 1945  MRN: [id number]  Referring provider: Koby Cole MD  Dx:   Encounter Diagnosis     ICD-10-CM    1  Lymphedema  I89 0                      Subjective: ***      Objective: See treatment diary below      Assessment: Tolerated treatment well  Patient would benefit from continued OT      Plan: Continue per plan of care        Precautions: Universal        Manuals                   MLD ABD  5'  10'                   MLD BLE  20'  20''                                           Pneumatic pump                       Neuro Re-Ed                       Skin care  skin and wound care 13'  skin and wounand care 15'                   Compression garment edu donning/doffing  15'  tg sz G 10'                                                                                                                                           Ther Ex                       BLE HEP:  Hip flex/abd  Knee kicks  Ankle pump  Ankle circles  Toe crunch                       nustep                                                                                                                                                                       Ther Activity                                                                       Gait Training                                                                       Modalities

## 2023-01-03 ENCOUNTER — APPOINTMENT (OUTPATIENT)
Dept: OCCUPATIONAL THERAPY | Age: 78
End: 2023-01-03

## 2023-01-03 NOTE — PROGRESS NOTES
Daily Note     Today's date: 1/3/2023  Patient name: Sana Abad  : 1945  MRN: [id number]  Referring provider: Steffanie Loza MD  Dx:   Encounter Diagnosis     ICD-10-CM    1  Lymphedema  I89 0                      Subjective: ***      Objective: See treatment diary below      Assessment: Tolerated treatment well  Patient would benefit from continued OT      Plan: Continue per plan of care        Precautions: Universal        Manuals                   MLD ABD  5'  10'                   MLD BLE  20'  20''                                           Pneumatic pump                       Neuro Re-Ed                       Skin care  skin and wound care 13'  skin and wounand care 15'                   Compression garment edu donning/doffing  15'  tg sz G 10'                                                                                                                                           Ther Ex                       BLE HEP:  Hip flex/abd  Knee kicks  Ankle pump  Ankle circles  Toe crunch                       nustep                                                                                                                                                                       Ther Activity                                                                       Gait Training                                                                       Modalities

## 2023-01-05 ENCOUNTER — OFFICE VISIT (OUTPATIENT)
Dept: OCCUPATIONAL THERAPY | Age: 78
End: 2023-01-05

## 2023-01-05 DIAGNOSIS — I89.0 LYMPHEDEMA: Primary | ICD-10-CM

## 2023-01-05 LAB

## 2023-01-05 NOTE — PROGRESS NOTES
Daily Note     Today's date: 2023  Patient name: Holland Sutton  : 1945  MRN: [id number]  Referring provider: Bj Peterson MD  Dx:   Encounter Diagnosis     ICD-10-CM    1  Lymphedema  I89 0                      Subjective: "The wound on the left is new  It just keeps weeping"  Objective: See treatment diary below    Assessment: Tolerated treatment well  Pt noted with increased weeping wounds this session, 2 on each LE  Wounds wrapped with gauze, Indigo Boot applied using Cellona (2) and coban wrap (3) for compression promoting wound healing and lymph drainage  OTR recommended pt contact wound care for assessment, contact info provided to pt and spouse  Patient would benefit from continued OT    Plan: Continue per plan of care        Precautions: Universal        Manuals                 MLD ABD  5'  10'    10'               MLD BLE  20'  20'    25'                                       Pneumatic pump                       Neuro Re-Ed                       Skin care  skin and wound care 13'  skin and wounand care 13'    skin and wound care 25'               Compression garment edu donning/doffing  15'  tg sz G 10'    tg sz G                                                                                                                                       Ther Ex                       BLE HEP:  Hip flex/abd  Knee kicks  Ankle pump  Ankle circles  Toe crunch                       nustep                                                                                                                                                                       Ther Activity                                                                       Gait Training                                                                       Modalities                                                     76.7

## 2023-01-08 DIAGNOSIS — K74.60 CIRRHOSIS OF LIVER WITHOUT ASCITES, UNSPECIFIED HEPATIC CIRRHOSIS TYPE (HCC): ICD-10-CM

## 2023-01-08 DIAGNOSIS — R60.0 LOCALIZED EDEMA: ICD-10-CM

## 2023-01-08 DIAGNOSIS — I10 ESSENTIAL HYPERTENSION: ICD-10-CM

## 2023-01-09 RX ORDER — SPIRONOLACTONE 25 MG/1
TABLET ORAL
Qty: 30 TABLET | Refills: 0 | Status: SHIPPED | OUTPATIENT
Start: 2023-01-09

## 2023-01-09 RX ORDER — LISINOPRIL 20 MG/1
TABLET ORAL
Qty: 30 TABLET | Refills: 0 | Status: SHIPPED | OUTPATIENT
Start: 2023-01-09 | End: 2023-01-22

## 2023-01-10 ENCOUNTER — HOSPITAL ENCOUNTER (INPATIENT)
Facility: HOSPITAL | Age: 78
LOS: 12 days | Discharge: NURSING, REHAB OR LONG-TERM CARE | End: 2023-01-22
Attending: EMERGENCY MEDICINE | Admitting: INTERNAL MEDICINE

## 2023-01-10 ENCOUNTER — APPOINTMENT (OUTPATIENT)
Dept: OCCUPATIONAL THERAPY | Age: 78
End: 2023-01-10

## 2023-01-10 ENCOUNTER — APPOINTMENT (EMERGENCY)
Dept: RADIOLOGY | Facility: HOSPITAL | Age: 78
End: 2023-01-10

## 2023-01-10 DIAGNOSIS — I27.20 PULMONARY HYPERTENSION (HCC): ICD-10-CM

## 2023-01-10 DIAGNOSIS — R06.00 DYSPNEA: ICD-10-CM

## 2023-01-10 DIAGNOSIS — I48.11 LONGSTANDING PERSISTENT ATRIAL FIBRILLATION (HCC): ICD-10-CM

## 2023-01-10 DIAGNOSIS — I50.9 CHF EXACERBATION (HCC): Primary | ICD-10-CM

## 2023-01-10 DIAGNOSIS — I87.2 VENOUS STASIS DERMATITIS OF BOTH LOWER EXTREMITIES: ICD-10-CM

## 2023-01-10 DIAGNOSIS — R60.9 PERIPHERAL EDEMA: ICD-10-CM

## 2023-01-10 DIAGNOSIS — R31.9 HEMATURIA: ICD-10-CM

## 2023-01-10 DIAGNOSIS — J81.1 PULMONARY EDEMA: ICD-10-CM

## 2023-01-10 DIAGNOSIS — R09.02 HYPOXIA: ICD-10-CM

## 2023-01-10 LAB
2HR DELTA HS TROPONIN: 1 NG/L
4HR DELTA HS TROPONIN: 1 NG/L
ALBUMIN SERPL BCP-MCNC: 3.5 G/DL (ref 3.5–5)
ALP SERPL-CCNC: 69 U/L (ref 34–104)
ALT SERPL W P-5'-P-CCNC: 10 U/L (ref 7–52)
ANION GAP SERPL CALCULATED.3IONS-SCNC: 5 MMOL/L (ref 4–13)
AST SERPL W P-5'-P-CCNC: 16 U/L (ref 13–39)
ATRIAL RATE: 67 BPM
BASOPHILS # BLD AUTO: 0.04 THOUSANDS/ÂΜL (ref 0–0.1)
BASOPHILS NFR BLD AUTO: 0 % (ref 0–1)
BILIRUB SERPL-MCNC: 0.37 MG/DL (ref 0.2–1)
BNP SERPL-MCNC: 810 PG/ML (ref 0–100)
BUN SERPL-MCNC: 43 MG/DL (ref 5–25)
CALCIUM SERPL-MCNC: 9.3 MG/DL (ref 8.4–10.2)
CARDIAC TROPONIN I PNL SERPL HS: 19 NG/L
CARDIAC TROPONIN I PNL SERPL HS: 20 NG/L
CARDIAC TROPONIN I PNL SERPL HS: 20 NG/L
CHLORIDE SERPL-SCNC: 99 MMOL/L (ref 96–108)
CO2 SERPL-SCNC: 37 MMOL/L (ref 21–32)
CREAT SERPL-MCNC: 1.18 MG/DL (ref 0.6–1.3)
EOSINOPHIL # BLD AUTO: 0.09 THOUSAND/ÂΜL (ref 0–0.61)
EOSINOPHIL NFR BLD AUTO: 1 % (ref 0–6)
ERYTHROCYTE [DISTWIDTH] IN BLOOD BY AUTOMATED COUNT: 18.3 % (ref 11.6–15.1)
GFR SERPL CREATININE-BSD FRML MDRD: 58 ML/MIN/1.73SQ M
GLUCOSE SERPL-MCNC: 141 MG/DL (ref 65–140)
GLUCOSE SERPL-MCNC: 65 MG/DL (ref 65–140)
GLUCOSE SERPL-MCNC: 94 MG/DL (ref 65–140)
HCT VFR BLD AUTO: 39.4 % (ref 36.5–49.3)
HGB BLD-MCNC: 10.5 G/DL (ref 12–17)
IMM GRANULOCYTES # BLD AUTO: 0.03 THOUSAND/UL (ref 0–0.2)
IMM GRANULOCYTES NFR BLD AUTO: 0 % (ref 0–2)
LYMPHOCYTES # BLD AUTO: 1.38 THOUSANDS/ÂΜL (ref 0.6–4.47)
LYMPHOCYTES NFR BLD AUTO: 14 % (ref 14–44)
MCH RBC QN AUTO: 21.1 PG (ref 26.8–34.3)
MCHC RBC AUTO-ENTMCNC: 26.6 G/DL (ref 31.4–37.4)
MCV RBC AUTO: 79 FL (ref 82–98)
MONOCYTES # BLD AUTO: 0.83 THOUSAND/ÂΜL (ref 0.17–1.22)
MONOCYTES NFR BLD AUTO: 8 % (ref 4–12)
NEUTROPHILS # BLD AUTO: 7.63 THOUSANDS/ÂΜL (ref 1.85–7.62)
NEUTS SEG NFR BLD AUTO: 77 % (ref 43–75)
NRBC BLD AUTO-RTO: 0 /100 WBCS
P AXIS: 40 DEGREES
PLATELET # BLD AUTO: 203 THOUSANDS/UL (ref 149–390)
PMV BLD AUTO: 10.2 FL (ref 8.9–12.7)
POTASSIUM SERPL-SCNC: 4.7 MMOL/L (ref 3.5–5.3)
PR INTERVAL: 162 MS
PROT SERPL-MCNC: 7.4 G/DL (ref 6.4–8.4)
QRS AXIS: 108 DEGREES
QRSD INTERVAL: 130 MS
QT INTERVAL: 382 MS
QTC INTERVAL: 403 MS
RBC # BLD AUTO: 4.97 MILLION/UL (ref 3.88–5.62)
SODIUM SERPL-SCNC: 141 MMOL/L (ref 135–147)
T WAVE AXIS: 197 DEGREES
VENTRICULAR RATE: 67 BPM
WBC # BLD AUTO: 10 THOUSAND/UL (ref 4.31–10.16)

## 2023-01-10 RX ORDER — PANTOPRAZOLE SODIUM 40 MG/1
40 TABLET, DELAYED RELEASE ORAL
Status: DISCONTINUED | OUTPATIENT
Start: 2023-01-10 | End: 2023-01-22 | Stop reason: HOSPADM

## 2023-01-10 RX ORDER — FAMOTIDINE 20 MG/1
40 TABLET, FILM COATED ORAL
Status: DISCONTINUED | OUTPATIENT
Start: 2023-01-10 | End: 2023-01-22 | Stop reason: HOSPADM

## 2023-01-10 RX ORDER — SPIRONOLACTONE 25 MG/1
25 TABLET ORAL DAILY
Status: DISCONTINUED | OUTPATIENT
Start: 2023-01-11 | End: 2023-01-17

## 2023-01-10 RX ORDER — LISINOPRIL 20 MG/1
20 TABLET ORAL DAILY
Status: DISCONTINUED | OUTPATIENT
Start: 2023-01-11 | End: 2023-01-13

## 2023-01-10 RX ORDER — DICYCLOMINE HYDROCHLORIDE 10 MG/1
10 CAPSULE ORAL 2 TIMES DAILY PRN
Status: DISCONTINUED | OUTPATIENT
Start: 2023-01-10 | End: 2023-01-22 | Stop reason: HOSPADM

## 2023-01-10 RX ORDER — ATORVASTATIN CALCIUM 40 MG/1
40 TABLET, FILM COATED ORAL DAILY
Status: DISCONTINUED | OUTPATIENT
Start: 2023-01-11 | End: 2023-01-22 | Stop reason: HOSPADM

## 2023-01-10 RX ORDER — FUROSEMIDE 10 MG/ML
80 INJECTION INTRAMUSCULAR; INTRAVENOUS
Status: DISCONTINUED | OUTPATIENT
Start: 2023-01-11 | End: 2023-01-11

## 2023-01-10 RX ORDER — POTASSIUM CITRATE 10 MEQ/1
10 TABLET, EXTENDED RELEASE ORAL 2 TIMES DAILY
Status: DISCONTINUED | OUTPATIENT
Start: 2023-01-10 | End: 2023-01-13

## 2023-01-10 RX ORDER — METOPROLOL SUCCINATE 100 MG/1
100 TABLET, EXTENDED RELEASE ORAL EVERY 12 HOURS
Status: DISCONTINUED | OUTPATIENT
Start: 2023-01-10 | End: 2023-01-13

## 2023-01-10 RX ORDER — INSULIN LISPRO 100 [IU]/ML
2-12 INJECTION, SOLUTION INTRAVENOUS; SUBCUTANEOUS
Status: DISCONTINUED | OUTPATIENT
Start: 2023-01-10 | End: 2023-01-13

## 2023-01-10 RX ORDER — DIGOXIN 125 MCG
125 TABLET ORAL DAILY
Status: DISCONTINUED | OUTPATIENT
Start: 2023-01-11 | End: 2023-01-22 | Stop reason: HOSPADM

## 2023-01-10 RX ORDER — INSULIN GLARGINE 100 [IU]/ML
90 INJECTION, SOLUTION SUBCUTANEOUS EVERY MORNING
Status: DISCONTINUED | OUTPATIENT
Start: 2023-01-11 | End: 2023-01-11

## 2023-01-10 RX ORDER — FUROSEMIDE 10 MG/ML
80 INJECTION INTRAMUSCULAR; INTRAVENOUS ONCE
Status: COMPLETED | OUTPATIENT
Start: 2023-01-10 | End: 2023-01-10

## 2023-01-10 RX ADMIN — FAMOTIDINE 40 MG: 20 TABLET ORAL at 21:13

## 2023-01-10 RX ADMIN — METOPROLOL SUCCINATE 100 MG: 100 TABLET, EXTENDED RELEASE ORAL at 21:13

## 2023-01-10 RX ADMIN — FUROSEMIDE 80 MG: 10 INJECTION, SOLUTION INTRAMUSCULAR; INTRAVENOUS at 12:43

## 2023-01-10 RX ADMIN — POTASSIUM CITRATE 10 MEQ: 10 TABLET, EXTENDED RELEASE ORAL at 21:14

## 2023-01-10 RX ADMIN — PANTOPRAZOLE SODIUM 40 MG: 40 TABLET, DELAYED RELEASE ORAL at 18:42

## 2023-01-10 RX ADMIN — APIXABAN 5 MG: 5 TABLET, FILM COATED ORAL at 21:14

## 2023-01-10 NOTE — TELEPHONE ENCOUNTER
Received a Rx sheet from [address] [place] [address] stating patient requested a medication to be refilled - Eliquis 5 mg 3 month supply     Medication was sent to the refill line at this time

## 2023-01-10 NOTE — ASSESSMENT & PLAN NOTE
Lab Results   Component Value Date    HGBA1C 7 8 (H) 11/02/2022       No results for input(s): POCGLU in the last 72 hours      Blood Sugar Average: Last 72 hrs:     Home regimen:   Tresiba 90 units daily -we will change to Lantus 90 units daily  NovoLog -sliding scale -change Humalog sliding scale

## 2023-01-10 NOTE — ED PROVIDER NOTES
History  Chief Complaint   Patient presents with   • Shortness of Breath     Pt to ED with c/o feeling SOB, pt states believes he is "filling up with fluid" due to leg swelling     Clau Galo is a 77 y o  male who presents with the chief complaint of leg swelling (up to his abdomen), weight gain (21 lbs in the past 2 weeks) and worsening shortness of breath  He has a pmh significant for CHF and is on high dose lasix, spironolactone and digoxin  He also has a history of CAD, IDDM, dyslipidemia and hypertension  Patient presented hypoxic but responded to nc oxygen  Review of records reveal his weight may actually be stable at or near 300 lbs  History provided by:  Patient and medical records   used: No    Shortness of Breath  Severity:  Moderate  Onset quality:  Gradual  Duration:  2 weeks  Timing:  Constant  Progression:  Worsening  Chronicity:  New  Relieved by:  Nothing  Worsened by:  Nothing  Ineffective treatments:  Diuretics  Associated symptoms: cough    Associated symptoms: no chest pain, no diaphoresis, no fever, no rash, no vomiting and no wheezing        Prior to Admission Medications   Prescriptions Last Dose Informant Patient Reported? Taking?    Continuous Blood Gluc Sensor (FreeStyle Peggy 2 Sensor) MISC   No No   Sig: Change it every 14 days   Eliquis 5 MG   No No   Sig: TAKE ONE TABLET BY MOUTH TWICE DAILY   Lancets (accu-chek multiclix) lancets   No No   Sig: Use 1 each 2 (two) times a day Use 2 times daily to test blood glucose   NovoLOG FlexPen 100 units/mL injection pen   No No   Sig: Inject 20-25 Units under the skin 3 (three) times a day with meals   Unifine Pentips 31G X 8 MM MISC   No No   Sig: inject under the skin daily use as directed   atorvastatin (LIPITOR) 40 mg tablet   No No   Sig: Take 1 tablet (40 mg total) by mouth daily   clindamycin (CLINDAGEL) 1 % gel   No No   Sig: Apply topically 2 (two) times a day   dicyclomine (BENTYL) 10 mg capsule   No No   Sig: Take 1 capsule (10 mg total) by mouth 2 (two) times a day as needed (abdominal cramping)   digoxin (LANOXIN) 0 125 mg tablet   No No   Sig: Take 1 tablet (125 mcg total) by mouth daily   famotidine (PEPCID) 40 MG tablet   No No   Sig: TAKE ONE TABLET BY MOUTH AT BEDTIME   fluorouracil (EFUDEX) 5 % cream   No No   Sig: Apply topically 2 (two) times a day   furosemide (LASIX) 80 mg tablet   No No   Sig: Take 0 5 tablets (40 mg total) by mouth 2 (two) times a day   insulin degludec Vivienne Grime FlexTouch) 200 units/mL CONCENTRATED U-200 injection pen   No No   Sig: inject 100 units under the skin once daily   lisinopril (ZESTRIL) 20 mg tablet   No No   Sig: TAKE ONE TABLET BY MOUTH ONCE DAILY   metoprolol succinate (TOPROL-XL) 100 mg 24 hr tablet   No No   Sig: Take 1 tablet (100 mg total) by mouth every 12 (twelve) hours   omeprazole (PriLOSEC) 20 mg delayed release capsule   No No   Sig: Take 1 capsule (20 mg total) by mouth 2 (two) times a day   potassium citrate (UROCIT-K) 10 mEq   No No   Sig: Take 1 tablet (10 mEq total) by mouth 2 (two) times a day   silver sulfadiazine (SILVADENE,SSD) 1 % cream   No No   Sig: Apply topically daily   spironolactone (ALDACTONE) 25 mg tablet   No No   Sig: TAKE ONE TABLET BY MOUTH ONCE DAILY      Facility-Administered Medications: None       Past Medical History:   Diagnosis Date   • A-fib [facility])    • AAA (abdominal aortic aneurysm)    • Actinic keratosis of right temple 7/31/2020   • Actinic keratosis of scalp 7/31/2020   • Acute respiratory failure with hypoxia (HCC) 3/25/2021   • Allergic 1960   • Arthritis    • Lay's esophagus    • Bladder cancer ([facility])    • Blister of left leg 4/28/2021   • Blister of right leg 5/26/2021   • CAD (coronary artery disease)    • Cancer ([facility]) 02/2010    Bladder   • CHF (congestive heart failure) ([facility])    • Chronic low back pain    • Chronic pain of both knees 7/31/2020   • Colitis 12/4/2020   • CPAP (continuous positive airway pressure) dependence    • Diabetes ([facility] [address] )    • [facility] 10/1993   • Excessive gas 12/3/2020   • Heart murmur    • History of chemotherapy    • Hydroureter on left 4/28/2021   • Hyperlipemia    • Hypertension    • Immunization deficiency 10/30/2020    Hep a nonimmune   • Kidney stone    • Left lower quadrant abdominal pain 12/3/2020   • Mass of right adrenal gland ([facility] ) 12/4/2020    4 1 cm   • Myocardial infarction [facility])    • 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   • Obesity 2000   • LUCIA (obstructive sleep apnea) 1/29/2021   • Pressure ulcer of right leg, stage 1 5/26/2021   • Urinary tract infection with hematuria 5/3/2021    u cx 4/2021=pseudomonas R to bactrim and cefdinir, S to cipro   • Venous stasis dermatitis of both lower extremities 5/26/2021       Past Surgical History:   Procedure Laterality Date   • BACK SURGERY     • BLADDER SURGERY     • CARDIAC CATHETERIZATION  04/2016   • CATARACT EXTRACTION, BILATERAL     • COLONOSCOPY  01/29/2019    next [facility] GRAFT  04/2016    Quadruple per [place]   • EGD  06/2017   • EYE SURGERY  11/2016    Cataracts   • JOINT REPLACEMENT  [id number]    Hip and shoulder   • KIDNEY STONE SURGERY     • GA CYSTO BLADDER W/URETERAL CATHETERIZATION Left 04/24/2021    Procedure: CYSTOSCOPY  WITH INSERTION STENT URETERAL;  Surgeon: Miranda Osborne MD;  Location: [facility];  Service: Urology   • GA CYSTO/URETERO W/LITHOTRIPSY &INDWELL STENT INSRT Left 05/21/2021    Procedure: CYSTOSCOPY URETEROSCOPY WITH LITHOTRIPSY HOLMIUM LASER, AND INSERTION STENT URETERAL/EXCHANGE;  Surgeon: Miranda Osborne MD;  Location: [facility];  Service: Urology   • TOTAL HIP ARTHROPLASTY Right 2012   • TOTAL SHOULDER REPLACEMENT Right 2013   • VASECTOMY  1971       Family History   Problem Relation Age of Onset   • Heart disease Father    • Arthritis Father    • Heart attack Father    • Alzheimer's disease Mother    • Dementia Mother      I have reviewed and agree with the history as documented  E-Cigarette/Vaping   • E-Cigarette Use Never User      E-Cigarette/Vaping Substances   • Nicotine No    • THC No    • CBD No    • Flavoring No    • Other No    • Unknown No      Social History     Tobacco Use   • Smoking status: Former     Packs/day: 0 25     Years: 20 00     Pack years: 5 00     Types: Cigarettes     Start date: 1965     Quit date: 2010     Years since quittin 0   • Smokeless tobacco: Never   • Tobacco comments:     Quit several times for up to 6 years  Vaping Use   • Vaping Use: Never used   Substance Use Topics   • Alcohol use: Not Currently     Comment: No use   • Drug use: Never     Comment: No use       Review of Systems   Constitutional: Positive for unexpected weight change  Negative for chills, diaphoresis and fever  Respiratory: Positive for cough and shortness of breath  Negative for wheezing  Cardiovascular: Positive for leg swelling  Negative for chest pain and palpitations  Gastrointestinal: Negative for diarrhea, nausea and vomiting  Genitourinary: Negative for dysuria and frequency  Skin: Negative for rash and wound  Leg weeping, left greater than right     All other systems reviewed and are negative  Physical Exam  Physical Exam  Vitals and nursing note reviewed  Constitutional:       General: He is in acute distress  Appearance: He is well-developed  He is obese  HENT:      Head: Normocephalic and atraumatic  Eyes:      Pupils: Pupils are equal, round, and reactive to light  Neck:      Vascular: No JVD  Cardiovascular:      Rate and Rhythm: Normal rate and regular rhythm  Heart sounds: Murmur heard  No friction rub  No gallop  Pulmonary:      Effort: Pulmonary effort is normal  No respiratory distress  Breath sounds: Examination of the right-middle field reveals rales  Examination of the left-middle field reveals rales  Examination of the right-lower field reveals rales   Examination of the left-lower field reveals rales  Rales present  No wheezing  Chest:      Chest wall: No tenderness  Musculoskeletal:         General: No tenderness  Normal range of motion  Cervical back: Normal range of motion  Right lower leg: Edema present  Left lower leg: Edema present  Comments: Serous weeping from both legs     Skin:     General: Skin is warm and dry  Neurological:      General: No focal deficit present  Mental Status: He is alert and oriented to person, place, and time  Psychiatric:         Behavior: Behavior normal          Thought Content:  Thought content normal          Judgment: Judgment normal          Vital Signs  ED Triage Vitals [01/10/23 1219]   Temp Pulse Respirations Blood Pressure SpO2   -- 61 20 148/64 (S) (!) 77 %      Temp src Heart Rate Source Patient Position - Orthostatic VS BP Location FiO2 (%)   -- Monitor -- -- --      Pain Score       --           Vitals:    01/10/23 1219   BP: 148/64   Pulse: 61         Visual Acuity      ED Medications  Medications   furosemide (LASIX) injection 80 mg (80 mg Intravenous Given 1/10/23 1243)       Diagnostic Studies  Results Reviewed     Procedure Component Value Units Date/Time    HS Troponin I 2hr [[id number]]     Lab Status: No result Specimen: Blood     HS Troponin 0hr (reflex protocol) [[id number]]  (Normal) Collected: 01/10/23 1243    Lab Status: Final result Specimen: Blood from Arm, Right Updated: 01/10/23 1324     hs TnI 0hr 19 ng/L     B-Type Natriuretic Peptide(BNP) AN, CA,  Campuses Only [[id number]]  (Abnormal) Collected: 01/10/23 1243    Lab Status: Final result Specimen: Blood from Arm, Right Updated: 01/10/23 1322      pg/mL     Comprehensive metabolic panel [[id number]]  (Abnormal) Collected: 01/10/23 1243    Lab Status: Final result Specimen: Blood from Arm, Right Updated: 01/10/23 1314     Sodium 141 mmol/L      Potassium 4 7 mmol/L      Chloride 99 mmol/L      CO2 37 mmol/L      ANION GAP 5 mmol/L BUN 43 mg/dL      Creatinine 1 18 mg/dL      Glucose 94 mg/dL      Calcium 9 3 mg/dL      AST 16 U/L      ALT 10 U/L      Alkaline Phosphatase 69 U/L      Total Protein 7 4 g/dL      Albumin 3 5 g/dL      Total Bilirubin 0 37 mg/dL      eGFR 58 ml/min/1 73sq m     Narrative:      [place] guidelines for Chronic Kidney Disease (CKD):   •  Stage 1 with normal or high GFR (GFR > 90 mL/min/1 73 square meters)  •  Stage 2 Mild CKD (GFR = 60-89 mL/min/1 73 square meters)  •  Stage 3A Moderate CKD (GFR = 45-59 mL/min/1 73 square meters)  •  Stage 3B Moderate CKD (GFR = 30-44 mL/min/1 73 square meters)  •  Stage 4 Severe CKD (GFR = 15-29 mL/min/1 73 square meters)  •  Stage 5 End Stage CKD (GFR <15 mL/min/1 73 square meters)  Note: GFR calculation is accurate only with a steady state creatinine    CBC and differential [[id number]]  (Abnormal) Collected: 01/10/23 1243    Lab Status: Final result Specimen: Blood from Arm, Right Updated: 01/10/23 1258     WBC 10 00 Thousand/uL      RBC 4 97 Million/uL      Hemoglobin 10 5 g/dL      Hematocrit 39 4 %      MCV 79 fL      MCH 21 1 pg      MCHC 26 6 g/dL      RDW 18 3 %      MPV 10 2 fL      Platelets 055 Thousands/uL      nRBC 0 /100 WBCs      Neutrophils Relative 77 %      Immat GRANS % 0 %      Lymphocytes Relative 14 %      Monocytes Relative 8 %      Eosinophils Relative 1 %      Basophils Relative 0 %      Neutrophils Absolute 7 63 Thousands/µL      Immature Grans Absolute 0 03 Thousand/uL      Lymphocytes Absolute 1 38 Thousands/µL      Monocytes Absolute 0 83 Thousand/µL      Eosinophils Absolute 0 09 Thousand/µL      Basophils Absolute 0 04 Thousands/µL                  XR chest 1 view portable   ED Interpretation by Krystal Nettles MD (01/10 [id number])   This film was interpreted independently by me  Bilateral pulmonary edema                    Procedures  ECG 12 Lead Documentation Only    Date/Time: 1/10/2023 1:00 PM  Performed by: Carolyn Briggs Apurva Meza MD  Authorized by: Castillo Pham MD     Indications / Diagnosis:  Shortness of breath  ECG reviewed by me, the ED Provider: yes    Patient location:  ED  Previous ECG:     Previous ECG:  Compared to current    Comparison ECG info:  10/07/2022    Similarity:  Changes noted (no longer has PVCs)  Interpretation:     Interpretation: abnormal    Rate:     ECG rate:  67    ECG rate assessment: normal    Rhythm:     Rhythm: sinus rhythm    Ectopy:     Ectopy: none    QRS:     QRS axis:  Normal    QRS intervals:  Normal  Conduction:     Conduction: abnormal      Abnormal conduction: complete RBBB    ST segments:     ST segments:  Normal  T waves:     T waves: normal               ED Course                                             Medical Decision Making  Background: 77 y o  male presents with shortness of breath, weight gain    Differential DX includes but is not limited to: chf, pulmonary edema, less likely acs, anemia, doubt PE (on eliquis), pneumonia, pnemothorax    Plan: cardiac workup with bnp, chest xray almost certainly an admission      CHF exacerbation ([facility] [address] ): acute illness or injury  Dyspnea: acute illness or injury  Hypoxia: self-limited or minor problem  Peripheral edema: acute illness or injury  Pulmonary edema: acute illness or injury  Amount and/or Complexity of Data Reviewed  Labs: ordered  Decision-making details documented in ED Course  Radiology: ordered and independent interpretation performed  Decision-making details documented in ED Course  ECG/medicine tests: ordered  Decision-making details documented in ED Course  Risk  Prescription drug management  Decision regarding hospitalization            Disposition  Final diagnoses:   CHF exacerbation ([address] )   Pulmonary edema   Peripheral edema   Dyspnea   Hypoxia     Time reflects when diagnosis was documented in both MDM as applicable and the Disposition within this note     Time User Action Codes Description Comment 1/10/2023  2:17 PM Hema Starling B Add [I50 9] CHF exacerbation ([address] )     1/10/2023  2:17 PM Hema Starling B Add [J81 1] Pulmonary edema     1/10/2023  2:17 PM Hema Starling B Add [R60 9] Peripheral edema     1/10/2023  2:17 PM Martha Dill Add [R06 00] Dyspnea     1/10/2023  2:17 PM Martha Dill Add [R09 02] Hypoxia       ED Disposition     ED Disposition   Admit    Condition   Stable    Date/Time   Tue Herson 10, 2023  2:18 PM    Comment   Case was discussed with Dr Zuleika Ernandez and the patient's admission status was agreed to be Admission Status: inpatient status to the service of Dr Zuleika Ernandez   Follow-up Information    None         Patient's Medications   Discharge Prescriptions    No medications on file       No discharge procedures on file      PDMP Review       Value Time User    PDMP Reviewed  Yes 5/21/2021  2:46 PM Donnie Yeung MD          ED Provider  Electronically Signed by           Jameson Rutledge MD  01/10/23 [id number]

## 2023-01-10 NOTE — ASSESSMENT & PLAN NOTE
Wt Readings from Last 3 Encounters:   01/10/23 (S) (!) 137 kg (302 lb 11 1 oz)   12/15/22 (!) 139 kg (306 lb)   11/21/22 (!) 138 kg (304 lb)       Patient presenting with worsening lower extremity edema, abdominal tightness, weight gain of 21 pounds in 2 to 3 weeks, PND  Patient denies any dietary or medication noncompliance  Patient takes p o  Lasix 80 twice daily at home  Received a dose of 80 mg IV in ER  Reports good urine output after getting the dose  We will continue on Lasix 80 twice daily IV for now  Monitor urine output and weight    Will also consult cardiology  Continue Toprol-XL, lisinopril

## 2023-01-10 NOTE — ASSESSMENT & PLAN NOTE
Patient was on CPAP  Recently transition to BiPAP by his pulmonologist but still has not received the machine  Will order chest BiPAP overnight

## 2023-01-10 NOTE — ED NOTES
Pt 77% on room air  Pt reports SOB on exertion  Pt placed on nonrebreather 15L and oxygen saturation increased to 100%  Pt placed on 4L NC and at 85%  Oxygen increased to 6L NC at this time  Patient's oxygen saturation now at 95%  Provider at bedside        Andreas Reese RN  01/10/23 [id number]

## 2023-01-10 NOTE — H&P
[facility]  H&P- Orlando Fairly 1945, 77 y o  male MRN: [id number]  Unit/Bed#: S -01 Encounter: [id number]  Primary Care Provider: Stacia Monreal MD   Date and time admitted to hospital: 1/10/2023 12:21 PM    * Acute on chronic diastolic (congestive) heart failure [facility])  Assessment & Plan  Wt Readings from Last 3 Encounters:   01/10/23 (S) (!) 137 kg (302 lb 11 1 oz)   12/15/22 (!) 139 kg (306 lb)   11/21/22 (!) 138 kg (304 lb)       Patient presenting with worsening lower extremity edema, abdominal tightness, weight gain of 21 pounds in 2 to 3 weeks, PND  Patient denies any dietary or medication noncompliance  Patient takes p o  Lasix 80 twice daily at home  Received a dose of 80 mg IV in ER  Reports good urine output after getting the dose  We will continue on Lasix 80 twice daily IV for now  Monitor urine output and weight  Will also consult cardiology  Continue Toprol-XL, lisinopril      Venous stasis dermatitis of both lower extremities  Assessment & Plan  Wound care    LUCIA (obstructive sleep apnea)  Assessment & Plan  Patient was on CPAP  Recently transition to BiPAP by his pulmonologist but still has not received the machine  Will order chest BiPAP overnight  Paroxysmal atrial fibrillation (HCC)  Assessment & Plan  Rate controlled  Continue Toprol-XL  Eliquis     CAD (coronary artery disease)  Assessment & Plan  Continue Eliquis, metoprolol, statin  Type 2 diabetes mellitus with hyperglycemia, with long-term current use of insulin (HCC)  Assessment & Plan  Lab Results   Component Value Date    HGBA1C 7 8 (H) 11/02/2022       No results for input(s): POCGLU in the last 72 hours  Blood Sugar Average: Last 72 hrs:     Home regimen:   Tresiba 90 units daily -we will change to Lantus 90 units daily  NovoLog -sliding scale -change Humalog sliding scale    Hypertension  Assessment & Plan  Stable    Continue lisinopril, metoprolol, spironolactone, Lasix         VTE Prophylaxis: Apixaban (Eliquis)  / sequential compression device   Code Status: full  POLST: POLST form is not discussed and not completed at this time  Discussion with family: wife and son at bedside    Anticipated Length of Stay:  Patient will be admitted on an Inpatient basis with an anticipated length of stay of  > 2 midnights  Justification for Hospital Stay: above    Total Time for Visit, including Counseling / Coordination of Care: 60 minutes  Greater than 50% of this total time spent on direct patient counseling and coordination of care  Chief Complaint:   Worsening lower extremity edema, weight gain    History of Present Illness:    Sana Abad is a 77 y o  male who presents with worsening bilateral lower extremity edema with blisters over the last 2 to 3 weeks  Also reports weight gain of 21 pounds during the same time  Has been having PND and waking up in the middle of the night with shortness of breath  Has history of diastolic heart failure, LUCIA  Denies any recent changes to his diuretic regimen  Has been compliant with medications and diet  Has chronic epigastric pain/GERD -following with GI -had recent bout of acute diverticulitis treated with antibiotic but did not complete the course because of diarrhea  Review of Systems:    Review of Systems   Constitutional: Positive for fatigue  Negative for chills and fever  HENT: Negative for congestion and sore throat  Respiratory: Positive for cough and shortness of breath  Cardiovascular: Positive for leg swelling  Negative for chest pain and palpitations  Gastrointestinal: Negative for abdominal pain, diarrhea, nausea and vomiting  Genitourinary: Negative for difficulty urinating, dysuria, flank pain and frequency  Musculoskeletal: Negative for arthralgias, gait problem and myalgias  Skin: Negative for color change and rash  Neurological: Negative for dizziness and light-headedness  Psychiatric/Behavioral: Negative for agitation, behavioral problems and confusion         Past Medical and Surgical History:     Past Medical History:   Diagnosis Date   • A-fib [facility])    • AAA (abdominal aortic aneurysm)    • Actinic keratosis of right temple 7/31/2020   • Actinic keratosis of scalp 7/31/2020   • Acute respiratory failure with hypoxia ([address] ) 3/25/2021   • Allergic 1960   • Arthritis    • Lay's esophagus    • Bladder cancer (HCC)    • Blister of left leg 4/28/2021   • Blister of right leg 5/26/2021   • CAD (coronary artery disease)    • Cancer ([address] ) 02/2010    Bladder   • CHF (congestive heart failure) (HCC)    • Chronic low back pain    • Chronic pain of both knees 7/31/2020   • Colitis 12/4/2020   • CPAP (continuous positive airway pressure) dependence    • Diabetes [address] [address] )    • Diabetes mellitus ([facility] [address] ) 10/1993   • Excessive gas 12/3/2020   • Heart murmur    • History of chemotherapy    • Hydroureter on left 4/28/2021   • Hyperlipemia    • Hypertension    • Immunization deficiency 10/30/2020    Hep a nonimmune   • Kidney stone    • Left lower quadrant abdominal pain 12/3/2020   • Mass of right adrenal gland ([facility] [address] ) 12/4/2020    4 1 cm   • Myocardial infarction ([facility] [address] )    • 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   • Obesity 2000   • LUCIA (obstructive sleep apnea) 1/29/2021   • Pressure ulcer of right leg, stage 1 5/26/2021   • Urinary tract infection with hematuria 5/3/2021    u cx 4/2021=pseudomonas R to bactrim and cefdinir, S to cipro   • Venous stasis dermatitis of both lower extremities 5/26/2021       Past Surgical History:   Procedure Laterality Date   • BACK SURGERY     • BLADDER SURGERY     • CARDIAC CATHETERIZATION  04/2016   • CATARACT EXTRACTION, BILATERAL     • COLONOSCOPY  01/29/2019    next 2022 [place]   • CORONARY ARTERY BYPASS GRAFT  04/2016    Quadruple per [place]   • EGD  06/2017   • EYE SURGERY  11/2016    Cataracts   • JOINT REPLACEMENT  [id number]    Hip and shoulder   • KIDNEY STONE SURGERY     • TN CYSTO BLADDER W/URETERAL CATHETERIZATION Left 04/24/2021    Procedure: CYSTOSCOPY  WITH INSERTION STENT URETERAL;  Surgeon: Jeane Rodriguez MD;  Location: [facility];  Service: Urology   • TN CYSTO/URETERO W/LITHOTRIPSY &INDWELL STENT INSRT Left 05/21/2021    Procedure: CYSTOSCOPY URETEROSCOPY WITH LITHOTRIPSY HOLMIUM LASER, AND INSERTION STENT URETERAL/EXCHANGE;  Surgeon: Jeane Rodriguez MD;  Location: [facility];  Service: Urology   • TOTAL HIP ARTHROPLASTY Right 2012   • TOTAL SHOULDER REPLACEMENT Right 2013   • VASECTOMY  1971       Meds/Allergies:    Prior to Admission medications    Medication Sig Start Date End Date Taking?  Authorizing Provider   atorvastatin (LIPITOR) 40 mg tablet Take 1 tablet (40 mg total) by mouth daily 11/22/22   Bryson Bauer MD   clindamycin (CLINDAGEL) 1 % gel Apply topically 2 (two) times a day 2/11/22   KACEY Hu   Continuous Blood Gluc Sensor (FreeStyle Latanya Lady 2 Sensor) MISC Change it every 14 days 4/11/22   KACEY Braun   dicyclomine (BENTYL) 10 mg capsule Take 1 capsule (10 mg total) by mouth 2 (two) times a day as needed (abdominal cramping) 9/2/22   Candis Juarez PA-C   digoxin (LANOXIN) 0 125 mg tablet Take 1 tablet (125 mcg total) by mouth daily 6/21/22   Daniella Baeza MD   Eliquis 5 MG TAKE ONE TABLET BY MOUTH TWICE DAILY 9/7/22   Bryson Bauer MD   famotidine (PEPCID) 40 MG tablet TAKE ONE TABLET BY MOUTH AT BEDTIME 12/16/22   Estevan Blanton MD   fluorouracil (EFUDEX) 5 % cream Apply topically 2 (two) times a day 8/10/22   Bryson Bauer MD   furosemide (LASIX) 80 mg tablet Take 0 5 tablets (40 mg total) by mouth 2 (two) times a day  Patient taking differently: Take 80 mg by mouth 2 (two) times a day 9/21/22   Bryson Bauer MD   insulin degludec Chris Polo FlexTouch) 200 units/mL CONCENTRATED U-200 injection pen inject 100 units under the skin once daily  Patient taking differently: 90 Units daily 11/9/22   KACEY Sesay   Lancets (accu-chek multiclix) lancets Use 1 each 2 (two) times a day Use 2 times daily to test blood glucose 8/4/21   Bryson Barron MD   lisinopril (ZESTRIL) 20 mg tablet TAKE ONE TABLET BY MOUTH ONCE DAILY 1/9/23   Brittny Powers MD   metoprolol succinate (TOPROL-XL) 100 mg 24 hr tablet Take 1 tablet (100 mg total) by mouth every 12 (twelve) hours 12/12/22   Bryson Barron MD   NovoLOG FlexPen 100 units/mL injection pen Inject 20-25 Units under the skin 3 (three) times a day with meals  Patient taking differently: Inject 20-25 Units under the skin 3 (three) times a day with meals Per sliding scale 12/20/22   KACEY Sesay   omeprazole (PriLOSEC) 20 mg delayed release capsule Take 1 capsule (20 mg total) by mouth 2 (two) times a day 8/8/22   Akbar Borrego MD   potassium citrate (UROCIT-K) 10 mEq Take 1 tablet (10 mEq total) by mouth 2 (two) times a day 8/15/22   Katlin Velasquez MD   silver sulfadiazine (SILVADENE,SSD) 1 % cream Apply topically daily 9/21/22   Bryson Barron MD   spironolactone (ALDACTONE) 25 mg tablet TAKE ONE TABLET BY MOUTH ONCE DAILY 1/9/23   Bryson Barron MD   Unifine Pentips 31G X 8 MM MISC inject under the skin daily use as directed 11/2/22   Bryson Barron MD     I have reviewed home medications with patient personally  Allergies:    Allergies   Allergen Reactions   • Diltiazem Abdominal Pain       Social History:     Marital Status: /Civil Union   Occupation:   Patient Pre-hospital Living Situation:   Patient Pre-hospital Level of Mobility:   Patient Pre-hospital Diet Restrictions:   Substance Use History:   Social History     Substance and Sexual Activity   Alcohol Use Not Currently    Comment: No use     Social History     Tobacco Use   Smoking Status Former   • Packs/day: 0 25   • Years: 20 00   • Pack years: 5 00   • Types: Cigarettes   • Start date: 1/1/1965   • Quit date: 2010   • Years since quittin 0   Smokeless Tobacco Never   Tobacco Comments    Quit several times for up to 6 years  Social History     Substance and Sexual Activity   Drug Use Never    Comment: No use       Family History:    Family History   Problem Relation Age of Onset   • Heart disease Father    • Arthritis Father    • Heart attack Father    • Alzheimer's disease Mother    • Dementia Mother        Physical Exam:     Vitals:   Blood Pressure: 120/68 (01/10/23 1445)  Pulse: 67 (01/10/23 1445)  Respirations: 20 (01/10/23 1445)  Weight - Scale: (S) (!) 137 kg (302 lb 11 1 oz) (01/10/23 1345)  SpO2: 95 % (01/10/23 1445)    Physical Exam    Constitutional: Pt appears comfortable  Not in any acute distress  HENT:   Head: Normocephalic and atraumatic  Eyes: EOM are normal    Neck: Neck supple  Cardiovascular: Normal rate, regular rhythm, normal heart sounds  Systolic murmur heard  Bilateral lower extremity pitting edema  Pulmonary/Chest: Effort normal, air entry b/l equal  No respiratory distress  Pt has no wheezes or crackles  Abdominal: Soft  Non-distended, Non-tender  Bowel sounds are normal    Musculoskeletal: Normal range of motion  Erythema of bilateral leg with superficial blisters on left leg  Neurological: awake, alert  Moving all extremities spontaneously  Psychiatric: normal mood and affect  Additional Data:     Lab Results: I have personally reviewed pertinent reports        Results from last 7 days   Lab Units 01/10/23  1243   WBC Thousand/uL 10 00   HEMOGLOBIN g/dL 10 5*   HEMATOCRIT % 39 4   PLATELETS Thousands/uL 203   NEUTROS PCT % 77*   LYMPHS PCT % 14   MONOS PCT % 8   EOS PCT % 1     Results from last 7 days   Lab Units 01/10/23  1243   SODIUM mmol/L 141   POTASSIUM mmol/L 4 7   CHLORIDE mmol/L 99   CO2 mmol/L 37*   BUN mg/dL 43*   CREATININE mg/dL 1 18   ANION GAP mmol/L 5   CALCIUM mg/dL 9 3   ALBUMIN g/dL 3 5   TOTAL BILIRUBIN mg/dL 0 37   ALK PHOS U/L 69   ALT U/L 10   AST U/L 16   GLUCOSE RANDOM mg/dL 94                       Imaging: I have personally reviewed pertinent reports  XR chest 1 view portable   ED Interpretation by Bennie Novoa MD (01/10 2802)   This film was interpreted independently by me  Bilateral pulmonary edema  Final Result by Cliff Reese MD (01/10 8722)   Shallow depth of inspiration  No interval change from 12/1/2022                  Workstation performed: [id number]             EKG, Pathology, and Other Studies Reviewed on Admission:   · EKG: Normal sinus rhythm, normal rate, right bundle branch block    Allscripts / Epic Records Reviewed: Yes     ** Please Note: This note has been constructed using a voice recognition system   **

## 2023-01-11 ENCOUNTER — APPOINTMENT (INPATIENT)
Dept: NON INVASIVE DIAGNOSTICS | Facility: HOSPITAL | Age: 78
End: 2023-01-11

## 2023-01-11 LAB
ANION GAP SERPL CALCULATED.3IONS-SCNC: 3 MMOL/L (ref 4–13)
AORTIC ROOT: 3.6 CM
APICAL FOUR CHAMBER EJECTION FRACTION: 68 %
ASCENDING AORTA: 3.4 CM
BASOPHILS # BLD AUTO: 0.03 THOUSANDS/ÂΜL (ref 0–0.1)
BASOPHILS NFR BLD AUTO: 0 % (ref 0–1)
BUN SERPL-MCNC: 35 MG/DL (ref 5–25)
CALCIUM SERPL-MCNC: 9.1 MG/DL (ref 8.4–10.2)
CHLORIDE SERPL-SCNC: 100 MMOL/L (ref 96–108)
CO2 SERPL-SCNC: 38 MMOL/L (ref 21–32)
CREAT SERPL-MCNC: 1.13 MG/DL (ref 0.6–1.3)
DOP CALC LVOT AREA: 4.91 CM2
DOP CALC LVOT DIAMETER: 2.5 CM
E WAVE DECELERATION TIME: 354 MS
EOSINOPHIL # BLD AUTO: 0.08 THOUSAND/ÂΜL (ref 0–0.61)
EOSINOPHIL NFR BLD AUTO: 1 % (ref 0–6)
ERYTHROCYTE [DISTWIDTH] IN BLOOD BY AUTOMATED COUNT: 18.3 % (ref 11.6–15.1)
FRACTIONAL SHORTENING: 33 % (ref 28–44)
GFR SERPL CREATININE-BSD FRML MDRD: 61 ML/MIN/1.73SQ M
GLUCOSE SERPL-MCNC: 116 MG/DL (ref 65–140)
GLUCOSE SERPL-MCNC: 128 MG/DL (ref 65–140)
GLUCOSE SERPL-MCNC: 136 MG/DL (ref 65–140)
GLUCOSE SERPL-MCNC: 60 MG/DL (ref 65–140)
GLUCOSE SERPL-MCNC: 64 MG/DL (ref 65–140)
GLUCOSE SERPL-MCNC: 64 MG/DL (ref 65–140)
GLUCOSE SERPL-MCNC: 81 MG/DL (ref 65–140)
GLUCOSE SERPL-MCNC: 90 MG/DL (ref 65–140)
HCT VFR BLD AUTO: 38.4 % (ref 36.5–49.3)
HGB BLD-MCNC: 10 G/DL (ref 12–17)
IMM GRANULOCYTES # BLD AUTO: 0.02 THOUSAND/UL (ref 0–0.2)
IMM GRANULOCYTES NFR BLD AUTO: 0 % (ref 0–2)
INTERVENTRICULAR SEPTUM IN DIASTOLE (PARASTERNAL SHORT AXIS VIEW): 1.2 CM
INTERVENTRICULAR SEPTUM: 1.2 CM (ref 0.6–1.1)
LAAS-AP2: 17 CM2
LAAS-AP4: 37.7 CM2
LEFT ATRIUM AREA SYSTOLE SINGLE PLANE A4C: 28.3 CM2
LEFT ATRIUM SIZE: 5 CM
LEFT INTERNAL DIMENSION IN SYSTOLE: 3.2 CM (ref 2.1–4)
LEFT VENTRICULAR INTERNAL DIMENSION IN DIASTOLE: 4.8 CM (ref 3.5–6)
LEFT VENTRICULAR POSTERIOR WALL IN END DIASTOLE: 1.2 CM
LEFT VENTRICULAR STROKE VOLUME: 66 ML
LVSV (TEICH): 66 ML
LYMPHOCYTES # BLD AUTO: 1.09 THOUSANDS/ÂΜL (ref 0.6–4.47)
LYMPHOCYTES NFR BLD AUTO: 13 % (ref 14–44)
MAGNESIUM SERPL-MCNC: 2.2 MG/DL (ref 1.9–2.7)
MCH RBC QN AUTO: 21.4 PG (ref 26.8–34.3)
MCHC RBC AUTO-ENTMCNC: 26 G/DL (ref 31.4–37.4)
MCV RBC AUTO: 82 FL (ref 82–98)
MONOCYTES # BLD AUTO: 0.92 THOUSAND/ÂΜL (ref 0.17–1.22)
MONOCYTES NFR BLD AUTO: 11 % (ref 4–12)
MV E'TISSUE VEL-SEP: 6 CM/S
MV PEAK A VEL: 1 M/S
MV PEAK E VEL: 113 CM/S
MV STENOSIS PRESSURE HALF TIME: 103 MS
MV VALVE AREA P 1/2 METHOD: 2.14 CM2
NEUTROPHILS # BLD AUTO: 6.51 THOUSANDS/ÂΜL (ref 1.85–7.62)
NEUTS SEG NFR BLD AUTO: 75 % (ref 43–75)
NRBC BLD AUTO-RTO: 0 /100 WBCS
PLATELET # BLD AUTO: 166 THOUSANDS/UL (ref 149–390)
PMV BLD AUTO: 10.3 FL (ref 8.9–12.7)
POTASSIUM SERPL-SCNC: 5.1 MMOL/L (ref 3.5–5.3)
RA PRESSURE ESTIMATED: 15 MMHG
RBC # BLD AUTO: 4.67 MILLION/UL (ref 3.88–5.62)
RIGHT ATRIUM AREA SYSTOLE A4C: 24.4 CM2
RIGHT VENTRICLE ID DIMENSION: 5.6 CM
RV PSP: 83 MMHG
SL CV LEFT ATRIUM LENGTH A2C: 5 CM
SL CV LV EF: 55
SL CV PED ECHO LEFT VENTRICLE DIASTOLIC VOLUME (MOD BIPLANE) 2D: 108 ML
SL CV PED ECHO LEFT VENTRICLE SYSTOLIC VOLUME (MOD BIPLANE) 2D: 41 ML
SODIUM SERPL-SCNC: 141 MMOL/L (ref 135–147)
TR MAX PG: 68 MMHG
TR PEAK VELOCITY: 4.1 M/S
TRICUSPID VALVE PEAK REGURGITATION VELOCITY: 4.12 M/S
WBC # BLD AUTO: 8.65 THOUSAND/UL (ref 4.31–10.16)

## 2023-01-11 RX ORDER — FUROSEMIDE 10 MG/ML
80 INJECTION INTRAMUSCULAR; INTRAVENOUS ONCE
Status: COMPLETED | OUTPATIENT
Start: 2023-01-11 | End: 2023-01-11

## 2023-01-11 RX ORDER — INSULIN GLARGINE 100 [IU]/ML
75 INJECTION, SOLUTION SUBCUTANEOUS EVERY MORNING
Status: DISCONTINUED | OUTPATIENT
Start: 2023-01-12 | End: 2023-01-11

## 2023-01-11 RX ORDER — INSULIN GLARGINE 100 [IU]/ML
70 INJECTION, SOLUTION SUBCUTANEOUS EVERY MORNING
Status: DISCONTINUED | OUTPATIENT
Start: 2023-01-12 | End: 2023-01-12

## 2023-01-11 RX ORDER — FUROSEMIDE 10 MG/ML
80 INJECTION INTRAMUSCULAR; INTRAVENOUS
Status: DISCONTINUED | OUTPATIENT
Start: 2023-01-11 | End: 2023-01-13

## 2023-01-11 RX ORDER — INSULIN GLARGINE 100 [IU]/ML
80 INJECTION, SOLUTION SUBCUTANEOUS EVERY MORNING
Status: DISCONTINUED | OUTPATIENT
Start: 2023-01-11 | End: 2023-01-11

## 2023-01-11 RX ORDER — ACETAMINOPHEN 325 MG/1
650 TABLET ORAL EVERY 6 HOURS PRN
Status: DISCONTINUED | OUTPATIENT
Start: 2023-01-11 | End: 2023-01-22 | Stop reason: HOSPADM

## 2023-01-11 RX ADMIN — APIXABAN 5 MG: 5 TABLET, FILM COATED ORAL at 20:46

## 2023-01-11 RX ADMIN — ATORVASTATIN CALCIUM 40 MG: 40 TABLET, FILM COATED ORAL at 09:20

## 2023-01-11 RX ADMIN — PANTOPRAZOLE SODIUM 40 MG: 40 TABLET, DELAYED RELEASE ORAL at 18:16

## 2023-01-11 RX ADMIN — POTASSIUM CITRATE 10 MEQ: 10 TABLET, EXTENDED RELEASE ORAL at 09:23

## 2023-01-11 RX ADMIN — PANTOPRAZOLE SODIUM 40 MG: 40 TABLET, DELAYED RELEASE ORAL at 04:39

## 2023-01-11 RX ADMIN — FAMOTIDINE 40 MG: 20 TABLET ORAL at 21:06

## 2023-01-11 RX ADMIN — DIGOXIN 125 MCG: 125 TABLET ORAL at 09:20

## 2023-01-11 RX ADMIN — INSULIN GLARGINE 80 UNITS: 100 INJECTION, SOLUTION SUBCUTANEOUS at 12:30

## 2023-01-11 RX ADMIN — LISINOPRIL 20 MG: 20 TABLET ORAL at 09:21

## 2023-01-11 RX ADMIN — FUROSEMIDE 80 MG: 10 INJECTION, SOLUTION INTRAMUSCULAR; INTRAVENOUS at 09:46

## 2023-01-11 RX ADMIN — PERFLUTREN 3 ML/MIN: 6.52 INJECTION, SUSPENSION INTRAVENOUS at 14:11

## 2023-01-11 RX ADMIN — METOPROLOL SUCCINATE 100 MG: 100 TABLET, EXTENDED RELEASE ORAL at 09:20

## 2023-01-11 RX ADMIN — SPIRONOLACTONE 25 MG: 25 TABLET ORAL at 09:21

## 2023-01-11 RX ADMIN — APIXABAN 5 MG: 5 TABLET, FILM COATED ORAL at 09:21

## 2023-01-11 RX ADMIN — FUROSEMIDE 80 MG: 10 INJECTION, SOLUTION INTRAMUSCULAR; INTRAVENOUS at 12:30

## 2023-01-11 RX ADMIN — POTASSIUM CITRATE 10 MEQ: 10 TABLET, EXTENDED RELEASE ORAL at 18:16

## 2023-01-11 NOTE — CONSULTS
Consultation - Cardiology Team One  David Thomas 77 y o  male MRN: [id number]  Unit/Bed#: S -01 Encounter: [id number]    Inpatient consult to Cardiology  Consult performed by: KACEY Lorenzo  Consult ordered by: Taisha Burks MD      Physician Requesting Consult: Maureen Cummings MD  Reason for Consult / Principal Problem: CHF       Assessment/ Plan    1  Acute on chronic diastolic heart failure    On furosemide 80 mg IV BID and spirolactone 25 mg PO daily  Will titrate to TID and assess response  May need gtt  He takes furosemide 80 mg PO BID at home   Monitor I/Os -680 ml in 24 hours   Daily weights 318 lbs today  Dry weight approximately 305 lbs   Continue 2 gram Na diet and fluid restriction   Echocardiogram pending   BMP pending this morning  Check BMP in am     2  CAD s/p CABG in 2016 (LIMA to LAD, SVG to OM3 and SVG to PDA) in 2016  On atorvastatin and metoprolol XL     3  Paroxysmal atrial fibrillation    In NSR  On eliquis for [address]   On metoprolol XL and digoxin     4  Hyperlipidemia   LDL 67  On atorvastatin     5  Type II diabetes   Hemoglobin A1C 7 8 (11/2/2022)   On lantus and SSI     6  Hypertension  BP stable: 132/60  On metoprolol  mg PO BID, lisinopril 20 mg PO daily and spirolactone 25 mg PO daily      History of Present Illness   HPI: David Thomas is a 77y o  year old male who has CAD s/p CABG in 2016 (LIMA to LAD, SVG to OM3 and SVG to PDA), chronic diastolic heart failure, AAA, hyperlipidemia, type II diabetes, hypertension, paroxysmal atrial fibrillation, type II diabetes, LUCIA and GERD  He follows with cardiologist Dr Brandon Males  He presents to [facility] ER 1/10/2023 with complaint of SOB and swelling in his legs  Patient reports for the past several weeks feeling like he was retaining fluid  He takes furosemide 80 mg PO BID at home  He has been taking his medications as prescribed   He tries to follow a low salt diet but also reports eating lunch meats, hotdogs, sausage and mauro  He does not follow a fluid restriction at home because he was told by his nephrologist and GI provider to increase hydration  He drinks a lot of diet coke through the day  Cardiology consulted for acute on chronic HFpEF  He was started on furosemide 80 mg IV BID  He received 1 dose of furosemide 80 mg IV but did not have much response  His next dose is not scheduled till 2000  Will adjust diuretics  He reports he has been taking his weights on a daily basis and has noticed a increase in weight on a daily basis  His dry weight is about 305 lbs and weight on admission was 318 lbs  He lives at home with his wife  He lives a very sedentary lifestyle  He has history of tobacco abuse but quit in 2010 and reports no alcohol use  Echocardiogram 12/28/2021 showed EF 55%, R and L atrium mildly dilated, mild MR and mild TR  EKG reviewed personally:  Normal sinus rhythm with RBBB   Ventricular rate 67 bpm  NC interval 162 ms  QRSD 130 ms  QT interval 382 ms  QTc interval 403 ms     Review of Systems   Constitutional: Negative for chills and fever  HENT: Negative for congestion  Cardiovascular: Positive for dyspnea on exertion, leg swelling and orthopnea  Negative for chest pain  Respiratory: Positive for shortness of breath  Musculoskeletal: Negative for falls  Gastrointestinal: Positive for bloating  Negative for nausea and vomiting  Neurological: Negative for dizziness and light-headedness  Psychiatric/Behavioral: Negative for altered mental status  All other systems reviewed and are negative      Historical Information   Past Medical History:   Diagnosis Date   • A-fib [facility])    • AAA (abdominal aortic aneurysm)    • Actinic keratosis of right temple 7/31/2020   • Actinic keratosis of scalp 7/31/2020   • Acute respiratory failure with hypoxia ([facility] [address] ) 3/25/2021   • Allergic 1960   • Arthritis    • Lay's esophagus    • Bladder cancer (HCC)    • Blister of left leg 4/28/2021   • Blister of right leg 5/26/2021   • CAD (coronary artery disease)    • Cancer ([facility] ) 02/2010    Bladder   • CHF (congestive heart failure) ([facility])    • Chronic low back pain    • Chronic pain of both knees 7/31/2020   • Colitis 12/4/2020   • CPAP (continuous positive airway pressure) dependence    • Diabetes (HCC)    • Diabetes mellitus ([facility] ) 10/1993   • Excessive gas 12/3/2020   • Heart murmur    • History of chemotherapy    • Hydroureter on left 4/28/2021   • Hyperlipemia    • Hypertension    • Immunization deficiency 10/30/2020    Hep a nonimmune   • Kidney stone    • Left lower quadrant abdominal pain 12/3/2020   • Mass of right adrenal gland ([place] [address] ) 12/4/2020    4 1 cm   • Myocardial infarction ([place] [address] )    • 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   • Obesity 2000   • LUCIA (obstructive sleep apnea) 1/29/2021   • Pressure ulcer of right leg, stage 1 5/26/2021   • Urinary tract infection with hematuria 5/3/2021    u cx 4/2021=pseudomonas R to bactrim and cefdinir, S to cipro   • Venous stasis dermatitis of both lower extremities 5/26/2021     Past Surgical History:   Procedure Laterality Date   • BACK SURGERY     • BLADDER SURGERY     • CARDIAC CATHETERIZATION  04/2016   • CATARACT EXTRACTION, BILATERAL     • COLONOSCOPY  01/29/2019    next [address] [place] [address] GRAFT  04/2016    Quadruple per [place]   • EGD  06/2017   • EYE SURGERY  11/2016    Cataracts   • JOINT REPLACEMENT  [id number]    Hip and shoulder   • KIDNEY STONE SURGERY     • IN CYSTO BLADDER W/URETERAL CATHETERIZATION Left 04/24/2021    Procedure: CYSTOSCOPY  WITH INSERTION STENT URETERAL;  Surgeon: Alejandro Bunch MD;  [facility];  Service: Urology   • IN CYSTO/URETERO W/LITHOTRIPSY &INDWELL STENT INSRT Left 05/21/2021    Procedure: CYSTOSCOPY URETEROSCOPY WITH LITHOTRIPSY HOLMIUM LASER, AND INSERTION STENT URETERAL/EXCHANGE;  Surgeon: Alejandro Bunch MD;  Location[facility];  Service: Urology   • TOTAL HIP ARTHROPLASTY Right 2012   • TOTAL SHOULDER REPLACEMENT Right 2013   • VASECTOMY  1971     Social History     Substance and Sexual Activity   Alcohol Use Not Currently    Comment: No use     Social History     Substance and Sexual Activity   Drug Use Never    Comment: No use     Social History     Tobacco Use   Smoking Status Former   • Packs/day: 0 25   • Years: 20 00   • Pack years: 5 00   • Types: Cigarettes   • Start date: 1965   • Quit date: 2010   • Years since quittin 0   Smokeless Tobacco Never   Tobacco Comments    Quit several times for up to 6 years       Family History:   Family History   Problem Relation Age of Onset   • Heart disease Father    • Arthritis Father    • Heart attack Father    • Alzheimer's disease Mother    • Dementia Mother        Meds/Allergies   all current active meds have been reviewed and current meds:   Current Facility-Administered Medications   Medication Dose Route Frequency   • apixaban (ELIQUIS) tablet 5 mg  5 mg Oral Q12H [facility]   • atorvastatin (LIPITOR) tablet 40 mg  40 mg Oral Daily   • dicyclomine (BENTYL) capsule 10 mg  10 mg Oral BID PRN   • digoxin (LANOXIN) tablet 125 mcg  125 mcg Oral Daily   • famotidine (PEPCID) tablet 40 mg  40 mg Oral HS   • furosemide (LASIX) injection 80 mg  80 mg Intravenous BID (diuretic)   • insulin glargine (LANTUS) subcutaneous injection 90 Units 0 9 mL  90 Units Subcutaneous QAM   • insulin lispro (HumaLOG) 100 units/mL subcutaneous injection 2-12 Units  2-12 Units Subcutaneous TID AC   • lisinopril (ZESTRIL) tablet 20 mg  20 mg Oral Daily   • metoprolol succinate (TOPROL-XL) 24 hr tablet 100 mg  100 mg Oral Q12H   • pantoprazole (PROTONIX) EC tablet 40 mg  40 mg Oral BID AC   • potassium citrate (UROCIT-K) CR tablet 10 mEq  10 mEq Oral BID   • spironolactone (ALDACTONE) tablet 25 mg  25 mg Oral Daily          Allergies   Allergen Reactions   • Diltiazem Abdominal Pain       Objective   Vitals: Blood pressure 132/60, pulse 56, temperature 98 2 °F (36 8 °C), temperature source Oral, resp  rate 18, weight (!) 144 kg (318 lb 6 4 oz), SpO2 91 % ,     Body mass index is 38 76 kg/m²  ,     Systolic (98QIM), UAY:582 , Min:108 , MX     Diastolic (84UHU), TK, Min:39, Max:68        Intake/Output Summary (Last 24 hours) at 2023 0832  Last data filed at 2023 0420  Gross per 24 hour   Intake 0 ml   Output 680 ml   Net -680 ml     Weight (last 2 days)     Date/Time Weight    23 0600 144 (318 4)    01/10/23 1345 137 (302 69)         Invasive Devices     Peripheral Intravenous Line  Duration           Peripheral IV 01/10/23 Distal;Right;Upper;Ventral (anterior) Antecubital <1 day              Physical Exam  Constitutional:       General: He is not in acute distress  Appearance: He is obese  HENT:      Head: Normocephalic  Mouth/Throat:      Mouth: Mucous membranes are moist    Cardiovascular:      Rate and Rhythm: Normal rate and regular rhythm  Pulses: Normal pulses  Pulmonary:      Effort: Pulmonary effort is normal  No respiratory distress  Comments: Crackles in bases   4 L NC   Abdominal:      General: Bowel sounds are normal  There is distension  Palpations: Abdomen is soft  Musculoskeletal:         General: Swelling present  Normal range of motion  Cervical back: Neck supple  Skin:     General: Skin is warm and dry  Capillary Refill: Capillary refill takes less than 2 seconds  Comments: Bilateral venous stasis with blisters and weeping    Neurological:      Mental Status: He is alert and oriented to person, place, and time     Psychiatric:         Mood and Affect: Mood normal            LABORATORY RESULTS:      CBC with diff:   Results from last 7 days   Lab Units 01/10/23  1243   WBC Thousand/uL 10 00   HEMOGLOBIN g/dL 10 5*   HEMATOCRIT % 39 4   MCV fL 79*   PLATELETS Thousands/uL 203   MCH pg 21 1*   MCHC g/dL 26 6*   RDW % 18 3*   MPV fL 10 2   NRBC AUTO /100 WBCs 0 CMP:  Results from last 7 days   Lab Units 01/10/23  1243   POTASSIUM mmol/L 4 7   CHLORIDE mmol/L 99   CO2 mmol/L 37*   BUN mg/dL 43*   CREATININE mg/dL 1 18   CALCIUM mg/dL 9 3   AST U/L 16   ALT U/L 10   ALK PHOS U/L 69   EGFR ml/min/1 73sq m 58       BMP:  Results from last 7 days   Lab Units 01/10/23  1243   POTASSIUM mmol/L 4 7   CHLORIDE mmol/L 99   CO2 mmol/L 37*   BUN mg/dL 43*   CREATININE mg/dL 1 18   CALCIUM mg/dL 9 3     Lab Results   Component Value Date    NTBNP 37 10/06/2021    NTBNP 87 2021    NTBNP 165 2021     Lipid Profile:   No results found for: CHOL  Lab Results   Component Value Date    HDL 34 (L) 2022    HDL 32 (L) 2022    HDL 37 (L) 10/06/2021     Lab Results   Component Value Date    LDLCALC 67 2022    LDLCALC 60 2022    LDLCALC 65 10/06/2021     Lab Results   Component Value Date    TRIG 153 (H) 2022    TRIG 223 (H) 2022    TRIG 201 (H) 10/06/2021     Cardiac testing:   Results for orders placed during the hospital encounter of 21    Echo complete with contrast if indicated    Narrative  [place] [address], [address] [place] [address]  [phone]    Transthoracic Echocardiogram  2D, M-mode, Doppler, and Color Doppler    Study date:  23-Mar-2021    Patient: Bk Chun  MR number: [id number]  Account number: [de-identified]  : 10-Herson-1945  Age: 67 years  Gender: Male  Status: Outpatient  Location: [address] [place] [address] and Vascular Center  Height: 76 in  Weight: 331 3 lb  BP: 160/ 80 mmHg    Indications: Assess left ventricular function  Diagnoses: R60 9 - Edema, unspecified    Sonographer:  RODRIGO Fuchs  Primary Physician:  Mervat Arevalo MD  Referring Physician:  Daniella Baeza MD  Group:  [facility]  Interpreting Physician:  Lorenzo Mccabe MD    SUMMARY    LEFT VENTRICLE:  Systolic function was normal by visual assessment  Ejection fraction was estimated to be 60 %    There were no regional wall motion abnormalities  Wall thickness was mildly increased  Doppler parameters were consistent with abnormal left ventricular relaxation (grade 1 diastolic dysfunction)  RIGHT VENTRICLE:  The ventricle was mildly dilated  Systolic function was mildly reduced  Systolic pressure was mildly increased  Estimated peak pressure was 45 mmHg  LEFT ATRIUM:  The atrium was mildly dilated  RIGHT ATRIUM:  The atrium was mildly dilated  MITRAL VALVE:  There was mild annular calcification  There was trace regurgitation  TRICUSPID VALVE:  There was mild to moderate regurgitation  HISTORY: PRIOR HISTORY: Leg edema, CAD, Afib, Hypertension, Hyperlipidemia    PROCEDURE: The study was performed in the [address] [place] [address] and [facility] [place]  This was a routine study  The transthoracic approach was used  The study included complete 2D imaging, M-mode, complete spectral Doppler, and color Doppler  The  heart rate was 108 bpm, at the start of the study  Images were obtained from the parasternal, apical, subcostal, and suprasternal notch acoustic windows  Intravenous contrast ( 0 4 ml Definity/NSS) was administered  This was a technically  difficult study  LEFT VENTRICLE: Size was normal  Systolic function was normal by visual assessment  Ejection fraction was estimated to be 60 %  There were no regional wall motion abnormalities  Wall thickness was mildly increased  DOPPLER: There was an  increased relative contribution of atrial contraction to ventricular filling  Doppler parameters were consistent with abnormal left ventricular relaxation (grade 1 diastolic dysfunction)  RIGHT VENTRICLE: The ventricle was mildly dilated  Systolic function was mildly reduced  DOPPLER: Systolic pressure was mildly increased  Estimated peak pressure was 45 mmHg  LEFT ATRIUM: The atrium was mildly dilated  RIGHT ATRIUM: The atrium was mildly dilated  MITRAL VALVE: There was mild annular calcification   Valve structure was normal  There was normal leaflet separation  DOPPLER: The transmitral velocity was within the normal range  There was no evidence for stenosis  There was trace  regurgitation  AORTIC VALVE: The valve was trileaflet  Leaflets exhibited normal thickness, mild calcification, and normal cuspal separation  DOPPLER: Transaortic velocity was within the normal range  There was no evidence for stenosis  There was no  regurgitation  TRICUSPID VALVE: The valve structure was normal  There was normal leaflet separation  DOPPLER: The transtricuspid velocity was within the normal range  There was no evidence for stenosis  There was mild to moderate regurgitation  PULMONIC VALVE: Leaflets exhibited normal thickness, no calcification, and normal cuspal separation  DOPPLER: The transpulmonic velocity was within the normal range  There was no regurgitation  PERICARDIUM: There was no pericardial effusion  AORTA: The root exhibited normal size  SYSTEMIC VEINS: IVC: The inferior vena cava was normal in size and course   Respirophasic changes were normal     SYSTEM MEASUREMENT TABLES    2D  %FS: 36 5 %  Ao Diam: 3 93 cm  EDV(Teich): 161 22 ml  EF(Teich): 65 53 %  ESV(Teich): 55 57 ml  IVSd: 1 2 cm  LA Area: 28 91 cm2  LA Diam: 5 52 cm  LVEDV MOD A4C: 89 05 ml  LVEF MOD A4C: 60 77 %  LVESV MOD A4C: 34 93 ml  LVIDd: 5 72 cm  LVIDs: 3 63 cm  LVLd A4C: 7 78 cm  LVLs A4C: 6 46 cm  LVPWd: 1 21 cm  RA Area: 24 49 cm2  RVIDd: 4 38 cm  SV MOD A4C: 54 11 ml  SV(Teich): 105 65 ml    CW  TR MaxP 73 mmHg  TR Vmax: 3 34 m/s    MM  TAPSE: 1 91 cm    PW  E' Sept: 0 07 m/s  E/E' Sept: 13 16  MV A Teja: 1 14 m/s  MV Dec [place]: 4 59 m/s2  MV DecT: 193 44 ms  MV E Teja: 0 89 m/s  MV E/A Ratio: 0 78  MV PHT: 56 1 ms  MVA By PHT: 3 92 cm2    [employer] Accredited Echocardiography Laboratory    Prepared and electronically signed by    Gustavo Erickson MD  Signed 24-Mar-2021 08:42:46    No results found for this or any previous visit  No valid procedures specified  No results found for this or any previous visit  Imaging:   XR chest 1 view portable    Result Date: 1/10/2023  Narrative: CHEST INDICATION:   shortness of breath  COMPARISON:  12/1/2022 EXAM PERFORMED/VIEWS:  XR CHEST PORTABLE FINDINGS:  There are median sternotomy wires indicating prior cardiac surgery  The cardiac silhouette is without change from the prior study  Lung markings are crowded secondary to low lung volumes  Within limitations of this examination there is no focal airspace opacity to suggest pneumonia  No pneumothorax or pleural effusion  Bibasilar scarring is unchanged  There is a right shoulder arthroplasty     Impression: Shallow depth of inspiration  No interval change from 12/1/2022 Workstation performed: [id number]     Thank you for allowing us to participate in this patient's care  This pt will follow up with Dr Luis Enrique Hernandez once discharged  Counseling / Coordination of Care  Total floor / unit time spent today 45 minutes  Greater than 50% of total time was spent with the patient and / or family counseling and / or coordination of care  A description of the counseling / coordination of care: Review of history, current assessment, development of a plan  Code Status: Level 1 - Full Code    ** Please Note: Dragon 360 Dictation voice to text software may have been used in the creation of this document   **

## 2023-01-11 NOTE — ASSESSMENT & PLAN NOTE
Lab Results   Component Value Date    HGBA1C 7 8 (H) 11/02/2022       Recent Labs     01/11/23  0743 01/11/23  0817 01/11/23  0846 01/11/23  1110   POCGLU 64* 60* 136 90       Blood Sugar Average: Last 72 hrs:  (P) 55 [id number]   · Home regimen: Tresiba 90 units daily  · Start at decreased dose of Lantus 80 mg qAM  · SSI coverage with Accu-Cheks ACHS  · Hypoglycemia protocol

## 2023-01-11 NOTE — DISCHARGE INSTR - OTHER ORDERS
Skin care plans:  1-Hydraguard to bilateral sacrum, buttock and heels BID and PRN  2-Elevate heels to offload pressure  3-Ehob cushion in chair when out of bed  4-Moisturize skin daily with skin nourishing cream   5-Turn/reposition q2h or when medically stable for pressure re-distribution on skin  6-Cleanse B/L legs and left foot with soap and water, pat dry, apply hydraguard to dry skin on legs and feet, apply adaptic and silver alginate to wound beds, cover with ABD, secure with rocío and tape  Wrap with 4 inch ACE wrap from toes above ankle and then 6 inch ACE wrap from ankle to knee, make sure to be able to place two fingers to test for tightness       A referral has been made for you at the outpatient wound management center, [phone] option 1, to manage your wound after your discharge

## 2023-01-11 NOTE — PLAN OF CARE
Problem: DISCHARGE PLANNING  Goal: Discharge to home or other facility with appropriate resources  Description: INTERVENTIONS:  - Identify barriers to discharge w/patient and caregiver  - Arrange for needed discharge resources and transportation as appropriate  - Identify discharge learning needs (meds, wound care, etc )  - Arrange for interpretive services to assist at discharge as needed  - Refer to Case Management Department for coordinating discharge planning if the patient needs post-hospital services based on physician/advanced practitioner order or complex needs related to functional status, cognitive ability, or social support system  Outcome: Progressing     Problem: Knowledge Deficit  Goal: Patient/family/caregiver demonstrates understanding of disease process, treatment plan, medications, and discharge instructions  Description: Complete learning assessment and assess knowledge base    Interventions:  - Provide teaching at level of understanding  - Provide teaching via preferred learning methods  Outcome: Progressing     Problem: CARDIOVASCULAR - ADULT  Goal: Maintains optimal cardiac output and hemodynamic stability  Description: INTERVENTIONS:  - Monitor I/O, vital signs and rhythm  - Monitor for S/S and trends of decreased cardiac output  - Administer and titrate ordered vasoactive medications to optimize hemodynamic stability  - Assess quality of pulses, skin color and temperature  - Assess for signs of decreased coronary artery perfusion  - Instruct patient to report change in severity of symptoms  Outcome: Progressing  Goal: Absence of cardiac dysrhythmias or at baseline rhythm  Description: INTERVENTIONS:  - Continuous cardiac monitoring, vital signs, obtain 12 lead EKG if ordered  - Administer antiarrhythmic and heart rate control medications as ordered  - Monitor electrolytes and administer replacement therapy as ordered  Outcome: Progressing     Problem: RESPIRATORY - ADULT  Goal: Achieves optimal ventilation and oxygenation  Description: INTERVENTIONS:  - Assess for changes in respiratory status  - Assess for changes in mentation and behavior  - Position to facilitate oxygenation and minimize respiratory effort  - Oxygen administered by appropriate delivery if ordered  - Initiate smoking cessation education as indicated  - Encourage broncho-pulmonary hygiene including cough, deep breathe, Incentive Spirometry  - Assess the need for suctioning and aspirate as needed  - Assess and instruct to report SOB or any respiratory difficulty  - Respiratory Therapy support as indicated  Outcome: Progressing     Problem: MOBILITY - ADULT  Goal: Maintain or return to baseline ADL function  Description: INTERVENTIONS:  -  Assess patient's ability to carry out ADLs; assess patient's baseline for ADL function and identify physical deficits which impact ability to perform ADLs (bathing, care of mouth/teeth, toileting, grooming, dressing, etc )  - Assess/evaluate cause of self-care deficits   - Assess range of motion  - Assess patient's mobility; develop plan if impaired  - Assess patient's need for assistive devices and provide as appropriate  - Encourage maximum independence but intervene and supervise when necessary  - Involve family in performance of ADLs  - Assess for home care needs following discharge   - Consider OT consult to assist with ADL evaluation and planning for discharge  - Provide patient education as appropriate  Outcome: Progressing  Goal: Maintains/Returns to pre admission functional level  Description: INTERVENTIONS:  - Perform BMAT or MOVE assessment daily    - Set and communicate daily mobility goal to care team and patient/family/caregiver  - Collaborate with rehabilitation services on mobility goals if consulted  - Perform Range of Motion 2 times a day  - Reposition patient every 2 hours    - Dangle patient 2 times a day  - Stand patient 2 times a day  - Ambulate patient 2 times a day  - Out of bed to chair 2 times a day   - Out of bed for meals 2 times a day  - Out of bed for toileting  - Record patient progress and toleration of activity level   Outcome: Progressing     Problem: Potential for Falls  Goal: Patient will remain free of falls  Description: INTERVENTIONS:  - Educate patient/family on patient safety including physical limitations  - Instruct patient to call for assistance with activity   - Consult OT/PT to assist with strengthening/mobility   - Keep Call bell within reach  - Keep bed low and locked with side rails adjusted as appropriate  - Keep care items and personal belongings within reach  - Initiate and maintain comfort rounds  - Make Fall Risk Sign visible to staff  - Offer Toileting every 2 Hours, in advance of need  - Initiate/Maintain alarm  - Obtain necessary fall risk management equipment:   - Apply yellow socks and bracelet for high fall risk patients  - Consider moving patient to room near nurses station  Outcome: Progressing

## 2023-01-11 NOTE — ASSESSMENT & PLAN NOTE
Wt Readings from Last 3 Encounters:   01/11/23 (!) 144 kg (318 lb 6 4 oz)   12/15/22 (!) 139 kg (306 lb)   11/21/22 (!) 138 kg (304 lb)     Patient presenting with worsening lower extremity edema, abdominal tightness, weight gain of 21 pounds in 2 to 3 weeks, PND  Patient denies any dietary or medication noncompliance  · Chronically on PO Lasix 80mg BID at home     · S/p 80 mg IV in ER with good response  · ECHO ordered, pending scheduling  · Cardiology consulted:  · Increase IV Lasix 80mg BID->TID dosing, may need Lasix gtt  · Continue spironolactone 25 mg daily  · Continue Toprol-XL, lisinopril  · Daily weights, I/O's  · Low-sodium diet, 1 5L fluid restriction

## 2023-01-11 NOTE — QUICK NOTE
BP this afternoon 86/46, repeat manual BP 90/48  Patient also has been intermittently hypoglycemic today, likely contributing  Suspect diuresis-induced hypotension  May need to decrease BB or ACE to allow more room for diuresis  Discussed with RN, continue with hypoglycemia protocol and encourage oral intake, will recheck BP in 1 hour

## 2023-01-11 NOTE — PLAN OF CARE
Problem: DISCHARGE PLANNING  Goal: Discharge to home or other facility with appropriate resources  Description: INTERVENTIONS:  - Identify barriers to discharge w/patient and caregiver  - Arrange for needed discharge resources and transportation as appropriate  - Identify discharge learning needs (meds, wound care, etc )  - Arrange for interpretive services to assist at discharge as needed  - Refer to Case Management Department for coordinating discharge planning if the patient needs post-hospital services based on physician/advanced practitioner order or complex needs related to functional status, cognitive ability, or social support system  Outcome: Progressing     Problem: Knowledge Deficit  Goal: Patient/family/caregiver demonstrates understanding of disease process, treatment plan, medications, and discharge instructions  Description: Complete learning assessment and assess knowledge base    Interventions:  - Provide teaching at level of understanding  - Provide teaching via preferred learning methods  Outcome: Progressing     Problem: CARDIOVASCULAR - ADULT  Goal: Maintains optimal cardiac output and hemodynamic stability  Description: INTERVENTIONS:  - Monitor I/O, vital signs and rhythm  - Monitor for S/S and trends of decreased cardiac output  - Administer and titrate ordered vasoactive medications to optimize hemodynamic stability  - Assess quality of pulses, skin color and temperature  - Assess for signs of decreased coronary artery perfusion  - Instruct patient to report change in severity of symptoms  Outcome: Progressing  Goal: Absence of cardiac dysrhythmias or at baseline rhythm  Description: INTERVENTIONS:  - Continuous cardiac monitoring, vital signs, obtain 12 lead EKG if ordered  - Administer antiarrhythmic and heart rate control medications as ordered  - Monitor electrolytes and administerlacement therapy as ordered  Outcome: Progressing     Problem: RESPIRATORY - ADULT  Goal: Achieves optimal ventilation and oxygenation  Description: INTERVENTIONS:  - Assess for changes in respiratory status  - Assess for changes in mentation and behavior  - Position to facilitate oxygenation and minimize respiratory effort  - Oxygen administered by appropriate delivery if ordered  - Initiate smoking cessation education as indicated  - Encourage broncho-pulmonary hygiene including cough, deep breathe, Incentive Spirometry  - Assess the need for suctioning and aspirate as needed  - Assess and instruct to report SOB or any respiratory difficulty  - Respiratory Therapy support as indicated  Outcome: Progressing

## 2023-01-11 NOTE — WOUND OSTOMY CARE
Consult Note - Wound   Catrachita Fly 77 y o  male MRN: [id number]  Unit/Bed#: S -01 Encounter: [id number]        History and Present Illness:  Patient is 78 yo male admitted [facility] with CHF  Patient has history of LUCIA, DM, HTN, and CAD  Patient is continent of bowel and bladder  Patient is independent on ADLs at baseline  Assessment Findings:     1  Venous ulcers B/L legs- full thickness weepy wounds to b/l legs, the left measuring larger in both wound size and edema, with mixed tissue of yellow slough and biofilm and pink tissue, immediate surrounding periwound is macerated from drainage and surrounding skin is edematous and scaly  Applied hydraguard to dry, scaly skin, and adaptic and silver alginate for management of drainage and bioburden  Legs then wrapped with ACE wrapped for edema management and encouraged patient to elevate legs  Also encouraged patient to follow up at out patient wound center for continued management of wounds  2  Venous ulcer left anterior foot- skin appears to have a yellow intact scab however with significant edema there is still clear, serous fluid leaking from foot, incorporated dressing to encompass foot for drainage management  Sacral/buttocks and B/L heels intact and blanching     No induration, fluctuance, odor, warmth/temperature differences, redness, or purulence noted to the above noted wounds and skin areas assessed  Patient tolerated well- no s/s of non-verbal pain or discomfort observed during the encounter  Bedside nurse aware of plan of care  See flow sheets for more detailed assessment findings  Wound care will continue to follow  Skin care plans:  1-Hydraguard to bilateral sacrum, buttock and heels BID and PRN  2-Elevate heels to offload pressure  3-Ehob cushion in chair when out of bed  4-Moisturize skin daily with skin nourishing cream   5-Turn/reposition q2h or when medically stable for pressure re-distribution on skin      6-Cleanse B/L legs and left foot with soap and water, pat dry, apply hydraguard to dry skin on legs and feet, apply adaptic and silver alginate to wound beds, cover with ABD, secure with rocío and tape  Wrap with 4 inch ACE wrap from toes above ankle and then 6 inch ACE wrap from ankle to knee, make sure to be able to place two fingers to test for tightness  Wounds:  Wound 01/10/23 Venous Ulcer Pretibial Left (Active)   Wound Image   01/11/23 0954   Wound Description Epithelialization;Fragile;Granulation tissue;Slough 01/11/23 0954   Pressure Injury Stage O 01/10/23 1707   Cynthia-wound Assessment Edema; Maceration;Scaly 01/11/23 0954   Wound Length (cm) 13 cm 01/11/23 0954   Wound Width (cm) 8 cm 01/11/23 0954   Wound Depth (cm) 0 2 cm 01/11/23 0954   Wound Surface Area (cm^2) 104 cm^2 01/11/23 0954   Wound Volume (cm^3) 20 8 cm^3 01/11/23 0954   Calculated Wound Volume (cm^3) 20 8 cm^3 01/11/23 0954   Tunneling 0 cm 01/11/23 0954   Tunneling in depth located at 0 01/11/23 0954   Undermining 0 01/11/23 0954   Undermining is depth extending from 0 01/11/23 0954   Wound Site Closure KIERAN 01/11/23 0954   Drainage Amount Moderate 01/11/23 0954   Drainage Description Serous 01/11/23 0954   Non-staged Wound Description Full thickness 01/11/23 0954   Treatments Cleansed 01/11/23 0954   Dressing ABD;Calcium Alginate with Silver;Dry dressing 01/11/23 0954   Wound packed? No 01/11/23 0954   Packing- # removed 0 01/11/23 0954   Packing- # inserted 0 01/11/23 9957   Dressing Changed New 01/11/23 0954   Patient Tolerance Tolerated well 01/11/23 0954   Dressing Status Clean;Dry; Intact 01/11/23 0954       Wound 01/10/23 Venous Ulcer Foot Anterior; Left (Active)   Wound Image   01/11/23 0956   Wound Description Epithelialization;Yellow 01/11/23 0956   Cynthia-wound Assessment Edema 01/11/23 0956   Wound Length (cm) 1 cm 01/11/23 0956   Wound Width (cm) 1 cm 01/11/23 0956   Wound Depth (cm) 0 cm 01/11/23 0956   Wound Surface Area (cm^2) 1 cm^2 01/11/23 0413   Wound Volume (cm^3) 0 cm^3 01/11/23 0956   Calculated Wound Volume (cm^3) 0 cm^3 01/11/23 0956   Tunneling 0 cm 01/11/23 0956   Tunneling in depth located at 0 01/11/23 0956   Undermining 0 01/11/23 0956   Undermining is depth extending from 0 01/11/23 0956   Wound Site Closure KIERAN 01/11/23 0956   Drainage Amount Small 01/11/23 0956   Drainage Description Serous 01/11/23 0956   Non-staged Wound Description Not applicable 35/82/14 2956   Treatments Cleansed 01/11/23 0956   Dressing Dry dressing 01/11/23 0956   Wound packed? No 01/11/23 0956   Packing- # removed 0 01/11/23 0956   Packing- # inserted 0 01/11/23 0956   Dressing Changed New 01/11/23 0956   Patient Tolerance Tolerated well 01/11/23 0956   Dressing Status Clean;Dry; Intact 01/11/23 0956       Wound 01/10/23 Venous Ulcer Pretibial Right (Active)   Wound Image   01/11/23 0952   Wound Description Pink;Slough 01/11/23 0952   Pressure Injury Stage O 01/10/23 1707   Cynthia-wound Assessment Edema; Maceration;Scaly 01/11/23 0952   Wound Length (cm) 1 5 cm 01/11/23 0952   Wound Width (cm) 2 cm 01/11/23 0952   Wound Depth (cm) 0 2 cm 01/11/23 0952   Wound Surface Area (cm^2) 3 cm^2 01/11/23 0952   Wound Volume (cm^3) 0 6 cm^3 01/11/23 0952   Calculated Wound Volume (cm^3) 0 6 cm^3 01/11/23 0952   Tunneling 0 cm 01/11/23 0952   Tunneling in depth located at 0 01/11/23 0952   Undermining 0 01/11/23 0952   Undermining is depth extending from 0 01/11/23 0952   Wound Site Closure KIERAN 01/11/23 0952   Drainage Amount Moderate 01/11/23 0952   Drainage Description Serous 01/11/23 0952   Non-staged Wound Description Full thickness 01/11/23 0952   Treatments Cleansed 01/11/23 0952   Dressing ABD;Calcium Alginate with Silver;Dry dressing 01/11/23 0952   Wound packed?  No 01/11/23 0952   Packing- # removed 0 01/11/23 0952   Packing- # inserted 0 01/11/23 0952   Dressing Changed New 01/11/23 0952   Patient Tolerance Tolerated well 01/11/23 0952   Dressing Status Clean;Dry; Intact 01/11/23 0931         Lynda Ogden BSN, RN, [facility]

## 2023-01-11 NOTE — ASSESSMENT & PLAN NOTE
· Patient was on CPAP  Recently transitioned to BiPAP by his pulmonologist but still has not received the machine    · C/w BiPAP overnight  · May need CM consult to assist with equipment when closer to discharge

## 2023-01-11 NOTE — ASSESSMENT & PLAN NOTE
Hypoxia on arrival, desaturations to 77% on room air  Denies home O2 use    · Likely due to CHF exacerbation  · Currently on 6 L NC O2, likely will need desaturation screening prior to discharge  · Wean O2 as tolerated to maintain SPO2 >90%  · Respiratory protocol

## 2023-01-11 NOTE — CASE MANAGEMENT
Case Management Assessment & Discharge Planning Note    Patient name Gavino Beard  Location S /S -48 MRN [id number]  : 1945 Date 2023       Current Admission Date: 1/10/2023  Current Admission Diagnosis:Acute on chronic diastolic (congestive) heart failure [facility])   Patient Active Problem List    Diagnosis Date Noted   • Pulmonary hypertension ([place] [address] ) 2022   • Chronic acquired lymphedema 10/26/2022   • Cirrhosis of liver without ascites ([place] [address] ) 2022   • Hallucination 2022   • Jerking movements of extremities 2022   • Vitamin D deficiency 08/10/2022   • Gastroesophageal reflux disease 2022   • PAD (peripheral artery disease) ([place] [address] ) 2022   • Atrial fibrillation with rapid ventricular response ([place] [address] ) 2021   • Cellulitis and abscess of left lower extremity 2021   • Ureteral calculi 12/15/2021   • Benign prostatic hyperplasia without lower urinary tract symptoms 12/15/2021   • Hypokalemia 10/19/2021   • Persistent proteinuria 2021   • Nipple pain 15/92/2655   • Folliculitis    • Venous stasis dermatitis of both lower extremities 2021   • Blister of right leg 2021   • Myocardial infarction [facility])    • Blister of left leg 2021   • Hydroureter on left 2021   • Nephrolithiasis 2021   • Fall 2021   • Hypercalcemia 2021   • Scaly skin on examination 2021   • Acute on chronic diastolic (congestive) heart failure ([facility] ) 2021   • Left upper lobe pulmonary nodule 2021   • Acute respiratory failure with hypoxia ([place] [address] ) 2021   • Hypoxia 2021   • Bilateral edema of lower extremity 2021   • LUCIA (obstructive sleep apnea) 2021   • Embolism and thrombosis of arteries of the lower extremities ([place] [address] ) 2021   • Colitis 2020   • Adrenal nodule ([facility] ) 2020   • Left lower quadrant abdominal pain 2020   • Excessive gas 2020   • Morbid obesity ([facility] ) 12/03/2020   • 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   • Immunization deficiency 10/30/2020   • Chronic pain of both knees 07/31/2020   • Actinic keratosis of right temple 07/31/2020   • Actinic keratosis of scalp 07/31/2020   • Decreased pedal pulses 04/04/2019   • Hypertension    • Hyperlipemia    • Type 2 diabetes mellitus with hyperglycemia, with long-term current use of insulin (HCC)    • Chronic low back pain    • CAD (coronary artery disease)    • Malignant neoplasm of urinary bladder (HCC)    • Arthritis    • Paroxysmal atrial fibrillation (HCC)    • AAA (abdominal aortic aneurysm)       LOS (days): 1  Geometric Mean LOS (GMLOS) (days): 3 90  Days to GMLOS:2 8     OBJECTIVE:    Risk of Unplanned Readmission Score: 19 33         Current admission status: Inpatient       Preferred Pharmacy:   [address] [place] [address]  [address] [place] [address]  Phone: [phone] Fax: [phone]    Primary Care Provider: Dolores Caceres MD    Primary Insurance: MEDICARE  Secondary Insurance: [facility]    ASSESSMENT:  Active Health Care Proxies    There are no active Health Care Proxies on file  Patient Information  Admitted from[de-identified] Home  Mental Status: Alert  During Assessment patient was accompanied by: Not accompanied during assessment  Assessment information provided by[de-identified] Patient  Primary Caregiver: Self  Support Systems: [facility] of Residence: [address] [place] [address] do you live in?: [facility] entry access options   Select all that apply : No steps to enter home  Type of Current Residence: [place]  In the last 12 months, was there a time when you were not able to pay the mortgage or rent on time?: No  In the last 12 months, was there a time when you did not have a steady place to sleep or slept in a shelter (including now)?: No  Living Arrangements: Lives w/ Spouse/significant other    Activities of Daily Living Prior to Admission  Functional Status: Independent  Completes ADLs independently?: Yes  Ambulates independently?: Yes  Does patient use assisted devices?: Yes  Assisted Devices (DME) used: Malini Delatorre  Does patient currently own DME?: Yes  What DME does the patient currently own?: Mary Pascal  Does patient have a history of Outpatient Therapy (PT/OT)?: No  Does the patient have a history of Short-Term Rehab?: No  Does patient have a history of HHC?: Yes ([facility])  Does patient currently have [address]?: No         Patient Information Continued  Within the past 12 months, you worried that your food would run out before you got the money to buy more : Never true  Within the past 12 months, the food you bought just didn't last and you didn't have money to get more : Never true         Means of Transportation  Means of Transport to Appts[de-identified] Drives Self  In the past 12 months, has lack of transportation kept you from medical appointments or from getting medications?: No  In the past 12 months, has lack of transportation kept you from meetings, work, or from getting things needed for daily living?: No        DISCHARGE DETAILS:    Discharge planning discussed with[de-identified] patient  Freedom of Choice: Yes  Comments - Freedom of Choice: PT/OT eval pending  patient relayed is open to [address] if it is rcmded with Penny Burkitt as preference as he had utilized previously  pt does not want STR if it is recommended

## 2023-01-11 NOTE — PROGRESS NOTES
[facility]  Progress Note - Betsy Saldivar 1945, 77 y o  male MRN: [id number]  Unit/Bed#: S -01 Encounter: [id number]  Primary Care Provider: Magdiel Brady MD   Date and time admitted to hospital: 1/10/2023 12:21 PM    * Acute on chronic diastolic (congestive) heart failure [facility])  Assessment & Plan  Wt Readings from Last 3 Encounters:   01/11/23 (!) 144 kg (318 lb 6 4 oz)   12/15/22 (!) 139 kg (306 lb)   11/21/22 (!) 138 kg (304 lb)     Patient presenting with worsening lower extremity edema, abdominal tightness, weight gain of 21 pounds in 2 to 3 weeks, PND  Patient denies any dietary or medication noncompliance  · Chronically on PO Lasix 80mg BID at home  · S/p 80 mg IV in ER with good response  · ECHO ordered, pending scheduling  · Cardiology consulted:  · Increase IV Lasix 80mg BID->TID dosing, may need Lasix gtt  · Continue spironolactone 25 mg daily  · Continue Toprol-XL, lisinopril  · Daily weights, I/O's  · Low-sodium diet, 1 5L fluid restriction    Acute respiratory failure with hypoxia (HCC)  Assessment & Plan  Hypoxia on arrival, desaturations to 77% on room air  Denies home O2 use  · Likely due to CHF exacerbation  · Currently on 6 L NC O2, likely will need desaturation screening prior to discharge  · Wean O2 as tolerated to maintain SPO2 >90%  · Respiratory protocol    Venous stasis dermatitis of both lower extremities  Assessment & Plan  · Wound care    LUCIA (obstructive sleep apnea)  Assessment & Plan  · Patient was on CPAP  Recently transitioned to BiPAP by his pulmonologist but still has not received the machine  · C/w BiPAP overnight  · May need CM consult to assist with equipment when closer to discharge    Paroxysmal atrial fibrillation [facility])  Assessment & Plan  · Rate controlled  · Continue Toprol-XL, digoxin, Eliquis     CAD (coronary artery disease)  Assessment & Plan  · Continue Eliquis, metoprolol, statin      Type 2 diabetes mellitus with hyperglycemia, with long-term current use of insulin [facility])  Assessment & Plan  Lab Results   Component Value Date    HGBA1C 7 8 (H) 2022       Recent Labs     23  0743 23  0817 23  0846 23  1110   POCGLU 64* 60* 136 90       Blood Sugar Average: Last 72 hrs:  (P) 97 [id number]   · Home regimen: Tresiba 90 units daily  · Start at decreased dose of Lantus 80 mg qAM  · SSI coverage with Accu-Cheks ACHS  · Hypoglycemia protocol    Hypertension  Assessment & Plan  · Stable  · Continue lisinopril, metoprolol, spironolactone, Lasix  VTE Pharmacologic Prophylaxis: VTE Score: 7 High Risk (Score >/= 5) - Pharmacological DVT Prophylaxis Ordered: apixaban (Eliquis)  Sequential Compression Devices Ordered  Patient Centered Rounds: I performed bedside rounds with nursing staff today  Discussions with Specialists or Other Care Team Provider: Case management    Education and Discussions with Family / Patient: Patient declined call to   Time Spent for Care: 45 minutes  More than 50% of total time spent on counseling and coordination of care as described above  Current Length of Stay: 1 day(s)  Current Patient Status: Inpatient   Certification Statement: The patient will continue to require additional inpatient hospital stay due to Acute respiratory failure, IV diuresis  Discharge Plan: Anticipate discharge in 48-72 hrs to discharge location to be determined pending rehab evaluations  Code Status: Level 1 - Full Code    Subjective:   Patient seen at bedside this a m , denies SOB or respiratory symptoms at this time, although does note that he has not noticed significant improvement in swelling of his legs      Objective:     Vitals:   Temp (24hrs), Av 2 °F (36 8 °C), Min:97 4 °F (36 3 °C), Max:99 3 °F (37 4 °C)    Temp:  [97 4 °F (36 3 °C)-99 3 °F (37 4 °C)] 98 2 °F (36 8 °C)  HR:  [56-75] 67  Resp:  [18-20] 18  BP: (108-137)/(39-68) 132/60  SpO2:  [89 %-95 %] 91 %  Body mass index is 38 76 kg/m²  Input and Output Summary (last 24 hours): Intake/Output Summary (Last 24 hours) at 1/11/2023 1348  Last data filed at 1/11/2023 1052  Gross per 24 hour   Intake 540 ml   Output 1000 ml   Net -460 ml       Physical Exam:   Physical Exam  Constitutional:       General: He is not in acute distress  Appearance: He is obese  He is not ill-appearing, toxic-appearing or diaphoretic  Cardiovascular:      Rate and Rhythm: Normal rate and regular rhythm  Pulses: Normal pulses  Heart sounds: Normal heart sounds  Pulmonary:      Effort: Pulmonary effort is normal  No respiratory distress  Comments: Decreased breath sounds across lung fields, no overt rales or rhonchi  Abdominal:      General: Bowel sounds are normal  There is distension  Palpations: Abdomen is soft  Tenderness: There is no abdominal tenderness  Musculoskeletal:         General: Swelling present  No tenderness  Comments: 2-3+ bilateral lower extremity pitting edema, does not extend past knee   Skin:     General: Skin is warm  Neurological:      General: No focal deficit present  Mental Status: He is alert     Psychiatric:         Mood and Affect: Mood normal          Behavior: Behavior normal          Additional Data:     Labs:  Results from last 7 days   Lab Units 01/11/23  0856   WBC Thousand/uL 8 65   HEMOGLOBIN g/dL 10 0*   HEMATOCRIT % 38 4   PLATELETS Thousands/uL 166   NEUTROS PCT % 75   LYMPHS PCT % 13*   MONOS PCT % 11   EOS PCT % 1     Results from last 7 days   Lab Units 01/11/23  0856 01/10/23  1243   SODIUM mmol/L 141 141   POTASSIUM mmol/L 5 1 4 7   CHLORIDE mmol/L 100 99   CO2 mmol/L 38* 37*   BUN mg/dL 35* 43*   CREATININE mg/dL 1 13 1 18   ANION GAP mmol/L 3* 5   CALCIUM mg/dL 9 1 9 3   ALBUMIN g/dL  --  3 5   TOTAL BILIRUBIN mg/dL  --  0 37   ALK PHOS U/L  --  69   ALT U/L  --  10   AST U/L  --  16   GLUCOSE RANDOM mg/dL 128 94         Results from last 7 days   Lab Units 01/11/23  1110 01/11/23  0846 01/11/23  0817 01/11/23  0743 01/10/23  2155 01/10/23  1834   POC GLUCOSE mg/dl 90 136 60* 64* 141* 65               Lines/Drains:  Invasive Devices     Peripheral Intravenous Line  Duration           Peripheral IV 01/10/23 Distal;Right;Upper;Ventral (anterior) Antecubital 1 day                      Imaging: Reviewed radiology reports from this admission including: chest xray    Recent Cultures (last 7 days):         Last 24 Hours Medication List:   Current Facility-Administered Medications   Medication Dose Route Frequency Provider Last Rate   • apixaban  5 mg Oral Q12H Judie Mota MD     • atorvastatin  40 mg Oral Daily Venkatesh Tristan MD     • dicyclomine  10 mg Oral BID PRN Venkatesh Tristan MD     • digoxin  125 mcg Oral Daily Venkatesh Tristan MD     • famotidine  40 mg Oral HS Venkatesh Tristan MD     • furosemide  80 mg Intravenous TID (diuretic) KACEY Kinney     • insulin glargine  80 Units Subcutaneous QAM Ewa Agee PA-C     • insulin lispro  2-12 Units Subcutaneous TID AC Venkatesh Tristan MD     • lisinopril  20 mg Oral Daily Venkatesh Tristan MD     • metoprolol succinate  100 mg Oral Q12H Venkatesh Tristan MD     • pantoprazole  40 mg Oral BID AC Venkatesh Tristan MD     • potassium citrate  10 mEq Oral BID Venkatesh Tristan MD     • spironolactone  25 mg Oral Daily Venkatesh Tristan MD          Today, Patient Was Seen By: Haris Anderson PA-C    **Please Note: This note may have been constructed using a voice recognition system  **

## 2023-01-12 ENCOUNTER — APPOINTMENT (OUTPATIENT)
Dept: OCCUPATIONAL THERAPY | Age: 78
End: 2023-01-12

## 2023-01-12 PROBLEM — K62.5 BRBPR (BRIGHT RED BLOOD PER RECTUM): Status: ACTIVE | Noted: 2023-01-12

## 2023-01-12 LAB
ANION GAP SERPL CALCULATED.3IONS-SCNC: 6 MMOL/L (ref 4–13)
BASOPHILS # BLD AUTO: 0.04 THOUSANDS/ÂΜL (ref 0–0.1)
BASOPHILS NFR BLD AUTO: 0 % (ref 0–1)
BUN SERPL-MCNC: 34 MG/DL (ref 5–25)
CALCIUM SERPL-MCNC: 9.2 MG/DL (ref 8.4–10.2)
CHLORIDE SERPL-SCNC: 98 MMOL/L (ref 96–108)
CO2 SERPL-SCNC: 39 MMOL/L (ref 21–32)
CREAT SERPL-MCNC: 1.24 MG/DL (ref 0.6–1.3)
EOSINOPHIL # BLD AUTO: 0.07 THOUSAND/ÂΜL (ref 0–0.61)
EOSINOPHIL NFR BLD AUTO: 1 % (ref 0–6)
ERYTHROCYTE [DISTWIDTH] IN BLOOD BY AUTOMATED COUNT: 18.4 % (ref 11.6–15.1)
GFR SERPL CREATININE-BSD FRML MDRD: 54 ML/MIN/1.73SQ M
GLUCOSE SERPL-MCNC: 127 MG/DL (ref 65–140)
GLUCOSE SERPL-MCNC: 128 MG/DL (ref 65–140)
GLUCOSE SERPL-MCNC: 129 MG/DL (ref 65–140)
GLUCOSE SERPL-MCNC: 59 MG/DL (ref 65–140)
GLUCOSE SERPL-MCNC: 62 MG/DL (ref 65–140)
GLUCOSE SERPL-MCNC: 79 MG/DL (ref 65–140)
GLUCOSE SERPL-MCNC: 95 MG/DL (ref 65–140)
HCT VFR BLD AUTO: 38.1 % (ref 36.5–49.3)
HGB BLD-MCNC: 9.7 G/DL (ref 12–17)
IMM GRANULOCYTES # BLD AUTO: 0.06 THOUSAND/UL (ref 0–0.2)
IMM GRANULOCYTES NFR BLD AUTO: 1 % (ref 0–2)
LYMPHOCYTES # BLD AUTO: 1.38 THOUSANDS/ÂΜL (ref 0.6–4.47)
LYMPHOCYTES NFR BLD AUTO: 14 % (ref 14–44)
MCH RBC QN AUTO: 21.6 PG (ref 26.8–34.3)
MCHC RBC AUTO-ENTMCNC: 25.5 G/DL (ref 31.4–37.4)
MCV RBC AUTO: 85 FL (ref 82–98)
MONOCYTES # BLD AUTO: 1.27 THOUSAND/ÂΜL (ref 0.17–1.22)
MONOCYTES NFR BLD AUTO: 13 % (ref 4–12)
NEUTROPHILS # BLD AUTO: 6.89 THOUSANDS/ÂΜL (ref 1.85–7.62)
NEUTS SEG NFR BLD AUTO: 71 % (ref 43–75)
NRBC BLD AUTO-RTO: 0 /100 WBCS
PLATELET # BLD AUTO: 185 THOUSANDS/UL (ref 149–390)
PMV BLD AUTO: 10.5 FL (ref 8.9–12.7)
POTASSIUM SERPL-SCNC: 5.4 MMOL/L (ref 3.5–5.3)
RBC # BLD AUTO: 4.5 MILLION/UL (ref 3.88–5.62)
SODIUM SERPL-SCNC: 143 MMOL/L (ref 135–147)
WBC # BLD AUTO: 9.71 THOUSAND/UL (ref 4.31–10.16)

## 2023-01-12 RX ORDER — DEXTROSE MONOHYDRATE 25 G/50ML
INJECTION, SOLUTION INTRAVENOUS
Status: COMPLETED
Start: 2023-01-12 | End: 2023-01-12

## 2023-01-12 RX ORDER — INSULIN GLARGINE 100 [IU]/ML
45 INJECTION, SOLUTION SUBCUTANEOUS EVERY MORNING
Status: DISCONTINUED | OUTPATIENT
Start: 2023-01-13 | End: 2023-01-15

## 2023-01-12 RX ORDER — DEXTROSE MONOHYDRATE 25 G/50ML
25 INJECTION, SOLUTION INTRAVENOUS ONCE
Status: COMPLETED | OUTPATIENT
Start: 2023-01-12 | End: 2023-01-12

## 2023-01-12 RX ORDER — LORAZEPAM 2 MG/ML
0.5 INJECTION INTRAMUSCULAR ONCE
Status: COMPLETED | OUTPATIENT
Start: 2023-01-12 | End: 2023-01-13

## 2023-01-12 RX ADMIN — APIXABAN 5 MG: 5 TABLET, FILM COATED ORAL at 11:43

## 2023-01-12 RX ADMIN — DIGOXIN 125 MCG: 125 TABLET ORAL at 11:46

## 2023-01-12 RX ADMIN — POTASSIUM CITRATE 10 MEQ: 10 TABLET, EXTENDED RELEASE ORAL at 17:16

## 2023-01-12 RX ADMIN — PANTOPRAZOLE SODIUM 40 MG: 40 TABLET, DELAYED RELEASE ORAL at 06:27

## 2023-01-12 RX ADMIN — PANTOPRAZOLE SODIUM 40 MG: 40 TABLET, DELAYED RELEASE ORAL at 17:15

## 2023-01-12 RX ADMIN — POTASSIUM CITRATE 10 MEQ: 10 TABLET, EXTENDED RELEASE ORAL at 11:47

## 2023-01-12 RX ADMIN — DEXTROSE MONOHYDRATE 25 ML: 25 INJECTION, SOLUTION INTRAVENOUS at 10:05

## 2023-01-12 RX ADMIN — ATORVASTATIN CALCIUM 40 MG: 40 TABLET, FILM COATED ORAL at 11:43

## 2023-01-12 RX ADMIN — FUROSEMIDE 80 MG: 10 INJECTION, SOLUTION INTRAMUSCULAR; INTRAVENOUS at 20:28

## 2023-01-12 RX ADMIN — FUROSEMIDE 80 MG: 10 INJECTION, SOLUTION INTRAMUSCULAR; INTRAVENOUS at 06:25

## 2023-01-12 RX ADMIN — FUROSEMIDE 80 MG: 10 INJECTION, SOLUTION INTRAMUSCULAR; INTRAVENOUS at 11:47

## 2023-01-12 NOTE — PLAN OF CARE
Problem: DISCHARGE PLANNING  Goal: Discharge to home or other facility with appropriate resources  Description: INTERVENTIONS:  - Identify barriers to discharge w/patient and caregiver  - Arrange for needed discharge resources and transportation as appropriate  - Identify discharge learning needs (meds, wound care, etc )  - Arrange for interpretive services to assist at discharge as needed  - Refer to Case Management Department for coordinating discharge planning if the patient needs post-hospital services based on physician/advanced practitioner order or complex needs related to functional status, cognitive ability, or social support system  Outcome: Progressing     Problem: Knowledge Deficit  Goal: Patient/family/caregiver demonstrates understanding of disease process, treatment plan, medications, and discharge instructions  Description: Complete learning assessment and assess knowledge base    Interventions:  - Provide teaching at level of understanding  - Provide teaching via preferred learning methods  Outcome: Progressing     Problem: CARDIOVASCULAR - ADULT  Goal: Maintains optimal cardiac output and hemodynamic stability  Description: INTERVENTIONS:  - Monitor I/O, vital signs and rhythm  - Monitor for S/S and trends of decreased cardiac output  - Administer and titrate ordered vasoactive medications to optimize hemodynamic stability  - Assess quality of pulses, skin color and temperature  - Assess for signs of decreased coronary artery perfusion  - Instruct patient to report change in severity of symptoms  Outcome: Progressing  Goal: Absence of cardiac dysrhythmias or at baseline rhythm  Description: INTERVENTIONS:  - Continuous cardiac monitoring, vital signs, obtain 12 lead EKG if ordered  - Administer antiarrhythmic and heart rate control medications as ordered  - Monitor electrolytes and administer replacement therapy as ordered  Outcome: Progressing     Problem: RESPIRATORY - ADULT  Goal: Achieves optimal ventilation and oxygenation  Description: INTERVENTIONS:  - Assess for changes in respiratory status  - Assess for changes in mentation and behavior  - Position to facilitate oxygenation and minimize respiratory effort  - Oxygen administered by appropriate delivery if ordered  - Initiate smoking cessation education as indicated  - Encourage broncho-pulmonary hygiene including cough, deep breathe, Incentive Spirometry  - Assess the need for suctioning and aspirate as needed  - Assess and instruct to report SOB or any respiratory difficulty  - Respiratory Therapy support as indicated  Outcome: Progressing     Problem: MOBILITY - ADULT  Goal: Maintain or return to baseline ADL function  Description: INTERVENTIONS:  -  Assess patient's ability to carry out ADLs; assess patient's baseline for ADL function and identify physical deficits which impact ability to perform ADLs (bathing, care of mouth/teeth, toileting, grooming, dressing, etc )  - Assess/evaluate cause of self-care deficits   - Assess range of motion  - Assess patient's mobility; develop plan if impaired  - Assess patient's need for assistive devices and provide as appropriate  - Encourage maximum independence but intervene and supervise when necessary  - Involve family in performance of ADLs  - Assess for home care needs following discharge   - Consider OT consult to assist with ADL evaluation and planning for discharge  - Provide patient education as appropriate  Outcome: Progressing  Goal: Maintains/Returns to pre admission functional level  Description: INTERVENTIONS:  - Perform BMAT or MOVE assessment daily    - Set and communicate daily mobility goal to care team and patient/family/caregiver  - Collaborate with rehabilitation services on mobility goals if consulted  - Perform Range of Motion  times a day  - Reposition patient every  hours    - Dangle patient  times a day  - Stand patient  times a day  - Ambulate patient  times a day  - Out of bed to chair  times a day   - Out of bed for meals  times a day  - Out of bed for toileting  - Record patient progress and toleration of activity level   Outcome: Progressing     Problem: Potential for Falls  Goal: Patient will remain free of falls  Description: INTERVENTIONS:  - Educate patient/family on patient safety including physical limitations  - Instruct patient to call for assistance with activity   - Consult OT/PT to assist with strengthening/mobility   - Keep Call bell within reach  - Keep bed low and locked with side rails adjusted as appropriate  - Keep care items and personal belongings within reach  - Initiate and maintain comfort rounds  - Make Fall Risk Sign visible to staff  - Offer Toileting every  Hours, in advance of need  - Initiate/Maintain alarm  - Obtain necessary fall risk management equipment:   - Apply yellow socks and bracelet for high fall risk patients  - Consider moving patient to room near nurses station  Outcome: Progressing     Problem: Prexisting or High Potential for Compromised Skin Integrity  Goal: Skin integrity is maintained or improved  Description: INTERVENTIONS:  - Identify patients at risk for skin breakdown  - Assess and monitor skin integrity  - Assess and monitor nutrition and hydration status  - Monitor labs   - Assess for incontinence   - Turn and reposition patient  - Assist with mobility/ambulation  - Relieve pressure over bony prominences  - Avoid friction and shearing  - Provide appropriate hygiene as needed including keeping skin clean and dry  - Evaluate need for skin moisturizer/barrier cream  - Collaborate with interdisciplinary team   - Patient/family teaching  - Consider wound care consult   Outcome: Progressing

## 2023-01-12 NOTE — CONSULTS
Heart Failure Consult Note - Payton Levy 77 y o  male MRN: [id number]    @ Encounter: [id number]      Assessment/Plan:    Patient Active Problem List    Diagnosis Date Noted   • Pulmonary hypertension ([facility] ) 11/21/2022   • Chronic acquired lymphedema 10/26/2022   • Cirrhosis of liver without ascites ([facility] ) 09/21/2022   • Hallucination 09/21/2022   • Jerking movements of extremities 09/21/2022   • Vitamin D deficiency 08/10/2022   • Gastroesophageal reflux disease 07/20/2022   • PAD (peripheral artery disease) ([facility] ) 03/31/2022   • Atrial fibrillation with rapid ventricular response ([place] [address] ) 12/27/2021   • Cellulitis and abscess of left lower extremity 12/27/2021   • Ureteral calculi 12/15/2021   • Benign prostatic hyperplasia without lower urinary tract symptoms 12/15/2021   • Hypokalemia 10/19/2021   • Persistent proteinuria 07/20/2021   • Nipple pain 81/53/2056   • Folliculitis 22/82/6792   • Venous stasis dermatitis of both lower extremities 05/26/2021   • Blister of right leg 05/26/2021   • Myocardial infarction [facility])    • Blister of left leg 04/28/2021   • Hydroureter on left 04/28/2021   • Nephrolithiasis 04/24/2021   • Fall 04/23/2021   • Hypercalcemia 04/23/2021   • Scaly skin on examination 03/29/2021   • Acute on chronic diastolic (congestive) heart failure ([facility] ) 03/25/2021   • Left upper lobe pulmonary nodule 03/25/2021   • Acute respiratory failure with hypoxia ([facility] ) 03/25/2021   • Hypoxia 03/24/2021   • Bilateral edema of lower extremity 03/18/2021   • LUCIA (obstructive sleep apnea) 01/29/2021   • Embolism and thrombosis of arteries of the lower extremities ([facility] ) 01/29/2021   • Colitis 12/04/2020   • Adrenal nodule ([facility] ) 12/04/2020   • Left lower quadrant abdominal pain 12/03/2020   • Excessive gas 12/03/2020   • Morbid obesity ([address] ) 12/03/2020   • 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   • Immunization deficiency 10/30/2020   • Chronic pain of both knees 07/31/2020   • Actinic keratosis of right temple 07/31/2020   • Actinic keratosis of scalp 07/31/2020   • Decreased pedal pulses 04/04/2019   • Hypertension    • Hyperlipemia    • Type 2 diabetes mellitus with hyperglycemia, with long-term current use of insulin (HCC)    • Chronic low back pain    • CAD (coronary artery disease)    • Malignant neoplasm of urinary bladder (HCC)    • Arthritis    • Paroxysmal atrial fibrillation (HCC)    • AAA (abdominal aortic aneurysm)      # Acute on chronic heart failure with preserved ejection fraction  # Pulmonary hypertension with RV dysfunction  Likely group II disease diastolic heart failure and group III disease (LUCIA, reports using CPAP at night but concern regarding adequacy/efficiency of use on last pulm note)  No h/o PE/DVT  Remote smoking, quit 2010  CT chest 4/2021: Pleural thickening with pleural calcifications in the dependent portion of the right mid and lower lung field, 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  Suspected round atelectasis in the posterior right lower lobe  No h/o illicit drug use      Studies- personally reviewed by me    Echocardiogram 1/11/23  LVEF: 55%  LVIDd: 4 8cm  RV: not well visualized; dilated RV>LV; reduced systolic function  MR: moderate MAC, trace MR  PASP: 83mmHg; estimated RAP 15, peak TR velocity 4 1  RVOT: suboptimal envelope, mid to late systolic notching; interventricular diastolic and systolic septal flattening  Other: grade 2 diastology, mild JONNY; mildly increased LV wall thickness    Echo 12/28/21:  LVEF 55%, mildly increased wall thickness  RV mildly dilated, normal systolic function  PASP [id number]    Echo 3/23/21:  LVEF 60%, mildly increased wall thickness  Grade 1 diastology  RV mildly dilated, mildly reduced systolic function  PASP [id number]    # CAD s/p CABG in 2016 (LIMA to LAD, SVG to OM3 and SVG to PDA) in 2016  # LUCIA  # Paroxysmal atrial fibrillation, s/p cardioversion 1/7/22  AC: Eliquis  Rate: metoprolol and digoxin  # Hyperlipidemia  # DM type II  Hemoglobin A1c 7 8 11/2/2022  # Hypertension  # Hemorrhoids with rectal bleeding    Today's Plan:  Significant volume overload on exam  Continue diuresis with lasix 80mg IV TID, received 2 doses yesterday and improved diuresis  Continue BIPAP support as needed  Plan on RHC to further assess pulmonary hypertension when volume optimized, inpatient vs outpatient  Monitor on telemetry      HPI:   Wing Ulrich is a 77y o  year old male who has CAD s/p CABG in 2016 (LIMA to LAD, SVG to OM3 and SVG to PDA), chronic diastolic heart failure, AAA, hyperlipidemia, type II diabetes, hypertension, paroxysmal atrial fibrillation, type II diabetes, LUCIA and GERD  He follows with cardiologist Dr Jamarcus Steinberg  "Presented 1/10/2023 with complaint of SOB and swelling in his legs  Patient reports for the past several weeks feeling like he was retaining fluid  He takes furosemide 80 mg PO BID at home  He has been taking his medications as prescribed  He tries to follow a low salt diet but also reports eating lunch meats, hotdogs, sausage and mauro  He does not follow a fluid restriction at home because he was told by his nephrologist and GI provider to increase hydration  He drinks a lot of diet coke through the day"  He lives at home with his wife  He lives a very sedentary lifestyle  He has history of tobacco abuse but quit in 2010 and reports no alcohol use  Heart failure consulted due to pulmonary hypertension  Echocardiogram this admission showed severe pulmonary hypertension with RV dilation and dysfunction  Patient hypoxic this morning with increasing oxygen requirements, improved with BIPAP   He was started on IV lasix 80mg IV TID and received 2 doses yesterday with improving urine output    Past Medical History:   Diagnosis Date   • A-fib [facility])    • AAA (abdominal aortic aneurysm)    • Actinic keratosis of right temple 7/31/2020   • Actinic keratosis of scalp 7/31/2020   • Acute respiratory failure with hypoxia ([facility] ) 3/25/2021   • Allergic 1960   • Arthritis    • Lay's esophagus    • Bladder cancer ([place] [address] )    • Blister of left leg 4/28/2021   • Blister of right leg 5/26/2021   • CAD (coronary artery disease)    • Cancer (HCC) 02/2010    Bladder   • CHF (congestive heart failure) ([facility])    • Chronic low back pain    • Chronic pain of both knees 7/31/2020   • Colitis 12/4/2020   • CPAP (continuous positive airway pressure) dependence    • Diabetes ([facility])    • Diabetes mellitus ([place] [address] ) 10/1993   • Excessive gas 12/3/2020   • Heart murmur    • History of chemotherapy    • Hydroureter on left 4/28/2021   • Hyperlipemia    • Hypertension    • Immunization deficiency 10/30/2020    Hep a nonimmune   • Kidney stone    • Left lower quadrant abdominal pain 12/3/2020   • Mass of right adrenal gland ([place] [address] ) 12/4/2020    4 1 cm   • Myocardial infarction ([place] [address] )    • 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   • Obesity 2000   • LUCIA (obstructive sleep apnea) 1/29/2021   • Pressure ulcer of right leg, stage 1 5/26/2021   • Urinary tract infection with hematuria 5/3/2021    u cx 4/2021=pseudomonas R to bactrim and cefdinir, S to cipro   • Venous stasis dermatitis of both lower extremities 5/26/2021       Review of Systems   Unable to perform ROS: Other   Limited ROS as patient hypoxic and placed on BIPAP    Allergies   Allergen Reactions   • Diltiazem Abdominal Pain           Current Facility-Administered Medications:   •  acetaminophen (TYLENOL) tablet 650 mg, 650 mg, Oral, Q6H PRN, Sabi Agee PA-C  •  apixaban (ELIQUIS) tablet 5 mg, 5 mg, Oral, Q12H [facility], Zoila Mclean MD, 5 mg at 01/11/23 2046  •  atorvastatin (LIPITOR) tablet 40 mg, 40 mg, Oral, Daily, Zoila Mclean MD, 40 mg at 01/11/23 0920  •  dextrose 50 % IV solution **ADS Override Pull**, , , ,   •  dextrose 50 % IV solution 25 mL, 25 mL, Intravenous, Once, Marty Buchanan PA-C  •  dicyclomine (BENTYL) capsule 10 mg, 10 mg, Oral, BID PRN, Zoila Mclean MD  •  digoxin (LANOXIN) tablet 125 mcg, 125 mcg, Oral, Daily, Earlene Essex, MD, 125 mcg at 23 0920  •  famotidine (PEPCID) tablet 40 mg, 40 mg, Oral, HS, Earlene Essex, MD, 40 mg at 23 2106  •  furosemide (LASIX) injection 80 mg, 80 mg, Intravenous, TID (diuretic), Rtia Wolf, KACEY, 80 mg at 23 6952  •  [START ON 2023] insulin glargine (LANTUS) subcutaneous injection 45 Units 0 45 mL, 45 Units, Subcutaneous, QAM, Marty Buchanan PA-C  •  insulin lispro (HumaLOG) 100 units/mL subcutaneous injection 2-12 Units, 2-12 Units, Subcutaneous, TID AC **AND** Fingerstick Glucose (POCT), , , TID AC, Earlene Essex, MD  •  lisinopril (ZESTRIL) tablet 20 mg, 20 mg, Oral, Daily, Earlene Essex, MD, 20 mg at 23 9613  •  metoprolol succinate (TOPROL-XL) 24 hr tablet 100 mg, 100 mg, Oral, Q12H, Earlene Essex, MD, 100 mg at 23 0920  •  pantoprazole (PROTONIX) EC tablet 40 mg, 40 mg, Oral, BID AC, Earlene Essex, MD, 40 mg at 23 4925  •  potassium citrate (UROCIT-K) CR tablet 10 mEq, 10 mEq, Oral, BID, Earlene Essex, MD, 10 mEq at 23 1816  •  spironolactone (ALDACTONE) tablet 25 mg, 25 mg, Oral, Daily, Earlene Essex, MD, 25 mg at 23 7329    Social History     Socioeconomic History   • Marital status: /Civil Union     Spouse name: Marilyn Baltazar   • Number of children: 4   • Years of education: Not on file   • Highest education level: 12th grade   Occupational History   • Not on file   Tobacco Use   • Smoking status: Former     Packs/day: 0 25     Years: 20 00     Pack years: 5 00     Types: Cigarettes     Start date: 1965     Quit date: 2010     Years since quittin 0   • Smokeless tobacco: Never   • Tobacco comments:     Quit several times for up to 6 years     Vaping Use   • Vaping Use: Never used   Substance and Sexual Activity   • Alcohol use: Not Currently     Comment: No use   • Drug use: Never     Comment: No use   • Sexual activity: Not Currently     Partners: Female   Other Topics Concern   • Not on file   Social History Narrative    · Most recent tobacco use screenin2020      · Do you currently or have you served in the [employer]:   No      · Were you activated, into active duty, as a member of the [employer] or as a Reservist:   No      · Live alone or with others:   with others        · Caffeine intake:   Occasional      · Illicit drugs:   No      · Diet:   Regular      · Exercise level: Moderate      · Advance directive: Yes      · Marital status:         · General stress level:   Medium      · Single or multi-level home/work:   multi level home      · Guns present in home:   No      · Seat belts used routinely:   Yes      · Sunscreen used routinely:   Yes      · Smoke alarm in home:   Yes      Social Determinants of Health     Financial Resource Strain: Not on file   Food Insecurity: No Food Insecurity   • Worried About Running Out of Food in the Last Year: Never true   • Ran Out of Food in the Last Year: Never true   Transportation Needs: No Transportation Needs   • Lack of Transportation (Medical): No   • Lack of Transportation (Non-Medical): No   Physical Activity: Not on file   Stress: Not on file   Social Connections: Not on file   Intimate Partner Violence: Not on file   Housing Stability: Unknown   • Unable to Pay for Housing in the Last Year: No   • Number of Places Lived in the Last Year: Not on file   • Unstable Housing in the Last Year: No       Family History   Problem Relation Age of Onset   • Heart disease Father    • Arthritis Father    • Heart attack Father    • Alzheimer's disease Mother    • Dementia Mother        Physical Exam:    Vitals: Blood pressure (!) 98/40, pulse 72, temperature 98 2 °F (36 8 °C), resp  rate 18, height 6' 4" (1 93 m), weight (!) 144 kg (317 lb 0 3 oz), SpO2 90 %  , Body mass index is 38 59 kg/m² ,   Wt Readings from Last 3 Encounters:   23 (!) 144 kg (317 lb 0 3 oz)   12/15/22 (!) 139 kg (306 lb) 11/21/22 (!) 138 kg (304 lb)     Intake/Output: 1260/2600, -1340 - on lasix 80mg IV x 2 doses    Physical Exam  Constitutional:       Appearance: Normal appearance  Comments: Sleepy, arousable, in mild respiratory distress on oxygen support, placed on BIPAP   HENT:      Head: Normocephalic and atraumatic  Mouth/Throat:      Mouth: Mucous membranes are moist    Eyes:      General: No scleral icterus  Extraocular Movements: Extraocular movements intact  Cardiovascular:      Rate and Rhythm: Normal rate and regular rhythm  Pulses: Normal pulses  Heart sounds: S1 normal and S2 normal  No murmur heard  No friction rub  No gallop  Comments: Elevated JVP, bilateral lower extremity edema  Pulmonary:      Breath sounds: Decreased breath sounds present  Abdominal:      General: There is no distension  Palpations: Abdomen is soft  Tenderness: There is no abdominal tenderness  There is no guarding or rebound  Musculoskeletal:         General: Normal range of motion  Cervical back: Neck supple  Skin:     General: Skin is warm and dry  Capillary Refill: Capillary refill takes less than 2 seconds  Neurological:      General: No focal deficit present  Mental Status: He is oriented to person, place, and time     Psychiatric:         Mood and Affect: Mood normal          Labs & Results:    Lab Results   Component Value Date    WBC 9 71 01/12/2023    HGB 9 7 (L) 01/12/2023    HCT 38 1 01/12/2023    MCV 85 01/12/2023     01/12/2023     Lab Results   Component Value Date    SODIUM 141 01/11/2023    K 5 1 01/11/2023     01/11/2023    CO2 38 (H) 01/11/2023    BUN 35 (H) 01/11/2023    CREATININE 1 13 01/11/2023    GLUC 128 01/11/2023    CALCIUM 9 1 01/11/2023     Lab Results   Component Value Date    NTBNP 37 10/06/2021      Lab Results   Component Value Date    CHOLESTEROL 132 11/02/2022    CHOLESTEROL 137 04/27/2022    CHOLESTEROL 142 10/06/2021     Lab Results   Component Value Date    HDL 34 (L) 11/02/2022    HDL 32 (L) 04/27/2022    HDL 37 (L) 10/06/2021     Lab Results   Component Value Date    TRIG 153 (H) 11/02/2022    TRIG 223 (H) 04/27/2022    TRIG 201 (H) 10/06/2021     Lab Results   Component Value Date    [place] 98 11/02/2022    [place] 105 04/27/2022    [place] 105 10/06/2021       EKG personally reviewed by Petar Bueno MD      Counseling / Coordination of Care  Time spent today 40 minutes  Greater than 50% of total time was spent with the patient and / or family counseling and / or coordination of care  We discussed diagnoses, most recent studies and any changes in treatment    Thank you for the opportunity to participate in the care of this patient      MD Stone CabreraMimbres Memorial Hospitalevin

## 2023-01-12 NOTE — CASE MANAGEMENT
Case Management Discharge Planning Note    Patient name Suleiman TRACEY /S -76 MRN [id number]  : 1945 Date 2023       Current Admission Date: 1/10/2023  Current Admission Diagnosis:Acute on chronic diastolic (congestive) heart failure [facility])   Patient Active Problem List    Diagnosis Date Noted   • Pulmonary hypertension ([place] [address] ) 2022   • Chronic acquired lymphedema 10/26/2022   • Cirrhosis of liver without ascites ([place] [address] ) 2022   • Hallucination 2022   • Jerking movements of extremities 2022   • Vitamin D deficiency 08/10/2022   • Gastroesophageal reflux disease 2022   • PAD (peripheral artery disease) ([place] [address] ) 2022   • Atrial fibrillation with rapid ventricular response ([place] [address] ) 2021   • Cellulitis and abscess of left lower extremity 2021   • Ureteral calculi 12/15/2021   • Benign prostatic hyperplasia without lower urinary tract symptoms 12/15/2021   • Hypokalemia 10/19/2021   • Persistent proteinuria 2021   • Nipple pain    • Folliculitis    • Venous stasis dermatitis of both lower extremities 2021   • Blister of right leg 2021   • Myocardial infarction [facility])    • Blister of left leg 2021   • Hydroureter on left 2021   • Nephrolithiasis 2021   • Fall 2021   • Hypercalcemia 2021   • Scaly skin on examination 2021   • Acute on chronic diastolic (congestive) heart failure ([place] [address] ) 2021   • Left upper lobe pulmonary nodule 2021   • Acute respiratory failure with hypoxia ([place] [address] ) 2021   • Hypoxia 2021   • Bilateral edema of lower extremity 2021   • LUCIA (obstructive sleep apnea) 2021   • Embolism and thrombosis of arteries of the lower extremities ([place] [address] ) 2021   • Colitis 2020   • Adrenal nodule ([facility] ) 2020   • Left lower quadrant abdominal pain 2020   • Excessive gas 2020   • Morbid obesity ([facility] ) 2020   • 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   • Immunization deficiency 10/30/2020   • Chronic pain of both knees 07/31/2020   • Actinic keratosis of right temple 07/31/2020   • Actinic keratosis of scalp 07/31/2020   • Decreased pedal pulses 04/04/2019   • Hypertension    • Hyperlipemia    • Type 2 diabetes mellitus with hyperglycemia, with long-term current use of insulin (HCC)    • Chronic low back pain    • CAD (coronary artery disease)    • Malignant neoplasm of urinary bladder (HCC)    • Arthritis    • Paroxysmal atrial fibrillation (HCC)    • AAA (abdominal aortic aneurysm)       LOS (days): 2  Geometric Mean LOS (GMLOS) (days): 3 90  Days to GMLOS:2 1     OBJECTIVE:  Risk of Unplanned Readmission Score: 19 52         Current admission status: Inpatient   Preferred Pharmacy:   [address]  Phone: [phone] Fax: [phone]    Primary Care Provider: Shaye Tinoco MD    Primary Insurance: MEDICARE  Secondary Insurance: [facility]    DISCHARGE DETAILS:    Discharge planning discussed with[de-identified] Patient  Freedom of Choice: Yes  Comments - Freedom of Choice: Choice is for [address] for [facility] provider    CM contacted family/caregiver?: Yes  Were Treatment Team discharge recommendations reviewed with patient/caregiver?: Yes  Did patient/caregiver verbalize understanding of patient care needs?: Yes  Were patient/caregiver advised of the risks associated with not following Treatment Team discharge recommendations?: Yes    Contacts  Patient Contacts: Stevphen Goldmann  Relationship to Patient[de-identified] Family  Contact Method: Phone  Phone Number: [phone]    Requested [address]         Is the patient interested in [facility] at discharge?: Yes  [address] requested[de-identified] Nursing, Occupational Therapy, Physical [address] Name[de-identified] Other ([address])  [address] Provider[de-identified] PCP  Home Health Services Needed[de-identified] Diabetes Management, Evaluate Functional Status and Safety, Gait/ADL Training, Heart Failure Management, Strengthening/Theraputic Exercises to Improve Function, Wound/Ostomy Care  Homebound Criteria Met[de-identified] Requires the Assistance of Another Person for Safe Ambulation or to Leave the Home, Uses an Assist Device (i e  cane, walker, etc)  Supporting Clincal Findings[de-identified] Fatigues Easliy in [employer], Limited Endurance    DME Referral Provided  Referral made for DME?: No    Other Referral/Resources/Interventions Provided:  Interventions: [facility]  Referral Comments: Radha Us confirmed able to accept for [facility] services @ D/C      Would you like to participate in our [address] service program?  : No - Declined    Treatment Team Recommendation:  (patient refusing STR even if recommended )  Discharge Destination Plan[de-identified] [address] [facility]  Transport at Discharge : [facility], [address] [facility]  01/12/23 10:27 AM

## 2023-01-12 NOTE — ASSESSMENT & PLAN NOTE
Wt Readings from Last 3 Encounters:   01/12/23 (!) 144 kg (317 lb 0 3 oz)   12/15/22 (!) 139 kg (306 lb)   11/21/22 (!) 138 kg (304 lb)     · Patient presenting with worsening lower extremity edema, abdominal tightness, weight gain of 21 pounds in 2 to 3 weeks, PND  Patient denies any dietary or medication noncompliance  · Chronically on PO Lasix 80mg BID at home     · ECHO shows EF 55%, grade 2 DD, dilated RV with reduced systolic function and pulmonary hypertension   · Net output 2,020 mL  · Cardiology and Heart Failure following   · Continue Lasix 80 mg IV TID  · Adjusted hold parameters to 100 mmHg SBP   · Continue spironolactone 25 mg daily  · Continue Toprol-XL, lisinopril  · Daily weights, I/O's  · Low-sodium diet, 1 5L fluid restriction  · Continue telemetry

## 2023-01-12 NOTE — PLAN OF CARE
Problem: DISCHARGE PLANNING  Goal: Discharge to home or other facility with appropriate resources  Description: INTERVENTIONS:  - Identify barriers to discharge w/patient and caregiver  - Arrange for needed discharge resources and transportation as appropriate  - Identify discharge learning needs (meds, wound care, etc )  - Arrange for interpretive services to assist at discharge as needed  - Refer to Case Management Department for coordinating discharge planning if the patient needs post-hospital services based on physician/advanced practitioner order or complex needs related to functional status, cognitive ability, or social support system  Outcome: Progressing     Problem: Knowledge Deficit  Goal: Patient/family/caregiver demonstrates understanding of disease process, treatment plan, medications, and discharge instructions  Description: Complete learning assessment and assess knowledge base    Interventions:  - Provide teaching at level of understanding  - Provide teaching via preferred learning methods  Outcome: Progressing     Problem: CARDIOVASCULAR - ADULT  Goal: Maintains optimal cardiac output and hemodynamic stability  Description: INTERVENTIONS:  - Monitor I/O, vital signs and rhythm  - Monitor for S/S and trends of decreased cardiac output  - Administer and titrate ordered vasoactive medications to optimize hemodynamic stability  - Assess quality of pulses, skin color and temperature  - Assess for signs of decreased coronary artery perfusion  - Instruct patient to report change in severity of symptoms  Outcome: Progressing  Goal: Absence of cardiac dysrhythmias or at baseline rhythm  Description: INTERVENTIONS:  - Continuous cardiac monitoring, vital signs, obtain 12 lead EKG if ordered  - Administer antiarrhythmic and heart rate control medications as ordered  - Monitor electrolytes and administer replacement therapy as ordered  Outcome: Progressing     Problem: RESPIRATORY - ADULT  Goal: Achieves optimal ventilation and oxygenation  Description: INTERVENTIONS:  - Assess for changes in respiratory status  - Assess for changes in mentation and behavior  - Position to facilitate oxygenation and minimize respiratory effort  - Oxygen administered by appropriate delivery if ordered  - Initiate smoking cessation education as indicated  - Encourage broncho-pulmonary hygiene including cough, deep breathe, Incentive Spirometry  - Assess the need for suctioning and aspirate as needed  - Assess and instruct to report SOB or any respiratory difficulty  - Respiratory Therapy support as indicated  Outcome: Progressing     Problem: MOBILITY - ADULT  Goal: Maintain or return to baseline ADL function  Description: INTERVENTIONS:  -  Assess patient's ability to carry out ADLs; assess patient's baseline for ADL function and identify physical deficits which impact ability to perform ADLs (bathing, care of mouth/teeth, toileting, grooming, dressing, etc )  - Assess/evaluate cause of self-care deficits   - Assess range of motion  - Assess patient's mobility; develop plan if impaired  - Assess patient's need for assistive devices and provide as appropriate  - Encourage maximum independence but intervene and supervise when necessary  - Involve family in performance of ADLs  - Assess for home care needs following discharge   - Consider OT consult to assist with ADL evaluation and planning for discharge  - Provide patient education as appropriate  Outcome: Progressing  Goal: Maintains/Returns to pre admission functional level  Description: INTERVENTIONS:  - Perform BMAT or MOVE assessment daily    - Set and communicate daily mobility goal to care team and patient/family/caregiver  - Collaborate with rehabilitation services on mobility goals if consulted  - Perform Range of Motion 2 times a day  - Reposition patient every 2 hours    - Dangle patient 2 times a day  - Stand patient 2 times a day  - Ambulate patient 2 times a day  - Out of bed to chair 2 times a day   - Out of bed for meals 2 times a day  - Out of bed for toileting  - Record patient progress and toleration of activity level   Outcome: Progressing     Problem: Potential for Falls  Goal: Patient will remain free of falls  Description: INTERVENTIONS:  - Educate patient/family on patient safety including physical limitations  - Instruct patient to call for assistance with activity   - Consult OT/PT to assist with strengthening/mobility   - Keep Call bell within reach  - Keep bed low and locked with side rails adjusted as appropriate  - Keep care items and personal belongings within reach  - Initiate and maintain comfort rounds  - Make Fall Risk Sign visible to staff  - Offer Toileting every 2 Hours, in advance of need  - Initiate/Maintain alarm  - Obtain necessary fall risk management equipment:   - Apply yellow socks and bracelet for high fall risk patients  - Consider moving patient to room near nurses station  Outcome: Progressing     Problem: Prexisting or High Potential for Compromised Skin Integrity  Goal: Skin integrity is maintained or improved  Description: INTERVENTIONS:  - Identify patients at risk for skin breakdown  - Assess and monitor skin integrity  - Assess and monitor nutrition and hydration status  - Monitor labs   - Assess for incontinence   - Turn and reposition patient  - Assist with mobility/ambulation  - Relieve pressure over bony prominences  - Avoid friction and shearing  - Provide appropriate hygiene as needed including keeping skin clean and dry  - Evaluate need for skin moisturizer/barrier cream  - Collaborate with interdisciplinary team   - Patient/family teaching  - Consider wound care consult   Outcome: Progressing

## 2023-01-12 NOTE — ASSESSMENT & PLAN NOTE
· Hypoxia on arrival, desaturations to 77% on room air  Denies home O2 use    · [place] improvement today, but was hypoxic in the setting of hypoglycemia, rectal bleeding, and on going volume overloaded status   · Briefly placed on BiPAP during the day  · Continue at night  · Wean O2 as tolerated to maintain SPO2 >90%  · Respiratory protocol

## 2023-01-12 NOTE — PHYSICAL THERAPY NOTE
Physical Therapy Cancellation Note       01/12/23 0941   Note Type   Note type Cancelled Session   Cancel Reasons Medical status   Additional Comments referral received for PT eval and tx  attempted to see pt for eval but Elinor Cespedes NSG states pt requires use of BIPAP presently and is not medically appropriate for PT eval  will follow and initiate PT as medically appropriate and schedule allows       Desiree Russell, PT

## 2023-01-12 NOTE — RESPIRATORY THERAPY NOTE
Called to pts room, to check SPO2, as readings aren't accurate  Pt was on 10lpm via NC and SPO2 at rest 85-87%  Placed pt on MIDFLOW at 0850 at 15lpm and SPO2 only 89-90%  PT's breathing labored  )900 placed pt on BIPAP with IPAP18 EPAP 10 RR 14 50% SPO2 now 95% and pt more comfortable and up in chair   Will leave pt on BIPAP for 2hrs and re-evaluate

## 2023-01-12 NOTE — PROGRESS NOTES
[facility]  Progress Note - Chingraulevin Malik 1945, 77 y o  male MRN: [id number]  Unit/Bed#: S -01 Encounter: [id number]  Primary Care Provider: Regina Celeste MD   Date and time admitted to hospital: 1/10/2023 12:21 PM    * Acute on chronic diastolic (congestive) heart failure [facility])  Assessment & Plan  Wt Readings from Last 3 Encounters:   01/12/23 (!) 144 kg (317 lb 0 3 oz)   12/15/22 (!) 139 kg (306 lb)   11/21/22 (!) 138 kg (304 lb)     · Patient presenting with worsening lower extremity edema, abdominal tightness, weight gain of 21 pounds in 2 to 3 weeks, PND  Patient denies any dietary or medication noncompliance  · Chronically on PO Lasix 80mg BID at home  · ECHO shows EF 55%, grade 2 DD, dilated RV with reduced systolic function and pulmonary hypertension   · Net output 2,020 mL  · Cardiology and Heart Failure following   · Continue Lasix 80 mg IV TID  · Adjusted hold parameters to 100 mmHg SBP   · Continue spironolactone 25 mg daily  · Continue Toprol-XL, lisinopril  · Daily weights, I/O's  · Low-sodium diet, 1 5L fluid restriction  · Continue telemetry     Pulmonary hypertension ([facility] )  Assessment & Plan  · Noted on ECHO  Remote smoking history  Is on BiPAP at night here  Apparently on CPAP at home but question adequacy   · Heart Failure following  · Diuresis as above   · Will need RHC outpatient once optimized   · Further Heart Failure follow up needed    Paroxysmal atrial fibrillation ([facility] )  Assessment & Plan  · Rate controlled  · Continue Toprol-XL, Digoxin, Eliquis     Acute respiratory failure with hypoxia (HCC)  Assessment & Plan  · Hypoxia on arrival, desaturations to 77% on room air  Denies home O2 use    · [place] improvement today, but was hypoxic in the setting of hypoglycemia, rectal bleeding, and on going volume overloaded status   · Briefly placed on BiPAP during the day  · Continue at night  · Wean O2 as tolerated to maintain SPO2 >90%  · Respiratory protocol    LUCIA (obstructive sleep apnea)  Assessment & Plan  · Patient was on CPAP  Recently transitioned to BiPAP by his pulmonologist but still has not received the machine  · Continue BiPAP overnight  · CM consult to assist with equipment when closer to discharge    Type 2 diabetes mellitus with hyperglycemia, with long-term current use of insulin [facility])  Assessment & Plan  Lab Results   Component Value Date    HGBA1C 7 8 (H) 11/02/2022       Recent Labs     01/11/23  2050 01/12/23  0310 01/12/23  0807 01/12/23  0917   POCGLU 116 95 62* 129       Blood Sugar Average: Last 72 hrs:  (P) 42 [id number]   · Home regimen: Tresiba 90 units daily  · Patient has not eaten here because he does not like the food and has been recurrently hypoglycemic  · Reduce Lantus to 45 U QHS - half of home dose   · He has received NO long acting insulin 1/12  · SSI coverage with Accu-Cheks ACHS  · Hypoglycemia protocol    Hypertension  Assessment & Plan  · Stable/soft  · Continue lisinopril, metoprolol, spironolactone, Lasix  · Monitor closely, hold parameters for Lasix adjusted     CAD (coronary artery disease)  Assessment & Plan  · Chest pain free  · Continue Eliquis, metoprolol, statin  · RHC needed as outpatient as above     Venous stasis dermatitis of both lower extremities  Assessment & Plan  · Wound care    BRBPR (bright red blood per rectum)  Assessment & Plan  · Noted on 1/12  Patient reports history of large hemorrhoid that has bled in the past   · Monitor Hgb  · No intervention at this time   · Continue Eliquis   · May need to consider holding anticoagulation and consulting GI, however would need further respiratory stabilization for any scopes/procedures       VTE Pharmacologic Prophylaxis: VTE Score: 7 High Risk (Score >/= 5) - Pharmacological DVT Prophylaxis Ordered: apixaban (Eliquis)  Sequential Compression Devices Ordered      Patient Centered Rounds: I performed bedside rounds with nursing staff today   Discussions with Specialists or Other Care Team Provider: Discussed with Heart Failure, Respiratory, RN, CM    Education and Discussions with Family / Patient: Attempted to update  (wife) via phone  Left voicemail  Time Spent for Care: 30 minutes  More than 50% of total time spent on counseling and coordination of care as described above  Current Length of Stay: 2 day(s)  Current Patient Status: Inpatient   Certification Statement: The patient will continue to require additional inpatient hospital stay due to further volume management   Discharge Plan: Anticipate discharge in 48-72 hrs to home with home services  Code Status: Level 1 - Full Code    Subjective:   Patient reports feeling shaky this morning  RN reported that he felt this way after going to the bathroom and having BRBPR  He says that his breathing is better and denies chest pain  Denies fevers or chills  Reports history of hemorrhoids that has bled exactly like this morning  He reports that he has not been eating because he does not like the food here and was recently told to decrease his insulin  After getting Dextrose bolus patient reported feeling some improvement  Objective:     Vitals:   Temp (24hrs), Av 2 °F (36 8 °C), Min:97 7 °F (36 5 °C), Max:98 7 °F (37 1 °C)    Temp:  [97 7 °F (36 5 °C)-98 7 °F (37 1 °C)] 98 2 °F (36 8 °C)  HR:  [49-72] 65  Resp:  [18] 18  BP: ()/(40-60) 119/55  SpO2:  [87 %-96 %] 94 %  Body mass index is 38 59 kg/m²  Input and Output Summary (last 24 hours): Intake/Output Summary (Last 24 hours) at 2023 1123  Last data filed at 2023 0400  Gross per 24 hour   Intake 720 ml   Output 2000 ml   Net -1280 ml       Physical Exam:   Physical Exam  Constitutional:       General: He is in acute distress  Appearance: Normal appearance  He is morbidly obese  He is ill-appearing  He is not diaphoretic  Interventions: Nasal cannula in place     HENT:      Head: Normocephalic and atraumatic  Mouth/Throat:      Mouth: Mucous membranes are moist    Eyes:      General: No scleral icterus  Pupils: Pupils are equal, round, and reactive to light  Cardiovascular:      Rate and Rhythm: Normal rate and regular rhythm  Pulses: Normal pulses  Heart sounds: Normal heart sounds, S1 normal and S2 normal  No murmur heard  No systolic murmur is present  No diastolic murmur is present  No gallop  No S3 or S4 sounds  Pulmonary:      Effort: Pulmonary effort is normal  No accessory muscle usage or respiratory distress  Breath sounds: No stridor  Examination of the right-lower field reveals decreased breath sounds  Examination of the left-lower field reveals decreased breath sounds  Decreased breath sounds present  No wheezing, rhonchi or rales  Chest:      Chest wall: No tenderness  Abdominal:      General: Bowel sounds are normal  There is no distension  Palpations: Abdomen is soft  Tenderness: There is no abdominal tenderness  There is no guarding  Musculoskeletal:      Right lower leg: 3+ Edema present  Left lower leg: 3+ Edema present  Skin:     General: Skin is warm and dry  Coloration: Skin is not jaundiced  Neurological:      General: No focal deficit present  Mental Status: He is alert  Mental status is at baseline  Motor: No tremor or seizure activity  Psychiatric:         Behavior: Behavior is cooperative            Additional Data:     Labs:  Results from last 7 days   Lab Units 01/12/23  0637   WBC Thousand/uL 9 71   HEMOGLOBIN g/dL 9 7*   HEMATOCRIT % 38 1   PLATELETS Thousands/uL 185   NEUTROS PCT % 71   LYMPHS PCT % 14   MONOS PCT % 13*   EOS PCT % 1     Results from last 7 days   Lab Units 01/12/23  0637 01/11/23  0856 01/10/23  1243   SODIUM mmol/L 143   < > 141   POTASSIUM mmol/L 5 4*   < > 4 7   CHLORIDE mmol/L 98   < > 99   CO2 mmol/L 39*   < > 37*   BUN mg/dL 34*   < > 43*   CREATININE mg/dL 1 24 < > 1 18   ANION GAP mmol/L 6   < > 5   CALCIUM mg/dL 9 2   < > 9 3   ALBUMIN g/dL  --   --  3 5   TOTAL BILIRUBIN mg/dL  --   --  0 37   ALK PHOS U/L  --   --  69   ALT U/L  --   --  10   AST U/L  --   --  16   GLUCOSE RANDOM mg/dL 59*   < > 94    < > = values in this interval not displayed  Results from last 7 days   Lab Units 01/12/23  0917 01/12/23  0807 01/12/23  0310 01/11/23  2050 01/11/23  1617 01/11/23  1539 01/11/23  1110 01/11/23  0846 01/11/23  0817 01/11/23  0743 01/10/23  2155 01/10/23  1834   POC GLUCOSE mg/dl 129 62* 95 116 81 64* 90 136 60* 64* 141* 65               Lines/Drains:  Invasive Devices     Peripheral Intravenous Line  Duration           Peripheral IV 01/10/23 Distal;Right;Upper;Ventral (anterior) Antecubital 1 day                  Telemetry:  Telemetry Orders (From admission, onward)             48 Hour Telemetry Monitoring  Continuous x 48 hours        References:    Telemetry Guidelines   Question:  Reason for 48 Hour Telemetry  Answer:  Acute Decompensated CHF (continuous diuretic infusion or total diuretic dose > 200 mg daily, associated electrolyte derangement, ionotropic drip, history of ventricular arrhythmia, or new EF <35%)                 Telemetry Reviewed: Normal Sinus Rhythm  Indication for Continued Telemetry Use: Acute CHF on >200 mg lasix/day or equivalent dose or with new reduced EF                Imaging: Reviewed radiology reports from this admission including: ECHO    Recent Cultures (last 7 days):         Last 24 Hours Medication List:   Current Facility-Administered Medications   Medication Dose Route Frequency Provider Last Rate   • acetaminophen  650 mg Oral Q6H PRN Syed Montez PA-C     • apixaban  5 mg Oral Q12H [facility] Amara Cavazos MD     • atorvastatin  40 mg Oral Daily Amara Cavazos MD     • dicyclomine  10 mg Oral BID PRN Amara Cavazos MD     • digoxin  125 mcg Oral Daily Amara Cavazos MD     • famotidine  40 mg Oral [facility] Roro Boyd MD     • furosemide  80 mg Intravenous TID (diuretic) Cloteal Hum, CRNP     • [START ON 1/13/2023] insulin glargine  45 Units Subcutaneous ANNA Buchanan PA-C     • insulin lispro  2-12 Units Subcutaneous TID ZULEYMA El MD     • lisinopril  20 mg Oral Daily Clarisse El MD     • metoprolol succinate  100 mg Oral Q12H Clarisse El MD     • pantoprazole  40 mg Oral BID AC Clarisse El MD     • potassium citrate  10 mEq Oral BID Clarisse El MD     • spironolactone  25 mg Oral Daily Clarisse El MD          Today, Patient Was Seen By: Deanna Peres PA-C    **Please Note: This note may have been constructed using a voice recognition system  **

## 2023-01-12 NOTE — ASSESSMENT & PLAN NOTE
Lab Results   Component Value Date    HGBA1C 7 8 (H) 11/02/2022       Recent Labs     01/11/23 2050 01/12/23  0310 01/12/23  0807 01/12/23  0917   POCGLU 116 95 62* 129       Blood Sugar Average: Last 72 hrs:  (P) 26 [id number]   · Home regimen: Tresiba 90 units daily  · Patient has not eaten here because he does not like the food and has been recurrently hypoglycemic  · Reduce Lantus to 45 U QHS - half of home dose   · He has received NO long acting insulin 1/12  · SSI coverage with Accu-Cheks ACHS  · Hypoglycemia protocol

## 2023-01-12 NOTE — ASSESSMENT & PLAN NOTE
· Noted on 1/12   Patient reports history of large hemorrhoid that has bled in the past   · Monitor Hgb  · No intervention at this time   · Continue Eliquis   · May need to consider holding anticoagulation and consulting GI, however would need further respiratory stabilization for any scopes/procedures

## 2023-01-12 NOTE — APP STUDENT NOTE
VTE Pharmacologic Prophylaxis: VTE Score: 7 High Risk (Score >/= 5) - Pharmacological DVT Prophylaxis Ordered: apixaban (Eliquis)  Sequential Compression Devices Ordered  Patient Centered Rounds: I performed bedside rounds with nursing staff today  Discussions with Specialists or Other Care Team Provider:     Education and Discussions with Family / Patient: Updated  (son) via phone  Time Spent for Care: 20 minutes  More than 50% of total time spent on counseling and coordination of care as described above  Current Length of Stay: 2 day(s)  Current Patient Status: Inpatient   Certification Statement: The patient will continue to require additional inpatient hospital stay due to heart failure  Discharge Plan: Anticipate discharge in 48 hrs to home  Code Status: Level 1 - Full Code    Subjective:   Atif Bedoya is a 77year old male with a PMH of CHF  HTN, DM2, CAD, Afib who presented with an acute CHF exacerbation on hospital day 2  This morning he had had been dyspneic, lightheaded and with associated low glucose levels and low Oxygen levels  He was placed on BiPAP and has no reported feeling better and denies feeling short of breath  Patient still reports feeling lightheaded and slightly shaky  Patient denies nausea, vomiting, chest pain, dizziness, dyspnea  Objective:     Vitals:   Temp (24hrs), Av 2 °F (36 8 °C), Min:97 7 °F (36 5 °C), Max:98 7 °F (37 1 °C)    Temp:  [97 7 °F (36 5 °C)-98 7 °F (37 1 °C)] 98 2 °F (36 8 °C)  HR:  [49-72] 65  Resp:  [18] 18  BP: ()/(40-60) 119/55  SpO2:  [87 %-96 %] 96 %  Body mass index is 38 59 kg/m²  Input and Output Summary (last 24 hours): Intake/Output Summary (Last 24 hours) at 2023 1014  Last data filed at 2023 0400  Gross per 24 hour   Intake 720 ml   Output 2600 ml   Net -1880 ml       Physical Exam:   Physical Exam  Constitutional:       Appearance: Normal appearance     HENT:      Head: Normocephalic and atraumatic  Cardiovascular:      Rate and Rhythm: Normal rate and regular rhythm  Pulses: Normal pulses  Heart sounds: Normal heart sounds  Pulmonary:      Effort: Pulmonary effort is normal       Breath sounds: Normal breath sounds  Abdominal:      General: Bowel sounds are normal  There is no distension  Palpations: Abdomen is soft  Tenderness: There is no abdominal tenderness  Musculoskeletal:      Right lower leg: Edema present  Left lower leg: Edema present  Skin:     General: Skin is warm and dry  Capillary Refill: Capillary refill takes less than 2 seconds  Findings: Lesion (bilateral wrapped bandages on lower extremities, no drainage) present  Neurological:      General: No focal deficit present  Mental Status: He is alert and oriented to person, place, and time  Psychiatric:         Mood and Affect: Mood normal          Behavior: Behavior normal          Thought Content:  Thought content normal          Judgment: Judgment normal           Additional Data:     Labs:  Results from last 7 days   Lab Units 01/12/23  0637   WBC Thousand/uL 9 71   HEMOGLOBIN g/dL 9 7*   HEMATOCRIT % 38 1   PLATELETS Thousands/uL 185   NEUTROS PCT % 71   LYMPHS PCT % 14   MONOS PCT % 13*   EOS PCT % 1     Results from last 7 days   Lab Units 01/11/23  0856 01/10/23  1243   SODIUM mmol/L 141 141   POTASSIUM mmol/L 5 1 4 7   CHLORIDE mmol/L 100 99   CO2 mmol/L 38* 37*   BUN mg/dL 35* 43*   CREATININE mg/dL 1 13 1 18   ANION GAP mmol/L 3* 5   CALCIUM mg/dL 9 1 9 3   ALBUMIN g/dL  --  3 5   TOTAL BILIRUBIN mg/dL  --  0 37   ALK PHOS U/L  --  69   ALT U/L  --  10   AST U/L  --  16   GLUCOSE RANDOM mg/dL 128 94         Results from last 7 days   Lab Units 01/12/23  0917 01/12/23  0807 01/12/23  0310 01/11/23  2050 01/11/23  1617 01/11/23  1539 01/11/23  1110 01/11/23  0846 01/11/23  0817 01/11/23  0743 01/10/23  2155 01/10/23  1834   POC GLUCOSE mg/dl 129 62* 95 116 81 64* 90 136 60* 64* 141* 65               Lines/Drains:  Invasive Devices     Peripheral Intravenous Line  Duration           Peripheral IV 01/10/23 Distal;Right;Upper;Ventral (anterior) Antecubital 1 day                  Telemetry:  Telemetry Orders (From admission, onward)             48 Hour Telemetry Monitoring  Continuous x 48 hours        References:    Telemetry Guidelines   Question:  Reason for 48 Hour Telemetry  Answer:  Acute Decompensated CHF (continuous diuretic infusion or total diuretic dose > 200 mg daily, associated electrolyte derangement, ionotropic drip, history of ventricular arrhythmia, or new EF <35%)                 Telemetry Reviewed: Normal Sinus Rhythm  Indication for Continued Telemetry Use: Acute respiratory failure on Bipap             Imaging: No pertinent imaging reviewed      Recent Cultures (last 7 days):         Last 24 Hours Medication List:   Current Facility-Administered Medications   Medication Dose Route Frequency Provider Last Rate   • acetaminophen  650 mg Oral Q6H PRN Syed Montez PA-C     • apixaban  5 mg Oral Q12H Emilie Cooper MD     • atorvastatin  40 mg Oral Daily Amara Cavazos MD     • dicyclomine  10 mg Oral BID PRN Amara Cavazos MD     • digoxin  125 mcg Oral Daily Amara Cavazos MD     • famotidine  40 mg Oral HS Amara Cavazos MD     • furosemide  80 mg Intravenous TID (diuretic) KACEY Hernandez     • [START ON 1/13/2023] insulin glargine  45 Units Subcutaneous QAM Marty Buchanan PA-C     • insulin lispro  2-12 Units Subcutaneous TID ZULEYMA Cavazos MD     • lisinopril  20 mg Oral Daily Amara Cavazos MD     • metoprolol succinate  100 mg Oral Q12H Amara Cavazos MD     • pantoprazole  40 mg Oral BID ZULEYMA Cavazos MD     • potassium citrate  10 mEq Oral BID Amara Cavazos MD     • spironolactone  25 mg Oral Daily Amara Cavazos MD         Assessment/Plan    Acute exacerbation of chronic diastolic congestive heart failure   Cardiology following, appreciated input   Continue Lasix 80mg IV TID, per Cardiology   Continue BIPAP as needed for Oxygen saturation < 90%   Echocardiogram showed LVEF 55%, RV reduced systolic function with dilation of ventricle  LV grade 2 diastolic dysfunction  Monitor telemetry   Monitor daily weights and I/Os    Acute Respiratory Failure w/ hypoxia   Patient currently on BiPAP   Likely due to CHF exacerbation   Respiratory protocol    DM2   Hypoglycemic episode this morning, sugar was 62  Patient reports not eating hospital food, likely contributing to hypoglycemia   Decrease Lantus dose   Accu-checks ACHS   Hypoglycemic protocol    Venous statis dermatitis BL lower extremities   Wound care had been consulted,    Hydraguard BL sacrum, buttocks and heels BID and PRN   Elevate heels and use cushion for chair   Applying adaptic and silver alginate to wound beds, ACE wraps bilaterally    LUCIA   Patient uses CPAP at home, now transitioned to BiPAP machine but is currently waiting to receive machine at home   Continue BiPAP overnight   Continue to monitor vitals and telemetry    Paroxysmal Atrial Fibrilation   Rate controlled   Continue Toprol-XL, digoxin ad Eliquis   Monitor telemetry    CAD   Continue Eliquis, metoprolol and statin    HTN   Stable   Continue lisinopril, metoprolol, spironolactone, Lasix   Monitor vitals                  Today, Patient Was Seen By: Bart Arndt    **Please Note: This note may have been constructed using a voice recognition system  **

## 2023-01-12 NOTE — OCCUPATIONAL THERAPY NOTE
Occupational Therapy Cancellation     Patient Name: Mike Olmedo  [id number] Date: 1/12/2023  Problem List  Principal Problem:    Acute on chronic diastolic (congestive) heart failure (HCC)  Active Problems:    Hypertension    Type 2 diabetes mellitus with hyperglycemia, with long-term current use of insulin (HCC)    CAD (coronary artery disease)    Paroxysmal atrial fibrillation (HCC)    LUCIA (obstructive sleep apnea)    Acute respiratory failure with hypoxia (HCC)    Venous stasis dermatitis of both lower extremities    Pulmonary hypertension (HCC)    BRBPR (bright red blood per rectum)    Past Medical History  Past Medical History:   Diagnosis Date    A-fib ([facility] )     AAA (abdominal aortic aneurysm)     Actinic keratosis of right temple 7/31/2020    Actinic keratosis of scalp 7/31/2020    Acute respiratory failure with hypoxia ([facility] ) 3/25/2021    Allergic 1960    Arthritis     Lay's esophagus     Bladder cancer ([facility] )     Blister of left leg 4/28/2021    Blister of right leg 5/26/2021    CAD (coronary artery disease)     Cancer ([facility] ) 02/2010    Bladder    CHF (congestive heart failure) ([facility])     Chronic low back pain     Chronic pain of both knees 7/31/2020    Colitis 12/4/2020    CPAP (continuous positive airway pressure) dependence     Diabetes ([facility] )     Diabetes mellitus ([facility] ) 10/1993    Excessive gas 12/3/2020    Heart murmur     History of chemotherapy     Hydroureter on left 4/28/2021    Hyperlipemia     Hypertension     Immunization deficiency 10/30/2020    Hep a nonimmune    Kidney stone     Left lower quadrant abdominal pain 12/3/2020    Mass of right adrenal gland ([facility] ) 12/4/2020    4 1 cm    Myocardial infarction ([facility] )     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    Obesity 2000    LUCIA (obstructive sleep apnea) 1/29/2021    Pressure ulcer of right leg, stage 1 5/26/2021    Urinary tract infection with hematuria 5/3/2021    u cx 4/2021=pseudomonas R to bactrim and cefdinir, S to cipro    Venous stasis dermatitis of both lower extremities 5/26/2021     Past Surgical History  Past Surgical History:   Procedure Laterality Date    BACK SURGERY      BLADDER SURGERY      CARDIAC CATHETERIZATION  04/2016    CATARACT EXTRACTION, BILATERAL      COLONOSCOPY  01/29/2019    next [id number] GRAFT  04/2016    Quadruple per [place]    EGD  06/2017    EYE SURGERY  11/2016    Cataracts    JOINT REPLACEMENT  [id number]    Hip and shoulder    KIDNEY STONE SURGERY      TN CYSTO BLADDER W/URETERAL CATHETERIZATION Left 04/24/2021    Procedure: CYSTOSCOPY  WITH INSERTION STENT URETERAL;  Surgeon: Grady Verde MD;  Location: [facility];  Service: Urology    TN CYSTO/URETERO W/LITHOTRIPSY &INDWELL STENT INSRT Left 05/21/2021    Procedure: CYSTOSCOPY URETEROSCOPY WITH LITHOTRIPSY HOLMIUM LASER, AND INSERTION STENT URETERAL/EXCHANGE;  Surgeon: Grady Verde MD;  Location: [facility];  Service: Urology    TOTAL HIP ARTHROPLASTY Right 2012    TOTAL SHOULDER REPLACEMENT Right 2013    VASECTOMY  1971        01/12/23 0942   OT Last Visit   OT Visit Date   (Thursday)   Note Type   Note type Cancelled Session   Cancel Reasons Medical status   Additional Comments Chart review completed and attempted to see pt for OT eval  Care coordination w/ PT, RJ  Per RN pt is not appropriate to particiapte due to medical status   Will cancel and continue to follow     Laquita Trejo OTR/L

## 2023-01-12 NOTE — ASSESSMENT & PLAN NOTE
· Noted on ECHO  Remote smoking history  Is on BiPAP at night here   Apparently on CPAP at home but question adequacy   · Heart Failure following  · Diuresis as above   · Will need [facility] outpatient once optimized   · Further Heart Failure follow up needed

## 2023-01-12 NOTE — PROGRESS NOTES
-- Patient:  -- MRN: [id number]  -- Aidin Request ID: [id number]  -- Level of care reserved: [facility]  -- Partner Reserved: [id number] [phone]  -- Clinical needs requested:  -- Geography searched: [address] [place] [address]  -- Start of Service:  -- Request sent: 8:36am EST on 1/12/2023 by Geneva Liang  -- Partner reserved: 10:24am EST on 1/12/2023 by Geneva Liang  -- Choice list shared: 10:23am EST on 1/12/2023 by Geneva Liang

## 2023-01-12 NOTE — ASSESSMENT & PLAN NOTE
· Patient was on CPAP  Recently transitioned to BiPAP by his pulmonologist but still has not received the machine    · Continue BiPAP overnight  · CM consult to assist with equipment when closer to discharge

## 2023-01-13 ENCOUNTER — APPOINTMENT (INPATIENT)
Dept: CT IMAGING | Facility: HOSPITAL | Age: 78
End: 2023-01-13

## 2023-01-13 PROBLEM — E87.5 HYPERKALEMIA: Status: ACTIVE | Noted: 2023-01-13

## 2023-01-13 LAB
ANION GAP SERPL CALCULATED.3IONS-SCNC: 2 MMOL/L (ref 4–13)
BUN SERPL-MCNC: 37 MG/DL (ref 5–25)
CALCIUM SERPL-MCNC: 9.7 MG/DL (ref 8.4–10.2)
CHLORIDE SERPL-SCNC: 96 MMOL/L (ref 96–108)
CO2 SERPL-SCNC: 43 MMOL/L (ref 21–32)
CREAT SERPL-MCNC: 1.51 MG/DL (ref 0.6–1.3)
ERYTHROCYTE [DISTWIDTH] IN BLOOD BY AUTOMATED COUNT: 18.6 % (ref 11.6–15.1)
GFR SERPL CREATININE-BSD FRML MDRD: 43 ML/MIN/1.73SQ M
GLUCOSE SERPL-MCNC: 102 MG/DL (ref 65–140)
GLUCOSE SERPL-MCNC: 120 MG/DL (ref 65–140)
GLUCOSE SERPL-MCNC: 52 MG/DL (ref 65–140)
GLUCOSE SERPL-MCNC: 64 MG/DL (ref 65–140)
GLUCOSE SERPL-MCNC: 77 MG/DL (ref 65–140)
GLUCOSE SERPL-MCNC: 82 MG/DL (ref 65–140)
GLUCOSE SERPL-MCNC: 84 MG/DL (ref 65–140)
GLUCOSE SERPL-MCNC: 87 MG/DL (ref 65–140)
GLUCOSE SERPL-MCNC: 93 MG/DL (ref 65–140)
GLUCOSE SERPL-MCNC: 95 MG/DL (ref 65–140)
GLUCOSE SERPL-MCNC: 96 MG/DL (ref 65–140)
HCT VFR BLD AUTO: 42.5 % (ref 36.5–49.3)
HGB BLD-MCNC: 10.6 G/DL (ref 12–17)
MCH RBC QN AUTO: 21.7 PG (ref 26.8–34.3)
MCHC RBC AUTO-ENTMCNC: 24.9 G/DL (ref 31.4–37.4)
MCV RBC AUTO: 87 FL (ref 82–98)
PLATELET # BLD AUTO: 168 THOUSANDS/UL (ref 149–390)
PMV BLD AUTO: 10.6 FL (ref 8.9–12.7)
POTASSIUM SERPL-SCNC: 5.7 MMOL/L (ref 3.5–5.3)
RBC # BLD AUTO: 4.89 MILLION/UL (ref 3.88–5.62)
SODIUM SERPL-SCNC: 141 MMOL/L (ref 135–147)
WBC # BLD AUTO: 10.22 THOUSAND/UL (ref 4.31–10.16)

## 2023-01-13 RX ORDER — INSULIN LISPRO 100 [IU]/ML
2-12 INJECTION, SOLUTION INTRAVENOUS; SUBCUTANEOUS
Status: DISCONTINUED | OUTPATIENT
Start: 2023-01-13 | End: 2023-01-16

## 2023-01-13 RX ORDER — METOPROLOL SUCCINATE 100 MG/1
100 TABLET, EXTENDED RELEASE ORAL EVERY 12 HOURS
Status: DISCONTINUED | OUTPATIENT
Start: 2023-01-13 | End: 2023-01-14

## 2023-01-13 RX ORDER — BUMETANIDE 0.25 MG/ML
4 INJECTION, SOLUTION INTRAMUSCULAR; INTRAVENOUS ONCE
Status: COMPLETED | OUTPATIENT
Start: 2023-01-13 | End: 2023-01-13

## 2023-01-13 RX ORDER — DEXTROSE MONOHYDRATE 25 G/50ML
INJECTION, SOLUTION INTRAVENOUS
Status: COMPLETED
Start: 2023-01-13 | End: 2023-01-13

## 2023-01-13 RX ORDER — BUMETANIDE 0.25 MG/ML
0.5 INJECTION INTRAMUSCULAR; INTRAVENOUS CONTINUOUS
Status: DISCONTINUED | OUTPATIENT
Start: 2023-01-13 | End: 2023-01-15

## 2023-01-13 RX ADMIN — Medication 1 MG/HR: at 17:43

## 2023-01-13 RX ADMIN — BUMETANIDE 4 MG: 0.25 INJECTION, SOLUTION INTRAMUSCULAR; INTRAVENOUS at 09:44

## 2023-01-13 RX ADMIN — LORAZEPAM 0.5 MG: 2 INJECTION INTRAMUSCULAR; INTRAVENOUS at 22:59

## 2023-01-13 RX ADMIN — DEXTROSE MONOHYDRATE 25 ML: 25 INJECTION, SOLUTION INTRAVENOUS at 18:39

## 2023-01-13 RX ADMIN — DEXTROSE MONOHYDRATE 50 ML: 25 INJECTION, SOLUTION INTRAVENOUS at 22:09

## 2023-01-13 RX ADMIN — Medication 1 MG/HR: at 10:52

## 2023-01-13 RX ADMIN — DEXTROSE MONOHYDRATE 25 ML: 25 INJECTION, SOLUTION INTRAVENOUS at 11:30

## 2023-01-13 RX ADMIN — FUROSEMIDE 80 MG: 10 INJECTION, SOLUTION INTRAMUSCULAR; INTRAVENOUS at 05:24

## 2023-01-13 NOTE — PHYSICAL THERAPY NOTE
Physical Therapy Cancellation Note       01/13/23 1331   PT Last Visit   PT Visit Date 01/13/23   Note Type   Note type Cancelled Session   Cancel Reasons Medical status   Additional Comments PT orders received and chart reviewed  Spoke with DINAH Tompkins who reports pt still on Bipap and not medically appropriate at this time  Will continue to follow up on caseload as approrpiate       Isabelle Bee, PT, DPT

## 2023-01-13 NOTE — ASSESSMENT & PLAN NOTE
· Hypoxia on arrival, desaturations to 77% on room air  Denies home O2 use    · Dovray improvement today, but was hypoxic in the setting of hypoglycemia, rectal bleeding, and on going volume overloaded status   · Patient continued to be hypoxic at 84% on 15 L midflow and required continuous BiPAP 1/13  · Upgrade to Level 2 SD due to continuous BiPAP need  · Continue at night  · Wean O2 as tolerated to maintain SPO2 >90%  · Respiratory protocol

## 2023-01-13 NOTE — PLAN OF CARE
Problem: DISCHARGE PLANNING  Goal: Discharge to home or other facility with appropriate resources  Description: INTERVENTIONS:  - Identify barriers to discharge w/patient and caregiver  - Arrange for needed discharge resources and transportation as appropriate  - Identify discharge learning needs (meds, wound care, etc )  - Arrange for interpretive services to assist at discharge as needed  - Refer to Case Management Department for coordinating discharge planning if the patient needs post-hospital services based on physician/advanced practitioner order or complex needs related to functional status, cognitive ability, or social support system  Outcome: Progressing     Problem: Knowledge Deficit  Goal: Patient/family/caregiver demonstrates understanding of disease process, treatment plan, medications, and discharge instructions  Description: Complete learning assessment and assess knowledge base    Interventions:  - Provide teaching at level of understanding  - Provide teaching via preferred learning methods  Outcome: Progressing     Problem: CARDIOVASCULAR - ADULT  Goal: Maintains optimal cardiac output and hemodynamic stability  Description: INTERVENTIONS:  - Monitor I/O, vital signs and rhythm  - Monitor for S/S and trends of decreased cardiac output  - Administer and titrate ordered vasoactive medications to optimize hemodynamic stability  - Assess quality of pulses, skin color and temperature  - Assess for signs of decreased coronary artery perfusion  - Instruct patient to report change in severity of symptoms  Outcome: Progressing  Goal: Absence of cardiac dysrhythmias or at baseline rhythm  Description: INTERVENTIONS:  - Continuous cardiac monitoring, vital signs, obtain 12 lead EKG if ordered  - Administer antiarrhythmic and heart rate control medications as ordered  - Monitor electrolytes and administer replacement therapy as ordered  Outcome: Progressing     Problem: RESPIRATORY - ADULT  Goal: Achieves optimal ventilation and oxygenation  Description: INTERVENTIONS:  - Assess for changes in respiratory status  - Assess for changes in mentation and behavior  - Position to facilitate oxygenation and minimize respiratory effort  - Oxygen administered by appropriate delivery if ordered  - Initiate smoking cessation education as indicated  - Encourage broncho-pulmonary hygiene including cough, deep breathe, Incentive Spirometry  - Assess the need for suctioning and aspirate as needed  - Assess and instruct to report SOB or any respiratory difficulty  - Respiratory Therapy support as indicated  Outcome: Progressing     Problem: MOBILITY - ADULT  Goal: Maintain or return to baseline ADL function  Description: INTERVENTIONS:  -  Assess patient's ability to carry out ADLs; assess patient's baseline for ADL function and identify physical deficits which impact ability to perform ADLs (bathing, care of mouth/teeth, toileting, grooming, dressing, etc )  - Assess/evaluate cause of self-care deficits   - Assess range of motion  - Assess patient's mobility; develop plan if impaired  - Assess patient's need for assistive devices and provide as appropriate  - Encourage maximum independence but intervene and supervise when necessary  - Involve family in performance of ADLs  - Assess for home care needs following discharge   - Consider OT consult to assist with ADL evaluation and planning for discharge  - Provide patient education as appropriate  Outcome: Progressing  Goal: Maintains/Returns to pre admission functional level  Description: INTERVENTIONS:  - Perform BMAT or MOVE assessment daily    - Set and communicate daily mobility goal to care team and patient/family/caregiver     - Collaborate with rehabilitation services on mobility goals if consulted  - Out of bed for toileting  - Record patient progress and toleration of activity level   Outcome: Progressing     Problem: Potential for Falls  Goal: Patient will remain free of falls  Description: INTERVENTIONS:  - Educate patient/family on patient safety including physical limitations  - Instruct patient to call for assistance with activity   - Consult OT/PT to assist with strengthening/mobility   - Keep Call bell within reach  - Keep bed low and locked with side rails adjusted as appropriate  - Keep care items and personal belongings within reach  - Initiate and maintain comfort rounds  - Make Fall Risk Sign visible to staff  - Apply yellow socks and bracelet for high fall risk patients  - Consider moving patient to room near nurses station  Outcome: Progressing     Problem: Prexisting or High Potential for Compromised Skin Integrity  Goal: Skin integrity is maintained or improved  Description: INTERVENTIONS:  - Identify patients at risk for skin breakdown  - Assess and monitor skin integrity  - Assess and monitor nutrition and hydration status  - Monitor labs   - Assess for incontinence   - Turn and reposition patient  - Assist with mobility/ambulation  - Relieve pressure over bony prominences  - Avoid friction and shearing  - Provide appropriate hygiene as needed including keeping skin clean and dry  - Evaluate need for skin moisturizer/barrier cream  - Collaborate with interdisciplinary team   - Patient/family teaching  - Consider wound care consult   Outcome: Progressing

## 2023-01-13 NOTE — PROGRESS NOTES
Heart Failure/ Pulmonary Hypertension Progress Note - Eileen Wilcox 77 y o  male MRN: [id number]    Unit/Bed#: S -01 Encounter: [id number]      Assessment:    Principal Problem:    Acute on chronic diastolic (congestive) heart failure (HCC)  Active Problems:    Hypertension    Type 2 diabetes mellitus with hyperglycemia, with long-term current use of insulin (HCC)    CAD (coronary artery disease)    Paroxysmal atrial fibrillation (HCC)    LUCIA (obstructive sleep apnea)    Acute respiratory failure with hypoxia (HCC)    Venous stasis dermatitis of both lower extremities    Pulmonary hypertension (HCC)    BRBPR (bright red blood per rectum)    # Acute on chronic heart failure with preserved ejection fraction  # Pulmonary hypertension with RV dysfunction  Likely group II disease diastolic heart failure and group III disease (LUCIA, reports using CPAP at night but concern regarding adequacy/efficiency of use on last pulm note)  No h/o PE/DVT  Remote smoking, quit 2010  CT chest 4/2021: Pleural thickening with pleural calcifications in the dependent portion of the right mid and lower lung field, 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    Suspected round atelectasis in the posterior right lower lobe   No h/o illicit drug use      Studies- personally reviewed by me     Echocardiogram 1/11/23  LVEF: 55%  LVIDd: 4 8cm  RV: not well visualized; dilated RV>LV; reduced systolic function  MR: moderate MAC, trace MR  PASP: 83mmHg; estimated RAP 15, peak TR velocity 4 1  RVOT: suboptimal envelope, mid to late systolic notching; interventricular diastolic and systolic septal flattening  Other: grade 2 diastology, mild JONNY; mildly increased LV wall thickness     Echo 12/28/21:  LVEF 55%, mildly increased wall thickness  RV mildly dilated, normal systolic function  PASP [id number]     Echo 3/23/21:  LVEF 60%, mildly increased wall thickness  Grade 1 diastology  RV mildly dilated, mildly reduced systolic function  PASP 45mmHg     # CAD s/p CABG in 2016 (LIMA to LAD, SVG to OM3 and SVG to PDA) in 2016  # LUCIA  # Paroxysmal atrial fibrillation, s/p cardioversion 1/7/22  AC: Eliquis  Rate: metoprolol and digoxin  # Hyperlipidemia  # DM type II  Hemoglobin A1c 7 8 11/2/2022  # Hypertension  # LACY, creatinine up to 1 5 today likely due to venous congestion  # Hemorrhoids with rectal bleeding    Plan:  No significant diuresis in the last 24 hours with lasix 80mg IV TID  Will stop lasix and start bumex drip at 1mg/hr with 4mg IV bolus  Currently requiring continuous BIPAP use  Plan on RHC to further assess pulmonary hypertension when volume optimized, inpatient vs outpatient  VQ scan when volume optimized, although less likely with chronic PE as patient has been on [facility]  Monitor on telemetry    Subjective:   Patient seen and examined  Requiring BiPAP  Tired    Review of Systems   Unable to perform ROS: Other    On BiPAP    Objective: Intake/ Output: ?  Weight: 317 lbs 1/12/23    Vitals: Blood pressure 137/55, pulse 69, temperature 98 7 °F (37 1 °C), temperature source Oral, resp  rate (!) 24, height 6' 4" (1 93 m), weight (!) 144 kg (317 lb 0 3 oz), SpO2 94 %  , Body mass index is 38 59 kg/m² , I/O last 3 completed shifts:  In: -   Out: 1600 [Urine:1600]  I/O this shift:   In: 0   Out: 50 [Urine:50]  Wt Readings from Last 3 Encounters:   01/12/23 (!) 144 kg (317 lb 0 3 oz)   12/15/22 (!) 139 kg (306 lb)   11/21/22 (!) 138 kg (304 lb)       Intake/Output Summary (Last 24 hours) at 1/13/2023 0819  Last data filed at 1/13/2023 0809  Gross per 24 hour   Intake 0 ml   Output 650 ml   Net -650 ml     I/O last 3 completed shifts:  In: -   Out: 1600 [[id number]]      Physical Exam:  Vitals:    01/13/23 0522 01/13/23 0729 01/13/23 0801 01/13/23 0809   BP: 152/60 137/55     BP Location:       Pulse: 74 69     Resp:  (!) 24     Temp:       TempSrc:       SpO2: 98% 98% 97% 94%   Weight:       Height:           GEN: Francisca Garcia sleepy but arousable  HEENT: NC/AT, moist mucosa, anicteric sclerae; extraocular muscles intact  NECK: supple, no carotid bruits   HEART: regular rhythm, normal S1 and S2, no murmurs, clicks, gallops or rubs, JVP is elevated  LUNGS: clear to auscultation bilaterally; no wheezes, rales, or rhonchi   ABDOMEN: normal bowel sounds, soft, no tenderness, no distention  EXTREMITIES: peripheral pulses normal; no clubbing, cyanosis, bilateral lower extremity pitting edema  NEURO: no focal findings   SKIN: normal without suspicious lesions on exposed skin      Current Facility-Administered Medications:   •  acetaminophen (TYLENOL) tablet 650 mg, 650 mg, Oral, Q6H PRN, Jabari Agee PA-C  •  apixaban (ELIQUIS) tablet 5 mg, 5 mg, Oral, Q12H [facility], Yobani Archuleta MD, 5 mg at 01/12/23 1143  •  atorvastatin (LIPITOR) tablet 40 mg, 40 mg, Oral, Daily, Yobani Archuleta MD, 40 mg at 01/12/23 1143  •  dicyclomine (BENTYL) capsule 10 mg, 10 mg, Oral, BID PRN, Yobani Archuleta MD  •  digoxin (LANOXIN) tablet 125 mcg, 125 mcg, Oral, Daily, Yobani Archuleta MD, 125 mcg at 01/12/23 1146  •  famotidine (PEPCID) tablet 40 mg, 40 mg, Oral, HS, Yobani Archuleta MD, 40 mg at 01/11/23 2106  •  furosemide (LASIX) injection 80 mg, 80 mg, Intravenous, TID (diuretic), KACEY Kramer, 80 mg at 01/13/23 0524  •  insulin glargine (LANTUS) subcutaneous injection 45 Units 0 45 mL, 45 Units, Subcutaneous, QAM, Marty Buchanan PA-C  •  insulin lispro (HumaLOG) 100 units/mL subcutaneous injection 2-12 Units, 2-12 Units, Subcutaneous, TID AC **AND** Fingerstick Glucose (POCT), , , TID AC, Yobani Archuleta MD  •  lisinopril (ZESTRIL) tablet 20 mg, 20 mg, Oral, Daily, Yobani Archuleta MD, 20 mg at 01/11/23 3692  •  LORazepam (ATIVAN) injection 0 5 mg, 0 5 mg, Intravenous, Once, KACEY Toussaint  •  metoprolol succinate (TOPROL-XL) 24 hr tablet 100 mg, 100 mg, Oral, Q12H, Yobani Archuleta MD, 100 mg at 01/11/23 0960  •  pantoprazole (PROTONIX) EC tablet 40 mg, 40 mg, Oral, BID AC, Samia Mason MD, 40 mg at 01/12/23 1715  •  potassium citrate (UROCIT-K) CR tablet 10 mEq, 10 mEq, Oral, BID, Samia Mason MD, 10 mEq at 01/12/23 1716  •  spironolactone (ALDACTONE) tablet 25 mg, 25 mg, Oral, Daily, Samia Mason MD, 25 mg at 01/11/23 0921      Labs & Results:        Results from last 7 days   Lab Units 01/13/23  0521 01/12/23  0637 01/11/23  0856   WBC Thousand/uL 10 22* 9 71 8 65   HEMOGLOBIN g/dL 10 6* 9 7* 10 0*   HEMATOCRIT % 42 5 38 1 38 4   PLATELETS Thousands/uL 168 185 166         Results from last 7 days   Lab Units 01/13/23  0521 01/12/23  0637 01/11/23  0856 01/10/23  1243   POTASSIUM mmol/L 5 7* 5 4* 5 1 4 7   CHLORIDE mmol/L 96 98 100 99   CO2 mmol/L 43* 39* 38* 37*   BUN mg/dL 37* 34* 35* 43*   CREATININE mg/dL 1 51* 1 24 1 13 1 18   CALCIUM mg/dL 9 7 9 2 9 1 9 3   ALK PHOS U/L  --   --   --  69   ALT U/L  --   --   --  10   AST U/L  --   --   --  16           Thank you for the opportunity to participate in the care of this patient      Han Mcdonald MD  Advanced Heart Failure and Mechanical Circulatory   [facility]

## 2023-01-13 NOTE — ASSESSMENT & PLAN NOTE
Wt Readings from Last 3 Encounters:   01/12/23 (!) 144 kg (317 lb 0 3 oz)   12/15/22 (!) 139 kg (306 lb)   11/21/22 (!) 138 kg (304 lb)     · Patient presenting with worsening lower extremity edema, abdominal tightness, weight gain of 21 pounds in 2 to 3 weeks, PND  Patient denies any dietary or medication noncompliance  · Chronically on PO Lasix 80mg BID at home     · ECHO shows EF 55%, grade 2 DD, dilated RV with reduced systolic function and pulmonary hypertension   · Net output 2,620 mL, minimal with Lasix 80 mg TID  · Upgraded to Level 2 Step Down due to requiring BiPAP continuously  · Cardiology and Heart Failure following   · Give Bumex 4 mg IV x 1 then start Bumex GTT on 1/13  · Stop Spironolactone due to LACY   · Continue Toprol-XL  · Hold Lisinopril due to LACY   · Daily weights, I/O's  · Low-sodium diet, 1 5L fluid restriction  · Continue telemetry

## 2023-01-13 NOTE — ASSESSMENT & PLAN NOTE
Lab Results   Component Value Date    HGBA1C 7 8 (H) 11/02/2022       Recent Labs     01/12/23  1540 01/12/23 2003 01/13/23  0730 01/13/23  1114   POCGLU 127 128 95 77       Blood Sugar Average: Last 72 hrs:  (P) 17 [id number]   · Home regimen: Tresiba 90 units daily  · Patient has not eaten here because he does not like the food and has been recurrently hypoglycemic  · Sugar remains overly tightly controlled and he is now on BiPAP and unable to have oral intake  · Volume overload status precludes Dextrose drip   · Stop Lantus for now  · SSI coverage with Accu-Cheks ACHS  · Q2 Accuchecks   · Hypoglycemia protocol

## 2023-01-13 NOTE — ASSESSMENT & PLAN NOTE
· Stable/soft  · Continue metoprolol  · Stop Lisinopril due to LACY   · Stop Aldactone due to LACY   · Monitor closely on Bumex GTT

## 2023-01-13 NOTE — APP STUDENT NOTE
VTE Pharmacologic Prophylaxis: VTE Score: 7 Moderate Risk (Score 3-4) - Pharmacological DVT Prophylaxis Ordered: apixaban (Eliquis)  Patient Centered Rounds: I performed bedside rounds with nursing staff today  Discussions with Specialists or Other Care Team Provider:     Education and Discussions with Family / Patient: Updated  (son) at bedside  Time Spent for Care: 20 minutes  More than 50% of total time spent on counseling and coordination of care as described above  Current Length of Stay: 3 day(s)  Current Patient Status: Inpatient   Certification Statement: The patient will continue to require additional inpatient hospital stay due to worsening renal function  Discharge Plan: Anticipate discharge in 48-72 hrs to home with home services  Code Status: Level 1 - Full Code    Subjective:   JIHAN CABRALES is a 77year old male with a hx of HTN, DM2,  Acute respiratory failure, CAD, A fib and LUCIA who was admitted for an acute on chronic heart failure exacerbation on hospital day 3  Interview was limited due to patient being on BiPAP machine  Family was present in the room to assist with interview  They report he looks about the same as yesterday and continues to be disoriented and sleeping most of the day  Objective:     Vitals:   Temp (24hrs), Av 7 °F (37 1 °C), Min:98 7 °F (37 1 °C), Max:98 7 °F (37 1 °C)    Temp:  [98 7 °F (37 1 °C)] 98 7 °F (37 1 °C)  HR:  [65-74] 69  Resp:  [16-24] 24  BP: ()/(43-60) 137/55  SpO2:  [90 %-99 %] 94 %  Body mass index is 38 59 kg/m²  Input and Output Summary (last 24 hours):      Intake/Output Summary (Last 24 hours) at 2023 0914  Last data filed at 2023 0809  Gross per 24 hour   Intake 0 ml   Output 650 ml   Net -650 ml       Physical Exam:   Physical Exam     Additional Data:     Labs:  Results from last 7 days   Lab Units 23  0521 23  0637   WBC Thousand/uL 10 22* 9 71   HEMOGLOBIN g/dL 10 6* 9 7*   HEMATOCRIT % 42 5 38 1   PLATELETS Thousands/uL 168 185   NEUTROS PCT %  --  71   LYMPHS PCT %  --  14   MONOS PCT %  --  13*   EOS PCT %  --  1     Results from last 7 days   Lab Units 01/13/23  0521 01/11/23  0856 01/10/23  1243   SODIUM mmol/L 141   < > 141   POTASSIUM mmol/L 5 7*   < > 4 7   CHLORIDE mmol/L 96   < > 99   CO2 mmol/L 43*   < > 37*   BUN mg/dL 37*   < > 43*   CREATININE mg/dL 1 51*   < > 1 18   ANION GAP mmol/L 2*   < > 5   CALCIUM mg/dL 9 7   < > 9 3   ALBUMIN g/dL  --   --  3 5   TOTAL BILIRUBIN mg/dL  --   --  0 37   ALK PHOS U/L  --   --  69   ALT U/L  --   --  10   AST U/L  --   --  16   GLUCOSE RANDOM mg/dL 87   < > 94    < > = values in this interval not displayed  Results from last 7 days   Lab Units 01/13/23  0730 01/12/23 2003 01/12/23  1540 01/12/23  1121 01/12/23  0917 01/12/23  0807 01/12/23  0310 01/11/23  2050 01/11/23  1617 01/11/23  1539 01/11/23  1110 01/11/23  0846   POC GLUCOSE mg/dl 95 128 127 79 129 62* 95 116 81 64* 90 136               Lines/Drains:  Invasive Devices       Peripheral Intravenous Line  Duration             Peripheral IV 01/10/23 Distal;Right;Upper;Ventral (anterior) Antecubital 2 days                      Telemetry:  Telemetry Orders (From admission, onward)               48 Hour Telemetry Monitoring  Continuous x 48 hours        References:    Telemetry Guidelines   Question:  Reason for 48 Hour Telemetry  Answer:  Acute Decompensated CHF (continuous diuretic infusion or total diuretic dose > 200 mg daily, associated electrolyte derangement, ionotropic drip, history of ventricular arrhythmia, or new EF <35%)                     Telemetry Reviewed: Normal Sinus Rhythm  Indication for Continued Telemetry Use: No indication for continued use  Will discontinue  Imaging: No pertinent imaging reviewed      Recent Cultures (last 7 days):         Last 24 Hours Medication List:   Current Facility-Administered Medications   Medication Dose Route Frequency Provider Last Rate    acetaminophen  650 mg Oral Q6H PRN Jayant Smith PA-C      apixaban  5 mg Oral Q12H [facility] Maria Esther Nevarez MD      atorvastatin  40 mg Oral Daily Maria Esther Nevarez MD      bumetanide  1 mg/hr Intravenous Continuous Maggy June MD      bumetanide  4 mg Intravenous Once Maggy June MD      dicyclomine  10 mg Oral BID PRN Maria Esther Nevarez MD      digoxin  125 mcg Oral Daily Maria Esther Nevarez MD      famotidine  40 mg Oral HS Maria Esther Nevarez MD      insulin glargine  45 Units Subcutaneous QAM Marty Buchanan PA-C      insulin lispro  2-12 Units Subcutaneous TID AC Maria Esther Nevarez MD      lisinopril  20 mg Oral Daily Maria Esther Nevarez MD      LORazepam  0 5 mg Intravenous Once KACEY Spangler      metoprolol succinate  100 mg Oral Q12H Maria Esther Nevarez MD      pantoprazole  40 mg Oral BID AC Maria Esther Nevarez MD      potassium citrate  10 mEq Oral BID Maria Esther Nevarez MD      spironolactone  25 mg Oral Daily Maria Esther Nevarez MD       Acute exacerbation of chronic diastolic congestive heart failure              Cardiology following, appreciated input              Continue Lasix 80mg IV TID, per Cardiology              Continue BIPAP as needed for Oxygen saturation < 90%              Echocardiogram showed LVEF 55%, RV reduced systolic function with dilation of ventricle  LV grade 2 diastolic dysfunction                Monitor telemetry              Monitor daily weights and I/Os     Pulmonary Hypertension   Noted on ECHO   Heart Failure following   Continue diuresis    Acute Respiratory Failure w/ hypoxia              Patient currently on BiPAP              Likely due to CHF exacerbation              Respiratory protocol    LACY   Hold lisinopril   Cr 1 51   Increase IV fluids   Elevated potassium, potassium held, low potassium diet   Monitor CMP and telemetry        DM2                       Patient reports not eating hospital food, likely contributing to hypoglycemia Decreased Lantus dose              Accu-checks ACHS              Hypoglycemic protocol     Venous statis dermatitis BL lower extremities              Wound care had been consulted,               Hydraguard BL sacrum, buttocks and heels BID and PRN              Elevate heels and use cushion for chair              Applying adaptic and silver alginate to wound beds, ACE wraps bilaterally     LUCIA              Patient uses CPAP at home, now transitioned to BiPAP machine but is currently waiting to receive machine at home              Continue BiPAP overnight              Continue to monitor vitals and telemetry     Paroxysmal Atrial Fibrilation              Rate controlled              Continue Toprol-XL, digoxin ad Eliquis              Monitor telemetry     CAD              Continue Eliquis, metoprolol and statin     HTN              Stable              Continue lisinopril, metoprolol, spironolactone, Lasix              Monitor vitals     BRBPR   Noted on 1/12  Patient has hx of large hemorrhoid   Monitor Hb, currently 10 6   Continue eliquis          Today, Patient Was Seen By: Marisela Roth    **Please Note: This note may have been constructed using a voice recognition system  **

## 2023-01-13 NOTE — OCCUPATIONAL THERAPY NOTE
Occupational Therapy Cancelled Session       01/13/23 1015   Note Type   Note type Cancelled Session   Cancel Reasons Medical status   Additional Comments OT orders received and chart review performed  Pt admitted with SOB  Attemted to see pt for OT evaluation this AM however pt transferred to Level 2 SD due to continuous BiPAP need  Will hold OT eval at this time due to medical status  Will continue to follow and see pt as appropriate and as schedule allows       VIRA Parr/ANTWAN

## 2023-01-13 NOTE — PROGRESS NOTES
[facility]  Progress Note - Catrachita Fly 1945, 77 y o  male MRN: [id number]  Unit/Bed#: S -01 Encounter: [id number]  Primary Care Provider: Dolores Caceres MD   Date and time admitted to hospital: 1/10/2023 12:21 PM    * Acute on chronic diastolic (congestive) heart failure [facility])  Assessment & Plan  Wt Readings from Last 3 Encounters:   01/12/23 (!) 144 kg (317 lb 0 3 oz)   12/15/22 (!) 139 kg (306 lb)   11/21/22 (!) 138 kg (304 lb)     · Patient presenting with worsening lower extremity edema, abdominal tightness, weight gain of 21 pounds in 2 to 3 weeks, PND  Patient denies any dietary or medication noncompliance  · Chronically on PO Lasix 80mg BID at home  · ECHO shows EF 55%, grade 2 DD, dilated RV with reduced systolic function and pulmonary hypertension   · Net output 2,620 mL, minimal with Lasix 80 mg TID  · Upgraded to Level 2 Step Down due to requiring BiPAP continuously  · Cardiology and Heart Failure following   · Give Bumex 4 mg IV x 1 then start Bumex GTT on 1/13  · Stop Spironolactone due to LACY   · Continue Toprol-XL  · Hold Lisinopril due to LACY   · Daily weights, I/O's  · Low-sodium diet, 1 5L fluid restriction  · Continue telemetry     Pulmonary hypertension ([place] [address] )  Assessment & Plan  · Noted on ECHO  Remote smoking history  Is on BiPAP at night here  Apparently on CPAP at home but question adequacy   · Heart Failure following  · Diuresis as above   · Will need RHC outpatient once optimized   · Further Heart Failure follow up needed    [facility]  Assessment & Plan  · Developed LACY on 1/13 with creatinine of 1 5, baseline of 1 0-1 1   Likely due to diuretic response, but also with hypotension   · Continue Bumex GTT   · Hold Lisinopril   · Hold Aldactone - discussed with Heart Failure   · Monitor BMP  · I/O  · UOP - status post Gómez catheter 1/13 due to critical illness    Paroxysmal atrial fibrillation ([facility] )  Assessment & Plan  · Rate controlled  · Continue Toprol-XL, Digoxin, Eliquis     Acute respiratory failure with hypoxia (HCC)  Assessment & Plan  · Hypoxia on arrival, desaturations to 77% on room air  Denies home O2 use  · [place] improvement today, but was hypoxic in the setting of hypoglycemia, rectal bleeding, and on going volume overloaded status   · Patient continued to be hypoxic at 84% on 15 L midflow and required continuous BiPAP 1/13  · Upgrade to Level 2 SD due to continuous BiPAP need  · Continue at night  · Wean O2 as tolerated to maintain SPO2 >90%  · Respiratory protocol    LUCIA (obstructive sleep apnea)  Assessment & Plan  · Patient was on CPAP  Recently transitioned to BiPAP by his pulmonologist but still has not received the machine  · Continue BiPAP overnight  · CM consult to assist with equipment when closer to discharge    Type 2 diabetes mellitus with hyperglycemia, with long-term current use of insulin [facility])  Assessment & Plan  Lab Results   Component Value Date    HGBA1C 7 8 (H) 11/02/2022       Recent Labs     01/12/23  1540 01/12/23 2003 01/13/23  0730 01/13/23  1114   POCGLU 127 128 95 77       Blood Sugar Average: Last 72 hrs:  (P) 02 [id number]   · Home regimen: Tresiba 90 units daily  · Patient has not eaten here because he does not like the food and has been recurrently hypoglycemic  · Sugar remains overly tightly controlled and he is now on BiPAP and unable to have oral intake  · Volume overload status precludes Dextrose drip   · Stop Lantus for now  · SSI coverage with Accu-Cheks ACHS  · Q2 Accuchecks   · Hypoglycemia protocol    Hypertension  Assessment & Plan  · Stable/soft  · Continue metoprolol  · Stop Lisinopril due to LACY   · Stop Aldactone due to LACY   · Monitor closely on Bumex GTT      CAD (coronary artery disease)  Assessment & Plan  · Chest pain free  · Continue Eliquis, metoprolol, statin    · RHC needed as outpatient as above     Venous stasis dermatitis of both lower extremities  Assessment & Plan  · Wound care    BRBPR (bright red blood per rectum)  Assessment & Plan  · Noted on   Patient reports history of large hemorrhoid that has bled in the past   · Monitor Hgb  · No intervention at this time   · Continue Eliquis   · May need to consider holding anticoagulation and consulting GI, however would need further respiratory stabilization for any scopes/procedures     Hyperkalemia  Assessment & Plan  · Noted at 5 7 today likely due to exogenous potassium and LACY   · Stop Potassium Citrate   · Continue Bumex GTT   · Monitor BMP  · On telemetry for heart failure/Bumex GTT        VTE Pharmacologic Prophylaxis: VTE Score: 7 High Risk (Score >/= 5) - Pharmacological DVT Prophylaxis Ordered: apixaban (Eliquis)  Sequential Compression Devices Ordered  Patient Centered Rounds: I performed bedside rounds with nursing staff today  Discussions with Specialists or Other Care Team Provider: Discussed with Heart Failure, RN, CM    Education and Discussions with Family / Patient: Updated  (wife) at bedside  Time Spent for Care: 30 minutes  More than 50% of total time spent on counseling and coordination of care as described above  Current Length of Stay: 3 day(s)  Current Patient Status: Inpatient   Certification Statement: The patient will continue to require additional inpatient hospital stay due to Bumex GTT, requiring continuous BiPAP, remains critically ill  Discharge Plan: Anticipate discharge in >72 hrs to rehab facility  Code Status: Level 3 - DNAR and DNI    Subjective:   Patient able to provide minimal history  Feels okay on BiPAP   Reports that he has to urinate,but has Gómez in      Objective:     Vitals:   Temp (24hrs), Av 7 °F (37 1 °C), Min:98 7 °F (37 1 °C), Max:98 7 °F (37 1 °C)    Temp:  [98 7 °F (37 1 °C)] 98 7 °F (37 1 °C)  HR:  [66-74] 69  Resp:  [16-24] 24  BP: ()/(49-60) 137/55  SpO2:  [90 %-99 %] 93 %  Body mass index is 38 59 kg/m²  Input and Output Summary (last 24 hours): Intake/Output Summary (Last 24 hours) at 1/13/2023 1241  Last data filed at 1/13/2023 1042  Gross per 24 hour   Intake 0 ml   Output 1225 ml   Net -1225 ml       Physical Exam:   Physical Exam  Constitutional:       General: He is in acute distress  Appearance: Normal appearance  He is obese  He is ill-appearing  He is not diaphoretic  Interventions: Face mask in place  HENT:      Head: Normocephalic and atraumatic  Mouth/Throat:      Mouth: Mucous membranes are moist    Eyes:      General: No scleral icterus  Pupils: Pupils are equal, round, and reactive to light  Cardiovascular:      Rate and Rhythm: Normal rate  Rhythm irregularly irregular  Pulses: Normal pulses  Heart sounds: Normal heart sounds, S1 normal and S2 normal  No murmur heard  No systolic murmur is present  No diastolic murmur is present  No gallop  No S3 or S4 sounds  Pulmonary:      Effort: Pulmonary effort is normal  No accessory muscle usage or respiratory distress  Breath sounds: Decreased air movement present  No stridor  Examination of the right-lower field reveals decreased breath sounds  Examination of the left-lower field reveals decreased breath sounds  Decreased breath sounds present  No wheezing, rhonchi or rales  Chest:      Chest wall: No tenderness  Abdominal:      General: Bowel sounds are normal  There is no distension  Palpations: Abdomen is soft  Tenderness: There is no abdominal tenderness  There is no guarding  Musculoskeletal:      Right lower leg: 3+ Edema present  Left lower leg: 3+ Edema present  Skin:     General: Skin is warm and dry  Coloration: Skin is not jaundiced  Neurological:      General: No focal deficit present  Mental Status: He is alert  Mental status is at baseline  Motor: No tremor or seizure activity  Psychiatric:         Behavior: Behavior is cooperative  Additional Data:     Labs:  Results from last 7 days   Lab Units 01/13/23  0521 01/12/23  0637   WBC Thousand/uL 10 22* 9 71   HEMOGLOBIN g/dL 10 6* 9 7*   HEMATOCRIT % 42 5 38 1   PLATELETS Thousands/uL 168 185   NEUTROS PCT %  --  71   LYMPHS PCT %  --  14   MONOS PCT %  --  13*   EOS PCT %  --  1     Results from last 7 days   Lab Units 01/13/23  0521 01/11/23  0856 01/10/23  1243   SODIUM mmol/L 141   < > 141   POTASSIUM mmol/L 5 7*   < > 4 7   CHLORIDE mmol/L 96   < > 99   CO2 mmol/L 43*   < > 37*   BUN mg/dL 37*   < > 43*   CREATININE mg/dL 1 51*   < > 1 18   ANION GAP mmol/L 2*   < > 5   CALCIUM mg/dL 9 7   < > 9 3   ALBUMIN g/dL  --   --  3 5   TOTAL BILIRUBIN mg/dL  --   --  0 37   ALK PHOS U/L  --   --  69   ALT U/L  --   --  10   AST U/L  --   --  16   GLUCOSE RANDOM mg/dL 87   < > 94    < > = values in this interval not displayed           Results from last 7 days   Lab Units 01/13/23  1114 01/13/23  0730 01/12/23  2003 01/12/23  1540 01/12/23  1121 01/12/23  0917 01/12/23  0807 01/12/23  0310 01/11/23  2050 01/11/23  1617 01/11/23  1539 01/11/23  1110   POC GLUCOSE mg/dl 77 95 128 127 79 129 62* 95 116 81 64* 90               Lines/Drains:  Invasive Devices     Peripheral Intravenous Line  Duration           Peripheral IV 01/10/23 Distal;Right;Upper;Ventral (anterior) Antecubital 3 days          Drain  Duration           Urethral Catheter Straight-tip 16 Fr  <1 day              Urinary Catheter:  Goal for removal: Remove after 48 hrs of I/O monitoring           Telemetry:  Telemetry Orders (From admission, onward)             48 Hour Telemetry Monitoring  Continuous x 48 hours        References:    Telemetry Guidelines   Question:  Reason for 48 Hour Telemetry  Answer:  Acute Decompensated CHF (continuous diuretic infusion or total diuretic dose > 200 mg daily, associated electrolyte derangement, ionotropic drip, history of ventricular arrhythmia, or new EF <35%)                 Telemetry Reviewed: Atrial fibrillation  HR averaging 60-65  Indication for Continued Telemetry Use: Acute CHF on >200 mg lasix/day or equivalent dose or with new reduced EF  Imaging: No pertinent imaging reviewed  Recent Cultures (last 7 days):         Last 24 Hours Medication List:   Current Facility-Administered Medications   Medication Dose Route Frequency Provider Last Rate   • acetaminophen  650 mg Oral Q6H PRN Sinai Yates PA-C     • apixaban  5 mg Oral Q12H Mary Wright MD     • atorvastatin  40 mg Oral Daily Tarun Tolliver MD     • bumetanide  1 mg/hr Intravenous Continuous Eric Gamez MD 1 mg/hr (01/13/23 1052)   • dicyclomine  10 mg Oral BID PRN Tarun Tolliver MD     • digoxin  125 mcg Oral Daily Tarun Tolliver MD     • famotidine  40 mg Oral HS Tarun Tolliver MD     • insulin glargine  45 Units Subcutaneous QAM Tamika Stephen PA-C     • insulin lispro  2-12 Units Subcutaneous TID AC Marty Buchanan PA-C     • LORazepam  0 5 mg Intravenous Once Buchanan's, CRNP     • metoprolol succinate  100 mg Oral Q12H Marty Buchanan PA-C     • pantoprazole  40 mg Oral BID AC Tarun Tolliver MD     • spironolactone  25 mg Oral Daily Tarun Tolliver MD          Today, Patient Was Seen By: Tamika Stephen PA-C    **Please Note: This note may have been constructed using a voice recognition system  **

## 2023-01-13 NOTE — PROGRESS NOTES
Patient refused standing daily weight- currently on bipap and lethargic states he feels too weak and is not comfortable to stand

## 2023-01-13 NOTE — ASSESSMENT & PLAN NOTE
· Noted at 5 7 today likely due to exogenous potassium and LACY   · Stop Potassium Citrate   · Continue Bumex GTT   · Monitor BMP  · On telemetry for heart failure/Bumex GTT

## 2023-01-13 NOTE — ASSESSMENT & PLAN NOTE
· Developed LACY on 1/13 with creatinine of 1 5, baseline of 1 0-1 1   Likely due to diuretic response, but also with hypotension   · Continue Bumex GTT   · Hold Lisinopril   · Hold Aldactone - discussed with Heart Failure   · Monitor BMP  · I/O  · UOP - status post Gómez catheter 1/13 due to critical illness

## 2023-01-14 ENCOUNTER — APPOINTMENT (INPATIENT)
Dept: RADIOLOGY | Facility: HOSPITAL | Age: 78
End: 2023-01-14

## 2023-01-14 PROBLEM — E87.5 HYPERKALEMIA: Status: RESOLVED | Noted: 2023-01-13 | Resolved: 2023-01-14

## 2023-01-14 PROBLEM — J96.02 ACUTE RESPIRATORY FAILURE WITH HYPOXIA AND HYPERCAPNIA (HCC): Status: ACTIVE | Noted: 2021-03-25

## 2023-01-14 LAB
ANION GAP SERPL CALCULATED.3IONS-SCNC: 5 MMOL/L (ref 4–13)
APTT PPP: 31 SECONDS (ref 23–37)
APTT PPP: 51 SECONDS (ref 23–37)
ARTERIAL PATENCY WRIST A: YES
BASE EXCESS BLDA CALC-SCNC: 20.7 MMOL/L
BASE EXCESS BLDA CALC-SCNC: 24 MMOL/L (ref -2–3)
BODY TEMPERATURE: 37 DEGREES FEHRENHEIT
BUN SERPL-MCNC: 39 MG/DL (ref 5–25)
BUN SERPL-MCNC: 39 MG/DL (ref 5–25)
BUN SERPL-MCNC: 41 MG/DL (ref 5–25)
CA-I BLD-SCNC: 1.05 MMOL/L (ref 1.12–1.32)
CA-I BLD-SCNC: 1.26 MMOL/L (ref 1.12–1.32)
CALCIUM SERPL-MCNC: 10 MG/DL (ref 8.4–10.2)
CALCIUM SERPL-MCNC: 9.5 MG/DL (ref 8.4–10.2)
CALCIUM SERPL-MCNC: 9.5 MG/DL (ref 8.4–10.2)
CHLORIDE SERPL-SCNC: 93 MMOL/L (ref 96–108)
CHLORIDE SERPL-SCNC: 94 MMOL/L (ref 96–108)
CHLORIDE SERPL-SCNC: 96 MMOL/L (ref 96–108)
CO2 SERPL-SCNC: 40 MMOL/L (ref 21–32)
CO2 SERPL-SCNC: >45 MMOL/L (ref 21–32)
CO2 SERPL-SCNC: >45 MMOL/L (ref 21–32)
CREAT SERPL-MCNC: 1.15 MG/DL (ref 0.6–1.3)
CREAT SERPL-MCNC: 1.2 MG/DL (ref 0.6–1.3)
CREAT SERPL-MCNC: 1.24 MG/DL (ref 0.6–1.3)
ERYTHROCYTE [DISTWIDTH] IN BLOOD BY AUTOMATED COUNT: 17.9 % (ref 11.6–15.1)
ERYTHROCYTE [DISTWIDTH] IN BLOOD BY AUTOMATED COUNT: 18.2 % (ref 11.6–15.1)
ERYTHROCYTE [DISTWIDTH] IN BLOOD BY AUTOMATED COUNT: 18.2 % (ref 11.6–15.1)
FIO2 GAS DIL.REBREATH: 60 L
GFR SERPL CREATININE-BSD FRML MDRD: 54 ML/MIN/1.73SQ M
GFR SERPL CREATININE-BSD FRML MDRD: 57 ML/MIN/1.73SQ M
GFR SERPL CREATININE-BSD FRML MDRD: 60 ML/MIN/1.73SQ M
GLUCOSE SERPL-MCNC: 114 MG/DL (ref 65–140)
GLUCOSE SERPL-MCNC: 31 MG/DL (ref 65–140)
GLUCOSE SERPL-MCNC: 34 MG/DL (ref 65–140)
GLUCOSE SERPL-MCNC: 39 MG/DL (ref 65–140)
GLUCOSE SERPL-MCNC: 39 MG/DL (ref 65–140)
GLUCOSE SERPL-MCNC: 44 MG/DL (ref 65–140)
GLUCOSE SERPL-MCNC: 55 MG/DL (ref 65–140)
GLUCOSE SERPL-MCNC: 60 MG/DL (ref 65–140)
GLUCOSE SERPL-MCNC: 61 MG/DL (ref 65–140)
GLUCOSE SERPL-MCNC: 70 MG/DL (ref 65–140)
GLUCOSE SERPL-MCNC: 77 MG/DL (ref 65–140)
GLUCOSE SERPL-MCNC: 80 MG/DL (ref 65–140)
GLUCOSE SERPL-MCNC: 81 MG/DL (ref 65–140)
GLUCOSE SERPL-MCNC: 84 MG/DL (ref 65–140)
GLUCOSE SERPL-MCNC: 87 MG/DL (ref 65–140)
GLUCOSE SERPL-MCNC: 91 MG/DL (ref 65–140)
GLUCOSE SERPL-MCNC: 93 MG/DL (ref 65–140)
GLUCOSE SERPL-MCNC: 96 MG/DL (ref 65–140)
GLUCOSE SERPL-MCNC: 97 MG/DL (ref 65–140)
GLUCOSE SERPL-MCNC: 99 MG/DL (ref 65–140)
HCO3 BLDA-SCNC: 47.4 MMOL/L (ref 22–28)
HCO3 BLDA-SCNC: 54.2 MMOL/L (ref 22–28)
HCT VFR BLD AUTO: 35.2 % (ref 36.5–49.3)
HCT VFR BLD AUTO: 35.6 % (ref 36.5–49.3)
HCT VFR BLD AUTO: 38 % (ref 36.5–49.3)
HCT VFR BLD CALC: 32 % (ref 36.5–49.3)
HGB BLD-MCNC: 9 G/DL (ref 12–17)
HGB BLD-MCNC: 9.1 G/DL (ref 12–17)
HGB BLD-MCNC: 9.7 G/DL (ref 12–17)
HGB BLDA-MCNC: 10.9 G/DL (ref 12–17)
INR PPP: 1.17 (ref 0.84–1.19)
IPAP: 24
MAGNESIUM SERPL-MCNC: 2.1 MG/DL (ref 1.9–2.7)
MCH RBC QN AUTO: 21.2 PG (ref 26.8–34.3)
MCH RBC QN AUTO: 21.4 PG (ref 26.8–34.3)
MCH RBC QN AUTO: 21.6 PG (ref 26.8–34.3)
MCHC RBC AUTO-ENTMCNC: 25.5 G/DL (ref 31.4–37.4)
MCHC RBC AUTO-ENTMCNC: 25.6 G/DL (ref 31.4–37.4)
MCHC RBC AUTO-ENTMCNC: 25.6 G/DL (ref 31.4–37.4)
MCV RBC AUTO: 83 FL (ref 82–98)
MCV RBC AUTO: 84 FL (ref 82–98)
MCV RBC AUTO: 84 FL (ref 82–98)
NON VENT- BIPAP: ABNORMAL
O2 CT BLDA-SCNC: 13 ML/DL (ref 16–23)
OXYHGB MFR BLDA: 92.7 % (ref 94–97)
PCO2 BLD: 101.8 MM HG (ref 36–44)
PCO2 BLD: 131 MM HG
PCO2 BLD: >45 MMOL/L (ref 21–32)
PCO2 BLDA: 66.9 MM HG (ref 36–44)
PCO2 BLDA: >103 MM HG
PEEP MAX SETTING VENT: 10 CM[H2O]
PH BLD: 7.32 [PH]
PH BLD: 7.33 [PH] (ref 7.35–7.45)
PH BLDA: 7.47 [PH] (ref 7.35–7.45)
PHOSPHATE SERPL-MCNC: 3 MG/DL (ref 2.3–4.1)
PLATELET # BLD AUTO: 141 THOUSANDS/UL (ref 149–390)
PLATELET # BLD AUTO: 146 THOUSANDS/UL (ref 149–390)
PLATELET # BLD AUTO: 147 THOUSANDS/UL (ref 149–390)
PMV BLD AUTO: 10.7 FL (ref 8.9–12.7)
PMV BLD AUTO: 10.9 FL (ref 8.9–12.7)
PMV BLD AUTO: 11.4 FL (ref 8.9–12.7)
PO2 BLD: 126 MM HG (ref 75–129)
PO2 BLDA: 66.7 MM HG (ref 75–129)
POTASSIUM BLD-SCNC: 4.7 MMOL/L (ref 3.5–5.3)
POTASSIUM SERPL-SCNC: 4.7 MMOL/L (ref 3.5–5.3)
POTASSIUM SERPL-SCNC: 5 MMOL/L (ref 3.5–5.3)
POTASSIUM SERPL-SCNC: 5.8 MMOL/L (ref 3.5–5.3)
PROTHROMBIN TIME: 15.1 SECONDS (ref 11.6–14.5)
RBC # BLD AUTO: 4.17 MILLION/UL (ref 3.88–5.62)
RBC # BLD AUTO: 4.26 MILLION/UL (ref 3.88–5.62)
RBC # BLD AUTO: 4.57 MILLION/UL (ref 3.88–5.62)
SAO2 % BLD FROM PO2: 98 % (ref 60–85)
SODIUM BLD-SCNC: 145 MMOL/L (ref 136–145)
SODIUM SERPL-SCNC: 141 MMOL/L (ref 135–147)
SODIUM SERPL-SCNC: 145 MMOL/L (ref 135–147)
SODIUM SERPL-SCNC: 146 MMOL/L (ref 135–147)
SPECIMEN SOURCE: ABNORMAL
SPECIMEN SOURCE: ABNORMAL
VENT BIPAP FIO2: 35 %
WBC # BLD AUTO: 10.17 THOUSAND/UL (ref 4.31–10.16)
WBC # BLD AUTO: 10.94 THOUSAND/UL (ref 4.31–10.16)
WBC # BLD AUTO: 9.51 THOUSAND/UL (ref 4.31–10.16)

## 2023-01-14 RX ORDER — HEPARIN SODIUM 1000 [USP'U]/ML
2000 INJECTION, SOLUTION INTRAVENOUS; SUBCUTANEOUS EVERY 6 HOURS PRN
Status: DISCONTINUED | OUTPATIENT
Start: 2023-01-14 | End: 2023-01-15

## 2023-01-14 RX ORDER — HEPARIN SODIUM 10000 [USP'U]/100ML
3-20 INJECTION, SOLUTION INTRAVENOUS
Status: DISCONTINUED | OUTPATIENT
Start: 2023-01-14 | End: 2023-01-15

## 2023-01-14 RX ORDER — DEXTROSE MONOHYDRATE 25 G/50ML
INJECTION, SOLUTION INTRAVENOUS
Status: COMPLETED
Start: 2023-01-14 | End: 2023-01-14

## 2023-01-14 RX ORDER — LIDOCAINE 50 MG/G
2 PATCH TOPICAL DAILY
Status: DISCONTINUED | OUTPATIENT
Start: 2023-01-14 | End: 2023-01-22 | Stop reason: HOSPADM

## 2023-01-14 RX ORDER — HEPARIN SODIUM 1000 [USP'U]/ML
4000 INJECTION, SOLUTION INTRAVENOUS; SUBCUTANEOUS EVERY 6 HOURS PRN
Status: DISCONTINUED | OUTPATIENT
Start: 2023-01-14 | End: 2023-01-15

## 2023-01-14 RX ORDER — ACETAZOLAMIDE 500 MG/5ML
500 INJECTION, POWDER, LYOPHILIZED, FOR SOLUTION INTRAVENOUS ONCE
Status: COMPLETED | OUTPATIENT
Start: 2023-01-14 | End: 2023-01-14

## 2023-01-14 RX ORDER — HEPARIN SODIUM 1000 [USP'U]/ML
4000 INJECTION, SOLUTION INTRAVENOUS; SUBCUTANEOUS ONCE
Status: COMPLETED | OUTPATIENT
Start: 2023-01-14 | End: 2023-01-14

## 2023-01-14 RX ORDER — DEXTROSE MONOHYDRATE 25 G/50ML
25 INJECTION, SOLUTION INTRAVENOUS AS NEEDED
Status: DISCONTINUED | OUTPATIENT
Start: 2023-01-14 | End: 2023-01-22 | Stop reason: HOSPADM

## 2023-01-14 RX ORDER — METOPROLOL TARTRATE 5 MG/5ML
5 INJECTION INTRAVENOUS EVERY 6 HOURS
Status: DISCONTINUED | OUTPATIENT
Start: 2023-01-14 | End: 2023-01-15

## 2023-01-14 RX ORDER — LIDOCAINE 50 MG/G
1 PATCH TOPICAL DAILY
Status: DISCONTINUED | OUTPATIENT
Start: 2023-01-14 | End: 2023-01-14

## 2023-01-14 RX ADMIN — DEXTROSE MONOHYDRATE 50 ML: 25 INJECTION, SOLUTION INTRAVENOUS at 12:10

## 2023-01-14 RX ADMIN — DEXTROSE MONOHYDRATE 50 ML: 25 INJECTION, SOLUTION INTRAVENOUS at 04:17

## 2023-01-14 RX ADMIN — ATORVASTATIN CALCIUM 40 MG: 40 TABLET, FILM COATED ORAL at 08:23

## 2023-01-14 RX ADMIN — HEPARIN SODIUM 11.1 UNITS/KG/HR: 10000 INJECTION, SOLUTION INTRAVENOUS at 08:58

## 2023-01-14 RX ADMIN — LIDOCAINE 1 PATCH: 50 PATCH TOPICAL at 02:12

## 2023-01-14 RX ADMIN — HEPARIN SODIUM 4000 UNITS: 1000 INJECTION INTRAVENOUS; SUBCUTANEOUS at 08:56

## 2023-01-14 RX ADMIN — ACETAMINOPHEN 650 MG: 325 TABLET, FILM COATED ORAL at 08:22

## 2023-01-14 RX ADMIN — Medication 1 MG/HR: at 05:39

## 2023-01-14 RX ADMIN — HEPARIN SODIUM 2000 UNITS: 1000 INJECTION INTRAVENOUS; SUBCUTANEOUS at 17:03

## 2023-01-14 RX ADMIN — ACETAZOLAMIDE 500 MG: 500 INJECTION, POWDER, LYOPHILIZED, FOR SOLUTION INTRAVENOUS at 08:23

## 2023-01-14 RX ADMIN — PANTOPRAZOLE SODIUM 40 MG: 40 TABLET, DELAYED RELEASE ORAL at 17:02

## 2023-01-14 RX ADMIN — METOROPROLOL TARTRATE 5 MG: 5 INJECTION, SOLUTION INTRAVENOUS at 22:25

## 2023-01-14 RX ADMIN — METOROPROLOL TARTRATE 5 MG: 5 INJECTION, SOLUTION INTRAVENOUS at 11:45

## 2023-01-14 RX ADMIN — Medication 0.5 MG/HR: at 12:40

## 2023-01-14 RX ADMIN — DEXTROSE MONOHYDRATE 25 ML: 25 INJECTION, SOLUTION INTRAVENOUS at 15:28

## 2023-01-14 RX ADMIN — DEXTROSE MONOHYDRATE 50 ML: 25 INJECTION, SOLUTION INTRAVENOUS at 06:42

## 2023-01-14 RX ADMIN — DIGOXIN 125 MCG: 125 TABLET ORAL at 08:23

## 2023-01-14 NOTE — ASSESSMENT & PLAN NOTE
· Hypoxia on arrival, desaturations to 77% on room air  Denies home O2 use    · [place] improvement today, but was hypoxic in the setting of hypoglycemia, rectal bleeding, and on going volume overloaded status   · Patient continued to be hypoxic at 84% on 15 L midflow and required continuous BiPAP 1/13  · ABG revealed CO2 of 103 1/14 despite BiPAP all day on 1/13  · Upgrade to Level 2 SD due to continuous BiPAP need  · Continue BiPAP, increased IPAP to 24, EPAP 10, rate 18 for better ventilation   · Continue at night  · Wean O2 as tolerated to maintain SPO2 >90%  · Respiratory protocol

## 2023-01-14 NOTE — PROGRESS NOTES
General Cardiology   Progress Note -  Team One   Tuan Cornejo 77 y o  male MRN: [id number]    Unit/Bed#: S -01 Encounter: [id number]    Assessment:    1  Acute on chronic HFpEF: Started on Bumex infusion at 1 mg/hr on 1/13  Also on spironolactone 25 mg daily however doses have been held because patient was on BiPAP  Di Hoist · Echocardiogram 1/11/2023: EF 55% with no diagnostic WMA cannot be excluded on the basis of the study, G2DD, reduced RV function, mild biatrial dilatation, mild TR  · Home diuretic regimen: Lasix 80 mg BID  · Dry weight: Proximately 305 lb  · Weight on admission: 302 lb per bed scale  · Weight today: 321 lb per bed scale, was 317 lb per standing scale yesterday  · I&Os: Output 24 hours -7 1 L  Net output -9 7 L  · Patient with contraction alkalosis  2  Acute hypoxic respiratory failure: In the setting of volume overload requiring BiPAP  3   Pulmonary hypertension with RV dysfunction: Likely group 2/3  PASP 83 mmHg per echocardiogram 1/11/2023  Diuresis as above  4   CAD: s/p CABG x3 (LIMA-LAD, SVG-OM 3, SVG-PDA) in 2016  Maintained on beta-blocker and statin  No aspirin due to anticoagulation with Eliquis  5  Paroxysmal atrial fibrillation: s/p cardioversion 1/7/2022  Maintained on Toprol- mg BID and digoxin 125 mcg daily however doses have been held as patient is on BiPAP  · [id number] 8: Anticoagulated on Eliquis 5 mg BID ever multiple doses have been held this admission as patient was on BiPAP  6  Essential hypertension: Average /61 on Bumex infusion  Toprol-XL and spironolactone have been held as patient has been on BiPAP  7   Hyperlipidemia: Lipid panel 11/2022 , , HDL 34, LDL 67 on atorvastatin 40 mg daily  8  Type II DM: Hemoglobin A1c 7 8 in 11/2022  Management per primary team   9   LACY: Baseline creatinine 1 0-1 2   Creatinine peaked at 1 5 yesterday likely cardiorenal  Improved to 1 15 with initiation of Bumex gtt however with contraction alkalosis  10   LUCIA: On CPAP  11  Fall: RRT overnight as patient fell out of a chair with head strike on Eliquis  CTH no acute intracranial abnormality  CT C-spine with no fracture or traumatic malalignment  Plan/Recommendations:  · We will give a dose of Diamox for contraction alkalosis  · Continue Bumex infusion but will decrease the rate to 0 5 mg/hr  · Monitor I&O's, renal function, electrolytes and standing weight  · Maintain potassium >4 and magnesium >2  · Plan for RHC (inpatient versus outpatient) and VQ scan once volume status is optimized  · As patient is still unable to take oral medications, will need to switch Eliquis to heparin infusion given elevated [id number] score  · We will switch Toprol-XL to IV Lopressor 5 mg q6h  · Once respiratory status improves can transition back to oral medications  · Continue to monitor on telemetry    __________________________________________________________    Subjective    Patient seen and examined  A RRT was called overnight due to a fall  Patient was sitting in a chair when he attempted to get up and fell striking his head  Review of Systems   Unable to perform ROS: other   BiPAP    Objective:   Vitals: Blood pressure 110/66, pulse 96, temperature 100 1 °F (37 8 °C), resp  rate 16, height 6' 4" (1 93 m), weight (!) 146 kg (321 lb 6 9 oz), SpO2 97 %  ,     Wt Readings from Last 3 Encounters:   01/14/23 (!) 146 kg (321 lb 6 9 oz)   12/15/22 (!) 139 kg (306 lb)   11/21/22 (!) 138 kg (304 lb)        Lab Results   Component Value Date    CREATININE 1 24 01/14/2023    CREATININE 1 51 (H) 01/13/2023    CREATININE 1 24 01/12/2023         Body mass index is 39 13 kg/m²  ,     Systolic (50YWI), ILP:722 , Min:96 , SDJ:207     Diastolic (95CCJ), VXP:51, Min:45, Max:107          Intake/Output Summary (Last 24 hours) at 1/14/2023 0724  Last data filed at 1/14/2023 0646  Gross per 24 hour   Intake 0 ml   Output 7175 ml   Net -7175 ml     Weight (last 2 days) Date/Time Weight    01/14/23 0600 146 (321 43)     Weight: pt unbale to be standing safetly at 01/14/23 0600    01/12/23 0600 144 (317 02)            Telemetry Review: Fibrillation with ventricular rates in the 100- 1 teens      Physical Exam  Vitals and nursing note reviewed  Constitutional:       General: He is not in acute distress  Appearance: He is well-developed  He is obese  Comments: On BiPAP in NAD   HENT:      Head: Normocephalic and atraumatic  Neck:      Comments: Unable to assess JVD due to patient body habitus  Cardiovascular:      Rate and Rhythm: Tachycardia present  Rhythm irregular  Heart sounds: Normal heart sounds  No murmur heard  No friction rub  No gallop  Pulmonary:      Effort: Pulmonary effort is normal  No respiratory distress  Breath sounds: No wheezing or rales  Comments: Diminished breath sounds bilaterally  Chest:      Chest wall: No tenderness  Abdominal:      General: Bowel sounds are normal  There is no distension  Palpations: Abdomen is soft  Tenderness: There is no abdominal tenderness  Musculoskeletal:         General: No tenderness  Normal range of motion  Cervical back: Normal range of motion and neck supple  Right lower leg: Edema present  Left lower leg: Edema present  Skin:     General: Skin is warm and dry  Coloration: Skin is not pale  Findings: No erythema  Neurological:      Mental Status: He is alert and oriented to person, place, and time  Psychiatric:         Behavior: Behavior normal          Thought Content:  Thought content normal          Judgment: Judgment normal          LABORATORY RESULTS      CBC with diff:   Results from last 7 days   Lab Units 01/14/23  0123 01/13/23  0521 01/12/23  0637 01/11/23  0856 01/10/23  1243   WBC Thousand/uL 10 94* 10 22* 9 71 8 65 10 00   HEMOGLOBIN g/dL 9 7* 10 6* 9 7* 10 0* 10 5*   HEMATOCRIT % 38 0 42 5 38 1 38 4 39 4   MCV fL 83 87 85 82 79* PLATELETS Thousands/uL 146* 168 185 166 203   MCH pg 21 2* 21 7* 21 6* 21 4* 21 1*   MCHC g/dL 25 5* 24 9* 25 5* 26 0* 26 6*   RDW % 17 9* 18 6* 18 4* 18 3* 18 3*   MPV fL 11 4 10 6 10 5 10 3 10 2   NRBC AUTO /100 WBCs  --   --  0 0 0       CMP:  Results from last 7 days   Lab Units 01/14/23  0036 01/13/23  0521 01/12/23  0637 01/11/23  0856 01/10/23  1243   POTASSIUM mmol/L 5 8* 5 7* 5 4* 5 1 4 7   CHLORIDE mmol/L 96 96 98 100 99   CO2 mmol/L 40* 43* 39* 38* 37*   BUN mg/dL 41* 37* 34* 35* 43*   CREATININE mg/dL 1 24 1 51* 1 24 1 13 1 18   CALCIUM mg/dL 9 5 9 7 9 2 9 1 9 3   AST U/L  --   --   --   --  16   ALT U/L  --   --   --   --  10   ALK PHOS U/L  --   --   --   --  69   EGFR ml/min/1 73sq m 54 43 54 61 58       BMP:  Results from last 7 days   Lab Units 01/14/23 0036 01/13/23  0521 01/12/23  0637 01/11/23  0856 01/10/23  1243   POTASSIUM mmol/L 5 8* 5 7* 5 4* 5 1 4 7   CHLORIDE mmol/L 96 96 98 100 99   CO2 mmol/L 40* 43* 39* 38* 37*   BUN mg/dL 41* 37* 34* 35* 43*   CREATININE mg/dL 1 24 1 51* 1 24 1 13 1 18   CALCIUM mg/dL 9 5 9 7 9 2 9 1 9 3       Lab Results   Component Value Date    NTBNP 37 10/06/2021    NTBNP 87 06/03/2021    NTBNP 165 04/22/2021             Results from last 7 days   Lab Units 01/14/23 0036 01/11/23  0856   MAGNESIUM mg/dL 2 1 2 2                           Lipid Profile:   No results found for: CHOL  Lab Results   Component Value Date    HDL 34 (L) 11/02/2022    HDL 32 (L) 04/27/2022    HDL 37 (L) 10/06/2021     Lab Results   Component Value Date    LDLCALC 67 11/02/2022    LDLCALC 60 04/27/2022    LDLCALC 65 10/06/2021     Lab Results   Component Value Date    TRIG 153 (H) 11/02/2022    TRIG 223 (H) 04/27/2022    TRIG 201 (H) 10/06/2021       Cardiac testing:   Results for orders placed during the hospital encounter of 03/23/21    Echo complete with contrast if indicated    Narrative  [address]  [phone]    Transthoracic Echocardiogram  2D, M-mode, Doppler, and Color Doppler    Study date:  23-Mar-2021    Patient: Tremaine Ashraf  MR number: [id number]  Account number: [de-identified]  : [id number]-Herson-1945  Age: 67 years  Gender: Male  Status: Outpatient  Location: [address] [place] [address]  Height: 76 in  Weight: 331 3 lb  BP: 160/ 80 mmHg    Indications: Assess left ventricular function  Diagnoses: R60 9 - Edema, unspecified    Sonographer:  RODRIGO Parker  Primary Physician:  Terri Lin MD  Referring Physician:  Amrit Lamb MD  Group[facility]  [facility]  Interpreting Physician:  Lynsey Horta MD    SUMMARY    LEFT VENTRICLE:  Systolic function was normal by visual assessment  Ejection fraction was estimated to be 60 %  There were no regional wall motion abnormalities  Wall thickness was mildly increased  Doppler parameters were consistent with abnormal left ventricular relaxation (grade 1 diastolic dysfunction)  RIGHT VENTRICLE:  The ventricle was mildly dilated  Systolic function was mildly reduced  Systolic pressure was mildly increased  Estimated peak pressure was 45 mmHg  LEFT ATRIUM:  The atrium was mildly dilated  RIGHT ATRIUM:  The atrium was mildly dilated  MITRAL VALVE:  There was mild annular calcification  There was trace regurgitation  TRICUSPID VALVE:  There was mild to moderate regurgitation  HISTORY: PRIOR HISTORY: Leg edema, CAD, Afib, Hypertension, Hyperlipidemia    PROCEDURE: The study was performed in the [address] [place] [address]  This was a routine study  The transthoracic approach was used  The study included complete 2D imaging, M-mode, complete spectral Doppler, and color Doppler  The  heart rate was 108 bpm, at the start of the study  Images were obtained from the parasternal, apical, subcostal, and suprasternal notch acoustic windows  Intravenous contrast ( 0 4 ml Definity/NSS) was administered   This was a technically  difficult study     LEFT VENTRICLE: Size was normal  Systolic function was normal by visual assessment  Ejection fraction was estimated to be 60 %  There were no regional wall motion abnormalities  Wall thickness was mildly increased  DOPPLER: There was an  increased relative contribution of atrial contraction to ventricular filling  Doppler parameters were consistent with abnormal left ventricular relaxation (grade 1 diastolic dysfunction)  RIGHT VENTRICLE: The ventricle was mildly dilated  Systolic function was mildly reduced  DOPPLER: Systolic pressure was mildly increased  Estimated peak pressure was 45 mmHg  LEFT ATRIUM: The atrium was mildly dilated  RIGHT ATRIUM: The atrium was mildly dilated  MITRAL VALVE: There was mild annular calcification  Valve structure was normal  There was normal leaflet separation  DOPPLER: The transmitral velocity was within the normal range  There was no evidence for stenosis  There was trace  regurgitation  AORTIC VALVE: The valve was trileaflet  Leaflets exhibited normal thickness, mild calcification, and normal cuspal separation  DOPPLER: Transaortic velocity was within the normal range  There was no evidence for stenosis  There was no  regurgitation  TRICUSPID VALVE: The valve structure was normal  There was normal leaflet separation  DOPPLER: The transtricuspid velocity was within the normal range  There was no evidence for stenosis  There was mild to moderate regurgitation  PULMONIC VALVE: Leaflets exhibited normal thickness, no calcification, and normal cuspal separation  DOPPLER: The transpulmonic velocity was within the normal range  There was no regurgitation  PERICARDIUM: There was no pericardial effusion  AORTA: The root exhibited normal size  SYSTEMIC VEINS: IVC: The inferior vena cava was normal in size and course   Respirophasic changes were normal     SYSTEM MEASUREMENT TABLES    2D  %FS: 36 5 %  Ao Diam: 3 93 cm  EDV(Teich): 161 22 ml  EF(Teich): 65 53 %  ESV(Teich): 55 57 ml  IVSd: 1 2 cm  LA Area: 28 91 cm2  LA Diam: 5 52 cm  LVEDV MOD A4C: 89 05 ml  LVEF MOD A4C: 60 77 %  LVESV MOD A4C: 34 93 ml  LVIDd: 5 72 cm  LVIDs: 3 63 cm  LVLd A4C: 7 78 cm  LVLs A4C: 6 46 cm  LVPWd: 1 21 cm  RA Area: 24 49 cm2  RVIDd: 4 38 cm  SV MOD A4C: 54 11 ml  SV(Teich): 105 65 ml    CW  TR MaxP 73 mmHg  TR Vmax: 3 34 m/s    MM  TAPSE: 1 91 cm    PW  E' Sept: 0 07 m/s  E/E' Sept: 13 16  MV A Teja: 1 14 m/s  MV Dec [place]: 4 59 m/s2  MV DecT: 193 44 ms  MV E Teja: 0 89 m/s  MV E/A Ratio: 0 78  MV PHT: 56 1 ms  MVA By PHT: 3 92 cm2    [facility]redited [facility]    Prepared and electronically signed by    Maira Garcias MD  Signed 24-Mar-2021 08:42:46    No results found for this or any previous visit  No results found for this or any previous visit  No valid procedures specified  No results found for this or any previous visit          Meds/Allergies   all current active meds have been reviewed, current meds:   Current Facility-Administered Medications   Medication Dose Route Frequency   • acetaminophen (TYLENOL) tablet 650 mg  650 mg Oral Q6H PRN   • apixaban (ELIQUIS) tablet 5 mg  5 mg Oral Q12H [facility]   • atorvastatin (LIPITOR) tablet 40 mg  40 mg Oral Daily   • bumetanide (BUMEX) 12 5 mg infusion 50 mL  1 mg/hr Intravenous Continuous   • dicyclomine (BENTYL) capsule 10 mg  10 mg Oral BID PRN   • digoxin (LANOXIN) tablet 125 mcg  125 mcg Oral Daily   • famotidine (PEPCID) tablet 40 mg  40 mg Oral HS   • insulin glargine (LANTUS) subcutaneous injection 45 Units 0 45 mL  45 Units Subcutaneous QAM   • insulin lispro (HumaLOG) 100 units/mL subcutaneous injection 2-12 Units  2-12 Units Subcutaneous TID AC   • lidocaine (LIDODERM) 5 % patch 1 patch  1 patch Topical Daily   • metoprolol succinate (TOPROL-XL) 24 hr tablet 100 mg  100 mg Oral Q12H   • pantoprazole (PROTONIX) EC tablet 40 mg  40 mg Oral BID AC   • spironolactone (ALDACTONE) tablet 25 mg  25 mg Oral Daily    and PTA meds:   Prior to Admission Medications   Prescriptions Last Dose Informant Patient Reported? Taking?    Continuous Blood Gluc Sensor (FreeStyle Peggy 2 Sensor) MISC   No No   Sig: Change it every 14 days   Lancets (accu-chek multiclix) lancets   No No   Sig: Use 1 each 2 (two) times a day Use 2 times daily to test blood glucose   NovoLOG FlexPen 100 units/mL injection pen 1/10/2023  No Yes   Sig: Inject 20-25 Units under the skin 3 (three) times a day with meals   Patient taking differently: Inject 20-25 Units under the skin 3 (three) times a day with meals Per sliding scale   Unifine Pentips 31G X 8 MM MISC   No No   Sig: inject under the skin daily use as directed   apixaban (Eliquis) 5 mg   No No   Sig: Take 1 tablet (5 mg total) by mouth 2 (two) times a day   atorvastatin (LIPITOR) 40 mg tablet 1/9/2023  No Yes   Sig: Take 1 tablet (40 mg total) by mouth daily   dicyclomine (BENTYL) 10 mg capsule Past Week  No Yes   Sig: Take 1 capsule (10 mg total) by mouth 2 (two) times a day as needed (abdominal cramping)   digoxin (LANOXIN) 0 125 mg tablet 1/10/2023  No Yes   Sig: Take 1 tablet (125 mcg total) by mouth daily   famotidine (PEPCID) 40 MG tablet   No No   Sig: TAKE ONE TABLET BY MOUTH AT BEDTIME   furosemide (LASIX) 80 mg tablet 1/10/2023  No Yes   Sig: Take 0 5 tablets (40 mg total) by mouth 2 (two) times a day   Patient taking differently: Take 80 mg by mouth 2 (two) times a day   insulin degludec Rennis Oas FlexTouch) 200 units/mL CONCENTRATED U-200 injection pen 1/10/2023  No Yes   Sig: inject 100 units under the skin once daily   Patient taking differently: 90 Units daily   lisinopril (ZESTRIL) 20 mg tablet 1/10/2023  No Yes   Sig: TAKE ONE TABLET BY MOUTH ONCE DAILY   metoprolol succinate (TOPROL-XL) 100 mg 24 hr tablet 1/10/2023  No Yes   Sig: Take 1 tablet (100 mg total) by mouth every 12 (twelve) hours   omeprazole (PriLOSEC) 20 mg delayed release capsule 1/10/2023  No Yes   Sig: Take 1 capsule (20 mg total) by mouth 2 (two) times a day   potassium citrate (UROCIT-K) 10 mEq 1/10/2023  No Yes   Sig: Take 1 tablet (10 mEq total) by mouth 2 (two) times a day   silver sulfadiazine (SILVADENE,SSD) 1 % cream Past Week  No Yes   Sig: Apply topically daily   spironolactone (ALDACTONE) 25 mg tablet 1/10/2023  No Yes   Sig: TAKE ONE TABLET BY MOUTH ONCE DAILY      Facility-Administered Medications: None     Medications Prior to Admission   Medication   • atorvastatin (LIPITOR) 40 mg tablet   • dicyclomine (BENTYL) 10 mg capsule   • digoxin (LANOXIN) 0 125 mg tablet   • furosemide (LASIX) 80 mg tablet   • insulin degludec Salome Tano FlexTouch) 200 units/mL CONCENTRATED U-200 injection pen   • lisinopril (ZESTRIL) 20 mg tablet   • metoprolol succinate (TOPROL-XL) 100 mg 24 hr tablet   • NovoLOG FlexPen 100 units/mL injection pen   • omeprazole (PriLOSEC) 20 mg delayed release capsule   • potassium citrate (UROCIT-K) 10 mEq   • silver sulfadiazine (SILVADENE,SSD) 1 % cream   • spironolactone (ALDACTONE) 25 mg tablet   • Continuous Blood Gluc Sensor (FreeStyle Peggy 2 Sensor) MISC   • famotidine (PEPCID) 40 MG tablet   • Lancets (accu-chek multiclix) lancets   • Unifine Pentips 31G X 8 MM MISC       bumetanide, 1 mg/hr, Last Rate: 1 mg/hr (01/14/23 0539)        Counseling / Coordination of Care  Total floor / unit time spent today 20 minutes  Greater than 50% of total time was spent with the patient and / or family counseling and / or coordination of care  ** Please Note: Dragon 360 Dictation voice to text software may have been used in the creation of this document   **

## 2023-01-14 NOTE — RAPID RESPONSE
Rapid Response Note  Eileen Wilcox 77 y o  male MRN: [id number]  Unit/Bed#: S -01 Encounter: [id number]    Rapid Response Notification(s):   Response called date/time:  1/14/2023 11:21 PM  Response team arrival date/time:  1/14/2023 11:22 PM  Response end date/time:  1/14/2023 11:31 PM  Level of care:  [facility]  Rapid response location:  [facility] unit  Primary reason for rapid response call: Fall    Rapid Response Intervention(s):   Airway:  None  Breathing:  Oxygen  Circulation:  None  Fluids administered:  None  Medications administered:  None       Assessment:   · 77year old male admitted for CHF exacerbation status post unwitnessed fall with head strike  Patient is anticoagulated with Eliquis  GCS 15 on arrival   Patient placed in cervical collar  Patient's only complaint is pain in buttock  Patient denies headache, neck pain, dizziness, lightheadedness, syncope, near syncope, chest pain, or any other concerns  Plan:   · CT head   · CT cervical spine   · CBC, BMP, electrolytes      Rapid Response Outcome:   Transfer:  Remain on floor  Primary service notified of transfer: No    Code Status: Level 3 (DNAR and DNI)      Family notified of transfer: no  Family member contacted: no     Background/Situation:   Eileen Wilcox is a 77 y o  male who was admitted to the hospital for acute decompensation of CHF  Patient was sitting in the chair when he attempted to get up and fell striking his head  Patient anticoagulated with Eliquis  Rapid was called for unwitnessed fall  Review of Systems   Constitutional: Negative for chills and fever  HENT: Negative for ear pain and sore throat  Eyes: Negative for pain and visual disturbance  Cardiovascular: Negative for chest pain and palpitations  Gastrointestinal: Negative for abdominal pain and vomiting  Genitourinary: Negative for dysuria and hematuria  Skin: Negative for color change and rash  Neurological: Negative for seizures and syncope  All other systems reviewed and are negative  Objective:   Vitals:    01/13/23 1507 01/13/23 1930 01/13/23 2029 01/13/23 2157   BP: (!) 122/48 132/56  (!) 96/45   Pulse: 74 78     Resp:       Temp: (!) 97 4 °F (36 3 °C)   100 2 °F (37 9 °C)   TempSrc:       SpO2: 100% 90% 98%    Weight:       Height:         Physical Exam  Constitutional:       General: He is in acute distress  Appearance: Normal appearance  He is obese  HENT:      Head: Normocephalic and atraumatic  Nose: Nose normal       Mouth/Throat:      Mouth: Mucous membranes are moist    Eyes:      Pupils: Pupils are equal, round, and reactive to light  Neck:      Comments: No cervical spine tenderness, cervical collar placed   Cardiovascular:      Rate and Rhythm: Regular rhythm  Tachycardia present  Pulses: Normal pulses  Pulmonary:      Effort: Pulmonary effort is normal       Comments: Patient on BiPAP for CHF, NRB placed during rapid response   Abdominal:      Palpations: Abdomen is soft  Musculoskeletal:      Comments: Moving all extremities, FROM in joints, no external signs of trauma    Neurological:      General: No focal deficit present  Mental Status: He is alert and oriented to person, place, and time  GCS: GCS eye subscore is 4  GCS verbal subscore is 5  GCS motor subscore is 6  Portions of the record may have been created with voice recognition software  Occasional wrong word or "sound a like" substitutions may have occurred due to the inherent limitations of voice recognition software  Read the chart carefully and recognize, using context, where substitutions have occurred      Ramón Ledesma MD

## 2023-01-14 NOTE — ASSESSMENT & PLAN NOTE
· Chest pain free  · Restart Eliquis, metoprolol, statin when able to take PO   · RHC needed as outpatient as above

## 2023-01-14 NOTE — ASSESSMENT & PLAN NOTE
· Noted on 1/12  Patient reports history of large hemorrhoid that has bled in the past   · Hemoglobin has been stable   · Monitor Hgb  · No intervention at this time   · Currently on heparin drip for A   Fib  · Restart Eliquis when able   · May need to consider holding anticoagulation and consulting GI, however would need further respiratory stabilization for any scopes/procedures

## 2023-01-14 NOTE — ASSESSMENT & PLAN NOTE
· Hemolyzed at 5 8, repeat was 5 0    · Stop Potassium Citrate   · Continue Bumex GTT   · Monitor BMP  · On telemetry for heart failure/Bumex GTT

## 2023-01-14 NOTE — PROGRESS NOTES
[facility]  Progress Note - Kerline Murguia 1945, 77 y o  male MRN: [id number]  Unit/Bed#: S -01 Encounter: [id number]  Primary Care Provider: Tricia Abdi MD   Date and time admitted to hospital: 1/10/2023 12:21 PM    * Acute on chronic diastolic (congestive) heart failure [facility])  Assessment & Plan  Wt Readings from Last 3 Encounters:   01/14/23 (!) 146 kg (321 lb 6 9 oz)   12/15/22 (!) 139 kg (306 lb)   11/21/22 (!) 138 kg (304 lb)     · Patient presenting with worsening lower extremity edema, abdominal tightness, weight gain of 21 pounds in 2 to 3 weeks, PND  Patient denies any dietary or medication noncompliance  · Chronically on PO Lasix 80mg BID at home  · ECHO shows EF 55%, grade 2 DD, dilated RV with reduced systolic function and pulmonary hypertension   · Net output 9,795 mL, minimal with Lasix 80 mg TID, however extreme response with Bumex drip causing contraction alkalosis   · Upgraded to Level 2 Step Down due to requiring BiPAP continuously  · Cardiology and Heart Failure following   · Bumex GTT on 1/13, reduced on 1/14  · Status post Acetazolamide 1/14  · Stop Spironolactone due to LACY   · Continue Toprol-XL, however has not been getting PO meds due to BiPAP  · Hold Lisinopril due to LACY   · Daily weights, I/O's  · Low-sodium diet, 1 5L fluid restriction  · Continue telemetry     [facility]  Assessment & Plan  · Noted on ECHO  Remote smoking history  Is on BiPAP at night here  Apparently on CPAP at home but question adequacy   · Heart Failure following  · Diuresis as above   · Will need RHC outpatient once optimized   · Further Heart Failure follow up needed    LACY (acute kidney injury) ([facility] )  Assessment & Plan  · Developed LACY on 1/13 with creatinine of 1 5, baseline of 1 0-1 1   Likely due to diuretic response, but also with hypotension   · Renal function normalized on 1/14 at 1 2  · Continue Bumex GTT, reduced due to contraction alkalosis · Hold Lisinopril   · Hold Aldactone - discussed with Heart Failure   · Monitor BMP  · I/O  · UOP - status post Gómez catheter 1/13 due to critical illness    Paroxysmal atrial fibrillation (HCC)  Assessment & Plan  · Elevated rates today due to not getting PO meds   · Now on Lopressor 5 mg IV Q6  · Heparin GTT  · Restart Toprol-XL, Digoxin, Eliquis when able     Acute respiratory failure with hypoxia and hypercapnia (HCC)  Assessment & Plan  · Hypoxia on arrival, desaturations to 77% on room air  Denies home O2 use  · [place] improvement today, but was hypoxic in the setting of hypoglycemia, rectal bleeding, and on going volume overloaded status   · Patient continued to be hypoxic at 84% on 15 L midflow and required continuous BiPAP 1/13  · ABG revealed CO2 of 103 1/14 despite BiPAP all day on 1/13  · Upgrade to Level 2 SD due to continuous BiPAP need  · Continue BiPAP, increased IPAP to 24, EPAP 10, rate 18 for better ventilation   · Continue at night  · Wean O2 as tolerated to maintain SPO2 >90%  · Respiratory protocol    LUCIA (obstructive sleep apnea)  Assessment & Plan  · Patient was on CPAP  Recently transitioned to BiPAP by his pulmonologist but still has not received the machine    · Continue BiPAP overnight  · CM consult to assist with equipment when closer to discharge    Type 2 diabetes mellitus with hyperglycemia, with long-term current use of insulin [facility])  Assessment & Plan  Lab Results   Component Value Date    HGBA1C 7 8 (H) 11/02/2022       Recent Labs     01/14/23  0411 01/14/23  0434 01/14/23  0621 01/14/23  0805   POCGLU 55* 114 61* 81       Blood Sugar Average: Last 72 hrs:  (P) 76 [id number]   · Home regimen: Tresiba 90 units daily  · Patient has not eaten here because he does not like the food and has been recurrently hypoglycemic  · Sugar remains overly tightly controlled and he is now on BiPAP and unable to have oral intake  · Volume overload status precludes Dextrose drip   · Patient received multiple amps of dextrose 1/13 - 1/14  · Stop Lantus for now  · SSI coverage with Accu-Cheks ACHS  · Q2 Accuchecks   · Patient was allowed to eat morning of 1/14  · Attempt to wean BiPAP today to allow for PO intake   · Hypoglycemia protocol    Hypertension  Assessment & Plan  · Stable/soft  · Metoprolol unable to be given due to BiPAP   · Stop Lisinopril due to LACY   · Stop Aldactone due to LACY   · Monitor closely on Bumex GTT      CAD (coronary artery disease)  Assessment & Plan  · Chest pain free  · Restart Eliquis, metoprolol, statin when able to take PO   · RHC needed as outpatient as above     Venous stasis dermatitis of both lower extremities  Assessment & Plan  · Wound care    BRBPR (bright red blood per rectum)  Assessment & Plan  · Noted on 1/12  Patient reports history of large hemorrhoid that has bled in the past   · Hemoglobin has been stable   · Monitor Hgb  · No intervention at this time   · Currently on heparin drip for A  Fib  · Restart Eliquis when able   · May need to consider holding anticoagulation and consulting GI, however would need further respiratory stabilization for any scopes/procedures     Hyperkalemia-resolved as of 1/14/2023  Assessment & Plan  · Hemolyzed at 5 8, repeat was 5 0    · Stop Potassium Citrate   · Continue Bumex GTT   · Monitor BMP  · On telemetry for heart failure/Bumex GTT        VTE Pharmacologic Prophylaxis: VTE Score: 7 High Risk (Score >/= 5) - Pharmacological DVT Prophylaxis Ordered: heparin drip  Sequential Compression Devices Ordered  Patient Centered Rounds: I performed bedside rounds with nursing staff today  Discussions with Specialists or Other Care Team Provider: Discussed with Cardiology, CC AP, Respiratory, RN, CM    Education and Discussions with Family / Patient: Attempted to update  (wife) via phone  Left voicemail  Time Spent for Care: 30 minutes   More than 50% of total time spent on counseling and coordination of care as described above  Current Length of Stay: 4 day(s)  Current Patient Status: Inpatient   Certification Statement: The patient will continue to require additional inpatient hospital stay due to further Bumex drip, optimization of respiratory status, need for BiPAP  Discharge Plan: Anticipate discharge in >72 hrs to rehab facility  Code Status: Level 3 - DNAR and DNI    Subjective:   Patient fell overnight per nursing staff  Trauma work up negative  Presently patient feels well as far as breathing goes and reports that he wants to eat  Tolerating BiPAP  Denies chest pain  Objective:     Vitals:   Temp (24hrs), Av 1 °F (37 3 °C), Min:97 4 °F (36 3 °C), Max:100 2 °F (37 9 °C)    Temp:  [97 4 °F (36 3 °C)-100 2 °F (37 9 °C)] 100 1 °F (37 8 °C)  HR:  [] 96  Resp:  [16-20] 16  BP: ()/() 110/66  SpO2:  [90 %-100 %] 97 %  Body mass index is 39 13 kg/m²  Input and Output Summary (last 24 hours): Intake/Output Summary (Last 24 hours) at 2023 1001  Last data filed at 2023 0936  Gross per 24 hour   Intake 120 ml   Output 6825 ml   Net -6705 ml       Physical Exam:   Physical Exam  Constitutional:       General: He is not in acute distress  Appearance: Normal appearance  He is obese  He is ill-appearing  He is not diaphoretic  Interventions: Face mask in place  HENT:      Head: Normocephalic and atraumatic  Mouth/Throat:      Mouth: Mucous membranes are moist    Eyes:      General: No scleral icterus  Pupils: Pupils are equal, round, and reactive to light  Cardiovascular:      Rate and Rhythm: Tachycardia present  Rhythm irregularly irregular  Pulses: Normal pulses  Heart sounds: Normal heart sounds, S1 normal and S2 normal  No murmur heard  No systolic murmur is present  No diastolic murmur is present  No gallop  No S3 or S4 sounds  Pulmonary:      Effort: Pulmonary effort is normal  No accessory muscle usage or respiratory distress  Breath sounds: No stridor  Examination of the right-lower field reveals decreased breath sounds and rales  Examination of the left-lower field reveals decreased breath sounds and rales  Decreased breath sounds and rales present  No wheezing or rhonchi  Chest:      Chest wall: No tenderness  Abdominal:      General: Bowel sounds are normal  There is no distension  Palpations: Abdomen is soft  Tenderness: There is no abdominal tenderness  There is no guarding  Musculoskeletal:      Right lower leg: No edema  Left lower leg: No edema  Skin:     General: Skin is warm and dry  Coloration: Skin is not jaundiced  Neurological:      General: No focal deficit present  Mental Status: He is alert  Mental status is at baseline  Motor: No tremor or seizure activity  Psychiatric:         Behavior: Behavior is cooperative  Additional Data:     Labs:  Results from last 7 days   Lab Units 01/14/23  0902 01/14/23  0850 01/13/23  0521 01/12/23  0637   WBC Thousand/uL  --  9 51   < > 9 71   HEMOGLOBIN g/dL  --  9 1*   < > 9 7*   I STAT HEMOGLOBIN g/dl 10 9*  --   --   --    HEMATOCRIT %  --  35 6*   < > 38 1   HEMATOCRIT, ISTAT % 32*  --   --   --    PLATELETS Thousands/uL  --  141*   < > 185   NEUTROS PCT %  --   --   --  71   LYMPHS PCT %  --   --   --  14   MONOS PCT %  --   --   --  13*   EOS PCT %  --   --   --  1    < > = values in this interval not displayed       Results from last 7 days   Lab Units 01/14/23  0902 01/14/23  0508 01/14/23  0036 01/11/23  0856 01/10/23  1243   SODIUM mmol/L  --  145 141   < > 141   POTASSIUM mmol/L  --  5 0 5 8*   < > 4 7   CHLORIDE mmol/L  --  94* 96   < > 99   CO2 mmol/L  --  >45* 40*   < > 37*   CO2, I-STAT mmol/L >45*  --   --   --   --    BUN mg/dL  --  39* 41*   < > 43*   CREATININE mg/dL  --  1 15 1 24   < > 1 18   ANION GAP mmol/L  --   --  5   < > 5   CALCIUM mg/dL  --  9 5 9 5   < > 9 3   ALBUMIN g/dL  --   --   --   --  3 5   TOTAL BILIRUBIN mg/dL  --   --   --   --  0 37   ALK PHOS U/L  --   --   --   --  69   ALT U/L  --   --   --   --  10   AST U/L  --   --   --   --  16   GLUCOSE RANDOM mg/dL  --  96 87   < > 94    < > = values in this interval not displayed  Results from last 7 days   Lab Units 01/14/23  0850   INR  1 17     Results from last 7 days   Lab Units 01/14/23  0805 01/14/23  0621 01/14/23  0434 01/14/23  0411 01/14/23  0155 01/14/23  0021 01/13/23  2320 01/13/23  2241 01/13/23  2159 01/13/23  1953 01/13/23  1859 01/13/23  1830   POC GLUCOSE mg/dl 81 61* 114 55* 80 91 96 120 52* 84 102 64*               Lines/Drains:  Invasive Devices     Peripheral Intravenous Line  Duration           Peripheral IV 01/13/23 Left Antecubital <1 day    Peripheral IV 01/14/23 Dorsal (posterior); Left Forearm <1 day          Drain  Duration           Urethral Catheter Straight-tip 16 Fr  1 day              Urinary Catheter:  Goal for removal: Remove after 48 hrs of I/O monitoring           Telemetry:  Telemetry Orders (From admission, onward)             48 Hour Telemetry Monitoring  Continuous x 48 hours        References:    Telemetry Guidelines   Question:  Reason for 48 Hour Telemetry  Answer:  Acute Decompensated CHF (continuous diuretic infusion or total diuretic dose > 200 mg daily, associated electrolyte derangement, ionotropic drip, history of ventricular arrhythmia, or new EF <35%)                 Telemetry Reviewed: Atrial fibrillation  HR averaging 100-110  Indication for Continued Telemetry Use: Acute CHF on >200 mg lasix/day or equivalent dose or with new reduced EF                Imaging: Reviewed radiology reports from this admission including: CT head    Recent Cultures (last 7 days):         Last 24 Hours Medication List:   Current Facility-Administered Medications   Medication Dose Route Frequency Provider Last Rate   • acetaminophen  650 mg Oral Q6H PRN Deborah Thomas PA-C     • atorvastatin  40 mg Oral Daily Remigio Matt Carmichael MD     • bumetanide  0 5 mg/hr Intravenous Continuous Regan RILEY Meyer 0 5 mg/hr (01/14/23 6420)   • dextrose          • dicyclomine  10 mg Oral BID PRN Diane Hinson MD     • digoxin  125 mcg Oral Daily Diane Hinson MD     • famotidine  40 mg Oral HS Diane Hinson MD     • heparin (porcine)  3-20 Units/kg/hr (Order-Specific) Intravenous Titrated Regan Smoker, PA-C 11 1 Units/kg/hr (01/14/23 4959)   • heparin (porcine)  2,000 Units Intravenous Q6H PRN Regan SmokerRILEY     • heparin (porcine)  4,000 Units Intravenous Q6H PRN Regan SmokerRILEY     • insulin glargine  45 Units Subcutaneous QAM Marty Vanegas PA-C     • insulin lispro  2-12 Units Subcutaneous TID AC Marty Vanegas PA-C     • lidocaine  1 patch Topical Daily Mery Donaldson PA-C     • metoprolol  5 mg Intravenous Q6H Regan RILEY Meyer     • pantoprazole  40 mg Oral BID AC Diane Hinson MD     • spironolactone  25 mg Oral Daily Diane Hinson MD          Today, Patient Was Seen By: Cordelia Quinones PA-C    **Please Note: This note may have been constructed using a voice recognition system  **

## 2023-01-14 NOTE — ASSESSMENT & PLAN NOTE
Lab Results   Component Value Date    HGBA1C 7 8 (H) 11/02/2022       Recent Labs     01/14/23  0411 01/14/23  0434 01/14/23  0621 01/14/23  0805   POCGLU 55* 114 61* 81       Blood Sugar Average: Last 72 hrs:  (P) 78 [id number]   · Home regimen: Tresiba 90 units daily  · Patient has not eaten here because he does not like the food and has been recurrently hypoglycemic  · Sugar remains overly tightly controlled and he is now on BiPAP and unable to have oral intake  · Volume overload status precludes Dextrose drip   · Patient received multiple amps of dextrose 1/13 - 1/14  · Stop Lantus for now  · SSI coverage with Accu-Cheks ACHS  · Q2 Accuchecks   · Patient was allowed to eat morning of 1/14  · Attempt to wean BiPAP today to allow for PO intake   · Hypoglycemia protocol

## 2023-01-14 NOTE — RESPIRATORY THERAPY NOTE
ABG drawn on 50% Bipap  Rolando's test preformed  ABG drawn from [facility]  Patient tolerated well   Ran on Istat

## 2023-01-14 NOTE — ASSESSMENT & PLAN NOTE
· Stable/soft  · Metoprolol unable to be given due to BiPAP   · Stop Lisinopril due to LACY   · Stop Aldactone due to LACY   · Monitor closely on Bumex GTT

## 2023-01-14 NOTE — NURSING NOTE
RN responded to bedside chair alarm sounding  Pt was found prone on floor  Orientation status was determined to be AAOx2-3 which is consistent with current baseline  Pt also reported head strike  Rapid response was called, providers promptly at bedside

## 2023-01-14 NOTE — ASSESSMENT & PLAN NOTE
· Developed LACY on 1/13 with creatinine of 1 5, baseline of 1 0-1 1   Likely due to diuretic response, but also with hypotension   · Renal function normalized on 1/14 at 1 2  · Continue Bumex GTT, reduced due to contraction alkalosis    · Hold Lisinopril   · Hold Aldactone - discussed with Heart Failure   · Monitor BMP  · I/O  · UOP - status post Gómez catheter 1/13 due to critical illness

## 2023-01-14 NOTE — ASSESSMENT & PLAN NOTE
Wt Readings from Last 3 Encounters:   01/14/23 (!) 146 kg (321 lb 6 9 oz)   12/15/22 (!) 139 kg (306 lb)   11/21/22 (!) 138 kg (304 lb)     · Patient presenting with worsening lower extremity edema, abdominal tightness, weight gain of 21 pounds in 2 to 3 weeks, PND  Patient denies any dietary or medication noncompliance  · Chronically on PO Lasix 80mg BID at home     · ECHO shows EF 55%, grade 2 DD, dilated RV with reduced systolic function and pulmonary hypertension   · Net output 9,795 mL, minimal with Lasix 80 mg TID, however extreme response with Bumex drip causing contraction alkalosis   · Upgraded to Level 2 Step Down due to requiring BiPAP continuously  · Cardiology and Heart Failure following   · Bumex GTT on 1/13, reduced on 1/14  · Status post Acetazolamide 1/14  · Stop Spironolactone due to LACY   · Continue Toprol-XL, however has not been getting PO meds due to BiPAP  · Hold Lisinopril due to LACY   · Daily weights, I/O's  · Low-sodium diet, 1 5L fluid restriction  · Continue telemetry

## 2023-01-14 NOTE — ASSESSMENT & PLAN NOTE
· Elevated rates today due to not getting PO meds   · Now on Lopressor 5 mg IV Q6  · Heparin GTT  · Restart Toprol-XL, Digoxin, Eliquis when able

## 2023-01-15 LAB
APTT PPP: 58 SECONDS (ref 23–37)
APTT PPP: 66 SECONDS (ref 23–37)
BUN SERPL-MCNC: 37 MG/DL (ref 5–25)
CALCIUM SERPL-MCNC: 10.6 MG/DL (ref 8.4–10.2)
CHLORIDE SERPL-SCNC: 87 MMOL/L (ref 96–108)
CO2 SERPL-SCNC: >45 MMOL/L (ref 21–32)
CREAT SERPL-MCNC: 1.24 MG/DL (ref 0.6–1.3)
ERYTHROCYTE [DISTWIDTH] IN BLOOD BY AUTOMATED COUNT: 18.8 % (ref 11.6–15.1)
GFR SERPL CREATININE-BSD FRML MDRD: 54 ML/MIN/1.73SQ M
GLUCOSE SERPL-MCNC: 113 MG/DL (ref 65–140)
GLUCOSE SERPL-MCNC: 116 MG/DL (ref 65–140)
GLUCOSE SERPL-MCNC: 144 MG/DL (ref 65–140)
GLUCOSE SERPL-MCNC: 158 MG/DL (ref 65–140)
GLUCOSE SERPL-MCNC: 188 MG/DL (ref 65–140)
GLUCOSE SERPL-MCNC: 202 MG/DL (ref 65–140)
GLUCOSE SERPL-MCNC: 221 MG/DL (ref 65–140)
GLUCOSE SERPL-MCNC: 232 MG/DL (ref 65–140)
GLUCOSE SERPL-MCNC: 76 MG/DL (ref 65–140)
GLUCOSE SERPL-MCNC: 83 MG/DL (ref 65–140)
GLUCOSE SERPL-MCNC: 84 MG/DL (ref 65–140)
GLUCOSE SERPL-MCNC: 97 MG/DL (ref 65–140)
HCT VFR BLD AUTO: 41 % (ref 36.5–49.3)
HGB BLD-MCNC: 10.8 G/DL (ref 12–17)
MCH RBC QN AUTO: 21.6 PG (ref 26.8–34.3)
MCHC RBC AUTO-ENTMCNC: 26.3 G/DL (ref 31.4–37.4)
MCV RBC AUTO: 82 FL (ref 82–98)
PLATELET # BLD AUTO: 158 THOUSANDS/UL (ref 149–390)
PMV BLD AUTO: 10.6 FL (ref 8.9–12.7)
POTASSIUM SERPL-SCNC: 5.1 MMOL/L (ref 3.5–5.3)
RBC # BLD AUTO: 5 MILLION/UL (ref 3.88–5.62)
SODIUM SERPL-SCNC: 142 MMOL/L (ref 135–147)
WBC # BLD AUTO: 8.67 THOUSAND/UL (ref 4.31–10.16)

## 2023-01-15 RX ORDER — BUMETANIDE 0.25 MG/ML
2 INJECTION, SOLUTION INTRAMUSCULAR; INTRAVENOUS
Status: DISCONTINUED | OUTPATIENT
Start: 2023-01-15 | End: 2023-01-16

## 2023-01-15 RX ORDER — LOPERAMIDE HYDROCHLORIDE 2 MG/1
2 CAPSULE ORAL 3 TIMES DAILY PRN
Status: DISCONTINUED | OUTPATIENT
Start: 2023-01-15 | End: 2023-01-22 | Stop reason: HOSPADM

## 2023-01-15 RX ORDER — METOPROLOL SUCCINATE 25 MG/1
25 TABLET, EXTENDED RELEASE ORAL 2 TIMES DAILY
Status: DISCONTINUED | OUTPATIENT
Start: 2023-01-15 | End: 2023-01-22 | Stop reason: HOSPADM

## 2023-01-15 RX ORDER — NYSTATIN 100000 [USP'U]/G
POWDER TOPICAL 2 TIMES DAILY
Status: DISCONTINUED | OUTPATIENT
Start: 2023-01-15 | End: 2023-01-22 | Stop reason: HOSPADM

## 2023-01-15 RX ADMIN — HEPARIN SODIUM 2000 UNITS: 1000 INJECTION INTRAVENOUS; SUBCUTANEOUS at 08:34

## 2023-01-15 RX ADMIN — ATORVASTATIN CALCIUM 40 MG: 40 TABLET, FILM COATED ORAL at 08:31

## 2023-01-15 RX ADMIN — DIGOXIN 125 MCG: 125 TABLET ORAL at 08:31

## 2023-01-15 RX ADMIN — BUMETANIDE 2 MG: 0.25 INJECTION, SOLUTION INTRAMUSCULAR; INTRAVENOUS at 11:23

## 2023-01-15 RX ADMIN — HEPARIN SODIUM 13.1 UNITS/KG/HR: 10000 INJECTION, SOLUTION INTRAVENOUS at 06:16

## 2023-01-15 RX ADMIN — PANTOPRAZOLE SODIUM 40 MG: 40 TABLET, DELAYED RELEASE ORAL at 18:02

## 2023-01-15 RX ADMIN — LIDOCAINE 5% 2 PATCH: 700 PATCH TOPICAL at 08:30

## 2023-01-15 RX ADMIN — NYSTATIN: 100000 POWDER TOPICAL at 18:02

## 2023-01-15 RX ADMIN — NYSTATIN 1 APPLICATION: 100000 POWDER TOPICAL at 11:31

## 2023-01-15 RX ADMIN — APIXABAN 5 MG: 5 TABLET, FILM COATED ORAL at 17:05

## 2023-01-15 RX ADMIN — INSULIN LISPRO 2 UNITS: 100 INJECTION, SOLUTION INTRAVENOUS; SUBCUTANEOUS at 17:05

## 2023-01-15 RX ADMIN — APIXABAN 5 MG: 5 TABLET, FILM COATED ORAL at 11:23

## 2023-01-15 RX ADMIN — METOROPROLOL TARTRATE 5 MG: 5 INJECTION, SOLUTION INTRAVENOUS at 04:12

## 2023-01-15 RX ADMIN — PANTOPRAZOLE SODIUM 40 MG: 40 TABLET, DELAYED RELEASE ORAL at 06:16

## 2023-01-15 RX ADMIN — METOPROLOL SUCCINATE 25 MG: 25 TABLET, EXTENDED RELEASE ORAL at 11:30

## 2023-01-15 NOTE — ASSESSMENT & PLAN NOTE
· Hypoxia on arrival, desaturations to 77% on room air  Denies home O2 use  · [place] improvement today, but was hypoxic in the setting of hypoglycemia, rectal bleeding, and on going volume overloaded status   · Patient continued to be hypoxic at 84% on 15 L midflow and required continuous BiPAP 1/13  · ABG revealed CO2 of 103 1/14 despite BiPAP all day on 1/13  · After being on BiPAP for most of the day on 1/14 he was able to weaned to 10 L later in the day to eat   He was back on BiPAP overnight and is now satting well on 8 L 1/15  · Upgrade to Level 2 SD due to continuous BiPAP need  · Continue BiPAP, increased IPAP to 24, EPAP 10, rate 18 for better ventilation   · If patient needs BiPAP while awake during the day again recommend these settings   · Continue at night (baseline) as well as if the patient naps during the day  · Wean O2 as tolerated to maintain SPO2 >90%  · Respiratory protocol  · Monitor mental status

## 2023-01-15 NOTE — ASSESSMENT & PLAN NOTE
· Developed LACY on 1/13 with creatinine of 1 5, baseline of 1 0-1 1   Likely due to diuretic response, but also with hypotension   · Renal function normalized on 1/14 at 1 2, pending on 1/15  · Continue Bumex GTT, reduced due to contraction alkalosis    · Hold Lisinopril   · Hold Aldactone - discussed with Heart Failure   · Monitor BMP  · I/O  · UOP - status post Gómez catheter 1/13 due to critical illness

## 2023-01-15 NOTE — ASSESSMENT & PLAN NOTE
Wt Readings from Last 3 Encounters:   01/15/23 128 kg (282 lb 3 oz)   12/15/22 (!) 139 kg (306 lb)   11/21/22 (!) 138 kg (304 lb)     · Patient presenting with worsening lower extremity edema, abdominal tightness, weight gain of 21 pounds in 2 to 3 weeks, PND  Patient denies any dietary or medication noncompliance  · Chronically on PO Lasix 80mg BID at home     · ECHO shows EF 55%, grade 2 DD, dilated RV with reduced systolic function and pulmonary hypertension   · Net output 17,225 mL, minimal with Lasix 80 mg TID, however extreme response with Bumex drip causing contraction alkalosis status post Acetazolamide on 1/14  · Upgraded to Level 2 Step Down due to requiring BiPAP continuously  · If continues on this trajectory can likely be med/surg on Monday   · Cardiology and Heart Failure following   · Bumex GTT on 1/13, reduced on 1/14  · Status post Acetazolamide 1/14  · Stop Spironolactone due to LACY   · Continue Toprol-XL, however has not been getting PO meds due to BiPAP  · Hold Lisinopril due to LACY   · Daily weights, I/O's  · Low-sodium diet, 1 5L fluid restriction  · Continue telemetry

## 2023-01-15 NOTE — PROGRESS NOTES
[facility]  Progress Note - Shai Georges 1945, 77 y o  male MRN: [id number]  Unit/Bed#: S -01 Encounter: [id number]  Primary Care Provider: Robert Zaidi MD   Date and time admitted to hospital: 1/10/2023 12:21 PM    * Acute on chronic diastolic (congestive) heart failure [facility])  Assessment & Plan  Wt Readings from Last 3 Encounters:   01/15/23 128 kg (282 lb 3 oz)   12/15/22 (!) 139 kg (306 lb)   11/21/22 (!) 138 kg (304 lb)     · Patient presenting with worsening lower extremity edema, abdominal tightness, weight gain of 21 pounds in 2 to 3 weeks, PND  Patient denies any dietary or medication noncompliance  · Chronically on PO Lasix 80mg BID at home  · ECHO shows EF 55%, grade 2 DD, dilated RV with reduced systolic function and pulmonary hypertension   · Net output 17,225 mL, minimal with Lasix 80 mg TID, however extreme response with Bumex drip causing contraction alkalosis status post Acetazolamide on 1/14  · Upgraded to Level 2 Step Down due to requiring BiPAP continuously  · If continues on this trajectory can likely be med/surg on Monday   · Cardiology and Heart Failure following   · Bumex GTT on 1/13, reduced on 1/14  · Status post Acetazolamide 1/14  · Stop Spironolactone due to LACY   · Continue Toprol-XL, however has not been getting PO meds due to BiPAP  · Hold Lisinopril due to LACY   · Daily weights, I/O's  · Low-sodium diet, 1 5L fluid restriction  · Continue telemetry     Pulmonary hypertension ([facility] [address] )  Assessment & Plan  · Noted on ECHO  Remote smoking history  Is on BiPAP at night here   Apparently on CPAP at home but question adequacy   · Heart Failure following  · Diuresis as above   · RHC may need to be completed inpatient - discuss with Heart Failure on Monday   · Continue heparin drip for now pending decision on this   · Further Heart Failure follow up needed    LACY (acute kidney injury) [facility])  Assessment & Plan  · Developed LACY on 1/13 with creatinine of 1 5, baseline of 1 0-1 1  Likely due to diuretic response, but also with hypotension   · Renal function normalized on 1/14 at 1 2, pending on 1/15  · Continue Bumex GTT, reduced due to contraction alkalosis    · Hold Lisinopril   · Hold Aldactone - discussed with Heart Failure   · Monitor BMP  · I/O  · UOP - status post Gómez catheter 1/13 due to critical illness    Paroxysmal atrial fibrillation (HCC)  Assessment & Plan  · Elevated rates today due to not getting PO meds   · Now on Lopressor 5 mg IV Q6  · Heparin GTT  · Restart Toprol-XL, Digoxin, Eliquis when able     Acute respiratory failure with hypoxia and hypercapnia (HCC)  Assessment & Plan  · Hypoxia on arrival, desaturations to 77% on room air  Denies home O2 use  · [place] improvement today, but was hypoxic in the setting of hypoglycemia, rectal bleeding, and on going volume overloaded status   · Patient continued to be hypoxic at 84% on 15 L midflow and required continuous BiPAP 1/13  · ABG revealed CO2 of 103 1/14 despite BiPAP all day on 1/13  · After being on BiPAP for most of the day on 1/14 he was able to weaned to 10 L later in the day to eat  He was back on BiPAP overnight and is now satting well on 8 L 1/15  · Upgrade to Level 2 SD due to continuous BiPAP need  · Continue BiPAP, increased IPAP to 24, EPAP 10, rate 18 for better ventilation   · If patient needs BiPAP while awake during the day again recommend these settings   · Continue at night (baseline) as well as if the patient naps during the day  · Wean O2 as tolerated to maintain SPO2 >90%  · Respiratory protocol  · Monitor mental status     LUCIA (obstructive sleep apnea)  Assessment & Plan  · Patient was on CPAP  Recently transitioned to BiPAP by his pulmonologist but still has not received the machine    · Continue BiPAP overnight and with naps  · CM consult to assist with equipment when closer to discharge    Type 2 diabetes mellitus with hyperglycemia, with long-term current use of insulin [facility])  Assessment & Plan  Lab Results   Component Value Date    HGBA1C 7 8 (H) 11/02/2022       Recent Labs     01/15/23  0216 01/15/23  0411 01/15/23  0636 01/15/23  0816   POCGLU 83 84 76 113       Blood Sugar Average: Last 72 hrs:  (P) 83   · Home regimen: Tresiba 90 units daily  · Patient has not eaten here because he does not like the food and has been recurrently hypoglycemic  · Sugar remains overly tightly controlled and he is now on BiPAP and unable to have oral intake  · Volume overload status precludes Dextrose drip   · Patient received multiple amps of dextrose 1/13 - 1/15  · Stop Lantus for now  · SSI coverage with Accu-Cheks ACHS  · Q2 Accuchecks   · Patient was allowed to eat morning and evening of 1/14  · Encourage PO intake now that he is on nasal cannula   · Hypoglycemia protocol    Hypertension  Assessment & Plan  · Stable   · Metoprolol unable to be given due to BiPAP   · Stop Lisinopril due to LACY   · Stop Aldactone due to LACY   · Monitor closely on Bumex GTT      CAD (coronary artery disease)  Assessment & Plan  · Chest pain free  · Restart Eliquis, metoprolol, statin when able to take PO   · RHC might be done inpatient as above, defer to Heart Failure      Venous stasis dermatitis of both lower extremities  Assessment & Plan  · Wound care    BRBPR (bright red blood per rectum)  Assessment & Plan  · Noted on 1/12  Patient reports history of large hemorrhoid that has bled in the past   · Hemoglobin has been stable   · Monitor Hgb  · No intervention at this time   · Currently on heparin drip for A  Fib  · Restart Eliquis when able   · May need to consider holding anticoagulation and consulting GI, however would need further respiratory stabilization for any scopes/procedures         VTE Pharmacologic Prophylaxis: VTE Score: 7 High Risk (Score >/= 5) - Pharmacological DVT Prophylaxis Ordered: heparin drip  Sequential Compression Devices Ordered  Patient Centered Rounds:  I performed bedside rounds with nursing staff today  Discussions with Specialists or Other Care Team Provider: Discussed with Cardiology, RN, CM    Education and Discussions with Family / Patient: Attempted to update  (wife) via phone  Left voicemail  Time Spent for Care: 30 minutes  More than 50% of total time spent on counseling and coordination of care as described above  Current Length of Stay: 5 day(s)  Current Patient Status: Inpatient   Certification Statement: The patient will continue to require additional inpatient hospital stay due to further IV diuresis, might need IP RHC, optimization of respiratory status, safe dsicharge plan   Discharge Plan: Anticipate discharge in >72 hrs to rehab facility  Code Status: Level 3 - DNAR and DNI    Subjective:   Patient states that his breathing is better today  Wants to get up  Denies chest pain  RN reports no events overnight besides needing some amps of dextrose  Was on BiPAP overnight, but was able to wean to nasal cannula last evening for dinner and this morning  Objective:     Vitals:   Temp (24hrs), Av 7 °F (37 6 °C), Min:99 1 °F (37 3 °C), Max:100 1 °F (37 8 °C)    Temp:  [99 1 °F (37 3 °C)-100 1 °F (37 8 °C)] 99 2 °F (37 3 °C)  HR:  [] 112  Resp:  [11-20] 11  BP: ()/(43-73) 104/43  SpO2:  [87 %-98 %] 92 %  Body mass index is 34 35 kg/m²  Input and Output Summary (last 24 hours): Intake/Output Summary (Last 24 hours) at 1/15/2023 0925  Last data filed at 1/15/2023 0920  Gross per 24 hour   Intake 120 ml   Output 8000 ml   Net -7880 ml       Physical Exam:   Physical Exam  Constitutional:       General: He is not in acute distress  Appearance: Normal appearance  He is morbidly obese  He is not ill-appearing or diaphoretic  Interventions: Nasal cannula in place  HENT:      Head: Normocephalic and atraumatic        Mouth/Throat:      Mouth: Mucous membranes are moist    Eyes:      General: No scleral icterus  Pupils: Pupils are equal, round, and reactive to light  Cardiovascular:      Rate and Rhythm: Tachycardia present  Rhythm irregularly irregular  Pulses: Normal pulses  Heart sounds: Normal heart sounds, S1 normal and S2 normal  No murmur heard  No systolic murmur is present  No diastolic murmur is present  No gallop  No S3 or S4 sounds  Pulmonary:      Effort: Pulmonary effort is normal  No accessory muscle usage or respiratory distress  Breath sounds: No stridor  Examination of the right-lower field reveals decreased breath sounds  Examination of the left-lower field reveals decreased breath sounds  Decreased breath sounds present  No wheezing, rhonchi or rales  Chest:      Chest wall: No tenderness  Abdominal:      General: Bowel sounds are normal  There is no distension  Palpations: Abdomen is soft  Tenderness: There is no abdominal tenderness  There is no guarding  Musculoskeletal:      Right lower leg: No edema  Left lower leg: No edema  Skin:     General: Skin is warm and dry  Coloration: Skin is not jaundiced  Neurological:      General: No focal deficit present  Mental Status: He is alert  Mental status is at baseline  Motor: No tremor or seizure activity  Psychiatric:         Behavior: Behavior is cooperative  Additional Data:     Labs:  Results from last 7 days   Lab Units 01/15/23  0825 01/13/23  0521 01/12/23  0637   WBC Thousand/uL 8 67   < > 9 71   HEMOGLOBIN g/dL 10 8*   < > 9 7*   I STAT HEMOGLOBIN   --    < >  --    HEMATOCRIT % 41 0   < > 38 1   HEMATOCRIT, ISTAT   --    < >  --    PLATELETS Thousands/uL 158   < > 185   NEUTROS PCT %  --   --  71   LYMPHS PCT %  --   --  14   MONOS PCT %  --   --  13*   EOS PCT %  --   --  1    < > = values in this interval not displayed       Results from last 7 days   Lab Units 01/14/23  1526 01/14/23  0508 01/14/23  0036 01/11/23  0856 01/10/23  1243   SODIUM mmol/L 146   < > 141   < > 141   POTASSIUM mmol/L 4 7   < > 5 8*   < > 4 7   CHLORIDE mmol/L 93*   < > 96   < > 99   CO2 mmol/L >45*   < > 40*   < > 37*   CO2, I-STAT   --    < >  --   --   --    BUN mg/dL 39*   < > 41*   < > 43*   CREATININE mg/dL 1 20   < > 1 24   < > 1 18   ANION GAP mmol/L  --   --  5   < > 5   CALCIUM mg/dL 10 0   < > 9 5   < > 9 3   ALBUMIN g/dL  --   --   --   --  3 5   TOTAL BILIRUBIN mg/dL  --   --   --   --  0 37   ALK PHOS U/L  --   --   --   --  69   ALT U/L  --   --   --   --  10   AST U/L  --   --   --   --  16   GLUCOSE RANDOM mg/dL 39*   < > 87   < > 94    < > = values in this interval not displayed  Results from last 7 days   Lab Units 01/14/23  0850   INR  1 17     Results from last 7 days   Lab Units 01/15/23  0816 01/15/23  0636 01/15/23  0411 01/15/23  0216 01/15/23  0019 01/14/23  2211 01/14/23  2124 01/14/23  2009 01/14/23  1707 01/14/23  1526 01/14/23  1520 01/14/23  1256   POC GLUCOSE mg/dl 113 76 84 83 97 99 70 84 44* 97 31* 93               Lines/Drains:  Invasive Devices     Peripheral Intravenous Line  Duration           Peripheral IV 01/13/23 Left Antecubital 1 day    Peripheral IV 01/14/23 Dorsal (posterior); Left Forearm 1 day          Drain  Duration           Urethral Catheter Straight-tip 16 Fr  1 day              Urinary Catheter:  Goal for removal: Voiding trial when ambulation improves           Telemetry:  Telemetry Orders (From admission, onward)             48 Hour Telemetry Monitoring  Continuous x 48 hours        References:    Telemetry Guidelines   Question:  Reason for 48 Hour Telemetry  Answer:  Acute Decompensated CHF (continuous diuretic infusion or total diuretic dose > 200 mg daily, associated electrolyte derangement, ionotropic drip, history of ventricular arrhythmia, or new EF <35%)                 Telemetry Reviewed: Atrial fibrillation   HR averaging 100-115  Indication for Continued Telemetry Use: Acute CHF on >200 mg lasix/day or equivalent dose or with new reduced EF  Imaging: No pertinent imaging reviewed  Recent Cultures (last 7 days):         Last 24 Hours Medication List:   Current Facility-Administered Medications   Medication Dose Route Frequency Provider Last Rate   • acetaminophen  650 mg Oral Q6H PRN Arabella Chatterjee PA-C     • atorvastatin  40 mg Oral Daily Gerald Saini MD     • bumetanide  0 5 mg/hr Intravenous Continuous Lavona Ramp, PA-C 0 5 mg/hr (01/14/23 1240)   • dextrose  25 mL Intravenous PRN Marty Buchanan PA-C     • dicyclomine  10 mg Oral BID PRN Gerald Saini MD     • digoxin  125 mcg Oral Daily Gerald Saini MD     • famotidine  40 mg Oral HS Gerald Saini MD     • heparin (porcine)  3-20 Units/kg/hr (Order-Specific) Intravenous Titrated Lavona DARIUSZ DuboisC 13 1 Units/kg/hr (01/15/23 6229)   • heparin (porcine)  2,000 Units Intravenous Q6H PRN Lavona RILEY Dubois     • heparin (porcine)  4,000 Units Intravenous Q6H PRN Lavona RILEY Dubois     • insulin glargine  45 Units Subcutaneous QAM Marty Wallace PA-C     • insulin lispro  2-12 Units Subcutaneous TID AC Marty Wallace PA-C     • lidocaine  2 patch Topical Daily Marty Wallace PA-C     • metoprolol  5 mg Intravenous Q6H Lavona RILEY Dubois     • nystatin   Topical BID Chace Bone, CRNP     • pantoprazole  40 mg Oral BID AC Gerald Saini MD     • spironolactone  25 mg Oral Daily Gerald Saini MD          Today, Patient Was Seen By: Anthony Velez PA-C    **Please Note: This note may have been constructed using a voice recognition system  **

## 2023-01-15 NOTE — ASSESSMENT & PLAN NOTE
· Patient was on CPAP  Recently transitioned to BiPAP by his pulmonologist but still has not received the machine    · Continue BiPAP overnight and with naps  · CM consult to assist with equipment when closer to discharge

## 2023-01-15 NOTE — ASSESSMENT & PLAN NOTE
Lab Results   Component Value Date    HGBA1C 7 8 (H) 11/02/2022       Recent Labs     01/15/23  0216 01/15/23  0411 01/15/23  0636 01/15/23  0816   POCGLU 83 84 76 113       Blood Sugar Average: Last 72 hrs:  (P) 83   · Home regimen: Tresiba 90 units daily  · Patient has not eaten here because he does not like the food and has been recurrently hypoglycemic  · Sugar remains overly tightly controlled and he is now on BiPAP and unable to have oral intake  · Volume overload status precludes Dextrose drip   · Patient received multiple amps of dextrose 1/13 - 1/15  · Stop Lantus for now  · SSI coverage with Accu-Cheks ACHS  · Q2 Accuchecks   · Patient was allowed to eat morning and evening of 1/14  · Encourage PO intake now that he is on nasal cannula   · Hypoglycemia protocol

## 2023-01-15 NOTE — ASSESSMENT & PLAN NOTE
· Chest pain free  · Restart Eliquis, metoprolol, statin when able to take PO   · RHC might be done inpatient as above, defer to Heart Failure

## 2023-01-15 NOTE — PROGRESS NOTES
General Cardiology   Progress Note -  Team One   Nishi Malik 77 y o  male MRN: [id number]    Unit/Bed#: S -01 Encounter: [id number]    Assessment:    1  Acute on chronic HFpEF:  Bumex infusion decreased to 0 5 mg/hr due to large volume diuresis with contraction alkalosis  Received a dose of Diamox yesterday  • Echocardiogram 1/11/2023: EF 55% with no diagnostic WMA cannot be excluded on the basis of the study, G2DD, reduced RV function, mild biatrial dilatation, mild TR  • Home diuretic regimen: Lasix 80 mg BID  • Dry weight: Proximately 305 lb  • Weight on admission: 302 lb per bed scale  • Weight today: 321 lb per bed scale, was 317 lb per standing scale yesterday  • I&Os: Output 24 hours -7 4 L  Net output -17 6 L  • Patient with contraction alkalosis yesterday  Labs pending today  2  Acute hypoxic respiratory failure: In the setting of volume overload requiring BiPAP  Patient now weaned to 8 L mid flow NC  3  Pulmonary hypertension with RV dysfunction: Likely group 2/3  PASP 83 mmHg per echocardiogram 1/11/2023  Diuresis as above  4   CAD: s/p CABG x3 (LIMA-LAD, SVG-OM 3, SVG-PDA) in 2016  Maintained on beta-blocker and statin  No aspirin due to anticoagulation with Eliquis  5  Paroxysmal atrial fibrillation: s/p cardioversion 1/7/2022  Maintained on Toprol- mg BID and digoxin 125 mcg daily however doses have been held as patient is on BiPAP  IV Lopressor was ordered yesterday  • [id number]: Anticoagulated on Eliquis 5 mg BID ever multiple doses have been held this admission as patient was on BiPAP therefore switched to heparin infusion on 1/14  • Now off of BiPAP and received digoxin this morning  6  Essential hypertension: Average /55 on Bumex infusion  Spironolactone currently on hold due to soft BP  7  Hyperlipidemia: Lipid panel 11/2022 , , HDL 34, LDL 67 on atorvastatin 40 mg daily  8  Type II DM: Hemoglobin A1c 7 8 in 11/2022    Management per primary team   9   LACY: Baseline creatinine 1 0-1 2  Creatinine peaked at 1 5 yesterday likely cardiorenal  Improved to 1 15 with initiation of Bumex gtt however with contraction alkalosis yesterday  Labs pending today  10   LUCIA: On CPAP  11  Fall: RRT overnight as patient fell out of a chair with head strike on Eliquis  CTH no acute intracranial abnormality  CT C-spine with no fracture or traumatic malalignment         Plan/Recommendations:  • Follow-up on BMP  • Will discontinue Bumex infusion and start intermittent IV Bumex 2 mg TID given large volume diuresis  • Monitor I&O's, renal function, electrolytes and standing weight  • Maintain potassium >4 and magnesium >2  • Plan for RHC (inpatient versus outpatient) and VQ scan once volume status is optimized per heart failure team  • Patient now able to take oral medications therefore will discontinue heparin infusion and restart Eliquis  • Continue digoxin  • Discontinue IV Lopressor restart Toprol-XL at a lower dose of 25 mg BID  • Spironolactone on hold due to soft BPs  • Continue to monitor on telemetry  __________________________________________________________    Subjective    Patient seen and examined  No acute events overnight  Sitting in a chair  Denies any chest pain, shortness of breath or palpitations  Review of Systems   Constitutional: Negative  Negative for chills  Cardiovascular: Positive for leg swelling  Negative for chest pain, dyspnea on exertion, near-syncope, orthopnea, palpitations, paroxysmal nocturnal dyspnea and syncope  Respiratory: Negative  Negative for cough, shortness of breath and wheezing  Endocrine: Negative  Hematologic/Lymphatic: Negative  Skin: Negative  Musculoskeletal: Negative  Gastrointestinal: Negative  Negative for diarrhea, nausea and vomiting  Neurological: Negative for dizziness, light-headedness and weakness  Psychiatric/Behavioral: Negative  Negative for altered mental status     All other systems reviewed and are negative  Objective:   Vitals: Blood pressure (!) 104/43, pulse (!) 112, temperature 99 2 °F (37 3 °C), resp  rate (!) 11, height 6' 4" (1 93 m), weight 128 kg (282 lb 3 oz), SpO2 92 %  ,     Wt Readings from Last 3 Encounters:   01/15/23 128 kg (282 lb 3 oz)   12/15/22 (!) 139 kg (306 lb)   11/21/22 (!) 138 kg (304 lb)        Lab Results   Component Value Date    CREATININE 1 20 01/14/2023    CREATININE 1 15 01/14/2023    CREATININE 1 24 01/14/2023         Body mass index is 34 35 kg/m²  ,     Systolic (73MQG), AXJ:305 , Min:91 , SUV:150     Diastolic (17YNM), SMH:05, Min:43, Max:73          Intake/Output Summary (Last 24 hours) at 1/15/2023 0935  Last data filed at 1/15/2023 0920  Gross per 24 hour   Intake 120 ml   Output 8000 ml   Net -7880 ml     Weight (last 2 days)     Date/Time Weight    01/15/23 0815 128 (282 19)    01/15/23 0600 159 (349 43)    01/14/23 0600 146 (321 43)     Weight: pt unbale to be standing safetly at 01/14/23 0600        Telemetry Review: Remi Karie fibrillation with ventricular rates 100-1 teens  Currently in the 1 teens      Physical Exam  Vitals and nursing note reviewed  Constitutional:       General: He is not in acute distress  Appearance: He is well-developed  He is obese  Comments: On 8 L mid flow in NAD   HENT:      Head: Normocephalic and atraumatic  Neck:      Comments: Unable to assess JVD due to patient body habitus  Cardiovascular:      Rate and Rhythm: Tachycardia present  Rhythm irregular  Heart sounds: Normal heart sounds  No murmur heard  No friction rub  No gallop  Pulmonary:      Effort: Pulmonary effort is normal  No respiratory distress  Breath sounds: No wheezing or rales  Comments: Diminished breath sounds bilaterally  Chest:      Chest wall: No tenderness  Abdominal:      General: Bowel sounds are normal  There is no distension  Palpations: Abdomen is soft  Tenderness:  There is no abdominal tenderness  Musculoskeletal:         General: No tenderness  Normal range of motion  Cervical back: Normal range of motion and neck supple  Right lower leg: Edema present  Left lower leg: Edema present  Skin:     General: Skin is warm and dry  Coloration: Skin is pale  Findings: No erythema  Neurological:      Mental Status: He is alert and oriented to person, place, and time  Psychiatric:         Thought Content:  Thought content normal          Judgment: Judgment normal          LABORATORY RESULTS      CBC with diff:   Results from last 7 days   Lab Units 01/15/23  0825 01/14/23  0902 01/14/23  0850 01/14/23  0508 01/14/23  0123 01/13/23  0521 01/12/23  0637 01/11/23  0856 01/10/23  1243   WBC Thousand/uL 8 67  --  9 51 10 17* 10 94* 10 22* 9 71 8 65 10 00   HEMOGLOBIN g/dL 10 8*  --  9 1* 9 0* 9 7* 10 6* 9 7* 10 0* 10 5*   I STAT HEMOGLOBIN g/dl  --  10 9*  --   --   --   --   --   --   --    HEMATOCRIT % 41 0  --  35 6* 35 2* 38 0 42 5 38 1 38 4 39 4   HEMATOCRIT, ISTAT %  --  32*  --   --   --   --   --   --   --    MCV fL 82  --  84 84 83 87 85 82 79*   PLATELETS Thousands/uL 158  --  141* 147* 146* 168 185 166 203   MCH pg 21 6*  --  21 4* 21 6* 21 2* 21 7* 21 6* 21 4* 21 1*   MCHC g/dL 26 3*  --  25 6* 25 6* 25 5* 24 9* 25 5* 26 0* 26 6*   RDW % 18 8*  --  18 2* 18 2* 17 9* 18 6* 18 4* 18 3* 18 3*   MPV fL 10 6  --  10 7 10 9 11 4 10 6 10 5 10 3 10 2   NRBC AUTO /100 WBCs  --   --   --   --   --   --  0 0 0       CMP:  Results from last 7 days   Lab Units 01/14/23  1526 01/14/23  0902 01/14/23  0508 01/14/23  0036 01/13/23  0521 01/12/23  0637 01/11/23  0856 01/10/23  1243   POTASSIUM mmol/L 4 7  --  5 0 5 8* 5 7* 5 4* 5 1 4 7   CHLORIDE mmol/L 93*  --  94* 96 96 98 100 99   CO2 mmol/L >45*  --  >45* 40* 43* 39* 38* 37*   CO2, I-STAT mmol/L  --  >45*  --   --   --   --   --   --    BUN mg/dL 39*  --  39* 41* 37* 34* 35* 43*   CREATININE mg/dL 1 20  --  1 15 1 24 1 51* 1 24 1 13 1 18   GLUCOSE, ISTAT mg/dl  --  77  --   --   --   --   --   --    CALCIUM mg/dL 10 0  --  9 5 9 5 9 7 9 2 9 1 9 3   AST U/L  --   --   --   --   --   --   --  16   ALT U/L  --   --   --   --   --   --   --  10   ALK PHOS U/L  --   --   --   --   --   --   --  69   EGFR ml/min/1 73sq m 57  --  60 54 43 54 61 58       BMP:  Results from last 7 days   Lab Units 01/14/23  1526 01/14/23  0902 01/14/23  0508 01/14/23  0036 01/13/23  0521 01/12/23  0637 01/11/23  0856 01/10/23  1243   POTASSIUM mmol/L 4 7  --  5 0 5 8* 5 7* 5 4* 5 1 4 7   CHLORIDE mmol/L 93*  --  94* 96 96 98 100 99   CO2 mmol/L >45*  --  >45* 40* 43* 39* 38* 37*   CO2, I-STAT mmol/L  --  >45*  --   --   --   --   --   --    BUN mg/dL 39*  --  39* 41* 37* 34* 35* 43*   CREATININE mg/dL 1 20  --  1 15 1 24 1 51* 1 24 1 13 1 18   GLUCOSE, ISTAT mg/dl  --  77  --   --   --   --   --   --    CALCIUM mg/dL 10 0  --  9 5 9 5 9 7 9 2 9 1 9 3       Lab Results   Component Value Date    NTBNP 37 10/06/2021    NTBNP 87 06/03/2021    NTBNP 165 04/22/2021             Results from last 7 days   Lab Units 01/14/23  0036 01/11/23  0856   MAGNESIUM mg/dL 2 1 2 2                     Results from last 7 days   Lab Units 01/14/23  0850   INR  1 17       Lipid Profile:   No results found for: CHOL  Lab Results   Component Value Date    HDL 34 (L) 11/02/2022    HDL 32 (L) 04/27/2022    HDL 37 (L) 10/06/2021     Lab Results   Component Value Date    LDLCALC 67 11/02/2022    LDLCALC 60 04/27/2022    LDLCALC 65 10/06/2021     Lab Results   Component Value Date    TRIG 153 (H) 11/02/2022    TRIG 223 (H) 04/27/2022    TRIG 201 (H) 10/06/2021       Cardiac testing:   Results for orders placed during the hospital encounter of 03/23/21    Echo complete with contrast if indicated    Narrative  [facility], [address] [facility]  [phone]    Transthoracic Echocardiogram  2D, M-mode, Doppler, and Color Doppler    Study date: 23-Mar-2021    Patient: Otis Crockett  MR number: [id number]  Account number: [de-identified]  : [id number]-Herson-1945  Age: 67 years  Gender: Male  Status: Outpatient  Location: [address] [place] [address]  Height: 76 in  Weight: 331 3 lb  BP: 160/ 80 mmHg    Indications: Assess left ventricular function  Diagnoses: R60 9 - Edema, unspecified    Sonographer:  RODRIGO Palma  Primary Physician:  Arsen Win MD  Referring Physician:  Zenobia Jaramillo MD  Group:  [facility]  Interpreting Physician:  Randee Cervantes MD    SUMMARY    LEFT VENTRICLE:  Systolic function was normal by visual assessment  Ejection fraction was estimated to be 60 %  There were no regional wall motion abnormalities  Wall thickness was mildly increased  Doppler parameters were consistent with abnormal left ventricular relaxation (grade 1 diastolic dysfunction)  RIGHT VENTRICLE:  The ventricle was mildly dilated  Systolic function was mildly reduced  Systolic pressure was mildly increased  Estimated peak pressure was 45 mmHg  LEFT ATRIUM:  The atrium was mildly dilated  RIGHT ATRIUM:  The atrium was mildly dilated  MITRAL VALVE:  There was mild annular calcification  There was trace regurgitation  TRICUSPID VALVE:  There was mild to moderate regurgitation  HISTORY: PRIOR HISTORY: Leg edema, CAD, Afib, Hypertension, Hyperlipidemia    PROCEDURE: The study was performed in the [address] [place] [address]  This was a routine study  The transthoracic approach was used  The study included complete 2D imaging, M-mode, complete spectral Doppler, and color Doppler  The  heart rate was 108 bpm, at the start of the study  Images were obtained from the parasternal, apical, subcostal, and suprasternal notch acoustic windows  Intravenous contrast ( 0 4 ml Definity/NSS) was administered  This was a technically  difficult study      LEFT VENTRICLE: Size was normal  Systolic function was normal by visual assessment  Ejection fraction was estimated to be 60 %  There were no regional wall motion abnormalities  Wall thickness was mildly increased  DOPPLER: There was an  increased relative contribution of atrial contraction to ventricular filling  Doppler parameters were consistent with abnormal left ventricular relaxation (grade 1 diastolic dysfunction)  RIGHT VENTRICLE: The ventricle was mildly dilated  Systolic function was mildly reduced  DOPPLER: Systolic pressure was mildly increased  Estimated peak pressure was 45 mmHg  LEFT ATRIUM: The atrium was mildly dilated  RIGHT ATRIUM: The atrium was mildly dilated  MITRAL VALVE: There was mild annular calcification  Valve structure was normal  There was normal leaflet separation  DOPPLER: The transmitral velocity was within the normal range  There was no evidence for stenosis  There was trace  regurgitation  AORTIC VALVE: The valve was trileaflet  Leaflets exhibited normal thickness, mild calcification, and normal cuspal separation  DOPPLER: Transaortic velocity was within the normal range  There was no evidence for stenosis  There was no  regurgitation  TRICUSPID VALVE: The valve structure was normal  There was normal leaflet separation  DOPPLER: The transtricuspid velocity was within the normal range  There was no evidence for stenosis  There was mild to moderate regurgitation  PULMONIC VALVE: Leaflets exhibited normal thickness, no calcification, and normal cuspal separation  DOPPLER: The transpulmonic velocity was within the normal range  There was no regurgitation  PERICARDIUM: There was no pericardial effusion  AORTA: The root exhibited normal size  SYSTEMIC VEINS: IVC: The inferior vena cava was normal in size and course   Respirophasic changes were normal     SYSTEM MEASUREMENT TABLES    2D  %FS: 36 5 %  Ao Diam: 3 93 cm  EDV(Teich): 161 22 ml  EF(Teich): 65 53 %  ESV(Teich): 55 57 ml  IVSd: 1 2 cm  LA Area: 28 91 cm2  LA Diam: 5 52 cm  LVEDV MOD A4C: 89 05 ml  LVEF MOD A4C: 60 77 %  LVESV MOD A4C: 34 93 ml  LVIDd: 5 72 cm  LVIDs: 3 63 cm  LVLd A4C: 7 78 cm  LVLs A4C: 6 46 cm  LVPWd: 1 21 cm  RA Area: 24 49 cm2  RVIDd: 4 38 cm  SV MOD A4C: 54 11 ml  SV(Teich): 105 65 ml    CW  TR MaxP 73 mmHg  TR Vmax: 3 34 m/s    MM  TAPSE: 1 91 cm    PW  E' Sept: 0 07 m/s  E/E' Sept: 13 16  MV A Teja: 1 14 m/s  MV Dec [place]: 4 59 m/s2  MV DecT: 193 44 ms  MV E Teja: 0 89 m/s  MV E/A Ratio: 0 78  MV PHT: 56 1 ms  MVA By PHT: 3 92 cm2    [facility]redited [facility]    Prepared and electronically signed by    Cassandra Friend MD  Signed 24-Mar-2021 08:42:46    No results found for this or any previous visit  No results found for this or any previous visit  No valid procedures specified  No results found for this or any previous visit          Meds/Allergies   all current active meds have been reviewed, current meds:   Current Facility-Administered Medications   Medication Dose Route Frequency   • acetaminophen (TYLENOL) tablet 650 mg  650 mg Oral Q6H PRN   • atorvastatin (LIPITOR) tablet 40 mg  40 mg Oral Daily   • bumetanide (BUMEX) 12 5 mg infusion 50 mL  0 5 mg/hr Intravenous Continuous   • dextrose 50 % IV solution 25 mL  25 mL Intravenous PRN   • dicyclomine (BENTYL) capsule 10 mg  10 mg Oral BID PRN   • digoxin (LANOXIN) tablet 125 mcg  125 mcg Oral Daily   • famotidine (PEPCID) tablet 40 mg  40 mg Oral HS   • heparin (porcine) 25,000 units in 0 45% NaCl 250 mL infusion (premix)  3-20 Units/kg/hr (Order-Specific) Intravenous Titrated   • heparin (porcine) injection 2,000 Units  2,000 Units Intravenous Q6H PRN   • heparin (porcine) injection 4,000 Units  4,000 Units Intravenous Q6H PRN   • insulin lispro (HumaLOG) 100 units/mL subcutaneous injection 2-12 Units  2-12 Units Subcutaneous TID AC   • lidocaine (LIDODERM) 5 % patch 2 patch  2 patch Topical Daily   • metoprolol (LOPRESSOR) injection 5 mg  5 mg Intravenous Q6H   • nystatin (MYCOSTATIN) powder   Topical BID   • pantoprazole (PROTONIX) EC tablet 40 mg  40 mg Oral BID AC   • spironolactone (ALDACTONE) tablet 25 mg  25 mg Oral Daily    and PTA meds:   Prior to Admission Medications   Prescriptions Last Dose Informant Patient Reported? Taking?    Continuous Blood Gluc Sensor (FreeStyle Peggy 2 Sensor) MISC   No No   Sig: Change it every 14 days   Lancets (accu-chek multiclix) lancets   No No   Sig: Use 1 each 2 (two) times a day Use 2 times daily to test blood glucose   NovoLOG FlexPen 100 units/mL injection pen 1/10/2023  No Yes   Sig: Inject 20-25 Units under the skin 3 (three) times a day with meals   Patient taking differently: Inject 20-25 Units under the skin 3 (three) times a day with meals Per sliding scale   Unifine Pentips 31G X 8 MM MISC   No No   Sig: inject under the skin daily use as directed   apixaban (Eliquis) 5 mg   No No   Sig: Take 1 tablet (5 mg total) by mouth 2 (two) times a day   atorvastatin (LIPITOR) 40 mg tablet 1/9/2023  No Yes   Sig: Take 1 tablet (40 mg total) by mouth daily   dicyclomine (BENTYL) 10 mg capsule Past Week  No Yes   Sig: Take 1 capsule (10 mg total) by mouth 2 (two) times a day as needed (abdominal cramping)   digoxin (LANOXIN) 0 125 mg tablet 1/10/2023  No Yes   Sig: Take 1 tablet (125 mcg total) by mouth daily   famotidine (PEPCID) 40 MG tablet   No No   Sig: TAKE ONE TABLET BY MOUTH AT BEDTIME   furosemide (LASIX) 80 mg tablet 1/10/2023  No Yes   Sig: Take 0 5 tablets (40 mg total) by mouth 2 (two) times a day   Patient taking differently: Take 80 mg by mouth 2 (two) times a day   insulin degludec [place] FlexTouch) 200 units/mL CONCENTRATED U-200 injection pen 1/10/2023  No Yes   Sig: inject 100 units under the skin once daily   Patient taking differently: 90 Units daily   lisinopril (ZESTRIL) 20 mg tablet 1/10/2023  No Yes   Sig: TAKE ONE TABLET BY MOUTH ONCE DAILY   metoprolol succinate (TOPROL-XL) 100 mg 24 hr tablet 1/10/2023  No Yes   Sig: Take 1 tablet (100 mg total) by mouth every 12 (twelve) hours   omeprazole (PriLOSEC) 20 mg delayed release capsule 1/10/2023  No Yes   Sig: Take 1 capsule (20 mg total) by mouth 2 (two) times a day   potassium citrate (UROCIT-K) 10 mEq 1/10/2023  No Yes   Sig: Take 1 tablet (10 mEq total) by mouth 2 (two) times a day   silver sulfadiazine (SILVADENE,SSD) 1 % cream Past Week  No Yes   Sig: Apply topically daily   spironolactone (ALDACTONE) 25 mg tablet 1/10/2023  No Yes   Sig: TAKE ONE TABLET BY MOUTH ONCE DAILY      Facility-Administered Medications: None     Medications Prior to Admission   Medication   • atorvastatin (LIPITOR) 40 mg tablet   • dicyclomine (BENTYL) 10 mg capsule   • digoxin (LANOXIN) 0 125 mg tablet   • furosemide (LASIX) 80 mg tablet   • insulin degludec Toya Julio C FlexTouch) 200 units/mL CONCENTRATED U-200 injection pen   • lisinopril (ZESTRIL) 20 mg tablet   • metoprolol succinate (TOPROL-XL) 100 mg 24 hr tablet   • NovoLOG FlexPen 100 units/mL injection pen   • omeprazole (PriLOSEC) 20 mg delayed release capsule   • potassium citrate (UROCIT-K) 10 mEq   • silver sulfadiazine (SILVADENE,SSD) 1 % cream   • spironolactone (ALDACTONE) 25 mg tablet   • Continuous Blood Gluc Sensor (FreeStyle Peggy 2 Sensor) MISC   • famotidine (PEPCID) 40 MG tablet   • Lancets (accu-chek multiclix) lancets   • Unifine Pentips 31G X 8 MM MISC       bumetanide, 0 5 mg/hr, Last Rate: 0 5 mg/hr (01/14/23 1240)  heparin (porcine), 3-20 Units/kg/hr (Order-Specific), Last Rate: 13 1 Units/kg/hr (01/15/23 1447)        Counseling / Coordination of Care  Total floor / unit time spent today 20 minutes  Greater than 50% of total time was spent with the patient and / or family counseling and / or coordination of care  ** Please Note: Dragon 360 Dictation voice to text software may have been used in the creation of this document   **

## 2023-01-15 NOTE — ASSESSMENT & PLAN NOTE
· Stable   · Metoprolol unable to be given due to BiPAP   · Stop Lisinopril due to LACY   · Stop Aldactone due to LACY   · Monitor closely on Bumex GTT

## 2023-01-15 NOTE — ASSESSMENT & PLAN NOTE
· Noted on ECHO  Remote smoking history  Is on BiPAP at night here   Apparently on CPAP at home but question adequacy   · Heart Failure following  · Diuresis as above   · RHC may need to be completed inpatient - discuss with Heart Failure on Monday   · Continue heparin drip for now pending decision on this   · Further Heart Failure follow up needed

## 2023-01-16 LAB
BASE EX.OXY STD BLDV CALC-SCNC: 95.2 % (ref 60–80)
BASE EXCESS BLDV CALC-SCNC: 20.7 MMOL/L
BUN SERPL-MCNC: 37 MG/DL (ref 5–25)
CALCIUM SERPL-MCNC: 10.4 MG/DL (ref 8.4–10.2)
CHLORIDE SERPL-SCNC: 89 MMOL/L (ref 96–108)
CO2 SERPL-SCNC: >45 MMOL/L (ref 21–32)
CREAT SERPL-MCNC: 1.07 MG/DL (ref 0.6–1.3)
DIGOXIN SERPL-MCNC: 0.9 NG/ML (ref 0.8–2)
ERYTHROCYTE [DISTWIDTH] IN BLOOD BY AUTOMATED COUNT: 18.9 % (ref 11.6–15.1)
GFR SERPL CREATININE-BSD FRML MDRD: 65 ML/MIN/1.73SQ M
GLUCOSE SERPL-MCNC: 138 MG/DL (ref 65–140)
GLUCOSE SERPL-MCNC: 148 MG/DL (ref 65–140)
GLUCOSE SERPL-MCNC: 151 MG/DL (ref 65–140)
GLUCOSE SERPL-MCNC: 160 MG/DL (ref 65–140)
GLUCOSE SERPL-MCNC: 171 MG/DL (ref 65–140)
GLUCOSE SERPL-MCNC: 236 MG/DL (ref 65–140)
HCO3 BLDV-SCNC: 48.7 MMOL/L (ref 24–30)
HCT VFR BLD AUTO: 39.5 % (ref 36.5–49.3)
HCT VFR BLD AUTO: 41.3 % (ref 36.5–49.3)
HGB BLD-MCNC: 10.5 G/DL (ref 12–17)
HGB BLD-MCNC: 10.8 G/DL (ref 12–17)
MCH RBC QN AUTO: 21.1 PG (ref 26.8–34.3)
MCHC RBC AUTO-ENTMCNC: 26.2 G/DL (ref 31.4–37.4)
MCV RBC AUTO: 81 FL (ref 82–98)
O2 CT BLDV-SCNC: 14.7 ML/DL
PCO2 BLDV: 76.4 MM HG (ref 42–50)
PH BLDV: 7.42 [PH] (ref 7.3–7.4)
PLATELET # BLD AUTO: 161 THOUSANDS/UL (ref 149–390)
PMV BLD AUTO: 10.9 FL (ref 8.9–12.7)
PO2 BLDV: 122.5 MM HG (ref 35–45)
POTASSIUM SERPL-SCNC: 4.4 MMOL/L (ref 3.5–5.3)
RBC # BLD AUTO: 5.11 MILLION/UL (ref 3.88–5.62)
SODIUM SERPL-SCNC: 142 MMOL/L (ref 135–147)
WBC # BLD AUTO: 7.28 THOUSAND/UL (ref 4.31–10.16)

## 2023-01-16 PROCEDURE — 4A023N6 MEASUREMENT OF CARDIAC SAMPLING AND PRESSURE, RIGHT HEART, PERCUTANEOUS APPROACH: ICD-10-PCS | Performed by: INTERNAL MEDICINE

## 2023-01-16 RX ORDER — INSULIN LISPRO 100 [IU]/ML
1-5 INJECTION, SOLUTION INTRAVENOUS; SUBCUTANEOUS EVERY 6 HOURS SCHEDULED
Status: DISCONTINUED | OUTPATIENT
Start: 2023-01-16 | End: 2023-01-22 | Stop reason: HOSPADM

## 2023-01-16 RX ORDER — ACETAZOLAMIDE 500 MG/5ML
250 INJECTION, POWDER, LYOPHILIZED, FOR SOLUTION INTRAVENOUS ONCE
Status: COMPLETED | OUTPATIENT
Start: 2023-01-16 | End: 2023-01-16

## 2023-01-16 RX ORDER — WATER 1000 ML/1000ML
INJECTION, SOLUTION INTRAVENOUS
Status: COMPLETED
Start: 2023-01-16 | End: 2023-01-16

## 2023-01-16 RX ORDER — BUMETANIDE 0.25 MG/ML
1.5 INJECTION, SOLUTION INTRAMUSCULAR; INTRAVENOUS 2 TIMES DAILY
Status: DISCONTINUED | OUTPATIENT
Start: 2023-01-16 | End: 2023-01-19

## 2023-01-16 RX ADMIN — FAMOTIDINE 40 MG: 20 TABLET ORAL at 21:57

## 2023-01-16 RX ADMIN — NYSTATIN: 100000 POWDER TOPICAL at 09:10

## 2023-01-16 RX ADMIN — NYSTATIN: 100000 POWDER TOPICAL at 17:37

## 2023-01-16 RX ADMIN — APIXABAN 5 MG: 5 TABLET, FILM COATED ORAL at 09:12

## 2023-01-16 RX ADMIN — BUMETANIDE 2 MG: 0.25 INJECTION, SOLUTION INTRAMUSCULAR; INTRAVENOUS at 06:04

## 2023-01-16 RX ADMIN — INSULIN LISPRO 1 UNITS: 100 INJECTION, SOLUTION INTRAVENOUS; SUBCUTANEOUS at 12:45

## 2023-01-16 RX ADMIN — PANTOPRAZOLE SODIUM 40 MG: 40 TABLET, DELAYED RELEASE ORAL at 17:33

## 2023-01-16 RX ADMIN — WATER 10 ML: 1 INJECTION INTRAMUSCULAR; INTRAVENOUS; SUBCUTANEOUS at 10:13

## 2023-01-16 RX ADMIN — METOPROLOL SUCCINATE 25 MG: 25 TABLET, EXTENDED RELEASE ORAL at 09:12

## 2023-01-16 RX ADMIN — ACETAZOLAMIDE 250 MG: 500 INJECTION, POWDER, LYOPHILIZED, FOR SOLUTION INTRAVENOUS at 10:10

## 2023-01-16 RX ADMIN — BUMETANIDE 1.5 MG: 0.25 INJECTION, SOLUTION INTRAMUSCULAR; INTRAVENOUS at 17:40

## 2023-01-16 RX ADMIN — METOPROLOL SUCCINATE 25 MG: 25 TABLET, EXTENDED RELEASE ORAL at 17:33

## 2023-01-16 RX ADMIN — INSULIN LISPRO 2 UNITS: 100 INJECTION, SOLUTION INTRAVENOUS; SUBCUTANEOUS at 17:37

## 2023-01-16 RX ADMIN — PANTOPRAZOLE SODIUM 40 MG: 40 TABLET, DELAYED RELEASE ORAL at 06:04

## 2023-01-16 RX ADMIN — SPIRONOLACTONE 25 MG: 25 TABLET ORAL at 09:12

## 2023-01-16 RX ADMIN — DIGOXIN 125 MCG: 125 TABLET ORAL at 09:12

## 2023-01-16 RX ADMIN — LIDOCAINE 5% 2 PATCH: 700 PATCH TOPICAL at 09:00

## 2023-01-16 RX ADMIN — ATORVASTATIN CALCIUM 40 MG: 40 TABLET, FILM COATED ORAL at 09:12

## 2023-01-16 NOTE — CONSULTS
Consult - Urology   Jennifer Cardona 1945, 77 y o  male MRN: [id number]    Unit/Bed#: S -01 Encounter: [id number]        Assessment & Plan:  1  Hematuria  - Pt with history of nonmuscle invasive bladder cancer, treated with BCG 8 years ago  - Most recent cystoscopy 12/15/21 negative for recurrent urothelial neoplasm   - Hold Eliquis  - Pt with history of BPH with intermittent gross hematuria  - Cr 1 07, stable  - Hgb 10 5, stable  Yesterday 10 8  - This morning nursing notes hematuria, fruit punch colored, without clots  - Evaluated patient, carlin tubing showing pink colored urine  Irrigated catheter with return of clear yellow urine    - Recommend holding anticoagulants for 24-48 hours due to patient risk of rebleeding    - No acute  surgical intervention required  - Urology will continue to follow  Subjective:   Pt is a 76 yo with PMH nonmuscle invasive bladder cancer, presented to ED 1/10 due to worsening bilateral lower extremity edema  Pt with acute respiratory failure with hypoxia, requiring BiPAP  Pt developed LACY on 1/13 with creatinine 1/5, baseline 1 0-1 1, now resolved  Carlin catheter was inserted 1/13 due to critical illness and for I/O monitoring  Pt denies any abdominal/flank pain, Fevers, N/V/D  Review of Systems   Constitutional: Negative for chills, fatigue and fever  Gastrointestinal: Negative for abdominal distention, abdominal pain, nausea and vomiting  Genitourinary: Positive for hematuria  Negative for dysuria and flank pain  Musculoskeletal: Negative for arthralgias and back pain  Objective:  Vitals: Blood pressure 127/66, pulse 103, temperature 98 2 °F (36 8 °C), resp  rate 18, height 6' 4" (1 93 m), weight 128 kg (282 lb 3 oz), SpO2 97 %  ,Body mass index is 34 35 kg/m²  Physical Exam  Constitutional:       General: He is not in acute distress  Appearance: Normal appearance  He is normal weight  He is ill-appearing  He is not toxic-appearing     HENT: Head: Normocephalic and atraumatic  Right Ear: External ear normal       Left Ear: External ear normal       Nose: Nose normal       Mouth/Throat:      Mouth: Mucous membranes are moist    Eyes:      General: No scleral icterus  Conjunctiva/sclera: Conjunctivae normal    Cardiovascular:      Rate and Rhythm: Normal rate  Pulses: Normal pulses  Pulmonary:      Effort: Pulmonary effort is normal    Abdominal:      General: Abdomen is flat  There is no distension  Palpations: Abdomen is soft  Tenderness: There is no abdominal tenderness  There is no right CVA tenderness, left CVA tenderness or guarding  Genitourinary:     Comments: Gómez bag showing pink urine, with mild sediment in tubing  Gómez irrigated with return of clear yellow urine  Musculoskeletal:         General: Normal range of motion  Cervical back: Normal range of motion  Skin:     General: Skin is warm  Findings: No rash  Neurological:      General: No focal deficit present  Mental Status: He is alert and oriented to person, place, and time  Mental status is at baseline  Psychiatric:         Mood and Affect: Mood normal          Behavior: Behavior normal          Thought Content:  Thought content normal          Judgment: Judgment normal          Labs:  Recent Labs     01/14/23  0123 01/14/23  0508 01/14/23  0850 01/15/23  0825 01/16/23  0438   WBC 10 94* 10 17* 9 51 8 67 7 28     Recent Labs     01/14/23  0123 01/14/23  0508 01/14/23  0850 01/14/23  0902 01/15/23  0825 01/16/23  0438 01/16/23  1553   HGB 9 7* 9 0* 9 1* 10 9* 10 8* 10 8* 10 5*     Recent Labs     01/14/23  0036 01/14/23  0508 01/14/23  1526 01/15/23  0825 01/16/23  0438   CREATININE 1 24 1 15 1 20 1 24 1 07         History:  Social History     Socioeconomic History   • Marital status: /Civil Union     Spouse name: Kimberly Andrews   • Number of children: 4   • Years of education: None   • Highest education level: 12th grade   Occupational History   • None   Tobacco Use   • Smoking status: Former     Packs/day: 0 25     Years: 20 00     Pack years: 5 00     Types: Cigarettes     Start date: 1965     Quit date: 2010     Years since quittin 0   • Smokeless tobacco: Never   • Tobacco comments:     Quit several times for up to 6 years  Vaping Use   • Vaping Use: Never used   Substance and Sexual Activity   • Alcohol use: Not Currently     Comment: No use   • Drug use: Never     Comment: No use   • Sexual activity: Not Currently     Partners: Female   Other Topics Concern   • None   Social History Narrative    · Most recent tobacco use screenin2020      · Do you currently or have you served in the [employer]:   No      · Were you activated, into active duty, as a member of the [employer] or as a Reservist:   No      · Live alone or with others:   with others        · Caffeine intake:   Occasional      · Illicit drugs:   No      · Diet:   Regular      · Exercise level: Moderate      · Advance directive: Yes      · Marital status:         · General stress level:   Medium      · Single or multi-level home/work:   multi level home      · Guns present in home:   No      · Seat belts used routinely:   Yes      · Sunscreen used routinely:   Yes      · Smoke alarm in home:   Yes      Social Determinants of Health     Financial Resource Strain: Not on file   Food Insecurity: No Food Insecurity   • Worried About Running Out of Food in the Last Year: Never true   • Ran Out of Food in the Last Year: Never true   Transportation Needs: No Transportation Needs   • Lack of Transportation (Medical): No   • Lack of Transportation (Non-Medical):  No   Physical Activity: Not on file   Stress: Not on file   Social Connections: Not on file   Intimate Partner Violence: Not on file   Housing Stability: Unknown   • Unable to Pay for Housing in the Last Year: No   • Number of Places Lived in the Last Year: Not on file   • Unstable Housing in the Last Year: No     Past Medical History:   Diagnosis Date   • A-fib [facility])    • AAA (abdominal aortic aneurysm)    • Actinic keratosis of right temple 7/31/2020   • Actinic keratosis of scalp 7/31/2020   • Acute respiratory failure with hypoxia ([address] ) 3/25/2021   • Allergic 1960   • Arthritis    • Lay's esophagus    • Bladder cancer (HCC)    • Blister of left leg 4/28/2021   • Blister of right leg 5/26/2021   • CAD (coronary artery disease)    • Cancer ([facility] ) 02/2010    Bladder   • CHF (congestive heart failure) (HCC)    • Chronic low back pain    • Chronic pain of both knees 7/31/2020   • Colitis 12/4/2020   • CPAP (continuous positive airway pressure) dependence    • Diabetes ([facility] )    • Diabetes mellitus ([facility] ) 10/1993   • Excessive gas 12/3/2020   • Heart murmur    • History of chemotherapy    • Hydroureter on left 4/28/2021   • Hyperlipemia    • Hypertension    • Immunization deficiency 10/30/2020    Hep a nonimmune   • Kidney stone    • Left lower quadrant abdominal pain 12/3/2020   • Mass of right adrenal gland ([facility] ) 12/4/2020    4 1 cm   • Myocardial infarction ([facility] )    • 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   • Obesity 2000   • LUCIA (obstructive sleep apnea) 1/29/2021   • Pressure ulcer of right leg, stage 1 5/26/2021   • Urinary tract infection with hematuria 5/3/2021    u cx 4/2021=pseudomonas R to bactrim and cefdinir, S to cipro   • Venous stasis dermatitis of both lower extremities 5/26/2021     Past Surgical History:   Procedure Laterality Date   • BACK SURGERY     • BLADDER SURGERY     • CARDIAC CATHETERIZATION  04/2016   • CATARACT EXTRACTION, BILATERAL     • COLONOSCOPY  01/29/2019    next 2022 [place]   • CORONARY ARTERY BYPASS GRAFT  04/2016    Quadruple per Rita   • EGD  06/2017   • EYE SURGERY  11/2016    Cataracts   • JOINT REPLACEMENT  [id number]    Hip and shoulder   • KIDNEY STONE SURGERY     • MO CYSTO BLADDER W/URETERAL CATHETERIZATION Left 04/24/2021    Procedure: CYSTOSCOPY  WITH INSERTION STENT URETERAL;  Surgeon: Jenna Montiel MD;  Location: [facility];  Service: Urology   • AZ CYSTO/URETERO W/LITHOTRIPSY &INDWELL STENT INSRT Left 05/21/2021    Procedure: CYSTOSCOPY URETEROSCOPY WITH LITHOTRIPSY HOLMIUM LASER, AND INSERTION STENT URETERAL/EXCHANGE;  Surgeon: Jenna Montiel MD;  Location: [facility];  Service: Urology   • TOTAL HIP ARTHROPLASTY Right 2012   • TOTAL SHOULDER REPLACEMENT Right 2013   • VASECTOMY  1971     Family History   Problem Relation Age of Onset   • Heart disease Father    • Arthritis Father    • Heart attack Father    • Alzheimer's disease Mother    • Dementia Mother        Natasha Bailey [place]  Date: 1/16/2023 Time: 4:30 PM

## 2023-01-16 NOTE — PROGRESS NOTES
[facility]  Progress Note - Kodak Frazier 1945, 77 y o  male MRN: [id number]  Unit/Bed#: S -01 Encounter: [id number]  Primary Care Provider: Esther Hutchinson MD   Date and time admitted to hospital: 1/10/2023 12:21 PM    * Acute on chronic diastolic (congestive) heart failure [facility])  Assessment & Plan  Wt Readings from Last 3 Encounters:   01/16/23 128 kg (282 lb 3 oz)   12/15/22 (!) 139 kg (306 lb)   11/21/22 (!) 138 kg (304 lb)     · Patient presenting with worsening lower extremity edema, abdominal tightness, weight gain of 21 pounds in 2 to 3 weeks, PND  Patient denies any dietary or medication noncompliance  · Chronically on PO Lasix 80mg BID at home  · ECHO shows EF 55%, grade 2 DD, dilated RV with reduced systolic function and pulmonary hypertension   · Net negative > 20L   · Currently on SD 2 due to continuous bipap use   · Cardiology and Heart Failure following   · Current diuretic:  IV bumex 1 5mg BID   · Status post Acetazolamide 1/14 and 1/16  · Aldactone held   · Continue Toprol-XL, however has not been getting PO meds due to BiPAP  · Hold Lisinopril due to LACY   · Daily weights, I/O's  · Low-sodium diet, 1 5L fluid restriction  · Continue telemetry     Acute respiratory failure with hypoxia and hypercapnia (HCC)  Assessment & Plan  · Hypoxia on arrival, desaturations to 77% on room air  Denies home O2 use  · [place] improvement today, but was hypoxic in the setting of hypoglycemia, rectal bleeding, and on going volume overloaded status   · Patient continued to be hypoxic at 84% on 15 L midflow and required continuous BiPAP 1/13  · ABG revealed CO2 of 103 1/14 despite BiPAP all day on 1/13  · After being on BiPAP for most of the day on 1/14 he was able to weaned to 10 L later in the day to eat   He was back on BiPAP overnight   · Mor lethargic 1/16 requiring 15L NC sats still 88% placed back on bipap   · Continue BiPAP, IPAP 20, EPAP 8   · Continue at night (baseline) as well as if the patient naps during the day  · Wean O2 as tolerated to maintain SPO2 >90%   · Respiratory protocol  · Monitor mental status     LACY (acute kidney injury) ([facility] )  Assessment & Plan  · Developed LACY on 1/13 with creatinine of 1 5, baseline of 1 0-1 1    · Cr now at baseline   · On bumex 1 5 mg IV BID   · Hold Lisinopril and aldactone  · Monitor BMP  · I/O  · UOP - status post Carlin catheter 1/13 due to critical illness  · maintain carlin for I/O monitoring     BRBPR (bright red blood per rectum)  Assessment & Plan  · Noted on 1/12  Patient reports history of large hemorrhoid that has bled in the past   · Hemoglobin has been stable   · Monitor Hgb  · No intervention at this time   · Currently on eliquis   · No bleeding per nursing     Pulmonary hypertension ([facility] )  Assessment & Plan  · Noted on ECHO  Remote smoking history  Is on BiPAP at night here  Apparently on CPAP at home but question adequacy   · Heart Failure following  · Diuresis as above   · Patient family agreeable to [address]  · Discuss with heart failure, if inpatient, will need to transition back to heparin gtt  · Further Heart Failure follow up needed    Paroxysmal atrial fibrillation (HCC)  Assessment & Plan  · AC with eliquis transitioned form heparin gtt   · Rate control with metoprolol and digoxin     Type 2 diabetes mellitus with hyperglycemia, with long-term current use of insulin (HCC)  Assessment & Plan      Blood Sugar Average: Last 72 hrs:  (P) 101 05   · Home regimen: Tresiba 90 units daily  · Was hypoglycemic due to not liking food, and bipap use   · lantus held for now   · accuchecks q 6 hours given poor po intake   · Change to SSI only   · Adjust as needed and as patient improves with eating       CAD (coronary artery disease)  Assessment & Plan  · Chest pain free  · Restart Eliquis, metoprolol, statin when able to take PO   · RHC might be done inpatient as above, defer to Heart Failure      Hypertension  Assessment & Plan  · Stable   · Metoprolol unable to be given due to BiPAP   · ACE and aldactone on hold   · On IV bumex 1 5 mg bid       Venous stasis dermatitis of both lower extremities  Assessment & Plan  · Wound care    LUCIA (obstructive sleep apnea)  Assessment & Plan  · Patient was on CPAP  Recently transitioned to BiPAP by his pulmonologist but still has not received the machine  · Continue BiPAP overnight and with naps   · CM consult to assist with equipment when closer to discharge        VTE Pharmacologic Prophylaxis: VTE Score: 7 High Risk (Score >/= 5) - Pharmacological DVT Prophylaxis Ordered: apixaban (Eliquis)  Sequential Compression Devices Ordered  Patient Centered Rounds: I performed bedside rounds with nursing staff today  Discussions with Specialists or Other Care Team Provider: heart failure, CM, primary RN     Education and Discussions with Family / Patient: Updated  (wife) via phone  Time Spent for Care: 30 minutes  More than 50% of total time spent on counseling and coordination of care as described above  Current Length of Stay: 6 day(s)  Current Patient Status: Inpatient   Certification Statement: The patient will continue to require additional inpatient hospital stay due to pending improvment in hypoxia  Discharge Plan: Anticipate discharge in >72 hrs to likely will need rehab     Code Status: Level 3 - DNAR and DNI    Subjective:   Patient more lethargic this morning  Was placed on 14 L nasal cannula by respiratory therapy, however desaturating to 88%  Was replaced on BiPAP  Does awaken to sternal rub  Per nursing not taking p o  medications very well given BiPAP use, and lethargy      Objective:     Vitals:   Temp (24hrs), Av 4 °F (36 9 °C), Min:97 7 °F (36 5 °C), Max:99 6 °F (37 6 °C)    Temp:  [97 7 °F (36 5 °C)-99 6 °F (37 6 °C)] 97 7 °F (36 5 °C)  HR:  [] 97  Resp:  [15-26] 15  BP: (106-148)/(62-76) 148/66  SpO2:  [91 %-98 %] 96 %  Body mass index is 34 35 kg/m²  Input and Output Summary (last 24 hours): Intake/Output Summary (Last 24 hours) at 1/16/2023 0909  Last data filed at 1/16/2023 0729  Gross per 24 hour   Intake 590 ml   Output 3950 ml   Net -3360 ml       Physical Exam:   Physical Exam  Vitals and nursing note reviewed  Constitutional:       Appearance: He is obese  He is ill-appearing  Comments: On BiPAP  Arouses to sternal rub   HENT:      Head: Normocephalic and atraumatic  Cardiovascular:      Rate and Rhythm: Tachycardia present  Rhythm irregular  Pulmonary:      Effort: Pulmonary effort is normal       Comments: Decreased breath sounds secondary to body habitus  Auscultated anteriorly  Abdominal:      General: Abdomen is flat  Palpations: Abdomen is soft  Skin:     General: Skin is warm and dry  Neurological:      General: No focal deficit present  Comments: Difficult to assess mental status due to lethargy          Additional Data:     Labs:  Results from last 7 days   Lab Units 01/16/23  0438 01/13/23  0521 01/12/23  0637   WBC Thousand/uL 7 28   < > 9 71   HEMOGLOBIN g/dL 10 8*   < > 9 7*   I STAT HEMOGLOBIN   --    < >  --    HEMATOCRIT % 41 3   < > 38 1   HEMATOCRIT, ISTAT   --    < >  --    PLATELETS Thousands/uL 161   < > 185   NEUTROS PCT %  --   --  71   LYMPHS PCT %  --   --  14   MONOS PCT %  --   --  13*   EOS PCT %  --   --  1    < > = values in this interval not displayed       Results from last 7 days   Lab Units 01/16/23  0438 01/14/23  0508 01/14/23  0036 01/11/23  0856 01/10/23  1243   SODIUM mmol/L 142   < > 141   < > 141   POTASSIUM mmol/L 4 4   < > 5 8*   < > 4 7   CHLORIDE mmol/L 89*   < > 96   < > 99   CO2 mmol/L >45*   < > 40*   < > 37*   CO2, I-STAT   --    < >  --   --   --    BUN mg/dL 37*   < > 41*   < > 43*   CREATININE mg/dL 1 07   < > 1 24   < > 1 18   ANION GAP mmol/L  --   --  5   < > 5   CALCIUM mg/dL 10 4*   < > 9 5   < > 9 3   ALBUMIN g/dL  --   --   --   --  3 5   TOTAL BILIRUBIN mg/dL  --   --   --   --  0 37   ALK PHOS U/L  --   --   --   --  69   ALT U/L  --   --   --   --  10   AST U/L  --   --   --   --  16   GLUCOSE RANDOM mg/dL 151*   < > 87   < > 94    < > = values in this interval not displayed  Results from last 7 days   Lab Units 01/14/23  0850   INR  1 17     Results from last 7 days   Lab Units 01/16/23  0751 01/16/23  0553 01/16/23  0354 01/15/23  2144 01/15/23  1959 01/15/23  1804 01/15/23  1637 01/15/23  1210 01/15/23  1020 01/15/23  0816 01/15/23  0636 01/15/23  0411   POC GLUCOSE mg/dl 138 148* 160* 158* 144* 202* 188* 232* 221* 113 76 84               Lines/Drains:  Invasive Devices     Peripheral Intravenous Line  Duration           Peripheral IV 01/13/23 Left Antecubital 2 days    Peripheral IV 01/14/23 Dorsal (posterior); Left Forearm 2 days          Drain  Duration           Urethral Catheter Straight-tip 16 Fr  2 days              Urinary Catheter:  Goal for removal: Remove after 48 hrs of I/O monitoring           Telemetry:  Telemetry Orders (From admission, onward)             48 Hour Telemetry Monitoring  Continuous x 48 hours        References:    Telemetry Guidelines   Question:  Reason for 48 Hour Telemetry  Answer:  Acute Decompensated CHF (continuous diuretic infusion or total diuretic dose > 200 mg daily, associated electrolyte derangement, ionotropic drip, history of ventricular arrhythmia, or new EF <35%)                 Telemetry Reviewed: Atrial fibrillation  HR averaging 110  Indication for Continued Telemetry Use: Acute CHF on >200 mg lasix/day or equivalent dose or with new reduced EF  Imaging: No pertinent imaging reviewed      Recent Cultures (last 7 days):         Last 24 Hours Medication List:   Current Facility-Administered Medications   Medication Dose Route Frequency Provider Last Rate   • acetaminophen  650 mg Oral Q6H PRN Roxy Meade PA-C     • apixaban  5 mg Oral BID Jay Cardenas PA-C     • atorvastatin  40 mg Oral Daily Ervin Chaney MD     • bumetanide  2 mg Intravenous TID (diuretic) Flor Montes PA-C     • dextrose  25 mL Intravenous PRN Marty Lepe PA-C     • dicyclomine  10 mg Oral BID PRN Ervin Chaney MD     • digoxin  125 mcg Oral Daily Ervin Chaney MD     • famotidine  40 mg Oral HS Ervin Chaney MD     • insulin lispro  1-5 Units Subcutaneous Q6H [address] Carl Weaver PA-C     • lidocaine  2 patch Topical Daily Marty Lepe PA-C     • loperamide  2 mg Oral TID PRN Gonzalo Deleon PA-C     • metoprolol succinate  25 mg Oral BID Flor Montes PA-C     • nystatin   Topical BID KACEY Valle     • pantoprazole  40 mg Oral BID AC Ervin Chaney MD     • spironolactone  25 mg Oral Daily Ervin Chaney MD          Today, Patient Was Seen By: Juan Miguel Pina PA-C    **Please Note: This note may have been constructed using a voice recognition system  **

## 2023-01-16 NOTE — ASSESSMENT & PLAN NOTE
· Noted on ECHO  Remote smoking history  Is on BiPAP at night here   Apparently on CPAP at home but question adequacy   · Heart Failure following  · Diuresis as above   · RHC may need to be completed inpatient - discuss with Heart Failure on Monday   · Further Heart Failure follow up needed

## 2023-01-16 NOTE — ASSESSMENT & PLAN NOTE
Wt Readings from Last 3 Encounters:   01/16/23 128 kg (282 lb 3 oz)   12/15/22 (!) 139 kg (306 lb)   11/21/22 (!) 138 kg (304 lb)     · Patient presenting with worsening lower extremity edema, abdominal tightness, weight gain of 21 pounds in 2 to 3 weeks, PND  Patient denies any dietary or medication noncompliance  · Chronically on PO Lasix 80mg BID at home     · ECHO shows EF 55%, grade 2 DD, dilated RV with reduced systolic function and pulmonary hypertension   · Net negative > 20L   · Currently on SD 2 due to continuous bipap use   · Cardiology and Heart Failure following   · Current diuretic:  IV bumex 2mg TID   · Status post Acetazolamide 1/14  · Aldactone restarted   · Continue Toprol-XL, however has not been getting PO meds due to BiPAP  · Hold Lisinopril due to LACY   · Daily weights, I/O's  · Low-sodium diet, 1 5L fluid restriction  · Continue telemetry

## 2023-01-16 NOTE — ASSESSMENT & PLAN NOTE
· Developed LACY on 1/13 with creatinine of 1 5, baseline of 1 0-1 1    · Cr now at baseline   · On bumex 2 mg IV TID   · Hold Lisinopril   · Aldactone restarted   · Monitor BMP  · I/O  · UOP - status post Carlin catheter 1/13 due to critical illness  · maintain carlin for I/O monitoring

## 2023-01-16 NOTE — ASSESSMENT & PLAN NOTE
· Noted on 1/12   Patient reports history of large hemorrhoid that has bled in the past   · Hemoglobin has been stable   · Monitor Hgb  · No intervention at this time   · Currently on eliquis   · No bleeding per nursing

## 2023-01-16 NOTE — QUICK NOTE
Patient with hematuria noted in carlin bag today per nursing  On eval he does have punch colored urine in bag without evidence of clot  He is on eliquis for hx of a fib  Discussed with cardiology  Ok to hold [facility] in the setting of hematuria until urology can eval  Consult placed  Will check h and h now  Of note patient is going for possible RHC on Wednesday  If [facility] is ok to be restarted by urology plan to restart heparin drip in anticipation for RHC as patient needs to be off eliquis for 48 hours

## 2023-01-16 NOTE — PHYSICAL THERAPY NOTE
PHYSICAL THERAPY EVALUATION & TREATMENT  DATE: 01/16/23  TIME: 5413-2629    NAME:  David Holly  AGE:   77 y o   Mrn:   [id number]  Length Of Stay: 6    ADMIT DX:  Peripheral edema [R60 9]  Pulmonary edema [J81 1]  Dyspnea [R06 00]  Hypoxia [R09 02]  CHF exacerbation ([facility] ) [I50 9]    Past Medical History:   Diagnosis Date   • A-fib [facility])    • AAA (abdominal aortic aneurysm)    • Actinic keratosis of right temple 7/31/2020   • Actinic keratosis of scalp 7/31/2020   • Acute respiratory failure with hypoxia ([facility] ) 3/25/2021   • Allergic 1960   • Arthritis    • Lay's esophagus    • Bladder cancer ([facility] )    • Blister of left leg 4/28/2021   • Blister of right leg 5/26/2021   • CAD (coronary artery disease)    • Cancer ([facility] ) 02/2010    Bladder   • CHF (congestive heart failure) (HCC)    • Chronic low back pain    • Chronic pain of both knees 7/31/2020   • Colitis 12/4/2020   • CPAP (continuous positive airway pressure) dependence    • Diabetes ([facility] )    • Diabetes mellitus ([facility] ) 10/1993   • Excessive gas 12/3/2020   • Heart murmur    • History of chemotherapy    • Hydroureter on left 4/28/2021   • Hyperlipemia    • Hypertension    • Immunization deficiency 10/30/2020    Hep a nonimmune   • Kidney stone    • Left lower quadrant abdominal pain 12/3/2020   • Mass of right adrenal gland ([facility] ) 12/4/2020    4 1 cm   • Myocardial infarction ([facility] )    • 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   • Obesity 2000   • LUCIA (obstructive sleep apnea) 1/29/2021   • Pressure ulcer of right leg, stage 1 5/26/2021   • Urinary tract infection with hematuria 5/3/2021    u cx 4/2021=pseudomonas R to bactrim and cefdinir, S to cipro   • Venous stasis dermatitis of both lower extremities 5/26/2021     Past Surgical History:   Procedure Laterality Date   • BACK SURGERY     • BLADDER SURGERY     • CARDIAC CATHETERIZATION  04/2016   • CATARACT EXTRACTION, BILATERAL     • COLONOSCOPY  01/29/2019    next 2022 [place]   • CORONARY ARTERY BYPASS GRAFT  04/2016    Quadruple per Rita   • EGD  06/2017   • EYE SURGERY  11/2016    Cataracts   • JOINT REPLACEMENT  [id number]    Hip and shoulder   • KIDNEY STONE SURGERY     • LA CYSTO BLADDER W/URETERAL CATHETERIZATION Left 04/24/2021    Procedure: CYSTOSCOPY  WITH INSERTION STENT URETERAL;  Surgeon: Miranda Osborne MD;  Location: [facility];  Service: Urology   • LA CYSTO/URETERO W/LITHOTRIPSY &INDWELL STENT INSRT Left 05/21/2021    Procedure: CYSTOSCOPY URETEROSCOPY WITH LITHOTRIPSY HOLMIUM LASER, AND INSERTION STENT URETERAL/EXCHANGE;  Surgeon: Miranda Osborne MD;  Location: [facility];  Service: Urology   • TOTAL HIP ARTHROPLASTY Right 2012   • TOTAL SHOULDER REPLACEMENT Right 2013   • VASECTOMY  1971       Performed at least 2 patient identifiers during session: Name, Lacy Malling, and ID bracelet     01/16/23 7007   PT Last Visit   PT Visit Date 01/16/23   Note Type   Note type Evaluation  (& treatment)   Pain Assessment   Pain Assessment Tool 0-10   Pain Score No Pain   Effect of Pain on Daily Activities n/a   Hospital Pain Intervention(s) Repositioned; Ambulation/increased activity   Multiple Pain Sites No   Restrictions/Precautions   Weight Bearing Precautions Per Order No   Other Precautions Cardiac/sternal;Cognitive; Chair Alarm; Bed Alarm;Multiple lines;Telemetry;O2;Fall Risk;Hard of hearing  (pt planned for cardiac cath later this admission, cleared for limited participation in therapy prior)   Home Living   Type of [address] [place] [address] One level;Stairs to enter with rails  (2 CARRIE w/ HR then single level living)   [employer] Tub/shower unit   [employer] Raised   Bathroom Equipment Grab bars in shower; Shower chair   Bathroom Accessibility Accessible via walker   [address] [place] [address],[address]  (2 rollator walkers, RW, denies home O2 (however previous therapy notes indicated home O2))   Prior Function   Level of [place] Independent with ADLs; Independent with functional mobility; Needs assistance with IADLS   Lives With Spouse   Receives Help From Family   IADLs Family/Friend/Other provides medication management; Family/Friend/Other provides meals   Falls in the last 6 months 1 to 4  (+ h/o falls documented, RR called 1/14/23 while in hospital d/t unwitnessed fall)   Vocational Retired   Comments Pt is a questionable historian at times of IE  Pt lives at home with his wife, has a FFSU after 2 CARRIE  Pt ambulates with rollator walker for household and short community mobility  Pt reports being independent with self care  General   Additional Pertinent History Pt admitted 1/10/2023 with peripheral and pulmonary edema, hypoxia  Dx: acute on chronic diastolic heart failure  Of note, pt with RR called 1/14/23 due to unwitnessed fall  Of note, pt with hematuria noted in carlin catheter at time of IE - RN notified      Family/Caregiver Present No   Cognition   Overall Cognitive Status Impaired   Arousal/Participation Alert   Orientation Level Oriented to person;Disoriented to time;Disoriented to situation   Memory Decreased recall of recent events;Decreased recall of precautions   Following Commands Follows one step commands without difficulty   Subjective   Subjective "I'll have to think about it" - in regards to post acute rehab   RUE Assessment   RUE Assessment WFL   RUE Strength   RUE Overall Strength Within Functional Limits - able to perform ADL tasks with strength   LUE Assessment   LUE Assessment WFL   LUE Strength   LUE Overall Strength Within Functional Limits - able to perform ADL tasks with strength   RLE Assessment   RLE Assessment X   Strength RLE   R Hip Flexion 2+/5   R Knee Flexion 3+/5   R Knee Extension 3+/5   LLE Assessment   LLE Assessment X   Strength LLE   L Hip Flexion 2+/5   L Knee Flexion 3+/5   L Knee Extension 3+/5   Coordination   Movements are Fluid and Coordinated 1   Sensation WFL   Light Touch   RLE Light Touch Grossly intact   LLE Light Touch Grossly intact   Proprioception   RLE Proprioception Grossly intact   LLE Proprioception Grossly Intact   Bed Mobility   Supine to Sit 2  Maximal assistance   Additional items Assist x 1;HOB elevated; Bedrails; Increased time required;Verbal cues;LE management  (trunk management)   Sit to Supine Unable to assess   Additional Comments Pt was left seated OOB with all safety measures intact and 1:1 observation present  Transfers   Sit to Stand 3  Moderate assistance   Additional items Assist x 2; Increased time required;Verbal cues   Stand to Sit 3  Moderate assistance   Additional items Assist x 2;Armrests; Increased time required;Verbal cues   Additional Comments Pt needing max verbal and visual cues for hand and foot placement for safe transfers and body mechanics  Pt with fair application and carry over t/o session  Ambulation/Elevation   Gait pattern Wide JAMES; Forward Flexion;Decreased foot clearance; Short stride   Gait Assistance 3  Moderate assist   Additional items Assist x 2;Verbal cues; Tactile cues   Assistive Device Rolling walker   Distance 3ft from EOB to [facility], limited due to CHAVEZ, see below treatment section for additional trial   Stair Management Assistance Not tested  (pt has 2 CARRIE his home)   Balance   Static Sitting Fair +   Dynamic Sitting Fair -   Static Standing Fair -   Dynamic Standing Poor +   Ambulatory Poor +   Endurance Deficit   Endurance Deficit Yes   Activity Tolerance   Activity Tolerance Patient limited by fatigue   Medical Staff Made Aware This session, pt required and most appropriately benefited from partial or full skilled PT/OT co-eval & co-treat due to extensive physical assistance of SKILLED therapists, cognitive-communication impairments, significant regression from baseline level of mobility, continuous vitals monitoring, decreased activity tolerance and unpredictable medical and/or functional status  PT and OT goals were addressed separately as seen in documentation     Nurse Made Aware Spoke with DINAH Mooney Assessment   Prognosis Good   Problem List Decreased strength;Decreased endurance; Impaired balance;Decreased mobility; Decreased cognition;Decreased safety awareness; Impaired hearing;Obesity; Decreased skin integrity   Assessment Pt seen for PT evaluation for mobility assessment & discharge needs  Activity orders: activity as tolerated  Pt admitted 1/10/2023 w/ peripheral edema, pulmonary edema, hypoxia, dx Acute on chronic diastolic (congestive) heart failure ([facility] )  Comorbidities affecting pt's fnxl performance include: obesity, CAD, pulmonary HTN, arthritis, DM, Afib, falls, lymphedema  During PT IE, pt requires MAXA 1 for bed mobility, MODA 2 for STS transfer and ambulatory transition from EOB to [facility] w/ RW (~3ft)  Pt demonstrates fnxl impairments identified in objective findings from 30 Second STS assessment, scoring 0 stands, indicating high risk of falls and hospital readmission d/t falls  The AM-PAC & Barthel Index outcome tools were used to assist in determining pt safety w/ mobility/self care & appropriate d/c recommendations, see above for scores  Pt is at risk of falls d/t multiple comorbidities, impulsivity, h/o falls, impaired balance, impaired cognition, impaired insight/safety awareness, use of ambulatory aid, varying levels of pain , advanced age, acuity of medical illness, ongoing medical treatment of primary dx and abnormal lab values  Pt's clinical presentation is currently unstable/unpredictable as seen in pt's presentation of vital sign response, changing level of pain, varying levels of cognitive performance, increased fall risk, new onset of impairment of functional mobility, decreased endurance and new onset of weakness, and requires high complexity clinical decision making  Pt will benefit from continued PT services in order to address impairments, decrease risk of falls, maximize independence w/ fnxl mobility, & ensure safety w/ mobility for transition to next level of care   Based on pt presentation & impairments, pt would most appropriately benefit from post acute STR  Barriers to Discharge Decreased caregiver support; Inaccessible home environment   Goals   Patient Goals to walk better; to go home   STG Expiration Date 01/26/23   Short Term Goal #1 Patient PT goals established in order to address patient self reported goal of "to walk better; to go home"  Pt will: complete all bed mobility in hospital bed with S in order to promote increased OOB functional mobility to improve overall activity tolerance; complete all transfers with RW and S in order to increase safety with functional mobility; ambulate >80ft with RW and S in order to increase safety with household and in facility distance functional mobility; negotiate 3-5 stairs with HR assist and S in order to facilitate safe access to his home; improve B LE strength to >/= 4/5 MMT t/o in order to increase safety with functional mobility and decrease risk of falls; demonstrate understanding and independence with LE strengthening HEP; improve ambulatory balance to >/= good grade with LRAD in order to promote safety and increased independence with mobility; improve AM-PAC score to >/= 16/24 in order to increase independence with mobility and decrease burden of care; improve Barthel Index score to >/= 45/100 in order to increase independence and decrease risk of falls  PT Treatment Day 1   Plan   Treatment/Interventions Functional transfer training;LE strengthening/ROM; Elevations; Therapeutic exercise; Endurance training;Cognitive reorientation;Patient/family training;Equipment eval/education; Bed mobility;Gait training;Spoke to nursing   PT Frequency 3-5x/wk   Recommendation   PT Discharge Recommendation Post acute rehabilitation services   AM-PAC Basic Mobility Inpatient   Turning in Flat Bed Without Bedrails 2   Lying on Back to Sitting on Edge of Flat Bed Without Bedrails 2   Moving Bed to Chair 2   Standing Up From Chair Using Arms 2   Walk in Room 2   Climb 3-5 Stairs With Railing 1   Basic Mobility Inpatient Raw Score 11   Basic Mobility Standardized Score 30 25   Highest Level Of Mobility   JH-HLM Goal 4: Move to chair/commode   JH-HLM Achieved 4: Move to chair/commode   Modified [place] Scale   Modified Inés Scale 4   Barthel Index   Feeding 10   Bathing 0   Grooming Score 0   Dressing Score 5   Bladder Score 0   Bowels Score 10   Toilet Use Score 5   Transfers (Bed/Chair) Score 5   Mobility (Level Surface) Score 0   Stairs Score 0   Barthel Index Score 35   Additional Treatment Session   Start Time 1333   End Time 1347   Treatment Assessment Pt is agreeable to participate in additional functional mobility training post IE  Pt displays ability to progress from [facility] 2 person to [address] [place] [address] 2 person for transfers, increased cues for hand and foot placement for safe transfer technique and optimal mechanics  Once in standing, pt able to maintain supported standing at RW with VASU, and ambulate 5ft from [facility] to recliner chair  Ambulation distance limited on this date as pt pending plan for cardiac cath in 2 days (per EMR)  During functional mobility efforts, vitals monitored: SPO2 maintained 89%+ on 10L MFNC, and HR ranging 80-150bpm in Afib  At end of session pt was left seated OOB in recliner chair with alarm engaged and activated to call bell system, needs in reach, 1:1 observation present  Regarding functional mobility, pt remains significantly below his baseline level of functional mobility and remains unsafe for return to home at current status  Continue to recommend post acute STR once medically cleared for d/c from the acute care setting  Will continue skilled PT POC as able and appropriate to address functional impairments and progress towards therapy goals  Next session, plan for intiation of LE strengthening program, progress ambulation as able pending pt remains asymptomatic from a cardiac standpoint     Equipment Use Recommend RN staffing A x2 and pt use of RW for all short in room distance functional mobility  Additional Treatment Day 1   End of Consult   Patient Position at End of Consult Bedside chair;Bed/Chair alarm activated; All needs within reach  (1:1 observation present)   End of Consult Comments Based on patient's [facility] Highest Level of Mobility scores today, patient currently has a goal of -[facility] Levels: 4: MOVE TO CHAIR/COMMODE, to be completed with RN staffing each shift, in order to improve overall activity tolerance and mobility, combat hospital related deconditioning, and maximize outcomes for d/c from the acute care setting  The patient's AM-PAC Basic Mobility Inpatient Short Form Raw Score is 11  A Raw score of less than or equal to 16 suggests the patient may benefit from discharge to post-acute rehabilitation services  Please also refer to the recommendation of the Physical Therapist for safe discharge planning  27 Second Sit to Stand Norms:  Age: 71-74 y/o   Male: <11 Female: <10  Less than the number indicated for the appropriate age and gender suggests that patient is at an increased risk of falls  These norms are also taken w/ patients who do not use upper extremities to assist to get up from the chair              Jose Hanna PT, DPT   Available via [employer]  NPI # [id number]  [place] License - [id number]  7/20/3556

## 2023-01-16 NOTE — PLAN OF CARE
Problem: OCCUPATIONAL THERAPY ADULT  Goal: Performs self-care activities at highest level of function for planned discharge setting  See evaluation for individualized goals  Description: Treatment Interventions: ADL retraining, Functional transfer training, UE strengthening/ROM, Endurance training, Patient/family training, Equipment evaluation/education, Compensatory technique education, Continued evaluation, Energy conservation, Activityengagement  Equipment Recommended: Bedside commode, Shower/Tub chair with back ($)       See flowsheet documentation for full assessment, interventions and recommendations  Note: Limitation: Decreased ADL status, Decreased UE strength, Decreased Safe judgement during ADL, Decreased cognition, Decreased endurance, Decreased self-care trans, Decreased high-level ADLs     Assessment: Pt is a 70yo male [facility] w/ worsening B LE edema w/ blisters  Diagnosed w/ acute on chronic diastolic CHF  Significant PMH impacting his occupational performance includes a-fib, bladder ca hx / tx, CAD, CHF, MI, chronic low back pain, R KAREL (2012), R total shoulder replacement (2013)  Rapid response called on 1/14/2023 due to unwitnessed fall  Pt reports living w/ wife in [address] [facility] w/ 2 CARRIE PTA  Pt reports use of rollator for functional mobility and completes ADL w/ out assistance  Upon eval, pt lethargic but responsive  Pt required max A x1 to complete bed mobility supine to sit, mod A x2 initial sit to stand, max A LBD, min A UBD, and mod A x2 short distance functional mobility using RW  -149 w/ short distance functional mobility  Pt denied SOB, dizzy   Pt presents w/ decreased activity tolerance, decreased endurance, decreased standing tolerance, decreased balance, limited insight into deficits, decreased sitting balance, generalized weakness / deconditioning impacting his I w/ dressing, bathing, oral hygiene, functional mobility, functional transfers, activity engagement, clothing mgmt  Pt completing ADL below baseline level of I and would benefit from OT while in acute care to address deficits  Based on Barthel Index and AMPAC 6 clicks recommend post acute rehab when medically stable for discharge from acute care to return to baseline level of I   Will continue to follow     OT Discharge Recommendation: Post acute rehabilitation services

## 2023-01-16 NOTE — ASSESSMENT & PLAN NOTE
Blood Sugar Average: Last 72 hrs:  (P) 101 05   · Home regimen: Tresiba 90 units daily  · Was hypoglycemic due to not liking food, and bipap use   · lantus held for now   · accuchecks q 6 hours given poor po intake   · Change to SSI only   · Adjust as needed and as patient improves with eating

## 2023-01-16 NOTE — PLAN OF CARE
Problem: PHYSICAL THERAPY ADULT  Goal: Performs mobility at highest level of function for planned discharge setting  See evaluation for individualized goals  Description: Treatment/Interventions: Functional transfer training, LE strengthening/ROM, Elevations, Therapeutic exercise, Endurance training, Cognitive reorientation, Patient/family training, Equipment eval/education, Bed mobility, Gait training, Spoke to nursing     See flowsheet documentation for full assessment, interventions and recommendations  Outcome: Progressing  Note: Prognosis: Good  Problem List: Decreased strength, Decreased endurance, Impaired balance, Decreased mobility, Decreased cognition, Decreased safety awareness, Impaired hearing, Obesity, Decreased skin integrity  Assessment: Pt seen for PT evaluation for mobility assessment & discharge needs  Activity orders: activity as tolerated  Pt admitted 1/10/2023 w/ peripheral edema, pulmonary edema, hypoxia, dx Acute on chronic diastolic (congestive) heart failure ([facility] )  Comorbidities affecting pt's fnxl performance include: obesity, CAD, pulmonary HTN, arthritis, DM, Afib, falls, lymphedema  During PT IE, pt requires MAXA 1 for bed mobility, MODA 2 for STS transfer and ambulatory transition from EOB to [facility] w/ RW (~3ft)  Pt demonstrates fnxl impairments identified in objective findings from 30 Second STS assessment, scoring 0 stands, indicating high risk of falls and hospital readmission d/t falls  The AM-PAC & Barthel Index outcome tools were used to assist in determining pt safety w/ mobility/self care & appropriate d/c recommendations, see above for scores  Pt is at risk of falls d/t multiple comorbidities, impulsivity, h/o falls, impaired balance, impaired cognition, impaired insight/safety awareness, use of ambulatory aid, varying levels of pain , advanced age, acuity of medical illness, ongoing medical treatment of primary dx and abnormal lab values   Pt's clinical presentation is currently unstable/unpredictable as seen in pt's presentation of vital sign response, changing level of pain, varying levels of cognitive performance, increased fall risk, new onset of impairment of functional mobility, decreased endurance and new onset of weakness, and requires high complexity clinical decision making  Pt will benefit from continued PT services in order to address impairments, decrease risk of falls, maximize independence w/ fnxl mobility, & ensure safety w/ mobility for transition to next level of care  Based on pt presentation & impairments, pt would most appropriately benefit from post acute STR  Barriers to Discharge: Decreased caregiver support, Inaccessible home environment     PT Discharge Recommendation: Post acute rehabilitation services    See flowsheet documentation for full assessment

## 2023-01-16 NOTE — PLAN OF CARE
Problem: Prexisting or High Potential for Compromised Skin Integrity  Goal: Skin integrity is maintained or improved  Description: INTERVENTIONS:  - Identify patients at risk for skin breakdown  - Assess and monitor skin integrity  - Assess and monitor nutrition and hydration status  - Monitor labs   - Assess for incontinence   - Turn and reposition patient  - Assist with mobility/ambulation  - Relieve pressure over bony prominences  - Avoid friction and shearing  - Provide appropriate hygiene as needed including keeping skin clean and dry  - Evaluate need for skin moisturizer/barrier cream  - Collaborate with interdisciplinary team   - Patient/family teaching  - Consider wound care consult   Outcome: Progressing     Problem: MOBILITY - ADULT  Goal: Maintain or return to baseline ADL function  Description: INTERVENTIONS:  -  Assess patient's ability to carry out ADLs; assess patient's baseline for ADL function and identify physical deficits which impact ability to perform ADLs (bathing, care of mouth/teeth, toileting, grooming, dressing, etc )  - Assess/evaluate cause of self-care deficits   - Assess range of motion  - Assess patient's mobility; develop plan if impaired  - Assess patient's need for assistive devices and provide as appropriate  - Encourage maximum independence but intervene and supervise when necessary  - Involve family in performance of ADLs  - Assess for home care needs following discharge   - Consider OT consult to assist with ADL evaluation and planning for discharge  - Provide patient education as appropriate  Outcome: Not Progressing     Problem: RESPIRATORY - ADULT  Goal: Achieves optimal ventilation and oxygenation  Description: INTERVENTIONS:  - Assess for changes in respiratory status  - Assess for changes in mentation and behavior  - Position to facilitate oxygenation and minimize respiratory effort  - Oxygen administered by appropriate delivery if ordered  - Initiate smoking cessation education as indicated  - Encourage broncho-pulmonary hygiene including cough, deep breathe, Incentive Spirometry  - Assess the need for suctioning and aspirate as needed  - Assess and instruct to report SOB or any respiratory difficulty  - Respiratory Therapy support as indicated  Outcome: Not Progressing     Problem: CARDIOVASCULAR - ADULT  Goal: Maintains optimal cardiac output and hemodynamic stability  Description: INTERVENTIONS:  - Monitor I/O, vital signs and rhythm  - Monitor for S/S and trends of decreased cardiac output  - Administer and titrate ordered vasoactive medications to optimize hemodynamic stability  - Assess quality of pulses, skin color and temperature  - Assess for signs of decreased coronary artery perfusion  - Instruct patient to report change in severity of symptoms  Outcome: Not Progressing     Problem: Knowledge Deficit  Goal: Patient/family/caregiver demonstrates understanding of disease process, treatment plan, medications, and discharge instructions  Description: Complete learning assessment and assess knowledge base    Interventions:  - Provide teaching at level of understanding  - Provide teaching via preferred learning methods  Outcome: Progressing

## 2023-01-16 NOTE — PROGRESS NOTES
Advanced Heart Failure/Pulmonary Hypertension Service Progress Note - Jennifer Cardona 77 y o  male MRN: [id number]    Unit/Bed#: S -01 Encounter: [id number]      Assessment:  77 y o  male Hx per chart p/w ADHF, PH, respiratory failure  Also some rectal bleeding early in course  # Acute on chronic HFpEF  # Pulmonary hypertension with RV dysfunction  PASP about 80 by echo (while decompensated)  Likely group II disease diastolic heart failure and group III disease, cannot rule out groups 4 and 1 now, or phenotypic group 1 due to chronic dz  Has LUCIA, on cpap since 2014 but outpt notes indicate possible worse AHI  and machine function/fitting issues that seem unresolved  Good compliance >5 hr per night  No h/o PE/DVT  On AC for AF  Remote smoking, quit 2010  CT chest 4/2021: Pleural thickening with pleural calcifications in the dependent portion of the right mid and lower lung field, 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  Suspected round atelectasis in the posterior right lower lobe  No h/o illicit drug use  # CAD s/p CABG in 2016 (LIMA to LAD, SVG to OM3 and SVG to PDA) in 2016  # LUCIA  # Paroxysmal atrial fibrillation, s/p cardioversion 1/7/22  also AFL on old ECGs  AC: Eliquis  Rate: metoprolol and digoxin  # Hyperlipidemia  # DM type II  Hemoglobin A1c 7 8 11/2/2022  # Hypertension  # LACY  # Hemorrhoids with rectal bleeding  RBBB  Fall, as inpt this admission 1/2023  CTH neg        Reviewed all pertinent labs/imaging/data including:  Cr: mild LACY peaked 1 5>>1 07 (BL around 1 0)  Bicarb up; note that BL somewhat elevated 2/2 chronic retention    Weight (last 2 days)     Date/Time Weight    01/16/23 0600 128 (282 19)    01/15/23 0815 128 (282 19)    01/15/23 0600 159 (349 43)    01/14/23 0600 146 (321 43)     Weight: pt unbale to be standing safetly at 01/14/23 0600           Intake/Output Summary (Last 24 hours) at 1/16/2023 0847  Last data filed at 1/16/2023 0729  Gross per 24 hour   Intake 590 ml Output 3950 ml   Net -3360 ml     Tele: not on tele    Drips:        Plan:  AOx1, lethargic on exam, appears somewhat appropriate in conversation, on bipap, breathing appears comfortable, O2 sat wnl on support  Exam limited but no florid vol overload at this point; labs, UO net neg 20L since admit, wt trend all suggest improvement (unreliable wt trend but historical best around 300lbs and wt now recorded as 280s  Good response to bumex gtt and diamox over weekend  Severe PH with TRmaxvel 4 0 range by echo, +significant RV dysfunction, small LV, BP normal to high now (few lower readings earlier in course)    Deescalate Diuresis bumex 2 IV tid to bumex 1 5 bid IV with diamox 250 x1 today  Strict IOs and daily weights  Keep K>4, Mg>2  Check bid lytes while aggressively diuresing  Goal net neg 1-2L in 24 hr  Home diuretic: lasix 80 bid    Check VBG  likely some component to his hypercarbia    Pt lethargic now but seems to grasp some of our convo; I introduced idea of RHC today though not entirely clear he wants this or is within his GOC  Please verify with him and his family/sons/medical decision makers if invasive procedures are within his GOC now; he seems to indicate yes but needs clarification  If so then would plan for inpt RHC; need to hold eliquis x 48 hrs and bridge w IV AC  RHC would occur later this week; I will verify if can be done here at [facility] lab    Would place on tele now    [facility] to hold home lisinopril 20 qd and aldactone 25 qd for the moment; if BP trend continues SBP>130 and stable, with good renal fxn, can start lisinopril 5 qd    Switch rate control agents toprol xl and dig to IV if not getting po on bipap    Check new dig level and ECG    VQ scan later this week vs outpt    PFTs outpt, I cannot see prior results in epic    Could consider entresto and/or sglti as outpt      Studies:    EKG - my read:  2022: AF, RAD, RBBB (old), non specific STT changes    Echocardiogram 1/11/23  LVEF: 55%  LVIDd: 4 8cm  RV: not well visualized; dilated RV>LV; reduced systolic function  MR: moderate MAC, trace MR  PASP: 83mmHg; estimated RAP 15, peak TR velocity 4 1  RVOT: suboptimal envelope, mid to late systolic notching; interventricular diastolic and systolic septal flattening  Other: grade 2 diastology, mild JONNY; mildly increased LV wall thickness     Echo 12/28/21:  LVEF 55%, mildly increased wall thickness  RV mildly dilated, normal systolic function  PASP [id number]     Echo 3/23/21:  LVEF 60%, mildly increased wall thickness  Grade 1 diastology  RV mildly dilated, mildly reduced systolic function  PASP [id number]  Subjective and Interval Events:   Patient seen and examined  No new complaints  ROS:  10 point ROS negative except as specified above      Tele: reviewed    Objective:     Physical Exam:  /66 (BP Location: Right arm)   Pulse 97   Temp 97 7 °F (36 5 °C) (Axillary)   Resp 15   Ht 6' 4" (1 93 m)   Wt 128 kg (282 lb 3 oz)   SpO2 96%   BMI 34 35 kg/m²   Ranges:  Temp:  [97 7 °F (36 5 °C)-99 6 °F (37 6 °C)] 97 7 °F (36 5 °C)  HR:  [] 97  Resp:  [15-26] 15  BP: (106-148)/(62-76) 148/66  FiO2 (%):  [35] 35    Weight (last 2 days)     Date/Time Weight    01/16/23 0600 128 (282 19)    01/15/23 0815 128 (282 19)    01/15/23 0600 159 (349 43)    01/14/23 0600 146 (321 43)     Weight: pt unbale to be standing safetly at 01/14/23 0600           Intake/Output Summary (Last 24 hours) at 1/16/2023 0847  Last data filed at 1/16/2023 8394  Gross per 24 hour   Intake 590 ml   Output 3950 ml   Net -3360 ml     Constitutional: lethargic, on bipap  Ears/nose/mouth/throat: atraumatic  CV: irreg, no overt JVD appreciable, on bipap  Resp: Decreased breath sounds BL  GI: Soft, NTND  MSK: no swollen joints in exposed areas  Extr: trace edema, warm LE  Pysche: tired affect  Neuro: intermittently appropriate in conversation  Skin: dry and intact in exposed areas    Labs/Imaging/Data:   Results from last 7 days   Lab Units 01/16/23  0438 01/15/23  0825 01/14/23  0902 01/14/23  0850 01/14/23  6727 01/14/23  0123 01/13/23  0521 01/12/23  0637 01/11/23  0856 01/10/23  1243   WBC Thousand/uL 7 28 8 67  --  9 51 10 17* 10 94* 10 22* 9 71 8 65 10 00   HEMOGLOBIN g/dL 10 8* 10 8*  --  9 1* 9 0* 9 7* 10 6* 9 7* 10 0* 10 5*   I STAT HEMOGLOBIN g/dl  --   --  10 9*  --   --   --   --   --   --   --    HEMATOCRIT % 41 3 41 0  --  35 6* 35 2* 38 0 42 5 38 1 38 4 39 4   HEMATOCRIT, ISTAT %  --   --  32*  --   --   --   --   --   --   --    PLATELETS Thousands/uL 161 158  --  141* 147* 146* 168 185 166 203   NEUTROS PCT %  --   --   --   --   --   --   --  71 75 77*   MONOS PCT %  --   --   --   --   --   --   --  13* 11 8      Results from last 7 days   Lab Units 01/16/23  0438 01/15/23  0825 01/14/23  1526 01/14/23  0902 01/14/23  0508 01/14/23  0036 01/13/23  0521 01/12/23  0637 01/11/23  0856 01/10/23  1243   SODIUM mmol/L 142 142 146  --  145 141 141 143 141 141   POTASSIUM mmol/L 4 4 5 1 4 7  --  5 0 5 8* 5 7* 5 4* 5 1 4 7   CHLORIDE mmol/L 89* 87* 93*  --  94* 96 96 98 100 99   CO2 mmol/L >45* >45* >45*  --  >45* 40* 43* 39* 38* 37*   CO2, I-STAT mmol/L  --   --   --  >45*  --   --   --   --   --   --    ANION GAP mmol/L  --   --   --   --   --  5 2* 6 3* 5   BUN mg/dL 37* 37* 39*  --  39* 41* 37* 34* 35* 43*   CREATININE mg/dL 1 07 1 24 1 20  --  1 15 1 24 1 51* 1 24 1 13 1 18   CALCIUM mg/dL 10 4* 10 6* 10 0  --  9 5 9 5 9 7 9 2 9 1 9 3   GLUCOSE RANDOM mg/dL 151* 116 39*  --  96 87 87 59* 128 94   ALT U/L  --   --   --   --   --   --   --   --   --  10   AST U/L  --   --   --   --   --   --   --   --   --  16   ALK PHOS U/L  --   --   --   --   --   --   --   --   --  69   ALBUMIN g/dL  --   --   --   --   --   --   --   --   --  3 5   TOTAL BILIRUBIN mg/dL  --   --   --   --   --   --   --   --   --  0 37      Results from last 7 days   Lab Units 01/14/23  0036 01/11/23  0856   MAGNESIUM mg/dL 2 1 2 2   PHOSPHORUS mg/dL 3 0  --        Results from last 7 days   Lab Units 01/15/23  0636 01/14/23  2316 01/14/23  1526 01/14/23  0850   INR   --   --   --  1 17   PTT seconds 58* 66* 51* 31                 ABG:  Results from last 7 days   Lab Units 01/14/23  1208   PH ART  7 468*   PCO2 ART mm Hg 66 9*   PO2 ART mm Hg 66 7*   HCO3 ART mmol/L 47 4*   BASE EXC ART mmol/L 20 7   ABG SOURCE  Radial, Right      VBG:  Results from last 7 days   Lab Units 01/14/23  1208   ABG SOURCE  Radial, Right     No results found for: LDH       Current Facility-Administered Medications   Medication Dose Route Frequency Provider Last Rate   • acetaminophen  650 mg Oral Q6H PRN Kadi Kelly PA-C     • apixaban  5 mg Oral BID Julia Olmedo PA-C     • atorvastatin  40 mg Oral Daily Sammie Fonseca MD     • bumetanide  2 mg Intravenous TID (diuretic) Julia Olmedo PA-C     • dextrose  25 mL Intravenous PRN Marty López PA-C     • dicyclomine  10 mg Oral BID PRN Sammie Fonseca MD     • digoxin  125 mcg Oral Daily Sammie Fonseca MD     • famotidine  40 mg Oral HS Sammie Fonseca MD     • insulin lispro  1-5 Units Subcutaneous Q6H [address] Carl Weaver PA-C     • lidocaine  2 patch Topical Daily Marty López PA-C     • loperamide  2 mg Oral TID PRN Molly Gibson PA-C     • metoprolol succinate  25 mg Oral BID Julia Olmedo PA-C     • nystatin   Topical BID KACEY Foster     • pantoprazole  40 mg Oral BID AC Sammie Fonseca MD     • spironolactone  25 mg Oral Daily Sammie Fonseca MD         Invasive Devices     Peripheral Intravenous Line  Duration           Peripheral IV 01/13/23 Left Antecubital 2 days    Peripheral IV 01/14/23 Dorsal (posterior); Left Forearm 1 day          Drain  Duration           Urethral Catheter Straight-tip 16 Fr  2 days                Counseling / Coordination of Care: Total floor / unit time spent today 30 minutes    Greater than 50% of total time was spent with the patient and / or family counseling and / or coordination of care  A description of the counseling / coordination of care: we discussed diagnoses, recent as well as older studies, labs, and all changes in cardiac treatment plan  All patient questions were answered  Thank you for the opportunity to participate in the care of this patient      Jana Velez MD  Attending Physician  Advanced Heart Failure and Transplant Cardiology  [place] [id number]

## 2023-01-16 NOTE — ASSESSMENT & PLAN NOTE
· Stable   · Metoprolol unable to be given due to BiPAP   · Restart ACE tomorrow if cr stable   · Aldactone restarted   · On IV bumex 2 mg TID

## 2023-01-16 NOTE — OCCUPATIONAL THERAPY NOTE
Occupational Therapy Evaluation     Patient Name: David Holly  Today's Date: 1/16/2023  Problem List  Principal Problem:    Acute on chronic diastolic (congestive) heart failure (HCC)  Active Problems:    Hypertension    Type 2 diabetes mellitus with hyperglycemia, with long-term current use of insulin (HCC)    CAD (coronary artery disease)    Paroxysmal atrial fibrillation (HCC)    LUCIA (obstructive sleep apnea)    Acute respiratory failure with hypoxia and hypercapnia (HCC)    LACY (acute kidney injury) ([address] )    Venous stasis dermatitis of both lower extremities    Pulmonary hypertension (HCC)    BRBPR (bright red blood per rectum)    Past Medical History  Past Medical History:   Diagnosis Date    A-fib ([address] )     AAA (abdominal aortic aneurysm)     Actinic keratosis of right temple 7/31/2020    Actinic keratosis of scalp 7/31/2020    Acute respiratory failure with hypoxia ([address] ) 3/25/2021    Allergic 1960    Arthritis     Lay's esophagus     Bladder cancer ([facility] )     Blister of left leg 4/28/2021    Blister of right leg 5/26/2021    CAD (coronary artery disease)     Cancer ([address] ) 02/2010    Bladder    CHF (congestive heart failure) ([facility])     Chronic low back pain     Chronic pain of both knees 7/31/2020    Colitis 12/4/2020    CPAP (continuous positive airway pressure) dependence     Diabetes ([address] )     Diabetes mellitus ([address] ) 10/1993    Excessive gas 12/3/2020    Heart murmur     History of chemotherapy     Hydroureter on left 4/28/2021    Hyperlipemia     Hypertension     Immunization deficiency 10/30/2020    Hep a nonimmune    Kidney stone     Left lower quadrant abdominal pain 12/3/2020    Mass of right adrenal gland ([address] ) 12/4/2020    4 1 cm    Myocardial infarction ([address] )     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    Obesity 2000    LUCIA (obstructive sleep apnea) 1/29/2021    Pressure ulcer of right leg, stage 1 5/26/2021    Urinary tract infection with hematuria 5/3/2021    u cx 4/2021=pseudomonas R to bactrim and cefdinir, S to cipro    Venous stasis dermatitis of both lower extremities 5/26/2021     Past Surgical History  Past Surgical History:   Procedure Laterality Date    BACK SURGERY      BLADDER SURGERY      CARDIAC CATHETERIZATION  04/2016    CATARACT EXTRACTION, BILATERAL      COLONOSCOPY  01/29/2019    next [id number] GRAFT  04/2016    Quadruple per [place]    EGD  06/2017    EYE SURGERY  11/2016    Cataracts    JOINT REPLACEMENT  [id number]    Hip and shoulder    KIDNEY STONE SURGERY      WI CYSTO BLADDER W/URETERAL CATHETERIZATION Left 04/24/2021    Procedure: CYSTOSCOPY  WITH INSERTION STENT URETERAL;  Surgeon: Camilo Lennox, MD;  [facility];  Service: Urology    WI CYSTO/URETERO W/LITHOTRIPSY &INDWELL STENT INSRT Left 05/21/2021    Procedure: CYSTOSCOPY URETEROSCOPY WITH LITHOTRIPSY HOLMIUM LASER, AND INSERTION STENT URETERAL/EXCHANGE;  Surgeon: Camilo Lennox, MD;  [facility];  Service: Urology    TOTAL HIP ARTHROPLASTY Right 2012    TOTAL SHOULDER REPLACEMENT Right 2013    VASECTOMY  1971           01/16/23 1346   OT Last Visit   OT Visit Date 01/16/23  (Monday)   Note Type   Note type Evaluation   Pain Assessment   Pain Assessment Tool FLACC   Pain Score No Pain   Pain Location/Orientation Location: Back   Hospital Pain Intervention(s) Repositioned; Ambulation/increased activity; Emotional support   Pain Rating: FLACC (Rest) - Face 0   Pain Rating: FLACC (Rest) - Legs 0   Pain Rating: FLACC (Rest) - Activity 0   Pain Rating: FLACC (Rest) - Cry 0   Pain Rating: FLACC (Rest) - Consolability 0   Score: FLACC (Rest) 0   Pain Rating: FLACC (Activity) - Face 0   Pain Rating: FLACC (Activity) - Legs 0   Pain Rating: FLACC (Activity) - Activity 1   Pain Rating: FLACC (Activity) - Cry 1   Pain Rating: FLACC (Activity) - Consolability 1   Score: FLACC (Activity) 3   Restrictions/Precautions   Weight Bearing Precautions Per Order No   Other Precautions Cognitive; Chair Alarm; Bed Alarm;1:1;Multiple lines;Telemetry;O2;Fall Risk;Pain;Hard of hearing  (9L midlfow O2; 1:1 present)   Home Living   Type of [address] Two level   Bathroom Shower/Tub Tub/shower unit   Bathroom Toilet Raised   Bathroom Equipment Shower chair   Bathroom Accessibility Accessible via walker   [address] [facility]  (RW and 2 rollators; Pt denies Home O2  Per previous OT eval recent O2 start)   Additional Comments Pt reports living w/ wifeNicolas in [address] [facility]   Prior Function   Level of [place] Independent with ADLs   Lives With Spouse   Receives Help From Family   IADLs Family/Friend/Other provides meals   Falls in the last 6 months 1 to 4  (1 in acute care; RR 1/14/2023)   Vocational Retired   Comments Pt reports I w/ basic ADLs using rollator for functional mobility  Needs assistance w/ IADLs  Pt not accurate, consistently historian upon eval    Lifestyle   Autonomy Pt reports I w/ ADLs using AD for functional mobility   Reciprocal Relationships Pt reports living w/ wifeNicolas   Service to Others Pt reports retired and worked in [employer] Pt reports enjoying going on the [employer]   General   Additional Pertinent History per cardiology progress plan for cardia cath later this week   Family/Caregiver Present No   Additional General Comments upgraded level of care 1/13/2023 to level 2 step down   Subjective   Subjective "I need to use the bathroom  Not sure if just gas"   ADL   Where Assessed Edge of bed  (vs seated on commode)   Eating Assistance 6  Modified independent   Eating Deficit Setup; Increased time to complete   Grooming Assistance 4  Minimal Assistance   Grooming Deficit Setup;Supervision/safety; Increased time to complete;Verbal cueing  (seated due to decreased standing matt/ balance)   UB Bathing Assistance Unable to assess   LB Bathing Assistance Unable to assess   UB Dressing Assistance 4  Minimal Assistance   UB Dressing Deficit Setup;Verbal cueing;Supervision/safety   LB Dressing Assistance 2  Maximal Assistance   LB Dressing Deficit Setup;Steadying; Requires assistive device for steadying; Increased time to complete;Supervision/safety;Verbal cueing   Toileting Assistance  2  Maximal Assistance   Toileting Deficit Bedside commode;Perineal hygiene;Clothing management up;Clothing management down;Setup;Supervison/safety; Increased time to complete;Verbal cueing   Additional Comments on mid flow O2 via NC   Bed Mobility   Supine to Sit 2  Maximal assistance   Additional items Assist x 1; Increased time required;Verbal cues;HOB elevated; Bedrails;LE management  (to pt's R)   Sit to Supine Unable to assess   Additional Comments Pt seated on commode post eval w/ needs met, call bell in reach and PT, 1:1 PCA present   Transfers   Sit to Stand 3  Moderate assistance   Additional items Assist x 2; Increased time required;Verbal cues; Bedrails;Armrests  (from elevated bed height)   Stand to Sit 3  Moderate assistance   Additional items Assist x 2; Increased time required;Verbal cues   Stand pivot 3  Moderate assistance   Additional items Assist x 2; Increased time required;Verbal cues  (using RW)   Toilet transfer 3  Moderate assistance   Additional items Assist x 2; Increased time required;Commode  (using RW to pt's L)   Additional Comments Pt required VC's for hand placement, walker mgmt during sit <> stand  Functional Mobility   Functional Mobility 3  Moderate assistance  (mod A x2 using RW few feet from EOB to commode)   Additional Comments using RW w/ cues for hand placement short distances w/ in room  -149 functional transfer   Additional items Rolling walker   Balance   Static Sitting Fair +   Static Standing Poor +   Activity Tolerance   Activity Tolerance Patient limited by fatigue;Treatment limited secondary to medical complications (Comment); Other (Comment)  (increased HR w/ minimal exertion)   Medical Staff Made Aware care coordination w/ PT, Vishnu díaz to regression from baseline, skilled physical assistance required, medical complexity and decreased activity tolerance  Spoke to CM   Nurse Made Aware spoke w/ Laura NIX pre / post eval   RUE Assessment   RUE Assessment   (observe during ADLs; s/p R total shoulder replacement)   RUE Strength   RUE Overall Strength Within Functional Limits - able to perform ADL tasks with strength  (able to participate in ADLs, limited end / terminal AROM shoulder)   LUE Assessment   LUE Assessment   (observed during ADLs)   LUE Strength   LUE Overall Strength Within Functional Limits - able to perform ADL tasks with strength   Hand Function   Gross Motor Coordination Functional   Fine Motor Coordination Functional   Sensation   Light Touch Not tested   Psychosocial   Patient Behaviors/Mood Cooperative   Cognition   Overall Cognitive Status Impaired   Arousal/Participation Responsive; Cooperative   Attention Attends with cues to redirect   Orientation Level Oriented to person;Oriented to place  (limited recall details, timeline recent events (does not recall fall in acute care))   Memory Decreased recall of recent events;Decreased short term memory;Decreased recall of precautions   Following Commands Follows one step commands with increased time or repetition   Comments Identified pt by full name and birtdhate  Sleeping upon arrival and required increased engagement and consistent stimuli intially to maintain arousal  Improved w/ increased activity and bed mobility to sit at EOB  Recommend ongoing eval of functional cognition to assist in DC Planning   Assessment   Limitation Decreased ADL status; Decreased UE strength;Decreased Safe judgement during ADL;Decreased cognition;Decreased endurance;Decreased self-care trans;Decreased high-level ADLs   Assessment Pt is a 68yo male [facility] On 1/10[facility]2023 w/ worsening B LE edema w/ blisters  Diagnosed w/ acute on chronic diastolic CHF   Significant PMH impacting his occupational performance includes a-fib, bladder ca hx / tx, CAD, CHF, MI, chronic low back pain, R KAREL (2012), R total shoulder replacement (2013)  Rapid response called on 1/14/2023 due to unwitnessed fall  Pt reports living w/ wife in [address] [facility] w/ 2 [place] PTA  Pt reports use of rollator for functional mobility and completes ADL w/ out assistance  Upon eval, pt lethargic but responsive  Pt required max A x1 to complete bed mobility supine to sit, mod A x2 initial sit to stand, max A LBD, min A UBD, and mod A x2 short distance functional mobility using RW  -149 w/ short distance functional mobility  Pt denied SOB, dizzy  Pt presents w/ decreased activity tolerance, decreased endurance, decreased standing tolerance, decreased balance, limited insight into deficits, decreased sitting balance, generalized weakness / deconditioning impacting his I w/ dressing, bathing, oral hygiene, functional mobility, functional transfers, activity engagement, clothing mgmt  Pt completing ADL below baseline level of I and would benefit from OT while in acute care to address deficits  Based on Barthel Index and AMPAC 6 clicks recommend post acute rehab when medically stable for discharge from acute care to return to baseline level of I  Will continue to follow   Goals   Patient Goals Pt stated that he would like to return home   Plan   Treatment Interventions ADL retraining;Functional transfer training;UE strengthening/ROM; Endurance training;Patient/family training;Equipment evaluation/education; Compensatory technique education;Continued evaluation; Energy conservation; Activityengagement   Goal Expiration Date 01/28/23   OT Frequency 3-5x/wk   Recommendation   OT Discharge Recommendation Post acute rehabilitation services   Equipment Recommended Bedside commode; Shower/Tub chair with back ($)   Commode Type Wide   AM-PAC Daily Activity Inpatient   Lower Body Dressing 2   Bathing 2   Toileting 2   Upper Body Dressing 3   Grooming 3   Eating 4   Daily Activity Raw Score 16   Daily Activity Standardized Score (Calc for Raw Score >=11) 35 96   AM-PAC Applied Cognition Inpatient   Following a Speech/Presentation 2   Understanding Ordinary Conversation 4   Taking Medications 2   Remembering Where Things Are Placed or Put Away 3   Remembering List of 4-5 Errands 2   Taking Care of Complicated Tasks 1   Applied Cognition Raw Score 14   Applied Cognition Standardized Score 32 02   Barthel Index   Feeding 5   Bathing 0   Grooming Score 0   Dressing Score 5   Bladder Score 0   Bowels Score 5   Toilet Use Score 5   Transfers (Bed/Chair) Score 5   Mobility (Level Surface) Score 0   Stairs Score 0   Barthel Index Score 25   Modified Inés Scale   Modified Elbert Scale 4   Additional Treatment Session   Start Time 1337   End Time 1346   Treatment Assessment Pt seen for skilled OT tx session day 1 following eval [id number]  Pt seated on commode attempting to void  Pt required min A X2 to complete sit <> stand from commode w/ cues / prompts for hand placement  Pt required less physical assistance to complete sit to stand  Pt required max A for personal hygiene standing w/ min A to maintain balance using RW  Pt engaged in functional mobility using RW w/ min A x2 approx 4' from commode to recliner chair  Pt seated OOB in chair on soft care cushion w/ needs met, call bell in reach and chair alarm activated  Provided w/ today's newspaper  Continue to recommend post acute rehab when medically stable  Will continue to follow   Additional Treatment Day 1  (Monday)   End of Consult   Patient Position at End of Consult Bed/Chair alarm activated      The patient's raw score on the AM-PAC Daily Activity inpatient short form is 16, standardized score is 35 96, less than 39 4  Patients at this level are likely to benefit from discharge to post-acute rehabilitation services  Please refer to the recommendation of the Occupational Therapist for safe discharge planning      Pt goals to be met by 1/28/2023 to max I w/ ADLs and improve engagement to return home w/ wife includes:    -Pt will demonstrate good attention and participation in ongoing eval of functional cognition to assist in DC planning    -Pt will consistently follow multi step directions during ADLs w/ good recall to max I w/ ADLs and return home, go on the computer    -Pt will complete functional transfers to bed, chair,and toilet using AD, DME w/ S to max I and minimize burden of care to return home w/ wife    -Pt will consistently engage in functional mobility to /from bathroom using AD w/ min A x1 to max I w/ ADLs and minimize burden of care to return home w/ wife    -Pt will complete LBD w/ S using LHAE as needed to max I w/ ADLs    -Pt will demonstrate good attention and understanding EC tech to max I w/ ADLs    -Pt will demonstrate improved activity and sitting tolerance OOB in chair for all meals    -Pt will demonstrate improved functional standing tolerance using AD as needed w/ at least fair balance to participate in ADLs standing at sink to max I w/ functional mobility to return home and access computer    -Pt will complete UBD w/ mod I to max I w/ ADLs    -Pt will complete bed mobility supine <> sit w/ S to max I and minimize burden of care to return home w/ wife and go on the computer    [facility] HospitalGIOVANNIR/L

## 2023-01-16 NOTE — ASSESSMENT & PLAN NOTE
· AC with eliquis transitioned form heparin gtt   · Rate control with metoprolol and digoxin   · If no taking po meds today he may need to restart IV metoprolol

## 2023-01-17 ENCOUNTER — APPOINTMENT (OUTPATIENT)
Dept: OCCUPATIONAL THERAPY | Age: 78
End: 2023-01-17

## 2023-01-17 ENCOUNTER — APPOINTMENT (INPATIENT)
Dept: CT IMAGING | Facility: HOSPITAL | Age: 78
End: 2023-01-17

## 2023-01-17 LAB
ANION GAP SERPL CALCULATED.3IONS-SCNC: 7 MMOL/L (ref 4–13)
ANION GAP SERPL CALCULATED.3IONS-SCNC: 7 MMOL/L (ref 4–13)
APTT PPP: 29 SECONDS (ref 23–37)
APTT PPP: 48 SECONDS (ref 23–37)
BUN SERPL-MCNC: 38 MG/DL (ref 5–25)
BUN SERPL-MCNC: 40 MG/DL (ref 5–25)
CALCIUM SERPL-MCNC: 10.1 MG/DL (ref 8.4–10.2)
CALCIUM SERPL-MCNC: 9.5 MG/DL (ref 8.4–10.2)
CHLORIDE SERPL-SCNC: 89 MMOL/L (ref 96–108)
CHLORIDE SERPL-SCNC: 89 MMOL/L (ref 96–108)
CO2 SERPL-SCNC: 42 MMOL/L (ref 21–32)
CO2 SERPL-SCNC: 43 MMOL/L (ref 21–32)
CREAT SERPL-MCNC: 1.11 MG/DL (ref 0.6–1.3)
CREAT SERPL-MCNC: 1.28 MG/DL (ref 0.6–1.3)
ERYTHROCYTE [DISTWIDTH] IN BLOOD BY AUTOMATED COUNT: 18.8 % (ref 11.6–15.1)
ERYTHROCYTE [DISTWIDTH] IN BLOOD BY AUTOMATED COUNT: 18.9 % (ref 11.6–15.1)
GFR SERPL CREATININE-BSD FRML MDRD: 52 ML/MIN/1.73SQ M
GFR SERPL CREATININE-BSD FRML MDRD: 62 ML/MIN/1.73SQ M
GLUCOSE SERPL-MCNC: 139 MG/DL (ref 65–140)
GLUCOSE SERPL-MCNC: 148 MG/DL (ref 65–140)
GLUCOSE SERPL-MCNC: 153 MG/DL (ref 65–140)
GLUCOSE SERPL-MCNC: 162 MG/DL (ref 65–140)
GLUCOSE SERPL-MCNC: 167 MG/DL (ref 65–140)
GLUCOSE SERPL-MCNC: 183 MG/DL (ref 65–140)
GLUCOSE SERPL-MCNC: 199 MG/DL (ref 65–140)
HCT VFR BLD AUTO: 36.6 % (ref 36.5–49.3)
HCT VFR BLD AUTO: 37.3 % (ref 36.5–49.3)
HGB BLD-MCNC: 9.8 G/DL (ref 12–17)
HGB BLD-MCNC: 9.9 G/DL (ref 12–17)
INR PPP: 1.07 (ref 0.84–1.19)
MAGNESIUM SERPL-MCNC: 2.3 MG/DL (ref 1.9–2.7)
MCH RBC QN AUTO: 21.5 PG (ref 26.8–34.3)
MCH RBC QN AUTO: 21.7 PG (ref 26.8–34.3)
MCHC RBC AUTO-ENTMCNC: 26.5 G/DL (ref 31.4–37.4)
MCHC RBC AUTO-ENTMCNC: 26.8 G/DL (ref 31.4–37.4)
MCV RBC AUTO: 81 FL (ref 82–98)
MCV RBC AUTO: 81 FL (ref 82–98)
PLATELET # BLD AUTO: 158 THOUSANDS/UL (ref 149–390)
PLATELET # BLD AUTO: 168 THOUSANDS/UL (ref 149–390)
PMV BLD AUTO: 10.3 FL (ref 8.9–12.7)
PMV BLD AUTO: 10.5 FL (ref 8.9–12.7)
POTASSIUM SERPL-SCNC: 4.3 MMOL/L (ref 3.5–5.3)
POTASSIUM SERPL-SCNC: 4.4 MMOL/L (ref 3.5–5.3)
PROTHROMBIN TIME: 14.2 SECONDS (ref 11.6–14.5)
RBC # BLD AUTO: 4.52 MILLION/UL (ref 3.88–5.62)
RBC # BLD AUTO: 4.61 MILLION/UL (ref 3.88–5.62)
SODIUM SERPL-SCNC: 138 MMOL/L (ref 135–147)
SODIUM SERPL-SCNC: 139 MMOL/L (ref 135–147)
WBC # BLD AUTO: 8.14 THOUSAND/UL (ref 4.31–10.16)
WBC # BLD AUTO: 8.4 THOUSAND/UL (ref 4.31–10.16)

## 2023-01-17 RX ORDER — MUSCLE RUB CREAM 100; 150 MG/G; MG/G
CREAM TOPICAL 4 TIMES DAILY PRN
Status: DISCONTINUED | OUTPATIENT
Start: 2023-01-17 | End: 2023-01-22 | Stop reason: HOSPADM

## 2023-01-17 RX ORDER — HEPARIN SODIUM 10000 [USP'U]/100ML
3-20 INJECTION, SOLUTION INTRAVENOUS
Status: DISCONTINUED | OUTPATIENT
Start: 2023-01-17 | End: 2023-01-19

## 2023-01-17 RX ORDER — ACETAZOLAMIDE 500 MG/5ML
250 INJECTION, POWDER, LYOPHILIZED, FOR SOLUTION INTRAVENOUS ONCE
Status: COMPLETED | OUTPATIENT
Start: 2023-01-17 | End: 2023-01-17

## 2023-01-17 RX ORDER — WATER 1000 ML/1000ML
INJECTION, SOLUTION INTRAVENOUS
Status: COMPLETED
Start: 2023-01-17 | End: 2023-01-17

## 2023-01-17 RX ADMIN — NYSTATIN: 100000 POWDER TOPICAL at 08:39

## 2023-01-17 RX ADMIN — INSULIN LISPRO 1 UNITS: 100 INJECTION, SOLUTION INTRAVENOUS; SUBCUTANEOUS at 23:50

## 2023-01-17 RX ADMIN — METOPROLOL SUCCINATE 25 MG: 25 TABLET, EXTENDED RELEASE ORAL at 08:39

## 2023-01-17 RX ADMIN — PANTOPRAZOLE SODIUM 40 MG: 40 TABLET, DELAYED RELEASE ORAL at 08:39

## 2023-01-17 RX ADMIN — BUMETANIDE 1.5 MG: 0.25 INJECTION, SOLUTION INTRAMUSCULAR; INTRAVENOUS at 08:39

## 2023-01-17 RX ADMIN — WATER 10 ML: 1 INJECTION INTRAMUSCULAR; INTRAVENOUS; SUBCUTANEOUS at 10:43

## 2023-01-17 RX ADMIN — FAMOTIDINE 40 MG: 20 TABLET ORAL at 23:48

## 2023-01-17 RX ADMIN — INSULIN LISPRO 1 UNITS: 100 INJECTION, SOLUTION INTRAVENOUS; SUBCUTANEOUS at 18:12

## 2023-01-17 RX ADMIN — SPIRONOLACTONE 25 MG: 25 TABLET ORAL at 08:39

## 2023-01-17 RX ADMIN — DIGOXIN 125 MCG: 125 TABLET ORAL at 08:39

## 2023-01-17 RX ADMIN — INSULIN LISPRO 1 UNITS: 100 INJECTION, SOLUTION INTRAVENOUS; SUBCUTANEOUS at 06:41

## 2023-01-17 RX ADMIN — ATORVASTATIN CALCIUM 40 MG: 40 TABLET, FILM COATED ORAL at 08:39

## 2023-01-17 RX ADMIN — METOPROLOL SUCCINATE 25 MG: 25 TABLET, EXTENDED RELEASE ORAL at 18:04

## 2023-01-17 RX ADMIN — PANTOPRAZOLE SODIUM 40 MG: 40 TABLET, DELAYED RELEASE ORAL at 15:51

## 2023-01-17 RX ADMIN — BUMETANIDE 1.5 MG: 0.25 INJECTION, SOLUTION INTRAMUSCULAR; INTRAVENOUS at 18:04

## 2023-01-17 RX ADMIN — NYSTATIN: 100000 POWDER TOPICAL at 18:13

## 2023-01-17 RX ADMIN — HEPARIN SODIUM 11.1 UNITS/KG/HR: 10000 INJECTION, SOLUTION INTRAVENOUS at 15:49

## 2023-01-17 RX ADMIN — INSULIN LISPRO 1 UNITS: 100 INJECTION, SOLUTION INTRAVENOUS; SUBCUTANEOUS at 12:54

## 2023-01-17 RX ADMIN — ACETAZOLAMIDE 250 MG: 500 INJECTION, POWDER, LYOPHILIZED, FOR SOLUTION INTRAVENOUS at 10:43

## 2023-01-17 NOTE — PROGRESS NOTES
Progress Note - Urology  Shelby Haines 1945, 77 y o  male MRN: [id number]    Unit/Bed#: S -Cathie Encounter: [id number]      Assessment & Plan:  1  Hematuria  - Hematuria now resolved  Urine clear yellow  - Vital signs stable, afebrile   -Hemoglobin 9 9, stable  Yesterday 10 5  -Creatinine 1 1, at baseline   - CT renal protocol to reassess previous 6 mm stone in seen in October 2022    -Patient for right heart catheterization Thursday  Heparin prior to procedure  Monitor for hematuria  -Patient will require outpatient cystoscopy  -Office will call to schedule patient    -Urology will continue to follow for results of CT  Subjective:   HPI: Patient seen at bedside today  He reports no complaints  He denies any nausea, vomiting, abdominal pain, flank pain, fevers  Review of Systems   Constitutional: Negative for chills and fever  Respiratory: Negative for cough and shortness of breath  Cardiovascular: Negative for chest pain and palpitations  Gastrointestinal: Negative for abdominal distention, abdominal pain and vomiting  Genitourinary: Negative for dysuria, flank pain and hematuria  Musculoskeletal: Negative for arthralgias and back pain  Skin: Negative for color change and rash  Neurological: Negative for seizures and syncope  All other systems reviewed and are negative  Objective:  Vitals: Blood pressure 122/59, pulse 84, temperature 98 8 °F (37 1 °C), resp  rate 16, height 6' 4" (1 93 m), weight 128 kg (282 lb 13 6 oz), SpO2 94 %  ,Body mass index is 34 43 kg/m²  Physical Exam  Constitutional:       General: He is not in acute distress  Appearance: Normal appearance  He is normal weight  He is not ill-appearing or toxic-appearing  HENT:      Head: Normocephalic and atraumatic        Right Ear: External ear normal       Left Ear: External ear normal       Nose: Nose normal       Mouth/Throat:      Mouth: Mucous membranes are moist    Eyes:      General: No scleral icterus  Conjunctiva/sclera: Conjunctivae normal    Cardiovascular:      Rate and Rhythm: Normal rate  Pulses: Normal pulses  Pulmonary:      Effort: Pulmonary effort is normal    Abdominal:      General: Abdomen is flat  There is no distension  Palpations: Abdomen is soft  Tenderness: There is no abdominal tenderness  There is no right CVA tenderness, left CVA tenderness or guarding  Genitourinary:     Comments: Gómez in place draining clear yellow urine  Musculoskeletal:         General: Normal range of motion  Cervical back: Normal range of motion  Skin:     General: Skin is warm  Findings: No rash  Neurological:      General: No focal deficit present  Mental Status: He is alert and oriented to person, place, and time  Mental status is at baseline  Psychiatric:         Mood and Affect: Mood normal          Behavior: Behavior normal          Thought Content:  Thought content normal          Judgment: Judgment normal            Labs:  Recent Labs     01/15/23  0825 01/16/23  0438 01/17/23  0440   WBC 8 67 7 28 8 14     Recent Labs     01/15/23  0825 01/16/23  0438 01/16/23  1553 01/17/23  0440   HGB 10 8* 10 8* 10 5* 9 9*     Recent Labs     01/15/23  0825 01/16/23  0438 01/16/23  1553 01/17/23  0440   HCT 41 0 41 3 39 5 37 3     Recent Labs     01/14/23  1526 01/15/23  0825 01/16/23  0438 01/17/23  0440   CREATININE 1 20 1 24 1 07 1 11       History:  Past Medical History:   Diagnosis Date   • [facility])    • AAA (abdominal aortic aneurysm)    • Actinic keratosis of right temple 7/31/2020   • Actinic keratosis of scalp 7/31/2020   • Acute respiratory failure with hypoxia ([facility] [address] ) 3/25/2021   • Allergic 1960   • Arthritis    • Lay's esophagus    • Bladder cancer ([facility])    • Blister of left leg 4/28/2021   • Blister of right leg 5/26/2021   • CAD (coronary artery disease)    • Cancer ([facility] [address] ) 02/2010    Bladder   • CHF (congestive heart failure) ([facility])    • Chronic low back pain    • Chronic pain of both knees 2020   • Colitis 2020   • CPAP (continuous positive airway pressure) dependence    • Diabetes (HCC)    • Diabetes mellitus [facility] ) 10/1993   • Excessive gas 12/3/2020   • Heart murmur    • History of chemotherapy    • Hydroureter on left 2021   • Hyperlipemia    • Hypertension    • Immunization deficiency 10/30/2020    Hep a nonimmune   • Kidney stone    • Left lower quadrant abdominal pain 12/3/2020   • Mass of right adrenal gland ([facility] ) 2020    4 1 cm   • Myocardial infarction 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   • Obesity    • LUCIA (obstructive sleep apnea) 2021   • Pressure ulcer of right leg, stage 1 2021   • Urinary tract infection with hematuria 5/3/2021    u cx 2021=pseudomonas R to bactrim and cefdinir, S to cipro   • Venous stasis dermatitis of both lower extremities 2021     Social History     Socioeconomic History   • Marital status: /Civil Union     Spouse name: Jose Jiménez   • Number of children: 4   • Years of education: None   • Highest education level: 12th grade   Occupational History   • None   Tobacco Use   • Smoking status: Former     Packs/day: 0 25     Years: 20 00     Pack years: 5 00     Types: Cigarettes     Start date: 1965     Quit date: 2010     Years since quittin 0   • Smokeless tobacco: Never   • Tobacco comments:     Quit several times for up to 6 years     Vaping Use   • Vaping Use: Never used   Substance and Sexual Activity   • Alcohol use: Not Currently     Comment: No use   • Drug use: Never     Comment: No use   • Sexual activity: Not Currently     Partners: Female   Other Topics Concern   • None   Social History Narrative    · Most recent tobacco use screenin2020      · Do you currently or have you served in the [employer]:   No      · Were you activated, into active duty, as a member of the [employer] or as a Reservist:   No      · Live alone or with others:   with others        · Caffeine intake:   Occasional      · Illicit drugs:   No      · Diet:   Regular      · Exercise level: Moderate      · Advance directive: Yes      · Marital status:         · General stress level:   Medium      · Single or multi-level home/work:   multi level home      · Guns present in home:   No      · Seat belts used routinely:   Yes      · Sunscreen used routinely:   Yes      · Smoke alarm in home:   Yes      Social Determinants of Health     Financial Resource Strain: Not on file   Food Insecurity: No Food Insecurity   • Worried About Running Out of Food in the Last Year: Never true   • Ran Out of Food in the Last Year: Never true   Transportation Needs: No Transportation Needs   • Lack of Transportation (Medical): No   • Lack of Transportation (Non-Medical):  No   Physical Activity: Not on file   Stress: Not on file   Social Connections: Not on file   Intimate Partner Violence: Not on file   Housing Stability: Unknown   • Unable to Pay for Housing in the Last Year: No   • Number of Places Lived in the Last Year: Not on file   • Unstable Housing in the Last Year: No     Past Surgical History:   Procedure Laterality Date   • BACK SURGERY     • BLADDER SURGERY     • CARDIAC CATHETERIZATION  04/2016   • CATARACT EXTRACTION, BILATERAL     • COLONOSCOPY  01/29/2019    next 2022 [place]   • CORONARY ARTERY BYPASS GRAFT  04/2016    Quadruple per [place]   • EGD  06/2017   • EYE SURGERY  11/2016    Cataracts   • JOINT REPLACEMENT  [id number]    Hip and shoulder   • KIDNEY STONE SURGERY     • [place] CYSTO BLADDER W/URETERAL CATHETERIZATION Left 04/24/2021    Procedure: CYSTOSCOPY  WITH INSERTION STENT URETERAL;  Surgeon: Mary Larkin MD;  [facility];  Service: Urology   • [place] CYSTO/URETERO W/LITHOTRIPSY &INDWELL STENT INSRT Left 05/21/2021    Procedure: CYSTOSCOPY URETEROSCOPY WITH LITHOTRIPSY HOLMIUM LASER, AND INSERTION STENT URETERAL/EXCHANGE;  Surgeon: Mary Larkin MD; Location: [facility];  Service: Urology   • TOTAL HIP ARTHROPLASTY Right 2012   • TOTAL SHOULDER REPLACEMENT Right 2013   • VASECTOMY  1971     Family History   Problem Relation Age of Onset   • Heart disease Father    • Arthritis Father    • Heart attack Father    • Alzheimer's disease Mother    • Dementia Mother        Brenda Shafer [place]  Date: 1/17/2023 Time: 12:55 PM

## 2023-01-17 NOTE — CASE MANAGEMENT
Case Management Progress Note    Patient name Elissa Rayo  Location S /S -01 MRN [id number]  : 1945 Date 2023       LOS (days): 7  Geometric Mean LOS (GMLOS) (days): 3 90  Days to GMLOS:-3 2        OBJECTIVE:        Current admission status: Inpatient  Preferred Pharmacy:   [facility] [id number] Germaine Jolly, [address] [facility]  [address]  Phone: [phone] Fax: [phone]    Primary Care Provider: Irena Moore MD    Primary Insurance: MEDICARE  Secondary Insurance: AARP    PROGRESS NOTE:    Patient transferred to unit from 2nd floor  Per prior CM notes, patient refusing rehab and home care arrangements confirmed with [place]  Update sent to [place] to relay patient remains in the hospital      Per cardiology notes, plan is for CATH on Thursday  PT notes show patient only ambulating a few feet, so will need to confirm patient's family able to assist with care prior to discharge  Patient also remains on 7L midflow - does not appear that he uses home o2 at baseline  Will continue to follow for d/c planning needs

## 2023-01-17 NOTE — PROGRESS NOTES
[facility]  Progress Note - Chris Lemon 1945, 77 y o  male MRN: [id number]  Unit/Bed#: S -Cathie Encounter: [id number]  Primary Care Provider: Singh Khan MD   Date and time admitted to hospital: 1/10/2023 12:21 PM    BRBPR (bright red blood per rectum)  Assessment & Plan  · Noted on 1/12  Patient reports history of large hemorrhoid that has bled in the past   · Hemoglobin has been stable   · Monitor Hgb  · No intervention at this time   · No bleeding per nursing     Pulmonary hypertension ([facility] [address] )  Assessment & Plan  · Noted on ECHO  Remote smoking history  Is on BiPAP at night here  Apparently on CPAP at home but question adequacy   · Heart Failure following  · Diuresis as above   · RHC    Venous stasis dermatitis of both lower extremities  Assessment & Plan  · Wound care    LACY (acute kidney injury) [facility])  Assessment & Plan  Recent Labs     01/15/23  0825 01/16/23  0438 01/17/23  0440   CREATININE 1 24 1 07 1 11   EGFR 54 65 62     · Estimated Creatinine Clearance: 78 6 mL/min (by C-G formula based on SCr of 1 11 mg/dL)  ·   · Developed LACY on 1/13 with creatinine of 1 5, baseline of 1 0-1 1    · Cr now at baseline   ·   · Monitor BMP  · I/O  · UOP - status post Carlin catheter 1/13 due to critical illness  · maintain carlin for I/O monitoring     Acute respiratory failure with hypoxia and hypercapnia (HCC)  Assessment & Plan  Recent Labs     01/16/23  1052   PHVEN 7 422*   GPI3LWL 76 4*   PO2VEN 122 5*   PDB5NNP 48 7*         · Hypoxia on arrival, desaturations to 77% on room air  Denies home O2 use    ·   · Mor lethargic 1/16 requiring 15L NC sats still 88% placed back on bipap   · Continue BiPAP, IPAP 20, EPAP 8   · Continue at night (baseline) as well as if the patient naps during the day  · Wean O2 as tolerated to maintain SPO2 >88%   · Respiratory protocol  · Monitor mental status  · If respiratory status does not improve may consider consult pulmonology    V/Q and PFT outpatient    LUCIA (obstructive sleep apnea)  Assessment & Plan  · Patient was on CPAP  Recently transitioned to BiPAP by his pulmonologist but still has not received the machine  · Continue BiPAP overnight and with naps   · CM consult to assist with equipment when closer to discharge    Bengay for lower back pain    Paroxysmal atrial fibrillation (HCC)  Assessment & Plan  Digoxin 0 9 1/16  · Rate control with metoprolol and digoxin     CAD (coronary artery disease)  Assessment & Plan  · Chest pain free  · RHC Thursday  · Atorvastatin    Type 2 diabetes mellitus with hyperglycemia, with long-term current use of insulin (HCC)  Assessment & Plan      Blood Sugar Average: Last 72 hrs:  (P) 115 [id number]   · Home regimen: Tresiba 90 units daily  · Was hypoglycemic due to not liking food, and bipap use   · lantus held for now   · accuchecks q 6 hours given poor po intake   · Change to SSI only   · Adjust as needed and as patient improves with eating  Hypertension  Assessment & Plan  · Stable   · Bumetanide 1 5 IV BID  · Acetazolamide once today  · Hold home lisinopril and spironolactone  · Continue metoprolol      * Acute on chronic diastolic (congestive) heart failure (HCC)  Assessment & Plan  Wt Readings from Last 3 Encounters:   01/17/23 128 kg (282 lb 13 6 oz)   12/15/22 (!) 139 kg (306 lb)   11/21/22 (!) 138 kg (304 lb)     · Patient presenting with worsening lower extremity edema, abdominal tightness, weight gain of 21 pounds in 2 to 3 weeks, PND  Patient denies any dietary or medication noncompliance  · Chronically on PO Lasix 80mg BID at home     · ECHO shows EF 55%, grade 2 DD, dilated RV with reduced systolic function and pulmonary hypertension   · Net negative > 20L   · Currently on SD 2 due to continuous bipap use   · Cardiology and Heart Failure following     RHC Thursday  - re-initiate heparin  - as above for bumetanide  - acetazolamide once today  - hold spironolactone and lisinopril  - metoprolol        VTE Pharmacologic Prophylaxis: VTE Score: 7 High Risk (Score >/= 5) - Pharmacological DVT Prophylaxis Ordered: heparin drip  Sequential Compression Devices Ordered  Patient Centered Rounds: I performed bedside rounds with nursing staff today  Discussions with Specialists or Other Care Team Provider: heart failure    Education and Discussions with Family / Patient: Attempted to update  (wife) via phone  Unable to contact  Current Length of Stay: 7 day(s)  Current Patient Status: Inpatient   Discharge Plan: Anticipate discharge in 48-72 hrs to rehab facility  Code Status: Level 3 - DNAR and DNI    Subjective:   Patient appears coherent this morning, more oriented and interactive than previously documented  He is visibly frustrated with bipap mask that is ill-fitting on nose and skin over nose appears irritated  He is asking for water  He says his lower back is in pain  When it is explained to the patient that he must wait for the nurse to remove the mask, he says "Why don't you just let me [email or web address] die "    Objective:     Vitals:   Temp (24hrs), Av 7 °F (37 1 °C), Min:98 2 °F (36 8 °C), Max:99 1 °F (37 3 °C)    Temp:  [98 2 °F (36 8 °C)-99 1 °F (37 3 °C)] 98 8 °F (37 1 °C)  HR:  [] 84  Resp:  [16-18] 16  BP: ()/(58-74) 122/59  SpO2:  [94 %-99 %] 95 %  Body mass index is 34 43 kg/m²  Input and Output Summary (last 24 hours): Intake/Output Summary (Last 24 hours) at 2023 1435  Last data filed at 2023 1248  Gross per 24 hour   Intake 920 ml   Output 2150 ml   Net -1230 ml       Physical Exam:   Physical Exam  Vitals and nursing note reviewed  Constitutional:       Appearance: He is well-developed  He is obese  HENT:      Head: Normocephalic and atraumatic  Eyes:      Conjunctiva/sclera: Conjunctivae normal    Cardiovascular:      Rate and Rhythm: Normal rate  Rhythm irregular  Heart sounds: No murmur heard    Pulmonary:      Effort: No respiratory distress  Comments: Difficult to examine due to bipap  Abdominal:      Palpations: Abdomen is soft  Tenderness: There is no abdominal tenderness  Musculoskeletal:         General: No swelling  Cervical back: Neck supple  Right lower leg: No edema  Left lower leg: No edema  Skin:     General: Skin is warm and dry  Capillary Refill: Capillary refill takes less than 2 seconds  Findings: Erythema present  Neurological:      Mental Status: He is alert and oriented to person, place, and time  Additional Data:     Labs:  Results from last 7 days   Lab Units 01/17/23  0440 01/13/23  0521 01/12/23  0637   WBC Thousand/uL 8 14   < > 9 71   HEMOGLOBIN g/dL 9 9*   < > 9 7*   I STAT HEMOGLOBIN   --    < >  --    HEMATOCRIT % 37 3   < > 38 1   HEMATOCRIT, ISTAT   --    < >  --    PLATELETS Thousands/uL 168   < > 185   NEUTROS PCT %  --   --  71   LYMPHS PCT %  --   --  14   MONOS PCT %  --   --  13*   EOS PCT %  --   --  1    < > = values in this interval not displayed  Results from last 7 days   Lab Units 01/17/23  0440   SODIUM mmol/L 139   POTASSIUM mmol/L 4 4   CHLORIDE mmol/L 89*   CO2 mmol/L 43*   BUN mg/dL 38*   CREATININE mg/dL 1 11   ANION GAP mmol/L 7   CALCIUM mg/dL 10 1   GLUCOSE RANDOM mg/dL 148*     Results from last 7 days   Lab Units 01/14/23  0850   INR  1 17     Results from last 7 days   Lab Units 01/17/23  1234 01/17/23  0555 01/16/23  2359 01/16/23  1735 01/16/23  1244 01/16/23  0751 01/16/23  0553 01/16/23  0354 01/15/23  2144 01/15/23  1959 01/15/23  1804 01/15/23  1637   POC GLUCOSE mg/dl 183* 153* 139 236* 171* 138 148* 160* 158* 144* 202* 188*               Lines/Drains:  Invasive Devices     Peripheral Intravenous Line  Duration           Peripheral IV 01/14/23 Dorsal (posterior); Left Forearm 3 days    Long-Dwell Peripheral IV (Midline) 60/66/27 Left Cephalic <1 day          Drain  Duration           Urethral Catheter Straight-tip 16 Fr  4 days              Urinary Catheter:  Goal for removal: Voiding trial when ambulation improves           Telemetry:  Telemetry Orders (From admission, onward)             48 Hour Telemetry Monitoring  Continuous x 48 hours        References:    Telemetry Guidelines   Question:  Reason for 48 Hour Telemetry  Answer:  Acute Decompensated CHF (continuous diuretic infusion or total diuretic dose > 200 mg daily, associated electrolyte derangement, ionotropic drip, history of ventricular arrhythmia, or new EF <35%)                 Telemetry Reviewed: Atrial fibrillation  HR averaging 93 b/m  Indication for Continued Telemetry Use: Arrthymias requiring medical therapy             Imaging: No pertinent imaging reviewed  Recent Cultures (last 7 days):         Last 24 Hours Medication List:   Current Facility-Administered Medications   Medication Dose Route Frequency Provider Last Rate   • acetaminophen  650 mg Oral Q6H PRN Maria Ines Castle PA-C     • atorvastatin  40 mg Oral Daily Santhosh Castro MD     • bumetanide  1 5 mg Intravenous BID Ambrocio Meléndez MD     • dextrose  25 mL Intravenous PRN Marty Porras PA-C     • dicyclomine  10 mg Oral BID PRN Santhosh Castro MD     • digoxin  125 mcg Oral Daily Santhosh Castro MD     • famotidine  40 mg Oral HS Santhosh Castro MD     • insulin lispro  1-5 Units Subcutaneous Q6H [address] Carl Weaver PA-C     • lidocaine  2 patch Topical Daily Martyevin Porras PA-C     • loperamide  2 mg Oral TID PRN Bo Rinne, PA-C     • menthol-methyl salicylate   Apply externally 4x Daily PRN Kelsie Floyd MD     • metoprolol succinate  25 mg Oral BID Gerardo Fajardo PA-C     • nystatin   Topical BID KACEY Sinclair     • pantoprazole  40 mg Oral BID AC Santhosh Castro MD          Today, Patient Was Seen By: Farnaz Kruse MD Psychiatry    **Please Note: This note may have been constructed using a voice recognition system  **

## 2023-01-17 NOTE — ASSESSMENT & PLAN NOTE
· Noted on 1/12   Patient reports history of large hemorrhoid that has bled in the past   · Hemoglobin has been stable   · Monitor Hgb  · No intervention at this time   · No bleeding per nursing

## 2023-01-17 NOTE — ASSESSMENT & PLAN NOTE
Recent Labs     01/15/23  0825 01/16/23  0438 01/17/23  0440   CREATININE 1 24 1 07 1 11   EGFR 54 65 62     · Estimated Creatinine Clearance: 78 6 mL/min (by C-G formula based on SCr of 1 11 mg/dL)    ·   · Developed LACY on 1/13 with creatinine of 1 5, baseline of 1 0-1 1    · Cr now at baseline   ·   · Monitor BMP  · I/O  · UOP - status post Carlin catheter 1/13 due to critical illness  · maintain carlin for I/O monitoring

## 2023-01-17 NOTE — ASSESSMENT & PLAN NOTE
Wt Readings from Last 3 Encounters:   01/17/23 128 kg (282 lb 13 6 oz)   12/15/22 (!) 139 kg (306 lb)   11/21/22 (!) 138 kg (304 lb)     · Patient presenting with worsening lower extremity edema, abdominal tightness, weight gain of 21 pounds in 2 to 3 weeks, PND  Patient denies any dietary or medication noncompliance  · Chronically on PO Lasix 80mg BID at home     · ECHO shows EF 55%, grade 2 DD, dilated RV with reduced systolic function and pulmonary hypertension   · Net negative > 20L   · Currently on SD 2 due to continuous bipap use   · Cardiology and Heart Failure following     RHC Thursday  - re-initiate heparin  - as above for bumetanide  - acetazolamide once today  - hold spironolactone and lisinopril  - metoprolol

## 2023-01-17 NOTE — PROGRESS NOTES
Advanced Heart Failure/Pulmonary Hypertension Service Progress Note - Maria Teresa Hdz 77 y o  male MRN: [id number]    Unit/Bed#: S -01 Encounter: [id number]      Assessment:  77 y o  male Hx per chart p/w ADHF, PH, respiratory failure  # Acute on chronic HFpEF  # Pulmonary hypertension with RV dysfunction  PASP about 80 by echo (while decompensated)  Likely group II disease diastolic heart failure and group III disease, cannot rule out groups 4 and 1 now, or phenotypic group 1 due to chronic dz  Has LUCIA, on cpap since 2014 but outpt notes indicate possible worse AHI  and machine function/fitting issues that seem unresolved  Good compliance >5 hr per night  No h/o PE/DVT  On AC for AF  Remote smoking, quit 2010  CT chest 4/2021: Pleural thickening with pleural calcifications in the dependent portion of the right mid and lower lung field, 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  Suspected round atelectasis in the posterior right lower lobe  No h/o illicit drug use  # CAD s/p CABG in 2016 (LIMA to LAD, SVG to OM3 and SVG to PDA) in 2016  # LUCIA  # Paroxysmal atrial fibrillation, s/p cardioversion 1/7/22  also AFL on old ECGs  AC: Eliquis  Rate: metoprolol and digoxin  # Hyperlipidemia  # DM type II  Hemoglobin A1c 7 8 11/2/2022  # Hypertension  # LACY  # Hemorrhoids with rectal bleeding  RBBB  Fall, as inpt this admission 1/2023  CTH neg  Hematuria 1/16/23, holding AC, uro following, has known renal stones  Has raegan        Reviewed all pertinent labs/imaging/data including:  Cr: mild LACY peaked 1 5>>1 07>1 1 (BL around 1 0)  Bicarb up but downtrending; note that BL somewhat elevated 2/2 chronic retention  PCO2 on ABG 1/16 was 76 while on bipap, pH 7 4    Weight (last 2 days)     Date/Time Weight    01/17/23 0544 128 (282 85)     Weight: pt cant stand safetly at 01/17/23 0544    01/16/23 0600 128 (282 19)    01/15/23 0815 128 (282 19)    01/15/23 0600 159 (349 43)           Intake/Output Summary (Last 24 hours) at 1/17/2023 0945  Last data filed at 1/17/2023 0901  Gross per 24 hour   Intake 920 ml   Output 1775 ml   Net -855 ml     Net neg 1 7L 24 hr    Tele: AF, rates generally <100    Drips:        Plan:  mentating better today, on O2 NC, warm, mildly vol up by exam  no florid vol overload at this point; labs, UO net neg 20L since admit, wt trend all suggest improvement (unreliable wt trend but historical best around 300lbs and wt now recorded as 280s  Good response to bumex gtt and diamox over weekend  Has Severe PH with TRmaxvel 4 0 range by echo, +significant RV dysfunction, small LV, BP normal to high now (few lower readings earlier in course)    Deescalated Diuresis bumex gtt to 2 IV tid to bumex 1 5 bid IV with diamox    Give another diamox 250 x1 1/17  Strict IOs and daily weights  Keep K>4, Mg>2  Goal net neg 1-2L in 24 hr  Home diuretic: lasix 80 bid    Plan for RHC as soon as lab available - Thursday  Complete hematuria workup per uro and primary, would restart hep gtt as soon as able  Hold eliquis before RHC x 3-4 doses    Ok to hold home lisinopril 20 qd and aldactone 25 qd for the moment; if BP trend continues SBP>130 and stable, with good renal fxn, can start lisinopril 5 qd    Switch rate control agents toprol xl and dig to IV if not getting po on bipap    Checked new dig level>0 9    VQ scan later this week vs outpt    PFTs outpt, I cannot see prior results in epic    Could consider entresto and/or sglti as outpt    If worsening CO2 retention, would cx pulm    Studies:    EKG - my read:  2022: AF, RAD, RBBB (old), non specific STT changes    Echocardiogram 1/11/23  LVEF: 55%  LVIDd: 4 8cm  RV: not well visualized; dilated RV>LV; reduced systolic function  MR: moderate MAC, trace MR  PASP: 83mmHg; estimated RAP 15, peak TR velocity 4 1  RVOT: suboptimal envelope, mid to late systolic notching; interventricular diastolic and systolic septal flattening  Other: grade 2 diastology, mild JONNY; mildly increased LV wall thickness     Echo 12/28/21:  LVEF 55%, mildly increased wall thickness  RV mildly dilated, normal systolic function  PASP [id number]     Echo 3/23/21:  LVEF 60%, mildly increased wall thickness  Grade 1 diastology  RV mildly dilated, mildly reduced systolic function  PASP [id number]  Subjective and Interval Events:   Patient seen and examined  No new complaints  Feels much better    ROS:  10 point ROS negative except as specified above      Tele: reviewed    Objective:     Physical Exam:  /59   Pulse 84   Temp 98 8 °F (37 1 °C)   Resp 16   Ht 6' 4" (1 93 m)   Wt 128 kg (282 lb 13 6 oz) Comment: pt cant stand safetly  SpO2 94%   BMI 34 43 kg/m²   Ranges:  Temp:  [98 1 °F (36 7 °C)-99 1 °F (37 3 °C)] 98 8 °F (37 1 °C)  HR:  [] 84  Resp:  [16-20] 16  BP: ()/(58-74) 122/59    Weight (last 2 days)     Date/Time Weight    01/17/23 0544 128 (282 85)     Weight: pt cant stand safetly at 01/17/23 0544    01/16/23 0600 128 (282 19)    01/15/23 0815 128 (282 19)    01/15/23 0600 159 (349 43)           Intake/Output Summary (Last 24 hours) at 1/17/2023 0945  Last data filed at 1/17/2023 0901  Gross per 24 hour   Intake 920 ml   Output 1775 ml   Net -855 ml     Constitutional: alert, oriented  Ears/nose/mouth/throat: atraumatic  CV: irreg, JVD to lower neck  Resp: Decreased breath sounds BL  GI: Soft, NTND  MSK: no swollen joints in exposed areas  Extr: trace edema, warm LE  Pysche: normal affect  Neuro: appropriate in conversation  Skin: dry and intact in exposed areas    Labs/Imaging/Data:   Results from last 7 days   Lab Units 01/17/23  0440 01/16/23  1553 01/16/23  0438 01/15/23  0825 01/14/23  0902 01/14/23  0850 01/14/23  0508 01/14/23  0123 01/13/23  0521 01/12/23  0637 01/11/23  0856 01/10/23  1243   WBC Thousand/uL 8 14  --  7 28 8 67  --  9 51 10 17* 10 94* 10 22* 9 71 8 65 10 00   HEMOGLOBIN g/dL 9 9* 10 5* 10 8* 10 8*  --  9 1* 9 0* 9 7* 10 6* 9 7* 10 0* 10 5*   I STAT HEMOGLOBIN g/dl  --   --   --   --  10 9*  --   --   --   --   --   --   --    HEMATOCRIT % 37 3 39 5 41 3 41 0  --  35 6* 35 2* 38 0 42 5 38 1 38 4 39 4   HEMATOCRIT, ISTAT %  --   --   --   --  32*  --   --   --   --   --   --   --    PLATELETS Thousands/uL 168  --  161 158  --  141* 147* 146* 168 185 166 203   NEUTROS PCT %  --   --   --   --   --   --   --   --   --  71 75 77*   MONOS PCT %  --   --   --   --   --   --   --   --   --  13* 11 8      Results from last 7 days   Lab Units 01/17/23  0440 01/16/23  0438 01/15/23  0825 01/14/23  1526 01/14/23  0902 01/14/23  0508 01/14/23  0036 01/13/23  0521 01/12/23  0637 01/11/23  0856 01/10/23  1243   SODIUM mmol/L 139 142 142 146  --  145 141 141 143 141 141   POTASSIUM mmol/L 4 4 4 4 5 1 4 7  --  5 0 5 8* 5 7* 5 4* 5 1 4 7   CHLORIDE mmol/L 89* 89* 87* 93*  --  94* 96 96 98 100 99   CO2 mmol/L 43* >45* >45* >45*  --  >45* 40* 43* 39* 38* 37*   CO2, I-STAT mmol/L  --   --   --   --  >45*  --   --   --   --   --   --    ANION GAP mmol/L 7  --   --   --   --   --  5 2* 6 3* 5   BUN mg/dL 38* 37* 37* 39*  --  39* 41* 37* 34* 35* 43*   CREATININE mg/dL 1 11 1 07 1 24 1 20  --  1 15 1 24 1 51* 1 24 1 13 1 18   CALCIUM mg/dL 10 1 10 4* 10 6* 10 0  --  9 5 9 5 9 7 9 2 9 1 9 3   GLUCOSE RANDOM mg/dL 148* 151* 116 39*  --  96 87 87 59* 128 94   ALT U/L  --   --   --   --   --   --   --   --   --   --  10   AST U/L  --   --   --   --   --   --   --   --   --   --  16   ALK PHOS U/L  --   --   --   --   --   --   --   --   --   --  69   ALBUMIN g/dL  --   --   --   --   --   --   --   --   --   --  3 5   TOTAL BILIRUBIN mg/dL  --   --   --   --   --   --   --   --   --   --  0 37      Results from last 7 days   Lab Units 01/17/23  0440 01/14/23  0036 01/11/23  0856   MAGNESIUM mg/dL 2 3 2 1 2 2   PHOSPHORUS mg/dL  --  3 0  --        Results from last 7 days   Lab Units 01/15/23  0636 01/14/23  2316 01/14/23  1526 01/14/23  0850   INR   --   --   --  1 17   PTT seconds 58* 66* 51* 31 ABG:  Results from last 7 days   Lab Units 01/14/23  1208   PH ART  7 468*   PCO2 ART mm Hg 66 9*   PO2 ART mm Hg 66 7*   HCO3 ART mmol/L 47 4*   BASE EXC ART mmol/L 20 7   ABG SOURCE  Radial, Right      VBG:  Results from last 7 days   Lab Units 01/16/23  1052 01/14/23  1208   PH JANICE  7 422*  --    PCO2 JANICE mm Hg 76 4*  --    PO2 JANICE mm Hg 122 5*  --    HCO3 JANICE mmol/L 48 7*  --    BASE EXC JANICE mmol/L 20 7  --    ABG SOURCE   --  Radial, Right     No results found for: LDH       Current Facility-Administered Medications   Medication Dose Route Frequency Provider Last Rate   • acetaminophen  650 mg Oral Q6H PRN Lorin Zacarias PA-C     • atorvastatin  40 mg Oral Daily Yobani Archuleta MD     • bumetanide  1 5 mg Intravenous BID Maxime Desouza MD     • dextrose  25 mL Intravenous PRN Marty Bustillo PA-C     • dicyclomine  10 mg Oral BID PRN Yobani Archuleta MD     • digoxin  125 mcg Oral Daily Yobani Archuleta MD     • famotidine  40 mg Oral HS Yobani Archuleta MD     • insulin lispro  1-5 Units Subcutaneous Q6H [facility] Carl Weaver PA-C     • lidocaine  2 patch Topical Daily Marty Bustillo PA-C     • loperamide  2 mg Oral TID PRN Brando Olsen PA-C     • menthol-methyl salicylate   Apply externally 4x Daily PRN Vikin Daryle Lais, MD     • metoprolol succinate  25 mg Oral BID Brian Patterson PA-C     • nystatin   Topical BID KACEY Toussaint     • pantoprazole  40 mg Oral BID AC Yobani Archuleta MD     • spironolactone  25 mg Oral Daily Yobani Archuleta MD         Invasive Devices     Peripheral Intravenous Line  Duration           Peripheral IV 01/13/23 Left Antecubital 3 days    Peripheral IV 01/14/23 Dorsal (posterior); Left Forearm 3 days          Drain  Duration           Urethral Catheter Straight-tip 16 Fr  4 days                Counseling / Coordination of Care: Total floor / unit time spent today 31 minutes    Greater than 50% of total time was spent with the patient and / or family counseling and / or coordination of care  A description of the counseling / coordination of care: we discussed diagnoses, recent as well as older studies, labs, and all changes in cardiac treatment plan  All patient questions were answered  Thank you for the opportunity to participate in the care of this patient      Francis Garza MD  Attending Physician  Advanced Heart Failure and Transplant Cardiology  [facility]

## 2023-01-17 NOTE — ASSESSMENT & PLAN NOTE
Recent Labs     01/16/23  1052   PHVEN 7 422*   [id number] 76 4*   PO2VEN 122 5*   [id number] 48 7*         · Hypoxia on arrival, desaturations to 77% on room air  Denies home O2 use    ·   · Mor lethargic 1/16 requiring 15L NC sats still 88% placed back on bipap   · Continue BiPAP, IPAP 20, EPAP 8   · Continue at night (baseline) as well as if the patient naps during the day  · Wean O2 as tolerated to maintain SPO2 >88%   · Respiratory protocol  · Monitor mental status  · If respiratory status does not improve may consider consult pulmonology    V/Q and PFT outpatient

## 2023-01-17 NOTE — PLAN OF CARE
Problem: RESPIRATORY - ADULT  Goal: Achieves optimal ventilation and oxygenation  Description: INTERVENTIONS:  - Assess for changes in respiratory status  - Assess for changes in mentation and behavior  - Position to facilitate oxygenation and minimize respiratory effort  - Oxygen administered by appropriate delivery if ordered  - Initiate smoking cessation education as indicated  - Encourage broncho-pulmonary hygiene including cough, deep breathe, Incentive Spirometry  - Assess the need for suctioning and aspirate as needed  - Assess and instruct to report SOB or any respiratory difficulty  - Respiratory Therapy support as indicated  Outcome: Progressing     Problem: CARDIOVASCULAR - ADULT  Goal: Maintains optimal cardiac output and hemodynamic stability  Description: INTERVENTIONS:  - Monitor I/O, vital signs and rhythm  - Monitor for S/S and trends of decreased cardiac output  - Administer and titrate ordered vasoactive medications to optimize hemodynamic stability  - Assess quality of pulses, skin color and temperature  - Assess for signs of decreased coronary artery perfusion  - Instruct patient to report change in severity of symptoms  Outcome: Progressing     Problem: DISCHARGE PLANNING  Goal: Discharge to home or other facility with appropriate resources  Description: INTERVENTIONS:  - Identify barriers to discharge w/patient and caregiver  - Arrange for needed discharge resources and transportation as appropriate  - Identify discharge learning needs (meds, wound care, etc )  - Arrange for interpretive services to assist at discharge as needed  - Refer to Case Management Department for coordinating discharge planning if the patient needs post-hospital services based on physician/advanced practitioner order or complex needs related to functional status, cognitive ability, or social support system  Outcome: Progressing

## 2023-01-17 NOTE — ASSESSMENT & PLAN NOTE
Blood Sugar Average: Last 72 hrs:  (P) 115 [id number]   · Home regimen: Tresiba 90 units daily  · Was hypoglycemic due to not liking food, and bipap use   · lantus held for now   · accuchecks q 6 hours given poor po intake   · Change to SSI only   · Adjust as needed and as patient improves with eating

## 2023-01-17 NOTE — ASSESSMENT & PLAN NOTE
· Noted on ECHO  Remote smoking history  Is on BiPAP at night here   Apparently on CPAP at home but question adequacy   · Heart Failure following  · Diuresis as above   · RHC

## 2023-01-17 NOTE — ASSESSMENT & PLAN NOTE
· Patient was on CPAP  Recently transitioned to BiPAP by his pulmonologist but still has not received the machine    · Continue BiPAP overnight and with naps   · CM consult to assist with equipment when closer to discharge    Bengay for lower back pain

## 2023-01-17 NOTE — ASSESSMENT & PLAN NOTE
· Stable   · Bumetanide 1 5 IV BID  · Acetazolamide once today  · Hold home lisinopril and spironolactone  · Continue metoprolol

## 2023-01-18 PROBLEM — R31.9 HEMATURIA: Status: ACTIVE | Noted: 2023-01-18

## 2023-01-18 LAB
ANION GAP SERPL CALCULATED.3IONS-SCNC: 5 MMOL/L (ref 4–13)
APTT PPP: 56 SECONDS (ref 23–37)
APTT PPP: 67 SECONDS (ref 23–37)
APTT PPP: 75 SECONDS (ref 23–37)
BASOPHILS # BLD AUTO: 0.04 THOUSANDS/ÂΜL (ref 0–0.1)
BASOPHILS NFR BLD AUTO: 1 % (ref 0–1)
BUN SERPL-MCNC: 39 MG/DL (ref 5–25)
CALCIUM SERPL-MCNC: 9.4 MG/DL (ref 8.4–10.2)
CHLORIDE SERPL-SCNC: 92 MMOL/L (ref 96–108)
CO2 SERPL-SCNC: 44 MMOL/L (ref 21–32)
CREAT SERPL-MCNC: 1.15 MG/DL (ref 0.6–1.3)
EOSINOPHIL # BLD AUTO: 0.2 THOUSAND/ÂΜL (ref 0–0.61)
EOSINOPHIL NFR BLD AUTO: 3 % (ref 0–6)
ERYTHROCYTE [DISTWIDTH] IN BLOOD BY AUTOMATED COUNT: 18.6 % (ref 11.6–15.1)
GFR SERPL CREATININE-BSD FRML MDRD: 60 ML/MIN/1.73SQ M
GLUCOSE SERPL-MCNC: 107 MG/DL (ref 65–140)
GLUCOSE SERPL-MCNC: 138 MG/DL (ref 65–140)
GLUCOSE SERPL-MCNC: 155 MG/DL (ref 65–140)
GLUCOSE SERPL-MCNC: 165 MG/DL (ref 65–140)
GLUCOSE SERPL-MCNC: 193 MG/DL (ref 65–140)
GLUCOSE SERPL-MCNC: 202 MG/DL (ref 65–140)
GLUCOSE SERPL-MCNC: 227 MG/DL (ref 65–140)
HCT VFR BLD AUTO: 34.5 % (ref 36.5–49.3)
HGB BLD-MCNC: 9.2 G/DL (ref 12–17)
IMM GRANULOCYTES # BLD AUTO: 0.04 THOUSAND/UL (ref 0–0.2)
IMM GRANULOCYTES NFR BLD AUTO: 1 % (ref 0–2)
LYMPHOCYTES # BLD AUTO: 1.57 THOUSANDS/ÂΜL (ref 0.6–4.47)
LYMPHOCYTES NFR BLD AUTO: 20 % (ref 14–44)
MCH RBC QN AUTO: 21.3 PG (ref 26.8–34.3)
MCHC RBC AUTO-ENTMCNC: 26.7 G/DL (ref 31.4–37.4)
MCV RBC AUTO: 80 FL (ref 82–98)
MONOCYTES # BLD AUTO: 1.03 THOUSAND/ÂΜL (ref 0.17–1.22)
MONOCYTES NFR BLD AUTO: 13 % (ref 4–12)
NEUTROPHILS # BLD AUTO: 5.17 THOUSANDS/ÂΜL (ref 1.85–7.62)
NEUTS SEG NFR BLD AUTO: 62 % (ref 43–75)
NRBC BLD AUTO-RTO: 0 /100 WBCS
PLATELET # BLD AUTO: 174 THOUSANDS/UL (ref 149–390)
PMV BLD AUTO: 11.1 FL (ref 8.9–12.7)
POTASSIUM SERPL-SCNC: 3.9 MMOL/L (ref 3.5–5.3)
RBC # BLD AUTO: 4.32 MILLION/UL (ref 3.88–5.62)
SODIUM SERPL-SCNC: 141 MMOL/L (ref 135–147)
WBC # BLD AUTO: 8.05 THOUSAND/UL (ref 4.31–10.16)

## 2023-01-18 RX ORDER — ECHINACEA PURPUREA EXTRACT 125 MG
2 TABLET ORAL 4 TIMES DAILY
Status: DISCONTINUED | OUTPATIENT
Start: 2023-01-18 | End: 2023-01-22 | Stop reason: HOSPADM

## 2023-01-18 RX ADMIN — LIDOCAINE 5% 2 PATCH: 700 PATCH TOPICAL at 08:23

## 2023-01-18 RX ADMIN — DIGOXIN 125 MCG: 125 TABLET ORAL at 08:23

## 2023-01-18 RX ADMIN — BUMETANIDE 1.5 MG: 0.25 INJECTION, SOLUTION INTRAMUSCULAR; INTRAVENOUS at 08:23

## 2023-01-18 RX ADMIN — INSULIN LISPRO 1 UNITS: 100 INJECTION, SOLUTION INTRAVENOUS; SUBCUTANEOUS at 23:14

## 2023-01-18 RX ADMIN — INSULIN LISPRO 1 UNITS: 100 INJECTION, SOLUTION INTRAVENOUS; SUBCUTANEOUS at 05:20

## 2023-01-18 RX ADMIN — METOPROLOL SUCCINATE 25 MG: 25 TABLET, EXTENDED RELEASE ORAL at 08:23

## 2023-01-18 RX ADMIN — HEPARIN SODIUM 15.1 UNITS/KG/HR: 10000 INJECTION, SOLUTION INTRAVENOUS at 14:24

## 2023-01-18 RX ADMIN — BUMETANIDE 1.5 MG: 0.25 INJECTION, SOLUTION INTRAMUSCULAR; INTRAVENOUS at 18:12

## 2023-01-18 RX ADMIN — SALINE NASAL SPRAY 2 SPRAY: 1.5 SOLUTION NASAL at 14:28

## 2023-01-18 RX ADMIN — SALINE NASAL SPRAY 2 SPRAY: 1.5 SOLUTION NASAL at 23:14

## 2023-01-18 RX ADMIN — FAMOTIDINE 40 MG: 20 TABLET ORAL at 23:14

## 2023-01-18 RX ADMIN — PANTOPRAZOLE SODIUM 40 MG: 40 TABLET, DELAYED RELEASE ORAL at 18:12

## 2023-01-18 RX ADMIN — INSULIN LISPRO 2 UNITS: 100 INJECTION, SOLUTION INTRAVENOUS; SUBCUTANEOUS at 12:02

## 2023-01-18 RX ADMIN — NYSTATIN: 100000 POWDER TOPICAL at 18:12

## 2023-01-18 RX ADMIN — NYSTATIN: 100000 POWDER TOPICAL at 08:32

## 2023-01-18 RX ADMIN — METOPROLOL SUCCINATE 25 MG: 25 TABLET, EXTENDED RELEASE ORAL at 18:12

## 2023-01-18 RX ADMIN — PANTOPRAZOLE SODIUM 40 MG: 40 TABLET, DELAYED RELEASE ORAL at 05:20

## 2023-01-18 RX ADMIN — ATORVASTATIN CALCIUM 40 MG: 40 TABLET, FILM COATED ORAL at 08:23

## 2023-01-18 NOTE — ASSESSMENT & PLAN NOTE
Recent Labs     01/16/23  1052   PHVEN 7 422*   [id number] 76 4*   PO2VEN 122 5*   [id number] 48 7*         · Hypoxia on arrival, desaturations to 77% on room air  Denies home O2 use      · Continue BiPAP, IPAP 20, EPAP 8   · Continue at night (baseline) as well as if the patient naps during the day  · Wean O2 as tolerated to maintain SPO2 >88%   · Respiratory protocol  · Monitor mental status  · If respiratory status does not improve may consider consult pulmonology  · V/Q and PFT outpatient

## 2023-01-18 NOTE — ASSESSMENT & PLAN NOTE
Blood Sugar Average: Last 72 hrs:  (P) 159 75   · Home regimen: Tresiba 90 units daily  · Was hypoglycemic due to not liking food, and bipap use   · lantus held for now   · accuchecks q 6 hours given poor po intake   · Change to SSI only   · Adjust as needed and as patient improves with eating

## 2023-01-18 NOTE — PLAN OF CARE
Problem: PHYSICAL THERAPY ADULT  Goal: Performs mobility at highest level of function for planned discharge setting  See evaluation for individualized goals  Description: Treatment/Interventions: Functional transfer training, LE strengthening/ROM, Elevations, Therapeutic exercise, Endurance training, Cognitive reorientation, Patient/family training, Equipment eval/education, Bed mobility, Gait training, Spoke to nursing     See flowsheet documentation for full assessment, interventions and recommendations  Outcome: Progressing  Note: Prognosis: Good  Problem List: Decreased strength, Decreased endurance, Impaired balance, Decreased mobility, Decreased cognition, Decreased safety awareness, Impaired hearing, Obesity, Decreased skin integrity  Assessment: pt began tx session lying supine in the bed and was agreeable to participate in PT intervention  pt continues to remain consistance with mod Ax1 for all bed mobility including rolling and repositioning in the bed from L to R na completing a supine<>sit EOB transfer with HHA in order to assist with pulling up into a seated EOB position  pt was able to sit EOB >10 minutes w/o LOB while perrorming TE activities in order to increase static/dynamic sitting balance and tolerance  pt was able to complete 3 STS in todays tx session to and from RW  pt required VC's for hand placement in order to increase safety and balance  On first 2 STS pt required mod Ax2 but on last STS pt required a decrease in assistance min Ax2  pt continues to remain limited with standing tolerance as pt stood between 45 seconds and 1 minute and 10 seconds before requesting a therapeutic seated rest break  pt was also limited with ambulation as pt was able to ambulate 2' fwd and 2' back RW and mod Ax1  pt would benefit from continued skilled PT intervenmtion in order to increase functional mobility, activity and standing tolerance and ambulation as appropriate   Continue to recommend post acute rehab services at the time of D/C in order to maximize pt functional independence and safety with all OOB mobility  post tx pt in bed with call bell, RN present and all pt needs met  Barriers to Discharge: Decreased caregiver support, Inaccessible home environment     PT Discharge Recommendation: Post acute rehabilitation services    See flowsheet documentation for full assessment

## 2023-01-18 NOTE — ASSESSMENT & PLAN NOTE
Recent Labs     01/17/23  0440 01/17/23  1534 01/18/23  0820   HGB 9 9* 9 8* 9 2*       Noted two days ago  Since resolved  Patient denies  pain  Urology following    · Currently anticoagulated with heparin  · CT yesterday has been read - awaiting urology recommendations  · Outpatient cystoscopy

## 2023-01-18 NOTE — DISCHARGE INSTR - AVS FIRST PAGE
Carlin Cath Care instructions---Maintain carlin catheter to straight drainage  May use leg bag and shower  May flush TID prn using Joon syringe and 120 ml NSS  May use more saline ad salty to prevent/treat cath obstruction  Remove catheter on 8th day rehab by 0600 hrs  Bladder scan if no void or less than 200ml in 6hrs  Straight cath for bladder scan >350ml and repeat process  If patient requires straight cath x3 , re-insert carlin and call for Urologist follow-up  Information above  Carlin can remain in place for up to 4 weeks at a time  Carlin placed at [facility] on 1/13/2023  Dear Luciano Nino,     It was our pleasure to care for you here at [facility]  It is our hope that we were always able to exceed the expected standards for your care during your stay  You were hospitalized due to ***  You were cared for on the *** floor by Maco Marcelo MD under the service of Gil Buckner MD with the [facility] who covers for your primary care physician (PCP), Hanh-Dung Pollie Opitz, MD, while you were hospitalized  If you have any questions or concerns related to this hospitalization, you may contact us at [phone]  For follow up as well as any medication refills, we recommend that you follow up with your primary care physician  A registered nurse will reach out to you by phone within a few days after your discharge to answer any additional questions that you may have after going home    However, at this time we provide for you here, the most important instructions / recommendations at discharge:     Notable Medication Adjustments -   Torsemide 40 mg  Testing Required after Discharge -   Cystoscopy  Pulmonary function testing  Echocardiography  Important follow up information -   Cardiology  PCP  Urology  Pulmonology  Please review this entire after visit summary as additional general instructions including medication list, appointments, activity, diet, any pertinent wound care, and other additional recommendations from your care team that may be provided for you        Sincerely,     Erik Champion MD none

## 2023-01-18 NOTE — ASSESSMENT & PLAN NOTE
· Patient was on CPAP  Recently transitioned to BiPAP by his pulmonologist but still has not received the machine    · Continue BiPAP overnight and with naps   · CM consult to assist with equipment when closer to discharge  · Bengay for lower back pain  · Nasal saline spray

## 2023-01-18 NOTE — PROGRESS NOTES
Advanced Heart Failure/Pulmonary Hypertension Service Progress Note - Chris Lemon 77 y o  male MRN: [id number]    Unit/Bed#: S -01 Encounter: [id number]      Assessment:  77 y o  male Hx per chart p/w ADHF, PH, respiratory failure  # Acute on chronic HFpEF  # Pulmonary hypertension with RV dysfunction  PASP about 80 by echo (while decompensated)  Likely group II disease diastolic heart failure and group III disease, cannot rule out groups 4 and 1 now, or phenotypic group 1 due to chronic dz  Has LUCIA, on cpap since 2014 but outpt notes indicate possible worse AHI  and machine function/fitting issues that seem unresolved  Good compliance >5 hr per night  No h/o PE/DVT  On AC for AF  Remote smoking, quit 2010  CT chest 4/2021: Pleural thickening with pleural calcifications in the dependent portion of the right mid and lower lung field, 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  Suspected round atelectasis in the posterior right lower lobe  No h/o illicit drug use  # CAD s/p CABG in 2016 (LIMA to LAD, SVG to OM3 and SVG to PDA) in 2016  # LUCIA on cpap  # Paroxysmal atrial fibrillation, s/p cardioversion 1/7/22  also AFL on old ECGs  AC: Eliquis  Rate: metoprolol and digoxin  # Hyperlipidemia  # DM type II  Hemoglobin A1c 7 8 11/2/2022  # Hypertension  # LACY  # Hemorrhoids with rectal bleeding  RBBB  Fall, as inpt this admission 1/2023  CTH neg  Hematuria 1/16/23, holding AC, uro following, has known renal stones  Has raegan        Reviewed all pertinent labs/imaging/data including:  Cr: mild LACY peaked 1 5>>1 07>1 1 (BL around 1 0)  Bicarb up but downtrending; note that BL somewhat elevated 2/2 chronic retention  PCO2 on ABG 1/16 was 76 while on bipap, pH 7 4    Weight (last 2 days)     Date/Time Weight    01/18/23 0530 --     Weight: bedscale broken, unable to stand patient safetly at 01/18/23 0530    01/17/23 0544 128 (282 85)     Weight: pt cant stand safetly at 01/17/23 0544    01/16/23 0600 128 (282 19)           Intake/Output Summary (Last 24 hours) at 1/18/2023 0835  Last data filed at 1/18/2023 3238  Gross per 24 hour   Intake 1500 ml   Output 2450 ml   Net -950 ml     Tele: AF, rates 70-80s    Drips:  heparin (porcine), 3-20 Units/kg/hr (Order-Specific), Last Rate: 13 1 Units/kg/hr (01/18/23 0755)         Plan:  Waiting 1/18 labs  Continues mentating better/at baseline, on O2 NC, warm, approaching euvolemia by exam  labs, UO about net neg 20L since admit, wt trend all suggest improvement (unreliable wt trend but historical best around 300lbs and wt now recorded as 280s  Good response to bumex gtt and diamox over last weekend    Has Severe PH with TRmaxvel 4 0 range by echo, +significant RV dysfunction, small LV, BP normal to high now (few lower readings earlier in course)    Deescalated Diuresis bumex gtt to 2 IV tid to bumex 1 5 bid IV with intermittant diamox    Hold diamox 1/18  Strict IOs and daily weights  Keep K>4, Mg>2  Goal net neg 1-2L in 24 hr  Home diuretic: lasix 80 bid    Will check 12 lead ECG for ectopy on tele    Plan for RHC as soon as lab available - Thursday  Npo after midnight  Complete hematuria workup per uro and primary, on hep gtt  Hold eliquis before RHC x 3-4 doses    Ok to hold home lisinopril 20 qd and aldactone 25 qd for the moment; if BP trend continues SBP>130 and stable, with good renal fxn, can start lisinopril 5 qd    Says he is not uses cpap qhs as inpt - he should use this at night    Switch rate control agents toprol xl and dig to IV if not getting po on bipap    Checked new dig level>was ok at 0 9    VQ scan later this week vs outpt    PFTs outpt, I cannot see prior results in epic    Could consider entresto and/or sglti as outpt    If worsening CO2 retention, would cx pulm    Studies:    EKG - my read:  2022: AF, RAD, RBBB (old), non specific STT changes    Echocardiogram 1/11/23  LVEF: 55%  LVIDd: 4 8cm  RV: not well visualized; dilated RV>LV; reduced systolic function  MR: moderate MAC, trace MR  PASP: 83mmHg; estimated RAP 15, peak TR velocity 4 1  RVOT: suboptimal envelope, mid to late systolic notching; interventricular diastolic and systolic septal flattening  Other: grade 2 diastology, mild JONNY; mildly increased LV wall thickness     Echo 12/28/21:  LVEF 55%, mildly increased wall thickness  RV mildly dilated, normal systolic function  PASP [id number]     Echo 3/23/21:  LVEF 60%, mildly increased wall thickness  Grade 1 diastology  RV mildly dilated, mildly reduced systolic function  PASP [id number]  Subjective and Interval Events:   Patient seen and examined  No new complaints  Feels much better    ROS:  10 point ROS negative except as specified above      Tele: reviewed    Objective:     Physical Exam:  /64   Pulse 83   Temp 98 4 °F (36 9 °C)   Resp 19   Ht 6' 4" (1 93 m)   Wt 128 kg (282 lb 13 6 oz) Comment: pt cant stand safetly  SpO2 94%   BMI 34 43 kg/m²   Ranges:  Temp:  [98 3 °F (36 8 °C)-98 4 °F (36 9 °C)] 98 4 °F (36 9 °C)  HR:  [79-89] 83  Resp:  [19-20] 19  BP: (111-136)/(48-72) 111/64    Weight (last 2 days)     Date/Time Weight    01/18/23 0530 --     Weight: bedscale broken, unable to stand patient safetly at 01/18/23 0530    01/17/23 0544 128 (282 85)     Weight: pt cant stand safetly at 01/17/23 0544    01/16/23 0600 128 (282 19)           Intake/Output Summary (Last 24 hours) at 1/18/2023 0835  Last data filed at 1/18/2023 0827  Gross per 24 hour   Intake 1500 ml   Output 2450 ml   Net -950 ml     Constitutional: alert, oriented  Ears/nose/mouth/throat: atraumatic  CV: irreg, no JVD/HJR  Resp: Decreased breath sounds BL  GI: Soft, NTND  MSK: no swollen joints in exposed areas  Extr: trace edema, warm LE  Pysche: normal affect  Neuro: appropriate in conversation  Skin: dry and intact in exposed areas    Labs/Imaging/Data:   Results from last 7 days   Lab Units 01/17/23  1534 01/17/23  0440 01/16/23  1553 01/16/23  0438 01/15/23  0825 01/14/23  0902 01/14/23  0850 01/14/23  0508 01/14/23  0123 01/13/23  0521 01/12/23  0637 01/11/23  0856   WBC Thousand/uL 8 40 8 14  --  7 28 8 67  --  9 51 10 17* 10 94*   < > 9 71 8 65   HEMOGLOBIN g/dL 9 8* 9 9* 10 5* 10 8* 10 8*  --  9 1* 9 0* 9 7*   < > 9 7* 10 0*   I STAT HEMOGLOBIN g/dl  --   --   --   --   --  10 9*  --   --   --   --   --   --    HEMATOCRIT % 36 6 37 3 39 5 41 3 41 0  --  35 6* 35 2* 38 0   < > 38 1 38 4   HEMATOCRIT, ISTAT %  --   --   --   --   --  32*  --   --   --   --   --   --    PLATELETS Thousands/uL 158 168  --  161 158  --  141* 147* 146*   < > 185 166   NEUTROS PCT %  --   --   --   --   --   --   --   --   --   --  71 75   MONOS PCT %  --   --   --   --   --   --   --   --   --   --  13* 11    < > = values in this interval not displayed        Results from last 7 days   Lab Units 01/17/23  1820 01/17/23  0440 01/16/23  0438 01/15/23  0825 01/14/23  1526 01/14/23  0902 01/14/23  4783 01/14/23  0036 01/13/23  0521 01/12/23  0637 01/11/23  0856   SODIUM mmol/L 138 139 142 142 146  --  145 141 141 143 141   POTASSIUM mmol/L 4 3 4 4 4 4 5 1 4 7  --  5 0 5 8* 5 7* 5 4* 5 1   CHLORIDE mmol/L 89* 89* 89* 87* 93*  --  94* 96 96 98 100   CO2 mmol/L 42* 43* >45* >45* >45*  --  >45* 40* 43* 39* 38*   CO2, I-STAT mmol/L  --   --   --   --   --  >45*  --   --   --   --   --    ANION GAP mmol/L 7 7  --   --   --   --   --  5 2* 6 3*   BUN mg/dL 40* 38* 37* 37* 39*  --  39* 41* 37* 34* 35*   CREATININE mg/dL 1 28 1 11 1 07 1 24 1 20  --  1 15 1 24 1 51* 1 24 1 13   CALCIUM mg/dL 9 5 10 1 10 4* 10 6* 10 0  --  9 5 9 5 9 7 9 2 9 1   GLUCOSE RANDOM mg/dL 167* 148* 151* 116 39*  --  96 87 87 59* 128      Results from last 7 days   Lab Units 01/17/23  0440 01/14/23  0036 01/11/23  0856   MAGNESIUM mg/dL 2 3 2 1 2 2   PHOSPHORUS mg/dL  --  3 0  --        Results from last 7 days   Lab Units 01/18/23  0528 01/17/23  2155 01/17/23  1534 01/15/23  0636 01/14/23  2316 01/14/23  1526 01/14/23  0850   INR --   --  1 07  --   --   --  1 17   PTT seconds 67* 48* 29 58* 66* 51* 31                 ABG:  Results from last 7 days   Lab Units 01/14/23  1208   PH ART  7 468*   PCO2 ART mm Hg 66 9*   PO2 ART mm Hg 66 7*   HCO3 ART mmol/L 47 4*   BASE EXC ART mmol/L 20 7   ABG SOURCE  Radial, Right      VBG:  Results from last 7 days   Lab Units 01/16/23  1052 01/14/23  1208   PH JANICE  7 422*  --    PCO2 JANICE mm Hg 76 4*  --    PO2 JANICE mm Hg 122 5*  --    HCO3 JANICE mmol/L 48 7*  --    BASE EXC JANICE mmol/L 20 7  --    ABG SOURCE   --  Radial, Right     No results found for: LDH    heparin (porcine), 3-20 Units/kg/hr (Order-Specific), Last Rate: 13 1 Units/kg/hr (01/18/23 0755)      Current Facility-Administered Medications   Medication Dose Route Frequency Provider Last Rate   • acetaminophen  650 mg Oral Q6H PRN Mi Agee PA-C     • atorvastatin  40 mg Oral Daily Taisha Burks MD     • bumetanide  1 5 mg Intravenous BID Monica Barr MD     • dextrose  25 mL Intravenous PRN Marty Buchanan PA-C     • dicyclomine  10 mg Oral BID PRN Taisha Burks MD     • digoxin  125 mcg Oral Daily Taisha Burks MD     • famotidine  40 mg Oral HS Taisha Burks MD     • heparin (porcine)  3-20 Units/kg/hr (Order-Specific) Intravenous Titrated Sujey Jang MD 13 1 Units/kg/hr (01/18/23 0755)   • insulin lispro  1-5 Units Subcutaneous Q6H [address] Carl Weaver PA-C     • lidocaine  2 patch Topical Daily Marty Mims PA-C     • loperamide  2 mg Oral TID PRN Kisha Sue PA-C     • menthol-methyl salicylate   Apply externally 4x Daily PRN Vikin Marylen Golds, MD     • metoprolol succinate  25 mg Oral BID Oneil Lombardi PA-C     • nystatin   Topical BID KACEY Lerma     • pantoprazole  40 mg Oral BID AC Taisha Burks MD         Invasive Devices     Peripheral Intravenous Line  Duration           Peripheral IV 01/14/23 Dorsal (posterior); Left Forearm 3 days    Long-Dwell Peripheral IV (Midline) 01/17/23 Left Cephalic <1 day          Drain  Duration           Urethral Catheter Straight-tip 16 Fr  4 days                Counseling / Coordination of Care: Total floor / unit time spent today 32 minutes  Greater than 50% of total time was spent with the patient and / or family counseling and / or coordination of care  A description of the counseling / coordination of care: we discussed diagnoses, recent as well as older studies, labs, and all changes in cardiac treatment plan  All patient questions were answered  Thank you for the opportunity to participate in the care of this patient      Jr Gimenez MD  Attending Physician  Advanced Heart Failure and Transplant Cardiology  [address] [place] [address]

## 2023-01-18 NOTE — ASSESSMENT & PLAN NOTE
Digoxin 0 9 1/16  · Rate control with metoprolol and digoxin   · On telemetry  · Anticoagulation with heparin

## 2023-01-18 NOTE — ASSESSMENT & PLAN NOTE
Wt Readings from Last 3 Encounters:   01/18/23 126 kg (277 lb)   12/15/22 (!) 139 kg (306 lb)   11/21/22 (!) 138 kg (304 lb)     · Patient presenting with worsening lower extremity edema, abdominal tightness, weight gain of 21 pounds in 2 to 3 weeks, PND  Patient denies any dietary or medication noncompliance  · Chronically on PO Lasix 80mg BID at home     · ECHO shows EF 55%, grade 2 DD, dilated RV with reduced systolic function and pulmonary hypertension   · Cardiology and Heart Failure following   · Has received two doses acetazolamide while inpatient    RHC Thursday - NPO after midnight  - on heparin (last PTT 67)  - Bumetanide 1 5 IV BID  - hold spironolactone and lisinopril  - continue metoprolol  - anticoagulated with heparin

## 2023-01-18 NOTE — CASE MANAGEMENT
Case Management Discharge Planning Note    Patient name Mona Parents  Location S /S -01 MRN [id number]  : 1945 Date 2023       Current Admission Date: 1/10/2023  Current Admission Diagnosis:Acute on chronic diastolic (congestive) heart failure [facility])   Patient Active Problem List    Diagnosis Date Noted   • Hematuria 2023   • BRBPR (bright red blood per rectum) 2023   • Pulmonary hypertension ([facility] ) 2022   • Chronic acquired lymphedema 10/26/2022   • Cirrhosis of liver without ascites ([facility] ) 2022   • Hallucination 2022   • Jerking movements of extremities 2022   • Vitamin D deficiency 08/10/2022   • Gastroesophageal reflux disease 2022   • PAD (peripheral artery disease) ([facility] ) 2022   • Atrial fibrillation with [facility] response [facility] 2021   • Cellulitis and abscess of left lower extremity 2021   • Ureteral calculi 12/15/2021   • Benign prostatic hyperplasia without lower urinary tract symptoms 12/15/2021   • Hypokalemia 10/19/2021   • Persistent proteinuria 2021   • Nipple pain    • Folliculitis    • Venous stasis dermatitis of both lower extremities 2021   • Blister of right leg 2021   • Myocardial infarction [facility])    • Blister of left leg 2021   • Hydroureter on left 2021   • Nephrolithiasis 2021   • Fall 2021   • LACY (acute kidney injury) ([facility] ) 2021   • Hypercalcemia 2021   • Scaly skin on examination 2021   • Acute on chronic diastolic (congestive) heart failure ([facility] ) 2021   • Left upper lobe pulmonary nodule 2021   • Acute respiratory failure with hypoxia and hypercapnia ([facility] ) 2021   • Hypoxia 2021   • Bilateral edema of lower extremity 2021   • LUCIA (obstructive sleep apnea) 2021   • Embolism and thrombosis of arteries of the lower extremities ([facility] ) 2021   • Colitis 2020   • Adrenal nodule ([facility] ) 12/04/2020   • Left lower quadrant abdominal pain 12/03/2020   • Excessive gas 12/03/2020   • Morbid obesity ([facility] ) 12/03/2020   • 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   • Immunization deficiency 10/30/2020   • Chronic pain of both knees 07/31/2020   • Actinic keratosis of right temple 07/31/2020   • Actinic keratosis of scalp 07/31/2020   • Decreased pedal pulses 04/04/2019   • Hypertension    • Hyperlipemia    • Type 2 diabetes mellitus with hyperglycemia, with long-term current use of insulin (HCC)    • Chronic low back pain    • CAD (coronary artery disease)    • Malignant neoplasm of urinary bladder (HCC)    • Arthritis    • Paroxysmal atrial fibrillation (HCC)    • AAA (abdominal aortic aneurysm)       LOS (days): 8  Geometric Mean LOS (GMLOS) (days): 3 90  Days to GMLOS:-4 2     OBJECTIVE:  Risk of Unplanned Readmission Score: 21 74         Current admission status: Inpatient   Preferred Pharmacy:   [address]  Phone: [phone] Fax: [phone]    Primary Care Provider: Jin Daley MD    Primary Insurance: MEDICARE  Secondary Insurance: [facility]    DISCHARGE DETAILS:    Discharge planning discussed with[de-identified] patient sleeping, so call made to family to discuss d/c plan; cell phone for spouse was actually patient's, which daughterTeresita, has - spoke with her re: discharge plan  Freedom of Choice: Yes (re: rehab recommendation vs home care)  Comments - Freedom of Choice: daughter requesting rehab referral be sent and email sent to her of local facilities [email or web address]); reports patient's spouse unable to care for her at home in current condition  CM contacted family/caregiver?: Yes (Teresita lew, by phone ([phone]))  Were Treatment Team discharge recommendations reviewed with patient/caregiver?: Yes  Did patient/caregiver verbalize understanding of patient care needs?: Yes  Were patient/caregiver advised of the risks associated with not following Treatment Team discharge recommendations?: Yes    Contacts  Patient Contacts: Aimee Fraire, daughter  Relationship to Patient[de-identified] Family  Contact Method: Phone  Phone Number: [phone]  Reason/Outcome: Emergency Contact              Other Referral/Resources/Interventions Provided:  Interventions: Short Term Rehab  Referral Comments: PT/OT recommending rehab, but per prior CM notes, patient had been refusing  Patient remains on 6L o2 acutely and BiPap while sleeping  Patient was an assist x2 with therapist, so call made to patient's spouse, Safia Restrepo ([phone]), to review d/c plan and current care needs  Patient's daughter, Aimee Doctor, answered the phone and relayed that this number is actually patient's cell phone - spouse only has the house number and is currently staying at her home, so she would be the best contact at this time  Reviewed PT/OT's recommendations, and daughter reports that she had been assuming patient would be going to rehab prior to coming home  Aware that he's not mobile enough to return as family would be unable to care for him if he's bedbound  Also confirmed that patient has no home o2 and only has a CPAP at home prior to hospitalization, although reports that patient had been saying he needs to get BiPap arranged  Daughter requesting rehab referrals to be sent and reports that she will discuss same with her dad next time she is in  Listing for local facilities emailed to her at: [email or web address] for her to review  Aware that this writer will be in touch to discuss bed offers once received  Will also review rehab recommendation again with patient as able  Patient scheduled to have CATH tomorrow      Would you like to participate in our [address] service program?  : No - Declined    Treatment Team Recommendation: Short Term Rehab  Discharge Destination Plan[de-identified] Short Term Rehab  Transport at Discharge : BLS Ambulance

## 2023-01-18 NOTE — ASSESSMENT & PLAN NOTE
· Stable   · Bumetanide 1 5 IV BID  · Hold home lisinopril and spironolactone  · Continue metoprolol

## 2023-01-18 NOTE — WOUND OSTOMY CARE
Progress Note - Wound   Sana Abad 77 y o  male MRN: [id number]  Unit/Bed#: S -01 Encounter: [id number]      Assessment Findings:   Patient seen today for wound care follow up assessment  Patient is continent of bowel and currently has indwelling carlin catheter  Patient is independent on ADLs at baseline      1  Venous ulcers B/L legs- wounds and edema have vastly improved since last week's assessment as patient's edema has significantly improved  Right leg is nearly resolved and partial thickness  Left leg significantly reduced in size, still the larger wound of the two legs and full thickness with pink tissue, small amount of drainage  Continuing same regimen and applied new dressings and ACE wraps      2  Venous ulcer left anterior foot- RESOLVED, incorporated in wrap but can be open to air       Sacral/buttocks and B/L heels intact and blanching      No induration, fluctuance, odor, warmth/temperature differences, redness, or purulence noted to the above noted wounds and skin areas assessed  Patient tolerated well- no s/s of non-verbal pain or discomfort observed during the encounter  Bedside nurse aware of plan of care  Wife at bedside also made aware of plan of care, active participant of patient's care at home  See flow sheets for more detailed assessment findings  Wound care will continue to follow       Skin care plans:  1-Hydraguard to bilateral sacrum, buttock and heels BID and PRN  2-Elevate heels to offload pressure  3-Ehob cushion in chair when out of bed  4-Moisturize skin daily with skin nourishing cream   5-Turn/reposition q2h or when medically stable for pressure re-distribution on skin  6-Cleanse B/L legs and left foot with soap and water, pat dry, apply hydraguard to dry skin on legs and feet, apply adaptic and silver alginate to wound beds, cover with ABD, secure with rocío and tape   Wrap with 4 inch ACE wrap from toes above ankle and then 6 inch ACE wrap from ankle to knee, make sure to be able to place two fingers to test for tightness         Wound 01/10/23 Venous Ulcer Pretibial Left (Active)   Wound Image   01/18/23 1055   Wound Description Beefy red 01/18/23 1055   Pressure Injury Stage O 01/10/23 1707   Cynthia-wound Assessment Fragile;Roper 01/18/23 1055   Wound Length (cm) 13 cm 01/18/23 1055   Wound Width (cm) 1 cm 01/18/23 1055   Wound Depth (cm) 0 2 cm 01/18/23 1055   Wound Surface Area (cm^2) 13 cm^2 01/18/23 1055   Wound Volume (cm^3) 2 6 cm^3 01/18/23 1055   Calculated Wound Volume (cm^3) 2 6 cm^3 01/18/23 1055   Change in Wound Size % 87 5 01/18/23 1055   Tunneling 0 cm 01/18/23 1055   Tunneling in depth located at 0 01/18/23 1055   Undermining 0 01/18/23 1055   Undermining is depth extending from 0 01/18/23 1055   Wound Site Closure KIERAN 01/18/23 1055   Drainage Amount Small 01/18/23 1055   Drainage Description Serosanguineous 01/18/23 1055   Non-staged Wound Description Full thickness 01/18/23 1055   Treatments Cleansed;Irrigation with NSS 01/18/23 1055   Dressing ABD;Calcium Alginate with Silver;Dry dressing;Non adherent 01/18/23 1055   Wound packed? No 01/18/23 1055   Packing- # removed 0 01/18/23 1055   Packing- # inserted 0 01/18/23 1055   Dressing Changed New 01/18/23 1055   Patient Tolerance Tolerated well 01/18/23 1055   Dressing Status Clean;Dry; Intact 01/18/23 1055       Wound 01/10/23 Venous Ulcer Foot Anterior; Left (Active)   Wound Image   01/18/23 1054   Wound Description Epithelialization 01/18/23 1054   Cynthia-wound Assessment Pink 01/18/23 1054   Wound Length (cm) 0 cm 01/18/23 1054   Wound Width (cm) 0 cm 01/18/23 1054   Wound Depth (cm) 0 cm 01/18/23 1054   Wound Surface Area (cm^2) 0 cm^2 01/18/23 1054   Wound Volume (cm^3) 0 cm^3 01/18/23 1054   Calculated Wound Volume (cm^3) 0 cm^3 01/18/23 1054   Tunneling 0 cm 01/18/23 1054   Tunneling in depth located at 0 01/18/23 1054   Undermining 0 01/18/23 1054   Undermining is depth extending from 0 01/18/23 1054 Wound Site Closure KIERAN 01/18/23 1054   Wound packed? No 01/18/23 1054   Packing- # removed 0 01/18/23 1054   Packing- # inserted 0 01/18/23 1054   Dressing Changed New 01/18/23 1054   Patient Tolerance Tolerated well 01/18/23 1054   Dressing Status Clean;Dry; Intact 01/18/23 1054       Wound 01/10/23 Venous Ulcer Pretibial Right (Active)   Wound Image   01/18/23 1051   Wound Description Beefy red 01/18/23 1051   Pressure Injury Stage O 01/10/23 1707   Cynthia-wound Assessment Hyperpigmented 01/18/23 1051   Wound Length (cm) 1 cm 01/18/23 1051   Wound Width (cm) 1 cm 01/18/23 1051   Wound Depth (cm) 0 1 cm 01/18/23 1051   Wound Surface Area (cm^2) 1 cm^2 01/18/23 1051   Wound Volume (cm^3) 0 1 cm^3 01/18/23 1051   Calculated Wound Volume (cm^3) 0 1 cm^3 01/18/23 1051   Change in Wound Size % 83 33 01/18/23 1051   Tunneling 0 cm 01/18/23 1051   Tunneling in depth located at 0 01/18/23 1051   Undermining 0 01/18/23 1051   Undermining is depth extending from 0 01/18/23 1051   Wound Site Closure KIERAN 01/18/23 1051   Drainage Amount Scant 01/18/23 1051   Drainage Description Serosanguineous 01/18/23 1051   Non-staged Wound Description Partial thickness 01/18/23 1051   Treatments Cleansed 01/18/23 1051   Dressing Dry dressing;Non adherent 01/18/23 1051   Wound packed? No 01/18/23 1051   Packing- # removed 0 01/18/23 1051   Packing- # inserted 0 01/18/23 1051   Dressing Changed New 01/18/23 1051   Patient Tolerance Tolerated well 01/18/23 1051   Dressing Status Clean;Dry; Intact 01/18/23 1051         Lynda Wilks RN, [facility]

## 2023-01-18 NOTE — PROGRESS NOTES
[facility]  Progress Note - Nishi Malik 1945, 77 y o  male MRN: [id number]  Unit/Bed#: S -Cathie Encounter: [id number]  Primary Care Provider: Regina Celeste MD   Date and time admitted to hospital: 1/10/2023 12:21 PM    * Acute on chronic diastolic (congestive) heart failure [facility])  Assessment & Plan  Wt Readings from Last 3 Encounters:   01/18/23 126 kg (277 lb)   12/15/22 (!) 139 kg (306 lb)   11/21/22 (!) 138 kg (304 lb)     · Patient presenting with worsening lower extremity edema, abdominal tightness, weight gain of 21 pounds in 2 to 3 weeks, PND  Patient denies any dietary or medication noncompliance  · Chronically on PO Lasix 80mg BID at home  · ECHO shows EF 55%, grade 2 DD, dilated RV with reduced systolic function and pulmonary hypertension   · Cardiology and Heart Failure following   · Has received two doses acetazolamide while inpatient    RHC Thursday - NPO after midnight  - on heparin (last PTT 67)  - Bumetanide 1 5 IV BID  - hold spironolactone and lisinopril  - continue metoprolol  - anticoagulated with heparin    Acute respiratory failure with hypoxia and hypercapnia (HCC)  Assessment & Plan  Recent Labs     01/16/23  1052   PHVEN 7 422*   KJJ4PAG 76 4*   PO2VEN 122 5*   EPG3STG 48 7*         · Hypoxia on arrival, desaturations to 77% on room air  Denies home O2 use  · Continue BiPAP, IPAP 20, EPAP 8   · Continue at night (baseline) as well as if the patient naps during the day  · Wean O2 as tolerated to maintain SPO2 >88%   · Respiratory protocol  · Monitor mental status  · If respiratory status does not improve may consider consult pulmonology  · V/Q and PFT outpatient    Hematuria  Assessment & Plan  Recent Labs     01/17/23  0440 01/17/23  1534 01/18/23  0820   HGB 9 9* 9 8* 9 2*       Noted two days ago  Since resolved  Patient denies  pain  Urology following    · Currently anticoagulated with heparin  · CT yesterday has been read - awaiting urology recommendations  · Outpatient cystoscopy    BRBPR (bright red blood per rectum)  Assessment & Plan  · Noted on 1/12  Patient reports history of large hemorrhoid that has bled in the past   · Hemoglobin has been stable   · Monitor Hgb  · No intervention at this time   · No bleeding per nursing     Pulmonary hypertension ([facility] )  Assessment & Plan  · Noted on ECHO  Remote smoking history  Is on BiPAP at night here  Apparently on CPAP at home but question adequacy   · Heart Failure following  · Diuresis as above   · RHC    Venous stasis dermatitis of both lower extremities  Assessment & Plan  · Wound care    LACY (acute kidney injury) [facility])  Assessment & Plan  Recent Labs     01/17/23  0440 01/17/23  1820 01/18/23  0820   CREATININE 1 11 1 28 1 15   EGFR 62 52 60     Estimated Creatinine Clearance: 75 1 mL/min (by C-G formula based on SCr of 1 15 mg/dL)  ·   · Developed LACY on 1/13 with creatinine of 1 5, baseline of 1 0-1 1  ·   ·   · Monitor BMP  · I/O  · UOP - status post Carlin catheter 1/13 due to critical illness  · maintain carlin for I/O monitoring     LUCIA (obstructive sleep apnea)  Assessment & Plan  · Patient was on CPAP  Recently transitioned to BiPAP by his pulmonologist but still has not received the machine    · Continue BiPAP overnight and with naps   · CM consult to assist with equipment when closer to discharge  · Bengay for lower back pain  · Nasal saline spray    Paroxysmal atrial fibrillation (HCC)  Assessment & Plan  Digoxin 0 9 1/16  · Rate control with metoprolol and digoxin   · On telemetry  · Anticoagulation with heparin    CAD (coronary artery disease)  Assessment & Plan  · Chest pain free  · RHC Thursday  · Atorvastatin    Type 2 diabetes mellitus with hyperglycemia, with long-term current use of insulin (HCC)  Assessment & Plan      Blood Sugar Average: Last 72 hrs:  (P) 159 75   · Home regimen: Tresiba 90 units daily  · Was hypoglycemic due to not liking food, and bipap use   · lantus held for now   · accuchecks q 6 hours given poor po intake   · Change to SSI only   · Adjust as needed and as patient improves with eating  Hypertension  Assessment & Plan  · Stable   · Bumetanide 1 5 IV BID  · Hold home lisinopril and spironolactone  · Continue metoprolol          VTE Pharmacologic Prophylaxis: VTE Score: 7 High Risk (Score >/= 5) - Pharmacological DVT Prophylaxis Ordered: heparin drip  Sequential Compression Devices Ordered  Patient Centered Rounds: I performed bedside rounds with nursing staff today  Discussions with Specialists or Other Care Team Provider: Heart failure, CM    Education and Discussions with Family / Patient: Updated  (wife) at bedside  Current Length of Stay: 8 day(s)  Current Patient Status: Inpatient   Discharge Plan: Anticipate discharge in 24-48 hrs to rehab facility  Code Status: Level 3 - DNAR and DNI    Subjective:   Patient states he feels better today  He prefers the nasal cannula over the mask  He has been able to eat  He does complain of dry mouth  Objective:     Vitals:   Temp (24hrs), Av 4 °F (36 9 °C), Min:98 3 °F (36 8 °C), Max:98 4 °F (36 9 °C)    Temp:  [98 3 °F (36 8 °C)-98 4 °F (36 9 °C)] 98 4 °F (36 9 °C)  HR:  [79-89] 83  Resp:  [19-20] 19  BP: (111-136)/(48-72) 111/64  SpO2:  [90 %-95 %] 95 %  Body mass index is 33 72 kg/m²  Input and Output Summary (last 24 hours): Intake/Output Summary (Last 24 hours) at 2023 1332  Last data filed at 2023 0658  Gross per 24 hour   Intake 880 ml   Output 1650 ml   Net -770 ml       Physical Exam:   Physical Exam  Vitals and nursing note reviewed  Constitutional:       General: He is not in acute distress  Appearance: He is well-developed  He is obese  HENT:      Head: Normocephalic and atraumatic  Eyes:      Conjunctiva/sclera: Conjunctivae normal    Cardiovascular:      Rate and Rhythm: Normal rate  Rhythm irregular        Heart sounds: No murmur heard   Pulmonary:      Effort: Pulmonary effort is normal  No respiratory distress  Breath sounds: Normal breath sounds  Abdominal:      Palpations: Abdomen is soft  Tenderness: There is no abdominal tenderness  Musculoskeletal:      Cervical back: Neck supple  Right lower leg: Edema present  Left lower leg: Edema present  Skin:     General: Skin is warm and dry  Capillary Refill: Capillary refill takes less than 2 seconds  Neurological:      Mental Status: He is alert and oriented to person, place, and time  Additional Data:     Labs:  Results from last 7 days   Lab Units 01/18/23  0820   WBC Thousand/uL 8 05   HEMOGLOBIN g/dL 9 2*   HEMATOCRIT % 34 5*   PLATELETS Thousands/uL 174   NEUTROS PCT % 62   LYMPHS PCT % 20   MONOS PCT % 13*   EOS PCT % 3     Results from last 7 days   Lab Units 01/18/23  0820   SODIUM mmol/L 141   POTASSIUM mmol/L 3 9   CHLORIDE mmol/L 92*   CO2 mmol/L 44*   BUN mg/dL 39*   CREATININE mg/dL 1 15   ANION GAP mmol/L 5   CALCIUM mg/dL 9 4   GLUCOSE RANDOM mg/dL 107     Results from last 7 days   Lab Units 01/17/23  1534   INR  1 07     Results from last 7 days   Lab Units 01/18/23  1128 01/18/23  0518 01/17/23  2059 01/17/23  1811 01/17/23  1234 01/17/23  0555 01/16/23  2359 01/16/23  1735 01/16/23  1244 01/16/23  1154 01/16/23  0751 01/16/23  0553   POC GLUCOSE mg/dl 227* 155* 162* 199* 183* 153* 139 236* 171* 165* 138 148*               Lines/Drains:  Invasive Devices     Peripheral Intravenous Line  Duration           Peripheral IV 01/14/23 Dorsal (posterior); Left Forearm 4 days    Long-Dwell Peripheral IV (Midline) 00/10/15 Left Cephalic 1 day          Drain  Duration           Urethral Catheter Straight-tip 16 Fr  5 days              Urinary Catheter:  Goal for removal: Voiding trial when ambulation improves           Telemetry:  Telemetry Orders (From admission, onward)             48 Hour Telemetry Monitoring  Continuous x 48 hours References:    Telemetry Guidelines   Question:  Reason for 48 Hour Telemetry  Answer:  Arrhythmias Requiring Medical Therapy (eg  SVT, Vtach/fib, Bradycardia, Uncontrolled A-fib)                 Telemetry Reviewed: Atrial fibrillation  HR averaging 83  Indication for Continued Telemetry Use: Awaiting PCI/EP Study/CABG             Imaging: Reviewed radiology reports from this admission including: abdominal/pelvic CT    Recent Cultures (last 7 days):         Last 24 Hours Medication List:   Current Facility-Administered Medications   Medication Dose Route Frequency Provider Last Rate   • acetaminophen  650 mg Oral Q6H PRN Chelly Milligan PA-C     • atorvastatin  40 mg Oral Daily Patrick Ashraf MD     • bumetanide  1 5 mg Intravenous BID Edward Borjas MD     • dextrose  25 mL Intravenous PRN Marty Buchanan PA-C     • dicyclomine  10 mg Oral BID PRN Patrick Ashraf MD     • digoxin  125 mcg Oral Daily Patrick Ashraf MD     • famotidine  40 mg Oral HS Patrick Ashraf MD     • heparin (porcine)  3-20 Units/kg/hr (Order-Specific) Intravenous Titrated Laurent Mojica MD 13 1 Units/kg/hr (01/18/23 0755)   • insulin lispro  1-5 Units Subcutaneous Q6H [address] Carl Weaver PA-C     • lidocaine  2 patch Topical Daily Marty Davis PA-C     • loperamide  2 mg Oral TID PRN Ale Toribio PA-C     • menthol-methyl salicylate   Apply externally 4x Daily PRN Laurent Mojica MD     • metoprolol succinate  25 mg Oral BID Shyam Carrera PA-C     • nystatin   Topical BID KACEY aPndey     • pantoprazole  40 mg Oral BID AC Patrick Ashraf MD     • sodium chloride  2 spray Each Nare 4x Daily Sobia Alvarado MD          Today, Patient Was Seen By: Sobia Alvarado MD    **Please Note: This note may have been constructed using a voice recognition system  **

## 2023-01-18 NOTE — PHYSICAL THERAPY NOTE
PHYSICAL THERAPY NOTE          Patient Name: Ct MENDOZAJHADLEY Date: 23 1148   PT Last Visit   PT Visit Date 23   Note Type   Note Type Treatment   Pain Assessment   Pain Assessment Tool 0-10   Pain Score No Pain   Pain Location/Orientation Location: Back   Pain Onset/Description Onset: Gradual   Patient's Stated Pain Goal No pain   Hospital Pain Intervention(s) Repositioned; Ambulation/increased activity; Emotional support; Rest   Multiple Pain Sites No   Pain Rating: FLACC (Rest) - Face 0   Pain Rating: FLACC (Rest) - Legs 0   Pain Rating: FLACC (Rest) - Activity 0   Pain Rating: FLACC (Rest) - Cry 0   Pain Rating: FLACC (Rest) - Consolability 0   Score: FLACC (Rest) 0   Pain Rating: FLACC (Activity) - Face 0   Pain Rating: FLACC (Activity) - Legs 0   Pain Rating: FLACC (Activity) - Activity 0   Pain Rating: FLACC (Activity) - Cry 0   Pain Rating: FLACC (Activity) - Consolability 0   Score: FLACC (Activity) 0   Restrictions/Precautions   Weight Bearing Precautions Per Order No   Other Precautions Cardiac/sternal;Cognitive; Chair Alarm; Bed Alarm;Multiple lines;O2;Fall Risk;Hard of hearing   General   Chart Reviewed Yes   Additional Pertinent History pt BP pre tx session 96/56 RN okay with PT intervention due to pt being asymptomatic   Family/Caregiver Present No   Cognition   Overall Cognitive Status Impaired   Arousal/Participation Alert; Responsive; Cooperative   Attention Attends with cues to redirect   Orientation Level Oriented to person;Oriented to place;Oriented to situation   Memory Decreased recall of recent events;Decreased recall of precautions   Following Commands Follows one step commands with increased time or repetition   Comments pt identified by name and    Subjective   Subjective pt was agreeable to participate in PT intervention   Bed Mobility   Supine to Sit 3  Moderate assistance Additional items Assist x 1;HOB elevated; Bedrails; Increased time required;Verbal cues; Other  (HHA)   Sit to Supine 3  Moderate assistance   Additional items Assist x 1;HOB elevated; Bedrails; Increased time required;Verbal cues;LE management   Additional Comments pt was able to sit EOB >10 minutes while perrorming TE activities and being educated on all functional transfers to and from [address] [place] [address] to Stand 3  Moderate assistance   Additional items Assist x 2; Increased time required;Verbal cues  (w/RW)   Stand to Sit 3  Moderate assistance   Additional items Assist x 2; Increased time required;Verbal cues  (w/ RW)   Stand pivot Unable to assess   Toilet transfer Unable to assess   Additional Comments (S)  pt was able to perform multiple STS to and from RW as pt level of assistance varied from mod Ax2 to min Ax 2   Ambulation/Elevation   Gait pattern Decreased foot clearance;Shuffling;Excessively slow   Gait Assistance 3  Moderate assist   Additional items Assist x 2;Verbal cues   Assistive Device Rolling walker   Distance 2' fwd and 2'back   Stair Management Assistance Not tested   Balance   Static Sitting Fair +   Dynamic Sitting Fair -   Static Standing Fair -   Dynamic Standing Poor +   Ambulatory Poor +   Endurance Deficit   Endurance Deficit Yes   Activity Tolerance   Activity Tolerance Patient limited by fatigue   Nurse Made Aware Spoke to RN   Exercises   Quad Sets Supine;10 reps;AROM; Bilateral   Heelslides Supine;10 reps;AROM; Bilateral   Hip Abduction Supine;10 reps;AROM; Bilateral   Hip Adduction Supine;10 reps;AROM; Bilateral   Knee AROM Long Arc Quad Sitting;10 reps;AROM; Bilateral   Ankle Pumps Supine;20 reps;AROM; Bilateral   Marching Sitting;10 reps;AROM; Bilateral   Assessment   Prognosis Good   Problem List Decreased strength;Decreased endurance; Impaired balance;Decreased mobility; Decreased cognition;Decreased safety awareness; Impaired hearing;Obesity; Decreased skin integrity   Assessment pt began tx session lying supine in the bed and was agreeable to participate in PT intervention  pt continues to remain consistance with mod Ax1 for all bed mobility including rolling and repositioning in the bed from L to R na completing a supine<>sit EOB transfer with HHA in order to assist with pulling up into a seated EOB position  pt was able to sit EOB >10 minutes w/o LOB while perrorming TE activities in order to increase static/dynamic sitting balance and tolerance  pt was able to complete 3 STS in todays tx session to and from RW  pt required VC's for hand placement in order to increase safety and balance  On first 2 STS pt required mod Ax2 but on last STS pt required a decrease in assistance min Ax2  pt continues to remain limited with standing tolerance as pt stood between 45 seconds and 1 minute and 10 seconds before requesting a therapeutic seated rest break  pt was also limited with ambulation as pt was able to ambulate 2' fwd and 2' back RW and mod Ax1  pt would benefit from continued skilled PT intervenmtion in order to increase functional mobility, activity and standing tolerance and ambulation as appropriate  Continue to recommend post acute rehab services at the time of D/C in order to maximize pt functional independence and safety with all OOB mobility  post tx pt in bed with call bell, RN present and all pt needs met   Goals   Patient Goals to be more active   STG Expiration Date 01/26/23   PT Treatment Day 2   Plan   Treatment/Interventions Functional transfer training;LE strengthening/ROM; Elevations; Therapeutic exercise; Endurance training;Cognitive reorientation;Patient/family training;Equipment eval/education; Bed mobility;Gait training;Spoke to nursing   Progress Slow progress, decreased activity tolerance   PT Frequency 3-5x/wk   Recommendation   PT Discharge Recommendation Post acute rehabilitation services   AM-PAC Basic Mobility Inpatient   Turning in Flat Bed Without Bedrails 2   Lying on Back to Sitting on Edge of Flat Bed Without Bedrails 2   Moving Bed to Chair 2   Standing Up From Chair Using Arms 2   Walk in Room 2   Climb 3-5 Stairs With Railing 1   Basic Mobility Inpatient Raw Score 11   Basic Mobility Standardized Score 30 25   Highest Level Of Mobility   -HLM Goal 4: Move to chair/commode   JH-HLM Achieved 5: Stand (1 or more minutes)   Education   Education Provided Mobility training;Assistive device; Other  (functional mobility)   Patient Demonstrates acceptance/verbal understanding   End of Consult   Patient Position at End of Consult Supine;Bed/Chair alarm activated; All needs within reach   The patient's AM-PAC Basic Mobility Inpatient Short Form Raw Score is 11  A Raw score of less than or equal to 16 suggests the patient may benefit from discharge to post-acute rehabilitation services  Please also refer to the recommendation of the Physical Therapist for safe discharge planning       Lexy Cloud

## 2023-01-18 NOTE — ASSESSMENT & PLAN NOTE
Recent Labs     01/17/23  0440 01/17/23  1820 01/18/23  0820   CREATININE 1 11 1 28 1 15   EGFR 62 52 60     Estimated Creatinine Clearance: 75 1 mL/min (by C-G formula based on SCr of 1 15 mg/dL)  ·   · Developed LACY on 1/13 with creatinine of 1 5, baseline of 1 0-1 1     ·   ·   · Monitor BMP  · I/O  · UOP - status post Carlin catheter 1/13 due to critical illness  · maintain carlin for I/O monitoring

## 2023-01-19 ENCOUNTER — APPOINTMENT (OUTPATIENT)
Dept: OCCUPATIONAL THERAPY | Age: 78
End: 2023-01-19

## 2023-01-19 ENCOUNTER — RA CDI HCC (OUTPATIENT)
Dept: OTHER | Facility: HOSPITAL | Age: 78
End: 2023-01-19

## 2023-01-19 ENCOUNTER — APPOINTMENT (OUTPATIENT)
Dept: NUCLEAR MEDICINE | Facility: HOSPITAL | Age: 78
End: 2023-01-19

## 2023-01-19 ENCOUNTER — APPOINTMENT (INPATIENT)
Dept: RADIOLOGY | Facility: HOSPITAL | Age: 78
End: 2023-01-19

## 2023-01-19 LAB
ANION GAP SERPL CALCULATED.3IONS-SCNC: 6 MMOL/L (ref 4–13)
ANION GAP SERPL CALCULATED.3IONS-SCNC: 6 MMOL/L (ref 4–13)
ANION GAP SERPL CALCULATED.3IONS-SCNC: 9 MMOL/L (ref 4–13)
APTT PPP: 86 SECONDS (ref 23–37)
ATRIAL RATE: 89 BPM
BASOPHILS # BLD AUTO: 0.05 THOUSANDS/ÂΜL (ref 0–0.1)
BASOPHILS NFR BLD AUTO: 1 % (ref 0–1)
BUN SERPL-MCNC: 37 MG/DL (ref 5–25)
BUN SERPL-MCNC: 41 MG/DL (ref 5–25)
BUN SERPL-MCNC: 42 MG/DL (ref 5–25)
CALCIUM SERPL-MCNC: 9.3 MG/DL (ref 8.4–10.2)
CALCIUM SERPL-MCNC: 9.6 MG/DL (ref 8.4–10.2)
CALCIUM SERPL-MCNC: 9.7 MG/DL (ref 8.4–10.2)
CHLORIDE SERPL-SCNC: 90 MMOL/L (ref 96–108)
CHLORIDE SERPL-SCNC: 90 MMOL/L (ref 96–108)
CHLORIDE SERPL-SCNC: 91 MMOL/L (ref 96–108)
CO2 SERPL-SCNC: 36 MMOL/L (ref 21–32)
CO2 SERPL-SCNC: 39 MMOL/L (ref 21–32)
CO2 SERPL-SCNC: 40 MMOL/L (ref 21–32)
CREAT SERPL-MCNC: 1.23 MG/DL (ref 0.6–1.3)
CREAT SERPL-MCNC: 1.26 MG/DL (ref 0.6–1.3)
CREAT SERPL-MCNC: 1.3 MG/DL (ref 0.6–1.3)
EOSINOPHIL # BLD AUTO: 0.22 THOUSAND/ÂΜL (ref 0–0.61)
EOSINOPHIL NFR BLD AUTO: 3 % (ref 0–6)
ERYTHROCYTE [DISTWIDTH] IN BLOOD BY AUTOMATED COUNT: 18.9 % (ref 11.6–15.1)
GFR SERPL CREATININE-BSD FRML MDRD: 51 ML/MIN/1.73SQ M
GFR SERPL CREATININE-BSD FRML MDRD: 53 ML/MIN/1.73SQ M
GFR SERPL CREATININE-BSD FRML MDRD: 55 ML/MIN/1.73SQ M
GLUCOSE SERPL-MCNC: 121 MG/DL (ref 65–140)
GLUCOSE SERPL-MCNC: 144 MG/DL (ref 65–140)
GLUCOSE SERPL-MCNC: 145 MG/DL (ref 65–140)
GLUCOSE SERPL-MCNC: 155 MG/DL (ref 65–140)
GLUCOSE SERPL-MCNC: 190 MG/DL (ref 65–140)
GLUCOSE SERPL-MCNC: 196 MG/DL (ref 65–140)
HCT VFR BLD AUTO: 35 % (ref 36.5–49.3)
HGB BLD-MCNC: 9.4 G/DL (ref 12–17)
IMM GRANULOCYTES # BLD AUTO: 0.05 THOUSAND/UL (ref 0–0.2)
IMM GRANULOCYTES NFR BLD AUTO: 1 % (ref 0–2)
LYMPHOCYTES # BLD AUTO: 1.55 THOUSANDS/ÂΜL (ref 0.6–4.47)
LYMPHOCYTES NFR BLD AUTO: 18 % (ref 14–44)
MCH RBC QN AUTO: 21.2 PG (ref 26.8–34.3)
MCHC RBC AUTO-ENTMCNC: 26.9 G/DL (ref 31.4–37.4)
MCV RBC AUTO: 79 FL (ref 82–98)
MONOCYTES # BLD AUTO: 1.16 THOUSAND/ÂΜL (ref 0.17–1.22)
MONOCYTES NFR BLD AUTO: 13 % (ref 4–12)
NEUTROPHILS # BLD AUTO: 5.85 THOUSANDS/ÂΜL (ref 1.85–7.62)
NEUTS SEG NFR BLD AUTO: 64 % (ref 43–75)
NRBC BLD AUTO-RTO: 0 /100 WBCS
P AXIS: 86 DEGREES
PLATELET # BLD AUTO: 186 THOUSANDS/UL (ref 149–390)
PMV BLD AUTO: 11.2 FL (ref 8.9–12.7)
POTASSIUM SERPL-SCNC: 4 MMOL/L (ref 3.5–5.3)
POTASSIUM SERPL-SCNC: 4.1 MMOL/L (ref 3.5–5.3)
POTASSIUM SERPL-SCNC: 4.8 MMOL/L (ref 3.5–5.3)
PR INTERVAL: 186 MS
QRS AXIS: 109 DEGREES
QRSD INTERVAL: 144 MS
QT INTERVAL: 400 MS
QTC INTERVAL: 486 MS
RBC # BLD AUTO: 4.43 MILLION/UL (ref 3.88–5.62)
SODIUM SERPL-SCNC: 135 MMOL/L (ref 135–147)
SODIUM SERPL-SCNC: 135 MMOL/L (ref 135–147)
SODIUM SERPL-SCNC: 137 MMOL/L (ref 135–147)
T WAVE AXIS: 7 DEGREES
VENTRICULAR RATE: 89 BPM
WBC # BLD AUTO: 8.88 THOUSAND/UL (ref 4.31–10.16)

## 2023-01-19 RX ORDER — LIDOCAINE HYDROCHLORIDE 10 MG/ML
INJECTION, SOLUTION EPIDURAL; INFILTRATION; INTRACAUDAL; PERINEURAL CODE/TRAUMA/SEDATION MEDICATION
Status: DISCONTINUED | OUTPATIENT
Start: 2023-01-19 | End: 2023-01-19 | Stop reason: HOSPADM

## 2023-01-19 RX ADMIN — PANTOPRAZOLE SODIUM 40 MG: 40 TABLET, DELAYED RELEASE ORAL at 10:09

## 2023-01-19 RX ADMIN — SALINE NASAL SPRAY 2 SPRAY: 1.5 SOLUTION NASAL at 17:33

## 2023-01-19 RX ADMIN — LIDOCAINE 5% 2 PATCH: 700 PATCH TOPICAL at 10:08

## 2023-01-19 RX ADMIN — FAMOTIDINE 40 MG: 20 TABLET ORAL at 20:00

## 2023-01-19 RX ADMIN — ACETAMINOPHEN 650 MG: 325 TABLET, FILM COATED ORAL at 17:49

## 2023-01-19 RX ADMIN — NYSTATIN: 100000 POWDER TOPICAL at 17:33

## 2023-01-19 RX ADMIN — METOPROLOL SUCCINATE 25 MG: 25 TABLET, EXTENDED RELEASE ORAL at 10:09

## 2023-01-19 RX ADMIN — METOPROLOL SUCCINATE 25 MG: 25 TABLET, EXTENDED RELEASE ORAL at 17:32

## 2023-01-19 RX ADMIN — SALINE NASAL SPRAY 2 SPRAY: 1.5 SOLUTION NASAL at 10:10

## 2023-01-19 RX ADMIN — DIGOXIN 125 MCG: 125 TABLET ORAL at 10:09

## 2023-01-19 RX ADMIN — ATORVASTATIN CALCIUM 40 MG: 40 TABLET, FILM COATED ORAL at 10:09

## 2023-01-19 RX ADMIN — NYSTATIN: 100000 POWDER TOPICAL at 10:10

## 2023-01-19 RX ADMIN — APIXABAN 5 MG: 5 TABLET, FILM COATED ORAL at 17:32

## 2023-01-19 RX ADMIN — HEPARIN SODIUM 15.1 UNITS/KG/HR: 10000 INJECTION, SOLUTION INTRAVENOUS at 06:48

## 2023-01-19 RX ADMIN — INSULIN LISPRO 1 UNITS: 100 INJECTION, SOLUTION INTRAVENOUS; SUBCUTANEOUS at 18:45

## 2023-01-19 RX ADMIN — PANTOPRAZOLE SODIUM 40 MG: 40 TABLET, DELAYED RELEASE ORAL at 17:32

## 2023-01-19 NOTE — PROGRESS NOTES
[facility]  coding opportunities          Chart Reviewed number of suggestions sent to Provider: 1   I11 0    Patients Insurance     Medicare Insurance: [employer]

## 2023-01-19 NOTE — ASSESSMENT & PLAN NOTE
Hypoxia on arrival, desaturations to 77% on room air  Denies home O2 use  Status post-RHC    · V/Q scan inpatient per cardiology recommendation  · Consult to pulmonology  · Continue BiPAP, IPAP 20, EPAP 8   · Continue at night (baseline) as well as if the patient naps during the day  · Respiratory protocol  · Monitor mental status  · PFT outpatient

## 2023-01-19 NOTE — ASSESSMENT & PLAN NOTE
Wt Readings from Last 3 Encounters:   01/19/23 126 kg (277 lb 9 oz)   12/15/22 (!) 139 kg (306 lb)   11/21/22 (!) 138 kg (304 lb)     · Patient presenting with worsening lower extremity edema, abdominal tightness, weight gain of 21 pounds in 2 to 3 weeks, PND  Patient denies any dietary or medication noncompliance  · Chronically on PO Lasix 80mg BID at home     · ECHO shows EF 55%, grade 2 DD, dilated RV with reduced systolic function and pulmonary hypertension   · Cardiology and Heart Failure following   · Has received two doses acetazolamide while inpatient    RHC today  - holding diuresis for now, will restart before discharge  - hold spironolactone and lisinopril  - continue metoprolol and digoxin  - consider palliative care consult for goals of care discussion

## 2023-01-19 NOTE — ASSESSMENT & PLAN NOTE
Wt Readings from Last 3 Encounters:   01/20/23 126 kg (278 lb)   12/15/22 (!) 139 kg (306 lb)   11/21/22 (!) 138 kg (304 lb)     · Patient presenting with worsening lower extremity edema, abdominal tightness, weight gain of 21 pounds in 2 to 3 weeks, PND  Patient denies any dietary or medication noncompliance  · Chronically on PO Lasix 80mg BID at home     · ECHO shows EF 55%, grade 2 DD, dilated RV with reduced systolic function and pulmonary hypertension   · Cardiology and Heart Failure following   · Has received two doses acetazolamide while inpatient    RHC today  - torsemide 40 mg QD restart in 1-2 days  - hold spironolactone and lisinopril  - continue metoprolol and digoxin  - consider palliative care consult for goals of care discussion

## 2023-01-19 NOTE — CASE MANAGEMENT
Case Management Progress Note    Patient name Sweta TRACEY /S -01 MRN [id number]  : 1945 Date 2023       LOS (days): 9  Geometric Mean LOS (GMLOS) (days): 3 90  Days to GMLOS:-5 2        OBJECTIVE:        Current admission status: Inpatient  Preferred Pharmacy:   [address] [place] [address], [address] [place] [address]  Phone: [phone] Fax: [phone]    Primary Care Provider: Ernie Alfonso MD    Primary Insurance: MEDICARE  Secondary Insurance: [facility]    PROGRESS NOTE:    Spoke with patient's daughter, Chavez Desai, as patient off the floor for procedure  [facility]  [facility] to daughter to be completed  Will follow-up to discuss same

## 2023-01-19 NOTE — H&P
Advanced Heart Failure/Pulmonary Hypertension Service Progress Note - Danita Flores 77 y o  male MRN: [id number]    Unit/Bed#: S -01 Encounter: [id number]      Assessment:  77 y o  male Hx per chart p/w ADHF, PH, respiratory failure  # Acute on chronic HFpEF  # Pulmonary hypertension with RV dysfunction  PASP about 80 by echo (while decompensated)  Likely group II disease diastolic heart failure and group III disease, cannot rule out groups 4 and 1 now, or phenotypic group 1 due to chronic dz  Has LUCIA, on cpap since 2014 but outpt notes indicate possible worse AHI  and machine function/fitting issues that seem unresolved  Good compliance >5 hr per night  No h/o PE/DVT  On AC for AF  Remote smoking, quit 2010  CT chest 4/2021: Pleural thickening with pleural calcifications in the dependent portion of the right mid and lower lung field, 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  Suspected round atelectasis in the posterior right lower lobe  No h/o illicit drug use  # CAD s/p CABG in 2016 (LIMA to LAD, SVG to OM3 and SVG to PDA) in 2016  # LUCIA on cpap  # Paroxysmal atrial fibrillation, s/p cardioversion 1/7/22  also AFL on old ECGs  AC: Eliquis  Rate: metoprolol and digoxin  # Hyperlipidemia  # DM type II  Hemoglobin A1c 7 8 11/2/2022  # Hypertension  # LACY  # Hemorrhoids with rectal bleeding  RBBB  Fall, as inpt this admission 1/2023  CTH neg  Hematuria 1/16/23, holding AC, uro following, has known renal stones  Has raegan        Reviewed all pertinent labs/imaging/data including:  Cr: mild LACY peaked 1 5>>1 07>1 1>1 2 (BL around 1 0)  Bicarb up but downtrending; note that BL somewhat elevated 2/2 chronic retention  PCO2 on ABG 1/16 was 76 while on bipap, pH 7 4    Weight (last 2 days)     Date/Time Weight    01/19/23 0551 126 (277 56)     Weight: pt unable to stand safetly at 01/19/23 0551    01/18/23 1137 126 (277)    01/18/23 0530 --     Weight: bedscale broken, unable to stand patient safetly at 01/18/23 0530    01/17/23 0544 128 (282 85)     Weight: pt cant stand safetly at 01/17/23 0544           Intake/Output Summary (Last 24 hours) at 1/19/2023 0753  Last data filed at 1/19/2023 0743  Gross per 24 hour   Intake --   Output 1250 ml   Net -1250 ml     Tele: AF, rates 70-80s    Drips:  heparin (porcine), 3-20 Units/kg/hr (Order-Specific), Last Rate: 15 1 Units/kg/hr (01/19/23 1534)         Plan:  warm, euvolemic by exam  labs, UO about net neg 20L since admit, wt trend all suggest improvement (unreliable wt trend but historical best around 300lbs and wt now recorded as 280s  Good response to bumex gtt and diamox over last weekend    Echo when decompensated: Severe PH with TRmaxvel 4 0 range, +significant RV dysfunction, small LV, BP normal to high now (few lower readings earlier in course)  No intracardiac shunt by color doppler  [address] today 1/19, see final report for full details  His hemodynamics indicate he is dry (now after diuresis), precapillary PH with CVP 2, PAP 52/12/25, DPG 7, TPG 20, PCW 5, CI near nl at 2 14  Will pause bumex now, reassess home diuretic in near term (will ultimately need home maintenance)  Further PH workup and Tx as outpt in near term  Can restart home eliquis 4 hr after RHC    Likely needs home O2  Uses cpap qhs    Would get VQ scan now, DC planning afterwards  Note possible false result based on 2021 CT chest:  "Pleural thickening with pleural calcifications in the dependent portion of the right mid and lower lung field, 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     Suspected round atelectasis in the posterior right lower lobe "  No recent CTA PE protocol to help assess if thromboembolic dz    Home diuretic: lasix 80 bid    Complete hematuria workup per uro and primary, on hep gtt  Held eliquis before RHC x 3-4 doses    Ok to hold home lisinopril 20 qd and aldactone 25 qd for the moment; if BP trend continues SBP>130 and stable, with good renal fxn, can start lisinopril 5 qd    cpap qhs    Checked new dig level>was ok at 0 9    PFTs with DLCO as outpt, I cannot see any prior results in epic    Could consider entresto and/or sglti as outpt though RHC points away from predominant HFpEF    Check HIV for completion    Studies:    Key labs:  [id number] wnl  tsh wnl 2021  LEENA, AMA, anti-smooth muscle Ab - all neg  HAV, B and C - all neg    EKG - my read:  2022: AF, RAD, RBBB (old), non specific STT changes    Echocardiogram 1/11/23  LVEF: 55%  LVIDd: 4 8cm  RV: not well visualized; dilated RV>LV; reduced systolic function  MR: moderate MAC, trace MR  PASP: 83mmHg; estimated RAP 15, peak TR velocity 4 1  RVOT: suboptimal envelope, mid to late systolic notching; interventricular diastolic and systolic septal flattening  Other: grade 2 diastology, mild JONNY; mildly increased LV wall thickness     Echo 12/28/21:  LVEF 55%, mildly increased wall thickness  RV mildly dilated, normal systolic function  PASP [id number]     Echo 3/23/21:  LVEF 60%, mildly increased wall thickness  Grade 1 diastology  RV mildly dilated, mildly reduced systolic function  PASP [id number]    Subjective and Interval Events:   Patient seen and examined  No new complaints  ROS:  10 point ROS negative except as specified above      Tele: reviewed    Objective:     Physical Exam:  /52   Pulse 57   Temp 98 2 °F (36 8 °C)   Resp 16   Ht 6' 4" (1 93 m)   Wt 126 kg (277 lb 9 oz) Comment: pt unable to stand safetly  SpO2 97%   BMI 33 79 kg/m²   Ranges:  Temp:  [98 2 °F (36 8 °C)-98 9 °F (37 2 °C)] 98 2 °F (36 8 °C)  HR:  [57-83] 57  Resp:  [14-21] 16  BP: (107-115)/(52-64) 109/52  FiO2 (%):  [35] 35    Weight (last 2 days)     Date/Time Weight    01/19/23 0551 126 (277 56)     Weight: pt unable to stand safetly at 01/19/23 0551    01/18/23 1137 126 (277)    01/18/23 0530 --     Weight: bedscale broken, unable to stand patient safetly at 01/18/23 0530    01/17/23 0544 128 (919 85)     Weight: pt cant stand safetly at 01/17/23 0544           Intake/Output Summary (Last 24 hours) at 1/19/2023 0753  Last data filed at 1/19/2023 0743  Gross per 24 hour   Intake --   Output 1250 ml   Net -1250 ml     Constitutional: alert, oriented  Ears/nose/mouth/throat: atraumatic  CV: irreg, no JVD/HJR  Resp: Decreased breath sounds BL  GI: Soft, NTND  MSK: no swollen joints in exposed areas  Extr: trace edema, warm LE  Pysche: normal affect  Neuro: appropriate in conversation  Skin: dry and intact in exposed areas    Labs/Imaging/Data:   Results from last 7 days   Lab Units 01/19/23  0319 01/18/23  0820 01/17/23  1534 01/17/23  0440 01/16/23  1553 01/16/23  0438 01/15/23  0825 01/14/23  0902 01/14/23  0850   WBC Thousand/uL 8 88 8 05 8 40 8 14  --  7 28 8 67  --  9 51   HEMOGLOBIN g/dL 9 4* 9 2* 9 8* 9 9* 10 5* 10 8* 10 8*  --  9 1*   I STAT HEMOGLOBIN   --   --   --   --   --   --   --    < >  --    HEMATOCRIT % 35 0* 34 5* 36 6 37 3 39 5 41 3 41 0  --  35 6*   HEMATOCRIT, ISTAT   --   --   --   --   --   --   --    < >  --    PLATELETS Thousands/uL 186 174 158 168  --  161 158  --  141*   NEUTROS PCT % 64 62  --   --   --   --   --   --   --    MONOS PCT % 13* 13*  --   --   --   --   --   --   --     < > = values in this interval not displayed        Results from last 7 days   Lab Units 01/19/23  0323 01/18/23  0820 01/17/23  1820 01/17/23  0440 01/16/23  0438 01/15/23  0825 01/14/23  1526 01/14/23  0508 01/14/23  0036 01/13/23  0521   SODIUM mmol/L 135 141 138 139 142 142 146   < > 141 141   POTASSIUM mmol/L 4 0 3 9 4 3 4 4 4 4 5 1 4 7   < > 5 8* 5 7*   CHLORIDE mmol/L 90* 92* 89* 89* 89* 87* 93*   < > 96 96   CO2 mmol/L 36* 44* 42* 43* >45* >45* >45*   < > 40* 43*   CO2, I-STAT   --   --   --   --   --   --   --    < >  --   --    ANION GAP mmol/L 9 5 7 7  --   --   --   --  5 2*   BUN mg/dL 42* 39* 40* 38* 37* 37* 39*   < > 41* 37*   CREATININE mg/dL 1 26 1 15 1 28 1 11 1 07 1 24 1 20   < > 1 24 1 51*   CALCIUM mg/dL 9 3 9 4 9 5 10 1 10 4* 10 6* 10 0   < > 9 5 9 7   GLUCOSE RANDOM mg/dL 144* 107 167* 148* 151* 116 39*   < > 87 87    < > = values in this interval not displayed  Results from last 7 days   Lab Units 01/17/23  0440 01/14/23  0036   MAGNESIUM mg/dL 2 3 2 1   PHOSPHORUS mg/dL  --  3 0       Results from last 7 days   Lab Units 01/19/23  0319 01/18/23  2100 01/18/23  1203 01/18/23  0528 01/17/23  2155 01/17/23  1534 01/15/23  0636 01/14/23  1526 01/14/23  0850   INR   --   --   --   --   --  1 07  --   --  1 17   PTT seconds 86* 75* 56* 67* 48* 29 58*   < > 31    < > = values in this interval not displayed                   ABG:  Results from last 7 days   Lab Units 01/14/23  1208   PH ART  7 468*   PCO2 ART mm Hg 66 9*   PO2 ART mm Hg 66 7*   HCO3 ART mmol/L 47 4*   BASE EXC ART mmol/L 20 7   ABG SOURCE  Radial, Right      VBG:  Results from last 7 days   Lab Units 01/16/23  1052 01/14/23  1208   PH JANICE  7 422*  --    PCO2 JANICE mm Hg 76 4*  --    PO2 JANICE mm Hg 122 5*  --    HCO3 JANICE mmol/L 48 7*  --    BASE EXC JANICE mmol/L 20 7  --    ABG SOURCE   --  Radial, Right     No results found for: LDH    heparin (porcine), 3-20 Units/kg/hr (Order-Specific), Last Rate: 15 1 Units/kg/hr (01/19/23 6681)      Current Facility-Administered Medications   Medication Dose Route Frequency Provider Last Rate   • acetaminophen  650 mg Oral Q6H PRN German Agee PA-C     • atorvastatin  40 mg Oral Daily Ameya Hall MD     • bumetanide  1 5 mg Intravenous BID Ghassan Boone MD     • dextrose  25 mL Intravenous PRN Marty Buchanan PA-C     • dicyclomine  10 mg Oral BID PRN Ameya Hall MD     • digoxin  125 mcg Oral Daily Ameya Hall MD     • famotidine  40 mg Oral HS Ameya Hall MD     • heparin (porcine)  3-20 Units/kg/hr (Order-Specific) Intravenous Titrated Jeri Montaon MD 15 1 Units/kg/hr (01/19/23 9018)   • insulin lispro  1-5 Units Subcutaneous Q6H [address] Carl Weaver PA-C     • lidocaine  2 patch Topical Daily Marty RUEDA Blossom Dozier PA-C     • loperamide  2 mg Oral TID PRN Gaudencio Segal PA-C     • menthol-methyl salicylate   Apply externally 4x Daily PRN Peace Jarquin MD     • metoprolol succinate  25 mg Oral BID Kelley Adames PA-C     • nystatin   Topical BID Louis KACEY Sorenson     • pantoprazole  40 mg Oral BID AC Arslan Ho MD     • sodium chloride  2 spray Each Nare 4x Daily Norma Gong MD         Invasive Devices     Peripheral Intravenous Line  Duration           Long-Dwell Peripheral IV (Midline) 47/67/26 Left Cephalic 1 day          Drain  Duration           Urethral Catheter Straight-tip 16 Fr  5 days                Counseling / Coordination of Care: Total floor / unit time spent today 32 minutes  Greater than 50% of total time was spent with the patient and / or family counseling and / or coordination of care  A description of the counseling / coordination of care: we discussed diagnoses, recent as well as older studies, labs, and all changes in cardiac treatment plan  All patient questions were answered  Thank you for the opportunity to participate in the care of this patient      Reji Davies MD  Attending Physician  Advanced Heart Failure and Transplant Cardiology  [address] [place] [address]

## 2023-01-19 NOTE — ASSESSMENT & PLAN NOTE
Blood Sugar Average: Last 72 hrs:  (P) 394 [id number]   · Home regimen: Tresiba 90 units daily  · Was hypoglycemic due to not liking food, and bipap use   · lantus held for now   · accuchecks q 6 hours given poor po intake   · Change to SSI only   · Adjust as needed and as patient improves with eating

## 2023-01-19 NOTE — ASSESSMENT & PLAN NOTE
Digoxin 0 9 1/16  · Rate control with metoprolol and digoxin   · On telemetry  · Restarting apixaban today

## 2023-01-19 NOTE — ASSESSMENT & PLAN NOTE
· Noted on ECHO  Remote smoking history  Is on BiPAP at night here   Apparently on CPAP at home but question adequacy   · Heart Failure following  · Holding diuresis  · RHC today

## 2023-01-19 NOTE — ASSESSMENT & PLAN NOTE
Recent Labs     01/17/23  1820 01/18/23  0820 01/19/23  0323   CREATININE 1 28 1 15 1 26   EGFR 52 60 53     Estimated Creatinine Clearance: 68 6 mL/min (by C-G formula based on SCr of 1 26 mg/dL)  ·   · Developed LACY on 1/13 with creatinine of 1 5, baseline of 1 0-1 1     ·   ·   · Monitor BMP  · I/O  · UOP - status post Carlin catheter 1/13 due to critical illness  · maintain carlin for I/O monitoring

## 2023-01-19 NOTE — ASSESSMENT & PLAN NOTE
Recent Labs     01/17/23  1534 01/18/23  0820 01/19/23  0319   HGB 9 8* 9 2* 9 4*       Resolved  Patient denies  pain  Urology following  Hgb stable    · Restarting apixaban today  · Urology recommendations  · Outpatient cystoscopy

## 2023-01-19 NOTE — PROGRESS NOTES
[facility]  Progress Note - Jennifer Cardona 1945, 77 y o  male MRN: [id number]  Unit/Bed#: S -01 Encounter: [id number]  Primary Care Provider: Martha Reyes MD   Date and time admitted to hospital: 1/10/2023 12:21 PM    * Acute respiratory failure with hypoxia and hypercapnia ([address] )  Assessment & Plan  Hypoxia on arrival, desaturations to 77% on room air  Denies home O2 use  Status post-RHC  · V/Q scan inpatient per cardiology recommendation  · Consult to pulmonology  · Continue BiPAP, IPAP 20, EPAP 8   · Continue at night (baseline) as well as if the patient naps during the day  · Respiratory protocol  · Monitor mental status  · PFT outpatient    Acute on chronic diastolic (congestive) heart failure (HCC)  Assessment & Plan  Wt Readings from Last 3 Encounters:   01/19/23 126 kg (277 lb 9 oz)   12/15/22 (!) 139 kg (306 lb)   11/21/22 (!) 138 kg (304 lb)     · Patient presenting with worsening lower extremity edema, abdominal tightness, weight gain of 21 pounds in 2 to 3 weeks, PND  Patient denies any dietary or medication noncompliance  · Chronically on PO Lasix 80mg BID at home  · ECHO shows EF 55%, grade 2 DD, dilated RV with reduced systolic function and pulmonary hypertension   · Cardiology and Heart Failure following   · Has received two doses acetazolamide while inpatient    RHC today  - holding diuresis for now, will restart before discharge  - hold spironolactone and lisinopril  - continue metoprolol and digoxin  - consider palliative care consult for goals of care discussion    Hematuria  Assessment & Plan  Recent Labs     01/17/23  1534 01/18/23  0820 01/19/23  0319   HGB 9 8* 9 2* 9 4*       Resolved  Patient denies  pain  Urology following  Hgb stable  · Restarting apixaban today  · Urology recommendations  · Outpatient cystoscopy    BRBPR (bright red blood per rectum)  Assessment & Plan  · Noted on 1/12   Patient reports history of large hemorrhoid that has bled in the past   · Hemoglobin has been stable   · Monitor Hgb  · No intervention at this time   · No bleeding per nursing     Pulmonary hypertension ([facility] [address] )  Assessment & Plan  · Noted on ECHO  Remote smoking history  Is on BiPAP at night here  Apparently on CPAP at home but question adequacy   · Heart Failure following  · Holding diuresis  · RHC today    Venous stasis dermatitis of both lower extremities  Assessment & Plan  · Wound care    LACY (acute kidney injury) [facility])  Assessment & Plan  Recent Labs     01/17/23  1820 01/18/23  0820 01/19/23  0323   CREATININE 1 28 1 15 1 26   EGFR 52 60 53     Estimated Creatinine Clearance: 68 6 mL/min (by C-G formula based on SCr of 1 26 mg/dL)  ·   · Developed LACY on 1/13 with creatinine of 1 5, baseline of 1 0-1 1  ·   ·   · Monitor BMP  · I/O  · UOP - status post Carlin catheter 1/13 due to critical illness  · maintain carlin for I/O monitoring     LUCIA (obstructive sleep apnea)  Assessment & Plan  · Patient was on CPAP  Recently transitioned to BiPAP by his pulmonologist but still has not received the machine    · Continue BiPAP overnight and with naps   · CM consult to assist with equipment when closer to discharge  · Bengay for lower back pain  · Nasal saline spray  · Pulmonology consulted    Paroxysmal atrial fibrillation (HCC)  Assessment & Plan  Digoxin 0 9 1/16  · Rate control with metoprolol and digoxin   · On telemetry  · Restarting apixaban today    CAD (coronary artery disease)  Assessment & Plan  · Chest pain free  · RHC today  · Atorvastatin    Type 2 diabetes mellitus with hyperglycemia, with long-term current use of insulin (HCC)  Assessment & Plan      Blood Sugar Average: Last 72 hrs:  (P) 348 [id number]   · Home regimen: Tresiba 90 units daily  · Was hypoglycemic due to not liking food, and bipap use   · lantus held for now   · accuchecks q 6 hours given poor po intake   · Change to SSI only   · Adjust as needed and as patient improves with eating  Hypertension  Assessment & Plan  Blood Pressure: 136/69  ·   ·   · Stable   · Holding diuresis for now  · Hold home lisinopril and spironolactone  · Continue metoprolol            VTE Pharmacologic Prophylaxis: VTE Score: 7 High Risk (Score >/= 5) - Pharmacological DVT Prophylaxis Ordered: apixaban (Eliquis)  Sequential Compression Devices Ordered  Patient Centered Rounds: I performed bedside rounds with nursing staff today  Discussions with Specialists or Other Care Team Provider: cardiology, urology    Education and Discussions with Family / Patient: Updated  (wife and daughter) at bedside  Current Length of Stay: 9 day(s)  Current Patient Status: Inpatient   Discharge Plan: Anticipate discharge in 24-48 hrs to rehab facility  Code Status: Level 3 - DNAR and DNI    Subjective:   Patient reports he feels well today with mild back pain  He reports he is eating  He is aware he has his procedure today though does not appear anxious  Objective:     Vitals:   Temp (24hrs), Av 8 °F (37 1 °C), Min:98 2 °F (36 8 °C), Max:98 9 °F (37 2 °C)    Temp:  [98 2 °F (36 8 °C)-98 9 °F (37 2 °C)] 98 9 °F (37 2 °C)  HR:  [57-81] 79  Resp:  [14-21] 16  BP: (107-136)/(52-69) 136/69  SpO2:  [91 %-99 %] 95 %  Body mass index is 33 79 kg/m²  Input and Output Summary (last 24 hours): Intake/Output Summary (Last 24 hours) at 2023 1230  Last data filed at 2023 0743  Gross per 24 hour   Intake --   Output 1250 ml   Net -1250 ml       Physical Exam:   Physical Exam  Vitals and nursing note reviewed  Constitutional:       General: He is not in acute distress  Appearance: He is well-developed  HENT:      Head: Normocephalic and atraumatic  Eyes:      Conjunctiva/sclera: Conjunctivae normal    Cardiovascular:      Rate and Rhythm: Normal rate  Rhythm irregular  Heart sounds: No murmur heard  Pulmonary:      Effort: Pulmonary effort is normal  No respiratory distress  Breath sounds: Normal breath sounds  Abdominal:      Palpations: Abdomen is soft  Tenderness: There is no abdominal tenderness  Musculoskeletal:      Cervical back: Neck supple  Right lower leg: Edema present  Left lower leg: Edema present  Lymphadenopathy:      Cervical: Cervical adenopathy:   73  Skin:     General: Skin is warm and dry  Capillary Refill: Capillary refill takes less than 2 seconds  Neurological:      Mental Status: He is alert and oriented to person, place, and time            Additional Data:     Labs:  Results from last 7 days   Lab Units 01/19/23  0319   WBC Thousand/uL 8 88   HEMOGLOBIN g/dL 9 4*   HEMATOCRIT % 35 0*   PLATELETS Thousands/uL 186   NEUTROS PCT % 64   LYMPHS PCT % 18   MONOS PCT % 13*   EOS PCT % 3     Results from last 7 days   Lab Units 01/19/23  0917   SODIUM mmol/L 137   POTASSIUM mmol/L 4 8   CHLORIDE mmol/L 91*   CO2 mmol/L 40*   BUN mg/dL 41*   CREATININE mg/dL 1 30   ANION GAP mmol/L 6   CALCIUM mg/dL 9 7   GLUCOSE RANDOM mg/dL 145*     Results from last 7 days   Lab Units 01/17/23  1534   INR  1 07     Results from last 7 days   Lab Units 01/19/23  1147 01/19/23  0608 01/18/23  2248 01/18/23  1811 01/18/23  1612 01/18/23  1128 01/18/23  0518 01/17/23  2059 01/17/23  1811 01/17/23  1234 01/17/23  0555 01/16/23  2359   POC GLUCOSE mg/dl 155* 121 202* 138 193* 227* 155* 162* 199* 183* 153* 139               Lines/Drains:  Invasive Devices     Peripheral Intravenous Line  Duration           Long-Dwell Peripheral IV (Midline) 09/87/86 Left Cephalic 2 days          Drain  Duration           Urethral Catheter Straight-tip 16 Fr  6 days              Urinary Catheter:  Goal for removal: Voiding trial when ambulation improves           Telemetry:  Telemetry Orders (From admission, onward)             48 Hour Telemetry Monitoring  Continuous x 48 hours        References:    Telemetry Guidelines   Question:  Reason for 48 Hour Telemetry  Answer: Arrhythmias Requiring Medical Therapy (eg  SVT, Vtach/fib, Bradycardia, Uncontrolled A-fib)                 Telemetry Reviewed: Atrial fibrillation  HR averaging 79  Indication for Continued Telemetry Use: Acute respiratory failure on Bipap             Imaging: Reviewed radiology reports from this admission including: procedure reports    Recent Cultures (last 7 days):         Last 24 Hours Medication List:   Current Facility-Administered Medications   Medication Dose Route Frequency Provider Last Rate   • acetaminophen  650 mg Oral Q6H PRN Shea Dukes PA-C     • apixaban  5 mg Oral BID Dane Spears MD     • atorvastatin  40 mg Oral Daily Juana Shaikh MD     • dextrose  25 mL Intravenous PRN Marty Garcia PA-C     • dicyclomine  10 mg Oral BID PRN Juana Shaikh MD     • digoxin  125 mcg Oral Daily Juana Shaikh MD     • famotidine  40 mg Oral HS Juana Shaikh MD     • insulin lispro  1-5 Units Subcutaneous Q6H [address] Carl Weaver PA-C     • lidocaine  2 patch Topical Daily Marty Garcia PA-C     • loperamide  2 mg Oral TID PRN Jackson Oviedo PA-C     • menthol-methyl salicylate   Apply externally 4x Daily PRN Alex Pino MD     • metoprolol succinate  25 mg Oral BID Leandro Méndez PA-C     • nystatin   Topical BID KACEY Quiles     • pantoprazole  40 mg Oral BID AC Juana Shaikh MD     • sodium chloride  2 spray Each Nare 4x Daily Dane Spears MD          Today, Patient Was Seen By: Dane Spears MD, Psychiatry    **Please Note: This note may have been constructed using a voice recognition system  **

## 2023-01-19 NOTE — DISCHARGE SUMMARY
[facility]  Discharge- Charbel Alcantar 1945, 77 y o  male MRN: [id number]  Unit/Bed#: S -01 Encounter: [id number]  Primary Care Provider: Lukas Mccracken MD   Date and time admitted to hospital: 1/10/2023 12:21 PM    BRBPR (bright red blood per rectum)  Assessment & Plan  · Noted on 1/12  Patient reports history of large hemorrhoid that has bled in the past   · Hemoglobin has been stable   · Monitor Hgb  · No intervention at this time   · No bleeding per nursing     Pulmonary hypertension ([facility] [address] )  Assessment & Plan  · Noted on ECHO  Remote smoking history  Is on BiPAP at night here  Apparently on CPAP at home but question adequacy   · Status post RHC  · VQ shows low probability of embolus    Venous stasis dermatitis of both lower extremities  Assessment & Plan  · Wound care followed, dry dressings applied and intact bilaterally upon discharge    Acute on chronic diastolic (congestive) heart failure (HCC)  Assessment & Plan  Wt Readings from Last 3 Encounters:   01/22/23 125 kg (276 lb 3 8 oz)   12/15/22 (!) 139 kg (306 lb)   11/21/22 (!) 138 kg (304 lb)     · Patient presenting with worsening lower extremity edema, abdominal tightness, weight gain of 21 pounds in 2 to 3 weeks, PND  Patient denies any dietary or medication noncompliance  · Chronically on PO Lasix 80mg BID at home  · ECHO shows EF 55%, grade 2 DD, dilated RV with reduced systolic function and pulmonary hypertension   · Cardiology and Heart Failure following   · Has received two doses acetazolamide while inpatient    S/p RHC  - torsemide 40 mg QD - continue metoprolol and digoxin    LUCIA (obstructive sleep apnea)  Assessment & Plan  · Patient was on CPAP  Recently transitioned to BiPAP by his pulmonologist but still has not received the machine    · Continue BiPAP overnight and with naps   · Bengay for lower back pain  · Nasal saline spray    Paroxysmal atrial fibrillation (HCC)  Assessment & Plan  Digoxin 0 9 1/16  · Rate control with metoprolol and digoxin   · Continue apixaban    CAD (coronary artery disease)  Assessment & Plan  · Chest pain free  · Status post-RHC  · Atorvastatin    Type 2 diabetes mellitus with hyperglycemia, with long-term current use of insulin (HCC)  Assessment & Plan    · Home regimen: Tresiba 90 units daily  · Was hypoglycemic due to not liking food, and bipap use   · lantus held for now   · accuchecks q 6 hours given poor po intake   · Change to SSI only   · Adjust as needed and as patient improves with eating  Hypertension  Assessment & Plan  Blood Pressure: 137/65  ·   ·   · Stable   · Home torsemide  · Continue metoprolol      * Acute respiratory failure with hypoxia and hypercapnia (HCC)  Assessment & Plan    Hypoxia on arrival, desaturations to 77% on room air  Denies home O2 use  Status post-RHC      Patient now blood oxygen saturation 89-91% on 2 L   · V/Q scan shows low probability of pulmonary embolus  · Pulmonology: on bipap  · Continue BiPAP, IPAP 20, EPAP 8   · Continue at night (baseline) as well as if the patient naps during the day  · Respiratory protocol  · Monitor mental status  · PFT outpatient      Medical Problems     Resolved Problems  Date Reviewed: 1/20/2023          Resolved    LACY (acute kidney injury) ([facility] [address] ) 1/20/2023     Resolved by  Costa Rojas MD    Hyperkalemia 1/14/2023     Resolved by  Jennifer Aguilera PA-C    Hematuria 1/20/2023     Resolved by  Costa Rojsa MD        Discharging Resident: Jae Valdez MD  Discharging Attending: Harris Collins MD  PCP: Ettie Merlin, MD  Admission Date:   Admission Orders (From admission, onward)     Ordered        01/10/23 1418  1 [facility]  Once                      Discharge Date: 01/22/23    Consultations During Hospital Stay:  · Cardiology  · Urology  · Pulmonology    Procedures Performed:   · Cardiac RHC    Significant Findings / Test Results:   XR chest portable    Result Date: 1/16/2023  Impression: No acute cardiopulmonary disease  Workstation performed: [id number]     XR chest 1 view portable    Result Date: 1/10/2023  Impression: Shallow depth of inspiration  No interval change from 12/1/2022 Workstation performed: [id number]     CT head wo contrast    Result Date: 1/14/2023  Impression: No acute intracranial abnormality  Workstation performed: [id number]     CT spine cervical wo contrast    Result Date: 1/14/2023  Impression: No cervical spine fracture or traumatic malalignment  Workstation performed: [id number]       XR chest portable    Result Date: 1/20/2023  Impression Stable bibasilar atelectasis/scarring  Workstation performed: [id number]   ·    Recent Labs     01/20/23  0457 01/21/23  0437 01/22/23  0517   HGB 10 0* 9 7* 10 0*     Recent Labs     01/20/23  1811 01/21/23  0443 01/21/23  1936   CREATININE 1 34* 1 20 1 34*   EGFR 50 57 50     Estimated Creatinine Clearance: 64 5 mL/min (A) (by C-G formula based on SCr of 1 34 mg/dL (H))  Incidental Findings:   · None    Test Results Pending at Discharge (will require follow up): · None     Outpatient Tests Requested:  · Cystoscopy  · PFT  · Echocardiography    Complications:  Haematuria, resolved  Fall    Reason for Admission: Respiratory failure    Hospital Course:   Maria Teresa Hdz is a 77 y o  male patient who originally presented to the hospital on 1/10/2023 due to SOB  Found to be hypoxic  Treated with non-rebreather of 15 L  Oxygen improved  Received IV diuresis in ED with urine output  Patient was continued on diuresis with I/O monitor, cardiology consulted  Venous stasis treated with wound care  Bipap was initiated for patient per OP pulmonologist  Patient placed on home metoprolol, statin, and apixaban, lisinopril, and spironolactone  Patient also on digoxin  IV diuresis increased initially  Patient oxygen was titrated appropriately throughout stay  Patient had one episode of hypotension that self-resolved   [address] was planned  Patient monitored on telemetry  Diet was adjusted to fluid, sodium, and carbohydrate restriction  Furosemide switched to bumetanide  Patient requiring continuous Bipap at one point  Patient developed LACY and home spironolactone and lisinopril were held  Patient had one fall and rapid response was called, patient given collar, patient denies symptoms other than pain in buttock  Work-up was completed  ABG was collected for need for Bipap  Patient occasionally disoriented at this time  Medications switched to IV due to Bipap  Patient given doses acetazolamide for volume status  AC switched to heparin  Bipap was changed to when asleep only  Appropriate medications back to PO  Diuresis was weaned  Patient had one episode of haematuria through [facility] held and urology consulted  Heparin resumed when clear  Complaint of dry mouth was symptomatically managed  RHC was completed  Pulmonology consulted  VQ scan was done  Diuresis held given good response  VQ negative for PE  Please see above list of diagnoses and related plan for additional information  Condition at Discharge: stable    Discharge Day Visit / Exam:   Subjective: Patient was seen at the bedside this morning where he stated he was feeling significantly improved overall  Understands he is going to rehab later today  No other questions or concerns  Vitals: Blood Pressure: 137/65 (01/22/23 0805)  Pulse: 82 (01/22/23 0805)  Temperature: 98 4 °F (36 9 °C) (01/22/23 0805)  Temp Source: Oral (01/22/23 0805)  Respirations: 18 (01/22/23 0805)  Height: 6' 4" (193 cm) (01/11/23 1414)  Weight - Scale: 125 kg (276 lb 3 8 oz) (01/22/23 0520)  SpO2: 90 % (01/22/23 0805)  Exam:   Physical Exam  Constitutional:       General: He is not in acute distress  HENT:      Head: Normocephalic  Nose: No rhinorrhea  Eyes:      Extraocular Movements: Extraocular movements intact     Cardiovascular:      Comments: Distant heart sounds  Pulmonary: Breath sounds: No stridor  No wheezing, rhonchi or rales  Abdominal:      General: There is distension  Tenderness: There is no abdominal tenderness  Skin:     General: Skin is warm  Neurological:      General: No focal deficit present  Mental Status: He is alert  Discussion with Family: With patient at bedside  Discharge instructions/Information to patient and family:   See after visit summary for information provided to patient and family  Provisions for Follow-Up Care:  See after visit summary for information related to follow-up care and any pertinent home health orders  Disposition:   Other: SNF    Planned Readmission: None    Discharge Medications:  See after visit summary for reconciled discharge medications provided to patient and/or family        **Please Note: This note may have been constructed using a voice recognition system**

## 2023-01-19 NOTE — ASSESSMENT & PLAN NOTE
Blood Pressure: 136/69  ·   ·   · Stable   · Holding diuresis for now    · Hold home lisinopril and spironolactone  · Continue metoprolol

## 2023-01-19 NOTE — PROGRESS NOTES
Progress Note - Urology  Perfecto Vega 1945, 77 y o  male MRN: [id number]    Unit/Bed#: S -Cathie Encounter: [id number]      Assessment & Plan:  1  Hematuria  - Hematuria resolved  Urine continues to be clear yellow  - VSS, afebrile  - Pt seen after R heart cath today  - Pt with history of nonmuscle invasive bladder cancer, treated wit BCG 9 years ago with no recurrence since then  - CT scan shows nonobstructing intrarenal calculi bilaterally, minimally increased in size and unchanged  - Due to hematuria and history of bladder cancer will plan for outpatient cystoscopy  - Urology will sign off at this time  Please do not hesitate to reach out with any questions or concerns  Subjective:   HPI: Pt seen after R heart cath  Review of Systems   Constitutional: Negative for chills and fever  Cardiovascular: Negative for palpitations  Gastrointestinal: Negative for abdominal distention, abdominal pain and vomiting  Genitourinary: Negative for dysuria and hematuria  Musculoskeletal: Negative for arthralgias and back pain  Skin: Negative for color change and rash  Neurological: Negative for seizures and syncope  All other systems reviewed and are negative  Objective:  Vitals: Blood pressure 136/69, pulse 79, temperature 98 9 °F (37 2 °C), resp  rate 16, height 6' 4" (1 93 m), weight 126 kg (277 lb 9 oz), SpO2 95 %  ,Body mass index is 33 79 kg/m²  Physical Exam    Imaging:  CT ABDOMEN AND PELVIS WITHOUT IV CONTRAST - LOW DOSE RENAL STONE      INDICATION:   Reassess stone burden from prior study Oct 2022      COMPARISON:  October 28, 2022     TECHNIQUE:  Low radiation dose thin section CT examination of the abdomen and pelvis was performed without intravenous or oral contrast according to a protocol specifically designed to evaluate for urinary tract calculus  Axial, sagittal, and coronal 2D   reformatted images were created from the source data and submitted for interpretation  Evaluation for pathology in the abdomen and pelvis that is unrelated to urinary tract calculi is limited        Radiation dose length product (DLP) for this visit:  670 mGy-cm   This examination, like all CT scans performed in the [facility], was performed utilizing techniques to minimize radiation dose exposure, including the use of iterative   reconstruction and automated exposure control      URINARY TRACT FINDINGS:     RIGHT KIDNEY AND URETER:  Duplication of right renal collecting system  Nonobstructing 3 mm and 1 mm interpolar (lower portion of upper pole moiety) calculus minimally increased in size from prior examination  No ureteral calculi  No   hydronephrosis  Renal vascular calcification reidentified      LEFT KIDNEY AND URETER: There are nonobstructing intrarenal calculi measuring on the order of 2 to 3 mm, minimally increased in size from previous examination  No ureteral calculi  No hydronephrosis or hydroureter  Renal vascular calcifications      URINARY BLADDER:  Collapsed and mildly thick-walled urinary bladder surrounding a Gómez catheter balloon         ADDITIONAL FINDINGS:     LOWER CHEST:  Scarring in the costophrenic angles  Relative elevation of left hemidiaphragm reidentified      LIMITED EVALUATION OF SOLID VISCERA AS BELOW:     LIVER/BILIARY TREE:  Liver is mildly enlarged and there is mild lobulation of hepatic contours  No solid hepatic mass  Patent portal and hepatic veins  Unchanged borderline periportal node      GALLBLADDER:  No calcified gallstones  No pericholecystic inflammatory change  No biliary dilatation      SPLEEN:  Few calcified splenic granulomata    A portion of the spleen is not seen above the margin of this examination     PANCREAS:  Unremarkable      ADRENAL GLANDS:  Unchanged right adrenal nodule measuring 4 0 x 3 4 cm, previously shown to represent an adenoma by washout criteria, and not significantly changed in size at least as far back as December 2014  Normal left adrenal gland      STOMACH AND BOWEL:  There is colonic diverticulosis without evidence of acute diverticulitis      APPENDIX:  No findings to suggest appendicitis      ABDOMINOPELVIC CAVITY:  No ascites  No pneumoperitoneum  No lymphadenopathy      VESSELS:  Extensive atherosclerotic change      Infrarenal abdominal aortic aneurysm measuring up to 49 x 40 mm on image 49 of series 2 are unchanged from most recent prior examination increased from 43 x 37 mm when measured using similar technique in December 2014      REPRODUCTIVE ORGANS:  Marked prostatomegaly reidentified      ABDOMINAL WALL/INGUINAL REGIONS:  Unremarkable      OSSEOUS STRUCTURES:  No acute fracture or destructive osseous lesion  Advanced lumbar degenerative changes again noted  Intact metallic right hip arthroplasty  Left hip osteoarthritis      IMPRESSION:     Tiny nonobstructing intrarenal calculi bilaterally, minimally increased in size and unchanged in number/location from prior examination  No ureteral calculi or hydronephrosis      Extensive atherosclerotic vascular calcification  Unchanged 49 mm infrarenal abdominal aortic aneurysm      Hepatosplenomegaly with lobulated hepatic contours in keeping with reported history of hepatic parenchymal disease      Prostatomegaly    Bladder wall thickening probably related to chronic prostate disease               Workstation performed: [id number]       Labs:  Recent Labs     01/17/23  0440 01/17/23  1534 01/18/23  0820 01/19/23  0319   WBC 8 14 8 40 8 05 8 88     Recent Labs     01/16/23  1553 01/17/23  0440 01/17/23  1534 01/18/23  0820 01/19/23  0319   HGB 10 5* 9 9* 9 8* 9 2* 9 4*     Recent Labs     01/16/23  1553 01/17/23  0440 01/17/23  1534 01/18/23  0820 01/19/23  0319   HCT 39 5 37 3 36 6 34 5* 35 0*     Recent Labs     01/17/23  0440 01/17/23  1820 01/18/23  0820 01/19/23  0323 01/19/23  0917   CREATININE 1 11 1 28 1 15 1 26 1 30       History:  Past Medical History:   Diagnosis Date   • A-fib [facility])    • AAA (abdominal aortic aneurysm)    • Actinic keratosis of right temple 2020   • Actinic keratosis of scalp 2020   • Acute respiratory failure with hypoxia ([facility] ) 3/25/2021   • Allergic 1960   • Arthritis    • Lay's esophagus    • Bladder cancer (HCC)    • Blister of left leg 2021   • Blister of right leg 2021   • CAD (coronary artery disease)    • Cancer ([facility] ) 2010    Bladder   • CHF (congestive heart failure) (HCC)    • Chronic low back pain    • Chronic pain of both knees 2020   • Colitis 2020   • CPAP (continuous positive airway pressure) dependence    • Diabetes (HCC)    • Diabetes mellitus ([facility] [address] ) 10/1993   • Excessive gas 12/3/2020   • Heart murmur    • History of chemotherapy    • Hydroureter on left 2021   • Hyperlipemia    • Hypertension    • Immunization deficiency 10/30/2020    Hep a nonimmune   • Kidney stone    • Left lower quadrant abdominal pain 12/3/2020   • Mass of right adrenal gland ([facility] ) 2020    4 1 cm   • Myocardial infarction ([facility] )    • 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   • Obesity    • LUCIA (obstructive sleep apnea) 2021   • Pressure ulcer of right leg, stage 1 2021   • Urinary tract infection with hematuria 5/3/2021    u cx 2021=pseudomonas R to bactrim and cefdinir, S to cipro   • Venous stasis dermatitis of both lower extremities 2021     Social History     Socioeconomic History   • Marital status: /Civil Union     Spouse name: Carmelita Cabrera   • Number of children: 4   • Years of education: None   • Highest education level: 12th grade   Occupational History   • None   Tobacco Use   • Smoking status: Former     Packs/day: 0 25     Years: 20 00     Pack years: 5 00     Types: Cigarettes     Start date: 1965     Quit date: 2010     Years since quittin 0   • Smokeless tobacco: Never   • Tobacco comments:     Quit several times for up to 6 years     Vaping Use   • Vaping Use: Never used   Substance and Sexual Activity   • Alcohol use: Not Currently     Comment: No use   • Drug use: Never     Comment: No use   • Sexual activity: Not Currently     Partners: Female   Other Topics Concern   • None   Social History Narrative    · Most recent tobacco use screenin2020      · Do you currently or have you served in the [employer]:   No      · Were you activated, into active duty, as a member of the [employer] or as a Reservist:   No      · Live alone or with others:   with others        · Caffeine intake:   Occasional      · Illicit drugs:   No      · Diet:   Regular      · Exercise level: Moderate      · Advance directive: Yes      · Marital status:         · General stress level:   Medium      · Single or multi-level home/work:   multi level home      · Guns present in home:   No      · Seat belts used routinely:   Yes      · Sunscreen used routinely:   Yes      · Smoke alarm in home:   Yes      Social Determinants of Health     Financial Resource Strain: Not on file   Food Insecurity: No Food Insecurity   • Worried About Running Out of Food in the Last Year: Never true   • Ran Out of Food in the Last Year: Never true   Transportation Needs: No Transportation Needs   • Lack of Transportation (Medical): No   • Lack of Transportation (Non-Medical):  No   Physical Activity: Not on file   Stress: Not on file   Social Connections: Not on file   Intimate Partner Violence: Not on file   Housing Stability: Unknown   • Unable to Pay for Housing in the Last Year: No   • Number of Places Lived in the Last Year: Not on file   • Unstable Housing in the Last Year: No     Past Surgical History:   Procedure Laterality Date   • BACK SURGERY     • BLADDER SURGERY     • CARDIAC CATHETERIZATION  2016   • CATARACT EXTRACTION, BILATERAL     • COLONOSCOPY  2019    next 2022 [place]   • CORONARY ARTERY BYPASS GRAFT  2016    Quadruple per [place]   • EGD  2017 • EYE SURGERY  11/2016    Cataracts   • JOINT REPLACEMENT  [id number]    Hip and shoulder   • KIDNEY STONE SURGERY     • VT CYSTO BLADDER W/URETERAL CATHETERIZATION Left 04/24/2021    Procedure: CYSTOSCOPY  WITH INSERTION STENT URETERAL;  Surgeon: Elliot Moore MD;  Location: [facility];  Service: Urology   • VT CYSTO/URETERO W/LITHOTRIPSY &INDWELL STENT INSRT Left 05/21/2021    Procedure: CYSTOSCOPY URETEROSCOPY WITH LITHOTRIPSY HOLMIUM LASER, AND INSERTION STENT URETERAL/EXCHANGE;  Surgeon: Elliot Moore MD;  Location: [facility];  Service: Urology   • TOTAL HIP ARTHROPLASTY Right 2012   • TOTAL SHOULDER REPLACEMENT Right 2013   • VASECTOMY  1971     Family History   Problem Relation Age of Onset   • Heart disease Father    • Arthritis Father    • Heart attack Father    • Alzheimer's disease Mother    • Dementia Mother        Damari Mary, [place]  Date: 1/19/2023 Time: 3:18 PM

## 2023-01-19 NOTE — ASSESSMENT & PLAN NOTE
· Patient was on CPAP  Recently transitioned to BiPAP by his pulmonologist but still has not received the machine    · Continue BiPAP overnight and with naps   · CM consult to assist with equipment when closer to discharge  · [place] for lower back pain  · Nasal saline spray  · Pulmonology consulted

## 2023-01-19 NOTE — CONSULTS
[address] 1945, 77 y o  male MRN: [id number]  Unit/Bed#: S -01 Encounter: [id number]  Primary Care Provider: Lloyd Sandoval MD   Date and time admitted to hospital: 1/10/2023 12:21 PM    Inpatient consult to Pulmonology  Consult performed by: Bob Stovre MD  Consult ordered by: Jamison Raphael MD          Pulmonary Consultation   Tuan Cornejo 77 y o  male MRN: [id number]  Unit/Bed#: S -01 Encounter: [id number]      Reason for consultation: Pulmonary hypertension, determining oxygen needs and bipap setting for rehabilitation      Impressions:       Acute respiratory failure with hypoxia and hypercapnia  · POA was hypoxic, , bilateral lower extremity swelling  · Home diuretic regimen is Lasix 80 mg BID and Aldactone 25 mg daily  · During this admission has been receiving IV diuretics because of CHF exacerbation  · Might be multifactorial and include CHF exacerbation, pulmonary hypertension, obstructive sleep apnea    Pulmonary hypertension    · Has been following up outpatient with Dr Jovanna Juarez from pulmonology  · Patient has been using CPAP for LUCIA before, planned to be changed to BiPAP recently because of hypercarbia outpatient  · Per notes from pulmonary before, patient had been having dependent edema and it has been hard to evaluate defeats type II pulmonary hypertension in the setting of left atrial hypertension or type III related to sleep apnea  · Cardiac RHC was done and results are the following /71, 102, HR 77, O2 sat 96% on 15L nasal prongs, Hgb 9 4, RA 2, RV 50/2, PA 52/12, 25, PCWP 5, TPG 20, DPG 7, PA sat 56%, Ezio CO/CI: 5 7/2 2, PVR 3 5, Hattie: 20, SVR 1404  · "Transducer leveled and zeroed multiple times and swapped out to ensure data accuracy  Low biv filling pressures after diuresis  Precapillary pHTN  Robust Hattie  Mildly elevated SVR   Normal CO/CI "    LUCIA    · Has been following up with pulmonary outpatient  · Initially he was using CPAP but then it was changed to BiPAP  · Patient was having some issues regarding oxygen readings because his hands were showing lower readings compared to the ear lobe before  · Is meant to be on BiPAP because of hypercarbia on outpatient labs but hasn't received the machine at home yet    CHF exacerbation    · Managed by Cardiology, has been receiving diuresis, is negative 24 215 L net fluid output  · Has been on Bumex, but currently diuresis is on hold currently  · Management per cardiology    Recommendations: Will follow up with V/Q scan results  Changed BiPAP settings to 14/8, 40% oxygen, will see how patient does   Titrate oxygen as needed  Will need home O2 evaluation   Thank you for the consult, will continue to follow      History of Present Illness   HPI:  Maria Teresa Hdz is a 77 y o  male with PMH of CAD, CABG, CHF, AAA, hyperlipidemia, Diabetes, hypertension, LUCIA who presented on 01/10/2023 to the ER because of SOB and bilateral LE swelling  Cardiology has been following the patient and he has been receiving diuretics because of CHF exacerbation  Since admission till today patient had 24 2 L net output  Has been on Bumex  Currently diuresis is on hold, will be restarted before discharge, cardiology ordered V/Q scan and it's pending, see RHC results above  Seems like RHC showed predominantly pulmonary hypertension  Patient will require outpatient follow-up with pulmonology to finalize the treatment  Patient will also require PFT outpatient  Patient had increased oxygen requirements today at 10-15 L-s, but now he is down to 5 Ls saturating at 87% while I'm in the room  Review of systems:  12 point review of systems was completed and was otherwise negative except as listed in HPI        Historical Information   Past Medical History:   Diagnosis Date   • A-fib [facility])    • AAA (abdominal aortic aneurysm)    • Actinic keratosis of right temple 7/31/2020   • Actinic keratosis of scalp 7/31/2020   • Acute respiratory failure with hypoxia ([facility] ) 3/25/2021   • Allergic 1960   • Arthritis    • Lay's esophagus    • Bladder cancer (HCC)    • Blister of left leg 4/28/2021   • Blister of right leg 5/26/2021   • CAD (coronary artery disease)    • Cancer ([facility] ) 02/2010    Bladder   • CHF (congestive heart failure) (HCC)    • Chronic low back pain    • Chronic pain of both knees 7/31/2020   • Colitis 12/4/2020   • CPAP (continuous positive airway pressure) dependence    • Diabetes ([facility] )    • Diabetes mellitus ([facility] ) 10/1993   • Excessive gas 12/3/2020   • Heart murmur    • History of chemotherapy    • Hydroureter on left 4/28/2021   • Hyperlipemia    • Hypertension    • Immunization deficiency 10/30/2020    Hep a nonimmune   • Kidney stone    • Left lower quadrant abdominal pain 12/3/2020   • Mass of right adrenal gland ([facility] ) 12/4/2020    4 1 cm   • Myocardial infarction ([place] [address] )    • 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   • Obesity 2000   • LUCIA (obstructive sleep apnea) 1/29/2021   • Pressure ulcer of right leg, stage 1 5/26/2021   • Urinary tract infection with hematuria 5/3/2021    u cx 4/2021=pseudomonas R to bactrim and cefdinir, S to cipro   • Venous stasis dermatitis of both lower extremities 5/26/2021     Past Surgical History:   Procedure Laterality Date   • BACK SURGERY     • BLADDER SURGERY     • CARDIAC CATHETERIZATION  04/2016   • CATARACT EXTRACTION, BILATERAL     • COLONOSCOPY  01/29/2019    next [address] GRAFT  04/2016    Quadruple per Rita   • EGD  06/2017   • EYE SURGERY  11/2016    Cataracts   • JOINT REPLACEMENT  [id number]    Hip and shoulder   • KIDNEY STONE SURGERY     • OR CYSTO BLADDER W/URETERAL CATHETERIZATION Left 04/24/2021    Procedure: CYSTOSCOPY  WITH INSERTION STENT URETERAL;  Surgeon: Trever Rehman MD;  Location: [facility];  Service: Urology   • OR CYSTO/URETERO W/LITHOTRIPSY &INDWELL STENT INSRT Left 05/21/2021 Procedure: CYSTOSCOPY URETEROSCOPY WITH LITHOTRIPSY HOLMIUM LASER, AND INSERTION STENT URETERAL/EXCHANGE;  Surgeon: Salomón Cabrera MD;  Location: [facility];  Service: Urology   • TOTAL HIP ARTHROPLASTY Right 2012   • TOTAL SHOULDER REPLACEMENT Right 2013   • VASECTOMY  1971     Family History   Problem Relation Age of Onset   • Heart disease Father    • Arthritis Father    • Heart attack Father    • Alzheimer's disease Mother    • Dementia Mother        Social History: Smoking history, 5 pack year smoking      Meds/Allergies   Current Facility-Administered Medications   Medication Dose Route Frequency   • acetaminophen (TYLENOL) tablet 650 mg  650 mg Oral Q6H PRN   • apixaban (ELIQUIS) tablet 5 mg  5 mg Oral BID   • atorvastatin (LIPITOR) tablet 40 mg  40 mg Oral Daily   • dextrose 50 % IV solution 25 mL  25 mL Intravenous PRN   • dicyclomine (BENTYL) capsule 10 mg  10 mg Oral BID PRN   • digoxin (LANOXIN) tablet 125 mcg  125 mcg Oral Daily   • famotidine (PEPCID) tablet 40 mg  40 mg Oral HS   • insulin lispro (HumaLOG) 100 units/mL subcutaneous injection 1-5 Units  1-5 Units Subcutaneous Q6H [facility]   • lidocaine (LIDODERM) 5 % patch 2 patch  2 patch Topical Daily   • loperamide (IMODIUM) capsule 2 mg  2 mg Oral TID PRN   • menthol-methyl salicylate (BENGAY) 30-89 % cream   Apply externally 4x Daily PRN   • metoprolol succinate (TOPROL-XL) 24 hr tablet 25 mg  25 mg Oral BID   • nystatin (MYCOSTATIN) powder   Topical BID   • pantoprazole (PROTONIX) EC tablet 40 mg  40 mg Oral BID AC   • sodium chloride (OCEAN) 0 65 % nasal spray 2 spray  2 spray Each Nare 4x Daily     Medications Prior to Admission   Medication   • atorvastatin (LIPITOR) 40 mg tablet   • dicyclomine (BENTYL) 10 mg capsule   • digoxin (LANOXIN) 0 125 mg tablet   • furosemide (LASIX) 80 mg tablet   • insulin degludec Mosie Ishikawa FlexTouch) 200 units/mL CONCENTRATED U-200 injection pen   • lisinopril (ZESTRIL) 20 mg tablet   • metoprolol succinate (TOPROL-XL) 100 mg 24 hr tablet   • NovoLOG FlexPen 100 units/mL injection pen   • omeprazole (PriLOSEC) 20 mg delayed release capsule   • potassium citrate (UROCIT-K) 10 mEq   • silver sulfadiazine (SILVADENE,SSD) 1 % cream   • spironolactone (ALDACTONE) 25 mg tablet   • Continuous Blood Gluc Sensor (FreeStyle Peggy 2 Sensor) MISC   • famotidine (PEPCID) 40 MG tablet   • Lancets (accu-chek multiclix) lancets   • Unifine Pentips 31G X 8 MM MISC     Allergies   Allergen Reactions   • Diltiazem Abdominal Pain       Vitals: Blood pressure 136/69, pulse 79, temperature 98 9 °F (37 2 °C), resp  rate 16, height 6' 4" (1 93 m), weight 126 kg (277 lb 9 oz), SpO2 (!) 84 % , On 5 L at 87%, Body mass index is 33 79 kg/m²  Intake/Output Summary (Last 24 hours) at 1/19/2023 1611  Last data filed at 1/19/2023 0743  Gross per 24 hour   Intake --   Output 1250 ml   Net -1250 ml       Physical Exam      Physical Exam  Vitals and nursing note reviewed  Constitutional:       General: He is not in acute distress  Appearance: He is well-developed  HENT:      Head: Normocephalic and atraumatic  Eyes:      Conjunctiva/sclera: Conjunctivae normal    Cardiovascular:      Rate and Rhythm: Normal rate  Rhythm irregular  Pulmonary:      Effort: Pulmonary effort is normal  No respiratory distress  Comments: Breathing sounds decreased bilaterally likely due to body habitus, no wheezing, rales or rhonchi anticipated on my exam    Abdominal:      Palpations: Abdomen is soft  Tenderness: There is no abdominal tenderness  Musculoskeletal:      Cervical back: Neck supple  No rigidity  Right lower leg: Edema present  Left lower leg: Edema present  Skin:     General: Skin is warm and dry  Capillary Refill: Capillary refill takes less than 2 seconds  Neurological:      Mental Status: He is alert  Psychiatric:         Mood and Affect: Mood normal             Labs:  I have personally reviewed pertinent lab results  Results from last 7 days   Lab Units 01/19/23  0319 01/18/23  0820 01/17/23  1534   WBC Thousand/uL 8 88 8 05 8 40   HEMOGLOBIN g/dL 9 4* 9 2* 9 8*   HEMATOCRIT % 35 0* 34 5* 36 6   PLATELETS Thousands/uL 186 174 158   NEUTROS PCT % 64 62  --    MONOS PCT % 13* 13*  --       Results from last 7 days   Lab Units 01/19/23  0917 01/19/23  0323 01/18/23  0820 01/14/23  1526 01/14/23  0902   POTASSIUM mmol/L 4 8 4 0 3 9   < >  --    CHLORIDE mmol/L 91* 90* 92*   < >  --    CO2 mmol/L 40* 36* 44*   < >  --    CO2, I-STAT mmol/L  --   --   --   --  >45*   BUN mg/dL 41* 42* 39*   < >  --    CREATININE mg/dL 1 30 1 26 1 15   < >  --    CALCIUM mg/dL 9 7 9 3 9 4   < >  --    GLUCOSE, ISTAT mg/dl  --   --   --   --  77    < > = values in this interval not displayed  Results from last 7 days   Lab Units 01/17/23  0440 01/14/23  0036   MAGNESIUM mg/dL 2 3 2 1     Results from last 7 days   Lab Units 01/14/23  0036   PHOSPHORUS mg/dL 3 0      Results from last 7 days   Lab Units 01/19/23  0319 01/18/23  2100 01/18/23  1203 01/17/23  2155 01/17/23  1534 01/14/23  1526 01/14/23  0850   INR   --   --   --   --  1 07  --  1 17   PTT seconds 86* 75* 56*   < > 29   < > 31    < > = values in this interval not displayed  0   Lab Value Date/Time    TROPONINI <0 02 03/25/2021 1857       Imaging and other studies: I have personally reviewed pertinent reports  NM lung ventilation / perfusion   Final Result by April Rincon MD (01/19 1541)      The probability for pulmonary embolus is low  Workstation performed: [id number]         CT renal stone study abdomen pelvis wo contrast   Final Result by Alisson Will MD (01/18 1050)      Tiny nonobstructing intrarenal calculi bilaterally, minimally increased in size and unchanged in number/location from prior examination  No ureteral calculi or hydronephrosis  Extensive atherosclerotic vascular calcification    Unchanged 49 mm infrarenal abdominal aortic aneurysm  Hepatosplenomegaly with lobulated hepatic contours in keeping with reported history of hepatic parenchymal disease  Prostatomegaly  Bladder wall thickening probably related to chronic prostate disease  Workstation performed: [id number]         XR chest portable   Final Result by Ravinder Chacon MD (21/16 2519)      No acute cardiopulmonary disease  Workstation performed: [id number]         CT head wo contrast   Final Result by Elia Abreu MD (01/14 0142)      No acute intracranial abnormality  Workstation performed: [id number]         CT spine cervical wo contrast   Final Result by Elia Abreu MD (01/14 0139)      No cervical spine fracture or traumatic malalignment  Workstation performed: [id number]         XR chest 1 view portable   ED Interpretation by Rylan Mckeon MD (01/10 1352)   This film was interpreted independently by me  Bilateral pulmonary edema  Final Result by Jade Grewal MD (01/10 1432)   Shallow depth of inspiration  No interval change from 12/1/2022                  Workstation performed: [id number]         XR chest portable    (Results Pending)         Pulmonary function testing:I have personally reviewed pertinent reports  EKG, Pathology, and Other Studies: I have personally reviewed pertinent reports        Code Status: Level 3 - DNAR and DNI

## 2023-01-20 DIAGNOSIS — J44.9 COPD, SEVERITY TO BE DETERMINED (HCC): Primary | ICD-10-CM

## 2023-01-20 PROBLEM — R31.9 HEMATURIA: Status: RESOLVED | Noted: 2023-01-18 | Resolved: 2023-01-20

## 2023-01-20 PROBLEM — N17.9 AKI (ACUTE KIDNEY INJURY) (HCC): Status: RESOLVED | Noted: 2021-04-23 | Resolved: 2023-01-20

## 2023-01-20 LAB
ANION GAP SERPL CALCULATED.3IONS-SCNC: 4 MMOL/L (ref 4–13)
ANION GAP SERPL CALCULATED.3IONS-SCNC: 6 MMOL/L (ref 4–13)
BASOPHILS # BLD AUTO: 0.04 THOUSANDS/ÂΜL (ref 0–0.1)
BASOPHILS NFR BLD AUTO: 0 % (ref 0–1)
BUN SERPL-MCNC: 35 MG/DL (ref 5–25)
BUN SERPL-MCNC: 36 MG/DL (ref 5–25)
CALCIUM SERPL-MCNC: 9.2 MG/DL (ref 8.4–10.2)
CALCIUM SERPL-MCNC: 9.6 MG/DL (ref 8.4–10.2)
CHLORIDE SERPL-SCNC: 88 MMOL/L (ref 96–108)
CHLORIDE SERPL-SCNC: 91 MMOL/L (ref 96–108)
CO2 SERPL-SCNC: 38 MMOL/L (ref 21–32)
CO2 SERPL-SCNC: 40 MMOL/L (ref 21–32)
CREAT SERPL-MCNC: 1.16 MG/DL (ref 0.6–1.3)
CREAT SERPL-MCNC: 1.34 MG/DL (ref 0.6–1.3)
EOSINOPHIL # BLD AUTO: 0.23 THOUSAND/ÂΜL (ref 0–0.61)
EOSINOPHIL NFR BLD AUTO: 2 % (ref 0–6)
ERYTHROCYTE [DISTWIDTH] IN BLOOD BY AUTOMATED COUNT: 19.1 % (ref 11.6–15.1)
GFR SERPL CREATININE-BSD FRML MDRD: 50 ML/MIN/1.73SQ M
GFR SERPL CREATININE-BSD FRML MDRD: 59 ML/MIN/1.73SQ M
GLUCOSE SERPL-MCNC: 135 MG/DL (ref 65–140)
GLUCOSE SERPL-MCNC: 139 MG/DL (ref 65–140)
GLUCOSE SERPL-MCNC: 158 MG/DL (ref 65–140)
GLUCOSE SERPL-MCNC: 204 MG/DL (ref 65–140)
GLUCOSE SERPL-MCNC: 209 MG/DL (ref 65–140)
GLUCOSE SERPL-MCNC: 215 MG/DL (ref 65–140)
GLUCOSE SERPL-MCNC: 224 MG/DL (ref 65–140)
GLUCOSE SERPL-MCNC: 225 MG/DL (ref 65–140)
HCT VFR BLD AUTO: 36.7 % (ref 36.5–49.3)
HGB BLD-MCNC: 10 G/DL (ref 12–17)
IMM GRANULOCYTES # BLD AUTO: 0.07 THOUSAND/UL (ref 0–0.2)
IMM GRANULOCYTES NFR BLD AUTO: 1 % (ref 0–2)
LYMPHOCYTES # BLD AUTO: 1.26 THOUSANDS/ÂΜL (ref 0.6–4.47)
LYMPHOCYTES NFR BLD AUTO: 13 % (ref 14–44)
MCH RBC QN AUTO: 21.8 PG (ref 26.8–34.3)
MCHC RBC AUTO-ENTMCNC: 27.2 G/DL (ref 31.4–37.4)
MCV RBC AUTO: 80 FL (ref 82–98)
MONOCYTES # BLD AUTO: 1.16 THOUSAND/ÂΜL (ref 0.17–1.22)
MONOCYTES NFR BLD AUTO: 12 % (ref 4–12)
NEUTROPHILS # BLD AUTO: 7.01 THOUSANDS/ÂΜL (ref 1.85–7.62)
NEUTS SEG NFR BLD AUTO: 72 % (ref 43–75)
NRBC BLD AUTO-RTO: 0 /100 WBCS
PLATELET # BLD AUTO: 188 THOUSANDS/UL (ref 149–390)
PMV BLD AUTO: 11 FL (ref 8.9–12.7)
POTASSIUM SERPL-SCNC: 4.3 MMOL/L (ref 3.5–5.3)
POTASSIUM SERPL-SCNC: 4.8 MMOL/L (ref 3.5–5.3)
RBC # BLD AUTO: 4.58 MILLION/UL (ref 3.88–5.62)
SODIUM SERPL-SCNC: 132 MMOL/L (ref 135–147)
SODIUM SERPL-SCNC: 135 MMOL/L (ref 135–147)
WBC # BLD AUTO: 9.77 THOUSAND/UL (ref 4.31–10.16)

## 2023-01-20 RX ADMIN — SALINE NASAL SPRAY 2 SPRAY: 1.5 SOLUTION NASAL at 20:57

## 2023-01-20 RX ADMIN — FAMOTIDINE 40 MG: 20 TABLET ORAL at 20:57

## 2023-01-20 RX ADMIN — INSULIN LISPRO 1 UNITS: 100 INJECTION, SOLUTION INTRAVENOUS; SUBCUTANEOUS at 00:14

## 2023-01-20 RX ADMIN — SALINE NASAL SPRAY 2 SPRAY: 1.5 SOLUTION NASAL at 12:14

## 2023-01-20 RX ADMIN — METOPROLOL SUCCINATE 25 MG: 25 TABLET, EXTENDED RELEASE ORAL at 17:19

## 2023-01-20 RX ADMIN — INSULIN LISPRO 2 UNITS: 100 INJECTION, SOLUTION INTRAVENOUS; SUBCUTANEOUS at 17:20

## 2023-01-20 RX ADMIN — INSULIN LISPRO 2 UNITS: 100 INJECTION, SOLUTION INTRAVENOUS; SUBCUTANEOUS at 12:14

## 2023-01-20 RX ADMIN — APIXABAN 5 MG: 5 TABLET, FILM COATED ORAL at 08:21

## 2023-01-20 RX ADMIN — PANTOPRAZOLE SODIUM 40 MG: 40 TABLET, DELAYED RELEASE ORAL at 08:21

## 2023-01-20 RX ADMIN — PANTOPRAZOLE SODIUM 40 MG: 40 TABLET, DELAYED RELEASE ORAL at 17:19

## 2023-01-20 RX ADMIN — DIGOXIN 125 MCG: 125 TABLET ORAL at 08:21

## 2023-01-20 RX ADMIN — APIXABAN 5 MG: 5 TABLET, FILM COATED ORAL at 17:19

## 2023-01-20 RX ADMIN — SALINE NASAL SPRAY 2 SPRAY: 1.5 SOLUTION NASAL at 17:22

## 2023-01-20 RX ADMIN — DICYCLOMINE HYDROCHLORIDE 10 MG: 10 CAPSULE ORAL at 14:48

## 2023-01-20 RX ADMIN — NYSTATIN: 100000 POWDER TOPICAL at 17:22

## 2023-01-20 RX ADMIN — METOPROLOL SUCCINATE 25 MG: 25 TABLET, EXTENDED RELEASE ORAL at 08:21

## 2023-01-20 RX ADMIN — INSULIN LISPRO 1 UNITS: 100 INJECTION, SOLUTION INTRAVENOUS; SUBCUTANEOUS at 23:50

## 2023-01-20 RX ADMIN — ATORVASTATIN CALCIUM 40 MG: 40 TABLET, FILM COATED ORAL at 08:21

## 2023-01-20 NOTE — ASSESSMENT & PLAN NOTE
Blood Pressure: 144/58  ·   ·   · Stable   · Holding diuresis for now    · Hold home lisinopril and spironolactone  · Continue metoprolol

## 2023-01-20 NOTE — ASSESSMENT & PLAN NOTE
Recent Labs     01/19/23  0917 01/19/23  1756 01/20/23  0820   CREATININE 1 30 1 23 1 16   EGFR 51 55 59     Estimated Creatinine Clearance: 74 5 mL/min (by C-G formula based on SCr of 1 16 mg/dL)  ·   · Developed LACY on 1/13 with creatinine of 1 5, baseline of 1 0-1 1     ·   ·   · Monitor BMP  · I/O  · UOP - status post Carlin catheter 1/13 due to critical illness  · maintain carlin for I/O monitoring

## 2023-01-20 NOTE — ASSESSMENT & PLAN NOTE
Blood Sugar Average: Last 72 hrs:  (P) 817 [id number]   · Home regimen: Tresiba 90 units daily  · Was hypoglycemic due to not liking food, and bipap use   · lantus held for now   · accuchecks q 6 hours given poor po intake   · Change to SSI only   · Adjust as needed and as patient improves with eating

## 2023-01-20 NOTE — PHYSICAL THERAPY NOTE
PHYSICAL THERAPY NOTE          Patient Name: Elissa Rayo  GCLWM'K Date: 23 1130   PT Last Visit   PT Visit Date 23   Note Type   Note Type Treatment   Pain Assessment   Pain Assessment Tool 0-10   Pain Score No Pain   Pain Location/Orientation Location: Back   Pain Onset/Description Onset: Gradual   Patient's Stated Pain Goal No pain   Hospital Pain Intervention(s) Repositioned; Ambulation/increased activity; Rest;Emotional support   Multiple Pain Sites No   Pain Rating: FLACC (Rest) - Face 0   Pain Rating: FLACC (Rest) - Legs 0   Pain Rating: FLACC (Rest) - Activity 0   Pain Rating: FLACC (Rest) - Cry 0   Pain Rating: FLACC (Rest) - Consolability 0   Score: FLACC (Rest) 0   Pain Rating: FLACC (Activity) - Face 0   Pain Rating: FLACC (Activity) - Legs 0   Pain Rating: FLACC (Activity) - Activity 0   Pain Rating: FLACC (Activity) - Cry 0   Pain Rating: FLACC (Activity) - Consolability 0   Score: FLACC (Activity) 0   Restrictions/Precautions   Weight Bearing Precautions Per Order No   Other Precautions Cardiac/sternal;Cognitive; Chair Alarm; Bed Alarm;O2;Fall Risk;Hard of hearing   General   Chart Reviewed Yes   Additional Pertinent History Sp02 94% pre tx session, on 3rd STS pt Sp02 decresed to 84%, post tx pt Sp02 93%   Family/Caregiver Present No   Cognition   Overall Cognitive Status Impaired   Arousal/Participation Alert; Responsive; Cooperative   Attention Attends with cues to redirect   Orientation Level Oriented X4   Memory Decreased recall of recent events;Decreased recall of precautions   Following Commands Follows one step commands with increased time or repetition   Comments pt identified by name and    Subjective   Subjective pt was agreeable to participate in PT intervention   Bed Mobility   Rolling R Unable to assess   Rolling L Unable to assess   Supine to Sit Unable to assess   Sit to Supine Unable to assess   Additional Comments pt seated OOB in the recliner pre/post tx session   Transfers   Sit to Stand 3  Moderate assistance   Additional items Assist x 1; Armrests; Increased time required;Verbal cues  (w/ RW)   Stand to Sit 3  Moderate assistance   Additional items Assist x 1; Armrests; Increased time required;Verbal cues  (w/ RW)   Stand pivot Unable to assess   Additional Comments pt continues to be limited with standing tolerance and balance as pt continues to require rW for all funcitonal transfers with VC's for hand placement while ascending to RW and descending back to seated in recliner   Ambulation/Elevation   Gait pattern Decreased foot clearance; Forward Flexion; Shuffling;Excessively slow;Decreased heel strike;Decreased toe off   Gait Assistance 3  Moderate assist   Additional items Assist x 1;Verbal cues; Tactile cues   Assistive Device Rolling walker   Distance 2'fwd and 2'back x2 RW   Stair Management Assistance Not tested   Balance   Static Sitting Fair +   Dynamic Sitting Fair -   Static Standing Poor +   Dynamic Standing Poor   Ambulatory Poor   Endurance Deficit   Endurance Deficit Yes   Activity Tolerance   Activity Tolerance Patient limited by fatigue; Other (Comment)  (generalized weakness)   Nurse Made Aware Spoke to RN   Exercises   Hip Abduction Sitting;10 reps;AROM; Bilateral   Hip Adduction Sitting;10 reps;AROM; Bilateral   Knee AROM Long Arc Quad Sitting;10 reps;AROM; Bilateral   Ankle Pumps Sitting;15 reps;AROM; Bilateral   Marching Sitting;10 reps;AROM; Bilateral   Assessment   Prognosis Good   Problem List Decreased strength;Decreased endurance; Impaired balance;Decreased mobility; Decreased cognition;Decreased safety awareness; Impaired hearing;Obesity; Decreased skin integrity   Assessment pt began tx session seated OOB in the recliner and was agreeable to participate in PT intervention   Slight progress wa snoted this tx session as pt required a slight decrease in level of assistance required to complete STS functional transfers to and from rW  pt required mod Ax1 with VC's for hand placementb in order to increase safety and balance  pt was able to complete 3 STS in todays tx session in order to strengthen LE's and increase safety and balance with all functional transfers  pt cotninues to remaion limited with standing tolerance and balance as pt on initial STS sttod 30 seconds with mod Ax1 for safety and balance and on last 2 STS pt stood 1 minute per stand and was able to ambulate 2'fwd and 2'back w/ RW x2  pt continues to demonstrate the inability to ambulate house hold distances and remains limited with standing tolerance  pt remains at an increase risk for falls and injuries due to deficits just listed  Continue to recommend post acute rehab servies at the time of D/C in order to maximize pt functional independence and safety with all OOB mobility  post tx pt in recliner with call bell, chair alarm activated and all pt needs met   Goals   Patient Goals no goals stated this tx session   STG Expiration Date 01/26/23   PT Treatment Day 3   Plan   Treatment/Interventions Functional transfer training;LE strengthening/ROM; Elevations; Therapeutic exercise; Endurance training;Cognitive reorientation;Patient/family training;Equipment eval/education; Bed mobility;Gait training;Spoke to nursing   Progress Slow progress, decreased activity tolerance   PT Frequency 3-5x/wk   Recommendation   PT Discharge Recommendation Post acute rehabilitation services   AM-PAC Basic Mobility Inpatient   Turning in Flat Bed Without Bedrails 2   Lying on Back to Sitting on Edge of Flat Bed Without Bedrails 2   Moving Bed to Chair 2   Standing Up From Chair Using Arms 2   Walk in Room 2   Climb 3-5 Stairs With Railing 1   Basic Mobility Inpatient Raw Score 11   Basic Mobility Standardized Score 30 25   Highest Level Of Mobility   -HLM Goal 4: Move to chair/commode   -HLM Achieved 5: Stand (1 or more minutes) Education   Education Provided Other  (functional transfers)   Patient Demonstrates acceptance/verbal understanding   End of Consult   Patient Position at End of Consult Bedside chair;Bed/Chair alarm activated; All needs within reach   The patient's AM-PAC Basic Mobility Inpatient Short Form Raw Score is 11  A Raw score of less than or equal to 16 suggests the patient may benefit from discharge to post-acute rehabilitation services  Please also refer to the recommendation of the Physical Therapist for safe discharge planning       Hafsa Wright

## 2023-01-20 NOTE — ASSESSMENT & PLAN NOTE
Recent Labs     01/19/23  0323 01/19/23  0917 01/19/23  1756   CREATININE 1 26 1 30 1 23   EGFR 53 51 55     Estimated Creatinine Clearance: 70 3 mL/min (by C-G formula based on SCr of 1 23 mg/dL)  ·   · Developed LACY on 1/13 with creatinine of 1 5, baseline of 1 0-1 1     ·   ·   · Monitor BMP  · I/O  · UOP - status post Carlin catheter 1/13 due to critical illness  · maintain carlin for I/O monitoring

## 2023-01-20 NOTE — ASSESSMENT & PLAN NOTE
ED Attending Physician Note      Patient : Veronica Cunningham Age: 79year old Sex: female   MRN: [id number] Encounter Date: 2/24/2020      History     Chief Complaint   Patient presents with   â¢ Ankle Swelling       This 79year old woman is presenting to the ED with redness and pain of the left ankle. The patient had a recent admission at [facility] for influenza and pneumonia she was at a rehab center and is now home suspect she had some problems with left ankle pain there was presumed to be gout. Over the last few days the pain is increased to the point where she cannot walk its in the left foot and ankle area there is some redness coming up the leg it is warm. She is brought here by her family. No fevers or chills no fall or injury she also some pain rating up into the groin there is concerned about possible DVT and the patient is referred here to the ER. Allergies   Allergen Reactions   â¢ Clarithromycin Other (See Comments)     Unknown   â¢ Contrast Media RASH   â¢ No Name Available Other (See Comments)     Contrast Media Ready-box Misc : Unknown         Current Discharge Medication List      Prior to Admission Medications    Details   acetaminophen (TYLENOL) 500 MG tablet Take 500 mg by mouth every 4 hours as needed for Pain. benzonatate (TESSALON PERLES) 100 MG capsule Take 100 mg by mouth 3 times daily as needed for Cough. Calcium Carb-Cholecalciferol (CALCIUM-VITAMIN D) 500-200 MG-UNIT tablet Take 1 tablet by mouth 2 times daily. atorvastatin (LIPITOR) 10 MG tablet Take 10 mg by mouth daily. loratadine (CLARITIN) 10 MG tablet TAKE ONE TABLET BY MOUTH DAILY AS NEEDED   Qty: 30 tablet, Refills: 1      omeprazole 20 MG tablet Take 1 tablet by mouth daily.   Qty: 90 tablet, Refills: 0      furosemide (LASIX) 40 MG tablet TAKE ONE TABLET BY MOUTH ONE TIME DAILY   Qty: 90 tablet, Refills: 0      warfarin (COUMADIN) 2 MG tablet TAKE AS DIRECTED  Qty: 30 tablet, Refills: 10 Hypoxia on arrival, desaturations to 77% on room air  Denies home O2 use  Status post-RHC    · V/Q scan shows low probability of pulmonary embolus  · Pulmonology: initiate bipap  · Continue BiPAP, IPAP 20, EPAP 8   · Continue at night (baseline) as well as if the patient naps during the day  · Respiratory protocol  · Monitor mental status  · PFT outpatient levothyroxine (SYNTHROID, LEVOTHROID) 75 MCG tablet TAKE ONE TABLET BY MOUTH ONE TIME DAILY   Qty: 30 tablet, Refills: 10      allopurinol (ZYLOPRIM) 100 MG tablet TAKE ONE TABLET BY MOUTH ONE TIME DAILY   Qty: 30 tablet, Refills: 10      metoPROLOL succinate (TOPROL-XL) 50 MG 24 hr tablet Take 1 tablet by mouth daily. Qty: 90 tablet, Refills: 3      fluticasone (FLONASE) 50 MCG/ACT nasal spray Spray 2 sprays in each nostril daily. INHALE 2 SPRAYS IN EACH NOSTRIL EVERY DAY  Qty: 16 g, Refills: 5             Current Discharge Medication List      New Prescriptions    Details   cephalexin (KEFLEX) 500 MG capsule Take 1 capsule by mouth 4 times daily for 10 days. Qty: 40 capsule, Refills: 0             Past Medical History:   Diagnosis Date   â¢ (HFpEF) heart failure with preserved ejection fraction (CMS/HCC)    â¢ Allergic rhinitis    â¢ Atrial fibrillation (CMS/HCC)    â¢ Atypical chest pain    â¢ Chronic diastolic heart failure (CMS/HCC)    â¢ CKD (chronic kidney disease) stage 3, GFR 30-59 ml/min (CMS/HCC)    â¢ Gout    â¢ HTN (hypertension)    â¢ Hyperlipidemia    â¢ Hypothyroidism    â¢ Microscopic hematuria    â¢ Neck strain    â¢ Obesity (BMI 35.0-39.9 without comorbidity)    â¢ ANJELICA on CPAP    â¢ Restrictive cardiomyopathy (CMS/HCC)        Past Surgical History:   Procedure Laterality Date   â¢ TUBAL LIGATION         Family History   Problem Relation Age of Onset   â¢ Cancer, Breast Sister    â¢ Cancer, Ovarian Mother        Social History     Tobacco Use   â¢ Smoking status: Never Smoker   â¢ Smokeless tobacco: Never Used   Substance Use Topics   â¢ Alcohol use: No     Frequency: Never   â¢ Drug use: Not on file       Review of Systems   Constitutional: Negative for activity change, chills and fever. HENT: Negative for congestion, trouble swallowing and voice change. Eyes: Negative for pain and visual disturbance. Respiratory: Negative for cough, shortness of breath and wheezing.     Cardiovascular: Negative for chest pain and palpitations. Gastrointestinal: Negative for abdominal pain, blood in stool, constipation, diarrhea, nausea and vomiting. Endocrine: Negative for polydipsia and polyuria. Genitourinary: Negative for difficulty urinating, dysuria, flank pain, hematuria and urgency. Musculoskeletal: Positive for joint swelling and myalgias. Negative for arthralgias. Skin: Negative for color change and rash. Allergic/Immunologic: Negative for immunocompromised state. Neurological: Negative for dizziness, weakness and headaches. Hematological: Does not bruise/bleed easily. Psychiatric/Behavioral: Negative for behavioral problems, confusion and suicidal ideas. Physical Exam     ED Triage Vitals [02/24/20 1729]   ED Triage Vitals Group      Temp 98.6 Â°F (37 Â°C)      Heart Rate 65      Resp 16      /63      SpO2 96 %      EtCO2 mmHg       Height       Weight       Weight Scale Used       BMI (Calculated)       IBW/kg (Calculated)        Physical Exam  Vitals signs and nursing note reviewed. Constitutional:       General: She is not in acute distress. Appearance: She is not ill-appearing. Comments: Mild distress   HENT:      Head: Normocephalic and atraumatic. Mouth/Throat:      Mouth: Mucous membranes are moist.   Eyes:      Pupils: Pupils are equal, round, and reactive to light. Neck:      Musculoskeletal: Neck supple. Cardiovascular:      Rate and Rhythm: Normal rate and regular rhythm. Pulses: Normal pulses. Pulmonary:      Effort: Pulmonary effort is normal. No respiratory distress. Abdominal:      Palpations: Abdomen is soft. Tenderness: There is no tenderness. Musculoskeletal:      Comments: The patient has tenderness of the ankle and swelling there is redness and warmth mainly in the medial aspect but somewhat diffusely there is pain with movement of the ankle there is redness and warmth extending up to about the mid lower leg.   No calf tenderness per se no lymphangitis. Good range of motion of the hips and knees. Pulses are intact. Skin:     General: Skin is warm and dry. Capillary Refill: Capillary refill takes less than 2 seconds. Neurological:      General: No focal deficit present. Mental Status: She is alert and oriented to person, place, and time. Mental status is at baseline. Psychiatric:         Mood and Affect: Mood normal.         Behavior: Behavior normal.         Thought Content:  Thought content normal.         Judgment: Judgment normal.         ED Course     Procedures    Lab Results     Results for orders placed or performed during the hospital encounter of 02/24/20   CBC with Automated Differential   Result Value Ref Range    WBC 10.8 4.2 - 11.0 K/mcL    RBC 4.57 4.00 - 5.20 mil/mcL    HGB 13.3 12.0 - 15.5 g/dL    HCT 42.0 36.0 - 46.5 %    MCV 91.9 78.0 - 100.0 fl    MCH 29.1 26.0 - 34.0 pg    MCHC 31.7 (L) 32.0 - 36.5 g/dL    RDW-CV 14.4 11.0 - 15.0 %     140 - 450 K/mcL    NRBC 0 0 /100 WBC    DIFF TYPE AUTOMATED DIFFERENTIAL     Neutrophil 71 %    LYMPH 20 %    MONO 8 %    EOSIN 1 %    BASO 0 %    Percent Immature Granuloctyes 0 %    Absolute Neutrophil 7.7 1.8 - 7.7 K/mcL    Absolute Lymph 2.1 1.0 - 4.0 K/mcL    Absolute Mono 0.9 0.3 - 0.9 K/mcL    Absolute Eos 0.1 0.1 - 0.5 K/mcL    Absolute Baso 0.0 0.0 - 0.3 K/mcL    Absolute Immature Granulocytes 0.0 0 - 0.2 K/mcl   Basic Metabolic Panel   Result Value Ref Range    Sodium 138 135 - 145 mmol/L    Potassium 4.2 3.4 - 5.1 mmol/L    Chloride 105 98 - 107 mmol/L    Carbon Dioxide 30 21 - 32 mmol/L    Anion Gap 7 (L) 10 - 20 mmol/L    Glucose 96 65 - 99 mg/dL    BUN 21 (H) 6 - 20 mg/dL    Creatinine 0.96 (H) 0.51 - 0.95 mg/dL    GFR Estimate,  62     GFR Estimate, Non African American 53     BUN/Creatinine Ratio 22 7 - 25    CALCIUM 9.3 8.4 - 10.2 mg/dL   Prothrombin Time   Result Value Ref Range    PROTIME 22.2 (H) 9.7 - 11.8 sec    INR 2.1        EKG Results Radiology Results     XR KNEE MIN 4 VIEWS LEFT   Final Result      1. No acute fracture or dislocation. 2.    Moderate tricompartment osteoarthritis and probable joint body. Electronically Signed by: Barbie Castro MD    Signed on: 2/24/2020 9:38 PM          XR HIP 2 VIEWS LEFT   Final Result       No acute fracture or dislocation. Electronically Signed by: Barbie Castro MD    Signed on: 2/24/2020 9:37 PM          XR FOOT MIN 3 VIEWS LEFT   Final Result       No acute abnormality. Diffuse arthritic changes. Diffuse soft tissue calcifications. Electronically Signed by: Dilcia Dai M.D. Signed on: 2/24/2020 9:55 PM          [address] [place] [address]   Final Result   This examination is negative for acute deep venous thrombosis within the left lower extremity. [facility] is fully accredited. Electronically Signed by: Dilcia Dai M.D. Signed on: 2/24/2020 7:21 PM               ED Medication Orders (From admission, onward)    Ordered Start     Status Ordering Provider    02/24/20 2322 02/24/20 2330  ceFAZolin (ANCEF) syringe 2,000 mg  ONCE      Ordered PEABODY, JOSEPH    02/24/20 2322 02/24/20 2330  predniSONE (DELTASONE) tablet 50 mg  ONCE      Ordered PEABODY, JOSEPH    02/24/20 2322 02/24/20 2330  metoPROLOL succinate (TOPROL-XL) ER tablet 50 mg  ONCE      Ordered PEABODY, JOSEPH    02/24/20 2322 02/24/20 2330  famotidine (PEPCID) tablet 40 mg  ONCE      Ordered PEABODY, JOSEPH    02/24/20 2322 02/24/20 2330  acetaminophen (TYLENOL) tablet 650 mg  ONCE      Ordered PEABODY, JOSEPH               MDM   Patient has an acute inflammatory reaction of the left lower extremity near the ankle joint. It is most suspicious for gout she has no fever or white count suggest acute arthritis. The patient is on warfarin I would be reluctant to tap her ankle and have a lower suspicion for septic joint at this point.   There may be a cellulitis as it extends up into the leg. Plan to treat her with analgesics a dose of antibiotics and steroids she cannot walk at this point I think she would benefit from admission. Clinical Impression     ED Diagnosis   1. Cellulitis of left lower extremity     2.  Arthritis           Disposition        There is no disposition no dispo time  There is no comment    Follow up  Heraclio Jin, [address] [place] [phone]    Schedule an appointment as soon as possible for a visit in 2 days         Court Silveira MD   2/24/2020 9:38 PM                      Court Silveira MD  02/25/20 [id number]

## 2023-01-20 NOTE — PLAN OF CARE
Problem: DISCHARGE PLANNING  Goal: Discharge to home or other facility with appropriate resources  Description: INTERVENTIONS:  - Identify barriers to discharge w/patient and caregiver  - Arrange for needed discharge resources and transportation as appropriate  - Identify discharge learning needs (meds, wound care, etc )  - Arrange for interpretive services to assist at discharge as needed  - Refer to Case Management Department for coordinating discharge planning if the patient needs post-hospital services based on physician/advanced practitioner order or complex needs related to functional status, cognitive ability, or social support system  Outcome: Progressing     Problem: Knowledge Deficit  Goal: Patient/family/caregiver demonstrates understanding of disease process, treatment plan, medications, and discharge instructions  Description: Complete learning assessment and assess knowledge base    Interventions:  - Provide teaching at level of understanding  - Provide teaching via preferred learning methods  Outcome: Progressing     Problem: CARDIOVASCULAR - ADULT  Goal: Maintains optimal cardiac output and hemodynamic stability  Description: INTERVENTIONS:  - Monitor I/O, vital signs and rhythm  - Monitor for S/S and trends of decreased cardiac output  - Administer and titrate ordered vasoactive medications to optimize hemodynamic stability  - Assess quality of pulses, skin color and temperature  - Assess for signs of decreased coronary artery perfusion  - Instruct patient to report change in severity of symptoms  Outcome: Progressing  Goal: Absence of cardiac dysrhythmias or at baseline rhythm  Description: INTERVENTIONS:  - Continuous cardiac monitoring, vital signs, obtain 12 lead EKG if ordered  - Administer antiarrhythmic and heart rate control medications as ordered  - Monitor electrolytes and administer replacement therapy as ordered  Outcome: Progressing     Problem: RESPIRATORY - ADULT  Goal: Achieves optimal ventilation and oxygenation  Description: INTERVENTIONS:  - Assess for changes in respiratory status  - Assess for changes in mentation and behavior  - Position to facilitate oxygenation and minimize respiratory effort  - Oxygen administered by appropriate delivery if ordered  - Initiate smoking cessation education as indicated  - Encourage broncho-pulmonary hygiene including cough, deep breathe, Incentive Spirometry  - Assess the need for suctioning and aspirate as needed  - Assess and instruct to report SOB or any respiratory difficulty  - Respiratory Therapy support as indicated  Outcome: Progressing     Problem: MOBILITY - ADULT  Goal: Maintain or return to baseline ADL function  Description: INTERVENTIONS:  -  Assess patient's ability to carry out ADLs; assess patient's baseline for ADL function and identify physical deficits which impact ability to perform ADLs (bathing, care of mouth/teeth, toileting, grooming, dressing, etc )  - Assess/evaluate cause of self-care deficits   - Assess range of motion  - Assess patient's mobility; develop plan if impaired  - Assess patient's need for assistive devices and provide as appropriate  - Encourage maximum independence but intervene and supervise when necessary  - Involve family in performance of ADLs  - Assess for home care needs following discharge   - Consider OT consult to assist with ADL evaluation and planning for discharge  - Provide patient education as appropriate  Outcome: Progressing  Goal: Maintains/Returns to pre admission functional level  Description: INTERVENTIONS:  - Perform BMAT or MOVE assessment daily    - Set and communicate daily mobility goal to care team and patient/family/caregiver     - Collaborate with rehabilitation services on mobility goals if consulted  - Out of bed for toileting  - Record patient progress and toleration of activity level   Outcome: Progressing     Problem: Potential for Falls  Goal: Patient will remain free of falls  Description: INTERVENTIONS:  - Educate patient/family on patient safety including physical limitations  - Instruct patient to call for assistance with activity   - Consult OT/PT to assist with strengthening/mobility   - Keep Call bell within reach  - Keep bed low and locked with side rails adjusted as appropriate  - Keep care items and personal belongings within reach  - Initiate and maintain comfort rounds  - Make Fall Risk Sign visible to staff  - Apply yellow socks and bracelet for high fall risk patients  - Consider moving patient to room near nurses station  Outcome: Progressing     Problem: Prexisting or High Potential for Compromised Skin Integrity  Goal: Skin integrity is maintained or improved  Description: INTERVENTIONS:  - Identify patients at risk for skin breakdown  - Assess and monitor skin integrity  - Assess and monitor nutrition and hydration status  - Monitor labs   - Assess for incontinence   - Turn and reposition patient  - Assist with mobility/ambulation  - Relieve pressure over bony prominences  - Avoid friction and shearing  - Provide appropriate hygiene as needed including keeping skin clean and dry  - Evaluate need for skin moisturizer/barrier cream  - Collaborate with interdisciplinary team   - Patient/family teaching  - Consider wound care consult   Outcome: Progressing     Problem: Nutrition/Hydration-ADULT  Goal: Nutrient/Hydration intake appropriate for improving, restoring or maintaining nutritional needs  Description: Monitor and assess patient's nutrition/hydration status for malnutrition  Collaborate with interdisciplinary team and initiate plan and interventions as ordered  Monitor patient's weight and dietary intake as ordered or per policy  Utilize nutrition screening tool and intervene as necessary  Determine patient's food preferences and provide high-protein, high-caloric foods as appropriate       INTERVENTIONS:  - Monitor oral intake, urinary output, labs, and treatment plans  - Assess nutrition and hydration status and recommend course of action  - Evaluate amount of meals eaten  - Assist patient with eating if necessary   - Allow adequate time for meals  - Recommend/ encourage appropriate diets, oral nutritional supplements, and vitamin/mineral supplements  - Order, calculate, and assess calorie counts as needed  - Recommend, monitor, and adjust tube feedings and TPN/PPN based on assessed needs  - Assess need for intravenous fluids  - Provide specific nutrition/hydration education as appropriate  - Include patient/family/caregiver in decisions related to nutrition  Outcome: Progressing

## 2023-01-20 NOTE — PLAN OF CARE
Problem: DISCHARGE PLANNING  Goal: Discharge to home or other facility with appropriate resources  Description: INTERVENTIONS:  - Identify barriers to discharge w/patient and caregiver  - Arrange for needed discharge resources and transportation as appropriate  - Identify discharge learning needs (meds, wound care, etc )  - Arrange for interpretive services to assist at discharge as needed  - Refer to Case Management Department for coordinating discharge planning if the patient needs post-hospital services based on physician/advanced practitioner order or complex needs related to functional status, cognitive ability, or social support system  Outcome: Progressing     Problem: Knowledge Deficit  Goal: Patient/family/caregiver demonstrates understanding of disease process, treatment plan, medications, and discharge instructions  Description: Complete learning assessment and assess knowledge base    Interventions:  - Provide teaching at level of understanding  - Provide teaching via preferred learning methods  Outcome: Progressing     Problem: CARDIOVASCULAR - ADULT  Goal: Maintains optimal cardiac output and hemodynamic stability  Description: INTERVENTIONS:  - Monitor I/O, vital signs and rhythm  - Monitor for S/S and trends of decreased cardiac output  - Administer and titrate ordered vasoactive medications to optimize hemodynamic stability  - Assess quality of pulses, skin color and temperature  - Assess for signs of decreased coronary artery perfusion  - Instruct patient to report change in severity of symptoms  Outcome: Progressing  Goal: Absence of cardiac dysrhythmias or at baseline rhythm  Description: INTERVENTIONS:  - Continuous cardiac monitoring, vital signs, obtain 12 lead EKG if ordered  - Administer antiarrhythmic and heart rate control medications as ordered  - Monitor electrolytes and administer replacement therapy as ordered  Outcome: Progressing     Problem: RESPIRATORY - ADULT  Goal: Achieves optimal ventilation and oxygenation  Description: INTERVENTIONS:  - Assess for changes in respiratory status  - Assess for changes in mentation and behavior  - Position to facilitate oxygenation and minimize respiratory effort  - Oxygen administered by appropriate delivery if ordered  - Initiate smoking cessation education as indicated  - Encourage broncho-pulmonary hygiene including cough, deep breathe, Incentive Spirometry  - Assess the need for suctioning and aspirate as needed  - Assess and instruct to report SOB or any respiratory difficulty  - Respiratory Therapy support as indicated  Outcome: Progressing     Problem: MOBILITY - ADULT  Goal: Maintain or return to baseline ADL function  Description: INTERVENTIONS:  -  Assess patient's ability to carry out ADLs; assess patient's baseline for ADL function and identify physical deficits which impact ability to perform ADLs (bathing, care of mouth/teeth, toileting, grooming, dressing, etc )  - Assess/evaluate cause of self-care deficits   - Assess range of motion  - Assess patient's mobility; develop plan if impaired  - Assess patient's need for assistive devices and provide as appropriate  - Encourage maximum independence but intervene and supervise when necessary  - Involve family in performance of ADLs  - Assess for home care needs following discharge   - Consider OT consult to assist with ADL evaluation and planning for discharge  - Provide patient education as appropriate  Outcome: Progressing  Goal: Maintains/Returns to pre admission functional level  Description: INTERVENTIONS:  - Perform BMAT or MOVE assessment daily    - Set and communicate daily mobility goal to care team and patient/family/caregiver  - Collaborate with rehabilitation services on mobility goals if consulted  - Perform Range of Motion  times a day  - Reposition patient every  hours    - Dangle patient * times a day  - Stand patient  times a day  - Ambulate patient  times a day  - Out of bed to chair  times a day   - Out of bed for meals  times a day  - Out of bed for toileting  - Record patient progress and toleration of activity level   Outcome: Progressing     Problem: Potential for Falls  Goal: Patient will remain free of falls  Description: INTERVENTIONS:  - Educate patient/family on patient safety including physical limitations  - Instruct patient to call for assistance with activity   - Consult OT/PT to assist with strengthening/mobility   - Keep Call bell within reach  - Keep bed low and locked with side rails adjusted as appropriate  - Keep care items and personal belongings within reach  - Initiate and maintain comfort rounds  - Make Fall Risk Sign visible to staff  - Offer Toileting every  Hours, in advance of need  - Initiate/Maintain alarm  - Obtain necessary fall risk management equipment:   - Apply yellow socks and bracelet for high fall risk patients  - Consider moving patient to room near nurses station  Outcome: Progressing     Problem: Prexisting or High Potential for Compromised Skin Integrity  Goal: Skin integrity is maintained or improved  Description: INTERVENTIONS:  - Identify patients at risk for skin breakdown  - Assess and monitor skin integrity  - Assess and monitor nutrition and hydration status  - Monitor labs   - Assess for incontinence   - Turn and reposition patient  - Assist with mobility/ambulation  - Relieve pressure over bony prominences  - Avoid friction and shearing  - Provide appropriate hygiene as needed including keeping skin clean and dry  - Evaluate need for skin moisturizer/barrier cream  - Collaborate with interdisciplinary team   - Patient/family teaching  - Consider wound care consult   Outcome: Progressing

## 2023-01-20 NOTE — CASE MANAGEMENT
Case Management Progress Note    Patient name Tsering Perry  Location S /S -01 MRN [id number]  : 1945 Date 2023       LOS (days): 10  Geometric Mean LOS (GMLOS) (days): 5 20  Days to GMLOS:-5        OBJECTIVE:        Current admission status: Inpatient  Preferred Pharmacy:   [address], [address] [place] [address]  Phone: [phone] Fax: [phone]    Primary Care Provider: Adalberto Ngo MD    Primary Insurance: MEDICARE  Secondary Insurance: [employer]    PROGRESS NOTE:    Met with patient, spouse and daughter at bedside to discuss d/c plan  Family aware that preferred facilities [facility] and [facility] had some follow-up questions re: respiratory status, so waiting on final determination  Daughter confirmed that they had gotten application for [facility], but had spoken with her spouse re: the facility and determined they did not want to consider, so they did not complete application  Family confirms plan for patient to go to facility for STR with ultimate goal to return home at discharge  Aware that this writer to follow-up once final determinations received from preferred facilities  BiPap order attached to referral, as patient will require BiPap at rehab  Per pulm resident, PFTs able to be completed out-patient once rehab is complete - no urgent need to do so prior  Information forwarded to [facility] and they report they will review updates re: PFT follow-up and current need for 5L o2 with administration and be in touch  Message sent to [facility] to inquire about admission, and initially Sergio Kwong had confirmed ability to accept patient on 5L o2  Sergio Kwong then relayed that DON reporting that patient will need to be on 3L o2 in order to be considered for acceptance  Inquired about reason why, as patient had been on 5L o2 when bed offered - Sergio Kwong reports she will follow up with DON and be back in touch  MD aware of above  Will follow-up with family once final responses received from facilities

## 2023-01-20 NOTE — CASE MANAGEMENT
Case Management Progress Note    Patient name Nita Silvestre S /S -01 MRN [id number]  : 1945 Date 2023       LOS (days): 10  Geometric Mean LOS (GMLOS) (days): 5 20  Days to GMLOS:-5        OBJECTIVE:        Current admission status: Inpatient  Preferred Pharmacy:   [facility] [address] Ashly Kiara, [address]  Phone: [phone] Fax: [phone]    Primary Care Provider: Jin Daley MD    Primary Insurance: MEDICARE  Secondary Insurance: [facility]    PROGRESS NOTE:    No response received from [place] re: DON determination of need for 3L o2 or less  MD and RN reports patient now down to 3L o2 at rest - information fowarded to [facility]; awaiting response  Response received from [facility] - reports that they will not be able to consider patient for weekend admission  Will need to follow-up with family re: actual bed offers received - messages sent to other following facilities ([place], [address], [facility], and [place]) to see if any one is able to offer a bed this weekend  Will continue to follow once bed offers confirmed and choices reviewed with patient/family

## 2023-01-20 NOTE — PLAN OF CARE
Problem: PHYSICAL THERAPY ADULT  Goal: Performs mobility at highest level of function for planned discharge setting  See evaluation for individualized goals  Description: Treatment/Interventions: Functional transfer training, LE strengthening/ROM, Elevations, Therapeutic exercise, Endurance training, Cognitive reorientation, Patient/family training, Equipment eval/education, Bed mobility, Gait training, Spoke to nursing     See flowsheet documentation for full assessment, interventions and recommendations  Outcome: Not Progressing  Note: Prognosis: Good  Problem List: Decreased strength, Decreased endurance, Impaired balance, Decreased mobility, Decreased cognition, Decreased safety awareness, Impaired hearing, Obesity, Decreased skin integrity  Assessment: pt began tx session seated OOB in the recliner and was agreeable to participate in PT intervention  Slight progress wa snoted this tx session as pt required a slight decrease in level of assistance required to complete STS functional transfers to and from rW  pt required mod Ax1 with VC's for hand placementb in order to increase safety and balance  pt was able to complete 3 STS in todays tx session in order to strengthen LE's and increase safety and balance with all functional transfers  pt cotninues to remaion limited with standing tolerance and balance as pt on initial STS sttod 30 seconds with mod Ax1 for safety and balance and on last 2 STS pt stood 1 minute per stand and was able to ambulate 2'fwd and 2'back w/ RW x2  pt continues to demonstrate the inability to ambulate house hold distances and remains limited with standing tolerance  pt remains at an increase risk for falls and injuries due to deficits just listed  Continue to recommend post acute rehab servies at the time of D/C in order to maximize pt functional independence and safety with all OOB mobility   post tx pt in recliner with call bell, chair alarm activated and all pt needs met  Barriers to Discharge: Decreased caregiver support, Inaccessible home environment     PT Discharge Recommendation: Post acute rehabilitation services    See flowsheet documentation for full assessment

## 2023-01-20 NOTE — ASSESSMENT & PLAN NOTE
Hypoxia on arrival, desaturations to 77% on room air  Denies home O2 use  Status post-RHC      Patient now blood oxygen saturation 89-91% on 2 L   · V/Q scan shows low probability of pulmonary embolus  · Pulmonology: on bipap  · Continue BiPAP, IPAP 20, EPAP 8   · Continue at night (baseline) as well as if the patient naps during the day  · Respiratory protocol  · Monitor mental status  · PFT outpatient

## 2023-01-20 NOTE — ASSESSMENT & PLAN NOTE
· Patient was on CPAP  Recently transitioned to BiPAP by his pulmonologist but still has not received the machine    · Continue BiPAP overnight and with naps   · Bengay for lower back pain  · Nasal saline spray  · Pulmonology consulted

## 2023-01-20 NOTE — PROGRESS NOTES
Advanced Heart Failure/Pulmonary Hypertension Service Progress Note - Luciano Nino 77 y o  male MRN: [id number]    Unit/Bed#: S -01 Encounter: [id number]      Assessment:  77 y o  male Hx per chart p/w ADHF, PH, respiratory failure  # Acute on chronic HFpEF  # Pulmonary hypertension with RV dysfunction  PASP about 80 by echo (while decompensated)  [facility]PAP 25 at [facility] after aggressive diuresis  1/2023 TTE when decompensated: Severe PH with TRmaxvel 4 0 range, +significant RV dysfunction, small LV, No intracardiac shunt by color doppler  Likely group II and group III disease, cannot rule out groups IV and I now, or phenotypic group 1 due to chronic dz  Has LUCIA, on cpap since 2014 but outpt notes indicate possible worse AHI  and machine function/fitting issues that seem unresolved  Good compliance >5 hr per night  No h/o PE/DVT  On AC for AF  Remote smoking, quit 2010  CT chest 4/2021: Pleural thickening with pleural calcifications in the dependent portion of the right mid and lower lung field, 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  Suspected round atelectasis in the posterior right lower lobe  No h/o illicit drug use  # CAD s/p CABG in 2016 (LIMA to LAD, SVG to OM3 and SVG to PDA) in 2016  # LUCIA on cpap  # Paroxysmal atrial fibrillation, s/p cardioversion 1/7/22  also AFL on old ECGs  AC: Eliquis  Rate: metoprolol and digoxin  # Hyperlipidemia  # DM type II  Hemoglobin A1c 7 8 11/2/2022  # Hypertension  # LACY  # Hemorrhoids with rectal bleeding  RBBB  Fall, as inpt this admission 1/2023  CTH neg  Hematuria 1/16/23, holding AC, uro following, has known renal stones  Has raegan        Reviewed all pertinent labs/imaging/data including:  Cr: mild LACY peaked 1 5>>1 07>1 1>1 2 (BL around 1 0)  Bicarb up but downtrending; note that BL somewhat elevated 2/2 chronic retention  PCO2 on ABG 1/16 was 76 while on bipap, pH 7 4    Weight (last 2 days)     Date/Time Weight    01/20/23 0531 126 (278)    01/20/23 0500 126 (277 56)    01/19/23 0551 126 (277 56)     Weight: pt unable to stand safetly at 01/19/23 0551    01/18/23 1137 126 (277)    01/18/23 0530 --     Weight: bedscale broken, unable to stand patient safetly at 01/18/23 0530           Intake/Output Summary (Last 24 hours) at 1/20/2023 0840  Last data filed at 1/20/2023 0501  Gross per 24 hour   Intake --   Output 1575 ml   Net -1575 ml     Net neg 1 9L in 24 hr off diuretic    Tele: AF, rates 70-80s    Drips:        Plan:  Awaiting 1/20 labs  warm, euvolemic by exam, dry by [address] 1/19/23, see full cath report  UO about net neg 20L since admit, unreliable wt trend but historical best around 300lbs and wt now recorded as 280s; on arrival was likely about 320lbs)  Good response to aggressive diuresis with bumex gtt and diamox over last weekend    1/19/23 RHC hemodynamics indicate he is dry (after aggressive diuresis), precapillary PH, TPG 20  Will pause bumex 1 5mg IV bid starting 1/19/23, reassess home diuretic in near term - can hold for 2-3 days, liberalize po intake, then restart torsemide 40 po qd, needs close fu after DC for volume management in case needs more diuretic  Further PH workup and Tx as outpt in near term  pulm recs appreciated  VQ scan below - neg for PE  If remains in house, would get limited echo with bubbles for shunt evaluation (note no intracardiac shunt by echo color doppler)  Can get this echo outpt if planning DC  Prior Home diuretic: lasix 80 bid    cw eliquis    Likely needs home O2, complete eval  Uses cpap qhs - pulm planning to switch to bipap 2/2 hypercarbia    1/19/23 VQ scan:  IMPRESSION:  The probability for pulmonary embolus is low  Note possible false result based on 2021 CT chest:  "Pleural thickening with pleural calcifications in the dependent portion of the right mid and lower lung field, 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     Suspected round atelectasis in the posterior right lower lobe "  No recent CTA PE protocol to help assess if thromboembolic dz    Complete hematuria workup per uro and primary    Ok to hold home lisinopril 20 qd and aldactone 25 qd for the moment; if BP trend continues SBP>130 and stable, with good renal fxn, can start lisinopril 5 qd    cpap qhs    Checked new dig level>was ok at 0 9    PFTs with DLCO as outpt, I cannot see any prior results in epic    Could consider entresto and/or sglti as outpt    Check HIV for completion    Studies:    Key labs:  [id number] wnl  tsh wnl 2021  LEENA, AMA, anti-smooth muscle Ab - all neg  HAV, B and C - all neg    EKG - my read:  2022: AF, RAD, RBBB (old), non specific STT changes    1/19/23 RHC:  /71, 102  HR 77  O2 sat 96% on 15L nasal prongs  Hgb 9 4     RA 2  RV 50/2  PA 52/12, 25  PCWP 5  TPG 20  DPG 7  PA sat 56%  Ezio CO/CI: 5 7/2 2  PVR 3 5  Hattie: 20  SVR 1404      Impression/Recommendations: The patient tolerated the procedure well; no immediate complications  Transducer leveled and zeroed multiple times and swapped out to ensure data accuracy  Low biv filling pressures after diuresis  Precapillary pHTN  Robust Hattie  Mildly elevated SVR  Normal CO/CI     1/19/23 VQ scan:  IMPRESSION:  The probability for pulmonary embolus is low      Echocardiogram 1/11/23  LVEF: 55%  LVIDd: 4 8cm  RV: not well visualized; dilated RV>LV; reduced systolic function  MR: moderate MAC, trace MR  PASP: 83mmHg; estimated RAP 15, peak TR velocity 4 1  RVOT: suboptimal envelope, mid to late systolic notching; interventricular diastolic and systolic septal flattening  Other: grade 2 diastology, mild JONNY; mildly increased LV wall thickness     Echo 12/28/21:  LVEF 55%, mildly increased wall thickness  RV mildly dilated, normal systolic function  PASP [id number]     Echo 3/23/21:  LVEF 60%, mildly increased wall thickness  Grade 1 diastology  RV mildly dilated, mildly reduced systolic function  PASP [id number]    Subjective and Interval Events:   Patient seen and examined  No new complaints  ROS:  10 point ROS negative except as specified above      Tele: reviewed    Objective:     Physical Exam:  /58   Pulse 80   Temp 98 7 °F (37 1 °C) (Oral)   Resp 18   Ht 6' 4" (1 93 m)   Wt 126 kg (278 lb)   SpO2 98%   BMI 33 84 kg/m²   Ranges:  Temp:  [98 7 °F (37 1 °C)-98 9 °F (37 2 °C)] 98 7 °F (37 1 °C)  HR:  [79-80] 80  Resp:  [16-18] 18  BP: (136-144)/(58-69) 144/58  FiO2 (%):  [35] 35    Weight (last 2 days)     Date/Time Weight    01/20/23 0531 126 (278)    01/20/23 0500 126 (277 56)    01/19/23 0551 126 (277 56)     Weight: pt unable to stand safetly at 01/19/23 0551    01/18/23 1137 126 (277)    01/18/23 0530 --     Weight: bedscale broken, unable to stand patient safetly at 01/18/23 0530           Intake/Output Summary (Last 24 hours) at 1/20/2023 0840  Last data filed at 1/20/2023 0501  Gross per 24 hour   Intake --   Output 1575 ml   Net -1575 ml     Constitutional: alert, oriented  Ears/nose/mouth/throat: atraumatic  CV: irreg, no JVD/HJR  Resp: Decreased breath sounds BL  GI: Soft, NTND  MSK: no swollen joints in exposed areas  Extr: trace edema, warm LE  Pysche: normal affect  Neuro: appropriate in conversation  Skin: dry and intact in exposed areas    Labs/Imaging/Data:   Results from last 7 days   Lab Units 01/20/23  0457 01/19/23  0319 01/18/23  0820 01/17/23  1534 01/17/23  0440 01/16/23  1553 01/16/23  0438 01/15/23  0825   WBC Thousand/uL 9 77 8 88 8 05 8 40 8 14  --  7 28 8 67   HEMOGLOBIN g/dL 10 0* 9 4* 9 2* 9 8* 9 9* 10 5* 10 8* 10 8*   HEMATOCRIT % 36 7 35 0* 34 5* 36 6 37 3 39 5 41 3 41 0   PLATELETS Thousands/uL 188 186 174 158 168  --  161 158   NEUTROS PCT % 72 64 62  --   --   --   --   --    MONOS PCT % 12 13* 13*  --   --   --   --   --       Results from last 7 days   Lab Units 01/19/23  1756 01/19/23  0917 01/19/23  0323 01/18/23  0820 01/17/23  1820 01/17/23  0440 01/16/23  0438 01/14/23  0508 01/14/23  0036   SODIUM mmol/L 135 137 135 141 138 139 142   < > 141   POTASSIUM mmol/L 4 1 4 8 4 0 3 9 4 3 4 4 4 4   < > 5 8*   CHLORIDE mmol/L 90* 91* 90* 92* 89* 89* 89*   < > 96   CO2 mmol/L 39* 40* 36* 44* 42* 43* >45*   < > 40*   CO2, I-STAT   --   --   --   --   --   --   --    < >  --    ANION GAP mmol/L 6 6 9 5 7 7  --   --  5   BUN mg/dL 37* 41* 42* 39* 40* 38* 37*   < > 41*   CREATININE mg/dL 1 23 1 30 1 26 1 15 1 28 1 11 1 07   < > 1 24   CALCIUM mg/dL 9 6 9 7 9 3 9 4 9 5 10 1 10 4*   < > 9 5   GLUCOSE RANDOM mg/dL 190* 145* 144* 107 167* 148* 151*   < > 87    < > = values in this interval not displayed  Results from last 7 days   Lab Units 01/17/23  0440 01/14/23  0036   MAGNESIUM mg/dL 2 3 2 1   PHOSPHORUS mg/dL  --  3 0       Results from last 7 days   Lab Units 01/19/23  0319 01/18/23  2100 01/18/23  1203 01/18/23  0528 01/17/23  2155 01/17/23  1534 01/15/23  0636 01/14/23  1526 01/14/23  0850   INR   --   --   --   --   --  1 07  --   --  1 17   PTT seconds 86* 75* 56* 67* 48* 29 58*   < > 31    < > = values in this interval not displayed                   ABG:  Results from last 7 days   Lab Units 01/14/23  1208   PH ART  7 468*   PCO2 ART mm Hg 66 9*   PO2 ART mm Hg 66 7*   HCO3 ART mmol/L 47 4*   BASE EXC ART mmol/L 20 7   ABG SOURCE  Radial, Right      VBG:  Results from last 7 days   Lab Units 01/16/23  1052 01/14/23  1208   PH JANICE  7 422*  --    PCO2 JANICE mm Hg 76 4*  --    PO2 JANICE mm Hg 122 5*  --    HCO3 JANICE mmol/L 48 7*  --    BASE EXC JANICE mmol/L 20 7  --    ABG SOURCE   --  Radial, Right     No results found for: LDH       Current Facility-Administered Medications   Medication Dose Route Frequency Provider Last Rate   • acetaminophen  650 mg Oral Q6H PRN Arabella Chatterjee PA-C     • apixaban  5 mg Oral BID Sang Thorpe MD     • atorvastatin  40 mg Oral Daily Gerald Saini MD     • dextrose  25 mL Intravenous PRN Marty Wallace PA-C     • dicyclomine  10 mg Oral BID PRN Gerald Saini MD     • digoxin  125 mcg Oral Daily Ameya Hall MD     • famotidine  40 mg Oral HS Ameya Hall MD     • insulin lispro  1-5 Units Subcutaneous Q6H [address] Carl Weaver PA-C     • lidocaine  2 patch Topical Daily Martyevin Pollock PA-C     • loperamide  2 mg Oral TID PRN Sánchez Jones PA-C     • menthol-methyl salicylate   Apply externally 4x Daily PRN Peace Montague MD     • metoprolol succinate  25 mg Oral BID Lunda FridayRILEY     • nystatin   Topical BID Francine Schaumann, CRNP     • pantoprazole  40 mg Oral BID AC Ameya Hall MD     • sodium chloride  2 spray Each Nare 4x Daily Shobha Palmer MD         Invasive Devices     Peripheral Intravenous Line  Duration           Long-Dwell Peripheral IV (Midline) 71/31/61 Left Cephalic 2 days          Drain  Duration           Urethral Catheter Straight-tip 16 Fr  6 days                Counseling / Coordination of Care: Total floor / unit time spent today 31 minutes  Greater than 50% of total time was spent with the patient and / or family counseling and / or coordination of care  A description of the counseling / coordination of care: we discussed diagnoses, recent as well as older studies, labs, and all changes in cardiac treatment plan  All patient questions were answered  Thank you for the opportunity to participate in the care of this patient      Ghassan Boone MD  Attending Physician  Advanced Heart Failure and Transplant Cardiology  [address] [place] [address]

## 2023-01-20 NOTE — CASE MANAGEMENT
Case Management Progress Note    Patient name Payton Levy  Location S /S -01 MRN [id number]  : 1945 Date 2023       LOS (days): 10  Geometric Mean LOS (GMLOS) (days): 5 20  Days to GMLOS:-5        OBJECTIVE:        Current admission status: Inpatient  Preferred Pharmacy:   [address], [address] [place] [address]  Phone: [phone] Fax: [phone]    Primary Care Provider: Ettie Merlin, MD    Primary Insurance: MEDICARE  Secondary Insurance: [facility]    PROGRESS NOTE:    Patient had been placed back on 10L o2 overnight  Per nursing - patient now back to 5L o2  TT sent to PT to request patient be seen to determine o2 need with ambulation/therapy, as facilities relaying concern about wide fluctuation of o2 requirements (5L - midflow 10L)  PTA to see patient this morning  [facility] inquiring about PFTs mentioned in pulmonary consult - TT sent to pulm resident to inquire about same  [facility] and [facility] both reporting that patient will need to be on 5L o2 or less to consider for admission; these are preferred facilities, so will follow-up after updated PT notes in and to see how patient continues to tolerate 5L o2  Met with patient at bedside and he confirmed agreement with plan to go to rehab prior to discharging to home  IMM provided for his records  PTA to room for treatment  Will follow-up after responses received from facilities and updated PT notes available

## 2023-01-20 NOTE — ASSESSMENT & PLAN NOTE
· Noted on ECHO  Remote smoking history  Is on BiPAP at night here   Apparently on CPAP at home but question adequacy   · Heart Failure following  · Status post RHC  · VQ shows low probability of embolus

## 2023-01-20 NOTE — ASSESSMENT & PLAN NOTE
Wt Readings from Last 3 Encounters:   01/20/23 126 kg (278 lb)   12/15/22 (!) 139 kg (306 lb)   11/21/22 (!) 138 kg (304 lb)     · Patient presenting with worsening lower extremity edema, abdominal tightness, weight gain of 21 pounds in 2 to 3 weeks, PND  Patient denies any dietary or medication noncompliance  · Chronically on PO Lasix 80mg BID at home     · ECHO shows EF 55%, grade 2 DD, dilated RV with reduced systolic function and pulmonary hypertension   · Cardiology and Heart Failure following   · Has received two doses acetazolamide while inpatient    S/p RHC  - torsemide 40 mg QD restart today  - continue metoprolol and digoxin

## 2023-01-20 NOTE — ASSESSMENT & PLAN NOTE
· Noted on ECHO  Remote smoking history  Is on BiPAP at night here   Apparently on CPAP at home but question adequacy   · Heart Failure following  · Holding diuresis  · Status post RHC  · VQ shows low probability of embolus

## 2023-01-20 NOTE — ASSESSMENT & PLAN NOTE
Recent Labs     01/18/23  0820 01/19/23  0319 01/20/23  0457   HGB 9 2* 9 4* 10 0*       Resolved  Patient denies  pain  Urology following  Hgb stable    · Restarting apixaban today  · Urology recommendations  · Outpatient cystoscopy

## 2023-01-20 NOTE — ASSESSMENT & PLAN NOTE
· Patient was on CPAP  Recently transitioned to BiPAP by his pulmonologist but still has not received the machine    · Continue BiPAP overnight and with naps   · Bengay for lower back pain  · Nasal saline spray

## 2023-01-20 NOTE — ASSESSMENT & PLAN NOTE
Blood Sugar Average: Last 72 hrs:  (P) 178 1742   · Home regimen: Tresiba 90 units daily  · Was hypoglycemic due to not liking food, and bipap use   · lantus held for now   · accuchecks q 6 hours given poor po intake   · Change to SSI only   · Adjust as needed and as patient improves with eating

## 2023-01-20 NOTE — PROGRESS NOTES
[facility]  Progress Note - Mona Parents 1945, 77 y o  male MRN: [id number]  Unit/Bed#: S -01 Encounter: [id number]  Primary Care Provider: Shaye Tinoco MD   Date and time admitted to hospital: 1/10/2023 12:21 PM    * Acute respiratory failure with hypoxia and hypercapnia ([facility] [address] )  Assessment & Plan    Hypoxia on arrival, desaturations to 77% on room air  Denies home O2 use  Status post-RHC  · V/Q scan shows low probability of pulmonary embolus  · Pulmonology: initiate bipap  · Continue BiPAP, IPAP 20, EPAP 8   · Continue at night (baseline) as well as if the patient naps during the day  · Respiratory protocol  · Monitor mental status  · PFT outpatient    Acute on chronic diastolic (congestive) heart failure (HCC)  Assessment & Plan  Wt Readings from Last 3 Encounters:   01/20/23 126 kg (278 lb)   12/15/22 (!) 139 kg (306 lb)   11/21/22 (!) 138 kg (304 lb)     · Patient presenting with worsening lower extremity edema, abdominal tightness, weight gain of 21 pounds in 2 to 3 weeks, PND  Patient denies any dietary or medication noncompliance  · Chronically on PO Lasix 80mg BID at home  · ECHO shows EF 55%, grade 2 DD, dilated RV with reduced systolic function and pulmonary hypertension   · Cardiology and Heart Failure following   · Has received two doses acetazolamide while inpatient    RHC today  - torsemide 40 mg QD restart in 1-2 days  - hold spironolactone and lisinopril  - continue metoprolol and digoxin  - consider palliative care consult for goals of care discussion    BRBPR (bright red blood per rectum)  Assessment & Plan  · Noted on 1/12  Patient reports history of large hemorrhoid that has bled in the past   · Hemoglobin has been stable   · Monitor Hgb  · No intervention at this time   · No bleeding per nursing     Pulmonary hypertension ([facility] )  Assessment & Plan  · Noted on ECHO  Remote smoking history  Is on BiPAP at night here   Apparently on CPAP at home but question adequacy   · Heart Failure following  · Holding diuresis  · Status post RHC  · VQ shows low probability of embolus    Venous stasis dermatitis of both lower extremities  Assessment & Plan  · Wound care    LACY (acute kidney injury) [facility])  Assessment & Plan  Recent Labs     01/19/23  0323 01/19/23  0917 01/19/23  1756   CREATININE 1 26 1 30 1 23   EGFR 53 51 55     Estimated Creatinine Clearance: 70 3 mL/min (by C-G formula based on SCr of 1 23 mg/dL)  ·   · Developed LACY on 1/13 with creatinine of 1 5, baseline of 1 0-1 1  ·   ·   · Monitor BMP  · I/O  · UOP - status post Carlin catheter 1/13 due to critical illness  · maintain carlin for I/O monitoring     LUCIA (obstructive sleep apnea)  Assessment & Plan  · Patient was on CPAP  Recently transitioned to BiPAP by his pulmonologist but still has not received the machine  · Continue BiPAP overnight and with naps   · Bengay for lower back pain  · Nasal saline spray  · Pulmonology consulted    Paroxysmal atrial fibrillation (HCC)  Assessment & Plan  Digoxin 0 9 1/16  · Rate control with metoprolol and digoxin   · On telemetry  · Continue apixaban    CAD (coronary artery disease)  Assessment & Plan  · Chest pain free  · Status post-RHC  · Atorvastatin    Type 2 diabetes mellitus with hyperglycemia, with long-term current use of insulin ([facility])  Assessment & Plan      Blood Sugar Average: Last 72 hrs:  (P) 283 [id number]   · Home regimen: Tresiba 90 units daily  · Was hypoglycemic due to not liking food, and bipap use   · lantus held for now   · accuchecks q 6 hours given poor po intake   · Change to SSI only   · Adjust as needed and as patient improves with eating  Hypertension  Assessment & Plan  Blood Pressure: 144/58  ·   ·   · Stable   · Holding diuresis for now    · Hold home lisinopril and spironolactone  · Continue metoprolol      Hematuria-resolved as of 1/20/2023  Assessment & Plan  Recent Labs     01/18/23  0820 01/19/23  0319 01/20/23  0457 HGB 9 2* 9 4* 10 0*       Resolved  Patient denies  pain  Urology following  Hgb stable  · Restarting apixaban today  · Urology recommendations  · Outpatient cystoscopy          VTE Pharmacologic Prophylaxis: VTE Score: 7 High Risk (Score >/= 5) - Pharmacological DVT Prophylaxis Ordered: apixaban (Eliquis)  Sequential Compression Devices Ordered  Patient Centered Rounds: I performed bedside rounds with nursing staff today  Discussions with Specialists or Other Care Team Provider: Pulmonology, HF    Education and Discussions with Family / Patient: Updated  (wife and daughter) via phone  Current Length of Stay: 10 day(s)  Current Patient Status: Inpatient   Discharge Plan: Anticipate discharge tomorrow to rehab facility  Code Status: Level 3 - DNAR and DNI    Subjective:   Patient is doing better today  He has been able to move from bed to chair  He denies SOB or pain  He is agreeable with plan to rehabilitation  Objective:     Vitals:   Temp (24hrs), Av 7 °F (37 1 °C), Min:98 7 °F (37 1 °C), Max:98 7 °F (37 1 °C)    Temp:  [98 7 °F (37 1 °C)] 98 7 °F (37 1 °C)  HR:  [80] 80  Resp:  [18] 18  BP: (144)/(58) 144/58  SpO2:  [84 %-98 %] 98 %  Body mass index is 33 84 kg/m²  Input and Output Summary (last 24 hours): Intake/Output Summary (Last 24 hours) at 2023 1211  Last data filed at 2023 1101  Gross per 24 hour   Intake 480 ml   Output 1925 ml   Net -1445 ml       Physical Exam:   Physical Exam  Vitals and nursing note reviewed  Constitutional:       General: He is not in acute distress  Appearance: He is well-developed  He is obese  HENT:      Head: Normocephalic and atraumatic  Eyes:      Conjunctiva/sclera: Conjunctivae normal    Cardiovascular:      Rate and Rhythm: Normal rate  Rhythm irregular  Heart sounds: No murmur heard  Pulmonary:      Effort: Pulmonary effort is normal  No respiratory distress  Breath sounds: Normal breath sounds  Abdominal:      Palpations: Abdomen is soft  Tenderness: There is no abdominal tenderness  Musculoskeletal:         General: No swelling  Cervical back: Neck supple  Skin:     General: Skin is warm and dry  Capillary Refill: Capillary refill takes less than 2 seconds  Findings: Erythema present  Neurological:      Mental Status: He is alert  Additional Data:     Labs:  Results from last 7 days   Lab Units 01/20/23  0457   WBC Thousand/uL 9 77   HEMOGLOBIN g/dL 10 0*   HEMATOCRIT % 36 7   PLATELETS Thousands/uL 188   NEUTROS PCT % 72   LYMPHS PCT % 13*   MONOS PCT % 12   EOS PCT % 2     Results from last 7 days   Lab Units 01/20/23  0820   SODIUM mmol/L 135   POTASSIUM mmol/L 4 8   CHLORIDE mmol/L 91*   CO2 mmol/L 40*   BUN mg/dL 35*   CREATININE mg/dL 1 16   ANION GAP mmol/L 4   CALCIUM mg/dL 9 2   GLUCOSE RANDOM mg/dL 135     Results from last 7 days   Lab Units 01/17/23  1534   INR  1 07     Results from last 7 days   Lab Units 01/20/23  1141 01/20/23  0459 01/20/23  0010 01/19/23  1754 01/19/23  1147 01/19/23  0608 01/18/23  2248 01/18/23  1811 01/18/23  1612 01/18/23  1128 01/18/23  0518 01/17/23  2059   POC GLUCOSE mg/dl 215* 139 158* 196* 155* 121 202* 138 193* 227* 155* 162*               Lines/Drains:  Invasive Devices     Peripheral Intravenous Line  Duration           Long-Dwell Peripheral IV (Midline) 39/71/79 Left Cephalic 3 days          Drain  Duration           Urethral Catheter Straight-tip 16 Fr  7 days              Urinary Catheter:  Goal for removal: Voiding trial when ambulation improves           Telemetry:  Telemetry Orders (From admission, onward)             48 Hour Telemetry Monitoring  Continuous x 48 hours        References:    Telemetry Guidelines   Question:  Reason for 48 Hour Telemetry  Answer:  Acute Respiratory Failure on Bipap                 Telemetry Reviewed: Atrial fibrillation   HR averaging 83  Indication for Continued Telemetry Use: Acute respiratory failure on Bipap             Imaging: Reviewed radiology reports from this admission including: chest CT scan and procedure reports    Recent Cultures (last 7 days):         Last 24 Hours Medication List:   Current Facility-Administered Medications   Medication Dose Route Frequency Provider Last Rate   • acetaminophen  650 mg Oral Q6H PRN Danielle Moser PA-C     • apixaban  5 mg Oral BID Erik Champion MD     • atorvastatin  40 mg Oral Daily Gene Mosley MD     • dextrose  25 mL Intravenous PRN Marty Devries PA-C     • dicyclomine  10 mg Oral BID PRN Gene Mosley MD     • digoxin  125 mcg Oral Daily Gene Mosley MD     • famotidine  40 mg Oral HS Gene Mosley MD     • insulin lispro  1-5 Units Subcutaneous Q6H [address] Carl Weaver PA-C     • lidocaine  2 patch Topical Daily Marty Devries PA-C     • loperamide  2 mg Oral TID PRN Lb Juarez PA-C     • menthol-methyl salicylate   Apply externally 4x Daily PRN Bam Gutierrez MD     • metoprolol succinate  25 mg Oral BID Violet Sams PA-C     • nystatin   Topical BID Amrita Later, CRNP     • pantoprazole  40 mg Oral BID AC Gene Mosley MD     • sodium chloride  2 spray Each Nare 4x Daily Erik Champion MD          Today, Patient Was Seen By: Erik Champion MD Psychiatry    **Please Note: This note may have been constructed using a voice recognition system  **

## 2023-01-20 NOTE — PROGRESS NOTES
Progress Note - Pulmonary   Sweta Gonzalez 77 y o  male MRN: [id number]  Unit/Bed#: S -Cathei [id number]    Assessment/Plan:    Acute respiratory failure with hypoxia and hypercapnia  • POA was hypoxic, , bilateral lower extremity swelling  • Home diuretic regimen is Lasix 80 mg BID and Aldactone 25 mg daily  • Not on oxygen at home  • During this admission has been receiving IV diuretics because of CHF exacerbation  • Might be multifactorial and include CHF exacerbation, pulmonary hypertension, obstructive sleep apnea  • V/Q scan showed low probability for PE     Pulmonary hypertension     • Has been following up outpatient with Dr Tamera Thornton from pulmonology  • Patient has been using CPAP for LUCIA before, planned to be changed to BiPAP recently because of hypercarbia outpatient  • Per notes from pulmonary before, patient had been having dependent edema and it has been hard to evaluate defeats type II pulmonary hypertension in the setting of left atrial hypertension or type III related to sleep apnea  • Cardiac RHC was done and results are the following /71, 102, HR 77, O2 sat 96% on 15L nasal prongs, Hgb 9 4, RA 2, RV 50/2, PA 52/12, 25, PCWP 5, TPG 20, DPG 7, PA sat 56%, Ezio CO/CI: 5 7/2 2, PVR 3 5, Hattie: 20, SVR 1404  • "Transducer leveled and zeroed multiple times and swapped out to ensure data accuracy  Low biv filling pressures after diuresis  Precapillary pHTN  Robust Hattie  Mildly elevated SVR   Normal CO/CI "     LUCIA     • Has been following up with pulmonary outpatient  • Initially he was using CPAP but then it was changed to BiPAP  • Patient was having some issues regarding oxygen readings because his hands were showing lower readings compared to the ear lobe before  • Is meant to be on BiPAP because of hypercarbia on outpatient labs but hasn't received the machine at home yet     CHF exacerbation     • Managed by Cardiology, has been receiving diuresis, is negative 26 140 L with net fluid output  • Has been on Bumex, but currently diuresis is on hold currently  • Management per cardiology       Recommendations:     Continue BiPAP at home (settings - 14/8), patient did well on current settings  Titrate oxygen as needed, currently on 2 L NC saturating in mid-upper 90s  Will need home O2 evaluation before discharge  Will need follow up with Pulmonology outpatient with Dr Tamera Thornton for further management  Will need PFTs outpatient      Subjective:    Patient subjectively feels much better and says that his shortness of breath is improved, says that he feels much more rested now and that he slept well at night on the current BiPAP settings  Objective:    Vitals: Blood pressure 144/58, pulse 80, temperature 98 7 °F (37 1 °C), temperature source Oral, resp  rate 18, height 6' 4" (1 93 m), weight 126 kg (278 lb), SpO2 98 %  5L NC, Body mass index is 33 84 kg/m²  Intake/Output Summary (Last 24 hours) at 1/20/2023 1053  Last data filed at 1/20/2023 1001  Gross per 24 hour   Intake --   Output 1925 ml   Net -1925 ml       Invasive Devices     Peripheral Intravenous Line  Duration           Long-Dwell Peripheral IV (Midline) 86/17/72 Left Cephalic 2 days          Drain  Duration           Urethral Catheter Straight-tip 16 Fr  7 days              Physical Exam:     Physical Exam  Vitals and nursing note reviewed  Constitutional:       General: He is not in acute distress  Appearance: He is well-developed  HENT:      Head: Normocephalic and atraumatic  Eyes:      Conjunctiva/sclera: Conjunctivae normal    Cardiovascular:      Rate and Rhythm: Normal rate  Rhythm irregular  Pulmonary:      Effort: Pulmonary effort is normal  No respiratory distress  Comments: Breathing sounds decreased bilaterally likely due to body habitus, no wheezing, rales or rhonchi anticipated on my exam    Abdominal:      Palpations: Abdomen is soft  Tenderness: There is no abdominal tenderness  Musculoskeletal:      Cervical back: Neck supple  No rigidity  Right lower leg: Edema present  Left lower leg: Edema present  Skin:     General: Skin is warm and dry  Capillary Refill: Capillary refill takes less than 2 seconds  Neurological:      Mental Status: He is alert  Psychiatric:         Mood and Affect: Mood normal          Labs: I have personally reviewed pertinent lab results  , CBC:   Lab Results   Component Value Date    WBC 9 77 01/20/2023    HGB 10 0 (L) 01/20/2023    HCT 36 7 01/20/2023    MCV 80 (L) 01/20/2023     01/20/2023    MCH 21 8 (L) 01/20/2023    MCHC 27 2 (L) 01/20/2023    RDW 19 1 (H) 01/20/2023    MPV 11 0 01/20/2023    NRBC 0 01/20/2023   , CMP:   Lab Results   Component Value Date    SODIUM 135 01/20/2023    K 4 8 01/20/2023    CL 91 (L) 01/20/2023    CO2 40 (H) 01/20/2023    BUN 35 (H) 01/20/2023    CREATININE 1 16 01/20/2023    CALCIUM 9 2 01/20/2023    EGFR 59 01/20/2023       Imaging and other studies: I have personally reviewed pertinent reports  XR chest portable   Final Result by Morena Yang MD (01/20 4220)      Stable bibasilar atelectasis/scarring  Workstation performed: [id number]         NM lung ventilation / perfusion   Final Result by Genesis Levi MD (01/19 9578)      The probability for pulmonary embolus is low  Workstation performed: [id number]         CT renal stone study abdomen pelvis wo contrast   Final Result by Jeaneth Munson MD (01/18 1050)      Tiny nonobstructing intrarenal calculi bilaterally, minimally increased in size and unchanged in number/location from prior examination  No ureteral calculi or hydronephrosis  Extensive atherosclerotic vascular calcification  Unchanged 49 mm infrarenal abdominal aortic aneurysm  Hepatosplenomegaly with lobulated hepatic contours in keeping with reported history of hepatic parenchymal disease  Prostatomegaly    Bladder wall thickening probably related to chronic prostate disease  Workstation performed: [id number]         XR chest portable   Final Result by Rich Schroeder MD (80/10 5731)      No acute cardiopulmonary disease  Workstation performed: [id number]         CT head wo contrast   Final Result by Violet Velazquez MD (01/14 0142)      No acute intracranial abnormality  Workstation performed: [id number]         CT spine cervical wo contrast   Final Result by Violet Velazquez MD (01/14 0139)      No cervical spine fracture or traumatic malalignment  Workstation performed: [id number]         XR chest 1 view portable   ED Interpretation by Fan Ibrahim MD (01/10 1352)   This film was interpreted independently by me  Bilateral pulmonary edema  Final Result by Alvarez Friend MD (01/10 1432)   Shallow depth of inspiration     No interval change from 12/1/2022                  Workstation performed: [id number]

## 2023-01-21 ENCOUNTER — APPOINTMENT (INPATIENT)
Dept: NON INVASIVE DIAGNOSTICS | Facility: HOSPITAL | Age: 78
End: 2023-01-21

## 2023-01-21 LAB
ANION GAP SERPL CALCULATED.3IONS-SCNC: 7 MMOL/L (ref 4–13)
ANION GAP SERPL CALCULATED.3IONS-SCNC: 8 MMOL/L (ref 4–13)
BASOPHILS # BLD AUTO: 0.04 THOUSANDS/ÂΜL (ref 0–0.1)
BASOPHILS NFR BLD AUTO: 0 % (ref 0–1)
BUN SERPL-MCNC: 36 MG/DL (ref 5–25)
BUN SERPL-MCNC: 38 MG/DL (ref 5–25)
CALCIUM SERPL-MCNC: 9.4 MG/DL (ref 8.4–10.2)
CALCIUM SERPL-MCNC: 9.5 MG/DL (ref 8.4–10.2)
CHLORIDE SERPL-SCNC: 88 MMOL/L (ref 96–108)
CHLORIDE SERPL-SCNC: 90 MMOL/L (ref 96–108)
CO2 SERPL-SCNC: 37 MMOL/L (ref 21–32)
CO2 SERPL-SCNC: 39 MMOL/L (ref 21–32)
CREAT SERPL-MCNC: 1.2 MG/DL (ref 0.6–1.3)
CREAT SERPL-MCNC: 1.34 MG/DL (ref 0.6–1.3)
EOSINOPHIL # BLD AUTO: 0.2 THOUSAND/ÂΜL (ref 0–0.61)
EOSINOPHIL NFR BLD AUTO: 2 % (ref 0–6)
ERYTHROCYTE [DISTWIDTH] IN BLOOD BY AUTOMATED COUNT: 19.5 % (ref 11.6–15.1)
GFR SERPL CREATININE-BSD FRML MDRD: 50 ML/MIN/1.73SQ M
GFR SERPL CREATININE-BSD FRML MDRD: 57 ML/MIN/1.73SQ M
GLUCOSE SERPL-MCNC: 160 MG/DL (ref 65–140)
GLUCOSE SERPL-MCNC: 165 MG/DL (ref 65–140)
GLUCOSE SERPL-MCNC: 167 MG/DL (ref 65–140)
GLUCOSE SERPL-MCNC: 217 MG/DL (ref 65–140)
GLUCOSE SERPL-MCNC: 249 MG/DL (ref 65–140)
HCT VFR BLD AUTO: 35.4 % (ref 36.5–49.3)
HGB BLD-MCNC: 9.7 G/DL (ref 12–17)
IMM GRANULOCYTES # BLD AUTO: 0.06 THOUSAND/UL (ref 0–0.2)
IMM GRANULOCYTES NFR BLD AUTO: 1 % (ref 0–2)
LYMPHOCYTES # BLD AUTO: 1.36 THOUSANDS/ÂΜL (ref 0.6–4.47)
LYMPHOCYTES NFR BLD AUTO: 15 % (ref 14–44)
MCH RBC QN AUTO: 21.6 PG (ref 26.8–34.3)
MCHC RBC AUTO-ENTMCNC: 27.4 G/DL (ref 31.4–37.4)
MCV RBC AUTO: 79 FL (ref 82–98)
MONOCYTES # BLD AUTO: 1.02 THOUSAND/ÂΜL (ref 0.17–1.22)
MONOCYTES NFR BLD AUTO: 11 % (ref 4–12)
NEUTROPHILS # BLD AUTO: 6.37 THOUSANDS/ÂΜL (ref 1.85–7.62)
NEUTS SEG NFR BLD AUTO: 71 % (ref 43–75)
NRBC BLD AUTO-RTO: 0 /100 WBCS
PLATELET # BLD AUTO: 221 THOUSANDS/UL (ref 149–390)
PMV BLD AUTO: 12.2 FL (ref 8.9–12.7)
POTASSIUM SERPL-SCNC: 3.6 MMOL/L (ref 3.5–5.3)
POTASSIUM SERPL-SCNC: 3.9 MMOL/L (ref 3.5–5.3)
RBC # BLD AUTO: 4.49 MILLION/UL (ref 3.88–5.62)
SODIUM SERPL-SCNC: 133 MMOL/L (ref 135–147)
SODIUM SERPL-SCNC: 136 MMOL/L (ref 135–147)
WBC # BLD AUTO: 9.05 THOUSAND/UL (ref 4.31–10.16)

## 2023-01-21 RX ORDER — TORSEMIDE 20 MG/1
40 TABLET ORAL DAILY
Status: DISCONTINUED | OUTPATIENT
Start: 2023-01-21 | End: 2023-01-22 | Stop reason: HOSPADM

## 2023-01-21 RX ADMIN — PANTOPRAZOLE SODIUM 40 MG: 40 TABLET, DELAYED RELEASE ORAL at 16:04

## 2023-01-21 RX ADMIN — INSULIN LISPRO 1 UNITS: 100 INJECTION, SOLUTION INTRAVENOUS; SUBCUTANEOUS at 12:07

## 2023-01-21 RX ADMIN — METOPROLOL SUCCINATE 25 MG: 25 TABLET, EXTENDED RELEASE ORAL at 17:10

## 2023-01-21 RX ADMIN — FAMOTIDINE 40 MG: 20 TABLET ORAL at 21:57

## 2023-01-21 RX ADMIN — APIXABAN 5 MG: 5 TABLET, FILM COATED ORAL at 17:10

## 2023-01-21 RX ADMIN — INSULIN LISPRO 1 UNITS: 100 INJECTION, SOLUTION INTRAVENOUS; SUBCUTANEOUS at 05:29

## 2023-01-21 RX ADMIN — ATORVASTATIN CALCIUM 40 MG: 40 TABLET, FILM COATED ORAL at 08:07

## 2023-01-21 RX ADMIN — SALINE NASAL SPRAY 2 SPRAY: 1.5 SOLUTION NASAL at 21:57

## 2023-01-21 RX ADMIN — INSULIN LISPRO 2 UNITS: 100 INJECTION, SOLUTION INTRAVENOUS; SUBCUTANEOUS at 17:10

## 2023-01-21 RX ADMIN — SALINE NASAL SPRAY 2 SPRAY: 1.5 SOLUTION NASAL at 17:10

## 2023-01-21 RX ADMIN — METOPROLOL SUCCINATE 25 MG: 25 TABLET, EXTENDED RELEASE ORAL at 08:07

## 2023-01-21 RX ADMIN — TORSEMIDE 40 MG: 20 TABLET ORAL at 12:06

## 2023-01-21 RX ADMIN — DIGOXIN 125 MCG: 125 TABLET ORAL at 08:07

## 2023-01-21 RX ADMIN — PANTOPRAZOLE SODIUM 40 MG: 40 TABLET, DELAYED RELEASE ORAL at 04:40

## 2023-01-21 RX ADMIN — APIXABAN 5 MG: 5 TABLET, FILM COATED ORAL at 08:07

## 2023-01-21 RX ADMIN — SALINE NASAL SPRAY 2 SPRAY: 1.5 SOLUTION NASAL at 08:07

## 2023-01-21 NOTE — PLAN OF CARE
Problem: DISCHARGE PLANNING  Goal: Discharge to home or other facility with appropriate resources  Description: INTERVENTIONS:  - Identify barriers to discharge w/patient and caregiver  - Arrange for needed discharge resources and transportation as appropriate  - Identify discharge learning needs (meds, wound care, etc )  - Arrange for interpretive services to assist at discharge as needed  - Refer to Case Management Department for coordinating discharge planning if the patient needs post-hospital services based on physician/advanced practitioner order or complex needs related to functional status, cognitive ability, or social support system  Outcome: Progressing     Problem: Knowledge Deficit  Goal: Patient/family/caregiver demonstrates understanding of disease process, treatment plan, medications, and discharge instructions  Description: Complete learning assessment and assess knowledge base    Interventions:  - Provide teaching at level of understanding  - Provide teaching via preferred learning methods  Outcome: Progressing     Problem: CARDIOVASCULAR - ADULT  Goal: Maintains optimal cardiac output and hemodynamic stability  Description: INTERVENTIONS:  - Monitor I/O, vital signs and rhythm  - Monitor for S/S and trends of decreased cardiac output  - Administer and titrate ordered vasoactive medications to optimize hemodynamic stability  - Assess quality of pulses, skin color and temperature  - Assess for signs of decreased coronary artery perfusion  - Instruct patient to report change in severity of symptoms  Outcome: Progressing  Goal: Absence of cardiac dysrhythmias or at baseline rhythm  Description: INTERVENTIONS:  - Continuous cardiac monitoring, vital signs, obtain 12 lead EKG if ordered  - Administer antiarrhythmic and heart rate control medications as ordered  - Monitor electrolytes and administer replacement therapy as ordered  Outcome: Progressing     Problem: RESPIRATORY - ADULT  Goal: Achieves optimal ventilation and oxygenation  Description: INTERVENTIONS:  - Assess for changes in respiratory status  - Assess for changes in mentation and behavior  - Position to facilitate oxygenation and minimize respiratory effort  - Oxygen administered by appropriate delivery if ordered  - Initiate smoking cessation education as indicated  - Encourage broncho-pulmonary hygiene including cough, deep breathe, Incentive Spirometry  - Assess the need for suctioning and aspirate as needed  - Assess and instruct to report SOB or any respiratory difficulty  - Respiratory Therapy support as indicated  Outcome: Progressing     Problem: MOBILITY - ADULT  Goal: Maintain or return to baseline ADL function  Description: INTERVENTIONS:  -  Assess patient's ability to carry out ADLs; assess patient's baseline for ADL function and identify physical deficits which impact ability to perform ADLs (bathing, care of mouth/teeth, toileting, grooming, dressing, etc )  - Assess/evaluate cause of self-care deficits   - Assess range of motion  - Assess patient's mobility; develop plan if impaired  - Assess patient's need for assistive devices and provide as appropriate  - Encourage maximum independence but intervene and supervise when necessary  - Involve family in performance of ADLs  - Assess for home care needs following discharge   - Consider OT consult to assist with ADL evaluation and planning for discharge  - Provide patient education as appropriate  Outcome: Progressing  Goal: Maintains/Returns to pre admission functional level  Description: INTERVENTIONS:  - Perform BMAT or MOVE assessment daily    - Set and communicate daily mobility goal to care team and patient/family/caregiver  - Collaborate with rehabilitation services on mobility goals if consulted  - Perform Range of Motion  times a day  - Reposition patient every  hours    - Dangle patient  times a day  - Stand patient times a day  - Ambulate patient  times a day  - Out of bed to chair  times a day   - Out of bed for meals times a day  - Out of bed for toileting  - Record patient progress and toleration of activity level   Outcome: Progressing     Problem: Potential for Falls  Goal: Patient will remain free of falls  Description: INTERVENTIONS:  - Educate patient/family on patient safety including physical limitations  - Instruct patient to call for assistance with activity   - Consult OT/PT to assist with strengthening/mobility   - Keep Call bell within reach  - Keep bed low and locked with side rails adjusted as appropriate  - Keep care items and personal belongings within reach  - Initiate and maintain comfort rounds  - Make Fall Risk Sign visible to staff  - Offer Toileting every  Hours, in advance of need  - Initiate/Maintain alarm  - Obtain necessary fall risk management equipment  - Apply yellow socks and bracelet for high fall risk patients  - Consider moving patient to room near nurses station  Outcome: Progressing     Problem: Prexisting or High Potential for Compromised Skin Integrity  Goal: Skin integrity is maintained or improved  Description: INTERVENTIONS:  - Identify patients at risk for skin breakdown  - Assess and monitor skin integrity  - Assess and monitor nutrition and hydration status  - Monitor labs   - Assess for incontinence   - Turn and reposition patient  - Assist with mobility/ambulation  - Relieve pressure over bony prominences  - Avoid friction and shearing  - Provide appropriate hygiene as needed including keeping skin clean and dry  - Evaluate need for skin moisturizer/barrier cream  - Collaborate with interdisciplinary team   - Patient/family teaching  - Consider wound care consult   Outcome: Progressing     Problem: Nutrition/Hydration-ADULT  Goal: Nutrient/Hydration intake appropriate for improving, restoring or maintaining nutritional needs  Description: Monitor and assess patient's nutrition/hydration status for malnutrition   Collaborate with interdisciplinary team and initiate plan and interventions as ordered  Monitor patient's weight and dietary intake as ordered or per policy  Utilize nutrition screening tool and intervene as necessary  Determine patient's food preferences and provide high-protein, high-caloric foods as appropriate       INTERVENTIONS:  - Monitor oral intake, urinary output, labs, and treatment plans  - Assess nutrition and hydration status and recommend course of action  - Evaluate amount of meals eaten  - Assist patient with eating if necessary   - Allow adequate time for meals  - Recommend/ encourage appropriate diets, oral nutritional supplements, and vitamin/mineral supplements  - Order, calculate, and assess calorie counts as needed  - Recommend, monitor, and adjust tube feedings and TPN/PPN based on assessed needs  - Assess need for intravenous fluids  - Provide specific nutrition/hydration education as appropriate  - Include patient/family/caregiver in decisions related to nutrition  Outcome: Progressing

## 2023-01-21 NOTE — CASE MANAGEMENT
Case Management Discharge Planning Note    Patient name Rudi Whitney  Location S /S -01 MRN [id number]  : 1945 Date 2023       Current Admission Date: 1/10/2023  Current Admission Diagnosis:Acute respiratory failure with hypoxia and hypercapnia [facility])   Patient Active Problem List    Diagnosis Date Noted   • BRBPR (bright red blood per rectum) 2023   • Pulmonary hypertension ([facility] [address] ) 2022   • Chronic acquired lymphedema 10/26/2022   • Cirrhosis of liver without ascites ([facility] [address] ) 2022   • Hallucination 2022   • Jerking movements of extremities 2022   • Vitamin D deficiency 08/10/2022   • Gastroesophageal reflux disease 2022   • PAD (peripheral artery disease) ([facility] [address] ) 2022   • [facility] 2021   • Cellulitis and abscess of left lower extremity 2021   • Ureteral calculi 12/15/2021   • Benign prostatic hyperplasia without lower urinary tract symptoms 12/15/2021   • Hypokalemia 10/19/2021   • Persistent proteinuria 2021   • Nipple pain 02/10/0594   • Folliculitis    • Venous stasis dermatitis of both lower extremities 2021   • Blister of right leg 2021   • Myocardial infarction [facility])    • Blister of left leg 2021   • Hydroureter on left 2021   • Nephrolithiasis 2021   • Fall 2021   • Hypercalcemia 2021   • Scaly skin on examination 2021   • Acute on chronic diastolic (congestive) heart failure ([address] ) 2021   • Left upper lobe pulmonary nodule 2021   • Acute respiratory failure with hypoxia and hypercapnia ([facility] [address] ) 2021   • Hypoxia 2021   • Bilateral edema of lower extremity 2021   • LUCIA (obstructive sleep apnea) 2021   • Embolism and thrombosis of arteries of the lower extremities ([address] ) 2021   • Colitis 2020   • Adrenal nodule ([facility] ) 2020   • Left lower quadrant abdominal pain 2020   • Excessive gas 12/03/2020   • Morbid obesity ([address] ) 12/03/2020   • 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   • Immunization deficiency 10/30/2020   • Chronic pain of both knees 07/31/2020   • Actinic keratosis of right temple 07/31/2020   • Actinic keratosis of scalp 07/31/2020   • Decreased pedal pulses 04/04/2019   • Hypertension    • Hyperlipemia    • Type 2 diabetes mellitus with hyperglycemia, with long-term current use of insulin (HCC)    • Chronic low back pain    • CAD (coronary artery disease)    • Malignant neoplasm of urinary bladder (HCC)    • Arthritis    • Paroxysmal atrial fibrillation (HCC)    • AAA (abdominal aortic aneurysm)       LOS (days): 11  Geometric Mean LOS (GMLOS) (days): 5 20  Days to GMLOS:-5 8     OBJECTIVE:  Risk of Unplanned Readmission Score: 22 59         Current admission status: Inpatient   Preferred Pharmacy:   [address]  Phone: [phone] Fax: [phone]    Primary Care Provider: Robert Zaidi MD    Primary Insurance: MEDICARE  [facility]    DISCHARGE DETAILS:  CM sent updated referral to [facility] if able to accept patient for inpatient rehab  Patient on 3 L NC oxygen per [facility]  Per [facility] admissions liaison, tentative bed availability Sunday vs Monday   CM will f/u tomorrow with facility to discuss bed availability

## 2023-01-22 VITALS
TEMPERATURE: 98.4 F | OXYGEN SATURATION: 90 % | WEIGHT: 276.24 LBS | RESPIRATION RATE: 18 BRPM | BODY MASS INDEX: 33.64 KG/M2 | SYSTOLIC BLOOD PRESSURE: 137 MMHG | HEIGHT: 76 IN | DIASTOLIC BLOOD PRESSURE: 65 MMHG | HEART RATE: 82 BPM

## 2023-01-22 LAB
BASOPHILS # BLD AUTO: 0.05 THOUSANDS/ÂΜL (ref 0–0.1)
BASOPHILS NFR BLD AUTO: 1 % (ref 0–1)
EOSINOPHIL # BLD AUTO: 0.19 THOUSAND/ÂΜL (ref 0–0.61)
EOSINOPHIL NFR BLD AUTO: 2 % (ref 0–6)
ERYTHROCYTE [DISTWIDTH] IN BLOOD BY AUTOMATED COUNT: 19.9 % (ref 11.6–15.1)
GLUCOSE SERPL-MCNC: 168 MG/DL (ref 65–140)
GLUCOSE SERPL-MCNC: 179 MG/DL (ref 65–140)
GLUCOSE SERPL-MCNC: 229 MG/DL (ref 65–140)
GLUCOSE SERPL-MCNC: 346 MG/DL (ref 65–140)
HCT VFR BLD AUTO: 36.3 % (ref 36.5–49.3)
HGB BLD-MCNC: 10 G/DL (ref 12–17)
IMM GRANULOCYTES # BLD AUTO: 0.08 THOUSAND/UL (ref 0–0.2)
IMM GRANULOCYTES NFR BLD AUTO: 1 % (ref 0–2)
LYMPHOCYTES # BLD AUTO: 1.37 THOUSANDS/ÂΜL (ref 0.6–4.47)
LYMPHOCYTES NFR BLD AUTO: 16 % (ref 14–44)
MCH RBC QN AUTO: 21.9 PG (ref 26.8–34.3)
MCHC RBC AUTO-ENTMCNC: 27.5 G/DL (ref 31.4–37.4)
MCV RBC AUTO: 79 FL (ref 82–98)
MONOCYTES # BLD AUTO: 1.18 THOUSAND/ÂΜL (ref 0.17–1.22)
MONOCYTES NFR BLD AUTO: 14 % (ref 4–12)
NEUTROPHILS # BLD AUTO: 5.77 THOUSANDS/ÂΜL (ref 1.85–7.62)
NEUTS SEG NFR BLD AUTO: 66 % (ref 43–75)
NRBC BLD AUTO-RTO: 0 /100 WBCS
PLATELET # BLD AUTO: 236 THOUSANDS/UL (ref 149–390)
PMV BLD AUTO: 11.1 FL (ref 8.9–12.7)
RBC # BLD AUTO: 4.57 MILLION/UL (ref 3.88–5.62)
WBC # BLD AUTO: 8.64 THOUSAND/UL (ref 4.31–10.16)

## 2023-01-22 RX ORDER — TORSEMIDE 20 MG/1
40 TABLET ORAL DAILY
Refills: 0
Start: 2023-01-22 | End: 2023-02-10

## 2023-01-22 RX ADMIN — TORSEMIDE 40 MG: 20 TABLET ORAL at 08:15

## 2023-01-22 RX ADMIN — SALINE NASAL SPRAY 2 SPRAY: 1.5 SOLUTION NASAL at 08:16

## 2023-01-22 RX ADMIN — PANTOPRAZOLE SODIUM 40 MG: 40 TABLET, DELAYED RELEASE ORAL at 05:48

## 2023-01-22 RX ADMIN — DIGOXIN 125 MCG: 125 TABLET ORAL at 08:15

## 2023-01-22 RX ADMIN — METOPROLOL SUCCINATE 25 MG: 25 TABLET, EXTENDED RELEASE ORAL at 08:15

## 2023-01-22 RX ADMIN — APIXABAN 5 MG: 5 TABLET, FILM COATED ORAL at 08:15

## 2023-01-22 RX ADMIN — ATORVASTATIN CALCIUM 40 MG: 40 TABLET, FILM COATED ORAL at 08:15

## 2023-01-22 RX ADMIN — INSULIN LISPRO 1 UNITS: 100 INJECTION, SOLUTION INTRAVENOUS; SUBCUTANEOUS at 05:20

## 2023-01-22 RX ADMIN — INSULIN LISPRO 4 UNITS: 100 INJECTION, SOLUTION INTRAVENOUS; SUBCUTANEOUS at 00:19

## 2023-01-22 NOTE — ASSESSMENT & PLAN NOTE
Wt Readings from Last 3 Encounters:   01/22/23 125 kg (276 lb 3 8 oz)   12/15/22 (!) 139 kg (306 lb)   11/21/22 (!) 138 kg (304 lb)     · Patient presenting with worsening lower extremity edema, abdominal tightness, weight gain of 21 pounds in 2 to 3 weeks, PND  Patient denies any dietary or medication noncompliance  · Chronically on PO Lasix 80mg BID at home     · ECHO shows EF 55%, grade 2 DD, dilated RV with reduced systolic function and pulmonary hypertension   · Cardiology and Heart Failure following   · Has received two doses acetazolamide while inpatient    S/p RHC  - torsemide 40 mg QD - continue metoprolol and digoxin

## 2023-01-22 NOTE — ASSESSMENT & PLAN NOTE
· Noted on ECHO  Remote smoking history  Is on BiPAP at night here   Apparently on CPAP at home but question adequacy   · Status post RHC  · VQ shows low probability of embolus

## 2023-01-22 NOTE — CASE MANAGEMENT
Case Management Discharge Planning Note    Patient name Nita Silvestre S /S -01 MRN [id number]  : 1945 Date 2023       Current Admission Date: 1/10/2023  Current Admission Diagnosis:Acute respiratory failure with hypoxia and hypercapnia [facility])   Patient Active Problem List    Diagnosis Date Noted   • BRBPR (bright red blood per rectum) 2023   • Pulmonary hypertension ([facility] [address] ) 2022   • Chronic acquired lymphedema 10/26/2022   • Cirrhosis of liver without ascites ([facility] [address] ) 2022   • Hallucination 2022   • Jerking movements of extremities 2022   • Vitamin D deficiency 08/10/2022   • Gastroesophageal reflux disease 2022   • PAD (peripheral artery disease) ([facility] [address] ) 2022   • [facility] 2021   • Cellulitis and abscess of left lower extremity 2021   • Ureteral calculi 12/15/2021   • Benign prostatic hyperplasia without lower urinary tract symptoms 12/15/2021   • Hypokalemia 10/19/2021   • Persistent proteinuria 2021   • Nipple pain    • Folliculitis    • Venous stasis dermatitis of both lower extremities 2021   • Blister of right leg 2021   • Myocardial infarction [facility])    • Blister of left leg 2021   • Hydroureter on left 2021   • Nephrolithiasis 2021   • Fall 2021   • Hypercalcemia 2021   • Scaly skin on examination 2021   • Acute on chronic diastolic (congestive) heart failure ([address] ) 2021   • Left upper lobe pulmonary nodule 2021   • Acute respiratory failure with hypoxia and hypercapnia ([facility] ) 2021   • Hypoxia 2021   • Bilateral edema of lower extremity 2021   • LUCIA (obstructive sleep apnea) 2021   • Embolism and thrombosis of arteries of the lower extremities ([address] ) 2021   • Colitis 2020   • Adrenal nodule ([facility] ) 2020   • Left lower quadrant abdominal pain 2020   • Excessive gas 12/03/2020   • Morbid obesity ([facility] [address] [facility] ) 12/03/2020   • 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   • Immunization deficiency 10/30/2020   • Chronic pain of both knees 07/31/2020   • Actinic keratosis of right temple 07/31/2020   • Actinic keratosis of scalp 07/31/2020   • Decreased pedal pulses 04/04/2019   • Hypertension    • Hyperlipemia    • Type 2 diabetes mellitus with hyperglycemia, with long-term current use of insulin (HCC)    • Chronic low back pain    • CAD (coronary artery disease)    • Malignant neoplasm of urinary bladder (HCC)    • Arthritis    • Paroxysmal atrial fibrillation (HCC)    • AAA (abdominal aortic aneurysm)       LOS (days): 12  Geometric Mean LOS (GMLOS) (days): 5 20  Days to GMLOS:-6 7     OBJECTIVE:  Risk of Unplanned Readmission Score: 24 25         Current admission status: Inpatient   Preferred Pharmacy:   [place] [address]  Phone: [phone] Fax: [phone]    Primary Care Provider: Олег Tapia MD    Primary Insurance: MEDICARE  Secondary Insurance: [facility]    DISCHARGE DETAILS:    Discharge planning discussed with[de-identified] patient's daughter and spouse  Freedom of Choice: Yes     CM contacted family/caregiver?: Yes  Were Treatment Team discharge recommendations reviewed with patient/caregiver?: Yes  Did patient/caregiver verbalize understanding of patient care needs?: N/A- going to facility  Were patient/caregiver advised of the risks associated with not following Treatment Team discharge recommendations?: Yes    Contacts  Patient Contacts: Patient's spouse Molly Ribera and daughter Stacey Sainz  Relationship to Patient[de-identified] Family  Contact Method: Phone  Reason/Outcome: Continuity of Care, Emergency Contact, Discharge Planning    [facility]         Is the patient interested in [facility] at discharge?: No    DME Referral Provided  Referral made for DME?: No    Other Referral/Resources/Interventions Provided:  Interventions: Short Term Rehab  Referral Comments: [facility] able to accept today for inpatient rehab         Treatment Team Recommendation: Short Term Rehab  Discharge Destination Plan[de-identified] Short Term Rehab  Transport at Discharge : hospitals [facility]  Dispatcher Contacted: Yes  Number/Name of Dispatcher: Sharla Matos by [place]ore[place] and Unit #): [facility]  ETA of Transport (Date): 01/22/23  ETA of Transport (Time): 1200     CM spoke with admission liaison from [facility]  [facility] today on 3 L NC oxygen  Preferred time at facility is [id number]  CM spoke with patient's spouse Danielle Vasquez at   CM introduced self and role  CM updated [facility] is able to accept today for inpatient rehab  Patient's spouse confirmed [facility] was facility preference  Spouse requesting CM speak with her daughter Lee Fee about discharge plan of care  CM spoke with patient's daughter Lee Fee at   CM introduced self and role  Patient's daughter aware CM spoke with her mother Danielle Vasquez also  Daughter updated [facility] is able to accept today for inpatient rehab  CM working on transport to facility  Facility preference is [id number] admission  CM will f/u with daughter re:transport time  All questions/concerns answered at this time  CM sent referral via [employer] for transport  [facility] able to transport at 1200 today  CM updated receiving facility, nursing and [facility] with transport time  CM updated patient's daughter Lee Fee at  with transport time

## 2023-01-22 NOTE — ASSESSMENT & PLAN NOTE
· Home regimen: Tresiba 90 units daily  · Was hypoglycemic due to not liking food, and bipap use   · lantus held for now   · accuchecks q 6 hours given poor po intake   · Change to SSI only   · Adjust as needed and as patient improves with eating

## 2023-01-23 DIAGNOSIS — G47.33 OSA (OBSTRUCTIVE SLEEP APNEA): Primary | ICD-10-CM

## 2023-01-24 ENCOUNTER — APPOINTMENT (OUTPATIENT)
Dept: OCCUPATIONAL THERAPY | Age: 78
End: 2023-01-24

## 2023-01-26 ENCOUNTER — APPOINTMENT (OUTPATIENT)
Dept: OCCUPATIONAL THERAPY | Age: 78
End: 2023-01-26

## 2023-01-31 ENCOUNTER — APPOINTMENT (OUTPATIENT)
Dept: OCCUPATIONAL THERAPY | Age: 78
End: 2023-01-31

## 2023-02-03 LAB

## 2023-02-04 DIAGNOSIS — K21.00 GASTROESOPHAGEAL REFLUX DISEASE WITH ESOPHAGITIS WITHOUT HEMORRHAGE: ICD-10-CM

## 2023-02-04 RX ORDER — FAMOTIDINE 40 MG/1
TABLET, FILM COATED ORAL
Qty: 30 TABLET | Refills: 0 | Status: SHIPPED | OUTPATIENT
Start: 2023-02-04

## 2023-02-07 ENCOUNTER — TELEPHONE (OUTPATIENT)
Dept: FAMILY MEDICINE CLINIC | Facility: CLINIC | Age: 78
End: 2023-02-07

## 2023-02-07 NOTE — TELEPHONE ENCOUNTER
ivory from [facility] called --patient has fluid in his left lower lobe   No shortness of breath   No chest pain  Or tightness   Patient breathing with ease   ---weight has come down since yesterday     Shabbir patient   Establishing care with dr John Lam

## 2023-02-09 NOTE — PROGRESS NOTES
Advanced Heart Failure/Pulmonary Hypertension Outpatient Progress Note - Recardo Payment 77 y o  male MRN: [id number]    @ Encounter: [id number]    Patient Active Problem List    Diagnosis Date Noted   • BRBPR (bright red blood per rectum) 01/12/2023   • Pulmonary hypertension ([address] ) 11/21/2022   • Chronic acquired lymphedema 10/26/2022   • Cirrhosis of liver without ascites ([address] ) 09/21/2022   • Hallucination 09/21/2022   • Jerking movements of extremities 09/21/2022   • Vitamin D deficiency 08/10/2022   • Gastroesophageal reflux disease 07/20/2022   • PAD (peripheral artery disease) ([facility] ) 03/31/2022   • Atrial fibrillation with rapid ventricular response ([facility] ) 12/27/2021   • Cellulitis and abscess of left lower extremity 12/27/2021   • Ureteral calculi 12/15/2021   • Benign prostatic hyperplasia without lower urinary tract symptoms 12/15/2021   • Hypokalemia 10/19/2021   • Persistent proteinuria 07/20/2021   • Nipple pain 57/57/4669   • Folliculitis 71/55/0044   • Venous stasis dermatitis of both lower extremities 05/26/2021   • Blister of right leg 05/26/2021   • Myocardial infarction [facility])    • Blister of left leg 04/28/2021   • Hydroureter on left 04/28/2021   • Nephrolithiasis 04/24/2021   • Fall 04/23/2021   • Hypercalcemia 04/23/2021   • Scaly skin on examination 03/29/2021   • Acute on chronic diastolic (congestive) heart failure ([address] ) 03/25/2021   • Left upper lobe pulmonary nodule 03/25/2021   • Acute respiratory failure with hypoxia and hypercapnia ([address] ) 03/25/2021   • Hypoxia 03/24/2021   • Bilateral edema of lower extremity 03/18/2021   • LUCIA (obstructive sleep apnea) 01/29/2021   • Embolism and thrombosis of arteries of the lower extremities ([address] ) 01/29/2021   • Colitis 12/04/2020   • Adrenal nodule ([address] ) 12/04/2020   • Left lower quadrant abdominal pain 12/03/2020   • Excessive gas 12/03/2020   • Morbid obesity ([facility] ) 12/03/2020   • 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   • Immunization deficiency 10/30/2020   • Chronic pain of both knees 07/31/2020   • Actinic keratosis of right temple 07/31/2020   • Actinic keratosis of scalp 07/31/2020   • Decreased pedal pulses 04/04/2019   • Hypertension    • Hyperlipemia    • Type 2 diabetes mellitus with hyperglycemia, with long-term current use of insulin (HCC)    • Chronic low back pain    • CAD (coronary artery disease)    • Malignant neoplasm of urinary bladder (HCC)    • Arthritis    • Paroxysmal atrial fibrillation (HCC)    • AAA (abdominal aortic aneurysm)      Assessment:  77 y o  male Hx per chart p/w post DC fu after admitted at [facility] for ADHF, PH, respiratory failure      Follows gen cards Dr Kemi Villalobos outpt  HFpEF  [address] with significant RV dilation/dysfxn  PASP about 80 by echo (while decompensated)  1/2023 RHC as below with predominantly precapillary PH, RA 2, mPAP 25, PCW 5, TPG 20, CI wnl during RHC after 20L inpt diuresis 1/2023  NYHA III, stage C  1/2023 TTE when decompensated: Severe PH with TRmaxvel 4 0 range, +significant RV dilation/dysfunction, small LV, No intracardiac shunt by color doppler  LA dilated  WHO groups I, II, and III dz, though predominantly non group II by RHC after diuresis, unlikely groups IV, V  Remote smoking, quit 2776     No h/o illicit drug use  # CAD s/p CABG in 2016 (LIMA to LAD, SVG to OM3 and SVG to PDA) in 2016  # LUCIA on cpap  # Paroxysmal atrial fibrillation, s/p cardioversion 1/7/22  also AFL on old ECGs  AC: Eliquis  Rate: metoprolol and digoxin  # Hyperlipidemia  # DM type II  # Hypertension  # LACY  # Hemorrhoids with rectal bleeding  RBBB  Fall, as inpt 1/2023  CTH neg    Hematuria 1/16/23, transiently held [facility] as inpt, uro following outpt  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        Cr: mild LACY peaked 1 5>>1 07>1 1>1 2>1 3 (BL around 1 0-1 3)  PCO2 on ABG 1/16/23 was 76 while on bipap, pH 7 4 - as inpt  a1c 7 8  bnp 700s    Left rehab at 279lbs - ?dry weight    Plan:  Warm, volume up on exam, likely contribution from 3rd spacing too  Diuresed over 20L during recent 1/2023 admission  Wt low 280s home scale recently and stable-slight increase from rehab DC weight  Worse edema recently  Breathing much better than prior  Weak and has trouble standing up on home scale  Doing home rehab but "fights it" per family    On home O2 now, before arrival    Getting bipap upgrade next Friday    Consider pulm rehab in future, if he will participate    [facility] workup:  [id number] wnl  tsh wnl 2021  LEENA, AMA, anti-smooth muscle Ab - all neg  HAV, B and C - all neg  Ntprobnp, HIV, RF, CRP - ordered today  UPEP: no monoclonal bands  SPEP: No monoclonal bands noted  Serum FLA ratio 2 67 (mildly elevated)  Recheck iron studies - low 2022 - ordered today  Check new CMP today    ECG as below  ordered echo bubble study today  RHC as below  has LUCIA and sleep medicine is in process of optimizing his therapy (Has LUCIA, on cpap since 2014 but outpt notes indicate possible worse AHI  and machine function/fitting issues that seem unresolved  Good compliance >5 hr per night  )  CT chest 4/2021: Pleural thickening with pleural calcifications in the dependent portion of the right mid and lower lung field, 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  Suspected round atelectasis in the posterior right lower lobe  No CTA PE done recently  VQ scan: low probability 1/2023  Check PFTs with DLCO - ordered today, I don't see any in past  No meth or anorexigen Hx  No h/o PE/DVT  On AC for AF  Consider 6MW in future for prognosis    PH Treatment:  Likely will warrant PAH directed Tx after full workup as above    RTC 3/10/23    --PDE5 inhibitors:   --Endothelin receptor antagonists:  --Guanylate cyclase stimulators:  --Prostacyclin analogues:     --Supplemental oxygen: on home O2 since 1/2023 DC  --Anticoagulation: on AC for AF    --Diuretic: arrives on torsemide 40 qd>increase to 40AM and 20PM with inception of jardiance 25 qd as well (DM and HFpEF)    On dig, toprol xl 25 qd, aldactone 25 qd    Check dig level today    Studies:     EKG - my read:  2022: AF, RAD, RBBB (old), non specific STT changes    1/19/23 VQ scan:  IMPRESSION:  The probability for pulmonary embolus is low      1/19/23 RHC:  /71, 102  HR 77  O2 sat 96% on 15L nasal prongs  Hgb 9 4     RA 2  RV 50/2  PA 52/12, 25  PCWP 5  TPG 20  DPG 7  PA sat 56%  Ezio CO/CI: 5 7/2 2  PVR 3 5  Hattie: 20  SVR 1404      Impression/Recommendations: The patient tolerated the procedure well; no immediate complications  Transducer leveled and zeroed multiple times and swapped out to ensure data accuracy  Low biv filling pressures after diuresis  Precapillary pHTN  Robust Hattie  Mildly elevated SVR  Normal CO/CI      1/19/23 VQ scan:  IMPRESSION:  The probability for pulmonary embolus is low      Echocardiogram 1/11/23  LVEF: 55%  LVIDd: 4 8cm  RV: not well visualized; dilated RV>LV; reduced systolic function  MR: moderate MAC, trace MR  PASP: 83mmHg; estimated RAP 15, peak TR velocity 4 1  RVOT: suboptimal envelope, mid to late systolic notching; interventricular diastolic and systolic septal flattening  Other: grade 2 diastology, mild JONNY; mildly increased LV wall thickness     Echo 12/28/21:  LVEF 55%, mildly increased wall thickness  RV mildly dilated, normal systolic function  PASP 47FRRK     Echo 3/23/21:  LVEF 60%, mildly increased wall thickness  Grade 1 diastology  RV mildly dilated, mildly reduced systolic function  PASP 39BCXC    HPI:   77 y o  male Hx per chart p/w post DC fu after admitted at [address] [place] [address] for ADHF, PH, respiratory failure  Diuresed 20L as inpt and had RHC for PH, now continues to feel significantly better though edema worse and wt slightly up  Breathing much better than prior  Weak and has trouble standing up on home scale  Doing home rehab but "fights it" per family  No new CP/SOB/dizziness/palpitations/syncope    No new PND, pillow orthopnea, fatigue          Past Medical History:   Diagnosis Date   • A-fib [facility])    • AAA (abdominal aortic aneurysm)    • Actinic keratosis of right temple 7/31/2020   • Actinic keratosis of scalp 7/31/2020   • Acute respiratory failure with hypoxia (HCC) 3/25/2021   • Allergic 1960   • Arthritis    • Lay's esophagus    • Bladder cancer ([facility])    • Blister of left leg 4/28/2021   • Blister of right leg 5/26/2021   • CAD (coronary artery disease)    • Cancer ([facility] ) 02/2010    Bladder   • CHF (congestive heart failure) ([facility])    • Chronic low back pain    • Chronic pain of both knees 7/31/2020   • Colitis 12/4/2020   • CPAP (continuous positive airway pressure) dependence    • Diabetes ([facility])    • Diabetes mellitus ([facility] ) 10/1993   • Excessive gas 12/3/2020   • Heart murmur    • History of chemotherapy    • Hydroureter on left 4/28/2021   • Hyperlipemia    • Hypertension    • Immunization deficiency 10/30/2020    Hep a nonimmune   • Kidney stone    • Left lower quadrant abdominal pain 12/3/2020   • Mass of right adrenal gland ([facility] ) 12/4/2020    4 1 cm   • Myocardial infarction ([place] [address] )    • 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   • Obesity 2000   • LUCIA (obstructive sleep apnea) 1/29/2021   • Pressure ulcer of right leg, stage 1 5/26/2021   • Urinary tract infection with hematuria 5/3/2021    u cx 4/2021=pseudomonas R to bactrim and cefdinir, S to cipro   • Venous stasis dermatitis of both lower extremities 5/26/2021       ROS:  10 point ROS negative except as specified in HPI    Allergies   Allergen Reactions   • Ciprofloxacin Abdominal Pain, Diarrhea, GI Bleeding and GI Intolerance   • Diltiazem Abdominal Pain   • Metronidazole Abdominal Pain, Diarrhea, GI Bleeding and GI Intolerance       Current Outpatient Medications   Medication Instructions   • apixaban (ELIQUIS) 5 mg, Oral, 2 times daily   • atorvastatin (LIPITOR) 40 mg, Oral, Daily   • Continuous Blood Gluc Sensor (FreeStyle Peggy 2 Sensor) MISC Change it every 14 days • dicyclomine (BENTYL) 10 mg, Oral, 2 times daily PRN   • digoxin (LANOXIN) 125 mcg, Oral, Daily   • Empagliflozin (JARDIANCE) 25 mg, Oral, Every morning   • famotidine (PEPCID) 40 MG tablet TAKE ONE TABLET BY MOUTH NIGHTLY AT BEDTIME   • insulin degludec Pato Samantha FlexTouch) 200 units/mL CONCENTRATED U-200 injection pen inject 100 units under the skin once daily   • Lancets (accu-chek multiclix) lancets 1 each, Other, 2 times daily, Use 2 times daily to test blood glucose   • metoprolol succinate (TOPROL-XL) 25 mg, Oral, Daily   • NovoLOG FlexPen 20-25 Units, Subcutaneous, 3 times daily with meals   • omeprazole (PRILOSEC) 20 mg, Oral, 2 times daily (morning and afternoon)   • potassium citrate (UROCIT-K) 10 mEq 10 mEq, Oral, 2 times daily   • silver sulfadiazine (SILVADENE,SSD) 1 % cream Topical, Daily   • Skin Protectants, Misc  (Remedy Phytoplex Hydraguard) CREA No dose, route, or frequency recorded  • spironolactone (ALDACTONE) 25 mg tablet TAKE ONE TABLET BY MOUTH ONCE DAILY   • torsemide (DEMADEX) 40 mg, Oral, 2 times daily, 40mg in morning and 20mg at 4pm    • Unifine Pentips 31G X 8 MM MISC Subcutaneous, Daily, Use as directed        Social History     Socioeconomic History   • Marital status: /Civil Union     Spouse name: Keyon Johnson   • Number of children: 4   • Years of education: Not on file   • Highest education level: 12th grade   Occupational History   • Not on file   Tobacco Use   • Smoking status: Former     Packs/day: 0 25     Years: 20 00     Pack years: 5 00     Types: Cigarettes     Start date: 1965     Quit date: 2010     Years since quittin 1   • Smokeless tobacco: Never   • Tobacco comments:     Quit several times for up to 6 years     Vaping Use   • Vaping Use: Never used   Substance and Sexual Activity   • Alcohol use: Not Currently     Comment: No use   • Drug use: Never     Comment: No use   • Sexual activity: Not Currently     Partners: Female   Other Topics Concern   • Not on file   Social History Narrative    · Most recent tobacco use screenin2020      · Do you currently or have you served in the [employer]:   No      · Were you activated, into active duty, as a member of the [employer] or as a Reservist:   No      · Live alone or with others:   with others        · Caffeine intake:   Occasional      · Illicit drugs:   No      · Diet:   Regular      · Exercise level: Moderate      · Advance directive: Yes      · Marital status:         · General stress level:   Medium      · Single or multi-level home/work:   multi level home      · Guns present in home:   No      · Seat belts used routinely:   Yes      · Sunscreen used routinely:   Yes      · Smoke alarm in home:   Yes      Social Determinants of Health     Financial Resource Strain: Not on file   Food Insecurity: No Food Insecurity   • Worried About Running Out of Food in the Last Year: Never true   • Ran Out of Food in the Last Year: Never true   Transportation Needs: No Transportation Needs   • Lack of Transportation (Medical): No   • Lack of Transportation (Non-Medical):  No   Physical Activity: Not on file   Stress: Not on file   Social Connections: Not on file   Intimate Partner Violence: Not on file   Housing Stability: Unknown   • Unable to Pay for Housing in the Last Year: No   • Number of Places Lived in the Last Year: Not on file   • Unstable Housing in the Last Year: No       Family History   Problem Relation Age of Onset   • Heart disease Father    • Arthritis Father    • Heart attack Father    • Alzheimer's disease Mother    • Dementia Mother        Physical Exam:  Vitals:    02/10/23 1031   BP: 122/68   BP Location: Left arm   Patient Position: Sitting   Cuff Size: Large   Pulse: 68   SpO2: 96%   Weight: 128 kg (282 lb 6 4 oz)   Height: 6' 4" (1 93 m)     Constitutional: NAD, non toxic, obese  Ears/nose/mouth/throat: atraumatic  CV: irreg, no overt JVD sitting upright  Resp: Decreased breath sounds BL  GI: Soft, NTND  MSK: no swollen joints in exposed areas  Extr: +2 LE edema, warm LE  Pysche: Normal affect  Neuro: appropriate in conversation  Skin: dry and intact in exposed areas    Labs & Results:    Lab Results   Component Value Date    SODIUM 133 (L) 01/21/2023    K 3 9 01/21/2023    CL 88 (L) 01/21/2023    CO2 37 (H) 01/21/2023    BUN 38 (H) 01/21/2023    CREATININE 1 34 (H) 01/21/2023    GLUC 249 (H) 01/21/2023    CALCIUM 9 5 01/21/2023     Lab Results   Component Value Date    WBC 8 64 01/22/2023    HGB 10 0 (L) 01/22/2023    HCT 36 3 (L) 01/22/2023    MCV 79 (L) 01/22/2023     01/22/2023     Lab Results   Component Value Date     (H) 01/10/2023      Lab Results   Component Value Date    CHOLESTEROL 132 11/02/2022    CHOLESTEROL 137 04/27/2022    CHOLESTEROL 142 10/06/2021     Lab Results   Component Value Date    HDL 34 (L) 11/02/2022    HDL 32 (L) 04/27/2022    HDL 37 (L) 10/06/2021     Lab Results   Component Value Date    TRIG 153 (H) 11/02/2022    TRIG 223 (H) 04/27/2022    TRIG 201 (H) 10/06/2021     Lab Results   Component Value Date    [place] 98 11/02/2022    [place] 105 04/27/2022    [place] 105 10/06/2021       Counseling / Coordination of Care  Time spent today 37 minutes  Greater than 50% of total time was spent with the patient and / or family counseling and / or coordination of care  We discussed diagnoses, most recent studies, tests and any changes in treatment plan  Thank you for the opportunity to participate in the care of this patient      Tori Diaz MD  Attending Physician  Advanced Heart Failure and Transplant Cardiology  Beep

## 2023-02-10 ENCOUNTER — TELEPHONE (OUTPATIENT)
Dept: FAMILY MEDICINE CLINIC | Facility: CLINIC | Age: 78
End: 2023-02-10

## 2023-02-10 ENCOUNTER — OFFICE VISIT (OUTPATIENT)
Dept: CARDIOLOGY CLINIC | Facility: CLINIC | Age: 78
End: 2023-02-10

## 2023-02-10 VITALS
HEIGHT: 76 IN | HEART RATE: 68 BPM | SYSTOLIC BLOOD PRESSURE: 122 MMHG | WEIGHT: 282.4 LBS | OXYGEN SATURATION: 96 % | DIASTOLIC BLOOD PRESSURE: 68 MMHG | BODY MASS INDEX: 34.39 KG/M2

## 2023-02-10 DIAGNOSIS — I48.91 ATRIAL FIBRILLATION WITH RAPID VENTRICULAR RESPONSE (HCC): ICD-10-CM

## 2023-02-10 DIAGNOSIS — I50.9 CHF EXACERBATION (HCC): ICD-10-CM

## 2023-02-10 DIAGNOSIS — I27.20 PULMONARY HTN (HCC): Primary | ICD-10-CM

## 2023-02-10 RX ORDER — METOPROLOL SUCCINATE 25 MG/1
25 TABLET, EXTENDED RELEASE ORAL DAILY
Qty: 90 TABLET | Refills: 5 | Status: SHIPPED | OUTPATIENT
Start: 2023-02-10 | End: 2023-02-14 | Stop reason: SDUPTHER

## 2023-02-10 RX ORDER — TORSEMIDE 20 MG/1
40 TABLET ORAL DAILY
Qty: 120 TABLET | Refills: 5 | Status: SHIPPED | OUTPATIENT
Start: 2023-02-10 | End: 2023-02-10

## 2023-02-10 RX ORDER — TORSEMIDE 20 MG/1
40 TABLET ORAL 2 TIMES DAILY
Qty: 120 TABLET | Refills: 5 | Status: SHIPPED | OUTPATIENT
Start: 2023-02-10 | End: 2023-02-13 | Stop reason: SDUPTHER

## 2023-02-10 NOTE — TELEPHONE ENCOUNTER
Michael Abdul with [facility] called to give us an update on pt  Michael Abdul stated the pt lungs are completely diminished however pt denies shortness of breathe, lung issues or chest tightness   Pt is currently on his way to see a cardiologist

## 2023-02-11 ENCOUNTER — APPOINTMENT (OUTPATIENT)
Dept: LAB | Facility: CLINIC | Age: 78
End: 2023-02-11

## 2023-02-11 DIAGNOSIS — I50.9 CHF EXACERBATION (HCC): ICD-10-CM

## 2023-02-11 DIAGNOSIS — I27.20 PULMONARY HTN (HCC): ICD-10-CM

## 2023-02-11 DIAGNOSIS — E55.9 VITAMIN D DEFICIENCY: ICD-10-CM

## 2023-02-11 DIAGNOSIS — Z79.4 TYPE 2 DIABETES MELLITUS WITH HYPERGLYCEMIA, WITH LONG-TERM CURRENT USE OF INSULIN (HCC): ICD-10-CM

## 2023-02-11 DIAGNOSIS — E11.65 TYPE 2 DIABETES MELLITUS WITH HYPERGLYCEMIA, WITH LONG-TERM CURRENT USE OF INSULIN (HCC): ICD-10-CM

## 2023-02-11 DIAGNOSIS — R80.1 PERSISTENT PROTEINURIA: ICD-10-CM

## 2023-02-11 DIAGNOSIS — E83.52 HYPERCALCEMIA: ICD-10-CM

## 2023-02-11 LAB
25(OH)D3 SERPL-MCNC: 25.3 NG/ML (ref 30–100)
ALBUMIN SERPL BCP-MCNC: 3.5 G/DL (ref 3.5–5)
ALP SERPL-CCNC: 74 U/L (ref 34–104)
ALT SERPL W P-5'-P-CCNC: 16 U/L (ref 7–52)
ANION GAP SERPL CALCULATED.3IONS-SCNC: 8 MMOL/L (ref 4–13)
AST SERPL W P-5'-P-CCNC: 18 U/L (ref 13–39)
BILIRUB SERPL-MCNC: 0.35 MG/DL (ref 0.2–1)
BUN SERPL-MCNC: 47 MG/DL (ref 5–25)
CA-I BLD-SCNC: 1.22 MMOL/L (ref 1.12–1.32)
CALCIUM SERPL-MCNC: 9.2 MG/DL (ref 8.4–10.2)
CHLORIDE SERPL-SCNC: 99 MMOL/L (ref 96–108)
CO2 SERPL-SCNC: 28 MMOL/L (ref 21–32)
CREAT SERPL-MCNC: 1.29 MG/DL (ref 0.6–1.3)
CREAT UR-MCNC: 84.4 MG/DL
CRP SERPL QL: 9.1 MG/L
DIGOXIN SERPL-MCNC: 0.5 NG/ML (ref 0.8–2)
EST. AVERAGE GLUCOSE BLD GHB EST-MCNC: 163 MG/DL
FERRITIN SERPL-MCNC: 80 NG/ML (ref 8–388)
GFR SERPL CREATININE-BSD FRML MDRD: 52 ML/MIN/1.73SQ M
GLUCOSE P FAST SERPL-MCNC: 236 MG/DL (ref 65–99)
HBA1C MFR BLD: 7.3 %
IRON SATN MFR SERPL: 12 % (ref 20–50)
IRON SERPL-MCNC: 41 UG/DL (ref 65–175)
MAGNESIUM SERPL-MCNC: 1.6 MG/DL (ref 1.9–2.7)
NT-PROBNP SERPL-MCNC: 177 PG/ML
PHOSPHATE SERPL-MCNC: 3 MG/DL (ref 2.3–4.1)
POTASSIUM SERPL-SCNC: 4.3 MMOL/L (ref 3.5–5.3)
PROT SERPL-MCNC: 7.4 G/DL (ref 6.4–8.4)
PROT UR-MCNC: 50 MG/DL
PROT/CREAT UR: 0.59 MG/G{CREAT} (ref 0–0.1)
PTH-INTACT SERPL-MCNC: 123 PG/ML (ref 18.4–80.1)
SODIUM SERPL-SCNC: 135 MMOL/L (ref 135–147)
TIBC SERPL-MCNC: 348 UG/DL (ref 250–450)
TSH SERPL DL<=0.05 MIU/L-ACNC: 0.85 UIU/ML (ref 0.45–4.5)

## 2023-02-12 LAB — HIV 1+2 AB+HIV1 P24 AG SERPL QL IA: NON REACTIVE

## 2023-02-13 DIAGNOSIS — I50.9 CHF EXACERBATION (HCC): ICD-10-CM

## 2023-02-13 LAB — RHEUMATOID FACT SER QL LA: NEGATIVE

## 2023-02-13 RX ORDER — TORSEMIDE 20 MG/1
TABLET ORAL
Qty: 120 TABLET | Refills: 5 | Status: SHIPPED | OUTPATIENT
Start: 2023-02-13

## 2023-02-14 ENCOUNTER — TELEPHONE (OUTPATIENT)
Dept: FAMILY MEDICINE CLINIC | Facility: CLINIC | Age: 78
End: 2023-02-14

## 2023-02-14 ENCOUNTER — TELEPHONE (OUTPATIENT)
Dept: ADMINISTRATIVE | Facility: HOSPITAL | Age: 78
End: 2023-02-14

## 2023-02-14 ENCOUNTER — TELEPHONE (OUTPATIENT)
Dept: CARDIOLOGY CLINIC | Facility: CLINIC | Age: 78
End: 2023-02-14

## 2023-02-14 DIAGNOSIS — I48.91 ATRIAL FIBRILLATION WITH RAPID VENTRICULAR RESPONSE (HCC): ICD-10-CM

## 2023-02-14 LAB — CCP AB SER IA-ACNC: 2.3

## 2023-02-14 RX ORDER — METOPROLOL SUCCINATE 50 MG/1
50 TABLET, EXTENDED RELEASE ORAL DAILY
Qty: 90 TABLET | Refills: 3 | Status: SHIPPED | OUTPATIENT
Start: 2023-02-14 | End: 2023-06-14

## 2023-02-14 RX ORDER — METOPROLOL SUCCINATE 50 MG/1
50 TABLET, EXTENDED RELEASE ORAL DAILY
Qty: 90 TABLET | Refills: 3 | Status: CANCELLED | OUTPATIENT
Start: 2023-02-14 | End: 2023-06-14

## 2023-02-14 NOTE — TELEPHONE ENCOUNTER
Received a call from [facility] EMERGENCY [facility] reporting HR at rest 100's and 116 with ambulation since decreasing Toprol from 100 mg BID to 25 mg daily     /70  No edema or SOB noted  Lungs diminished throughout   Continues with O2@ 2 L  compliant with BiPap  Weight today 275 lbs   [facility] [phone]

## 2023-02-14 NOTE — TELEPHONE ENCOUNTER
Lonza [facility] called the patient had a fall over the weekend --he tripped over a shoe and fell backward  He has no pain and no injury

## 2023-02-14 NOTE — TELEPHONE ENCOUNTER
Edward Tejeda! I reached out to pt to help him schedule his Split study  When I was speaking with pt he mentioned he has an upcoming PFT and echo and was curious if that would work for you  I explained the difference between all 3 screenings and I advised pt it would be best to have the sleep study done due to the sleep apnea  Pt stated his cardiologist advised him to stay home due to having congestive heart failure and working on getting stronger       Pt stated he is going to hold off on scheduling the sleep study and get the other test done first

## 2023-02-15 ENCOUNTER — OFFICE VISIT (OUTPATIENT)
Dept: ENDOCRINOLOGY | Facility: CLINIC | Age: 78
End: 2023-02-15

## 2023-02-15 VITALS
WEIGHT: 268 LBS | DIASTOLIC BLOOD PRESSURE: 56 MMHG | SYSTOLIC BLOOD PRESSURE: 108 MMHG | HEIGHT: 76 IN | HEART RATE: 93 BPM | BODY MASS INDEX: 32.63 KG/M2

## 2023-02-15 DIAGNOSIS — E83.52 HYPERCALCEMIA: ICD-10-CM

## 2023-02-15 DIAGNOSIS — E11.65 TYPE 2 DIABETES MELLITUS WITH HYPERGLYCEMIA, WITH LONG-TERM CURRENT USE OF INSULIN (HCC): Primary | ICD-10-CM

## 2023-02-15 DIAGNOSIS — E27.8 ADRENAL NODULE (HCC): ICD-10-CM

## 2023-02-15 DIAGNOSIS — E78.00 PURE HYPERCHOLESTEROLEMIA: ICD-10-CM

## 2023-02-15 DIAGNOSIS — I10 PRIMARY HYPERTENSION: ICD-10-CM

## 2023-02-15 DIAGNOSIS — Z79.4 TYPE 2 DIABETES MELLITUS WITH HYPERGLYCEMIA, WITH LONG-TERM CURRENT USE OF INSULIN (HCC): Primary | ICD-10-CM

## 2023-02-15 RX ORDER — INSULIN DEGLUDEC 200 U/ML
INJECTION, SOLUTION SUBCUTANEOUS
Qty: 12 ML | Refills: 1 | Status: SHIPPED | OUTPATIENT
Start: 2023-02-15

## 2023-02-15 RX ORDER — POLYETHYLENE GLYCOL 3350 17 G/17G
POWDER, FOR SOLUTION ORAL
COMMUNITY
Start: 2023-01-25

## 2023-02-15 RX ORDER — GUAIFENESIN 600 MG/1
TABLET, EXTENDED RELEASE ORAL
COMMUNITY
Start: 2023-02-01

## 2023-02-15 NOTE — ASSESSMENT & PLAN NOTE
Calcium levels have normalized, PTH remains elevated  He is also following with nephrology  I do not believe it is primary hyperparathyroidism, work up was negative for adenoma

## 2023-02-15 NOTE — ASSESSMENT & PLAN NOTE
HGA1C at goal, BGs very well controlled, in range 74% of the time, having occasional hypoglycemia over night  Treatment regimen:   - Will decrease Tresiba to 85 units daily  Continue with novolog 20-25 units before each meal   - He was started on Jardiance by cardiology, reviewed side effects with patient in detail  Also advised patient that this medication will effect BGs and if he continues to have hypoglycemia to notify office so insulin can be adjusted further    - Continue with CGM  Discussed risks/complications associated with uncontrolled diabetes  Advised to adhere to diabetic diet, and recommended staying active/exercising routinely  Keep carbohydrates consistent to limit blood glucose fluctuations  Advised to call if blood sugars less than 70 mg/dl or over 300 mg/dl  Discussed symptoms and treatment of hypoglycemia  Recommended routine follow-up with podiatry and ophthalmology  Ordered blood work to complete prior to next visit    Lab Results   Component Value Date    HGBA1C 7 3 (H) 02/11/2023

## 2023-02-16 NOTE — TELEPHONE ENCOUNTER
I spoke with pt and informed him of information below  Pt agreed to scheduling the sleep study  Unfortunately these sleep studies are scheduled into July so he is scheduled for 7/12  I did add pt to wait list just incase they have anything sooner and will check periodically to see if any cancellations have been made to move pt to sooner date!

## 2023-02-20 ENCOUNTER — TELEPHONE (OUTPATIENT)
Dept: SLEEP CENTER | Facility: CLINIC | Age: 78
End: 2023-02-20

## 2023-02-20 ENCOUNTER — PATIENT OUTREACH (OUTPATIENT)
Dept: CASE MANAGEMENT | Facility: HOSPITAL | Age: 78
End: 2023-02-20

## 2023-02-20 NOTE — TELEPHONE ENCOUNTER
----- Message from Harleen Hernandez DO sent at 2/19/2023  9:49 PM EST -----  Approve of the study but again criteria should change away from [facility] policy  ----- Message -----  From: Gypsy Calderon  Sent: 2/17/2023   8:07 AM EST  To: Sleep Medicine Lexy Reyes Provider    This Split Study sleep study needs approval      If approved please sign and return to clerical pool  If denied please include reasons why  Also provide alternative testing if warranted  Please sign and return to clerical pool

## 2023-02-21 ENCOUNTER — TELEPHONE (OUTPATIENT)
Dept: FAMILY MEDICINE CLINIC | Facility: CLINIC | Age: 78
End: 2023-02-21

## 2023-02-21 ENCOUNTER — TELEPHONE (OUTPATIENT)
Dept: SLEEP CENTER | Facility: CLINIC | Age: 78
End: 2023-02-21

## 2023-02-21 NOTE — TELEPHONE ENCOUNTER
ivory from [facility] called  Is it ok to adjust his heart rate parameters  from  60-90    To     Please advise thank you

## 2023-02-21 NOTE — TELEPHONE ENCOUNTER
----- Message from Reinaldo Madden DO sent at 2/19/2023  9:49 PM EST -----  Approve of the study but again criteria should change away from [facility] policy  ----- Message -----  From: Benita De La Fuente  Sent: 2/17/2023   8:07 AM EST  To: Sleep Medicine [facility] Provider    This Split Study sleep study needs approval      If approved please sign and return to clerical pool  If denied please include reasons why  Also provide alternative testing if warranted  Please sign and return to clerical pool

## 2023-02-25 ENCOUNTER — NURSE TRIAGE (OUTPATIENT)
Dept: OTHER | Facility: OTHER | Age: 78
End: 2023-02-25

## 2023-02-25 NOTE — TELEPHONE ENCOUNTER
Reason for Disposition  • [1] Blood glucose > 300 mg/dL (16 7 mmol/L) AND [2] two or more times in a row    Answer Assessment - Initial Assessment Questions  1  BLOOD GLUCOSE: "What is your blood glucose level?"       271    2  ONSET: "When did you check the blood glucose?"     15 minutes ago    3  USUAL RANGE: "What is your glucose level usually?" (e g , usual fasting morning value, usual evening value)      100-150    4  KETONES: "Do you check for ketones (urine or blood test strips)?" If yes, ask: "What does the test show now?"       Does not check for ketones    5  TYPE 1 or 2:  "Do you know what type of diabetes you have?"  (e g , Type 1, Type 2, Gestational; doesn't know)       Type 2 DM    6  INSULIN: "Do you take insulin?" "What type of insulin(s) do you use? What is the mode of delivery? (syringe, pen; injection or pump)?"       Pt  Taking Tresiba 85 units weekly  Took yesterday  Also using Novolog 20-25 units sliding scale TID with meals    7  DIABETES PILLS: "Do you take any pills for your diabetes?" If yes, ask: "Have you missed taking any pills recently?"      No    8  OTHER SYMPTOMS: "Do you have any symptoms?" (e g , fever, frequent urination, difficulty breathing, dizziness, weakness, vomiting)      Denies all symptoms    Protocols used: DIABETES - HIGH BLOOD SUGAR-ADULT-    Pt  States blood sugars have been high over the past couple of weeks  Was 315 this morning  Now 271  Pt  Denies all symptoms related to hyperglycemia  Does not test for ketones at home  States he is taking [place] 85 units once weekly along with Novolog sliding scale TID with meals  Per notes, it appears [place] is supposed to be taken 85 units once daily  Paged on call provider for clarification to see if pt is taking correctly  Per on call, pt should be taking once daily  Spoke with pt to make him aware  Pt  Verbalized understanding

## 2023-02-26 ENCOUNTER — TELEPHONE (OUTPATIENT)
Dept: OTHER | Facility: OTHER | Age: 78
End: 2023-02-26

## 2023-02-26 NOTE — TELEPHONE ENCOUNTER
[facility] requests to page the on call provider to report pt gained 2 lbs overnight, declining sob chest paain or tightness and verbalizes  breathing with ease    She is also requesting that the provider call back the patient directly    Paged to on call provider viaTC

## 2023-02-28 ENCOUNTER — HOSPITAL ENCOUNTER (OUTPATIENT)
Dept: PULMONOLOGY | Facility: HOSPITAL | Age: 78
Discharge: HOME | End: 2023-02-28
Attending: STUDENT IN AN ORGANIZED HEALTH CARE EDUCATION/TRAINING PROGRAM

## 2023-02-28 ENCOUNTER — HOSPITAL ENCOUNTER (OUTPATIENT)
Dept: NON INVASIVE DIAGNOSTICS | Facility: HOSPITAL | Age: 78
Discharge: HOME | End: 2023-02-28
Attending: STUDENT IN AN ORGANIZED HEALTH CARE EDUCATION/TRAINING PROGRAM

## 2023-02-28 VITALS
HEIGHT: 76 IN | HEART RATE: 93 BPM | BODY MASS INDEX: 32.63 KG/M2 | DIASTOLIC BLOOD PRESSURE: 56 MMHG | SYSTOLIC BLOOD PRESSURE: 108 MMHG | WEIGHT: 268 LBS

## 2023-02-28 DIAGNOSIS — I27.20 PULMONARY HTN (HCC): ICD-10-CM

## 2023-02-28 LAB
APICAL FOUR CHAMBER EJECTION FRACTION: 68 %
SL CV LV EF: 65
TR MAX PG: 26 MMHG
TR PEAK VELOCITY: 2.5 M/S
TRICUSPID VALVE PEAK REGURGITATION VELOCITY: 2.54 M/S

## 2023-02-28 RX ORDER — ALBUTEROL SULFATE 2.5 MG/3ML
2.5 SOLUTION RESPIRATORY (INHALATION) ONCE
Status: COMPLETED | OUTPATIENT
Start: 2023-02-28 | End: 2023-02-28

## 2023-02-28 RX ADMIN — ALBUTEROL SULFATE 2.5 MG: 2.5 SOLUTION RESPIRATORY (INHALATION) at 14:20

## 2023-03-01 ENCOUNTER — PROCEDURE VISIT (OUTPATIENT)
Dept: UROLOGY | Facility: CLINIC | Age: 78
End: 2023-03-01

## 2023-03-01 ENCOUNTER — PATIENT OUTREACH (OUTPATIENT)
Dept: CASE MANAGEMENT | Facility: HOSPITAL | Age: 78
End: 2023-03-01

## 2023-03-01 VITALS
OXYGEN SATURATION: 88 % | WEIGHT: 268 LBS | HEART RATE: 118 BPM | DIASTOLIC BLOOD PRESSURE: 62 MMHG | BODY MASS INDEX: 32.63 KG/M2 | SYSTOLIC BLOOD PRESSURE: 116 MMHG | HEIGHT: 76 IN

## 2023-03-01 DIAGNOSIS — C67.9 MALIGNANT NEOPLASM OF URINARY BLADDER, UNSPECIFIED SITE (HCC): Primary | ICD-10-CM

## 2023-03-01 NOTE — PROGRESS NOTES
Cystoscopy     Date/Time 3/1/2023 8:15 AM     Performed by  Alin Munoz MD     Authorized by Alin Munoz MD          Procedure Details:  Procedure type: cystoscopy         1  Non muscle invasive bladder cancer  -distant history of non muscle invasive disease with 1 recurrence  -treated with BCG 10 years ago  - no recurrence since that time  -MYA    2  BPH with intermittent gross hematuria    3  4 1 cm right adrenal adenoma  -Stable on imaging        Office Cystoscopy Procedure Note    Indication:    Hematuria and Urothelial carcinoma of the bladder    Informed consent   The risks, benefits, complications, treatment options, and expected outcomes were discussed with the patient  The patient concurred with the proposed plan and provided informed consent  Anesthesia  Lidocaine jelly 2%    Procedure  The patient was placed in the supineposition, was prepped and draped in the usual manner using sterile technique, and 2% lidocaine jelly instilled into the urethra  A 17 F flexible cystoscope was then inserted into the urethra and the urethra and bladder carefully examined  The following findings were noted:    Findings:  Urethra:  Normal  Prostate:  Normal  Bladder:  Normal  Ureteral orifices:  Normal  Other findings:  None     Specimens: None                 Complications:    None; patient tolerated the procedure well           Disposition: To home after 30 minute observation  Condition: Stable    Plan:     I was elated to review the negative cystoscopic findings with Kvngingrid Patel and his ming daughter today  I suspect that his mild gross hematuria during his hospitalization for CHF was related to heavy diuretic administration and catheter placement  Cystoscopy showed no recurrence of the patient's remote history of bladder cancer  Stable BPH changes are noted and are rather mild to modest     This point he will follow-up with urology as needed    Happy to see patient back should he develop any additional issues down the line

## 2023-03-01 NOTE — PROGRESS NOTES
SNF/LACY Pathway Outpatient Care Manager Note: Chart notes reviewed and call made to patient  He reports feeling "good"  Had cystoscopy today and got good news- no cancer concerns  He states his weight has been stable within 2 pounds: 271-273#  HR though is running in higher side at times  and increases with activity  Last week was 70-80's mostly  He denies any palpitations or chest pain  His BP have been "good": 110's-130's/50's-70"  Denies worsening SOB or edema  He sees pulmonary and cardiology next week  I encouraged to get lab work done before then and also to call cardiology office if HR running consistently>90  [facility] nurses till coming and he is on their tele-monitoring for weight, HR and BP  Will follow up

## 2023-03-02 ENCOUNTER — DOCUMENTATION (OUTPATIENT)
Dept: NEPHROLOGY | Facility: CLINIC | Age: 78
End: 2023-03-02

## 2023-03-02 NOTE — PROGRESS NOTES
Sent message via [employer] to see if patient was interested in rescheduling appointment that was cancelled via [address]  Patient was scheduled for 02/15/23 for a follow up

## 2023-03-03 ENCOUNTER — RA CDI HCC (OUTPATIENT)
Dept: OTHER | Facility: HOSPITAL | Age: 78
End: 2023-03-03

## 2023-03-03 NOTE — PROGRESS NOTES
[facility]  coding opportunities          Chart Reviewed number of suggestions sent to Provider: 1   I11 0  Patients Insurance     Medicare Insurance: [employer]

## 2023-03-04 ENCOUNTER — APPOINTMENT (OUTPATIENT)
Dept: LAB | Facility: CLINIC | Age: 78
End: 2023-03-04

## 2023-03-04 DIAGNOSIS — Z79.4 TYPE 2 DIABETES MELLITUS WITH HYPERGLYCEMIA, WITH LONG-TERM CURRENT USE OF INSULIN (HCC): ICD-10-CM

## 2023-03-04 DIAGNOSIS — E11.65 TYPE 2 DIABETES MELLITUS WITH HYPERGLYCEMIA, WITH LONG-TERM CURRENT USE OF INSULIN (HCC): ICD-10-CM

## 2023-03-04 DIAGNOSIS — K21.00 GASTROESOPHAGEAL REFLUX DISEASE WITH ESOPHAGITIS WITHOUT HEMORRHAGE: ICD-10-CM

## 2023-03-04 LAB
ALBUMIN SERPL BCP-MCNC: 3.5 G/DL (ref 3.5–5)
ALP SERPL-CCNC: 86 U/L (ref 34–104)
ALT SERPL W P-5'-P-CCNC: 12 U/L (ref 7–52)
ANION GAP SERPL CALCULATED.3IONS-SCNC: 8 MMOL/L (ref 4–13)
AST SERPL W P-5'-P-CCNC: 15 U/L (ref 13–39)
BILIRUB SERPL-MCNC: 0.45 MG/DL (ref 0.2–1)
BUN SERPL-MCNC: 38 MG/DL (ref 5–25)
CALCIUM SERPL-MCNC: 9.6 MG/DL (ref 8.4–10.2)
CHLORIDE SERPL-SCNC: 94 MMOL/L (ref 96–108)
CO2 SERPL-SCNC: 33 MMOL/L (ref 21–32)
CREAT SERPL-MCNC: 1.17 MG/DL (ref 0.6–1.3)
GFR SERPL CREATININE-BSD FRML MDRD: 58 ML/MIN/1.73SQ M
GLUCOSE P FAST SERPL-MCNC: 199 MG/DL (ref 65–99)
POTASSIUM SERPL-SCNC: 4.4 MMOL/L (ref 3.5–5.3)
PROT SERPL-MCNC: 7.7 G/DL (ref 6.4–8.4)
SODIUM SERPL-SCNC: 135 MMOL/L (ref 135–147)

## 2023-03-04 RX ORDER — FAMOTIDINE 40 MG/1
TABLET, FILM COATED ORAL
Qty: 30 TABLET | Refills: 0 | Status: SHIPPED | OUTPATIENT
Start: 2023-03-04

## 2023-03-06 ENCOUNTER — PATIENT OUTREACH (OUTPATIENT)
Dept: CASE MANAGEMENT | Facility: HOSPITAL | Age: 78
End: 2023-03-06

## 2023-03-06 ENCOUNTER — TELEPHONE (OUTPATIENT)
Dept: CARDIOLOGY CLINIC | Facility: CLINIC | Age: 78
End: 2023-03-06

## 2023-03-06 ENCOUNTER — TELEPHONE (OUTPATIENT)
Dept: ENDOCRINOLOGY | Facility: CLINIC | Age: 78
End: 2023-03-06

## 2023-03-06 NOTE — TELEPHONE ENCOUNTER
Mr Hayley Kay is scheduled to see you on Friday, 3/8  Call today from [place] [id number] who wanted to let you know that patient had a 2 lb weight gain overnight  Yesterday's weight was 273 lbs, and today weighs 275 lb  Nurse states lungs are more clear than in the past, no edema, no shortness of breath , no chest pain  Pt denies increase in sodium intake  I advised him to continue daily weights and call back tomorrow if additional weight gain  Taking : Torsemide 40 mg AM, 20 mg PM  Spironolactone 25 mg daily AM    Please advise

## 2023-03-06 NOTE — TELEPHONE ENCOUNTER
----- Message from Justinmind sent at 3/6/2023 12:13 PM EST -----  Kidney function stable since starting on Jardiance

## 2023-03-06 NOTE — PROGRESS NOTES
Outpatient Care Manager Note: Chart notes reviewed  Stable CKD 3  SNF/LACY Pathway completed and Complex episode resolved

## 2023-03-09 ENCOUNTER — OFFICE VISIT (OUTPATIENT)
Dept: PULMONOLOGY | Facility: CLINIC | Age: 78
End: 2023-03-09

## 2023-03-09 VITALS
BODY MASS INDEX: 32.62 KG/M2 | HEART RATE: 67 BPM | OXYGEN SATURATION: 90 % | SYSTOLIC BLOOD PRESSURE: 98 MMHG | DIASTOLIC BLOOD PRESSURE: 50 MMHG | HEIGHT: 76 IN | TEMPERATURE: 97 F

## 2023-03-09 DIAGNOSIS — R60.0 BILATERAL EDEMA OF LOWER EXTREMITY: ICD-10-CM

## 2023-03-09 DIAGNOSIS — G47.33 OSA (OBSTRUCTIVE SLEEP APNEA): ICD-10-CM

## 2023-03-09 DIAGNOSIS — I27.20 PULMONARY HYPERTENSION (HCC): ICD-10-CM

## 2023-03-09 DIAGNOSIS — J44.9 CHRONIC OBSTRUCTIVE PULMONARY DISEASE, UNSPECIFIED COPD TYPE (HCC): Primary | ICD-10-CM

## 2023-03-09 DIAGNOSIS — I50.9 CHF EXACERBATION (HCC): ICD-10-CM

## 2023-03-09 RX ORDER — UMECLIDINIUM BROMIDE AND VILANTEROL TRIFENATATE 62.5; 25 UG/1; UG/1
1 POWDER RESPIRATORY (INHALATION) DAILY
Qty: 60 BLISTER | Refills: 0 | Status: SHIPPED | COMMUNITY
Start: 2023-03-09 | End: 2023-04-08

## 2023-03-09 NOTE — ASSESSMENT & PLAN NOTE
On BiPAP ventilation because of hypercarbia and tolerates this well  Notices that his sleep is restful now    Plan to do compliance when he returns in a month

## 2023-03-09 NOTE — PROGRESS NOTES
Advanced Heart Failure/Pulmonary Hypertension Outpatient Progress Note - Ct Andrea 77 y o  male MRN: [id number]    @ Encounter: [id number]    Patient Active Problem List    Diagnosis Date Noted   • Chronic obstructive pulmonary disease ([facility] ) 03/09/2023   • BRBPR (bright red blood per rectum) 01/12/2023   • Pulmonary hypertension ([facility] ) 11/21/2022   • Chronic acquired lymphedema 10/26/2022   • Cirrhosis of liver without ascites ([facility] ) 09/21/2022   • Hallucination 09/21/2022   • Jerking movements of extremities 09/21/2022   • Vitamin D deficiency 08/10/2022   • Gastroesophageal reflux disease 07/20/2022   • PAD (peripheral artery disease) ([facility] ) 03/31/2022   • Atrial fibrillation with rapid ventricular response ([place] [address] ) 12/27/2021   • Cellulitis and abscess of left lower extremity 12/27/2021   • Ureteral calculi 12/15/2021   • Benign prostatic hyperplasia without lower urinary tract symptoms 12/15/2021   • Hypokalemia 10/19/2021   • Persistent proteinuria 07/20/2021   • Nipple pain 24/50/1309   • Folliculitis 50/42/9826   • Venous stasis dermatitis of both lower extremities 05/26/2021   • Blister of right leg 05/26/2021   • Myocardial infarction [facility])    • Blister of left leg 04/28/2021   • Hydroureter on left 04/28/2021   • Nephrolithiasis 04/24/2021   • Fall 04/23/2021   • Hypercalcemia 04/23/2021   • Scaly skin on examination 03/29/2021   • Acute on chronic diastolic (congestive) heart failure ([facility] ) 03/25/2021   • Left upper lobe pulmonary nodule 03/25/2021   • Acute respiratory failure with hypoxia and hypercapnia ([facility] ) 03/25/2021   • Hypoxia 03/24/2021   • Bilateral edema of lower extremity 03/18/2021   • LUCIA (obstructive sleep apnea) 01/29/2021   • Embolism and thrombosis of arteries of the lower extremities ([facility] ) 01/29/2021   • Colitis 12/04/2020   • Adrenal nodule ([facility] ) 12/04/2020   • Left lower quadrant abdominal pain 12/03/2020   • Excessive gas 12/03/2020   • Morbid obesity ([facility] ) 12/03/2020   • 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   • Immunization deficiency 10/30/2020   • Chronic pain of both knees 07/31/2020   • Actinic keratosis of right temple 07/31/2020   • Actinic keratosis of scalp 07/31/2020   • Decreased pedal pulses 04/04/2019   • Hypertension    • Hyperlipemia    • Type 2 diabetes mellitus with hyperglycemia, with long-term current use of insulin (HCC)    • Chronic low back pain    • CAD (coronary artery disease)    • Malignant neoplasm of urinary bladder (HCC)    • Arthritis    • Paroxysmal atrial fibrillation (HCC)    • AAA (abdominal aortic aneurysm)      Assessment:  77 y o  male Hx per chart p/w PH fu after 2023 admit at [facility] for ADHF, PH, respiratory failure      Follows gen cards Dr Barbra Fontenot outpt  HFpEF  [place] [address] with significant RV dilation/dysfxn  1/2023 RHC as below with predominantly precapillary PH, RA 2, mPAP 25, PCW 5, TPG 20, CI wnl - note this RHC was after 20L inpt diuresis 1/2023  NYHA III, stage C  1/2023 TTE when decompensated: Severe PH with TRmaxvel 4 0 range, +significant RV dilation/dysfunction, small LV, No intracardiac shunt by color doppler  LA dilated  WHO groups II, and III dz, though predominantly non group II by RHC after diuresis, +likely group I phenotype, unlikely groups IV, V  Remote smoking, quit 4593     No h/o illicit drug use  # CAD s/p CABG in 2016 (LIMA to LAD, SVG to OM3 and SVG to PDA) in 2016  # LUCIA on cpap  # Paroxysmal atrial fibrillation, s/p cardioversion 1/7/22  also AFL on old ECGs  AC: Eliquis  Rate: metoprolol and digoxin  # Hyperlipidemia  # DM type II  # Hypertension  # LACY  # Hemorrhoids with rectal bleeding  RBBB  Fall, as inpt 1/2023  CTH neg    Hematuria 1/16/23, transiently held [facility] as inpt, uro following outpt  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  -distant history of non muscle invasive disease with 1 recurrence  -treated with BCG 10 years ago  4 1 cm right adrenal adenoma  -Stable on imaging, extensive endo workup reassuring per chart  Past renal stones:  1/2023 CT: IMPRESSION:  Tiny nonobstructing intrarenal calculi bilaterally, minimally increased in size and unchanged in number/location from prior examination  No ureteral calculi or hydronephrosis  Extensive atherosclerotic vascular calcification  Unchanged 49 mm infrarenal abdominal aortic aneurysm  Hepatosplenomegaly with lobulated hepatic contours in keeping with reported history of hepatic parenchymal disease  Prostatomegaly  Bladder wall thickening probably related to chronic prostate disease         Cr: 1 1 (BL around 1 0-1 3)  UPCR 0 59  PCO2 on ABG 1/16/23 was 76 while on bipap, pH 7 4 - as inpt  a1c 7 8>7 3  ntbnp 177 on 2/11/23  bnp 800s 1/2023  tsh wnl 2023    Left rehab at 279lbs - may be near his dry weight    Plan:  Warm, near euvolemic, peripheral edema noted  recent echo hemos suggest nl filling pressures and show TRmaxvel<2 9, RAP 3, no strong signal uncontrolled PH now  Note RV unchanged - dilated and dysfxnl  Repeat echo and ntbnp reassuring   He feels very well recently  pulm upgraded bipap and enhanced COPD inhaler regimen recently - starting new inhalers today    Repeat long discussion about jardiance and his H/O UTI and renal stones (last UTI 5 years ago, says he was not admitted for this)  He will watch for UTI Sx and call immediately  Recent CT renal scan shows tiny stones - reassuring    Low iron stores - fu PCP for repletion    On home O2 now, using during exertion only not at rest    Consider pulm rehab in future    [place] 30 Treatment:  Could consider PAH directed Tx given RHC findings, pulm vascular remodeling, some evidence RV uncoupling; however he is doing well on current Tx targeted at groups II and III and adding PAH Tx will implicate some level of side effect risk especially given his left heart dz    Will cw close monitoring, will not start [place] [address] directed tx today    --PDE5 inhibitors:   --Endothelin receptor antagonists:  --Guanylate cyclase stimulators:  --Prostacyclin analogues:     --Supplemental oxygen: on home O2 during exertion since 1/2023 DC  --Anticoagulation: on [facility] for AF    --Diuretic: arrives on torsemide 40 qd>increase to 40AM and 20PM  Today 3/10/23: Will reduce torsemide 40 AM and 20 PM to just 40 AM  Long discussion about red flags and when to self uptitrate diuretic  On jardiance and aldactone    On dig, toprol xl 25 qd, aldactone 25 qd    Checked dig level - was acceptable    PH workup:  [email or web address] wnl  tsh wnl 2021  LEENA, AMA, anti-smooth muscle Ab - all neg  HAV, B and C - all neg  HIV, RF - neg  CRP 9 1  UPEP: no monoclonal bands  SPEP: No monoclonal bands noted  Serum FLA ratio 2 67 (mildly elevated in pt w CKD)    echo bubbles done - no shunt  RHC as below  has LUCIA, follows pulm, recent bipap upgrade  CT chest 4/2021: Pleural thickening with pleural calcifications in the dependent portion of the right mid and lower lung field, 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  Suspected round atelectasis in the posterior right lower lobe  No CTA PE done recently  VQ scan: low probability 1/2023  Check PFTs with DLCO - mixed obstructive and restrictive defects, follows pulm  No meth or anorexigen Hx  No h/o PE/DVT  On AC for AF    Consider 6MW in future for prognosis  ntbnp 177 on 2/11/23 - reassuring  bnp 800s 1/2023    Studies:     EKG - my read:  2022: AF, RAD, RBBB (old), non specific STT changes    2/28/23 TTE:  •  Left Ventricle: Left ventricular cavity size is normal  Wall thickness is normal  The left ventricular ejection fraction is 65%  Systolic function is normal  Although no diagnostic regional wall motion abnormality was identified, this possibility cannot be completely excluded on the basis of this study  Unable to assess diastolic function  •  IVS: There is abnormal septal motion consistent with left bundle branch block  •  Right Ventricle: Right ventricle is not well visualized   Right ventricular cavity size is mildly-moderately dilated  Systolic function is mildly reduced  •  Left Atrium: The atrium is dilated  •  Right Atrium: The atrium is dilated  •  Atrial Septum: There is no atrial septal defect by saline contrast  No patent foramen ovale detected using saline contrast injection at rest or with cough or Valsalva maneuver  •  Mitral Valve: There is mild annular calcification      No Right to left shunt with agitated saline at rest or with abdominal compression  There are improved and normal indirect pulmonary pressure measurements and no RVOT notching  The images are limited but the RV still appears mildly dilated and mildly reduced  1/19/23 VQ scan:  IMPRESSION:  The probability for pulmonary embolus is low      1/19/23 RHC:  /71, 102  HR 77  O2 sat 96% on 15L nasal prongs  Hgb 9 4     RA 2  RV 50/2  PA 52/12, 25  PCWP 5  TPG 20  DPG 7  PA sat 56%  Ezio CO/CI: 5 7/2 2  PVR 3 5  Hattie: 20  SVR 1404      Impression/Recommendations: The patient tolerated the procedure well; no immediate complications  Transducer leveled and zeroed multiple times and swapped out to ensure data accuracy  Low biv filling pressures after diuresis  Precapillary pHTN  Robust Hattie  Mildly elevated SVR    Normal CO/CI      1/19/23 VQ scan:  IMPRESSION:  The probability for pulmonary embolus is low      Echocardiogram 1/11/23  LVEF: 55%  LVIDd: 4 8cm  RV: not well visualized; dilated RV>LV; reduced systolic function  MR: moderate MAC, trace MR  PASP: 83mmHg; estimated RAP 15, peak TR velocity 4 1  RVOT: suboptimal envelope, mid to late systolic notching; interventricular diastolic and systolic septal flattening  Other: grade 2 diastology, mild JONNY; mildly increased LV wall thickness     Echo 12/28/21:  LVEF 55%, mildly increased wall thickness  RV mildly dilated, normal systolic function  PASP 51EBWP     Echo 3/23/21:  LVEF 60%, mildly increased wall thickness  Grade 1 diastology  RV mildly dilated, mildly reduced systolic function  PASP 45mmHg    HPI:   77 y o  male Hx per chart p/w post DC fu after admitted at [facility] for ADHF, PH, respiratory failure  Diuresed 20L as inpt and had RHC for PH, now continues to feel significantly better though edema worse and wt slightly up  Breathing much better than prior  Weak and has trouble standing up on home scale  Doing home rehab but "fights it" per family  No new CP/SOB/dizziness/palpitations/syncope  No new PND, pillow orthopnea, fatigue  Interim events:  3/10/23:  Warm, near euvolemic, peripheral edema noted  recent echo hemos suggest nl filling pressures and show TRmaxvel<2 9, RAP 3, no strong signal uncontrolled PH now  Note RV unchanged - dilated and dysfxnl  Repeat echo and ntbnp reassuring   He feels very well recently  pulm upgraded bipap and enhanced COPD inhaler regimen recently - starting new inhalers today  No new CP/SOB/dizziness/palpitations/syncope  No new leg swelling, PND, pillow orthopnea, unintentional weight changes, fatigue      2/10/23:  Warm, volume up on exam, likely contribution from 3rd spacing too  Diuresed over 20L during recent 1/2023 admission  Wt low 280s home scale recently and stable-slight increase from rehab DC weight  Worse edema recently  Breathing much better than prior  Weak and has trouble standing up on home scale  Doing home rehab but "fights it" per family    Past Medical History:   Diagnosis Date   • A-fib [facility])    • AAA (abdominal aortic aneurysm)    • Actinic keratosis of right temple 7/31/2020   • Actinic keratosis of scalp 7/31/2020   • Acute respiratory failure with hypoxia ([address] ) 3/25/2021   • Allergic 1960   • Arthritis    • Lay's esophagus    • Bladder cancer ([facility] )    • Blister of left leg 4/28/2021   • Blister of right leg 5/26/2021   • CAD (coronary artery disease)    • Cancer ([address] ) 02/2010    Bladder   • CHF (congestive heart failure) (HCC)    • Chronic low back pain    • Chronic pain of both knees 7/31/2020   • Colitis 12/4/2020   • CPAP (continuous positive airway pressure) dependence    • Diabetes (HCC)    • Diabetes mellitus ([facility] ) 10/1993   • Excessive gas 12/3/2020   • Heart murmur    • History of chemotherapy    • Hydroureter on left 4/28/2021   • Hyperlipemia    • Hypertension    • Immunization deficiency 10/30/2020    Hep a nonimmune   • Kidney stone    • Left lower quadrant abdominal pain 12/3/2020   • Mass of right adrenal gland ([facility] ) 12/4/2020    4 1 cm   • Myocardial infarction [facility])    • 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   • Obesity 2000   • LUCIA (obstructive sleep apnea) 1/29/2021   • Pressure ulcer of right leg, stage 1 5/26/2021   • Urinary tract infection with hematuria 5/3/2021    u cx 4/2021=pseudomonas R to bactrim and cefdinir, S to cipro   • Venous stasis dermatitis of both lower extremities 5/26/2021       ROS:  10 point ROS negative except as specified in HPI    Allergies   Allergen Reactions   • Ciprofloxacin Abdominal Pain, Diarrhea, GI Bleeding and GI Intolerance   • Diltiazem Abdominal Pain   • Metronidazole Abdominal Pain, Diarrhea, GI Bleeding and GI Intolerance       Current Outpatient Medications   Medication Instructions   • apixaban (ELIQUIS) 5 mg, Oral, 2 times daily   • atorvastatin (LIPITOR) 40 mg, Oral, Daily   • Continuous Blood Gluc Sensor (FreeStyle Peggy 2 Sensor) MISC Change it every 14 days   • dicyclomine (BENTYL) 10 mg, Oral, 2 times daily PRN   • digoxin (LANOXIN) 125 mcg, Oral, Daily   • Empagliflozin (JARDIANCE) 25 mg, Oral, Every morning   • famotidine (PEPCID) 40 MG tablet TAKE ONE TABLET BY MOUTH NIGHTLY AT BEDTIME   • insulin degludec Nathalie Lamb FlexTouch) 200 units/mL CONCENTRATED U-200 injection pen inject 85 units under the skin once daily   • Lancets (accu-chek multiclix) lancets 1 each, Other, 2 times daily, Use 2 times daily to test blood glucose   • metoprolol succinate (TOPROL-XL) 50 mg, Oral, Daily   • Mucus Relief 600 MG 12 hr tablet No dose, route, or frequency recorded  • NovoLOG FlexPen 20-25 Units, Subcutaneous, 3 times daily with meals   • omeprazole (PRILOSEC) 20 mg, Oral, 2 times daily (morning and afternoon)   • polyethylene glycol (MIRALAX) 17 g No dose, route, or frequency recorded  • potassium citrate (UROCIT-K) 10 mEq 10 mEq, Oral, 2 times daily   • silver sulfadiazine (SILVADENE,SSD) 1 % cream Topical, Daily   • Skin Protectants, Misc  (Remedy Phytoplex Hydraguard) CREA No dose, route, or frequency recorded  • spironolactone (ALDACTONE) 25 mg tablet TAKE ONE TABLET BY MOUTH ONCE DAILY   • torsemide (DEMADEX) 20 mg tablet 40mg in morning and 20mg at 4pm    • umeclidinium-vilanterol (Anoro Ellipta) 62 5-25 mcg/actuation inhaler 1 puff, Inhalation, Daily   • Unifine Pentips 31G X 8 MM MISC Subcutaneous, Daily, Use as directed        Social History     Socioeconomic History   • Marital status: /Civil Union     Spouse name: Sp Antunez   • Number of children: 4   • Years of education: Not on file   • Highest education level: 12th grade   Occupational History   • Not on file   Tobacco Use   • Smoking status: Former     Packs/day: 0 25     Years: 20 00     Pack years: 5 00     Types: Cigarettes     Start date: 1965     Quit date: 2010     Years since quittin 1   • Smokeless tobacco: Never   • Tobacco comments:     Quit several times for up to 6 years     Vaping Use   • Vaping Use: Never used   Substance and Sexual Activity   • Alcohol use: Not Currently     Comment: No use   • Drug use: Never     Comment: No use   • Sexual activity: Not Currently     Partners: Female   Other Topics Concern   • Not on file   Social History Narrative    · Most recent tobacco use screenin2020      · Do you currently or have you served in the [employer]:   No      · Were you activated, into active duty, as a member of the [employer] or as a Reservist:   No      · Live alone or with others:   with others        · Caffeine intake:   Occasional · Illicit drugs:   No      · Diet:   Regular      · Exercise level: Moderate      · Advance directive: Yes      · Marital status:         · General stress level:   Medium      · Single or multi-level home/work:   multi level home      · Guns present in home:   No      · Seat belts used routinely:   Yes      · Sunscreen used routinely:   Yes      · Smoke alarm in home:   Yes      Social Determinants of Health     Financial Resource Strain: Not on file   Food Insecurity: No Food Insecurity   • Worried About Running Out of Food in the Last Year: Never true   • Ran Out of Food in the Last Year: Never true   Transportation Needs: No Transportation Needs   • Lack of Transportation (Medical): No   • Lack of Transportation (Non-Medical):  No   Physical Activity: Not on file   Stress: Not on file   Social Connections: Not on file   Intimate Partner Violence: Not on file   Housing Stability: Unknown   • Unable to Pay for Housing in the Last Year: No   • Number of Places Lived in the Last Year: Not on file   • Unstable Housing in the Last Year: No       Family History   Problem Relation Age of Onset   • Heart disease Father    • Arthritis Father    • Heart attack Father    • Alzheimer's disease Mother    • Dementia Mother    • Diabetes type II Sister        Physical Exam:  Vitals:    03/10/23 1018   BP: 108/62   BP Location: Left arm   Patient Position: Sitting   Cuff Size: Standard   Pulse: 98   SpO2: 92%   Weight: 128 kg (282 lb 4 8 oz)   Height: 6' 4" (1 93 m)     Constitutional: NAD, non toxic, obese  Ears/nose/mouth/throat: atraumatic  CV: irreg, no JVD  Resp: Decreased breath sounds BL   GI: Soft, NTND  MSK: no swollen joints in exposed areas  Extr: +1 LE edema, warm LE  Pysche: Normal affect  Neuro: appropriate in conversation  Skin: dry and intact in exposed areas    Labs & Results:    Lab Results   Component Value Date    SODIUM 135 03/04/2023    K 4 4 03/04/2023    CL 94 (L) 03/04/2023    CO2 33 (H) 03/04/2023    BUN 38 (H) 03/04/2023    CREATININE 1 17 03/04/2023    GLUC 249 (H) 01/21/2023    CALCIUM 9 6 03/04/2023     Lab Results   Component Value Date    WBC 8 64 01/22/2023    HGB 10 0 (L) 01/22/2023    HCT 36 3 (L) 01/22/2023    MCV 79 (L) 01/22/2023     01/22/2023     Lab Results   Component Value Date     (H) 01/10/2023      Lab Results   Component Value Date    CHOLESTEROL 132 11/02/2022    CHOLESTEROL 137 04/27/2022    CHOLESTEROL 142 10/06/2021     Lab Results   Component Value Date    HDL 34 (L) 11/02/2022    HDL 32 (L) 04/27/2022    HDL 37 (L) 10/06/2021     Lab Results   Component Value Date    TRIG 153 (H) 11/02/2022    TRIG 223 (H) 04/27/2022    TRIG 201 (H) 10/06/2021     Lab Results   Component Value Date    [place] 98 11/02/2022    [place] 105 04/27/2022    [place] 105 10/06/2021       Counseling / Coordination of Care  Time spent today 35 minutes  Greater than 50% of total time was spent with the patient and / or family counseling and / or coordination of care  We discussed diagnoses, most recent studies, tests and any changes in treatment plan  Thank you for the opportunity to participate in the care of this patient      Tori Diaz MD  Attending Physician  Advanced Heart Failure and Transplant Cardiology  [employer]

## 2023-03-09 NOTE — ASSESSMENT & PLAN NOTE
Diagnosis based on recent PFTs  He also has decreased lung volumes of unclear cause  There is some bibasilar atelectasis on recent portable chest x-ray  For the moment there is no convincing evidence of asbestosis or silicosis  Trial of daily Anoro to improve oxygenation and ventilation

## 2023-03-09 NOTE — ASSESSMENT & PLAN NOTE
Improving with improving oxygenation and likely decreasing PCO2 based on recent electrolytes study  I will have a repeat venous blood gas done before his next visit in a month    I told him he only needs oxygen during his exercise periods

## 2023-03-09 NOTE — ASSESSMENT & PLAN NOTE
Seems improved by recent echocardiogram   I think effective treatment of his sleep apnea with BiPAP ventilation has helped  I also started him on bronchodilators for new diagnosis of COPD based on recent PFTs

## 2023-03-10 ENCOUNTER — OFFICE VISIT (OUTPATIENT)
Dept: CARDIOLOGY CLINIC | Facility: CLINIC | Age: 78
End: 2023-03-10

## 2023-03-10 VITALS
DIASTOLIC BLOOD PRESSURE: 62 MMHG | WEIGHT: 282.3 LBS | HEIGHT: 76 IN | BODY MASS INDEX: 34.38 KG/M2 | HEART RATE: 98 BPM | OXYGEN SATURATION: 92 % | SYSTOLIC BLOOD PRESSURE: 108 MMHG

## 2023-03-10 DIAGNOSIS — I10 ESSENTIAL HYPERTENSION: ICD-10-CM

## 2023-03-10 DIAGNOSIS — I27.20 PULMONARY HTN (HCC): Primary | ICD-10-CM

## 2023-03-10 DIAGNOSIS — I50.9 ACUTE ON CHRONIC CONGESTIVE HEART FAILURE, UNSPECIFIED HEART FAILURE TYPE (HCC): ICD-10-CM

## 2023-03-15 DIAGNOSIS — R60.0 LOCALIZED EDEMA: ICD-10-CM

## 2023-03-15 DIAGNOSIS — K74.60 CIRRHOSIS OF LIVER WITHOUT ASCITES, UNSPECIFIED HEPATIC CIRRHOSIS TYPE (HCC): ICD-10-CM

## 2023-03-15 RX ORDER — SPIRONOLACTONE 25 MG/1
25 TABLET ORAL DAILY
Qty: 30 TABLET | Refills: 11 | Status: SHIPPED | OUTPATIENT
Start: 2023-03-15

## 2023-03-17 ENCOUNTER — OFFICE VISIT (OUTPATIENT)
Dept: FAMILY MEDICINE CLINIC | Facility: CLINIC | Age: 78
End: 2023-03-17

## 2023-03-17 VITALS
TEMPERATURE: 98.6 F | BODY MASS INDEX: 34.29 KG/M2 | OXYGEN SATURATION: 91 % | WEIGHT: 281.6 LBS | DIASTOLIC BLOOD PRESSURE: 60 MMHG | RESPIRATION RATE: 16 BRPM | SYSTOLIC BLOOD PRESSURE: 120 MMHG | HEIGHT: 76 IN | HEART RATE: 90 BPM

## 2023-03-17 DIAGNOSIS — I48.0 PAF (PAROXYSMAL ATRIAL FIBRILLATION) (HCC): ICD-10-CM

## 2023-03-17 DIAGNOSIS — K75.81 LIVER CIRRHOSIS SECONDARY TO NASH (HCC): ICD-10-CM

## 2023-03-17 DIAGNOSIS — C67.9 MALIGNANT NEOPLASM OF URINARY BLADDER, UNSPECIFIED SITE (HCC): ICD-10-CM

## 2023-03-17 DIAGNOSIS — B37.2 YEAST DERMATITIS: ICD-10-CM

## 2023-03-17 DIAGNOSIS — E66.01 MORBID OBESITY (HCC): ICD-10-CM

## 2023-03-17 DIAGNOSIS — Z76.89 ENCOUNTER TO ESTABLISH CARE: Primary | ICD-10-CM

## 2023-03-17 DIAGNOSIS — K74.60 LIVER CIRRHOSIS SECONDARY TO NASH (HCC): ICD-10-CM

## 2023-03-17 DIAGNOSIS — I25.10 CORONARY ARTERY DISEASE INVOLVING NATIVE CORONARY ARTERY OF NATIVE HEART WITHOUT ANGINA PECTORIS: ICD-10-CM

## 2023-03-17 DIAGNOSIS — L30.4 INTERTRIGO: ICD-10-CM

## 2023-03-17 DIAGNOSIS — I50.32 CHRONIC DIASTOLIC CHF (CONGESTIVE HEART FAILURE) (HCC): ICD-10-CM

## 2023-03-17 DIAGNOSIS — Z79.4 TYPE 2 DIABETES MELLITUS WITH HYPERGLYCEMIA, WITH LONG-TERM CURRENT USE OF INSULIN (HCC): ICD-10-CM

## 2023-03-17 DIAGNOSIS — E11.65 TYPE 2 DIABETES MELLITUS WITH HYPERGLYCEMIA, WITH LONG-TERM CURRENT USE OF INSULIN (HCC): ICD-10-CM

## 2023-03-17 DIAGNOSIS — I74.3 EMBOLISM AND THROMBOSIS OF ARTERIES OF THE LOWER EXTREMITIES (HCC): ICD-10-CM

## 2023-03-17 PROBLEM — N64.4 NIPPLE PAIN: Status: RESOLVED | Noted: 2021-07-14 | Resolved: 2023-03-17

## 2023-03-17 PROBLEM — R25.2 JERKING MOVEMENTS OF EXTREMITIES: Status: RESOLVED | Noted: 2022-09-21 | Resolved: 2023-03-17

## 2023-03-17 PROBLEM — J96.02 ACUTE RESPIRATORY FAILURE WITH HYPOXIA AND HYPERCAPNIA (HCC): Status: RESOLVED | Noted: 2021-03-25 | Resolved: 2023-03-17

## 2023-03-17 PROBLEM — R23.4: Status: RESOLVED | Noted: 2021-03-29 | Resolved: 2023-03-17

## 2023-03-17 PROBLEM — L73.9 FOLLICULITIS: Status: RESOLVED | Noted: 2021-07-14 | Resolved: 2023-03-17

## 2023-03-17 PROBLEM — K62.5 BRBPR (BRIGHT RED BLOOD PER RECTUM): Status: RESOLVED | Noted: 2023-01-12 | Resolved: 2023-03-17

## 2023-03-17 PROBLEM — J96.01 ACUTE RESPIRATORY FAILURE WITH HYPOXIA AND HYPERCAPNIA (HCC): Status: RESOLVED | Noted: 2021-03-25 | Resolved: 2023-03-17
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
## 2023-03-17 PROBLEM — S80.822A BLISTER OF LEFT LEG: Status: RESOLVED | Noted: 2021-04-28 | Resolved: 2023-03-17

## 2023-03-17 PROBLEM — L02.416 CELLULITIS AND ABSCESS OF LEFT LOWER EXTREMITY: Status: RESOLVED | Noted: 2021-12-27 | Resolved: 2023-03-17

## 2023-03-17 PROBLEM — L03.116 CELLULITIS AND ABSCESS OF LEFT LOWER EXTREMITY: Status: RESOLVED | Noted: 2021-12-27 | Resolved: 2023-03-17

## 2023-03-17 PROBLEM — R44.3 HALLUCINATION: Status: RESOLVED | Noted: 2022-09-21 | Resolved: 2023-03-17

## 2023-03-17 RX ORDER — NYSTATIN 100000 [USP'U]/G
POWDER TOPICAL 2 TIMES DAILY
Qty: 60 G | Refills: 3 | Status: SHIPPED | OUTPATIENT
Start: 2023-03-17

## 2023-03-17 RX ORDER — NYSTATIN 100000 U/G
CREAM TOPICAL 2 TIMES DAILY
Qty: 60 G | Refills: 3 | Status: SHIPPED | OUTPATIENT
Start: 2023-03-17

## 2023-03-17 NOTE — PROGRESS NOTES
Assessment/Plan:    1  Encounter to establish care    2  Embolism and thrombosis of arteries of the lower extremities (HCC)  Comments:  chronic AC-Eliquis    3  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 [facility])  Comments:  stable-managed by [facility]    4  Malignant neoplasm of urinary bladder, unspecified site [facility])  Comments:  managed by URO    5  Morbid obesity ([address] )    6  Intertrigo  -     nystatin (MYCOSTATIN) powder; Apply topically 2 (two) times a day    7  Yeast dermatitis  -     nystatin (MYCOSTATIN) cream; Apply topically 2 (two) times a day    8  Type 2 diabetes mellitus with hyperglycemia, with long-term current use of insulin (HCC)  Comments:  stable A1c 7 3  managed by endo  on jardiance, tresiba  has Douglass monitor    9  Chronic diastolic CHF (congestive heart failure) ([facility] )  Comments:  no evidence chf today on exam    10  PAF (paroxysmal atrial fibrillation) ([facility] [address] )    11  Coronary artery disease involving native coronary artery of native heart without angina pectoris  Comments:  s/p CABG 2016    The case discussed with patient using patient centered shared decision making  The patient was counseled regarding instructions for management,-- risk factor reductions,-- prognosis,-- impressions,-- risks and benefits of treatment options,-- importance of compliance with treatment  I have reviewed the instructions with the patient, answering all questions to his satisfaction  1  Chronic conditions clinically stable  DM controlled a1c 7 3-endo managed  CAD/s/pCABG/PAF/CHF-no evidence of overt CHF today  HTN-controlled  Continue plan of care  Will follow along with specialists    2  Intertrigo gluteal cleft, pannus  Keep areas clean and dry  Frequent position change-prevent sacral wounds  Antifungal therapy        There are no Patient Instructions on file for this visit  Return in about 3 months (around 6/17/2023)  Subjective:      Patient ID: Jennifer Cardona is a 77 y o  male      Chief Complaint   Patient presents with   • Establish Care     ESTABLISH CARE       76 yo male with complex medical history presents to establish care  Accompanied by his wife, daughter    Reviewed his chart, medical history in detail    DM controlled a1c 7 3-endo managed  CAD/s/pCABG/PAF/CHF-per cardio  HTN-controlled  CKD    He and family feel he is doing well  Chronic conditions stable    His daughter advises skin on buttock irritated  Wants him checked for sore      The following portions of the patient's history were reviewed and updated as appropriate: allergies, current medications, past family history, past medical history, past social history, past surgical history and problem list     Review of Systems   Constitutional: Positive for fatigue  Negative for fever and unexpected weight change  Weight stable   HENT: Positive for hearing loss  Negative for trouble swallowing  Respiratory: Negative for cough and shortness of breath  Cardiovascular: Positive for leg swelling  Negative for chest pain and palpitations  Gastrointestinal: Negative for abdominal pain and blood in stool  Endocrine: Negative  Genitourinary: Negative for decreased urine volume, difficulty urinating and hematuria  Musculoskeletal: Positive for arthralgias and gait problem  Skin: Positive for rash  Negative for pallor and wound  Neurological: Positive for weakness  Negative for dizziness and headaches  Psychiatric/Behavioral: Negative for confusion           Current Outpatient Medications   Medication Sig Dispense Refill   • apixaban (Eliquis) 5 mg Take 1 tablet (5 mg total) by mouth 2 (two) times a day 180 tablet 3   • atorvastatin (LIPITOR) 40 mg tablet Take 1 tablet (40 mg total) by mouth daily 90 tablet 0   • Continuous Blood Gluc Sensor (FreeStyle Peggy 2 Sensor) MISC Change it every 14 days 1 each 0   • dicyclomine (BENTYL) 10 mg capsule Take 1 capsule (10 mg total) by mouth 2 (two) times a day as needed (abdominal cramping) 60 capsule 3   • digoxin (LANOXIN) 0 125 mg tablet Take 1 tablet (125 mcg total) by mouth daily 90 tablet 3   • Empagliflozin (Jardiance) 25 MG TABS Take 1 tablet (25 mg total) by mouth every morning 120 tablet 5   • famotidine (PEPCID) 40 MG tablet TAKE ONE TABLET BY MOUTH NIGHTLY AT BEDTIME 30 tablet 0   • insulin degludec Klaus Ramez FlexTouch) 200 units/mL CONCENTRATED U-200 injection pen inject 85 units under the skin once daily 12 mL 1   • Lancets (accu-chek multiclix) lancets Use 1 each 2 (two) times a day Use 2 times daily to test blood glucose 102 each 3   • metoprolol succinate (TOPROL-XL) 50 mg 24 hr tablet Take 1 tablet (50 mg total) by mouth daily 90 tablet 3   • NovoLOG FlexPen 100 units/mL injection pen Inject 20-25 Units under the skin 3 (three) times a day with meals (Patient taking differently: Inject 20-25 Units under the skin 3 (three) times a day with meals Per sliding scale) 30 mL 1   • nystatin (MYCOSTATIN) cream Apply topically 2 (two) times a day 60 g 3   • nystatin (MYCOSTATIN) powder Apply topically 2 (two) times a day 60 g 3   • omeprazole (PriLOSEC) 20 mg delayed release capsule Take 1 capsule (20 mg total) by mouth 2 (two) times a day 180 capsule 1   • potassium citrate (UROCIT-K) 10 mEq Take 1 tablet (10 mEq total) by mouth 2 (two) times a day 180 tablet 3   • silver sulfadiazine (SILVADENE,SSD) 1 % cream Apply topically daily 400 g 1   • spironolactone (ALDACTONE) 25 mg tablet Take 1 tablet (25 mg total) by mouth daily 30 tablet 11   • torsemide (DEMADEX) 20 mg tablet 40mg in morning and 20mg at 4pm  120 tablet 5   • umeclidinium-vilanterol (Anoro Ellipta) 62 5-25 mcg/actuation inhaler Inhale 1 puff daily 60 blister 0   • Unifine Pentips 31G X 8 MM MISC inject under the skin daily use as directed 100 each 0   • Skin Protectants, Misc  (Remedy Phytoplex Hydraguard) CREA        No current facility-administered medications for this visit         Objective:    /60 (BP Location: Right arm, Patient Position: Sitting, Cuff Size: Large)   Pulse 90   Temp 98 6 °F (37 °C) (Temporal)   Resp 16   Ht 6' 4" (1 93 m)   Wt 128 kg (281 lb 9 6 oz)   SpO2 91%   BMI 34 28 kg/m²        Physical Exam  Vitals and nursing note reviewed  Constitutional:       General: He is not in acute distress  Appearance: Normal appearance  He is not ill-appearing  Comments: Elderly gentleman presents in wheelchair   HENT:      Head: Normocephalic and atraumatic  Right Ear: There is impacted cerumen  Left Ear: Tympanic membrane normal    Neck:      Thyroid: No thyromegaly  Vascular: No JVD  Cardiovascular:      Rate and Rhythm: Normal rate  Rhythm irregular  Pulses: Normal pulses  Heart sounds:     No S3 or S4 sounds  Pulmonary:      Effort: Pulmonary effort is normal       Breath sounds: No rales  Abdominal:      Palpations: Abdomen is soft  Tenderness: There is no abdominal tenderness  Musculoskeletal:      Right lower le+ Edema present  Left lower le+ Edema present  Lymphadenopathy:      Cervical: No cervical adenopathy  Skin:     General: Skin is warm and dry  Coloration: Skin is not pale  Comments: Scaly skin with erythematous base gluteal cleft and abd pannus    No pressure ulcers, wounds seen   Neurological:      General: No focal deficit present  Mental Status: He is alert  Motor: Weakness present        Gait: Gait abnormal    Psychiatric:         Mood and Affect: Mood normal                 KACEY Bell

## 2023-03-22 DIAGNOSIS — Z79.4 TYPE 2 DIABETES MELLITUS WITH HYPERGLYCEMIA, WITH LONG-TERM CURRENT USE OF INSULIN (HCC): ICD-10-CM

## 2023-03-22 DIAGNOSIS — E11.65 TYPE 2 DIABETES MELLITUS WITH HYPERGLYCEMIA, WITH LONG-TERM CURRENT USE OF INSULIN (HCC): ICD-10-CM

## 2023-03-22 RX ORDER — INSULIN DEGLUDEC 200 U/ML
85 INJECTION, SOLUTION SUBCUTANEOUS DAILY
Qty: 12.75 ML | Refills: 0 | Status: SHIPPED | OUTPATIENT
Start: 2023-03-22 | End: 2023-04-21

## 2023-04-04 ENCOUNTER — TELEPHONE (OUTPATIENT)
Dept: CARDIOLOGY CLINIC | Facility: CLINIC | Age: 78
End: 2023-04-04

## 2023-04-04 NOTE — TELEPHONE ENCOUNTER
Call today from [facility] nurse who sees Mr Loren Johnson in his home  She called today to report recent weights  At ov on 3/10 weight was 282 lbs  On home scale:  3/31 277 lbs  4/1  279 lbs  4/2  278 lbs  4/3 279 lbs  Today 4/4 280 lbs  No CHF symptoms  Lungs clear  He feels great per nurse and believes weight changes are from increase in appetite, eating better  I spoke with Mr Loren Johnson by phone also  He never received your message stating he can discontinue the Spironolactone due to the breast discomfort  He has continued taking it, and I told him he could stop it today  He feels these weights are stable for him  So as of today, will stop Spironolactone  Remains on   Torsemide 40 mg AM and 20 mg at 4 PM   Potassium citrate 10 meq BID    Last CMP on 3/4:  Cr 1 17  K 4 4    I just noticed he has an ov tomorrow with Dr Bety Velásquez and with you on 5/10  Please advise if new recommendations

## 2023-04-05 ENCOUNTER — OFFICE VISIT (OUTPATIENT)
Dept: CARDIOLOGY CLINIC | Facility: CLINIC | Age: 78
End: 2023-04-05

## 2023-04-05 VITALS
WEIGHT: 279 LBS | SYSTOLIC BLOOD PRESSURE: 110 MMHG | OXYGEN SATURATION: 93 % | DIASTOLIC BLOOD PRESSURE: 62 MMHG | HEIGHT: 76 IN | HEART RATE: 77 BPM | BODY MASS INDEX: 33.97 KG/M2

## 2023-04-05 DIAGNOSIS — I50.32 CHRONIC DIASTOLIC CHF (CONGESTIVE HEART FAILURE) (HCC): ICD-10-CM

## 2023-04-05 DIAGNOSIS — I10 PRIMARY HYPERTENSION: ICD-10-CM

## 2023-04-05 DIAGNOSIS — I25.10 CORONARY ARTERY DISEASE INVOLVING NATIVE CORONARY ARTERY OF NATIVE HEART WITHOUT ANGINA PECTORIS: ICD-10-CM

## 2023-04-05 DIAGNOSIS — I48.0 PAROXYSMAL ATRIAL FIBRILLATION (HCC): Primary | ICD-10-CM

## 2023-04-05 DIAGNOSIS — I27.20 PULMONARY HYPERTENSION (HCC): ICD-10-CM

## 2023-04-05 DIAGNOSIS — E78.00 PURE HYPERCHOLESTEROLEMIA: ICD-10-CM

## 2023-04-05 DIAGNOSIS — I48.0 PAF (PAROXYSMAL ATRIAL FIBRILLATION) (HCC): ICD-10-CM

## 2023-04-05 DIAGNOSIS — E66.01 MORBID OBESITY (HCC): ICD-10-CM

## 2023-04-05 NOTE — PROGRESS NOTES
[facility]  Office progress note  Arely Carson 77 y o  male MRN: [id number]        Problems    1  Paroxysmal atrial fibrillation (HCC)  POCT ECG      2  Pure hypercholesterolemia        3  Coronary artery disease involving native coronary artery of native heart without angina pectoris        4  Chronic diastolic CHF (congestive heart failure) ([facility] )        5  PAF (paroxysmal atrial fibrillation) ([facility] )        6  Primary hypertension        7  Morbid obesity ([facility] )        8  Pulmonary hypertension (HCC)            Impression:      Coronary artery disease  CABG x3 in 2016, LIMA to LAD, SVG to OM3, SVG to PDA  He had full recovery of his moderate ischemic cardiomyopathy  LVEF normal  No new ischemic symptoms  Chronic diastolic/right-sided CHF  318lbs January 2023, decompensated, had severe PHTN by echo but RHC low pressures after 30lbs diuresis  Stable at 279lbs  No on Torsemide and Jardiace  Spironolactone stopped due to nipple tenderness  AAA  52 millimeters in early 2021 by abdominal CT  50 millimeters by noncontrast abdominal CT 6/22  Follows with vascular  51mm 10/22 by CTA  Lower extremity atherosclerosis  No claudication  Mixed hyperlipidemia  Mixed  LDL controlled  Type 2 diabetes  Not controlled  Hypertension  Well controlled  Atrial fibrillation  Paroxysmal, DCCV 1/22, maintains NSR  On eliquis    Plan    Jardiance added, spironolactone removed  Doing well on Torsemide  Labs stable  Weight stable  Will not f/u with HF team as PHTN was only a finding in presence of volume overload, resolved on f/u echo 2/23    No changes today'3 mo f/u        HPI: Arely Carson is a 77y o  year old male with CAD, paroxysmal atrial fibrillation, diastolic/right-sided CHF, obesity, hypertension, diabetes, mixed hyperlipidemia, LUCIA, hospitalized 12/21 for AFib/RVR, cardioversion 1/7/2022 urgently for hypotension and AFib/RVR, now returns for a follow-up visit    He had decompensated CHF 1/23, seen by heart failure team, 30lbs diuresis, thankfully no afib, mild Creatinine fluctuations but renal function stable  Spirionlactone stopped due to nipple tenderness  BP is stable  Lipids controlled  ECHO showed PHTN, severe, but resolved after 30lbs diuresis  Review of Systems   Constitutional: Negative for appetite change, diaphoresis, fatigue and fever  Respiratory: Negative for chest tightness, shortness of breath and wheezing  Cardiovascular: Negative for chest pain, palpitations and leg swelling  Gastrointestinal: Negative for abdominal pain and blood in stool  Musculoskeletal: Positive for arthralgias and gait problem  Negative for joint swelling  Skin: Negative for rash  Neurological: Positive for weakness  Negative for dizziness, syncope and light-headedness           Past Medical History:   Diagnosis Date   • A-fib [facility])    • AAA (abdominal aortic aneurysm) ([place] [id number] )    • Actinic keratosis of right temple 7/31/2020   • Actinic keratosis of scalp 7/31/2020   • Acute respiratory failure with hypoxia ([facility] ) 3/25/2021   • Allergic 1960   • Arthritis    • Lay's esophagus    • Bladder cancer (HCC)    • Blister of left leg 4/28/2021   • Blister of right leg 5/26/2021   • CAD (coronary artery disease)    • Cancer ([facility] ) 02/2010    Bladder   • CHF (congestive heart failure) (HCC)    • Chronic low back pain    • Chronic pain of both knees 7/31/2020   • Colitis 12/4/2020   • CPAP (continuous positive airway pressure) dependence    • Diabetes (HCC)    • Diabetes mellitus ([facility] ) 10/1993   • Excessive gas 12/3/2020   • Heart murmur    • History of chemotherapy    • Hydroureter on left 4/28/2021   • Hyperlipemia    • Hypertension    • Immunization deficiency 10/30/2020    Hep a nonimmune   • Kidney stone    • Left lower quadrant abdominal pain 12/3/2020   • Mass of right adrenal gland ([facility] ) 12/4/2020    4 1 cm   • Myocardial infarction [facility])    • 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   • Obesity 2000   • LUCIA (obstructive sleep apnea) 2021   • Pressure ulcer of right leg, stage 1 2021   • Urinary tract infection with hematuria 5/3/2021    u cx 2021=pseudomonas R to bactrim and cefdinir, S to cipro   • Venous stasis dermatitis of both lower extremities 2021     Past Surgical History:   Procedure Laterality Date   • BACK SURGERY     • BLADDER SURGERY     • CARDIAC CATHETERIZATION  2016   • CARDIAC CATHETERIZATION N/A 2023    Procedure: Cardiac RHC; Surgeon: Ritu Taylor MD;  [facility]; Service: Cardiology   • CATARACT EXTRACTION, BILATERAL     • COLONOSCOPY  2019    next  [place]   • CORONARY ARTERY BYPASS GRAFT  2016    Quadruple per Rita   • EGD  2017   • EYE SURGERY  2016    Cataracts   • JOINT REPLACEMENT  [id number]    Hip and shoulder   • KIDNEY STONE SURGERY     • CT CYSTO BLADDER W/URETERAL CATHETERIZATION Left 2021    Procedure: CYSTOSCOPY  WITH INSERTION STENT URETERAL;  Surgeon: Ubaldo Reyes MD;  Location: [facility];  Service: Urology   • CT CYSTO/URETERO W/LITHOTRIPSY &INDWELL STENT INSRT Left 2021    Procedure: CYSTOSCOPY URETEROSCOPY WITH LITHOTRIPSY HOLMIUM LASER, AND INSERTION STENT URETERAL/EXCHANGE;  Surgeon: Ubaldo Reyes MD;  Location: [facility];  Service: Urology   • TOTAL HIP ARTHROPLASTY Right 2012   • TOTAL SHOULDER REPLACEMENT Right 2013   • [address]     Social History     Substance and Sexual Activity   Alcohol Use Not Currently    Comment: No use     Social History     Substance and Sexual Activity   Drug Use Never    Comment: No use     Social History     Tobacco Use   Smoking Status Former   • Packs/day: 0 25   • Years: 20 00   • Pack years: 5 00   • Types: Cigarettes   • Start date: 1965   • Quit date: 2010   • Years since quittin 2   Smokeless Tobacco Never   Tobacco Comments    Quit several times for up to 6 years       Family History   Problem Relation Age of Onset   • Heart disease Father    • Arthritis Father    • Heart attack Father    • Alzheimer's disease Mother    • Dementia Mother    • Diabetes type II Sister        Allergies:   Allergies   Allergen Reactions   • Ciprofloxacin Abdominal Pain, Diarrhea, GI Bleeding and GI Intolerance   • Diltiazem Abdominal Pain   • Metronidazole Abdominal Pain, Diarrhea, GI Bleeding and GI Intolerance       Medications:     Current Outpatient Medications:   •  apixaban (Eliquis) 5 mg, Take 1 tablet (5 mg total) by mouth 2 (two) times a day, Disp: 180 tablet, Rfl: 3  •  atorvastatin (LIPITOR) 40 mg tablet, Take 1 tablet (40 mg total) by mouth daily, Disp: 90 tablet, Rfl: 0  •  Continuous Blood Gluc Sensor (FreeStyle Peggy 2 Sensor) MISC, Change it every 14 days, Disp: 1 each, Rfl: 0  •  dicyclomine (BENTYL) 10 mg capsule, Take 1 capsule (10 mg total) by mouth 2 (two) times a day as needed (abdominal cramping), Disp: 60 capsule, Rfl: 3  •  digoxin (LANOXIN) 0 125 mg tablet, Take 1 tablet (125 mcg total) by mouth daily, Disp: 90 tablet, Rfl: 3  •  Empagliflozin (Jardiance) 25 MG TABS, Take 1 tablet (25 mg total) by mouth every morning, Disp: 120 tablet, Rfl: 5  •  famotidine (PEPCID) 40 MG tablet, TAKE ONE TABLET BY MOUTH NIGHTLY AT BEDTIME, Disp: 30 tablet, Rfl: 0  •  insulin degludec Sherry See FlexTouch) 200 units/mL CONCENTRATED U-200 injection pen, Inject 85 Units under the skin daily inject 85 units under the skin once daily, Disp: 12 75 mL, Rfl: 0  •  Lancets (accu-chek multiclix) lancets, Use 1 each 2 (two) times a day Use 2 times daily to test blood glucose, Disp: 102 each, Rfl: 3  •  metoprolol succinate (TOPROL-XL) 50 mg 24 hr tablet, Take 1 tablet (50 mg total) by mouth daily, Disp: 90 tablet, Rfl: 3  •  NovoLOG FlexPen 100 units/mL injection pen, Inject 20-25 Units under the skin 3 (three) times a day with meals (Patient taking differently: Inject 20-25 Units under the skin 3 (three) times a day with meals Per sliding scale), Disp: 30 mL, Rfl: 1  •  nystatin (MYCOSTATIN) cream, Apply topically 2 (two) times a day, Disp: 60 g, Rfl: 3  •  nystatin (MYCOSTATIN) powder, Apply topically 2 (two) times a day, Disp: 60 g, Rfl: 3  •  omeprazole (PriLOSEC) 20 mg delayed release capsule, Take 1 capsule (20 mg total) by mouth 2 (two) times a day, Disp: 180 capsule, Rfl: 1  •  potassium citrate (UROCIT-K) 10 mEq, Take 1 tablet (10 mEq total) by mouth 2 (two) times a day, Disp: 180 tablet, Rfl: 3  •  silver sulfadiazine (SILVADENE,SSD) 1 % cream, Apply topically daily, Disp: 400 g, Rfl: 1  •  Skin Protectants, Misc  (Remedy Phytoplex Hydraguard) CREA, , Disp: , Rfl:   •  torsemide (DEMADEX) 20 mg tablet, 40mg in morning and 20mg at 4pm , Disp: 120 tablet, Rfl: 5  •  Unifine Pentips 31G X 8 MM MISC, inject under the skin daily use as directed, Disp: 100 each, Rfl: 0  •  umeclidinium-vilanterol (Anoro Ellipta) 62 5-25 mcg/actuation inhaler, Inhale 1 puff daily (Patient not taking: Reported on 4/5/2023), Disp: 60 blister, Rfl: 0      Vitals:    04/05/23 1120   BP: 110/62   Pulse: 77   SpO2: 93%     Weight (last 2 days)     Date/Time Weight    04/05/23 1120 127 (279)        Physical Exam  Vitals reviewed  Constitutional:       General: He is not in acute distress  Appearance: He is well-developed  He is obese  He is not diaphoretic  HENT:      Head: Normocephalic and atraumatic  Eyes:      General: No scleral icterus  Conjunctiva/sclera: Conjunctivae normal       Pupils: Pupils are equal, round, and reactive to light  Neck:      Thyroid: No thyromegaly  Vascular: No JVD  Cardiovascular:      Rate and Rhythm: Normal rate and regular rhythm  Heart sounds: Normal heart sounds  No murmur heard  No friction rub  No gallop  Pulmonary:      Effort: Pulmonary effort is normal  No respiratory distress  Breath sounds: Normal breath sounds  No wheezing or rales  Abdominal:      General: Bowel sounds are normal  There is no distension        Palpations: Abdomen "is soft  Tenderness: There is no abdominal tenderness  Musculoskeletal:         General: No deformity  Normal range of motion  Cervical back: Normal range of motion and neck supple  Right lower leg: No edema  Left lower leg: No edema  Skin:     General: Skin is warm and dry  Findings: No erythema or rash  Neurological:      General: No focal deficit present  Mental Status: He is alert and oriented to person, place, and time  Cranial Nerves: No cranial nerve deficit  Motor: No weakness  Laboratory Studies:    Laboratory studies personally reviewed    Cardiac testing:     EKG reviewed personally:   Results for orders placed or performed in visit on 04/05/23   POCT ECG    Impression    NSR         Echocardiogram:  2017-EF 55%  3/21-EF 60%, mild LVH, mild RV dilation and mild RV dysfunction, mild-to-moderate TR with mild-to-moderate pulmonary hypertension  12/28/21-EF 55%, moderate concentric hypertrophy, RV dilated, left atrium dilated, right atrium dilated, mild MR, mild TR, mildly elevated pulmonary pressures, mildly dilated ascending aorta-personally reviewed  1/23 - EF 55%, RV dilated with reduced function, severe PHTN, septal flattening  2/23 - EF 55%, RV mildly dilated, mild PHTN    Stress test:      Holter:      Cardiac catheterization:        Michael John MD    Portions of the record may have been created with voice recognition software  Occasional wrong word or \"sound a like\" substitutions may have occurred due to the inherent limitations of voice recognition software  Read the chart carefully and recognize, using context, where substitutions have occurred    "

## 2023-04-27 ENCOUNTER — APPOINTMENT (OUTPATIENT)
Dept: LAB | Facility: CLINIC | Age: 78
End: 2023-04-27

## 2023-04-27 DIAGNOSIS — J44.9 CHRONIC OBSTRUCTIVE PULMONARY DISEASE, UNSPECIFIED COPD TYPE (HCC): ICD-10-CM

## 2023-04-27 LAB
BASE EX.OXY STD BLDV CALC-SCNC: 73.5 % (ref 60–80)
BASE EXCESS BLDV CALC-SCNC: 5.2 MMOL/L
HCO3 BLDV-SCNC: 32.1 MMOL/L (ref 24–30)
O2 CT BLDV-SCNC: 11.3 ML/DL
PCO2 BLDV: 59.4 MM HG (ref 42–50)
PH BLDV: 7.35 [PH] (ref 7.3–7.4)
PO2 BLDV: 43.6 MM HG (ref 35–45)

## 2023-04-29 NOTE — TELEPHONE ENCOUNTER
Onset: 2 days    Location/description: Mom states on Thursday pt had flu symptoms (vomiting and diarrhea). On Friday pt ha a fever, sore throat and harder to swallow. Last night throat super swollen and has a blister on his tonsils.      Associated Symptoms: See above    What improves/worsens symptoms: na    Symptom specific medications: Tylenol and Ibuprofen    Intake and Output:   Eating and drinking some  voiding    Activity level: Sleeping    Temperature (route and time): none    Weight: See below  Wt Readings from Last 1 Encounters:   12/23/22 (!) 24.8 kg (54 lb 9.6 oz) (97 %, Z= 1.93)*     * Growth percentiles are based on CDC (Boys, 2-20 Years) data.        Recent visits (last 3-4 weeks) for same reason or recent surgery:  12/23/2023 Office visit      PLAN:  Mom will take pt to the ED for evaluation     Reason for Disposition  • [1] Drooling or spitting out saliva (because can't swallow) AND [2] normal breathing    Protocols used: SORE THROAT-P-AH       Patient had CYSTOSCOPY URETEROSCOPY WITH LITHOTRIPSY HOLMIUM LASER, AND INSERTION STENT URETERAL/EXCHANGE (Left Ureter) on 5/21/21 with Dr Don Garcia  Called and spoke with patient at this time  Advised per Dr Don Garcia stent can be removed anytime this week  Reviewed he can remove it himself at home  Reviewed steps for removal  Patient states he would like to remove it at home versus coming into office  Reviewed stent discomfort measures post removal  Reviewed unexpected findings would be excruciating pain or fever  Advised if this occurs he should contact office or proceed to ER  Advised follow up plan is to keep cystoscopy as scheduled in December, but he is to have US done prior  Number for CS provided, advised he schedule US for beginning of December so it is read in time for scheduled appt  Patient verbalized understanding of conversation       He knows office will call tomorrow to ensure successful stent removal

## 2023-05-02 ENCOUNTER — OFFICE VISIT (OUTPATIENT)
Dept: PULMONOLOGY | Facility: CLINIC | Age: 78
End: 2023-05-02

## 2023-05-02 VITALS
TEMPERATURE: 98.2 F | SYSTOLIC BLOOD PRESSURE: 128 MMHG | DIASTOLIC BLOOD PRESSURE: 68 MMHG | OXYGEN SATURATION: 91 % | HEART RATE: 87 BPM | BODY MASS INDEX: 34.2 KG/M2 | WEIGHT: 281 LBS

## 2023-05-02 DIAGNOSIS — E11.65 TYPE 2 DIABETES MELLITUS WITH HYPERGLYCEMIA, WITH LONG-TERM CURRENT USE OF INSULIN (HCC): ICD-10-CM

## 2023-05-02 DIAGNOSIS — Z79.4 TYPE 2 DIABETES MELLITUS WITH HYPERGLYCEMIA, WITH LONG-TERM CURRENT USE OF INSULIN (HCC): ICD-10-CM

## 2023-05-02 DIAGNOSIS — J44.9 CHRONIC OBSTRUCTIVE PULMONARY DISEASE, UNSPECIFIED COPD TYPE (HCC): Primary | ICD-10-CM

## 2023-05-02 DIAGNOSIS — I27.20 PULMONARY HYPERTENSION (HCC): ICD-10-CM

## 2023-05-02 DIAGNOSIS — G47.33 OSA (OBSTRUCTIVE SLEEP APNEA): ICD-10-CM

## 2023-05-02 RX ORDER — UMECLIDINIUM BROMIDE AND VILANTEROL TRIFENATATE 62.5; 25 UG/1; UG/1
1 POWDER RESPIRATORY (INHALATION) DAILY
Qty: 60 BLISTER | Refills: 5 | Status: SHIPPED | OUTPATIENT
Start: 2023-05-02 | End: 2023-06-01

## 2023-05-02 NOTE — ASSESSMENT & PLAN NOTE
Patient senses improvement with use of BiPAP ventilation  Uses at least 6 hours per night  I advised him also to use it with his daytime naps  I advised him to use oxygen with his BiPAP ventilation and I sent a message to the DME provider to provide a connection piece for his oxygen  PCO2 on venous blood gas is clearly improving implying that his respiratory function is improving  We were not able to get compliance on his new machine and will attempt to do so

## 2023-05-02 NOTE — PROGRESS NOTES
Answers for HPI/ROS submitted by the patient on 4/26/2023  Do you experience frequent throat clearing?: Yes  Chronicity: chronic  When did you first notice your symptoms?: more than 1 year ago  How often do your symptoms occur?: every several days  Since you first noticed this problem, how has it changed?: gradually improving  Have you had a change in appetite?: No  Do you have chest pain?: No  Do you have shortness of breath that occurs with effort or exertion?: No  Do you have ear congestion?: Yes  Do you have ear pain?: No  Do you have a fever?: No  Do you have headaches?: No  Do you have heartburn?: No  Do you have fatigue?: No  Do you have muscle pain?: No  Do you have nasal congestion?: Yes  Do you have shortness of breath when lying flat?: No  Do you have shortness of breath when you wake up?: No  Do you have post-nasal drip?: Yes  Do you have a runny nose?: Yes  Do you have sneezing?: Yes  Do you have a sore throat?: No  Do you have sweats?: No  Do you have trouble swallowing?: No  Have you experienced weight loss?: No  Which of the following makes your symptoms worse?: change in weather, pollen  Which of the following makes your symptoms better?: rest  Risk factors for lung disease: smoking/tobacco exposure    Assessment/Plan:    LUCIA (obstructive sleep apnea)  Patient senses improvement with use of BiPAP ventilation  Uses at least 6 hours per night  I advised him also to use it with his daytime naps  I advised him to use oxygen with his BiPAP ventilation and I sent a message to the DME provider to provide a connection piece for his oxygen  PCO2 on venous blood gas is clearly improving implying that his respiratory function is improving  We were not able to get compliance on his new machine and will attempt to do so  Chronic obstructive pulmonary disease (HCC)  Chronic airflow obstruction based on recent PFTs  Along with sleep apnea this equates to a so-called overlap syndrome    In order to maximize his respiratory function I advised him to be on long-acting bronchodilators during the course of every day  He used samples of Anoro without incident and I renewed this medication  He states that he does not have episodes of acute dyspnea requiring short acting bronchodilator for now  Because he gets hypoxemic with his exercise periods on the exercise bike I advised him to use oxygen during this time  Pulmonary hypertension ([address] )  Proved based on recent echo  However patient still has a moderate amount of dependent edema bilateral        Diagnoses and all orders for this visit:    Chronic obstructive pulmonary disease, unspecified COPD type ([address] )  -     umeclidinium-vilanterol (Anoro Ellipta) 62 5-25 mcg/actuation inhaler; Inhale 1 puff daily    Pulmonary hypertension (HCC)    LUCIA (obstructive sleep apnea)  -     PAP DME Resupply/Reorder          Subjective:      Patient ID: Andrew Raines is a 77 y o  male  Patient returns for reevaluation of sleep apnea, COPD and hypercarbic respiratory failure  Since we last met in February he has been started on BiPAP ventilation  He senses that his sleep is much more restful  He feels better during the course of a normal day  Trouble with headaches  No technical problems with the use of BiPAP ventilation  Not sense much in the way of shortness of breath  At the last visit I gave him samples of Anoro  He did not sense that there was tremendous benefit with this drug  Based on his PFTs I do recommend that he continue with long-acting bronchodilators  He does not sense the need for short acting bronchodilators  Not troubled with much dyspnea on exertion  No trouble with cough  Last venous blood gas did demonstrate considerable improvement in PCO2  Patient has oxygen but not really using it at all  I recommend that he use it with his exercise periods on his exercise bike and while sleeping    With regard to the latter I sent a message to his DME provider  primary symptoms  Pertinent negatives include no chest pain, coughing, fever, headaches, myalgias or sore throat  The following portions of the patient's history were reviewed and updated as appropriate: allergies, current medications, past family history, past medical history, past social history, past surgical history and problem list     Review of Systems   Constitutional: Positive for activity change  Negative for appetite change, fever and unexpected weight change  Improved activity   HENT: Positive for postnasal drip, rhinorrhea and sneezing  Negative for ear pain, sore throat and trouble swallowing  Respiratory: Positive for apnea and shortness of breath  Negative for cough and wheezing  Cardiovascular: Positive for leg swelling  Negative for chest pain and palpitations  Musculoskeletal: Negative for myalgias  Neurological: Negative for headaches  Objective:      /68 (BP Location: Left arm, Patient Position: Sitting, Cuff Size: Large)   Pulse 87   Temp 98 2 °F (36 8 °C) (Tympanic)   Wt 127 kg (281 lb)   SpO2 91%   BMI 34 20 kg/m²          Physical Exam  Vitals reviewed  Constitutional:       General: He is not in acute distress  Appearance: He is normal weight  He is ill-appearing  Comments: Appears chronically ill   Cardiovascular:      Rate and Rhythm: Normal rate and regular rhythm  Pulses:           Radial pulses are 3+ on the right side and 3+ on the left side  Heart sounds: Normal heart sounds  Pulmonary:      Effort: Pulmonary effort is normal       Breath sounds: Examination of the right-lower field reveals decreased breath sounds  Examination of the left-lower field reveals decreased breath sounds  Decreased breath sounds present  No wheezing or rhonchi  Musculoskeletal:         General: No swelling  Cervical back: No rigidity or tenderness  Right lower leg: No edema  Left lower leg: No edema  Lymphadenopathy:      Cervical: No cervical adenopathy  Skin:     General: Skin is warm and dry  Neurological:      Mental Status: He is alert and oriented to person, place, and time     Psychiatric:         Mood and Affect: Mood normal          Behavior: Behavior normal

## 2023-05-02 NOTE — ASSESSMENT & PLAN NOTE
Chronic airflow obstruction based on recent PFTs  Along with sleep apnea this equates to a so-called overlap syndrome  In order to maximize his respiratory function I advised him to be on long-acting bronchodilators during the course of every day  He used samples of Anoro without incident and I renewed this medication  He states that he does not have episodes of acute dyspnea requiring short acting bronchodilator for now  Because he gets hypoxemic with his exercise periods on the exercise bike I advised him to use oxygen during this time

## 2023-05-02 NOTE — ASSESSMENT & PLAN NOTE
Proved based on recent echo    However patient still has a moderate amount of dependent edema bilateral

## 2023-05-03 ENCOUNTER — HOSPITAL ENCOUNTER (OUTPATIENT)
Dept: NON INVASIVE DIAGNOSTICS | Facility: CLINIC | Age: 78
Discharge: HOME | End: 2023-05-03

## 2023-05-03 DIAGNOSIS — I71.40 ABDOMINAL AORTIC ANEURYSM (AAA) WITHOUT RUPTURE, UNSPECIFIED PART (HCC): ICD-10-CM

## 2023-05-03 RX ORDER — INSULIN DEGLUDEC 200 U/ML
85 INJECTION, SOLUTION SUBCUTANEOUS DAILY
Qty: 12.75 ML | Refills: 0 | Status: SHIPPED | OUTPATIENT
Start: 2023-05-03 | End: 2023-06-02

## 2023-05-04 ENCOUNTER — TRANSCRIBE ORDERS (OUTPATIENT)
Dept: VASCULAR SURGERY | Facility: CLINIC | Age: 78
End: 2023-05-04

## 2023-05-04 DIAGNOSIS — I71.40 ABDOMINAL AORTIC ANEURYSM (AAA) WITHOUT RUPTURE, UNSPECIFIED PART (HCC): Primary | ICD-10-CM

## 2023-05-05 DIAGNOSIS — Z79.4 TYPE 2 DIABETES MELLITUS WITH HYPERGLYCEMIA, WITH LONG-TERM CURRENT USE OF INSULIN (HCC): Primary | ICD-10-CM

## 2023-05-05 DIAGNOSIS — E11.65 TYPE 2 DIABETES MELLITUS WITH HYPERGLYCEMIA, WITH LONG-TERM CURRENT USE OF INSULIN (HCC): Primary | ICD-10-CM

## 2023-05-07 DIAGNOSIS — I48.11 LONGSTANDING PERSISTENT ATRIAL FIBRILLATION (HCC): ICD-10-CM

## 2023-05-11 ENCOUNTER — APPOINTMENT (OUTPATIENT)
Dept: LAB | Facility: AMBULARY SURGERY CENTER | Age: 78
End: 2023-05-11

## 2023-05-11 DIAGNOSIS — Z79.4 TYPE 2 DIABETES MELLITUS WITH HYPERGLYCEMIA, WITH LONG-TERM CURRENT USE OF INSULIN (HCC): ICD-10-CM

## 2023-05-11 DIAGNOSIS — E11.65 TYPE 2 DIABETES MELLITUS WITH HYPERGLYCEMIA, WITH LONG-TERM CURRENT USE OF INSULIN (HCC): ICD-10-CM

## 2023-05-11 LAB
ALBUMIN SERPL BCP-MCNC: 3.2 G/DL (ref 3.5–5)
ALP SERPL-CCNC: 97 U/L (ref 46–116)
ALT SERPL W P-5'-P-CCNC: 14 U/L (ref 12–78)
ANION GAP SERPL CALCULATED.3IONS-SCNC: 3 MMOL/L (ref 4–13)
AST SERPL W P-5'-P-CCNC: 16 U/L (ref 5–45)
BILIRUB SERPL-MCNC: 0.27 MG/DL (ref 0.2–1)
BUN SERPL-MCNC: 31 MG/DL (ref 5–25)
CALCIUM ALBUM COR SERPL-MCNC: 10 MG/DL (ref 8.3–10.1)
CALCIUM SERPL-MCNC: 9.4 MG/DL (ref 8.3–10.1)
CHLORIDE SERPL-SCNC: 100 MMOL/L (ref 96–108)
CHOLEST SERPL-MCNC: 123 MG/DL
CO2 SERPL-SCNC: 31 MMOL/L (ref 21–32)
CREAT SERPL-MCNC: 1.33 MG/DL (ref 0.6–1.3)
EST. AVERAGE GLUCOSE BLD GHB EST-MCNC: 186 MG/DL
GFR SERPL CREATININE-BSD FRML MDRD: 50 ML/MIN/1.73SQ M
GLUCOSE P FAST SERPL-MCNC: 167 MG/DL (ref 65–99)
HBA1C MFR BLD: 8.1 %
HDLC SERPL-MCNC: 31 MG/DL
LDLC SERPL CALC-MCNC: 33 MG/DL (ref 0–100)
NONHDLC SERPL-MCNC: 92 MG/DL
POTASSIUM SERPL-SCNC: 4.1 MMOL/L (ref 3.5–5.3)
PROT SERPL-MCNC: 8.3 G/DL (ref 6.4–8.4)
SODIUM SERPL-SCNC: 134 MMOL/L (ref 135–147)
T4 FREE SERPL-MCNC: 1.02 NG/DL (ref 0.76–1.46)
TRIGL SERPL-MCNC: 295 MG/DL
TSH SERPL DL<=0.05 MIU/L-ACNC: 1.15 UIU/ML (ref 0.45–4.5)

## 2023-05-22 DIAGNOSIS — I48.91 ATRIAL FIBRILLATION WITH RAPID VENTRICULAR RESPONSE (HCC): ICD-10-CM

## 2023-05-22 NOTE — TELEPHONE ENCOUNTER
Digoxin level done 02/11/23 result 0 5  Mg level done 02/11/23 result 1 6  eGFR done 05/11/23 result 50

## 2023-05-25 ENCOUNTER — OFFICE VISIT (OUTPATIENT)
Dept: ENDOCRINOLOGY | Facility: CLINIC | Age: 78
End: 2023-05-25

## 2023-05-25 ENCOUNTER — TELEPHONE (OUTPATIENT)
Dept: ADMINISTRATIVE | Facility: OTHER | Age: 78
End: 2023-05-25

## 2023-05-25 VITALS
WEIGHT: 286 LBS | DIASTOLIC BLOOD PRESSURE: 60 MMHG | HEART RATE: 86 BPM | BODY MASS INDEX: 34.83 KG/M2 | HEIGHT: 76 IN | SYSTOLIC BLOOD PRESSURE: 110 MMHG

## 2023-05-25 DIAGNOSIS — Z79.4 TYPE 2 DIABETES MELLITUS WITH HYPERGLYCEMIA, WITH LONG-TERM CURRENT USE OF INSULIN (HCC): ICD-10-CM

## 2023-05-25 DIAGNOSIS — E27.8 ADRENAL NODULE (HCC): Primary | ICD-10-CM

## 2023-05-25 DIAGNOSIS — E83.52 HYPERCALCEMIA: ICD-10-CM

## 2023-05-25 DIAGNOSIS — E11.65 TYPE 2 DIABETES MELLITUS WITH HYPERGLYCEMIA, WITH LONG-TERM CURRENT USE OF INSULIN (HCC): ICD-10-CM

## 2023-05-25 DIAGNOSIS — E78.00 PURE HYPERCHOLESTEROLEMIA: ICD-10-CM

## 2023-05-25 LAB
DME PARACHUTE DELIVERY DATE REQUESTED: NORMAL
DME PARACHUTE DELIVERY NOTE: NORMAL
DME PARACHUTE ITEM DESCRIPTION: NORMAL
DME PARACHUTE ITEM DESCRIPTION: NORMAL
DME PARACHUTE ORDER STATUS: NORMAL
DME PARACHUTE SUPPLIER NAME: NORMAL
DME PARACHUTE SUPPLIER PHONE: NORMAL

## 2023-05-25 RX ORDER — INSULIN DEGLUDEC 200 U/ML
INJECTION, SOLUTION SUBCUTANEOUS
Qty: 15 ML | Refills: 4 | Status: SHIPPED | OUTPATIENT
Start: 2023-05-25

## 2023-05-25 RX ORDER — DIGOXIN 125 MCG
125 TABLET ORAL DAILY
Qty: 90 TABLET | Refills: 0 | Status: SHIPPED | OUTPATIENT
Start: 2023-05-25

## 2023-05-25 NOTE — LETTER
Diabetic Eye Exam Form    Date Requested: 23  Patient: Riley Alvarado  Patient : 1945   Referring Provider: Randi Hill 74 Exam Date _______________________________      Type of Exam MUST be documented for Diabetic Eye Exams  Please CHECK ONE  Retinal Exam       Dilated Retinal Exam       OCT       Optomap-Iris Exam      Fundus Photography       Left Eye - Please check Retinopathy or No Retinopathy        Exam did show retinopathy    Exam did not show retinopathy       Right Eye - Please check Retinopathy or No Retinopathy       Exam did show retinopathy    Exam did not show retinopathy       Comments __________________________________________________________    Practice Providing Exam ______________________________________________    Exam Performed By (print name) _______________________________________      Provider Signature ___________________________________________________      These reports are needed for  compliance  Please fax this completed form and a copy of the Diabetic Eye Exam report to our office located at [place] [address] as soon as possible via Fax [phone] [facility] Shantanu Shabnam: Phone [phone]  We thank you for your assistance in treating our mutual patient

## 2023-05-25 NOTE — PATIENT INSTRUCTIONS
Please cut the orange juice out and your breakfast sugars should be better    Keep all the insulin doses the same    Please get the adrenal labs tested in July when you get blood work with Nephrology

## 2023-05-25 NOTE — LETTER
Diabetic Eye Exam Form    Date Requested: 23  Patient: Yvonne Hernandez  Patient : 1945   Referring Provider: KACEY Valencia    2nd Request      DIABETIC Eye Exam Date _______________________________      Type of Exam MUST be documented for Diabetic Eye Exams  Please CHECK ONE  Retinal Exam       Dilated Retinal Exam       OCT       Optomap-Iris Exam      Fundus Photography       Left Eye - Please check Retinopathy or No Retinopathy        Exam did show retinopathy    Exam did not show retinopathy       Right Eye - Please check Retinopathy or No Retinopathy       Exam did show retinopathy    Exam did not show retinopathy       Comments __________________________________________________________    Practice Providing Exam ______________________________________________    Exam Performed By (print name) _______________________________________      Provider Signature ___________________________________________________      These reports are needed for  compliance  Please fax this completed form and a copy of the Diabetic Eye Exam report to our office located at [place] [address] as soon as possible via Fax [phone] anthony Almeida Civil: Phone [phone]  We thank you for your assistance in treating our mutual patient

## 2023-05-25 NOTE — PROGRESS NOTES
Established Patient Progress Note      Chief Complaint   Patient presents with   • Diabetes Type 2        History of Present Illness:   María Lebron is a 77 y o  male with hypertension, hyperlipidemia, sleep apnea, adrenal adenoma, hyperparathyroidism and type 2 diabetes with long term use of insulin for about 28 years  e is accompanied by his daughter-in-law Chelsi Hall) and wife for today's visit      Last seen in the officein Feb 2023 with Miko Hernandez  Reports complications of neuropathy and microalbuminuria  HLast A1C was 7 3  Denies recent severe hypoglycemic or severe hyperglycemic episodes  Denies any issues with his current regimen  Home glucose monitoring: are performed regularly with CGM  Was having GI issues with Metformin  Previously treated with Pioglitazone which was discontinued due to edema   Did not tolerate GLP-1 agonist due to nausea  SGLT2 inhibitors were avoided due to history of urosepsis/nephrolithiasis  Corral Kary was started about 1 week ago by cardiology  Admitted to hospital 1/10/23 with CHF exacerbation      He also has a 4 cm adrenal adenoma which has been evaluated by Dr Татьяна Gonzales workup from feb/march 2021  Aldosterone and Plama metanephrines were normal  2/3/2021 dexamethasone suppression testing did not suppress x 2 so additional testing was performed, DHEAS and ACTH were normal   Additional testing included 1 salivary cortisol mildly elevated,  And 1 SC was normal  24-hour urine cortisol was normal  Repeat CT for renal stones done 1/17/23 showed stable right adrenal nodule at approx 4cmx 3 4     For hypercalcemia, most recent calcium normal  24 hour urine was completed by nephrology and showed normal calcium level, which does not support diagnosis of FHH  Sestambi scan was completed and negative for parathyroid adenoma  Has a history of kidney stones and he takes potassium citrate  He takes 1000 units vitamin-D daily dietary calcium intake  DEXA done 3/2021 showed normal bone density  Denies recent falls or fractures      Alvarez Henderson   Device used: Deleon Caul 2   Home use   Indication: Type 2 Diabetes  More than 72 hours of data was reviewed  Report to be scanned to chart  Date Range:   Analysis of data:   Average Glucose: 178  Coefficient of Variation: 17 8%   Time in Target Range: 56%   Time Above Range: 41% high, 3% very high    Time Below Range: 0%     Interpretation of data:   [facility] [place] [facility]   He reports having orange juice at breakfast       Current regimen:   Tresiba 95 units once daily  NovoLog 20-25 units with each meal depending on carb amount, does not take any if not eating carbs      Of note Jardiance 25 mg daily - was started by cardiology for hx of CHF     Ophthal: had exam about 6 months ago, [facility]  Pod: follows with podiatry every 6 weeks, Dr Soledad Lau, will request records     Has hypertension: Taking lisinopril and Toprol XL  Has hyperlipidemia: Taking atorvastatin and zetia    Vitamin D Deficiency: Taking 1,000 units daily    Patient Active Problem List   Diagnosis   • Hypertension   • Hyperlipemia   • Type 2 diabetes mellitus with hyperglycemia, with long-term current use of insulin (HCC)   • Chronic low back pain   • CAD (coronary artery disease)   • Malignant neoplasm of urinary bladder (HCC)   • Arthritis   • Paroxysmal atrial fibrillation (HCC)   • AAA (abdominal aortic aneurysm) (HCC)   • Decreased pedal pulses   • Chronic pain of both knees   • Actinic keratosis of right temple   • Actinic keratosis of scalp   • 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   • Immunization deficiency   • Left lower quadrant abdominal pain   • Excessive gas   • Morbid obesity (HCC)   • Colitis   • Adrenal nodule (HCC)   • LUCIA (obstructive sleep apnea)   • Embolism and thrombosis of arteries of the lower extremities (HCC)   • Bilateral edema of lower extremity   • Hypoxia   • Chronic diastolic CHF (congestive heart failure) (HCC)   • Left upper lobe pulmonary nodule   • Hypercalcemia   • Nephrolithiasis   • Hydroureter on left   • Venous stasis dermatitis of both lower extremities   • Blister of right leg   • Persistent proteinuria   • Hypokalemia   • Ureteral calculi   • Benign prostatic hyperplasia without lower urinary tract symptoms   • PAF (paroxysmal atrial fibrillation) (HCC)   • PAD (peripheral artery disease) (HCC)   • Gastroesophageal reflux disease   • Vitamin D deficiency   • Cirrhosis of liver without ascites (HCC)   • Chronic acquired lymphedema   • Pulmonary hypertension (HCC)   • Chronic obstructive pulmonary disease (HCC)      Past Medical History:   Diagnosis Date   • A-fib [facility])    • AAA (abdominal aortic aneurysm) ([place] [address] )    • Actinic keratosis of right temple 7/31/2020   • Actinic keratosis of scalp 7/31/2020   • Acute respiratory failure with hypoxia ([place] [address] ) 3/25/2021   • Allergic 1960   • Arthritis    • Lay's esophagus    • Bladder cancer ([place] [address] )    • Blister of left leg 4/28/2021   • Blister of right leg 5/26/2021   • CAD (coronary artery disease)    • Cancer ([place] [address] ) 02/2010    Bladder   • CHF (congestive heart failure) (HCC)    • Chronic low back pain    • Chronic pain of both knees 7/31/2020   • Colitis 12/4/2020   • CPAP (continuous positive airway pressure) dependence    • Diabetes (HCC)    • Diabetes mellitus ([place] [address] ) 10/1993   • Excessive gas 12/3/2020   • Heart murmur    • History of chemotherapy    • Hydroureter on left 4/28/2021   • Hyperlipemia    • Hypertension    • Immunization deficiency 10/30/2020    Hep a nonimmune   • Kidney stone    • Left lower quadrant abdominal pain 12/3/2020   • Mass of right adrenal gland ([place] [address] ) 12/4/2020    4 1 cm   • Myocardial infarction [facility])    • 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   • Obesity 2000   • LUCIA (obstructive sleep apnea) 1/29/2021   • Pressure ulcer of right leg, stage 1 5/26/2021   • Urinary tract infection with hematuria 5/3/2021 u cx 2021=pseudomonas R to bactrim and cefdinir, S to cipro   • Venous stasis dermatitis of both lower extremities 2021      Past Surgical History:   Procedure Laterality Date   • BACK SURGERY     • BLADDER SURGERY     • CARDIAC CATHETERIZATION  2016   • CARDIAC CATHETERIZATION N/A 2023    Procedure: Cardiac RHC; Surgeon: Mariaa Eubanks MD;  [facility]; Service: Cardiology   • CATARACT EXTRACTION, BILATERAL     • COLONOSCOPY  2019    next 2022 [place]   • CORONARY ARTERY BYPASS GRAFT  2016    Quadruple per [place]   • EGD  2017   • EYE SURGERY  2016    Cataracts   • JOINT REPLACEMENT  [id number]    Hip and shoulder   • KIDNEY STONE SURGERY     • WV CYSTO BLADDER W/URETERAL CATHETERIZATION Left 2021    Procedure: CYSTOSCOPY  WITH INSERTION STENT URETERAL;  Surgeon: Isra Juárez MD;  Location: [facility];  Service: Urology   • WV CYSTO/URETERO W/LITHOTRIPSY &INDWELL STENT INSRT Left 2021    Procedure: CYSTOSCOPY URETEROSCOPY WITH LITHOTRIPSY HOLMIUM LASER, AND INSERTION STENT URETERAL/EXCHANGE;  Surgeon: Isra Juárez MD;  Location: [facility];  Service: Urology   • TOTAL HIP ARTHROPLASTY Right 2012   • TOTAL SHOULDER REPLACEMENT Right 2013   • VASECTOMY  1971      Family History   Problem Relation Age of Onset   • Heart disease Father    • Arthritis Father    • Heart attack Father    • Alzheimer's disease Mother    • Dementia Mother    • Diabetes type II Sister      Social History     Tobacco Use   • Smoking status: Former     Packs/day: 0 25     Years: 20 00     Total pack years: 5 00     Types: Cigarettes     Start date: 1965     Quit date: 2009     Years since quittin 4   • Smokeless tobacco: Never   • Tobacco comments:     Quit several times for up to 6 years     Substance Use Topics   • Alcohol use: Not Currently     Comment: No use     Allergies   Allergen Reactions   • Ciprofloxacin Abdominal Pain, Diarrhea, GI Bleeding and GI Intolerance   • Diltiazem Abdominal Pain   • Metronidazole Abdominal Pain, Diarrhea, GI Bleeding and GI Intolerance         Current Outpatient Medications:   •  apixaban (Eliquis) 5 mg, Take 1 tablet (5 mg total) by mouth 2 (two) times a day, Disp: 180 tablet, Rfl: 3  •  atorvastatin (LIPITOR) 40 mg tablet, Take 1 tablet (40 mg total) by mouth daily, Disp: 90 tablet, Rfl: 0  •  Continuous Blood Gluc Sensor (FreeStyle Peggy 2 Sensor) MISC, Change it every 14 days, Disp: 1 each, Rfl: 0  •  dicyclomine (BENTYL) 10 mg capsule, Take 1 capsule (10 mg total) by mouth 2 (two) times a day as needed (abdominal cramping), Disp: 60 capsule, Rfl: 3  •  digoxin (LANOXIN) 0 125 mg tablet, Take 1 tablet (125 mcg total) by mouth daily, Disp: 90 tablet, Rfl: 3  •  Empagliflozin (Jardiance) 25 MG TABS, Take 1 tablet (25 mg total) by mouth every morning, Disp: 120 tablet, Rfl: 5  •  famotidine (PEPCID) 40 MG tablet, TAKE ONE TABLET BY MOUTH NIGHTLY AT BEDTIME, Disp: 30 tablet, Rfl: 1  •  insulin degludec Lennox Ammon FlexTouch) 200 units/mL CONCENTRATED U-200 injection pen, inject 95 units under the skin once daily, Disp: 15 mL, Rfl: 4  •  metoprolol succinate (TOPROL-XL) 50 mg 24 hr tablet, Take 1 tablet (50 mg total) by mouth daily, Disp: 90 tablet, Rfl: 3  •  NovoLOG FlexPen 100 units/mL injection pen, Inject 20-25 Units under the skin 3 (three) times a day with meals, Disp: 30 mL, Rfl: 3  •  omeprazole (PriLOSEC) 20 mg delayed release capsule, Take 1 capsule (20 mg total) by mouth 2 (two) times a day, Disp: 180 capsule, Rfl: 1  •  potassium citrate (UROCIT-K) 10 mEq, Take 1 tablet (10 mEq total) by mouth 2 (two) times a day, Disp: 180 tablet, Rfl: 3  •  torsemide (DEMADEX) 20 mg tablet, 40mg in morning and 20mg at 4pm , Disp: 120 tablet, Rfl: 5  •  umeclidinium-vilanterol (Anoro Ellipta) 62 5-25 mcg/actuation inhaler, Inhale 1 puff daily, Disp: 60 blister, Rfl: 5  •  Unifine Pentips 31G X 8 MM MISC, inject under the skin daily use as "directed, Disp: 100 each, Rfl: 0  •  Lancets (accu-chek multiclix) lancets, Use 1 each 2 (two) times a day Use 2 times daily to test blood glucose, Disp: 102 each, Rfl: 3    Review of Systems   Constitutional: Negative for fatigue and unexpected weight change  HENT: Positive for hearing loss  Eyes: Negative for visual disturbance  Respiratory: Positive for shortness of breath (on O2 therapy - SOB with exertion)  Negative for chest tightness  Cardiovascular: Negative for chest pain, palpitations and leg swelling  Gastrointestinal: Negative for abdominal pain, constipation, diarrhea, nausea and vomiting  Endocrine: Negative for polydipsia, polyphagia and polyuria  Musculoskeletal: Positive for gait problem  Skin: Negative for color change, pallor, rash and wound  Neurological: Positive for numbness  Negative for dizziness, tremors, weakness and light-headedness  Physical Exam:  Body mass index is 34 81 kg/m²  /60   Pulse 86   Ht 6' 4\" (1 93 m)   Wt 130 kg (286 lb)   BMI 34 81 kg/m²    Wt Readings from Last 3 Encounters:   05/25/23 130 kg (286 lb)   05/02/23 127 kg (281 lb)   04/05/23 127 kg (279 lb)     Labs:   Lab Results   Component Value Date    HGBA1C 8 1 (H) 05/11/2023    HGBA1C 7 3 (H) 02/11/2023    HGBA1C 7 8 (H) 11/02/2022     Lab Results   Component Value Date    BUN 31 (H) 05/11/2023     05/11/2023    CO2 31 05/11/2023    CREATININE 1 33 (H) 05/11/2023    CREATININE 1 17 03/04/2023    CREATININE 1 29 02/11/2023    K 4 1 05/11/2023     eGFR   Date Value Ref Range Status   05/11/2023 50 ml/min/1 73sq m Final     Lab Results   Component Value Date    HDL 31 (L) 05/11/2023    TRIG 295 (H) 05/11/2023     Lab Results   Component Value Date    ALKPHOS 97 05/11/2023    ALT 14 05/11/2023    AST 16 05/11/2023     Lab Results   Component Value Date    KKT3SCVJHQSD 1 150 05/11/2023    QPK6KVFBXUBM 0 848 02/11/2023    [id number] 0 984 12/27/2021     Impression & Plan:    1   " T2DM: A1c is fiar as his goal is 7-8%, and he is using his Douglass 2 well  I asked that he continue Tresiba to 95units and will order U-200 to inject less liquid  Will continue Novolog 20-25units with meals  Asked that he emilinate orange juice with BF rather then increase insulin doses       2  Right adrenal adenoma: biochemical eval was unremarkable  Appears gorssly stable  Will check ACTh, cortisol, DHEAs (not dex suppression)     3  Hypercalcemia, hyperparathyroidism: PTH is increased bu serum calcium is stbale  Will monitor with labs  4  CHF: Ok Charis is part of CHF regimen  1  Adrenal nodule ([address] )        2  Type 2 diabetes mellitus with hyperglycemia, with long-term current use of insulin (HCC)  insulin degludec Maeola Roch FlexTouch) 200 units/mL CONCENTRATED U-200 injection pen    ACTH- Lab Collect    Cortisol- Lab Collect    DHEA-sulfate- Lab Collect      3  Hypercalcemia        4  Pure hypercholesterolemia              Discussed with the patient and all questioned fully answered  He will call me if any problems arise

## 2023-05-25 NOTE — LETTER
Diabetic Foot Exam Form    Date Requested: 23  Patient: Domenica Brittle  Patient : 1945   Referring Provider: KACEY Rebolledo   2nd Request    Diabetic Foot Exam Performed with shoes and socks removed        Yes         No     Date of Diabetic Foot Exam ______________________________  Risk Score ____________________________________________    Left Foot       Visual Inspection         Monofilament Testing Sensory Exam        Pedal Pulses         Additional Comments         Right Foot      Visual Inspection         Monofilament Testing Sensory Exam       Pedal Pulses         Additional Comments         Comments __________________________________________________________    Practice Providing Exam ______________________________________________    Exam Performed By (print name) _______________________________________      Provider Signature ___________________________________________________      These reports are needed for  compliance  Please fax this completed form and a copy of the Diabetic Foot Exam report to our office located at [place] [address] as soon as possible via Fax [phone] anthony Glasgow: Phone [phone]    We thank you for your assistance in treating our mutual patient

## 2023-05-25 NOTE — TELEPHONE ENCOUNTER
Upon review of the In Basket request and the patient's chart, initial outreach has been made via fax to facility  Please see Contacts section for details       Thank you  Darrel Chavez MA

## 2023-05-25 NOTE — LETTER
Diabetic Foot Exam Form    Date Requested: 23  Patient: Vanessa Horton  Patient : 1945   Referring Provider: KACEY Pollard    Diabetic Foot Exam Performed with shoes and socks removed        Yes         No     Date of Diabetic Foot Exam ______________________________  Risk Score ____________________________________________    Left Foot       Visual Inspection         Monofilament Testing Sensory Exam        Pedal Pulses         Additional Comments         Right Foot      Visual Inspection         Monofilament Testing Sensory Exam       Pedal Pulses         Additional Comments         Comments __________________________________________________________    Practice Providing Exam ______________________________________________    Exam Performed By (print name) _______________________________________      Provider Signature ___________________________________________________      These reports are needed for  compliance  Please fax this completed form and a copy of the Diabetic Foot Exam report to our office located at [place] [address] as soon as possible via Fax [phone] anthony Gomez: Phone [phone]    We thank you for your assistance in treating our mutual patient

## 2023-05-25 NOTE — TELEPHONE ENCOUNTER
----- Message from Ros Fournier sent at 5/25/2023 11:18 AM EDT -----  Regarding: HM DM FOOT EXAM  05/25/23 11:18 AM    Hello, our patient Kemi Grande has had Diabetic Foot Exam completed/performed  Please assist in updating the patient chart by calling the office Dr Tim Handley DPM (Podiatry) Phone: [phone] Fax: [phone]  [address] [address]       Thank you,  Ros [facility] [address] [facility]  05/25/23 11:19 AM     Hello, our patient Kemi Grande has had Diabetic Eye Exam completed/performed   Please assist in updating the patient chart by calling [address] (Ophthalmology) Phone: [phone] Fax: [phone] [address] [facility]       Thank you,   Sagrario Bland   [facility]

## 2023-05-30 NOTE — TELEPHONE ENCOUNTER
As a follow-up, a second attempt has been made for outreach via fax to facility  Please see Contacts section for details      Thank you  Laura Bates MA

## 2023-06-01 NOTE — TELEPHONE ENCOUNTER
Upon review of the In Basket request we were able to locate, review, and update the patient chart as requested for Diabetic Eye Exam     Any additional questions or concerns should be emailed to the Practice Liaisons via the appropriate education email address, please do not reply via In Basket      Thank you  Nazario Rothman MA

## 2023-06-02 NOTE — TELEPHONE ENCOUNTER
As a final attempt, a third outreach has been made via telephone call to facility  Please see Contacts section for details  This encounter will be closed and completed by end of day  Should we receive the requested information because of previous outreach attempts, the requested patient's chart will be updated appropriately       Thank you  Cozette Eisenmenger, MA

## 2023-06-08 ENCOUNTER — TELEPHONE (OUTPATIENT)
Dept: NEPHROLOGY | Facility: CLINIC | Age: 78
End: 2023-06-08

## 2023-06-08 ENCOUNTER — RA CDI HCC (OUTPATIENT)
Dept: OTHER | Facility: HOSPITAL | Age: 78
End: 2023-06-08

## 2023-06-08 NOTE — TELEPHONE ENCOUNTER
Per  Skyler Jones patient to change his appointment time on 7/7/23 with Gloria Titus  Patient appointment on 7/7/23 at 11 am with Gloria Titus

## 2023-06-16 DIAGNOSIS — I10 PRIMARY HYPERTENSION: ICD-10-CM

## 2023-06-16 DIAGNOSIS — R80.1 PERSISTENT PROTEINURIA: Primary | ICD-10-CM

## 2023-06-21 ENCOUNTER — OFFICE VISIT (OUTPATIENT)
Dept: FAMILY MEDICINE CLINIC | Facility: CLINIC | Age: 78
End: 2023-06-21
Payer: MEDICARE

## 2023-06-21 VITALS
HEART RATE: 70 BPM | TEMPERATURE: 98.6 F | HEIGHT: 76 IN | WEIGHT: 281.2 LBS | OXYGEN SATURATION: 96 % | RESPIRATION RATE: 16 BRPM | SYSTOLIC BLOOD PRESSURE: 120 MMHG | DIASTOLIC BLOOD PRESSURE: 60 MMHG | BODY MASS INDEX: 34.24 KG/M2

## 2023-06-21 DIAGNOSIS — M54.42 CHRONIC BILATERAL LOW BACK PAIN WITH BILATERAL SCIATICA: ICD-10-CM

## 2023-06-21 DIAGNOSIS — G89.29 CHRONIC BILATERAL LOW BACK PAIN WITH BILATERAL SCIATICA: ICD-10-CM

## 2023-06-21 DIAGNOSIS — Z13.0 SCREENING, DEFICIENCY ANEMIA, IRON: ICD-10-CM

## 2023-06-21 DIAGNOSIS — G89.29 HIP PAIN, CHRONIC, RIGHT: ICD-10-CM

## 2023-06-21 DIAGNOSIS — M25.551 HIP PAIN, CHRONIC, RIGHT: ICD-10-CM

## 2023-06-21 DIAGNOSIS — Z00.00 MEDICARE ANNUAL WELLNESS VISIT, SUBSEQUENT: Primary | ICD-10-CM

## 2023-06-21 DIAGNOSIS — D63.8 ANEMIA, CHRONIC DISEASE: ICD-10-CM

## 2023-06-21 DIAGNOSIS — M54.41 CHRONIC BILATERAL LOW BACK PAIN WITH BILATERAL SCIATICA: ICD-10-CM

## 2023-06-21 PROCEDURE — G0439 PPPS, SUBSEQ VISIT: HCPCS | Performed by: NURSE PRACTITIONER

## 2023-06-21 PROCEDURE — 99214 OFFICE O/P EST MOD 30 MIN: CPT | Performed by: NURSE PRACTITIONER

## 2023-06-21 NOTE — PROGRESS NOTES
Assessment and Plan:     Problem List Items Addressed This Visit        Other    Chronic low back pain    Relevant Orders    XR spine lumbar minimum 4 views non injury   Other Visit Diagnoses     Medicare annual wellness visit, subsequent    -  Primary    Screening, deficiency anemia, iron        Anemia, chronic disease        Relevant Orders    CBC and differential    Hip pain, chronic, right        Relevant Orders    XR hip/pelv 2-3 vws right if performed      The case discussed with patient using patient centered shared decision making  The patient was counseled regarding instructions for management,-- risk factor reductions,-- prognosis,-- impressions,-- risks and benefits of treatment options,-- importance of compliance with treatment  I have reviewed the instructions with the patient, answering all questions to his satisfaction  Treatment plan RE: etiology of pruritis and chronic hip and back pain to be determined by testing results-will follow up when results available  BMI Counseling: Body mass index is 34 23 kg/m²  The BMI is above normal  Nutrition recommendations include decreasing portion sizes, moderation in carbohydrate intake and increasing intake of lean protein  Rationale for BMI follow-up plan is due to patient being overweight or obese  Depression Screening and Follow-up Plan: Patient was screened for depression during today's encounter  They screened negative with a PHQ-2 score of 0  Preventive health issues were discussed with patient, and age appropriate screening tests were ordered as noted in patient's After Visit Summary  Personalized health advice and appropriate referrals for health education or preventive services given if needed, as noted in patient's After Visit Summary       History of Present Illness:     Patient presents for a Medicare Wellness Visit    Here for follow up, awv  Accompanied by daughter and spouse    In usual state of health overall  C/o ongoing itchiness of back, torso x 1 month  Only change is addition of ICS    Has anemia, kidney dz is due for labs    Also c/o worsening lbp, right hip pain, possible sciatica  H/o right THR  Is sedentary, chair bound  Requesting referral to ortho     Patient Care Team:  Virginia Means as PCP - General (Family Medicine)  Jennifer Rodgers MD as PCP - Endocrinology (Endocrinology)  MD Clarice Robb MD Madeline Birk, MD Kallie Dynes, MD as Cardiologist (Cardiology)  Dominguez Cunningham MD (Endocrinology)  Jessi Santos MD (Urology)  [address] (Ophthalmology)  Kael Gutierrez DPM (Podiatry)  Jennifer Rodgers MD (Endocrinology)  Jennifer Foster MD (Nephrology)     Review of Systems:     Review of Systems   Constitutional: Negative for fatigue, fever and unexpected weight change  Respiratory: Negative for cough and shortness of breath  Cardiovascular: Negative  Gastrointestinal: Negative for abdominal pain and blood in stool  Musculoskeletal: Positive for arthralgias, back pain and gait problem  Right buttock pain worse after laying down  H/o right THR   Skin: Negative for rash  Persistent pruritis with no evidence of itch    +h/o CRD, anemia   Due for bmp, cbc which can contribute to pruritis if progressing        Problem List:     Patient Active Problem List   Diagnosis   • Hypertension   • Hyperlipemia   • Type 2 diabetes mellitus with hyperglycemia, with long-term current use of insulin (HCC)   • Chronic low back pain   • CAD (coronary artery disease)   • Malignant neoplasm of urinary bladder (HCC)   • Arthritis   • Paroxysmal atrial fibrillation (HCC)   • AAA (abdominal aortic aneurysm) (HCC)   • Decreased pedal pulses   • Chronic pain of both knees   • Actinic keratosis of right temple   • Actinic keratosis of scalp   • 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   • Immunization deficiency   • Left lower quadrant abdominal pain   • Excessive gas   • Morbid obesity (HCC)   • Colitis   • Adrenal nodule (HCC)   • LUCIA (obstructive sleep apnea)   • Embolism and thrombosis of arteries of the lower extremities (HCC)   • Bilateral edema of lower extremity   • Hypoxia   • Chronic diastolic CHF (congestive heart failure) (HCC)   • Left upper lobe pulmonary nodule   • Hypercalcemia   • Nephrolithiasis   • Hydroureter on left   • Venous stasis dermatitis of both lower extremities   • Blister of right leg   • Persistent proteinuria   • Hypokalemia   • Ureteral calculi   • Benign prostatic hyperplasia without lower urinary tract symptoms   • PAF (paroxysmal atrial fibrillation) (HCC)   • PAD (peripheral artery disease) (HCC)   • Gastroesophageal reflux disease   • Vitamin D deficiency   • Cirrhosis of liver without ascites (HCC)   • Chronic acquired lymphedema   • Pulmonary hypertension (HCC)   • Chronic obstructive pulmonary disease (HCC)      Past Medical and Surgical History:     Past Medical History:   Diagnosis Date   • [facility])    • AAA [facility]ab[facility]    • Actinic keratosis of right temple 7/31/2020   • Actinic keratosis of scalp 7/31/2020   • Acute respiratory failure with hypoxia ([facility] ) 3/25/2021   • Allergic 1960   • Arthritis    • Lay's esophagus    • Bladder cancer ([facility] )    • Blister of left leg 4/28/2021   • Blister of right leg 5/26/2021   • CAD (coronary artery disease)    • Cancer ([facility] ) 02/2010    Bladder   • CHF (congestive heart failure) (HCC)    • Chronic low back pain    • Chronic pain of both knees 7/31/2020   • Colitis 12/4/2020   • CPAP (continuous positive airway pressure) dependence    • Diabetes (HCC)    • Diabetes mellitus ([facility] ) 10/1993   • Excessive gas 12/3/2020   • Heart murmur    • History of chemotherapy    • Hydroureter on left 4/28/2021   • Hyperlipemia    • Hypertension    • Immunization deficiency 10/30/2020    Hep a nonimmune   • Kidney stone    • Left lower quadrant abdominal pain 12/3/2020   • Mass of right adrenal gland ([facility] ) 12/4/2020    4 1 cm   • Myocardial infarction 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   • Obesity 2000   • LUCIA (obstructive sleep apnea) 1/29/2021   • Pressure ulcer of right leg, stage 1 5/26/2021   • Urinary tract infection with hematuria 5/3/2021    u cx 4/2021=pseudomonas R to bactrim and cefdinir, S to cipro   • Venous stasis dermatitis of both lower extremities 5/26/2021     Past Surgical History:   Procedure Laterality Date   • BACK SURGERY     • BLADDER SURGERY     • CARDIAC CATHETERIZATION  04/2016   • CARDIAC CATHETERIZATION N/A 1/19/2023    Procedure: Cardiac RHC; Surgeon: Bk Gardner MD;  [facility];   Service: Cardiology   • CATARACT EXTRACTION, BILATERAL     • COLONOSCOPY  01/29/2019    next 2022 [place]   • CORONARY ARTERY BYPASS GRAFT  04/2016    Quadruple per [place]   • EGD  06/2017   • EYE SURGERY  11/2016    Cataracts   • JOINT REPLACEMENT  [id number]    Hip and shoulder   • KIDNEY STONE SURGERY     • NE CYSTO BLADDER W/URETERAL CATHETERIZATION Left 04/24/2021    Procedure: CYSTOSCOPY  WITH INSERTION STENT URETERAL;  Surgeon: Marc Lebron MD;  Location: [facility] MA[facility];  Service: Urology   • NE CYSTO/URETERO W/LITHOTRIPSY &INDWELL STENT INSRT Left 05/21/2021    Procedure: CYSTOSCOPY URETEROSCOPY WITH LITHOTRIPSY HOLMIUM LASER, AND INSERTION STENT URETERAL/EXCHANGE;  Surgeon: Marc Lebron MD;  Location[facility];  Service: Urology   • TOTAL HIP ARTHROPLASTY Right 2012   • TOTAL SHOULDER REPLACEMENT Right 2013   • VASECTOMY  1971      Family History:     Family History   Problem Relation Age of Onset   • Heart disease Father    • Arthritis Father    • Heart attack Father    • Alzheimer's disease Mother    • Dementia Mother    • Diabetes type II Sister       Social History:     Social History     Socioeconomic History   • Marital status: /Civil Union     Spouse name: Venkatesh Chapa   • Number of children: 4   • Years of education: None   • Highest education level: 12th grade   Occupational History   • None   Tobacco Use   • Smoking status: Former     Packs/day: 0 25     Years: 20 00     Total pack years: 5 00     Types: Cigarettes     Start date: 1965     Quit date: 2009     Years since quittin 4   • Smokeless tobacco: Never   • Tobacco comments:     Quit several times for up to 6 years  Vaping Use   • Vaping Use: Former   Substance and Sexual Activity   • Alcohol use: Not Currently     Comment: No use   • Drug use: Never     Comment: No use   • Sexual activity: Not Currently     Partners: Female   Other Topics Concern   • None   Social History Narrative    · Most recent tobacco use screenin2020      · Do you currently or have you served in the [employer]:   No      · Were you activated, into active duty, as a member of the [employer] or as a Reservist:   No      · Live alone or with others:   with others        · Caffeine intake:   Occasional      · Illicit drugs:   No      · Diet:   Regular      · Exercise level: Moderate      · Advance directive: Yes      · Marital status:         · General stress level:   Medium      · Single or multi-level home/work:   multi level home      · Guns present in home:   No      · Seat belts used routinely:   Yes      · Sunscreen used routinely:   Yes      · Smoke alarm in home:   Yes      Social Determinants of Health     Financial Resource Strain: Low Risk  (2023)    Overall Financial Resource Strain (CARDIA)    • Difficulty of Paying Living Expenses: Not hard at all   Food Insecurity: No Food Insecurity (2023)    Hunger Vital Sign    • Worried About Running Out of Food in the Last Year: Never true    • Ran Out of Food in the Last Year: Never true   Transportation Needs: No Transportation Needs (2023)    PRAPARE - Transportation    • Lack of Transportation (Medical): No    • Lack of Transportation (Non-Medical):  No   Physical Activity: Insufficiently Active (4/13/2021)    Exercise Vital Sign    • Days of Exercise per Week: 4 days    • Minutes of Exercise per Session: 10 min   Stress: No Stress Concern Present (4/13/2021)    [address]    • Feeling of Stress : Not at all   Social Connections:  Moderately Isolated (4/13/2021)    Social Connection and Isolation Panel [NHANES]    • Frequency of Communication with Friends and Family: More than three times a week    • Frequency of Social Gatherings with Friends and Family: Twice a week    • Attends Yarsanism Services: Never    • Active Member of Clubs or Organizations: No    • Attends Club or Organization Meetings: Never    • Marital Status:    Intimate Partner Violence: Not At Risk (4/13/2021)    Humiliation, Afraid, Rape, and Kick questionnaire    • Fear of Current or Ex-Partner: No    • Emotionally Abused: No    • Physically Abused: No    • Sexually Abused: No   Housing Stability: Unknown (1/11/2023)    Housing Stability Vital Sign    • Unable to Pay for Housing in the Last Year: No    • Number of Places Lived in the Last Year: Not on file    • Unstable Housing in the Last Year: No      Medications and Allergies:     Current Outpatient Medications   Medication Sig Dispense Refill   • apixaban (Eliquis) 5 mg Take 1 tablet (5 mg total) by mouth 2 (two) times a day 180 tablet 3   • atorvastatin (LIPITOR) 40 mg tablet Take 1 tablet (40 mg total) by mouth daily 90 tablet 0   • Continuous Blood Gluc Sensor (FreeStyle Peggy 2 Sensor) MISC Change it every 14 days 1 each 0   • dicyclomine (BENTYL) 10 mg capsule Take 1 capsule (10 mg total) by mouth 2 (two) times a day as needed (abdominal cramping) 60 capsule 3   • digoxin (LANOXIN) 0 125 mg tablet Take 1 tablet (125 mcg total) by mouth daily 90 tablet 0   • Empagliflozin (Jardiance) 25 MG TABS Take 1 tablet (25 mg total) by mouth every morning 120 tablet 5   • famotidine (PEPCID) 40 MG tablet TAKE ONE TABLET BY MOUTH NIGHTLY AT BEDTIME 30 tablet 1   • insulin degludec Claude Latasha FlexTouch) 200 units/mL CONCENTRATED U-200 injection pen inject 95 units under the skin once daily 15 mL 4   • Lancets (accu-chek multiclix) lancets Use 1 each 2 (two) times a day Use 2 times daily to test blood glucose 102 each 3   • metoprolol succinate (TOPROL-XL) 50 mg 24 hr tablet Take 1 tablet (50 mg total) by mouth daily 90 tablet 3   • NovoLOG FlexPen 100 units/mL injection pen Inject 20-25 Units under the skin 3 (three) times a day with meals 30 mL 3   • omeprazole (PriLOSEC) 20 mg delayed release capsule Take 1 capsule (20 mg total) by mouth 2 (two) times a day 180 capsule 1   • potassium citrate (UROCIT-K) 10 mEq Take 1 tablet (10 mEq total) by mouth 2 (two) times a day 180 tablet 3   • torsemide (DEMADEX) 20 mg tablet 40mg in morning and 20mg at 4pm  120 tablet 5   • umeclidinium-vilanterol (Anoro Ellipta) 62 5-25 mcg/actuation inhaler Inhale 1 puff daily 60 blister 5   • Unifine Pentips 31G X 8 MM MISC inject under the skin daily use as directed 100 each 0     No current facility-administered medications for this visit       Allergies   Allergen Reactions   • Ciprofloxacin Abdominal Pain, Diarrhea, GI Bleeding and GI Intolerance   • Diltiazem Abdominal Pain   • Metronidazole Abdominal Pain, Diarrhea, GI Bleeding and GI Intolerance      Immunizations:     Immunization History   Administered Date(s) Administered   • COVID-19 MODERNA VACC 0 5 ML IM 01/21/2021, 03/03/2021   • Hep B, adult 10/30/2020, 07/14/2021, 03/14/2022   • INFLUENZA 09/07/2011, 10/17/2012, 09/17/2013, 10/20/2014, 11/24/2015, 09/29/2016, 09/30/2016, 11/29/2018, 10/13/2020   • Influenza, high dose seasonal 0 7 mL 10/13/2021, 10/26/2022   • Pneumococcal Conjugate 13-Valent 09/20/2016, 09/29/2016   • Pneumococcal Polysaccharide PPV23 10/06/2009, 07/14/2021   • Td (adult), adsorbed 06/03/2011   • Tdap 04/23/2021   • Zoster 03/01/2010, 03/09/2010      Health Maintenance: Topic Date Due   • Hepatitis C Screening  Completed         Topic Date Due   • Hepatitis A Vaccine (1 of 2 - Risk 2-dose series) Never done   • COVID-19 Vaccine (3 - Moderna series) 04/28/2021      Medicare Screening Tests and Risk Assessments:     Ayesha Morgan is here for his Subsequent Wellness visit  Health Risk Assessment:   Patient rates overall health as good  Patient feels that their physical health rating is same  Patient is satisfied with their life  Eyesight was rated as same  Hearing was rated as same  Patient feels that their emotional and mental health rating is same  Patients states they are sometimes angry  Patient states they are sometimes unusually tired/fatigued  Pain experienced in the last 7 days has been some  Patient's pain rating has been 3/10  Patient states that he has experienced no weight loss or gain in last 6 months  Fall Risk Screening: In the past year, patient has experienced: no history of falling in past year      Home Safety:  Patient has trouble with stairs inside or outside of their home  Patient has working smoke alarms and has working carbon monoxide detector  Home safety hazards include: none  Nutrition:   Current diet is No Added Salt, Low Carb and Low Saturated Fat  Medications:   Patient is currently taking over-the-counter supplements  OTC medications include: see medication list  Patient is able to manage medications  Activities of Daily Living (ADLs)/Instrumental Activities of Daily Living (IADLs):   Walk and transfer into and out of bed and chair?: Yes  Dress and groom yourself?: Yes    Bathe or shower yourself?: Yes    Feed yourself?  Yes  Do your laundry/housekeeping?: No  Manage your money, pay your bills and track your expenses?: Yes  Make your own meals?: Yes    Do your own shopping?: Yes    Previous Hospitalizations:   Any hospitalizations or ED visits within the last 12 months?: Yes    How many hospitalizations have you had in the last "year?: 1-2    Advance Care Planning:   Living will: Yes    Durable POA for healthcare: Yes    Advanced directive: Yes    Advanced directive counseling given: Yes      Cognitive Screening:   Provider or family/friend/caregiver concerned regarding cognition?: No    PREVENTIVE SCREENINGS      Cardiovascular Screening:    General: History Lipid Disorder and Screening Current      Diabetes Screening:     General: History Diabetes and Screening Current      Prostate Cancer Screening:    General: Screening Not Indicated      Abdominal Aortic Aneurysm (AAA) Screening:    Risk factors include: tobacco use        General: Screening Not Indicated and History AAA      Lung Cancer Screening:     General: Screening Not Indicated      Hepatitis C Screening:    General: Screening Current    Screening, Brief Intervention, and Referral to Treatment (SBIRT)    Screening  Typical number of drinks in a day: 0  Typical number of drinks in a week: 0  Interpretation: Low risk drinking behavior  AUDIT-C Screenin) How often did you have a drink containing alcohol in the past year? never  2) How many drinks did you have on a typical day when you were drinking in the past year? 0  3) How often did you have 6 or more drinks on one occasion in the past year? never    AUDIT-C Score: 0  Interpretation: Score 0-3 (male): Negative screen for alcohol misuse    Single Item Drug Screening:  How often have you used an illegal drug (including marijuana) or a prescription medication for non-medical reasons in the past year? never    Single Item Drug Screen Score: 0  Interpretation: Negative screen for possible drug use disorder    Brief Intervention  Alcohol & drug use screenings were reviewed  No concerns regarding substance use disorder identified  No results found       Physical Exam:     /60 (BP Location: Left arm, Patient Position: Sitting, Cuff Size: Large)   Pulse 70   Temp 98 6 °F (37 °C) (Temporal)   Resp 16   Ht 6' 4\" (1 93 " m)   Wt 128 kg (281 lb 3 2 oz)   SpO2 96%   BMI 34 23 kg/m²     Physical Exam  Vitals and nursing note reviewed  Constitutional:       General: He is not in acute distress  Appearance: Normal appearance  Cardiovascular:      Rate and Rhythm: Normal rate  Rhythm irregular  Pulses: Normal pulses  Heart sounds: Murmur heard  Pulmonary:      Effort: Pulmonary effort is normal       Breath sounds: Normal breath sounds  Musculoskeletal:      Comments: Wheelchair bound   Skin:     Findings: No rash  Neurological:      Mental Status: He is alert            Kip Blizzard, [address]

## 2023-06-23 ENCOUNTER — APPOINTMENT (OUTPATIENT)
Dept: LAB | Facility: AMBULARY SURGERY CENTER | Age: 78
End: 2023-06-23
Payer: MEDICARE

## 2023-06-23 DIAGNOSIS — D63.8 ANEMIA, CHRONIC DISEASE: ICD-10-CM

## 2023-06-23 DIAGNOSIS — E11.65 TYPE 2 DIABETES MELLITUS WITH HYPERGLYCEMIA, WITH LONG-TERM CURRENT USE OF INSULIN (HCC): ICD-10-CM

## 2023-06-23 DIAGNOSIS — Z79.4 TYPE 2 DIABETES MELLITUS WITH HYPERGLYCEMIA, WITH LONG-TERM CURRENT USE OF INSULIN (HCC): ICD-10-CM

## 2023-06-23 LAB
ANION GAP SERPL CALCULATED.3IONS-SCNC: 3 MMOL/L
BASOPHILS # BLD AUTO: 0.06 THOUSANDS/ÂΜL (ref 0–0.1)
BASOPHILS NFR BLD AUTO: 1 % (ref 0–1)
BUN SERPL-MCNC: 36 MG/DL (ref 5–25)
CALCIUM SERPL-MCNC: 10 MG/DL (ref 8.3–10.1)
CHLORIDE SERPL-SCNC: 102 MMOL/L (ref 96–108)
CO2 SERPL-SCNC: 32 MMOL/L (ref 21–32)
CORTIS SERPL-MCNC: 11.3 UG/DL
CREAT SERPL-MCNC: 1.36 MG/DL (ref 0.6–1.3)
CREAT UR-MCNC: 44.4 MG/DL
EOSINOPHIL # BLD AUTO: 0.15 THOUSAND/ÂΜL (ref 0–0.61)
EOSINOPHIL NFR BLD AUTO: 2 % (ref 0–6)
ERYTHROCYTE [DISTWIDTH] IN BLOOD BY AUTOMATED COUNT: 17.7 % (ref 11.6–15.1)
GFR SERPL CREATININE-BSD FRML MDRD: 49 ML/MIN/1.73SQ M
GLUCOSE P FAST SERPL-MCNC: 198 MG/DL (ref 65–99)
HCT VFR BLD AUTO: 37.7 % (ref 36.5–49.3)
HGB BLD-MCNC: 11.1 G/DL (ref 12–17)
IMM GRANULOCYTES # BLD AUTO: 0.07 THOUSAND/UL (ref 0–0.2)
IMM GRANULOCYTES NFR BLD AUTO: 1 % (ref 0–2)
LYMPHOCYTES # BLD AUTO: 2.34 THOUSANDS/ÂΜL (ref 0.6–4.47)
LYMPHOCYTES NFR BLD AUTO: 26 % (ref 14–44)
MAGNESIUM SERPL-MCNC: 2.8 MG/DL (ref 1.6–2.6)
MCH RBC QN AUTO: 24.9 PG (ref 26.8–34.3)
MCHC RBC AUTO-ENTMCNC: 29.4 G/DL (ref 31.4–37.4)
MCV RBC AUTO: 85 FL (ref 82–98)
MONOCYTES # BLD AUTO: 0.78 THOUSAND/ÂΜL (ref 0.17–1.22)
MONOCYTES NFR BLD AUTO: 9 % (ref 4–12)
NEUTROPHILS # BLD AUTO: 5.65 THOUSANDS/ÂΜL (ref 1.85–7.62)
NEUTS SEG NFR BLD AUTO: 61 % (ref 43–75)
NRBC BLD AUTO-RTO: 0 /100 WBCS
PHOSPHATE SERPL-MCNC: 3.6 MG/DL (ref 2.3–4.1)
PLATELET # BLD AUTO: 273 THOUSANDS/UL (ref 149–390)
PMV BLD AUTO: 10.7 FL (ref 8.9–12.7)
POTASSIUM SERPL-SCNC: 3.9 MMOL/L (ref 3.5–5.3)
PROT UR-MCNC: 22 MG/DL
PROT/CREAT UR: 0.5 MG/G{CREAT} (ref 0–0.1)
PTH-INTACT SERPL-MCNC: 101.7 PG/ML (ref 12–88)
RBC # BLD AUTO: 4.45 MILLION/UL (ref 3.88–5.62)
SODIUM SERPL-SCNC: 137 MMOL/L (ref 135–147)
WBC # BLD AUTO: 9.05 THOUSAND/UL (ref 4.31–10.16)

## 2023-06-23 PROCEDURE — 84100 ASSAY OF PHOSPHORUS: CPT

## 2023-06-23 PROCEDURE — 80048 BASIC METABOLIC PNL TOTAL CA: CPT

## 2023-06-23 PROCEDURE — 84156 ASSAY OF PROTEIN URINE: CPT

## 2023-06-23 PROCEDURE — 82533 TOTAL CORTISOL: CPT

## 2023-06-23 PROCEDURE — 82570 ASSAY OF URINE CREATININE: CPT

## 2023-06-23 PROCEDURE — 83970 ASSAY OF PARATHORMONE: CPT

## 2023-06-23 PROCEDURE — 82024 ASSAY OF ACTH: CPT

## 2023-06-23 PROCEDURE — 85025 COMPLETE CBC W/AUTO DIFF WBC: CPT

## 2023-06-23 PROCEDURE — 83735 ASSAY OF MAGNESIUM: CPT

## 2023-06-23 PROCEDURE — 36415 COLL VENOUS BLD VENIPUNCTURE: CPT

## 2023-06-23 PROCEDURE — 82627 DEHYDROEPIANDROSTERONE: CPT

## 2023-06-24 LAB
ACTH PLAS-MCNC: 12.6 PG/ML (ref 7.2–63.3)
DHEA-S SERPL-MCNC: 17.3 UG/DL (ref 20.8–226.4)

## 2023-06-26 ENCOUNTER — APPOINTMENT (OUTPATIENT)
Dept: RADIOLOGY | Age: 78
End: 2023-06-26
Payer: MEDICARE

## 2023-06-26 DIAGNOSIS — M54.42 CHRONIC BILATERAL LOW BACK PAIN WITH BILATERAL SCIATICA: ICD-10-CM

## 2023-06-26 DIAGNOSIS — G89.29 HIP PAIN, CHRONIC, RIGHT: ICD-10-CM

## 2023-06-26 DIAGNOSIS — G89.29 CHRONIC BILATERAL LOW BACK PAIN WITH BILATERAL SCIATICA: ICD-10-CM

## 2023-06-26 DIAGNOSIS — M54.41 CHRONIC BILATERAL LOW BACK PAIN WITH BILATERAL SCIATICA: ICD-10-CM

## 2023-06-26 DIAGNOSIS — M25.551 HIP PAIN, CHRONIC, RIGHT: ICD-10-CM

## 2023-06-26 PROCEDURE — 73502 X-RAY EXAM HIP UNI 2-3 VIEWS: CPT

## 2023-06-26 PROCEDURE — 72110 X-RAY EXAM L-2 SPINE 4/>VWS: CPT

## 2023-07-01 DIAGNOSIS — K21.00 GASTROESOPHAGEAL REFLUX DISEASE WITH ESOPHAGITIS WITHOUT HEMORRHAGE: ICD-10-CM

## 2023-07-03 ENCOUNTER — PATIENT MESSAGE (OUTPATIENT)
Age: 78
End: 2023-07-03

## 2023-07-03 RX ORDER — FAMOTIDINE 40 MG/1
TABLET, FILM COATED ORAL
Qty: 30 TABLET | Refills: 0 | Status: SHIPPED | OUTPATIENT
Start: 2023-07-03

## 2023-07-03 NOTE — PATIENT COMMUNICATION
Approved Prescriptions     famotidine (PEPCID) 40 MG tablet         Sig: TAKE ONE TABLET BY MOUTH NIGHTLY AT BEDTIME    Disp:  30 tablet    Refills:  0    Start: 7/3/2023    Class: Normal    Authorized by: Karissa Parish    For: Gastroesophageal reflux disease with esophagitis without hemorrhage        To be filled at: [facility], [address] [place] [address]

## 2023-07-05 DIAGNOSIS — I25.83 CORONARY ARTERY DISEASE DUE TO LIPID RICH PLAQUE: ICD-10-CM

## 2023-07-05 DIAGNOSIS — I25.10 CORONARY ARTERY DISEASE DUE TO LIPID RICH PLAQUE: ICD-10-CM

## 2023-07-05 RX ORDER — ATORVASTATIN CALCIUM 40 MG/1
TABLET, FILM COATED ORAL
Qty: 90 TABLET | Refills: 0 | Status: SHIPPED | OUTPATIENT
Start: 2023-07-05

## 2023-07-07 ENCOUNTER — OFFICE VISIT (OUTPATIENT)
Dept: NEPHROLOGY | Facility: CLINIC | Age: 78
End: 2023-07-07
Payer: MEDICARE

## 2023-07-07 VITALS
DIASTOLIC BLOOD PRESSURE: 64 MMHG | SYSTOLIC BLOOD PRESSURE: 122 MMHG | HEIGHT: 76 IN | BODY MASS INDEX: 34.1 KG/M2 | WEIGHT: 280 LBS

## 2023-07-07 DIAGNOSIS — E83.52 HYPERCALCEMIA: ICD-10-CM

## 2023-07-07 DIAGNOSIS — E55.9 VITAMIN D DEFICIENCY: ICD-10-CM

## 2023-07-07 DIAGNOSIS — N18.31 STAGE 3A CHRONIC KIDNEY DISEASE (HCC): Primary | ICD-10-CM

## 2023-07-07 DIAGNOSIS — I10 PRIMARY HYPERTENSION: ICD-10-CM

## 2023-07-07 DIAGNOSIS — R80.1 PERSISTENT PROTEINURIA: ICD-10-CM

## 2023-07-07 DIAGNOSIS — I50.32 CHRONIC DIASTOLIC CHF (CONGESTIVE HEART FAILURE) (HCC): ICD-10-CM

## 2023-07-07 DIAGNOSIS — N20.0 NEPHROLITHIASIS: ICD-10-CM

## 2023-07-07 PROCEDURE — 99214 OFFICE O/P EST MOD 30 MIN: CPT | Performed by: PHYSICIAN ASSISTANT

## 2023-07-07 RX ORDER — MELATONIN
1000 DAILY
COMMUNITY

## 2023-07-07 NOTE — PATIENT INSTRUCTIONS
Can try Eucerin or Aquaphor for dry skin   Continue current medications  Follow-up in 6 months with repeat labs prior

## 2023-07-07 NOTE — PROGRESS NOTES
OFFICE FOLLOW UP - Nephrology   Carla Martinez 77 y.o. male MRN: [id number]       ASSESSMENT/PLAN:  1. CKD IIIA with Proteinuria: baseline creatinine 1.1-1.3 Likely due to diabetes and hypertensive nephrosclerosis. UPC ratio stable at 0.5 g and creatinine stable at 1.36  · Lisinopril was discontinued in January by cardiology due to hypotension  · If proteinuria worsening would resume lisinopril at a very low-dose but since it is stable we will just monitor for now  · Continue Jardiance  2. Hypercalcemia with hyperparathyroidism:  Recent calcium 10 (uncorrected) and .7. Following with endocrine  · Remains on vitamin D 1000 units daily per endocrine  3. Nephrolithiasis: No episodes of kidney stones per patient. History of left hydronephrosis status post left ureteral stent placement and lithotripsy in 2021  · Last CT in January 2023 showed tiny nonobstructing bilateral renal calculi, unchanged from prior examination  · 24-hour urine August 2022 with good urine volume 2.3 L, citrate low but improving  · Continue potassium citrate 10 mEq twice a day  · Low oxalate diet  4. Hypertension: Blood pressure acceptable  5. Adrenal mass: Follows with endocrine and biochemical evaluation unremarkable. 6. Hypermagnesemia: Magnesium 2.8. Not taking magnesium supplements  7. Chronic diastolic CHF: Continue torsemide 40 mg a.m., 20 mg p.m.  8. DM II: Last A1c 8.1. Working closely with endocrine. On Jardiance    Continue current medications  Follow-up in 6 months with Dr. Barry Rodriguez with repeat labs prior     HPI: Carla Martinez is a 77 y.o. male who is here for CKD follow-up. Feeling well recently. He does note that he was hospitalized in January 2023 with acute CHF and elevated bicarb. His diuretics were increased and since discharge he has been monitoring his weights and fluid intake closely. His weights have been very stable at home around 280 pounds.   Checks his blood pressure every day and typically around 120/60. Did have some episodes of hypotension with blood pressure into the 80s in January and his lisinopril was discontinued. No edema recently or chest pain/shortness of breath. Good urinary output. ROS:   A complete 10 point review of systems was done. Pertinent positives and negatives as noted in the HPI, otherwise the review of systems is negative.     Allergies: Ciprofloxacin, Diltiazem, and Metronidazole    Medications:   Current Outpatient Medications:   •  apixaban (Eliquis) 5 mg, Take 1 tablet (5 mg total) by mouth 2 (two) times a day, Disp: 180 tablet, Rfl: 3  •  atorvastatin (LIPITOR) 40 mg tablet, TAKE ONE TABLET BY MOUTH ONCE DAILY, Disp: 90 tablet, Rfl: 0  •  Continuous Blood Gluc Sensor (FreeStyle Peggy 2 Sensor) MISC, Change it every 14 days, Disp: 1 each, Rfl: 0  •  dicyclomine (BENTYL) 10 mg capsule, Take 1 capsule (10 mg total) by mouth 2 (two) times a day as needed (abdominal cramping), Disp: 60 capsule, Rfl: 3  •  digoxin (LANOXIN) 0.125 mg tablet, Take 1 tablet (125 mcg total) by mouth daily, Disp: 90 tablet, Rfl: 0  •  Empagliflozin (Jardiance) 25 MG TABS, Take 1 tablet (25 mg total) by mouth every morning, Disp: 120 tablet, Rfl: 5  •  famotidine (PEPCID) 40 MG tablet, TAKE ONE TABLET BY MOUTH NIGHTLY AT BEDTIME, Disp: 30 tablet, Rfl: 0  •  insulin degludec Christina Bass FlexTouch) 200 units/mL CONCENTRATED U-200 injection pen, inject 95 units under the skin once daily, Disp: 15 mL, Rfl: 4  •  Lancets (accu-chek multiclix) lancets, Use 1 each 2 (two) times a day Use 2 times daily to test blood glucose, Disp: 102 each, Rfl: 3  •  metoprolol succinate (TOPROL-XL) 50 mg 24 hr tablet, Take 1 tablet (50 mg total) by mouth daily, Disp: 90 tablet, Rfl: 3  •  NovoLOG FlexPen 100 units/mL injection pen, Inject 20-25 Units under the skin 3 (three) times a day with meals, Disp: 30 mL, Rfl: 3  •  omeprazole (PriLOSEC) 20 mg delayed release capsule, Take 1 capsule (20 mg total) by mouth 2 (two) times a day, Disp: 180 capsule, Rfl: 1  •  potassium citrate (UROCIT-K) 10 mEq, Take 1 tablet (10 mEq total) by mouth 2 (two) times a day, Disp: 180 tablet, Rfl: 3  •  torsemide (DEMADEX) 20 mg tablet, 40mg in morning and 20mg at 4pm., Disp: 120 tablet, Rfl: 5  •  umeclidinium-vilanterol (Anoro Ellipta) 62.5-25 mcg/actuation inhaler, Inhale 1 puff daily, Disp: 60 blister, Rfl: 5  •  Unifine Pentips 31G X 8 MM MISC, inject under the skin daily use as directed, Disp: 100 each, Rfl: 0    Past Medical History:   Diagnosis Date   • A-fib [facility])    • AAA (abdominal aortic aneurysm) ([address])    • Actinic keratosis of right temple 7/31/2020   • Actinic keratosis of scalp 7/31/2020   • Acute respiratory failure with hypoxia ([address]) 3/25/2021   • Allergic 1960   • Arthritis    • Lay's esophagus    • Bladder cancer ([address])    • Blister of left leg 4/28/2021   • Blister of right leg 5/26/2021   • CAD (coronary artery disease)    • Cancer ([address]) 02/2010    Bladder   • CHF (congestive heart failure) (HCC)    • Chronic low back pain    • Chronic pain of both knees 7/31/2020   • Colitis 12/4/2020   • CPAP (continuous positive airway pressure) dependence    • Diabetes ([address])    • Diabetes mellitus ([address]) 10/1993   • Excessive gas 12/3/2020   • Heart murmur    • History of chemotherapy    • Hydroureter on left 4/28/2021   • Hyperlipemia    • Hypertension    • Immunization deficiency 10/30/2020    Hep a nonimmune   • Kidney stone    • Left lower quadrant abdominal pain 12/3/2020   • Mass of right adrenal gland ([address]) 12/4/2020    4.1 cm   • Myocardial infarction [facility])    • 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   • Obesity 2000   • LUCIA (obstructive sleep apnea) 1/29/2021   • Pressure ulcer of right leg, stage 1 5/26/2021   • Urinary tract infection with hematuria 5/3/2021    u cx 4/2021=pseudomonas R to bactrim and cefdinir, S to cipro   • Venous stasis dermatitis of both lower extremities 5/26/2021     Past Surgical History:   Procedure Laterality Date   • BACK SURGERY     • BLADDER SURGERY     • CARDIAC CATHETERIZATION  04/2016   • CARDIAC CATHETERIZATION N/A 1/19/2023    Procedure: Cardiac RHC; Surgeon: Mika Gunderson MD;  [facility]; Service: Cardiology   • CATARACT EXTRACTION, BILATERAL     • COLONOSCOPY  01/29/2019    next 2022 [place]   • CORONARY ARTERY BYPASS GRAFT  04/2016    Quadruple per Rita   • EGD  06/2017   • EYE SURGERY  11/2016    Cataracts   • JOINT REPLACEMENT  [id number]    Hip and shoulder   • KIDNEY STONE SURGERY     • CT CYSTO BLADDER W/URETERAL CATHETERIZATION Left 04/24/2021    Procedure: CYSTOSCOPY  WITH INSERTION STENT URETERAL;  Surgeon: Jitendra Iqbal MD;  Location[facility];  Service: Urology   • CT CYSTO/URETERO W/LITHOTRIPSY &INDWELL STENT INSRT Left 05/21/2021    Procedure: CYSTOSCOPY URETEROSCOPY WITH LITHOTRIPSY HOLMIUM LASER, AND INSERTION STENT URETERAL/EXCHANGE;  Surgeon: Jitendra Iqbal MD;  [facility];  Service: Urology   • TOTAL HIP ARTHROPLASTY Right 2012   • TOTAL SHOULDER REPLACEMENT Right 2013   • VASECTOMY  1971     Family History   Problem Relation Age of Onset   • Heart disease Father    • Arthritis Father    • Heart attack Father    • Alzheimer's disease Mother    • Dementia Mother    • Diabetes type II Sister       reports that he quit smoking about 14 years ago. His smoking use included cigarettes. He started smoking about 58 years ago. He has a 5.00 pack-year smoking history. He has never used smokeless tobacco. He reports that he does not currently use alcohol. He reports that he does not use drugs. Physical Exam:   Vitals:    07/07/23 1050   Weight: 127 kg (280 lb)   Height: 6' 4" (1.93 m)     Body mass index is 34.08 kg/m².     General: no acute distress, seated in wheelchair  Eyes: conjunctivae pink, anicteric sclerae  ENT: mucous membranes moist  Neck: supple, no JVD  Chest: clear to auscultation bilaterally with no wheezes, rale or rhochi  CVS: regular rate and rhythm   Abdomen: soft, non-tender, non-distended  Extremities: no lower extremity edema   Skin: no rash  Neuro: awake and alert       Lab Results:  Results for orders placed or performed in visit on 06/23/23   ACTH- Lab Collect   Result Value Ref Range    ACTH 12.6 7.2 - 63.3 pg/mL   Cortisol- Lab Collect   Result Value Ref Range    Cortisol, Random 11.3 ug/dL   DHEA-sulfate- Lab Collect   Result Value Ref Range    DHEA-SO4 17.3 (L) 20.8 - 226.4 ug/dL   CBC and differential   Result Value Ref Range    WBC 9.05 4.31 - 10.16 Thousand/uL    RBC 4.45 3.88 - 5.62 Million/uL    Hemoglobin 11.1 (L) 12.0 - 17.0 g/dL    Hematocrit 37.7 36.5 - 49.3 %    MCV 85 82 - 98 fL    MCH 24.9 (L) 26.8 - 34.3 pg    MCHC 29.4 (L) 31.4 - 37.4 g/dL    RDW 17.7 (H) 11.6 - 15.1 %    MPV 10.7 8.9 - 12.7 fL    Platelets 260 586 - 266 Thousands/uL    nRBC 0 /100 WBCs    Neutrophils Relative 61 43 - 75 %    Immat GRANS % 1 0 - 2 %    Lymphocytes Relative 26 14 - 44 %    Monocytes Relative 9 4 - 12 %    Eosinophils Relative 2 0 - 6 %    Basophils Relative 1 0 - 1 %    Neutrophils Absolute 5.65 1.85 - 7.62 Thousands/µL    Immature Grans Absolute 0.07 0.00 - 0.20 Thousand/uL    Lymphocytes Absolute 2.34 0.60 - 4.47 Thousands/µL    Monocytes Absolute 0.78 0.17 - 1.22 Thousand/µL    Eosinophils Absolute 0.15 0.00 - 0.61 Thousand/µL    Basophils Absolute 0.06 0.00 - 0.10 Thousands/µL             Invalid input(s): "ALBUMIN"      Portions of the record may have been created with voice recognition software. Occasional wrong word or "sound a like" substitutions may have occurred due to the inherent limitations of voice recognition software. Read the chart carefully and recognize, using context, where substitutions have occurred. If you have any questions, please contact the dictating provider.

## 2023-07-23 DIAGNOSIS — K21.9 GASTROESOPHAGEAL REFLUX DISEASE, UNSPECIFIED WHETHER ESOPHAGITIS PRESENT: ICD-10-CM

## 2023-07-24 RX ORDER — OMEPRAZOLE 20 MG/1
20 CAPSULE, DELAYED RELEASE ORAL
Qty: 180 CAPSULE | Refills: 0 | Status: SHIPPED | OUTPATIENT
Start: 2023-07-24

## 2023-07-27 ENCOUNTER — OFFICE VISIT (OUTPATIENT)
Dept: CARDIOLOGY CLINIC | Facility: CLINIC | Age: 78
End: 2023-07-27
Payer: MEDICARE

## 2023-07-27 VITALS
DIASTOLIC BLOOD PRESSURE: 82 MMHG | BODY MASS INDEX: 34.08 KG/M2 | HEART RATE: 73 BPM | OXYGEN SATURATION: 93 % | SYSTOLIC BLOOD PRESSURE: 130 MMHG | HEIGHT: 76 IN

## 2023-07-27 DIAGNOSIS — I25.10 CORONARY ARTERY DISEASE INVOLVING NATIVE CORONARY ARTERY OF NATIVE HEART WITHOUT ANGINA PECTORIS: ICD-10-CM

## 2023-07-27 DIAGNOSIS — I48.0 PAF (PAROXYSMAL ATRIAL FIBRILLATION) (HCC): Primary | ICD-10-CM

## 2023-07-27 PROCEDURE — 99215 OFFICE O/P EST HI 40 MIN: CPT | Performed by: INTERNAL MEDICINE

## 2023-07-27 PROCEDURE — 93000 ELECTROCARDIOGRAM COMPLETE: CPT | Performed by: INTERNAL MEDICINE

## 2023-07-27 NOTE — PROGRESS NOTES
[address] [facility]  Office progress note  Paul Harden 77 y.o. male MRN: [id number]        Problems    1. PAF (paroxysmal atrial fibrillation) (HCC)  POCT ECG    Basic metabolic panel    Digoxin level      2. Coronary artery disease involving native coronary artery of native heart without angina pectoris  Lipid panel          Impression:      Coronary artery disease  CABG x3 in 2016, LIMA to LAD, SVG to OM3, SVG to PDA  He had full recovery of his moderate ischemic cardiomyopathy  LVEF normal as of 2/2365%  Denies any ischemic symptoms  Chronic diastolic/right-sided CHF  318lbs January 2023, decompensated, had severe PHTN by echo but RHC low pressures after 30lbs diuresis  Stable at 280 pounds  On torsemide, on Jardiance, nipple tenderness required spironolactone to be discontinued. AAA  52 millimeters in early 2021 by abdominal CT  50 millimeters by noncontrast abdominal CT 6/22  51mm 10/22 by CTA  50 mm by recent ultrasound, follows closely with vascular  Lower extremity atherosclerosis  Denies any claudication symptoms  Mixed hyperlipidemia  Lipids controlled  Type 2 diabetes  A1c remains uncontrolled  Hypertension  Blood pressures are well controlled  Atrial fibrillation  Paroxysmal, had DCCV 1/22, maintaining normal sinus rhythm  Normal sinus rhythm on EKG today  Stable on Eliquis    Plan    6-month follow-up  Repeat digoxin level in 6 months  Repeat BMP in 6 months  Repeat lipid panel in 6 months  His daughter asked about calcium score CT, we discussed that this was not useful in a man already diagnosed with CAD, status post CABG with longstanding diabetes. Not offering any ischemic symptoms currently, and has a preserved EF by echo 2/23, do not feel further ischemic testing is advantageous at this time.       HPI: Paul Harden is a 77y.o. year old male with CAD, paroxysmal atrial fibrillation, diastolic/right-sided CHF, obesity, hypertension, diabetes, mixed hyperlipidemia, LUCIA, hospitalized 12/21 for AFib/RVR, cardioversion 1/7/2022 urgently for hypotension and AFib/RVR, now returns for a follow-up visit    He is doing well, he has no major complaints for me. He wears compression stockings. He is using a pedal bike and doing about 15 to 20 minutes, enjoying the exercise, feels better. He has been plagued by constipation recently in the last month, taking 6 Dulcolax without significant improvement, stools are hard. He had increased GERD symptoms and was placed on Pepcid for about a month, that was the only recent change. He is not taking Bentyl. Although there was a period he had significant stomach cramps as well. He has not had any real hospitalizations. His original A-fib was asymptomatic, but detected on his pulse ox. He denies palpitations. He remains on digoxin considering his asymptomatic atrial fibrillation. He is on blood thinners, CBC stable. He maintains sinus rhythm, on EKG heart rate 76, with right bundle branch block which is unchanged. Blood pressures are excellent. Weights are stable, 280 pounds. Review of Systems   Constitutional: Negative for appetite change, diaphoresis, fatigue and fever. Respiratory: Negative for chest tightness, shortness of breath and wheezing. Cardiovascular: Negative for chest pain, palpitations and leg swelling. Gastrointestinal: Negative for abdominal pain and blood in stool. Musculoskeletal: Positive for arthralgias. Negative for joint swelling. Skin: Negative for rash. Neurological: Positive for weakness. Negative for dizziness, syncope and light-headedness. All other systems reviewed and are negative.         Past Medical History:   Diagnosis Date   • A-fib [facility])    • AAA (abdominal aortic aneurysm) ([address])    • Actinic keratosis of right temple 7/31/2020   • Actinic keratosis of scalp 7/31/2020   • Acute respiratory failure with hypoxia ([address]) 3/25/2021   • Allergic 1960   • Arthritis    • Lay's esophagus    • Bladder cancer ([address]) • Blister of left leg 4/28/2021   • Blister of right leg 5/26/2021   • CAD (coronary artery disease)    • Cancer ([address]) 02/2010    Bladder   • CHF (congestive heart failure) (HCC)    • Chronic low back pain    • Chronic pain of both knees 7/31/2020   • Colitis 12/4/2020   • CPAP (continuous positive airway pressure) dependence    • Diabetes (HCC)    • Diabetes mellitus ([address]) 10/1993   • Excessive gas 12/3/2020   • Heart murmur    • History of chemotherapy    • Hydroureter on left 4/28/2021   • Hyperlipemia    • Hypertension    • Immunization deficiency 10/30/2020    Hep a nonimmune   • Kidney stone    • Left lower quadrant abdominal pain 12/3/2020   • Mass of right adrenal gland ([address]) 12/4/2020    4.1 cm   • Myocardial infarction [facility])    • 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   • Obesity 2000   • LUCIA (obstructive sleep apnea) 1/29/2021   • Pressure ulcer of right leg, stage 1 5/26/2021   • Urinary tract infection with hematuria 5/3/2021    u cx 4/2021=pseudomonas R to bactrim and cefdinir, S to cipro   • Venous stasis dermatitis of both lower extremities 5/26/2021     Past Surgical History:   Procedure Laterality Date   • BACK SURGERY     • BLADDER SURGERY     • CARDIAC CATHETERIZATION  04/2016   • CARDIAC CATHETERIZATION N/A 1/19/2023    Procedure: Cardiac RHC; Surgeon: Malika Wagner MD;  [facility];   Service: Cardiology   • CATARACT EXTRACTION, BILATERAL     • COLONOSCOPY  01/29/2019    next 2022 [place]   • CORONARY ARTERY BYPASS GRAFT  04/2016    Quadruple per Rita   • EGD  06/2017   • EYE SURGERY  11/2016    Cataracts   • JOINT REPLACEMENT  [id number]    Hip and shoulder   • KIDNEY STONE SURGERY     • OK CYSTO BLADDER W/URETERAL CATHETERIZATION Left 04/24/2021    Procedure: CYSTOSCOPY  WITH INSERTION STENT URETERAL;  Surgeon: Connie Rayo MD;  [facility];  Service: Urology   • [place] CYSTO/URETERO W/LITHOTRIPSY &INDWELL STENT INSRT Left 05/21/2021    Procedure: CYSTOSCOPY URETEROSCOPY WITH LITHOTRIPSY HOLMIUM LASER, AND INSERTION STENT URETERAL/EXCHANGE;  Surgeon: Kelsey Zhang MD;  [facility];  Service: Urology   • TOTAL HIP ARTHROPLASTY Right 2012   • TOTAL SHOULDER REPLACEMENT Right 2013   • [id number] [facility]     Social History     Substance and Sexual Activity   Alcohol Use Not Currently    Comment: No use     Social History     Substance and Sexual Activity   Drug Use Never    Comment: No use     Social History     Tobacco Use   Smoking Status Former   • Packs/day: 0.25   • Years: 20.00   • Total pack years: 5.00   • Types: Cigarettes   • Start date: 1965   • Quit date: 2009   • Years since quittin.5   Smokeless Tobacco Never   Tobacco Comments    Quit several times for up to 6 years. Family History   Problem Relation Age of Onset   • Heart disease Father    • Arthritis Father    • Heart attack Father    • Alzheimer's disease Mother    • Dementia Mother    • Diabetes type II Sister        Allergies:   Allergies   Allergen Reactions   • Ciprofloxacin Abdominal Pain, Diarrhea, GI Bleeding and GI Intolerance   • Diltiazem Abdominal Pain   • Metronidazole Abdominal Pain, Diarrhea, GI Bleeding and GI Intolerance       Medications:     Current Outpatient Medications:   •  apixaban (Eliquis) 5 mg, Take 1 tablet (5 mg total) by mouth 2 (two) times a day, Disp: 180 tablet, Rfl: 3  •  atorvastatin (LIPITOR) 40 mg tablet, TAKE ONE TABLET BY MOUTH ONCE DAILY, Disp: 90 tablet, Rfl: 0  •  cholecalciferol (VITAMIN D3) 1,000 units tablet, Take 1,000 Units by mouth daily, Disp: , Rfl:   •  Continuous Blood Gluc Sensor (FreeStyle Peggy 2 Sensor) MISC, Change it every 14 days, Disp: 1 each, Rfl: 0  •  dicyclomine (BENTYL) 10 mg capsule, Take 1 capsule (10 mg total) by mouth 2 (two) times a day as needed (abdominal cramping), Disp: 60 capsule, Rfl: 3  •  digoxin (LANOXIN) 0.125 mg tablet, Take 1 tablet (125 mcg total) by mouth daily, Disp: 90 tablet, Rfl: 0  •  Empagliflozin (Jardiance) 25 MG TABS, Take 1 tablet (25 mg total) by mouth every morning, Disp: 120 tablet, Rfl: 5  •  famotidine (PEPCID) 40 MG tablet, TAKE ONE TABLET BY MOUTH NIGHTLY AT BEDTIME, Disp: 30 tablet, Rfl: 0  •  insulin degludec Jeannette Ruffini FlexTouch) 200 units/mL CONCENTRATED U-200 injection pen, inject 95 units under the skin once daily, Disp: 15 mL, Rfl: 4  •  Lancets (accu-chek multiclix) lancets, Use 1 each 2 (two) times a day Use 2 times daily to test blood glucose, Disp: 102 each, Rfl: 3  •  metoprolol succinate (TOPROL-XL) 50 mg 24 hr tablet, Take 1 tablet (50 mg total) by mouth daily, Disp: 90 tablet, Rfl: 3  •  NovoLOG FlexPen 100 units/mL injection pen, Inject 20-25 Units under the skin 3 (three) times a day with meals, Disp: 30 mL, Rfl: 3  •  omeprazole (PriLOSEC) 20 mg delayed release capsule, Take 1 capsule (20 mg total) by mouth 2 (two) times a day, Disp: 180 capsule, Rfl: 0  •  potassium citrate (UROCIT-K) 10 mEq, Take 1 tablet (10 mEq total) by mouth 2 (two) times a day, Disp: 180 tablet, Rfl: 3  •  torsemide (DEMADEX) 20 mg tablet, 40mg in morning and 20mg at 4pm., Disp: 120 tablet, Rfl: 5  •  umeclidinium-vilanterol (Anoro Ellipta) 62.5-25 mcg/actuation inhaler, Inhale 1 puff daily, Disp: 60 blister, Rfl: 5  •  Unifine Pentips 31G X 8 MM MISC, inject under the skin daily use as directed, Disp: 100 each, Rfl: 0      Vitals:    07/27/23 0839   BP: 130/82   Pulse: 73   SpO2: 93%     Weight (last 2 days)     Date/Time Weight    07/27/23 0839 --     Weight: in wheelchair at 07/27/23 0839        Physical Exam  Vitals reviewed. Constitutional:       General: He is not in acute distress. Appearance: He is well-developed. He is obese. He is not diaphoretic. HENT:      Head: Normocephalic and atraumatic. Eyes:      General: No scleral icterus. Conjunctiva/sclera: Conjunctivae normal.      Pupils: Pupils are equal, round, and reactive to light. Neck:      Thyroid: No thyromegaly.       Vascular: No JVD. Cardiovascular:      Rate and Rhythm: Normal rate and regular rhythm. Heart sounds: Murmur heard. No friction rub. No gallop. Pulmonary:      Effort: Pulmonary effort is normal. No respiratory distress. Breath sounds: Normal breath sounds. No wheezing or rales. Abdominal:      General: Bowel sounds are normal. There is no distension. Palpations: Abdomen is soft. Tenderness: There is no abdominal tenderness. Musculoskeletal:         General: No deformity. Normal range of motion. Cervical back: Normal range of motion and neck supple. Right lower leg: No edema. Left lower leg: No edema. Skin:     General: Skin is warm and dry. Findings: No erythema or rash. Neurological:      General: No focal deficit present. Mental Status: He is alert and oriented to person, place, and time. Cranial Nerves: No cranial nerve deficit. Motor: No weakness. Laboratory Studies:    Laboratory studies all personally reviewed    Cardiac testing:     EKG reviewed personally:   Results for orders placed or performed in visit on 07/27/23   POCT ECG    Impression    Sinus rhythm, sinus arrhythmia, right bundle branch block         Echocardiogram:  2017-EF 55%  3/21-EF 60%, mild LVH, mild RV dilation and mild RV dysfunction, mild-to-moderate TR with mild-to-moderate pulmonary hypertension  12/28/21-EF 55%, moderate concentric hypertrophy, RV dilated, left atrium dilated, right atrium dilated, mild MR, mild TR, mildly elevated pulmonary pressures, mildly dilated ascending aorta-personally reviewed  1/23 - EF 55%, RV dilated with reduced function, severe PHTN, septal flattening  2/23 - EF 55%, RV mildly dilated, mild PHTN    Stress test:      Holter:      Cardiac catheterization:        Zeke Gusman MD    Portions of the record may have been created with voice recognition software.   Occasional wrong word or "sound a like" substitutions may have occurred due to the inherent limitations of voice recognition software. Read the chart carefully and recognize, using context, where substitutions have occurred.

## 2023-07-28 ENCOUNTER — TELEPHONE (OUTPATIENT)
Dept: GASTROENTEROLOGY | Facility: CLINIC | Age: 78
End: 2023-07-28

## 2023-07-28 ENCOUNTER — TELEPHONE (OUTPATIENT)
Age: 78
End: 2023-07-28

## 2023-07-30 DIAGNOSIS — K21.00 GASTROESOPHAGEAL REFLUX DISEASE WITH ESOPHAGITIS WITHOUT HEMORRHAGE: ICD-10-CM

## 2023-07-31 ENCOUNTER — TELEPHONE (OUTPATIENT)
Age: 78
End: 2023-07-31

## 2023-07-31 RX ORDER — FAMOTIDINE 40 MG/1
TABLET, FILM COATED ORAL
Qty: 30 TABLET | Refills: 0 | Status: SHIPPED | OUTPATIENT
Start: 2023-07-31

## 2023-07-31 NOTE — TELEPHONE ENCOUNTER
Pa request for Omeprazole DR 20 mg bid has been submitted to CoverMyMeds  Key:[id number]  Will await insurance determination

## 2023-08-18 DIAGNOSIS — I48.91 ATRIAL FIBRILLATION WITH RAPID VENTRICULAR RESPONSE (HCC): ICD-10-CM

## 2023-08-18 RX ORDER — DIGOXIN 125 MCG
125 TABLET ORAL DAILY
Qty: 90 TABLET | Refills: 0 | Status: SHIPPED | OUTPATIENT
Start: 2023-08-18

## 2023-08-25 DIAGNOSIS — K21.00 GASTROESOPHAGEAL REFLUX DISEASE WITH ESOPHAGITIS WITHOUT HEMORRHAGE: ICD-10-CM

## 2023-08-25 RX ORDER — FAMOTIDINE 40 MG/1
TABLET, FILM COATED ORAL
Qty: 30 TABLET | Refills: 5 | Status: SHIPPED | OUTPATIENT
Start: 2023-08-25

## 2023-08-27 DIAGNOSIS — Z79.4 TYPE 2 DIABETES MELLITUS WITH HYPERGLYCEMIA, WITH LONG-TERM CURRENT USE OF INSULIN (HCC): ICD-10-CM

## 2023-08-27 DIAGNOSIS — E11.65 TYPE 2 DIABETES MELLITUS WITH HYPERGLYCEMIA, WITH LONG-TERM CURRENT USE OF INSULIN (HCC): ICD-10-CM

## 2023-08-28 RX ORDER — INSULIN DEGLUDEC 200 U/ML
INJECTION, SOLUTION SUBCUTANEOUS
Qty: 15 ML | Refills: 0 | Status: SHIPPED | OUTPATIENT
Start: 2023-08-28 | End: 2023-09-07 | Stop reason: SDUPTHER

## 2023-08-31 DIAGNOSIS — Z79.4 TYPE 2 DIABETES MELLITUS WITH HYPERGLYCEMIA, WITH LONG-TERM CURRENT USE OF INSULIN (HCC): Primary | ICD-10-CM

## 2023-08-31 DIAGNOSIS — E11.65 TYPE 2 DIABETES MELLITUS WITH HYPERGLYCEMIA, WITH LONG-TERM CURRENT USE OF INSULIN (HCC): Primary | ICD-10-CM

## 2023-09-02 ENCOUNTER — APPOINTMENT (OUTPATIENT)
Dept: LAB | Facility: CLINIC | Age: 78
End: 2023-09-02
Payer: MEDICARE

## 2023-09-02 LAB
ANION GAP SERPL CALCULATED.3IONS-SCNC: 10 MMOL/L
BUN SERPL-MCNC: 31 MG/DL (ref 5–25)
CALCIUM SERPL-MCNC: 9.4 MG/DL (ref 8.4–10.2)
CHLORIDE SERPL-SCNC: 99 MMOL/L (ref 96–108)
CO2 SERPL-SCNC: 31 MMOL/L (ref 21–32)
CREAT SERPL-MCNC: 1.16 MG/DL (ref 0.6–1.3)
GFR SERPL CREATININE-BSD FRML MDRD: 59 ML/MIN/1.73SQ M
GLUCOSE P FAST SERPL-MCNC: 130 MG/DL (ref 65–99)
POTASSIUM SERPL-SCNC: 4.1 MMOL/L (ref 3.5–5.3)
SODIUM SERPL-SCNC: 140 MMOL/L (ref 135–147)

## 2023-09-02 PROCEDURE — 83036 HEMOGLOBIN GLYCOSYLATED A1C: CPT | Performed by: INTERNAL MEDICINE

## 2023-09-02 PROCEDURE — 80048 BASIC METABOLIC PNL TOTAL CA: CPT | Performed by: INTERNAL MEDICINE

## 2023-09-02 PROCEDURE — 36415 COLL VENOUS BLD VENIPUNCTURE: CPT | Performed by: INTERNAL MEDICINE

## 2023-09-05 LAB
EST. AVERAGE GLUCOSE BLD GHB EST-MCNC: 180 MG/DL
HBA1C MFR BLD: 7.9 %

## 2023-09-07 ENCOUNTER — TELEPHONE (OUTPATIENT)
Dept: ADMINISTRATIVE | Facility: OTHER | Age: 78
End: 2023-09-07

## 2023-09-07 ENCOUNTER — OFFICE VISIT (OUTPATIENT)
Dept: ENDOCRINOLOGY | Facility: CLINIC | Age: 78
End: 2023-09-07
Payer: MEDICARE

## 2023-09-07 VITALS
SYSTOLIC BLOOD PRESSURE: 146 MMHG | BODY MASS INDEX: 34.58 KG/M2 | DIASTOLIC BLOOD PRESSURE: 70 MMHG | HEIGHT: 76 IN | HEART RATE: 76 BPM | WEIGHT: 284 LBS

## 2023-09-07 DIAGNOSIS — Z79.4 TYPE 2 DIABETES MELLITUS WITH HYPERGLYCEMIA, WITH LONG-TERM CURRENT USE OF INSULIN (HCC): ICD-10-CM

## 2023-09-07 DIAGNOSIS — E83.52 HYPERCALCEMIA: ICD-10-CM

## 2023-09-07 DIAGNOSIS — E11.65 TYPE 2 DIABETES MELLITUS WITH HYPERGLYCEMIA, WITH LONG-TERM CURRENT USE OF INSULIN (HCC): ICD-10-CM

## 2023-09-07 DIAGNOSIS — E27.8 ADRENAL NODULE (HCC): Primary | ICD-10-CM

## 2023-09-07 PROCEDURE — 95251 CONT GLUC MNTR ANALYSIS I&R: CPT | Performed by: INTERNAL MEDICINE

## 2023-09-07 PROCEDURE — 99214 OFFICE O/P EST MOD 30 MIN: CPT | Performed by: INTERNAL MEDICINE

## 2023-09-07 RX ORDER — INSULIN ASPART 100 [IU]/ML
20-25 INJECTION, SOLUTION INTRAVENOUS; SUBCUTANEOUS
Qty: 30 ML | Refills: 6 | Status: SHIPPED | OUTPATIENT
Start: 2023-09-07

## 2023-09-07 RX ORDER — INSULIN DEGLUDEC 200 U/ML
INJECTION, SOLUTION SUBCUTANEOUS
Qty: 15 ML | Refills: 6 | Status: SHIPPED | OUTPATIENT
Start: 2023-09-07

## 2023-09-07 NOTE — LETTER
Diabetic Foot Exam Form    Date Requested: 23  Patient: Radha Jackson  Patient : [id number]   Referring Provider: KACEY Ballard    Diabetic Foot Exam Performed with shoes and socks removed        Yes         No     Date of Diabetic Foot Exam ______________________________  Risk Score ____________________________________________    Left Foot       Visual Inspection         Monofilament Testing Sensory Exam        Pedal Pulses         Additional Comments         Right Foot      Visual Inspection         Monofilament Testing Sensory Exam       Pedal Pulses         Additional Comments         Comments __________________________________________________________    Practice Providing Exam ______________________________________________    Exam Performed By (print name) _______________________________________      Provider Signature ___________________________________________________      These reports are needed for  compliance. Please fax this completed form and a copy of the Diabetic Foot Exam report to our office located at [address] [place] [address] as soon as possible via Fax [phone] anthony Lo Mend: Phone [phone]    We thank you for your assistance in treating our mutual patient.

## 2023-09-07 NOTE — PROGRESS NOTES
Established Patient Progress Note      Chief Complaint   Patient presents with   • Diabetes Type 2        History of Present Illness:   Barrett Beckett is a 77 y.o. male with hypertension, hyperlipidemia, sleep apnea, adrenal adenoma, hyperparathyroidism and type 2 diabetes with long term use of insulin for about 28 years. He is accompanied by his daughter-in-law Nohemi Oliva) and wife for today's visit  Last seen in the office in May 3645    Reports complications of neuropathy and microalbuminuria. Last A1C was 7.3. Denies recent severe hypoglycemic or severe hyperglycemic episodes. Denies any issues with his current regimen. Home glucose monitoring: are performed regularly with CGM. Was having GI issues with Metformin. Previously treated with Pioglitazone which was discontinued due to edema.  Did not tolerate GLP-1 agonist due to nausea. SGLT2 inhibitors were avoided for diabetes care due to history of urosepsis/nephrolithiasis. Sharon Neely was started about in May 2023  Admitted to hospital 1/10/23 with CHF exacerbation.     He also has a 4 cm adrenal adenoma which has been evaluated by Dr. Arlette Popper Dr. Christianna Collet workup from feb/march 2021. Aldosterone and [place] metanephrines were normal  2/3/2021 dexamethasone suppression testing did not suppress x 2 so additional testing was performed, DHEAS and ACTH were normal.  Additional testing included 1 salivary cortisol mildly elevated,  And 1 SC was normal. 24-hour urine cortisol was normal. Repeat CT for renal stones done 1/17/23 showed stable right adrenal nodule at approx 4cmx 3.4.     For hypercalcemia, most recent calcium normal. 24 hour urine was completed by nephrology and showed normal calcium level, which does not support diagnosis of FHH. [place] scan was completed and negative for parathyroid adenoma. Has a history of kidney stones and he takes potassium citrate. He takes 1000 units vitamin-D daily dietary calcium intake.  DEXA done 3/2021 showed normal bone density. Denies recent falls or fractures.     [place]   Device used: Innovid 2   Home use   Indication: Type 2 Diabetes  More than 72 hours of data was reviewed. Report to be scanned to chart. Date Range:   Analysis of data:   Average Glucose: 8/25/23-9/7/23  Coefficient of Variation: 20.8%   Time in Target Range: 78%   Time Above Range: 22% high, 0% very high    Time Below Range: 0%     Interpretation of data:    Time in range is 78%. He reports a low a few weeks ago not on tracer       Current regimen:   Tresiba 95 units once daily  NovoLog 20-25 units with each meal depending on carb amount, does not take any if not eating carbs      Of note Jardiance 25 mg daily - was started by cardiology for hx of CHF     Ophthal: scheduled for Sept, [facility]  Pod: follows with podiatry every 6 weeks, Dr. Sarah Jones in 3 weeks.   Flu: planning to get  COVID: plans to get     Has hypertension: Taking lisinopril and metoporol XL  Has hyperlipidemia: Taking atorvastatin and zetia      Taking vit D 1000units daily    Patient Active Problem List   Diagnosis   • Hypertension   • Hyperlipemia   • Type 2 diabetes mellitus with hyperglycemia, with long-term current use of insulin (HCC)   • Chronic low back pain   • CAD (coronary artery disease)   • Malignant neoplasm of urinary bladder (HCC)   • Arthritis   • Paroxysmal atrial fibrillation (HCC)   • AAA (abdominal aortic aneurysm) (HCC)   • Decreased pedal pulses   • Chronic pain of both knees   • Actinic keratosis of right temple   • Actinic keratosis of scalp   • 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   • Immunization deficiency   • Left lower quadrant abdominal pain   • Excessive gas   • Morbid obesity (HCC)   • Colitis   • Adrenal nodule (HCC)   • LUCIA (obstructive sleep apnea)   • Embolism and thrombosis of arteries of the lower extremities (HCC)   • Bilateral edema of lower extremity   • Hypoxia   • Chronic diastolic CHF (congestive heart failure) (HCC)   • Left upper lobe pulmonary nodule   • Hypercalcemia   • Nephrolithiasis   • Hydroureter on left   • Venous stasis dermatitis of both lower extremities   • Blister of right leg   • Persistent proteinuria   • Hypokalemia   • Ureteral calculi   • Benign prostatic hyperplasia without lower urinary tract symptoms   • PAF (paroxysmal atrial fibrillation) (HCC)   • PAD (peripheral artery disease) (HCC)   • Gastroesophageal reflux disease   • Vitamin D deficiency   • Cirrhosis of liver without ascites (HCC)   • Chronic acquired lymphedema   • Pulmonary hypertension (HCC)   • Chronic obstructive pulmonary disease (HCC)      Past Medical History:   Diagnosis Date   • A-fib [facility])    • AAA (abdominal aortic aneurysm) (HCC)    • Actinic keratosis of right temple 7/31/2020   • Actinic keratosis of scalp 7/31/2020   • Acute respiratory failure with hypoxia ([address]) 3/25/2021   • Allergic 1960   • Arthritis    • Lay's esophagus    • Bladder cancer ([address])    • Blister of left leg 4/28/2021   • Blister of right leg 5/26/2021   • CAD (coronary artery disease)    • Cancer ([address]) 02/2010    Bladder   • CHF (congestive heart failure) (HCC)    • Chronic low back pain    • Chronic pain of both knees 7/31/2020   • Colitis 12/4/2020   • CPAP (continuous positive airway pressure) dependence    • Diabetes (HCC)    • Diabetes mellitus ([address]) 10/1993   • Excessive gas 12/3/2020   • Heart murmur    • History of chemotherapy    • Hydroureter on left 4/28/2021   • Hyperlipemia    • Hypertension    • Immunization deficiency 10/30/2020    Hep a nonimmune   • Kidney stone    • Left lower quadrant abdominal pain 12/3/2020   • Mass of right adrenal gland ([address]) 12/4/2020    4.1 cm   • Myocardial infarction [facility])    • 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   • Obesity 2000   • LUCIA (obstructive sleep apnea) 1/29/2021   • Pressure ulcer of right leg, stage 1 5/26/2021   • Urinary tract infection with hematuria 5/3/2021    u cx 4/2021=pseudomonas R to bactrim and cefdinir, S to cipro   • Venous stasis dermatitis of both lower extremities 2021      Past Surgical History:   Procedure Laterality Date   • BACK SURGERY     • BLADDER SURGERY     • CARDIAC CATHETERIZATION  2016   • CARDIAC CATHETERIZATION N/A 2023    Procedure: Cardiac RHC; Surgeon: Herminio Mejia MD;  [facility]; Service: Cardiology   • CATARACT EXTRACTION, BILATERAL     • COLONOSCOPY  2019    next 2022 [place]   • CORONARY ARTERY BYPASS GRAFT  2016    Quadruple per [place]   • EGD  2017   • EYE SURGERY  2016    Cataracts   • JOINT REPLACEMENT  [id number]    Hip and shoulder   • KIDNEY STONE SURGERY     • WA CYSTO BLADDER W/URETERAL CATHETERIZATION Left 2021    Procedure: CYSTOSCOPY  WITH INSERTION STENT URETERAL;  Surgeon: Wyatt Nemuann MD;  Location: [facility];  Service: Urology   • WA CYSTO/URETERO W/LITHOTRIPSY &INDWELL STENT INSRT Left 2021    Procedure: CYSTOSCOPY URETEROSCOPY WITH LITHOTRIPSY HOLMIUM LASER, AND INSERTION STENT URETERAL/EXCHANGE;  Surgeon: Wyatt Neumann MD;  Location[facility];  Service: Urology   • TOTAL HIP ARTHROPLASTY Right 2012   • TOTAL SHOULDER REPLACEMENT Right 2013   • VASECTOMY  1971      Family History   Problem Relation Age of Onset   • Heart disease Father    • Arthritis Father    • Heart attack Father    • Alzheimer's disease Mother    • Dementia Mother    • Diabetes type II Sister      Social History     Tobacco Use   • Smoking status: Former     Packs/day: 0.25     Years: 20.00     Total pack years: 5.00     Types: Cigarettes     Start date: 1965     Quit date: 2009     Years since quittin.6   • Smokeless tobacco: Never   • Tobacco comments:     Quit several times for up to 6 years.    Substance Use Topics   • Alcohol use: Not Currently     Comment: No use     Allergies   Allergen Reactions   • Ciprofloxacin Abdominal Pain, Diarrhea, GI Bleeding and GI Intolerance   • Diltiazem Abdominal Pain • Metronidazole Abdominal Pain, Diarrhea, GI Bleeding and GI Intolerance         Current Outpatient Medications:   •  apixaban (Eliquis) 5 mg, Take 1 tablet (5 mg total) by mouth 2 (two) times a day, Disp: 180 tablet, Rfl: 3  •  atorvastatin (LIPITOR) 40 mg tablet, TAKE ONE TABLET BY MOUTH ONCE DAILY, Disp: 90 tablet, Rfl: 0  •  cholecalciferol (VITAMIN D3) 1,000 units tablet, Take 1,000 Units by mouth daily, Disp: , Rfl:   •  Continuous Blood Gluc Sensor (FreeStyle Peggy 2 Sensor) MISC, Change it every 14 days, Disp: 1 each, Rfl: 0  •  dicyclomine (BENTYL) 10 mg capsule, Take 1 capsule (10 mg total) by mouth 2 (two) times a day as needed (abdominal cramping), Disp: 60 capsule, Rfl: 3  •  digoxin (LANOXIN) 0.125 mg tablet, TAKE ONE TABLET BY MOUTH ONCE DAILY, Disp: 90 tablet, Rfl: 0  •  Empagliflozin (Jardiance) 25 MG TABS, Take 1 tablet (25 mg total) by mouth every morning, Disp: 120 tablet, Rfl: 5  •  famotidine (PEPCID) 40 MG tablet, TAKE ONE TABLET BY MOUTH NIGHTLY AT BEDTIME, Disp: 30 tablet, Rfl: 5  •  insulin degludec [place] Demark FlexTouch) 200 units/mL CONCENTRATED U-200 injection pen, inject 95 units under the skin once daily, Disp: 15 mL, Rfl: 6  •  Lancets (accu-chek multiclix) lancets, Use 1 each 2 (two) times a day Use 2 times daily to test blood glucose, Disp: 102 each, Rfl: 3  •  metoprolol succinate (TOPROL-XL) 50 mg 24 hr tablet, Take 1 tablet (50 mg total) by mouth daily, Disp: 90 tablet, Rfl: 3  •  NovoLOG FlexPen 100 units/mL injection pen, Inject 20-25 Units under the skin 3 (three) times a day with meals, Disp: 30 mL, Rfl: 6  •  omeprazole (PriLOSEC) 20 mg delayed release capsule, Take 1 capsule (20 mg total) by mouth 2 (two) times a day (Patient taking differently: Take 20 mg by mouth daily), Disp: 180 capsule, Rfl: 0  •  potassium citrate (UROCIT-K) 10 mEq, Take 1 tablet (10 mEq total) by mouth 2 (two) times a day, Disp: 180 tablet, Rfl: 3  •  torsemide (DEMADEX) 20 mg tablet, 40mg in morning and 20mg at 4pm., Disp: 120 tablet, Rfl: 5  •  umeclidinium-vilanterol (Anoro Ellipta) 62.5-25 mcg/actuation inhaler, Inhale 1 puff daily, Disp: 60 blister, Rfl: 5  •  Unifine Pentips 31G X 8 MM MISC, inject under the skin daily use as directed, Disp: 100 each, Rfl: 0    Review of Systems   Constitutional: Negative for unexpected weight change. HENT: Positive for hearing loss. Eyes: Negative for visual disturbance. Respiratory: Positive for shortness of breath (on O2 therapy - SOB with exertion). Musculoskeletal: Positive for gait problem. Neurological: Positive for numbness. Negative for tremors. Psychiatric/Behavioral: The patient is not nervous/anxious. Physical Exam:  Body mass index is 34.57 kg/m². /70   Pulse 76   Ht 6' 4" (1.93 m)   Wt 129 kg (284 lb)   BMI 34.57 kg/m²    Wt Readings from Last 3 Encounters:   09/07/23 129 kg (284 lb)   07/07/23 127 kg (280 lb)   06/21/23 128 kg (281 lb 3.2 oz)     Labs:   Lab Results   Component Value Date    HGBA1C 7.9 (H) 09/02/2023    HGBA1C 8.1 (H) 05/11/2023    HGBA1C 7.3 (H) 02/11/2023     Lab Results   Component Value Date    CREATININE 1.16 09/02/2023    CREATININE 1.36 (H) 06/23/2023    CREATININE 1.33 (H) 05/11/2023    BUN 31 (H) 09/02/2023    K 4.1 09/02/2023    CL 99 09/02/2023    CO2 31 09/02/2023     eGFR   Date Value Ref Range Status   09/02/2023 59 ml/min/1.73sq m Final     Lab Results   Component Value Date    HDL 31 (L) 05/11/2023    TRIG 295 (H) 05/11/2023     Lab Results   Component Value Date    ALT 14 05/11/2023    AST 16 05/11/2023    ALKPHOS 97 05/11/2023     Lab Results   Component Value Date    MKP5PEYHDGZN 1.150 05/11/2023    CRI8VIBENERX 0.848 02/11/2023    FBV0QEQIPORO 0.984 12/27/2021     Impression & Plan:    1. T2DM: A1c is at his goal is 7-8%, and he is using his [place] 2 well. I asked that he continue [place] to 95units and Novolog 20-25units with meals. A1c and CMP ordered for next visit.       2.  Right adrenal adenoma: biochemical eval was unremarkable in the past. Appears grossly stable since 2014. No new orders at this time.      3. Hypercalcemia, hyperparathyroidism: Calcium is stable. Will monitor with labs. 4. CHF: Micheline Escobar is part of CHF regimen. 1. Adrenal nodule ([address])        2. Type 2 diabetes mellitus with hyperglycemia, with long-term current use of insulin (HCC)  insulin degludec [place] FlexTouch) 200 units/mL CONCENTRATED U-200 injection pen    NovoLOG FlexPen 100 units/mL injection pen    Comprehensive metabolic panel Lab Collect    Hemoglobin A1C      3. Hypercalcemia              Discussed with the patient and all questioned fully answered. He will call me if any problems arise.

## 2023-09-07 NOTE — LETTER
Diabetic Foot Exam Form    Date Requested: 23  Patient: Nuha Sosa  Patient : 1945   Referring Provider: KACEY Fernandez    Diabetic Foot Exam Performed with shoes and socks removed        Yes         No     Date of Diabetic Foot Exam ______________________________  Risk Score ____________________________________________    Left Foot       Visual Inspection         Monofilament Testing Sensory Exam        Pedal Pulses         Additional Comments         Right Foot      Visual Inspection         Monofilament Testing Sensory Exam       Pedal Pulses         Additional Comments         Comments __________________________________________________________    Practice Providing Exam ______________________________________________    Exam Performed By (print name) _______________________________________      Provider Signature ___________________________________________________      These reports are needed for  compliance. Please fax this completed form and a copy of the Diabetic Foot Exam report to our office located at [address] [place] [address] as soon as possible via Fax [phone] anthony [place]over [place]: Phone [phone]    We thank you for your assistance in treating our mutual patient.

## 2023-09-07 NOTE — TELEPHONE ENCOUNTER
----- Message from Inman Precise, MA sent at 9/7/2023  9:07 AM EDT -----  09/07/23 9:07 AM    Hello, our patient Musa Daniels has had Diabetic Foot Exam completed/performed. Please assist in updating the patient chart by making an External outreach to Dr. Ken Brunner facility located in [place] ([place]). The date of service is 8/2023.     Thank you,  Cesar Jarquin MA  [facility]

## 2023-09-07 NOTE — LETTER
Diabetic Foot Exam Form    Date Requested: 23  Patient: Musa Daniels  Patient : 1945   Referring Provider: KACEY Motta    Diabetic Foot Exam Performed with shoes and socks removed        Yes         No     Date of Diabetic Foot Exam ______________________________  Risk Score ____________________________________________    Left Foot       Visual Inspection         Monofilament Testing Sensory Exam        Pedal Pulses         Additional Comments         Right Foot      Visual Inspection         Monofilament Testing Sensory Exam       Pedal Pulses         Additional Comments         Comments __________________________________________________________    Practice Providing Exam ______________________________________________    Exam Performed By (print name) _______________________________________      Provider Signature ___________________________________________________      These reports are needed for  compliance. Please fax this completed form and a copy of the Diabetic Foot Exam report to our office located at [address] [place] [address] as soon as possible via Fax [phone] anthony Dolan: Phone [phone]    We thank you for your assistance in treating our mutual patient.

## 2023-09-08 NOTE — TELEPHONE ENCOUNTER
Upon review of the In Basket request and the patient's chart, initial outreach has been made via fax to facility. Please see Contacts section for details.      Thank you  Adela Leo MA

## 2023-09-14 ENCOUNTER — RA CDI HCC (OUTPATIENT)
Dept: OTHER | Facility: HOSPITAL | Age: 78
End: 2023-09-14

## 2023-09-14 NOTE — PROGRESS NOTES
[address] [facility] coding opportunities          Chart Reviewed number of suggestions sent to Provider: 2   I11.0  E11.51  Patients Insurance     Medicare Insurance: Medicare
None

## 2023-09-20 NOTE — TELEPHONE ENCOUNTER
As a follow-up, a second attempt has been made for outreach via telephone call to facility. Please see Contacts section for details.     Thank you  Laurie Leblanc MA

## 2023-09-21 ENCOUNTER — OFFICE VISIT (OUTPATIENT)
Dept: FAMILY MEDICINE CLINIC | Facility: CLINIC | Age: 78
End: 2023-09-21
Payer: MEDICARE

## 2023-09-21 VITALS
BODY MASS INDEX: 34.1 KG/M2 | DIASTOLIC BLOOD PRESSURE: 58 MMHG | SYSTOLIC BLOOD PRESSURE: 132 MMHG | OXYGEN SATURATION: 94 % | HEIGHT: 76 IN | HEART RATE: 74 BPM | RESPIRATION RATE: 17 BRPM | WEIGHT: 280 LBS | TEMPERATURE: 96.6 F

## 2023-09-21 DIAGNOSIS — R29.898 WEAKNESS OF BOTH LOWER LIMBS: ICD-10-CM

## 2023-09-21 DIAGNOSIS — R05.2 SUBACUTE COUGH: ICD-10-CM

## 2023-09-21 DIAGNOSIS — Z99.3 WHEELCHAIR DEPENDENCE: ICD-10-CM

## 2023-09-21 DIAGNOSIS — G89.29 CHRONIC PAIN OF BOTH KNEES: ICD-10-CM

## 2023-09-21 DIAGNOSIS — M25.562 CHRONIC PAIN OF BOTH KNEES: ICD-10-CM

## 2023-09-21 DIAGNOSIS — M19.049 HAND ARTHROPATHY: Primary | ICD-10-CM

## 2023-09-21 DIAGNOSIS — M54.41 CHRONIC MIDLINE LOW BACK PAIN WITH BILATERAL SCIATICA: ICD-10-CM

## 2023-09-21 DIAGNOSIS — J44.9 CHRONIC OBSTRUCTIVE PULMONARY DISEASE, UNSPECIFIED COPD TYPE (HCC): ICD-10-CM

## 2023-09-21 DIAGNOSIS — M25.561 CHRONIC PAIN OF BOTH KNEES: ICD-10-CM

## 2023-09-21 DIAGNOSIS — I48.0 PAROXYSMAL ATRIAL FIBRILLATION (HCC): ICD-10-CM

## 2023-09-21 DIAGNOSIS — R26.89 BALANCE PROBLEM: ICD-10-CM

## 2023-09-21 DIAGNOSIS — I27.20 PULMONARY HYPERTENSION (HCC): ICD-10-CM

## 2023-09-21 DIAGNOSIS — I50.32 CHRONIC DIASTOLIC CHF (CONGESTIVE HEART FAILURE) (HCC): ICD-10-CM

## 2023-09-21 DIAGNOSIS — Z79.4 TYPE 2 DIABETES MELLITUS WITH HYPERGLYCEMIA, WITH LONG-TERM CURRENT USE OF INSULIN (HCC): ICD-10-CM

## 2023-09-21 DIAGNOSIS — G89.29 CHRONIC MIDLINE LOW BACK PAIN WITH BILATERAL SCIATICA: ICD-10-CM

## 2023-09-21 DIAGNOSIS — E11.65 TYPE 2 DIABETES MELLITUS WITH HYPERGLYCEMIA, WITH LONG-TERM CURRENT USE OF INSULIN (HCC): ICD-10-CM

## 2023-09-21 DIAGNOSIS — I10 PRIMARY HYPERTENSION: ICD-10-CM

## 2023-09-21 DIAGNOSIS — M54.42 CHRONIC MIDLINE LOW BACK PAIN WITH BILATERAL SCIATICA: ICD-10-CM

## 2023-09-21 PROCEDURE — 99214 OFFICE O/P EST MOD 30 MIN: CPT | Performed by: NURSE PRACTITIONER

## 2023-09-22 NOTE — PROGRESS NOTES
Assessment/Plan:    1. Hand arthropathy    2. Subacute cough    3. Balance problem    4. Weakness of both lower limbs    5. Chronic pain of both knees    6. Chronic midline low back pain with bilateral sciatica    7. Type 2 diabetes mellitus with hyperglycemia, with long-term current use of insulin (HCC)    8. Chronic obstructive pulmonary disease, unspecified COPD type ([address])    9. Primary hypertension    10. Paroxysmal atrial fibrillation (HCC)    11. Chronic diastolic CHF (congestive heart failure) ([address])    12. Pulmonary hypertension ([address])    13. Wheelchair dependence    The case discussed with patient using patient centered shared decision making. The patient was counseled regarding instructions for management,-- risk factor reductions,-- prognosis,-- impressions,-- risks and benefits of treatment options,-- importance of compliance with treatment. I have reviewed the instructions with the patient, answering all questions to his satisfaction. 1. Chronic conditions clinically stable  DM uncontrolled a1c 7.9-endo managed  CAD/s/pCABG/PAF/CHF-no evidence of overt CHF today  HTN-controlled  Continue plan of care  Will follow along with specialists    2. Recent URI-resolving  Chest clear  No evidence of bronchitis, PNA  Call with relapse, colored sputum, fever, worsening cough    3. Mobility issues  Pt appropriate candidate for motorized scooter  See below:        78 yo male with significant history of diabetes, polyneuropathy, copd, Afib, CKD stage 3, anemia, chf, pulm htn, general weakness, exertional shortness of breath  meets criteria for medically necessary Motorized wheelchair or scooter due to the following      Polyneuropathy, severe osteoarthritis, chronic respiratory difficulties, morbid obesity contribute to mobility limitations which thereby significantly impair participation in mobility related activities of daily living including but not limited to toileting, dressing, grooming, mobilizing self.    He has very limited endurance and muscle strength. His mobility is limited to walking 20 feet or less when attending to doctor's appointments and other other out of home necessities. His spouse is too weak to push him in a wheelchair. his arm strength too weak to propel himself in a wheelchair. He is limited to leaving the home when his daughter is available to assist him which is infrequently. There are no Patient Instructions on file for this visit. Return in about 3 months (around 12/21/2023). Subjective:      Patient ID: Ramon Patel is a 77 y.o. male. Chief Complaint   Patient presents with   • Follow-up     3 month       76 yo male with complex medical history presents for chronic condition follow up  Accompanied by his wife, daughter    Reviewed his chart, medical history in detail    DM not well controlled a1c 7.9-endo managed  CAD/s/pCABG/PAF/CHF-per cardio  HTN-controlled  CKD    He and family feel he is doing well. Chronic conditions stable    Recent uri  Feeling better  Still having occasional dry cough, tickle in throat    Pt interested in scooter  He is largely house bound when daughter not available to help him  In wheelchair  He is weak, he has multi joint OA he can not walk more than 20 ft due to breathing, pain, weakness      The following portions of the patient's history were reviewed and updated as appropriate: allergies, current medications, past family history, past medical history, past social history, past surgical history and problem list.    Review of Systems   Constitutional: Positive for fatigue. Negative for fever and unexpected weight change. Weight stable   HENT: Positive for hearing loss. Negative for trouble swallowing. Respiratory: Positive for cough. Negative for shortness of breath. CHAVEZ at baseline   Cardiovascular: Positive for leg swelling. Negative for chest pain and palpitations.    Gastrointestinal: Negative for abdominal pain and blood in stool. Endocrine: Negative. Genitourinary: Negative for decreased urine volume, difficulty urinating and hematuria. Musculoskeletal: Positive for arthralgias and gait problem. Pt arrives in wheelchair   Skin: Positive for rash. Negative for pallor and wound. Neurological: Positive for weakness. Negative for dizziness and headaches. Psychiatric/Behavioral: Negative for confusion and dysphoric mood. The patient is not nervous/anxious.           Current Outpatient Medications   Medication Sig Dispense Refill   • apixaban (Eliquis) 5 mg Take 1 tablet (5 mg total) by mouth 2 (two) times a day 180 tablet 3   • atorvastatin (LIPITOR) 40 mg tablet TAKE ONE TABLET BY MOUTH ONCE DAILY 90 tablet 0   • cholecalciferol (VITAMIN D3) 1,000 units tablet Take 1,000 Units by mouth daily     • Continuous Blood Gluc Sensor (FreeStyle Peggy 2 Sensor) MISC Change it every 14 days 1 each 0   • dicyclomine (BENTYL) 10 mg capsule Take 1 capsule (10 mg total) by mouth 2 (two) times a day as needed (abdominal cramping) 60 capsule 3   • digoxin (LANOXIN) 0.125 mg tablet TAKE ONE TABLET BY MOUTH ONCE DAILY 90 tablet 0   • Empagliflozin (Jardiance) 25 MG TABS Take 1 tablet (25 mg total) by mouth every morning 120 tablet 5   • famotidine (PEPCID) 40 MG tablet TAKE ONE TABLET BY MOUTH NIGHTLY AT BEDTIME 30 tablet 5   • insulin degludec [place]ark FlexTouch) 200 units/mL CONCENTRATED U-200 injection pen inject 95 units under the skin once daily 15 mL 6   • Lancets (accu-chek multiclix) lancets Use 1 each 2 (two) times a day Use 2 times daily to test blood glucose 102 each 3   • metoprolol succinate (TOPROL-XL) 50 mg 24 hr tablet Take 1 tablet (50 mg total) by mouth daily 90 tablet 3   • NovoLOG FlexPen 100 units/mL injection pen Inject 20-25 Units under the skin 3 (three) times a day with meals 30 mL 6   • omeprazole (PriLOSEC) 20 mg delayed release capsule Take 1 capsule (20 mg total) by mouth 2 (two) times a day (Patient taking differently: Take 20 mg by mouth daily) 180 capsule 0   • potassium citrate (UROCIT-K) 10 mEq Take 1 tablet (10 mEq total) by mouth 2 (two) times a day 180 tablet 3   • torsemide (DEMADEX) 20 mg tablet 40mg in morning and 20mg at 4pm. 120 tablet 5   • umeclidinium-vilanterol (Anoro Ellipta) 62.5-25 mcg/actuation inhaler Inhale 1 puff daily 60 blister 5   • Unifine Pentips 31G X 8 MM MISC inject under the skin daily use as directed 100 each 0     No current facility-administered medications for this visit. Objective:    /58 (BP Location: Right arm, Patient Position: Sitting, Cuff Size: Large)   Pulse 74   Temp (!) 96.6 °F (35.9 °C) (Temporal)   Resp 17   Ht 6' 4" (1.93 m)   Wt 127 kg (280 lb)   SpO2 94%   BMI 34.08 kg/m²        Physical Exam  Vitals and nursing note reviewed. Constitutional:       General: He is not in acute distress. Appearance: Normal appearance. He is not ill-appearing. Comments: Elderly gentleman presents in wheelchair   HENT:      Head: Normocephalic and atraumatic. Right Ear: There is impacted cerumen. Left Ear: Tympanic membrane normal.   Neck:      Thyroid: No thyromegaly. Vascular: No JVD. Cardiovascular:      Rate and Rhythm: Normal rate. Rhythm irregular. Pulses: Normal pulses. Heart sounds:      No S3 or S4 sounds. Pulmonary:      Effort: Pulmonary effort is normal.      Breath sounds: No rales. Abdominal:      Palpations: Abdomen is soft. Tenderness: There is no abdominal tenderness. Musculoskeletal:      Right lower le+ Edema present. Left lower le+ Edema present. Lymphadenopathy:      Cervical: No cervical adenopathy. Skin:     General: Skin is warm and dry. Coloration: Skin is not pale. Comments: Scaly skin with erythematous base gluteal cleft and abd pannus    No pressure ulcers, wounds seen   Neurological:      General: No focal deficit present. Mental Status: He is alert. Motor: Weakness present.       Gait: Gait abnormal.   Psychiatric:         Mood and Affect: Mood normal.                KACEY Crabtree

## 2023-09-25 NOTE — TELEPHONE ENCOUNTER
As a final attempt, a third outreach has been made via telephone call to facility. Please see Contacts section for details. This encounter will be closed and completed by end of day. Should we receive the requested information because of previous outreach attempts, the requested patient's chart will be updated appropriately.      Thank you  Benito Montes MA

## 2023-10-01 DIAGNOSIS — I25.83 CORONARY ARTERY DISEASE DUE TO LIPID RICH PLAQUE: ICD-10-CM

## 2023-10-01 DIAGNOSIS — I25.10 CORONARY ARTERY DISEASE DUE TO LIPID RICH PLAQUE: ICD-10-CM

## 2023-10-02 RX ORDER — ATORVASTATIN CALCIUM 40 MG/1
TABLET, FILM COATED ORAL
Qty: 90 TABLET | Refills: 0 | Status: SHIPPED | OUTPATIENT
Start: 2023-10-02

## 2023-10-12 ENCOUNTER — TELEPHONE (OUTPATIENT)
Dept: VASCULAR SURGERY | Facility: CLINIC | Age: 78
End: 2023-10-12

## 2023-10-12 NOTE — TELEPHONE ENCOUNTER
Attempted to contact patient to schedule appointment(s) listed below. Requested patient call [phone] option 3 to schedule appointment(s). Patient's appointment(s) are due on or after 12/15/2023 .     Dopplers  [x] Abdominal Aorta Iliac (AOIL) 12/15/2023  [] Carotid (CV)   [] Celiac and/or Mesenteric  [] Endovascular Aortic Repair (EVAR)   [] Hemodialysis Access (HD)   [] Lower Limb Arterial (BRAEDEN)  [] Lower Limb Venous (LEV)  [] Lower Limb Venous Duplex with Reflux (LEVDR)  [] Renal Artery  [] Upper Limb Arterial (UEA)    [] Upper Limb Venous (UEV)              [] CROW and Waveform analysis     Advanced Imaging   [] CTA head/neck    [] CTA abdomen    [] CTA abdomen & pelvis    [] CT abdomen with/ without contrast  [] CT abdomen with contrast  [] CT abdomen without contrast    [] CT abdomen & pelvis with/ without contrast  [] CT abdomen & pelvis with contrast  [] CT abdomen & pelvis without contrast    Office Visit   [] New patient, patient last seen over 3 years ago  [x] Risk factor modification (RFM)   [] Follow up   [] Lost to follow up (LTFU)

## 2023-10-17 DIAGNOSIS — J44.9 CHRONIC OBSTRUCTIVE PULMONARY DISEASE, UNSPECIFIED COPD TYPE (HCC): ICD-10-CM

## 2023-10-17 RX ORDER — UMECLIDINIUM BROMIDE AND VILANTEROL TRIFENATATE 62.5; 25 UG/1; UG/1
1 POWDER RESPIRATORY (INHALATION) DAILY
Qty: 60 EACH | Refills: 5 | Status: SHIPPED | OUTPATIENT
Start: 2023-10-17

## 2023-10-26 DIAGNOSIS — I50.9 CHF EXACERBATION (HCC): ICD-10-CM

## 2023-10-26 RX ORDER — TORSEMIDE 20 MG/1
TABLET ORAL
Qty: 120 TABLET | Refills: 0 | Status: SHIPPED | OUTPATIENT
Start: 2023-10-26

## 2023-10-26 NOTE — ASSESSMENT & PLAN NOTE
Addended by: MANE BELLAMY on: 10/26/2023 10:10 AM     Modules accepted: Orders     · Has been off CPAP due to malfunctioning machine; is to be re-initiated in outpatient setting  · Continue CPAP q h s  here in hospital

## 2023-11-02 ENCOUNTER — RA CDI HCC (OUTPATIENT)
Dept: OTHER | Facility: HOSPITAL | Age: 78
End: 2023-11-02

## 2023-11-02 NOTE — PROGRESS NOTES
[address] [facility] coding opportunities          Chart Reviewed number of suggestions sent to Provider: 2   E11.51  I13.0    Patients Insurance     Medicare Insurance: [employer]

## 2023-11-09 ENCOUNTER — OFFICE VISIT (OUTPATIENT)
Dept: FAMILY MEDICINE CLINIC | Facility: CLINIC | Age: 78
End: 2023-11-09
Payer: MEDICARE

## 2023-11-09 VITALS
OXYGEN SATURATION: 88 % | HEART RATE: 70 BPM | HEIGHT: 76 IN | RESPIRATION RATE: 17 BRPM | WEIGHT: 281 LBS | SYSTOLIC BLOOD PRESSURE: 128 MMHG | DIASTOLIC BLOOD PRESSURE: 50 MMHG | BODY MASS INDEX: 34.22 KG/M2 | TEMPERATURE: 98.6 F

## 2023-11-09 DIAGNOSIS — I48.11 LONGSTANDING PERSISTENT ATRIAL FIBRILLATION (HCC): ICD-10-CM

## 2023-11-09 DIAGNOSIS — J44.9 CHRONIC OBSTRUCTIVE PULMONARY DISEASE, UNSPECIFIED COPD TYPE (HCC): ICD-10-CM

## 2023-11-09 DIAGNOSIS — M25.562 CHRONIC PAIN OF BOTH KNEES: ICD-10-CM

## 2023-11-09 DIAGNOSIS — M54.42 CHRONIC MIDLINE LOW BACK PAIN WITH BILATERAL SCIATICA: ICD-10-CM

## 2023-11-09 DIAGNOSIS — R29.898 WEAKNESS OF BOTH LOWER LIMBS: Primary | ICD-10-CM

## 2023-11-09 DIAGNOSIS — G89.29 CHRONIC PAIN OF BOTH KNEES: ICD-10-CM

## 2023-11-09 DIAGNOSIS — R26.89 BALANCE PROBLEM: ICD-10-CM

## 2023-11-09 DIAGNOSIS — I50.32 CHRONIC DIASTOLIC CHF (CONGESTIVE HEART FAILURE) (HCC): ICD-10-CM

## 2023-11-09 DIAGNOSIS — M54.41 CHRONIC MIDLINE LOW BACK PAIN WITH BILATERAL SCIATICA: ICD-10-CM

## 2023-11-09 DIAGNOSIS — R29.898 WEAKNESS OF DISTAL ARMS AND LEGS: ICD-10-CM

## 2023-11-09 DIAGNOSIS — Z23 ENCOUNTER FOR IMMUNIZATION: ICD-10-CM

## 2023-11-09 DIAGNOSIS — M25.561 CHRONIC PAIN OF BOTH KNEES: ICD-10-CM

## 2023-11-09 DIAGNOSIS — Z99.3 WHEELCHAIR DEPENDENCE: ICD-10-CM

## 2023-11-09 DIAGNOSIS — G89.29 CHRONIC MIDLINE LOW BACK PAIN WITH BILATERAL SCIATICA: ICD-10-CM

## 2023-11-09 DIAGNOSIS — R06.02 EXERTIONAL SHORTNESS OF BREATH: ICD-10-CM

## 2023-11-09 PROCEDURE — 90662 IIV NO PRSV INCREASED AG IM: CPT

## 2023-11-09 PROCEDURE — G0008 ADMIN INFLUENZA VIRUS VAC: HCPCS

## 2023-11-09 PROCEDURE — 99213 OFFICE O/P EST LOW 20 MIN: CPT | Performed by: NURSE PRACTITIONER

## 2023-11-09 NOTE — PROGRESS NOTES
Assessment/Plan:    1. Weakness of both lower limbs    2. Encounter for immunization  -     influenza vaccine, high-dose, PF 0.7 mL (FLUZONE HIGH-DOSE)    3. Balance problem    4. Chronic pain of both knees    5. Chronic midline low back pain with bilateral sciatica    6. Chronic obstructive pulmonary disease, unspecified COPD type ([address])    7. Chronic diastolic CHF (congestive heart failure) ([address])    8. Wheelchair dependence    9. Longstanding persistent atrial fibrillation ([address])    10. Weakness of distal arms and legs    11. Exertional shortness of breath    The case discussed with patient using patient centered shared decision making. The patient was counseled regarding instructions for management,-- risk factor reductions,-- prognosis,-- impressions,-- risks and benefits of treatment options,-- importance of compliance with treatment. I have reviewed the instructions with the patient, answering all questions to his satisfaction. 1. Chronic conditions clinically stable  DM uncontrolled a1c 7.9-endo managed  CAD/s/pCABG/PAF/CHF-no evidence of overt CHF today  HTN-controlled  Continue plan of care  Will follow along with specialists    2. Recent URI-resolving  Chest clear  No evidence of bronchitis, PNA  Call with relapse, colored sputum, fever, worsening cough    3.  Mobility issues  Pt appropriate candidate for motorized scooter  See below:        76 yo male with significant history of diabetes, polyneuropathy, copd, Afib, CKD stage 3, anemia, chf, pulm htn, general weakness, weakness of upper and lower extremity limbs, exertional shortness of breath  meets criteria for medically necessary Motorized wheelchair or scooter due to the following      Polyneuropathy, severe osteoarthritis, chronic respiratory difficulties, morbid obesity contribute to mobility limitations which thereby significantly impair participation in mobility related activities of daily living including but not limited to toileting, dressing, grooming, mobilizing self. Patient has significant upper and lower extremity limb weakness and has difficulty supporting himself to use assistive device such as walker or cane. Additionally he is unable to propel himself in manual wheelchair due to his lack of arm strength. His wife does not have the strength to mobilize him or propel him in wheelchair at home or in wheelchair taking him to doctors appointments. Thereby, motorized scooter is medically necessary to facilitate his independent mobility at home and to medical appointments and to shopping to secure food and medications. He has very limited endurance and muscle strength. His mobility is limited to walking 20 feet or less when attending to doctor's appointments and other other out of home necessities. His spouse is too weak to push him in a wheelchair. his arm strength too weak to propel himself in a wheelchair. He is limited to leaving the home when his daughter is available to assist him which is infrequently. There are no Patient Instructions on file for this visit. Return for Next scheduled follow up. Subjective:      Patient ID: Juan C Sorenson is a 77 y.o. male. Chief Complaint   Patient presents with    Other     Patient would like to discuss getting a motorized wheelchair       76 yo male with complex medical history presents for chronic condition follow up  Accompanied by his wife, daughter    Reviewed his chart, medical history in detail    DM not well controlled a1c 7.9-endo managed  CAD/s/pCABG/PAF/CHF-per cardio  HTN-controlled  CKD    He and family feel he is doing well. Chronic conditions stable    Pt interested in scooter  He is largely house bound when daughter not available to help him  In wheelchair-has upper and lower extremity weakness. Has difficulty supporting self with walker and cane. Lacks strengths to propels self in wheel chair.  Wife unable to as well  He is weak, he has multi joint OA he can not walk more than 20 ft due to breathing, pain, weakness        The following portions of the patient's history were reviewed and updated as appropriate: allergies, current medications, past family history, past medical history, past social history, past surgical history and problem list.    Review of Systems   Constitutional:  Positive for fatigue. Negative for fever and unexpected weight change. Weight stable   HENT:  Positive for hearing loss. Negative for trouble swallowing. Respiratory:  Positive for cough. Negative for shortness of breath. CHAVEZ at baseline   Cardiovascular:  Positive for leg swelling. Negative for chest pain and palpitations. Gastrointestinal:  Negative for abdominal pain and blood in stool. Endocrine: Negative. Genitourinary:  Negative for decreased urine volume, difficulty urinating and hematuria. Musculoskeletal:  Positive for arthralgias, back pain and gait problem. Pt arrives in wheelchair   Skin:  Positive for rash. Negative for pallor and wound. Neurological:  Positive for weakness. Negative for dizziness and headaches. Psychiatric/Behavioral:  Negative for confusion and dysphoric mood. The patient is not nervous/anxious.           Current Outpatient Medications   Medication Sig Dispense Refill    apixaban (Eliquis) 5 mg Take 1 tablet (5 mg total) by mouth 2 (two) times a day 180 tablet 3    atorvastatin (LIPITOR) 40 mg tablet TAKE ONE TABLET BY MOUTH ONCE DAILY 90 tablet 0    cholecalciferol (VITAMIN D3) 1,000 units tablet Take 1,000 Units by mouth daily      Continuous Blood Gluc Sensor (FreeStyle Peggy 2 Sensor) MISC Change it every 14 days 1 each 0    dicyclomine (BENTYL) 10 mg capsule Take 1 capsule (10 mg total) by mouth 2 (two) times a day as needed (abdominal cramping) 60 capsule 3    digoxin (LANOXIN) 0.125 mg tablet TAKE ONE TABLET BY MOUTH ONCE DAILY 90 tablet 0    Empagliflozin (Jardiance) 25 MG TABS Take 1 tablet (25 mg total) by mouth every morning 120 tablet 5    famotidine (PEPCID) 40 MG tablet TAKE ONE TABLET BY MOUTH NIGHTLY AT BEDTIME 30 tablet 5    insulin degludec Naty Pacini FlexTouch) 200 units/mL CONCENTRATED U-200 injection pen inject 95 units under the skin once daily 15 mL 6    Lancets (accu-chek multiclix) lancets Use 1 each 2 (two) times a day Use 2 times daily to test blood glucose 102 each 3    metoprolol succinate (TOPROL-XL) 50 mg 24 hr tablet Take 1 tablet (50 mg total) by mouth daily 90 tablet 3    NovoLOG FlexPen 100 units/mL injection pen Inject 20-25 Units under the skin 3 (three) times a day with meals 30 mL 6    omeprazole (PriLOSEC) 20 mg delayed release capsule Take 1 capsule (20 mg total) by mouth 2 (two) times a day (Patient taking differently: Take 20 mg by mouth daily) 180 capsule 0    potassium citrate (UROCIT-K) 10 mEq Take 1 tablet (10 mEq total) by mouth 2 (two) times a day 180 tablet 3    torsemide (DEMADEX) 20 mg tablet take two tablets (40mg) by mouth in the morning and one tablet (20mg) at 4pm 120 tablet 0    umeclidinium-vilanterol (Anoro Ellipta) 62.5-25 mcg/actuation inhaler INHALE ONE PUFF BY MOUTH ONCE DAILY 60 each 5    Unifine Pentips 31G X 8 MM MISC inject under the skin daily use as directed 100 each 0     No current facility-administered medications for this visit. Objective:    /50 (BP Location: Right arm, Patient Position: Sitting, Cuff Size: Large)   Pulse 70   Temp 98.6 °F (37 °C) (Temporal)   Resp 17   Ht 6' 4" (1.93 m)   Wt 127 kg (281 lb)   SpO2 (!) 88%   BMI 34.20 kg/m²        Physical Exam  Vitals and nursing note reviewed. Constitutional:       General: He is not in acute distress. Appearance: Normal appearance. He is not ill-appearing. Comments: Elderly gentleman presents in wheelchair   HENT:      Head: Normocephalic and atraumatic. Neck:      Thyroid: No thyromegaly. Vascular: No JVD. Cardiovascular:      Rate and Rhythm: Normal rate. Rhythm irregular. Pulses: Normal pulses. Heart sounds:      No S3 or S4 sounds. Pulmonary:      Effort: Pulmonary effort is normal.      Breath sounds: No rales. Abdominal:      Palpations: Abdomen is soft. Tenderness: There is no abdominal tenderness. Musculoskeletal:      Right lower le+ Edema present. Left lower le+ Edema present. Comments: Patient arrive in wheelchair   Lymphadenopathy:      Cervical: No cervical adenopathy. Skin:     General: Skin is warm and dry. Coloration: Skin is not pale. Neurological:      General: No focal deficit present. Mental Status: He is alert. Motor: Weakness present.       Gait: Gait abnormal.      Comments: Upper and lower extremity weakness   Psychiatric:         Mood and Affect: Mood normal.                KACEY Montero

## 2023-11-12 DIAGNOSIS — I48.91 ATRIAL FIBRILLATION WITH RAPID VENTRICULAR RESPONSE (HCC): ICD-10-CM

## 2023-11-13 ENCOUNTER — TELEPHONE (OUTPATIENT)
Dept: ENDOCRINOLOGY | Facility: CLINIC | Age: 78
End: 2023-11-13

## 2023-11-15 RX ORDER — DIGOXIN 125 MCG
125 TABLET ORAL DAILY
Qty: 90 TABLET | Refills: 0 | Status: SHIPPED | OUTPATIENT
Start: 2023-11-15

## 2023-11-23 ENCOUNTER — HOSPITAL ENCOUNTER (EMERGENCY)
Facility: HOSPITAL | Age: 78
Discharge: HOME | End: 2023-11-23
Attending: EMERGENCY MEDICINE
Payer: MEDICARE

## 2023-11-23 ENCOUNTER — APPOINTMENT (EMERGENCY)
Dept: RADIOLOGY | Facility: HOSPITAL | Age: 78
End: 2023-11-23
Payer: MEDICARE

## 2023-11-23 VITALS
RESPIRATION RATE: 22 BRPM | OXYGEN SATURATION: 95 % | SYSTOLIC BLOOD PRESSURE: 173 MMHG | HEART RATE: 65 BPM | DIASTOLIC BLOOD PRESSURE: 79 MMHG | TEMPERATURE: 98 F

## 2023-11-23 DIAGNOSIS — R07.9 CHEST PAIN: Primary | ICD-10-CM

## 2023-11-23 LAB
2HR DELTA HS TROPONIN: 0 NG/L
ALBUMIN SERPL BCP-MCNC: 3.9 G/DL (ref 3.5–5)
ALP SERPL-CCNC: 102 U/L (ref 34–104)
ALT SERPL W P-5'-P-CCNC: 11 U/L (ref 7–52)
ANION GAP SERPL CALCULATED.3IONS-SCNC: 6 MMOL/L
AST SERPL W P-5'-P-CCNC: 13 U/L (ref 13–39)
ATRIAL RATE: 67 BPM
BASOPHILS # BLD AUTO: 0.04 THOUSANDS/ÂΜL (ref 0–0.1)
BASOPHILS NFR BLD AUTO: 1 % (ref 0–1)
BILIRUB SERPL-MCNC: 0.37 MG/DL (ref 0.2–1)
BNP SERPL-MCNC: 138 PG/ML (ref 0–100)
BUN SERPL-MCNC: 27 MG/DL (ref 5–25)
CALCIUM SERPL-MCNC: 9.5 MG/DL (ref 8.4–10.2)
CARDIAC TROPONIN I PNL SERPL HS: 13 NG/L
CARDIAC TROPONIN I PNL SERPL HS: 13 NG/L
CHLORIDE SERPL-SCNC: 100 MMOL/L (ref 96–108)
CO2 SERPL-SCNC: 32 MMOL/L (ref 21–32)
CREAT SERPL-MCNC: 1.1 MG/DL (ref 0.6–1.3)
EOSINOPHIL # BLD AUTO: 0.16 THOUSAND/ÂΜL (ref 0–0.61)
EOSINOPHIL NFR BLD AUTO: 2 % (ref 0–6)
ERYTHROCYTE [DISTWIDTH] IN BLOOD BY AUTOMATED COUNT: 17.9 % (ref 11.6–15.1)
GFR SERPL CREATININE-BSD FRML MDRD: 63 ML/MIN/1.73SQ M
GLUCOSE SERPL-MCNC: 121 MG/DL (ref 65–140)
HCT VFR BLD AUTO: 38.5 % (ref 36.5–49.3)
HGB BLD-MCNC: 11.5 G/DL (ref 12–17)
IMM GRANULOCYTES # BLD AUTO: 0.05 THOUSAND/UL (ref 0–0.2)
IMM GRANULOCYTES NFR BLD AUTO: 1 % (ref 0–2)
LYMPHOCYTES # BLD AUTO: 2.77 THOUSANDS/ÂΜL (ref 0.6–4.47)
LYMPHOCYTES NFR BLD AUTO: 33 % (ref 14–44)
MCH RBC QN AUTO: 25.4 PG (ref 26.8–34.3)
MCHC RBC AUTO-ENTMCNC: 29.9 G/DL (ref 31.4–37.4)
MCV RBC AUTO: 85 FL (ref 82–98)
MONOCYTES # BLD AUTO: 0.82 THOUSAND/ÂΜL (ref 0.17–1.22)
MONOCYTES NFR BLD AUTO: 10 % (ref 4–12)
NEUTROPHILS # BLD AUTO: 4.57 THOUSANDS/ÂΜL (ref 1.85–7.62)
NEUTS SEG NFR BLD AUTO: 53 % (ref 43–75)
NRBC BLD AUTO-RTO: 0 /100 WBCS
P AXIS: 75 DEGREES
PLATELET # BLD AUTO: 202 THOUSANDS/UL (ref 149–390)
PMV BLD AUTO: 10.7 FL (ref 8.9–12.7)
POTASSIUM SERPL-SCNC: 3.8 MMOL/L (ref 3.5–5.3)
PR INTERVAL: 198 MS
PROT SERPL-MCNC: 8.1 G/DL (ref 6.4–8.4)
QRS AXIS: 83 DEGREES
QRSD INTERVAL: 116 MS
QT INTERVAL: 410 MS
QTC INTERVAL: 433 MS
RBC # BLD AUTO: 4.53 MILLION/UL (ref 3.88–5.62)
SODIUM SERPL-SCNC: 138 MMOL/L (ref 135–147)
T WAVE AXIS: 74 DEGREES
VENTRICULAR RATE: 67 BPM
WBC # BLD AUTO: 8.41 THOUSAND/UL (ref 4.31–10.16)

## 2023-11-23 PROCEDURE — 71045 X-RAY EXAM CHEST 1 VIEW: CPT

## 2023-11-23 PROCEDURE — 99285 EMERGENCY DEPT VISIT HI MDM: CPT | Performed by: EMERGENCY MEDICINE

## 2023-11-23 PROCEDURE — 36415 COLL VENOUS BLD VENIPUNCTURE: CPT | Performed by: EMERGENCY MEDICINE

## 2023-11-23 PROCEDURE — 84484 ASSAY OF TROPONIN QUANT: CPT | Performed by: EMERGENCY MEDICINE

## 2023-11-23 PROCEDURE — 93010 ELECTROCARDIOGRAM REPORT: CPT | Performed by: INTERNAL MEDICINE

## 2023-11-23 PROCEDURE — 99285 EMERGENCY DEPT VISIT HI MDM: CPT

## 2023-11-23 PROCEDURE — 93005 ELECTROCARDIOGRAM TRACING: CPT

## 2023-11-23 PROCEDURE — 80053 COMPREHEN METABOLIC PANEL: CPT | Performed by: EMERGENCY MEDICINE

## 2023-11-23 PROCEDURE — 83880 ASSAY OF NATRIURETIC PEPTIDE: CPT

## 2023-11-23 PROCEDURE — 85025 COMPLETE CBC W/AUTO DIFF WBC: CPT | Performed by: EMERGENCY MEDICINE

## 2023-11-23 NOTE — ED PROVIDER NOTES
History  Chief Complaint   Patient presents with    Shortness of Breath     Pt c/o CP that radiates to the shoulder blades and SOB that started 30 minutes ago. CP has resolved PTA. +Cardiac hx -CHF, Bypass sx.      26-year-old male with past medical history of hypertension, hyperlipidemia, diabetes, coronary artery disease status post CABG, CHF, obstructive sleep apnea on CPAP nightly, A-fib on Eliquis presents today for evaluation of chest pain. Patient states about 30 minutes prior to arrival while he was sitting down and not exerting himself he started with lower substernal squeezing chest pain that occasionally radiated into his back. States he had brief episodes of chest pain that lasted for only a few minutes before completely resolving. He does say the chest pain was associated with some shortness of breath but denies any diaphoresis, nausea, vomiting, abdominal pain, paresthesias, weakness. Denies fevers or recent illness. Currently in the ED, he is completely asymptomatic and back to his baseline. Prior to Admission Medications   Prescriptions Last Dose Informant Patient Reported? Taking?    Continuous Blood Gluc Sensor (FreeStyle Peggy 2 Sensor) MISC  Self No No   Sig: Change it every 14 days   Empagliflozin (Jardiance) 25 MG TABS  Self No No   Sig: Take 1 tablet (25 mg total) by mouth every morning   Lancets (accu-chek multiclix) lancets  Self No No   Sig: Use 1 each 2 (two) times a day Use 2 times daily to test blood glucose   NovoLOG FlexPen 100 units/mL injection pen   No No   Sig: Inject 20-25 Units under the skin 3 (three) times a day with meals   Unifine Pentips 31G X 8 MM MISC  Self No No   Sig: inject under the skin daily use as directed   apixaban (Eliquis) 5 mg  Self No No   Sig: Take 1 tablet (5 mg total) by mouth 2 (two) times a day   atorvastatin (LIPITOR) 40 mg tablet   No No   Sig: TAKE ONE TABLET BY MOUTH ONCE DAILY   cholecalciferol (VITAMIN D3) 1,000 units tablet  Self Yes No   Sig: Take 1,000 Units by mouth daily   dicyclomine (BENTYL) 10 mg capsule  Self No No   Sig: Take 1 capsule (10 mg total) by mouth 2 (two) times a day as needed (abdominal cramping)   digoxin (LANOXIN) 0.125 mg tablet   No No   Sig: TAKE ONE TABLET BY MOUTH ONCE DAILY   famotidine (PEPCID) 40 MG tablet  Self No No   Sig: TAKE ONE TABLET BY MOUTH NIGHTLY AT BEDTIME   insulin degludec Irving Ashleigh FlexTouch) 200 units/mL CONCENTRATED U-200 injection pen   No No   Sig: inject 95 units under the skin once daily   metoprolol succinate (TOPROL-XL) 50 mg 24 hr tablet  Self No No   Sig: Take 1 tablet (50 mg total) by mouth daily   omeprazole (PriLOSEC) 20 mg delayed release capsule  Self No No   Sig: Take 1 capsule (20 mg total) by mouth 2 (two) times a day   Patient taking differently: Take 20 mg by mouth daily   potassium citrate (UROCIT-K) 10 mEq  Self No No   Sig: Take 1 tablet (10 mEq total) by mouth 2 (two) times a day   torsemide (DEMADEX) 20 mg tablet   No No   Sig: take two tablets (40mg) by mouth in the morning and one tablet (20mg) at 4pm   umeclidinium-vilanterol (Anoro Ellipta) 62.5-25 mcg/actuation inhaler   No No   Sig: INHALE ONE PUFF BY MOUTH ONCE DAILY      Facility-Administered Medications: None       Past Medical History:   Diagnosis Date    A-fib [facility])     AAA (abdominal aortic aneurysm) (Formerly [facility])     Actinic keratosis of right temple 7/31/2020    Actinic keratosis of scalp 7/31/2020    Acute respiratory failure with hypoxia (Formerly [facility]) 3/25/2021    Allergic 1960    Arthritis     Lay's esophagus     Bladder cancer (Formerly [facility])     Blister of left leg 4/28/2021    Blister of right leg 5/26/2021    CAD (coronary artery disease)     Cancer (Formerly [facility]) 02/2010    Bladder    CHF (congestive heart failure) (Formerly [facility])     Chronic low back pain     Chronic pain of both knees 7/31/2020    Colitis 12/4/2020    CPAP (continuous positive airway pressure) dependence     Diabetes ([address])     Diabetes mellitus ([address]) 10/1993    Excessive gas 12/3/2020    Heart murmur     History of chemotherapy     Hydroureter on left 4/28/2021    Hyperlipemia     Hypertension     Immunization deficiency 10/30/2020    Hep a nonimmune    Kidney stone     Left lower quadrant abdominal pain 12/3/2020    Mass of right adrenal gland ([address]) 12/4/2020    4.1 cm    Myocardial infarction ([address])     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    Obesity 2000    LUCIA (obstructive sleep apnea) 1/29/2021    Pressure ulcer of right leg, stage 1 5/26/2021    Urinary tract infection with hematuria 5/3/2021    u cx 4/2021=pseudomonas R to bactrim and cefdinir, S to cipro    Venous stasis dermatitis of both lower extremities 5/26/2021       Past Surgical History:   Procedure Laterality Date    BACK SURGERY      BLADDER SURGERY      CARDIAC CATHETERIZATION  04/2016    CARDIAC CATHETERIZATION N/A 1/19/2023    Procedure: Cardiac RHC; Surgeon: Tae Calix MD;  [facility];   Service: Cardiology    CATARACT EXTRACTION, BILATERAL      COLONOSCOPY  01/29/2019    next 2022 [address] GRAFT  04/2016    Quadruple per Rita    EGD  06/2017    EYE SURGERY  11/2016    Cataracts    JOINT REPLACEMENT  [id number]    Hip and shoulder    KIDNEY STONE SURGERY      AK CYSTO BLADDER W/URETERAL CATHETERIZATION Left 04/24/2021    Procedure: CYSTOSCOPY  WITH INSERTION STENT URETERAL;  Surgeon: Barbra Glass MD;  Location: [facility];  Service: Urology    AK CYSTO/URETERO W/LITHOTRIPSY &INDWELL STENT INSRT Left 05/21/2021    Procedure: CYSTOSCOPY URETEROSCOPY WITH LITHOTRIPSY HOLMIUM LASER, AND INSERTION STENT URETERAL/EXCHANGE;  Surgeon: Barbra Glass MD;  Location[facility];  Service: Urology    TOTAL HIP ARTHROPLASTY Right 2012    TOTAL SHOULDER REPLACEMENT Right 2013    VASECTOMY  1971       Family History   Problem Relation Age of Onset    Heart disease Father     Arthritis Father     Heart attack Father     Alzheimer's disease Mother     Dementia Mother     Diabetes type II Sister      I have reviewed and agree with the history as documented. E-Cigarette/Vaping    E-Cigarette Use Never User      E-Cigarette/Vaping Substances    Nicotine No     THC No     CBD No     Flavoring No     Other No     Unknown No      Social History     Tobacco Use    Smoking status: Former     Packs/day: 0.25     Years: 20.00     Total pack years: 5.00     Types: Cigarettes     Start date: 1965     Quit date: 2009     Years since quittin.9    Smokeless tobacco: Never    Tobacco comments:     Quit several times for up to 6 years. Vaping Use    Vaping Use: Never used   Substance Use Topics    Alcohol use: Not Currently     Comment: No use    Drug use: Never     Comment: No use        Review of Systems   Constitutional:  Negative for chills and fever. HENT:  Negative for ear pain and sore throat. Eyes:  Negative for pain and visual disturbance. Respiratory:  Positive for shortness of breath. Negative for cough. Cardiovascular:  Positive for chest pain. Negative for palpitations. Gastrointestinal:  Negative for abdominal pain and vomiting. Genitourinary:  Negative for dysuria and hematuria. Musculoskeletal:  Negative for arthralgias and back pain. Skin:  Negative for color change and rash. Neurological:  Negative for seizures and syncope. All other systems reviewed and are negative. Physical Exam  ED Triage Vitals [23 1050]   Temperature Pulse Respirations Blood Pressure SpO2   98 °F (36.7 °C) 65 22 (!) 173/79 95 %      Temp Source Heart Rate Source Patient Position - Orthostatic VS BP Location FiO2 (%)   Oral Monitor Sitting Left arm --      Pain Score       No Pain             Orthostatic Vital Signs  Vitals:    23 1050   BP: (!) 173/79   Pulse: 65   Patient Position - Orthostatic VS: Sitting       Physical Exam  Vitals and nursing note reviewed. Constitutional:       General: He is not in acute distress. Appearance: He is well-developed. HENT:      Head: Normocephalic and atraumatic. Eyes:      Conjunctiva/sclera: Conjunctivae normal.   Cardiovascular:      Rate and Rhythm: Normal rate and regular rhythm. Heart sounds: No murmur heard. Pulmonary:      Effort: Pulmonary effort is normal. No respiratory distress. Breath sounds: Normal breath sounds. Chest:      Chest wall: No tenderness. Abdominal:      Palpations: Abdomen is soft. Tenderness: There is no abdominal tenderness. Musculoskeletal:         General: No swelling. Cervical back: Neck supple. Right lower leg: No tenderness. No edema. Left lower leg: No tenderness. No edema. Comments: No lower extremity edema or calf tenderness   Skin:     General: Skin is warm and dry. Capillary Refill: Capillary refill takes less than 2 seconds. Neurological:      Mental Status: He is alert.          ED Medications  Medications - No data to display    Diagnostic Studies  Results Reviewed       Procedure Component Value Units Date/Time    HS Troponin I 2hr [[id number]]  (Normal) Collected: 11/23/23 1253    Lab Status: Final result Specimen: Blood from Arm, Right Updated: 11/23/23 1326     hs TnI 2hr 13 ng/L      Delta 2hr hsTnI 0 ng/L     B-Type Natriuretic Peptide(BNP) [[id number]]  (Abnormal) Collected: 11/23/23 1059    Lab Status: Final result Specimen: Blood from Arm, Right Updated: 11/23/23 1141      pg/mL     HS Troponin 0hr (reflex protocol) [[id number]]  (Normal) Collected: 11/23/23 1059    Lab Status: Final result Specimen: Blood from Arm, Right Updated: 11/23/23 1132     hs TnI 0hr 13 ng/L     Comprehensive metabolic panel [[id number]]  (Abnormal) Collected: 11/23/23 1059    Lab Status: Final result Specimen: Blood from Arm, Right Updated: 11/23/23 1127     Sodium 138 mmol/L      Potassium 3.8 mmol/L      Chloride 100 mmol/L      CO2 32 mmol/L      ANION GAP 6 mmol/L      BUN 27 mg/dL      Creatinine 1.10 mg/dL      Glucose 121 mg/dL      Calcium 9.5 mg/dL      AST 13 U/L      ALT 11 U/L      Alkaline Phosphatase 102 U/L      Total Protein 8.1 g/dL      Albumin 3.9 g/dL      Total Bilirubin 0.37 mg/dL      eGFR 63 ml/min/1.73sq m     Narrative:      [facility] guidelines for Chronic Kidney Disease (CKD):     Stage 1 with normal or high GFR (GFR > 90 mL/min/1.73 square meters)    Stage 2 Mild CKD (GFR = 60-89 mL/min/1.73 square meters)    Stage 3A Moderate CKD (GFR = 45-59 mL/min/1.73 square meters)    Stage 3B Moderate CKD (GFR = 30-44 mL/min/1.73 square meters)    Stage 4 Severe CKD (GFR = 15-29 mL/min/1.73 square meters)    Stage 5 End Stage CKD (GFR <15 mL/min/1.73 square meters)  Note: GFR calculation is accurate only with a steady state creatinine    CBC and differential [[id number]]  (Abnormal) Collected: 11/23/23 1059    Lab Status: Final result Specimen: Blood from Arm, Right Updated: 11/23/23 1109     WBC 8.41 Thousand/uL      RBC 4.53 Million/uL      Hemoglobin 11.5 g/dL      Hematocrit 38.5 %      MCV 85 fL      MCH 25.4 pg      MCHC 29.9 g/dL      RDW 17.9 %      MPV 10.7 fL      Platelets 126 Thousands/uL      nRBC 0 /100 WBCs      Neutrophils Relative 53 %      Immat GRANS % 1 %      Lymphocytes Relative 33 %      Monocytes Relative 10 %      Eosinophils Relative 2 %      Basophils Relative 1 %      Neutrophils Absolute 4.57 Thousands/µL      Immature Grans Absolute 0.05 Thousand/uL      Lymphocytes Absolute 2.77 Thousands/µL      Monocytes Absolute 0.82 Thousand/µL      Eosinophils Absolute 0.16 Thousand/µL      Basophils Absolute 0.04 Thousands/µL                    XR chest 1 view portable   Final Result by Shamika Lopez MD (11/23 1238)      Mild pulmonary venous congestion. Mild benign bibasilar atelectasis/scar with chronic right pleural thickening.                Workstation performed: [id number]               Procedures  ECG 12 Lead Documentation Only    Date/Time: 11/23/2023 10:55 AM    Performed by: Neel Wilkerson MD  Authorized by: Neel Wilkerson MD    Indications / Diagnosis:  Chest pain  ECG reviewed by me, the ED Provider: yes    Patient location:  ED  Previous ECG:     Previous ECG:  Compared to current  Interpretation:     Interpretation: abnormal    Rate:     ECG rate:  67    ECG rate assessment: normal    Rhythm:     Rhythm: sinus rhythm    Ectopy:     Ectopy: none    QRS:     QRS axis:  Normal    QRS intervals:  Normal  Conduction:     Conduction: abnormal      Abnormal conduction: incomplete RBBB    ST segments:     ST segments:  Normal  T waves:     T waves: non-specific and flattening      Flattening:  AVL  Comments:      No change from prior        ED Course             HEART Risk Score      Flowsheet Row Most Recent Value   Heart Score Risk Calculator    History 0 Filed at: 11/23/2023 1226   ECG 1 Filed at: 11/23/2023 1226   Age 2 Filed at: 11/23/2023 1226   Risk Factors 2 Filed at: 11/23/2023 1226   Troponin 1 Filed at: 11/23/2023 1226   HEART Score 6 Filed at: 11/23/2023 1226                        SBIRT 22yo+      Flowsheet Row Most Recent Value   Initial Alcohol Screen: US AUDIT-C     1. How often do you have a drink containing alcohol? 0 Filed at: 11/23/2023 1047   2. How many drinks containing alcohol do you have on a typical day you are drinking? 0 Filed at: 11/23/2023 1047   Audit-C Score 0 Filed at: 11/23/2023 1047   CHRIS: How many times in the past year have you. .. Used an illegal drug or used a prescription medication for non-medical reasons? Never Filed at: 11/23/2023 1047                  Medical Decision Making  30-year-old male with past medical history of hypertension, hyperlipidemia, diabetes, coronary artery disease status post CABG, CHF, obstructive sleep apnea on CPAP nightly, A-fib on Eliquis presents today for evaluation of chest pain.   DDX including but not limited to: ACS, MI, pneumothorax, pneumonia, pleurisy, pericarditis, myocarditis, musculoskeletal chest wall pain, GERD/gastritis. Do not suspect dissection or PE at this time. Patient is not tachycardia, not short of breath or hypoxic, no back pain, equal pulses with unremarkable physical exam, well-appearing and resting comfortably in exam bed. Patient was able to eat a full meal in the ED, remained completely asymptomatic during his ED stay. EKG non-ischemic. Delta troponin negative. Workup overall unremarkable. HEART score 6 so ambulatory referral was placed to cardiology. Reviewed close return precautions. Amount and/or Complexity of Data Reviewed  Labs: ordered. Radiology: ordered. Disposition  Final diagnoses:   Chest pain     Time reflects when diagnosis was documented in both MDM as applicable and the Disposition within this note       Time User Action Codes Description Comment    11/23/2023  1:40 PM Harding James Add [R07.9] Chest pain           ED Disposition       ED Disposition   Discharge    Condition   Stable    Date/Time   Thu Nov 23, 2023 [facility] discharge to home/self care.                    Follow-up Information       Follow up With Specialties Details Why Contact Info Additional Information    [facility] Schedule an appointment as soon as possible for a visit   [address] [facility] [address]  [address] [place] [address] [address] [place], [address] [place], [place], [address] [facility],4Th Floor Emergency Department Emergency Medicine  As we discusssed, please return to the ED for any new development of chest pain, difficulty breathing, nausea or vomiting, sweatiness or any other concerning findings [address]  Emergency Department, [place], [place], [address]            Discharge Medication List as of 11/23/2023  1:42 PM CONTINUE these medications which have NOT CHANGED    Details   apixaban (Eliquis) 5 mg Take 1 tablet (5 mg total) by mouth 2 (two) times a day, Starting Mon 5/8/2023, Normal      atorvastatin (LIPITOR) 40 mg tablet TAKE ONE TABLET BY MOUTH ONCE DAILY, Normal      cholecalciferol (VITAMIN D3) 1,000 units tablet Take 1,000 Units by mouth daily, Historical Med      Continuous Blood Gluc Sensor (FreeStyle Peggy 2 Sensor) MISC Change it every 14 days, Normal      dicyclomine (BENTYL) 10 mg capsule Take 1 capsule (10 mg total) by mouth 2 (two) times a day as needed (abdominal cramping), Starting Fri 9/2/2022, Normal      digoxin (LANOXIN) 0.125 mg tablet TAKE ONE TABLET BY MOUTH ONCE DAILY, Starting Wed 11/15/2023, Normal      Empagliflozin (Jardiance) 25 MG TABS Take 1 tablet (25 mg total) by mouth every morning, Starting Fri 2/10/2023, Normal      famotidine (PEPCID) 40 MG tablet TAKE ONE TABLET BY MOUTH NIGHTLY AT BEDTIME, Normal      insulin degludec Nicole Quiles FlexTouch) 200 units/mL CONCENTRATED U-200 injection pen inject 95 units under the skin once daily, Normal      Lancets (accu-chek multiclix) lancets Use 1 each 2 (two) times a day Use 2 times daily to test blood glucose, Starting Wed 8/4/2021, Normal      metoprolol succinate (TOPROL-XL) 50 mg 24 hr tablet Take 1 tablet (50 mg total) by mouth daily, Starting Tue 2/14/2023, Normal      NovoLOG FlexPen 100 units/mL injection pen Inject 20-25 Units under the skin 3 (three) times a day with meals, Starting Thu 9/7/2023, Normal      omeprazole (PriLOSEC) 20 mg delayed release capsule Take 1 capsule (20 mg total) by mouth 2 (two) times a day, Starting Mon 7/24/2023, Normal      potassium citrate (UROCIT-K) 10 mEq Take 1 tablet (10 mEq total) by mouth 2 (two) times a day, Starting Mon 4/10/2023, Normal      torsemide (DEMADEX) 20 mg tablet take two tablets (40mg) by mouth in the morning and one tablet (20mg) at 4pm, Normal      umeclidinium-vilanterol (Anoro Ellipta) 62.5-25 mcg/actuation inhaler INHALE ONE PUFF BY MOUTH ONCE DAILY, Starting Tue 10/17/2023, Normal      Unifine Pentips 31G X 8 MM MISC inject under the skin daily use as directed, Starting Wed 11/2/2022, Normal               PDMP Review         Value Time User    PDMP Reviewed  Yes 5/21/2021  2:46 PM More Naranjo MD             ED Provider  Attending physically available and evaluated Gael Lat. I managed the patient along with the ED Attending.     Electronically Signed by           Samira Worthy MD  11/23/23 0337

## 2023-11-23 NOTE — ED ATTENDING ATTESTATION
11/23/2023  I, Noreen Alvarado MD, saw and evaluated the patient. I have discussed the patient with the resident/non-physician practitioner and agree with the resident's/non-physician practitioner's findings, Plan of Care, and MDM as documented in the resident's/non-physician practitioner's note, except where noted. All available labs and Radiology studies were reviewed. I was present for key portions of any procedure(s) performed by the resident/non-physician practitioner and I was immediately available to provide assistance. At this point I agree with the current assessment done in the Emergency Department. I have conducted an independent evaluation of this patient a history and physical is as follows:    78 y/o male with history of HLD, DM, prior CABG. Patient presents with squeezing substernal chest pain. Patient reports onset of the symptoms this morning while at rest on his computer. Lasted for about 20 minutes prior to resolving. Pain did not radiate to his arms or jaw. He was also experiencing some back pain at the time that has also since resolved. He felt short of breath when the pain was occurring but denies any shortness of breath currently. No diaphoresis, nausea, or vomiting. Physical Exam  Vitals and nursing note reviewed. Constitutional:       General: He is not in acute distress. Appearance: He is well-developed. He is not ill-appearing, toxic-appearing or diaphoretic. HENT:      Head: Normocephalic and atraumatic. Right Ear: External ear normal.      Left Ear: External ear normal.      Nose: Nose normal.   Eyes:      Conjunctiva/sclera: Conjunctivae normal.   Cardiovascular:      Rate and Rhythm: Normal rate and regular rhythm. Pulses: Normal pulses. Heart sounds: Normal heart sounds. No murmur heard. No friction rub. No gallop. Pulmonary:      Effort: Pulmonary effort is normal. No respiratory distress. Breath sounds: Normal breath sounds.  No wheezing or rales. Abdominal:      General: Bowel sounds are normal. There is no distension. Palpations: Abdomen is soft. Tenderness: There is no abdominal tenderness. There is no guarding. Musculoskeletal:         General: No deformity. Normal range of motion. Cervical back: Normal range of motion and neck supple. Right lower leg: No edema. Left lower leg: No edema. Comments: No calf swelling or tenderness   Skin:     General: Skin is warm and dry. Neurological:      Mental Status: He is alert and oriented to person, place, and time. Motor: No abnormal muscle tone. Psychiatric:         Mood and Affect: Mood normal.           ED Course  ED Course as of 11/23/23 1348   Thu Nov 23, 2023   1203 I personally interpreted the patient's EKG which reveals normal rate, normal sinus rhythm, incomplete right bundle dev block similar to prior, normal intervals, no ST segment deviation or pathologic T wave inversions, similar to most recent prior EKG with slightly narrow QRS complexes. Patient is currently chest pain-free at the time of presentation to the emergency department. Initial troponin is 13, will obtain delta in 2 hours. BNP is minimally elevated to 138 the patient has no JVD, lower extremity edema, or increased work of breathing and lungs are clear to auscultation bilaterally. Dust x-ray shows mild pulmonary venous congestion but it appears improved from prior. Doubt decompensated CHF. 1227 HEART score 6   1342 Delta troponin is 0. Patient has not had any recurrence of pain. He ate in the emergency department and is still symptom-free. He is comfortable with discharge home with ambulatory referral to cardiology. Return precautions discussed.          Critical Care Time  Procedures

## 2023-11-24 ENCOUNTER — VBI (OUTPATIENT)
Dept: FAMILY MEDICINE CLINIC | Facility: CLINIC | Age: 78
End: 2023-11-24

## 2023-11-24 NOTE — TELEPHONE ENCOUNTER
11/24/23 2:23 PM    Patient contacted post ED visit, [facility] department spoke with patient/caregiver and outreach was successful. Thank you.   Jamari Smith MA  [facility]

## 2023-11-30 DIAGNOSIS — K21.9 GASTROESOPHAGEAL REFLUX DISEASE, UNSPECIFIED WHETHER ESOPHAGITIS PRESENT: ICD-10-CM

## 2023-11-30 RX ORDER — OMEPRAZOLE 20 MG/1
20 CAPSULE, DELAYED RELEASE ORAL
Qty: 180 CAPSULE | Refills: 0 | Status: SHIPPED | OUTPATIENT
Start: 2023-11-30

## 2023-11-30 NOTE — TELEPHONE ENCOUNTER
Reason for call:   [x] Refill   [] Prior Auth  [] Other:     Office:   [] PCP/Provider -   [x] Specialty/Provider - gastro    Medication: omeprazole (PriLOSEC) 20 mg delayed release capsule    Quantity: 180    Pharmacy: [address] [place] [address]     Does the patient have enough for 3 days?    [x] Yes   [] No - Send as HP to POD

## 2023-12-04 DIAGNOSIS — I50.9 CHF EXACERBATION (HCC): ICD-10-CM

## 2023-12-04 RX ORDER — TORSEMIDE 20 MG/1
TABLET ORAL
Qty: 120 TABLET | Refills: 0 | Status: SHIPPED | OUTPATIENT
Start: 2023-12-04

## 2023-12-07 ENCOUNTER — APPOINTMENT (OUTPATIENT)
Dept: LAB | Facility: AMBULARY SURGERY CENTER | Age: 78
End: 2023-12-07
Payer: MEDICARE

## 2023-12-07 DIAGNOSIS — E11.65 TYPE 2 DIABETES MELLITUS WITH HYPERGLYCEMIA, WITH LONG-TERM CURRENT USE OF INSULIN (HCC): ICD-10-CM

## 2023-12-07 DIAGNOSIS — Z79.4 TYPE 2 DIABETES MELLITUS WITH HYPERGLYCEMIA, WITH LONG-TERM CURRENT USE OF INSULIN (HCC): ICD-10-CM

## 2023-12-07 LAB
ALBUMIN SERPL BCP-MCNC: 3.8 G/DL (ref 3.5–5)
ALP SERPL-CCNC: 100 U/L (ref 34–104)
ALT SERPL W P-5'-P-CCNC: 13 U/L (ref 7–52)
ANION GAP SERPL CALCULATED.3IONS-SCNC: 10 MMOL/L
AST SERPL W P-5'-P-CCNC: 15 U/L (ref 13–39)
BILIRUB SERPL-MCNC: 0.29 MG/DL (ref 0.2–1)
BUN SERPL-MCNC: 33 MG/DL (ref 5–25)
CALCIUM SERPL-MCNC: 9.2 MG/DL (ref 8.4–10.2)
CHLORIDE SERPL-SCNC: 97 MMOL/L (ref 96–108)
CO2 SERPL-SCNC: 31 MMOL/L (ref 21–32)
CREAT SERPL-MCNC: 1.15 MG/DL (ref 0.6–1.3)
EST. AVERAGE GLUCOSE BLD GHB EST-MCNC: 200 MG/DL
GFR SERPL CREATININE-BSD FRML MDRD: 60 ML/MIN/1.73SQ M
GLUCOSE SERPL-MCNC: 160 MG/DL (ref 65–140)
HBA1C MFR BLD: 8.6 %
POTASSIUM SERPL-SCNC: 3.9 MMOL/L (ref 3.5–5.3)
PROT SERPL-MCNC: 7.5 G/DL (ref 6.4–8.4)
SODIUM SERPL-SCNC: 138 MMOL/L (ref 135–147)

## 2023-12-07 PROCEDURE — 80053 COMPREHEN METABOLIC PANEL: CPT

## 2023-12-07 PROCEDURE — 83036 HEMOGLOBIN GLYCOSYLATED A1C: CPT

## 2023-12-07 PROCEDURE — 36415 COLL VENOUS BLD VENIPUNCTURE: CPT

## 2023-12-12 ENCOUNTER — OFFICE VISIT (OUTPATIENT)
Dept: ENDOCRINOLOGY | Facility: CLINIC | Age: 78
End: 2023-12-12
Payer: MEDICARE

## 2023-12-12 VITALS
DIASTOLIC BLOOD PRESSURE: 70 MMHG | BODY MASS INDEX: 33.49 KG/M2 | SYSTOLIC BLOOD PRESSURE: 120 MMHG | WEIGHT: 275 LBS | OXYGEN SATURATION: 99 % | HEIGHT: 76 IN | HEART RATE: 70 BPM

## 2023-12-12 DIAGNOSIS — E83.52 HYPERCALCEMIA: ICD-10-CM

## 2023-12-12 DIAGNOSIS — E11.65 TYPE 2 DIABETES MELLITUS WITH HYPERGLYCEMIA, WITH LONG-TERM CURRENT USE OF INSULIN (HCC): Primary | ICD-10-CM

## 2023-12-12 DIAGNOSIS — E27.8 ADRENAL NODULE (HCC): ICD-10-CM

## 2023-12-12 DIAGNOSIS — I10 PRIMARY HYPERTENSION: ICD-10-CM

## 2023-12-12 DIAGNOSIS — Z79.4 TYPE 2 DIABETES MELLITUS WITH HYPERGLYCEMIA, WITH LONG-TERM CURRENT USE OF INSULIN (HCC): Primary | ICD-10-CM

## 2023-12-12 DIAGNOSIS — E78.00 PURE HYPERCHOLESTEROLEMIA: ICD-10-CM

## 2023-12-12 PROCEDURE — 95251 CONT GLUC MNTR ANALYSIS I&R: CPT

## 2023-12-12 PROCEDURE — 99214 OFFICE O/P EST MOD 30 MIN: CPT

## 2023-12-12 NOTE — PROGRESS NOTES
Established Patient Progress Note      Chief Complaint   Patient presents with    Diabetes Type 2        Impression & Plan:    Problem List Items Addressed This Visit          Endocrine    Type 2 diabetes mellitus with hyperglycemia, with long-term current use of insulin ([address]) - Primary     HGA1C is elevated which is not consistent with CGM readings. BGs very well controlled, in range 83% of the time on CGM - no hypoglycemia and occasional hyperglycemia. I will check fructosamine level with next labs, A1c is possibly not accurate. Treatment regimen:   - Continue current medication regimen.  - Continue with CGM. Discussed risks/complications associated with uncontrolled diabetes. Advised to adhere to diabetic diet, and recommended staying active/exercising routinely. Keep carbohydrates consistent to limit blood glucose fluctuations. Advised to call if blood sugars less than 70 mg/dl or over 300 mg/dl. Discussed symptoms and treatment of hypoglycemia. Recommended routine follow-up with podiatry and ophthalmology. Ordered blood work to complete prior to next visit. Lab Results   Component Value Date    HGBA1C 8.6 (H) 12/07/2023          Relevant Orders    Ambulatory referral to Ophthalmology    Hemoglobin A1C    Comprehensive metabolic panel    Fructosamine- Lab Collect       Cardiovascular and Mediastinum    Hypertension     BP at goal. Continue current medication regimen. Other    Hyperlipemia     LDL at goal, continue statin. Adrenal nodule ([address])     Labs and imaging have remained stable. No new imaging or lab work necessary at this time. Hypercalcemia     Calcium level has remained stable with mildly elevated PTH. Continue to monitor levels. History of Present Illness:   Parminder Smith is a 77 y.o. male with hypertension, hyperlipidemia, sleep apnea, adrenal adenoma, hyperparathyroidism and type 2 diabetes with long term use of insulin for about 28 years. Last seen in the office 9/7/23. Reports complications of neuropathy and microalbuminuria. Last A1C was 8.6. Denies recent severe hypoglycemic or severe hyperglycemic episodes. Denies any issues with his current regimen. Home glucose monitoring: are performed regularly with CGM. ED 11/23 with SOB and CP - cardiac work-up was normal.    Was having GI issues with Metformin. Previously treated with Pioglitazone which was discontinued due to edema. Did not tolerate GLP-1 agonist due to nausea. He also has a 4 cm adrenal adenoma which has been evaluated by Dr. Kahlil Rahman. Reviewed Dr. Hernandez Grew workup from feb/march 2021. Aldosterone and [place] metanephrines were normal  2/3/2021 dexamethasone suppression testing did not suppress x 2 so additional testing was performed, DHEAS and ACTH were normal. 1 salivary cortisol mildly elevated, 1 was normal. 24-hour urine cortisol was normal. Repeat CT done 1/17/23 showed stable right adrenal nodule. For hypercalcemia, most recent calcium normal with PTH of 101.7 hour urine was completed by nephrology and showed normal calcium level, which does not support diagnosis of [address]. Sestambi scan was completed and negative for parathyroid adenoma. Has a history of kidney stones and he takes potassium citrate. He takes 1000 units vitamin-D daily dietary calcium intake. DEXA done 3/2021 showed normal bone density. Denies recent falls or fractures. Nuha Sosa   Device used: Vizerra 2   Home use   Indication: Type 2 Diabetes  More than 72 hours of data was reviewed. Report to be scanned to chart. Date Range: 11/29/23-12/12/23  Analysis of data:   Average Glucose: 154  Coefficient of Variation: 19.5%   Time in Target Range: 83%   Time Above Range: 17% high, 0% very high   Time Below Range: 0%   Interpretation of data:  BGs very well controlled.       Current regimen:   Tresiba 94 units once daily  NovoLog 15-25 units with each meal depending on carb amount, does not take any if not eating carbs  Jardiance 25 mg daily - was started by cardiology      Last Eye Exam: [facility], exam about a month ago   Last Foot Exam: follows with podiatry every 6 weeks, Dr. Ken Munoz     Has hypertension: Taking lisinopril and Toprol XL  Has hyperlipidemia: Taking atorvastatin and zetia    Vitamin D Deficiency: Taking 1,000 units daily    Patient Active Problem List   Diagnosis    Hypertension    Hyperlipemia    Type 2 diabetes mellitus with hyperglycemia, with long-term current use of insulin (HCC)    Chronic low back pain    CAD (coronary artery disease)    Malignant neoplasm of urinary bladder (HCC)    Arthritis    Paroxysmal atrial fibrillation (HCC)    AAA (abdominal aortic aneurysm) (HCC)    Decreased pedal pulses    Chronic pain of both knees    Actinic keratosis of right temple    Actinic keratosis of scalp    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    Immunization deficiency    Left lower quadrant abdominal pain    Excessive gas    Morbid obesity (HCC)    Colitis    Adrenal nodule (HCC)    LUCIA (obstructive sleep apnea)    Embolism and thrombosis of arteries of the lower extremities (HCC)    Bilateral edema of lower extremity    Hypoxia    Chronic diastolic CHF (congestive heart failure) (HCC)    Left upper lobe pulmonary nodule    Hypercalcemia    Nephrolithiasis    Hydroureter on left    Venous stasis dermatitis of both lower extremities    Blister of right leg    Persistent proteinuria    Hypokalemia    Ureteral calculi    Benign prostatic hyperplasia without lower urinary tract symptoms    PAF (paroxysmal atrial fibrillation) (HCC)    PAD (peripheral artery disease) (HCC)    Gastroesophageal reflux disease    Vitamin D deficiency    Cirrhosis of liver without ascites (HCC)    Chronic acquired lymphedema    Pulmonary hypertension (HCC)    Chronic obstructive pulmonary disease ([address])      Past Medical History:   Diagnosis Date    A-fib ([address])     AAA (abdominal aortic aneurysm) ([address]) Actinic keratosis of right temple 7/31/2020    Actinic keratosis of scalp 7/31/2020    Acute respiratory failure with hypoxia (HCC) 3/25/2021    Allergic 1960    Arthritis     Lay's esophagus     Bladder cancer ([address])     Blister of left leg 4/28/2021    Blister of right leg 5/26/2021    CAD (coronary artery disease)     Cancer ([address]) 02/2010    Bladder    CHF (congestive heart failure) (HCC)     Chronic low back pain     Chronic pain of both knees 7/31/2020    Colitis 12/4/2020    CPAP (continuous positive airway pressure) dependence     Diabetes ([address])     Diabetes mellitus ([address]) 10/1993    Excessive gas 12/3/2020    Heart murmur     History of chemotherapy     Hydroureter on left 4/28/2021    Hyperlipemia     Hypertension     Immunization deficiency 10/30/2020    Hep a nonimmune    Kidney stone     Left lower quadrant abdominal pain 12/3/2020    Mass of right adrenal gland ([address]) 12/4/2020    4.1 cm    Myocardial infarction ([address])     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    Obesity 2000    LUCIA (obstructive sleep apnea) 1/29/2021    Pressure ulcer of right leg, stage 1 5/26/2021    Urinary tract infection with hematuria 5/3/2021    u cx 4/2021=pseudomonas R to bactrim and cefdinir, S to cipro    Venous stasis dermatitis of both lower extremities 5/26/2021      Past Surgical History:   Procedure Laterality Date    BACK SURGERY      BLADDER SURGERY      CARDIAC CATHETERIZATION  04/2016    CARDIAC CATHETERIZATION N/A 1/19/2023    Procedure: Cardiac RHC; Surgeon: Shalini Yuan MD;  [facility];   Service: Cardiology    CATARACT EXTRACTION, BILATERAL      COLONOSCOPY  01/29/2019    next 2022 [address] GRAFT  04/2016    Quadruple per [place]    EGD  06/2017    EYE SURGERY  11/2016    Cataracts    JOINT REPLACEMENT  [id number]    Hip and shoulder    KIDNEY STONE SURGERY      TX CYSTO BLADDER W/URETERAL CATHETERIZATION Left 04/24/2021    Procedure: CYSTOSCOPY  WITH INSERTION STENT URETERAL;  Surgeon: Heron Montero MD;  Location: [facility];  Service: Urology    VT CYSTO/URETERO W/LITHOTRIPSY &INDWELL STENT INSRT Left 2021    Procedure: CYSTOSCOPY URETEROSCOPY WITH LITHOTRIPSY HOLMIUM LASER, AND INSERTION STENT URETERAL/EXCHANGE;  Surgeon: Heron Montero MD;  Location: [facility];  Service: Urology    TOTAL HIP ARTHROPLASTY Right 2012    TOTAL SHOULDER REPLACEMENT Right 2013    VASECTOMY  1971      Family History   Problem Relation Age of Onset    Heart disease Father     Arthritis Father     Heart attack Father     Alzheimer's disease Mother     Dementia Mother     Diabetes type II Sister      Social History     Tobacco Use    Smoking status: Former     Packs/day: 0.25     Years: 20.00     Total pack years: 5.00     Types: Cigarettes     Start date: 1965     Quit date: 2009     Years since quittin.9    Smokeless tobacco: Never    Tobacco comments:     Quit several times for up to 6 years.    Substance Use Topics    Alcohol use: Not Currently     Comment: No use     Allergies   Allergen Reactions    Ciprofloxacin Abdominal Pain, Diarrhea, GI Bleeding and GI Intolerance    Diltiazem Abdominal Pain    Metronidazole Abdominal Pain, Diarrhea, GI Bleeding and GI Intolerance         Current Outpatient Medications:     apixaban (Eliquis) 5 mg, Take 1 tablet (5 mg total) by mouth 2 (two) times a day, Disp: 180 tablet, Rfl: 3    atorvastatin (LIPITOR) 40 mg tablet, TAKE ONE TABLET BY MOUTH ONCE DAILY, Disp: 90 tablet, Rfl: 0    cholecalciferol (VITAMIN D3) 1,000 units tablet, Take 1,000 Units by mouth daily, Disp: , Rfl:     Continuous Blood Gluc Sensor (FreeStyle Peggy 2 Sensor) MISC, Change it every 14 days, Disp: 1 each, Rfl: 0    dicyclomine (BENTYL) 10 mg capsule, Take 1 capsule (10 mg total) by mouth 2 (two) times a day as needed (abdominal cramping), Disp: 60 capsule, Rfl: 3    digoxin (LANOXIN) 0.125 mg tablet, TAKE ONE TABLET BY MOUTH ONCE DAILY, Disp: 90 tablet, Rfl: 0 Empagliflozin (Jardiance) 25 MG TABS, Take 1 tablet (25 mg total) by mouth every morning, Disp: 120 tablet, Rfl: 5    famotidine (PEPCID) 40 MG tablet, TAKE ONE TABLET BY MOUTH NIGHTLY AT BEDTIME, Disp: 30 tablet, Rfl: 5    insulin degludec Naty Pacini FlexTouch) 200 units/mL CONCENTRATED U-200 injection pen, inject 95 units under the skin once daily, Disp: 15 mL, Rfl: 6    Lancets (accu-chek multiclix) lancets, Use 1 each 2 (two) times a day Use 2 times daily to test blood glucose, Disp: 102 each, Rfl: 3    metoprolol succinate (TOPROL-XL) 50 mg 24 hr tablet, Take 1 tablet (50 mg total) by mouth daily, Disp: 90 tablet, Rfl: 3    NovoLOG FlexPen 100 units/mL injection pen, Inject 20-25 Units under the skin 3 (three) times a day with meals, Disp: 30 mL, Rfl: 6    omeprazole (PriLOSEC) 20 mg delayed release capsule, Take 1 capsule (20 mg total) by mouth 2 (two) times a day, Disp: 180 capsule, Rfl: 0    potassium citrate (UROCIT-K) 10 mEq, Take 1 tablet (10 mEq total) by mouth 2 (two) times a day, Disp: 180 tablet, Rfl: 3    torsemide (DEMADEX) 20 mg tablet, take two tablets by mouth in the morning and one tablet  at 4pm, Disp: 120 tablet, Rfl: 0    umeclidinium-vilanterol (Anoro Ellipta) 62.5-25 mcg/actuation inhaler, INHALE ONE PUFF BY MOUTH ONCE DAILY, Disp: 60 each, Rfl: 5    Unifine Pentips 31G X 8 MM MISC, inject under the skin daily use as directed, Disp: 100 each, Rfl: 0    Review of Systems   Constitutional:  Negative for activity change, appetite change, chills, diaphoresis, fatigue, fever and unexpected weight change. Eyes:  Negative for visual disturbance. Respiratory:  Negative for chest tightness and shortness of breath. Cardiovascular:  Negative for chest pain, palpitations and leg swelling. Gastrointestinal:  Negative for abdominal pain, constipation, diarrhea, nausea and vomiting. Endocrine: Negative for polydipsia, polyphagia and polyuria.    Skin:  Negative for color change, pallor, rash and wound.   Neurological:  Negative for dizziness, tremors, weakness, light-headedness and numbness. All other systems reviewed and are negative. Physical Exam:  Body mass index is 33.47 kg/m². /70 (BP Location: Right arm, Patient Position: Sitting, Cuff Size: Standard)   Pulse 70   Ht 6' 4" (1.93 m)   Wt 125 kg (275 lb)   SpO2 99%   BMI 33.47 kg/m²    Wt Readings from Last 3 Encounters:   12/12/23 125 kg (275 lb)   11/09/23 127 kg (281 lb)   09/21/23 127 kg (280 lb)       Physical Exam  Vitals reviewed. Constitutional:       Appearance: Normal appearance. He is obese. Cardiovascular:      Rate and Rhythm: Normal rate and regular rhythm. Pulses: Normal pulses. Heart sounds: Normal heart sounds. Pulmonary:      Effort: Pulmonary effort is normal.      Breath sounds: Normal breath sounds. Neurological:      Mental Status: He is alert and oriented to person, place, and time. Psychiatric:         Mood and Affect: Mood normal.         Thought Content:  Thought content normal.       Labs:   Lab Results   Component Value Date    HGBA1C 8.6 (H) 12/07/2023    HGBA1C 7.9 (H) 09/02/2023    HGBA1C 8.1 (H) 05/11/2023     Lab Results   Component Value Date    CREATININE 1.15 12/07/2023    CREATININE 1.10 11/23/2023    CREATININE 1.16 09/02/2023    BUN 33 (H) 12/07/2023    K 3.9 12/07/2023    CL 97 12/07/2023    CO2 31 12/07/2023     eGFR   Date Value Ref Range Status   12/07/2023 60 ml/min/1.73sq m Final     Lab Results   Component Value Date    HDL 31 (L) 05/11/2023    TRIG 295 (H) 05/11/2023     Lab Results   Component Value Date    ALT 13 12/07/2023    AST 15 12/07/2023    ALKPHOS 100 12/07/2023     Lab Results   Component Value Date    [id number] 1.150 05/11/2023    [id number] 0.848 02/11/2023    [id number] 0.984 12/27/2021     Lab Results   Component Value Date    FREET4 1.02 05/11/2023       Orders Placed This Encounter   Procedures    Hemoglobin A1C     Standing Status:   Future Standing Expiration Date:   12/12/2024    Comprehensive metabolic panel     This is a patient instruction: Patient fasting for 8 hours or longer recommended. Standing Status:   Future     Standing Expiration Date:   12/12/2024    Fructosamine- Lab Collect     Standing Status:   Future     Standing Expiration Date:   12/12/2024    Ambulatory referral to Ophthalmology     Standing Status:   Future     Standing Expiration Date:   12/12/2024     Referral Priority:   Routine     Referral Type:   Consult - AMB     Referral Reason:   Specialty Services Required     Referred to Provider:   [address] [place] [address].     Requested Specialty:   Ophthalmology     Number of Visits Requested:   1     Expiration Date:   12/12/2024       There are no Patient Instructions on file for this visit. Discussed with the patient and all questioned fully answered. He will call me if any problems arise.     KACEY Andres

## 2023-12-12 NOTE — ASSESSMENT & PLAN NOTE
HGA1C is elevated which is not consistent with CGM readings. BGs very well controlled, in range 83% of the time on CGM - no hypoglycemia and occasional hyperglycemia. I will check fructosamine level with next labs, A1c is possibly not accurate. Treatment regimen:   - Continue current medication regimen.  - Continue with CGM. Discussed risks/complications associated with uncontrolled diabetes. Advised to adhere to diabetic diet, and recommended staying active/exercising routinely. Keep carbohydrates consistent to limit blood glucose fluctuations. Advised to call if blood sugars less than 70 mg/dl or over 300 mg/dl. Discussed symptoms and treatment of hypoglycemia. Recommended routine follow-up with podiatry and ophthalmology. Ordered blood work to complete prior to next visit.    Lab Results   Component Value Date    HGBA1C 8.6 (H) 12/07/2023

## 2024-01-05 ENCOUNTER — HOSPITAL ENCOUNTER (OUTPATIENT)
Dept: NON INVASIVE DIAGNOSTICS | Facility: CLINIC | Age: 79
Discharge: HOME | End: 2024-01-05
Payer: MEDICARE

## 2024-01-05 DIAGNOSIS — I71.40 ABDOMINAL AORTIC ANEURYSM (AAA) WITHOUT RUPTURE, UNSPECIFIED PART (HCC): ICD-10-CM

## 2024-01-05 PROCEDURE — 93978 VASCULAR STUDY: CPT

## 2024-01-08 ENCOUNTER — APPOINTMENT (OUTPATIENT)
Dept: LAB | Facility: AMBULARY SURGERY CENTER | Age: 79
End: 2024-01-08
Payer: MEDICARE

## 2024-01-08 DIAGNOSIS — N18.31 STAGE 3A CHRONIC KIDNEY DISEASE (HCC): ICD-10-CM

## 2024-01-08 LAB
25(OH)D3 SERPL-MCNC: 25.3 NG/ML (ref 30–100)
ALBUMIN SERPL BCP-MCNC: 3.8 G/DL (ref 3.5–5)
ANION GAP SERPL CALCULATED.3IONS-SCNC: 9 MMOL/L
BACTERIA UR QL AUTO: ABNORMAL /HPF
BILIRUB UR QL STRIP: NEGATIVE
BUN SERPL-MCNC: 32 MG/DL (ref 5–25)
CALCIUM SERPL-MCNC: 9.5 MG/DL (ref 8.4–10.2)
CHLORIDE SERPL-SCNC: 98 MMOL/L (ref 96–108)
CLARITY UR: CLEAR
CO2 SERPL-SCNC: 32 MMOL/L (ref 21–32)
COLOR UR: COLORLESS
CREAT SERPL-MCNC: 1.11 MG/DL (ref 0.6–1.3)
CREAT UR-MCNC: 30.1 MG/DL
GFR SERPL CREATININE-BSD FRML MDRD: 62 ML/MIN/1.73SQ M
GLUCOSE SERPL-MCNC: 236 MG/DL (ref 65–140)
GLUCOSE UR STRIP-MCNC: ABNORMAL MG/DL
HGB UR QL STRIP.AUTO: NEGATIVE
HYALINE CASTS #/AREA URNS LPF: ABNORMAL /LPF
KETONES UR STRIP-MCNC: NEGATIVE MG/DL
LEUKOCYTE ESTERASE UR QL STRIP: ABNORMAL
MAGNESIUM SERPL-MCNC: 2.1 MG/DL (ref 1.9–2.7)
MUCOUS THREADS UR QL AUTO: ABNORMAL
NITRITE UR QL STRIP: NEGATIVE
NON-SQ EPI CELLS URNS QL MICRO: ABNORMAL /HPF
PH UR STRIP.AUTO: 6 [PH]
PHOSPHATE SERPL-MCNC: 3.6 MG/DL (ref 2.3–4.1)
POTASSIUM SERPL-SCNC: 3.9 MMOL/L (ref 3.5–5.3)
PROT UR STRIP-MCNC: NEGATIVE MG/DL
PROT UR-MCNC: 7 MG/DL
PROT/CREAT UR: 0.23 MG/G{CREAT} (ref 0–0.1)
PTH-INTACT SERPL-MCNC: 147 PG/ML (ref 12–88)
RBC #/AREA URNS AUTO: ABNORMAL /HPF
SODIUM SERPL-SCNC: 139 MMOL/L (ref 135–147)
SP GR UR STRIP.AUTO: 1.01 (ref 1–1.03)
TRANS CELLS #/AREA URNS HPF: PRESENT /[HPF]
UROBILINOGEN UR STRIP-ACNC: <2 MG/DL
WBC #/AREA URNS AUTO: ABNORMAL /HPF

## 2024-01-08 PROCEDURE — 84156 ASSAY OF PROTEIN URINE: CPT

## 2024-01-08 PROCEDURE — 82570 ASSAY OF URINE CREATININE: CPT

## 2024-01-08 PROCEDURE — 81001 URINALYSIS AUTO W/SCOPE: CPT

## 2024-01-08 PROCEDURE — 83735 ASSAY OF MAGNESIUM: CPT

## 2024-01-08 PROCEDURE — 82306 VITAMIN D 25 HYDROXY: CPT

## 2024-01-08 PROCEDURE — 80069 RENAL FUNCTION PANEL: CPT

## 2024-01-08 PROCEDURE — 93978 VASCULAR STUDY: CPT | Performed by: SURGERY

## 2024-01-08 PROCEDURE — 36415 COLL VENOUS BLD VENIPUNCTURE: CPT

## 2024-01-08 PROCEDURE — 83970 ASSAY OF PARATHORMONE: CPT

## 2024-01-09 DIAGNOSIS — E55.9 VITAMIN D DEFICIENCY: Primary | ICD-10-CM

## 2024-01-09 RX ORDER — ERGOCALCIFEROL 1.25 MG/1
50000 CAPSULE ORAL WEEKLY
Qty: 8 CAPSULE | Refills: 0 | Status: SHIPPED | OUTPATIENT
Start: 2024-01-09 | End: 2024-02-28

## 2024-01-09 NOTE — TELEPHONE ENCOUNTER
Lm for Maynor regarding new medication start below. Vitamin d 50,000 units weekly x8 thereafter start vitamin d 2000 units daily . Vit d low and pth elevated this should balance levels back to a baseline.   Happy Birthday Maynor!      ----- Message from Sylvie James PA-C sent at 1/9/2024  9:04 AM EST -----  Blood work reviewed.  Vitamin d low and pth elevated.  Recommend starting ergocalciferol 50,000 units weekly x8 weeks then transition to OTC cholecalciferol 2000 units daily.  This should fix both problems.  Also, Happy Birthday Maynor (tomorrow)!

## 2024-01-11 DIAGNOSIS — I50.9 CHF EXACERBATION (HCC): ICD-10-CM

## 2024-01-11 RX ORDER — TORSEMIDE 20 MG/1
TABLET ORAL
Qty: 120 TABLET | Refills: 11 | Status: SHIPPED | OUTPATIENT
Start: 2024-01-11

## 2024-01-13 DIAGNOSIS — I25.83 CORONARY ARTERY DISEASE DUE TO LIPID RICH PLAQUE: ICD-10-CM

## 2024-01-13 DIAGNOSIS — I25.10 CORONARY ARTERY DISEASE DUE TO LIPID RICH PLAQUE: ICD-10-CM

## 2024-01-15 ENCOUNTER — RA CDI HCC (OUTPATIENT)
Dept: OTHER | Facility: HOSPITAL | Age: 79
End: 2024-01-15

## 2024-01-15 RX ORDER — ATORVASTATIN CALCIUM 40 MG/1
40 TABLET, FILM COATED ORAL DAILY
Qty: 90 TABLET | Refills: 1 | Status: SHIPPED | OUTPATIENT
Start: 2024-01-15 | End: 2024-07-13

## 2024-01-15 NOTE — PROGRESS NOTES
HCC coding opportunities          Chart Reviewed number of suggestions sent to Provider: 2   I13.0  E11.51  Patients Insurance     Medicare Insurance: Medicare

## 2024-01-25 ENCOUNTER — OFFICE VISIT (OUTPATIENT)
Dept: CARDIOLOGY CLINIC | Facility: CLINIC | Age: 79
End: 2024-01-25
Payer: MEDICARE

## 2024-01-25 VITALS
DIASTOLIC BLOOD PRESSURE: 58 MMHG | SYSTOLIC BLOOD PRESSURE: 122 MMHG | WEIGHT: 280.3 LBS | HEIGHT: 76 IN | HEART RATE: 73 BPM | BODY MASS INDEX: 34.13 KG/M2 | OXYGEN SATURATION: 93 %

## 2024-01-25 DIAGNOSIS — I48.0 PAF (PAROXYSMAL ATRIAL FIBRILLATION) (HCC): ICD-10-CM

## 2024-01-25 DIAGNOSIS — I74.3 EMBOLISM AND THROMBOSIS OF ARTERIES OF THE LOWER EXTREMITIES (HCC): ICD-10-CM

## 2024-01-25 DIAGNOSIS — E78.2 MIXED HYPERLIPIDEMIA: ICD-10-CM

## 2024-01-25 DIAGNOSIS — I50.32 CHRONIC DIASTOLIC CHF (CONGESTIVE HEART FAILURE) (HCC): ICD-10-CM

## 2024-01-25 DIAGNOSIS — R07.9 CHEST PAIN: ICD-10-CM

## 2024-01-25 DIAGNOSIS — Z79.4 TYPE 2 DIABETES MELLITUS WITH HYPERGLYCEMIA, WITH LONG-TERM CURRENT USE OF INSULIN (HCC): ICD-10-CM

## 2024-01-25 DIAGNOSIS — I25.10 CORONARY ARTERY DISEASE INVOLVING NATIVE CORONARY ARTERY OF NATIVE HEART WITHOUT ANGINA PECTORIS: ICD-10-CM

## 2024-01-25 DIAGNOSIS — N18.31 STAGE 3A CHRONIC KIDNEY DISEASE (HCC): Primary | ICD-10-CM

## 2024-01-25 DIAGNOSIS — I48.0 PAROXYSMAL ATRIAL FIBRILLATION (HCC): ICD-10-CM

## 2024-01-25 DIAGNOSIS — E66.01 MORBID OBESITY (HCC): ICD-10-CM

## 2024-01-25 DIAGNOSIS — E11.65 TYPE 2 DIABETES MELLITUS WITH HYPERGLYCEMIA, WITH LONG-TERM CURRENT USE OF INSULIN (HCC): ICD-10-CM

## 2024-01-25 PROCEDURE — 99214 OFFICE O/P EST MOD 30 MIN: CPT | Performed by: INTERNAL MEDICINE

## 2024-01-25 PROCEDURE — 93000 ELECTROCARDIOGRAM COMPLETE: CPT | Performed by: INTERNAL MEDICINE

## 2024-01-25 NOTE — PROGRESS NOTES
[facility] Cardiology  Office progress note  Home Walters 79 y.o. male MRN: [id number]        Problems    1. Stage 3a chronic kidney disease ([facility])        2. Chest pain  Ambulatory Referral to Cardiology    POCT ECG      3. Embolism and thrombosis of arteries of the lower extremities ([facility])        4. Chronic diastolic CHF (congestive heart failure) ([facility])        5. Type 2 diabetes mellitus with hyperglycemia, with long-term current use of insulin ([facility])        6. Paroxysmal atrial fibrillation ([facility])  Digoxin level      7. Morbid obesity ([facility])        8. Coronary artery disease involving native coronary artery of native heart without angina pectoris        9. Mixed hyperlipidemia  Lipid panel      10. PAF (paroxysmal atrial fibrillation) ([facility])  POCT ECG          Impression:      Coronary artery disease  CABG x3 in 2016, LIMA to LAD, SVG to OM3, SVG to PDA  He had full recovery of his moderate ischemic cardiomyopathy  EF remains normal, last echo was 2/23  No ischemic symptoms  Chronic diastolic/right-sided CHF  Weights remained stable at 280 pounds  Nipple tenderness on spironolactone precluded ongoing use  Continues on torsemide and Jardiance  AAA  47 millimeters in early 2021 by abdominal CT  50 millimeters by noncontrast abdominal CT 6/22  51mm 10/22 by CTA  Continues to remain unchanged at 5 cm by ultrasound 1/5/2024  Lower extremity atherosclerosis  No significant claudication  He is exercising on a stationary bike 30 minutes a day  Mixed hyperlipidemia  Lipids not well-controlled, suppressed HDL, elevated triglycerides 295, although LDL 33  Type 2 diabetes  Poorly controlled with A1c 8.6  Hypertension  Controlled  Atrial fibrillation  Paroxysmal, had a cardioversion 1/22, no recurrence  Maintaining normal sinus rhythm  On Eliquis, digoxin, metoprolol succinate 50 mg a day    Plan    Overall doing exceptionally well from a cardiac standpoint without any A-fib recurrence, normal blood pressure, stable renal function  with a GFR of about 61 which on occasion has fluctuated under 60.  Meds are all pretty well-tolerated.  He needs an updated digoxin level which she can have done in March with his additional blood work, and repeat his lipid panel at the same time.  Otherwise I will see him back in 6 months.      HPI: Home Walters is a 79 y.o. year old male with CAD, paroxysmal atrial fibrillation, diastolic/right-sided CHF, obesity, hypertension, diabetes, mixed hyperlipidemia, LUCIA, hospitalized 12/21 for AFib/RVR, cardioversion 1/7/2022 urgently for hypotension and AFib/RVR, CHF decompensation in early 2023 with a weight up to 320 pounds, who has been very clinically stable for about the last year or so.  Weights are excellent, 280 pounds, this is his dry weight  He has trivial ankle swelling  He wears compression stockings  He is exercising 30 minutes every day  He started a new hobby, wood whittling  His EKG reveals normal sinus rhythm, and has had no obvious detectable A-fib recurrence  He continues on Toprol, digoxin, Eliquis.  He has no bleeding, and a normal blood count  He denies any claudication symptoms  His AAA is stable by recent imaging 1/5/2024, 5 cm.  Blood pressure is excellent  He has mixed hyperlipidemia with elevated triglycerides, likely due to his uncontrolled type 2 diabetes which has an A1c of 8.6.  His renal function is stable, occasionally has dipped to a GFR of 58 consistent with CKD stage III, this was explained to him, but current GFR is around 62.      Review of Systems   Constitutional:  Negative for appetite change, diaphoresis, fatigue and fever.   Respiratory:  Negative for chest tightness, shortness of breath and wheezing.    Cardiovascular:  Negative for chest pain, palpitations and leg swelling.   Gastrointestinal:  Negative for abdominal pain and blood in stool.   Musculoskeletal:  Positive for arthralgias and gait problem. Negative for joint swelling.   Skin:  Negative for rash.    Neurological:  Negative for dizziness, syncope and light-headedness.         Past Medical History:   Diagnosis Date   • A-fib ([facility])    • AAA (abdominal aortic aneurysm) ([facility])    • Actinic keratosis of right temple 7/31/2020   • Actinic keratosis of scalp 7/31/2020   • Acute respiratory failure with hypoxia ([facility]) 3/25/2021   • Allergic 1960   • Arthritis    • Lay's esophagus    • Bladder cancer ([facility])    • Blister of left leg 4/28/2021   • Blister of right leg 5/26/2021   • CAD (coronary artery disease)    • Cancer ([facility]) 02/2010    Bladder   • CHF (congestive heart failure) ([facility])    • Chronic low back pain    • Chronic pain of both knees 7/31/2020   • Colitis 12/4/2020   • CPAP (continuous positive airway pressure) dependence    • Diabetes ([facility])    • Diabetes mellitus ([facility]) 10/1993   • Excessive gas 12/3/2020   • Heart murmur    • History of chemotherapy    • Hydroureter on left 4/28/2021   • Hyperlipemia    • Hypertension    • Immunization deficiency 10/30/2020    Hep a nonimmune   • Kidney stone    • Left lower quadrant abdominal pain 12/3/2020   • Mass of right adrenal gland ([facility]) 12/4/2020    4.1 cm   • Myocardial infarction ([facility])    • 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   • Obesity 2000   • LUCIA (obstructive sleep apnea) 1/29/2021   • Pressure ulcer of right leg, stage 1 5/26/2021   • Urinary tract infection with hematuria 5/3/2021    u cx 4/2021=pseudomonas R to bactrim and cefdinir, S to cipro   • Venous stasis dermatitis of both lower extremities 5/26/2021     Past Surgical History:   Procedure Laterality Date   • BACK SURGERY     • BLADDER SURGERY     • CARDIAC CATHETERIZATION  04/2016   • CARDIAC CATHETERIZATION N/A 1/19/2023    Procedure: Cardiac RHC;  Surgeon: Jose Carlos Shea MD;  [facility];  Service: Cardiology   • CATARACT EXTRACTION, BILATERAL     • COLONOSCOPY  01/29/2019    next 2022 [place]   • CORONARY ARTERY BYPASS GRAFT  04/2016    Quadruple per [place]   • EGD   06/2017   • EYE SURGERY  11/2016    Cataracts   • JOINT REPLACEMENT  [id number]    Hip and shoulder   • KIDNEY STONE SURGERY     • ME CYSTO BLADDER W/URETERAL CATHETERIZATION Left 04/24/2021    Procedure: CYSTOSCOPY  WITH INSERTION STENT URETERAL;  Surgeon: Siddhartha Leslie MD;  Location: [facility];  Service: Urology   • ME CYSTO/URETERO W/LITHOTRIPSY &INDWELL STENT INSRT Left 05/21/2021    Procedure: CYSTOSCOPY URETEROSCOPY WITH LITHOTRIPSY HOLMIUM LASER, AND INSERTION STENT URETERAL/EXCHANGE;  Surgeon: Siddhartha Leslie MD;  Location: [facility];  Service: Urology   • TOTAL HIP ARTHROPLASTY Right 2012   • TOTAL SHOULDER REPLACEMENT Right 2013   • VASECTOMY  1971     Social History     Substance and Sexual Activity   Alcohol Use Not Currently    Comment: No use     Social History     Substance and Sexual Activity   Drug Use Never    Comment: No use     Social History     Tobacco Use   Smoking Status Former   • Current packs/day: 0.00   • Average packs/day: 0.3 packs/day for 44.0 years (11.0 ttl pk-yrs)   • Types: Cigarettes   • Start date: 1/1/1965   • Quit date: 1/1/2009   • Years since quitting: 15.0   Smokeless Tobacco Never   Tobacco Comments    Quit several times for up to 6 years.     Family History   Problem Relation Age of Onset   • Heart disease Father    • Arthritis Father    • Heart attack Father    • Alzheimer's disease Mother    • Dementia Mother    • Diabetes type II Sister        Allergies:  Allergies   Allergen Reactions   • Ciprofloxacin Abdominal Pain, Diarrhea, GI Bleeding and GI Intolerance   • Diltiazem Abdominal Pain   • Metronidazole Abdominal Pain, Diarrhea, GI Bleeding and GI Intolerance       Medications:     Current Outpatient Medications:   •  apixaban (Eliquis) 5 mg, Take 1 tablet (5 mg total) by mouth 2 (two) times a day, Disp: 180 tablet, Rfl: 3  •  atorvastatin (LIPITOR) 40 mg tablet, Take 1 tablet (40 mg total) by mouth daily, Disp: 90 tablet, Rfl: 1  •  cholecalciferol (VITAMIN D3) 1,000  units tablet, Take 1,000 Units by mouth daily, Disp: , Rfl:   •  Continuous Blood Gluc Sensor (FreeStyle Peggy 2 Sensor) MISC, Change it every 14 days, Disp: 1 each, Rfl: 0  •  dicyclomine (BENTYL) 10 mg capsule, Take 1 capsule (10 mg total) by mouth 2 (two) times a day as needed (abdominal cramping), Disp: 60 capsule, Rfl: 3  •  digoxin (LANOXIN) 0.125 mg tablet, TAKE ONE TABLET BY MOUTH ONCE DAILY, Disp: 90 tablet, Rfl: 0  •  Empagliflozin (Jardiance) 25 MG TABS, Take 1 tablet (25 mg total) by mouth every morning, Disp: 120 tablet, Rfl: 5  •  ergocalciferol (VITAMIN D2) 50,000 units, Take 1 capsule (50,000 Units total) by mouth once a week for 8 doses, Disp: 8 capsule, Rfl: 0  •  famotidine (PEPCID) 40 MG tablet, TAKE ONE TABLET BY MOUTH NIGHTLY AT BEDTIME, Disp: 30 tablet, Rfl: 5  •  insulin degludec (Tresiba FlexTouch) 200 units/mL CONCENTRATED U-200 injection pen, inject 95 units under the skin once daily, Disp: 15 mL, Rfl: 6  •  Lancets (accu-chek multiclix) lancets, Use 1 each 2 (two) times a day Use 2 times daily to test blood glucose, Disp: 102 each, Rfl: 3  •  metoprolol succinate (TOPROL-XL) 50 mg 24 hr tablet, Take 1 tablet (50 mg total) by mouth daily, Disp: 90 tablet, Rfl: 3  •  NovoLOG FlexPen 100 units/mL injection pen, Inject 20-25 Units under the skin 3 (three) times a day with meals, Disp: 30 mL, Rfl: 6  •  omeprazole (PriLOSEC) 20 mg delayed release capsule, Take 1 capsule (20 mg total) by mouth 2 (two) times a day, Disp: 180 capsule, Rfl: 0  •  potassium citrate (UROCIT-K) 10 mEq, Take 1 tablet (10 mEq total) by mouth 2 (two) times a day, Disp: 180 tablet, Rfl: 3  •  torsemide (DEMADEX) 20 mg tablet, take two tablets by mouth in the morning and one tablet  at 4pm, Disp: 120 tablet, Rfl: 11  •  umeclidinium-vilanterol (Anoro Ellipta) 62.5-25 mcg/actuation inhaler, INHALE ONE PUFF BY MOUTH ONCE DAILY, Disp: 60 each, Rfl: 5  •  Unifine Pentips 31G X 8 MM MISC, inject under the skin daily use as  directed, Disp: 100 each, Rfl: 0      Vitals:    01/25/24 0838   BP: 122/58   Pulse: 73   SpO2: 93%       Weight (last 2 days)     Date/Time Weight    01/25/24 0838 127 (280.3)        Physical Exam  Constitutional:       General: He is not in acute distress.     Appearance: He is not diaphoretic.   HENT:      Head: Normocephalic and atraumatic.   Eyes:      General: No scleral icterus.     Conjunctiva/sclera: Conjunctivae normal.   Neck:      Vascular: No JVD.   Cardiovascular:      Rate and Rhythm: Normal rate and regular rhythm.      Heart sounds: Normal heart sounds. No murmur heard.  Pulmonary:      Effort: Pulmonary effort is normal. No respiratory distress.      Breath sounds: Normal breath sounds. No decreased breath sounds, wheezing, rhonchi or rales.   Musculoskeletal:      Cervical back: Normal range of motion.      Right lower leg: Normal. No edema.      Left lower leg: Normal. No edema.   Skin:     General: Skin is warm and dry.   Neurological:      Mental Status: He is alert and oriented to person, place, and time.           Laboratory Studies:    Labs personally reviewed    Cardiac testing:     EKG reviewed personally:   Results for orders placed or performed in visit on 01/25/24   POCT ECG    Impression    Sinus rhythm, right bundle branch block           Echocardiogram:  2017-EF 55%  3/21-EF 60%, mild LVH, mild RV dilation and mild RV dysfunction, mild-to-moderate TR with mild-to-moderate pulmonary hypertension  12/28/21-EF 55%, moderate concentric hypertrophy, RV dilated, left atrium dilated, right atrium dilated, mild MR, mild TR, mildly elevated pulmonary pressures, mildly dilated ascending aorta-personally reviewed  1/23 - EF 55%, RV dilated with reduced function, severe PHTN, septal flattening  2/23 - EF 55%, RV mildly dilated, mild PHTN    Stress test:      Holter:      Cardiac catheterization:        Agusto Thompson MD    Portions of the record may have been created with voice recognition  "software.  Occasional wrong word or \"sound a like\" substitutions may have occurred due to the inherent limitations of voice recognition software.  Read the chart carefully and recognize, using context, where substitutions have occurred.  "

## 2024-02-01 ENCOUNTER — OFFICE VISIT (OUTPATIENT)
Dept: VASCULAR SURGERY | Facility: CLINIC | Age: 79
End: 2024-02-01
Payer: MEDICARE

## 2024-02-01 VITALS
HEIGHT: 76 IN | SYSTOLIC BLOOD PRESSURE: 130 MMHG | DIASTOLIC BLOOD PRESSURE: 58 MMHG | HEART RATE: 72 BPM | OXYGEN SATURATION: 94 % | RESPIRATION RATE: 18 BRPM | BODY MASS INDEX: 34.12 KG/M2

## 2024-02-01 DIAGNOSIS — I71.43 INFRARENAL ABDOMINAL AORTIC ANEURYSM (AAA) WITHOUT RUPTURE (HCC): Primary | ICD-10-CM

## 2024-02-01 DIAGNOSIS — I73.9 PAD (PERIPHERAL ARTERY DISEASE) (HCC): ICD-10-CM

## 2024-02-01 PROCEDURE — 99214 OFFICE O/P EST MOD 30 MIN: CPT | Performed by: NURSE PRACTITIONER

## 2024-02-01 NOTE — ASSESSMENT & PLAN NOTE
79 year old male with HTN, HLD, CAD, CABG, AFib on Eliquis, chronic heart failure,  DM, chronic back pain, AAA, AIOD/PAD, right popliteal ectasia. Patient returns to the office for yearly visit to review aortoiliac duplex 1/5/2024    -AOIL showed stable 5.0 cm infrarenal AAA, right BETTY 2.2 cm and left CEA normal, greater than 70% celiac axis stenosis, SMA and renal arteries patent bilaterally  -Stable in comparison to prior duplex  -Below criteria for repair, await 5.5 cm.  Case reviewed with Dr Cruz and will review imaging in the event patient qualifies for early repair or STAAABLE trial   -For now recommended repeat AOIL in 6 months   -BP elevated at start of visit with regular sized BP cufff. Recheck by me with large cuff /58. Takes BP at home regularly, BP cuff calibrated with PCP and readings typically 120-130 systolic   -Follow up in 1 year or sooner if significant change on next duplex   -Any acute onset severe low back pain patient and family instructed to call 911

## 2024-02-01 NOTE — PATIENT INSTRUCTIONS
Nonruptured Abdominal Aortic Aneurysm   WHAT YOU NEED TO KNOW:   What is an abdominal aortic aneurysm (AAA)?  An AAA is a bulging or weak area in your abdominal aorta. Over time, the bulge may grow and is at risk for tearing or rupturing (bursting). The aorta is a large blood vessel that extends from your heart to your abdomen. The part of the aorta that extends into your abdomen is called your abdominal aorta. Your abdominal aorta brings blood to your stomach, pelvis, and legs. Treatment may be needed so your aneurysm does not grow and rupture. An AAA rupture is a life-threatening emergency.  What increases my risk for an AAA?   Use of nicotine products or drugs such as cocaine    High blood pressure (BP) or atherosclerosis (the buildup of fat and cholesterol in your arteries)    Being overweight or obese    A family or personal history of an AAA    Being male or older than 65    An abdominal injury    A connective tissue disorder such as Rigo-Danlos syndrome or Marfan syndrome    What are the signs and symptoms of an AAA?  An AAA usually does not have signs or symptoms if it has not ruptured. You may have any of the following if the AAA leaks or ruptures:  Sudden pain in your abdomen, groin, back, legs, or buttocks    A lump or swelling in your abdomen    Nausea and vomiting    Stiff abdominal muscles    Numbness or tingling in your legs    Pale, sweaty, or clammy skin    Dizziness, fainting, or loss of consciousness    How is a nonruptured AAA diagnosed?  An AAA may be found when you have a test done for another condition. Your healthcare provider will examine you and ask about your medical history. Tell the provider if you have any symptoms and when they started. Tell your provider about any medicines you take. You may be given contrast liquid before some of the following tests. Tell the healthcare provider if you have ever had an allergic reaction to contrast liquid.  An ultrasound, CT scan, or MRI  may show  the aneurysm or a condition causing it. Do not enter the MRI room with anything metal. Metal can cause serious injury. Tell the healthcare provider if you have any metal in or on your body.    Angiography  may be used to take detailed pictures of the AAA and your blood vessels.    How is an AAA treated?  Your AAA may not need treatment. Your healthcare provider may monitor the size of your AAA with tests, such as an ultrasound. If your AAA gets bigger, starts to leak, or ruptures, you may need any of the following:  Medicines  may be needed to lower your BP or cholesterol level.    Endovascular repair  is a procedure that uses a graft to repair your AAA. The graft stops blood flow to the aneurysm and protects your abdominal aorta. You may need to have more than 1 endovascular repair.    Open repair  is surgery to repair or remove an AAA.       What can I do to manage a nonruptured AAA?   Do not use nicotine products or stimulating drugs.  Nicotine and drugs such as cocaine may raise your BP, damage your aorta, and make your AAA larger. Ask your provider for information if you currently smoke or use stimulating drugs and need help to quit. E- cigarettes or smokeless tobacco still contain nicotine. Do not use these in place of cigarettes. Avoid secondhand smoke.    Manage other health conditions that can cause or worsen an AAA.  Follow your treatment plan to manage conditions such as high cholesterol, obesity, or stress. Check your BP as directed if you have high BP. Your provider will show you how to do this. Check your BP 2 times, 1 minute apart. Check as often as directed each day. Keep a record of your readings and bring it to your follow-up visits.         Follow physical activity directions.  Your healthcare provider may help you create a physical activity plan. Your plan may include low-intensity activity, such as walking, yoga, or swimming. Do not  lift anything heavier than 10 pounds. You may also need to  avoid intense physical activity, such as running. Heavy lifting or intense activity may raise your BP or put pressure on your aorta. These increase your risk for a tear or rupture.         Follow the meal plan recommended by your provider.  Talk to your provider or dietitian about a heart-healthy or low-sodium meal plan. A meal plan may help you lower your cholesterol or BP levels. Heart-healthy meal plans are low in sodium, processed sugar, and some fats. They are high in potassium, calcium, heart-healthy fats, and fiber. These can be found in vegetables, fruit, and whole-grain foods.         Know the risks if you choose to drink alcohol.  Alcohol can increase your BP. Ask your provider if it is okay for you to drink any alcohol. Your provider can help you set limits for the number of drinks you have in 24 hours and within 1 week. A drink of alcohol is 12 ounces of beer, 5 ounces of wine, or 1½ ounces of liquor.    Get vaccines as directed.  Some viruses can worsen an AAA. Get an influenza (flu) vaccine as soon as recommended each year, usually in September or October. Get all recommended COVID-19 vaccine doses and boosters. A pneumonia vaccine may also be recommended. Your provider will tell you if you need other vaccines, and when to get them.    What do I need to know about family planning?   Before you try to get pregnant,  talk to your specialist about family planning. Genetic counseling may be needed if your AAA is caused by a condition that can be passed to your baby. You may need counseling if your condition can put you or your pregnancy at risk. You may instead need counseling about the risk of pregnancy making your AAA grow, tear, or rupture. You may also need tests to check your AAA. These tests can help you and your provider plan treatment before and during your pregnancy. You may need surgery to protect your aorta and prevent your AAA from growing, tearing, or rupturing.    While you are pregnant,  you  may have 1 or more specialist caring for you. You may need medicine to help lower your BP. You may need regular tests to check your AAA. You may need other medicines or surgery if your AAA grows, tears, or ruptures. Your baby may need to be born early if your aorta tears or ruptures. A  section () may be needed if you have a history of a tear in your aorta. You may need a  if you have a high risk for a ruptured aneurysm or tear.    After your baby is born,  you may need tests to check your AAA. Your risk for a ruptured aneurysm or tear is high for up to 12 weeks.    What do I need to know about screening for an AAA?  Your healthcare provider can give you specific information about your screening. The following is general information:  Screening means you are checked for an AAA even if you have no signs or symptoms. Screening is recommended if you are male, aged 65 to 75 years, and you ever smoked cigarettes. Screening may be recommended based on your family history and other personal risk factors.    Your parents, siblings, and children may need screening. More family members may also need screening if 2 or more people in your family had an AAA. Your provider will tell you which family members need screening.    The benefit of screening is that your provider can diagnose an AAA early. This helps your provider monitor your AAA and start treatment, if needed. This can help prevent your AAA from growing, rupturing, or tearing. Screening can also help you plan a healthy pregnancy.    Call your local emergency number (911 in the [place]), or have someone else call if:   You have any of the following signs of a stroke:      Numbness or drooping on one side of your face     Weakness in an arm or leg    Confusion or difficulty speaking    Dizziness, a severe headache, or vision loss             You faint or lose consciousness.    You cannot be woken.    When should I seek immediate care?   You have  sudden sharp pain in your abdomen, groin, back, legs, or buttocks.    You have nausea and are vomiting.    You feel dizzy.    You have stiffness or swelling in your abdomen, or a lump in your abdomen.    You have numbness or tingling in your legs.    Your skin is pale, sweaty, or clammy.    When should I call my doctor?   You have questions or concerns about your condition or care.      CARE AGREEMENT:   You have the right to help plan your care. Learn about your health condition and how it may be treated. Discuss treatment options with your healthcare providers to decide what care you want to receive. You always have the right to refuse treatment. The above information is an  only. It is not intended as medical advice for individual conditions or treatments. Talk to your doctor, nurse or pharmacist before following any medical regimen to see if it is safe and effective for you.  © Copyright [employer] 2023 Information is for End User's use only and may not be sold, redistributed or otherwise used for commercial purposes.    Peripheral Artery Disease   WHAT YOU NEED TO KNOW:   What is peripheral artery disease (PAD)?  PAD is narrow, weak, or blocked arteries. It may affect any arteries outside of your heart and brain. PAD is usually the result of a buildup of fat and cholesterol, also called plaque, along your artery walls. Inflammation, a blood clot, or abnormal cell growth could also block your arteries. PAD prevents normal blood flow to your legs and arms. You are at risk of an amputation if poor blood flow keeps wounds from healing or causes gangrene (tissue death). Without treatment, PAD can also cause a heart attack or stroke.  What increases my risk for PAD?   Smoking cigarettes    Diabetes    High blood pressure    High cholesterol    Age older than 40 years    Heart disease or a family history of heart disease    What are the signs and symptoms of PAD?  Mild PAD usually does not cause symptoms.  As the disease worsens over time, you may have the following:  Pain or cramps in your leg or hip while you walk    A numb, weak, or heavy feeling in your legs    Dry, scaly, red, or pale skin on your legs    Thick or brittle nails, or hair loss on your arms and legs    Foot sores that will not heal    Burning or aching in your feet and toes while resting (this may be worse when you lie down)    How is PAD diagnosed?   Angiography  is a test that shows pictures of the arteries in your arms and legs. You will be given contrast liquid to help the arteries show up better on the pictures. The pictures will be taken with an MRI or CT scan. Tell the healthcare provider if you have ever had an allergic reaction to contrast liquid. Do not enter the MRI room with anything metal. Metal can cause serious injury. Tell a healthcare provider if you have any metal in or on your body.    Doppler ankle brachial index (CROW)  is a test that compares blood pressure in your ankles to blood pressure in your arms. This tells your healthcare provider how well blood is flowing through the arteries in your legs.    How is PAD treated?  Treatment can help reduce your risk of a heart attack, stroke, or amputation. You may need more than one of the following:  Medicines  may be given:    Antiplatelets , such as aspirin, help prevent blood clots. Take your antiplatelet medicine exactly as directed. These medicines make it more likely for you to bleed or bruise. If you are told to take aspirin, do not take acetaminophen or ibuprofen instead.    Statin medicine  helps lower your cholesterol and prevents PAD from getting worse.    A supervised exercise program  helps you stay active in normal daily activities. Healthcare providers will help you safely walk or do strength training exercises 3 times a week for 30 to 60 minutes. You will do this for several months, then transition to walking on your own.    Angioplasty  is a procedure to open your  artery so blood can flow through normally. A thin tube called a catheter is used to insert a small balloon into your artery. The balloon is inflated to open your blocked artery, and then removed. A tube called a stent may be placed in your artery to hold it open.         Bypass surgery  is used to make a new connection to your artery with a vein from another part of your body, or an artificial graft. The vein or graft is attached to your artery above and below your blockage. This allows blood to flow around the blocked portion of your artery.    How can I manage PAD?   Do not smoke.  Nicotine and other chemicals in cigarettes and cigars can worsen PAD. They can also increase your risk for a heart attack or stroke. Ask your healthcare provider for information if you currently smoke and need help to quit. E-cigarettes or smokeless tobacco still contain nicotine. Talk to your healthcare provider before you use these products.    Manage other health conditions.  Take your medicines as directed. Follow your healthcare provider's instructions if you have high blood pressure or high cholesterol. Perform foot care and check your blood sugar levels as directed if you have diabetes.    Eat heart-healthy foods.  Eat whole grains, fruits, and vegetables every day. Limit salt and high-fat foods. Ask your healthcare provider for more information on a heart healthy diet. Ask what a healthy weight is for you. Your healthcare provider can help you create a healthy weight-loss plan, if needed.       Call your local emergency number (615 in the [place]) if:   You have any of the following signs of a heart attack:      Squeezing, pressure, or pain in your chest    You may  also have any of the following:     Discomfort or pain in your back, neck, jaw, stomach, or arm    Shortness of breath    Nausea or vomiting    Lightheadedness or a sudden cold sweat    You have any of the following signs of a stroke:      Numbness or drooping on one side  of your face     Weakness in an arm or leg    Confusion or difficulty speaking    Dizziness, a severe headache, or vision loss    When should I seek immediate care?   You have sores or wounds that will not heal.    You notice black or discolored skin on your arm or leg.    Your skin is cool to the touch.    When should I call my doctor?   You have leg pain when you walk 1/8 mile (200 meters) or less, even with treatment.    Your legs are red, dry, or pale, even with treatment.    You have questions or concerns about your condition or care.    CARE AGREEMENT:   You have the right to help plan your care. Learn about your health condition and how it may be treated. Discuss treatment options with your healthcare providers to decide what care you want to receive. You always have the right to refuse treatment. The above information is an  only. It is not intended as medical advice for individual conditions or treatments. Talk to your doctor, nurse or pharmacist before following any medical regimen to see if it is safe and effective for you.  © Copyright [employer] 2023 Information is for End User's use only and may not be sold, redistributed or otherwise used for commercial purposes.

## 2024-02-01 NOTE — ASSESSMENT & PLAN NOTE
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  Uses a walker  -Does stationary bike 30 minutes daily  -No specific claudication symptoms.  Chronic numbness of the feet secondary to neuropathy  -Continue exercise routine  -Continue statin therapy  -Follows with podiatry for nail trimming every 6 weeks  -Recommended lower extremity arterial duplex in 6 months  -Follow-up in the office in 1 year

## 2024-02-05 DIAGNOSIS — K21.9 GASTROESOPHAGEAL REFLUX DISEASE, UNSPECIFIED WHETHER ESOPHAGITIS PRESENT: ICD-10-CM

## 2024-02-06 RX ORDER — OMEPRAZOLE 20 MG/1
20 CAPSULE, DELAYED RELEASE ORAL
Qty: 180 CAPSULE | Refills: 0 | Status: SHIPPED | OUTPATIENT
Start: 2024-02-06

## 2024-02-09 DIAGNOSIS — K21.00 GASTROESOPHAGEAL REFLUX DISEASE WITH ESOPHAGITIS WITHOUT HEMORRHAGE: ICD-10-CM

## 2024-02-09 RX ORDER — FAMOTIDINE 40 MG/1
40 TABLET, FILM COATED ORAL
Qty: 30 TABLET | Refills: 5 | Status: SHIPPED | OUTPATIENT
Start: 2024-02-09

## 2024-02-11 DIAGNOSIS — I48.91 ATRIAL FIBRILLATION WITH RAPID VENTRICULAR RESPONSE (HCC): ICD-10-CM

## 2024-02-12 ENCOUNTER — TELEPHONE (OUTPATIENT)
Dept: FAMILY MEDICINE CLINIC | Facility: CLINIC | Age: 79
End: 2024-02-12

## 2024-02-12 DIAGNOSIS — E11.65 TYPE 2 DIABETES MELLITUS WITH HYPERGLYCEMIA, WITH LONG-TERM CURRENT USE OF INSULIN (HCC): ICD-10-CM

## 2024-02-12 DIAGNOSIS — Z79.4 TYPE 2 DIABETES MELLITUS WITH HYPERGLYCEMIA, WITH LONG-TERM CURRENT USE OF INSULIN (HCC): ICD-10-CM

## 2024-02-12 DIAGNOSIS — I10 PRIMARY HYPERTENSION: Primary | ICD-10-CM

## 2024-02-12 RX ORDER — DIGOXIN 125 MCG
125 TABLET ORAL DAILY
Qty: 90 TABLET | Refills: 0 | Status: SHIPPED | OUTPATIENT
Start: 2024-02-12

## 2024-02-26 ENCOUNTER — OFFICE VISIT (OUTPATIENT)
Dept: FAMILY MEDICINE CLINIC | Facility: CLINIC | Age: 79
End: 2024-02-26
Payer: MEDICARE

## 2024-02-26 VITALS
HEIGHT: 76 IN | HEART RATE: 68 BPM | OXYGEN SATURATION: 96 % | TEMPERATURE: 98.4 F | RESPIRATION RATE: 17 BRPM | DIASTOLIC BLOOD PRESSURE: 62 MMHG | SYSTOLIC BLOOD PRESSURE: 124 MMHG | BODY MASS INDEX: 34.12 KG/M2

## 2024-02-26 DIAGNOSIS — M54.42 CHRONIC MIDLINE LOW BACK PAIN WITH BILATERAL SCIATICA: ICD-10-CM

## 2024-02-26 DIAGNOSIS — G89.29 CHRONIC PAIN OF BOTH KNEES: ICD-10-CM

## 2024-02-26 DIAGNOSIS — M25.561 CHRONIC PAIN OF BOTH KNEES: ICD-10-CM

## 2024-02-26 DIAGNOSIS — I50.32 CHRONIC DIASTOLIC CHF (CONGESTIVE HEART FAILURE) (HCC): ICD-10-CM

## 2024-02-26 DIAGNOSIS — Z91.81 AT MODERATE RISK FOR FALL: ICD-10-CM

## 2024-02-26 DIAGNOSIS — M54.41 CHRONIC MIDLINE LOW BACK PAIN WITH BILATERAL SCIATICA: ICD-10-CM

## 2024-02-26 DIAGNOSIS — R26.89 BALANCE PROBLEM: ICD-10-CM

## 2024-02-26 DIAGNOSIS — M25.562 CHRONIC PAIN OF BOTH KNEES: ICD-10-CM

## 2024-02-26 DIAGNOSIS — M19.049 HAND ARTHROPATHY: ICD-10-CM

## 2024-02-26 DIAGNOSIS — Z91.81 HISTORY OF FALL: ICD-10-CM

## 2024-02-26 DIAGNOSIS — R06.02 EXERTIONAL SHORTNESS OF BREATH: ICD-10-CM

## 2024-02-26 DIAGNOSIS — R29.898 WEAKNESS OF DISTAL ARMS AND LEGS: ICD-10-CM

## 2024-02-26 DIAGNOSIS — Z99.3 WHEELCHAIR DEPENDENCE: ICD-10-CM

## 2024-02-26 DIAGNOSIS — R29.898 WEAKNESS OF BOTH LOWER LIMBS: Primary | ICD-10-CM

## 2024-02-26 DIAGNOSIS — G89.29 CHRONIC MIDLINE LOW BACK PAIN WITH BILATERAL SCIATICA: ICD-10-CM

## 2024-02-26 PROCEDURE — G2211 COMPLEX E/M VISIT ADD ON: HCPCS | Performed by: NURSE PRACTITIONER

## 2024-02-26 PROCEDURE — 99214 OFFICE O/P EST MOD 30 MIN: CPT | Performed by: NURSE PRACTITIONER

## 2024-02-27 NOTE — PROGRESS NOTES
Assessment/Plan:    1. Weakness of both lower limbs    2. Balance problem    3. Chronic pain of both knees    4. Chronic midline low back pain with bilateral sciatica    5. Chronic diastolic CHF (congestive heart failure) (HCC)    6. Wheelchair dependence    7. Weakness of distal arms and legs    8. Exertional shortness of breath    9. Hand arthropathy    10. History of fall    11. At moderate risk for fall    The case discussed with patient using patient centered shared decision making.The patient was counseled regarding instructions for management,-- risk factor reductions,-- prognosis,-- impressions,-- risks and benefits of treatment options,-- importance of compliance with treatment. I have reviewed the instructions with the patient, answering all questions to his satisfaction.      Falls Plan of Care: balance, strength, and gait training instructions were provided. not completed because patient not ambulatory, bed ridden, immobile, confined to chair, or wheelchair bound. Recommended assistive device to help with gait and balance.        78 yo male with significant history of diabetes, polyneuropathy, copd, Afib, CKD stage 3, anemia, chf, pulm htn, general weakness, weakness of upper and lower extremity limbs, exertional shortness of breath  meets criteria for medically necessary Motorized wheelchair or scooter due to the following      Polyneuropathy, severe osteoarthritis, chronic respiratory difficulties, morbid obesity contribute to mobility limitations which thereby significantly impair participation in mobility related activities of daily living including but not limited to toileting, dressing, grooming, mobilizing self.  Patient has significant upper and lower extremity limb weakness and has difficulty supporting himself to use assistive device such as walker or cane.  Additionally he is unable to propel himself in manual wheelchair due to his lack of arm strength.  His wife does not have the strength to  mobilize him or propel him in wheelchair at home or in wheelchair taking him to doctors appointments.  Thereby, motorized wheelchair or scooter is medically necessary to facilitate his independent mobility at home to complete ADLs (personal care, eating) and to medical appointments and to shopping to secure food and medications.     He has very limited endurance and muscle strength. His mobility is limited to walking 20 feet or less when attending to doctor's appointments and other other out of home necessities.  His spouse is too weak to push him in a wheelchair. his arm strength too weak to propel himself in a wheelchair. He is limited to leaving the home when his daughter is available to assist him which is infrequently.       There are no Patient Instructions on file for this visit.    No follow-ups on file.    Subjective:      Patient ID: Home Walters is a 79 y.o. male.    Chief Complaint   Patient presents with    Other     Discuss motorized scooter       79 yo male with complex medical history presents for mobility examination.    Accompanied by his wife, son    Patient has rapidly declining functional and mobility status-for this reason he now requires a power mobility device  Height is 6 foot 4 inches weight 280 pounds  He arrives to all appointments in wheelchair pushed by his son or daughter-he lacks strength in his arms or hands to self propel.  His wife has early dementia and is too weak to push the wheelchair.  He is on room air sitting in the wheelchair.  His SpO2 is usually between 94 to 96% on room air  He has 2+ pitting edema in his lower extremities  History of sacral ulcer from previous hospitalization although this is stable at this point  Ability to shift weight is very difficult due to his unstable knee secondary to pain and lower extremity muscle weakness as well and large body habitus  Strength of all extremities dependent upon level of fatigue-2/5 when most fatigued/ 3/5 at baseline and  all 4 extremities  Pain during today's office visit and low back and bilateral knees is rated 8/10  Patient is nonambulatory and arrives in wheelchair.  For the sake of this exam, I tried to engage him to stand up for exam and he has difficulty due to pain and lower extremity weakness.    The following medical conditions: Congestive heart failure, neuropathy, moderate COPD, hypertension, advanced multi joint arthritis, venous stasis of lower extremity, lymphedema, chronic pain syndrome create impairments in mobility and impact ability to mobilize.     As mentioned above, power mobility device is necessary related to impairments in his home-PMD is necessary to get to the bathroom to toilet, get to the kitchen for meal time, to get to the bedroom to groom and dress.  PMD is medically necessary for use outside of the home--to maintain independence, to allow patient to get to medical appointments and to shop for necessary food items when assistance not available.       A cane will Not medically meet Maynor's needs in the home or outside of the home due to his poor balance and high risk of falling.  A walker will not medically meet his needs in or out of the home at this assistive device escalate his back pain.  When using a walker his knees are unstable and buckle.  When using a walker he must sit frequently to rest as he experiences pain in extremity weakness.  He must rest every 2 minutes approximately.  He has severe shoulder pain, other extremity weakness, poor hand strength and poor  which impairs his use of walker as well.    Manual wheelchair will not medically needed his need in or out of the home.  Patient cannot use a manual wheelchair due to  strength 2/5 hand and shoulder pain level of 8/10.  Furthermore his spouse is unable to push the wheelchair.      The scooter/EOB will not medically meet his needs in or out of the home.  He cannot use POV due to lack of postural stability, poor balance.    It is my  opinion that the patient can safely operate a power mobility device that is medically and physically.  He has always been compliant with his medical treatment plan and is willing and motivated to use the power mobility device in the home to maintain his independence and maintain MRADLs and outside of the home for medical appointments.        The following portions of the patient's history were reviewed and updated as appropriate: allergies, current medications, past family history, past medical history, past social history, past surgical history and problem list.    Review of Systems   Constitutional:  Positive for fatigue. Negative for fever and unexpected weight change.        Weight stable   HENT:  Positive for hearing loss. Negative for trouble swallowing.    Respiratory:  Positive for cough. Negative for shortness of breath.         CHAVEZ at baseline   Cardiovascular:  Positive for leg swelling. Negative for chest pain and palpitations.   Gastrointestinal:  Negative for abdominal pain and blood in stool.   Endocrine: Negative.    Genitourinary:  Negative for decreased urine volume, difficulty urinating and hematuria.   Musculoskeletal:  Positive for arthralgias, back pain, gait problem and joint swelling.        Pt arrives in wheelchair   Skin:  Positive for rash. Negative for pallor and wound.   Neurological:  Positive for weakness and numbness. Negative for dizziness and headaches.   Psychiatric/Behavioral:  Negative for confusion and dysphoric mood. The patient is not nervous/anxious.          Current Outpatient Medications   Medication Sig Dispense Refill    apixaban (Eliquis) 5 mg Take 1 tablet (5 mg total) by mouth 2 (two) times a day 180 tablet 3    atorvastatin (LIPITOR) 40 mg tablet Take 1 tablet (40 mg total) by mouth daily 90 tablet 1    cholecalciferol (VITAMIN D3) 1,000 units tablet Take 1,000 Units by mouth daily      Continuous Blood Gluc Sensor (FreeStyle Peggy 2 Sensor) MISC Change it every 14  "days 1 each 0    dicyclomine (BENTYL) 10 mg capsule Take 1 capsule (10 mg total) by mouth 2 (two) times a day as needed (abdominal cramping) 60 capsule 3    digoxin (LANOXIN) 0.125 mg tablet TAKE ONE TABLET BY MOUTH ONCE DAILY 90 tablet 0    Empagliflozin (Jardiance) 25 MG TABS Take 1 tablet (25 mg total) by mouth every morning 120 tablet 5    ergocalciferol (VITAMIN D2) 50,000 units Take 1 capsule (50,000 Units total) by mouth once a week for 8 doses 8 capsule 0    famotidine (PEPCID) 40 MG tablet Take 1 tablet (40 mg total) by mouth daily at bedtime 30 tablet 5    insulin degludec (Tresiba FlexTouch) 200 units/mL CONCENTRATED U-200 injection pen inject 95 units under the skin once daily 15 mL 6    Lancets (accu-chek multiclix) lancets Use 1 each 2 (two) times a day Use 2 times daily to test blood glucose 102 each 3    metoprolol succinate (TOPROL-XL) 50 mg 24 hr tablet Take 1 tablet (50 mg total) by mouth daily 90 tablet 3    NovoLOG FlexPen 100 units/mL injection pen Inject 20-25 Units under the skin 3 (three) times a day with meals 30 mL 6    omeprazole (PriLOSEC) 20 mg delayed release capsule Take 1 capsule (20 mg total) by mouth 2 (two) times a day 180 capsule 0    potassium citrate (UROCIT-K) 10 mEq Take 1 tablet (10 mEq total) by mouth 2 (two) times a day 180 tablet 3    torsemide (DEMADEX) 20 mg tablet take two tablets by mouth in the morning and one tablet  at 4pm 120 tablet 11    umeclidinium-vilanterol (Anoro Ellipta) 62.5-25 mcg/actuation inhaler INHALE ONE PUFF BY MOUTH ONCE DAILY 60 each 5    Unifine Pentips 31G X 8 MM MISC inject under the skin daily use as directed 100 each 0     No current facility-administered medications for this visit.       Objective:    /62 (BP Location: Left arm, Patient Position: Sitting, Cuff Size: Large)   Pulse 68   Temp 98.4 °F (36.9 °C) (Temporal)   Resp 17   Ht 6' 4\" (1.93 m)   SpO2 96%   BMI 34.12 kg/m²        Physical Exam  Vitals and nursing note " reviewed.   Constitutional:       General: He is not in acute distress.     Appearance: Normal appearance. He is obese. He is not ill-appearing.      Comments: Elderly gentleman presents in wheelchair   HENT:      Head: Normocephalic and atraumatic.   Neck:      Thyroid: No thyromegaly.      Vascular: No JVD.   Cardiovascular:      Rate and Rhythm: Normal rate. Rhythm irregular.      Pulses: Normal pulses.      Heart sounds:      No S3 or S4 sounds.   Pulmonary:      Effort: Pulmonary effort is normal.      Breath sounds: No rales.   Abdominal:      Palpations: Abdomen is soft.      Tenderness: There is no abdominal tenderness.   Musculoskeletal:      Right lower le+ Edema present.      Left lower le+ Edema present.      Comments: Patient arrive in wheelchair   Lymphadenopathy:      Cervical: No cervical adenopathy.   Skin:     General: Skin is warm and dry.      Coloration: Skin is not pale.   Neurological:      General: No focal deficit present.      Mental Status: He is alert. Mental status is at baseline.      Motor: Weakness and abnormal muscle tone present.      Gait: Gait abnormal.      Comments: Upper and lower extremity weakness       Psychiatric:         Mood and Affect: Mood normal.         Behavior: Behavior normal.                KACEY Bal

## 2024-02-29 ENCOUNTER — OFFICE VISIT (OUTPATIENT)
Dept: PULMONOLOGY | Facility: CLINIC | Age: 79
End: 2024-02-29
Payer: MEDICARE

## 2024-02-29 VITALS
TEMPERATURE: 98.5 F | BODY MASS INDEX: 34.12 KG/M2 | DIASTOLIC BLOOD PRESSURE: 62 MMHG | SYSTOLIC BLOOD PRESSURE: 120 MMHG | HEIGHT: 76 IN | OXYGEN SATURATION: 91 % | HEART RATE: 66 BPM

## 2024-02-29 DIAGNOSIS — J44.9 CHRONIC OBSTRUCTIVE PULMONARY DISEASE, UNSPECIFIED COPD TYPE (HCC): Primary | ICD-10-CM

## 2024-02-29 PROCEDURE — 99213 OFFICE O/P EST LOW 20 MIN: CPT | Performed by: INTERNAL MEDICINE

## 2024-02-29 NOTE — ASSESSMENT & PLAN NOTE
Obstructive sleep apnea and overlap syndrome now treated with ventilation.  Patient has been quite compliant with this according to his report has no symptoms of sleep apnea while using this nightly.  No technical problems with the use of this device except for occasional mask leaking.  Last measured pCO2 on venous blood gas was elevated in April but has not been measured since.  We have not been able to get appliance and will call his DME provider.  Advised to continue.  Because of patient's borderline pO2 at rest and documented pulmonary hypertension I recommend that he continue to use the oxygen at night while sleeping.

## 2024-02-29 NOTE — ASSESSMENT & PLAN NOTE
Moderate function on previous PFTs.  Surprisingly patient did not have clear evidence of either air trapping or abnormal DLCO.  Patient continues to take Anoro on a daily basis and does not sense the need for rescue inhaler.  I gather that his activities are quite limited because of back and leg pain he is in fact looking into getting a motorized scooter.  Advised to continue Anoro.

## 2024-02-29 NOTE — PROGRESS NOTES
Answers submitted by the patient for this visit:  Pulmonology Questionnaire (Submitted on 2/25/2024)  Chief Complaint: Primary symptoms  Do you experience frequent throat clearing?: Yes  When did you first notice your symptoms?: more than 1 year ago  How often do your symptoms occur?: intermittently  Since you first noticed this problem, how has it changed?: unchanged  Do you have shortness of breath that occurs with effort or exertion?: No  Do you have ear congestion?: No  Do you have heartburn?: No  Do you have fatigue?: No  Do you have nasal congestion?: Yes  Which of the following makes your symptoms worse?: change in weather  Which of the following makes your symptoms better?: nothing  Risk factors for lung disease: smoking/tobacco exposure  Assessment/Plan:    LUCIA (obstructive sleep apnea)  Obstructive sleep apnea and overlap syndrome now treated with ventilation.  Patient has been quite compliant with this according to his report has no symptoms of sleep apnea while using this nightly.  No technical problems with the use of this device except for occasional mask leaking.  Last measured pCO2 on venous blood gas was elevated in April but has not been measured since.  We have not been able to get appliance and will call his DME provider.  Advised to continue.  Because of patient's borderline pO2 at rest and documented pulmonary hypertension I recommend that he continue to use the oxygen at night while sleeping.    Chronic obstructive pulmonary disease (HCC)  Moderate function on previous PFTs.  Surprisingly patient did not have clear evidence of either air trapping or abnormal DLCO.  Patient continues to take Anoro on a daily basis and does not sense the need for rescue inhaler.  I gather that his activities are quite limited because of back and leg pain he is in fact looking into getting a motorized scooter.  Advised to continue Anoro.    Left upper lobe pulmonary nodule  Unchanged left upper lobe nodule from  2016 to 2021.  No further imaging required.     Diagnoses and all orders for this visit:    Chronic obstructive pulmonary disease, unspecified COPD type (HCC)          Subjective:      Patient ID: Home Walters is a 79 y.o. male.    This patient returns for reevaluation of overlap syndrome with obstructive sleep apnea and COPD.  Previously had had serious problems with considerable hypercarbia.  Previous admissions with ongoing heart failure problems.  Patient has been quite religiously using BiPAP ventilation since last January.  He has had no clinical problems using this device except for occasional mask leaking.  Feels well when he gets out of bed in the morning and has no other symptoms of sleep apnea.  Does not sense that he is having any major trouble with dyspnea but his activities are quite limited by leg and back problems.  He is looking into getting a motorized wheelchair.  Cough with sputum only in the morning and this may well be postnasal drip from his BiPAP ventilation.  No major wheezing.  Takes Anoro on a daily basis for documented moderate airflow obstruction.  Does not require rescue inhaler.  No other new issues and I gather he has not had any major heart problems since last January.    primary symptoms  Associated symptoms include coughing. Pertinent negatives include no chest pain, fever, headaches or myalgias.       The following portions of the patient's history were reviewed and updated as appropriate: allergies, current medications, past family history, past medical history, past social history, past surgical history and problem list.    Review of Systems   Constitutional:  Negative for activity change, appetite change and fever.   HENT:  Negative for ear pain and postnasal drip.    Respiratory:  Positive for apnea and cough. Negative for shortness of breath and wheezing.    Cardiovascular:  Positive for leg swelling. Negative for chest pain and palpitations.   Musculoskeletal:  Negative  "for myalgias.   Neurological:  Negative for headaches.         Objective:      /62 (BP Location: Left arm, Patient Position: Sitting, Cuff Size: Standard)   Pulse 66   Temp 98.5 °F (36.9 °C) (Tympanic)   Ht 6' 4\" (1.93 m)   SpO2 92%   BMI 34.12 kg/m²          Physical Exam  Vitals reviewed.   Constitutional:       General: He is not in acute distress.     Appearance: He is normal weight. He is not ill-appearing.      Comments: Examined in wheelchair   Cardiovascular:      Rate and Rhythm: Normal rate and regular rhythm.      Heart sounds: Murmur heard.      Systolic murmur is present with a grade of 2/6.   Pulmonary:      Effort: Pulmonary effort is normal.      Breath sounds: Examination of the right-lower field reveals rales. Examination of the left-lower field reveals rales. Rales present. No wheezing or rhonchi.      Comments: Faint Crackles  Musculoskeletal:         General: No swelling.      Cervical back: No rigidity or tenderness.      Right lower le+ Pitting Edema present.      Left lower le+ Pitting Edema present.   Lymphadenopathy:      Cervical: No cervical adenopathy.   Skin:     General: Skin is warm and dry.   Neurological:      Mental Status: He is alert and oriented to person, place, and time.   Psychiatric:         Mood and Affect: Mood normal.         Behavior: Behavior normal.         "

## 2024-03-12 ENCOUNTER — TELEPHONE (OUTPATIENT)
Dept: CARDIOLOGY CLINIC | Facility: CLINIC | Age: 79
End: 2024-03-12

## 2024-03-12 DIAGNOSIS — I48.91 ATRIAL FIBRILLATION WITH RAPID VENTRICULAR RESPONSE (HCC): ICD-10-CM

## 2024-03-12 RX ORDER — METOPROLOL SUCCINATE 50 MG/1
50 TABLET, EXTENDED RELEASE ORAL DAILY
Qty: 90 TABLET | Refills: 3 | Status: SHIPPED | OUTPATIENT
Start: 2024-03-12 | End: 2025-03-07

## 2024-03-13 ENCOUNTER — TELEPHONE (OUTPATIENT)
Dept: FAMILY MEDICINE CLINIC | Facility: CLINIC | Age: 79
End: 2024-03-13

## 2024-03-13 DIAGNOSIS — Z91.81 AT MODERATE RISK FOR FALL: ICD-10-CM

## 2024-03-13 DIAGNOSIS — Z99.3 WHEELCHAIR DEPENDENCE: ICD-10-CM

## 2024-03-13 DIAGNOSIS — G89.29 CHRONIC MIDLINE LOW BACK PAIN WITH BILATERAL SCIATICA: ICD-10-CM

## 2024-03-13 DIAGNOSIS — R26.89 BALANCE PROBLEM: ICD-10-CM

## 2024-03-13 DIAGNOSIS — Z99.3 DEPENDENT FOR WHEELCHAIR MOBILITY: ICD-10-CM

## 2024-03-13 DIAGNOSIS — I48.91 ATRIAL FIBRILLATION WITH RAPID VENTRICULAR RESPONSE (HCC): ICD-10-CM

## 2024-03-13 DIAGNOSIS — M54.42 CHRONIC MIDLINE LOW BACK PAIN WITH BILATERAL SCIATICA: ICD-10-CM

## 2024-03-13 DIAGNOSIS — M25.562 CHRONIC PAIN OF BOTH KNEES: ICD-10-CM

## 2024-03-13 DIAGNOSIS — G89.29 CHRONIC PAIN OF BOTH KNEES: ICD-10-CM

## 2024-03-13 DIAGNOSIS — R29.898 WEAKNESS OF DISTAL ARMS AND LEGS: ICD-10-CM

## 2024-03-13 DIAGNOSIS — M54.41 CHRONIC MIDLINE LOW BACK PAIN WITH BILATERAL SCIATICA: ICD-10-CM

## 2024-03-13 DIAGNOSIS — R06.02 EXERTIONAL SHORTNESS OF BREATH: ICD-10-CM

## 2024-03-13 DIAGNOSIS — M25.561 CHRONIC PAIN OF BOTH KNEES: ICD-10-CM

## 2024-03-13 DIAGNOSIS — R29.898 WEAKNESS OF BOTH LOWER LIMBS: Primary | ICD-10-CM

## 2024-03-13 RX ORDER — METOPROLOL SUCCINATE 50 MG/1
50 TABLET, EXTENDED RELEASE ORAL DAILY
Qty: 90 TABLET | Refills: 3 | OUTPATIENT
Start: 2024-03-13 | End: 2025-03-08

## 2024-03-13 NOTE — TELEPHONE ENCOUNTER
Marybeth Denny MD  You 14 hours ago (4:58 PM)      2 mo          Routing to schedulers to contact patient and reschedule procedure.    Spoke to pt and he would like script and pt places

## 2024-03-13 NOTE — TELEPHONE ENCOUNTER
Please advise patient the I got notification from [facility] that he requires a one-time PT/OT evalution for motorized chair-medicare requirement-this is just an evaluation and he will not have to continue a treatment plan.     They state it per Medicare coverage guidelines. I placed the order. Please give him numbers to schedule

## 2024-03-14 ENCOUNTER — OFFICE VISIT (OUTPATIENT)
Dept: PODIATRY | Facility: CLINIC | Age: 79
End: 2024-03-14
Payer: MEDICARE

## 2024-03-14 ENCOUNTER — APPOINTMENT (OUTPATIENT)
Dept: LAB | Facility: AMBULARY SURGERY CENTER | Age: 79
End: 2024-03-14
Payer: MEDICARE

## 2024-03-14 VITALS
OXYGEN SATURATION: 94 % | DIASTOLIC BLOOD PRESSURE: 64 MMHG | BODY MASS INDEX: 34.12 KG/M2 | HEART RATE: 93 BPM | SYSTOLIC BLOOD PRESSURE: 121 MMHG | HEIGHT: 76 IN

## 2024-03-14 DIAGNOSIS — Z79.4 TYPE 2 DIABETES MELLITUS WITH HYPERGLYCEMIA, WITH LONG-TERM CURRENT USE OF INSULIN (HCC): ICD-10-CM

## 2024-03-14 DIAGNOSIS — E78.2 MIXED HYPERLIPIDEMIA: ICD-10-CM

## 2024-03-14 DIAGNOSIS — E11.42 DIABETIC PERIPHERAL NEUROPATHY (HCC): ICD-10-CM

## 2024-03-14 DIAGNOSIS — E11.65 TYPE 2 DIABETES MELLITUS WITH HYPERGLYCEMIA, WITH LONG-TERM CURRENT USE OF INSULIN (HCC): ICD-10-CM

## 2024-03-14 DIAGNOSIS — I73.9 PAD (PERIPHERAL ARTERY DISEASE) (HCC): ICD-10-CM

## 2024-03-14 DIAGNOSIS — B35.1 ONYCHOMYCOSIS: Primary | ICD-10-CM

## 2024-03-14 DIAGNOSIS — I48.0 PAF (PAROXYSMAL ATRIAL FIBRILLATION) (HCC): ICD-10-CM

## 2024-03-14 DIAGNOSIS — I48.0 PAROXYSMAL ATRIAL FIBRILLATION (HCC): ICD-10-CM

## 2024-03-14 DIAGNOSIS — I25.10 CORONARY ARTERY DISEASE INVOLVING NATIVE CORONARY ARTERY OF NATIVE HEART WITHOUT ANGINA PECTORIS: ICD-10-CM

## 2024-03-14 LAB
ALBUMIN SERPL BCP-MCNC: 3.9 G/DL (ref 3.5–5)
ALP SERPL-CCNC: 85 U/L (ref 34–104)
ALT SERPL W P-5'-P-CCNC: 12 U/L (ref 7–52)
ANION GAP SERPL CALCULATED.3IONS-SCNC: 11 MMOL/L (ref 4–13)
AST SERPL W P-5'-P-CCNC: 14 U/L (ref 13–39)
BILIRUB SERPL-MCNC: 0.38 MG/DL (ref 0.2–1)
BUN SERPL-MCNC: 42 MG/DL (ref 5–25)
CALCIUM SERPL-MCNC: 9.6 MG/DL (ref 8.4–10.2)
CHLORIDE SERPL-SCNC: 98 MMOL/L (ref 96–108)
CHOLEST SERPL-MCNC: 126 MG/DL
CO2 SERPL-SCNC: 32 MMOL/L (ref 21–32)
CREAT SERPL-MCNC: 1.4 MG/DL (ref 0.6–1.3)
DIGOXIN SERPL-MCNC: 0.6 NG/ML (ref 0.8–2)
EST. AVERAGE GLUCOSE BLD GHB EST-MCNC: 177 MG/DL
GFR SERPL CREATININE-BSD FRML MDRD: 47 ML/MIN/1.73SQ M
GLUCOSE P FAST SERPL-MCNC: 143 MG/DL (ref 65–99)
HBA1C MFR BLD: 7.8 %
HDLC SERPL-MCNC: 28 MG/DL
LDLC SERPL CALC-MCNC: 50 MG/DL (ref 0–100)
NONHDLC SERPL-MCNC: 98 MG/DL
POTASSIUM SERPL-SCNC: 4.2 MMOL/L (ref 3.5–5.3)
PROT SERPL-MCNC: 7.8 G/DL (ref 6.4–8.4)
SODIUM SERPL-SCNC: 141 MMOL/L (ref 135–147)
TRIGL SERPL-MCNC: 240 MG/DL

## 2024-03-14 PROCEDURE — 80053 COMPREHEN METABOLIC PANEL: CPT

## 2024-03-14 PROCEDURE — 82985 ASSAY OF GLYCATED PROTEIN: CPT

## 2024-03-14 PROCEDURE — 80061 LIPID PANEL: CPT

## 2024-03-14 PROCEDURE — 99202 OFFICE O/P NEW SF 15 MIN: CPT | Performed by: PODIATRIST

## 2024-03-14 PROCEDURE — 36415 COLL VENOUS BLD VENIPUNCTURE: CPT

## 2024-03-14 PROCEDURE — 11721 DEBRIDE NAIL 6 OR MORE: CPT | Performed by: PODIATRIST

## 2024-03-14 PROCEDURE — 80162 ASSAY OF DIGOXIN TOTAL: CPT

## 2024-03-14 PROCEDURE — 83036 HEMOGLOBIN GLYCOSYLATED A1C: CPT

## 2024-03-15 LAB — FRUCTOSAMINE SERPL-SCNC: 250 UMOL/L (ref 0–285)

## 2024-03-26 ENCOUNTER — OFFICE VISIT (OUTPATIENT)
Dept: ENDOCRINOLOGY | Facility: CLINIC | Age: 79
End: 2024-03-26
Payer: MEDICARE

## 2024-03-26 VITALS
HEIGHT: 76 IN | WEIGHT: 284.2 LBS | DIASTOLIC BLOOD PRESSURE: 68 MMHG | SYSTOLIC BLOOD PRESSURE: 122 MMHG | BODY MASS INDEX: 34.61 KG/M2

## 2024-03-26 DIAGNOSIS — Z79.4 TYPE 2 DIABETES MELLITUS WITH HYPERGLYCEMIA, WITH LONG-TERM CURRENT USE OF INSULIN (HCC): Primary | ICD-10-CM

## 2024-03-26 DIAGNOSIS — E83.52 HYPERCALCEMIA: ICD-10-CM

## 2024-03-26 DIAGNOSIS — Z79.4 CURRENT USE OF INSULIN (HCC): ICD-10-CM

## 2024-03-26 DIAGNOSIS — E27.8 ADRENAL NODULE (HCC): ICD-10-CM

## 2024-03-26 DIAGNOSIS — E11.65 TYPE 2 DIABETES MELLITUS WITH HYPERGLYCEMIA, WITH LONG-TERM CURRENT USE OF INSULIN (HCC): Primary | ICD-10-CM

## 2024-03-26 PROCEDURE — 99214 OFFICE O/P EST MOD 30 MIN: CPT | Performed by: INTERNAL MEDICINE

## 2024-03-26 PROCEDURE — 95251 CONT GLUC MNTR ANALYSIS I&R: CPT | Performed by: INTERNAL MEDICINE

## 2024-03-26 NOTE — PATIENT INSTRUCTIONS
Please lower the Tresiba to 90units  Continue the Novolog    Let us know when the phone is et up for your  and we can send a new order for the Peggy 3 to [facility].

## 2024-03-26 NOTE — PROGRESS NOTES
Established Patient Progress Note      Chief Complaint   Patient presents with    Diabetes Type 2        History of Present Illness:   Home Walters is a 79 y.o. male with hypertension, hyperlipidemia, sleep apnea, adrenal adenoma, hyperparathyroidism and type 2 diabetes with long term use of insulin for about 28 years.   He is accompanied by his daughter-in-law (Linette) and wife for today's visit  Last seen in the office in Sept 2023 by Shayla ERAZO.     Reports complications of neuropathy and microalbuminuria. Last A1C was 7.3. Denies recent severe hypoglycemic or severe hyperglycemic episodes. Denies any issues with his current regimen. Home glucose monitoring: are performed regularly with Peggy 2 CGM.      Previously treated with metformin (stopped due to GI issues) and Pioglitazone which was discontinued due to edema.  Did not tolerate GLP-1 agonist due to nausea. SGLT2 inhibitors were avoided for diabetes care due to history of urosepsis/nephrolithiasis. Jardiance was started about in May 2023 by Cards for CHF.     He also has a 4 cm adrenal adenoma which has been evaluated by Dr. Bush. Reviewed Dr. Bush's workup from feb/march 2021.   Aldosterone and [place] metanephrines were normal  2/3/2021 dexamethasone suppression testing did not suppress x 2 so additional testing was performed, DHEAS and ACTH were normal.  Additional testing included 1 salivary cortisol mildly elevated,  And 1 SC was normal. 24-hour urine cortisol was normal. Repeat CT for renal stones done 1/17/23 showed stable right adrenal nodule at approx 4cmx 3.4.     For hypercalcemia, most recent calcium normal. 24 hour urine was completed by nephrology and showed normal calcium level, which does not support diagnosis of FHH. Sestambi scan was completed and negative for parathyroid adenoma. Has a history of kidney stones and he takes potassium citrate. He takes 1000 units vitamin-D daily dietary calcium intake. DEXA done 3/2021 showed  "normal bone density. Denies recent falls or fractures. PTH is elevated and vit D is 25.3. calcium is normal. Took ergo but now on 2000units a day    Home MONCADA Romeo   Device used: Vimessa 2   Home use   Indication: Type 2 Diabetes  More than 72 hours of data was reviewed. Report to be scanned to chart.   Date Range: 3/13/24-3/26/24  Analysis of data:   Average Glucose: 141  Coefficient of Variation: 2%   Time in Target Range: 88%   Time Above Range: 11% high, 1% very high    Time Below Range: 0%     Interpretation of data:    Recently has been \"laying off the carbs.\" For the past few nights, lower overnight Bgs for the past 2-3 days.        Current regimen:   Tresiba 94 units once daily  NovoLog 20-25 units with each meal depending on carb amount, does not take any if not eating carbs      Of note Jardiance 25 mg daily - was started by cardiology for hx of CHF       Ophthal: [facility], 3-4mos ago  Pod: follows with podiatry every 6 weeks, Dr. Wood  Flu: 23-24  COVID: no booster 2023     Has hypertension: Taking lisinopril and metoporol XL  Has hyperlipidemia: Taking atorvastatin and zetia      Taking vit D 1000units daily    Patient Active Problem List   Diagnosis    Hypertension    Mixed hyperlipidemia    Type 2 diabetes mellitus with hyperglycemia, with long-term current use of insulin (HCC)    Chronic low back pain    CAD (coronary artery disease)    Malignant neoplasm of urinary bladder (HCC)    Arthritis    AAA (abdominal aortic aneurysm) (HCC)    Decreased pedal pulses    Chronic pain of both knees    Actinic keratosis of right temple    Actinic keratosis of scalp    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    Immunization deficiency    Left lower quadrant abdominal pain    Excessive gas    Morbid obesity (HCC)    Colitis    Adrenal nodule (HCC)    LUCIA (obstructive sleep apnea)    Embolism and thrombosis of arteries of the lower extremities (HCC)    Bilateral edema of lower extremity    Hypoxia    " Chronic diastolic CHF (congestive heart failure) (HCC)    Left upper lobe pulmonary nodule    Hypercalcemia    Nephrolithiasis    Hydroureter on left    Venous stasis dermatitis of both lower extremities    Blister of right leg    Persistent proteinuria    Hypokalemia    Ureteral calculi    Benign prostatic hyperplasia without lower urinary tract symptoms    PAF (paroxysmal atrial fibrillation) (HCC)    PAD (peripheral artery disease) (HCC)    Gastroesophageal reflux disease    Vitamin D deficiency    Cirrhosis of liver without ascites (HCC)    Chronic acquired lymphedema    Pulmonary hypertension (HCC)    Chronic obstructive pulmonary disease (HCC)    Stage 3a chronic kidney disease (HCC)    Current use of insulin (HCC)      Past Medical History:   Diagnosis Date    A-fib (HCC)     AAA (abdominal aortic aneurysm) (HCC)     Actinic keratosis of right temple 7/31/2020    Actinic keratosis of scalp 7/31/2020    Acute respiratory failure with hypoxia (HCC) 3/25/2021    Allergic 1960    Arthritis     Lay's esophagus     Bladder cancer (HCC)     Blister of left leg 4/28/2021    Blister of right leg 5/26/2021    CAD (coronary artery disease)     Cancer (HCC) 02/2010    Bladder    CHF (congestive heart failure) (HCC)     Chronic low back pain     Chronic pain of both knees 7/31/2020    Colitis 12/4/2020    CPAP (continuous positive airway pressure) dependence     Diabetes (HCC)     Diabetes mellitus (HCC) 10/1993    Excessive gas 12/3/2020    Heart murmur     History of chemotherapy     Hydroureter on left 4/28/2021    Hyperlipemia     Hypertension     Immunization deficiency 10/30/2020    Hep a nonimmune    Kidney stone     Left lower quadrant abdominal pain 12/3/2020    Mass of right adrenal gland (HCC) 12/4/2020    4.1 cm    Myocardial infarction (HCC)     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    Obesity 2000    LUCIA (obstructive sleep apnea) 1/29/2021    Pressure ulcer of right leg, stage 1 5/26/2021    Urinary  tract infection with hematuria 5/3/2021    u cx 4/2021=pseudomonas R to bactrim and cefdinir, S to cipro    Venous stasis dermatitis of both lower extremities 5/26/2021      Past Surgical History:   Procedure Laterality Date    BACK SURGERY      BLADDER SURGERY      CARDIAC CATHETERIZATION  04/2016    CARDIAC CATHETERIZATION N/A 1/19/2023    Procedure: Cardiac RHC;  Surgeon: Jose Carlos Shea MD;  [facility];  Service: Cardiology    CATARACT EXTRACTION, BILATERAL      COLONOSCOPY  01/29/2019    next 2022 [place]    CORONARY ARTERY BYPASS GRAFT  04/2016    Quadruple per Rita    EGD  06/2017    EYE SURGERY  11/2016    Cataracts    JOINT REPLACEMENT  [id number]    Hip and shoulder    KIDNEY STONE SURGERY      ND CYSTO BLADDER W/URETERAL CATHETERIZATION Left 04/24/2021    Procedure: CYSTOSCOPY  WITH INSERTION STENT URETERAL;  Surgeon: Siddhartha Leslie MD;  Location: [facility];  Service: Urology    ND CYSTO/URETERO W/LITHOTRIPSY &INDWELL STENT INSRT Left 05/21/2021    Procedure: CYSTOSCOPY URETEROSCOPY WITH LITHOTRIPSY HOLMIUM LASER, AND INSERTION STENT URETERAL/EXCHANGE;  Surgeon: Siddhartha Leslie MD;  Location[facility];  Service: Urology    TOTAL HIP ARTHROPLASTY Right 2012    TOTAL SHOULDER REPLACEMENT Right 2013    VASECTOMY  1971      Family History   Problem Relation Age of Onset    Heart disease Father     Arthritis Father     Heart attack Father     Alzheimer's disease Mother     Dementia Mother     Diabetes type II Sister      Social History     Tobacco Use    Smoking status: Former     Current packs/day: 0.00     Average packs/day: 0.3 packs/day for 44.0 years (11.0 ttl pk-yrs)     Types: Cigarettes     Start date: 1/1/1965     Quit date: 1/1/2009     Years since quitting: 15.2    Smokeless tobacco: Never    Tobacco comments:     Quit several times for up to 6 years.   Substance Use Topics    Alcohol use: Not Currently     Comment: No use     Allergies   Allergen Reactions    Ciprofloxacin  Abdominal Pain, Diarrhea, GI Bleeding and GI Intolerance    Diltiazem Abdominal Pain    Metronidazole Abdominal Pain, Diarrhea, GI Bleeding and GI Intolerance         Current Outpatient Medications:     apixaban (Eliquis) 5 mg, Take 1 tablet (5 mg total) by mouth 2 (two) times a day, Disp: 180 tablet, Rfl: 3    atorvastatin (LIPITOR) 40 mg tablet, Take 1 tablet (40 mg total) by mouth daily, Disp: 90 tablet, Rfl: 1    cholecalciferol (VITAMIN D3) 1,000 units tablet, Take 1,000 Units by mouth daily, Disp: , Rfl:     Continuous Blood Gluc Sensor (FreeStyle Peggy 2 Sensor) MISC, Change it every 14 days, Disp: 1 each, Rfl: 0    dicyclomine (BENTYL) 10 mg capsule, Take 1 capsule (10 mg total) by mouth 2 (two) times a day as needed (abdominal cramping), Disp: 60 capsule, Rfl: 3    digoxin (LANOXIN) 0.125 mg tablet, TAKE ONE TABLET BY MOUTH ONCE DAILY, Disp: 90 tablet, Rfl: 0    Empagliflozin (Jardiance) 25 MG TABS, Take 1 tablet (25 mg total) by mouth every morning, Disp: 120 tablet, Rfl: 5    ergocalciferol (VITAMIN D2) 50,000 units, Take 1 capsule (50,000 Units total) by mouth once a week for 8 doses, Disp: 8 capsule, Rfl: 0    famotidine (PEPCID) 40 MG tablet, Take 1 tablet (40 mg total) by mouth daily at bedtime, Disp: 30 tablet, Rfl: 5    insulin degludec (Tresiba FlexTouch) 200 units/mL CONCENTRATED U-200 injection pen, inject 95 units under the skin once daily, Disp: 15 mL, Rfl: 6    Lancets (accu-chek multiclix) lancets, Use 1 each 2 (two) times a day Use 2 times daily to test blood glucose, Disp: 102 each, Rfl: 3    metoprolol succinate (TOPROL-XL) 50 mg 24 hr tablet, Take 1 tablet (50 mg total) by mouth daily, Disp: 90 tablet, Rfl: 3    NovoLOG FlexPen 100 units/mL injection pen, Inject 20-25 Units under the skin 3 (three) times a day with meals, Disp: 30 mL, Rfl: 6    omeprazole (PriLOSEC) 20 mg delayed release capsule, Take 1 capsule (20 mg total) by mouth 2 (two) times a day, Disp: 180 capsule, Rfl: 0     "potassium citrate (UROCIT-K) 10 mEq, Take 1 tablet (10 mEq total) by mouth 2 (two) times a day, Disp: 180 tablet, Rfl: 3    torsemide (DEMADEX) 20 mg tablet, take two tablets by mouth in the morning and one tablet  at 4pm, Disp: 120 tablet, Rfl: 11    umeclidinium-vilanterol (Anoro Ellipta) 62.5-25 mcg/actuation inhaler, INHALE ONE PUFF BY MOUTH ONCE DAILY, Disp: 60 each, Rfl: 5    Unifine Pentips 31G X 8 MM MISC, inject under the skin daily use as directed, Disp: 100 each, Rfl: 0    Review of Systems   Constitutional:  Negative for unexpected weight change.   HENT:  Positive for hearing loss.    Eyes:  Negative for visual disturbance.   Respiratory:  Positive for shortness of breath (on O2 therapy - SOB with exertion).    Musculoskeletal:  Positive for gait problem.   Neurological:  Positive for numbness. Negative for tremors.   Psychiatric/Behavioral:  The patient is not nervous/anxious.        Physical Exam:  Body mass index is 34.59 kg/m².  /68 (BP Location: Right arm, Patient Position: Sitting, Cuff Size: Adult)   Ht 6' 4\" (1.93 m)   Wt 129 kg (284 lb 3.2 oz)   BMI 34.59 kg/m²    Wt Readings from Last 3 Encounters:   03/26/24 129 kg (284 lb 3.2 oz)   01/25/24 127 kg (280 lb 4.8 oz)   12/12/23 125 kg (275 lb)       Physical Exam   Gen: appears well-developed and well-nourished. No apparent distress.   Head: Normocephalic and atraumatic.   Eyes: no stare or proptosis, no periorbital edema  E/N/M nl facies, hearing grossly intact  Neck: range of motion nl  Pulmonary/Chest: breathing  comfortably, no accessory muscle use, effort normal.   Musculoskeletal: moves upper extremities  Neurological: alert and oriented to person, place, and time. No upper ext tremor appreciated  Skin: does not appear diaphoretic, no facial plethora  Psychiatric: normal mood and affect; behavior is normal; no gross lapses in memory, answer questions appropriately      Labs:   Lab Results   Component Value Date    HGBA1C 7.8 (H) " 03/14/2024    HGBA1C 8.6 (H) 12/07/2023    HGBA1C 7.9 (H) 09/02/2023     Lab Results   Component Value Date    CREATININE 1.40 (H) 03/14/2024    CREATININE 1.11 01/08/2024    CREATININE 1.15 12/07/2023    BUN 42 (H) 03/14/2024    K 4.2 03/14/2024    CL 98 03/14/2024    CO2 32 03/14/2024     eGFR   Date Value Ref Range Status   03/14/2024 47 ml/min/1.73sq m Final     Lab Results   Component Value Date    HDL 28 (L) 03/14/2024    TRIG 240 (H) 03/14/2024     Lab Results   Component Value Date    ALT 12 03/14/2024    AST 14 03/14/2024    ALKPHOS 85 03/14/2024     Lab Results   Component Value Date    JHC4GYTRCPLO 1.150 05/11/2023    KGB7ESZAPUYC 0.848 02/11/2023    SMT4RYOKLKNL 0.984 12/27/2021     Impression & Plan:        1. Type 2 diabetes mellitus with hyperglycemia, with long-term current use of insulin (HCC)  Hemoglobin A1C    Basic metabolic panel      2. Hypercalcemia        3. Adrenal nodule (HCC)        4. Current use of insulin (HCC)            1. T2DM: A1c is at his goal is 7-8%, and he is using his Peggy 2 well. I asked that he lower Tresiba to 90units and Novolog 20-25units with meals. A1c and CMP ordered for next visit. His daughter is planning to switich to monitoring with cell phone. Once this is complete, I asked them to contact the office and we can upgrade him to the Peggy 3 via [employer] ([place])     2. Hypercalcemia, hyperparathyroidism: Calcium is stable. Will monitor with labs.     3. CHF: Jardiance is part of CHF regimen.     Not addressed today: Right adrenal adenoma: biochemical eval was unremarkable in the past. Appears grossly stable since 2014. No new orders at this time.     Plan rtc in 3-4mos and possibly one more visit in later 2024 prior to next winter.      Discussed with the patient and all questioned fully answered. He will call me if any problems arise.  Anna Carroll MD

## 2024-03-29 ENCOUNTER — TELEPHONE (OUTPATIENT)
Age: 79
End: 2024-03-29

## 2024-03-29 NOTE — TELEPHONE ENCOUNTER
Ron called in asking for Earline, she was unavailable at the time of call. He asked me to let her and Michelle know that Medicare will not cover the wheelchair that he wants, so he said to stop the process.

## 2024-03-29 NOTE — TELEPHONE ENCOUNTER
Called and spoke mohit Hollis at [facility] and informed of pt message re denial of coverage. She states that pt was informed it is most likely not covered due to the fact he was requesting a power wheelchair that was collapsible/portable and medicare is mainly concerned over pt mobility inside of the house. Their office will reach out to pt to discuss coverage one last time and if it is truly what pt is requesting they will stop the process. No further action needed on our end.

## 2024-03-29 NOTE — TELEPHONE ENCOUNTER
Rep from [facility] called, to confirm fax number and to check the status of additional paper work they needed signed.    Rx and one time order for PT/OT if needed.    Ref ID for patient [id number]  Fax number confirmed.

## 2024-04-02 DIAGNOSIS — R82.991 HYPOCITRATURIA: ICD-10-CM

## 2024-04-02 DIAGNOSIS — J44.9 CHRONIC OBSTRUCTIVE PULMONARY DISEASE, UNSPECIFIED COPD TYPE (HCC): ICD-10-CM

## 2024-04-02 RX ORDER — UMECLIDINIUM BROMIDE AND VILANTEROL TRIFENATATE 62.5; 25 UG/1; UG/1
1 POWDER RESPIRATORY (INHALATION) DAILY
Qty: 60 EACH | Refills: 2 | Status: SHIPPED | OUTPATIENT
Start: 2024-04-02

## 2024-04-02 RX ORDER — POTASSIUM CITRATE 10 MEQ/1
10 TABLET, EXTENDED RELEASE ORAL 2 TIMES DAILY
Qty: 180 TABLET | Refills: 1 | Status: SHIPPED | OUTPATIENT
Start: 2024-04-02

## 2024-04-19 LAB
DME PARACHUTE DELIVERY DATE REQUESTED: NORMAL
DME PARACHUTE ITEM DESCRIPTION: NORMAL
DME PARACHUTE ITEM DESCRIPTION: NORMAL
DME PARACHUTE ORDER STATUS: NORMAL
DME PARACHUTE SUPPLIER NAME: NORMAL
DME PARACHUTE SUPPLIER PHONE: NORMAL

## 2024-04-23 DIAGNOSIS — E11.65 TYPE 2 DIABETES MELLITUS WITH HYPERGLYCEMIA, WITH LONG-TERM CURRENT USE OF INSULIN (HCC): ICD-10-CM

## 2024-04-23 DIAGNOSIS — Z79.4 TYPE 2 DIABETES MELLITUS WITH HYPERGLYCEMIA, WITH LONG-TERM CURRENT USE OF INSULIN (HCC): ICD-10-CM

## 2024-04-24 RX ORDER — INSULIN DEGLUDEC 200 U/ML
INJECTION, SOLUTION SUBCUTANEOUS
Qty: 15 ML | Refills: 5 | Status: SHIPPED | OUTPATIENT
Start: 2024-04-24

## 2024-04-25 ENCOUNTER — OFFICE VISIT (OUTPATIENT)
Dept: FAMILY MEDICINE CLINIC | Facility: CLINIC | Age: 79
End: 2024-04-25
Payer: MEDICARE

## 2024-04-25 VITALS
SYSTOLIC BLOOD PRESSURE: 120 MMHG | RESPIRATION RATE: 17 BRPM | HEART RATE: 73 BPM | HEIGHT: 76 IN | BODY MASS INDEX: 34.59 KG/M2 | DIASTOLIC BLOOD PRESSURE: 52 MMHG | TEMPERATURE: 98.6 F | OXYGEN SATURATION: 93 %

## 2024-04-25 DIAGNOSIS — C67.9 MALIGNANT NEOPLASM OF URINARY BLADDER, UNSPECIFIED SITE (HCC): ICD-10-CM

## 2024-04-25 DIAGNOSIS — L98.9 LESION OF SKIN OF SCALP: Primary | ICD-10-CM

## 2024-04-25 DIAGNOSIS — K74.60 LIVER CIRRHOSIS SECONDARY TO NASH (HCC): ICD-10-CM

## 2024-04-25 DIAGNOSIS — K75.81 LIVER CIRRHOSIS SECONDARY TO NASH (HCC): ICD-10-CM

## 2024-04-25 PROCEDURE — 99213 OFFICE O/P EST LOW 20 MIN: CPT | Performed by: NURSE PRACTITIONER

## 2024-04-25 PROCEDURE — G2211 COMPLEX E/M VISIT ADD ON: HCPCS | Performed by: NURSE PRACTITIONER

## 2024-04-25 NOTE — PROGRESS NOTES
Assessment/Plan:    1. Lesion of skin of scalp  Comments:  see image  Orders:  -     Ambulatory Referral to Dermatology; Future    2. 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 Malignant neoplasm of urinary bladder, unspecified site (HCC)        I suspect this lesion is a squamous skin cancer. I will arrange for an appointment with Derm for biopsy and surgical intervention. Pt appreciative with plan    There are no Patient Instructions on file for this visit.    No follow-ups on file.    Subjective:      Patient ID: Home Walters is a 79 y.o. male.    Chief Complaint   Patient presents with    Mass     head       Maynor presents with growing skin lesion located top of head  It has been present for nearly 1 year  Has been treated for skin cancer in past        The following portions of the patient's history were reviewed and updated as appropriate: allergies, current medications, past family history, past medical history, past social history, past surgical history and problem list.    Review of Systems   Skin:  Positive for color change.         Current Outpatient Medications   Medication Sig Dispense Refill    apixaban (Eliquis) 5 mg Take 1 tablet (5 mg total) by mouth 2 (two) times a day 180 tablet 3    atorvastatin (LIPITOR) 40 mg tablet Take 1 tablet (40 mg total) by mouth daily 90 tablet 1    cholecalciferol (VITAMIN D3) 1,000 units tablet Take 1,000 Units by mouth daily      Continuous Blood Gluc Sensor (FreeStyle Peggy 2 Sensor) MISC Change it every 14 days 1 each 0    dicyclomine (BENTYL) 10 mg capsule Take 1 capsule (10 mg total) by mouth 2 (two) times a day as needed (abdominal cramping) 60 capsule 3    digoxin (LANOXIN) 0.125 mg tablet TAKE ONE TABLET BY MOUTH ONCE DAILY 90 tablet 0    Empagliflozin (Jardiance) 25 MG TABS Take 1 tablet (25 mg total) by mouth every morning 120 tablet 5    ergocalciferol (VITAMIN D2) 50,000 units Take 1 capsule (50,000 Units total) by mouth once a week for 8 doses 8 capsule  "0    famotidine (PEPCID) 40 MG tablet Take 1 tablet (40 mg total) by mouth daily at bedtime 30 tablet 5    insulin degludec (Tresiba FlexTouch) 200 units/mL CONCENTRATED U-200 injection pen Inject 95 units under the skin once daily 15 mL 5    metoprolol succinate (TOPROL-XL) 50 mg 24 hr tablet Take 1 tablet (50 mg total) by mouth daily 90 tablet 3    NovoLOG FlexPen 100 units/mL injection pen Inject 20-25 Units under the skin 3 (three) times a day with meals 30 mL 6    omeprazole (PriLOSEC) 20 mg delayed release capsule Take 1 capsule (20 mg total) by mouth 2 (two) times a day 180 capsule 0    potassium citrate (UROCIT-K) 10 mEq TAKE ONE TABLET BY MOUTH TWICE DAILY 180 tablet 1    torsemide (DEMADEX) 20 mg tablet take two tablets by mouth in the morning and one tablet  at 4pm 120 tablet 11    umeclidinium-vilanterol (Anoro Ellipta) 62.5-25 mcg/actuation inhaler INHALE ONE PUFF BY MOUTH ONCE DAILY 60 each 2    Unifine Pentips 31G X 8 MM MISC inject under the skin daily use as directed 100 each 0    Lancets (accu-chek multiclix) lancets Use 1 each 2 (two) times a day Use 2 times daily to test blood glucose 102 each 3     No current facility-administered medications for this visit.       Objective:    /52 (BP Location: Left arm, Patient Position: Sitting, Cuff Size: Large)   Pulse 73   Temp 98.6 °F (37 °C) (Temporal)   Resp 17   Ht 6' 4\" (1.93 m)   SpO2 93%   BMI 34.59 kg/m²        Physical Exam  Vitals and nursing note reviewed.   Constitutional:       General: He is not in acute distress.     Appearance: Normal appearance.   Skin:     Comments: See image   Neurological:      General: No focal deficit present.      Mental Status: He is alert. Mental status is at baseline.                      KACEY Bal  "

## 2024-04-29 ENCOUNTER — TELEPHONE (OUTPATIENT)
Dept: ENDOCRINOLOGY | Facility: CLINIC | Age: 79
End: 2024-04-29

## 2024-05-02 ENCOUNTER — CONSULT (OUTPATIENT)
Dept: PLASTIC SURGERY | Facility: CLINIC | Age: 79
End: 2024-05-02
Payer: MEDICARE

## 2024-05-02 VITALS
TEMPERATURE: 98.2 F | HEART RATE: 62 BPM | BODY MASS INDEX: 33.49 KG/M2 | SYSTOLIC BLOOD PRESSURE: 121 MMHG | WEIGHT: 275 LBS | DIASTOLIC BLOOD PRESSURE: 57 MMHG | HEIGHT: 76 IN

## 2024-05-02 DIAGNOSIS — L98.9 LESION OF SKIN OF SCALP: Primary | ICD-10-CM

## 2024-05-02 PROCEDURE — 11104 PUNCH BX SKIN SINGLE LESION: CPT

## 2024-05-02 PROCEDURE — 17000 DESTRUCT PREMALG LESION: CPT

## 2024-05-02 NOTE — PROGRESS NOTES
"[facility]  [address], [address], [place] [address]  [phone]    Patient Identification: Home Walters is a 79 y.o. male     History of Present Illness: The patient is a 79 y.o.  year-old male  who presents to the office for new patient consult regarding a scalp lesion. Patient states the lesion has been present for \"years\" but has begun to grow larger over the past few months. Describes the lesion and crusty and tender. He denies blood, fluid or inflammation to the area. Denies a personal or family history of skin cancer. Does not follow regularly with a dermatologist. Pt is on Xarelto and is a diabetic.   Patient has no complaints at this time.    Past Medical History:   Diagnosis Date    A-fib (HCC)     AAA (abdominal aortic aneurysm) (HCC)     Actinic keratosis of right temple 7/31/2020    Actinic keratosis of scalp 7/31/2020    Acute respiratory failure with hypoxia (HCC) 3/25/2021    Allergic 1960    Arthritis     Lay's esophagus     Bladder cancer (HCC)     Blister of left leg 4/28/2021    Blister of right leg 5/26/2021    CAD (coronary artery disease)     Cancer (HCC) 02/2010    Bladder    CHF (congestive heart failure) (HCC)     Chronic low back pain     Chronic pain of both knees 7/31/2020    Colitis 12/4/2020    CPAP (continuous positive airway pressure) dependence     Diabetes (HCC)     Diabetes mellitus (HCC) 10/1993    Excessive gas 12/3/2020    Heart murmur     History of chemotherapy     Hydroureter on left 4/28/2021    Hyperlipemia     Hypertension     Immunization deficiency 10/30/2020    Hep a nonimmune    Kidney stone     Left lower quadrant abdominal pain 12/3/2020    Mass of right adrenal gland (HCC) 12/4/2020    4.1 cm    Myocardial infarction (HCC)     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    Obesity 2000    LUCIA (obstructive sleep apnea) 1/29/2021    Pressure ulcer of right leg, stage 1 5/26/2021    Urinary tract infection with " "hematuria 5/3/2021    u cx 4/2021=pseudomonas R to bactrim and cefdinir, S to cipro    Venous stasis dermatitis of both lower extremities 5/26/2021          Review of Systems  Constitutional: Denies fevers, chills or pain.  Skin: Denies any warmth, erythema, edema or mucopurulent drainage. Scalp lesion    Physical Exam  Vitals:    05/02/24 1303   BP: 121/57   Pulse: 62   Temp: 98.2 °F (36.8 °C)     Constitutional:AAOx3, well-developed, no distress. Pt is cooperative and pleasant.  Eyes: Pupils are equal, round, and reactive to light. EOM in tact. Clear conjunctiva  Cardiovascular: Normal rate, regular rhythm.  Pulmonary/Chest: Effort normal and breath sounds normal. No respiratory distress.  Neuro: Gait in tact. Reflexes and motor strength normal and symmetric. Cranial nerved 2-12 grossly intact  Psychiatric: Has appropriate behavior and thought process.   Skin: Lesion is projected approximately 1cm from scalp surface. Measuring 1.6x1.5cm, keratotic, firm. See media. Scattered actinic keratosis surrounding.     Scalp lesion biopsy    Date/Time: 5/2/2024 1:00 PM    Performed by: Graciela Martini PA-C  Authorized by: Graciela Martini PA-C  Universal Protocol:  Consent: Verbal consent obtained. Written consent obtained.  Risks and benefits: risks, benefits and alternatives were discussed  Consent given by: patient  Time out: Immediately prior to procedure a \"time out\" was called to verify the correct patient, procedure, equipment, support staff and site/side marked as required.  Patient understanding: patient states understanding of the procedure being performed  Patient consent: the patient's understanding of the procedure matches consent given  Site marked: the operative site was marked  Patient identity confirmed: verbally with patient    Procedure Details - Lesion Biopsy:     Body area:  Head/neck    Head/neck location:  Scalp    Biopsy method: punch biopsy      Biopsy tissue type: skin     Local Xylocaine 2cc to " the area. Alcohol swab prepped. 4mm punch used to obtain sample. Hemostasis achieved with silver nitrate. Area dressed with bacitracin. Pt tolerated well.        Assessment and Plan:  The patient is an 79 y.o.  year-old male who presents to the office for new patient consult regarding a scalp lesion.    -At today's visit discussed options of care with the patient. Would recommend biopsy of the lesion at this time.   -Details of the procedure and after-care were reviewed with the patient. Lesion will be sent for pathology review, resulting in 7-14 days.  - Reviewed the risks of the procedure, such as bleeding, infection, asymmetry, contour deformity, scarring, wound healing problems, anesthesia risks, prolonged numbness of area as well as possibility of subsequent procedures. Pt at increased risk of bleeding and wound healing complications due to Xarelto and diabetic status. All questions were answered at this time. Patient would like to move forward with biopsy.  -Punch biopsy was preformed, patient tolerated well. He is to apply bacitracin and a bandaid to the area daily. Wash with soap and water gently.  -Will contact the patient with biopsy results.  -CODE [id number]   -The patient is to call the office with any questions or concerns. All of the patient's questions were answered at this time and they agree with the plan of care.      Graciela Martini PA-C  [facility]

## 2024-05-09 ENCOUNTER — TELEPHONE (OUTPATIENT)
Dept: ENDOCRINOLOGY | Facility: CLINIC | Age: 79
End: 2024-05-09

## 2024-05-09 DIAGNOSIS — I50.9 CHF EXACERBATION (HCC): ICD-10-CM

## 2024-05-09 DIAGNOSIS — I48.91 ATRIAL FIBRILLATION WITH RAPID VENTRICULAR RESPONSE (HCC): ICD-10-CM

## 2024-05-09 RX ORDER — DIGOXIN 125 MCG
125 TABLET ORAL DAILY
Qty: 90 TABLET | Refills: 1 | Status: SHIPPED | OUTPATIENT
Start: 2024-05-09

## 2024-05-09 RX ORDER — EMPAGLIFLOZIN 25 MG/1
TABLET, FILM COATED ORAL
Qty: 90 TABLET | Refills: 1 | Status: SHIPPED | OUTPATIENT
Start: 2024-05-09

## 2024-05-09 NOTE — TELEPHONE ENCOUNTER
Aneesh Rousseau   [facility]  1m ago  @[email or web address]. Patient has been contacted and this order is being sent to fulfillment. This should be shipping soon. Over night shipping was approved!    [facility]  47m ago  Scheduling Delivery: Order for Peggy 3 Sensor, change every 14 days + 1 other item is in the final stages of being processed

## 2024-05-10 LAB
DME PARACHUTE DELIVERY DATE EXPECTED: NORMAL
DME PARACHUTE DELIVERY DATE REQUESTED: NORMAL
DME PARACHUTE ITEM DESCRIPTION: NORMAL
DME PARACHUTE ITEM DESCRIPTION: NORMAL
DME PARACHUTE ORDER STATUS: NORMAL
DME PARACHUTE SUPPLIER NAME: NORMAL
DME PARACHUTE SUPPLIER PHONE: NORMAL

## 2024-05-13 LAB
DME PARACHUTE DELIVERY DATE ACTUAL: NORMAL
DME PARACHUTE DELIVERY DATE EXPECTED: NORMAL
DME PARACHUTE DELIVERY DATE REQUESTED: NORMAL
DME PARACHUTE ITEM DESCRIPTION: NORMAL
DME PARACHUTE ITEM DESCRIPTION: NORMAL
DME PARACHUTE ORDER STATUS: NORMAL
DME PARACHUTE SUPPLIER NAME: NORMAL
DME PARACHUTE SUPPLIER PHONE: NORMAL

## 2024-05-14 DIAGNOSIS — K21.9 GASTROESOPHAGEAL REFLUX DISEASE, UNSPECIFIED WHETHER ESOPHAGITIS PRESENT: ICD-10-CM

## 2024-05-15 RX ORDER — OMEPRAZOLE 20 MG/1
20 CAPSULE, DELAYED RELEASE ORAL 2 TIMES DAILY
Qty: 180 CAPSULE | Refills: 1 | Status: SHIPPED | OUTPATIENT
Start: 2024-05-15

## 2024-05-21 ENCOUNTER — TELEPHONE (OUTPATIENT)
Dept: PLASTIC SURGERY | Facility: CLINIC | Age: 79
End: 2024-05-21

## 2024-05-21 DIAGNOSIS — C44.42 SQUAMOUS CELL CARCINOMA OF SCALP: Primary | ICD-10-CM

## 2024-05-21 NOTE — TELEPHONE ENCOUNTER
Called patient and discussed biopsy results of SCC. Recommend referral to Mohs Surgery. Reviewed details of Mohs, all questions answered. Pt agrees with plan. Referral placed, media in chart.

## 2024-05-23 ENCOUNTER — TELEPHONE (OUTPATIENT)
Dept: DERMATOLOGY | Facility: CLINIC | Age: 79
End: 2024-05-23

## 2024-05-23 NOTE — TELEPHONE ENCOUNTER
Pre- operative Mohs Telephone Scheduling Note    Do you have a pacemaker, defibrillator, spinal or brain stimulator? no    Do you take antibiotics before skin or dental procedures? no  If yes, will likely require pre-operative antibiotics. Ask  the patient why they take the antibiotics (usually because of joint replacement).    Do you have a history of a joint replacements within the past 2 years?  7-8 years ago   If yes, will likely require pre-operative antibiotics. Ask if orthopaedic surgeon has prescribed pre-operative antibiotics to take before procedures/dental work?    Do you take any OTC medications that thin your blood (Aspirin, Aleve, Ibuprofen) or supplements that thin your blood (fish oil, garlic, vitamin E, Ginko Biloba)? no    Do you take any prescribed medications that thin your blood (Coumadin, Plavix, Xarelto, Eliquis or another prescribed blood thinner)? yes: Eliquis    Do you have an allergy to lidocaine or epinephrine? no    Do you have allergies to Iodine? no    Do you wear a lidocaine patch? no    Have you ever been diagnosed with HIV, AIDS, Hep B and Hep C? no    Do you use a cane, walker or wheelchair? yes: walker and wheelchair    Is the patient from a nursing home? no If yes, Is there any special accommodations that is needed for patient n/a    Do you smoke? no      If yes,  patient to try and stop 2 days before surgery and 7 days after the surgery. Minimizing smoking as much as possible during this time will improve healing and the cosmetic result after surgery.    Do you use supplemental oxygen? If so, how many liters and can you be off it for a short period of time? no    Date scheduled: 06/04/2024 at 10 am with Dr. Rodríguez    Coordination of Care with other provider (Oculoplastics, Plastics, ENT) required? no   IF YES, PLEASE FORWARD TO APPROPRIATE PERSONNEL TO HELP COORDINATE.    Are there remaining tumors to be scheduled? no    Was Prior Authorization obtained? No (please use  [facility] to document prior auth)

## 2024-05-23 NOTE — LETTER
Home Walters     1945    [address] [place] [id number]    Dear Home Walters,    You are scheduled to have the MOHS procedure on June 4, 2024 at 10 am for Scalp with Dr.Nadia Rodríguez. Our office is located in [facility] our address is [address] [place] [address] [address] [place] [address]. Once you arrive please check in with our front staff in [address] and they will escort you to the MOHS waiting room.  If you have someone bringing you to your appointment they may wait in the waiting room or accompany you in your visit.      Below you will find some pre-op instructions along with some information regarding the MOHS procedure.     If you have any questions please call our office at [phone].       Thank you,    [facility]         PRE-OPERATIVE INSTRUCTIONS - MOHS    Before your scheduled surgery, there are a number of important precautions and positive steps you should take to help prepare yourself for a successful treatment and speedy recovery.    Some of the steps, which are listed below, may seem unnecessary and inconvenient, but they are important. For example, when you stop smoking, you increase your ability to heal. Occasionally, there may be valid reasons, personal or medical, why you can't comply. In such cases, please call the office so we can discuss possible ways to overcome any obstacles you may be encountering.    If you have any questions about the surgery, or remember additional medical information that you forgot to mention to our staff, please contact the office prior to your surgery.    GENERAL INFORMATION REGARDING MOHS MICROGRAPHIC SURGERY    Mohs surgery is a specialized technique for the removal of skin cancer developed by Dr. Frederick Mohs over 50 years ago to improve the cure rates of skin cancer. Traditionally, skin cancers are treated by destructive methods (radiation, freezing, scraping, and burning) or  excision (cutting out the tissue with standards margins and sending it to an outside laboratory for testing). These methods all yield cure rates between 65%-94%. However, for cancers located in cosmetically sensitive areas, large tumors, or tumors unsuccessfully treated by other means, Mohs surgery offers a higher cure rate. In most cases, Mohs surgery provides you with a 99% cure rate for primary (previously untreated) basal cell cancer and a 95% cure rate for primary squamous cell cancer. In Mohs surgery, tissue is removed and processed in a way that we are able to check 100% of the margins, giving the highest cure rate for any method of treating skin cancers while providing maximal preservation of normal skin. This allows the surgeon to produce an optimal cosmetic result for the patient by maximizing the amount of tissue removed yielding as small a scar as possible    On the day of surgery, you will be given local anesthesia only (similar to what was given to you during your initial biopsy). You will remain awake. You will verify the location of the skin cancer prior to the onset of the surgery. Once the area is numb, the tissue containing the skin cancer will be removed, taking a small safety margin. This margin is usually smaller than what would be taken with a standard excision. Once the tissue is removed, it is marked and oriented. The first layer (“Stage I”) will be processed in our laboratory. The wound will be treated for bleeding and a bandage will be placed to keep you comfortable while you wait an approximate 45 minutes-1 hour (for the processing of the tissue) in your room. Your Mohs surgeon will examine the pathology in the lab, checking all the margins. If any tumor remains, you will need to take a second layer of skin (“Stage 2”). The area will be re-anesthetized and your Mohs surgeon will remove more skin only in the area where the tumor exists. This process will continue until all the skin cancer  is removed. Unfortunately, there is no method to predict how many layers or stages will be taken.    Once the tumor has been removed completely, we will discuss the best ways to close the defect. Most wounds may be closed with stitches. A larger wound may require a skin graft or a flap. In rare instances, especially for cancers around the eye or for larger cancers, we may work with another surgeon (oculoplastic, ENT, plastics) with special skills to assist with reconstruction.  If the surgery is coordinated with another specialist, you will have the Mohs portion of the procedure first and see the coordinated specialist after the skin cancer has been removed.  Always follow the pre-operative instructions of the surgeon doing the closure.      Medications: Please take all your normal medications the morning of your surgery. If you are a diabetic, please bring your insulin or medications with you, as well as a snack to avoid having low blood sugar during your day with us.    1) Blood Thinners    VERY IMPORTANT: We do NOT stop or hold prescribed blood thinners (such as Coumadin/Warfarin, Plavix, Eliquis, Pradaxa, Brilinta, Apixaban, Xarelto, Lovonox, Rivaroxaban, or Aggrenox) before Mohs surgery. Additionally, If you take aspirin because you have had a stroke, heart attack, heart disease, other condition, or your physician has prescribed you to take it, please continue your aspirin.    Although there is a risk of increased bruising and bleeding, we are still able to safely perform surgery while continuing these medications. Please NEVER stop your prescribed blood thinner without the managing doctor's (the doctor that prescribed the medication) permission or knowledge. If you have any concerns about not holding your blood thinner, please address these with your Mohs surgeon.    Most people should stop all non-steroidal anti-inflammatory medications (Motrin, Naproxen, Advil, Midol, Aleve, etc.) for 7 days prior to your  scheduled surgery and 2 days after (unless instructed otherwise after surgery).  You may take Tylenol for pain.     Vitamins and Supplements  Avoid taking any supplements with Vitamin E, Fish Oil, Gingko, Ginseng, and Garlic for 2 weeks before and 2 days after your surgery. These thin your blood.    Lidocaine Patches  Avoid wearing any over the counter or prescribed Lidocaine patches the day of the surgery.    Alcohol: Avoid drinking alcohol for 2 days prior to your surgery, and for 2 days afterwards (it thins the blood and causes more bruising and swelling).    Smoking: Try to STOP or reduce smoking significantly the week before your surgery, and especially the week afterwards (it greatly improves how well you heal). Tobacco smoke deprives the blood of oxygen, which is urgently needed by the wound during the healing process.    Contact Lenses: Do not wear them on the day of the surgery. Instead, wear glasses and bring your case, in case we need to remove them.    Clothing: Do not wear your nicest clothing on your surgery day. We recommend wearing a button down shirt that will not disrupt your post-operative dressing when changing later that night. Please avoid wearing jeans during the procedure to help prevent damage to our equipment.     Bathing: On the morning of your surgery, you may bathe or shower normally. If you get your hair done on a weekly basis, remember to get your hair washed the day before surgery.   - You will need to keep your surgical site dry for a minimum of 48 hours after surgery.    Makeup: If your surgery is on the face, please do not wear any makeup on the day of the surgery.    Jewelry: Please try to avoid wearing jewelry on the day of surgery.    Food: On the morning of surgery, have breakfast but limit your intake of caffeinated beverages. They are diuretic and may inconvenience you during surgery. If you are following up with another surgeon the same day as your Mohs surgery, you must  receive permission to eat breakfast from that surgeon.      What to bring with you on the day of your surgery:  Bring snacks - Since you could be at the office long, you may bring snacks and/or lunch with you. Some snacks and drinks are available at the office as well.   Bring a sweater - Bring a sweater or jacket that buttons or zips down the front and will not disturb your wound dressing during removal.  Bring something to do - You will be spending much of the day in our office. There will be 45-60 minute waiting periods  between layers/stages, and while there is a television with cable in every room, it is nice to have something to keep you occupied such as books, magazines, knitting, music, or work.     Planning Ahead:  Other Appointments - It is important to realize that no matter how small the skin cancer appears to be, looks can be deceiving. Since your surgery may last the entire day, you should not schedule any other appointments that day.  Special Occasions - Surgery often creates swelling and bruising. Also, the post-op dressing may be rather large and obvious. Keep this in mind as you arrange your social/work schedule. If an important event is already planned, please check with your referring physician or your Mohs surgeon to see if the surgery can be postponed.  Activity Limits after Surgery - If surgery was performed on your face, we recommend that you keep your activity level to a minimum for 2-3 days (the blood pressure elevation related to exercise can lead to bleeding). If you have stitches in an area that will be under tension or significant movement (neck, back, arms, legs), you will need to avoid heavy lifting (anything over 5 lbs) or exercise for at least 2 weeks and possibly longer. We also advise that you limit out of town travel for the first 7 days after surgery. You should also wait at least 7 days before going into a pool or the ocean.  Housework - Since you will need to minimize activity  after surgery, plan to do your groceries, laundry, gardening, and other heavy household chores prior to your surgery. Please make arrangements for assistance during the post-op period. If surgery is around your mouth area, you may need to eat soft foods, such as soup, milkshakes, or yogurt for 48 hours.    Purchasing bandage supplies: Prior to surgery, please purchase the following items to care for your surgical wound properly.  Cotton swabs (Q-tips)  Vaseline or Aquaphor  Telfa pads (or any non-stick dressing)  Paper tape or Hypafix tape  Gauze pads (3x3)    Transportation: It is often reassuring and comforting to have a  drive you to and from the surgery. He or she is welcome to wait in the office during the surgery. If you do not have a , you may drive to and from your procedure (unless stated otherwise). If the site being treated is near your eye, be aware that the final bandage may cause some obstruction of vision.     Rescheduling: If you need to reschedule your surgery, please notify the office as soon as possible.

## 2024-05-28 ENCOUNTER — PATIENT MESSAGE (OUTPATIENT)
Dept: CARDIOLOGY CLINIC | Facility: CLINIC | Age: 79
End: 2024-05-28

## 2024-05-28 NOTE — TELEPHONE ENCOUNTER
We have not received a request for hold or cardiac clearance. Please indicate if hold being requested for upcoming procedure.

## 2024-05-30 ENCOUNTER — OFFICE VISIT (OUTPATIENT)
Dept: PODIATRY | Facility: CLINIC | Age: 79
End: 2024-05-30
Payer: MEDICARE

## 2024-05-30 VITALS
HEART RATE: 68 BPM | OXYGEN SATURATION: 90 % | BODY MASS INDEX: 33.47 KG/M2 | DIASTOLIC BLOOD PRESSURE: 60 MMHG | SYSTOLIC BLOOD PRESSURE: 122 MMHG | HEIGHT: 76 IN

## 2024-05-30 DIAGNOSIS — B35.1 ONYCHOMYCOSIS: Primary | ICD-10-CM

## 2024-05-30 DIAGNOSIS — E11.65 TYPE 2 DIABETES MELLITUS WITH HYPERGLYCEMIA, WITH LONG-TERM CURRENT USE OF INSULIN (HCC): ICD-10-CM

## 2024-05-30 DIAGNOSIS — I73.9 PAD (PERIPHERAL ARTERY DISEASE) (HCC): ICD-10-CM

## 2024-05-30 DIAGNOSIS — Z79.4 TYPE 2 DIABETES MELLITUS WITH HYPERGLYCEMIA, WITH LONG-TERM CURRENT USE OF INSULIN (HCC): ICD-10-CM

## 2024-05-30 DIAGNOSIS — E11.42 DIABETIC PERIPHERAL NEUROPATHY (HCC): ICD-10-CM

## 2024-05-30 PROCEDURE — 11721 DEBRIDE NAIL 6 OR MORE: CPT | Performed by: PODIATRIST

## 2024-05-30 NOTE — PROGRESS NOTES
Assessment/Plan:     The patient's clinical examination today significant for thickened and dystrophic pedal nail plates with brittleness, discoloration, and subungual debris consistent with onychomycosis.  Pedal pulses are nonpalpable bilaterally.  Capillary fill time to the toes is greater than 3 seconds x 10.  Temperature gradient is mildly increased to the bilateral extremities.  The interdigital spaces are clear without maceration.  No pretrophic changes noted to either lower extremity.     The pedal nail plates are sharply debrided with a sterile nail clipper x 10 without complication.  The nails were then mechanically reduced in thickness and girth utilizing a rotary bur.  A diabetic foot examination was performed and the patient is deemed to be in the high risk category secondary to diminished pedal pulses and dense peripheral neuropathy.     The patient will follow-up with me in 3 months for continued at risk diabetic footcare.        There are no diagnoses linked to this encounter.      Subjective:     Patient ID: Home Walters is a 79 y.o. male.    The patient presents today with a chief complaint of painful elongated pedal nail plates.  The patient does note a history of peripheral neuropathy stemming from his diabetes.  He denies any other acute pedal issues today.      PAST MEDICAL HISTORY:  Past Medical History:   Diagnosis Date    A-fib (Formerly [facility])     AAA (abdominal aortic aneurysm) (Formerly [facility])     Actinic keratosis of right temple 7/31/2020    Actinic keratosis of scalp 7/31/2020    Acute respiratory failure with hypoxia (Formerly [facility]) 3/25/2021    Allergic 1960    Arthritis     Lay's esophagus     Bladder cancer (Formerly [facility])     Blister of left leg 4/28/2021    Blister of right leg 5/26/2021    CAD (coronary artery disease)     Cancer (Formerly [facility]) 02/2010    Bladder    CHF (congestive heart failure) (Formerly [facility])     Chronic low back pain     Chronic pain of both knees 7/31/2020    Colitis 12/4/2020    CPAP (continuous positive airway  pressure) dependence     Diabetes (HCC)     Diabetes mellitus (HCC) 10/1993    Excessive gas 12/3/2020    Heart murmur     History of chemotherapy     Hydroureter on left 4/28/2021    Hyperlipemia     Hypertension     Immunization deficiency 10/30/2020    Hep a nonimmune    Kidney stone     Left lower quadrant abdominal pain 12/3/2020    Mass of right adrenal gland (HCC) 12/4/2020    4.1 cm    Myocardial infarction (HCC)     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    Obesity 2000    LUCIA (obstructive sleep apnea) 1/29/2021    Pressure ulcer of right leg, stage 1 5/26/2021    Urinary tract infection with hematuria 5/3/2021    u cx 4/2021=pseudomonas R to bactrim and cefdinir, S to cipro    Venous stasis dermatitis of both lower extremities 5/26/2021       PAST SURGICAL HISTORY:  Past Surgical History:   Procedure Laterality Date    BACK SURGERY      BLADDER SURGERY      CARDIAC CATHETERIZATION  04/2016    CARDIAC CATHETERIZATION N/A 1/19/2023    Procedure: Cardiac RHC;  Surgeon: Jose Carlos Shea MD;  [facility];  Service: Cardiology    CATARACT EXTRACTION, BILATERAL      COLONOSCOPY  01/29/2019    next 2022 [place]    CORONARY ARTERY BYPASS GRAFT  04/2016    Quadruple per [place]    EGD  06/2017    EYE SURGERY  11/2016    Cataracts    JOINT REPLACEMENT  [id number]    Hip and shoulder    KIDNEY STONE SURGERY      [place] CYSTO BLADDER W/URETERAL CATHETERIZATION Left 04/24/2021    Procedure: CYSTOSCOPY  WITH INSERTION STENT URETERAL;  Surgeon: Siddhartha Leslie MD;  [facility];  Service: Urology    [place] CYSTO/URETERO W/LITHOTRIPSY &INDWELL STENT INSRT Left 05/21/2021    Procedure: CYSTOSCOPY URETEROSCOPY WITH LITHOTRIPSY HOLMIUM LASER, AND INSERTION STENT URETERAL/EXCHANGE;  Surgeon: Siddhartha Leslie MD;  [facility];  Service: Urology    TOTAL HIP ARTHROPLASTY Right 2012    TOTAL SHOULDER REPLACEMENT Right 2013    VASECTOMY  1971        ALLERGIES:  Ciprofloxacin, Diltiazem, and  Metronidazole    MEDICATIONS:  Current Outpatient Medications   Medication Sig Dispense Refill    apixaban (Eliquis) 5 mg Take 1 tablet (5 mg total) by mouth 2 (two) times a day 180 tablet 3    atorvastatin (LIPITOR) 40 mg tablet Take 1 tablet (40 mg total) by mouth daily 90 tablet 1    cholecalciferol (VITAMIN D3) 1,000 units tablet Take 1,000 Units by mouth daily      Continuous Blood Gluc Sensor (FreeStyle Peggy 2 Sensor) MISC Change it every 14 days 1 each 0    dicyclomine (BENTYL) 10 mg capsule Take 1 capsule (10 mg total) by mouth 2 (two) times a day as needed (abdominal cramping) 60 capsule 3    digoxin (LANOXIN) 0.125 mg tablet TAKE ONE TABLET BY MOUTH ONCE DAILY 90 tablet 1    ergocalciferol (VITAMIN D2) 50,000 units Take 1 capsule (50,000 Units total) by mouth once a week for 8 doses 8 capsule 0    famotidine (PEPCID) 40 MG tablet Take 1 tablet (40 mg total) by mouth daily at bedtime 30 tablet 5    insulin degludec (Tresiba FlexTouch) 200 units/mL CONCENTRATED U-200 injection pen Inject 95 units under the skin once daily 15 mL 5    Jardiance 25 MG TABS TAKE ONE TABLET BY MOUTH DAILY IN MORNING 90 tablet 1    metoprolol succinate (TOPROL-XL) 50 mg 24 hr tablet Take 1 tablet (50 mg total) by mouth daily 90 tablet 3    NovoLOG FlexPen 100 units/mL injection pen Inject 20-25 Units under the skin 3 (three) times a day with meals 30 mL 6    omeprazole (PriLOSEC) 20 mg delayed release capsule TAKE ONE CAPSULE BY MOUTH TWICE DAILY 180 capsule 1    potassium citrate (UROCIT-K) 10 mEq TAKE ONE TABLET BY MOUTH TWICE DAILY 180 tablet 1    torsemide (DEMADEX) 20 mg tablet take two tablets by mouth in the morning and one tablet  at 4pm 120 tablet 11    umeclidinium-vilanterol (Anoro Ellipta) 62.5-25 mcg/actuation inhaler INHALE ONE PUFF BY MOUTH ONCE DAILY 60 each 2    Unifine Pentips 31G X 8 MM MISC inject under the skin daily use as directed 100 each 0     No current facility-administered medications for this visit.        SOCIAL HISTORY:  Social History     Socioeconomic History    Marital status: /Civil Union     Spouse name: Gabriela    Number of children: 4    Years of education: None    Highest education level: 12th grade   Occupational History    None   Tobacco Use    Smoking status: Former     Current packs/day: 0.00     Average packs/day: 0.3 packs/day for 44.0 years (11.0 ttl pk-yrs)     Types: Cigarettes     Start date: 1965     Quit date: 2010     Years since quittin.4     Passive exposure: Past    Smokeless tobacco: Never    Tobacco comments:     Quit several times for up to 6 years.   Vaping Use    Vaping status: Never Used   Substance and Sexual Activity    Alcohol use: Not Currently     Comment: No use    Drug use: Never     Comment: No use    Sexual activity: Not Currently     Partners: Female   Other Topics Concern    None   Social History Narrative    · Most recent tobacco use screenin2020      · Do you currently or have you served in the [employer]:   No      · Were you activated, into active duty, as a member of the [employer] or as a Reservist:   No      · Live alone or with others:   with others        · Caffeine intake:   Occasional      · Illicit drugs:   No      · Diet:   Regular      · Exercise level:   Moderate      · Advance directive:   Yes      · Marital status:         · General stress level:   Medium      · Single or multi-level home/work:   multi level home      · Guns present in home:   No      · Seat belts used routinely:   Yes      · Sunscreen used routinely:   Yes      · Smoke alarm in home:   Yes      Social Determinants of Health     Financial Resource Strain: Low Risk  (2023)    Overall Financial Resource Strain (CARDIA)     Difficulty of Paying Living Expenses: Not hard at all   Food Insecurity: No Food Insecurity (2023)    Hunger Vital Sign     Worried About Running Out of Food in the Last Year: Never true     Ran Out of Food in the  Last Year: Never true   Transportation Needs: No Transportation Needs (6/21/2023)    PRAPARE - Transportation     Lack of Transportation (Medical): No     Lack of Transportation (Non-Medical): No   Physical Activity: Insufficiently Active (4/13/2021)    Exercise Vital Sign     Days of Exercise per Week: 4 days     Minutes of Exercise per Session: 10 min   Stress: No Stress Concern Present (4/13/2021)    [place] citizen [place] of Occupational Health - Occupational Stress Questionnaire     Feeling of Stress : Not at all   Social Connections: Moderately Isolated (4/13/2021)    Social Connection and Isolation Panel [NHANES]     Frequency of Communication with Friends and Family: More than three times a week     Frequency of Social Gatherings with Friends and Family: Twice a week     Attends [employer] Services: Never     Active Member of Clubs or Organizations: No     Attends Club or Organization Meetings: Never     Marital Status:    Intimate Partner Violence: Not At Risk (4/13/2021)    Humiliation, Afraid, Rape, and Kick questionnaire     Fear of Current or Ex-Partner: No     Emotionally Abused: No     Physically Abused: No     Sexually Abused: No   Housing Stability: Unknown (1/11/2023)    Housing Stability Vital Sign     Unable to Pay for Housing in the Last Year: No     Number of Places Lived in the Last Year: Not on file     Unstable Housing in the Last Year: No        Review of Systems   Constitutional: Negative.    HENT: Negative.     Eyes: Negative.    Respiratory: Negative.     Cardiovascular: Negative.    Endocrine: Negative.    Musculoskeletal: Negative.    Neurological: Negative.    Hematological: Negative.    Psychiatric/Behavioral: Negative.           Objective:     Physical Exam  Vitals reviewed.   Constitutional:       Appearance: Normal appearance.   HENT:      Head: Normocephalic and atraumatic.      Nose: Nose normal.   Eyes:      Conjunctiva/sclera: Conjunctivae normal.      Pupils: Pupils are  equal, round, and reactive to light.   Cardiovascular:      Pulses:           Dorsalis pedis pulses are 0 on the right side and 0 on the left side.        Posterior tibial pulses are 0 on the right side and 0 on the left side.   Pulmonary:      Effort: Pulmonary effort is normal.   Feet:      Right foot:      Skin integrity: Skin integrity normal.      Toenail Condition: Right toenails are abnormally thick and long. Fungal disease present.     Left foot:      Skin integrity: Skin integrity normal.      Toenail Condition: Left toenails are abnormally thick and long. Fungal disease present.     Comments: The patient's clinical examination today significant for thickened and dystrophic pedal nail plates with brittleness, discoloration, and subungual debris consistent with onychomycosis.  Pedal pulses are nonpalpable bilaterally.  Capillary fill time to the toes is greater than 3 seconds x 10.  Temperature gradient is mildly increased to the bilateral extremities.  The interdigital spaces are clear without maceration.  No pretrophic changes noted to either lower extremity.     Skin:     General: Skin is warm.      Capillary Refill: Capillary refill takes 2 to 3 seconds.   Neurological:      General: No focal deficit present.      Mental Status: He is alert and oriented to person, place, and time.   Psychiatric:         Mood and Affect: Mood normal.         Behavior: Behavior normal.         Thought Content: Thought content normal.

## 2024-06-04 ENCOUNTER — PROCEDURE VISIT (OUTPATIENT)
Dept: DERMATOLOGY | Facility: CLINIC | Age: 79
End: 2024-06-04
Payer: MEDICARE

## 2024-06-04 ENCOUNTER — TELEPHONE (OUTPATIENT)
Dept: DERMATOLOGY | Facility: CLINIC | Age: 79
End: 2024-06-04

## 2024-06-04 VITALS
TEMPERATURE: 99.9 F | WEIGHT: 278.4 LBS | HEART RATE: 78 BPM | HEIGHT: 76 IN | DIASTOLIC BLOOD PRESSURE: 64 MMHG | OXYGEN SATURATION: 93 % | BODY MASS INDEX: 33.9 KG/M2 | SYSTOLIC BLOOD PRESSURE: 138 MMHG

## 2024-06-04 DIAGNOSIS — C44.42 SQUAMOUS CELL CARCINOMA OF SCALP: ICD-10-CM

## 2024-06-04 PROCEDURE — 14020 TIS TRNFR S/A/L 10 SQ CM/<: CPT | Performed by: STUDENT IN AN ORGANIZED HEALTH CARE EDUCATION/TRAINING PROGRAM

## 2024-06-04 PROCEDURE — 17311 MOHS 1 STAGE H/N/HF/G: CPT | Performed by: STUDENT IN AN ORGANIZED HEALTH CARE EDUCATION/TRAINING PROGRAM

## 2024-06-04 NOTE — Clinical Note
This patient needs to establish care with dermatology- had biopsy of SCC by Plastics. We have treated the SCC but he has never had a skin check. Please schedule a skin exam.  TY!

## 2024-06-04 NOTE — PROGRESS NOTES
MOHS Procedure Note    Patient: Home Walters  : 1945  MRN: [id number]  Date: 2024    History of Present Illness: The patient is a 79 y.o. 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     Past Medical History:   Diagnosis Date    A-fib (HCC)     AAA (abdominal aortic aneurysm) (HCC)     Actinic keratosis of right temple 2020    Actinic keratosis of scalp 2020    Acute respiratory failure with hypoxia (HCC) 2021    Allergic 1960    Arthritis     Lay's esophagus     Bladder cancer (HCC)     Blister of left leg 2021    Blister of right leg 2021    CAD (coronary artery disease)     Cancer (HCC) 2010    Bladder    CHF (congestive heart failure) (HCC)     Chronic low back pain     Chronic pain of both knees 2020    Colitis 2020    CPAP (continuous positive airway pressure) dependence     Diabetes (HCC)     Diabetes mellitus (HCC) 10/1993    Excessive gas 2020    Heart murmur     History of chemotherapy     Hydroureter on left 2021    Hyperlipemia     Hypertension     Immunization deficiency 10/30/2020    Hep a nonimmune    Kidney stone     Left lower quadrant abdominal pain 2020    Mass of right adrenal gland (HCC) 2020    4.1 cm    Myocardial infarction (HCC)     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    Obesity 2000    LUCIA (obstructive sleep apnea) 2021    Pressure ulcer of right leg, stage 1 2021    Squamous cell skin cancer 2024    central scalp, mohs    Urinary tract infection with hematuria 2021    u cx 2021=pseudomonas R to bactrim and cefdinir, S to cipro    Venous stasis dermatitis of both lower extremities 2021       Past Surgical History:   Procedure Laterality Date    BACK SURGERY      BLADDER SURGERY      CARDIAC CATHETERIZATION  2016    CARDIAC CATHETERIZATION N/A 2023    Procedure: Cardiac RHC;  Surgeon: Jose Carlos Shea MD;  [facility];  Service: Cardiology    CATARACT EXTRACTION, BILATERAL      COLONOSCOPY  01/29/2019    next 2022 [place]    CORONARY ARTERY BYPASS GRAFT  04/2016    Quadruple per [place]    EGD  06/2017    EYE SURGERY  11/2016    Cataracts    JOINT REPLACEMENT  [id number]    Hip and shoulder    KIDNEY STONE SURGERY      MOHS SURGERY  06/04/2024    [facility], Dr Rodríguez    [place] CYSTO BLADDER W/URETERAL CATHETERIZATION Left 04/24/2021    Procedure: CYSTOSCOPY  WITH INSERTION STENT URETERAL;  Surgeon: Siddhartha Leslie MD;  [facility];  Service: Urology    [place] CYSTO/URETERO W/LITHOTRIPSY &INDWELL STENT INSRT Left 05/21/2021    Procedure: CYSTOSCOPY URETEROSCOPY WITH LITHOTRIPSY HOLMIUM LASER, AND INSERTION STENT URETERAL/EXCHANGE;  Surgeon: Siddhartha Leslie MD;  [facility];  Service: Urology    TOTAL HIP ARTHROPLASTY Right 2012    TOTAL SHOULDER REPLACEMENT Right 2013    VASECTOMY  1971         Current Outpatient Medications:     apixaban (Eliquis) 5 mg, Take 1 tablet (5 mg total) by mouth 2 (two) times a day, Disp: 180 tablet, Rfl: 3    atorvastatin (LIPITOR) 40 mg tablet, Take 1 tablet (40 mg total) by mouth daily, Disp: 90 tablet, Rfl: 1    cholecalciferol (VITAMIN D3) 1,000 units tablet, Take 1,000 Units by mouth daily, Disp: , Rfl:     Continuous Blood Gluc Sensor (FreeStyle Peggy 2 Sensor) MISC, Change it every 14 days, Disp: 1 each, Rfl: 0    dicyclomine (BENTYL) 10 mg capsule, Take 1 capsule (10 mg total) by mouth 2 (two) times a day as needed (abdominal cramping), Disp: 60 capsule, Rfl: 3    digoxin (LANOXIN) 0.125 mg tablet, TAKE ONE TABLET BY MOUTH ONCE DAILY, Disp: 90 tablet, Rfl: 1    famotidine (PEPCID) 40 MG tablet, Take 1 tablet (40 mg total) by mouth daily at bedtime, Disp: 30 tablet, Rfl: 5    insulin degludec (Tresiba FlexTouch) 200 units/mL CONCENTRATED U-200 injection pen, Inject 95 units under the skin once daily, Disp: 15 mL, Rfl: 5    Jardiance 25 MG TABS, TAKE ONE TABLET BY MOUTH  DAILY IN MORNING, Disp: 90 tablet, Rfl: 1    metoprolol succinate (TOPROL-XL) 50 mg 24 hr tablet, Take 1 tablet (50 mg total) by mouth daily, Disp: 90 tablet, Rfl: 3    NovoLOG FlexPen 100 units/mL injection pen, Inject 20-25 Units under the skin 3 (three) times a day with meals, Disp: 30 mL, Rfl: 6    omeprazole (PriLOSEC) 20 mg delayed release capsule, TAKE ONE CAPSULE BY MOUTH TWICE DAILY, Disp: 180 capsule, Rfl: 1    potassium citrate (UROCIT-K) 10 mEq, TAKE ONE TABLET BY MOUTH TWICE DAILY, Disp: 180 tablet, Rfl: 1    torsemide (DEMADEX) 20 mg tablet, take two tablets by mouth in the morning and one tablet  at 4pm, Disp: 120 tablet, Rfl: 11    umeclidinium-vilanterol (Anoro Ellipta) 62.5-25 mcg/actuation inhaler, INHALE ONE PUFF BY MOUTH ONCE DAILY, Disp: 60 each, Rfl: 2    Unifine Pentips 31G X 8 MM MISC, inject under the skin daily use as directed, Disp: 100 each, Rfl: 0    ergocalciferol (VITAMIN D2) 50,000 units, Take 1 capsule (50,000 Units total) by mouth once a week for 8 doses, Disp: 8 capsule, Rfl: 0    Allergies   Allergen Reactions    Ciprofloxacin Abdominal Pain, Diarrhea, GI Bleeding and GI Intolerance    Diltiazem Abdominal Pain    Metronidazole Abdominal Pain, Diarrhea, GI Bleeding and GI Intolerance       Physical Exam:   Vitals:    06/04/24 1010   BP: 138/64   Pulse: 78   Temp: 99.9 °F (37.7 °C)   SpO2: 93%     General: Awake, Alert, Oriented x 3, Mood and affect appropriate  Respiratory: Respirations even and unlabored  Cardiovascular: Peripheral pulses intact; no edema  Musculoskeletal Exam: n/a  Cervical lymph node exam was negative  for palpable LAD  Right lateral neck with palpable subQ nodule with visible punctum   Skin: 3 cm x 2.5 cm keratotic nodule on the central vertex scalp    Assessment: Biopsy confirmed squamous cell carcinoma of the central scalp.     Plan: Mohs    Time of H&P Completion:1019    MOHS Procedure Timeout      Flowsheet Row Most Recent Value   Timeout: 1030   Patient  Identity Verified: Yes   Correct Site Verified: Yes   Correct Procedure Verified: Yes            MOHS Diagnosis/Indication/Location/ID      Flowsheet Row Most Recent Value   Pathology Type Squamous cell carcinoma   Anatomic Site --  [central scalp]   Indications for MOHS tumor location, large tumor size   MOHS ID [id number]            MOHS Site/Accession/Pre-Post      Flowsheet Row Most Recent Value   Original Site Identified (as submitted by referring clinician) Photo, Referral   Biopsy Accession/Specimen # (as submitted by referring clincian) [id number]   Pre-MOHS Size Length (cm) 3   Pre-MOHS Size Width (cm) 2.5   Post-MOHS Size-Length (cm) 3.4   Post MOHS Size-Width (cm) 2.7   Repair Type Rotation flap  [partial intermediate]   Suture Type Vicryl   Vicryl Suture Size 3  [4]   Final repair area (cm2): 4.14   Anesthetic Used 1% Lidocaine with epinephrine  [10]          Cutaneous SCC Pathological staging     Staging form:      AJCC 8th edition T2 (Tumor >=2cm and <4 cm greatest dimension)    [facility]   T2b (2-3 high risk factors, which include diameter >=2cm; PD; PNI >=0.1mm; invasion beyond SQ fat)       *features leading to selection of stage are highlighted above*      MOHS Tumor Stage 1 Information      Flowsheet Row Most Recent Value   Tissue Sections (blocks) 4   Microscopic Exam Section 1: No tumor identified in section.   Microscopic Exam Section 2: No tumor identified in section.   Microscopic Exam Section 3: No tumor identified in section.   Microscopic Exam Section 4: No tumor identified in section.   Tumor Clear After Stage I? Yes                Patient identified, procedure verified, site identified and verified. Time out completed. Surgical removal of the lesion discussed with the patient (risks and benefits, including possibility of scarring, infection, recurrence or potential for further treatment)  I have specifically identified the site with the patient. I have discussed the fact that the patient will  have a scar after the procedure regardless of granulation or repair with sutures. I have discussed that the repair options can range from granulation in some cases to linear or curvilinear closures to larger flaps or grafts.  There are sometimes flaps or grafts used that require multiples stages of surgery and will not be completed today, rather be completed over a series of appointments. I have discussed that occasionally due to location, size or depth of the lesion I may recommend consultation with and transfer of care for further removal or the reconstruction to another provider such as ophthalmology surgery, plastic surgery, ENT surgery, or surgical oncology. There are cases in which other testing such as imaging with MRI or CT scan or testing of lymph nodes is recommended because of the nature/depth/location of tumor seen during the removal. There is a risk of injury to nerves causing temporary or permanent numbness or the inability to move muscles full such as the inability to lift eyebrows. Questions answered and verbal and written consent was obtained.    The tumor qualifies for Mohs based on AUC criteria. Dr. Rodríguez served as the surgeon and pathologist during the procedure.    With the patient in the supine position and under adequate local anesthesia with 1% lidocaine with epinephrine 1:100,000, the defect was scrubbed with Chlorhexidine. Sterile drapes were placed from the sterile tray.      Because of the location of the surgical defect, a combination of a  rotation flap and partial intermediate closure was judged to give the best possible cosmetic and functional result with minimal movement of surrounding tissue.  The edges of the defect were carefully debrided removing any dead or coagulated tissue.  The edges of the defect and the area to be used for rotation of tissue at the base of the flap were minimally undermined.  The rotation flap was freed up and draped over the defect. The flap was secured in  place by a deep layer of buried sutures, followed by superficial sutures, as noted above. The standing cone of tissue was excised with a #15 scalpel blade and closed in two layers as described previously.     Estimated blood loss was less than 5 cc.  The patient tolerated the procedure well. The wound was dressed with petrolatum, a non-stick pad, and a compression dressing.      Maryjane Rodríguez MD served as the surgeon and pathologist during the procedure.    Postoperative care: Wound care discussed at length.  I urged the patient to call us if any problems or question should arise. If circumstances should change, we will contact the patient to make other arrangements.    Complications: none  Post-op medications: none  Patient condition after procedure: stable  Discharge plans: Plan for follow up as planned with us in 2 months for wound check. Will send note to dermatology staff to establish care with dermatology.      Scribe Attestation      I,:  Emma Mcdaniels am acting as a scribe while in the presence of the attending physician.:       I,:  Maryjane Rodríguez MD personally performed the services described in this documentation    as scribed in my presence.:

## 2024-06-04 NOTE — TELEPHONE ENCOUNTER
I think that this patient should be added in for an am spot in [facility], as the soonest available for a np appt is going into 2025. Please advise as to what I should do and I will give Maynor a call. Thank you!

## 2024-06-04 NOTE — TELEPHONE ENCOUNTER
----- Message from Maryjane Rodríguez MD sent at 6/4/2024  3:27 PM EDT -----  This patient needs to establish care with dermatology- had biopsy of SCC by Plastics. We have treated the SCC but he has never had a skin check. Please schedule a skin exam.  TY!

## 2024-06-04 NOTE — PATIENT INSTRUCTIONS
"Mohs Microscopic Surgery After Care    Your wound will heal via secondary intention, which means no additional surgery was performed after removal of your skin cancer. The wound was left open to heal gradually over time using wound care alone.     Wounds left to heal secondarily can take 2-3 months on average to heal.  The healing occurs step by step. You will notice that over the first three weeks the area will drain straw colored fluid that may have some blood within it. This is normal. Over the first three weeks, you form a nice healing base called fibrin that serves as the scaffold or map for the rest of your body's healing process. The fibrin is a thick yellow tissue. People often worry that it is pus given the yellow color. Unlike pus, this is a thick layer that cannot be rubbed off. After this, you should expect the wound to \"fill in\" to the surface of your skin over the course of several weeks. Lastly, a new layer of skin will form over the wound.    Until your body forms a good fibrin base (which takes ~3 weeks) you should avoid immersing the wound in water (such as in baths, whirlpools, swimming pools, hot tubs, lakes and oceans). You however CAN and should wash the area daily, as instructed below.     After healing, the scar will initially be red (and often times a deep red to purple color on the legs) but will gradually fade over the course of 1 year to a round scar lighter than the skin surrounding it.    We advise you follow the wound care as noted below the entire time it takes for the areas to heal completely.    WOUND CARE AFTER YOUR PROCEDURE    Try NOT to remove the pressure bandage for 48 hours. Keep the area clean and dry while this bandage is on.     After removing the bandage for the first time, gently clean the area with soap and water. If the bandage is difficult to remove, getting the bandage wet in the shower will sometimes help soften the adhesive and allow it to be removed more easily. "     You will now need to cleanse this area daily in the shower with gentle soap. There is no need to scrub the area. You will need to apply plain Vaseline ointment (this is over the counter and not a prescription) to the site until it has healed over completely followed by a clean appropriately sized bandage to area.  Non stick dressing and paper tape (or Hypafix) are recommended for sensitive skin but a bandaid is fine if it covers the area well.    MANAGING YOUR PAIN AFTER SURGERY     You can expect to have some pain after surgery. This is normal. The pain is typically worse the first two days after surgery, and quickly begins to get better.     The best strategy for controlling your pain after surgery is around the clock pain control. You can take over the counter Acetaminophen (Tylenol) for discomfort, if no contraindications.     If you are taking this at the maximum dose, you can alternate this with Motrin (ibuprofen or Advil) as well. Alternating these medications with each other allows you to maximize your pain control. In addition to Tylenol and Motrin, you can use heating pads or ice packs on your incisions to help reduce your pain.     How will I alternate your regular strength over-the-counter pain medication?  You will take a dose of pain medication every three hours.   Start by taking 650 mg of Tylenol (2 pills of 325 mg)   3 hours later take 600 mg of Motrin (3 pills of 200 mg)   3 hours after taking the Motrin take 650 mg of Tylenol   3 hours after that take 600 mg of Motrin.    See example - if your first dose of Tylenol is at 12:00 PM     12:00 PM  Tylenol 650 mg (2 pills of 325 mg)    3:00 PM  Motrin 600 mg (3 pills of 200 mg)    6:00 PM  Tylenol 650 mg (2 pills of 325 mg)    9:00 PM  Motrin 600 mg (3 pills of 200 mg)    Continue alternating every 3 hours      Important:   Do not take more than 4000mg of Tylenol or 3200mg of Motrin in a 24-hour period.     What if I still have pain?   If you have  pain that is not controlled with the over-the-counter pain medications (Tylenol and Motrin or Advil), don't hesitate to call our staff using the number provided. We will help make sure you are managing your pain in the best way possible, and if necessary, we can provide a prescription for additional pain medication.     CALL OUR OFFICE IMMEDIATELY FOR ANY SIGNS OF INFECTION:    This includes fever, chills, increased redness, warmth, tenderness, severe discomfort/pain, or pus or foul smell coming from the wound. If you are experiencing any of the above, please call [facility]'s Mohs Department directly at [phone].    IF BLEEDING IS NOTICED:    Place a clean cloth over the area and apply firm pressure for thirty minutes.  Check the wound ONLY after 30 minutes of direct pressure; do not cheat and sneak a peak, as that does not count.  If bleeding persists after 30 minutes of legitimate direct pressure, then try one more round of direct pressure to the area.  Should the bleeding become heavier or not stop after the second attempt, call [facility] directly at [phone]. Your call will get routed to the dermatology surgeon on call even after hours.

## 2024-06-06 ENCOUNTER — DOCUMENTATION (OUTPATIENT)
Dept: HEMATOLOGY ONCOLOGY | Facility: CLINIC | Age: 79
End: 2024-06-06

## 2024-06-06 NOTE — PROGRESS NOTES
Intake received/ Chart reviewed for services completed outside of [facility]    Pathology completed: 5/2/2024    Imaging completed: N/A    All records needed are in patients chart. No records retrieval needed at this time.

## 2024-06-25 DIAGNOSIS — I48.11 LONGSTANDING PERSISTENT ATRIAL FIBRILLATION (HCC): ICD-10-CM

## 2024-06-25 RX ORDER — APIXABAN 5 MG/1
5 TABLET, FILM COATED ORAL 2 TIMES DAILY
Qty: 180 TABLET | Refills: 1 | Status: SHIPPED | OUTPATIENT
Start: 2024-06-25

## 2024-06-27 ENCOUNTER — RA CDI HCC (OUTPATIENT)
Dept: OTHER | Facility: HOSPITAL | Age: 79
End: 2024-06-27

## 2024-06-27 NOTE — PROGRESS NOTES
HCC coding opportunities          Chart Reviewed number of suggestions sent to Provider: 3   I13.0  E11.22  E11.51    Patients Insurance     Medicare Insurance: Medicare           Plan: Patient will continue to monitor hair loss. If sx worsen patient will rtc Detail Level: Zone

## 2024-07-05 ENCOUNTER — OFFICE VISIT (OUTPATIENT)
Dept: FAMILY MEDICINE CLINIC | Facility: CLINIC | Age: 79
End: 2024-07-05
Payer: MEDICARE

## 2024-07-05 VITALS
SYSTOLIC BLOOD PRESSURE: 124 MMHG | WEIGHT: 282 LBS | HEIGHT: 76 IN | BODY MASS INDEX: 34.34 KG/M2 | TEMPERATURE: 97.7 F | DIASTOLIC BLOOD PRESSURE: 52 MMHG | HEART RATE: 72 BPM | RESPIRATION RATE: 17 BRPM | OXYGEN SATURATION: 94 %

## 2024-07-05 DIAGNOSIS — E78.2 MIXED HYPERLIPIDEMIA: ICD-10-CM

## 2024-07-05 DIAGNOSIS — Z00.00 MEDICARE ANNUAL WELLNESS VISIT, SUBSEQUENT: Primary | ICD-10-CM

## 2024-07-05 DIAGNOSIS — I10 PRIMARY HYPERTENSION: ICD-10-CM

## 2024-07-05 PROBLEM — K52.9 COLITIS: Status: RESOLVED | Noted: 2020-12-04 | Resolved: 2024-07-05

## 2024-07-05 PROBLEM — Z28.39 IMMUNIZATION DEFICIENCY: Status: RESOLVED | Noted: 2020-10-30 | Resolved: 2024-07-05

## 2024-07-05 PROBLEM — R14.3 EXCESSIVE GAS: Status: RESOLVED | Noted: 2020-12-03 | Resolved: 2024-07-05

## 2024-07-05 PROBLEM — Z78.9 NONIMMUNE TO HEPATITIS B VIRUS: Status: RESOLVED | Noted: 2020-10-30 | Resolved: 2024-07-05

## 2024-07-05 PROBLEM — R09.02 HYPOXIA: Status: RESOLVED | Noted: 2021-03-24 | Resolved: 2024-07-05

## 2024-07-05 PROBLEM — R10.32 LEFT LOWER QUADRANT ABDOMINAL PAIN: Status: RESOLVED | Noted: 2020-12-03 | Resolved: 2024-07-05

## 2024-07-05 PROBLEM — S80.821A BLISTER OF RIGHT LEG: Status: RESOLVED | Noted: 2021-05-26 | Resolved: 2024-07-05

## 2024-07-05 PROCEDURE — G0439 PPPS, SUBSEQ VISIT: HCPCS | Performed by: NURSE PRACTITIONER

## 2024-07-05 NOTE — PROGRESS NOTES
Ambulatory Visit  Name: Home Walters      : 1945      MRN: [id number]  Encounter Provider: KACEY Rogers  Encounter Date: 2024   Encounter department: [facility] [place]    Assessment & Plan   1. Medicare annual wellness visit, subsequent  2. Primary hypertension  3. Mixed hyperlipidemia       Preventive health issues were discussed with patient, and age appropriate screening tests were ordered as noted in patient's After Visit Summary. Personalized health advice and appropriate referrals for health education or preventive services given if needed, as noted in patient's After Visit Summary.    History of Present Illness     HPI   Patient Care Team:  KACEY Rogers as PCP - General (Family Medicine)  Anna Carroll MD as PCP - Endocrinology (Endocrinology)  MD Tierra Enriquez CRNP Jay Barry Fisher, MD Stanley Walker, MD Jake Marais, MD as Cardiologist (Cardiology)  Mehran OKEEFE MD (Endocrinology)  Jasiel Lanier MD (Urology)  [facility] (Ophthalmology)  Jaspreet Nixon DPM (Podiatry)  Jamaal Benjamin MD (Nephrology)  Yeni Matta PA-C (Nephrology)  KACEY Pedersen as Nurse Practitioner (Endocrinology)    Review of Systems  Medical History Reviewed by provider this encounter:  Tobacco  Allergies  Meds  Problems  Med Hx  Surg Hx  Fam Hx       Annual Wellness Visit Questionnaire   Home is here for his Subsequent Wellness visit. Last Medicare Wellness visit information reviewed, patient interviewed and updates made to the record today.      Health Risk Assessment:   Patient rates overall health as good. Patient feels that their physical health rating is same. Patient is very satisfied with their life. Eyesight was rated as same. Hearing was rated as slightly worse. Patient feels that their emotional and mental health rating is same. Patients states they are never, rarely angry. Patient states they  are sometimes unusually tired/fatigued. Pain experienced in the last 7 days has been some. Patient's pain rating has been 4/10. Patient states that he has experienced no weight loss or gain in last 6 months. General arthritis pain    Depression Screening:   PHQ-2 Score: 0      Fall Risk Screening:   In the past year, patient has experienced: no history of falling in past year      Home Safety:  Patient does not have trouble with stairs inside or outside of their home. Patient has working smoke alarms and has working carbon monoxide detector. Home safety hazards include: none.     Nutrition:   Current diet is Diabetic.     Medications:   Patient is currently taking over-the-counter supplements. OTC medications include: see medication list. Patient is able to manage medications.     Activities of Daily Living (ADLs)/Instrumental Activities of Daily Living (IADLs):   Walk and transfer into and out of bed and chair?: Yes  Dress and groom yourself?: Yes    Bathe or shower yourself?: Yes    Feed yourself? Yes  Do your laundry/housekeeping?: Yes  Manage your money, pay your bills and track your expenses?: Yes  Make your own meals?: Yes    Do your own shopping?: Yes    Durable Medical Equipment Suppliers  [facility]    Previous Hospitalizations:   Any hospitalizations or ED visits within the last 12 months?: No      Advance Care Planning:   Living will: Yes    Durable POA for healthcare: Yes    Advanced directive: Yes    Advanced directive counseling given: Yes    ACP document given: Yes      Cognitive Screening:   Provider or family/friend/caregiver concerned regarding cognition?: No    PREVENTIVE SCREENINGS      Cardiovascular Screening:    General: History Lipid Disorder and Risks and Benefits Discussed    Due for: Lipid Panel      Diabetes Screening:     General: Screening Current      Colorectal Cancer Screening:     General: Screening Not Indicated      Prostate Cancer Screening:    General: Screening Not Indicated       Osteoporosis Screening:    General: Screening Not Indicated      Abdominal Aortic Aneurysm (AAA) Screening:    Risk factors include: tobacco use        General: Screening Not Indicated and History AAA      Lung Cancer Screening:     General: Screening Not Indicated      Hepatitis C Screening:    General: Screening Not Indicated    Screening, Brief Intervention, and Referral to Treatment (SBIRT)    Screening  Typical number of drinks in a day: 0  Typical number of drinks in a week: 0  Interpretation: Low risk drinking behavior.    AUDIT-C Screenin) How often did you have a drink containing alcohol in the past year? never  2) How many drinks did you have on a typical day when you were drinking in the past year? 0  3) How often did you have 6 or more drinks on one occasion in the past year? never    AUDIT-C Score: 0  Interpretation: Score 0-3 (male): Negative screen for alcohol misuse    Single Item Drug Screening:  How often have you used an illegal drug (including marijuana) or a prescription medication for non-medical reasons in the past year? never    Single Item Drug Screen Score: 0  Interpretation: Negative screen for possible drug use disorder    Brief Intervention  Alcohol & drug use screenings were reviewed. No concerns regarding substance use disorder identified.     Other Counseling Topics:   Car/seat belt/driving safety and calcium and vitamin D intake and regular weightbearing exercise.     Social Determinants of Health     Financial Resource Strain: Low Risk  (2023)    Overall Financial Resource Strain (CARDIA)     Difficulty of Paying Living Expenses: Not hard at all   Food Insecurity: No Food Insecurity (2024)    Hunger Vital Sign     Worried About Running Out of Food in the Last Year: Never true     Ran Out of Food in the Last Year: Never true   Transportation Needs: No Transportation Needs (2024)    PRAPARE - Transportation     Lack of Transportation (Medical): No     Lack of  "Transportation (Non-Medical): No   Housing Stability: Low Risk  (7/4/2024)    Housing Stability Vital Sign     Unable to Pay for Housing in the Last Year: No     Number of Times Moved in the Last Year: 0     Homeless in the Last Year: No   Utilities: Not At Risk (7/4/2024)    [facility] Utilities     Threatened with loss of utilities: No     No results found.    Objective     /52 (BP Location: Left arm, Patient Position: Sitting, Cuff Size: Large)   Pulse 72   Temp 97.7 °F (36.5 °C) (Temporal)   Resp 17   Ht 6' 4\" (1.93 m)   Wt 128 kg (282 lb)   SpO2 94%   BMI 34.33 kg/m²     Physical Exam  Vitals and nursing note reviewed.   Constitutional:       General: He is not in acute distress.     Appearance: Normal appearance.      Comments: Using motorized wheelchair   Cardiovascular:      Rate and Rhythm: Normal rate. Rhythm irregular.      Pulses: Normal pulses.      Heart sounds: Murmur heard.   Pulmonary:      Effort: Pulmonary effort is normal.      Breath sounds: Normal breath sounds.   Neurological:      General: No focal deficit present.      Mental Status: He is alert. Mental status is at baseline.   Psychiatric:         Mood and Affect: Mood normal.       Administrative Statements           "

## 2024-07-05 NOTE — PATIENT INSTRUCTIONS
Medicare Preventive Visit Patient Instructions  Thank you for completing your Welcome to Medicare Visit or Medicare Annual Wellness Visit today. Your next wellness visit will be due in one year (7/6/2025).  The screening/preventive services that you may require over the next 5-10 years are detailed below. Some tests may not apply to you based off risk factors and/or age. Screening tests ordered at today's visit but not completed yet may show as past due. Also, please note that scanned in results may not display below.  Preventive Screenings:  Service Recommendations Previous Testing/Comments   Colorectal Cancer Screening  Colonoscopy    Fecal Occult Blood Test (FOBT)/Fecal Immunochemical Test (FIT)  Fecal DNA/Cologuard Test  Flexible Sigmoidoscopy Age: 45-75 years old   Colonoscopy: every 10 years (May be performed more frequently if at higher risk)  OR  FOBT/FIT: every 1 year  OR  Cologuard: every 3 years  OR  Sigmoidoscopy: every 5 years  Screening may be recommended earlier than age 45 if at higher risk for colorectal cancer. Also, an individualized decision between you and your healthcare provider will decide whether screening between the ages of 76-85 would be appropriate. Colonoscopy: 07/20/2022  FOBT/FIT: Not on file  Cologuard: Not on file  Sigmoidoscopy: Not on file          Prostate Cancer Screening Individualized decision between patient and health care provider in men between ages of 55-69   Medicare will cover every 12 months beginning on the day after your 50th birthday PSA: 0.9 ng/mL     Screening Not Indicated     Hepatitis C Screening Once for adults born between 1945 and 1965  More frequently in patients at high risk for Hepatitis C Hep C Antibody: Not on file        Diabetes Screening 1-2 times per year if you're at risk for diabetes or have pre-diabetes Fasting glucose: 143 mg/dL (3/14/2024)  A1C: 7.8 % (3/14/2024)  Screening Not Indicated  History Diabetes   Cholesterol Screening Once every 5  years if you don't have a lipid disorder. May order more often based on risk factors. Lipid panel: 03/14/2024  Screening Not Indicated  History Lipid Disorder      Other Preventive Screenings Covered by Medicare:  Abdominal Aortic Aneurysm (AAA) Screening: covered once if your at risk. You're considered to be at risk if you have a family history of AAA or a male between the age of 65-75 who smoking at least 100 cigarettes in your lifetime.  Lung Cancer Screening: covers low dose CT scan once per year if you meet all of the following conditions: (1) Age 55-77; (2) No signs or symptoms of lung cancer; (3) Current smoker or have quit smoking within the last 15 years; (4) You have a tobacco smoking history of at least 20 pack years (packs per day x number of years you smoked); (5) You get a written order from a healthcare provider.  Glaucoma Screening: covered annually if you're considered high risk: (1) You have diabetes OR (2) Family history of glaucoma OR (3)  aged 50 and older OR (4)  American aged 65 and older  Osteoporosis Screening: covered every 2 years if you meet one of the following conditions: (1) Have a vertebral abnormality; (2) On glucocorticoid therapy for more than 3 months; (3) Have primary hyperparathyroidism; (4) On osteoporosis medications and need to assess response to drug therapy.  HIV Screening: covered annually if you're between the age of 15-65. Also covered annually if you are younger than 15 and older than 65 with risk factors for HIV infection. For pregnant patients, it is covered up to 3 times per pregnancy.    Immunizations:  Immunization Recommendations   Influenza Vaccine Annual influenza vaccination during flu season is recommended for all persons aged >= 6 months who do not have contraindications   Pneumococcal Vaccine   * Pneumococcal conjugate vaccine = PCV13 (Prevnar 13), PCV15 (Vaxneuvance), PCV20 (Prevnar 20)  * Pneumococcal polysaccharide vaccine = PPSV23  (Pneumovax) Adults 19-63 yo with certain risk factors or if 65+ yo  If never received any pneumonia vaccine: recommend Prevnar 20 (PCV20)  Give PCV20 if previously received 1 dose of PCV13 or PPSV23   Hepatitis B Vaccine 3 dose series if at intermediate or high risk (ex: diabetes, end stage renal disease, liver disease)   Respiratory syncytial virus (RSV) Vaccine - COVERED BY MEDICARE PART D  * RSVPreF3 (Arexvy) CDC recommends that adults 60 years of age and older may receive a single dose of RSV vaccine using shared clinical decision-making (SCDM)   Tetanus (Td) Vaccine - COST NOT COVERED BY MEDICARE PART B Following completion of primary series, a booster dose should be given every 10 years to maintain immunity against tetanus. Td may also be given as tetanus wound prophylaxis.   Tdap Vaccine - COST NOT COVERED BY MEDICARE PART B Recommended at least once for all adults. For pregnant patients, recommended with each pregnancy.   Shingles Vaccine (Shingrix) - COST NOT COVERED BY MEDICARE PART B  2 shot series recommended in those 19 years and older who have or will have weakened immune systems or those 50 years and older     Health Maintenance Due:  There are no preventive care reminders to display for this patient.  Immunizations Due:      Topic Date Due   • Hepatitis A Vaccine (1 of 2 - Risk 2-dose series) Never done   • COVID-19 Vaccine (4 - 2023-24 season) 09/01/2023   • Influenza Vaccine (1) 09/01/2024     Advance Directives   What are advance directives?  Advance directives are legal documents that state your wishes and plans for medical care. These plans are made ahead of time in case you lose your ability to make decisions for yourself. Advance directives can apply to any medical decision, such as the treatments you want, and if you want to donate organs.   What are the types of advance directives?  There are many types of advance directives, and each state has rules about how to use them. You may choose a  combination of any of the following:  Living will:  This is a written record of the treatment you want. You can also choose which treatments you do not want, which to limit, and which to stop at a certain time. This includes surgery, medicine, IV fluid, and tube feedings.   Durable power of  for healthcare (DPAHC):  This is a written record that states who you want to make healthcare choices for you when you are unable to make them for yourself. This person, called a proxy, is usually a family member or a friend. You may choose more than 1 proxy.  Do not resuscitate (DNR) order:  A DNR order is used in case your heart stops beating or you stop breathing. It is a request not to have certain forms of treatment, such as CPR. A DNR order may be included in other types of advance directives.  Medical directive:  This covers the care that you want if you are in a coma, near death, or unable to make decisions for yourself. You can list the treatments you want for each condition. Treatment may include pain medicine, surgery, blood transfusions, dialysis, IV or tube feedings, and a ventilator (breathing machine).  Values history:  This document has questions about your views, beliefs, and how you feel and think about life. This information can help others choose the care that you would choose.  Why are advance directives important?  An advance directive helps you control your care. Although spoken wishes may be used, it is better to have your wishes written down. Spoken wishes can be misunderstood, or not followed. Treatments may be given even if you do not want them. An advance directive may make it easier for your family to make difficult choices about your care.   Weight Management   Why it is important to manage your weight:  Being overweight increases your risk of health conditions such as heart disease, high blood pressure, type 2 diabetes, and certain types of cancer. It can also increase your risk for  osteoarthritis, sleep apnea, and other respiratory problems. Aim for a slow, steady weight loss. Even a small amount of weight loss can lower your risk of health problems.  How to lose weight safely:  A safe and healthy way to lose weight is to eat fewer calories and get regular exercise. You can lose up about 1 pound a week by decreasing the number of calories you eat by 500 calories each day.   Healthy meal plan for weight management:  A healthy meal plan includes a variety of foods, contains fewer calories, and helps you stay healthy. A healthy meal plan includes the following:  Eat whole-grain foods more often.  A healthy meal plan should contain fiber. Fiber is the part of grains, fruits, and vegetables that is not broken down by your body. Whole-grain foods are healthy and provide extra fiber in your diet. Some examples of whole-grain foods are whole-wheat breads and pastas, oatmeal, brown rice, and bulgur.  Eat a variety of vegetables every day.  Include dark, leafy greens such as spinach, kale, trinity greens, and mustard greens. Eat yellow and orange vegetables such as carrots, sweet potatoes, and winter squash.   Eat a variety of fruits every day.  Choose fresh or canned fruit (canned in its own juice or light syrup) instead of juice. Fruit juice has very little or no fiber.  Eat low-fat dairy foods.  Drink fat-free (skim) milk or 1% milk. Eat fat-free yogurt and low-fat cottage cheese. Try low-fat cheeses such as mozzarella and other reduced-fat cheeses.  Choose meat and other protein foods that are low in fat.  Choose beans or other legumes such as split peas or lentils. Choose fish, skinless poultry (chicken or turkey), or lean cuts of red meat (beef or pork). Before you cook meat or poultry, cut off any visible fat.   Use less fat and oil.  Try baking foods instead of frying them. Add less fat, such as margarine, sour cream, regular salad dressing and mayonnaise to foods. Eat fewer high-fat foods. Some  examples of high-fat foods include french fries, doughnuts, ice cream, and cakes.  Eat fewer sweets.  Limit foods and drinks that are high in sugar. This includes candy, cookies, regular soda, and sweetened drinks.  Exercise:  Exercise at least 30 minutes per day on most days of the week. Some examples of exercise include walking, biking, dancing, and swimming. You can also fit in more physical activity by taking the stairs instead of the elevator or parking farther away from stores. Ask your healthcare provider about the best exercise plan for you.      © Copyright [employer] 2018 Information is for End User's use only and may not be sold, redistributed or otherwise used for commercial purposes. All illustrations and images included in CareNotes® are the copyrighted property of [employer].A[employer]M., Inc. or [employer]

## 2024-07-08 DIAGNOSIS — I25.10 CORONARY ARTERY DISEASE DUE TO LIPID RICH PLAQUE: ICD-10-CM

## 2024-07-08 DIAGNOSIS — I25.83 CORONARY ARTERY DISEASE DUE TO LIPID RICH PLAQUE: ICD-10-CM

## 2024-07-09 RX ORDER — ATORVASTATIN CALCIUM 40 MG/1
40 TABLET, FILM COATED ORAL DAILY
Qty: 90 TABLET | Refills: 1 | Status: SHIPPED | OUTPATIENT
Start: 2024-07-09 | End: 2025-01-05

## 2024-07-11 ENCOUNTER — LAB (OUTPATIENT)
Dept: LAB | Facility: AMBULARY SURGERY CENTER | Age: 79
End: 2024-07-11
Payer: MEDICARE

## 2024-07-11 ENCOUNTER — TELEPHONE (OUTPATIENT)
Dept: HEMATOLOGY ONCOLOGY | Facility: CLINIC | Age: 79
End: 2024-07-11

## 2024-07-11 DIAGNOSIS — I10 PRIMARY HYPERTENSION: ICD-10-CM

## 2024-07-11 DIAGNOSIS — E11.65 TYPE 2 DIABETES MELLITUS WITH HYPERGLYCEMIA, WITH LONG-TERM CURRENT USE OF INSULIN (HCC): ICD-10-CM

## 2024-07-11 DIAGNOSIS — Z79.4 TYPE 2 DIABETES MELLITUS WITH HYPERGLYCEMIA, WITH LONG-TERM CURRENT USE OF INSULIN (HCC): ICD-10-CM

## 2024-07-11 LAB
ALBUMIN SERPL BCG-MCNC: 3.7 G/DL (ref 3.5–5)
ALP SERPL-CCNC: 78 U/L (ref 34–104)
ALT SERPL W P-5'-P-CCNC: 12 U/L (ref 7–52)
ANION GAP SERPL CALCULATED.3IONS-SCNC: 10 MMOL/L (ref 4–13)
AST SERPL W P-5'-P-CCNC: 13 U/L (ref 13–39)
BASOPHILS # BLD AUTO: 0.03 THOUSANDS/ÂΜL (ref 0–0.1)
BASOPHILS NFR BLD AUTO: 0 % (ref 0–1)
BILIRUB SERPL-MCNC: 0.33 MG/DL (ref 0.2–1)
BUN SERPL-MCNC: 37 MG/DL (ref 5–25)
CALCIUM SERPL-MCNC: 9.4 MG/DL (ref 8.4–10.2)
CHLORIDE SERPL-SCNC: 100 MMOL/L (ref 96–108)
CO2 SERPL-SCNC: 30 MMOL/L (ref 21–32)
CREAT SERPL-MCNC: 1.23 MG/DL (ref 0.6–1.3)
EOSINOPHIL # BLD AUTO: 0.09 THOUSAND/ÂΜL (ref 0–0.61)
EOSINOPHIL NFR BLD AUTO: 1 % (ref 0–6)
ERYTHROCYTE [DISTWIDTH] IN BLOOD BY AUTOMATED COUNT: 16.5 % (ref 11.6–15.1)
EST. AVERAGE GLUCOSE BLD GHB EST-MCNC: 166 MG/DL
GFR SERPL CREATININE-BSD FRML MDRD: 55 ML/MIN/1.73SQ M
GLUCOSE P FAST SERPL-MCNC: 141 MG/DL (ref 65–99)
HBA1C MFR BLD: 7.4 %
HCT VFR BLD AUTO: 37.9 % (ref 36.5–49.3)
HGB BLD-MCNC: 11.1 G/DL (ref 12–17)
IMM GRANULOCYTES # BLD AUTO: 0.04 THOUSAND/UL (ref 0–0.2)
IMM GRANULOCYTES NFR BLD AUTO: 1 % (ref 0–2)
LYMPHOCYTES # BLD AUTO: 1.62 THOUSANDS/ÂΜL (ref 0.6–4.47)
LYMPHOCYTES NFR BLD AUTO: 22 % (ref 14–44)
MCH RBC QN AUTO: 25.8 PG (ref 26.8–34.3)
MCHC RBC AUTO-ENTMCNC: 29.3 G/DL (ref 31.4–37.4)
MCV RBC AUTO: 88 FL (ref 82–98)
MONOCYTES # BLD AUTO: 0.65 THOUSAND/ÂΜL (ref 0.17–1.22)
MONOCYTES NFR BLD AUTO: 9 % (ref 4–12)
NEUTROPHILS # BLD AUTO: 5.1 THOUSANDS/ÂΜL (ref 1.85–7.62)
NEUTS SEG NFR BLD AUTO: 67 % (ref 43–75)
NRBC BLD AUTO-RTO: 0 /100 WBCS
PLATELET # BLD AUTO: 191 THOUSANDS/UL (ref 149–390)
PMV BLD AUTO: 10.9 FL (ref 8.9–12.7)
POTASSIUM SERPL-SCNC: 4.1 MMOL/L (ref 3.5–5.3)
PROT SERPL-MCNC: 7.7 G/DL (ref 6.4–8.4)
RBC # BLD AUTO: 4.31 MILLION/UL (ref 3.88–5.62)
SODIUM SERPL-SCNC: 140 MMOL/L (ref 135–147)
WBC # BLD AUTO: 7.53 THOUSAND/UL (ref 4.31–10.16)

## 2024-07-11 PROCEDURE — 36415 COLL VENOUS BLD VENIPUNCTURE: CPT

## 2024-07-11 PROCEDURE — 83036 HEMOGLOBIN GLYCOSYLATED A1C: CPT

## 2024-07-11 PROCEDURE — 80053 COMPREHEN METABOLIC PANEL: CPT

## 2024-07-11 PROCEDURE — 85025 COMPLETE CBC W/AUTO DIFF WBC: CPT

## 2024-07-11 NOTE — TELEPHONE ENCOUNTER
Maynor returned the call from [employer], and confirmed that he WILL take the appointment for 7/12/24 at 2:00 PM.    I tried to schedule it but it was all blocked out & wouldn't allow me to.

## 2024-07-11 NOTE — TELEPHONE ENCOUNTER
Called and LM for patient regarding his appt. Today with Dr. Spears. Dr. Spears had a medical emergency and will not be in the office today. Let patient know that an appt. On 7/12/24 at 2:00 PM was available and that he should call the [facility]cology [facility] back to reschedule.

## 2024-07-12 ENCOUNTER — OFFICE VISIT (OUTPATIENT)
Dept: HEMATOLOGY ONCOLOGY | Facility: CLINIC | Age: 79
End: 2024-07-12
Payer: MEDICARE

## 2024-07-12 VITALS
OXYGEN SATURATION: 94 % | HEIGHT: 76 IN | RESPIRATION RATE: 17 BRPM | HEART RATE: 60 BPM | TEMPERATURE: 98.2 F | SYSTOLIC BLOOD PRESSURE: 114 MMHG | BODY MASS INDEX: 34.33 KG/M2 | DIASTOLIC BLOOD PRESSURE: 50 MMHG

## 2024-07-12 DIAGNOSIS — C67.9 MALIGNANT NEOPLASM OF URINARY BLADDER, UNSPECIFIED SITE (HCC): Primary | ICD-10-CM

## 2024-07-12 DIAGNOSIS — C44.42 SQUAMOUS CELL CARCINOMA OF SCALP: ICD-10-CM

## 2024-07-12 PROCEDURE — 99204 OFFICE O/P NEW MOD 45 MIN: CPT | Performed by: INTERNAL MEDICINE

## 2024-07-12 NOTE — LETTER
July 14, 2024     KACEY Rogers  [address]  [address]  [facility] [address]    Patient: Home Walters   YOB: 1945   Date of Visit: 7/12/2024       Dear Dr. Fox:    Thank you for referring Home Walters to me for evaluation. Below are my notes for this consultation.    If you have questions, please do not hesitate to call me. I look forward to following your patient along with you.         Sincerely,        Sagrario Spears MD        CC: MD Maryjane Laguna MD Melissa A Wilson, MD  7/14/2024  9:02 PM  Sign when Signing Visit  [facility] [place]  [address] [address]  [phone]  [phone]     Date of Visit: 7/12/2024  Name: Home Walters   YOB: 1945        Subjective    VISIT DIAGNOSIS:  Diagnoses and all orders for this visit:    Malignant neoplasm of urinary bladder, unspecified site (HCC)    Squamous cell carcinoma of scalp  -     Ambulatory referral to Hematology / Oncology  -     CBC and differential; Future  -     Comprehensive metabolic panel; Future          Cancer Staging   Stage 2 (T2N0M0)          HISTORY OF PRESENT ILLNESS: Home Walters is a 79 y.o. male  who has new diagnosis of cutaneous squamous cell carcinoma on the scalp seen in consultation at the request of dermatology.  History is obtained from the patient, his wife, his son, and review of records.    Patient states that he has had a spot on the top of his head for a number of years.  He thinks that approximately 2 years ago it started to grow.  It would grow upwards.  He states that it got scabbed over at 1 point and he would pick it and then it would get better and resolve and then grow again.  He states that most recently over the past couple months the last time he picked it grew up and continued growing.  It looks like a scab at 1 point though he thinks it did turn black.  It was bleeding when he would pick it.  It did get  tender to touch as well towards the end.  He has not seen dermatology routinely.    He saw plastic and reconstructive surgery who biopsied the lesion on 5/2/2024.  Pathology demonstrates from a central scalp punch biopsy squamous cell carcinoma.  According to notes from plastic and reconstructive surgery on 5/2/2024 the lesion measured 1.6 x 1.5 cm was keratotic and firm and that it was raised approximately 1 cm from the scalp surface.    After receiving results of biopsy, he was referred to Mohs.  He underwent Mohs procedure on 6/4/2024 with Dr. Rodríguez.  In her note she describes the lesion as being 3 x 2.5 cm.  He is recovering from this procedure at this time.  He can get it covered with Vaseline.    Unclear exactly when he is seeing dermatology again.    Of note, he was diagnosed with bladder cancer in 2010 and had BCG treatment.  He has been followed by urology and has been cleared at this time.    He does describe having increased sun exposure when he was younger and as an adult.  Would go to the beach every year as a kid although as an adult he would wear a hat more often.  He also worked on a farm.  He does describe having increased number of sunburns.  He does describe having a few blistering sunburns.  He denies tanning bed use.    No skin cancer history in the family.  No melanoma history in the family.     He had blond hair when he was younger, has blue eyes and is [place] and [place] and descent.    He he quit smoking in 2009 and smoked about half a pack per day for 20 years.  He had episodes of quitting in between but completely quit 20 years ago.  Denies alcohol use.  Denies any drug use.      He is up-to-date on colonoscopy and PSA checks.    Denies any autoimmune disorders or immunosuppressive use.      REVIEW OF SYSTEMS:  Review of Systems   Constitutional:  Negative for appetite change, fatigue, fever and unexpected weight change.   HENT:   Negative for lump/mass, trouble swallowing and voice change.     Eyes:  Negative for icterus.   Respiratory:  Negative for cough, shortness of breath and wheezing.    Cardiovascular:  Negative for leg swelling.   Gastrointestinal:  Negative for abdominal pain, constipation, diarrhea, nausea and vomiting.   Genitourinary:  Negative for difficulty urinating and hematuria.    Musculoskeletal:  Negative for arthralgias, gait problem and myalgias.   Skin:  Positive for wound (healing Mohs site on top of scalp). Negative for itching and rash.        No new, changing, or concerning lesions.   Neurological:  Negative for extremity weakness, gait problem, headaches, light-headedness and numbness.   Hematological:  Negative for adenopathy.        MEDICATIONS:    Current Outpatient Medications:   •  apixaban (Eliquis) 5 mg, TAKE ONE TABLET BY MOUTH TWICE DAILY, Disp: 180 tablet, Rfl: 1  •  atorvastatin (LIPITOR) 40 mg tablet, Take 1 tablet (40 mg total) by mouth daily, Disp: 90 tablet, Rfl: 1  •  cholecalciferol (VITAMIN D3) 1,000 units tablet, Take 1,000 Units by mouth daily, Disp: , Rfl:   •  Continuous Blood Gluc Sensor (FreeStyle Peggy 2 Sensor) MISC, Change it every 14 days, Disp: 1 each, Rfl: 0  •  dicyclomine (BENTYL) 10 mg capsule, Take 1 capsule (10 mg total) by mouth 2 (two) times a day as needed (abdominal cramping), Disp: 60 capsule, Rfl: 3  •  digoxin (LANOXIN) 0.125 mg tablet, TAKE ONE TABLET BY MOUTH ONCE DAILY, Disp: 90 tablet, Rfl: 1  •  famotidine (PEPCID) 40 MG tablet, Take 1 tablet (40 mg total) by mouth daily at bedtime, Disp: 30 tablet, Rfl: 5  •  insulin degludec (Tresiba FlexTouch) 200 units/mL CONCENTRATED U-200 injection pen, Inject 95 units under the skin once daily, Disp: 15 mL, Rfl: 5  •  Jardiance 25 MG TABS, TAKE ONE TABLET BY MOUTH DAILY IN MORNING, Disp: 90 tablet, Rfl: 1  •  metoprolol succinate (TOPROL-XL) 50 mg 24 hr tablet, Take 1 tablet (50 mg total) by mouth daily, Disp: 90 tablet, Rfl: 3  •  NovoLOG FlexPen 100 units/mL injection pen, Inject 20-25  Units under the skin 3 (three) times a day with meals, Disp: 30 mL, Rfl: 6  •  omeprazole (PriLOSEC) 20 mg delayed release capsule, TAKE ONE CAPSULE BY MOUTH TWICE DAILY, Disp: 180 capsule, Rfl: 1  •  potassium citrate (UROCIT-K) 10 mEq, TAKE ONE TABLET BY MOUTH TWICE DAILY, Disp: 180 tablet, Rfl: 1  •  torsemide (DEMADEX) 20 mg tablet, take two tablets by mouth in the morning and one tablet  at 4pm, Disp: 120 tablet, Rfl: 11  •  umeclidinium-vilanterol (Anoro Ellipta) 62.5-25 mcg/actuation inhaler, INHALE ONE PUFF BY MOUTH ONCE DAILY, Disp: 60 each, Rfl: 2  •  Unifine Pentips 31G X 8 MM MISC, inject under the skin daily use as directed, Disp: 100 each, Rfl: 0  •  ergocalciferol (VITAMIN D2) 50,000 units, Take 1 capsule (50,000 Units total) by mouth once a week for 8 doses, Disp: 8 capsule, Rfl: 0     ALLERGIES:  Allergies   Allergen Reactions   • Ciprofloxacin Abdominal Pain, Diarrhea, GI Bleeding and GI Intolerance   • Diltiazem Abdominal Pain   • Metronidazole Abdominal Pain, Diarrhea, GI Bleeding and GI Intolerance        ACTIVE PROBLEMS:  Patient Active Problem List   Diagnosis   • Hypertension   • Mixed hyperlipidemia   • Type 2 diabetes mellitus with hyperglycemia, with long-term current use of insulin ([facility])   • Chronic low back pain   • CAD (coronary artery disease)   • Malignant neoplasm of urinary bladder ([facility])   • Arthritis   • AAA (abdominal aortic aneurysm) ([facility])   • Decreased pedal pulses   • Chronic pain of both knees   • Actinic keratosis of right temple   • Actinic keratosis of scalp   • Morbid obesity ([facility])   • Adrenal nodule ([facility])   • LUCIA (obstructive sleep apnea)   • Embolism and thrombosis of arteries of the lower extremities ([facility])   • Bilateral edema of lower extremity   • Chronic diastolic CHF (congestive heart failure) ([facility])   • Left upper lobe pulmonary nodule   • Hypercalcemia   • Nephrolithiasis   • Hydroureter on left   • Venous stasis dermatitis of both lower extremities   • Persistent  proteinuria   • Hypokalemia   • Ureteral calculi   • Benign prostatic hyperplasia without lower urinary tract symptoms   • PAF (paroxysmal atrial fibrillation) (HCC)   • PAD (peripheral artery disease) (HCC)   • Gastroesophageal reflux disease   • Vitamin D deficiency   • Cirrhosis of liver without ascites (HCC)   • Chronic acquired lymphedema   • Pulmonary hypertension (HCC)   • Chronic obstructive pulmonary disease (HCC)   • Stage 3a chronic kidney disease (HCC)   • Current use of insulin (HCC)   • Squamous cell carcinoma of scalp          PAST MEDICAL HISTORY:   Past Medical History:   Diagnosis Date   • A-fib (HCC)    • AAA (abdominal aortic aneurysm) (HCC)    • Actinic keratosis of right temple 07/31/2020   • Actinic keratosis of scalp 07/31/2020   • Acute respiratory failure with hypoxia (HCC) 03/25/2021   • Allergic 1960   • Arthritis    • Lay's esophagus    • Bladder cancer (HCC)    • Blister of left leg 04/28/2021   • Blister of right leg 05/26/2021   • CAD (coronary artery disease)    • Cancer (HCC) 02/2010    Bladder   • CHF (congestive heart failure) (HCC)    • Chronic low back pain    • Chronic pain of both knees 07/31/2020   • Colitis 12/04/2020   • CPAP (continuous positive airway pressure) dependence    • Diabetes (HCC)    • Diabetes mellitus (HCC) 10/1993   • Excessive gas 12/03/2020   • Heart murmur    • History of chemotherapy    • Hydroureter on left 04/28/2021   • Hyperlipemia    • Hypertension    • Immunization deficiency 10/30/2020    Hep a nonimmune   • Kidney stone    • Left lower quadrant abdominal pain 12/03/2020   • Mass of right adrenal gland (HCC) 12/04/2020    4.1 cm   • Myocardial infarction (HCC)    • 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   • Obesity 2000   • LUCIA (obstructive sleep apnea) 01/29/2021   • Pressure ulcer of right leg, stage 1 05/26/2021   • Squamous cell skin cancer 05/02/2024    central scalp, mohs   • Urinary tract infection with hematuria 05/03/2021    u cx  2021=pseudomonas R to bactrim and cefdinir, S to cipro   • Venous stasis dermatitis of both lower extremities 2021        PAST SURGICAL HISTORY:  Past Surgical History:   Procedure Laterality Date   • BACK SURGERY     • BLADDER SURGERY     • CARDIAC CATHETERIZATION  2016   • CARDIAC CATHETERIZATION N/A 2023    Procedure: Cardiac RHC;  Surgeon: Jose Carlos Shea MD;  [facility];  Service: Cardiology   • CATARACT EXTRACTION, BILATERAL     • COLONOSCOPY  2019    next  [place]   • CORONARY ARTERY BYPASS GRAFT  2016    Quadruple per Rita   • EGD  2017   • EYE SURGERY  2016    Cataracts   • JOINT REPLACEMENT  [id number]    Hip and shoulder   • KIDNEY STONE SURGERY     • MOHS SURGERY  2024    [facility] central scalp, Dr Rodríguez   • MS CYSTO BLADDER W/URETERAL CATHETERIZATION Left 2021    Procedure: CYSTOSCOPY  WITH INSERTION STENT URETERAL;  Surgeon: Siddhartha Leslie MD;  Location: [facility];  Service: Urology   • MS CYSTO/URETERO W/LITHOTRIPSY &INDWELL STENT INSRT Left 2021    Procedure: CYSTOSCOPY URETEROSCOPY WITH LITHOTRIPSY HOLMIUM LASER, AND INSERTION STENT URETERAL/EXCHANGE;  Surgeon: Siddhartha Leslie MD;  Location: [facility];  Service: Urology   • TOTAL HIP ARTHROPLASTY Right 2012   • TOTAL SHOULDER REPLACEMENT Right 2013   • VASECTOMY          SOCIAL HISTORY:  Social History     Socioeconomic History   • Marital status: /Civil Union     Spouse name: Gabriela   • Number of children: 4   • Years of education: None   • Highest education level: 12th grade   Occupational History   • None   Tobacco Use   • Smoking status: Former     Current packs/day: 0.00     Average packs/day: 0.3 packs/day for 44.0 years (11.0 ttl pk-yrs)     Types: Cigarettes     Start date: 1965     Quit date: 2010     Years since quittin.5     Passive exposure: Past   • Smokeless tobacco: Never   • Tobacco comments:     Quit several times for up to 6 years.   Vaping Use    • Vaping status: Never Used   Substance and Sexual Activity   • Alcohol use: Not Currently     Comment: No use   • Drug use: Never     Comment: No use   • Sexual activity: Not Currently     Partners: Female   Other Topics Concern   • None   Social History Narrative    · Most recent tobacco use screenin2020      · Do you currently or have you served in the [employer]:   No      · Were you activated, into active duty, as a member of the [employer] or as a Reservist:   No      · Live alone or with others:   with others        · Caffeine intake:   Occasional      · Illicit drugs:   No      · Diet:   Regular      · Exercise level:   Moderate      · Advance directive:   Yes      · Marital status:         · General stress level:   Medium      · Single or multi-level home/work:   multi level home      · Guns present in home:   No      · Seat belts used routinely:   Yes      · Sunscreen used routinely:   Yes      · Smoke alarm in home:   Yes      Social Determinants of Health     Financial Resource Strain: Low Risk  (2023)    Overall Financial Resource Strain (CARDIA)    • Difficulty of Paying Living Expenses: Not hard at all   Food Insecurity: No Food Insecurity (2024)    Hunger Vital Sign    • Worried About Running Out of Food in the Last Year: Never true    • Ran Out of Food in the Last Year: Never true   Transportation Needs: No Transportation Needs (2024)    PRAPARE - Transportation    • Lack of Transportation (Medical): No    • Lack of Transportation (Non-Medical): No   Physical Activity: Insufficiently Active (2021)    Exercise Vital Sign    • Days of Exercise per Week: 4 days    • Minutes of Exercise per Session: 10 min   Stress: No Stress Concern Present (2021)    [employer] [place] [employer] - Occupational Stress Questionnaire    • Feeling of Stress : Not at all   Social Connections: Moderately Isolated (2021)    Social Connection and Isolation  "Panel [NHANES]    • Frequency of Communication with Friends and Family: More than three times a week    • Frequency of Social Gatherings with Friends and Family: Twice a week    • Attends Yarsani Services: Never    • Active Member of Clubs or Organizations: No    • Attends Club or Organization Meetings: Never    • Marital Status:    Intimate Partner Violence: Not At Risk (4/13/2021)    Humiliation, Afraid, Rape, and Kick questionnaire    • Fear of Current or Ex-Partner: No    • Emotionally Abused: No    • Physically Abused: No    • Sexually Abused: No   Housing Stability: Low Risk  (7/4/2024)    Housing Stability Vital Sign    • Unable to Pay for Housing in the Last Year: No    • Number of Times Moved in the Last Year: 0    • Homeless in the Last Year: No        FAMILY HISTORY:  Family History   Problem Relation Age of Onset   • Heart disease Father    • Arthritis Father    • Heart attack Father    • Alzheimer's disease Mother    • Dementia Mother    • Diabetes type II Sister            Objective    PHYSICAL EXAMINATION:   Blood pressure 114/50, pulse 60, temperature 98.2 °F (36.8 °C), temperature source Temporal, resp. rate 17, height 6' 4\" (1.93 m), SpO2 94%.     Pain Score: 0-No pain      ECOG Performance Status      Flowsheet Row Most Recent Value   ECOG Performance Status 1 - Restricted in physically strenuous activity but ambulatory and able to carry out work of a light or sedentary nature, e.g., light house work, office work                Physical Exam  Constitutional:       General: He is not in acute distress.     Appearance: Normal appearance. He is not ill-appearing or toxic-appearing.   HENT:      Head: Normocephalic and atraumatic.      Right Ear: External ear normal.      Left Ear: External ear normal.      Nose: Nose normal.      Mouth/Throat:      Mouth: Mucous membranes are moist.      Pharynx: Oropharynx is clear.   Eyes:      General: No scleral icterus.        Right eye: No discharge.   "       Left eye: No discharge.      Conjunctiva/sclera: Conjunctivae normal.   Cardiovascular:      Rate and Rhythm: Normal rate and regular rhythm.      Pulses: Normal pulses.      Heart sounds: No murmur heard.     No friction rub. No gallop.   Pulmonary:      Effort: Pulmonary effort is normal. No respiratory distress.      Breath sounds: Normal breath sounds. No wheezing or rales.   Abdominal:      General: Bowel sounds are normal. There is no distension.      Palpations: There is no mass.      Tenderness: There is no abdominal tenderness. There is no rebound.   Musculoskeletal:         General: Swelling present. No tenderness.      Cervical back: Normal range of motion. No rigidity.      Right lower leg: Edema present.      Left lower leg: Edema present.      Comments: In wheelchair   Lymphadenopathy:      Head:      Right side of head: No submandibular, preauricular or posterior auricular adenopathy.      Left side of head: No submandibular, preauricular or posterior auricular adenopathy.      Cervical: No cervical adenopathy.      Right cervical: No superficial or posterior cervical adenopathy.     Left cervical: No superficial or posterior cervical adenopathy.      Upper Body:      Right upper body: No supraclavicular or axillary adenopathy.      Left upper body: No supraclavicular or axillary adenopathy.   Skin:     General: Skin is warm.      Coloration: Skin is not jaundiced.      Findings: No lesion or rash.      Comments: Healing Mohs site on top of scalp - edges closed nicely with exposed granulation tissue - see picture in media   Neurological:      General: No focal deficit present.      Mental Status: He is alert and oriented to person, place, and time.      Cranial Nerves: No cranial nerve deficit.      Gait: Gait abnormal (in wheelchair).   Psychiatric:         Mood and Affect: Mood normal.         Behavior: Behavior normal.         Thought Content: Thought content normal.         Judgment:  Judgment normal.         I reviewed lab data in the chart.    WBC   Date Value Ref Range Status   07/11/2024 7.53 4.31 - 10.16 Thousand/uL Final   11/23/2023 8.41 4.31 - 10.16 Thousand/uL Final   06/23/2023 9.05 4.31 - 10.16 Thousand/uL Final     Hemoglobin   Date Value Ref Range Status   07/11/2024 11.1 (L) 12.0 - 17.0 g/dL Final   11/23/2023 11.5 (L) 12.0 - 17.0 g/dL Final   06/23/2023 11.1 (L) 12.0 - 17.0 g/dL Final     Platelets   Date Value Ref Range Status   07/11/2024 191 149 - 390 Thousands/uL Final   11/23/2023 202 149 - 390 Thousands/uL Final   06/23/2023 273 149 - 390 Thousands/uL Final     MCV   Date Value Ref Range Status   07/11/2024 88 82 - 98 fL Final   11/23/2023 85 82 - 98 fL Final   06/23/2023 85 82 - 98 fL Final      Potassium   Date Value Ref Range Status   07/11/2024 4.1 3.5 - 5.3 mmol/L Final   03/14/2024 4.2 3.5 - 5.3 mmol/L Final   01/08/2024 3.9 3.5 - 5.3 mmol/L Final     Chloride   Date Value Ref Range Status   07/11/2024 100 96 - 108 mmol/L Final   03/14/2024 98 96 - 108 mmol/L Final   01/08/2024 98 96 - 108 mmol/L Final     CO2   Date Value Ref Range Status   07/11/2024 30 21 - 32 mmol/L Final   03/14/2024 32 21 - 32 mmol/L Final   01/08/2024 32 21 - 32 mmol/L Final     CO2, i-STAT   Date Value Ref Range Status   01/14/2023 >45 (H) 21 - 32 mmol/L Final   04/22/2021 30 21 - 32 mmol/L Final     BUN   Date Value Ref Range Status   07/11/2024 37 (H) 5 - 25 mg/dL Final   03/14/2024 42 (H) 5 - 25 mg/dL Final   01/08/2024 32 (H) 5 - 25 mg/dL Final     Creatinine   Date Value Ref Range Status   07/11/2024 1.23 0.60 - 1.30 mg/dL Final     Comment:     Standardized to IDMS reference method   03/14/2024 1.40 (H) 0.60 - 1.30 mg/dL Final     Comment:     Standardized to IDMS reference method   01/08/2024 1.11 0.60 - 1.30 mg/dL Final     Comment:     Standardized to IDMS reference method     Glucose   Date Value Ref Range Status   01/08/2024 236 (H) 65 - 140 mg/dL Final     Comment:     If the patient  is fasting, the ADA then defines impaired fasting glucose as > 100 mg/dL and diabetes as > or equal to 123 mg/dL.   12/07/2023 160 (H) 65 - 140 mg/dL Final     Comment:     If the patient is fasting, the ADA then defines impaired fasting glucose as > 100 mg/dL and diabetes as > or equal to 123 mg/dL.   11/23/2023 121 65 - 140 mg/dL Final     Comment:     If the patient is fasting, the ADA then defines impaired fasting glucose as > 100 mg/dL and diabetes as > or equal to 123 mg/dL.     Calcium   Date Value Ref Range Status   07/11/2024 9.4 8.4 - 10.2 mg/dL Final   03/14/2024 9.6 8.4 - 10.2 mg/dL Final   01/08/2024 9.5 8.4 - 10.2 mg/dL Final     Albumin   Date Value Ref Range Status   07/11/2024 3.7 3.5 - 5.0 g/dL Final   03/14/2024 3.9 3.5 - 5.0 g/dL Final   01/08/2024 3.8 3.5 - 5.0 g/dL Final     Total Bilirubin   Date Value Ref Range Status   07/11/2024 0.33 0.20 - 1.00 mg/dL Final     Comment:     Use of this assay is not recommended for patients undergoing treatment with eltrombopag due to the potential for falsely elevated results.  N-acetyl-p-benzoquinone imine (metabolite of Acetaminophen) will generate erroneously low results in samples for patients that have taken an overdose of Acetaminophen.   03/14/2024 0.38 0.20 - 1.00 mg/dL Final     Comment:     Use of this assay is not recommended for patients undergoing treatment with eltrombopag due to the potential for falsely elevated results.  N-acetyl-p-benzoquinone imine (metabolite of Acetaminophen) will generate erroneously low results in samples for patients that have taken an overdose of Acetaminophen.   12/07/2023 0.29 0.20 - 1.00 mg/dL Final     Comment:     Use of this assay is not recommended for patients undergoing treatment with eltrombopag due to the potential for falsely elevated results.  N-acetyl-p-benzoquinone imine (metabolite of Acetaminophen) will generate erroneously low results in samples for patients that have taken an overdose of  "Acetaminophen.     Alkaline Phosphatase   Date Value Ref Range Status   07/11/2024 78 34 - 104 U/L Final   03/14/2024 85 34 - 104 U/L Final   12/07/2023 100 34 - 104 U/L Final     AST   Date Value Ref Range Status   07/11/2024 13 13 - 39 U/L Final   03/14/2024 14 13 - 39 U/L Final   12/07/2023 15 13 - 39 U/L Final     ALT   Date Value Ref Range Status   07/11/2024 12 7 - 52 U/L Final     Comment:     Specimen collection should occur prior to Sulfasalazine administration due to the potential for falsely depressed results.    03/14/2024 12 7 - 52 U/L Final     Comment:     Specimen collection should occur prior to Sulfasalazine administration due to the potential for falsely depressed results.    12/07/2023 13 7 - 52 U/L Final     Comment:     Specimen collection should occur prior to Sulfasalazine administration due to the potential for falsely depressed results.       No results found for: \"LDH\"  No results found for: \"TSH\"  No results found for: \"D5YFKQB\"   Free T4   Date Value Ref Range Status   05/11/2023 1.02 0.76 - 1.46 ng/dL Final     Comment:     Specimen collection should occur prior to Sulfasalazine administration due to the potential for falsely elevated results.         RECENT IMAGING:  No results found.          Assessment/Plan  Mr. Walters is a 79 yr male with stage II cutaneous squamous cell carcinoma of the scalp here for disease management discussion.    1. Squamous cell carcinoma of scalp  I had an extensive discussion with the patient and his family regarding his  diagnosis, prognosis, and recommendations for further management.  We reviewed his SCC history, current findings, pathology and imaging tests.    Discussed that although he lesion is on his scalp which is a high risk location as well as the size, there are no other additional high risk features.  I did discuss that sometimes we recommend radiation to prevent recurrence, but I am not sure it is warranted at this time.  I did discuss with he " and his family, however, that he will need close follow-up with dermatology as well as myself to continue to monitor for any recurrence.  Also discussed looking for any new, changing, concerning skin lesions or lymphadenopathy.  Danica to continue to monitor the site on his scalp at the scar as well as other skin lesions.  Discussed that they should follow with dermatology closely.    I will reach out to Dr. Rodríguez to discuss the healing lesion on his scalp at this time to ensure healing is appropriate and does not need to be seen by dermatology sooner.    Discussed that they should follow-up with me as well in addition to dermatology.  I did discuss that at this time we will have him follow-up with me in 3 months for close monitoring and evaluation.  I did order for blood work to be done at that time.      We discussed the importance of regular cutaneous self examinations and reviewed the features of lesions that could be concerning for skin cancer. I did explain that he  is at risk for developing another primary skin cancer.  We also discussed avoidance of unnecessary sun exposure and use of sun protective clothing and sunscreen.  I also recommended routine follow up and skin checks with dermatology.    He and his family had all their questions and concerns answered and addressed.  They know to call with issues or concerns prior to their next visit.  They understand and are in agreement with the plan.      - Ambulatory referral to Hematology / Oncology  - CBC and differential; Future  - Comprehensive metabolic panel; Future    2. Malignant neoplasm of urinary bladder, unspecified site (HCC)  Status post diagnosis and treatment in 2010 has been followed by urology and has been disease-free since then.  He has been cleared by urology at this time as well.        Return in about 3 months (around 10/12/2024) for Office Visit, labs.     Sagrario Spears MD, PhD

## 2024-07-15 NOTE — PROGRESS NOTES
[facility]ATOLOGY ONCOL[facility] SPECIAL[facility]ST[facility]  [address] [address]  [phone]  [phone]     Date of Visit: 7/12/2024  Name: Home Walters   YOB: 1945        Subjective    VISIT DIAGNOSIS:  Diagnoses and all orders for this visit:    Malignant neoplasm of urinary bladder, unspecified site (HCC)    Squamous cell carcinoma of scalp  -     Ambulatory referral to Hematology / Oncology  -     CBC and differential; Future  -     Comprehensive metabolic panel; Future          Cancer Staging   Stage 2 (T2N0M0)          HISTORY OF PRESENT ILLNESS: Home Walters is a 79 y.o. male  who has new diagnosis of cutaneous squamous cell carcinoma on the scalp seen in consultation at the request of dermatology.  History is obtained from the patient, his wife, his son, and review of records.    Patient states that he has had a spot on the top of his head for a number of years.  He thinks that approximately 2 years ago it started to grow.  It would grow upwards.  He states that it got scabbed over at 1 point and he would pick it and then it would get better and resolve and then grow again.  He states that most recently over the past couple months the last time he picked it grew up and continued growing.  It looks like a scab at 1 point though he thinks it did turn black.  It was bleeding when he would pick it.  It did get tender to touch as well towards the end.  He has not seen dermatology routinely.    He saw plastic and reconstructive surgery who biopsied the lesion on 5/2/2024.  Pathology demonstrates from a central scalp punch biopsy squamous cell carcinoma.  According to notes from plastic and reconstructive surgery on 5/2/2024 the lesion measured 1.6 x 1.5 cm was keratotic and firm and that it was raised approximately 1 cm from the scalp surface.    After receiving results of biopsy, he was referred to Mohs.  He underwent Mohs procedure on 6/4/2024 with Dr. Rodríguez.  In her note she  describes the lesion as being 3 x 2.5 cm.  He is recovering from this procedure at this time.  He can get it covered with Vaseline.    Unclear exactly when he is seeing dermatology again.    Of note, he was diagnosed with bladder cancer in 2010 and had BCG treatment.  He has been followed by urology and has been cleared at this time.    He does describe having increased sun exposure when he was younger and as an adult.  Would go to the beach every year as a kid although as an adult he would wear a hat more often.  He also worked on a farm.  He does describe having increased number of sunburns.  He does describe having a few blistering sunburns.  He denies tanning bed use.    No skin cancer history in the family.  No melanoma history in the family.     He had blond hair when he was younger, has blue eyes and is [place] and [place] and descent.    He he quit smoking in 2009 and smoked about half a pack per day for 20 years.  He had episodes of quitting in between but completely quit 20 years ago.  Denies alcohol use.  Denies any drug use.      He is up-to-date on colonoscopy and PSA checks.    Denies any autoimmune disorders or immunosuppressive use.      REVIEW OF SYSTEMS:  Review of Systems   Constitutional:  Negative for appetite change, fatigue, fever and unexpected weight change.   HENT:   Negative for lump/mass, trouble swallowing and voice change.    Eyes:  Negative for icterus.   Respiratory:  Negative for cough, shortness of breath and wheezing.    Cardiovascular:  Negative for leg swelling.   Gastrointestinal:  Negative for abdominal pain, constipation, diarrhea, nausea and vomiting.   Genitourinary:  Negative for difficulty urinating and hematuria.    Musculoskeletal:  Negative for arthralgias, gait problem and myalgias.   Skin:  Positive for wound (healing Mohs site on top of scalp). Negative for itching and rash.        No new, changing, or concerning lesions.   Neurological:  Negative for extremity  weakness, gait problem, headaches, light-headedness and numbness.   Hematological:  Negative for adenopathy.        MEDICATIONS:    Current Outpatient Medications:     apixaban (Eliquis) 5 mg, TAKE ONE TABLET BY MOUTH TWICE DAILY, Disp: 180 tablet, Rfl: 1    atorvastatin (LIPITOR) 40 mg tablet, Take 1 tablet (40 mg total) by mouth daily, Disp: 90 tablet, Rfl: 1    cholecalciferol (VITAMIN D3) 1,000 units tablet, Take 1,000 Units by mouth daily, Disp: , Rfl:     Continuous Blood Gluc Sensor (FreeStyle Peggy 2 Sensor) MISC, Change it every 14 days, Disp: 1 each, Rfl: 0    dicyclomine (BENTYL) 10 mg capsule, Take 1 capsule (10 mg total) by mouth 2 (two) times a day as needed (abdominal cramping), Disp: 60 capsule, Rfl: 3    digoxin (LANOXIN) 0.125 mg tablet, TAKE ONE TABLET BY MOUTH ONCE DAILY, Disp: 90 tablet, Rfl: 1    famotidine (PEPCID) 40 MG tablet, Take 1 tablet (40 mg total) by mouth daily at bedtime, Disp: 30 tablet, Rfl: 5    insulin degludec (Tresiba FlexTouch) 200 units/mL CONCENTRATED U-200 injection pen, Inject 95 units under the skin once daily, Disp: 15 mL, Rfl: 5    Jardiance 25 MG TABS, TAKE ONE TABLET BY MOUTH DAILY IN MORNING, Disp: 90 tablet, Rfl: 1    metoprolol succinate (TOPROL-XL) 50 mg 24 hr tablet, Take 1 tablet (50 mg total) by mouth daily, Disp: 90 tablet, Rfl: 3    NovoLOG FlexPen 100 units/mL injection pen, Inject 20-25 Units under the skin 3 (three) times a day with meals, Disp: 30 mL, Rfl: 6    omeprazole (PriLOSEC) 20 mg delayed release capsule, TAKE ONE CAPSULE BY MOUTH TWICE DAILY, Disp: 180 capsule, Rfl: 1    potassium citrate (UROCIT-K) 10 mEq, TAKE ONE TABLET BY MOUTH TWICE DAILY, Disp: 180 tablet, Rfl: 1    torsemide (DEMADEX) 20 mg tablet, take two tablets by mouth in the morning and one tablet  at 4pm, Disp: 120 tablet, Rfl: 11    umeclidinium-vilanterol (Anoro Ellipta) 62.5-25 mcg/actuation inhaler, INHALE ONE PUFF BY MOUTH ONCE DAILY, Disp: 60 each, Rfl: 2    Unifine Pentips  31G X 8 MM MISC, inject under the skin daily use as directed, Disp: 100 each, Rfl: 0    ergocalciferol (VITAMIN D2) 50,000 units, Take 1 capsule (50,000 Units total) by mouth once a week for 8 doses, Disp: 8 capsule, Rfl: 0     ALLERGIES:  Allergies   Allergen Reactions    Ciprofloxacin Abdominal Pain, Diarrhea, GI Bleeding and GI Intolerance    Diltiazem Abdominal Pain    Metronidazole Abdominal Pain, Diarrhea, GI Bleeding and GI Intolerance        ACTIVE PROBLEMS:  Patient Active Problem List   Diagnosis    Hypertension    Mixed hyperlipidemia    Type 2 diabetes mellitus with hyperglycemia, with long-term current use of insulin (HCC)    Chronic low back pain    CAD (coronary artery disease)    Malignant neoplasm of urinary bladder (HCC)    Arthritis    AAA (abdominal aortic aneurysm) (HCC)    Decreased pedal pulses    Chronic pain of both knees    Actinic keratosis of right temple    Actinic keratosis of scalp    Morbid obesity (HCC)    Adrenal nodule (HCC)    LUCIA (obstructive sleep apnea)    Embolism and thrombosis of arteries of the lower extremities (HCC)    Bilateral edema of lower extremity    Chronic diastolic CHF (congestive heart failure) (HCC)    Left upper lobe pulmonary nodule    Hypercalcemia    Nephrolithiasis    Hydroureter on left    Venous stasis dermatitis of both lower extremities    Persistent proteinuria    Hypokalemia    Ureteral calculi    Benign prostatic hyperplasia without lower urinary tract symptoms    PAF (paroxysmal atrial fibrillation) (HCC)    PAD (peripheral artery disease) (HCC)    Gastroesophageal reflux disease    Vitamin D deficiency    Cirrhosis of liver without ascites (HCC)    Chronic acquired lymphedema    Pulmonary hypertension (HCC)    Chronic obstructive pulmonary disease (HCC)    Stage 3a chronic kidney disease (HCC)    Current use of insulin (HCC)    Squamous cell carcinoma of scalp          PAST MEDICAL HISTORY:   Past Medical History:   Diagnosis Date    A-fib (HCC)      AAA (abdominal aortic aneurysm) (HCC)     Actinic keratosis of right temple 07/31/2020    Actinic keratosis of scalp 07/31/2020    Acute respiratory failure with hypoxia (HCC) 03/25/2021    Allergic 1960    Arthritis     Lay's esophagus     Bladder cancer (HCC)     Blister of left leg 04/28/2021    Blister of right leg 05/26/2021    CAD (coronary artery disease)     Cancer (HCC) 02/2010    Bladder    CHF (congestive heart failure) (HCC)     Chronic low back pain     Chronic pain of both knees 07/31/2020    Colitis 12/04/2020    CPAP (continuous positive airway pressure) dependence     Diabetes (HCC)     Diabetes mellitus (HCC) 10/1993    Excessive gas 12/03/2020    Heart murmur     History of chemotherapy     Hydroureter on left 04/28/2021    Hyperlipemia     Hypertension     Immunization deficiency 10/30/2020    Hep a nonimmune    Kidney stone     Left lower quadrant abdominal pain 12/03/2020    Mass of right adrenal gland (HCC) 12/04/2020    4.1 cm    Myocardial infarction ([facility])     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    Obesity 2000    LUCIA (obstructive sleep apnea) 01/29/2021    Pressure ulcer of right leg, stage 1 05/26/2021    Squamous cell skin cancer 05/02/2024    central scalp, mohs    Urinary tract infection with hematuria 05/03/2021    u cx 4/2021=pseudomonas R to bactrim and cefdinir, S to cipro    Venous stasis dermatitis of both lower extremities 05/26/2021        PAST SURGICAL HISTORY:  Past Surgical History:   Procedure Laterality Date    BACK SURGERY      BLADDER SURGERY      CARDIAC CATHETERIZATION  04/2016    CARDIAC CATHETERIZATION N/A 01/19/2023    Procedure: Cardiac RHC;  Surgeon: Jose Carlos Shea MD;  [facility];  Service: Cardiology    CATARACT EXTRACTION, BILATERAL      COLONOSCOPY  01/29/2019    next 2022 [place]    CORONARY ARTERY BYPASS GRAFT  04/2016    Quadruple per [facility]    EGD  06/2017    EYE SURGERY  11/2016    Cataracts    JOINT REPLACEMENT  [id number]     Hip and shoulder    KIDNEY STONE SURGERY      MOHS SURGERY  2024    [facility] central scalp, Dr Rodríguez    MO CYSTO BLADDER W/URETERAL CATHETERIZATION Left 2021    Procedure: CYSTOSCOPY  WITH INSERTION STENT URETERAL;  Surgeon: Siddhartha Leslie MD;  Location: [facility];  Service: Urology    MO CYSTO/URETERO W/LITHOTRIPSY &INDWELL STENT INSRT Left 2021    Procedure: CYSTOSCOPY URETEROSCOPY WITH LITHOTRIPSY HOLMIUM LASER, AND INSERTION STENT URETERAL/EXCHANGE;  Surgeon: Siddhartha Leslie MD;  Location: [facility];  Service: Urology    TOTAL HIP ARTHROPLASTY Right 2012    TOTAL SHOULDER REPLACEMENT Right 2013    VASECTOMY  1971        SOCIAL HISTORY:  Social History     Socioeconomic History    Marital status: /Civil Union     Spouse name: Gabriela    Number of children: 4    Years of education: None    Highest education level: 12th grade   Occupational History    None   Tobacco Use    Smoking status: Former     Current packs/day: 0.00     Average packs/day: 0.3 packs/day for 44.0 years (11.0 ttl pk-yrs)     Types: Cigarettes     Start date: 1965     Quit date: 2010     Years since quittin.5     Passive exposure: Past    Smokeless tobacco: Never    Tobacco comments:     Quit several times for up to 6 years.   Vaping Use    Vaping status: Never Used   Substance and Sexual Activity    Alcohol use: Not Currently     Comment: No use    Drug use: Never     Comment: No use    Sexual activity: Not Currently     Partners: Female   Other Topics Concern    None   Social History Narrative    · Most recent tobacco use screenin2020      · Do you currently or have you served in the [place] Armed Forces:   No      · Were you activated, into active duty, as a member of the [employer] or as a Reservist:   No      · Live alone or with others:   with others        · Caffeine intake:   Occasional      · Illicit drugs:   No      · Diet:   Regular      · Exercise level:   Moderate      · Advance directive:    Yes      · Marital status:         · General stress level:   Medium      · Single or multi-level home/work:   multi level home      · Guns present in home:   No      · Seat belts used routinely:   Yes      · Sunscreen used routinely:   Yes      · Smoke alarm in home:   Yes      Social Determinants of Health     Financial Resource Strain: Low Risk  (6/21/2023)    Overall Financial Resource Strain (CARDIA)     Difficulty of Paying Living Expenses: Not hard at all   Food Insecurity: No Food Insecurity (7/4/2024)    Hunger Vital Sign     Worried About Running Out of Food in the Last Year: Never true     Ran Out of Food in the Last Year: Never true   Transportation Needs: No Transportation Needs (7/4/2024)    PRAPARE - Transportation     Lack of Transportation (Medical): No     Lack of Transportation (Non-Medical): No   Physical Activity: Insufficiently Active (4/13/2021)    Exercise Vital Sign     Days of Exercise per Week: 4 days     Minutes of Exercise per Session: 10 min   Stress: No Stress Concern Present (4/13/2021)    [place] [employer] - Occupational Stress Questionnaire     Feeling of Stress : Not at all   Social Connections: Moderately Isolated (4/13/2021)    Social Connection and Isolation Panel [NHANES]     Frequency of Communication with Friends and Family: More than three times a week     Frequency of Social Gatherings with Friends and Family: Twice a week     Attends [employer]ism Services: Never     Active Member of Clubs or Organizations: No     Attends Club or Organization Meetings: Never     Marital Status:    Intimate Partner Violence: Not At Risk (4/13/2021)    Humiliation, Afraid, Rape, and Kick questionnaire     Fear of Current or Ex-Partner: No     Emotionally Abused: No     Physically Abused: No     Sexually Abused: No   Housing Stability: Low Risk  (7/4/2024)    Housing Stability Vital Sign     Unable to Pay for Housing in the Last Year: No     Number of Times  "Moved in the Last Year: 0     Homeless in the Last Year: No        FAMILY HISTORY:  Family History   Problem Relation Age of Onset    Heart disease Father     Arthritis Father     Heart attack Father     Alzheimer's disease Mother     Dementia Mother     Diabetes type II Sister            Objective    PHYSICAL EXAMINATION:   Blood pressure 114/50, pulse 60, temperature 98.2 °F (36.8 °C), temperature source Temporal, resp. rate 17, height 6' 4\" (1.93 m), SpO2 94%.     Pain Score: 0-No pain      ECOG Performance Status      Flowsheet Row Most Recent Value   ECOG Performance Status 1 - Restricted in physically strenuous activity but ambulatory and able to carry out work of a light or sedentary nature, e.g., light house work, office work                Physical Exam  Constitutional:       General: He is not in acute distress.     Appearance: Normal appearance. He is not ill-appearing or toxic-appearing.   HENT:      Head: Normocephalic and atraumatic.      Right Ear: External ear normal.      Left Ear: External ear normal.      Nose: Nose normal.      Mouth/Throat:      Mouth: Mucous membranes are moist.      Pharynx: Oropharynx is clear.   Eyes:      General: No scleral icterus.        Right eye: No discharge.         Left eye: No discharge.      Conjunctiva/sclera: Conjunctivae normal.   Cardiovascular:      Rate and Rhythm: Normal rate and regular rhythm.      Pulses: Normal pulses.      Heart sounds: No murmur heard.     No friction rub. No gallop.   Pulmonary:      Effort: Pulmonary effort is normal. No respiratory distress.      Breath sounds: Normal breath sounds. No wheezing or rales.   Abdominal:      General: Bowel sounds are normal. There is no distension.      Palpations: There is no mass.      Tenderness: There is no abdominal tenderness. There is no rebound.   Musculoskeletal:         General: Swelling present. No tenderness.      Cervical back: Normal range of motion. No rigidity.      Right lower leg: " Edema present.      Left lower leg: Edema present.      Comments: In wheelchair   Lymphadenopathy:      Head:      Right side of head: No submandibular, preauricular or posterior auricular adenopathy.      Left side of head: No submandibular, preauricular or posterior auricular adenopathy.      Cervical: No cervical adenopathy.      Right cervical: No superficial or posterior cervical adenopathy.     Left cervical: No superficial or posterior cervical adenopathy.      Upper Body:      Right upper body: No supraclavicular or axillary adenopathy.      Left upper body: No supraclavicular or axillary adenopathy.   Skin:     General: Skin is warm.      Coloration: Skin is not jaundiced.      Findings: No lesion or rash.      Comments: Healing Mohs site on top of scalp - edges closed nicely with exposed granulation tissue - see picture in media   Neurological:      General: No focal deficit present.      Mental Status: He is alert and oriented to person, place, and time.      Cranial Nerves: No cranial nerve deficit.      Gait: Gait abnormal (in wheelchair).   Psychiatric:         Mood and Affect: Mood normal.         Behavior: Behavior normal.         Thought Content: Thought content normal.         Judgment: Judgment normal.         I reviewed lab data in the chart.    WBC   Date Value Ref Range Status   07/11/2024 7.53 4.31 - 10.16 Thousand/uL Final   11/23/2023 8.41 4.31 - 10.16 Thousand/uL Final   06/23/2023 9.05 4.31 - 10.16 Thousand/uL Final     Hemoglobin   Date Value Ref Range Status   07/11/2024 11.1 (L) 12.0 - 17.0 g/dL Final   11/23/2023 11.5 (L) 12.0 - 17.0 g/dL Final   06/23/2023 11.1 (L) 12.0 - 17.0 g/dL Final     Platelets   Date Value Ref Range Status   07/11/2024 191 149 - 390 Thousands/uL Final   11/23/2023 202 149 - 390 Thousands/uL Final   06/23/2023 273 149 - 390 Thousands/uL Final     MCV   Date Value Ref Range Status   07/11/2024 88 82 - 98 fL Final   11/23/2023 85 82 - 98 fL Final   06/23/2023 85  82 - 98 fL Final      Potassium   Date Value Ref Range Status   07/11/2024 4.1 3.5 - 5.3 mmol/L Final   03/14/2024 4.2 3.5 - 5.3 mmol/L Final   01/08/2024 3.9 3.5 - 5.3 mmol/L Final     Chloride   Date Value Ref Range Status   07/11/2024 100 96 - 108 mmol/L Final   03/14/2024 98 96 - 108 mmol/L Final   01/08/2024 98 96 - 108 mmol/L Final     CO2   Date Value Ref Range Status   07/11/2024 30 21 - 32 mmol/L Final   03/14/2024 32 21 - 32 mmol/L Final   01/08/2024 32 21 - 32 mmol/L Final     CO2, i-STAT   Date Value Ref Range Status   01/14/2023 >45 (H) 21 - 32 mmol/L Final   04/22/2021 30 21 - 32 mmol/L Final     BUN   Date Value Ref Range Status   07/11/2024 37 (H) 5 - 25 mg/dL Final   03/14/2024 42 (H) 5 - 25 mg/dL Final   01/08/2024 32 (H) 5 - 25 mg/dL Final     Creatinine   Date Value Ref Range Status   07/11/2024 1.23 0.60 - 1.30 mg/dL Final     Comment:     Standardized to IDMS reference method   03/14/2024 1.40 (H) 0.60 - 1.30 mg/dL Final     Comment:     Standardized to IDMS reference method   01/08/2024 1.11 0.60 - 1.30 mg/dL Final     Comment:     Standardized to IDMS reference method     Glucose   Date Value Ref Range Status   01/08/2024 236 (H) 65 - 140 mg/dL Final     Comment:     If the patient is fasting, the ADA then defines impaired fasting glucose as > 100 mg/dL and diabetes as > or equal to 123 mg/dL.   12/07/2023 160 (H) 65 - 140 mg/dL Final     Comment:     If the patient is fasting, the ADA then defines impaired fasting glucose as > 100 mg/dL and diabetes as > or equal to 123 mg/dL.   11/23/2023 121 65 - 140 mg/dL Final     Comment:     If the patient is fasting, the ADA then defines impaired fasting glucose as > 100 mg/dL and diabetes as > or equal to 123 mg/dL.     Calcium   Date Value Ref Range Status   07/11/2024 9.4 8.4 - 10.2 mg/dL Final   03/14/2024 9.6 8.4 - 10.2 mg/dL Final   01/08/2024 9.5 8.4 - 10.2 mg/dL Final     Albumin   Date Value Ref Range Status   07/11/2024 3.7 3.5 - 5.0 g/dL  Final   03/14/2024 3.9 3.5 - 5.0 g/dL Final   01/08/2024 3.8 3.5 - 5.0 g/dL Final     Total Bilirubin   Date Value Ref Range Status   07/11/2024 0.33 0.20 - 1.00 mg/dL Final     Comment:     Use of this assay is not recommended for patients undergoing treatment with eltrombopag due to the potential for falsely elevated results.  N-acetyl-p-benzoquinone imine (metabolite of Acetaminophen) will generate erroneously low results in samples for patients that have taken an overdose of Acetaminophen.   03/14/2024 0.38 0.20 - 1.00 mg/dL Final     Comment:     Use of this assay is not recommended for patients undergoing treatment with eltrombopag due to the potential for falsely elevated results.  N-acetyl-p-benzoquinone imine (metabolite of Acetaminophen) will generate erroneously low results in samples for patients that have taken an overdose of Acetaminophen.   12/07/2023 0.29 0.20 - 1.00 mg/dL Final     Comment:     Use of this assay is not recommended for patients undergoing treatment with eltrombopag due to the potential for falsely elevated results.  N-acetyl-p-benzoquinone imine (metabolite of Acetaminophen) will generate erroneously low results in samples for patients that have taken an overdose of Acetaminophen.     Alkaline Phosphatase   Date Value Ref Range Status   07/11/2024 78 34 - 104 U/L Final   03/14/2024 85 34 - 104 U/L Final   12/07/2023 100 34 - 104 U/L Final     AST   Date Value Ref Range Status   07/11/2024 13 13 - 39 U/L Final   03/14/2024 14 13 - 39 U/L Final   12/07/2023 15 13 - 39 U/L Final     ALT   Date Value Ref Range Status   07/11/2024 12 7 - 52 U/L Final     Comment:     Specimen collection should occur prior to Sulfasalazine administration due to the potential for falsely depressed results.    03/14/2024 12 7 - 52 U/L Final     Comment:     Specimen collection should occur prior to Sulfasalazine administration due to the potential for falsely depressed results.    12/07/2023 13 7 - 52 U/L  "Final     Comment:     Specimen collection should occur prior to Sulfasalazine administration due to the potential for falsely depressed results.       No results found for: \"LDH\"  No results found for: \"TSH\"  No results found for: \"G6FHCHE\"   Free T4   Date Value Ref Range Status   05/11/2023 1.02 0.76 - 1.46 ng/dL Final     Comment:     Specimen collection should occur prior to Sulfasalazine administration due to the potential for falsely elevated results.         RECENT IMAGING:  No results found.          Assessment/Plan  Mr. Walters is a 79 yr male with stage II cutaneous squamous cell carcinoma of the scalp here for disease management discussion.    1. Squamous cell carcinoma of scalp  I had an extensive discussion with the patient and his family regarding his  diagnosis, prognosis, and recommendations for further management.  We reviewed his SCC history, current findings, pathology and imaging tests.    Discussed that although he lesion is on his scalp which is a high risk location as well as the size, there are no other additional high risk features.  I did discuss that sometimes we recommend radiation to prevent recurrence, but I am not sure it is warranted at this time.  I did discuss with he and his family, however, that he will need close follow-up with dermatology as well as myself to continue to monitor for any recurrence.  Also discussed looking for any new, changing, concerning skin lesions or lymphadenopathy.  Danica to continue to monitor the site on his scalp at the scar as well as other skin lesions.  Discussed that they should follow with dermatology closely.    I will reach out to Dr. Rodríguez to discuss the healing lesion on his scalp at this time to ensure healing is appropriate and does not need to be seen by dermatology sooner.    Discussed that they should follow-up with me as well in addition to dermatology.  I did discuss that at this time we will have him follow-up with me in 3 months for " close monitoring and evaluation.  I did order for blood work to be done at that time.      We discussed the importance of regular cutaneous self examinations and reviewed the features of lesions that could be concerning for skin cancer. I did explain that he  is at risk for developing another primary skin cancer.  We also discussed avoidance of unnecessary sun exposure and use of sun protective clothing and sunscreen.  I also recommended routine follow up and skin checks with dermatology.    He and his family had all their questions and concerns answered and addressed.  They know to call with issues or concerns prior to their next visit.  They understand and are in agreement with the plan.      - Ambulatory referral to Hematology / Oncology  - CBC and differential; Future  - Comprehensive metabolic panel; Future    2. Malignant neoplasm of urinary bladder, unspecified site (HCC)  Status post diagnosis and treatment in 2010 has been followed by urology and has been disease-free since then.  He has been cleared by urology at this time as well.        Return in about 3 months (around 10/12/2024) for Office Visit, labs.     Sagrario Spears MD, PhD

## 2024-07-24 ENCOUNTER — TELEPHONE (OUTPATIENT)
Dept: DERMATOLOGY | Facility: CLINIC | Age: 79
End: 2024-07-24

## 2024-07-24 NOTE — TELEPHONE ENCOUNTER
Left message on voicemail for patient to return call regarding scheduling for a Mohs post op appointment tomorrow with Dr. Rodríguez. Phone number for call back provided.

## 2024-07-30 DIAGNOSIS — J44.9 CHRONIC OBSTRUCTIVE PULMONARY DISEASE, UNSPECIFIED COPD TYPE (HCC): ICD-10-CM

## 2024-07-30 RX ORDER — UMECLIDINIUM BROMIDE AND VILANTEROL TRIFENATATE 62.5; 25 UG/1; UG/1
1 POWDER RESPIRATORY (INHALATION) DAILY
Qty: 60 EACH | Refills: 5 | Status: ON HOLD | OUTPATIENT
Start: 2024-07-30

## 2024-08-04 ENCOUNTER — HOSPITAL ENCOUNTER (INPATIENT)
Facility: HOSPITAL | Age: 79
LOS: 2 days | Discharge: HOME | DRG: 378 | End: 2024-08-06
Attending: EMERGENCY MEDICINE | Admitting: INTERNAL MEDICINE
Payer: MEDICARE

## 2024-08-04 ENCOUNTER — APPOINTMENT (EMERGENCY)
Dept: CT IMAGING | Facility: HOSPITAL | Age: 79
DRG: 378 | End: 2024-08-04
Payer: MEDICARE

## 2024-08-04 DIAGNOSIS — K92.2 ACUTE LOWER GI BLEEDING: ICD-10-CM

## 2024-08-04 DIAGNOSIS — K92.2 GI BLEED: Primary | ICD-10-CM

## 2024-08-04 PROBLEM — K63.89 EPIPLOIC APPENDAGITIS: Status: ACTIVE | Noted: 2024-08-04

## 2024-08-04 LAB
ABO GROUP BLD: NORMAL
ABO GROUP BLD: NORMAL
ALBUMIN SERPL BCG-MCNC: 3.8 G/DL (ref 3.5–5)
ALP SERPL-CCNC: 84 U/L (ref 34–104)
ALT SERPL W P-5'-P-CCNC: 12 U/L (ref 7–52)
ANION GAP SERPL CALCULATED.3IONS-SCNC: 10 MMOL/L (ref 4–13)
AST SERPL W P-5'-P-CCNC: 12 U/L (ref 13–39)
BASOPHILS # BLD AUTO: 0.05 THOUSANDS/ÂΜL (ref 0–0.1)
BASOPHILS NFR BLD AUTO: 0 % (ref 0–1)
BILIRUB SERPL-MCNC: 0.36 MG/DL (ref 0.2–1)
BLD GP AB SCN SERPL QL: NEGATIVE
BUN SERPL-MCNC: 37 MG/DL (ref 5–25)
CALCIUM SERPL-MCNC: 9.1 MG/DL (ref 8.4–10.2)
CHLORIDE SERPL-SCNC: 100 MMOL/L (ref 96–108)
CO2 SERPL-SCNC: 29 MMOL/L (ref 21–32)
CREAT SERPL-MCNC: 1.21 MG/DL (ref 0.6–1.3)
EOSINOPHIL # BLD AUTO: 0.06 THOUSAND/ÂΜL (ref 0–0.61)
EOSINOPHIL NFR BLD AUTO: 1 % (ref 0–6)
ERYTHROCYTE [DISTWIDTH] IN BLOOD BY AUTOMATED COUNT: 16.9 % (ref 11.6–15.1)
GFR SERPL CREATININE-BSD FRML MDRD: 56 ML/MIN/1.73SQ M
GLUCOSE SERPL-MCNC: 109 MG/DL (ref 65–140)
GLUCOSE SERPL-MCNC: 160 MG/DL (ref 65–140)
GLUCOSE SERPL-MCNC: 80 MG/DL (ref 65–140)
GLUCOSE SERPL-MCNC: 93 MG/DL (ref 65–140)
HCT VFR BLD AUTO: 35.8 % (ref 36.5–49.3)
HCT VFR BLD AUTO: 38.8 % (ref 36.5–49.3)
HGB BLD-MCNC: 10.7 G/DL (ref 12–17)
HGB BLD-MCNC: 11.5 G/DL (ref 12–17)
IMM GRANULOCYTES # BLD AUTO: 0.07 THOUSAND/UL (ref 0–0.2)
IMM GRANULOCYTES NFR BLD AUTO: 1 % (ref 0–2)
LYMPHOCYTES # BLD AUTO: 1.79 THOUSANDS/ÂΜL (ref 0.6–4.47)
LYMPHOCYTES NFR BLD AUTO: 16 % (ref 14–44)
MCH RBC QN AUTO: 26.1 PG (ref 26.8–34.3)
MCHC RBC AUTO-ENTMCNC: 29.6 G/DL (ref 31.4–37.4)
MCV RBC AUTO: 88 FL (ref 82–98)
MONOCYTES # BLD AUTO: 0.74 THOUSAND/ÂΜL (ref 0.17–1.22)
MONOCYTES NFR BLD AUTO: 7 % (ref 4–12)
NEUTROPHILS # BLD AUTO: 8.48 THOUSANDS/ÂΜL (ref 1.85–7.62)
NEUTS SEG NFR BLD AUTO: 75 % (ref 43–75)
NRBC BLD AUTO-RTO: 0 /100 WBCS
PLATELET # BLD AUTO: 218 THOUSANDS/UL (ref 149–390)
PMV BLD AUTO: 10.7 FL (ref 8.9–12.7)
POTASSIUM SERPL-SCNC: 3.5 MMOL/L (ref 3.5–5.3)
PROT SERPL-MCNC: 8.1 G/DL (ref 6.4–8.4)
RBC # BLD AUTO: 4.4 MILLION/UL (ref 3.88–5.62)
RH BLD: POSITIVE
RH BLD: POSITIVE
SODIUM SERPL-SCNC: 139 MMOL/L (ref 135–147)
SPECIMEN EXPIRATION DATE: NORMAL
WBC # BLD AUTO: 11.19 THOUSAND/UL (ref 4.31–10.16)

## 2024-08-04 PROCEDURE — 86850 RBC ANTIBODY SCREEN: CPT | Performed by: INTERNAL MEDICINE

## 2024-08-04 PROCEDURE — 82948 REAGENT STRIP/BLOOD GLUCOSE: CPT

## 2024-08-04 PROCEDURE — 36415 COLL VENOUS BLD VENIPUNCTURE: CPT

## 2024-08-04 PROCEDURE — 80053 COMPREHEN METABOLIC PANEL: CPT

## 2024-08-04 PROCEDURE — 86900 BLOOD TYPING SEROLOGIC ABO: CPT | Performed by: INTERNAL MEDICINE

## 2024-08-04 PROCEDURE — 99223 1ST HOSP IP/OBS HIGH 75: CPT | Performed by: INTERNAL MEDICINE

## 2024-08-04 PROCEDURE — 96374 THER/PROPH/DIAG INJ IV PUSH: CPT

## 2024-08-04 PROCEDURE — 86901 BLOOD TYPING SEROLOGIC RH(D): CPT | Performed by: INTERNAL MEDICINE

## 2024-08-04 PROCEDURE — 99222 1ST HOSP IP/OBS MODERATE 55: CPT | Performed by: INTERNAL MEDICINE

## 2024-08-04 PROCEDURE — 99285 EMERGENCY DEPT VISIT HI MDM: CPT | Performed by: EMERGENCY MEDICINE

## 2024-08-04 PROCEDURE — 93005 ELECTROCARDIOGRAM TRACING: CPT

## 2024-08-04 PROCEDURE — 85025 COMPLETE CBC W/AUTO DIFF WBC: CPT

## 2024-08-04 PROCEDURE — 99285 EMERGENCY DEPT VISIT HI MDM: CPT

## 2024-08-04 PROCEDURE — 85018 HEMOGLOBIN: CPT | Performed by: INTERNAL MEDICINE

## 2024-08-04 PROCEDURE — 74177 CT ABD & PELVIS W/CONTRAST: CPT

## 2024-08-04 PROCEDURE — 85014 HEMATOCRIT: CPT | Performed by: INTERNAL MEDICINE

## 2024-08-04 RX ORDER — TORSEMIDE 20 MG/1
20 TABLET ORAL EVERY EVENING
Status: DISCONTINUED | OUTPATIENT
Start: 2024-08-04 | End: 2024-08-06 | Stop reason: HOSPADM

## 2024-08-04 RX ORDER — POTASSIUM CITRATE 10 MEQ/1
10 TABLET, EXTENDED RELEASE ORAL 2 TIMES DAILY
Status: DISCONTINUED | OUTPATIENT
Start: 2024-08-04 | End: 2024-08-06 | Stop reason: HOSPADM

## 2024-08-04 RX ORDER — INSULIN LISPRO 100 [IU]/ML
1-5 INJECTION, SOLUTION INTRAVENOUS; SUBCUTANEOUS
Status: DISCONTINUED | OUTPATIENT
Start: 2024-08-04 | End: 2024-08-06 | Stop reason: HOSPADM

## 2024-08-04 RX ORDER — ACETAMINOPHEN 325 MG/1
650 TABLET ORAL EVERY 6 HOURS PRN
Status: DISCONTINUED | OUTPATIENT
Start: 2024-08-04 | End: 2024-08-06 | Stop reason: HOSPADM

## 2024-08-04 RX ORDER — INSULIN LISPRO 100 [IU]/ML
2-12 INJECTION, SOLUTION INTRAVENOUS; SUBCUTANEOUS
Status: DISCONTINUED | OUTPATIENT
Start: 2024-08-04 | End: 2024-08-06 | Stop reason: HOSPADM

## 2024-08-04 RX ORDER — INSULIN LISPRO 100 [IU]/ML
15 INJECTION, SOLUTION INTRAVENOUS; SUBCUTANEOUS
Status: DISCONTINUED | OUTPATIENT
Start: 2024-08-04 | End: 2024-08-05

## 2024-08-04 RX ORDER — TORSEMIDE 20 MG/1
40 TABLET ORAL EVERY MORNING
Status: DISCONTINUED | OUTPATIENT
Start: 2024-08-05 | End: 2024-08-06 | Stop reason: HOSPADM

## 2024-08-04 RX ORDER — DIGOXIN 125 MCG
125 TABLET ORAL DAILY
Status: DISCONTINUED | OUTPATIENT
Start: 2024-08-04 | End: 2024-08-06 | Stop reason: HOSPADM

## 2024-08-04 RX ORDER — METOPROLOL SUCCINATE 50 MG/1
50 TABLET, EXTENDED RELEASE ORAL DAILY
Status: DISCONTINUED | OUTPATIENT
Start: 2024-08-04 | End: 2024-08-06 | Stop reason: HOSPADM

## 2024-08-04 RX ORDER — DICYCLOMINE HYDROCHLORIDE 10 MG/1
10 CAPSULE ORAL 2 TIMES DAILY PRN
Status: DISCONTINUED | OUTPATIENT
Start: 2024-08-04 | End: 2024-08-06 | Stop reason: HOSPADM

## 2024-08-04 RX ORDER — INSULIN GLARGINE 100 [IU]/ML
80 INJECTION, SOLUTION SUBCUTANEOUS
Status: DISCONTINUED | OUTPATIENT
Start: 2024-08-04 | End: 2024-08-05

## 2024-08-04 RX ORDER — ATORVASTATIN CALCIUM 40 MG/1
40 TABLET, FILM COATED ORAL DAILY
Status: DISCONTINUED | OUTPATIENT
Start: 2024-08-04 | End: 2024-08-06 | Stop reason: HOSPADM

## 2024-08-04 RX ORDER — POLYETHYLENE GLYCOL 3350 17 G/17G
238 POWDER, FOR SOLUTION ORAL ONCE
Status: COMPLETED | OUTPATIENT
Start: 2024-08-04 | End: 2024-08-04

## 2024-08-04 RX ORDER — PANTOPRAZOLE SODIUM 40 MG/10ML
40 INJECTION, POWDER, LYOPHILIZED, FOR SOLUTION INTRAVENOUS EVERY 12 HOURS SCHEDULED
Status: DISCONTINUED | OUTPATIENT
Start: 2024-08-04 | End: 2024-08-06 | Stop reason: HOSPADM

## 2024-08-04 RX ADMIN — POLYETHYLENE GLYCOL 3350 238 G: 17 POWDER, FOR SOLUTION ORAL at 17:55

## 2024-08-04 RX ADMIN — ACETAMINOPHEN 650 MG: 325 TABLET, FILM COATED ORAL at 21:53

## 2024-08-04 RX ADMIN — POTASSIUM CITRATE 10 MEQ: 10 TABLET, EXTENDED RELEASE ORAL at 17:43

## 2024-08-04 RX ADMIN — PANTOPRAZOLE SODIUM 40 MG: 40 INJECTION, POWDER, FOR SOLUTION INTRAVENOUS at 21:50

## 2024-08-04 RX ADMIN — IOHEXOL 100 ML: 350 INJECTION, SOLUTION INTRAVENOUS at 11:43

## 2024-08-04 RX ADMIN — TORSEMIDE 20 MG: 20 TABLET ORAL at 17:43

## 2024-08-04 RX ADMIN — INSULIN GLARGINE 80 UNITS: 100 INJECTION, SOLUTION SUBCUTANEOUS at 17:43

## 2024-08-04 RX ADMIN — PANTOPRAZOLE SODIUM 40 MG: 40 INJECTION, POWDER, FOR SOLUTION INTRAVENOUS at 13:42

## 2024-08-04 RX ADMIN — Medication 1000 UNITS: at 14:38

## 2024-08-04 NOTE — ASSESSMENT & PLAN NOTE
Wt Readings from Last 3 Encounters:   07/05/24 128 kg (282 lb)   06/04/24 126 kg (278 lb 6.4 oz)   05/02/24 125 kg (275 lb)     Clinically euvolemic  Continue home dose torsemide and metoprolol xl  Hold jardiance for now   Daily weight and I and O

## 2024-08-04 NOTE — ASSESSMENT & PLAN NOTE
Not in exacerbation  Continue home dose anoro ellipta  Uses 2 L i2 at bedtime due to LUCIA  Continue OP pulmonary follow up

## 2024-08-04 NOTE — H&P
[facility]  H&P  Name: Home Walters 79 y.o. 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 Also showed moderate gastritis.   Colonoscopy in 2022: internal hemorrhoids and diverticulosis  Hemoglobin 11.5 (baseline 11-12)     PLAN:  Hold eliquis  BID protonix  GI consult  Clear liquid diet for now and NPO PM  Trend H and H every 12 hours    Epiploic appendagitis  Assessment & Plan  Incidentally noted on CT  CT showed: Epiploic appendagitis adjacent to the distal descending colon.   Patient has no pain or tenderness for me    Stage 3a chronic kidney disease (HCC)  Assessment & Plan  Lab Results   Component Value Date    EGFR 56 08/04/2024    EGFR 55 07/11/2024    EGFR 47 03/14/2024    CREATININE 1.21 08/04/2024    CREATININE 1.23 07/11/2024    CREATININE 1.40 (H) 03/14/2024     Baseline creatinine between 1.1-1.4  Currently at baseline  Trend BMP  Continue home dose torsemide    Chronic obstructive pulmonary disease (HCC)  Assessment & Plan  Not in exacerbation  Continue home dose anoro ellipta  Uses 2 L i2 at bedtime due to LUCIA  Continue OP pulmonary follow up    PAF (paroxysmal atrial fibrillation) ([facility])  Assessment & Plan  Continue home dose metoprolol XL and digoxin  Hold eliquis given GI bleed    Chronic diastolic CHF (congestive heart failure) ([facility])  Assessment & Plan  Wt Readings from Last 3 Encounters:   07/05/24 128 kg (282 lb)   06/04/24 126 kg (278 lb 6.4 oz)   05/02/24 125 kg (275 lb)     Clinically euvolemic  Continue home dose torsemide and metoprolol xl  Hold jardiance for now   Daily weight and I and O          LUCIA (obstructive sleep apnea)  Assessment & Plan  Uses cpap and opxygen  at night    AAA (abdominal aortic aneurysm) ([facility])  Assessment & Plan  Stable around 5 cm as per last cardiology note  Continued OP monitoring     Type 2 diabetes mellitus with hyperglycemia, with long-term current use of insulin ([facility])  Assessment & Plan  Lab Results   Component Value Date    HGBA1C 7.4 (H) 07/11/2024       Recent Labs     08/04/24  1338   POCGLU 80       Blood Sugar Average: Last 72 hrs:  (P) 80    On tresiba, jardiance (for CHF) and novolog at home  Will continue basal bolus regimen but at a lower dose than home dose given clear liquid diet and NPO PM. Titrate based on blood sugar.  ISS and accuchekcs  Hypoglycemia protocol     Hypertension  Assessment & Plan  Within acceptable range in ER  Continue home dose metoprolol XL and torsemide with holding parameters            VTE Prophylaxis:  not on any due to GI bleed   / sequential compression device   Code Status: full    Discussion with family: d/w daughter at bedside.    Anticipated Length of Stay:  Patient will be admitted on an Inpatient basis with an anticipated length of stay of  more than 2 midnights.   Justification for Hospital Stay: ongoing Mx    Total Time for Visit, including Counseling / Coordination of Care: 60 minutes.  Greater than 50% of this total time spent on direct patient counseling and coordination of care.    Chief Complaint:   bloody bowel movements    History of Present Illness:    Home Walters is a 79 y.o. male who presents with 3 episodes of bloody bowel movements with trent blood in his stool since last evening. Last bloody bowel movement was this morning. He takes eliquis for anticoagulation given PAF and last dose was last evening. He had constipation 3 weeks back but that had resolved. He mentioned left sided abdominal discomfort to ED physician and CT a/p was done which showed Epiploic appendagitis adjacent to the distal descending colon. Patient denies any abdominal pain to me and did not have tenderness for  me.    Review of Systems:    Review of Systems   Constitutional:  Negative for chills and fever.   HENT: Negative.     Respiratory: Negative.     Cardiovascular: Negative.    Gastrointestinal:  Positive for blood in stool. Negative for abdominal pain, constipation, diarrhea, nausea and vomiting.   Genitourinary: Negative.    Musculoskeletal: Negative.    Neurological:  Negative for dizziness, weakness, light-headedness and numbness.       Past Medical and Surgical History:     Past Medical History:   Diagnosis Date    A-fib (HCC)     AAA (abdominal aortic aneurysm) ([facility])     Actinic keratosis of right temple 07/31/2020    Actinic keratosis of scalp 07/31/2020    Acute respiratory failure with hypoxia ([facility]) 03/25/2021    Allergic 1960    Arthritis     Lay's esophagus     Bladder cancer ([facility])     Blister of left leg 04/28/2021    Blister of right leg 05/26/2021    CAD (coronary artery disease)     Cancer ([facility]) 02/2010    Bladder    CHF (congestive heart failure) ([facility])     Chronic low back pain     Chronic pain of both knees 07/31/2020    Colitis 12/04/2020    CPAP (continuous positive airway pressure) dependence     Diabetes ([facility])     Diabetes mellitus ([facility]) 10/1993    Excessive gas 12/03/2020    Heart murmur     History of chemotherapy     Hydroureter on left 04/28/2021    Hyperlipemia     Hypertension     Immunization deficiency 10/30/2020    Hep a nonimmune    Kidney stone     Left lower quadrant abdominal pain 12/03/2020    Mass of right adrenal gland ([facility]) 12/04/2020    4.1 cm    Myocardial infarction ([facility])     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    Obesity 2000    LUCIA (obstructive sleep apnea) 01/29/2021    Pressure ulcer of right leg, stage 1 05/26/2021    Squamous cell skin cancer 05/02/2024    central scalp, mohs    Urinary tract infection with hematuria 05/03/2021    u cx 4/2021=pseudomonas R to bactrim and cefdinir, S to cipro    Venous stasis dermatitis of both lower extremities 05/26/2021        Past Surgical History:   Procedure Laterality Date    BACK SURGERY      BLADDER SURGERY      CARDIAC CATHETERIZATION  04/2016    CARDIAC CATHETERIZATION N/A 01/19/2023    Procedure: Cardiac RHC;  Surgeon: Jose Carlos Shea MD;  [facility];  Service: Cardiology    CATARACT EXTRACTION, BILATERAL      COLONOSCOPY  01/29/2019    next 2022 [place]    CORONARY ARTERY BYPASS GRAFT  04/2016    Quadruple per Rita    EGD  06/2017    EYE SURGERY  11/2016    Cataracts    JOINT REPLACEMENT  [id number]    Hip and shoulder    KIDNEY STONE SURGERY      MOHS SURGERY  06/04/2024    [facility] central scalp, Dr Rodríguez    PA CYSTO BLADDER W/URETERAL CATHETERIZATION Left 04/24/2021    Procedure: CYSTOSCOPY  WITH INSERTION STENT URETERAL;  Surgeon: Siddhartha Leslie MD;  Location: [facility];  Service: Urology    PA CYSTO/URETERO W/LITHOTRIPSY &INDWELL STENT INSRT Left 05/21/2021    Procedure: CYSTOSCOPY URETEROSCOPY WITH LITHOTRIPSY HOLMIUM LASER, AND INSERTION STENT URETERAL/EXCHANGE;  Surgeon: Siddhartha Leslie MD;  Location: [facility];  Service: Urology    TOTAL HIP ARTHROPLASTY Right 2012    TOTAL SHOULDER REPLACEMENT Right 2013    VASECTOMY  1971       Meds/Allergies:    Prior to Admission medications    Medication Sig Start Date End Date Taking? Authorizing Provider   apixaban (Eliquis) 5 mg TAKE ONE TABLET BY MOUTH TWICE DAILY 6/25/24  Yes KACEY Rogers   atorvastatin (LIPITOR) 40 mg tablet Take 1 tablet (40 mg total) by mouth daily 7/9/24 1/5/25  KACEY Rogers   cholecalciferol (VITAMIN D3) 1,000 units tablet Take 1,000 Units by mouth daily    Historical Provider, MD   Continuous Blood Gluc Sensor (FreeStyle Peggy 2 Sensor) MISC Change it every 14 days 4/11/22   KACEY Pedersen   dicyclomine (BENTYL) 10 mg capsule Take 1 capsule (10 mg total) by mouth 2 (two) times a day as needed (abdominal cramping) 9/2/22   Candis Juarez PA-C   digoxin (LANOXIN) 0.125 mg tablet TAKE ONE TABLET BY  MOUTH ONCE DAILY 5/9/24   Agusto Thompson MD   ergocalciferol (VITAMIN D2) 50,000 units Take 1 capsule (50,000 Units total) by mouth once a week for 8 doses 1/9/24 5/30/24  Sylvie James PA-C   famotidine (PEPCID) 40 MG tablet Take 1 tablet (40 mg total) by mouth daily at bedtime 2/9/24   Jitendra Dominique MD   insulin degludec (Tresiba FlexTouch) 200 units/mL CONCENTRATED U-200 injection pen Inject 95 units under the skin once daily 4/24/24   Anna aCrroll MD   Jardiance 25 MG TABS TAKE ONE TABLET BY MOUTH DAILY IN MORNING 5/9/24   Agusto Thompson MD   metoprolol succinate (TOPROL-XL) 50 mg 24 hr tablet Take 1 tablet (50 mg total) by mouth daily 3/12/24 3/7/25  Agusto Thompson MD   NovoLOG FlexPen 100 units/mL injection pen Inject 20-25 Units under the skin 3 (three) times a day with meals 9/7/23   Anna Carroll MD   omeprazole (PriLOSEC) 20 mg delayed release capsule TAKE ONE CAPSULE BY MOUTH TWICE DAILY 5/15/24   KACEY Rogers   potassium citrate (UROCIT-K) 10 mEq TAKE ONE TABLET BY MOUTH TWICE DAILY 4/2/24   KACEY Rogers   torsemide (DEMADEX) 20 mg tablet take two tablets by mouth in the morning and one tablet  at 4pm 1/11/24   Agusto Thompson MD   umeclidinium-vilanterol (Anoro Ellipta) 62.5-25 mcg/actuation inhaler Inhale 1 puff daily 7/30/24   Han Curtis MD   Unifine Pentips 31G X 8 MM MISC inject under the skin daily use as directed 11/2/22   Bryson Shea MD     I have reviewed home medications with patient personally.    Allergies:   Allergies   Allergen Reactions    Ciprofloxacin Abdominal Pain, Diarrhea, GI Bleeding and GI Intolerance    Diltiazem Abdominal Pain    Metronidazole Abdominal Pain, Diarrhea, GI Bleeding and GI Intolerance       Social History:     Marital Status: /Civil Union     Substance Use History:   Social History     Substance and Sexual Activity   Alcohol Use Not Currently    Comment: No use     Social History     Tobacco Use   Smoking Status Former     Current packs/day: 0.00    Average packs/day: 0.3 packs/day for 44.0 years (11.0 ttl pk-yrs)    Types: Cigarettes    Start date: 1965    Quit date: 2010    Years since quittin.6    Passive exposure: Past   Smokeless Tobacco Never   Tobacco Comments    Quit several times for up to 6 years.     Social History     Substance and Sexual Activity   Drug Use Never    Comment: No use         Physical Exam:     Vitals:   Blood Pressure: 140/63 (24 1330)  Pulse: 69 (24 1330)  Temperature: 98.3 °F (36.8 °C) (24 0950)  Respirations: 20 (24 1330)  SpO2: 99 % (24 1411)    Physical Exam  Constitutional:       General: He is not in acute distress.  Cardiovascular:      Rate and Rhythm: Normal rate and regular rhythm.      Heart sounds: Normal heart sounds. No murmur heard.  Pulmonary:      Effort: No respiratory distress.      Breath sounds: Normal breath sounds. No wheezing or rales.   Abdominal:      General: Bowel sounds are normal. There is no distension.      Palpations: Abdomen is soft.      Tenderness: There is no abdominal tenderness.   Skin:     General: Skin is warm.   Neurological:      Mental Status: He is alert.      Comments: Awake alert and communicative  Wiggles toes b/l and squeezes fingers b/l             Additional Data:     Lab Results: I have personally reviewed pertinent reports.      Results from last 7 days   Lab Units 24  1028   WBC Thousand/uL 11.19*   HEMOGLOBIN g/dL 11.5*   HEMATOCRIT % 38.8   PLATELETS Thousands/uL 218   SEGS PCT % 75   LYMPHO PCT % 16   MONO PCT % 7   EOS PCT % 1     Results from last 7 days   Lab Units 24  1028   SODIUM mmol/L 139   POTASSIUM mmol/L 3.5   CHLORIDE mmol/L 100   CO2 mmol/L 29   BUN mg/dL 37*   CREATININE mg/dL 1.21   ANION GAP mmol/L 10   CALCIUM mg/dL 9.1   ALBUMIN g/dL 3.8   TOTAL BILIRUBIN mg/dL 0.36   ALK PHOS U/L 84   ALT U/L 12   AST U/L 12*   GLUCOSE RANDOM mg/dL 160*         Results from last 7 days   Lab  Units 08/04/24  1338   POC GLUCOSE mg/dl 80               Imaging: I have personally reviewed pertinent reports.      CT abdomen pelvis with contrast   Final Result by Bernabe Bennett MD (08/04 1308)      Epiploic appendagitis adjacent to the distal descending colon.         Workstation performed: [id number]             ** Please Note: This note has been constructed using a voice recognition system. **

## 2024-08-04 NOTE — PLAN OF CARE
Problem: PAIN - ADULT  Goal: Verbalizes/displays adequate comfort level or baseline comfort level  Description: Interventions:  - Encourage patient to monitor pain and request assistance  - Assess pain using appropriate pain scale  - Administer analgesics based on type and severity of pain and evaluate response  - Implement non-pharmacological measures as appropriate and evaluate response  - Consider cultural and social influences on pain and pain management  - Notify physician/advanced practitioner if interventions unsuccessful or patient reports new pain  Outcome: Progressing     Problem: SAFETY ADULT  Goal: Patient will remain free of falls  Description: INTERVENTIONS:  - Educate patient/family on patient safety including physical limitations  - Instruct patient to call for assistance with activity   - Consult OT/PT to assist with strengthening/mobility   - Keep Call bell within reach  - Keep bed low and locked with side rails adjusted as appropriate  - Keep care items and personal belongings within reach  - Initiate and maintain comfort rounds  - Make Fall Risk Sign visible to staff  - Apply yellow socks and bracelet for high fall risk patients  - Consider moving patient to room near nurses station  Outcome: Progressing  Goal: Maintain or return to baseline ADL function  Description: INTERVENTIONS:  -  Assess patient's ability to carry out ADLs; assess patient's baseline for ADL function and identify physical deficits which impact ability to perform ADLs (bathing, care of mouth/teeth, toileting, grooming, dressing, etc.)  - Assess/evaluate cause of self-care deficits   - Assess range of motion  - Assess patient's mobility; develop plan if impaired  - Assess patient's need for assistive devices and provide as appropriate  - Encourage maximum independence but intervene and supervise when necessary  - Involve family in performance of ADLs  - Assess for home care needs following discharge   - Consider OT consult  to assist with ADL evaluation and planning for discharge  - Provide patient education as appropriate  Outcome: Progressing  Goal: Maintains/Returns to pre admission functional level  Description: INTERVENTIONS:  - Perform AM-PAC 6 Click Basic Mobility/ Daily Activity assessment daily.  - Set and communicate daily mobility goal to care team and patient/family/caregiver.   - Collaborate with rehabilitation services on mobility goals if consulted  - Out of bed for toileting  - Record patient progress and toleration of activity level   Outcome: Progressing     Problem: DISCHARGE PLANNING  Goal: Discharge to home or other facility with appropriate resources  Description: INTERVENTIONS:  - Identify barriers to discharge w/patient and caregiver  - Arrange for needed discharge resources and transportation as appropriate  - Identify discharge learning needs (meds, wound care, etc.)  - Arrange for interpretive services to assist at discharge as needed  - Refer to Case Management Department for coordinating discharge planning if the patient needs post-hospital services based on physician/advanced practitioner order or complex needs related to functional status, cognitive ability, or social support system  Outcome: Progressing     Problem: GASTROINTESTINAL - ADULT  Goal: Minimal or absence of nausea and/or vomiting  Description: INTERVENTIONS:  - Administer IV fluids if ordered to ensure adequate hydration  - Maintain NPO status until nausea and vomiting are resolved  - Nasogastric tube if ordered  - Administer ordered antiemetic medications as needed  - Provide nonpharmacologic comfort measures as appropriate  - Advance diet as tolerated, if ordered  - Consider nutrition services referral to assist patient with adequate nutrition and appropriate food choices  Outcome: Progressing  Goal: Maintains or returns to baseline bowel function  Description: INTERVENTIONS:  - Assess bowel function  - Encourage oral fluids to ensure  adequate hydration  - Administer IV fluids if ordered to ensure adequate hydration  - Administer ordered medications as needed  - Encourage mobilization and activity  - Consider nutritional services referral to assist patient with adequate nutrition and appropriate food choices  Outcome: Progressing  Goal: Maintains adequate nutritional intake  Description: INTERVENTIONS:  - Monitor percentage of each meal consumed  - Identify factors contributing to decreased intake, treat as appropriate  - Assist with meals as needed  - Monitor I&O, weight, and lab values if indicated  - Obtain nutrition services referral as needed  Outcome: Progressing  Goal: Oral mucous membranes remain intact  Description: INTERVENTIONS  - Assess oral mucosa and hygiene practices  - Implement preventative oral hygiene regimen  - Implement oral medicated treatments as ordered  - Initiate Nutrition services referral as needed  Outcome: Progressing

## 2024-08-04 NOTE — ASSESSMENT & PLAN NOTE
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 Also showed moderate gastritis.   Colonoscopy in 2022: internal hemorrhoids and diverticulosis  Hemoglobin 11.5 (baseline 11-12)     PLAN:  Hold eliquis  BID protonix  GI consult  Clear liquid diet for now and NPO PM  Trend H and H every 12 hours

## 2024-08-04 NOTE — ASSESSMENT & PLAN NOTE
Incidentally noted on CT  CT showed: Epiploic appendagitis adjacent to the distal descending colon.   Patient has no pain or tenderness for me

## 2024-08-04 NOTE — ED ATTENDING ATTESTATION
8/4/2024  IMarcio DO, saw and evaluated the patient. I have discussed the patient with the resident/non-physician practitioner and agree with the resident's/non-physician practitioner's findings, Plan of Care, and MDM as documented in the resident's/non-physician practitioner's note, except where noted. All available labs and Radiology studies were reviewed.  I was present for key portions of any procedure(s) performed by the resident/non-physician practitioner and I was immediately available to provide assistance.       At this point I agree with the current assessment done in the Emergency Department.  I have conducted an independent evaluation of this patient a history and physical is as follows:    78 yo M coming into the ED for eval of rectal bleeding since yesterday, states large amount of dark red blood in the toilet. He last had an episode 3 hours ago around 8 am, and another a few hours before that one. He is on anticoagulation, eliquis, for afib. States he's otherwise feeling well.    PE:  The patient is well appearing, non-toxic, in NAD. Head: normocephalic, atraumatic. HEENT: mucous membranes moist.  Lungs: CTA b/l, no resp distress. Heart: RRR. No M/R/G. Abdomen: mild LLQ abdominal tenderness, ND, no R/R/G. Neuro: CN2-12 intact, GCS 15. Normal strength and sensation, normal speech and gait. Cap refill < 2 sec, skin warm and dry. No rashes or lesions.    A/p: admit for acute lower GI bleed, described as multiple episodes of large volumes of blood in the toilet, on eliquis. Labs and CT a/p WNL at this time. Will require GI eval, likely colonoscopy and trending of Hgb.    ED Course         Critical Care Time  Procedures

## 2024-08-04 NOTE — ASSESSMENT & PLAN NOTE
Lab Results   Component Value Date    HGBA1C 7.4 (H) 07/11/2024       Recent Labs     08/04/24  1338   POCGLU 80       Blood Sugar Average: Last 72 hrs:  (P) 80    On tresiba, jardiance (for CHF) and novolog at home  Will continue basal bolus regimen but at a lower dose than home dose given clear liquid diet and NPO PM. Titrate based on blood sugar.  ISS and accuchekcs  Hypoglycemia protocol

## 2024-08-04 NOTE — CONSULTS
Consultation -  [facility]  Home Walters 79 y.o. male MRN: [id number]  Unit/Bed#: S -01 Encounter: [id number]    ASSESSMENT/PLAN:     #1.  Acute onset of painless rectal bleeding in the setting of anticoagulation with Eliquis for atrial fibrillation, last administered last evening.  Appears hemodynamically stable and not showing signs of active GI hemorrhage at this time, suspect diverticular bleed versus ruptured internal hemorrhoid, rule out more significant sources of GI bleeding, last colonoscopy was a couple of years ago    -Clear liquid diet today, can prep this afternoon for colonoscopy tomorrow    -Procedure was explained in detail to patient at this time including associated risks and benefits, risks including but not limited to infection, perforation and bleeding    -If no significant GI bleeding is noted at the time of colonoscopy, most likely can resume Eliquis after such exam and observe for further rebleeding    -Monitor hemoglobin, transfuse as needed        Inpatient consult to gastroenterology  Consult performed by: Ewa Lui PA-C  Consult ordered by: Hannah Florez MD          Reason for Consult / Principal Problem: GI bleed    HPI: Home Walters is a 79 y.o. year old male with history of chronic bronchitis, nightly use of supplemental oxygen and CPAP who presented with complaint of rectal bleeding, the patient says that yesterday he had 2 episodes of blood in his stools, and 2 episodes again this morning, he says small amount of stool mixed in but the bowel movement consisted mostly of blood noted in the toilet.  This was painless and he denies any abdominal or rectal pain that came along with this.  His last such bowel movement occurred around 7 AM this morning so has not had any further bowel movements for several hours at this point.  Notably he is anticoagulated with Eliquis for persistent atrial fibrillation, the last dose of which was administered  last evening.  The patient denies any NSAID use.  He says he had an EGD and colonoscopy a couple of years ago, showing Lay's and he does take omeprazole daily for this.  Exam also showed diverticulosis.  He denies any problems with heartburn or dysphagia lately.  Hemoglobin appears to be stable.  He says that a few weeks ago he was constipated and straining but made some changes to his diet and has had improvement in that in the last week.      REVIEW OF SYSTEMS:    CONSTITUTIONAL: Denies any fever, chills, or rigors. Good appetite, and no recent weight loss.  HEENT: No earache or tinnitus. Denies hearing loss or visual disturbances.  CARDIOVASCULAR: No chest pain or palpitations.   RESPIRATORY: Denies any cough, hemoptysis, shortness of breath or dyspnea on exertion.  GASTROINTESTINAL: As noted in the History of Present Illness.   GENITOURINARY: No problems with urination. Denies any hematuria or dysuria.  NEUROLOGIC: No dizziness or vertigo, denies headaches.   MUSCULOSKELETAL: Denies any muscle or joint pain.   SKIN: Denies skin rashes or itching.   ENDOCRINE: Denies excessive thirst. Denies intolerance to heat or cold.  PSYCHOSOCIAL: Denies depression or anxiety. Denies any recent memory loss.       Historical Information   Past Medical History:   Diagnosis Date    A-fib ([facility])     AAA (abdominal aortic aneurysm) ([facility])     Actinic keratosis of right temple 07/31/2020    Actinic keratosis of scalp 07/31/2020    Acute respiratory failure with hypoxia ([facility]) 03/25/2021    Allergic 1960    Arthritis     Lay's esophagus     Bladder cancer ([facility])     Blister of left leg 04/28/2021    Blister of right leg 05/26/2021    CAD (coronary artery disease)     Cancer ([facility]) 02/2010    Bladder    CHF (congestive heart failure) ([facility])     Chronic low back pain     Chronic pain of both knees 07/31/2020    Colitis 12/04/2020    CPAP (continuous positive airway pressure) dependence     Diabetes ([facility])     Diabetes mellitus ([facility])  10/1993    Excessive gas 12/03/2020    Heart murmur     History of chemotherapy     Hydroureter on left 04/28/2021    Hyperlipemia     Hypertension     Immunization deficiency 10/30/2020    Hep a nonimmune    Kidney stone     Left lower quadrant abdominal pain 12/03/2020    Mass of right adrenal gland ([facility]) 12/04/2020    4.1 cm    Myocardial infarction ([facility])     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    Obesity 2000    LUCIA (obstructive sleep apnea) 01/29/2021    Pressure ulcer of right leg, stage 1 05/26/2021    Squamous cell skin cancer 05/02/2024    central scalp, mohs    Urinary tract infection with hematuria 05/03/2021    u cx 4/2021=pseudomonas R to bactrim and cefdinir, S to cipro    Venous stasis dermatitis of both lower extremities 05/26/2021     Past Surgical History:   Procedure Laterality Date    BACK SURGERY      BLADDER SURGERY      CARDIAC CATHETERIZATION  04/2016    CARDIAC CATHETERIZATION N/A 01/19/2023    Procedure: Cardiac RHC;  Surgeon: Jose Carlos Shea MD;  Location: [facility];  Service: Cardiology    CATARACT EXTRACTION, BILATERAL      COLONOSCOPY  01/29/2019    next 2022 [place]    CORONARY ARTERY BYPASS GRAFT  04/2016    Quadruple per [place]    EGD  06/2017    EYE SURGERY  11/2016    Cataracts    JOINT REPLACEMENT  [id number]    Hip and shoulder    KIDNEY STONE SURGERY      MOHS SURGERY  06/04/2024    [facility] central scalp, Dr Rodríguez    NE CYSTO BLADDER W/URETERAL CATHETERIZATION Left 04/24/2021    Procedure: CYSTOSCOPY  WITH INSERTION STENT URETERAL;  Surgeon: Siddhartha Leslie MD;  Location: [facility];  Service: Urology    NE CYSTO/URETERO W/LITHOTRIPSY &INDWELL STENT INSRT Left 05/21/2021    Procedure: CYSTOSCOPY URETEROSCOPY WITH LITHOTRIPSY HOLMIUM LASER, AND INSERTION STENT URETERAL/EXCHANGE;  Surgeon: Siddhartha Leslie MD;  Location: [facility];  Service: Urology    TOTAL HIP ARTHROPLASTY Right 2012    TOTAL SHOULDER REPLACEMENT Right 2013    VASECTOMY  1971     Social History   Social  History     Substance and Sexual Activity   Alcohol Use Not Currently    Comment: No use     Social History     Substance and Sexual Activity   Drug Use Never    Comment: No use     Social History     Tobacco Use   Smoking Status Former    Current packs/day: 0.00    Average packs/day: 0.3 packs/day for 44.0 years (11.0 ttl pk-yrs)    Types: Cigarettes    Start date: 1965    Quit date: 2010    Years since quittin.6    Passive exposure: Past   Smokeless Tobacco Never   Tobacco Comments    Quit several times for up to 6 years.     Family History   Problem Relation Age of Onset    Heart disease Father     Arthritis Father     Heart attack Father     Alzheimer's disease Mother     Dementia Mother     Diabetes type II Sister        Meds/Allergies       Medications Prior to Admission:     apixaban (Eliquis) 5 mg    atorvastatin (LIPITOR) 40 mg tablet    Continuous Blood Gluc Sensor (FreeStyle Peggy 2 Sensor) MISC    famotidine (PEPCID) 40 MG tablet    metoprolol succinate (TOPROL-XL) 50 mg 24 hr tablet    umeclidinium-vilanterol (Anoro Ellipta) 62.5-25 mcg/actuation inhaler    cholecalciferol (VITAMIN D3) 1,000 units tablet    dicyclomine (BENTYL) 10 mg capsule    digoxin (LANOXIN) 0.125 mg tablet    ergocalciferol (VITAMIN D2) 50,000 units    insulin degludec (Tresiba FlexTouch) 200 units/mL CONCENTRATED U-200 injection pen    Jardiance 25 MG TABS    NovoLOG FlexPen 100 units/mL injection pen    omeprazole (PriLOSEC) 20 mg delayed release capsule    potassium citrate (UROCIT-K) 10 mEq    torsemide (DEMADEX) 20 mg tablet    Unifine Pentips 31G X 8 MM MISC  Current Facility-Administered Medications   Medication Dose Route Frequency    acetaminophen (TYLENOL) tablet 650 mg  650 mg Oral Q6H PRN    atorvastatin (LIPITOR) tablet 40 mg  40 mg Oral Daily    Cholecalciferol (VITAMIN D3) tablet 1,000 Units  1,000 Units Oral Daily    dicyclomine (BENTYL) capsule 10 mg  10 mg Oral BID PRN    digoxin (LANOXIN) tablet 125  "mcg  125 mcg Oral Daily    insulin glargine (LANTUS) subcutaneous injection 80 Units 0.8 mL  80 Units Subcutaneous HS    insulin lispro (HumALOG/ADMELOG) 100 units/mL subcutaneous injection 1-5 Units  1-5 Units Subcutaneous HS    insulin lispro (HumALOG/ADMELOG) 100 units/mL subcutaneous injection 15 Units  15 Units Subcutaneous TID With Meals    insulin lispro (HumALOG/ADMELOG) 100 units/mL subcutaneous injection 2-12 Units  2-12 Units Subcutaneous TID AC    metoprolol succinate (TOPROL-XL) 24 hr tablet 50 mg  50 mg Oral Daily    pantoprazole (PROTONIX) injection 40 mg  40 mg Intravenous Q12H MELISSA    potassium citrate (UROCIT-K) CR tablet 10 mEq  10 mEq Oral BID    torsemide (DEMADEX) tablet 20 mg  20 mg Oral QPM    [START ON 8/5/2024] torsemide (DEMADEX) tablet 40 mg  40 mg Oral QAM    umeclidinium-vilanterol 62.5-25 mcg/actuation inhaler 1 puff  1 puff Inhalation Daily       Allergies   Allergen Reactions    Ciprofloxacin Abdominal Pain, Diarrhea, GI Bleeding and GI Intolerance    Diltiazem Abdominal Pain    Metronidazole Abdominal Pain, Diarrhea, GI Bleeding and GI Intolerance           Objective     Blood pressure 128/59, pulse 71, temperature 98 °F (36.7 °C), temperature source Oral, resp. rate 17, height 6' 3.98\" (1.93 m), weight 128 kg (282 lb), SpO2 94%.      Intake/Output Summary (Last 24 hours) at 8/4/2024 1634  Last data filed at 8/4/2024 1501  Gross per 24 hour   Intake 480 ml   Output 900 ml   Net -420 ml         PHYSICAL EXAM     General Appearance:   Alert, cooperative, no distress, appears stated age    HEENT:   Normocephalic, atraumatic, anicteric.     Neck:  Supple, symmetrical, trachea midline, no adenopathy;    thyroid: no enlargement/tenderness/nodules; no carotid  bruit or JVD    Lungs:   Clear to auscultation bilaterally; no rales, rhonchi or wheezing; respirations unlabored    Heart::   S1 and S2 normal; regular rate and rhythm; no murmur, rub, or gallop.   Abdomen:   Soft, non-tender, " non-distended; normal bowel sounds; no masses, no organomegaly    Genitalia:   Deferred    Rectal:   Deferred    Extremities:  No cyanosis, clubbing or edema    Pulses:  2+ and symmetric all extremities    Skin:  Skin color, texture, turgor normal, no rashes or lesions    Lymph nodes:  No palpable cervical, axillary or inguinal lymphadenopathy        Lab Results:   Admission on 08/04/2024   Component Date Value    Ventricular Rate 08/04/2024 86     Atrial Rate 08/04/2024 85     QRSD Interval 08/04/2024 178     QT Interval 08/04/2024 420     QTC Interval 08/04/2024 502     QRS [place] 08/04/2024 167     T Wave [place] 08/04/2024 24     Ventricular Rate 08/04/2024 83     Atrial Rate 08/04/2024 83     SD Interval 08/04/2024 186     QRSD Interval 08/04/2024 178     QT Interval 08/04/2024 414     QTC Interval 08/04/2024 486     P Axis 08/04/2024 89     QRS [place] 08/04/2024 171     T Wave [place] 08/04/2024 22     WBC 08/04/2024 11.19 (H)     RBC 08/04/2024 4.40     Hemoglobin 08/04/2024 11.5 (L)     Hematocrit 08/04/2024 38.8     MCV 08/04/2024 88     MCH 08/04/2024 26.1 (L)     MCHC 08/04/2024 29.6 (L)     RDW 08/04/2024 16.9 (H)     MPV 08/04/2024 10.7     Platelets 08/04/2024 218     nRBC 08/04/2024 0     Segmented % 08/04/2024 75     Immature Grans % 08/04/2024 1     Lymphocytes % 08/04/2024 16     Monocytes % 08/04/2024 7     Eosinophils Relative 08/04/2024 1     Basophils Relative 08/04/2024 0     Absolute Neutrophils 08/04/2024 8.48 (H)     Absolute Immature Grans 08/04/2024 0.07     Absolute Lymphocytes 08/04/2024 1.79     Absolute Monocytes 08/04/2024 0.74     Eosinophils Absolute 08/04/2024 0.06     Basophils Absolute 08/04/2024 0.05     Sodium 08/04/2024 139     Potassium 08/04/2024 3.5     Chloride 08/04/2024 100     CO2 08/04/2024 29     ANION GAP 08/04/2024 10     BUN 08/04/2024 37 (H)     Creatinine 08/04/2024 1.21     Glucose 08/04/2024 160 (H)     Calcium 08/04/2024 9.1     AST 08/04/2024 12 (L)     ALT 08/04/2024  12     Alkaline Phosphatase 08/04/2024 84     Total Protein 08/04/2024 8.1     Albumin 08/04/2024 3.8     Total Bilirubin 08/04/2024 0.36     eGFR 08/04/2024 56     POC Glucose 08/04/2024 80     POC Glucose 08/04/2024 109        Imaging Studies: I have personally reviewed pertinent reports.                The patient was seen and examined by Dr. Foster, all key medical decisions were made with Dr. Foster.  Thank you for allowing us to participate in the care of this pleasant patient.  We will follow up with you closely.

## 2024-08-04 NOTE — ASSESSMENT & PLAN NOTE
Within acceptable range in ER  Continue home dose metoprolol XL and torsemide with holding parameters

## 2024-08-04 NOTE — ASSESSMENT & PLAN NOTE
Lab Results   Component Value Date    EGFR 56 08/04/2024    EGFR 55 07/11/2024    EGFR 47 03/14/2024    CREATININE 1.21 08/04/2024    CREATININE 1.23 07/11/2024    CREATININE 1.40 (H) 03/14/2024     Baseline creatinine between 1.1-1.4  Currently at baseline  Trend BMP  Continue home dose torsemide

## 2024-08-04 NOTE — ED PROVIDER NOTES
History  Chief Complaint   Patient presents with    Rectal Bleeding     Started with rectal bleeding yesterday morning state it slowed down in the afternoon and started back up today. States slightly SOB, no dizziness  States bright red and fills the toilet up.      Patient is a 79-year-old male with history of left-sided diverticulosis and internal hemorrhoids dx by colonoscopy 7/20/22 presents to the ED with 1 days of gross bloody bowel movements at home.  Patient and family patient has had 3 grossly bloody bowel movements in the last 24 hours with intermittent normal bowel movements in between.  He also reports mild left lower quadrant abdominal pain but denies any chest pain, nausea, vomiting, fever, chills, or shortness of breath beyond his normal COPD for which he only requires supplemental oxygen at night when he sleeps.          Prior to Admission Medications   Prescriptions Last Dose Informant Patient Reported? Taking?   Continuous Blood Gluc Sensor (FreeStyle Peggy 2 Sensor) MISC 8/4/2024 Self No Yes   Sig: Change it every 14 days   Jardiance 25 MG TABS Unknown Self No No   Sig: TAKE ONE TABLET BY MOUTH DAILY IN MORNING   NovoLOG FlexPen 100 units/mL injection pen Unknown Self No No   Sig: Inject 20-25 Units under the skin 3 (three) times a day with meals   Unifine Pentips 31G X 8 MM MISC Unknown Self No No   Sig: inject under the skin daily use as directed   apixaban (Eliquis) 5 mg 8/3/2024  No Yes   Sig: TAKE ONE TABLET BY MOUTH TWICE DAILY   atorvastatin (LIPITOR) 40 mg tablet 8/3/2024  No Yes   Sig: Take 1 tablet (40 mg total) by mouth daily   cholecalciferol (VITAMIN D3) 1,000 units tablet Unknown Self Yes No   Sig: Take 1,000 Units by mouth daily   dicyclomine (BENTYL) 10 mg capsule Unknown Self No No   Sig: Take 1 capsule (10 mg total) by mouth 2 (two) times a day as needed (abdominal cramping)   digoxin (LANOXIN) 0.125 mg tablet  Self No No   Sig: TAKE ONE TABLET BY MOUTH ONCE DAILY    ergocalciferol (VITAMIN D2) 50,000 units  Self No No   Sig: Take 1 capsule (50,000 Units total) by mouth once a week for 8 doses   famotidine (PEPCID) 40 MG tablet 8/4/2024 Self No Yes   Sig: Take 1 tablet (40 mg total) by mouth daily at bedtime   insulin degludec (Tresiba FlexTouch) 200 units/mL CONCENTRATED U-200 injection pen  Self No No   Sig: Inject 95 units under the skin once daily   metoprolol succinate (TOPROL-XL) 50 mg 24 hr tablet 8/4/2024 Self No Yes   Sig: Take 1 tablet (50 mg total) by mouth daily   omeprazole (PriLOSEC) 20 mg delayed release capsule Unknown Self No No   Sig: TAKE ONE CAPSULE BY MOUTH TWICE DAILY   potassium citrate (UROCIT-K) 10 mEq Unknown Self No No   Sig: TAKE ONE TABLET BY MOUTH TWICE DAILY   torsemide (DEMADEX) 20 mg tablet Unknown Self No No   Sig: take two tablets by mouth in the morning and one tablet  at 4pm   umeclidinium-vilanterol (Anoro Ellipta) 62.5-25 mcg/actuation inhaler 8/4/2024  No Yes   Sig: Inhale 1 puff daily      Facility-Administered Medications: None       Past Medical History:   Diagnosis Date    A-fib ([facility])     AAA (abdominal aortic aneurysm) ([facility])     Actinic keratosis of right temple 07/31/2020    Actinic keratosis of scalp 07/31/2020    Acute respiratory failure with hypoxia ([facility]) 03/25/2021    Allergic 1960    Arthritis     Lay's esophagus     Bladder cancer ([facility])     Blister of left leg 04/28/2021    Blister of right leg 05/26/2021    CAD (coronary artery disease)     Cancer ([facility]) 02/2010    Bladder    CHF (congestive heart failure) ([facility])     Chronic low back pain     Chronic pain of both knees 07/31/2020    Colitis 12/04/2020    CPAP (continuous positive airway pressure) dependence     Diabetes ([facility])     Diabetes mellitus ([facility]) 10/1993    Excessive gas 12/03/2020    Heart murmur     History of chemotherapy     Hydroureter on left 04/28/2021    Hyperlipemia     Hypertension     Immunization deficiency 10/30/2020    Hep a nonimmune    Kidney stone      Left lower quadrant abdominal pain 12/03/2020    Mass of right adrenal gland (HCC) 12/04/2020    4.1 cm    Myocardial infarction (HCC)     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    Obesity 2000    LUCIA (obstructive sleep apnea) 01/29/2021    Pressure ulcer of right leg, stage 1 05/26/2021    Squamous cell skin cancer 05/02/2024    central scalp, mohs    Urinary tract infection with hematuria 05/03/2021    u cx 4/2021=pseudomonas R to bactrim and cefdinir, S to cipro    Venous stasis dermatitis of both lower extremities 05/26/2021       Past Surgical History:   Procedure Laterality Date    BACK SURGERY      BLADDER SURGERY      CARDIAC CATHETERIZATION  04/2016    CARDIAC CATHETERIZATION N/A 01/19/2023    Procedure: Cardiac RHC;  Surgeon: Jose Carlos Shea MD;  [facility];  Service: Cardiology    CATARACT EXTRACTION, BILATERAL      COLONOSCOPY  01/29/2019    next 2022 [place]    CORONARY ARTERY BYPASS GRAFT  04/2016    Quadruple per [place]    EGD  06/2017    EYE SURGERY  11/2016    Cataracts    JOINT REPLACEMENT  [id number]    Hip and shoulder    KIDNEY STONE SURGERY      MOHS SURGERY  06/04/2024    SCC central scalp, Dr Rodríguez    CT CYSTO BLADDER W/URETERAL CATHETERIZATION Left 04/24/2021    Procedure: CYSTOSCOPY  WITH INSERTION STENT URETERAL;  Surgeon: Siddhartha Leslie MD;  Location: [facility];  Service: Urology    CT CYSTO/URETERO W/LITHOTRIPSY &INDWELL STENT INSRT Left 05/21/2021    Procedure: CYSTOSCOPY URETEROSCOPY WITH LITHOTRIPSY HOLMIUM LASER, AND INSERTION STENT URETERAL/EXCHANGE;  Surgeon: Siddhartha Leslie MD;  Location: [facility];  Service: Urology    TOTAL HIP ARTHROPLASTY Right 2012    TOTAL SHOULDER REPLACEMENT Right 2013    VASECTOMY  1971       Family History   Problem Relation Age of Onset    Heart disease Father     Arthritis Father     Heart attack Father     Alzheimer's disease Mother     Dementia Mother     Diabetes type II Sister      I have reviewed and agree with the history as  documented.    E-Cigarette/Vaping    E-Cigarette Use Never User      E-Cigarette/Vaping Substances    Nicotine No     THC No     CBD No     Flavoring No     Other No     Unknown No      Social History     Tobacco Use    Smoking status: Former     Current packs/day: 0.00     Average packs/day: 0.3 packs/day for 44.0 years (11.0 ttl pk-yrs)     Types: Cigarettes     Start date: 1965     Quit date: 2010     Years since quittin.6     Passive exposure: Past    Smokeless tobacco: Never    Tobacco comments:     Quit several times for up to 6 years.   Vaping Use    Vaping status: Never Used   Substance Use Topics    Alcohol use: Not Currently     Comment: No use    Drug use: Never     Comment: No use        Review of Systems   Constitutional:  Negative for chills and fever.   HENT:  Negative for sore throat.    Respiratory:  Negative for cough, chest tightness and shortness of breath.    Cardiovascular:  Negative for chest pain and palpitations.   Gastrointestinal:  Positive for abdominal pain and blood in stool. Negative for nausea and vomiting.   Genitourinary:  Negative for dysuria, frequency and hematuria.   Skin:  Negative for rash.   Neurological:  Negative for dizziness, light-headedness and headaches.       Physical Exam  ED Triage Vitals   Temperature Pulse Respirations Blood Pressure SpO2   24 0950 24 0950 24 0950 24 0950 24 0950   98.3 °F (36.8 °C) 91 20 152/70 (!) 89 %      Temp Source Heart Rate Source Patient Position - Orthostatic VS BP Location FiO2 (%)   24 1448 24 1130 -- -- --   Oral Monitor         Pain Score       24 0950       3             Orthostatic Vital Signs  Vitals:    24 1130 24 1330 24 1430 24 1448   BP: 111/55 140/63 128/59 128/59   Pulse: 72 69 71 71       Physical Exam  Vitals and nursing note reviewed.   Constitutional:       General: He is not in acute distress.     Appearance: Normal appearance.   HENT:       Head: Normocephalic and atraumatic.   Eyes:      Extraocular Movements: Extraocular movements intact.      Conjunctiva/sclera: Conjunctivae normal.   Cardiovascular:      Rate and Rhythm: Normal rate and regular rhythm.      Pulses: Normal pulses.      Heart sounds: Normal heart sounds. No murmur heard.  Pulmonary:      Effort: Pulmonary effort is normal. No respiratory distress.      Breath sounds: Normal breath sounds.   Abdominal:      General: There is no distension.      Palpations: Abdomen is soft.      Tenderness: There is abdominal tenderness. There is no guarding or rebound.      Comments: Mild left lower quadrant tenderness to deep palpation.  No guarding or rebound   Musculoskeletal:         General: No tenderness. Normal range of motion.      Cervical back: Normal range of motion.      Right lower leg: Edema present.      Left lower leg: Edema present.      Comments: +1 pitting edema to bilateral lower extremities   Skin:     General: Skin is warm and dry.      Capillary Refill: Capillary refill takes less than 2 seconds.      Coloration: Skin is not pale.      Findings: No rash.   Neurological:      General: No focal deficit present.      Mental Status: He is alert and oriented to person, place, and time. Mental status is at baseline.      Sensory: No sensory deficit.   Psychiatric:         Mood and Affect: Mood normal.         Behavior: Behavior normal.         Thought Content: Thought content normal.         Judgment: Judgment normal.         ED Medications  Medications   pantoprazole (PROTONIX) injection 40 mg (40 mg Intravenous Given 8/4/24 1342)   atorvastatin (LIPITOR) tablet 40 mg (40 mg Oral Not Given 8/4/24 1434)   Cholecalciferol (VITAMIN D3) tablet 1,000 Units (1,000 Units Oral Given 8/4/24 1438)   dicyclomine (BENTYL) capsule 10 mg (has no administration in time range)   digoxin (LANOXIN) tablet 125 mcg (125 mcg Oral Not Given 8/4/24 1437)   insulin glargine (LANTUS) subcutaneous  injection 80 Units 0.8 mL (has no administration in time range)   metoprolol succinate (TOPROL-XL) 24 hr tablet 50 mg (50 mg Oral Not Given 8/4/24 1435)   insulin lispro (HumALOG/ADMELOG) 100 units/mL subcutaneous injection 15 Units (15 Units Subcutaneous Not Given 8/4/24 1619)   potassium citrate (UROCIT-K) CR tablet 10 mEq (has no administration in time range)   torsemide (DEMADEX) tablet 20 mg (has no administration in time range)   umeclidinium-vilanterol 62.5-25 mcg/actuation inhaler 1 puff (1 puff Inhalation Not Given 8/4/24 1436)   insulin lispro (HumALOG/ADMELOG) 100 units/mL subcutaneous injection 2-12 Units ( Subcutaneous Not Given 8/4/24 1619)   insulin lispro (HumALOG/ADMELOG) 100 units/mL subcutaneous injection 1-5 Units (has no administration in time range)   torsemide (DEMADEX) tablet 40 mg (has no administration in time range)   acetaminophen (TYLENOL) tablet 650 mg (has no administration in time range)   iohexol (OMNIPAQUE) 350 MG/ML injection (MULTI-DOSE) 100 mL (100 mL Intravenous Given 8/4/24 1143)       Diagnostic Studies  Results Reviewed       Procedure Component Value Units Date/Time    Fingerstick Glucose (POCT) [[id number]]  (Normal) Collected: 08/04/24 1338    Lab Status: Final result Specimen: Blood Updated: 08/04/24 1340     POC Glucose 80 mg/dl     Comprehensive metabolic panel [[id number]]  (Abnormal) Collected: 08/04/24 1028    Lab Status: Final result Specimen: Blood from Arm, Left Updated: 08/04/24 1105     Sodium 139 mmol/L      Potassium 3.5 mmol/L      Chloride 100 mmol/L      CO2 29 mmol/L      ANION GAP 10 mmol/L      BUN 37 mg/dL      Creatinine 1.21 mg/dL      Glucose 160 mg/dL      Calcium 9.1 mg/dL      AST 12 U/L      ALT 12 U/L      Alkaline Phosphatase 84 U/L      Total Protein 8.1 g/dL      Albumin 3.8 g/dL      Total Bilirubin 0.36 mg/dL      eGFR 56 ml/min/1.73sq m     Narrative:      National Kid[employer] Disease Foundation guidelines for Chronic Kidney Disease (CKD):      Stage 1 with normal or high GFR (GFR > 90 mL/min/1.73 square meters)    Stage 2 Mild CKD (GFR = 60-89 mL/min/1.73 square meters)    Stage 3A Moderate CKD (GFR = 45-59 mL/min/1.73 square meters)    Stage 3B Moderate CKD (GFR = 30-44 mL/min/1.73 square meters)    Stage 4 Severe CKD (GFR = 15-29 mL/min/1.73 square meters)    Stage 5 End Stage CKD (GFR <15 mL/min/1.73 square meters)  Note: GFR calculation is accurate only with a steady state creatinine    CBC and differential [[id number]]  (Abnormal) Collected: 08/04/24 1028    Lab Status: Final result Specimen: Blood from Arm, Left Updated: 08/04/24 1055     WBC 11.19 Thousand/uL      RBC 4.40 Million/uL      Hemoglobin 11.5 g/dL      Hematocrit 38.8 %      MCV 88 fL      MCH 26.1 pg      MCHC 29.6 g/dL      RDW 16.9 %      MPV 10.7 fL      Platelets 218 Thousands/uL      nRBC 0 /100 WBCs      Segmented % 75 %      Immature Grans % 1 %      Lymphocytes % 16 %      Monocytes % 7 %      Eosinophils Relative 1 %      Basophils Relative 0 %      Absolute Neutrophils 8.48 Thousands/µL      Absolute Immature Grans 0.07 Thousand/uL      Absolute Lymphocytes 1.79 Thousands/µL      Absolute Monocytes 0.74 Thousand/µL      Eosinophils Absolute 0.06 Thousand/µL      Basophils Absolute 0.05 Thousands/µL                    CT abdomen pelvis with contrast   Final Result by Bernabe Bennett MD (08/04 1308)      Epiploic appendagitis adjacent to the distal descending colon.         Workstation performed: [id number]               Procedures  ECG 12 Lead Documentation Only    Date/Time: 8/4/2024 11:35 AM    Performed by: Vish Seaman MD  Authorized by: Vish Seaman MD    Indications / Diagnosis:  Shortness of breath, bloody stool, on Eliquis  ECG reviewed by me, the ED Provider: yes    Patient location:  ED  Previous ECG:     Previous ECG:  Compared to current    Comparison ECG info:  November 23, 2023    Similarity:  No change    Comparison to cardiac monitor: Yes     Interpretation:     Interpretation: abnormal    Rate:     ECG rate:  86    ECG rate assessment: normal    Rhythm:     Rhythm: sinus rhythm    Ectopy:     Ectopy: none    QRS:     QRS axis:  Right    QRS intervals:  Wide  Conduction:     Conduction: abnormal      Abnormal conduction: incomplete RBBB    ST segments:     ST segments:  Non-specific    Depression:  V2 and V5  T waves:     T waves: non-specific          ED Course  ED Course as of 08/04/24 1622   Sun Aug 04, 2024   1036 JHOANA performed with RN as chaperone in the room.  Nonbleeding external hemorrhoid noted at the 3 to 7 o'clock position.  JHOANA returned brown fecal matter with no gross blood.  Hemoccult negative   1101 Hemoglobin(!): 11.5  Hemoglobin stable compared to baseline 11.1-11.5.   1119 CMP unremarkable.  Electrolytes within normal limits.  No evidence of LACY   1322 CT suggestive of epiploic appendagitis.   1349 Case discussed with SLIM.  Patient will be admitted for further evaluation                                       Medical Decision Making  Patient is an 79 year old male with hx of diverticulosis and paroxysmal A-fib on Eliquis presenting to the ED with 3 bloody bowel movements in the past 24 hours.    ED course as above    Patient remained vitally stable here in the ED.  Lab work was unremarkable, hemoglobin was stable, JHOANA negative for any gross blood with negative Hemoccult.  CT suggestive of epiploic appendagitis.     Given patient's history of diverticulosis, small comorbidities, and currently being on Eliquis, patient requires further evaluation for possible lower GI bleed.  For, case was discussed with VERÓNICA who agreed to admit the patient.    Plan discussed with patient and family was at bedside.  All are aware and agreeable to the plan.  Patient admitted in stable condition to the care of Dr. Martin.     Amount and/or Complexity of Data Reviewed  Labs: ordered. Decision-making details documented in ED Course.  Radiology:  ordered.    Risk  Prescription drug management.  Decision regarding hospitalization.          Disposition  Final diagnoses:   Acute lower GI bleeding     Time reflects when diagnosis was documented in both MDM as applicable and the Disposition within this note       Time User Action Codes Description Comment    8/4/2024  1:28 PM Hannah Florez Add [K92.2] GI bleed     8/4/2024  1:46 PM Vish Seaman Add [K62.5] Rectal bleeding     8/4/2024  1:46 PM Vish Seaman Remove [K62.5] Rectal bleeding     8/4/2024  1:46 PM Vish Seaman Add [K92.2] Acute lower GI bleeding           ED Disposition       ED Disposition   Admit    Condition   Stable    Date/Time   Sun Aug 4, 2024  1:46 PM    Comment   Case was discussed with Dr. Martin and the patient's admission status was agreed to be Admission Status: inpatient status to the service of Dr. Florez.               Follow-up Information    None         Current Discharge Medication List        CONTINUE these medications which have NOT CHANGED    Details   apixaban (Eliquis) 5 mg TAKE ONE TABLET BY MOUTH TWICE DAILY  Qty: 180 tablet, Refills: 1    Associated Diagnoses: Longstanding persistent atrial fibrillation (HCC)      atorvastatin (LIPITOR) 40 mg tablet Take 1 tablet (40 mg total) by mouth daily  Qty: 90 tablet, Refills: 1    Associated Diagnoses: Coronary artery disease due to lipid rich plaque      Continuous Blood Gluc Sensor (FreeStyle Peggy 2 Sensor) MISC Change it every 14 days  Qty: 1 each, Refills: 0    Comments: Pt is going to use a manufacture coupon  Associated Diagnoses: Type 2 diabetes mellitus with hyperglycemia, with long-term current use of insulin (formerly [facility])      famotidine (PEPCID) 40 MG tablet Take 1 tablet (40 mg total) by mouth daily at bedtime  Qty: 30 tablet, Refills: 5    Associated Diagnoses: Gastroesophageal reflux disease with esophagitis without hemorrhage      metoprolol succinate (TOPROL-XL) 50 mg 24 hr tablet Take 1 tablet (50 mg total) by mouth  daily  Qty: 90 tablet, Refills: 3    Associated Diagnoses: Atrial fibrillation with rapid ventricular response (HCC)      umeclidinium-vilanterol (Anoro Ellipta) 62.5-25 mcg/actuation inhaler Inhale 1 puff daily  Qty: 60 each, Refills: 5    Associated Diagnoses: Chronic obstructive pulmonary disease, unspecified COPD type (HCC)      cholecalciferol (VITAMIN D3) 1,000 units tablet Take 1,000 Units by mouth daily      dicyclomine (BENTYL) 10 mg capsule Take 1 capsule (10 mg total) by mouth 2 (two) times a day as needed (abdominal cramping)  Qty: 60 capsule, Refills: 3    Associated Diagnoses: Left lower quadrant abdominal pain      digoxin (LANOXIN) 0.125 mg tablet TAKE ONE TABLET BY MOUTH ONCE DAILY  Qty: 90 tablet, Refills: 1    Associated Diagnoses: Atrial fibrillation with rapid ventricular response (HCC)      ergocalciferol (VITAMIN D2) 50,000 units Take 1 capsule (50,000 Units total) by mouth once a week for 8 doses  Qty: 8 capsule, Refills: 0    Associated Diagnoses: Vitamin D deficiency      insulin degludec (Tresiba FlexTouch) 200 units/mL CONCENTRATED U-200 injection pen Inject 95 units under the skin once daily  Qty: 15 mL, Refills: 5    Associated Diagnoses: Type 2 diabetes mellitus with hyperglycemia, with long-term current use of insulin ([facility])      Jardiance 25 MG TABS TAKE ONE TABLET BY MOUTH DAILY IN MORNING  Qty: 90 tablet, Refills: 1    Associated Diagnoses: CHF exacerbation ([facility])      NovoLOG FlexPen 100 units/mL injection pen Inject 20-25 Units under the skin 3 (three) times a day with meals  Qty: 30 mL, Refills: 6    Associated Diagnoses: Type 2 diabetes mellitus with hyperglycemia, with long-term current use of insulin ([facility])      omeprazole (PriLOSEC) 20 mg delayed release capsule TAKE ONE CAPSULE BY MOUTH TWICE DAILY  Qty: 180 capsule, Refills: 1    Associated Diagnoses: Gastroesophageal reflux disease, unspecified whether esophagitis present      potassium citrate (UROCIT-K) 10 mEq TAKE ONE  TABLET BY MOUTH TWICE DAILY  Qty: 180 tablet, Refills: 1    Associated Diagnoses: Hypocitraturia      torsemide (DEMADEX) 20 mg tablet take two tablets by mouth in the morning and one tablet  at 4pm  Qty: 120 tablet, Refills: 11    Associated Diagnoses: CHF exacerbation (HCC)      Unifine Pentips 31G X 8 MM MISC inject under the skin daily use as directed  Qty: 100 each, Refills: 0    Associated Diagnoses: Type 2 diabetes mellitus with hyperglycemia, with long-term current use of insulin (HCC)           No discharge procedures on file.    PDMP Review         Value Time User    PDMP Reviewed  Yes 5/21/2021  2:46 PM Siddhartha Leslie MD             ED Provider  Attending physically available and evaluated [facility]. I managed the patient along with the ED Attending.    Electronically Signed by           Vish Seaman MD  08/04/24 7740

## 2024-08-05 ENCOUNTER — APPOINTMENT (INPATIENT)
Dept: GASTROENTEROLOGY | Facility: HOSPITAL | Age: 79
DRG: 378 | End: 2024-08-05
Payer: MEDICARE

## 2024-08-05 ENCOUNTER — ANESTHESIA EVENT (INPATIENT)
Dept: GASTROENTEROLOGY | Facility: HOSPITAL | Age: 79
DRG: 378 | End: 2024-08-05
Payer: MEDICARE

## 2024-08-05 ENCOUNTER — ANESTHESIA (INPATIENT)
Dept: GASTROENTEROLOGY | Facility: HOSPITAL | Age: 79
DRG: 378 | End: 2024-08-05
Payer: MEDICARE

## 2024-08-05 LAB
ANION GAP SERPL CALCULATED.3IONS-SCNC: 7 MMOL/L (ref 4–13)
BASOPHILS # BLD AUTO: 0.03 THOUSANDS/ÂΜL (ref 0–0.1)
BASOPHILS NFR BLD AUTO: 0 % (ref 0–1)
BUN SERPL-MCNC: 31 MG/DL (ref 5–25)
CALCIUM SERPL-MCNC: 8.9 MG/DL (ref 8.4–10.2)
CHLORIDE SERPL-SCNC: 101 MMOL/L (ref 96–108)
CO2 SERPL-SCNC: 30 MMOL/L (ref 21–32)
CREAT SERPL-MCNC: 1.25 MG/DL (ref 0.6–1.3)
EOSINOPHIL # BLD AUTO: 0.1 THOUSAND/ÂΜL (ref 0–0.61)
EOSINOPHIL NFR BLD AUTO: 1 % (ref 0–6)
ERYTHROCYTE [DISTWIDTH] IN BLOOD BY AUTOMATED COUNT: 17.1 % (ref 11.6–15.1)
GFR SERPL CREATININE-BSD FRML MDRD: 54 ML/MIN/1.73SQ M
GLUCOSE SERPL-MCNC: 120 MG/DL (ref 65–140)
GLUCOSE SERPL-MCNC: 130 MG/DL (ref 65–140)
GLUCOSE SERPL-MCNC: 174 MG/DL (ref 65–140)
GLUCOSE SERPL-MCNC: 69 MG/DL (ref 65–140)
GLUCOSE SERPL-MCNC: 73 MG/DL (ref 65–140)
GLUCOSE SERPL-MCNC: 78 MG/DL (ref 65–140)
GLUCOSE SERPL-MCNC: 85 MG/DL (ref 65–140)
HCT VFR BLD AUTO: 36.4 % (ref 36.5–49.3)
HCT VFR BLD AUTO: 37.4 % (ref 36.5–49.3)
HGB BLD-MCNC: 10.9 G/DL (ref 12–17)
HGB BLD-MCNC: 11.1 G/DL (ref 12–17)
IMM GRANULOCYTES # BLD AUTO: 0.06 THOUSAND/UL (ref 0–0.2)
IMM GRANULOCYTES NFR BLD AUTO: 1 % (ref 0–2)
LYMPHOCYTES # BLD AUTO: 1.56 THOUSANDS/ÂΜL (ref 0.6–4.47)
LYMPHOCYTES NFR BLD AUTO: 16 % (ref 14–44)
MCH RBC QN AUTO: 26.7 PG (ref 26.8–34.3)
MCHC RBC AUTO-ENTMCNC: 29.9 G/DL (ref 31.4–37.4)
MCV RBC AUTO: 89 FL (ref 82–98)
MONOCYTES # BLD AUTO: 0.87 THOUSAND/ÂΜL (ref 0.17–1.22)
MONOCYTES NFR BLD AUTO: 9 % (ref 4–12)
NEUTROPHILS # BLD AUTO: 7.28 THOUSANDS/ÂΜL (ref 1.85–7.62)
NEUTS SEG NFR BLD AUTO: 73 % (ref 43–75)
NRBC BLD AUTO-RTO: 0 /100 WBCS
PLATELET # BLD AUTO: 210 THOUSANDS/UL (ref 149–390)
PMV BLD AUTO: 10.4 FL (ref 8.9–12.7)
POTASSIUM SERPL-SCNC: 3.4 MMOL/L (ref 3.5–5.3)
RBC # BLD AUTO: 4.09 MILLION/UL (ref 3.88–5.62)
SODIUM SERPL-SCNC: 138 MMOL/L (ref 135–147)
WBC # BLD AUTO: 9.9 THOUSAND/UL (ref 4.31–10.16)

## 2024-08-05 PROCEDURE — 0DBK8ZX EXCISION OF ASCENDING COLON, VIA NATURAL OR ARTIFICIAL OPENING ENDOSCOPIC, DIAGNOSTIC: ICD-10-PCS | Performed by: INTERNAL MEDICINE

## 2024-08-05 PROCEDURE — 88305 TISSUE EXAM BY PATHOLOGIST: CPT | Performed by: PATHOLOGY

## 2024-08-05 PROCEDURE — 85018 HEMOGLOBIN: CPT | Performed by: INTERNAL MEDICINE

## 2024-08-05 PROCEDURE — 85025 COMPLETE CBC W/AUTO DIFF WBC: CPT | Performed by: INTERNAL MEDICINE

## 2024-08-05 PROCEDURE — 99233 SBSQ HOSP IP/OBS HIGH 50: CPT | Performed by: PHYSICIAN ASSISTANT

## 2024-08-05 PROCEDURE — 80048 BASIC METABOLIC PNL TOTAL CA: CPT | Performed by: INTERNAL MEDICINE

## 2024-08-05 PROCEDURE — 45380 COLONOSCOPY AND BIOPSY: CPT | Performed by: INTERNAL MEDICINE

## 2024-08-05 PROCEDURE — 0DBM8ZX EXCISION OF DESCENDING COLON, VIA NATURAL OR ARTIFICIAL OPENING ENDOSCOPIC, DIAGNOSTIC: ICD-10-PCS | Performed by: INTERNAL MEDICINE

## 2024-08-05 PROCEDURE — 82948 REAGENT STRIP/BLOOD GLUCOSE: CPT

## 2024-08-05 PROCEDURE — 85014 HEMATOCRIT: CPT | Performed by: INTERNAL MEDICINE

## 2024-08-05 RX ORDER — SODIUM CHLORIDE, SODIUM LACTATE, POTASSIUM CHLORIDE, CALCIUM CHLORIDE 600; 310; 30; 20 MG/100ML; MG/100ML; MG/100ML; MG/100ML
INJECTION, SOLUTION INTRAVENOUS CONTINUOUS PRN
Status: DISCONTINUED | OUTPATIENT
Start: 2024-08-05 | End: 2024-08-05

## 2024-08-05 RX ORDER — LIDOCAINE HYDROCHLORIDE 10 MG/ML
INJECTION, SOLUTION EPIDURAL; INFILTRATION; INTRACAUDAL; PERINEURAL AS NEEDED
Status: DISCONTINUED | OUTPATIENT
Start: 2024-08-05 | End: 2024-08-05

## 2024-08-05 RX ORDER — DEXTROSE MONOHYDRATE 25 G/50ML
INJECTION, SOLUTION INTRAVENOUS
Status: COMPLETED
Start: 2024-08-05 | End: 2024-08-05

## 2024-08-05 RX ORDER — PHENYLEPHRINE HCL IN 0.9% NACL 1 MG/10 ML
SYRINGE (ML) INTRAVENOUS AS NEEDED
Status: DISCONTINUED | OUTPATIENT
Start: 2024-08-05 | End: 2024-08-05

## 2024-08-05 RX ORDER — INSULIN GLARGINE 100 [IU]/ML
60 INJECTION, SOLUTION SUBCUTANEOUS
Status: DISCONTINUED | OUTPATIENT
Start: 2024-08-05 | End: 2024-08-06 | Stop reason: HOSPADM

## 2024-08-05 RX ORDER — PROPOFOL 10 MG/ML
INJECTION, EMULSION INTRAVENOUS AS NEEDED
Status: DISCONTINUED | OUTPATIENT
Start: 2024-08-05 | End: 2024-08-05

## 2024-08-05 RX ORDER — KETAMINE HCL IN NACL, ISO-OSM 100MG/10ML
SYRINGE (ML) INJECTION AS NEEDED
Status: DISCONTINUED | OUTPATIENT
Start: 2024-08-05 | End: 2024-08-05

## 2024-08-05 RX ADMIN — PANTOPRAZOLE SODIUM 40 MG: 40 INJECTION, POWDER, FOR SOLUTION INTRAVENOUS at 21:35

## 2024-08-05 RX ADMIN — PROPOFOL 50 MG: 10 INJECTION, EMULSION INTRAVENOUS at 16:57

## 2024-08-05 RX ADMIN — Medication 100 MCG: at 17:08

## 2024-08-05 RX ADMIN — DEXTROSE MONOHYDRATE 50 ML: 25 INJECTION, SOLUTION INTRAVENOUS at 12:57

## 2024-08-05 RX ADMIN — PROPOFOL 100 MCG/KG/MIN: 10 INJECTION, EMULSION INTRAVENOUS at 16:59

## 2024-08-05 RX ADMIN — TORSEMIDE 40 MG: 20 TABLET ORAL at 09:46

## 2024-08-05 RX ADMIN — Medication 10 MG: at 17:14

## 2024-08-05 RX ADMIN — UMECLIDINIUM BROMIDE AND VILANTEROL TRIFENATATE 1 PUFF: 62.5; 25 POWDER RESPIRATORY (INHALATION) at 09:47

## 2024-08-05 RX ADMIN — Medication 10 MG: at 17:30

## 2024-08-05 RX ADMIN — INSULIN GLARGINE 60 UNITS: 100 INJECTION, SOLUTION SUBCUTANEOUS at 21:34

## 2024-08-05 RX ADMIN — Medication 100 MCG: at 17:02

## 2024-08-05 RX ADMIN — LIDOCAINE HYDROCHLORIDE 50 MG: 10 INJECTION, SOLUTION EPIDURAL; INFILTRATION; INTRACAUDAL at 16:57

## 2024-08-05 RX ADMIN — SODIUM CHLORIDE, SODIUM LACTATE, POTASSIUM CHLORIDE, AND CALCIUM CHLORIDE: .6; .31; .03; .02 INJECTION, SOLUTION INTRAVENOUS at 16:27

## 2024-08-05 RX ADMIN — INSULIN LISPRO 1 UNITS: 100 INJECTION, SOLUTION INTRAVENOUS; SUBCUTANEOUS at 21:35

## 2024-08-05 RX ADMIN — TORSEMIDE 20 MG: 20 TABLET ORAL at 18:29

## 2024-08-05 RX ADMIN — PANTOPRAZOLE SODIUM 40 MG: 40 INJECTION, POWDER, FOR SOLUTION INTRAVENOUS at 09:47

## 2024-08-05 RX ADMIN — POTASSIUM CITRATE 10 MEQ: 10 TABLET, EXTENDED RELEASE ORAL at 09:47

## 2024-08-05 RX ADMIN — Medication 10 MG: at 16:57

## 2024-08-05 RX ADMIN — Medication 1000 UNITS: at 09:46

## 2024-08-05 RX ADMIN — ATORVASTATIN CALCIUM 40 MG: 40 TABLET, FILM COATED ORAL at 18:29

## 2024-08-05 RX ADMIN — METOPROLOL SUCCINATE 50 MG: 50 TABLET, EXTENDED RELEASE ORAL at 09:46

## 2024-08-05 RX ADMIN — DIGOXIN 125 MCG: 125 TABLET ORAL at 09:46

## 2024-08-05 RX ADMIN — POTASSIUM CITRATE 10 MEQ: 10 TABLET, EXTENDED RELEASE ORAL at 18:29

## 2024-08-05 NOTE — RESPIRATORY THERAPY NOTE
08/04/24 4209   Respiratory Assessment   Resp Comments pt on bowel prep for procedure tomorrow will not wear cpap hs to facilitate easier access to toilet

## 2024-08-05 NOTE — ASSESSMENT & PLAN NOTE
Lab Results   Component Value Date    EGFR 54 08/05/2024    EGFR 56 08/04/2024    EGFR 55 07/11/2024    CREATININE 1.25 08/05/2024    CREATININE 1.21 08/04/2024    CREATININE 1.23 07/11/2024     Baseline creatinine between 1.1-1.4  Currently at baseline  Trend BMP  Continue home dose torsemide

## 2024-08-05 NOTE — PLAN OF CARE
Problem: PAIN - ADULT  Goal: Verbalizes/displays adequate comfort level or baseline comfort level  Description: Interventions:  - Encourage patient to monitor pain and request assistance  - Assess pain using appropriate pain scale  - Administer analgesics based on type and severity of pain and evaluate response  - Implement non-pharmacological measures as appropriate and evaluate response  - Consider cultural and social influences on pain and pain management  - Notify physician/advanced practitioner if interventions unsuccessful or patient reports new pain  8/5/2024 1139 by Breana Prabhakar  Outcome: Progressing  8/5/2024 1139 by Breana Prabhakar  Outcome: Progressing     Problem: INFECTION - ADULT  Goal: Absence or prevention of progression during hospitalization  Description: INTERVENTIONS:  - Assess and monitor for signs and symptoms of infection  - Monitor lab/diagnostic results  - Monitor all insertion sites, i.e. indwelling lines, tubes, and drains  - Monitor endotracheal if appropriate and nasal secretions for changes in amount and color  - [place] appropriate cooling/warming therapies per order  - Administer medications as ordered  - Instruct and encourage patient and family to use good hand hygiene technique  - Identify and instruct in appropriate isolation precautions for identified infection/condition  8/5/2024 1139 by Breana Prabhakar  Outcome: Progressing  8/5/2024 1139 by Breana Prabhakar  Outcome: Progressing  Goal: Absence of fever/infection during neutropenic period  Description: INTERVENTIONS:  - Monitor WBC    8/5/2024 1139 by Breana Prabhakar  Outcome: Progressing  8/5/2024 1139 by Breana Prabhakar  Outcome: Progressing     Problem: SAFETY ADULT  Goal: Patient will remain free of falls  Description: INTERVENTIONS:  - Educate patient/family on patient safety including physical limitations  - Instruct patient to call for assistance with activity   - Consult OT/PT to assist with strengthening/mobility   - Keep  Call bell within reach  - Keep bed low and locked with side rails adjusted as appropriate  - Keep care items and personal belongings within reach  - Initiate and maintain comfort rounds  - Make Fall Risk Sign visible to staff  - Offer Toileting every 2 Hours, in advance of need  - Initiate/Maintain bed/chair alarm  - Obtain necessary fall risk management equipment  - Apply yellow socks and bracelet for high fall risk patients  - Consider moving patient to room near nurses station  8/5/2024 1139 by Breana Prabhakar  Outcome: Progressing  8/5/2024 1139 by Breana Prabhakar  Outcome: Progressing  Goal: Maintain or return to baseline ADL function  Description: INTERVENTIONS:  -  Assess patient's ability to carry out ADLs; assess patient's baseline for ADL function and identify physical deficits which impact ability to perform ADLs (bathing, care of mouth/teeth, toileting, grooming, dressing, etc.)  - Assess/evaluate cause of self-care deficits   - Assess range of motion  - Assess patient's mobility; develop plan if impaired  - Assess patient's need for assistive devices and provide as appropriate  - Encourage maximum independence but intervene and supervise when necessary  - Involve family in performance of ADLs  - Assess for home care needs following discharge   - Consider OT consult to assist with ADL evaluation and planning for discharge  - Provide patient education as appropriate  8/5/2024 1139 by Breana Prabhakar  Outcome: Progressing  8/5/2024 1139 by Breana Prabhakar  Outcome: Progressing  Goal: Maintains/Returns to pre admission functional level  Description: INTERVENTIONS:  - Perform AM-PAC 6 Click Basic Mobility/ Daily Activity assessment daily.  - Set and communicate daily mobility goal to care team and patient/family/caregiver.   - Collaborate with rehabilitation services on mobility goals if consulted  - Perform Range of Motion 3 times a day.  - Reposition patient every 2 hours.  - Dangle patient 3 times a day  - Stand  patient 3 times a day  - Ambulate patient 3 times a day  - Out of bed to chair 3 times a day   - Out of bed for meals 3 times a day  - Out of bed for toileting  - Record patient progress and toleration of activity level   8/5/2024 1139 by Breana Prabhakar  Outcome: Progressing  8/5/2024 1139 by Breana Prabhakar  Outcome: Progressing     Problem: DISCHARGE PLANNING  Goal: Discharge to home or other facility with appropriate resources  Description: INTERVENTIONS:  - Identify barriers to discharge w/patient and caregiver  - Arrange for needed discharge resources and transportation as appropriate  - Identify discharge learning needs (meds, wound care, etc.)  - Arrange for interpretive services to assist at discharge as needed  - Refer to Case Management Department for coordinating discharge planning if the patient needs post-hospital services based on physician/advanced practitioner order or complex needs related to functional status, cognitive ability, or social support system  8/5/2024 1139 by Breana Prabhakar  Outcome: Progressing  8/5/2024 1139 by Breana Prabhakar  Outcome: Progressing     Problem: GASTROINTESTINAL - ADULT  Goal: Minimal or absence of nausea and/or vomiting  Description: INTERVENTIONS:  - Administer IV fluids if ordered to ensure adequate hydration  - Maintain NPO status until nausea and vomiting are resolved  - Nasogastric tube if ordered  - Administer ordered antiemetic medications as needed  - Provide nonpharmacologic comfort measures as appropriate  - Advance diet as tolerated, if ordered  - Consider nutrition services referral to assist patient with adequate nutrition and appropriate food choices  8/5/2024 1139 by Breana Prabhakar  Outcome: Progressing  8/5/2024 1139 by Breana Prabhakar  Outcome: Progressing  Goal: Maintains or returns to baseline bowel function  Description: INTERVENTIONS:  - Assess bowel function  - Encourage oral fluids to ensure adequate hydration  - Administer IV fluids if ordered to ensure  adequate hydration  - Administer ordered medications as needed  - Encourage mobilization and activity  - Consider nutritional services referral to assist patient with adequate nutrition and appropriate food choices  8/5/2024 1139 by Breana Prabhakar  Outcome: Progressing  8/5/2024 1139 by Breana Prabhakar  Outcome: Progressing  Goal: Maintains adequate nutritional intake  Description: INTERVENTIONS:  - Monitor percentage of each meal consumed  - Identify factors contributing to decreased intake, treat as appropriate  - Assist with meals as needed  - Monitor I&O, weight, and lab values if indicated  - Obtain nutrition services referral as needed  8/5/2024 1139 by Breana Prabhakar  Outcome: Progressing  8/5/2024 1139 by Breana Prabhakar  Outcome: Progressing  Goal: Establish and maintain optimal ostomy function  Description: INTERVENTIONS:  - Assess bowel function  - Encourage oral fluids to ensure adequate hydration  - Administer IV fluids if ordered to ensure adequate hydration   - Administer ordered medications as needed  - Encourage mobilization and activity  - Nutrition services referral to assist patient with appropriate food choices  - Assess stoma site  - Consider wound care consult   8/5/2024 1139 by Breana Prabhakar  Outcome: Progressing  8/5/2024 1139 by Breana Prabhakar  Outcome: Progressing  Goal: Oral mucous membranes remain intact  Description: INTERVENTIONS  - Assess oral mucosa and hygiene practices  - Implement preventative oral hygiene regimen  - Implement oral medicated treatments as ordered  - Initiate Nutrition services referral as needed  8/5/2024 1139 by Breana Prabhakar  Outcome: Progressing  8/5/2024 1139 by Breana Prabhakar  Outcome: Progressing     Problem: Potential for Falls  Goal: Patient will remain free of falls  Description: INTERVENTIONS:  - Educate patient/family on patient safety including physical limitations  - Instruct patient to call for assistance with activity   - Consult OT/PT to assist with  strengthening/mobility   - Keep Call bell within reach  - Keep bed low and locked with side rails adjusted as appropriate  - Keep care items and personal belongings within reach  - Initiate and maintain comfort rounds  - Make Fall Risk Sign visible to staff  - Offer Toileting every 2 Hours, in advance of need  - Initiate/Maintain bed/chair alarm  - Obtain necessary fall risk management equipment  - Apply yellow socks and bracelet for high fall risk patients  - Consider moving patient to room near nurses station  8/5/2024 1139 by Breana Prabhakar  Outcome: Progressing  8/5/2024 1139 by Breana Prabhakar  Outcome: Progressing

## 2024-08-05 NOTE — ANESTHESIA PREPROCEDURE EVALUATION
Hgb 10, EF65%    Procedure:  COLONOSCOPY    Relevant Problems   ANESTHESIA (within normal limits)      CARDIO   (+) AAA (abdominal aortic aneurysm) (HCC)   (+) CAD (coronary artery disease)   (+) Hypertension   (+) Mixed hyperlipidemia   (+) PAF (paroxysmal atrial fibrillation) (HCC)      ENDO   (+) Type 2 diabetes mellitus with hyperglycemia, with long-term current use of insulin (HCC)      GI/HEPATIC   (+) Cirrhosis of liver without ascites (HCC)   (+) GI bleed   (+) Gastroesophageal reflux disease      /RENAL   (+) Benign prostatic hyperplasia without lower urinary tract symptoms   (+) Nephrolithiasis   (+) Stage 3a chronic kidney disease (HCC)      MUSCULOSKELETAL   (+) Arthritis   (+) Chronic low back pain      NEURO/PSYCH   (+) Chronic low back pain      PULMONARY   (+) Chronic obstructive pulmonary disease (HCC)   (+) LUCIA (obstructive sleep apnea)        Physical Exam    Airway    Mallampati score: II  TM Distance: <3 FB  Neck ROM: full     Dental       Cardiovascular  Rate: normal    Pulmonary  Pulmonary exam normal     Other Findings  Per pt denies anything remaining that is loose or removeable      Anesthesia Plan  ASA Score- 3     Anesthesia Type- IV sedation with anesthesia with ASA Monitors.         Additional Monitors:     Airway Plan:     Comment: Per patient, appropriately NPO, denies active CP/SOB/wheezing/symptoms related to heartburn/nausea/vomiting  .       Plan Factors-Exercise tolerance (METS): >4 METS.    Chart reviewed.    Patient summary reviewed.    Patient is not a current smoker.              Induction- intravenous.    Postoperative Plan-     Perioperative Resuscitation Plan - Level 1 - Full Code.       Informed Consent- Anesthetic plan and risks discussed with patient.  I personally reviewed this patient with the CRNA. Discussed and agreed on the Anesthesia Plan with the CRNA..

## 2024-08-05 NOTE — ANESTHESIA POSTPROCEDURE EVALUATION
Post-Op Assessment Note    CV Status:  Stable  Pain Score: 0    Pain management: adequate       Mental Status:  Sleepy   Hydration Status:  Euvolemic   PONV Controlled:  Controlled   Airway Patency:  Patent     Post Op Vitals Reviewed: Yes    No anethesia notable event occurred.    Staff: CRNA   Comments: vss sv nonobstructed uneventful              BP (!) 106/49 (08/05/24 1742)    Temp 98 °F (36.7 °C) (08/05/24 1742)    Pulse 75 (08/05/24 1742)   Resp 14 (08/05/24 1742)    SpO2 99 % (08/05/24 1742)

## 2024-08-05 NOTE — ASSESSMENT & PLAN NOTE
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 Also showed moderate gastritis.   Colonoscopy in 2022: internal hemorrhoids and diverticulosis  Hemoglobin 11.5 (baseline 11-12)     PLAN:  Hold eliquis  BID protonix  GI consulted and planning for colonoscopy today  Trend H and H every 12 hours

## 2024-08-05 NOTE — ASSESSMENT & PLAN NOTE
Wt Readings from Last 3 Encounters:   08/04/24 128 kg (282 lb)   07/05/24 128 kg (282 lb)   06/04/24 126 kg (278 lb 6.4 oz)     Clinically euvolemic  Continue home dose torsemide and metoprolol xl  Hold jardiance for now   Daily weight and I and O

## 2024-08-05 NOTE — PROGRESS NOTES
[facility]  Progress Note  Name: Home Walters I  MRN: [id number]  Unit/Bed#: S -01 I Date of Admission: 8/4/2024   Date of Service: 8/5/2024 I Hospital Day: 1    Assessment & Plan   Epiploic appendagitis  Assessment & Plan  Incidentally noted on CT  CT showed: Epiploic appendagitis adjacent to the distal descending colon.   Patient has no pain or tenderness for me    Stage 3a chronic kidney disease ([facility])  Assessment & Plan  Lab Results   Component Value Date    EGFR 54 08/05/2024    EGFR 56 08/04/2024    EGFR 55 07/11/2024    CREATININE 1.25 08/05/2024    CREATININE 1.21 08/04/2024    CREATININE 1.23 07/11/2024     Baseline creatinine between 1.1-1.4  Currently at baseline  Trend BMP  Continue home dose torsemide    Chronic obstructive pulmonary disease ([facility])  Assessment & Plan  Not in exacerbation  Continue home dose anoro ellipta  Uses 2 L i2 at bedtime due to LUCIA  Continue OP pulmonary follow up    PAF (paroxysmal atrial fibrillation) ([facility])  Assessment & Plan  Continue home dose metoprolol XL and digoxin  Hold eliquis given GI bleed    Chronic diastolic CHF (congestive heart failure) ([facility])  Assessment & Plan  Wt Readings from Last 3 Encounters:   08/04/24 128 kg (282 lb)   07/05/24 128 kg (282 lb)   06/04/24 126 kg (278 lb 6.4 oz)     Clinically euvolemic  Continue home dose torsemide and metoprolol xl  Hold jardiance for now   Daily weight and I and O          LUCIA (obstructive sleep apnea)  Assessment & Plan  Uses cpap and opxygen at night    AAA (abdominal aortic aneurysm) ([facility])  Assessment & Plan  Stable around 5 cm as per last cardiology note  Continued OP monitoring     Type 2 diabetes mellitus with hyperglycemia, with long-term current use of insulin ([facility])  Assessment & Plan  Lab Results   Component Value Date    HGBA1C 7.4 (H) 07/11/2024       Recent Labs     08/04/24  2116 08/05/24  0728 08/05/24  1125 08/05/24  1250   POCGLU 93 85 78 69         Blood Sugar Average: Last  72 hrs:  (P) [id number]    On tresiba, jardiance (for CHF) and novolog at home  Further decreased basal bolus regimen today after episode of symptomatic hypoglycemia given NPO status pdg procedure  ISS and accuchekcs  Hypoglycemia protocol     Hypertension  Assessment & Plan  Within acceptable range in ER  Continue home dose metoprolol XL and torsemide with holding parameters     * GI bleed  Assessment & Plan  3 episodes of trent blood in stool since yesterday  Suspect diverticular bleed  Last dose of eliquis yesterday evening  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 Also showed moderate gastritis.   Colonoscopy in 2022: internal hemorrhoids and diverticulosis  Hemoglobin 11.5 (baseline 11-12)     PLAN:  Hold eliquis  BID protonix  GI consulted and planning for colonoscopy today  Trend H and H every 12 hours         VTE Pharmacologic Prophylaxis: VTE Score: 4 Moderate Risk (Score 3-4) - Pharmacological DVT Prophylaxis Contraindicated. Sequential Compression Devices Ordered.    Mobility:   Basic Mobility Inpatient Raw Score: 24  JH-HLM Goal: 8: Walk 250 feet or more  JH-HLM Achieved: 8: Walk 250 feet ot more  JH-HLM Goal achieved. Continue to encourage appropriate mobility.    Patient Centered Rounds: I performed bedside rounds with nursing staff today.   Discussions with Specialists or Other Care Team Provider: reviewed GI recommendations    Education and Discussions with Family / Patient: Patient declined call to .     Total Time Spent on Date of Encounter in care of patient: 45 mins. This time was spent on one or more of the following: performing physical exam; counseling and coordination of care; obtaining or reviewing history; documenting in the medical record; reviewing/ordering tests, medications or procedures; communicating with other healthcare professionals and discussing with patient's family/caregivers.    Current Length of Stay: 1 day(s)  Current Patient Status:  Inpatient   Certification Statement: The patient will continue to require additional inpatient hospital stay due to pending GI clearance following colonoscopy  Discharge Plan: Anticipate discharge in 24-48 hrs to home.    Code Status: Level 1 - Full Code    Subjective:   Pt feels his stool is clearing of blood and denies any abd pain, nausea or vomiting. He has an appetite. He denies fever or chills.  Has been ambulating with his walker without dizziness or weakness.     Objective:     Vitals:   Temp (24hrs), Av.9 °F (36.6 °C), Min:97.5 °F (36.4 °C), Max:98.3 °F (36.8 °C)    Temp:  [97.5 °F (36.4 °C)-98.3 °F (36.8 °C)] 98.3 °F (36.8 °C)  HR:  [54-71] 70  Resp:  [17-18] 17  BP: (112-134)/(48-59) 128/57  SpO2:  [92 %-97 %] 92 %  Body mass index is 34.34 kg/m².     Input and Output Summary (last 24 hours):     Intake/Output Summary (Last 24 hours) at 2024 1443  Last data filed at 2024 1201  Gross per 24 hour   Intake 480 ml   Output 800 ml   Net -320 ml       Physical Exam:   Physical Exam  Vitals and nursing note reviewed.   Constitutional:       General: He is not in acute distress.     Appearance: Normal appearance. He is normal weight. He is not ill-appearing or toxic-appearing.   HENT:      Head: Normocephalic and atraumatic.      Right Ear: External ear normal.      Left Ear: External ear normal.      Nose: Nose normal.      Mouth/Throat:      Mouth: Mucous membranes are moist.      Pharynx: Oropharynx is clear.   Eyes:      Conjunctiva/sclera: Conjunctivae normal.   Cardiovascular:      Rate and Rhythm: Normal rate and regular rhythm.      Pulses: Normal pulses.      Heart sounds: No murmur heard.     No friction rub. No gallop.   Pulmonary:      Effort: Pulmonary effort is normal. No respiratory distress.      Breath sounds: Normal breath sounds. No stridor. No wheezing, rhonchi or rales.   Abdominal:      General: Abdomen is flat. Bowel sounds are normal. There is no distension.      Palpations:  Abdomen is soft. There is no mass.      Tenderness: There is no abdominal tenderness. There is no guarding or rebound.      Hernia: No hernia is present.   Musculoskeletal:      Cervical back: Normal range of motion.      Right lower leg: No edema.      Left lower leg: No edema.   Skin:     General: Skin is warm and dry.   Neurological:      Mental Status: He is alert. Mental status is at baseline.   Psychiatric:         Mood and Affect: Mood normal.          Additional Data:     Labs:  Results from last 7 days   Lab Units 08/05/24  0511   WBC Thousand/uL 9.90   HEMOGLOBIN g/dL 10.9*   HEMATOCRIT % 36.4*   PLATELETS Thousands/uL 210   SEGS PCT % 73   LYMPHO PCT % 16   MONO PCT % 9   EOS PCT % 1     Results from last 7 days   Lab Units 08/05/24  0511 08/04/24  1028   SODIUM mmol/L 138 139   POTASSIUM mmol/L 3.4* 3.5   CHLORIDE mmol/L 101 100   CO2 mmol/L 30 29   BUN mg/dL 31* 37*   CREATININE mg/dL 1.25 1.21   ANION GAP mmol/L 7 10   CALCIUM mg/dL 8.9 9.1   ALBUMIN g/dL  --  3.8   TOTAL BILIRUBIN mg/dL  --  0.36   ALK PHOS U/L  --  84   ALT U/L  --  12   AST U/L  --  12*   GLUCOSE RANDOM mg/dL 73 160*         Results from last 7 days   Lab Units 08/05/24  1310 08/05/24  1250 08/05/24  1125 08/05/24  0728 08/04/24  2116 08/04/24  1528 08/04/24  1338   POC GLUCOSE mg/dl 130 69 78 85 93 109 80               Lines/Drains:  Invasive Devices       Peripheral Intravenous Line  Duration             Peripheral IV 08/04/24 Left Antecubital 1 day                          Imaging: Reviewed radiology reports from this admission including: abdominal/pelvic CT    Recent Cultures (last 7 days):         Last 24 Hours Medication List:   Current Facility-Administered Medications   Medication Dose Route Frequency Provider Last Rate    acetaminophen  650 mg Oral Q6H PRN Hannah Florez MD      atorvastatin  40 mg Oral Daily Hannah Florez MD      cholecalciferol  1,000 Units Oral Daily Hannah Florez MD       dicyclomine  10 mg Oral BID PRN Hannah Florez MD      digoxin  125 mcg Oral Daily Hannah Florez MD      insulin glargine  60 Units Subcutaneous HS Sue Doss PA-C      insulin lispro  1-5 Units Subcutaneous HS Hannah Florez MD      insulin lispro  2-12 Units Subcutaneous TID AC Hannah Florez MD      metoprolol succinate  50 mg Oral Daily Hannah Florez MD      pantoprazole  40 mg Intravenous Q12H MELISSA Hannah Florez MD      potassium citrate  10 mEq Oral BID Hannah Florez MD      torsemide  20 mg Oral QPM Hannah Florez MD      torsemide  40 mg Oral QAM Hannah Florez MD      umeclidinium-vilanterol  1 puff Inhalation Daily Hannah Florez MD          Today, Patient Was Seen By: Sue Doss PA-C    **Please Note: This note may have been constructed using a voice recognition system.**

## 2024-08-05 NOTE — ASSESSMENT & PLAN NOTE
Lab Results   Component Value Date    HGBA1C 7.4 (H) 07/11/2024       Recent Labs     08/04/24  2116 08/05/24  0728 08/05/24  1125 08/05/24  1250   POCGLU 93 85 78 69         Blood Sugar Average: Last 72 hrs:  (P) 85.08961047524749844    On tresiba, jardiance (for CHF) and novolog at home  Further decreased basal bolus regimen today after episode of symptomatic hypoglycemia given NPO status pdg procedure  ISS and accuchekcs  Hypoglycemia protocol

## 2024-08-06 VITALS
RESPIRATION RATE: 19 BRPM | SYSTOLIC BLOOD PRESSURE: 158 MMHG | HEART RATE: 82 BPM | OXYGEN SATURATION: 93 % | HEIGHT: 76 IN | TEMPERATURE: 97.6 F | WEIGHT: 282 LBS | BODY MASS INDEX: 34.34 KG/M2 | DIASTOLIC BLOOD PRESSURE: 64 MMHG

## 2024-08-06 LAB
ANION GAP SERPL CALCULATED.3IONS-SCNC: 6 MMOL/L (ref 4–13)
ATRIAL RATE: 83 BPM
ATRIAL RATE: 85 BPM
BUN SERPL-MCNC: 31 MG/DL (ref 5–25)
CALCIUM SERPL-MCNC: 9 MG/DL (ref 8.4–10.2)
CHLORIDE SERPL-SCNC: 101 MMOL/L (ref 96–108)
CO2 SERPL-SCNC: 31 MMOL/L (ref 21–32)
CREAT SERPL-MCNC: 1.34 MG/DL (ref 0.6–1.3)
GFR SERPL CREATININE-BSD FRML MDRD: 49 ML/MIN/1.73SQ M
GLUCOSE SERPL-MCNC: 104 MG/DL (ref 65–140)
GLUCOSE SERPL-MCNC: 110 MG/DL (ref 65–140)
GLUCOSE SERPL-MCNC: 118 MG/DL (ref 65–140)
HCT VFR BLD AUTO: 38.1 % (ref 36.5–49.3)
HGB BLD-MCNC: 11.5 G/DL (ref 12–17)
P AXIS: 89 DEGREES
POTASSIUM SERPL-SCNC: 3.5 MMOL/L (ref 3.5–5.3)
PR INTERVAL: 186 MS
QRS AXIS: 167 DEGREES
QRS AXIS: 171 DEGREES
QRSD INTERVAL: 178 MS
QRSD INTERVAL: 178 MS
QT INTERVAL: 414 MS
QT INTERVAL: 420 MS
QTC INTERVAL: 486 MS
QTC INTERVAL: 502 MS
SODIUM SERPL-SCNC: 138 MMOL/L (ref 135–147)
T WAVE AXIS: 22 DEGREES
T WAVE AXIS: 24 DEGREES
VENTRICULAR RATE: 83 BPM
VENTRICULAR RATE: 86 BPM

## 2024-08-06 PROCEDURE — 85014 HEMATOCRIT: CPT | Performed by: INTERNAL MEDICINE

## 2024-08-06 PROCEDURE — 80048 BASIC METABOLIC PNL TOTAL CA: CPT | Performed by: PHYSICIAN ASSISTANT

## 2024-08-06 PROCEDURE — 85018 HEMOGLOBIN: CPT | Performed by: INTERNAL MEDICINE

## 2024-08-06 PROCEDURE — 99239 HOSP IP/OBS DSCHRG MGMT >30: CPT | Performed by: PHYSICIAN ASSISTANT

## 2024-08-06 PROCEDURE — 93010 ELECTROCARDIOGRAM REPORT: CPT | Performed by: INTERNAL MEDICINE

## 2024-08-06 PROCEDURE — 82948 REAGENT STRIP/BLOOD GLUCOSE: CPT

## 2024-08-06 RX ADMIN — DIGOXIN 125 MCG: 125 TABLET ORAL at 09:00

## 2024-08-06 RX ADMIN — Medication 1000 UNITS: at 09:00

## 2024-08-06 RX ADMIN — UMECLIDINIUM BROMIDE AND VILANTEROL TRIFENATATE 1 PUFF: 62.5; 25 POWDER RESPIRATORY (INHALATION) at 09:05

## 2024-08-06 RX ADMIN — APIXABAN 5 MG: 5 TABLET, FILM COATED ORAL at 10:49

## 2024-08-06 RX ADMIN — METOPROLOL SUCCINATE 50 MG: 50 TABLET, EXTENDED RELEASE ORAL at 09:00

## 2024-08-06 RX ADMIN — POTASSIUM CITRATE 10 MEQ: 10 TABLET, EXTENDED RELEASE ORAL at 09:05

## 2024-08-06 RX ADMIN — PANTOPRAZOLE SODIUM 40 MG: 40 INJECTION, POWDER, FOR SOLUTION INTRAVENOUS at 09:00

## 2024-08-06 RX ADMIN — TORSEMIDE 40 MG: 20 TABLET ORAL at 09:00

## 2024-08-06 NOTE — ASSESSMENT & PLAN NOTE
Wt Readings from Last 3 Encounters:   08/04/24 128 kg (282 lb)   07/05/24 128 kg (282 lb)   06/04/24 126 kg (278 lb 6.4 oz)     Clinically euvolemic  Continue home dose torsemide and metoprolol xl  Restart jardiance

## 2024-08-06 NOTE — DISCHARGE SUMMARY
[facility]  Discharge- Home Walters 1945, 79 y.o. 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 Also showed moderate gastritis.   Colonoscopy in 2022: internal hemorrhoids and diverticulosis  Hemoglobin 11.5 (baseline 11-12)   Stable for discharge   Restart eliquis  Call GI with further bleeding or RTED if bleeding and symptomatic     PAF (paroxysmal atrial fibrillation) ([facility])  Assessment & Plan  Stable   Continue home dose metoprolol XL and digoxin  Restart Eliquis     Epiploic appendagitis  Assessment & Plan  Incidentally noted on CT  CT showed: Epiploic appendagitis adjacent to the distal descending colon.   Patient has no pain or tenderness for me    Stage 3a chronic kidney disease (HCC)  Assessment & Plan  Lab Results   Component Value Date    EGFR 49 08/06/2024    EGFR 54 08/05/2024    EGFR 56 08/04/2024    CREATININE 1.34 (H) 08/06/2024    CREATININE 1.25 08/05/2024    CREATININE 1.21 08/04/2024     Baseline creatinine between 1.1-1.4  Currently at baseline  Continue home dose torsemide    LUCIA (obstructive sleep apnea)  Assessment & Plan  Uses cpap and oxygen at night    Type 2 diabetes mellitus with hyperglycemia, with long-term current use of insulin ([facility])  Assessment & Plan  Lab Results   Component Value Date    HGBA1C 7.4 (H) 07/11/2024       Recent Labs     08/05/24  1310 08/05/24  1908 08/05/24  2105 08/06/24  0730   POCGLU 130 120 174* 104         Blood Sugar Average: Last 72 hrs:  (P) 104.2    A1c acceptable   Restart home regimen     Hypertension  Assessment & Plan  BP acceptable  Continue home dose metoprolol XL and torsemide      AAA  (abdominal aortic aneurysm) ([facility])  Assessment & Plan  Stable around 5 cm as per last cardiology note  Continued OP monitoring     Chronic obstructive pulmonary disease ([facility])  Assessment & Plan  Not in exacerbation  Continue home dose anoro ellipta  Uses 2 L i2 at bedtime due to LUCIA  Continue OP pulmonary follow up    Chronic diastolic CHF (congestive heart failure) ([facility])  Assessment & Plan  Wt Readings from Last 3 Encounters:   08/04/24 128 kg (282 lb)   07/05/24 128 kg (282 lb)   06/04/24 126 kg (278 lb 6.4 oz)     Clinically euvolemic  Continue home dose torsemide and metoprolol xl  Restart jardiance              Medical Problems       Resolved Problems  Date Reviewed: 8/6/2024   None       Discharging Physician / Practitioner: Marty Buchanan PA-C  PCP: KACEY Bal  Admission Date:   Admission Orders (From admission, onward)       Ordered        08/04/24 1347  INPATIENT ADMISSION  Once                          Discharge Date: 08/06/24    Consultations During Hospital Stay:  GI - Dr. Harper     Procedures Performed:   CT abdomen pelvis   Colonoscopy     Significant Findings / Test Results:   CT abdomen pelvis: Epiploic appendagitis adjacent to the distal descending colon.   Colonoscopy: The ascending colon and descending colon appeared normal. Performed random biopsy using biopsy forceps. Small hemorrhoids. Brown liquid noted in parts of the colon majority of this was suctioned converting from fair to adequate prep.     Incidental Findings:   None     Test Results Pending at Discharge (will require follow up):   None     Outpatient Tests Requested:  None    Complications:  None    Reason for Admission: GI Bleeding     Hospital Course:   Home Walters is a 79 y.o. male patient who originally presented to the hospital on 8/4/2024 due to GI bleeding. CT scan in the ER was negative, however did show epiploic appendagitis. He was asymptomatic in that regard. He was admitted, Eliquis was held, and GI was  "consulted. Hemoglobin was trended. He under went colonoscopy with brown stool and no signs of bleeding. Suspected that he had hemorrhoidal vs diverticular bleed. Hgb remained stable. He was deemed stable for discharge and Eliquis was restarted. He will follow up with GI if he has further bleeding.             Please see above list of diagnoses and related plan for additional information.     Condition at Discharge: stable    Discharge Day Visit / Exam:   Subjective:  Patient reports feeling well. Denies bleeding. Excited to go home.   Vitals: Blood Pressure: 158/64 (08/06/24 0900)  Pulse: 82 (08/06/24 0900)  Temperature: 97.6 °F (36.4 °C) (08/06/24 0731)  Temp Source: Temporal (08/05/24 1759)  Respirations: 19 (08/06/24 0731)  Height: 6' 3.98\" (193 cm) (08/04/24 1448)  Weight - Scale: 128 kg (282 lb) (08/04/24 1448)  SpO2: 93 % (08/06/24 0731)  Exam:   Physical Exam  Constitutional:       General: He is not in acute distress.     Appearance: Normal appearance. He is overweight. He is not ill-appearing or diaphoretic.      Interventions: Nasal cannula in place.   HENT:      Head: Normocephalic and atraumatic.      Mouth/Throat:      Mouth: Mucous membranes are moist.   Eyes:      General: No scleral icterus.     Pupils: Pupils are equal, round, and reactive to light.   Cardiovascular:      Rate and Rhythm: Normal rate and regular rhythm.      Pulses: Normal pulses.      Heart sounds: Normal heart sounds, S1 normal and S2 normal. No murmur heard.     No systolic murmur is present.      No diastolic murmur is present.      No gallop. No S3 or S4 sounds.   Pulmonary:      Effort: Pulmonary effort is normal. No accessory muscle usage or respiratory distress.      Breath sounds: Normal breath sounds. No stridor. No wheezing, rhonchi or rales.   Chest:      Chest wall: No tenderness.   Abdominal:      General: Bowel sounds are normal. There is no distension.      Palpations: Abdomen is soft.      Tenderness: There is no " abdominal tenderness. There is no guarding.   Musculoskeletal:      Right lower leg: No edema.      Left lower leg: No edema.   Skin:     General: Skin is warm and dry.      Coloration: Skin is not jaundiced.   Neurological:      General: No focal deficit present.      Mental Status: He is alert. Mental status is at baseline.      Motor: No tremor or seizure activity.   Psychiatric:         Behavior: Behavior is cooperative.          Discussion with Family: Patient declined call to .     Discharge instructions/Information to patient and family:   See after visit summary for information provided to patient and family.      Provisions for Follow-Up Care:  See after visit summary for information related to follow-up care and any pertinent home health orders.      Mobility at time of Discharge:   Basic Mobility Inpatient Raw Score: 24  JH-HLM Goal: 8: Walk 250 feet or more  JH-HLM Achieved: 7: Walk 25 feet or more  HLM Goal achieved. Continue to encourage appropriate mobility.     Disposition:   Home    Planned Readmission: None     Discharge Statement:  I spent 45 minutes discharging the patient. This time was spent on the day of discharge. I had direct contact with the patient on the day of discharge. Greater than 50% of the total time was spent examining patient, answering all patient questions, arranging and discussing plan of care with patient as well as directly providing post-discharge instructions.  Additional time then spent on discharge activities.    Discharge Medications:  See after visit summary for reconciled discharge medications provided to patient and/or family.      **Please Note: This note may have been constructed using a voice recognition system**

## 2024-08-06 NOTE — ASSESSMENT & PLAN NOTE
Lab Results   Component Value Date    EGFR 49 08/06/2024    EGFR 54 08/05/2024    EGFR 56 08/04/2024    CREATININE 1.34 (H) 08/06/2024    CREATININE 1.25 08/05/2024    CREATININE 1.21 08/04/2024     Baseline creatinine between 1.1-1.4  Currently at baseline  Continue home dose torsemide

## 2024-08-06 NOTE — ASSESSMENT & PLAN NOTE
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 Also showed moderate gastritis.   Colonoscopy in 2022: internal hemorrhoids and diverticulosis  Hemoglobin 11.5 (baseline 11-12)   Stable for discharge   Restart eliquis  Call GI with further bleeding or RTED if bleeding and symptomatic

## 2024-08-06 NOTE — PLAN OF CARE
Problem: PAIN - ADULT  Goal: Verbalizes/displays adequate comfort level or baseline comfort level  Description: Interventions:  - Encourage patient to monitor pain and request assistance  - Assess pain using appropriate pain scale  - Administer analgesics based on type and severity of pain and evaluate response  - Implement non-pharmacological measures as appropriate and evaluate response  - Consider cultural and social influences on pain and pain management  - Notify physician/advanced practitioner if interventions unsuccessful or patient reports new pain  Outcome: Progressing     Problem: INFECTION - ADULT  Goal: Absence or prevention of progression during hospitalization  Description: INTERVENTIONS:  - Assess and monitor for signs and symptoms of infection  - Monitor lab/diagnostic results  - Monitor all insertion sites, i.e. indwelling lines, tubes, and drains  - Monitor endotracheal if appropriate and nasal secretions for changes in amount and color  - [place] appropriate cooling/warming therapies per order  - Administer medications as ordered  - Instruct and encourage patient and family to use good hand hygiene technique  - Identify and instruct in appropriate isolation precautions for identified infection/condition  Outcome: Progressing  Goal: Absence of fever/infection during neutropenic period  Description: INTERVENTIONS:  - Monitor WBC    Outcome: Progressing     Problem: SAFETY ADULT  Goal: Patient will remain free of falls  Description: INTERVENTIONS:  - Educate patient/family on patient safety including physical limitations  - Instruct patient to call for assistance with activity   - Consult OT/PT to assist with strengthening/mobility   - Keep Call bell within reach  - Keep bed low and locked with side rails adjusted as appropriate  - Keep care items and personal belongings within reach  - Initiate and maintain comfort rounds  - Make Fall Risk Sign visible to staff  - Apply yellow socks and bracelet  for high fall risk patients  - Consider moving patient to room near nurses station  Outcome: Progressing  Goal: Maintain or return to baseline ADL function  Description: INTERVENTIONS:  -  Assess patient's ability to carry out ADLs; assess patient's baseline for ADL function and identify physical deficits which impact ability to perform ADLs (bathing, care of mouth/teeth, toileting, grooming, dressing, etc.)  - Assess/evaluate cause of self-care deficits   - Assess range of motion  - Assess patient's mobility; develop plan if impaired  - Assess patient's need for assistive devices and provide as appropriate  - Encourage maximum independence but intervene and supervise when necessary  - Involve family in performance of ADLs  - Assess for home care needs following discharge   - Consider OT consult to assist with ADL evaluation and planning for discharge  - Provide patient education as appropriate  Outcome: Progressing  Goal: Maintains/Returns to pre admission functional level  Description: INTERVENTIONS:  - Perform AM-PAC 6 Click Basic Mobility/ Daily Activity assessment daily.  - Set and communicate daily mobility goal to care team and patient/family/caregiver.   - Collaborate with rehabilitation services on mobility goals if consulted  - Out of bed for toileting  - Record patient progress and toleration of activity level   Outcome: Progressing     Problem: DISCHARGE PLANNING  Goal: Discharge to home or other facility with appropriate resources  Description: INTERVENTIONS:  - Identify barriers to discharge w/patient and caregiver  - Arrange for needed discharge resources and transportation as appropriate  - Identify discharge learning needs (meds, wound care, etc.)  - Arrange for interpretive services to assist at discharge as needed  - Refer to Case Management Department for coordinating discharge planning if the patient needs post-hospital services based on physician/advanced practitioner order or complex needs  related to functional status, cognitive ability, or social support system  Outcome: Progressing     Problem: GASTROINTESTINAL - ADULT  Goal: Minimal or absence of nausea and/or vomiting  Description: INTERVENTIONS:  - Administer IV fluids if ordered to ensure adequate hydration  - Maintain NPO status until nausea and vomiting are resolved  - Nasogastric tube if ordered  - Administer ordered antiemetic medications as needed  - Provide nonpharmacologic comfort measures as appropriate  - Advance diet as tolerated, if ordered  - Consider nutrition services referral to assist patient with adequate nutrition and appropriate food choices  Outcome: Progressing  Goal: Maintains or returns to baseline bowel function  Description: INTERVENTIONS:  - Assess bowel function  - Encourage oral fluids to ensure adequate hydration  - Administer IV fluids if ordered to ensure adequate hydration  - Administer ordered medications as needed  - Encourage mobilization and activity  - Consider nutritional services referral to assist patient with adequate nutrition and appropriate food choices  Outcome: Progressing  Goal: Maintains adequate nutritional intake  Description: INTERVENTIONS:  - Monitor percentage of each meal consumed  - Identify factors contributing to decreased intake, treat as appropriate  - Assist with meals as needed  - Monitor I&O, weight, and lab values if indicated  - Obtain nutrition services referral as needed  Outcome: Progressing  Goal: Establish and maintain optimal ostomy function  Description: INTERVENTIONS:  - Assess bowel function  - Encourage oral fluids to ensure adequate hydration  - Administer IV fluids if ordered to ensure adequate hydration   - Administer ordered medications as needed  - Encourage mobilization and activity  - Nutrition services referral to assist patient with appropriate food choices  - Assess stoma site  - Consider wound care consult   Outcome: Progressing  Goal: Oral mucous membranes  remain intact  Description: INTERVENTIONS  - Assess oral mucosa and hygiene practices  - Implement preventative oral hygiene regimen  - Implement oral medicated treatments as ordered  - Initiate Nutrition services referral as needed  Outcome: Progressing     Problem: Potential for Falls  Goal: Patient will remain free of falls  Description: INTERVENTIONS:  - Educate patient/family on patient safety including physical limitations  - Instruct patient to call for assistance with activity   - Consult OT/PT to assist with strengthening/mobility   - Keep Call bell within reach  - Keep bed low and locked with side rails adjusted as appropriate  - Keep care items and personal belongings within reach  - Initiate and maintain comfort rounds  - Make Fall Risk Sign visible to staff  - Apply yellow socks and bracelet for high fall risk patients  - Consider moving patient to room near nurses station  Outcome: Progressing

## 2024-08-06 NOTE — ASSESSMENT & PLAN NOTE
Lab Results   Component Value Date    HGBA1C 7.4 (H) 07/11/2024       Recent Labs     08/05/24  1310 08/05/24  1908 08/05/24  2105 08/06/24  0730   POCGLU 130 120 174* 104         Blood Sugar Average: Last 72 hrs:  (P) 104.2    A1c acceptable   Restart home regimen

## 2024-08-07 ENCOUNTER — TRANSITIONAL CARE MANAGEMENT (OUTPATIENT)
Dept: FAMILY MEDICINE CLINIC | Facility: CLINIC | Age: 79
End: 2024-08-07

## 2024-08-07 PROCEDURE — 88305 TISSUE EXAM BY PATHOLOGIST: CPT | Performed by: PATHOLOGY

## 2024-08-21 ENCOUNTER — OFFICE VISIT (OUTPATIENT)
Dept: FAMILY MEDICINE CLINIC | Facility: CLINIC | Age: 79
End: 2024-08-21
Payer: MEDICARE

## 2024-08-21 VITALS
BODY MASS INDEX: 34.34 KG/M2 | HEART RATE: 62 BPM | TEMPERATURE: 98.4 F | SYSTOLIC BLOOD PRESSURE: 118 MMHG | HEIGHT: 76 IN | DIASTOLIC BLOOD PRESSURE: 52 MMHG | RESPIRATION RATE: 17 BRPM | OXYGEN SATURATION: 91 %

## 2024-08-21 DIAGNOSIS — I48.0 PAF (PAROXYSMAL ATRIAL FIBRILLATION) (HCC): ICD-10-CM

## 2024-08-21 DIAGNOSIS — Z09 HOSPITAL DISCHARGE FOLLOW-UP: Primary | ICD-10-CM

## 2024-08-21 DIAGNOSIS — I10 PRIMARY HYPERTENSION: ICD-10-CM

## 2024-08-21 DIAGNOSIS — K92.2 LOWER GI BLEED: ICD-10-CM

## 2024-08-21 DIAGNOSIS — Z79.01 ANTICOAGULATED: ICD-10-CM

## 2024-08-21 DIAGNOSIS — I74.3 EMBOLISM AND THROMBOSIS OF ARTERIES OF THE LOWER EXTREMITIES (HCC): ICD-10-CM

## 2024-08-21 PROCEDURE — 99495 TRANSJ CARE MGMT MOD F2F 14D: CPT | Performed by: NURSE PRACTITIONER

## 2024-08-21 NOTE — PROGRESS NOTES
TCM Call       Date and time call was made  8/7/2024  1:49 PM    Hospital care reviewed  Records reviewed    Patient was hospitialized at  [facility]    Date of Admission  08/04/24    Date of discharge  08/06/24    Diagnosis  GI bleed    Disposition  Home    Were the patients medications reviewed and updated  Yes    Current Symptoms  None          TCM Call       Post hospital issues  None    Should patient be enrolled in anticoag monitoring?  No    Scheduled for follow up?  Yes    Patients specialists  Cardiologist    Cardiologist name  Dr. Thompson    Cardiologist contact #  [phone]    Did you obtain your prescribed medications  Yes    Why were you unable to obtain your medications  Still waiting to hear from specialist    Do you need help managing your prescriptions or medications  No    Is transportation to your appointment needed  No    I have advised the patient to call PCP with any new or worsening symptoms  June Matthew LPN

## 2024-08-21 NOTE — PROGRESS NOTES
TRANSITION OF CARE OFFICE VISIT  [facility] - [facility] PRIMARY CARE [place]    NAME: Home Walters  AGE: 79 y.o. SEX: male  : 1945     DATE: 2024     Assessment and Plan:     1. Hospital discharge follow-up  Comments:  [facility] - with acute lower GI bleed on Eliquis  2. Lower GI bleed  Comments:  Resolved  3. Anticoagulated  4. PAF (paroxysmal atrial fibrillation) (HCC)  5. Primary hypertension  6. Embolism and thrombosis of arteries of the lower extremities (HCC)    The case discussed with patient using patient centered shared decision making.The patient was counseled regarding instructions for management,-- risk factor reductions,-- prognosis,-- impressions,-- risks and benefits of treatment options,-- importance of compliance with treatment. I have reviewed the instructions with the patient, answering all questions to his satisfaction.    Maynor clinically stable today  Rectal bleeding resolved  Colonoscopy report reviewed  We discussed instructions to follow if bleeding were to occur in the future.  Questions answered  He will follow-up with gastroenterology as recommended  He has upcoming appointment with cardiology     Transitional Care Management Review:     Home Walters is a 79 y.o. male here for TCM follow-up    During the TCM phone call patient stated:    TCM Call       Date and time call was made  2024  1:49 PM    Hospital care reviewed  Records reviewed    Patient was hospitialized at  [facility]    Date of Admission  24    Date of discharge  24    Diagnosis  GI bleed    Disposition  Home    Were the patients medications reviewed and updated  Yes    Current Symptoms  None          TCM Call       Post hospital issues  None    Should patient be enrolled in anticoag monitoring?  No    Scheduled for follow up?  Yes    Patients specialists  Cardiologist    Cardiologist name  Dr. Thompson    Cardiologist contact #  [phone]    Did you  obtain your prescribed medications  Yes    Why were you unable to obtain your medications  Still waiting to hear from specialist    Do you need help managing your prescriptions or medications  No    Is transportation to your appointment needed  No    I have advised the patient to call PCP with any new or worsening symptoms  June Matthew LPN             HPI:     79-year-old male with history of paroxysmal atrial fibrillation on Eliquis presents for hospital follow-up  He experienced 3 bloody bowel movements on August 4.  He presented to [facility] emergency department  His Eliquis was held.   GI bleed workup proceeded  Hemoglobin remained stable  He did not require blood transfusion  He had colonoscopy per his gastroenterology team.  There was no evidence of active bleeding.  It was felt he may have had inflamed diverticulum versus bleeding hemorrhoid which resolved  His Eliquis was resumed on discharge  Today he reports that he is back to baseline and feeling well  He has had no further rectal bleeding  History obtained from chart review and the patient.      The following portions of the patient's history were reviewed and updated as appropriate: allergies, current medications, past family history, past medical history, past social history, past surgical history and problem list.     Review of Systems:     Review of Systems     Problem List:     Patient Active Problem List   Diagnosis    Hypertension    Mixed hyperlipidemia    Type 2 diabetes mellitus with hyperglycemia, with long-term current use of insulin (HCC)    Chronic low back pain    CAD (coronary artery disease)    Malignant neoplasm of urinary bladder (HCC)    Arthritis    AAA (abdominal aortic aneurysm) (HCC)    Decreased pedal pulses    Chronic pain of both knees    Actinic keratosis of right temple    Actinic keratosis of scalp    Morbid obesity (HCC)    Adrenal nodule (HCC)    LUCIA (obstructive sleep apnea)    Embolism and thrombosis of  "arteries of the lower extremities (HCC)    Bilateral edema of lower extremity    Chronic diastolic CHF (congestive heart failure) (HCC)    Left upper lobe pulmonary nodule    Hypercalcemia    Nephrolithiasis    Hydroureter on left    Venous stasis dermatitis of both lower extremities    Persistent proteinuria    Hypokalemia    Ureteral calculi    Benign prostatic hyperplasia without lower urinary tract symptoms    PAF (paroxysmal atrial fibrillation) (HCC)    PAD (peripheral artery disease) (HCC)    Gastroesophageal reflux disease    Vitamin D deficiency    Cirrhosis of liver without ascites (HCC)    Chronic acquired lymphedema    Pulmonary hypertension (HCC)    Chronic obstructive pulmonary disease (HCC)    Stage 3a chronic kidney disease (HCC)    Current use of insulin (HCC)    Squamous cell carcinoma of scalp    GI bleed    Epiploic appendagitis        Objective:     /52 (BP Location: Left arm, Patient Position: Sitting, Cuff Size: Large)   Pulse 62   Temp 98.4 °F (36.9 °C) (Temporal)   Resp 17   Ht 6' 3.98\" (1.93 m)   SpO2 91%   BMI 34.34 kg/m²     Physical Exam     Laboratory Results: I have personally reviewed the pertinent laboratory results/reports     Radiology/Other Diagnostic Testing Results: I have personally reviewed pertinent reports.      Colonoscopy    Result Date: 8/5/2024  Table formatting from the original result was not included. [facility] [address] [facility] [address] [phone] DATE OF SERVICE: 8/05/24 PHYSICIAN(S): Attending: Juan Harper MD Fellow: No Staff Documented INDICATION: GI bleed POST-OP DIAGNOSIS: See the impression below. HISTORY: Prior colonoscopy: 5 years ago. BOWEL PREPARATION: Miralax/Dulcolax (GLYCOLAX) PREPROCEDURE: Informed consent was obtained for the procedure, including sedation. Risks including but not limited to bleeding, infection, perforation, adverse drug reaction and aspiration were explained in detail. " Also explained about less than 100% sensitivity with the exam and other alternatives. The patient was placed in the left lateral decubitus position. Procedure: Colonoscopy DETAILS OF PROCEDURE: Patient was taken to the procedure room where a time out was performed to confirm correct patient and correct procedure. The patient underwent monitored anesthesia care, which was administered by an anesthesia professional. The patient's blood pressure, ECG, ETCO2, heart rate, level of consciousness, oxygen and respirations were monitored throughout the procedure. A digital rectal exam was performed. The scope was introduced through the anus and advanced to the cecum. Retroflexion was performed in the rectum. The quality of bowel preparation was evaluated using the [place] Bowel Preparation Scale with scores of: right colon = 2, transverse colon = 2, left colon = 2. The total BBPS score was 6. Bowel prep was adequate. The patient experienced no blood loss. The procedure was not difficult. The patient tolerated the procedure well. There were no apparent adverse events. ANESTHESIA INFORMATION: ASA: III Anesthesia Type: IV Sedation with Anesthesia MEDICATIONS: No administrations occurring from 1653 to 1739 on 08/05/24 FINDINGS: All observed locations appeared normal, including the ascending colon and descending colon. Performed random biopsy using biopsy forceps. Internal small hemorrhoids Brown liquid noted in parts of the colon majority of this was suctioned converting from fair to adequate prep. EVENTS: Procedure Events Event Event Time ENDO CECUM REACHED 8/5/2024  5:25 PM ENDO SCOPE OUT TIME 8/5/2024  5:35 PM SPECIMENS: ID Type Source Tests Collected by Time Destination 1 : bx ascending r/o colitis Tissue Colon TISSUE EXAM Juan Harper MD 8/5/2024  5:28 PM  2 : bx descending  r/o colitis Tissue Colon TISSUE EXAM Juan Harper MD 8/5/2024  5:30 PM  EQUIPMENT: Colonoscope - 3108     The ascending colon and descending  colon appeared normal. Performed random biopsy using biopsy forceps. Small hemorrhoids Brown liquid noted in parts of the colon majority of this was suctioned converting from fair to adequate prep. RECOMMENDATION: No further screening colonoscopies necessary Age greater than 65  May resume anticoagulation. May resume diet. Follow biopsy results in 2 weeks.  Juan Harper MD     CT abdomen pelvis with contrast    Result Date: 8/4/2024  CT ABDOMEN AND PELVIS WITH IV CONTRAST INDICATION: LLQ abdominal pain, bloody bowel movements, hx of diverticulosis. COMPARISON: CT of the abdomen and pelvis 1/17/2023. TECHNIQUE: CT examination of the abdomen and pelvis was performed. Multiplanar 2D reformatted images were created from the source data. This examination, like all CT scans performed in the [facility], was performed utilizing techniques to minimize radiation dose exposure, including the use of iterative reconstruction and automated exposure control. Radiation dose length product (DLP) for this visit: 2343 mGy-cm IV Contrast: 100 mL of iohexol (OMNIPAQUE) Enteric Contrast: Not administered. FINDINGS: ABDOMEN LOWER CHEST: No acute abnormality in the visualized lower chest. Linear scarring/atelectasis and calcifications at the lung bases. Lipomatous hypertrophy of the interatrial septum. LIVER/BILIARY TREE: Unremarkable. GALLBLADDER: No calcified gallstones. No pericholecystic inflammatory change. SPLEEN: Granulomas, otherwise unremarkable. PANCREAS: Unremarkable. ADRENAL GLANDS: Unchanged 4 cm right adrenal gland nodule previously characterized as an adenoma. Normal left adrenal gland. KIDNEYS/URETERS: Bilateral nonobstructing renal calculi or vascular calcifications measuring up to 4 mm. No hydronephrosis. STOMACH AND BOWEL: No disproportionate dilation of the small or large bowel. APPENDIX: No findings to suggest appendicitis. ABDOMINOPELVIC CAVITY: No ascites. No pneumoperitoneum. No  lymphadenopathy. Focal mesenteric stranding adjacent to the distal descending colon in the left hemiabdomen (series 301, image 110; series 601, image 89). Appearance is typical for epiploic appendagitis. VESSELS: Unremarkable for patient's age. PELVIS REPRODUCTIVE ORGANS: Unremarkable for patient's age. URINARY BLADDER: Unremarkable. ABDOMINAL WALL/INGUINAL REGIONS: Unremarkable. BONES: No acute fracture or suspicious osseous lesion.     Epiploic appendagitis adjacent to the distal descending colon. Workstation performed: [id number]        Current Medications:     Outpatient Medications Prior to Visit   Medication Sig Dispense Refill    apixaban (Eliquis) 5 mg TAKE ONE TABLET BY MOUTH TWICE DAILY 180 tablet 1    atorvastatin (LIPITOR) 40 mg tablet Take 1 tablet (40 mg total) by mouth daily 90 tablet 1    cholecalciferol (VITAMIN D3) 1,000 units tablet Take 1,000 Units by mouth daily      Continuous Blood Gluc Sensor (FreeStyle Peggy 2 Sensor) MISC Change it every 14 days 1 each 0    dicyclomine (BENTYL) 10 mg capsule Take 1 capsule (10 mg total) by mouth 2 (two) times a day as needed (abdominal cramping) 60 capsule 3    digoxin (LANOXIN) 0.125 mg tablet TAKE ONE TABLET BY MOUTH ONCE DAILY 90 tablet 1    famotidine (PEPCID) 40 MG tablet Take 1 tablet (40 mg total) by mouth daily at bedtime 30 tablet 5    insulin degludec (Tresiba FlexTouch) 200 units/mL CONCENTRATED U-200 injection pen Inject 95 units under the skin once daily 15 mL 5    Jardiance 25 MG TABS TAKE ONE TABLET BY MOUTH DAILY IN MORNING 90 tablet 1    metoprolol succinate (TOPROL-XL) 50 mg 24 hr tablet Take 1 tablet (50 mg total) by mouth daily 90 tablet 3    NovoLOG FlexPen 100 units/mL injection pen Inject 20-25 Units under the skin 3 (three) times a day with meals 30 mL 6    omeprazole (PriLOSEC) 20 mg delayed release capsule TAKE ONE CAPSULE BY MOUTH TWICE DAILY 180 capsule 1    potassium citrate (UROCIT-K) 10 mEq TAKE ONE TABLET BY MOUTH TWICE DAILY  180 tablet 1    torsemide (DEMADEX) 20 mg tablet take two tablets by mouth in the morning and one tablet  at 4pm 120 tablet 11    umeclidinium-vilanterol (Anoro Ellipta) 62.5-25 mcg/actuation inhaler Inhale 1 puff daily 60 each 5    Unifine Pentips 31G X 8 MM MISC inject under the skin daily use as directed 100 each 0     No facility-administered medications prior to visit.       KACEY Bal  [facility]

## 2024-08-29 ENCOUNTER — OFFICE VISIT (OUTPATIENT)
Dept: PODIATRY | Facility: CLINIC | Age: 79
End: 2024-08-29
Payer: MEDICARE

## 2024-08-29 VITALS
DIASTOLIC BLOOD PRESSURE: 63 MMHG | HEIGHT: 76 IN | OXYGEN SATURATION: 94 % | HEART RATE: 69 BPM | BODY MASS INDEX: 34.34 KG/M2 | SYSTOLIC BLOOD PRESSURE: 117 MMHG

## 2024-08-29 DIAGNOSIS — S80.812A ABRASION OF LEFT LOWER EXTREMITY, INITIAL ENCOUNTER: ICD-10-CM

## 2024-08-29 DIAGNOSIS — B35.1 ONYCHOMYCOSIS: Primary | ICD-10-CM

## 2024-08-29 DIAGNOSIS — Z79.4 TYPE 2 DIABETES MELLITUS WITH HYPERGLYCEMIA, WITH LONG-TERM CURRENT USE OF INSULIN (HCC): ICD-10-CM

## 2024-08-29 DIAGNOSIS — E11.65 TYPE 2 DIABETES MELLITUS WITH HYPERGLYCEMIA, WITH LONG-TERM CURRENT USE OF INSULIN (HCC): ICD-10-CM

## 2024-08-29 DIAGNOSIS — I73.9 PAD (PERIPHERAL ARTERY DISEASE) (HCC): ICD-10-CM

## 2024-08-29 DIAGNOSIS — E11.42 DIABETIC PERIPHERAL NEUROPATHY (HCC): ICD-10-CM

## 2024-08-29 PROCEDURE — 11721 DEBRIDE NAIL 6 OR MORE: CPT | Performed by: PODIATRIST

## 2024-08-29 PROCEDURE — 99213 OFFICE O/P EST LOW 20 MIN: CPT | Performed by: PODIATRIST

## 2024-08-29 NOTE — PROGRESS NOTES
Assessment/Plan:     The patient's clinical examination today significant for thickened and dystrophic pedal nail plates with brittleness, discoloration, and subungual debris consistent with onychomycosis.  There is a superficial abrasion to the left lower leg without any tenderness to palpation.  The wound base is completely granular.  Pedal pulses are nonpalpable bilaterally.  Capillary fill time to the toes is greater than 3 seconds x 10.  Temperature gradient is mildly increased to the bilateral extremities.  The interdigital spaces are clear without maceration.  No pretrophic changes noted to either lower extremity.     The pedal nail plates are sharply debrided with a sterile nail clipper x 10 without complication.  The nails were then mechanically reduced in thickness and girth utilizing a rotary bur.  The left lower leg wound was cleansed and dressed with triple antibiotic ointment and a dry Band-Aid.  Recommend continuing daily local wound care with DC materials at home until healed.    The patient is deemed to be in the high risk category secondary to diminished pedal pulses and dense peripheral neuropathy.  Continue to monitor healing of the left lower leg wound.       The patient will follow-up with me in 3 months for continued at risk diabetic footcare.           Diagnoses and all orders for this visit:    Onychomycosis    PAD (peripheral artery disease) (HCC)    Type 2 diabetes mellitus with hyperglycemia, with long-term current use of insulin ([facility])    Diabetic peripheral neuropathy (HCC) [E11.42]    Abrasion of left lower extremity, initial encounter          Subjective:     Patient ID: Home Walters is a 79 y.o. male.    The patient presents today with a chief complaint of painful elongated pedal nail plates.  The patient has a history of peripheral neuropathy stemming from his diabetes.  He does have a new onset ulceration to the anterior aspect of his left lower leg after bumping into an object  recently.  It is not painful.  He has been leaving it open to air.        Review of Systems   Constitutional: Negative.    HENT: Negative.     Eyes: Negative.    Respiratory: Negative.     Cardiovascular: Negative.    Endocrine: Negative.    Musculoskeletal: Negative.    Skin:  Positive for wound.   Neurological: Negative.    Hematological: Negative.    Psychiatric/Behavioral: Negative.           Objective:     Physical Exam  Vitals reviewed.   Constitutional:       Appearance: Normal appearance.   HENT:      Head: Normocephalic and atraumatic.      Nose: Nose normal.   Eyes:      Conjunctiva/sclera: Conjunctivae normal.      Pupils: Pupils are equal, round, and reactive to light.   Cardiovascular:      Pulses:           Dorsalis pedis pulses are 0 on the right side and 0 on the left side.        Posterior tibial pulses are 0 on the right side and 0 on the left side.   Pulmonary:      Effort: Pulmonary effort is normal.   Musculoskeletal:        Feet:    Feet:      Right foot:      Toenail Condition: Right toenails are abnormally thick and long. Fungal disease present.     Left foot:      Skin integrity: Skin breakdown present. No erythema or warmth.      Toenail Condition: Left toenails are abnormally thick and long. Fungal disease present.  Skin:     General: Skin is warm.      Capillary Refill: Capillary refill takes 2 to 3 seconds.   Neurological:      General: No focal deficit present.      Mental Status: He is alert and oriented to person, place, and time.   Psychiatric:         Mood and Affect: Mood normal.         Behavior: Behavior normal.         Thought Content: Thought content normal.

## 2024-08-31 DIAGNOSIS — E11.65 TYPE 2 DIABETES MELLITUS WITH HYPERGLYCEMIA, WITH LONG-TERM CURRENT USE OF INSULIN (HCC): ICD-10-CM

## 2024-08-31 DIAGNOSIS — Z79.4 TYPE 2 DIABETES MELLITUS WITH HYPERGLYCEMIA, WITH LONG-TERM CURRENT USE OF INSULIN (HCC): ICD-10-CM

## 2024-09-01 RX ORDER — INSULIN ASPART 100 [IU]/ML
INJECTION, SOLUTION INTRAVENOUS; SUBCUTANEOUS
Qty: 30 ML | Refills: 1 | Status: SHIPPED | OUTPATIENT
Start: 2024-09-01

## 2024-09-14 DIAGNOSIS — K21.00 GASTROESOPHAGEAL REFLUX DISEASE WITH ESOPHAGITIS WITHOUT HEMORRHAGE: ICD-10-CM

## 2024-09-16 RX ORDER — FAMOTIDINE 40 MG/1
40 TABLET, FILM COATED ORAL
Qty: 30 TABLET | Refills: 5 | Status: SHIPPED | OUTPATIENT
Start: 2024-09-16

## 2024-10-01 DIAGNOSIS — R82.991 HYPOCITRATURIA: ICD-10-CM

## 2024-10-01 RX ORDER — POTASSIUM CITRATE 10 MEQ/1
10 TABLET, EXTENDED RELEASE ORAL 2 TIMES DAILY
Qty: 180 TABLET | Refills: 1 | Status: SHIPPED | OUTPATIENT
Start: 2024-10-01

## 2024-10-03 ENCOUNTER — OFFICE VISIT (OUTPATIENT)
Dept: PLASTIC SURGERY | Facility: CLINIC | Age: 79
End: 2024-10-03
Payer: MEDICARE

## 2024-10-03 DIAGNOSIS — C44.42 SQUAMOUS CELL CARCINOMA OF SCALP: ICD-10-CM

## 2024-10-03 DIAGNOSIS — L98.9 LESION OF SKIN OF SCALP: ICD-10-CM

## 2024-10-03 DIAGNOSIS — L98.9 SKIN LESION: Primary | ICD-10-CM

## 2024-10-03 PROCEDURE — 88305 TISSUE EXAM BY PATHOLOGIST: CPT | Performed by: PATHOLOGY

## 2024-10-03 PROCEDURE — 11102 TANGNTL BX SKIN SINGLE LES: CPT

## 2024-10-03 PROCEDURE — 99213 OFFICE O/P EST LOW 20 MIN: CPT

## 2024-10-03 PROCEDURE — 11305 SHAVE SKIN LESION 0.5 CM/<: CPT

## 2024-10-04 ENCOUNTER — OFFICE VISIT (OUTPATIENT)
Dept: VASCULAR SURGERY | Facility: CLINIC | Age: 79
End: 2024-10-04
Payer: MEDICARE

## 2024-10-04 VITALS
SYSTOLIC BLOOD PRESSURE: 136 MMHG | HEIGHT: 76 IN | DIASTOLIC BLOOD PRESSURE: 66 MMHG | HEART RATE: 69 BPM | BODY MASS INDEX: 34.34 KG/M2

## 2024-10-04 DIAGNOSIS — I87.2 VENOUS STASIS DERMATITIS OF BOTH LOWER EXTREMITIES: ICD-10-CM

## 2024-10-04 DIAGNOSIS — I48.0 PAF (PAROXYSMAL ATRIAL FIBRILLATION) (HCC): ICD-10-CM

## 2024-10-04 DIAGNOSIS — I71.43 INFRARENAL ABDOMINAL AORTIC ANEURYSM (AAA) WITHOUT RUPTURE (HCC): Primary | ICD-10-CM

## 2024-10-04 DIAGNOSIS — I73.9 PAD (PERIPHERAL ARTERY DISEASE) (HCC): ICD-10-CM

## 2024-10-04 PROCEDURE — 99213 OFFICE O/P EST LOW 20 MIN: CPT | Performed by: SURGERY

## 2024-10-04 NOTE — ASSESSMENT & PLAN NOTE
Right SFA stenosis, remains stable over past 2 scans with lower CROW.  If you develop any wound / ulcer on foot / leg please inform vascular surgeon.

## 2024-10-04 NOTE — ASSESSMENT & PLAN NOTE
I have personally reviewed the CT imaging and my independent interpretation is as follows: one axis is 3.8 cm and other axis is 5.1 cm, remains unchanged from prior scans, heavily calcified plaque.    Continue close follow up every 6 months with ultrasound doppler    He would not be eligible for STAAABLE trial as he had CKD3b and aneurysm measures 5.1 cm in one axis.    Right popliteal artery ectasia to 1 cm stable on 2 ultrasounds from 2020 and 2022.  No further follow up required

## 2024-10-04 NOTE — PROGRESS NOTES
Ambulatory Visit  Name: Home Walters      : 1945      MRN: [id number]  Encounter Provider: Ingrid Encarnacion MD  Encounter Date: 10/4/2024   Enc[facility] [place]    Assessment & Plan  Infrarenal abdominal aortic aneurysm (AAA) without rupture (HCC)  I have personally reviewed the CT imaging and my independent interpretation is as follows: one axis is 3.8 cm and other axis is 5.1 cm, remains unchanged from prior scans, heavily calcified plaque.    Continue close follow up every 6 months with ultrasound doppler    He would not be eligible for STAAABLE trial as he had CKD3b and aneurysm measures 5.1 cm in one axis.    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  No further follow up required         PAF (paroxysmal atrial fibrillation) ([facility])  Recently admitted with GI bleeding   Will discuss lowering dose of ELiquis with Dr. Thompson due to CKD3B    If he has recurrent bleeding, need to discuss with cardiologist about eliquis dosing / Watchman procedure.         PAD (peripheral artery disease) ([facility])  Right SFA stenosis, remains stable over past 2 scans with lower CROW.  If you develop any wound / ulcer on foot / leg please inform vascular surgeon.       Venous stasis dermatitis of both lower extremities  No new wounds,  Using compression stockings and moisturizer treatment daily           History of Present Illness     Home Walters is a 79 y.o. male who presents for evaluation of multiple vascular issues  Health remains stable, walks with walker    Patient presents to review AOIL/ CT abd/ pelvis for known AAA.     History obtained from : patient  Review of Systems   Constitutional: Negative.    HENT: Negative.     Eyes: Negative.    Respiratory: Negative.     Cardiovascular: Negative.    Gastrointestinal: Negative.    Endocrine: Negative.    Genitourinary: Negative.    Musculoskeletal:  Positive for gait problem.   Skin: Negative.     Allergic/Immunologic: Negative.    Hematological: Negative.    Psychiatric/Behavioral: Negative.       Past Medical History   Past Medical History:   Diagnosis Date    A-fib (HCC)     AAA (abdominal aortic aneurysm) ([facility])     Actinic keratosis of right temple 07/31/2020    Actinic keratosis of scalp 07/31/2020    Acute respiratory failure with hypoxia (HCC) 03/25/2021    Allergic 1960    Arthritis     Lay's esophagus     Bladder cancer (HCC)     Blister of left leg 04/28/2021    Blister of right leg 05/26/2021    CAD (coronary artery disease)     Cancer (HCC) 02/2010    Bladder    CHF (congestive heart failure) ([facility])     Chronic low back pain     Chronic pain of both knees 07/31/2020    Colitis 12/04/2020    CPAP (continuous positive airway pressure) dependence     Diabetes (HCC)     Diabetes mellitus ([facility]) 10/1993    Excessive gas 12/03/2020    Heart murmur     History of chemotherapy     Hydroureter on left 04/28/2021    Hyperlipemia     Hypertension     Immunization deficiency 10/30/2020    Hep a nonimmune    Kidney stone     Left lower quadrant abdominal pain 12/03/2020    Mass of right adrenal gland ([facility]) 12/04/2020    4.1 cm    Myocardial infarction ([facility])     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    Obesity 2000    LUCIA (obstructive sleep apnea) 01/29/2021    Pressure ulcer of right leg, stage 1 05/26/2021    Squamous cell skin cancer 05/02/2024    central scalp, mohs    Urinary tract infection with hematuria 05/03/2021    u cx 4/2021=pseudomonas R to bactrim and cefdinir, S to cipro    Venous stasis dermatitis of both lower extremities 05/26/2021     Past Surgical History:   Procedure Laterality Date    BACK SURGERY      BLADDER SURGERY      CARDIAC CATHETERIZATION  04/2016    CARDIAC CATHETERIZATION N/A 01/19/2023    Procedure: Cardiac RHC;  Surgeon: Jose Carlos Shea MD;  [facility];  Service: Cardiology    CATARACT EXTRACTION, BILATERAL      COLONOSCOPY  01/29/2019    next 2022 [place]    CORONARY ARTERY BYPASS GRAFT  04/2016    Quadruple per [place]    EGD  06/2017    EYE SURGERY  11/2016    Cataracts    JOINT REPLACEMENT  [id number]    Hip and shoulder    KIDNEY STONE SURGERY      MOHS SURGERY  06/04/2024    SCC central scalp, Dr Rodríguez    LA CYSTO BLADDER W/URETERAL CATHETERIZATION Left 04/24/2021    Procedure: CYSTOSCOPY  WITH INSERTION STENT URETERAL;  Surgeon: Siddhartha Leslie MD;  Location: [facility];  Service: Urology    LA CYSTO/URETERO W/LITHOTRIPSY &INDWELL STENT INSRT Left 05/21/2021    Procedure: CYSTOSCOPY URETEROSCOPY WITH LITHOTRIPSY HOLMIUM LASER, AND INSERTION STENT URETERAL/EXCHANGE;  Surgeon: Siddhartha Leslie MD;  Location: [facility];  Service: Urology    TOTAL HIP ARTHROPLASTY Right 2012    TOTAL SHOULDER REPLACEMENT Right 2013    VASECTOMY  1971     Family History   Problem Relation Age of Onset    Heart disease Father     Arthritis Father     Heart attack Father     Alzheimer's disease Mother     Dementia Mother     Diabetes type II Sister      Current Outpatient Medications on File Prior to Visit   Medication Sig Dispense Refill    apixaban (Eliquis) 5 mg TAKE ONE TABLET BY MOUTH TWICE DAILY 180 tablet 1    atorvastatin (LIPITOR) 40 mg tablet Take 1 tablet (40 mg total) by mouth daily 90 tablet 1    cholecalciferol (VITAMIN D3) 1,000 units tablet Take 1,000 Units by mouth daily      Continuous Blood Gluc Sensor (FreeStyle Peggy 2 Sensor) MISC Change it every 14 days 1 each 0    dicyclomine (BENTYL) 10 mg capsule Take 1 capsule (10 mg total) by mouth 2 (two) times a day as needed (abdominal cramping) 60 capsule 3    digoxin (LANOXIN) 0.125 mg tablet TAKE ONE TABLET BY MOUTH ONCE DAILY 90 tablet 1    famotidine (PEPCID) 40 MG tablet Take 1 tablet by mouth daily at bedtime 30 tablet 5    insulin degludec (Tresiba FlexTouch) 200 units/mL CONCENTRATED U-200 injection pen Inject 95 units under the skin once daily 15 mL 5    Jardiance 25 MG TABS TAKE ONE TABLET BY MOUTH DAILY IN  MORNING 90 tablet 1    metoprolol succinate (TOPROL-XL) 50 mg 24 hr tablet Take 1 tablet (50 mg total) by mouth daily 90 tablet 3    NovoLOG FlexPen 100 units/mL injection pen Inject 20-25 units under the skin three times daily with meals 30 mL 1    omeprazole (PriLOSEC) 20 mg delayed release capsule TAKE ONE CAPSULE BY MOUTH TWICE DAILY 180 capsule 1    potassium citrate (UROCIT-K) 10 mEq TAKE ONE TABLET BY MOUTH TWICE DAILY 180 tablet 1    torsemide (DEMADEX) 20 mg tablet take two tablets by mouth in the morning and one tablet  at 4pm 120 tablet 11    umeclidinium-vilanterol (Anoro Ellipta) 62.5-25 mcg/actuation inhaler Inhale 1 puff daily 60 each 5    Unifine Pentips 31G X 8 MM MISC inject under the skin daily use as directed 100 each 0     No current facility-administered medications on file prior to visit.     Allergies   Allergen Reactions    Ciprofloxacin Abdominal Pain, Diarrhea, GI Bleeding and GI Intolerance    Diltiazem Abdominal Pain    Metronidazole Abdominal Pain, Diarrhea, GI Bleeding and GI Intolerance          Objective     There were no vitals taken for this visit.    Physical Exam  Vitals and nursing note reviewed.   Constitutional:       Appearance: Normal appearance. He is obese.      Comments: Frail elderly male in wheelchair   Cardiovascular:      Rate and Rhythm: Normal rate and regular rhythm.   Musculoskeletal:      Right lower leg: Edema present.      Left lower leg: Edema present.   Skin:     Capillary Refill: Capillary refill takes less than 2 seconds.   Neurological:      Mental Status: He is alert.   Psychiatric:         Mood and Affect: Mood normal.         Behavior: Behavior normal.

## 2024-10-04 NOTE — ASSESSMENT & PLAN NOTE
Recently admitted with GI bleeding   Will discuss lowering dose of ELiquis with Dr. Thompson due to CKD3B    If he has recurrent bleeding, need to discuss with cardiologist about eliquis dosing / Watchman procedure.

## 2024-10-07 NOTE — PROGRESS NOTES
[facility]  [address], [address], [place] [address]  [phone]    Patient Identification: Home Walters is a 79 y.o. male     History of Present Illness: The patient is a 79 y.o.  year-old male  who presents to the office for a skin lesion on the scalp. Pt had Mohs to the scalp 6/4/2024 for SCC. Pt presents with a new lesion to the scalp that is concerning. He states it is tender and has grown larger in size. He is unsure of how long it has been present. Pt has not established with Dermatology for skin exams yet.    Past Medical History:   Diagnosis Date    A-fib ([facility])     AAA (abdominal aortic aneurysm) ([facility])     Actinic keratosis of right temple 07/31/2020    Actinic keratosis of scalp 07/31/2020    Acute respiratory failure with hypoxia ([facility]) 03/25/2021    Allergic 1960    Arthritis     Lay's esophagus     Bladder cancer ([facility])     Blister of left leg 04/28/2021    Blister of right leg 05/26/2021    CAD (coronary artery disease)     Cancer ([facility]) 02/2010    Bladder    CHF (congestive heart failure) ([facility])     Chronic low back pain     Chronic pain of both knees 07/31/2020    Colitis 12/04/2020    CPAP (continuous positive airway pressure) dependence     Diabetes (HCC)     Diabetes mellitus (HCC) 10/1993    Excessive gas 12/03/2020    Heart murmur     History of chemotherapy     Hydroureter on left 04/28/2021    Hyperlipemia     Hypertension     Immunization deficiency 10/30/2020    Hep a nonimmune    Kidney stone     Left lower quadrant abdominal pain 12/03/2020    Mass of right adrenal gland (HCC) 12/04/2020    4.1 cm    Myocardial infarction ([facility])     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    Obesity 2000    LUCIA (obstructive sleep apnea) 01/29/2021    Pressure ulcer of right leg, stage 1 05/26/2021    Squamous cell skin cancer 05/02/2024    central scalp, mohs    Urinary tract infection with hematuria 05/03/2021    u cx 4/2021=pseudomonas R to bactrim and  "cefdinir, S to cipro    Venous stasis dermatitis of both lower extremities 05/26/2021          Review of Systems  Constitutional: Denies fevers, chills or pain.  Skin: Denies any warmth, erythema, edema or mucopurulent drainage. Lesion to scalp    Physical Exam  Constitutional:AAOx3, well-developed, no distress. Pt is cooperative and pleasant.  Scalp: Healing mohs scar to the central scalp. 3mm raised, keratotic lesion to the right of the scar. See media.     Shave Biopsy    Date/Time: 10/3/2024 12:00 PM    Performed by: Graciela Martini PA-C  Authorized by: Graciela Martini PA-C  Universal Protocol:  Consent: Verbal consent obtained. Written consent obtained.  Risks and benefits: risks, benefits and alternatives were discussed  Consent given by: patient  Time out: Immediately prior to procedure a \"time out\" was called to verify the correct patient, procedure, equipment, support staff and site/side marked as required.  Patient understanding: patient states understanding of the procedure being performed  Patient identity confirmed: verbally with patient    Procedure Details - Lesion Biopsy:     Body area:  Head/neck    Head/neck location:  Scalp    Biopsy method: shave biopsy      Biopsy tissue type: skin    Initial size (mm):  5    Final defect size (mm):  5    Malignancy: malignancy unknown       Area was infiltrated with 3cc xylocaine. Prepped with chlorhexadine. Shave biopsy obtained with 10 blade. Hemostasis achieved. Area dressed with bacitracin and band-aid.       Assessment and Plan:  The patient is an 79 y.o.  year-old male who presents to the office for skin lesion to scalp.      -Details of the procedure and after-care were reviewed with the patient. Lesion will be sent for pathology review, resulting in 7-14 days. Pt will be educated on scar care and use of silicone scar cream in post-operative period.   - Reviewed the risks of the procedure, such as bleeding, infection, asymmetry, contour deformity, " scarring, wound healing problems, anesthesia risks, prolonged numbness of the area as well as possibility of subsequent procedures. All questions were answered at this time. Patient would like to move forward with biopsy.  -Shave biopsy was preformed, patient tolerated well. He is to apply bacitracin and a bandaid to the area daily. Wash with soap and water gently.   -Will call patient with results.  -Code [id number]  -The patient is to call the office with any questions or concerns. All of the patient's questions were answered at this time and they agree with the plan of care.      Graciela Martini PA-C  [facility]

## 2024-10-10 PROCEDURE — 88305 TISSUE EXAM BY PATHOLOGIST: CPT | Performed by: PATHOLOGY

## 2024-10-11 ENCOUNTER — APPOINTMENT (OUTPATIENT)
Dept: LAB | Facility: AMBULARY SURGERY CENTER | Age: 79
End: 2024-10-11
Payer: MEDICARE

## 2024-10-11 DIAGNOSIS — C44.42 SQUAMOUS CELL CARCINOMA OF SCALP: ICD-10-CM

## 2024-10-11 LAB
ALBUMIN SERPL BCG-MCNC: 3.9 G/DL (ref 3.5–5)
ALP SERPL-CCNC: 87 U/L (ref 34–104)
ALT SERPL W P-5'-P-CCNC: 14 U/L (ref 7–52)
ANION GAP SERPL CALCULATED.3IONS-SCNC: 10 MMOL/L (ref 4–13)
AST SERPL W P-5'-P-CCNC: 14 U/L (ref 13–39)
BASOPHILS # BLD AUTO: 0.03 THOUSANDS/ΜL (ref 0–0.1)
BASOPHILS NFR BLD AUTO: 0 % (ref 0–1)
BILIRUB SERPL-MCNC: 0.37 MG/DL (ref 0.2–1)
BUN SERPL-MCNC: 47 MG/DL (ref 5–25)
CALCIUM SERPL-MCNC: 9.7 MG/DL (ref 8.4–10.2)
CHLORIDE SERPL-SCNC: 96 MMOL/L (ref 96–108)
CO2 SERPL-SCNC: 32 MMOL/L (ref 21–32)
CREAT SERPL-MCNC: 1.3 MG/DL (ref 0.6–1.3)
EOSINOPHIL # BLD AUTO: 0.09 THOUSAND/ΜL (ref 0–0.61)
EOSINOPHIL NFR BLD AUTO: 1 % (ref 0–6)
ERYTHROCYTE [DISTWIDTH] IN BLOOD BY AUTOMATED COUNT: 16.1 % (ref 11.6–15.1)
GFR SERPL CREATININE-BSD FRML MDRD: 51 ML/MIN/1.73SQ M
GLUCOSE P FAST SERPL-MCNC: 166 MG/DL (ref 65–99)
HCT VFR BLD AUTO: 42.4 % (ref 36.5–49.3)
HGB BLD-MCNC: 12.3 G/DL (ref 12–17)
IMM GRANULOCYTES # BLD AUTO: 0.04 THOUSAND/UL (ref 0–0.2)
IMM GRANULOCYTES NFR BLD AUTO: 1 % (ref 0–2)
LYMPHOCYTES # BLD AUTO: 1.95 THOUSANDS/ΜL (ref 0.6–4.47)
LYMPHOCYTES NFR BLD AUTO: 25 % (ref 14–44)
MCH RBC QN AUTO: 26.5 PG (ref 26.8–34.3)
MCHC RBC AUTO-ENTMCNC: 29 G/DL (ref 31.4–37.4)
MCV RBC AUTO: 91 FL (ref 82–98)
MONOCYTES # BLD AUTO: 0.65 THOUSAND/ΜL (ref 0.17–1.22)
MONOCYTES NFR BLD AUTO: 9 % (ref 4–12)
NEUTROPHILS # BLD AUTO: 4.91 THOUSANDS/ΜL (ref 1.85–7.62)
NEUTS SEG NFR BLD AUTO: 64 % (ref 43–75)
NRBC BLD AUTO-RTO: 0 /100 WBCS
PLATELET # BLD AUTO: 203 THOUSANDS/UL (ref 149–390)
PMV BLD AUTO: 11.4 FL (ref 8.9–12.7)
POTASSIUM SERPL-SCNC: 4.1 MMOL/L (ref 3.5–5.3)
PROT SERPL-MCNC: 7.8 G/DL (ref 6.4–8.4)
RBC # BLD AUTO: 4.64 MILLION/UL (ref 3.88–5.62)
SODIUM SERPL-SCNC: 138 MMOL/L (ref 135–147)
WBC # BLD AUTO: 7.67 THOUSAND/UL (ref 4.31–10.16)

## 2024-10-11 PROCEDURE — 85025 COMPLETE CBC W/AUTO DIFF WBC: CPT

## 2024-10-11 PROCEDURE — 36415 COLL VENOUS BLD VENIPUNCTURE: CPT

## 2024-10-11 PROCEDURE — 80053 COMPREHEN METABOLIC PANEL: CPT

## 2024-10-15 ENCOUNTER — TELEPHONE (OUTPATIENT)
Dept: PLASTIC SURGERY | Facility: CLINIC | Age: 79
End: 2024-10-15

## 2024-10-15 ENCOUNTER — TELEPHONE (OUTPATIENT)
Dept: DERMATOLOGY | Facility: CLINIC | Age: 79
End: 2024-10-15

## 2024-10-15 DIAGNOSIS — C44.42 SQUAMOUS CELL CARCINOMA OF SCALP: Primary | ICD-10-CM

## 2024-10-15 NOTE — TELEPHONE ENCOUNTER
Called and informed patient of pathology of SCC. Informed patient of [facility] referral. Patient verbalized understanding.

## 2024-10-15 NOTE — TELEPHONE ENCOUNTER
Pre- operative Mohs Telephone Scheduling Note    Do you have a pacemaker, defibrillator, spinal or brain stimulator? no    Do you take antibiotics before skin or dental procedures? no  If yes, will likely require pre-operative antibiotics. Ask  the patient why they take the antibiotics (usually because of joint replacement).    Do you have a history of a joint replacements within the past 2 years? no   If yes, will likely require pre-operative antibiotics. Ask if orthopaedic surgeon has prescribed pre-operative antibiotics to take before procedures/dental work?    Do you take any OTC medications that thin your blood (Aspirin, Aleve, Ibuprofen) or supplements that thin your blood (fish oil, garlic, vitamin E, Ginko Biloba)? no    Do you take any prescribed medications that thin your blood (Coumadin, Plavix, Xarelto, Eliquis or another prescribed blood thinner)? yes: eliquis    Do you have an allergy to lidocaine or epinephrine? no    Do you have allergies to Iodine? no    Do you wear a lidocaine patch? no    Have you ever been diagnosed with HIV, AIDS, Hep B and Hep C? no    Do you use a cane, walker or wheelchair? yes: walker/wheelchair    Is the patient from a nursing home? no If yes, Is there any special accommodations that is needed for patient n/a    Do you smoke? no      If yes,  patient to try and stop 2 days before surgery and 7 days after the surgery. Minimizing smoking as much as possible during this time will improve healing and the cosmetic result after surgery.    Do you use supplemental oxygen? If so, how many liters and can you be off it for a short period of time? N/a    Date scheduled: 11/22 at 12 with Dr. Stephen    Coordination of Care with other provider (Oculoplastics, Plastics, ENT) required? no   IF YES, PLEASE FORWARD TO APPROPRIATE PERSONNEL TO HELP COORDINATE.    Are there remaining tumors to be scheduled? no    Was Prior Authorization obtained? No (please use [email or web address] to  document prior auth)

## 2024-10-15 NOTE — LETTER
Home Walters     1945    [address] [place] [phone]    Dear Home Walters,    You are scheduled to have the MOHS procedure on November 22, 2024 at 12 pm for scalp with Dr.Ryan Stephen. Our office is located in [facility] our address is [address] [place] [address] [address] [place] [address]. Once you arrive please check in with our front staff in [address] and they will escort you to the MOHS waiting room.  If you have someone bringing you to your appointment they may wait in the waiting room or accompany you in your visit.      Below you will find some pre-op instructions along with some information regarding the MOHS procedure.     If you have any questions please call our office at [phone].       Thank you,    [facility]         PRE-OPERATIVE INSTRUCTIONS - MOHS    Before your scheduled surgery, there are a number of important precautions and positive steps you should take to help prepare yourself for a successful treatment and speedy recovery.    Some of the steps, which are listed below, may seem unnecessary and inconvenient, but they are important. For example, when you stop smoking, you increase your ability to heal. Occasionally, there may be valid reasons, personal or medical, why you can't comply. In such cases, please call the office so we can discuss possible ways to overcome any obstacles you may be encountering.    If you have any questions about the surgery, or remember additional medical information that you forgot to mention to our staff, please contact the office prior to your surgery.    GENERAL INFORMATION REGARDING MOHS MICROGRAPHIC SURGERY    Mohs surgery is a specialized technique for the removal of skin cancer developed by Dr. Frederick Mohs over 50 years ago to improve the cure rates of skin cancer. Traditionally, skin cancers are treated by destructive methods (radiation, freezing, scraping, and burning)  or excision (cutting out the tissue with standards margins and sending it to an outside laboratory for testing). These methods all yield cure rates between 65%-94%. However, for cancers located in cosmetically sensitive areas, large tumors, or tumors unsuccessfully treated by other means, Mohs surgery offers a higher cure rate. In most cases, Mohs surgery provides you with a 99% cure rate for primary (previously untreated) basal cell cancer and a 95% cure rate for primary squamous cell cancer. In Mohs surgery, tissue is removed and processed in a way that we are able to check 100% of the margins, giving the highest cure rate for any method of treating skin cancers while providing maximal preservation of normal skin. This allows the surgeon to produce an optimal cosmetic result for the patient by maximizing the amount of tissue removed yielding as small a scar as possible    On the day of surgery, you will be given local anesthesia only (similar to what was given to you during your initial biopsy). You will remain awake. You will verify the location of the skin cancer prior to the onset of the surgery. Once the area is numb, the tissue containing the skin cancer will be removed, taking a small safety margin. This margin is usually smaller than what would be taken with a standard excision. Once the tissue is removed, it is marked and oriented. The first layer (“Stage I”) will be processed in our laboratory. The wound will be treated for bleeding and a bandage will be placed to keep you comfortable while you wait an approximate 45 minutes-1 hour (for the processing of the tissue) in your room. Your Mohs surgeon will examine the pathology in the lab, checking all the margins. If any tumor remains, you will need to take a second layer of skin (“Stage 2”). The area will be re-anesthetized and your Mohs surgeon will remove more skin only in the area where the tumor exists. This process will continue until all the skin  cancer is removed. Unfortunately, there is no method to predict how many layers or stages will be taken.    Once the tumor has been removed completely, we will discuss the best ways to close the defect. Most wounds may be closed with stitches. A larger wound may require a skin graft or a flap. In rare instances, especially for cancers around the eye or for larger cancers, we may work with another surgeon (oculoplastic, ENT, plastics) with special skills to assist with reconstruction.  If the surgery is coordinated with another specialist, you will have the Mohs portion of the procedure first and see the coordinated specialist after the skin cancer has been removed.  Always follow the pre-operative instructions of the surgeon doing the closure.      Medications: Please take all your normal medications the morning of your surgery. If you are a diabetic, please bring your insulin or medications with you, as well as a snack to avoid having low blood sugar during your day with us.    1) Blood Thinners    VERY IMPORTANT: We do NOT stop or hold prescribed blood thinners (such as Coumadin/Warfarin, Plavix, Eliquis, Pradaxa, Brilinta, Apixaban, Xarelto, Lovonox, Rivaroxaban, or Aggrenox) before Mohs surgery. Additionally, If you take aspirin because you have had a stroke, heart attack, heart disease, other condition, or your physician has prescribed you to take it, please continue your aspirin.    Although there is a risk of increased bruising and bleeding, we are still able to safely perform surgery while continuing these medications. Please NEVER stop your prescribed blood thinner without the managing doctor's (the doctor that prescribed the medication) permission or knowledge. If you have any concerns about not holding your blood thinner, please address these with your Mohs surgeon.    Most people should stop all non-steroidal anti-inflammatory medications (Motrin, Naproxen, Advil, Midol, Aleve, etc.) for 7 days prior to  your scheduled surgery and 2 days after (unless instructed otherwise after surgery).  You may take Tylenol for pain.     Vitamins and Supplements  Avoid taking any supplements with Vitamin E, Fish Oil, Gingko, Ginseng, and Garlic for 2 weeks before and 2 days after your surgery. These thin your blood.    Lidocaine Patches  Avoid wearing any over the counter or prescribed Lidocaine patches the day of the surgery.    Alcohol: Avoid drinking alcohol for 2 days prior to your surgery, and for 2 days afterwards (it thins the blood and causes more bruising and swelling).    Smoking: Try to STOP or reduce smoking significantly the week before your surgery, and especially the week afterwards (it greatly improves how well you heal). Tobacco smoke deprives the blood of oxygen, which is urgently needed by the wound during the healing process.    Contact Lenses: Do not wear them on the day of the surgery. Instead, wear glasses and bring your case, in case we need to remove them.    Clothing: Do not wear your nicest clothing on your surgery day. We recommend wearing a button down shirt that will not disrupt your post-operative dressing when changing later that night. Please avoid wearing jeans during the procedure to help prevent damage to our equipment.     Bathing: On the morning of your surgery, you may bathe or shower normally. If you get your hair done on a weekly basis, remember to get your hair washed the day before surgery.   - You will need to keep your surgical site dry for a minimum of 48 hours after surgery.    Makeup: If your surgery is on the face, please do not wear any makeup on the day of the surgery.    Jewelry: Please try to avoid wearing jewelry on the day of surgery.    Food: On the morning of surgery, have breakfast but limit your intake of caffeinated beverages. They are diuretic and may inconvenience you during surgery. If you are following up with another surgeon the same day as your Mohs surgery, you  must receive permission to eat breakfast from that surgeon.      What to bring with you on the day of your surgery:  Bring snacks - Since you could be at the office long, you may bring snacks and/or lunch with you. Some snacks and drinks are available at the office as well.   Bring a sweater - Bring a sweater or jacket that buttons or zips down the front and will not disturb your wound dressing during removal.  Bring something to do - You will be spending much of the day in our office. There will be 45-60 minute waiting periods  between layers/stages, and while there is a television with cable in every room, it is nice to have something to keep you occupied such as books, magazines, knitting, music, or work.     Planning Ahead:  Other Appointments - It is important to realize that no matter how small the skin cancer appears to be, looks can be deceiving. Since your surgery may last the entire day, you should not schedule any other appointments that day.  Special Occasions - Surgery often creates swelling and bruising. Also, the post-op dressing may be rather large and obvious. Keep this in mind as you arrange your social/work schedule. If an important event is already planned, please check with your referring physician or your Mohs surgeon to see if the surgery can be postponed.  Activity Limits after Surgery - If surgery was performed on your face, we recommend that you keep your activity level to a minimum for 2-3 days (the blood pressure elevation related to exercise can lead to bleeding). If you have stitches in an area that will be under tension or significant movement (neck, back, arms, legs), you will need to avoid heavy lifting (anything over 5 lbs) or exercise for at least 2 weeks and possibly longer. We also advise that you limit out of town travel for the first 7 days after surgery. You should also wait at least 7 days before going into a pool or the ocean.  Housework - Since you will need to minimize  activity after surgery, plan to do your groceries, laundry, gardening, and other heavy household chores prior to your surgery. Please make arrangements for assistance during the post-op period. If surgery is around your mouth area, you may need to eat soft foods, such as soup, milkshakes, or yogurt for 48 hours.    Purchasing bandage supplies: Prior to surgery, please purchase the following items to care for your surgical wound properly.  Cotton swabs (Q-tips)  Vaseline or Aquaphor  Telfa pads (or any non-stick dressing)  Paper tape or Hypafix tape  Gauze pads (3x3)    Transportation: It is often reassuring and comforting to have a  drive you to and from the surgery. He or she is welcome to wait in the office during the surgery. If you do not have a , you may drive to and from your procedure (unless stated otherwise). If the site being treated is near your eye, be aware that the final bandage may cause some obstruction of vision.     Rescheduling: If you need to reschedule your surgery, please notify the office as soon as possible.

## 2024-10-17 ENCOUNTER — OFFICE VISIT (OUTPATIENT)
Dept: HEMATOLOGY ONCOLOGY | Facility: CLINIC | Age: 79
End: 2024-10-17
Payer: MEDICARE

## 2024-10-17 VITALS
HEIGHT: 75 IN | HEART RATE: 89 BPM | DIASTOLIC BLOOD PRESSURE: 64 MMHG | SYSTOLIC BLOOD PRESSURE: 118 MMHG | BODY MASS INDEX: 35.25 KG/M2 | OXYGEN SATURATION: 92 % | TEMPERATURE: 97.8 F | RESPIRATION RATE: 18 BRPM

## 2024-10-17 DIAGNOSIS — Z85.828 ENCOUNTER FOR FOLLOW-UP SURVEILLANCE OF SKIN CANCER: ICD-10-CM

## 2024-10-17 DIAGNOSIS — C44.42 SQUAMOUS CELL CARCINOMA OF SCALP: Primary | ICD-10-CM

## 2024-10-17 DIAGNOSIS — Z08 ENCOUNTER FOR FOLLOW-UP SURVEILLANCE OF SKIN CANCER: ICD-10-CM

## 2024-10-17 PROCEDURE — 99214 OFFICE O/P EST MOD 30 MIN: CPT | Performed by: INTERNAL MEDICINE

## 2024-10-17 PROCEDURE — G2211 COMPLEX E/M VISIT ADD ON: HCPCS | Performed by: INTERNAL MEDICINE

## 2024-10-17 NOTE — LETTER
November 10, 2024     KACEY Rogers  [address]  [address]  [place] [facility] [address]    Patient: Home Walters   YOB: 1945   Date of Visit: 10/17/2024       Dear Dr. Fox:    Thank you for referring Home Walters to me for evaluation. Below are my notes for this consultation.    If you have questions, please do not hesitate to call me. I look forward to following your patient along with you.         Sincerely,        Sagrario Spears MD        CC: MD Maryjane Hunter MD Melissa A Wilson, MD  11/10/2024  7:19 PM  Sign when Signing Visit  [facility] [place]  [address] [address]  [phone]  [phone]     Date of Visit: 10/17/2024  Name: Home Walters   YOB: 1945        Subjective    VISIT DIAGNOSIS:  Diagnoses and all orders for this visit:    Squamous cell carcinoma of scalp  -     CBC and differential; Future  -     Comprehensive metabolic panel; Future    Encounter for follow-up surveillance of skin cancer          HISTORY OF PRESENT ILLNESS: Home Walters is a 79 y.o. male  who has squamous cell carcinoma on his scalp status post Mohs procedure unfortunately 3 months ago here for continued monitoring, follow-up and surveillance given the high risk nature of his lesion.    Has a new lesion on his scalp that was recently biopsied by plastic surgery demonstrate squamous cell carcinoma.  Needs to establish care with dermatology for routine exams.  Denies any additional skin lesions.  Denies any lymphadenopathy.       REVIEW OF SYSTEMS:  Review of Systems   Constitutional:  Negative for appetite change, fatigue, fever and unexpected weight change.   HENT:   Negative for lump/mass, trouble swallowing and voice change.    Eyes:  Negative for icterus.   Respiratory:  Negative for cough, shortness of breath and wheezing.    Cardiovascular:  Negative for leg swelling.   Gastrointestinal:  Negative for  abdominal pain, constipation, diarrhea, nausea and vomiting.   Genitourinary:  Negative for difficulty urinating and hematuria.    Musculoskeletal:  Negative for arthralgias, gait problem and myalgias.   Skin:  Negative for itching and rash.        + lesion on his scalp.  No additional new, changing, or concerning lesions.   Neurological:  Negative for extremity weakness, gait problem, headaches, light-headedness and numbness.   Hematological:  Negative for adenopathy.        MEDICATIONS:    Current Outpatient Medications:   •  apixaban (Eliquis) 5 mg, TAKE ONE TABLET BY MOUTH TWICE DAILY, Disp: 180 tablet, Rfl: 1  •  atorvastatin (LIPITOR) 40 mg tablet, Take 1 tablet (40 mg total) by mouth daily, Disp: 90 tablet, Rfl: 1  •  cholecalciferol (VITAMIN D3) 1,000 units tablet, Take 1,000 Units by mouth daily, Disp: , Rfl:   •  Continuous Blood Gluc Sensor (FreeStyle Peggy 2 Sensor) MISC, Change it every 14 days, Disp: 1 each, Rfl: 0  •  dicyclomine (BENTYL) 10 mg capsule, Take 1 capsule (10 mg total) by mouth 2 (two) times a day as needed (abdominal cramping), Disp: 60 capsule, Rfl: 3  •  famotidine (PEPCID) 40 MG tablet, Take 1 tablet by mouth daily at bedtime, Disp: 30 tablet, Rfl: 5  •  metoprolol succinate (TOPROL-XL) 50 mg 24 hr tablet, Take 1 tablet (50 mg total) by mouth daily, Disp: 90 tablet, Rfl: 3  •  NovoLOG FlexPen 100 units/mL injection pen, Inject 20-25 units under the skin three times daily with meals, Disp: 30 mL, Rfl: 1  •  omeprazole (PriLOSEC) 20 mg delayed release capsule, TAKE ONE CAPSULE BY MOUTH TWICE DAILY, Disp: 180 capsule, Rfl: 1  •  potassium citrate (UROCIT-K) 10 mEq, TAKE ONE TABLET BY MOUTH TWICE DAILY, Disp: 180 tablet, Rfl: 1  •  torsemide (DEMADEX) 20 mg tablet, take two tablets by mouth in the morning and one tablet  at 4pm, Disp: 120 tablet, Rfl: 11  •  umeclidinium-vilanterol (Anoro Ellipta) 62.5-25 mcg/actuation inhaler, Inhale 1 puff daily, Disp: 60 each, Rfl: 5  •  Unifine  Pentips 31G X 8 MM MISC, inject under the skin daily use as directed, Disp: 100 each, Rfl: 0  •  digoxin (LANOXIN) 0.125 mg tablet, TAKE ONE TABLET BY MOUTH ONCE DAILY, Disp: 90 tablet, Rfl: 0  •  Empagliflozin (Jardiance) 25 MG TABS, TAKE ONE TABLET BY MOUTH DAILY IN MORNING, Disp: 90 tablet, Rfl: 1  •  Tresiba FlexTouch 200 units/mL CONCENTRATED U-200 injection pen, Inject 95 units under the skin once daily, Disp: 15 mL, Rfl: 0     ALLERGIES:  Allergies   Allergen Reactions   • Ciprofloxacin Abdominal Pain, Diarrhea, GI Bleeding and GI Intolerance   • Diltiazem Abdominal Pain   • Metronidazole Abdominal Pain, Diarrhea, GI Bleeding and GI Intolerance        ACTIVE PROBLEMS:  Patient Active Problem List   Diagnosis   • Hypertension   • Mixed hyperlipidemia   • Type 2 diabetes mellitus with hyperglycemia, with long-term current use of insulin ([facility])   • Chronic low back pain   • CAD (coronary artery disease)   • Malignant neoplasm of urinary bladder (HCC)   • Arthritis   • AAA (abdominal aortic aneurysm) ([facility])   • Decreased pedal pulses   • Chronic pain of both knees   • Actinic keratosis of right temple   • Actinic keratosis of scalp   • Morbid obesity (HCC)   • Adrenal nodule (HCC)   • LUCIA (obstructive sleep apnea)   • Bilateral edema of lower extremity   • Chronic diastolic CHF (congestive heart failure) (HCC)   • Left upper lobe pulmonary nodule   • Hypercalcemia   • Nephrolithiasis   • Hydroureter on left   • Venous stasis dermatitis of both lower extremities   • Persistent proteinuria   • Hypokalemia   • Ureteral calculi   • Benign prostatic hyperplasia without lower urinary tract symptoms   • PAF (paroxysmal atrial fibrillation) (HCC)   • PAD (peripheral artery disease) (HCC)   • Gastroesophageal reflux disease   • Vitamin D deficiency   • Cirrhosis of liver without ascites (HCC)   • Chronic acquired lymphedema   • Pulmonary hypertension (HCC)   • Chronic obstructive pulmonary disease (HCC)   • Stage 3a chronic  kidney disease (HCC)   • Current use of insulin (HCC)   • Squamous cell carcinoma of scalp   • GI bleed   • Epiploic appendagitis          PAST MEDICAL HISTORY:   Past Medical History:   Diagnosis Date   • A-fib (HCC)    • AAA (abdominal aortic aneurysm) (HCC)    • Actinic keratosis of right temple 07/31/2020   • Actinic keratosis of scalp 07/31/2020   • Acute respiratory failure with hypoxia (HCC) 03/25/2021   • Allergic 1960   • Arthritis    • Lay's esophagus    • Bladder cancer (HCC)    • Blister of left leg 04/28/2021   • Blister of right leg 05/26/2021   • CAD (coronary artery disease)    • Cancer (HCC) 02/2010    Bladder   • CHF (congestive heart failure) (HCC)    • Chronic low back pain    • Chronic pain of both knees 07/31/2020   • Colitis 12/04/2020   • CPAP (continuous positive airway pressure) dependence    • Diabetes (HCC)    • Diabetes mellitus (HCC) 10/1993   • Excessive gas 12/03/2020   • Heart murmur    • History of chemotherapy    • Hydroureter on left 04/28/2021   • Hyperlipemia    • Hypertension    • Immunization deficiency 10/30/2020    Hep a nonimmune   • Kidney stone    • Left lower quadrant abdominal pain 12/03/2020   • Mass of right adrenal gland (HCC) 12/04/2020    4.1 cm   • Myocardial infarction (HCC)    • 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   • Obesity 2000   • LUCIA (obstructive sleep apnea) 01/29/2021   • Pressure ulcer of right leg, stage 1 05/26/2021   • Squamous cell skin cancer 05/02/2024    central scalp, mohs   • Urinary tract infection with hematuria 05/03/2021    u cx 4/2021=pseudomonas R to bactrim and cefdinir, S to cipro   • Venous stasis dermatitis of both lower extremities 05/26/2021        PAST SURGICAL HISTORY:  Past Surgical History:   Procedure Laterality Date   • BACK SURGERY     • BLADDER SURGERY     • CARDIAC CATHETERIZATION  04/2016   • CARDIAC CATHETERIZATION N/A 01/19/2023    Procedure: Cardiac RHC;  Surgeon: Jose Carlos Shea MD;  [facility];   Service: Cardiology   • CATARACT EXTRACTION, BILATERAL     • COLONOSCOPY  2019    next  [place]   • CORONARY ARTERY BYPASS GRAFT  2016    Quadruple per [place]   • EGD  2017   • EYE SURGERY  2016    Cataracts   • JOINT REPLACEMENT  [id number]    Hip and shoulder   • KIDNEY STONE SURGERY     • MOHS SURGERY  2024    [facility], Dr Rodríguez   • MO CYSTO BLADDER W/URETERAL CATHETERIZATION Left 2021    Procedure: CYSTOSCOPY  WITH INSERTION STENT URETERAL;  Surgeon: Siddhartha Leslie MD;  Location: [facility];  Service: Urology   • MO CYSTO/URETERO W/LITHOTRIPSY &INDWELL STENT INSRT Left 2021    Procedure: CYSTOSCOPY URETEROSCOPY WITH LITHOTRIPSY HOLMIUM LASER, AND INSERTION STENT URETERAL/EXCHANGE;  Surgeon: Siddhartha Leslie MD;  Location[facility];  Service: Urology   • TOTAL HIP ARTHROPLASTY Right 2012   • TOTAL SHOULDER REPLACEMENT Right 2013   • VASECTOMY  1971        SOCIAL HISTORY:  Social History     Socioeconomic History   • Marital status: /Civil Union     Spouse name: Gabriela   • Number of children: 4   • Years of education: Not on file   • Highest education level: 12th grade   Occupational History   • Not on file   Tobacco Use   • Smoking status: Former     Current packs/day: 0.00     Average packs/day: 0.3 packs/day for 44.0 years (11.0 ttl pk-yrs)     Types: Cigarettes     Start date: 1965     Quit date: 2010     Years since quittin.8     Passive exposure: Past   • Smokeless tobacco: Never   • Tobacco comments:     Quit several times for up to 6 years.   Vaping Use   • Vaping status: Never Used   Substance and Sexual Activity   • Alcohol use: Not Currently     Comment: No use   • Drug use: Never     Comment: No use   • Sexual activity: Not Currently     Partners: Female   Other Topics Concern   • Not on file   Social History Narrative    · Most recent tobacco use screenin2020      · Do you currently or have you served in the [email or web address]:    No      · Were you activated, into active duty, as a member of the [employer] or as a Reservist:   No      · Live alone or with others:   with others        · Caffeine intake:   Occasional      · Illicit drugs:   No      · Diet:   Regular      · Exercise level:   Moderate      · Advance directive:   Yes      · Marital status:         · General stress level:   Medium      · Single or multi-level home/work:   multi level home      · Guns present in home:   No      · Seat belts used routinely:   Yes      · Sunscreen used routinely:   Yes      · Smoke alarm in home:   Yes      Social Determinants of Health     Financial Resource Strain: Low Risk  (6/21/2023)    Overall Financial Resource Strain (CARDIA)    • Difficulty of Paying Living Expenses: Not hard at all   Food Insecurity: No Food Insecurity (7/4/2024)    Nursing - Inadequate Food Risk Classification    • Worried About Running Out of Food in the Last Year: Never true    • Ran Out of Food in the Last Year: Never true    • Ran Out of Food in the Last Year: Not on file   Transportation Needs: No Transportation Needs (7/4/2024)    PRAPARE - Transportation    • Lack of Transportation (Medical): No    • Lack of Transportation (Non-Medical): No   Physical Activity: Insufficiently Active (4/13/2021)    Exercise Vital Sign    • Days of Exercise per Week: 4 days    • Minutes of Exercise per Session: 10 min   Stress: No Stress Concern Present (4/13/2021)    [employer] [place] [employer] - Occupational Stress Questionnaire    • Feeling of Stress : Not at all   Social Connections: Moderately Isolated (4/13/2021)    Social Connection and Isolation Panel [NHANES]    • Frequency of Communication with Friends and Family: More than three times a week    • Frequency of Social Gatherings with Friends and Family: Twice a week    • Attends [employer] Services: Never    • Active Member of Clubs or Organizations: No    • Attends Club or Organization Meetings: Never  "   • Marital Status:    Intimate Partner Violence: Not At Risk (4/13/2021)    Humiliation, Afraid, Rape, and Kick questionnaire    • Fear of Current or Ex-Partner: No    • Emotionally Abused: No    • Physically Abused: No    • Sexually Abused: No   Housing Stability: Low Risk  (7/4/2024)    Housing Stability Vital Sign    • Unable to Pay for Housing in the Last Year: No    • Number of Times Moved in the Last Year: 0    • Homeless in the Last Year: No        FAMILY HISTORY:  Family History   Problem Relation Age of Onset   • Heart disease Father    • Arthritis Father    • Heart attack Father    • Alzheimer's disease Mother    • Dementia Mother    • Diabetes type II Sister            Objective    PHYSICAL EXAMINATION:   Blood pressure 118/64, pulse 89, temperature 97.8 °F (36.6 °C), temperature source Temporal, resp. rate 18, height 6' 3\" (1.905 m), SpO2 92%.     Pain Score: 0-No pain     ECOG Performance Status      Flowsheet Row Most Recent Value   ECOG Performance Status 1 - Restricted in physically strenuous activity but ambulatory and able to carry out work of a light or sedentary nature, e.g., light house work, office work               Physical Exam  Constitutional:       General: He is not in acute distress.     Appearance: Normal appearance. He is not ill-appearing or toxic-appearing.   HENT:      Head: Normocephalic and atraumatic.      Right Ear: External ear normal.      Left Ear: External ear normal.      Nose: Nose normal.      Mouth/Throat:      Mouth: Mucous membranes are moist.      Pharynx: Oropharynx is clear.   Eyes:      General: No scleral icterus.        Right eye: No discharge.         Left eye: No discharge.      Conjunctiva/sclera: Conjunctivae normal.   Cardiovascular:      Rate and Rhythm: Normal rate and regular rhythm.      Pulses: Normal pulses.      Heart sounds: No murmur heard.     No friction rub. No gallop.   Pulmonary:      Effort: Pulmonary effort is normal. No respiratory " distress.      Breath sounds: Normal breath sounds. No wheezing or rales.   Abdominal:      General: Bowel sounds are normal. There is no distension.      Palpations: There is no mass.      Tenderness: There is no abdominal tenderness. There is no rebound.   Musculoskeletal:         General: Swelling present. No tenderness.      Cervical back: Normal range of motion. No rigidity.      Right lower leg: Edema present.      Left lower leg: Edema present.   Lymphadenopathy:      Head:      Right side of head: No submandibular, preauricular or posterior auricular adenopathy.      Left side of head: No submandibular, preauricular or posterior auricular adenopathy.      Cervical: No cervical adenopathy.      Right cervical: No superficial or posterior cervical adenopathy.     Left cervical: No superficial or posterior cervical adenopathy.      Upper Body:      Right upper body: No supraclavicular or axillary adenopathy.      Left upper body: No supraclavicular or axillary adenopathy.   Skin:     General: Skin is warm.      Coloration: Skin is not jaundiced.      Findings: No lesion or rash.      Comments: Healing mohs site on top of scalp.  New lesion  s/p biopsy to side of the recent SCC/Mohs site.   Neurological:      General: No focal deficit present.      Mental Status: He is alert and oriented to person, place, and time.      Cranial Nerves: No cranial nerve deficit.      Motor: No weakness.      Gait: Gait abnormal.   Psychiatric:         Mood and Affect: Mood normal.         Behavior: Behavior normal.         Thought Content: Thought content normal.         Judgment: Judgment normal.         I reviewed lab data in the chart.    WBC   Date Value Ref Range Status   10/11/2024 7.67 4.31 - 10.16 Thousand/uL Final   08/05/2024 9.90 4.31 - 10.16 Thousand/uL Final   08/04/2024 11.19 (H) 4.31 - 10.16 Thousand/uL Final     Hemoglobin   Date Value Ref Range Status   10/11/2024 12.3 12.0 - 17.0 g/dL Final   08/06/2024 11.5 (L)  12.0 - 17.0 g/dL Final   08/05/2024 11.1 (L) 12.0 - 17.0 g/dL Final     Platelets   Date Value Ref Range Status   10/11/2024 203 149 - 390 Thousands/uL Final   08/05/2024 210 149 - 390 Thousands/uL Final   08/04/2024 218 149 - 390 Thousands/uL Final     MCV   Date Value Ref Range Status   10/11/2024 91 82 - 98 fL Final   08/05/2024 89 82 - 98 fL Final   08/04/2024 88 82 - 98 fL Final      Potassium   Date Value Ref Range Status   10/11/2024 4.1 3.5 - 5.3 mmol/L Final   08/06/2024 3.5 3.5 - 5.3 mmol/L Final   08/05/2024 3.4 (L) 3.5 - 5.3 mmol/L Final     Chloride   Date Value Ref Range Status   10/11/2024 96 96 - 108 mmol/L Final   08/06/2024 101 96 - 108 mmol/L Final   08/05/2024 101 96 - 108 mmol/L Final     CO2   Date Value Ref Range Status   10/11/2024 32 21 - 32 mmol/L Final   08/06/2024 31 21 - 32 mmol/L Final   08/05/2024 30 21 - 32 mmol/L Final     CO2, i-STAT   Date Value Ref Range Status   01/14/2023 >45 (H) 21 - 32 mmol/L Final   04/22/2021 30 21 - 32 mmol/L Final     BUN   Date Value Ref Range Status   10/11/2024 47 (H) 5 - 25 mg/dL Final   08/06/2024 31 (H) 5 - 25 mg/dL Final   08/05/2024 31 (H) 5 - 25 mg/dL Final     Creatinine   Date Value Ref Range Status   10/11/2024 1.30 0.60 - 1.30 mg/dL Final     Comment:     Standardized to IDMS reference method   08/06/2024 1.34 (H) 0.60 - 1.30 mg/dL Final     Comment:     Standardized to IDMS reference method   08/05/2024 1.25 0.60 - 1.30 mg/dL Final     Comment:     Standardized to IDMS reference method     Glucose   Date Value Ref Range Status   08/06/2024 110 65 - 140 mg/dL Final     Comment:     If the patient is fasting, the ADA then defines impaired fasting glucose as > 100 mg/dL and diabetes as > or equal to 123 mg/dL.   08/05/2024 73 65 - 140 mg/dL Final     Comment:     If the patient is fasting, the ADA then defines impaired fasting glucose as > 100 mg/dL and diabetes as > or equal to 123 mg/dL.   08/04/2024 160 (H) 65 - 140 mg/dL Final     Comment:      If the patient is fasting, the ADA then defines impaired fasting glucose as > 100 mg/dL and diabetes as > or equal to 123 mg/dL.     Calcium   Date Value Ref Range Status   10/11/2024 9.7 8.4 - 10.2 mg/dL Final   08/06/2024 9.0 8.4 - 10.2 mg/dL Final   08/05/2024 8.9 8.4 - 10.2 mg/dL Final     Albumin   Date Value Ref Range Status   10/11/2024 3.9 3.5 - 5.0 g/dL Final   08/04/2024 3.8 3.5 - 5.0 g/dL Final   07/11/2024 3.7 3.5 - 5.0 g/dL Final     Total Bilirubin   Date Value Ref Range Status   10/11/2024 0.37 0.20 - 1.00 mg/dL Final     Comment:     Use of this assay is not recommended for patients undergoing treatment with eltrombopag due to the potential for falsely elevated results.  N-acetyl-p-benzoquinone imine (metabolite of Acetaminophen) will generate erroneously low results in samples for patients that have taken an overdose of Acetaminophen.   08/04/2024 0.36 0.20 - 1.00 mg/dL Final     Comment:     Use of this assay is not recommended for patients undergoing treatment with eltrombopag due to the potential for falsely elevated results.  N-acetyl-p-benzoquinone imine (metabolite of Acetaminophen) will generate erroneously low results in samples for patients that have taken an overdose of Acetaminophen.   07/11/2024 0.33 0.20 - 1.00 mg/dL Final     Comment:     Use of this assay is not recommended for patients undergoing treatment with eltrombopag due to the potential for falsely elevated results.  N-acetyl-p-benzoquinone imine (metabolite of Acetaminophen) will generate erroneously low results in samples for patients that have taken an overdose of Acetaminophen.     Alkaline Phosphatase   Date Value Ref Range Status   10/11/2024 87 34 - 104 U/L Final   08/04/2024 84 34 - 104 U/L Final   07/11/2024 78 34 - 104 U/L Final     AST   Date Value Ref Range Status   10/11/2024 14 13 - 39 U/L Final   08/04/2024 12 (L) 13 - 39 U/L Final   07/11/2024 13 13 - 39 U/L Final     ALT   Date Value Ref Range Status  "  10/11/2024 14 7 - 52 U/L Final     Comment:     Specimen collection should occur prior to Sulfasalazine administration due to the potential for falsely depressed results.    08/04/2024 12 7 - 52 U/L Final     Comment:     Specimen collection should occur prior to Sulfasalazine administration due to the potential for falsely depressed results.    07/11/2024 12 7 - 52 U/L Final     Comment:     Specimen collection should occur prior to Sulfasalazine administration due to the potential for falsely depressed results.       No results found for: \"LDH\"  No results found for: \"TSH\"  No results found for: \"K4GCFJQ\"   Free T4   Date Value Ref Range Status   05/11/2023 1.02 0.76 - 1.46 ng/dL Final     Comment:     Specimen collection should occur prior to Sulfasalazine administration due to the potential for falsely elevated results.         RECENT IMAGING:  No results found.          Assessment/Plan  Mr. Walters is a 79-year-old man with TAVR squamous of carcinoma scalp a new scalp lesion biopsy-proven to be squamous cell carcinoma here for continued monitoring, follow-up and surveillance.    1. Squamous cell carcinoma of scalp  2. Encounter for follow-up surveillance of skin cancer  New lesion on his scalp that is biopsy-proven squamous cell carcinoma.  He will follow-up with dermatology for discussion of Mohs procedure for resection.  Previous Mohs site healing nicely.  No other concerning skin lesions.  Labs reviewed and okay.    He will see dermatology for removal of this new Mohs and establish care.  He knows to continue to monitor for any new, changing, concerning skin lesions.  Knows to monitor for any lymphadenopathy as well.    He will return in follow-up in 3 months for continued monitoring and evaluation.  Blood work to be done at that time.  Orders placed and prescription provided.    He knows to call with issues or concerns prior to his next visit.        - CBC and differential; Future  - Comprehensive metabolic " panel; Future        Return in about 3 months (around 1/17/2025) for Office Visit, labs.     Sagrario Spears MD, PhD

## 2024-10-21 DIAGNOSIS — I50.9 CHF EXACERBATION (HCC): ICD-10-CM

## 2024-10-22 RX ORDER — EMPAGLIFLOZIN 25 MG/1
TABLET, FILM COATED ORAL
Qty: 90 TABLET | Refills: 1 | Status: SHIPPED | OUTPATIENT
Start: 2024-10-22

## 2024-11-07 DIAGNOSIS — E11.65 TYPE 2 DIABETES MELLITUS WITH HYPERGLYCEMIA, WITH LONG-TERM CURRENT USE OF INSULIN (HCC): ICD-10-CM

## 2024-11-07 DIAGNOSIS — Z79.4 TYPE 2 DIABETES MELLITUS WITH HYPERGLYCEMIA, WITH LONG-TERM CURRENT USE OF INSULIN (HCC): ICD-10-CM

## 2024-11-07 DIAGNOSIS — I48.91 ATRIAL FIBRILLATION WITH RAPID VENTRICULAR RESPONSE (HCC): ICD-10-CM

## 2024-11-07 RX ORDER — DIGOXIN 125 MCG
125 TABLET ORAL DAILY
Qty: 90 TABLET | Refills: 0 | Status: SHIPPED | OUTPATIENT
Start: 2024-11-07

## 2024-11-07 RX ORDER — INSULIN DEGLUDEC 200 U/ML
INJECTION, SOLUTION SUBCUTANEOUS
Qty: 15 ML | Refills: 0 | Status: SHIPPED | OUTPATIENT
Start: 2024-11-07

## 2024-11-07 RX ORDER — INSULIN DEGLUDEC 200 U/ML
INJECTION, SOLUTION SUBCUTANEOUS
Qty: 15 ML | Refills: 0 | OUTPATIENT
Start: 2024-11-07

## 2024-11-11 NOTE — PROGRESS NOTES
[facility]  [address] [address]  [phone]  [phone]     Date of Visit: 10/17/2024  Name: Home Walters   YOB: 1945        Subjective    VISIT DIAGNOSIS:  Diagnoses and all orders for this visit:    Squamous cell carcinoma of scalp  -     CBC and differential; Future  -     Comprehensive metabolic panel; Future    Encounter for follow-up surveillance of skin cancer          HISTORY OF PRESENT ILLNESS: Home Walters is a 79 y.o. male  who has squamous cell carcinoma on his scalp status post Mohs procedure unfortunately 3 months ago here for continued monitoring, follow-up and surveillance given the high risk nature of his lesion.    Has a new lesion on his scalp that was recently biopsied by plastic surgery demonstrate squamous cell carcinoma.  Needs to establish care with dermatology for routine exams.  Denies any additional skin lesions.  Denies any lymphadenopathy.       REVIEW OF SYSTEMS:  Review of Systems   Constitutional:  Negative for appetite change, fatigue, fever and unexpected weight change.   HENT:   Negative for lump/mass, trouble swallowing and voice change.    Eyes:  Negative for icterus.   Respiratory:  Negative for cough, shortness of breath and wheezing.    Cardiovascular:  Negative for leg swelling.   Gastrointestinal:  Negative for abdominal pain, constipation, diarrhea, nausea and vomiting.   Genitourinary:  Negative for difficulty urinating and hematuria.    Musculoskeletal:  Negative for arthralgias, gait problem and myalgias.   Skin:  Negative for itching and rash.        + lesion on his scalp.  No additional new, changing, or concerning lesions.   Neurological:  Negative for extremity weakness, gait problem, headaches, light-headedness and numbness.   Hematological:  Negative for adenopathy.        MEDICATIONS:    Current Outpatient Medications:     apixaban (Eliquis) 5 mg, TAKE ONE TABLET BY MOUTH TWICE DAILY,  Disp: 180 tablet, Rfl: 1    atorvastatin (LIPITOR) 40 mg tablet, Take 1 tablet (40 mg total) by mouth daily, Disp: 90 tablet, Rfl: 1    cholecalciferol (VITAMIN D3) 1,000 units tablet, Take 1,000 Units by mouth daily, Disp: , Rfl:     Continuous Blood Gluc Sensor (FreeStyle Peggy 2 Sensor) MISC, Change it every 14 days, Disp: 1 each, Rfl: 0    dicyclomine (BENTYL) 10 mg capsule, Take 1 capsule (10 mg total) by mouth 2 (two) times a day as needed (abdominal cramping), Disp: 60 capsule, Rfl: 3    famotidine (PEPCID) 40 MG tablet, Take 1 tablet by mouth daily at bedtime, Disp: 30 tablet, Rfl: 5    metoprolol succinate (TOPROL-XL) 50 mg 24 hr tablet, Take 1 tablet (50 mg total) by mouth daily, Disp: 90 tablet, Rfl: 3    NovoLOG FlexPen 100 units/mL injection pen, Inject 20-25 units under the skin three times daily with meals, Disp: 30 mL, Rfl: 1    omeprazole (PriLOSEC) 20 mg delayed release capsule, TAKE ONE CAPSULE BY MOUTH TWICE DAILY, Disp: 180 capsule, Rfl: 1    potassium citrate (UROCIT-K) 10 mEq, TAKE ONE TABLET BY MOUTH TWICE DAILY, Disp: 180 tablet, Rfl: 1    torsemide (DEMADEX) 20 mg tablet, take two tablets by mouth in the morning and one tablet  at 4pm, Disp: 120 tablet, Rfl: 11    umeclidinium-vilanterol (Anoro Ellipta) 62.5-25 mcg/actuation inhaler, Inhale 1 puff daily, Disp: 60 each, Rfl: 5    Unifine Pentips 31G X 8 MM MISC, inject under the skin daily use as directed, Disp: 100 each, Rfl: 0    digoxin (LANOXIN) 0.125 mg tablet, TAKE ONE TABLET BY MOUTH ONCE DAILY, Disp: 90 tablet, Rfl: 0    Empagliflozin (Jardiance) 25 MG TABS, TAKE ONE TABLET BY MOUTH DAILY IN MORNING, Disp: 90 tablet, Rfl: 1    Tresiba FlexTouch 200 units/mL CONCENTRATED U-200 injection pen, Inject 95 units under the skin once daily, Disp: 15 mL, Rfl: 0     ALLERGIES:  Allergies   Allergen Reactions    Ciprofloxacin Abdominal Pain, Diarrhea, GI Bleeding and GI Intolerance    Diltiazem Abdominal Pain    Metronidazole Abdominal Pain,  Diarrhea, GI Bleeding and GI Intolerance        ACTIVE PROBLEMS:  Patient Active Problem List   Diagnosis    Hypertension    Mixed hyperlipidemia    Type 2 diabetes mellitus with hyperglycemia, with long-term current use of insulin (HCC)    Chronic low back pain    CAD (coronary artery disease)    Malignant neoplasm of urinary bladder (HCC)    Arthritis    AAA (abdominal aortic aneurysm) (HCC)    Decreased pedal pulses    Chronic pain of both knees    Actinic keratosis of right temple    Actinic keratosis of scalp    Morbid obesity (HCC)    Adrenal nodule (HCC)    LUCIA (obstructive sleep apnea)    Bilateral edema of lower extremity    Chronic diastolic CHF (congestive heart failure) (HCC)    Left upper lobe pulmonary nodule    Hypercalcemia    Nephrolithiasis    Hydroureter on left    Venous stasis dermatitis of both lower extremities    Persistent proteinuria    Hypokalemia    Ureteral calculi    Benign prostatic hyperplasia without lower urinary tract symptoms    PAF (paroxysmal atrial fibrillation) (HCC)    PAD (peripheral artery disease) (HCC)    Gastroesophageal reflux disease    Vitamin D deficiency    Cirrhosis of liver without ascites (HCC)    Chronic acquired lymphedema    Pulmonary hypertension (HCC)    Chronic obstructive pulmonary disease (HCC)    Stage 3a chronic kidney disease (HCC)    Current use of insulin (HCC)    Squamous cell carcinoma of scalp    GI bleed    Epiploic appendagitis          PAST MEDICAL HISTORY:   Past Medical History:   Diagnosis Date    A-fib (HCC)     AAA (abdominal aortic aneurysm) (HCC)     Actinic keratosis of right temple 07/31/2020    Actinic keratosis of scalp 07/31/2020    Acute respiratory failure with hypoxia (HCC) 03/25/2021    Allergic 1960    Arthritis     Lay's esophagus     Bladder cancer (HCC)     Blister of left leg 04/28/2021    Blister of right leg 05/26/2021    CAD (coronary artery disease)     Cancer (HCC) 02/2010    Bladder    CHF (congestive heart  failure) (HCC)     Chronic low back pain     Chronic pain of both knees 07/31/2020    Colitis 12/04/2020    CPAP (continuous positive airway pressure) dependence     Diabetes (HCC)     Diabetes mellitus (HCC) 10/1993    Excessive gas 12/03/2020    Heart murmur     History of chemotherapy     Hydroureter on left 04/28/2021    Hyperlipemia     Hypertension     Immunization deficiency 10/30/2020    Hep a nonimmune    Kidney stone     Left lower quadrant abdominal pain 12/03/2020    Mass of right adrenal gland (HCC) 12/04/2020    4.1 cm    Myocardial infarction (HCC)     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    Obesity 2000    LUCIA (obstructive sleep apnea) 01/29/2021    Pressure ulcer of right leg, stage 1 05/26/2021    Squamous cell skin cancer 05/02/2024    central scalp, mohs    Urinary tract infection with hematuria 05/03/2021    u cx 4/2021=pseudomonas R to bactrim and cefdinir, S to cipro    Venous stasis dermatitis of both lower extremities 05/26/2021        PAST SURGICAL HISTORY:  Past Surgical History:   Procedure Laterality Date    BACK SURGERY      BLADDER SURGERY      CARDIAC CATHETERIZATION  04/2016    CARDIAC CATHETERIZATION N/A 01/19/2023    Procedure: Cardiac RHC;  Surgeon: Jose Carlos Shea MD;  [facility];  Service: Cardiology    CATARACT EXTRACTION, BILATERAL      COLONOSCOPY  01/29/2019    next 2022 [facility]    CORONARY ARTERY BYPASS GRAFT  04/2016    Quadruple per [place]    EGD  06/2017    EYE SURGERY  11/2016    Cataracts    JOINT REPLACEMENT  [id number]    Hip and shoulder    KIDNEY STONE SURGERY      MOHS SURGERY  06/04/2024    SCC central scalp, Dr Rodríguez    DC CYSTO BLADDER W/URETERAL CATHETERIZATION Left 04/24/2021    Procedure: CYSTOSCOPY  WITH INSERTION STENT URETERAL;  Surgeon: Siddhartha Leslie MD;  Location: [facility];  Service: Urology    DC CYSTO/URETERO W/LITHOTRIPSY &INDWELL STENT INSRT Left 05/21/2021    Procedure: CYSTOSCOPY URETEROSCOPY WITH LITHOTRIPSY HOLMIUM LASER,  AND INSERTION STENT URETERAL/EXCHANGE;  Surgeon: Siddhartha Leslie MD;  Location: [facility];  Service: Urology    TOTAL HIP ARTHROPLASTY Right 2012    TOTAL SHOULDER REPLACEMENT Right 2013    VASECTOMY  1971        SOCIAL HISTORY:  Social History     Socioeconomic History    Marital status: /Civil Union     Spouse name: Gabriela    Number of children: 4    Years of education: Not on file    Highest education level: 12th grade   Occupational History    Not on file   Tobacco Use    Smoking status: Former     Current packs/day: 0.00     Average packs/day: 0.3 packs/day for 44.0 years (11.0 ttl pk-yrs)     Types: Cigarettes     Start date: 1965     Quit date: 2010     Years since quittin.8     Passive exposure: Past    Smokeless tobacco: Never    Tobacco comments:     Quit several times for up to 6 years.   Vaping Use    Vaping status: Never Used   Substance and Sexual Activity    Alcohol use: Not Currently     Comment: No use    Drug use: Never     Comment: No use    Sexual activity: Not Currently     Partners: Female   Other Topics Concern    Not on file   Social History Narrative    · Most recent tobacco use screenin2020      · Do you currently or have you served in the [employer]:   No      · Were you activated, into active duty, as a member of the [employer] or as a Reservist:   No      · Live alone or with others:   with others        · Caffeine intake:   Occasional      · Illicit drugs:   No      · Diet:   Regular      · Exercise level:   Moderate      · Advance directive:   Yes      · Marital status:         · General stress level:   Medium      · Single or multi-level home/work:   multi level home      · Guns present in home:   No      · Seat belts used routinely:   Yes      · Sunscreen used routinely:   Yes      · Smoke alarm in home:   Yes      Social Determinants of Health     Financial Resource Strain: Low Risk  (2023)    Overall Financial Resource Strain (CARDIA)      Difficulty of Paying Living Expenses: Not hard at all   Food Insecurity: No Food Insecurity (7/4/2024)    Nursing - Inadequate Food Risk Classification     Worried About Running Out of Food in the Last Year: Never true     Ran Out of Food in the Last Year: Never true     Ran Out of Food in the Last Year: Not on file   Transportation Needs: No Transportation Needs (7/4/2024)    PRAPARE - Transportation     Lack of Transportation (Medical): No     Lack of Transportation (Non-Medical): No   Physical Activity: Insufficiently Active (4/13/2021)    Exercise Vital Sign     Days of Exercise per Week: 4 days     Minutes of Exercise per Session: 10 min   Stress: No Stress Concern Present (4/13/2021)    [employer] - Occupational Stress Questionnaire     Feeling of Stress : Not at all   Social Connections: Moderately Isolated (4/13/2021)    Social Connection and Isolation Panel [NHANES]     Frequency of Communication with Friends and Family: More than three times a week     Frequency of Social Gatherings with Friends and Family: Twice a week     Attends [employer] Services: Never     Active Member of Clubs or Organizations: No     Attends Club or Organization Meetings: Never     Marital Status:    Intimate Partner Violence: Not At Risk (4/13/2021)    Humiliation, Afraid, Rape, and Kick questionnaire     Fear of Current or Ex-Partner: No     Emotionally Abused: No     Physically Abused: No     Sexually Abused: No   Housing Stability: Low Risk  (7/4/2024)    Housing Stability Vital Sign     Unable to Pay for Housing in the Last Year: No     Number of Times Moved in the Last Year: 0     Homeless in the Last Year: No        FAMILY HISTORY:  Family History   Problem Relation Age of Onset    Heart disease Father     Arthritis Father     Heart attack Father     Alzheimer's disease Mother     Dementia Mother     Diabetes type II Sister            Objective    PHYSICAL EXAMINATION:   Blood  "pressure 118/64, pulse 89, temperature 97.8 °F (36.6 °C), temperature source Temporal, resp. rate 18, height 6' 3\" (1.905 m), SpO2 92%.     Pain Score: 0-No pain     ECOG Performance Status      Flowsheet Row Most Recent Value   ECOG Performance Status 1 - Restricted in physically strenuous activity but ambulatory and able to carry out work of a light or sedentary nature, e.g., light house work, office work               Physical Exam  Constitutional:       General: He is not in acute distress.     Appearance: Normal appearance. He is not ill-appearing or toxic-appearing.   HENT:      Head: Normocephalic and atraumatic.      Right Ear: External ear normal.      Left Ear: External ear normal.      Nose: Nose normal.      Mouth/Throat:      Mouth: Mucous membranes are moist.      Pharynx: Oropharynx is clear.   Eyes:      General: No scleral icterus.        Right eye: No discharge.         Left eye: No discharge.      Conjunctiva/sclera: Conjunctivae normal.   Cardiovascular:      Rate and Rhythm: Normal rate and regular rhythm.      Pulses: Normal pulses.      Heart sounds: No murmur heard.     No friction rub. No gallop.   Pulmonary:      Effort: Pulmonary effort is normal. No respiratory distress.      Breath sounds: Normal breath sounds. No wheezing or rales.   Abdominal:      General: Bowel sounds are normal. There is no distension.      Palpations: There is no mass.      Tenderness: There is no abdominal tenderness. There is no rebound.   Musculoskeletal:         General: Swelling present. No tenderness.      Cervical back: Normal range of motion. No rigidity.      Right lower leg: Edema present.      Left lower leg: Edema present.   Lymphadenopathy:      Head:      Right side of head: No submandibular, preauricular or posterior auricular adenopathy.      Left side of head: No submandibular, preauricular or posterior auricular adenopathy.      Cervical: No cervical adenopathy.      Right cervical: No superficial " or posterior cervical adenopathy.     Left cervical: No superficial or posterior cervical adenopathy.      Upper Body:      Right upper body: No supraclavicular or axillary adenopathy.      Left upper body: No supraclavicular or axillary adenopathy.   Skin:     General: Skin is warm.      Coloration: Skin is not jaundiced.      Findings: No lesion or rash.      Comments: Healing mohs site on top of scalp.  New lesion  s/p biopsy to side of the recent SCC/Mohs site.   Neurological:      General: No focal deficit present.      Mental Status: He is alert and oriented to person, place, and time.      Cranial Nerves: No cranial nerve deficit.      Motor: No weakness.      Gait: Gait abnormal.   Psychiatric:         Mood and Affect: Mood normal.         Behavior: Behavior normal.         Thought Content: Thought content normal.         Judgment: Judgment normal.         I reviewed lab data in the chart.    WBC   Date Value Ref Range Status   10/11/2024 7.67 4.31 - 10.16 Thousand/uL Final   08/05/2024 9.90 4.31 - 10.16 Thousand/uL Final   08/04/2024 11.19 (H) 4.31 - 10.16 Thousand/uL Final     Hemoglobin   Date Value Ref Range Status   10/11/2024 12.3 12.0 - 17.0 g/dL Final   08/06/2024 11.5 (L) 12.0 - 17.0 g/dL Final   08/05/2024 11.1 (L) 12.0 - 17.0 g/dL Final     Platelets   Date Value Ref Range Status   10/11/2024 203 149 - 390 Thousands/uL Final   08/05/2024 210 149 - 390 Thousands/uL Final   08/04/2024 218 149 - 390 Thousands/uL Final     MCV   Date Value Ref Range Status   10/11/2024 91 82 - 98 fL Final   08/05/2024 89 82 - 98 fL Final   08/04/2024 88 82 - 98 fL Final      Potassium   Date Value Ref Range Status   10/11/2024 4.1 3.5 - 5.3 mmol/L Final   08/06/2024 3.5 3.5 - 5.3 mmol/L Final   08/05/2024 3.4 (L) 3.5 - 5.3 mmol/L Final     Chloride   Date Value Ref Range Status   10/11/2024 96 96 - 108 mmol/L Final   08/06/2024 101 96 - 108 mmol/L Final   08/05/2024 101 96 - 108 mmol/L Final     CO2   Date Value Ref  Range Status   10/11/2024 32 21 - 32 mmol/L Final   08/06/2024 31 21 - 32 mmol/L Final   08/05/2024 30 21 - 32 mmol/L Final     CO2, i-STAT   Date Value Ref Range Status   01/14/2023 >45 (H) 21 - 32 mmol/L Final   04/22/2021 30 21 - 32 mmol/L Final     BUN   Date Value Ref Range Status   10/11/2024 47 (H) 5 - 25 mg/dL Final   08/06/2024 31 (H) 5 - 25 mg/dL Final   08/05/2024 31 (H) 5 - 25 mg/dL Final     Creatinine   Date Value Ref Range Status   10/11/2024 1.30 0.60 - 1.30 mg/dL Final     Comment:     Standardized to IDMS reference method   08/06/2024 1.34 (H) 0.60 - 1.30 mg/dL Final     Comment:     Standardized to IDMS reference method   08/05/2024 1.25 0.60 - 1.30 mg/dL Final     Comment:     Standardized to IDMS reference method     Glucose   Date Value Ref Range Status   08/06/2024 110 65 - 140 mg/dL Final     Comment:     If the patient is fasting, the ADA then defines impaired fasting glucose as > 100 mg/dL and diabetes as > or equal to 123 mg/dL.   08/05/2024 73 65 - 140 mg/dL Final     Comment:     If the patient is fasting, the ADA then defines impaired fasting glucose as > 100 mg/dL and diabetes as > or equal to 123 mg/dL.   08/04/2024 160 (H) 65 - 140 mg/dL Final     Comment:     If the patient is fasting, the ADA then defines impaired fasting glucose as > 100 mg/dL and diabetes as > or equal to 123 mg/dL.     Calcium   Date Value Ref Range Status   10/11/2024 9.7 8.4 - 10.2 mg/dL Final   08/06/2024 9.0 8.4 - 10.2 mg/dL Final   08/05/2024 8.9 8.4 - 10.2 mg/dL Final     Albumin   Date Value Ref Range Status   10/11/2024 3.9 3.5 - 5.0 g/dL Final   08/04/2024 3.8 3.5 - 5.0 g/dL Final   07/11/2024 3.7 3.5 - 5.0 g/dL Final     Total Bilirubin   Date Value Ref Range Status   10/11/2024 0.37 0.20 - 1.00 mg/dL Final     Comment:     Use of this assay is not recommended for patients undergoing treatment with eltrombopag due to the potential for falsely elevated results.  N-acetyl-p-benzoquinone imine (metabolite  "of Acetaminophen) will generate erroneously low results in samples for patients that have taken an overdose of Acetaminophen.   08/04/2024 0.36 0.20 - 1.00 mg/dL Final     Comment:     Use of this assay is not recommended for patients undergoing treatment with eltrombopag due to the potential for falsely elevated results.  N-acetyl-p-benzoquinone imine (metabolite of Acetaminophen) will generate erroneously low results in samples for patients that have taken an overdose of Acetaminophen.   07/11/2024 0.33 0.20 - 1.00 mg/dL Final     Comment:     Use of this assay is not recommended for patients undergoing treatment with eltrombopag due to the potential for falsely elevated results.  N-acetyl-p-benzoquinone imine (metabolite of Acetaminophen) will generate erroneously low results in samples for patients that have taken an overdose of Acetaminophen.     Alkaline Phosphatase   Date Value Ref Range Status   10/11/2024 87 34 - 104 U/L Final   08/04/2024 84 34 - 104 U/L Final   07/11/2024 78 34 - 104 U/L Final     AST   Date Value Ref Range Status   10/11/2024 14 13 - 39 U/L Final   08/04/2024 12 (L) 13 - 39 U/L Final   07/11/2024 13 13 - 39 U/L Final     ALT   Date Value Ref Range Status   10/11/2024 14 7 - 52 U/L Final     Comment:     Specimen collection should occur prior to Sulfasalazine administration due to the potential for falsely depressed results.    08/04/2024 12 7 - 52 U/L Final     Comment:     Specimen collection should occur prior to Sulfasalazine administration due to the potential for falsely depressed results.    07/11/2024 12 7 - 52 U/L Final     Comment:     Specimen collection should occur prior to Sulfasalazine administration due to the potential for falsely depressed results.       No results found for: \"LDH\"  No results found for: \"TSH\"  No results found for: \"Q3RDYXI\"   Free T4   Date Value Ref Range Status   05/11/2023 1.02 0.76 - 1.46 ng/dL Final     Comment:     Specimen collection should " occur prior to Sulfasalazine administration due to the potential for falsely elevated results.         RECENT IMAGING:  No results found.          Assessment/Plan  Mr. Walters is a 79-year-old man with TAVR squamous of carcinoma scalp a new scalp lesion biopsy-proven to be squamous cell carcinoma here for continued monitoring, follow-up and surveillance.    1. Squamous cell carcinoma of scalp  2. Encounter for follow-up surveillance of skin cancer  New lesion on his scalp that is biopsy-proven squamous cell carcinoma.  He will follow-up with dermatology for discussion of Mohs procedure for resection.  Previous Mohs site healing nicely.  No other concerning skin lesions.  Labs reviewed and okay.    He will see dermatology for removal of this new Mohs and establish care.  He knows to continue to monitor for any new, changing, concerning skin lesions.  Knows to monitor for any lymphadenopathy as well.    He will return in follow-up in 3 months for continued monitoring and evaluation.  Blood work to be done at that time.  Orders placed and prescription provided.    He knows to call with issues or concerns prior to his next visit.        - CBC and differential; Future  - Comprehensive metabolic panel; Future        Return in about 3 months (around 1/17/2025) for Office Visit, labs.     Sagrario Spears MD, PhD

## 2024-11-14 DIAGNOSIS — K21.9 GASTROESOPHAGEAL REFLUX DISEASE, UNSPECIFIED WHETHER ESOPHAGITIS PRESENT: ICD-10-CM

## 2024-11-19 DIAGNOSIS — E11.65 TYPE 2 DIABETES MELLITUS WITH HYPERGLYCEMIA, WITH LONG-TERM CURRENT USE OF INSULIN (HCC): ICD-10-CM

## 2024-11-19 DIAGNOSIS — Z79.4 TYPE 2 DIABETES MELLITUS WITH HYPERGLYCEMIA, WITH LONG-TERM CURRENT USE OF INSULIN (HCC): ICD-10-CM

## 2024-11-19 RX ORDER — INSULIN ASPART 100 [IU]/ML
INJECTION, SOLUTION INTRAVENOUS; SUBCUTANEOUS
Qty: 30 ML | Refills: 0 | Status: SHIPPED | OUTPATIENT
Start: 2024-11-19

## 2024-11-22 ENCOUNTER — PROCEDURE VISIT (OUTPATIENT)
Dept: DERMATOLOGY | Facility: CLINIC | Age: 79
End: 2024-11-22
Payer: MEDICARE

## 2024-11-22 VITALS
BODY MASS INDEX: 34.07 KG/M2 | HEART RATE: 75 BPM | TEMPERATURE: 98.2 F | WEIGHT: 274 LBS | DIASTOLIC BLOOD PRESSURE: 62 MMHG | HEIGHT: 75 IN | SYSTOLIC BLOOD PRESSURE: 138 MMHG

## 2024-11-22 DIAGNOSIS — C44.42 SQUAMOUS CELL CARCINOMA OF SCALP: ICD-10-CM

## 2024-11-22 PROCEDURE — 17311 MOHS 1 STAGE H/N/HF/G: CPT | Performed by: DERMATOLOGY

## 2024-11-22 NOTE — PROGRESS NOTES
MOHS Procedure Note    Patient: Home Walters  : 1945  MRN: [id number]  Date: 2024    History of Present Illness: The patient is a 79 y.o. male who presents with complaints of squamous cell carcinoma, well-differentiated on scalp.     Past Medical History:   Diagnosis Date    A-fib (HCC)     AAA (abdominal aortic aneurysm) (HCC)     Actinic keratosis of right temple 2020    Actinic keratosis of scalp 2020    Acute respiratory failure with hypoxia (HCC) 2021    Allergic 1960    Arthritis     Lay's esophagus     Bladder cancer (HCC)     Blister of left leg 2021    Blister of right leg 2021    CAD (coronary artery disease)     Cancer (HCC) 2010    Bladder    CHF (congestive heart failure) (HCC)     Chronic low back pain     Chronic pain of both knees 2020    Colitis 2020    CPAP (continuous positive airway pressure) dependence     Diabetes (HCC)     Diabetes mellitus (HCC) 10/1993    Excessive gas 2020    Heart murmur     History of chemotherapy     Hydroureter on left 2021    Hyperlipemia     Hypertension     Immunization deficiency 10/30/2020    Hep a nonimmune    Kidney stone     Left lower quadrant abdominal pain 2020    Mass of right adrenal gland (HCC) 2020    4.1 cm    Myocardial infarction (HCC)     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    Obesity 2000    LUCIA (obstructive sleep apnea) 2021    Pressure ulcer of right leg, stage 1 2021    Squamous cell skin cancer 2024    central scalp, mohs    Squamous cell skin cancer 10/03/2024    scalp-Mohs    Urinary tract infection with hematuria 2021    u cx 2021=pseudomonas R to bactrim and cefdinir, S to cipro    Venous stasis dermatitis of both lower extremities 2021       Past Surgical History:   Procedure Laterality Date    BACK SURGERY      BLADDER SURGERY      CARDIAC CATHETERIZATION  2016    CARDIAC CATHETERIZATION N/A 2023     Procedure: Cardiac RHC;  Surgeon: Jose Carlos Shea MD;  [facility];  Service: Cardiology    CATARACT EXTRACTION, BILATERAL      COLONOSCOPY  01/29/2019    next 2022 [place]    CORONARY ARTERY BYPASS GRAFT  04/2016    Quadruple per [place]    EGD  06/2017    EYE SURGERY  11/2016    Cataracts    JOINT REPLACEMENT  [id number]    Hip and shoulder    KIDNEY STONE SURGERY      MOHS SURGERY  06/04/2024    [facility] central scalp, Dr Rodríguez    [facility]    SCC scalp;     MD CYSTO BLADDER W/URETERAL CATHETERIZATION Left 04/24/2021    Procedure: CYSTOSCOPY  WITH INSERTION STENT URETERAL;  Surgeon: Siddhartha Leslie MD;  [facility];  Service: Urology    MD CYSTO/URETERO W/LITHOTRIPSY &INDWELL STENT INSRT Left 05/21/2021    Procedure: CYSTOSCOPY URETEROSCOPY WITH LITHOTRIPSY HOLMIUM LASER, AND INSERTION STENT URETERAL/EXCHANGE;  Surgeon: Siddhartha Leslie MD;  [facility];  Service: Urology    TOTAL HIP ARTHROPLASTY Right 2012    TOTAL SHOULDER REPLACEMENT Right 2013    VASECTOMY  1971         Current Outpatient Medications:     apixaban (Eliquis) 5 mg, TAKE ONE TABLET BY MOUTH TWICE DAILY, Disp: 180 tablet, Rfl: 1    atorvastatin (LIPITOR) 40 mg tablet, Take 1 tablet (40 mg total) by mouth daily, Disp: 90 tablet, Rfl: 1    cholecalciferol (VITAMIN D3) 1,000 units tablet, Take 1,000 Units by mouth daily, Disp: , Rfl:     Continuous Blood Gluc Sensor (FreeStyle Peggy 2 Sensor) MISC, Change it every 14 days, Disp: 1 each, Rfl: 0    dicyclomine (BENTYL) 10 mg capsule, Take 1 capsule (10 mg total) by mouth 2 (two) times a day as needed (abdominal cramping), Disp: 60 capsule, Rfl: 3    digoxin (LANOXIN) 0.125 mg tablet, TAKE ONE TABLET BY MOUTH ONCE DAILY, Disp: 90 tablet, Rfl: 0    Empagliflozin (Jardiance) 25 MG TABS, TAKE ONE TABLET BY MOUTH DAILY IN MORNING, Disp: 90 tablet, Rfl: 1    famotidine (PEPCID) 40 MG tablet, Take 1 tablet by mouth daily at bedtime, Disp: 30 tablet, Rfl: 5     metoprolol succinate (TOPROL-XL) 50 mg 24 hr tablet, Take 1 tablet (50 mg total) by mouth daily, Disp: 90 tablet, Rfl: 3    omeprazole (PriLOSEC) 20 mg delayed release capsule, TAKE ONE CAPSULE BY MOUTH TWICE DAILY, Disp: 180 capsule, Rfl: 1    potassium citrate (UROCIT-K) 10 mEq, TAKE ONE TABLET BY MOUTH TWICE DAILY, Disp: 180 tablet, Rfl: 1    torsemide (DEMADEX) 20 mg tablet, take two tablets by mouth in the morning and one tablet  at 4pm, Disp: 120 tablet, Rfl: 11    umeclidinium-vilanterol (Anoro Ellipta) 62.5-25 mcg/actuation inhaler, Inhale 1 puff daily, Disp: 60 each, Rfl: 5    Unifine Pentips 31G X 8 MM MISC, inject under the skin daily use as directed, Disp: 100 each, Rfl: 0    insulin degludec (Tresiba FlexTouch) 200 units/mL CONCENTRATED U-200 injection pen, Inject 95 units under the skin once daily, Disp: 15 mL, Rfl: 5    NovoLOG FlexPen 100 units/mL injection pen, Inject 20-25 units under the skin three times daily with meals, Disp: 30 mL, Rfl: 0    Allergies   Allergen Reactions    Ciprofloxacin Abdominal Pain, Diarrhea, GI Bleeding and GI Intolerance    Diltiazem Abdominal Pain    Metronidazole Abdominal Pain, Diarrhea, GI Bleeding and GI Intolerance       Physical Exam:   Vitals:    11/22/24 1246   BP: 138/62   Pulse: 75   Temp: 98.2 °F (36.8 °C)     General: Awake, Alert, Oriented x 3, Mood and affect appropriate  Respiratory: Respirations even and unlabored  Cardiovascular: Peripheral pulses intact; no edema  Musculoskeletal Exam: N/A    Assessment: 0.6 x 0.6 cm pink patch on scalp. Biopsy proven positive for squamous cell carcinoma, well-differentiated on scalp.   Plan: Mohs    Time of H&P Completion:1243    MOHS Procedure Timeout      Flowsheet Row Most Recent Value   Timeout: 1245   Patient Identity Verified: Yes   Correct Site Verified: Yes   Correct Procedure Verified: Yes            MOHS Diagnosis/Indication/Location/ID      Flowsheet Row Most Recent Value   Pathology Type Squamous cell  carcinoma   Anatomic Site --  [scalp]   Indications for MOHS tumor location   MOHS ID [id number]            MOHS Site/Accession/Pre-Post      Flowsheet Row Most Recent Value   Original Site Identified (as submitted by referring clinician) Photo, Referral   Biopsy Accession/Specimen # (as submitted by referring clincian) [id number]   Pre-MOHS Size Length (cm) 0.6   Pre-MOHS Size Width (cm) 0.6   Post-MOHS Size-Length (cm) 1   Post MOHS Size-Width (cm) 0.8   Repair Type Second intention   Anesthetic Used 1% Lidocaine with epinephrine  [buffered]            MOHS Tumor Stage 1 Information      Flowsheet Row Most Recent Value   Tissue Sections (blocks) 2   Microscopic Exam Section 1: No tumor identified in section.   Microscopic Exam Section 2: No tumor identified in section.   Tumor Clear After Stage I? Yes                  Patient identified, procedure verified, site identified and verified. Time out completed. Surgical removal of the lesion discussed with the patient (risks and benefits, including possibility of scarring, infection, recurrence or potential for further treatment)  I have specifically identified the site with the patient. I have discussed the fact that the patient will have a scar after the procedure regardless of granulation or repair with sutures. I have discussed that the repair options can range from granulation in some cases to linear or curvilinear closures to larger flaps or grafts.  There are sometimes flaps or grafts used that require multiples stages of surgery and will not be completed today, rather be completed over a series of appointments. I have discussed that occasionally due to location, size or depth of the lesion I may recommend consultation with and transfer of care for further removal or the reconstruction to another provider such as ophthalmology surgery, plastic surgery, ENT surgery, or surgical oncology. There are cases in which other testing such as imaging with MRI or CT scan  or testing of lymph nodes is recommended because of the nature/depth/location of tumor seen during the removal. There is a risk of injury to nerves causing temporary or permanent numbness or the inability to move muscles full such as the inability to lift eyebrows. Questions answered and verbal and written consent was obtained.    The tumor qualifies for Mohs based on AUC criteria. Dr. Stephen served as the surgeon and pathologist during the procedure.    SCC cleared with 1 stage of mohs and allowed to heal secondarily after detailed discussion of repair options.  Well tolerated.  Wound check in 2 weeks.    Scribe Attestation      I,:  Michell Fuentes MA am acting as a scribe while in the presence of the attending physician.:       I,:  Nicola Stephen MD personally performed the services described in this documentation    as scribed in my presence.:

## 2024-11-23 DIAGNOSIS — Z79.4 TYPE 2 DIABETES MELLITUS WITH HYPERGLYCEMIA, WITH LONG-TERM CURRENT USE OF INSULIN (HCC): ICD-10-CM

## 2024-11-23 DIAGNOSIS — E11.65 TYPE 2 DIABETES MELLITUS WITH HYPERGLYCEMIA, WITH LONG-TERM CURRENT USE OF INSULIN (HCC): ICD-10-CM

## 2024-11-25 RX ORDER — INSULIN DEGLUDEC 200 U/ML
INJECTION, SOLUTION SUBCUTANEOUS
Qty: 15 ML | Refills: 5 | Status: SHIPPED | OUTPATIENT
Start: 2024-11-25

## 2024-11-25 NOTE — ASSESSMENT & PLAN NOTE
Recently had CT scan which showed stable adenoma with no change  Treatment Goal Explanation (Does Not Render In The Note): Stable for the purposes of categorizing medical decision making is defined by the specific treatment goals for an individual patient. A patient that is not at their treatment goal is not stable, even if the condition has not changed and there is no short- term threat to life or function.

## 2024-12-02 ENCOUNTER — OFFICE VISIT (OUTPATIENT)
Dept: PODIATRY | Facility: CLINIC | Age: 79
End: 2024-12-02
Payer: MEDICARE

## 2024-12-02 VITALS
BODY MASS INDEX: 34.25 KG/M2 | OXYGEN SATURATION: 94 % | SYSTOLIC BLOOD PRESSURE: 130 MMHG | DIASTOLIC BLOOD PRESSURE: 69 MMHG | HEIGHT: 75 IN | HEART RATE: 78 BPM

## 2024-12-02 DIAGNOSIS — I73.9 PAD (PERIPHERAL ARTERY DISEASE) (HCC): ICD-10-CM

## 2024-12-02 DIAGNOSIS — E11.65 TYPE 2 DIABETES MELLITUS WITH HYPERGLYCEMIA, WITH LONG-TERM CURRENT USE OF INSULIN (HCC): ICD-10-CM

## 2024-12-02 DIAGNOSIS — Z79.4 TYPE 2 DIABETES MELLITUS WITH HYPERGLYCEMIA, WITH LONG-TERM CURRENT USE OF INSULIN (HCC): ICD-10-CM

## 2024-12-02 DIAGNOSIS — E11.42 DIABETIC PERIPHERAL NEUROPATHY (HCC): ICD-10-CM

## 2024-12-02 DIAGNOSIS — B35.1 ONYCHOMYCOSIS: Primary | ICD-10-CM

## 2024-12-02 PROCEDURE — 11721 DEBRIDE NAIL 6 OR MORE: CPT | Performed by: PODIATRIST

## 2024-12-02 PROCEDURE — RECHECK: Performed by: PODIATRIST

## 2024-12-02 NOTE — PROGRESS NOTES
Assessment/Plan:     The patient's clinical examination today was significant for thickened and dystrophic pedal nail plates with brittleness, discoloration, and subungual debris consistent with onychomycosis x 10.  There are no open lesions. Pedal pulses are nonpalpable bilaterally.  Capillary fill time to the toes is greater than 3 seconds x 10.  Temperature gradient is mildly increased to the bilateral extremities.  Skin is thin with decreased turgor.  There is absence of hair growth to the bilateral extremities.  The interdigital spaces are clear without maceration.  No pretrophic changes noted to either lower extremity.     The pedal nail plates are sharply debrided with a sterile nail clipper x 10 without complication.  The nails were then mechanically reduced in thickness and girth utilizing a rotary bur.  There are no other acute pedal issues noted today.     The patient is deemed to be in the high risk category secondary to diminished pedal pulses and dense peripheral neuropathy.  Continue to monitor healing of the left lower leg wound.       The patient will follow-up with me in 3 months for continued at risk diabetic footcare.        Diagnoses and all orders for this visit:    Onychomycosis    PAD (peripheral artery disease) ([facility])    Type 2 diabetes mellitus with hyperglycemia, with long-term current use of insulin ([facility])    Diabetic peripheral neuropathy ([facility]) [E11.42]          Subjective:     Patient ID: Home Walters is a 79 y.o. male.    The patient presents today for follow up of painful elongated pedal nail plates.  The patient has a history of peripheral neuropathy stemming from his diabetes.  He notes no other pedal issues today.      PAST MEDICAL HISTORY:  Past Medical History:   Diagnosis Date    A-fib ([facility])     AAA (abdominal aortic aneurysm) ([facility])     Actinic keratosis of right temple 07/31/2020    Actinic keratosis of scalp 07/31/2020    Acute respiratory failure with hypoxia ([facility]) 03/25/2021     Allergic 1960    Arthritis     Lay's esophagus     Bladder cancer (HCC)     Blister of left leg 04/28/2021    Blister of right leg 05/26/2021    CAD (coronary artery disease)     Cancer (HCC) 02/2010    Bladder    CHF (congestive heart failure) (HCC)     Chronic low back pain     Chronic pain of both knees 07/31/2020    Colitis 12/04/2020    CPAP (continuous positive airway pressure) dependence     Diabetes (HCC)     Diabetes mellitus (HCC) 10/1993    Excessive gas 12/03/2020    Heart murmur     History of chemotherapy     Hydroureter on left 04/28/2021    Hyperlipemia     Hypertension     Immunization deficiency 10/30/2020    Hep a nonimmune    Kidney stone     Left lower quadrant abdominal pain 12/03/2020    Mass of right adrenal gland (HCC) 12/04/2020    4.1 cm    Myocardial infarction (HCC)     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    Obesity 2000    LUCIA (obstructive sleep apnea) 01/29/2021    Pressure ulcer of right leg, stage 1 05/26/2021    Squamous cell skin cancer 05/02/2024    central scalp, mohs    Squamous cell skin cancer 10/03/2024    scalp-Mohs    Urinary tract infection with hematuria 05/03/2021    u cx 4/2021=pseudomonas R to bactrim and cefdinir, S to cipro    Venous stasis dermatitis of both lower extremities 05/26/2021       PAST SURGICAL HISTORY:  Past Surgical History:   Procedure Laterality Date    BACK SURGERY      BLADDER SURGERY      CARDIAC CATHETERIZATION  04/2016    CARDIAC CATHETERIZATION N/A 01/19/2023    Procedure: Cardiac RHC;  Surgeon: Jose Carlos Shea MD;  [facility];  Service: Cardiology    CATARACT EXTRACTION, BILATERAL      COLONOSCOPY  01/29/2019    next 2022 [place]    CORONARY ARTERY BYPASS GRAFT  04/2016    Quadruple per [place]    EGD  06/2017    EYE SURGERY  11/2016    Cataracts    JOINT REPLACEMENT  [id number]    Hip and shoulder    KIDNEY STONE SURGERY      MOHS SURGERY  06/04/2024    [facility] central scalp, Dr Rodríguez    MOHS SURGERY N/A 11/22/2024     SCC scalp;     AL CYSTO BLADDER W/URETERAL CATHETERIZATION Left 04/24/2021    Procedure: CYSTOSCOPY  WITH INSERTION STENT URETERAL;  Surgeon: Siddhartha Leslie MD;  [facility];  Service: Urology    AL CYSTO/URETERO W/LITHOTRIPSY &INDWELL STENT INSRT Left 05/21/2021    Procedure: CYSTOSCOPY URETEROSCOPY WITH LITHOTRIPSY HOLMIUM LASER, AND INSERTION STENT URETERAL/EXCHANGE;  Surgeon: Siddhartha Leslie MD;  [facility];  Service: Urology    TOTAL HIP ARTHROPLASTY Right 2012    TOTAL SHOULDER REPLACEMENT Right 2013    VASECTOMY  1971        ALLERGIES:  Ciprofloxacin, Diltiazem, and Metronidazole    MEDICATIONS:  Current Outpatient Medications   Medication Sig Dispense Refill    apixaban (Eliquis) 5 mg TAKE ONE TABLET BY MOUTH TWICE DAILY 180 tablet 1    atorvastatin (LIPITOR) 40 mg tablet Take 1 tablet (40 mg total) by mouth daily 90 tablet 1    cholecalciferol (VITAMIN D3) 1,000 units tablet Take 1,000 Units by mouth daily      Continuous Blood Gluc Sensor (FreeStyle Peggy 2 Sensor) MISC Change it every 14 days 1 each 0    dicyclomine (BENTYL) 10 mg capsule Take 1 capsule (10 mg total) by mouth 2 (two) times a day as needed (abdominal cramping) 60 capsule 3    digoxin (LANOXIN) 0.125 mg tablet TAKE ONE TABLET BY MOUTH ONCE DAILY 90 tablet 0    Empagliflozin (Jardiance) 25 MG TABS TAKE ONE TABLET BY MOUTH DAILY IN MORNING 90 tablet 1    famotidine (PEPCID) 40 MG tablet Take 1 tablet by mouth daily at bedtime 30 tablet 5    insulin degludec (Tresiba FlexTouch) 200 units/mL CONCENTRATED U-200 injection pen Inject 95 units under the skin once daily 15 mL 5    metoprolol succinate (TOPROL-XL) 50 mg 24 hr tablet Take 1 tablet (50 mg total) by mouth daily 90 tablet 3    NovoLOG FlexPen 100 units/mL injection pen Inject 20-25 units under the skin three times daily with meals 30 mL 0    omeprazole (PriLOSEC) 20 mg delayed release capsule TAKE ONE CAPSULE BY MOUTH TWICE DAILY 180 capsule 1    potassium citrate  (UROCIT-K) 10 mEq TAKE ONE TABLET BY MOUTH TWICE DAILY 180 tablet 1    torsemide (DEMADEX) 20 mg tablet take two tablets by mouth in the morning and one tablet  at 4pm 120 tablet 11    umeclidinium-vilanterol (Anoro Ellipta) 62.5-25 mcg/actuation inhaler Inhale 1 puff daily 60 each 5    Unifine Pentips 31G X 8 MM MISC inject under the skin daily use as directed 100 each 0     No current facility-administered medications for this visit.       SOCIAL HISTORY:  Social History     Socioeconomic History    Marital status: /Civil Union     Spouse name: Gabriela    Number of children: 4    Years of education: None    Highest education level: 12th grade   Occupational History    None   Tobacco Use    Smoking status: Former     Current packs/day: 0.00     Average packs/day: 0.3 packs/day for 44.0 years (11.0 ttl pk-yrs)     Types: Cigarettes     Start date: 1965     Quit date: 2010     Years since quittin.9     Passive exposure: Past    Smokeless tobacco: Never    Tobacco comments:     Quit several times for up to 6 years.   Vaping Use    Vaping status: Never Used   Substance and Sexual Activity    Alcohol use: Not Currently     Comment: No use    Drug use: Never     Comment: No use    Sexual activity: Not Currently     Partners: Female   Other Topics Concern    None   Social History Narrative    · Most recent tobacco use screenin2020      · Do you currently or have you served in the [employer]:   No      · Were you activated, into active duty, as a member of the [employer] or as a Reservist:   No      · Live alone or with others:   with others        · Caffeine intake:   Occasional      · Illicit drugs:   No      · Diet:   Regular      · Exercise level:   Moderate      · Advance directive:   Yes      · Marital status:         · General stress level:   Medium      · Single or multi-level home/work:   multi level home      · Guns present in home:   No      · Seat belts used  routinely:   Yes      · Sunscreen used routinely:   Yes      · Smoke alarm in home:   Yes      Social Drivers of Health     Financial Resource Strain: Low Risk  (6/21/2023)    Overall Financial Resource Strain (CARDIA)     Difficulty of Paying Living Expenses: Not hard at all   Food Insecurity: No Food Insecurity (7/4/2024)    Nursing - Inadequate Food Risk Classification     Worried About Running Out of Food in the Last Year: Never true     Ran Out of Food in the Last Year: Never true     Ran Out of Food in the Last Year: Not on file   Transportation Needs: No Transportation Needs (7/4/2024)    PRAPARE - Transportation     Lack of Transportation (Medical): No     Lack of Transportation (Non-Medical): No   Physical Activity: Insufficiently Active (4/13/2021)    Exercise Vital Sign     Days of Exercise per Week: 4 days     Minutes of Exercise per Session: 10 min   Stress: No Stress Concern Present (4/13/2021)    [employer] - Occupational Stress Questionnaire     Feeling of Stress : Not at all   Social Connections: Moderately Isolated (4/13/2021)    Social Connection and Isolation Panel [NHANES]     Frequency of Communication with Friends and Family: More than three times a week     Frequency of Social Gatherings with Friends and Family: Twice a week     Attends Oriental orthodox Services: Never     Active Member of Clubs or Organizations: No     Attends Club or Organization Meetings: Never     Marital Status:    Intimate Partner Violence: Not At Risk (4/13/2021)    Humiliation, Afraid, Rape, and Kick questionnaire     Fear of Current or Ex-Partner: No     Emotionally Abused: No     Physically Abused: No     Sexually Abused: No   Housing Stability: Low Risk  (7/4/2024)    Housing Stability Vital Sign     Unable to Pay for Housing in the Last Year: No     Number of Times Moved in the Last Year: 0     Homeless in the Last Year: No        Review of Systems   Constitutional: Negative.    HENT:  Negative.     Eyes: Negative.    Respiratory: Negative.     Cardiovascular: Negative.    Endocrine: Negative.    Musculoskeletal: Negative.    Neurological: Negative.    Hematological: Negative.    Psychiatric/Behavioral: Negative.           Objective:     Physical Exam  Vitals reviewed.   Constitutional:       Appearance: Normal appearance.   HENT:      Head: Normocephalic and atraumatic.      Nose: Nose normal.   Eyes:      Conjunctiva/sclera: Conjunctivae normal.      Pupils: Pupils are equal, round, and reactive to light.   Cardiovascular:      Pulses:           Dorsalis pedis pulses are 0 on the right side and 1+ on the left side.        Posterior tibial pulses are 0 on the right side and 0 on the left side.   Pulmonary:      Effort: Pulmonary effort is normal.   Feet:      Right foot:      Skin integrity: Skin integrity normal.      Toenail Condition: Right toenails are abnormally thick and long. Fungal disease present.     Left foot:      Skin integrity: Skin integrity normal.      Toenail Condition: Left toenails are abnormally thick and long. Fungal disease present.     Comments: The patient's clinical examination today was significant for thickened and dystrophic pedal nail plates with brittleness, discoloration, and subungual debris consistent with onychomycosis x 10.  There are no open lesions. Pedal pulses are nonpalpable bilaterally.  Capillary fill time to the toes is greater than 3 seconds x 10.  Temperature gradient is mildly increased to the bilateral extremities.  Skin is thin with decreased turgor.  There is absence of hair growth to the bilateral extremities.  The interdigital spaces are clear without maceration.  No pretrophic changes noted to either lower extremity.  Skin:     General: Skin is warm.      Capillary Refill: Capillary refill takes 2 to 3 seconds.   Neurological:      General: No focal deficit present.      Mental Status: He is alert and oriented to person, place, and time.    Psychiatric:         Mood and Affect: Mood normal.         Behavior: Behavior normal.         Thought Content: Thought content normal.

## 2024-12-04 ENCOUNTER — TELEPHONE (OUTPATIENT)
Dept: ENDOCRINOLOGY | Facility: CLINIC | Age: 79
End: 2024-12-04

## 2024-12-05 NOTE — TELEPHONE ENCOUNTER
Calling to confirm patients last office visit, and their next appt. Information was provided. No further questions at this time.

## 2024-12-13 DIAGNOSIS — I25.83 CORONARY ARTERY DISEASE DUE TO LIPID RICH PLAQUE: ICD-10-CM

## 2024-12-13 DIAGNOSIS — I25.10 CORONARY ARTERY DISEASE DUE TO LIPID RICH PLAQUE: ICD-10-CM

## 2024-12-13 RX ORDER — ATORVASTATIN CALCIUM 40 MG/1
40 TABLET, FILM COATED ORAL DAILY
Qty: 90 TABLET | Refills: 0 | Status: SHIPPED | OUTPATIENT
Start: 2024-12-13

## 2024-12-16 DIAGNOSIS — E11.65 TYPE 2 DIABETES MELLITUS WITH HYPERGLYCEMIA, WITH LONG-TERM CURRENT USE OF INSULIN (HCC): ICD-10-CM

## 2024-12-16 DIAGNOSIS — Z79.4 TYPE 2 DIABETES MELLITUS WITH HYPERGLYCEMIA, WITH LONG-TERM CURRENT USE OF INSULIN (HCC): ICD-10-CM

## 2024-12-16 RX ORDER — PEN NEEDLE, DIABETIC 31 GX5/16"
NEEDLE, DISPOSABLE MISCELLANEOUS DAILY
Qty: 400 EACH | Refills: 1 | Status: SHIPPED | OUTPATIENT
Start: 2024-12-16 | End: 2024-12-17

## 2024-12-17 ENCOUNTER — TELEPHONE (OUTPATIENT)
Age: 79
End: 2024-12-17

## 2024-12-17 DIAGNOSIS — E11.65 TYPE 2 DIABETES MELLITUS WITH HYPERGLYCEMIA, WITH LONG-TERM CURRENT USE OF INSULIN (HCC): Primary | ICD-10-CM

## 2024-12-17 DIAGNOSIS — Z79.4 TYPE 2 DIABETES MELLITUS WITH HYPERGLYCEMIA, WITH LONG-TERM CURRENT USE OF INSULIN (HCC): Primary | ICD-10-CM

## 2024-12-17 RX ORDER — PEN NEEDLE, DIABETIC 32 GX 1/4"
NEEDLE, DISPOSABLE MISCELLANEOUS
Qty: 120 EACH | Refills: 4 | Status: SHIPPED | OUTPATIENT
Start: 2024-12-17

## 2024-12-17 NOTE — TELEPHONE ENCOUNTER
Phone call from [facility]. They received the script for Ufine pen needles. However, the patient insurance only covers BD pen needles.  Please send script to [facility] for BD pen needles 31G x 8MM

## 2024-12-19 DIAGNOSIS — I48.11 LONGSTANDING PERSISTENT ATRIAL FIBRILLATION (HCC): ICD-10-CM

## 2024-12-19 RX ORDER — APIXABAN 5 MG/1
5 TABLET, FILM COATED ORAL 2 TIMES DAILY
Qty: 180 TABLET | Refills: 1 | Status: SHIPPED | OUTPATIENT
Start: 2024-12-19

## 2024-12-25 DIAGNOSIS — Z79.4 TYPE 2 DIABETES MELLITUS WITH HYPERGLYCEMIA, WITH LONG-TERM CURRENT USE OF INSULIN (HCC): ICD-10-CM

## 2024-12-25 DIAGNOSIS — E11.65 TYPE 2 DIABETES MELLITUS WITH HYPERGLYCEMIA, WITH LONG-TERM CURRENT USE OF INSULIN (HCC): ICD-10-CM

## 2024-12-26 RX ORDER — INSULIN ASPART 100 [IU]/ML
INJECTION, SOLUTION INTRAVENOUS; SUBCUTANEOUS
Qty: 30 ML | Refills: 5 | Status: SHIPPED | OUTPATIENT
Start: 2024-12-26

## 2025-01-07 DIAGNOSIS — E78.00 PURE HYPERCHOLESTEROLEMIA: ICD-10-CM

## 2025-01-07 DIAGNOSIS — Z79.4 TYPE 2 DIABETES MELLITUS WITH HYPERGLYCEMIA, WITH LONG-TERM CURRENT USE OF INSULIN (HCC): Primary | ICD-10-CM

## 2025-01-07 DIAGNOSIS — E83.52 HYPERCALCEMIA: ICD-10-CM

## 2025-01-07 DIAGNOSIS — E11.65 TYPE 2 DIABETES MELLITUS WITH HYPERGLYCEMIA, WITH LONG-TERM CURRENT USE OF INSULIN (HCC): Primary | ICD-10-CM

## 2025-01-09 DIAGNOSIS — J44.9 CHRONIC OBSTRUCTIVE PULMONARY DISEASE, UNSPECIFIED COPD TYPE (HCC): ICD-10-CM

## 2025-01-10 ENCOUNTER — APPOINTMENT (OUTPATIENT)
Dept: LAB | Facility: AMBULARY SURGERY CENTER | Age: 80
End: 2025-01-10
Payer: MEDICARE

## 2025-01-10 DIAGNOSIS — E78.00 PURE HYPERCHOLESTEROLEMIA: ICD-10-CM

## 2025-01-10 DIAGNOSIS — C44.42 SQUAMOUS CELL CARCINOMA OF SCALP: ICD-10-CM

## 2025-01-10 DIAGNOSIS — Z79.4 TYPE 2 DIABETES MELLITUS WITH HYPERGLYCEMIA, WITH LONG-TERM CURRENT USE OF INSULIN (HCC): ICD-10-CM

## 2025-01-10 DIAGNOSIS — E11.65 TYPE 2 DIABETES MELLITUS WITH HYPERGLYCEMIA, WITH LONG-TERM CURRENT USE OF INSULIN (HCC): ICD-10-CM

## 2025-01-10 DIAGNOSIS — E83.52 HYPERCALCEMIA: ICD-10-CM

## 2025-01-10 LAB
25(OH)D3 SERPL-MCNC: 27.7 NG/ML (ref 30–100)
ALBUMIN SERPL BCG-MCNC: 4 G/DL (ref 3.5–5)
ALP SERPL-CCNC: 79 U/L (ref 34–104)
ALT SERPL W P-5'-P-CCNC: 12 U/L (ref 7–52)
ANION GAP SERPL CALCULATED.3IONS-SCNC: 11 MMOL/L (ref 4–13)
AST SERPL W P-5'-P-CCNC: 14 U/L (ref 13–39)
BASOPHILS # BLD AUTO: 0.04 THOUSANDS/ΜL (ref 0–0.1)
BASOPHILS NFR BLD AUTO: 1 % (ref 0–1)
BILIRUB SERPL-MCNC: 0.47 MG/DL (ref 0.2–1)
BUN SERPL-MCNC: 35 MG/DL (ref 5–25)
CALCIUM SERPL-MCNC: 9.5 MG/DL (ref 8.4–10.2)
CHLORIDE SERPL-SCNC: 99 MMOL/L (ref 96–108)
CHOLEST SERPL-MCNC: 137 MG/DL (ref ?–200)
CO2 SERPL-SCNC: 32 MMOL/L (ref 21–32)
CREAT SERPL-MCNC: 1.3 MG/DL (ref 0.6–1.3)
CREAT UR-MCNC: 30.8 MG/DL
EOSINOPHIL # BLD AUTO: 0.11 THOUSAND/ΜL (ref 0–0.61)
EOSINOPHIL NFR BLD AUTO: 2 % (ref 0–6)
ERYTHROCYTE [DISTWIDTH] IN BLOOD BY AUTOMATED COUNT: 16.7 % (ref 11.6–15.1)
EST. AVERAGE GLUCOSE BLD GHB EST-MCNC: 174 MG/DL
GFR SERPL CREATININE-BSD FRML MDRD: 51 ML/MIN/1.73SQ M
GLUCOSE P FAST SERPL-MCNC: 138 MG/DL (ref 65–99)
HBA1C MFR BLD: 7.7 %
HCT VFR BLD AUTO: 42.6 % (ref 36.5–49.3)
HDLC SERPL-MCNC: 28 MG/DL
HGB BLD-MCNC: 12.6 G/DL (ref 12–17)
IMM GRANULOCYTES # BLD AUTO: 0.04 THOUSAND/UL (ref 0–0.2)
IMM GRANULOCYTES NFR BLD AUTO: 1 % (ref 0–2)
LDLC SERPL CALC-MCNC: 53 MG/DL (ref 0–100)
LYMPHOCYTES # BLD AUTO: 1.96 THOUSANDS/ΜL (ref 0.6–4.47)
LYMPHOCYTES NFR BLD AUTO: 27 % (ref 14–44)
MCH RBC QN AUTO: 27 PG (ref 26.8–34.3)
MCHC RBC AUTO-ENTMCNC: 29.6 G/DL (ref 31.4–37.4)
MCV RBC AUTO: 91 FL (ref 82–98)
MICROALBUMIN UR-MCNC: 19.2 MG/L
MICROALBUMIN/CREAT 24H UR: 62 MG/G CREATININE (ref 0–30)
MONOCYTES # BLD AUTO: 0.62 THOUSAND/ΜL (ref 0.17–1.22)
MONOCYTES NFR BLD AUTO: 9 % (ref 4–12)
NEUTROPHILS # BLD AUTO: 4.47 THOUSANDS/ΜL (ref 1.85–7.62)
NEUTS SEG NFR BLD AUTO: 60 % (ref 43–75)
NONHDLC SERPL-MCNC: 109 MG/DL
NRBC BLD AUTO-RTO: 0 /100 WBCS
PLATELET # BLD AUTO: 183 THOUSANDS/UL (ref 149–390)
PMV BLD AUTO: 11 FL (ref 8.9–12.7)
POTASSIUM SERPL-SCNC: 4.2 MMOL/L (ref 3.5–5.3)
PROT SERPL-MCNC: 7.6 G/DL (ref 6.4–8.4)
PTH-INTACT SERPL-MCNC: 170.2 PG/ML (ref 12–88)
RBC # BLD AUTO: 4.67 MILLION/UL (ref 3.88–5.62)
SODIUM SERPL-SCNC: 142 MMOL/L (ref 135–147)
TRIGL SERPL-MCNC: 278 MG/DL (ref ?–150)
WBC # BLD AUTO: 7.24 THOUSAND/UL (ref 4.31–10.16)

## 2025-01-10 PROCEDURE — 83036 HEMOGLOBIN GLYCOSYLATED A1C: CPT

## 2025-01-10 PROCEDURE — 82043 UR ALBUMIN QUANTITATIVE: CPT

## 2025-01-10 PROCEDURE — 82306 VITAMIN D 25 HYDROXY: CPT

## 2025-01-10 PROCEDURE — 82570 ASSAY OF URINE CREATININE: CPT

## 2025-01-10 PROCEDURE — 80061 LIPID PANEL: CPT

## 2025-01-10 PROCEDURE — 83970 ASSAY OF PARATHORMONE: CPT

## 2025-01-10 PROCEDURE — 36415 COLL VENOUS BLD VENIPUNCTURE: CPT

## 2025-01-10 PROCEDURE — 85025 COMPLETE CBC W/AUTO DIFF WBC: CPT

## 2025-01-10 PROCEDURE — 80053 COMPREHEN METABOLIC PANEL: CPT

## 2025-01-10 RX ORDER — UMECLIDINIUM BROMIDE AND VILANTEROL TRIFENATATE 62.5; 25 UG/1; UG/1
1 POWDER RESPIRATORY (INHALATION) DAILY
Qty: 60 EACH | Refills: 0 | Status: SHIPPED | OUTPATIENT
Start: 2025-01-10

## 2025-01-14 ENCOUNTER — OFFICE VISIT (OUTPATIENT)
Dept: ENDOCRINOLOGY | Facility: CLINIC | Age: 80
End: 2025-01-14
Payer: MEDICARE

## 2025-01-14 VITALS
WEIGHT: 274 LBS | HEART RATE: 71 BPM | OXYGEN SATURATION: 92 % | BODY MASS INDEX: 34.07 KG/M2 | DIASTOLIC BLOOD PRESSURE: 52 MMHG | HEIGHT: 75 IN | SYSTOLIC BLOOD PRESSURE: 124 MMHG

## 2025-01-14 DIAGNOSIS — N18.31 CHRONIC KIDNEY DISEASE, STAGE 3A (HCC): ICD-10-CM

## 2025-01-14 DIAGNOSIS — E83.52 HYPERCALCEMIA: ICD-10-CM

## 2025-01-14 DIAGNOSIS — E27.9 ADRENAL NODULE (HCC): ICD-10-CM

## 2025-01-14 DIAGNOSIS — E11.65 TYPE 2 DIABETES MELLITUS WITH HYPERGLYCEMIA, WITH LONG-TERM CURRENT USE OF INSULIN (HCC): Primary | ICD-10-CM

## 2025-01-14 DIAGNOSIS — Z79.4 TYPE 2 DIABETES MELLITUS WITH HYPERGLYCEMIA, WITH LONG-TERM CURRENT USE OF INSULIN (HCC): Primary | ICD-10-CM

## 2025-01-14 DIAGNOSIS — E11.42 DIABETIC PERIPHERAL NEUROPATHY (HCC): ICD-10-CM

## 2025-01-14 PROCEDURE — 99214 OFFICE O/P EST MOD 30 MIN: CPT

## 2025-01-14 PROCEDURE — 95251 CONT GLUC MNTR ANALYSIS I&R: CPT

## 2025-01-14 NOTE — ASSESSMENT & PLAN NOTE
Lab Results   Component Value Date    HGBA1C 7.7 (H) 01/10/2025   Continue follow up with podiatry

## 2025-01-14 NOTE — ASSESSMENT & PLAN NOTE
Wt Readings from Last 3 Encounters:   01/14/25 124 kg (274 lb)   11/22/24 124 kg (274 lb)   08/04/24 128 kg (282 lb)

## 2025-01-14 NOTE — PROGRESS NOTES
Established Patient Progress Note      Chief Complaint   Patient presents with    Diabetes Type 2        Impression & Plan:    Assessment & Plan  Type 2 diabetes mellitus with hyperglycemia, with long-term current use of insulin ([facility])  Diabetes control:  HGA1C at goal for age  BGs well controlled, in range 87% of the time  A few hypoglycemia noted on CGM but patient reports they did not correlate with finger sticks and did not show any real hypoglycemia  Treatment regimen:   Continue current medication regimen. Patient denies any real hypoglycemia.   Continue with CGM   Discussed risks/complications associated with uncontrolled diabetes.    Advised to adhere to diabetic diet, and recommended staying active/exercising routinely.  Keep carbohydrates consistent to limit blood glucose fluctuations.  Advised to call if blood sugars less than 70 mg/dl or over 300 mg/dl.   Discussed symptoms and treatment of hypoglycemia.    Recommended routine follow-up with podiatry and ophthalmology.   Ordered blood work to complete prior to next visit.   Lab Results   Component Value Date    HGBA1C 7.7 (H) 01/10/2025       Orders:    Hemoglobin A1C; Future    Comprehensive metabolic panel; Future    Hypercalcemia  PTH has worsened. Vitamin D remains low.   Calcium level remains normal.   Recommend he increase Vitamin D to 4,000 units daily  Increase calcium intake in diet. Would not recommend supplements due to hx of kidney stones.   Orders:    PTH, intact; Future    Vitamin D 25 hydroxy; Future    Diabetic peripheral neuropathy ([facility])    Lab Results   Component Value Date    HGBA1C 7.7 (H) 01/10/2025   Continue follow up with podiatry         Chronic kidney disease, stage 3a ([facility])  Lab Results   Component Value Date    EGFR 51 01/10/2025    EGFR 51 10/11/2024    EGFR 49 08/06/2024    CREATININE 1.30 01/10/2025    CREATININE 1.30 10/11/2024    CREATININE 1.34 (H) 08/06/2024   Continue follow up with nephrology.   Taking SGLT-2 and  ACE.       Adrenal nodule (HCC)  Stable on imaging. No further work up needed at this time.            History of Present Illness:   Home Walters is a 78 y.o. male with hypertension, hyperlipidemia, sleep apnea, adrenal adenoma, hyperparathyroidism and type 2 diabetes seen in follow up. Reports complications of neuropathy, retinopathy and microalbuminuria. Last A1C was 7.7. Denies recent severe hypoglycemic or severe hyperglycemic episodes. Denies any issues with his current regimen. Home glucose monitoring: are performed regularly with CGM.     Previously treated with metformin (stopped due to GI issues) and Pioglitazone which was discontinued due to edema.  Did not tolerate GLP-1 agonist due to nausea.     Home Walters   Device used: [employer] 3   Home use   Indication: Type 2 Diabetes  More than 72 hours of data was reviewed. Report to be scanned to chart.   Date Range: 1/1/25-1/14/25  Analysis of data:   Average Glucose: 143  Coefficient of Variation: 23.3%    Time in Target Range: 87%   Time Above Range: 12% high, 1% very high    Time Below Range: 0%   Interpretation of data:  BGs well controlled.      Current regimen:   Tresiba 95 units once daily  NovoLog 20-25 units with each meal depending on carb amount, does not take any if not eating carbs  Jardiance 25 mg daily - was started by cardiology for hx of CHF    Last Eye Exam: [facility], [facility]   Last Foot Exam: follows with podiatry every 6 weeks, Dr. Wood      Has hypertension: Taking lisinopril and Toprol XL  Has hyperlipidemia: Taking atorvastatin and zetia    Vitamin D Deficiency: Taking 2,000 units daily  Adrenal Adenoma: 4 cm adrenal adenoma which has been stable on imaging. Repeat CT for renal stones done 8/24/24  Hypercalcemia/Hyperparathyroidism: Denies recent falls or fractures. PTH is elevated and vit D is 27.7. calcium is normal. Vitamin D3 2000 units a day. No recent kidney stones.     Patient Active Problem List   Diagnosis     Hypertension    Mixed hyperlipidemia    Type 2 diabetes mellitus with hyperglycemia, with long-term current use of insulin (HCC)    Chronic low back pain    CAD (coronary artery disease)    Malignant neoplasm of urinary bladder (HCC)    Arthritis    AAA (abdominal aortic aneurysm) (HCC)    Decreased pedal pulses    Chronic pain of both knees    Actinic keratosis of right temple    Actinic keratosis of scalp    Morbid obesity (HCC)    Adrenal nodule (HCC)    LUCIA (obstructive sleep apnea)    Bilateral edema of lower extremity    Chronic diastolic CHF (congestive heart failure) (HCC)    Left upper lobe pulmonary nodule    Hypercalcemia    Nephrolithiasis    Hydroureter on left    Venous stasis dermatitis of both lower extremities    Persistent proteinuria    Hypokalemia    Ureteral calculi    Benign prostatic hyperplasia without lower urinary tract symptoms    PAF (paroxysmal atrial fibrillation) (HCC)    PAD (peripheral artery disease) (HCC)    Gastroesophageal reflux disease    Vitamin D deficiency    Cirrhosis of liver without ascites (HCC)    Chronic acquired lymphedema    Pulmonary hypertension (HCC)    Chronic obstructive pulmonary disease (HCC)    Stage 3a chronic kidney disease (HCC)    Current use of insulin (HCC)    Squamous cell carcinoma of scalp    GI bleed    Epiploic appendagitis    Diabetic peripheral neuropathy (HCC)      Past Medical History:   Diagnosis Date    A-fib (HCC)     AAA (abdominal aortic aneurysm) (HCC)     Actinic keratosis of right temple 07/31/2020    Actinic keratosis of scalp 07/31/2020    Acute respiratory failure with hypoxia (HCC) 03/25/2021    Allergic 1960    Arthritis     Lay's esophagus     Bladder cancer (HCC)     Blister of left leg 04/28/2021    Blister of right leg 05/26/2021    CAD (coronary artery disease)     Cancer (HCC) 02/2010    Bladder    CHF (congestive heart failure) (HCC)     Chronic low back pain     Chronic pain of both knees 07/31/2020    Colitis  12/04/2020    CPAP (continuous positive airway pressure) dependence     Diabetes (HCC)     Diabetes mellitus (HCC) 10/1993    Excessive gas 12/03/2020    Heart murmur     History of chemotherapy     Hydroureter on left 04/28/2021    Hyperlipemia     Hypertension     Immunization deficiency 10/30/2020    Hep a nonimmune    Kidney stone     Left lower quadrant abdominal pain 12/03/2020    Mass of right adrenal gland (HCC) 12/04/2020    4.1 cm    Myocardial infarction (HCC)     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    Obesity 2000    LUCIA (obstructive sleep apnea) 01/29/2021    Pressure ulcer of right leg, stage 1 05/26/2021    Squamous cell skin cancer 05/02/2024    central scalp, mohs    Squamous cell skin cancer 10/03/2024    scalp-Mohs    Urinary tract infection with hematuria 05/03/2021    u cx 4/2021=pseudomonas R to bactrim and cefdinir, S to cipro    Venous stasis dermatitis of both lower extremities 05/26/2021      Past Surgical History:   Procedure Laterality Date    BACK SURGERY      BLADDER SURGERY      CARDIAC CATHETERIZATION  04/2016    CARDIAC CATHETERIZATION N/A 01/19/2023    Procedure: Cardiac RHC;  Surgeon: Jose Carlos Shea MD;  [facility];  Service: Cardiology    CATARACT EXTRACTION, BILATERAL      COLONOSCOPY  01/29/2019    next 2022 [place]    CORONARY ARTERY BYPASS GRAFT  04/2016    Quadruple per [place]    EGD  06/2017    EYE SURGERY  11/2016    Cataracts    JOINT REPLACEMENT  [id number]    Hip and shoulder    KIDNEY STONE SURGERY      MOHS SURGERY  06/04/2024    [facility] central scalp, Dr Rodríguez    MOHS SURGERY N/A 11/22/2024    [facility] scalp;     ND CYSTO/URETERO W/LITHOTRIPSY &INDWELL STENT INSRT Left 05/21/2021    Procedure: CYSTOSCOPY URETEROSCOPY WITH LITHOTRIPSY HOLMIUM LASER, AND INSERTION STENT URETERAL/EXCHANGE;  Surgeon: Siddhartha Leslie MD;  Location: [facility];  Service: Urology    ND CYSTOURETHROSCOPY W/URETERAL CATHETERIZATION Left 04/24/2021    Procedure: CYSTOSCOPY   WITH INSERTION STENT URETERAL;  Surgeon: Siddhartha Leslie MD;  Location: [facility];  Service: Urology    TOTAL HIP ARTHROPLASTY Right 2012    TOTAL SHOULDER REPLACEMENT Right 2013    VASECTOMY  1971      Social History     Tobacco Use    Smoking status: Former     Current packs/day: 0.00     Average packs/day: 0.3 packs/day for 44.0 years (11.0 ttl pk-yrs)     Types: Cigarettes     Start date: 1/1/1965     Quit date: 1/1/2010     Years since quitting: 15.0     Passive exposure: Past    Smokeless tobacco: Never    Tobacco comments:     Quit several times for up to 6 years.   Substance Use Topics    Alcohol use: Not Currently     Comment: No use     Allergies   Allergen Reactions    Ciprofloxacin Abdominal Pain, Diarrhea, GI Bleeding and GI Intolerance    Diltiazem Abdominal Pain    Metronidazole Abdominal Pain, Diarrhea, GI Bleeding and GI Intolerance         Current Outpatient Medications:     apixaban (Eliquis) 5 mg, TAKE ONE TABLET BY MOUTH TWICE DAILY, Disp: 180 tablet, Rfl: 1    atorvastatin (LIPITOR) 40 mg tablet, Take 1 tablet by mouth daily, Disp: 90 tablet, Rfl: 0    cholecalciferol (VITAMIN D3) 1,000 units tablet, Take 1,000 Units by mouth daily, Disp: , Rfl:     Continuous Blood Gluc Sensor (FreeStyle Peggy 2 Sensor) MISC, Change it every 14 days, Disp: 1 each, Rfl: 0    dicyclomine (BENTYL) 10 mg capsule, Take 1 capsule (10 mg total) by mouth 2 (two) times a day as needed (abdominal cramping), Disp: 60 capsule, Rfl: 3    digoxin (LANOXIN) 0.125 mg tablet, TAKE ONE TABLET BY MOUTH ONCE DAILY, Disp: 90 tablet, Rfl: 0    Empagliflozin (Jardiance) 25 MG TABS, TAKE ONE TABLET BY MOUTH DAILY IN MORNING, Disp: 90 tablet, Rfl: 1    famotidine (PEPCID) 40 MG tablet, Take 1 tablet by mouth daily at bedtime, Disp: 30 tablet, Rfl: 5    insulin degludec (Tresiba FlexTouch) 200 units/mL CONCENTRATED U-200 injection pen, Inject 95 units under the skin once daily, Disp: 15 mL, Rfl: 5    Insulin Pen Needle (BD Pen Needle  "Micro U/F) 32G X 6 MM MISC, Use 3-4 times per day with insulin pens, Disp: 120 each, Rfl: 4    metoprolol succinate (TOPROL-XL) 50 mg 24 hr tablet, Take 1 tablet (50 mg total) by mouth daily, Disp: 90 tablet, Rfl: 3    NovoLOG FlexPen 100 units/mL injection pen, Inject 20-25 units under the skin three times daily with meals, Disp: 30 mL, Rfl: 5    omeprazole (PriLOSEC) 20 mg delayed release capsule, TAKE ONE CAPSULE BY MOUTH TWICE DAILY, Disp: 180 capsule, Rfl: 1    potassium citrate (UROCIT-K) 10 mEq, TAKE ONE TABLET BY MOUTH TWICE DAILY, Disp: 180 tablet, Rfl: 1    torsemide (DEMADEX) 20 mg tablet, take two tablets by mouth in the morning and one tablet  at 4pm, Disp: 120 tablet, Rfl: 11    umeclidinium-vilanterol (Anoro Ellipta) 62.5-25 mcg/actuation inhaler, inhale 1 puff by mouth daily, Disp: 60 each, Rfl: 0    Review of Systems   Constitutional:  Negative for chills, fever and unexpected weight change.   HENT:  Positive for hearing loss.    Eyes:  Negative for visual disturbance.   Respiratory:  Positive for shortness of breath (SOB with exertion).    Gastrointestinal:  Negative for diarrhea, nausea and vomiting.   Musculoskeletal:  Positive for gait problem.   Neurological:  Positive for numbness. Negative for tremors.   Psychiatric/Behavioral:  The patient is not nervous/anxious.    All other systems reviewed and are negative.      Physical Exam:  Body mass index is 34.25 kg/m².  /52   Pulse 71   Ht 6' 3\" (1.905 m)   Wt 124 kg (274 lb)   SpO2 92%   BMI 34.25 kg/m²    Wt Readings from Last 3 Encounters:   01/14/25 124 kg (274 lb)   11/22/24 124 kg (274 lb)   08/04/24 128 kg (282 lb)       Physical Exam  Vitals reviewed.   Constitutional:       Appearance: Normal appearance. He is obese.   Cardiovascular:      Rate and Rhythm: Normal rate.   Pulmonary:      Effort: Pulmonary effort is normal.   Neurological:      Mental Status: He is alert and oriented to person, place, and time.      Gait: Gait " abnormal.   Psychiatric:         Mood and Affect: Mood normal.         Labs:   Lab Results   Component Value Date    HGBA1C 7.7 (H) 01/10/2025    HGBA1C 7.4 (H) 07/11/2024    HGBA1C 7.8 (H) 03/14/2024     Lab Results   Component Value Date    CREATININE 1.30 01/10/2025    CREATININE 1.30 10/11/2024    CREATININE 1.34 (H) 08/06/2024    BUN 35 (H) 01/10/2025    K 4.2 01/10/2025    CL 99 01/10/2025    CO2 32 01/10/2025     eGFR   Date Value Ref Range Status   01/10/2025 51 ml/min/1.73sq m Final     Lab Results   Component Value Date    HDL 28 (L) 01/10/2025    TRIG 278 (H) 01/10/2025     Lab Results   Component Value Date    ALT 12 01/10/2025    AST 14 01/10/2025    ALKPHOS 79 01/10/2025     Lab Results   Component Value Date    BSE5CMCTANGE 1.150 05/11/2023    YDL6HSXUQWLP 0.848 02/11/2023    FGI6VAJNQGZL 0.984 12/27/2021       Patient Instructions   Increase vitamin D to 4000 units daily  Increase dietary calcium  glass or milk or yogurt     Discussed with the patient and all questioned fully answered. He will call me if any problems arise.    KACEY Mccoy

## 2025-01-14 NOTE — ASSESSMENT & PLAN NOTE
PTH has worsened. Vitamin D remains low.   Calcium level remains normal.   Recommend he increase Vitamin D to 4,000 units daily  Increase calcium intake in diet. Would not recommend supplements due to hx of kidney stones.   Orders:    PTH, intact; Future    Vitamin D 25 hydroxy; Future

## 2025-01-14 NOTE — ASSESSMENT & PLAN NOTE
Diabetes control:  HGA1C at goal for age  BGs well controlled, in range 87% of the time  A few hypoglycemia noted on CGM but patient reports they did not correlate with finger sticks and did not show any real hypoglycemia  Treatment regimen:   Continue current medication regimen. Patient denies any real hypoglycemia.   Continue with CGM   Discussed risks/complications associated with uncontrolled diabetes.    Advised to adhere to diabetic diet, and recommended staying active/exercising routinely.  Keep carbohydrates consistent to limit blood glucose fluctuations.  Advised to call if blood sugars less than 70 mg/dl or over 300 mg/dl.   Discussed symptoms and treatment of hypoglycemia.    Recommended routine follow-up with podiatry and ophthalmology.   Ordered blood work to complete prior to next visit.   Lab Results   Component Value Date    HGBA1C 7.7 (H) 01/10/2025       Orders:    Hemoglobin A1C; Future    Comprehensive metabolic panel; Future

## 2025-01-20 DIAGNOSIS — I50.9 CHF EXACERBATION (HCC): ICD-10-CM

## 2025-01-21 RX ORDER — TORSEMIDE 20 MG/1
TABLET ORAL
Qty: 120 TABLET | Refills: 1 | Status: SHIPPED | OUTPATIENT
Start: 2025-01-21

## 2025-01-31 ENCOUNTER — TELEPHONE (OUTPATIENT)
Dept: ENDOCRINOLOGY | Facility: CLINIC | Age: 80
End: 2025-01-31

## 2025-01-31 DIAGNOSIS — I48.91 ATRIAL FIBRILLATION WITH RAPID VENTRICULAR RESPONSE (HCC): ICD-10-CM

## 2025-02-03 RX ORDER — DIGOXIN 125 MCG
125 TABLET ORAL DAILY
Qty: 90 TABLET | Refills: 0 | Status: SHIPPED | OUTPATIENT
Start: 2025-02-03

## 2025-02-04 ENCOUNTER — TELEPHONE (OUTPATIENT)
Dept: ENDOCRINOLOGY | Facility: CLINIC | Age: 80
End: 2025-02-04

## 2025-02-07 DIAGNOSIS — J44.9 CHRONIC OBSTRUCTIVE PULMONARY DISEASE, UNSPECIFIED COPD TYPE (HCC): ICD-10-CM

## 2025-02-07 RX ORDER — UMECLIDINIUM BROMIDE AND VILANTEROL TRIFENATATE 62.5; 25 UG/1; UG/1
1 POWDER RESPIRATORY (INHALATION) DAILY
Qty: 60 EACH | Refills: 0 | Status: SHIPPED | OUTPATIENT
Start: 2025-02-07

## 2025-02-11 DIAGNOSIS — R82.991 HYPOCITRATURIA: ICD-10-CM

## 2025-02-12 DIAGNOSIS — R82.991 HYPOCITRATURIA: ICD-10-CM

## 2025-02-12 RX ORDER — POTASSIUM CITRATE 10 MEQ/1
10 TABLET, EXTENDED RELEASE ORAL 2 TIMES DAILY
Qty: 180 TABLET | Refills: 1 | Status: SHIPPED | OUTPATIENT
Start: 2025-02-12

## 2025-02-12 RX ORDER — POTASSIUM CITRATE 10 MEQ/1
10 TABLET, EXTENDED RELEASE ORAL 2 TIMES DAILY
Qty: 180 TABLET | Refills: 0 | OUTPATIENT
Start: 2025-02-12

## 2025-02-14 ENCOUNTER — RESULTS FOLLOW-UP (OUTPATIENT)
Dept: VASCULAR SURGERY | Facility: CLINIC | Age: 80
End: 2025-02-14

## 2025-02-14 ENCOUNTER — HOSPITAL ENCOUNTER (OUTPATIENT)
Dept: VASCULAR ULTRASOUND | Facility: HOSPITAL | Age: 80
Discharge: HOME | End: 2025-02-14
Payer: MEDICARE

## 2025-02-14 DIAGNOSIS — I71.43 INFRARENAL ABDOMINAL AORTIC ANEURYSM (AAA) WITHOUT RUPTURE (HCC): ICD-10-CM

## 2025-02-14 DIAGNOSIS — I73.9 PAD (PERIPHERAL ARTERY DISEASE) (HCC): ICD-10-CM

## 2025-02-14 PROCEDURE — 93978 VASCULAR STUDY: CPT | Performed by: SURGERY

## 2025-02-14 PROCEDURE — 93978 VASCULAR STUDY: CPT

## 2025-02-17 ENCOUNTER — TRANSCRIBE ORDERS (OUTPATIENT)
Dept: VASCULAR SURGERY | Facility: CLINIC | Age: 80
End: 2025-02-17

## 2025-02-17 DIAGNOSIS — I71.43 INFRARENAL ABDOMINAL AORTIC ANEURYSM (AAA) WITHOUT RUPTURE (HCC): Primary | ICD-10-CM

## 2025-02-20 ENCOUNTER — OFFICE VISIT (OUTPATIENT)
Dept: HEMATOLOGY ONCOLOGY | Facility: CLINIC | Age: 80
End: 2025-02-20
Payer: MEDICARE

## 2025-02-20 VITALS
TEMPERATURE: 98.1 F | DIASTOLIC BLOOD PRESSURE: 60 MMHG | OXYGEN SATURATION: 91 % | BODY MASS INDEX: 34.25 KG/M2 | HEART RATE: 72 BPM | RESPIRATION RATE: 18 BRPM | HEIGHT: 75 IN | SYSTOLIC BLOOD PRESSURE: 100 MMHG

## 2025-02-20 DIAGNOSIS — L98.9 SKIN LESION OF FACE: ICD-10-CM

## 2025-02-20 DIAGNOSIS — C44.42 SQUAMOUS CELL CARCINOMA OF SCALP: Primary | ICD-10-CM

## 2025-02-20 PROCEDURE — 99213 OFFICE O/P EST LOW 20 MIN: CPT | Performed by: INTERNAL MEDICINE

## 2025-02-20 PROCEDURE — G2211 COMPLEX E/M VISIT ADD ON: HCPCS | Performed by: INTERNAL MEDICINE

## 2025-02-20 NOTE — PROGRESS NOTES
Name: Home Walters      : 1945      MRN: [id number]  Encounter Provider: Sagrario Spears MD  Encounter Date: 2025   Encounter department: [facility]  :  Assessment & Plan  Squamous cell carcinoma of scalp  Mr. 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  Clinically he is doing well without evidence of recurrence.  Labs reviewed and okay.  Does have a new lesion in between his eyebrows that is consistent or concerning for SCC/KA.  I will reach out to dermatology to get him in to be seen.  He knows to continue to monitor any new, change, concerning skin lesions or lymphadenopathy.  He will return for follow-up in 6 months.  Orders placed for blood work to be done at that time.  He knows to call with issues or concerns prior to his next visit.  Orders:    CBC and differential; Future    Comprehensive metabolic panel; Future    Skin lesion of face  Parents consistent with SCC/KA.  I will reach out to dermatology to get him in to be seen.           No follow-ups on file.    History of Present Illness   Chief Complaint   Patient presents with    Follow-up       Pertinent Medical History       25:   Mr. Walters is an 80-year-old gentleman with squamous of carcinoma of the scalp here for continued monitoring, follow-up and surveillance.  At his last visit in 2024 he had a healing Mohs procedure site on his scalp for squamous cell carcinoma with a new skin lesion that was biopsied demonstrating to be squamous cell carcinoma as well.  He did undergo a second Mohs procedure with dermatology, Dr. Stephen on 2024.    Today that is healing nicely and has healed.  He states that that lesion was painful prior to biopsy and demonstrating squamous cell carcinoma.  He denies any concerning skin lesions on his scalp at this time.  He does have  a new spot that is right on the superior aspect of the bridge of his nose right in between his eyes that has been growing over the past 6 months that he states is itchy.  It is not painful or bleeding.  Denies any other concerning skin lesions.  Denies any lymphadenopathy.       Review of Systems   Constitutional:  Negative for activity change, chills, diaphoresis, fatigue, fever and unexpected weight change.   HENT:  Negative for congestion, hearing loss, trouble swallowing and voice change.    Respiratory:  Negative for cough, shortness of breath and wheezing.    Cardiovascular:  Negative for chest pain, palpitations and leg swelling.   Gastrointestinal:  Negative for abdominal distention, abdominal pain, constipation, diarrhea, nausea and vomiting.   Musculoskeletal:  Positive for gait problem (in wheel chair - but rides exercise bicycle at home). Negative for arthralgias and myalgias.   Skin:  Negative for color change and rash.   Neurological:  Negative for dizziness, seizures, speech difficulty, weakness, light-headedness and headaches.   Hematological:  Negative for adenopathy.     Current Outpatient Medications on File Prior to Visit   Medication Sig Dispense Refill    Anoro Ellipta 62.5-25 MCG/ACT inhaler inhale 1 puff by mouth daily 60 each 0    apixaban (Eliquis) 5 mg TAKE ONE TABLET BY MOUTH TWICE DAILY 180 tablet 1    atorvastatin (LIPITOR) 40 mg tablet Take 1 tablet by mouth daily 90 tablet 0    cholecalciferol (VITAMIN D3) 1,000 units tablet Take 1,000 Units by mouth daily      Continuous Blood Gluc Sensor (FreeStyle Peggy 2 Sensor) MISC Change it every 14 days 1 each 0    dicyclomine (BENTYL) 10 mg capsule Take 1 capsule (10 mg total) by mouth 2 (two) times a day as needed (abdominal cramping) 60 capsule 3    digoxin (LANOXIN) 0.125 mg tablet TAKE ONE TABLET BY MOUTH ONCE DAILY 90 tablet 0    Empagliflozin (Jardiance) 25 MG TABS TAKE ONE TABLET BY MOUTH DAILY IN MORNING 90 tablet 1    famotidine  "(PEPCID) 40 MG tablet Take 1 tablet by mouth daily at bedtime 30 tablet 5    insulin degludec (Tresiba FlexTouch) 200 units/mL CONCENTRATED U-200 injection pen Inject 95 units under the skin once daily 15 mL 5    Insulin Pen Needle (BD Pen Needle Micro U/F) 32G X 6 MM MISC Use 3-4 times per day with insulin pens 120 each 4    metoprolol succinate (TOPROL-XL) 50 mg 24 hr tablet Take 1 tablet (50 mg total) by mouth daily 90 tablet 3    NovoLOG FlexPen 100 units/mL injection pen Inject 20-25 units under the skin three times daily with meals 30 mL 5    omeprazole (PriLOSEC) 20 mg delayed release capsule TAKE ONE CAPSULE BY MOUTH TWICE DAILY 180 capsule 1    potassium citrate (UROCIT-K) 10 mEq TAKE ONE TABLET BY MOUTH TWICE DAILY 180 tablet 1    torsemide (DEMADEX) 20 mg tablet take two tablets by mouth in the morning and one tablet  at 4pm 120 tablet 1     No current facility-administered medications on file prior to visit.          Objective   /60 (BP Location: Left arm, Patient Position: Sitting, Cuff Size: Adult)   Pulse 72   Temp 98.1 °F (36.7 °C) (Temporal)   Resp 18   Ht 6' 3\" (1.905 m)   SpO2 91%   BMI 34.25 kg/m²     Pain Screening:  Pain Score: 0-No pain  ECOG ECOG Performance Status: 1 - Restricted in physically strenuous activity but ambulatory and able to carry out work of a light or sedentary nature, e.g., light house work, office work     Physical Exam  Constitutional:       General: He is not in acute distress.     Appearance: Normal appearance. He is not ill-appearing.   HENT:      Head: Normocephalic and atraumatic.      Right Ear: External ear normal.      Left Ear: External ear normal.      Nose: Nose normal. No congestion.      Mouth/Throat:      Mouth: Mucous membranes are moist.      Pharynx: Oropharynx is clear. No oropharyngeal exudate.   Eyes:      General: No scleral icterus.        Right eye: No discharge.         Left eye: No discharge.      Conjunctiva/sclera: Conjunctivae " normal.   Cardiovascular:      Rate and Rhythm: Normal rate and regular rhythm.      Pulses: Normal pulses.      Heart sounds: No murmur heard.     No friction rub. No gallop.   Pulmonary:      Effort: Pulmonary effort is normal. No respiratory distress.      Breath sounds: Normal breath sounds. No wheezing or rales.   Abdominal:      General: Bowel sounds are normal. There is no distension.      Palpations: There is no mass.      Tenderness: There is no abdominal tenderness. There is no rebound.   Musculoskeletal:         General: No swelling or tenderness. Normal range of motion.      Cervical back: Normal range of motion. No rigidity.      Right lower leg: No edema (trace).      Left lower leg: No edema (trace).   Lymphadenopathy:      Head:      Right side of head: No submental, submandibular, preauricular, posterior auricular or occipital adenopathy.      Left side of head: No submental, submandibular, preauricular, posterior auricular or occipital adenopathy.      Cervical: No cervical adenopathy.      Right cervical: No superficial or posterior cervical adenopathy.     Left cervical: No superficial or posterior cervical adenopathy.      Upper Body:      Right upper body: No supraclavicular or axillary adenopathy.      Left upper body: No supraclavicular or axillary adenopathy.   Skin:     General: Skin is warm.      Coloration: Skin is not jaundiced.      Findings: Lesion (possible SCC/KA) present. No rash.      Comments: Scars well healed.  No evidence of recurrence at primary sites.  Lesion superior aspect of bridge of nose in between eyebrows - looks consistent with KA   Neurological:      General: No focal deficit present.      Mental Status: He is alert and oriented to person, place, and time.      Cranial Nerves: No cranial nerve deficit.      Motor: No weakness.      Gait: Gait normal.   Psychiatric:         Mood and Affect: Mood normal.         Behavior: Behavior normal.         Thought Content:  "Thought content normal.         Judgment: Judgment normal.         Labs: I have reviewed the following labs:  Lab Results   Component Value Date/Time    WBC 7.24 01/10/2025 06:57 AM    RBC 4.67 01/10/2025 06:57 AM    Hemoglobin 12.6 01/10/2025 06:57 AM    Hematocrit 42.6 01/10/2025 06:57 AM    MCV 91 01/10/2025 06:57 AM    MCH 27.0 01/10/2025 06:57 AM    RDW 16.7 (H) 01/10/2025 06:57 AM    Platelets 183 01/10/2025 06:57 AM    Segmented % 60 01/10/2025 06:57 AM    Lymphocytes % 27 01/10/2025 06:57 AM    Monocytes % 9 01/10/2025 06:57 AM    Eosinophils Relative 2 01/10/2025 06:57 AM    Basophils Relative 1 01/10/2025 06:57 AM    Immature Grans % 1 01/10/2025 06:57 AM    Absolute Neutrophils 4.47 01/10/2025 06:57 AM     Lab Results   Component Value Date/Time    Potassium 4.2 01/10/2025 06:45 AM    Chloride 99 01/10/2025 06:45 AM    CO2 32 01/10/2025 06:45 AM    BUN 35 (H) 01/10/2025 06:45 AM    Creatinine 1.30 01/10/2025 06:45 AM    Glucose, Fasting 138 (H) 01/10/2025 06:45 AM    Calcium 9.5 01/10/2025 06:45 AM    AST 14 01/10/2025 06:45 AM    ALT 12 01/10/2025 06:45 AM    Alkaline Phosphatase 79 01/10/2025 06:45 AM    Total Protein 7.6 01/10/2025 06:45 AM    Albumin 4.0 01/10/2025 06:45 AM    Total Bilirubin 0.47 01/10/2025 06:45 AM    eGFR 51 01/10/2025 06:45 AM   No results found for: \"[id number]\", \"FREET4\", \"LDH\", \"CEA\", \"\", \"\", \"MR7154\", \"\", \"CHROMGRANINA\", \"NSE\"     Radiology Results Review : No pertinent imaging studies reviewed.        Sagrario Spears MD, PhD  "

## 2025-02-20 NOTE — ASSESSMENT & PLAN NOTE
Mr. 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  Clinically he is doing well without evidence of recurrence.  Labs reviewed and okay.  Does have a new lesion in between his eyebrows that is consistent or concerning for SCC/KA.  I will reach out to dermatology to get him in to be seen.  He knows to continue to monitor any new, change, concerning skin lesions or lymphadenopathy.  He will return for follow-up in 6 months.  Orders placed for blood work to be done at that time.  He knows to call with issues or concerns prior to his next visit.  Orders:    CBC and differential; Future    Comprehensive metabolic panel; Future

## 2025-02-20 NOTE — LETTER
2025     KACEY Rogers  [address]  [address]  [place] [address]    Patient: Home Walters   YOB: 1945   Date of Visit: 2025       Dear Dr. Fox:    Thank you for referring Home Walters to me for evaluation. Below are my notes for this consultation.    If you have questions, please do not hesitate to call me. I look forward to following your patient along with you.         Sincerely,        Sagrario Spears MD        CC: MD Oswald Laguna MD Melinda McLane, CRNP Stanley Walker, MD Jake Marais, MD Zachariah Goldsmith, MD  [facility]  Jaspreet Nixon, MD Yeni Montenegro, KACEY Guerrier MD  2025 11:29 AM  Sign when Signing Visit  Name: Home Walters      : 1945      MRN: [id number]  Encounter Provider: Sagrario Spears MD  Encounter Date: 2025   Encounter department: [facility]  :  Assessment & Plan  Squamous cell carcinoma of scalp  Mr. 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  Clinically he is doing well without evidence of recurrence.  Labs reviewed and okay.  Does have a new lesion in between his eyebrows that is consistent or concerning for SCC/KA.  I will reach out to dermatology to get him in to be seen.  He knows to continue to monitor any new, change, concerning skin lesions or lymphadenopathy.  He will return for follow-up in 6 months.  Orders placed for blood work to be done at that time.  He knows to call with issues or concerns prior to his next visit.  Orders:  •  CBC and differential; Future  •  Comprehensive metabolic panel; Future    Skin lesion of face  Parents consistent with SCC/KA.  I will reach out to dermatology to get him in to be seen.           No  follow-ups on file.    History of Present Illness  Chief Complaint   Patient presents with   • Follow-up       Pertinent Medical History      02/20/25:   Mr. Walters is an 80-year-old gentleman with squamous of carcinoma of the scalp here for continued monitoring, follow-up and surveillance.  At his last visit in October 2024 he had a healing Mohs procedure site on his scalp for squamous cell carcinoma with a new skin lesion that was biopsied demonstrating to be squamous cell carcinoma as well.  He did undergo a second Mohs procedure with dermatology, Dr. Stephen on 11/22/2024.    Today that is healing nicely and has healed.  He states that that lesion was painful prior to biopsy and demonstrating squamous cell carcinoma.  He denies any concerning skin lesions on his scalp at this time.  He does have a new spot that is right on the superior aspect of the bridge of his nose right in between his eyes that has been growing over the past 6 months that he states is itchy.  It is not painful or bleeding.  Denies any other concerning skin lesions.  Denies any lymphadenopathy.       Review of Systems   Constitutional:  Negative for activity change, chills, diaphoresis, fatigue, fever and unexpected weight change.   HENT:  Negative for congestion, hearing loss, trouble swallowing and voice change.    Respiratory:  Negative for cough, shortness of breath and wheezing.    Cardiovascular:  Negative for chest pain, palpitations and leg swelling.   Gastrointestinal:  Negative for abdominal distention, abdominal pain, constipation, diarrhea, nausea and vomiting.   Musculoskeletal:  Positive for gait problem (in wheel chair - but rides exercise bicycle at home). Negative for arthralgias and myalgias.   Skin:  Negative for color change and rash.   Neurological:  Negative for dizziness, seizures, speech difficulty, weakness, light-headedness and headaches.   Hematological:  Negative for adenopathy.     Current Outpatient Medications on  "File Prior to Visit   Medication Sig Dispense Refill   • Anoro Ellipta 62.5-25 MCG/ACT inhaler inhale 1 puff by mouth daily 60 each 0   • apixaban (Eliquis) 5 mg TAKE ONE TABLET BY MOUTH TWICE DAILY 180 tablet 1   • atorvastatin (LIPITOR) 40 mg tablet Take 1 tablet by mouth daily 90 tablet 0   • cholecalciferol (VITAMIN D3) 1,000 units tablet Take 1,000 Units by mouth daily     • Continuous Blood Gluc Sensor (FreeStyle Peggy 2 Sensor) MISC Change it every 14 days 1 each 0   • dicyclomine (BENTYL) 10 mg capsule Take 1 capsule (10 mg total) by mouth 2 (two) times a day as needed (abdominal cramping) 60 capsule 3   • digoxin (LANOXIN) 0.125 mg tablet TAKE ONE TABLET BY MOUTH ONCE DAILY 90 tablet 0   • Empagliflozin (Jardiance) 25 MG TABS TAKE ONE TABLET BY MOUTH DAILY IN MORNING 90 tablet 1   • famotidine (PEPCID) 40 MG tablet Take 1 tablet by mouth daily at bedtime 30 tablet 5   • insulin degludec (Tresiba FlexTouch) 200 units/mL CONCENTRATED U-200 injection pen Inject 95 units under the skin once daily 15 mL 5   • Insulin Pen Needle (BD Pen Needle Micro U/F) 32G X 6 MM MISC Use 3-4 times per day with insulin pens 120 each 4   • metoprolol succinate (TOPROL-XL) 50 mg 24 hr tablet Take 1 tablet (50 mg total) by mouth daily 90 tablet 3   • NovoLOG FlexPen 100 units/mL injection pen Inject 20-25 units under the skin three times daily with meals 30 mL 5   • omeprazole (PriLOSEC) 20 mg delayed release capsule TAKE ONE CAPSULE BY MOUTH TWICE DAILY 180 capsule 1   • potassium citrate (UROCIT-K) 10 mEq TAKE ONE TABLET BY MOUTH TWICE DAILY 180 tablet 1   • torsemide (DEMADEX) 20 mg tablet take two tablets by mouth in the morning and one tablet  at 4pm 120 tablet 1     No current facility-administered medications on file prior to visit.          Objective  /60 (BP Location: Left arm, Patient Position: Sitting, Cuff Size: Adult)   Pulse 72   Temp 98.1 °F (36.7 °C) (Temporal)   Resp 18   Ht 6' 3\" (1.905 m)   SpO2 91%  "  BMI 34.25 kg/m²     Pain Screening:  Pain Score: 0-No pain  ECOG ECOG Performance Status: 1 - Restricted in physically strenuous activity but ambulatory and able to carry out work of a light or sedentary nature, e.g., light house work, office work     Physical Exam  Constitutional:       General: He is not in acute distress.     Appearance: Normal appearance. He is not ill-appearing.   HENT:      Head: Normocephalic and atraumatic.      Right Ear: External ear normal.      Left Ear: External ear normal.      Nose: Nose normal. No congestion.      Mouth/Throat:      Mouth: Mucous membranes are moist.      Pharynx: Oropharynx is clear. No oropharyngeal exudate.   Eyes:      General: No scleral icterus.        Right eye: No discharge.         Left eye: No discharge.      Conjunctiva/sclera: Conjunctivae normal.   Cardiovascular:      Rate and Rhythm: Normal rate and regular rhythm.      Pulses: Normal pulses.      Heart sounds: No murmur heard.     No friction rub. No gallop.   Pulmonary:      Effort: Pulmonary effort is normal. No respiratory distress.      Breath sounds: Normal breath sounds. No wheezing or rales.   Abdominal:      General: Bowel sounds are normal. There is no distension.      Palpations: There is no mass.      Tenderness: There is no abdominal tenderness. There is no rebound.   Musculoskeletal:         General: No swelling or tenderness. Normal range of motion.      Cervical back: Normal range of motion. No rigidity.      Right lower leg: No edema (trace).      Left lower leg: No edema (trace).   Lymphadenopathy:      Head:      Right side of head: No submental, submandibular, preauricular, posterior auricular or occipital adenopathy.      Left side of head: No submental, submandibular, preauricular, posterior auricular or occipital adenopathy.      Cervical: No cervical adenopathy.      Right cervical: No superficial or posterior cervical adenopathy.     Left cervical: No superficial or posterior  cervical adenopathy.      Upper Body:      Right upper body: No supraclavicular or axillary adenopathy.      Left upper body: No supraclavicular or axillary adenopathy.   Skin:     General: Skin is warm.      Coloration: Skin is not jaundiced.      Findings: Lesion (possible SCC/KA) present. No rash.      Comments: Scars well healed.  No evidence of recurrence at primary sites.  Lesion superior aspect of bridge of nose in between eyebrows - looks consistent with KA   Neurological:      General: No focal deficit present.      Mental Status: He is alert and oriented to person, place, and time.      Cranial Nerves: No cranial nerve deficit.      Motor: No weakness.      Gait: Gait normal.   Psychiatric:         Mood and Affect: Mood normal.         Behavior: Behavior normal.         Thought Content: Thought content normal.         Judgment: Judgment normal.         Labs: I have reviewed the following labs:  Lab Results   Component Value Date/Time    WBC 7.24 01/10/2025 06:57 AM    RBC 4.67 01/10/2025 06:57 AM    Hemoglobin 12.6 01/10/2025 06:57 AM    Hematocrit 42.6 01/10/2025 06:57 AM    MCV 91 01/10/2025 06:57 AM    MCH 27.0 01/10/2025 06:57 AM    RDW 16.7 (H) 01/10/2025 06:57 AM    Platelets 183 01/10/2025 06:57 AM    Segmented % 60 01/10/2025 06:57 AM    Lymphocytes % 27 01/10/2025 06:57 AM    Monocytes % 9 01/10/2025 06:57 AM    Eosinophils Relative 2 01/10/2025 06:57 AM    Basophils Relative 1 01/10/2025 06:57 AM    Immature Grans % 1 01/10/2025 06:57 AM    Absolute Neutrophils 4.47 01/10/2025 06:57 AM     Lab Results   Component Value Date/Time    Potassium 4.2 01/10/2025 06:45 AM    Chloride 99 01/10/2025 06:45 AM    CO2 32 01/10/2025 06:45 AM    BUN 35 (H) 01/10/2025 06:45 AM    Creatinine 1.30 01/10/2025 06:45 AM    Glucose, Fasting 138 (H) 01/10/2025 06:45 AM    Calcium 9.5 01/10/2025 06:45 AM    AST 14 01/10/2025 06:45 AM    ALT 12 01/10/2025 06:45 AM    Alkaline Phosphatase 79 01/10/2025 06:45 AM    Total  "Protein 7.6 01/10/2025 06:45 AM    Albumin 4.0 01/10/2025 06:45 AM    Total Bilirubin 0.47 01/10/2025 06:45 AM    eGFR 51 01/10/2025 06:45 AM   No results found for: \"[id number]\", \"FREET4\", \"LDH\", \"CEA\", \"\", \"\", \"EP1800\", \"\", \"CHROMGRANINA\", \"NSE\"     Radiology Results Review : No pertinent imaging studies reviewed.        Sagrario Spears MD, PhD  "

## 2025-02-28 ENCOUNTER — RA CDI HCC (OUTPATIENT)
Dept: OTHER | Facility: HOSPITAL | Age: 80
End: 2025-02-28

## 2025-02-28 NOTE — PROGRESS NOTES
HCC coding opportunities          Chart Reviewed number of suggestions sent to Provider: 3   E11.22  E11.51  I13.0    Patients Insurance     Medicare Insurance: Medicare

## 2025-03-04 DIAGNOSIS — I25.83 CORONARY ARTERY DISEASE DUE TO LIPID RICH PLAQUE: ICD-10-CM

## 2025-03-04 DIAGNOSIS — I25.10 CORONARY ARTERY DISEASE DUE TO LIPID RICH PLAQUE: ICD-10-CM

## 2025-03-04 DIAGNOSIS — K21.00 GASTROESOPHAGEAL REFLUX DISEASE WITH ESOPHAGITIS WITHOUT HEMORRHAGE: ICD-10-CM

## 2025-03-04 DIAGNOSIS — J44.9 CHRONIC OBSTRUCTIVE PULMONARY DISEASE, UNSPECIFIED COPD TYPE (HCC): ICD-10-CM

## 2025-03-05 ENCOUNTER — OFFICE VISIT (OUTPATIENT)
Dept: PODIATRY | Facility: CLINIC | Age: 80
End: 2025-03-05
Payer: MEDICARE

## 2025-03-05 VITALS — OXYGEN SATURATION: 92 % | BODY MASS INDEX: 34.25 KG/M2 | HEART RATE: 68 BPM | HEIGHT: 75 IN

## 2025-03-05 DIAGNOSIS — E11.65 TYPE 2 DIABETES MELLITUS WITH HYPERGLYCEMIA, WITH LONG-TERM CURRENT USE OF INSULIN (HCC): ICD-10-CM

## 2025-03-05 DIAGNOSIS — I73.9 PAD (PERIPHERAL ARTERY DISEASE) (HCC): ICD-10-CM

## 2025-03-05 DIAGNOSIS — E11.42 DIABETIC PERIPHERAL NEUROPATHY (HCC): ICD-10-CM

## 2025-03-05 DIAGNOSIS — B35.1 ONYCHOMYCOSIS: Primary | ICD-10-CM

## 2025-03-05 DIAGNOSIS — Z79.4 TYPE 2 DIABETES MELLITUS WITH HYPERGLYCEMIA, WITH LONG-TERM CURRENT USE OF INSULIN (HCC): ICD-10-CM

## 2025-03-05 PROCEDURE — 11721 DEBRIDE NAIL 6 OR MORE: CPT | Performed by: PODIATRIST

## 2025-03-05 PROCEDURE — RECHECK: Performed by: PODIATRIST

## 2025-03-05 RX ORDER — CYCLOSPORINE 0 G/ML
SOLUTION/ DROPS OPHTHALMIC; TOPICAL
COMMUNITY
Start: 2025-01-20

## 2025-03-05 RX ORDER — FAMOTIDINE 40 MG/1
40 TABLET, FILM COATED ORAL
Qty: 30 TABLET | Refills: 5 | Status: SHIPPED | OUTPATIENT
Start: 2025-03-05

## 2025-03-05 RX ORDER — UMECLIDINIUM BROMIDE AND VILANTEROL TRIFENATATE 62.5; 25 UG/1; UG/1
1 POWDER RESPIRATORY (INHALATION) DAILY
Qty: 60 EACH | Refills: 5 | Status: SHIPPED | OUTPATIENT
Start: 2025-03-05

## 2025-03-05 NOTE — PROGRESS NOTES
:  Assessment & Plan  Onychomycosis         PAD (peripheral artery disease) ([facility])         Type 2 diabetes mellitus with hyperglycemia, with long-term current use of insulin ([facility])    Lab Results   Component Value Date    HGBA1C 7.7 (H) 01/10/2025            Diabetic peripheral neuropathy ([facility]) [E11.42]    Lab Results   Component Value Date    HGBA1C 7.7 (H) 01/10/2025            The patient's clinical examination today was significant for thickened and dystrophic pedal nail plates with brittleness, discoloration, and subungual debris consistent with onychomycosis x 10.  There are no open lesions. Pedal pulses are nonpalpable bilaterally.  Capillary fill time to the toes is greater than 3 seconds x 10.  Temperature gradient is mildly increased to the bilateral extremities.  Skin is thin with decreased turgor.  Dependent rubor is noted bilaterally.  There is absence of hair growth to the bilateral extremities.  The interdigital spaces are clear without maceration.  No pretrophic changes noted to either lower extremity.     The pedal nail plates are sharply debrided with a sterile nail clipper x 10 without complication.  The nails were then mechanically reduced in thickness and girth utilizing a rotary bur.  There are no other acute pedal issues noted today.     The patient will follow-up with me in 3 months for continued at risk diabetic footcare.       History of Present Illness     Home Waltesr is a 80 y.o. male   The patient presents today for follow up of painful elongated pedal nail plates.  The patient has a history of dense peripheral neuropathy stemming from his diabetes.  He does state that he and his wife routinely inspect his feet to ensure that there are no open lesions.  He notes no other pedal issues today.           PAST MEDICAL HISTORY:  Past Medical History:   Diagnosis Date    A-fib ([facility])     AAA (abdominal aortic aneurysm) ([facility])     Actinic keratosis of right temple 07/31/2020    Actinic keratosis  of scalp 07/31/2020    Acute respiratory failure with hypoxia (HCC) 03/25/2021    Allergic 1960    Arthritis     Lay's esophagus     Bladder cancer (HCC)     Blister of left leg 04/28/2021    Blister of right leg 05/26/2021    CAD (coronary artery disease)     Cancer (HCC) 02/2010    Bladder    CHF (congestive heart failure) (HCC)     Chronic low back pain     Chronic pain of both knees 07/31/2020    Colitis 12/04/2020    CPAP (continuous positive airway pressure) dependence     Diabetes (HCC)     Diabetes mellitus (HCC) 10/1993    Excessive gas 12/03/2020    Heart murmur     History of chemotherapy     Hydroureter on left 04/28/2021    Hyperlipemia     Hypertension     Immunization deficiency 10/30/2020    Hep a nonimmune    Kidney stone     Left lower quadrant abdominal pain 12/03/2020    Mass of right adrenal gland (HCC) 12/04/2020    4.1 cm    Myocardial infarction (HCC)     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    Obesity 2000    LUCIA (obstructive sleep apnea) 01/29/2021    Pressure ulcer of right leg, stage 1 05/26/2021    Squamous cell skin cancer 05/02/2024    central scalp, mohs    Squamous cell skin cancer 10/03/2024    scalp-Mohs    Urinary tract infection with hematuria 05/03/2021    u cx 4/2021=pseudomonas R to bactrim and cefdinir, S to cipro    Venous stasis dermatitis of both lower extremities 05/26/2021       PAST SURGICAL HISTORY:  Past Surgical History:   Procedure Laterality Date    BACK SURGERY      BLADDER SURGERY      CARDIAC CATHETERIZATION  04/2016    CARDIAC CATHETERIZATION N/A 01/19/2023    Procedure: Cardiac RHC;  Surgeon: Jose Carlos Shea MD;  [facility];  Service: Cardiology    CATARACT EXTRACTION, BILATERAL      COLONOSCOPY  01/29/2019    next 2022 [place]    CORONARY ARTERY BYPASS GRAFT  04/2016    Quadruple per Rita    EGD  06/2017    EYE SURGERY  11/2016    Cataracts    JOINT REPLACEMENT  [id number]    Hip and shoulder    KIDNEY STONE SURGERY      [facility]  SURGERY  06/04/2024    [facility] central scalp, Dr Abidi MOHS SURGERY N/A 11/22/2024    [facility] scalp;     [place] CYSTO/URETERO W/LITHOTRIPSY &INDWELL STENT INSRT Left 05/21/2021    Procedure: CYSTOSCOPY URETEROSCOPY WITH LITHOTRIPSY HOLMIUM LASER, AND INSERTION STENT URETERAL/EXCHANGE;  Surgeon: Siddhartha Leslie MD;  Location: [facility];  Service: Urology    WV CYSTOURETHROSCOPY W/URETERAL CATHETERIZATION Left 04/24/2021    Procedure: CYSTOSCOPY  WITH INSERTION STENT URETERAL;  Surgeon: Siddhartha Leslie MD;  Location: [facility];  Service: Urology    TOTAL HIP ARTHROPLASTY Right 2012    TOTAL SHOULDER REPLACEMENT Right 2013    VASECTOMY  1971        ALLERGIES:  Ciprofloxacin, Diltiazem, and Metronidazole    MEDICATIONS:  Current Outpatient Medications   Medication Sig Dispense Refill    apixaban (Eliquis) 5 mg TAKE ONE TABLET BY MOUTH TWICE DAILY 180 tablet 1    atorvastatin (LIPITOR) 40 mg tablet Take 1 tablet by mouth daily 90 tablet 0    Cequa 0.09 % SOLN       cholecalciferol (VITAMIN D3) 1,000 units tablet Take 1,000 Units by mouth daily      Continuous Blood Gluc Sensor (FreeStyle Peggy 2 Sensor) MISC Change it every 14 days 1 each 0    dicyclomine (BENTYL) 10 mg capsule Take 1 capsule (10 mg total) by mouth 2 (two) times a day as needed (abdominal cramping) 60 capsule 3    digoxin (LANOXIN) 0.125 mg tablet TAKE ONE TABLET BY MOUTH ONCE DAILY 90 tablet 0    Empagliflozin (Jardiance) 25 MG TABS TAKE ONE TABLET BY MOUTH DAILY IN MORNING 90 tablet 1    famotidine (PEPCID) 40 MG tablet TAKE ONE TABLET BY MOUTH ONCE DAILY AT BEDTIME 30 tablet 5    insulin degludec (Tresiba FlexTouch) 200 units/mL CONCENTRATED U-200 injection pen Inject 95 units under the skin once daily 15 mL 5    Insulin Pen Needle (BD Pen Needle Micro U/F) 32G X 6 MM MISC Use 3-4 times per day with insulin pens 120 each 4    metoprolol succinate (TOPROL-XL) 50 mg 24 hr tablet Take 1 tablet (50 mg total) by mouth daily 90 tablet 3    NovoLOG FlexPen 100  units/mL injection pen Inject 20-25 units under the skin three times daily with meals 30 mL 5    omeprazole (PriLOSEC) 20 mg delayed release capsule TAKE ONE CAPSULE BY MOUTH TWICE DAILY 180 capsule 1    potassium citrate (UROCIT-K) 10 mEq TAKE ONE TABLET BY MOUTH TWICE DAILY 180 tablet 1    torsemide (DEMADEX) 20 mg tablet take two tablets by mouth in the morning and one tablet  at 4pm 120 tablet 1    umeclidinium-vilanterol (Anoro Ellipta) 62.5-25 mcg/actuation inhaler inhale 1 puff by mouth daily 60 each 5     No current facility-administered medications for this visit.       SOCIAL HISTORY:  Social History     Socioeconomic History    Marital status: /Civil Union     Spouse name: Gabriela    Number of children: 4    Years of education: None    Highest education level: 12th grade   Occupational History    None   Tobacco Use    Smoking status: Former     Current packs/day: 0.00     Average packs/day: 0.3 packs/day for 44.0 years (11.0 ttl pk-yrs)     Types: Cigarettes     Start date: 1965     Quit date: 2010     Years since quitting: 15.1     Passive exposure: Past    Smokeless tobacco: Never    Tobacco comments:     Quit several times for up to 6 years.   Vaping Use    Vaping status: Never Used   Substance and Sexual Activity    Alcohol use: Not Currently     Comment: No use    Drug use: Never     Comment: No use    Sexual activity: Not Currently     Partners: Female   Other Topics Concern    None   Social History Narrative    · Most recent tobacco use screenin2020      · Do you currently or have you served in the [employer]:   No      · Were you activated, into active duty, as a member of the [employer] or as a Reservist:   No      · Live alone or with others:   with others        · Caffeine intake:   Occasional      · Illicit drugs:   No      · Diet:   Regular      · Exercise level:   Moderate      · Advance directive:   Yes      · Marital status:         · General stress  level:   Medium      · Single or multi-level home/work:   multi level home      · Guns present in home:   No      · Seat belts used routinely:   Yes      · Sunscreen used routinely:   Yes      · Smoke alarm in home:   Yes      Social Drivers of Health     Financial Resource Strain: Low Risk  (6/21/2023)    Overall Financial Resource Strain (CARDIA)     Difficulty of Paying Living Expenses: Not hard at all   Food Insecurity: No Food Insecurity (7/4/2024)    Nursing - Inadequate Food Risk Classification     Worried About Running Out of Food in the Last Year: Never true     Ran Out of Food in the Last Year: Never true     Ran Out of Food in the Last Year: Not on file   Transportation Needs: No Transportation Needs (7/4/2024)    PRAPARE - Transportation     Lack of Transportation (Medical): No     Lack of Transportation (Non-Medical): No   Physical Activity: Insufficiently Active (4/13/2021)    Exercise Vital Sign     Days of Exercise per Week: 4 days     Minutes of Exercise per Session: 10 min   Stress: No Stress Concern Present (4/13/2021)    [employer] [place] [employer] - Occupational Stress Questionnaire     Feeling of Stress : Not at all   Social Connections: Moderately Isolated (4/13/2021)    Social Connection and Isolation Panel [NHANES]     Frequency of Communication with Friends and Family: More than three times a week     Frequency of Social Gatherings with Friends and Family: Twice a week     Attends Yazidism Services: Never     Active Member of Clubs or Organizations: No     Attends Club or Organization Meetings: Never     Marital Status:    Intimate Partner Violence: Not At Risk (4/13/2021)    Humiliation, Afraid, Rape, and Kick questionnaire     Fear of Current or Ex-Partner: No     Emotionally Abused: No     Physically Abused: No     Sexually Abused: No   Housing Stability: Low Risk  (7/4/2024)    Housing Stability Vital Sign     Unable to Pay for Housing in the Last Year: No      "Number of Times Moved in the Last Year: 0     Homeless in the Last Year: No      Review of Systems   Constitutional: Negative.    HENT: Negative.     Eyes: Negative.    Respiratory: Negative.     Cardiovascular: Negative.    Endocrine: Negative.    Musculoskeletal: Negative.    Neurological: Negative.    Hematological: Negative.    Psychiatric/Behavioral: Negative.       Objective   Pulse 68   Ht 6' 3\" (1.905 m)   SpO2 92%   BMI 34.25 kg/m²      Physical Exam  Constitutional:       Appearance: Normal appearance.   HENT:      Head: Normocephalic and atraumatic.   Eyes:      Conjunctiva/sclera: Conjunctivae normal.   Cardiovascular:      Pulses: Pulses are weak.           Dorsalis pedis pulses are 0 on the right side and 0 on the left side.        Posterior tibial pulses are 0 on the right side and 0 on the left side.   Pulmonary:      Effort: Pulmonary effort is normal.   Feet:      Right foot:      Skin integrity: Skin integrity normal. No ulcer, skin breakdown, erythema, warmth, callus or dry skin.      Toenail Condition: Right toenails are abnormally thick and long. Fungal disease present.     Left foot:      Skin integrity: Skin integrity normal. No ulcer, skin breakdown, erythema, warmth, callus or dry skin.      Toenail Condition: Left toenails are abnormally thick and long. Fungal disease present.     Comments: The patient's clinical examination today was significant for thickened and dystrophic pedal nail plates with brittleness, discoloration, and subungual debris consistent with onychomycosis x 10.  There are no open lesions. Pedal pulses are nonpalpable bilaterally.  Capillary fill time to the toes is greater than 3 seconds x 10.  Temperature gradient is mildly increased to the bilateral extremities.  Skin is thin with decreased turgor.  Dependent rubor is noted bilaterally.  There is absence of hair growth to the bilateral extremities.  The interdigital spaces are clear without maceration.  No " pretrophic changes noted to either lower extremity.  Skin:     General: Skin is warm and dry.      Capillary Refill: Capillary refill takes more than 3 seconds.   Neurological:      General: No focal deficit present.      Mental Status: He is alert and oriented to person, place, and time.   Psychiatric:         Mood and Affect: Mood normal.     Diabetic Foot Exam    Patient's shoes and socks removed.    Right Foot/Ankle   Right Foot Inspection  Skin Exam: skin normal and skin intact. No dry skin, no warmth, no callus, no erythema, no maceration, no abnormal color, no pre-ulcer, no ulcer and no callus.     Toe Exam: ROM and strength within normal limits.     Sensory   Vibration: absent  Monofilament testing: absent    Vascular  Capillary refills: elevated  The right DP pulse is 0. The right PT pulse is 0.     Left Foot/Ankle  Left Foot Inspection  Skin Exam: skin normal and skin intact. No dry skin, no warmth, no erythema, no maceration, normal color, no pre-ulcer, no ulcer and no callus.     Toe Exam: ROM and strength within normal limits.     Sensory   Vibration: absent  Monofilament testing: absent    Vascular  Capillary refills: elevated  The left DP pulse is 0. The left PT pulse is 0.     Assign Risk Category  No deformity present  Loss of protective sensation  Weak pulses  Risk: 2

## 2025-03-06 RX ORDER — ATORVASTATIN CALCIUM 40 MG/1
40 TABLET, FILM COATED ORAL DAILY
Qty: 90 TABLET | Refills: 1 | Status: SHIPPED | OUTPATIENT
Start: 2025-03-06

## 2025-03-07 ENCOUNTER — OFFICE VISIT (OUTPATIENT)
Dept: FAMILY MEDICINE CLINIC | Facility: CLINIC | Age: 80
End: 2025-03-07
Payer: MEDICARE

## 2025-03-07 VITALS
OXYGEN SATURATION: 92 % | BODY MASS INDEX: 34.19 KG/M2 | WEIGHT: 275 LBS | HEIGHT: 75 IN | TEMPERATURE: 98.9 F | DIASTOLIC BLOOD PRESSURE: 54 MMHG | SYSTOLIC BLOOD PRESSURE: 128 MMHG | HEART RATE: 97 BPM | RESPIRATION RATE: 17 BRPM

## 2025-03-07 DIAGNOSIS — E11.65 TYPE 2 DIABETES MELLITUS WITH HYPERGLYCEMIA, WITH LONG-TERM CURRENT USE OF INSULIN (HCC): Primary | ICD-10-CM

## 2025-03-07 DIAGNOSIS — I50.32 CHRONIC DIASTOLIC CHF (CONGESTIVE HEART FAILURE) (HCC): ICD-10-CM

## 2025-03-07 DIAGNOSIS — C67.9 MALIGNANT NEOPLASM OF URINARY BLADDER, UNSPECIFIED SITE (HCC): ICD-10-CM

## 2025-03-07 DIAGNOSIS — M25.562 CHRONIC PAIN OF LEFT KNEE: ICD-10-CM

## 2025-03-07 DIAGNOSIS — K75.81 LIVER CIRRHOSIS SECONDARY TO NASH (HCC): ICD-10-CM

## 2025-03-07 DIAGNOSIS — I48.0 PAF (PAROXYSMAL ATRIAL FIBRILLATION) (HCC): ICD-10-CM

## 2025-03-07 DIAGNOSIS — J41.0 SIMPLE CHRONIC BRONCHITIS (HCC): ICD-10-CM

## 2025-03-07 DIAGNOSIS — E66.01 MORBID OBESITY (HCC): ICD-10-CM

## 2025-03-07 DIAGNOSIS — K74.60 LIVER CIRRHOSIS SECONDARY TO NASH (HCC): ICD-10-CM

## 2025-03-07 DIAGNOSIS — Z79.4 TYPE 2 DIABETES MELLITUS WITH HYPERGLYCEMIA, WITH LONG-TERM CURRENT USE OF INSULIN (HCC): Primary | ICD-10-CM

## 2025-03-07 DIAGNOSIS — G89.29 CHRONIC PAIN OF LEFT KNEE: ICD-10-CM

## 2025-03-07 LAB — SL AMB POCT HEMOGLOBIN AIC: 7.6 (ref ?–6.5)

## 2025-03-07 PROCEDURE — 99214 OFFICE O/P EST MOD 30 MIN: CPT | Performed by: NURSE PRACTITIONER

## 2025-03-07 PROCEDURE — 83036 HEMOGLOBIN GLYCOSYLATED A1C: CPT | Performed by: NURSE PRACTITIONER

## 2025-03-07 NOTE — ASSESSMENT & PLAN NOTE
Wt Readings from Last 3 Encounters:   03/07/25 125 kg (275 lb)   01/14/25 124 kg (274 lb)   11/22/24 124 kg (274 lb)       No evidence of overt heart failure on exam  Continue with low-salt diet, daily weights

## 2025-03-07 NOTE — PROGRESS NOTES
Name: Home Walters      : 1945      MRN: [id number]  Encounter Provider: KACEY Bal  Encounter Date: 3/7/2025   Encounter department: [facility]  :  Assessment & Plan  Type 2 diabetes mellitus with hyperglycemia, with long-term current use of insulin (HCC)  Gradually improving  Lab Results   Component Value Date    HGBA1C 7.6 (A) 2025       Orders:    POCT hemoglobin A1c    Chronic pain of left knee  Provided Ortho contact  Working on weight loss-down 10 pounds  Orders:    XR knee 3 vw left non injury; Future    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 (HCC)         Malignant neoplasm of urinary bladder, unspecified site (HCC)         Chronic diastolic CHF (congestive heart failure) (HCC)  Wt Readings from Last 3 Encounters:   25 125 kg (275 lb)   25 124 kg (274 lb)   24 124 kg (274 lb)       No evidence of overt heart failure on exam  Continue with low-salt diet, daily weights           Simple chronic bronchitis (HCC)  No evidence of exacerbation on exam       PAF (paroxysmal atrial fibrillation) (HCC)         Morbid obesity (HCC)         Patient clinically stable at this time.  Cont with current plan of care.   RTO as recommended and PRN         History of Present Illness   79 yo male with complex medical history presents for chronic condition follow up  Accompanied by his wife    Reviewed his chart, medical history in detail    DM better controlled-he has continuous glucose monitor-A1c 7.6  CAD/s/pCABG/PAF/CHF-per cardio  HTN-controlled  CKD    He and family feel he is doing well. Chronic conditions stable    Swelling is much improved  More mobile at home  Left knee arthritis worse-interested in treatment like injection  History of squamous cell lesion top of head-removed by Mohs procedure  He has been seen oncology          Review of Systems   Constitutional:  Positive for fatigue. Negative for fever and unexpected weight change.        Weight stable  "  HENT:  Positive for hearing loss. Negative for trouble swallowing.    Respiratory:  Negative for cough and shortness of breath.         CHAVEZ at baseline   Cardiovascular:  Negative for chest pain, palpitations and leg swelling (Trace much improved).   Gastrointestinal:  Negative for abdominal pain and blood in stool.   Endocrine: Negative.    Genitourinary:  Negative for decreased urine volume, difficulty urinating and hematuria.   Musculoskeletal:  Positive for arthralgias (Carpal tunnel, left knee pain) and gait problem.        Pt arrives in motorized scooter   Skin:  Positive for rash. Negative for pallor and wound.   Neurological:  Positive for weakness and numbness. Negative for dizziness and headaches.   Psychiatric/Behavioral:  Negative for confusion and dysphoric mood. The patient is not nervous/anxious.        Objective   /54 (BP Location: Left arm, Patient Position: Sitting, Cuff Size: Large)   Pulse 97   Temp 98.9 °F (37.2 °C) (Temporal)   Resp 17   Ht 6' 3\" (1.905 m)   Wt 125 kg (275 lb)   SpO2 92%   BMI 34.37 kg/m²      Physical Exam  Vitals and nursing note reviewed.   Constitutional:       General: He is not in acute distress.     Appearance: Normal appearance.      Comments: Using motorized wheelchair   Cardiovascular:      Rate and Rhythm: Normal rate. Rhythm irregular.      Pulses: Normal pulses.      Heart sounds: Murmur heard.   Pulmonary:      Effort: Pulmonary effort is normal.      Breath sounds: Normal breath sounds.   Neurological:      General: No focal deficit present.      Mental Status: He is alert. Mental status is at baseline.   Psychiatric:         Mood and Affect: Mood normal.       "

## 2025-03-07 NOTE — ASSESSMENT & PLAN NOTE
Patient clinically stable at this time.  Cont with current plan of care.   RTO as recommended and PRN

## 2025-03-07 NOTE — ASSESSMENT & PLAN NOTE
Gradually improving  Lab Results   Component Value Date    HGBA1C 7.6 (A) 03/07/2025       Orders:    POCT hemoglobin A1c

## 2025-03-20 ENCOUNTER — TELEPHONE (OUTPATIENT)
Age: 80
End: 2025-03-20

## 2025-03-20 NOTE — TELEPHONE ENCOUNTER
[facility] calling in states that Nov[employer] needs prior auth, they faxed the covermymeds form last week, he will refax. Please advise.

## 2025-03-20 NOTE — TELEPHONE ENCOUNTER
PA for Novolog FlexPen 100 U DENIED    Reason:        Message sent to office clinical pool Yes    Denial letter scanned into Media Yes    Appeal started No (Provider will need to decide if appeal is warranted and send clinical documentation to Prior Authorization Team for initiation.)    **Please follow up with your patient regarding denial and next steps**

## 2025-03-20 NOTE — TELEPHONE ENCOUNTER
PA for Novolog FlexPen 100 U SUBMITTED to [employer]    via    []CMM-KEY:   [x][employer]-Case ID # [id number]   []Availity-Auth ID # NDC #   []Faxed to plan   []Other website   []Phone call Case ID #     [x]PA sent as URGENT    All office notes, labs and other pertaining documents and studies sent. Clinical questions answered. Awaiting determination from insurance company.     Turnaround time for your insurance to make a decision on your Prior Authorization can take 7-21 business days.

## 2025-03-21 DIAGNOSIS — E11.65 TYPE 2 DIABETES MELLITUS WITH HYPERGLYCEMIA, WITH LONG-TERM CURRENT USE OF INSULIN (HCC): Primary | ICD-10-CM

## 2025-03-21 DIAGNOSIS — Z79.4 TYPE 2 DIABETES MELLITUS WITH HYPERGLYCEMIA, WITH LONG-TERM CURRENT USE OF INSULIN (HCC): Primary | ICD-10-CM

## 2025-03-21 RX ORDER — INSULIN LISPRO 100 [IU]/ML
INJECTION, SOLUTION INTRAVENOUS; SUBCUTANEOUS
Qty: 30 ML | Refills: 5 | Status: SHIPPED | OUTPATIENT
Start: 2025-03-21

## 2025-03-24 ENCOUNTER — HOSPITAL ENCOUNTER (OUTPATIENT)
Dept: RADIOLOGY | Facility: HOSPITAL | Age: 80
Discharge: HOME | End: 2025-03-24
Payer: MEDICARE

## 2025-03-24 DIAGNOSIS — M25.562 CHRONIC PAIN OF LEFT KNEE: ICD-10-CM

## 2025-03-24 DIAGNOSIS — G89.29 CHRONIC PAIN OF LEFT KNEE: ICD-10-CM

## 2025-03-24 PROCEDURE — 73562 X-RAY EXAM OF KNEE 3: CPT

## 2025-04-01 ENCOUNTER — OFFICE VISIT (OUTPATIENT)
Dept: CARDIOLOGY CLINIC | Facility: CLINIC | Age: 80
End: 2025-04-01
Payer: MEDICARE

## 2025-04-01 ENCOUNTER — RESULTS FOLLOW-UP (OUTPATIENT)
Dept: FAMILY MEDICINE CLINIC | Facility: CLINIC | Age: 80
End: 2025-04-01

## 2025-04-01 VITALS
HEART RATE: 78 BPM | BODY MASS INDEX: 34.93 KG/M2 | HEIGHT: 75 IN | WEIGHT: 280.9 LBS | DIASTOLIC BLOOD PRESSURE: 70 MMHG | SYSTOLIC BLOOD PRESSURE: 132 MMHG | OXYGEN SATURATION: 91 %

## 2025-04-01 DIAGNOSIS — I10 PRIMARY HYPERTENSION: ICD-10-CM

## 2025-04-01 DIAGNOSIS — R60.0 BILATERAL EDEMA OF LOWER EXTREMITY: ICD-10-CM

## 2025-04-01 DIAGNOSIS — I25.10 CORONARY ARTERY DISEASE INVOLVING NATIVE CORONARY ARTERY OF NATIVE HEART WITHOUT ANGINA PECTORIS: Primary | ICD-10-CM

## 2025-04-01 DIAGNOSIS — E78.2 MIXED HYPERLIPIDEMIA: ICD-10-CM

## 2025-04-01 DIAGNOSIS — Z79.4 TYPE 2 DIABETES MELLITUS WITH HYPERGLYCEMIA, WITH LONG-TERM CURRENT USE OF INSULIN (HCC): ICD-10-CM

## 2025-04-01 DIAGNOSIS — I48.0 PAF (PAROXYSMAL ATRIAL FIBRILLATION) (HCC): ICD-10-CM

## 2025-04-01 DIAGNOSIS — E11.65 TYPE 2 DIABETES MELLITUS WITH HYPERGLYCEMIA, WITH LONG-TERM CURRENT USE OF INSULIN (HCC): ICD-10-CM

## 2025-04-01 DIAGNOSIS — I50.32 CHRONIC DIASTOLIC CHF (CONGESTIVE HEART FAILURE) (HCC): ICD-10-CM

## 2025-04-01 PROCEDURE — 99214 OFFICE O/P EST MOD 30 MIN: CPT | Performed by: INTERNAL MEDICINE

## 2025-04-01 PROCEDURE — G2211 COMPLEX E/M VISIT ADD ON: HCPCS | Performed by: INTERNAL MEDICINE

## 2025-04-01 NOTE — PROGRESS NOTES
[facility] Cardiology  Office progress note  Home Walters 80 y.o. male MRN: [id number]        Impression:      Coronary artery disease  CABG x3 in 2016, LIMA to LAD, SVG to OM3, SVG to PDA  He had full recovery of his moderate ischemic cardiomyopathy  EF remains normal, last echo was 2/23  He has no ischemic symptoms and continues to do 30 minutes of stationary bike riding every day  Chronic diastolic/right-sided CHF  His weights are stable  He had nipple tenderness with spironolactone and it is avoided  He continues on torsemide, Jardiance, metoprolol  AAA  47 millimeters in early 2021 by abdominal CT  50 millimeters by noncontrast abdominal CT 6/22  51mm 10/22 by CTA  5 cm by ultrasound 1/24  5.2 cm by repeat ultrasound 2/25, being followed closely by vascular  Lower extremity atherosclerosis  Riding his stationary bike with his wife 30 minutes a day, no claudication  Mixed hyperlipidemia  LDL is well suppressed, HDL chronically low at 28, triglycerides 278 are lifestyle/diabetes related and we discussed potentially lowering carbohydrates in the diet, but which will need to be matched with adjustments in his insulin  Type 2 diabetes  A1c slightly better controlled this year at 7.6  Hypertension  Blood pressure is nicely controlled  Atrial fibrillation  Paroxysmal, had a cardioversion 1/22  Continues on Eliquis, digoxin, metoprolol.  Had a hemorrhoid related GI bleed 8/24, but back on Eliquis and tolerating it well without any further bleeding  He has had a 5-hour episode and a couple much shorter episodes of A-fib recurrence, both by symptoms and by Fitbit alarmed.    Plan    He can use an added dose of metoprolol 50 mg if he has a 1 to 2-hour episode of A-fib  No change to any medications at this time  I will see him back in a year    HPI: Home Walters is a 80 y.o. year old male with CAD, paroxysmal atrial fibrillation, diastolic/right-sided CHF, obesity, hypertension, diabetes, mixed hyperlipidemia, LUCIA,  hospitalized 12/21 for AFib/RVR, cardioversion 1/7/2022 urgently for hypotension and AFib/RVR, CHF decompensation in early 2023 with a weight up to 320 pounds, who returns for a follow-up visit.    He had a GI bleed, probably hemorrhoidal, colonoscopy was otherwise unremarkable, placed back on Eliquis and has not had any issues since this incident 8/24.  Blood pressure is excellent  AAA is slowly growing, up to 5.2 cm by recent ultrasound 2/15  He has no chest pain, chest pressure, orthopnea, CHF exacerbation or heart failure hospitalizations  He continues on torsemide and Jardiance  He had a 5-hour episode, and a couple smaller episodes of A-fib recurrence both by symptoms and Fitbit telling him his heart rates around 110.  I will have reverted to normal sinus rhythm spontaneously.  Cholesterol is a mixed disorder with low HDL and borderline elevated triglycerides chronically due to his weight and diabetes, but LDL remains well suppressed.        Review of Systems   Constitutional:  Negative for appetite change, diaphoresis, fatigue and fever.   Respiratory:  Negative for chest tightness, shortness of breath and wheezing.    Cardiovascular:  Negative for chest pain, palpitations and leg swelling.   Gastrointestinal:  Negative for abdominal pain and blood in stool.   Musculoskeletal:  Positive for arthralgias and gait problem. Negative for joint swelling.   Skin:  Negative for rash.   Neurological:  Negative for dizziness, syncope and light-headedness.         Past Medical History:   Diagnosis Date   • A-fib ([facility])    • AAA (abdominal aortic aneurysm) ([facility])    • Actinic keratosis of right temple 07/31/2020   • Actinic keratosis of scalp 07/31/2020   • Acute respiratory failure with hypoxia ([facility]) 03/25/2021   • Allergic 1960   • Arthritis    • Lay's esophagus    • Bladder cancer ([facility])    • Blister of left leg 04/28/2021   • Blister of right leg 05/26/2021   • CAD (coronary artery disease)    • Cancer ([facility]) 02/2010     Bladder   • CHF (congestive heart failure) (HCC)    • Chronic low back pain    • Chronic pain of both knees 07/31/2020   • Colitis 12/04/2020   • CPAP (continuous positive airway pressure) dependence    • Diabetes (HCC)    • Diabetes mellitus (HCC) 10/1993   • Excessive gas 12/03/2020   • Heart murmur    • History of chemotherapy    • Hydroureter on left 04/28/2021   • Hyperlipemia    • Hypertension    • Immunization deficiency 10/30/2020    Hep a nonimmune   • Kidney stone    • Left lower quadrant abdominal pain 12/03/2020   • Mass of right adrenal gland (HCC) 12/04/2020    4.1 cm   • Myocardial infarction (HCC)    • 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   • Obesity 2000   • LUCIA (obstructive sleep apnea) 01/29/2021   • Pressure ulcer of right leg, stage 1 05/26/2021   • Squamous cell skin cancer 05/02/2024    central scalp, mohs   • Squamous cell skin cancer 10/03/2024    scalp-Mohs   • Urinary tract infection with hematuria 05/03/2021    u cx 4/2021=pseudomonas R to bactrim and cefdinir, S to cipro   • Venous stasis dermatitis of both lower extremities 05/26/2021     Past Surgical History:   Procedure Laterality Date   • BACK SURGERY     • BLADDER SURGERY     • CARDIAC CATHETERIZATION  04/2016   • CARDIAC CATHETERIZATION N/A 01/19/2023    Procedure: Cardiac RHC;  Surgeon: Jose Carlos Shea MD;  [facility];  Service: Cardiology   • CATARACT EXTRACTION, BILATERAL     • COLONOSCOPY  01/29/2019    next 2022 [place]   • CORONARY ARTERY BYPASS GRAFT  04/2016    Quadruple per Rita   • EGD  06/2017   • EYE SURGERY  11/2016    Cataracts   • JOINT REPLACEMENT  [id number]    Hip and shoulder   • KIDNEY STONE SURGERY     • MOHS SURGERY  06/04/2024    [facility] central scalp, Dr Rodríguez   • MOHS SURGERY N/A 11/22/2024    [facility] scalp;    • NM CYSTO/URETERO W/LITHOTRIPSY &INDWELL STENT INSRT Left 05/21/2021    Procedure: CYSTOSCOPY URETEROSCOPY WITH LITHOTRIPSY HOLMIUM LASER, AND INSERTION STENT  URETERAL/EXCHANGE;  Surgeon: Siddhartha Leslie MD;  Location: [facility];  Service: Urology   • WI CYSTOURETHROSCOPY W/URETERAL CATHETERIZATION Left 04/24/2021    Procedure: CYSTOSCOPY  WITH INSERTION STENT URETERAL;  Surgeon: Siddhartha Leslie MD;  Location: [facility];  Service: Urology   • TOTAL HIP ARTHROPLASTY Right 2012   • TOTAL SHOULDER REPLACEMENT Right 2013   • VASECTOMY  1971     Social History     Substance and Sexual Activity   Alcohol Use Not Currently    Comment: No use     Social History     Substance and Sexual Activity   Drug Use Never    Comment: No use     Social History     Tobacco Use   Smoking Status Former   • Current packs/day: 0.00   • Average packs/day: 0.3 packs/day for 44.0 years (11.0 ttl pk-yrs)   • Types: Cigarettes   • Start date: 1/1/1965   • Quit date: 1/1/2010   • Years since quitting: 15.2   • Passive exposure: Past   Smokeless Tobacco Never   Tobacco Comments    Quit several times for up to 6 years.     Family History   Problem Relation Age of Onset   • Heart disease Father    • Arthritis Father    • Heart attack Father    • Alzheimer's disease Mother    • Dementia Mother    • Diabetes type II Sister        Allergies:  Allergies   Allergen Reactions   • Ciprofloxacin Abdominal Pain, Diarrhea, GI Bleeding and GI Intolerance   • Diltiazem Abdominal Pain   • Metronidazole Abdominal Pain, Diarrhea, GI Bleeding and GI Intolerance       Medications:     Current Outpatient Medications:   •  apixaban (Eliquis) 5 mg, TAKE ONE TABLET BY MOUTH TWICE DAILY, Disp: 180 tablet, Rfl: 1  •  atorvastatin (LIPITOR) 40 mg tablet, TAKE ONE TABLET BY MOUTH ONCE DAILY, Disp: 90 tablet, Rfl: 1  •  Cequa 0.09 % SOLN, , Disp: , Rfl:   •  cholecalciferol (VITAMIN D3) 1,000 units tablet, Take 1,000 Units by mouth daily, Disp: , Rfl:   •  Continuous Blood Gluc Sensor (FreeStyle Peggy 2 Sensor) MISC, Change it every 14 days, Disp: 1 each, Rfl: 0  •  digoxin (LANOXIN) 0.125 mg tablet, TAKE ONE TABLET BY MOUTH ONCE  DAILY, Disp: 90 tablet, Rfl: 0  •  Empagliflozin (Jardiance) 25 MG TABS, TAKE ONE TABLET BY MOUTH DAILY IN MORNING, Disp: 90 tablet, Rfl: 1  •  famotidine (PEPCID) 40 MG tablet, TAKE ONE TABLET BY MOUTH ONCE DAILY AT BEDTIME, Disp: 30 tablet, Rfl: 5  •  insulin degludec (Tresiba FlexTouch) 200 units/mL CONCENTRATED U-200 injection pen, Inject 95 units under the skin once daily, Disp: 15 mL, Rfl: 5  •  insulin lispro (HumaLOG KwikPen) 100 units/mL injection pen, Inject 20-25 units three times daily before each meal., Disp: 30 mL, Rfl: 5  •  Insulin Pen Needle (BD Pen Needle Micro U/F) 32G X 6 MM MISC, Use 3-4 times per day with insulin pens, Disp: 120 each, Rfl: 4  •  metoprolol succinate (TOPROL-XL) 50 mg 24 hr tablet, Take 1 tablet (50 mg total) by mouth daily, Disp: 90 tablet, Rfl: 3  •  omeprazole (PriLOSEC) 20 mg delayed release capsule, TAKE ONE CAPSULE BY MOUTH TWICE DAILY, Disp: 180 capsule, Rfl: 1  •  potassium citrate (UROCIT-K) 10 mEq, TAKE ONE TABLET BY MOUTH TWICE DAILY, Disp: 180 tablet, Rfl: 1  •  torsemide (DEMADEX) 20 mg tablet, take two tablets by mouth in the morning and one tablet  at 4pm, Disp: 120 tablet, Rfl: 1  •  umeclidinium-vilanterol (Anoro Ellipta) 62.5-25 mcg/actuation inhaler, inhale 1 puff by mouth daily, Disp: 60 each, Rfl: 5  •  dicyclomine (BENTYL) 10 mg capsule, Take 1 capsule (10 mg total) by mouth 2 (two) times a day as needed (abdominal cramping) (Patient not taking: Reported on 4/1/2025), Disp: 60 capsule, Rfl: 3      Vitals:    04/01/25 0820   BP: 132/70   Pulse: 78   SpO2: 91%       Weight (last 2 days)     Date/Time Weight    04/01/25 0820 127 (280.9)        Physical Exam  Constitutional:       General: He is not in acute distress.     Appearance: He is not diaphoretic.   HENT:      Head: Normocephalic and atraumatic.   Eyes:      General: No scleral icterus.     Conjunctiva/sclera: Conjunctivae normal.   Neck:      Vascular: No JVD.   Cardiovascular:      Rate and Rhythm:  "Normal rate and regular rhythm.      Heart sounds: Normal heart sounds. No murmur heard.  Pulmonary:      Effort: Pulmonary effort is normal. No respiratory distress.      Breath sounds: Normal breath sounds. No decreased breath sounds, wheezing, rhonchi or rales.   Musculoskeletal:      Cervical back: Normal range of motion.      Right lower leg: Normal. No edema.      Left lower leg: Normal. No edema.   Skin:     General: Skin is warm and dry.   Neurological:      Mental Status: He is alert and oriented to person, place, and time.           Laboratory Studies:    Labs personally reviewed    Cardiac testing:     EKG reviewed personally:   No results found for this visit on 04/01/25.  8/24-sinus rhythm, bifascicular heart block        Echocardiogram:  2017-EF 55%  3/21-EF 60%, mild LVH, mild RV dilation and mild RV dysfunction, mild-to-moderate TR with mild-to-moderate pulmonary hypertension  12/28/21-EF 55%, moderate concentric hypertrophy, RV dilated, left atrium dilated, right atrium dilated, mild MR, mild TR, mildly elevated pulmonary pressures, mildly dilated ascending aorta-personally reviewed  1/23 - EF 55%, RV dilated with reduced function, severe PHTN, septal flattening  2/23 - EF 55%, RV mildly dilated, mild PHTN    Stress test:      Holter:      Cardiac catheterization:        Agusto Thompson MD    Portions of the record may have been created with voice recognition software.  Occasional wrong word or \"sound a like\" substitutions may have occurred due to the inherent limitations of voice recognition software.  Read the chart carefully and recognize, using context, where substitutions have occurred.  "

## 2025-04-03 ENCOUNTER — OFFICE VISIT (OUTPATIENT)
Dept: PULMONOLOGY | Facility: CLINIC | Age: 80
End: 2025-04-03
Payer: MEDICARE

## 2025-04-03 VITALS
SYSTOLIC BLOOD PRESSURE: 120 MMHG | DIASTOLIC BLOOD PRESSURE: 56 MMHG | OXYGEN SATURATION: 93 % | HEART RATE: 70 BPM | TEMPERATURE: 98.2 F | BODY MASS INDEX: 34.82 KG/M2 | HEIGHT: 75 IN | WEIGHT: 280 LBS

## 2025-04-03 DIAGNOSIS — G47.33 OSA (OBSTRUCTIVE SLEEP APNEA): ICD-10-CM

## 2025-04-03 DIAGNOSIS — J44.9 CHRONIC OBSTRUCTIVE PULMONARY DISEASE, UNSPECIFIED COPD TYPE (HCC): Primary | ICD-10-CM

## 2025-04-03 DIAGNOSIS — I27.20 PULMONARY HYPERTENSION (HCC): ICD-10-CM

## 2025-04-03 PROCEDURE — 99213 OFFICE O/P EST LOW 20 MIN: CPT | Performed by: INTERNAL MEDICINE

## 2025-04-03 NOTE — ASSESSMENT & PLAN NOTE
This patient does not sense dyspnea using Anoro on a daily basis.  His level of activity is fairly low because of back arthritis.  Does ride an exercise bike on a daily basis and does not sense any major impairment riding for 30 minutes.  Does not sense the need for rescue inhaler.  Plan to continue with Anoro.

## 2025-04-03 NOTE — PROGRESS NOTES
Answers submitted by the patient for this visit:  Pulmonology Questionnaire (Submitted on 4/2/2025)  Chief Complaint: Primary symptoms  Have you had a change in appetite?: No  Do you have chest pain?: No  Do you have shortness of breath that occurs with effort or exertion?: No  Do you have ear congestion?: No  Do you have ear pain?: No  Do you have a fever?: No  Do you have headaches?: No  Do you have heartburn?: No  Do you have fatigue?: No  Do you have muscle pain?: No  Do you have nasal congestion?: Yes  Do you have shortness of breath when lying flat?: No  Do you have shortness of breath when you wake up?: No  Do you have post-nasal drip?: Yes  Do you have a runny nose?: Yes  Do you have sneezing?: Yes  Do you have a sore throat?: No  Do you have sweats?: No  Do you have trouble swallowing?: No  Have you experienced weight loss?: No  Which of the following makes your symptoms worse?: change in weather  Assessment/Plan:    Chronic obstructive pulmonary disease (HCC)  This patient does not sense dyspnea using Anoro on a daily basis.  His level of activity is fairly low because of back arthritis.  Does ride an exercise bike on a daily basis and does not sense any major impairment riding for 30 minutes.  Does not sense the need for rescue inhaler.  Plan to continue with Anoro.    LUCIA (obstructive sleep apnea)  On sleep apnea treatment since 2014.  Now using BiPAP therapy because of hypercarbia.  Patient states that he is using this treatment every night approximately 5 to 6 hours.  No major symptoms of sleep apnea while using this device.  No technical problems.  [facility] and I asked him to have this done in case there would be any insurance problems.  Technically can have this machine replaced in 2026 and we will consider that at the next visit.  Advised to continue.  Last measure of pCO2 in April 2023 was still elevated at 56 but lower than before.  For now  there does not seem to be need to remeasure this study.  I would label this patient's diagnosis as overlap syndrome between sleep apnea and COPD.    Pulmonary hypertension (HCC)  No definite pulmonary hypertension on last echo of 2023.  No convincing signs of pulmonary hypertension today.  Pulmonary hypertension is a common complication of overlap syndrome.     Diagnoses and all orders for this visit:    Chronic obstructive pulmonary disease, unspecified COPD type (HCC)    LUCIA (obstructive sleep apnea)    Pulmonary hypertension (HCC)          Subjective:      Patient ID: Home Walters is a 80 y.o. male.    The patient returns for reevaluation of COPD and sleep apnea.  With regard to the former he had moderate airflow obstruction and restrictive changes on previous PFTs.  Patient's level of activity is fairly low because of back arthritis.  He does exercise on an exercise bike on a daily basis.  Does not sense that he has any dyspnea that interferes with his lifestyle.  Does not sense the need for rescue inhaler.  Continues to use Anoro on a daily basis.  No exacerbations since his last visit here in February 2024.  No hospitalizations relative to lung disease.  Last echocardiogram did not clearly show evidence of pulmonary hypertension although this may commonly be seen with overlap syndrome.  With regard to sleep apnea has been on treatment since 2014.  Now on BiPAP ventilation because of chronic hypercarbia.  Last pCO2 measured in April 2023 was 56.  Patient does not have headaches.  Uses the BiPAP ventilation faithfully nightly at least 5 hours.  No major daytime sleepiness.  Feels that his sleep is restful.  His bed partner does not hear snoring while he is using the device.  His machine uses a computer chip for analysis and I encouraged him to take that to [facility] for analysis.  She would technically be able to get a new machine in 2026.    primary symptoms  Pertinent negatives include no chest pain, coughing,  "fever, headaches, myalgias or sore throat.       The following portions of the patient's history were reviewed and updated as appropriate: allergies, current medications, past family history, past medical history, past social history, past surgical history and problem list.    Review of Systems   Constitutional:  Negative for activity change, appetite change, fever and unexpected weight change.   HENT:  Positive for postnasal drip, rhinorrhea and sneezing. Negative for ear pain, sore throat and trouble swallowing.    Respiratory:  Positive for apnea. Negative for cough, shortness of breath and wheezing.    Cardiovascular:  Negative for chest pain, palpitations and leg swelling.   Musculoskeletal:  Positive for back pain and gait problem. Negative for myalgias.   Neurological:  Negative for headaches.         Objective:      /56 (BP Location: Left arm, Patient Position: Sitting, Cuff Size: Standard)   Pulse 70   Temp 98.2 °F (36.8 °C) (Tympanic)   Ht 6' 3\" (1.905 m)   Wt 127 kg (280 lb)   SpO2 93%   BMI 35.00 kg/m²          Physical Exam  Vitals reviewed.   Constitutional:       General: He is not in acute distress.     Appearance: He is obese. He is not ill-appearing.      Comments: Examined in wheelchair   Neck:      Vascular: No JVD.   Cardiovascular:      Rate and Rhythm: Normal rate and regular rhythm.      Pulses:           Radial pulses are 2+ on the right side and 2+ on the left side.      Heart sounds: Heart sounds are distant. Murmur heard.      Systolic murmur is present with a grade of 2/6.      Comments: Systolic ejection murmur at the right sternal border.  Possible holosystolic murmur at the apex, grade 1  Pulmonary:      Effort: Pulmonary effort is normal.      Breath sounds: Examination of the right-lower field reveals decreased breath sounds. Examination of the left-lower field reveals decreased breath sounds. Decreased breath sounds present. No wheezing or rhonchi.   Musculoskeletal:    "      General: No swelling.      Cervical back: No rigidity or tenderness.      Right lower leg: No edema.      Left lower leg: No edema.   Lymphadenopathy:      Cervical: No cervical adenopathy.   Skin:     General: Skin is warm and dry.      Findings: No rash.   Neurological:      General: No focal deficit present.      Mental Status: He is alert and oriented to person, place, and time.   Psychiatric:         Mood and Affect: Mood normal.         Behavior: Behavior normal.      okay  70 treated with stop and let you do your work and

## 2025-04-03 NOTE — ASSESSMENT & PLAN NOTE
No definite pulmonary hypertension on last echo of 2023.  No convincing signs of pulmonary hypertension today.  Pulmonary hypertension is a common complication of overlap syndrome.

## 2025-04-03 NOTE — ASSESSMENT & PLAN NOTE
On sleep apnea treatment since 2014.  Now using BiPAP therapy because of hypercarbia.  Patient states that he is using this treatment every night approximately 5 to 6 hours.  No major symptoms of sleep apnea while using this device.  No technical problems.  Patient has to take his computer chip into [facility] to have it read and I asked him to have this done in case there would be any insurance problems.  Technically can have this machine replaced in 2026 and we will consider that at the next visit.  Advised to continue.  Last measure of pCO2 in April 2023 was still elevated at 56 but lower than before.  For now there does not seem to be need to remeasure this study.  I would label this patient's diagnosis as overlap syndrome between sleep apnea and COPD.

## 2025-04-06 DIAGNOSIS — I50.9 CHF EXACERBATION (HCC): ICD-10-CM

## 2025-04-07 RX ORDER — TORSEMIDE 20 MG/1
TABLET ORAL
Qty: 120 TABLET | Refills: 1 | Status: SHIPPED | OUTPATIENT
Start: 2025-04-07

## 2025-04-10 DIAGNOSIS — I48.91 ATRIAL FIBRILLATION WITH RAPID VENTRICULAR RESPONSE (HCC): ICD-10-CM

## 2025-04-10 DIAGNOSIS — I50.9 CHF EXACERBATION (HCC): ICD-10-CM

## 2025-04-11 RX ORDER — EMPAGLIFLOZIN 25 MG/1
TABLET, FILM COATED ORAL
Qty: 90 TABLET | Refills: 1 | Status: SHIPPED | OUTPATIENT
Start: 2025-04-11

## 2025-04-11 RX ORDER — METOPROLOL SUCCINATE 50 MG/1
50 TABLET, EXTENDED RELEASE ORAL DAILY
Qty: 90 TABLET | Refills: 1 | Status: SHIPPED | OUTPATIENT
Start: 2025-04-11

## 2025-04-28 DIAGNOSIS — I48.91 ATRIAL FIBRILLATION WITH RAPID VENTRICULAR RESPONSE (HCC): ICD-10-CM

## 2025-04-28 NOTE — PROGRESS NOTES
Inbasket notification received for patient enrolled in Medicare CHF Bundle; discharged home with [facility]  Chart review completed  Outside records through [facility] requested and refreshed  Patient hospitalized 3/25/2021 - 3/31/2021 at [address] for acute on chronic CHF  Patient presented with SOB and worsening BLE edema  Patient with past medical history of CAD, CHF, AFIB, Bladder Cancer, AAA, colitis, HTN and diabetes type 2  HGBA1C of 9 3 drawn 03/12/2021  See problem list for complete list of medical diagnosis  Review of IP SW note indicates patient lives with wife in ranch home, 2 steps to enter  Patient drives and is was reportedly independent with ADLs prior to admission  Patient has the following DME provided by [facility]:  RW and CPAP  Patient ordered 4L of O2 at rest and 6L O2 with activity upon discharge  Future Appointments:  4/9 [address] [facility] Cardiology    Call placed to [facility] to confirm start of care  Per Lila Cortes, opened with [facility]  for SN / PT / OT services  This CM will continue to monitor through chart review, [facility] and [facility]  hide

## 2025-04-30 RX ORDER — DIGOXIN 125 MCG
125 TABLET ORAL DAILY
Qty: 30 TABLET | Refills: 0 | Status: SHIPPED | OUTPATIENT
Start: 2025-04-30

## 2025-04-30 NOTE — TELEPHONE ENCOUNTER
Patient needs updated blood work & ECG.  Please place orders. A 30D courtesy refill was provided.      Digoxin (serum) in normal range and within 180 days    Mg Level in normal range and within 180 days    ECG completed within the last 6 months

## 2025-05-06 ENCOUNTER — PATIENT MESSAGE (OUTPATIENT)
Dept: FAMILY MEDICINE CLINIC | Facility: CLINIC | Age: 80
End: 2025-05-06

## 2025-05-06 DIAGNOSIS — Z12.5 SCREENING FOR MALIGNANT NEOPLASM OF PROSTATE: Primary | ICD-10-CM

## 2025-05-09 ENCOUNTER — RESULTS FOLLOW-UP (OUTPATIENT)
Dept: ENDOCRINOLOGY | Facility: CLINIC | Age: 80
End: 2025-05-09

## 2025-05-09 ENCOUNTER — RESULTS FOLLOW-UP (OUTPATIENT)
Dept: FAMILY MEDICINE CLINIC | Facility: CLINIC | Age: 80
End: 2025-05-09

## 2025-05-09 ENCOUNTER — APPOINTMENT (OUTPATIENT)
Dept: LAB | Facility: AMBULARY SURGERY CENTER | Age: 80
End: 2025-05-09
Payer: MEDICARE

## 2025-05-09 DIAGNOSIS — Z79.4 TYPE 2 DIABETES MELLITUS WITH HYPERGLYCEMIA, WITH LONG-TERM CURRENT USE OF INSULIN (HCC): ICD-10-CM

## 2025-05-09 DIAGNOSIS — Z12.5 SCREENING FOR MALIGNANT NEOPLASM OF PROSTATE: ICD-10-CM

## 2025-05-09 DIAGNOSIS — E11.65 TYPE 2 DIABETES MELLITUS WITH HYPERGLYCEMIA, WITH LONG-TERM CURRENT USE OF INSULIN (HCC): ICD-10-CM

## 2025-05-09 DIAGNOSIS — E83.52 HYPERCALCEMIA: ICD-10-CM

## 2025-05-09 LAB
25(OH)D3 SERPL-MCNC: 41 NG/ML (ref 30–100)
ALBUMIN SERPL BCG-MCNC: 3.9 G/DL (ref 3.5–5)
ALP SERPL-CCNC: 87 U/L (ref 34–104)
ALT SERPL W P-5'-P-CCNC: 17 U/L (ref 7–52)
ANION GAP SERPL CALCULATED.3IONS-SCNC: 10 MMOL/L (ref 4–13)
AST SERPL W P-5'-P-CCNC: 16 U/L (ref 13–39)
BILIRUB SERPL-MCNC: 0.44 MG/DL (ref 0.2–1)
BUN SERPL-MCNC: 39 MG/DL (ref 5–25)
CALCIUM SERPL-MCNC: 9.6 MG/DL (ref 8.4–10.2)
CHLORIDE SERPL-SCNC: 97 MMOL/L (ref 96–108)
CO2 SERPL-SCNC: 33 MMOL/L (ref 21–32)
CREAT SERPL-MCNC: 1.3 MG/DL (ref 0.6–1.3)
EST. AVERAGE GLUCOSE BLD GHB EST-MCNC: 183 MG/DL
GFR SERPL CREATININE-BSD FRML MDRD: 51 ML/MIN/1.73SQ M
GLUCOSE P FAST SERPL-MCNC: 114 MG/DL (ref 65–99)
HBA1C MFR BLD: 8 %
POTASSIUM SERPL-SCNC: 4 MMOL/L (ref 3.5–5.3)
PROT SERPL-MCNC: 7.7 G/DL (ref 6.4–8.4)
PSA SERPL-MCNC: 2.48 NG/ML (ref 0–4)
PTH-INTACT SERPL-MCNC: 144.7 PG/ML (ref 12–88)
SODIUM SERPL-SCNC: 140 MMOL/L (ref 135–147)

## 2025-05-09 PROCEDURE — 83036 HEMOGLOBIN GLYCOSYLATED A1C: CPT

## 2025-05-09 PROCEDURE — 82306 VITAMIN D 25 HYDROXY: CPT

## 2025-05-09 PROCEDURE — 36415 COLL VENOUS BLD VENIPUNCTURE: CPT

## 2025-05-09 PROCEDURE — G0103 PSA SCREENING: HCPCS

## 2025-05-09 PROCEDURE — 80053 COMPREHEN METABOLIC PANEL: CPT

## 2025-05-09 PROCEDURE — 83970 ASSAY OF PARATHORMONE: CPT

## 2025-05-14 ENCOUNTER — OFFICE VISIT (OUTPATIENT)
Dept: ENDOCRINOLOGY | Facility: CLINIC | Age: 80
End: 2025-05-14
Payer: MEDICARE

## 2025-05-14 VITALS
SYSTOLIC BLOOD PRESSURE: 126 MMHG | WEIGHT: 277 LBS | HEIGHT: 75 IN | BODY MASS INDEX: 34.44 KG/M2 | DIASTOLIC BLOOD PRESSURE: 58 MMHG | HEART RATE: 62 BPM | OXYGEN SATURATION: 92 %

## 2025-05-14 DIAGNOSIS — E27.9 ADRENAL NODULE (HCC): ICD-10-CM

## 2025-05-14 DIAGNOSIS — E11.9 TYPE 2 DIABETES MELLITUS WITHOUT COMPLICATION, WITHOUT LONG-TERM CURRENT USE OF INSULIN (HCC): Primary | ICD-10-CM

## 2025-05-14 DIAGNOSIS — E21.3 HYPERPARATHYROIDISM (HCC): ICD-10-CM

## 2025-05-14 DIAGNOSIS — I10 PRIMARY HYPERTENSION: ICD-10-CM

## 2025-05-14 PROCEDURE — 99214 OFFICE O/P EST MOD 30 MIN: CPT

## 2025-05-14 PROCEDURE — 95251 CONT GLUC MNTR ANALYSIS I&R: CPT

## 2025-05-14 NOTE — ASSESSMENT & PLAN NOTE
BP at goal  Continue current medication regimen   Continue follow up with cardiology and nephrology

## 2025-05-14 NOTE — PROGRESS NOTES
Established Patient Progress Note      Chief Complaint   Patient presents with    Diabetes Type 2        Impression & Plan:    Assessment & Plan  Type 2 diabetes mellitus without complication, without long-term current use of insulin (HCC)  Diabetes control:  HGA1C is elevated but I believe this is falsely elevated, does not match CGM report  CGM report shows BGs in range 80% of the time with GMI of 7%  Denies hypoglycemia   Treatment regimen:   Continue current medication regimen  Continue CGM - will upgrade to Peggy 3 plus   Discussed risks/complications associated with uncontrolled diabetes.    Advised to adhere to diabetic diet, and recommended staying active/exercising routinely.  Keep carbohydrates consistent to limit blood glucose fluctuations.  Advised to call if blood sugars less than 70 mg/dl or over 300 mg/dl.   Discussed symptoms and treatment of hypoglycemia.    Recommended routine follow-up with podiatry and ophthalmology.   Ordered blood work to complete prior to next visit.   Lab Results   Component Value Date    HGBA1C 8.0 (H) 05/09/2025       Orders:    Hemoglobin A1C; Future    Comprehensive metabolic panel; Future    Hyperparathyroidism (HCC)  PTH improved with increase in vitamin d. Calcium levels have been stable. DEXA scan normal in the past. Will continue to monitor.   Orders:    PTH, intact; Future    Adrenal nodule (HCC)  This has remained stable. Hormonal work up negative in the past.        Primary hypertension  BP at goal  Continue current medication regimen   Continue follow up with cardiology and nephrology          History of Present Illness:   Home Walters is a 80 y.o. male seen in follow up for type 2 diabetes. Has past medication history of hypertension, hyperlipidemia, sleep apnea, adrenal adenoma, and hyperparathyroidism.  Reports complications of neuropathy, retinopathy and microalbuminuria. Denies recent severe hypoglycemic or severe hyperglycemic episodes. Denies any issues  with his current regimen. Last A1C was 8. Home glucose monitoring: are performed regularly with CGM.     Previously treated with metformin (stopped due to GI issues) and Pioglitazone which was discontinued due to edema.  Did not tolerate GLP-1 agonist due to nausea     Home Walters   Device used: Peggy 3   Home use   Indication: Type 2 Diabetes  More than 72 hours of data was reviewed. Report to be scanned to chart.   Date Range: 5/1/25-5/14/25  Analysis of data:   Average Glucose: 155  Coefficient of Variation: 21.4%    Time in Target Range: 80%   Time Above Range: 18% high, 2% very high    Time Below Range: 0%   Interpretation of data:  BGs in range majority of the time. Rare hypoglycemia.      Current regimen:   Tresiba 95 units once daily  Humalog 20-25 units with each meal depending on carb amount, does not take any if not eating carbs  Jardiance 25 mg daily - was started by cardiology for hx of CHF     Last Eye Exam: [facility], UTD   Last Foot Exam: follows with podiatry every 6 weeks, Dr. Wood      Has hypertension: Taking lisinopril and Toprol XL  Has hyperlipidemia: Taking atorvastatin and zetia    Adrenal Adenoma: 4 cm adrenal adenoma which has been stable on imaging. Repeat CT for renal stones done 8/24/24  Hypercalcemia/Hyperparathyroidism: Denies recent falls or fractures. PTH is elevated but improved and vit D is 41. calcium is normal. Vitamin D3 4000 units a day. No recent kidney stones.     Problem List[1]   Past Medical History:   Diagnosis Date    A-fib ([facility])     AAA (abdominal aortic aneurysm) ([facility])     Actinic keratosis of right temple 07/31/2020    Actinic keratosis of scalp 07/31/2020    Acute respiratory failure with hypoxia ([facility]) 03/25/2021    Allergic 1960    Aneurysm ([facility]) 11/2001    Aorta    Arrhythmia     Arthritis     Lay's esophagus     Bladder cancer ([facility])     Blister of left leg 04/28/2021    Blister of right leg 05/26/2021    BPH (benign prostatic  hypertrophy) 02/2010    CAD (coronary artery disease)     Cancer (HCC) 02/2010    Bladder    CHF (congestive heart failure) (HCC)     Chronic low back pain     Chronic pain of both knees 07/31/2020    Colitis 12/04/2020    CPAP (continuous positive airway pressure) dependence     Diabetes (HCC)     Diabetes mellitus (HCC) 10/1993    Excessive gas 12/03/2020    Heart murmur     History of chemotherapy     Hydroureter on left 04/28/2021    Hyperlipemia     Hypertension     Immunization deficiency 10/30/2020    Hep a nonimmune    Kidney stone     Left lower quadrant abdominal pain 12/03/2020    Mass of right adrenal gland (HCC) 12/04/2020    4.1 cm    Myocardial infarction (HCC)     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    Obesity 2000    LUCIA (obstructive sleep apnea) 01/29/2021    Pressure ulcer of right leg, stage 1 05/26/2021    Squamous cell skin cancer 05/02/2024    central scalp, mohs    Squamous cell skin cancer 10/03/2024    scalp-Mohs    Urinary tract infection with hematuria 05/03/2021    u cx 4/2021=pseudomonas R to bactrim and cefdinir, S to cipro    Venous stasis dermatitis of both lower extremities 05/26/2021      Past Surgical History:   Procedure Laterality Date    BACK SURGERY      BLADDER SURGERY      CARDIAC CATHETERIZATION  04/2016    CARDIAC CATHETERIZATION N/A 01/19/2023    Procedure: Cardiac RHC;  Surgeon: Jose Carlos Shea MD;  [facility];  Service: Cardiology    CATARACT EXTRACTION, BILATERAL      COLONOSCOPY  01/29/2019    next 2022 [place]    CORONARY ARTERY BYPASS GRAFT  04/2016    Quadruple per Dwarf    CYSTOSCOPY  06/2019    EGD  06/2017    EYE SURGERY  11/2016    Cataracts    JOINT REPLACEMENT  [id number]    Hip and shoulder    KIDNEY STONE SURGERY      MOHS SURGERY  06/04/2024    [facility] central scalp, Dr Rodríguez    MOHS SURGERY N/A 11/22/2024    [facility] scalp;     VT CYSTO/URETERO W/LITHOTRIPSY &INDWELL STENT INSRT Left 05/21/2021    Procedure: CYSTOSCOPY  "URETEROSCOPY WITH LITHOTRIPSY HOLMIUM LASER, AND INSERTION STENT URETERAL/EXCHANGE;  Surgeon: Siddhartha Leslie MD;  Location: [facility];  Service: Urology    CA CYSTOURETHROSCOPY W/URETERAL CATHETERIZATION Left 04/24/2021    Procedure: CYSTOSCOPY  WITH INSERTION STENT URETERAL;  Surgeon: Siddhartha Leslie MD;  Location: [facility];  Service: Urology    TOTAL HIP ARTHROPLASTY Right 2012    TOTAL SHOULDER REPLACEMENT Right 2013    VASECTOMY  1971      Social History     Tobacco Use    Smoking status: Former     Current packs/day: 0.00     Average packs/day: 0.3 packs/day for 44.0 years (11.0 ttl pk-yrs)     Types: Cigarettes     Start date: 1/1/1965     Quit date: 1/1/2010     Years since quitting: 15.3     Passive exposure: Past    Smokeless tobacco: Never    Tobacco comments:     Quit several times for up to 6 years.   Substance Use Topics    Alcohol use: Not Currently     Comment: No use     Allergies[2]    Current Medications[3]    Review of Systems   Constitutional:  Negative for fatigue, fever and unexpected weight change.   Eyes:  Negative for visual disturbance.   Respiratory:  Negative for shortness of breath.    Cardiovascular:  Negative for chest pain.   Gastrointestinal:  Negative for abdominal pain, constipation, diarrhea, nausea and vomiting.   Endocrine: Negative for polydipsia and polyuria.   Musculoskeletal:  Positive for gait problem.   Skin:  Negative for wound.   Neurological:  Positive for numbness. Negative for dizziness and syncope.   All other systems reviewed and are negative.      Physical Exam:  Body mass index is 34.62 kg/m².  /58   Pulse 62   Ht 6' 3\" (1.905 m)   Wt 126 kg (277 lb)   SpO2 92%   BMI 34.62 kg/m²    Wt Readings from Last 3 Encounters:   05/14/25 126 kg (277 lb)   04/03/25 127 kg (280 lb)   04/01/25 127 kg (280 lb 14.4 oz)       Physical Exam  Vitals reviewed.   Constitutional:       Appearance: Normal appearance. He is obese.     Cardiovascular:      Rate and Rhythm: " Normal rate.   Pulmonary:      Effort: Pulmonary effort is normal.     Neurological:      Mental Status: He is alert and oriented to person, place, and time.     Psychiatric:         Mood and Affect: Mood normal.         Labs:   Lab Results   Component Value Date    HGBA1C 8.0 (H) 05/09/2025    HGBA1C 7.6 (A) 03/07/2025    HGBA1C 7.7 (H) 01/10/2025     Lab Results   Component Value Date    CREATININE 1.30 05/09/2025    CREATININE 1.30 01/10/2025    CREATININE 1.30 10/11/2024    BUN 39 (H) 05/09/2025    K 4.0 05/09/2025    CL 97 05/09/2025    CO2 33 (H) 05/09/2025     eGFR   Date Value Ref Range Status   05/09/2025 51 ml/min/1.73sq m Final     Lab Results   Component Value Date    HDL 28 (L) 01/10/2025    TRIG 278 (H) 01/10/2025     Lab Results   Component Value Date    ALT 17 05/09/2025    AST 16 05/09/2025    ALKPHOS 87 05/09/2025     Lab Results   Component Value Date    JQS2CFZSSNAJ 1.150 05/11/2023    WPW7LBTRGVKA 0.848 02/11/2023    GUQ1IXOWRSDA 0.984 12/27/2021       There are no Patient Instructions on file for this visit.    Discussed with the patient and all questioned fully answered. He will call me if any problems arise.    KACEY Mccoy         [1]   Patient Active Problem List  Diagnosis    Primary hypertension    Mixed hyperlipidemia    Type 2 diabetes mellitus with hyperglycemia, with long-term current use of insulin (HCC)    Chronic low back pain    CAD (coronary artery disease)    Malignant neoplasm of urinary bladder (HCC)    Arthritis    AAA (abdominal aortic aneurysm) (HCC)    Decreased pedal pulses    Chronic pain of both knees    Actinic keratosis of right temple    Actinic keratosis of scalp    Morbid obesity (HCC)    Adrenal nodule (HCC)    LUCIA (obstructive sleep apnea)    Bilateral edema of lower extremity    Chronic diastolic CHF (congestive heart failure) (HCC)    Left upper lobe pulmonary nodule    Hypercalcemia    Nephrolithiasis    Hydroureter on left    Venous stasis  dermatitis of both lower extremities    Persistent proteinuria    Hypokalemia    Ureteral calculi    Benign prostatic hyperplasia without lower urinary tract symptoms    PAF (paroxysmal atrial fibrillation) (HCC)    PAD (peripheral artery disease) (HCC)    Gastroesophageal reflux disease    Vitamin D deficiency    Cirrhosis of liver without ascites (HCC)    Chronic acquired lymphedema    Pulmonary hypertension (HCC)    Chronic obstructive pulmonary disease (HCC)    Stage 3a chronic kidney disease (HCC)    Current use of insulin (HCC)    Squamous cell carcinoma of scalp    GI bleed    Epiploic appendagitis    Diabetic peripheral neuropathy (HCC)   [2]   Allergies  Allergen Reactions    Ciprofloxacin Abdominal Pain, Diarrhea, GI Bleeding and GI Intolerance    Diltiazem Abdominal Pain    Metronidazole Abdominal Pain, Diarrhea, GI Bleeding and GI Intolerance   [3]   Current Outpatient Medications:     apixaban (Eliquis) 5 mg, TAKE ONE TABLET BY MOUTH TWICE DAILY, Disp: 180 tablet, Rfl: 1    atorvastatin (LIPITOR) 40 mg tablet, TAKE ONE TABLET BY MOUTH ONCE DAILY, Disp: 90 tablet, Rfl: 1    cholecalciferol (VITAMIN D3) 1,000 units tablet, Take 1,000 Units by mouth in the morning., Disp: , Rfl:     Continuous Blood Gluc Sensor (FreeStyle Peggy 2 Sensor) MISC, Change it every 14 days, Disp: 1 each, Rfl: 0    digoxin (LANOXIN) 0.125 mg tablet, TAKE ONE TABLET BY MOUTH ONCE DAILY, Disp: 30 tablet, Rfl: 0    Empagliflozin (Jardiance) 25 MG TABS, TAKE ONE TABLET BY MOUTH DAILY IN MORNING, Disp: 90 tablet, Rfl: 1    famotidine (PEPCID) 40 MG tablet, TAKE ONE TABLET BY MOUTH ONCE DAILY AT BEDTIME, Disp: 30 tablet, Rfl: 5    insulin degludec (Tresiba FlexTouch) 200 units/mL CONCENTRATED U-200 injection pen, Inject 95 units under the skin once daily, Disp: 15 mL, Rfl: 5    insulin lispro (HumaLOG KwikPen) 100 units/mL injection pen, Inject 20-25 units three times daily before each meal., Disp: 30 mL, Rfl: 5    Insulin Pen Needle  (BD Pen Needle Micro U/F) 32G X 6 MM MISC, Use 3-4 times per day with insulin pens, Disp: 120 each, Rfl: 4    metoprolol succinate (TOPROL-XL) 50 mg 24 hr tablet, TAKE ONE TABLET BY MOUTH ONCE DAILY, Disp: 90 tablet, Rfl: 1    omeprazole (PriLOSEC) 20 mg delayed release capsule, TAKE ONE CAPSULE BY MOUTH TWICE DAILY, Disp: 180 capsule, Rfl: 1    potassium citrate (UROCIT-K) 10 mEq, TAKE ONE TABLET BY MOUTH TWICE DAILY, Disp: 180 tablet, Rfl: 1    torsemide (DEMADEX) 20 mg tablet, take two tablets by mouth in the morning and one tablet  at 4pm, Disp: 120 tablet, Rfl: 1    umeclidinium-vilanterol (Anoro Ellipta) 62.5-25 mcg/actuation inhaler, inhale 1 puff by mouth daily, Disp: 60 each, Rfl: 5    Cequa 0.09 % SOLN, , Disp: , Rfl:     dicyclomine (BENTYL) 10 mg capsule, Take 1 capsule (10 mg total) by mouth 2 (two) times a day as needed (abdominal cramping) (Patient not taking: Reported on 4/1/2025), Disp: 60 capsule, Rfl: 3

## 2025-05-15 DIAGNOSIS — K21.9 GASTROESOPHAGEAL REFLUX DISEASE, UNSPECIFIED WHETHER ESOPHAGITIS PRESENT: ICD-10-CM

## 2025-05-16 RX ORDER — OMEPRAZOLE 20 MG/1
20 CAPSULE, DELAYED RELEASE ORAL 2 TIMES DAILY
Qty: 180 CAPSULE | Refills: 1 | Status: SHIPPED | OUTPATIENT
Start: 2025-05-16

## 2025-05-25 DIAGNOSIS — I48.91 ATRIAL FIBRILLATION WITH RAPID VENTRICULAR RESPONSE (HCC): ICD-10-CM

## 2025-05-27 RX ORDER — DIGOXIN 125 MCG
125 TABLET ORAL DAILY
Qty: 30 TABLET | Refills: 0 | Status: SHIPPED | OUTPATIENT
Start: 2025-05-27 | End: 2025-05-31

## 2025-05-28 DIAGNOSIS — I48.91 ATRIAL FIBRILLATION WITH RAPID VENTRICULAR RESPONSE (HCC): ICD-10-CM

## 2025-05-29 NOTE — TELEPHONE ENCOUNTER
Requested medication(s) are due for refill today: Yes  Patient has already received a courtesy refill: No  Other reason request has been forwarded to provider: Not sure if this is a duplicate or not per chart

## 2025-05-30 ENCOUNTER — TELEPHONE (OUTPATIENT)
Age: 80
End: 2025-05-30

## 2025-05-30 NOTE — TELEPHONE ENCOUNTER
Pt calling for skin check, he has several spots on his head and ears he needs checked    Pt has had MOHS with us 2x, has seen plastics for bx, never been to reg derm    Pt's PCP is wanting him to have a full body scan     Pt does not drive and relies on his children for transportation. Pt is limited to [place] only, but can do any day/time as long as he doesn't have another appt scheduled    Advised pt I can call him if I get a cancellation at [place]    Tentatively scheduled as NP with [facility] in Dec    Please call pt back at [phone]

## 2025-05-30 NOTE — TELEPHONE ENCOUNTER
Called and spoke with pt, offered cancellation next week 6/3 10am mohit/Johny. Pt accepted appt    Cancelled Dec

## 2025-05-31 RX ORDER — DIGOXIN 125 MCG
125 TABLET ORAL DAILY
Qty: 90 TABLET | Refills: 3 | Status: SHIPPED | OUTPATIENT
Start: 2025-05-31

## 2025-06-03 ENCOUNTER — OFFICE VISIT (OUTPATIENT)
Dept: DERMATOLOGY | Facility: CLINIC | Age: 80
End: 2025-06-03
Payer: MEDICARE

## 2025-06-03 VITALS — BODY MASS INDEX: 33.85 KG/M2 | TEMPERATURE: 97.6 F | WEIGHT: 278 LBS | HEIGHT: 76 IN

## 2025-06-03 DIAGNOSIS — D22.71 MULTIPLE BENIGN MELANOCYTIC NEVI OF BOTH UPPER EXTREMITIES, BOTH LOWER EXTREMITIES, AND TRUNK: ICD-10-CM

## 2025-06-03 DIAGNOSIS — L82.1 SEBORRHEIC KERATOSIS: ICD-10-CM

## 2025-06-03 DIAGNOSIS — D22.62 MULTIPLE BENIGN MELANOCYTIC NEVI OF BOTH UPPER EXTREMITIES, BOTH LOWER EXTREMITIES, AND TRUNK: ICD-10-CM

## 2025-06-03 DIAGNOSIS — Z85.89 HISTORY OF SQUAMOUS CELL CARCINOMA: Primary | ICD-10-CM

## 2025-06-03 DIAGNOSIS — L57.0 ACTINIC KERATOSIS: ICD-10-CM

## 2025-06-03 DIAGNOSIS — D48.5 NEOPLASM OF UNCERTAIN BEHAVIOR OF SKIN: ICD-10-CM

## 2025-06-03 DIAGNOSIS — D22.5 MULTIPLE BENIGN MELANOCYTIC NEVI OF BOTH UPPER EXTREMITIES, BOTH LOWER EXTREMITIES, AND TRUNK: ICD-10-CM

## 2025-06-03 DIAGNOSIS — L81.4 LENTIGO: ICD-10-CM

## 2025-06-03 DIAGNOSIS — D22.61 MULTIPLE BENIGN MELANOCYTIC NEVI OF BOTH UPPER EXTREMITIES, BOTH LOWER EXTREMITIES, AND TRUNK: ICD-10-CM

## 2025-06-03 DIAGNOSIS — D18.01 CHERRY ANGIOMA: ICD-10-CM

## 2025-06-03 DIAGNOSIS — D22.72 MULTIPLE BENIGN MELANOCYTIC NEVI OF BOTH UPPER EXTREMITIES, BOTH LOWER EXTREMITIES, AND TRUNK: ICD-10-CM

## 2025-06-03 PROCEDURE — 88305 TISSUE EXAM BY PATHOLOGIST: CPT | Performed by: PATHOLOGY

## 2025-06-03 PROCEDURE — 17003 DESTRUCT PREMALG LES 2-14: CPT | Performed by: NURSE PRACTITIONER

## 2025-06-03 PROCEDURE — 99204 OFFICE O/P NEW MOD 45 MIN: CPT | Performed by: NURSE PRACTITIONER

## 2025-06-03 PROCEDURE — 17000 DESTRUCT PREMALG LESION: CPT | Performed by: NURSE PRACTITIONER

## 2025-06-03 PROCEDURE — 11102 TANGNTL BX SKIN SINGLE LES: CPT | Performed by: NURSE PRACTITIONER

## 2025-06-03 RX ORDER — FLUOROURACIL 50 MG/G
CREAM TOPICAL
Qty: 40 G | Refills: 0 | Status: SHIPPED | OUTPATIENT
Start: 2025-06-03

## 2025-06-03 NOTE — PATIENT INSTRUCTIONS
What is skin cancer?  Skin cancer is unfortunately very common. That's why we are here to help you on your journey to healthy happy skin! There are two main types of skin cancer: melanoma and non-melanoma skin cancer. Melanoma is a form of skin cancer that often arises within an existing nevus or mole. However, this is not always the case. Melanoma can arise anywhere (not only where you have moles right now). Melanoma can run in families, so letting us know about your family history is important. Non-melanoma skin cancer is the most common type of cancer in the [place]. The two main types of non-melanoma skin cancers are basal cell carcinomas (BCC) and squamous cell carcinoma (SCC). These cancers tend to be less aggressive than melanomas but are still important to look for and treat.    What can I do to prevent skin cancer?  One of the largest risk factors for skin cancer is sun exposure or UV radiation. Therefore, sun protection is ruggiero! Here are some great tips for protecting yourself!  Try to avoid direct sun exposure during peak sun hours (10 AM to 2 PM)  Remember you get A LOT of sun even under cloud coverage and through care windows!  When choosing a sunscreen, look for one that says “broad spectrum” sunscreen. This means it protects you from more of the harmful UV rays.   Choose a sunscreen that is SPF 30 or greater for best protection.   Apply sunscreen to all sun-exposed skin and reapply every 2 hours.   Consider sun protective clothing! Great additions to your sun protective clothing wardrobe include broad brimmed hats, sunglasses, UPF clothing.  Avoid tanning salons. These have been shown to be very harmful in terms of your risk of skin cancer.   Avoid “base tans”. We now know that tans are dangerous (not just sun burns). If you want to have a tan for a trip, consider a spray tan!    Should I check my skin at home between my dermatology appointments?  Yes! It's always a great idea to look at your  "skin on a regular basis. Here are some things to look for when monitoring your skin.   For melanoma, look for the ABCDE's!  A = Asymmetry. Look for a spot where one half does not match the other!  B = Borders. Look for a spot that has jagged, ragged or irregular borders.  C = Color. Look for a spot that is not evenly colored and often includes multiple colors, especially true black, red, white, blue, grey.   D = Diameter. Look for a spot that is larger than the size of a pencil eraser.  E = Evolution. If you ever have a spot that is changing in shape, color, size or symptoms (becomes itchy, painful or starts to bleed), always call us!  For non-melanoma skin cancers, look for a new, pink spot that is not going away, especially one that is itchy, painful or bleeding.     What should I do if I see a spot that is concerning for melanoma or non-melanoma skin cancer?  If you are ever concerned, call us! Do not wait for your next appointment. We want to help!     I.  RATIONALE FOR PROCEDURE  I understand that a skin biopsy allows the Dermatologist to test a lesion or rash under the microscope to obtain a diagnosis.  It usually involves numbing the area with numbing medication and removing a small piece of skin; sometimes the area will be closed with sutures. In this specific procedure, sutures are not usually needed.  If any sutures are placed, then they are usually need to be removed in 2 weeks or less.    I understand that my Dermatologist recommends that a skin \"shave\" biopsy be performed today.  A local anesthetic, similar to the kind that a dentist uses when filling a cavity, will be injected with a very small needle into the skin area to be sampled.  The injected skin and tissue underneath \"will go to sleep” and become numb so no pain should be felt afterwards.  An instrument shaped like a tiny \"razor blade\" (shave biopsy instrument) will be used to cut a small piece of tissue and skin from the area so that a sample " "of tissue can be taken and examined more closely under the microscope.  A slight amount of bleeding will occur, but it will be stopped with direct pressure and a pressure bandage and any other appropriate methods.  I understands that a scar will form where the wound was created.  Surgical ointment will be applied to help protect the wound.  Sutures are not usually needed.    II.  RISKS AND POTENTIAL COMPLICATIONS   I understand the risks and potential complications of a skin biopsy include but are not limited to the following:  Bleeding  Infection  Pain  Scar/keloid  Skin discoloration  Incomplete Removal  Recurrence  Nerve Damage/Numbness/Loss of Function  Allergic Reaction to Anesthesia  Biopsies are diagnostic procedures and based on findings additional treatment or evaluation may be required  Loss or destruction of specimen resulting in no additional findings    My Dermatologist has explained to me the nature of the condition, the nature of the procedure, and the benefits to be reasonably expected compared with alternative approaches.  My Dermatologist has discussed the likelihood of major risks or complications of this procedure including the specific risks listed above, such as bleeding, infection, and scarring/keloid.  I understand that a scar is expected after this procedure.  I understand that my physician cannot predict if the scar will form a \"keloid,\" which extends beyond the borders of the wound that is created.  A keloid is a thick, painful, and bumpy scar.  A keloid can be difficult to treat, as it does not always respond well to therapy, which includes injecting cortisone directly into the keloid every few weeks.  While this usually reduces the pain and size of the scar, it does not eliminate it.      I understand that photographs may be taken before and after the procedure.  These will be maintained as part of the medical providers confidential records and may not be made available to me.  I further " "authorize the medical provider to use the photographs for teaching purposes or to illustrate scientific papers, books, or lectures if in his/her judgment, medical research, education, or science may benefit from its use.    I have had an opportunity to fully inquire about the risks and benefits of this procedure and its alternatives.   I have been given ample time and opportunity to ask questions and to seek a second opinion if I wished to do so.  I acknowledge that there have specifically been no guarantees as to the cosmetic results from the procedure.  I am aware that with any procedure there is always the possibility of an unexpected complication.    III. POST-PROCEDURAL CARE (WHAT YOU WILL NEED TO DO \"AFTER THE BIOPSY\" TO OPTIMIZE HEALING)    Keep the area clean and dry.  Try NOT to remove the bandage or get it wet for the first 24 hours.    Gently clean the area and apply surgical ointment (such as Vaseline petrolatum ointment, which is available \"over the counter\" and not a prescription) to the biopsy site for up to 2 weeks straight.  This acts to protect the wound from the outside world.      Sutures are not usually placed in this procedure.  If any sutures were placed, return for suture removal as instructed (generally 1 week for the face, 2 weeks for the body).      Take Acetaminophen (Tylenol) for discomfort, if no contraindications.  Ibuprofen or aspirin could make bleeding worse.    Call our office immediately for signs of infection: fever, chills, increased redness, warmth, tenderness, discomfort/pain, or pus or foul smell coming from the wound.    WHAT TO DO IF THERE IS ANY BLEEDING?  If a small amount of bleeding is noticed, place a clean cloth over the area and apply firm pressure for ten minutes.  Check the wound after 10 minutes of direct pressure.  If bleeding persists, try one more time for an additional 10 minutes of direct pressure on the area.  If the bleeding becomes heavier or does not stop " after the second attempt, or if you have any other questions about this procedure, then please call your [facility] by calling [phone] ([phone]).     I hereby acknowledge that I have reviewed and verified the site with my Dermatologist and have requested and authorized my Dermatologist to proceed with the procedure.

## 2025-06-03 NOTE — PROGRESS NOTES
"[facility]atology [facility] Note     Patient Name: Home Walters  Encounter Date: 6/3/25       Have you been cared for by a [facility] Dermatologist in the last 3 years and, if so, which description applies to you? NO. I am considered a \"new\" patient and must complete all patient intake questions. I am not of child-bearing potential.     REVIEW OF SYSTEMS:  Have you recently had or currently have any of the following? Recent fever or chills? No  Any non-healing wound? No   PAST MEDICAL HISTORY:  Have you personally ever had or currently have any of the following?  If \"YES,\" then please provide more detail. Skin cancer (such as Melanoma, Basal Cell Carcinoma, Squamous Cell Carcinoma?  YES, SCC x 2 MOHS  Tuberculosis, HIV/AIDS, Hepatitis B or C: No  Radiation Treatment No   HISTORY OF IMMUNOSUPPRESSION:   Do you have a history of any of the following:  Systemic Immunosuppression such as Diabetes, Biologic or Immunotherapy, Chemotherapy, Organ Transplantation, Bone Marrow Transplantation or Prednisone?  YES, Chemotherapy bladder CA     Answering \"YES\" requires the addition of the dotphrase \"IMMUNOSUPPRESSED\" as the first diagnosis of the patient's visit.   FAMILY HISTORY:  Any \"first degree relatives\" (parent, brother, sister, or child) with the following?    Skin Cancer, Pancreatic or Other Cancer? No   PATIENT EXPERIENCE:    Do you want the Dermatologist to perform a COMPLETE skin exam today including a clinical examination under the \"bra and underwear\" areas?  NO, patient wheelchair bound, only able to perform skin check of front side of body  If necessary, do we have your permission to call and leave a detailed message on your Preferred Phone number that includes your specific medical information?  Yes      Allergies[1] Current Medications[2]        Whom besides the patient is providing clinical information about today's encounter?   NO ADDITIONAL HISTORIAN (patient alone provided history) Wife " "present    Physical Exam and Assessment/Plan by Diagnosis: Patient last evaluated by Dr. Stephen on 11/22/24 for Mohs.    NEOPLASM OF UNCERTAIN BEHAVIOR OF SKIN    Physical Exam:  (Anatomic Location); (Size and Morphological Description); (Differential Diagnosis):  Specimen A; right wrist;1.1 cm x 0.6 cm; pink papule with rolled border; DDX  BCC          Pertinent Positives:  Pertinent Negatives:    Additional History of Present Condition:  patient reports new lesion a few months ago.  Notes occasional itch, denies any tenderness or bleeding.    Assessment and Plan:  I have discussed with the patient that a sample of skin via a \"skin biopsy” would be potentially helpful to further make a specific diagnosis under the microscope.  Based on a thorough discussion of this condition and the management approach to it (including a comprehensive discussion of the known risks, side effects and potential benefits of treatment), the patient (family) agrees to implement the following specific plan:    Procedure:  Skin Biopsy.  After a thorough discussion of treatment options and risk/benefits/alternatives (including but not limited to local pain, scarring, dyspigmentation, blistering, possible superinfection, and inability to confirm a diagnosis via histopathology), verbal and written consent were obtained and portion of the rash was biopsied for tissue sample.  See below for consent that was obtained from patient and subsequent Procedure Note.    PROCEDURE TANGENTIAL (SHAVE) BIOPSY NOTE:    Performing Physician: Emma Andrade  Anatomic Location; Clinical Description with size (cm); Pre-Op Diagnosis:   Specimen A; right wrist;1.1 cm x 0.6 cm; pink papule with rolled border; DDX  BCC  Post-op diagnosis: Same     Local anesthesia: 1% xylocaine with epi      Topical anesthesia: None    Hemostasis: Aluminum chloride       After obtaining informed consent  at which time there was a discussion about the purpose of biopsy  and low " "risks of infection and bleeding.  The area was prepped and draped in the usual fashion. Anesthesia was obtained with 1% lidocaine with epinephrine. A shave biopsy to an appropriate sampling depth was obtained by Shave (Dermablade or 15 blade) The resulting wound was covered with surgical ointment and bandaged appropriately.     The patient tolerated the procedure well without complications and was without signs of functional compromise.      Specimen has been sent for review by Dermatopathology.    Standard post-procedure care has been explained and has been included in written form within the patient's copy of Informed Consent.    INFORMED CONSENT DISCUSSION AND POST-OPERATIVE INSTRUCTIONS FOR PATIENT    I.  RATIONALE FOR PROCEDURE  I understand that a skin biopsy allows the Dermatologist to test a lesion or rash under the microscope to obtain a diagnosis.  It usually involves numbing the area with numbing medication and removing a small piece of skin; sometimes the area will be closed with sutures. In this specific procedure, sutures are not usually needed.  If any sutures are placed, then they are usually need to be removed in 2 weeks or less.    I understand that my Dermatologist recommends that a skin \"shave\" biopsy be performed today.  A local anesthetic, similar to the kind that a dentist uses when filling a cavity, will be injected with a very small needle into the skin area to be sampled.  The injected skin and tissue underneath \"will go to sleep” and become numb so no pain should be felt afterwards.  An instrument shaped like a tiny \"razor blade\" (shave biopsy instrument) will be used to cut a small piece of tissue and skin from the area so that a sample of tissue can be taken and examined more closely under the microscope.  A slight amount of bleeding will occur, but it will be stopped with direct pressure and a pressure bandage and any other appropriate methods.  I understands that a scar will form where " "the wound was created.  Surgical ointment will be applied to help protect the wound.  Sutures are not usually needed.    II.  RISKS AND POTENTIAL COMPLICATIONS   I understand the risks and potential complications of a skin biopsy include but are not limited to the following:  Bleeding  Infection  Pain  Scar/keloid  Skin discoloration  Incomplete Removal  Recurrence  Nerve Damage/Numbness/Loss of Function  Allergic Reaction to Anesthesia  Biopsies are diagnostic procedures and based on findings additional treatment or evaluation may be required  Loss or destruction of specimen resulting in no additional findings    My Dermatologist has explained to me the nature of the condition, the nature of the procedure, and the benefits to be reasonably expected compared with alternative approaches.  My Dermatologist has discussed the likelihood of major risks or complications of this procedure including the specific risks listed above, such as bleeding, infection, and scarring/keloid.  I understand that a scar is expected after this procedure.  I understand that my physician cannot predict if the scar will form a \"keloid,\" which extends beyond the borders of the wound that is created.  A keloid is a thick, painful, and bumpy scar.  A keloid can be difficult to treat, as it does not always respond well to therapy, which includes injecting cortisone directly into the keloid every few weeks.  While this usually reduces the pain and size of the scar, it does not eliminate it.      I understand that photographs may be taken before and after the procedure.  These will be maintained as part of the medical providers confidential records and may not be made available to me.  I further authorize the medical provider to use the photographs for teaching purposes or to illustrate scientific papers, books, or lectures if in his/her judgment, medical research, education, or science may benefit from its use.    I have had an opportunity to " "fully inquire about the risks and benefits of this procedure and its alternatives.   I have been given ample time and opportunity to ask questions and to seek a second opinion if I wished to do so.  I acknowledge that there have specifically been no guarantees as to the cosmetic results from the procedure.  I am aware that with any procedure there is always the possibility of an unexpected complication.    III. POST-PROCEDURAL CARE (WHAT YOU WILL NEED TO DO \"AFTER THE BIOPSY\" TO OPTIMIZE HEALING)    Keep the area clean and dry.  Try NOT to remove the bandage or get it wet for the first 24 hours.    Gently clean the area and apply surgical ointment (such as Vaseline petrolatum ointment, which is available \"over the counter\" and not a prescription) to the biopsy site for up to 2 weeks straight.  This acts to protect the wound from the outside world.      Sutures are not usually placed in this procedure.  If any sutures were placed, return for suture removal as instructed (generally 1 week for the face, 2 weeks for the body).      Take Acetaminophen (Tylenol) for discomfort, if no contraindications.  Ibuprofen or aspirin could make bleeding worse.    Call our office immediately for signs of infection: fever, chills, increased redness, warmth, tenderness, discomfort/pain, or pus or foul smell coming from the wound.    WHAT TO DO IF THERE IS ANY BLEEDING?  If a small amount of bleeding is noticed, place a clean cloth over the area and apply firm pressure for ten minutes.  Check the wound after 10 minutes of direct pressure.  If bleeding persists, try one more time for an additional 10 minutes of direct pressure on the area.  If the bleeding becomes heavier or does not stop after the second attempt, or if you have any other questions about this procedure, then please call your [facility]'s Dermatologist by calling [phone] (SKIN).     I hereby acknowledge that I have reviewed and verified the site with my Dermatologist " and have requested and authorized my Dermatologist to proceed with the procedure.    HISTORY OF SQUAMOUS CELL CARCINOMA     Physical Exam:  Anatomic Location Affected:  central scalp (Mohs 6/4/24), scalp (Mohs 11/22/24)  Morphological Description of Scar:  well healed scar  Suspected Recurrence: no  Regional adenopathy: no    Additional History of Present Condition:  Underwent Mohs more recently by Dr. Stephen on 11/22/24.  No reoccurrences.    Assessment and Plan:  Based on a thorough discussion of this condition and the management approach to it (including a comprehensive discussion of the known risks, side effects and potential benefits of treatment), the patient (family) agrees to implement the following specific plan:  Follow up in 6 months for skin check  Monitor for any changes    SEBORRHEIC KERATOSES  - Relevant exam: Scattered over the trunk/extremities are waxy brown to black plaques and papules with stuck on appearance and consistent dermoscopy  - Exam and clinical history consistent with seborrheic keratoses  - Counseled that these are benign growths that do not require treatment    MELANOCYTIC NEVI  -Relevant exam: Scattered over the trunk/extremities are homogenously pigmented brown macules and papules. ELM performed and without concerning findings. No outliers unless otherwise noted in today's note  - Exam and clinical history consistent with melanocytic nevi  - Counseled to return to clinic prior to scheduled appointment should any of these lesions change or should any new lesions of concern arise  - Counseled on use of sun protection daily. Reviewed latest FDA sunscreen guidelines, including use of broad spectrum (UVA and UVB blocking) sunscreen or sun protective clothing with SPF 30-50 every 2-3 hours and reapplied after exposure to water    LENTIGINES  OTHER SKIN CHANGES DUE TO CHRONIC EXPOSURE TO NONIONIZING RADIATION  - Relevant exam: Over sun exposed areas are brown macules. ELM performed and  without concerning findings.  - Exam and clinical history consistent with lentigines.  - Counseled to return to clinic prior to scheduled appointment should any of these lesions change or should any new lesions of concern arise.  - Recommended use of sunscreen as above and below.    CHERRY ANGIOMAS  - Relevant exam: Scattered over the trunk/extremities are red papules  - Exam and clinical history consistent with cherry angiomas  - Educated that these are benign    ACTINIC KERATOSES  - Relevant exam: On the scalp, mid lower forehead, left cheek are scaly pink macules without palpable dermal component    - Exam and clinical history consistent with actinic keratoses  - Discussed that these lesions are considered premalignant with the potential to evolve into squamous cell carcinoma.   - Discussed treatment options, which may inclue liquid nitrogen destruction, topical immunotherapy including risks, benefits  - Patient counseled to return to the office in 4-6 weeks after completion of treatment for recheck if not resolved at which time retreatment or biopsy to rule out SCC will be determined based on clinic findings    - The patient agreed to treatment with topical efudex 5% for 14 days BID to the scalp  - Common side effects for this treatment were discussed   -Start in a week as cryotherapy performed in the office today  -Follow up in 3 months for re-evaluation  -Advised to call with any reoccurrences    PROCEDURE:  DESTRUCTION OF PRE-MALIGNANT LESIONS    - After a thorough discussion of treatment options and risk/benefits/alternatives (including but not limited to local pain, scarring, dyspigmentation, blistering, and possible superinfection), verbal and written consent were obtained and the aforementioned lesions were treated on with cryotherapy using liquid nitrogen x 1 cycle for 5-10 seconds.    TOTAL NUMBER of 10 pre-malignant lesions were treated today on the ANATOMIC LOCATION: scalp, mid lower forehead,left  cheek.     The patient tolerated the procedure well, and after-care instructions were provided.     Scribe Attestation      I,:  Liv Shaw MA am acting as a scribe while in the presence of the attending physician.:       I,:  KACEY Diamond personally performed the services described in this documentation    as scribed in my presence.:                 [1]   Allergies  Allergen Reactions    Ciprofloxacin Abdominal Pain, Diarrhea, GI Bleeding and GI Intolerance    Diltiazem Abdominal Pain    Metronidazole Abdominal Pain, Diarrhea, GI Bleeding and GI Intolerance   [2]   Current Outpatient Medications:     apixaban (Eliquis) 5 mg, TAKE ONE TABLET BY MOUTH TWICE DAILY, Disp: 180 tablet, Rfl: 1    atorvastatin (LIPITOR) 40 mg tablet, TAKE ONE TABLET BY MOUTH ONCE DAILY, Disp: 90 tablet, Rfl: 1    cholecalciferol (VITAMIN D3) 1,000 units tablet, Take 1,000 Units by mouth in the morning., Disp: , Rfl:     Continuous Blood Gluc Sensor (FreeStyle Peggy 2 Sensor) MISC, Change it every 14 days, Disp: 1 each, Rfl: 0    digoxin (LANOXIN) 0.125 mg tablet, TAKE ONE TABLET BY MOUTH ONCE DAILY, Disp: 90 tablet, Rfl: 3    Empagliflozin (Jardiance) 25 MG TABS, TAKE ONE TABLET BY MOUTH DAILY IN MORNING, Disp: 90 tablet, Rfl: 1    famotidine (PEPCID) 40 MG tablet, TAKE ONE TABLET BY MOUTH ONCE DAILY AT BEDTIME, Disp: 30 tablet, Rfl: 5    insulin degludec (Tresiba FlexTouch) 200 units/mL CONCENTRATED U-200 injection pen, Inject 95 units under the skin once daily, Disp: 15 mL, Rfl: 5    insulin lispro (HumaLOG KwikPen) 100 units/mL injection pen, Inject 20-25 units three times daily before each meal., Disp: 30 mL, Rfl: 5    Insulin Pen Needle (BD Pen Needle Micro U/F) 32G X 6 MM MISC, Use 3-4 times per day with insulin pens, Disp: 120 each, Rfl: 4    metoprolol succinate (TOPROL-XL) 50 mg 24 hr tablet, TAKE ONE TABLET BY MOUTH ONCE DAILY, Disp: 90 tablet, Rfl: 1    omeprazole (PriLOSEC) 20 mg delayed release capsule, TAKE  ONE CAPSULE BY MOUTH TWICE DAILY, Disp: 180 capsule, Rfl: 1    potassium citrate (UROCIT-K) 10 mEq, TAKE ONE TABLET BY MOUTH TWICE DAILY, Disp: 180 tablet, Rfl: 1    torsemide (DEMADEX) 20 mg tablet, take two tablets by mouth in the morning and one tablet  at 4pm, Disp: 120 tablet, Rfl: 1    umeclidinium-vilanterol (Anoro Ellipta) 62.5-25 mcg/actuation inhaler, inhale 1 puff by mouth daily, Disp: 60 each, Rfl: 5

## 2025-06-04 DIAGNOSIS — Z79.4 TYPE 2 DIABETES MELLITUS WITH HYPERGLYCEMIA, WITH LONG-TERM CURRENT USE OF INSULIN (HCC): ICD-10-CM

## 2025-06-04 DIAGNOSIS — E11.65 TYPE 2 DIABETES MELLITUS WITH HYPERGLYCEMIA, WITH LONG-TERM CURRENT USE OF INSULIN (HCC): ICD-10-CM

## 2025-06-05 RX ORDER — INSULIN DEGLUDEC 200 U/ML
INJECTION, SOLUTION SUBCUTANEOUS
Qty: 15 ML | Refills: 1 | Status: SHIPPED | OUTPATIENT
Start: 2025-06-05

## 2025-06-06 ENCOUNTER — RESULTS FOLLOW-UP (OUTPATIENT)
Dept: DERMATOLOGY | Facility: CLINIC | Age: 80
End: 2025-06-06

## 2025-06-06 DIAGNOSIS — C44.92 SCCA (SQUAMOUS CELL CARCINOMA) OF SKIN: Primary | ICD-10-CM

## 2025-06-06 PROCEDURE — 88305 TISSUE EXAM BY PATHOLOGIST: CPT | Performed by: PATHOLOGY

## 2025-06-09 ENCOUNTER — TELEPHONE (OUTPATIENT)
Dept: DERMATOLOGY | Facility: CLINIC | Age: 80
End: 2025-06-09

## 2025-06-09 NOTE — TELEPHONE ENCOUNTER
Pre- operative Mohs Telephone Scheduling Note    Do you have a pacemaker or defibrillator? no    Do you have a cochlear implant, spinal or brain stimulator? no    Do you take antibiotics before skin or dental procedures? no  If yes, will likely require pre-operative antibiotics. Ask  the patient why they take the antibiotics (usually because of joint replacement).    Do you have a history of a joint replacements within the past 2 years? no   If yes, will likely require pre-operative antibiotics. Ask if orthopaedic surgeon has prescribed pre-operative antibiotics to take before procedures/dental work?    Do you take any OTC medications that thin your blood (Aspirin, Aleve, Ibuprofen) or supplements that thin your blood (fish oil, garlic, vitamin E, Ginko Biloba)? no    Do you take any prescribed medications that thin your blood (Coumadin, Plavix, Xarelto, Eliquis or another prescribed blood thinner)? yes: eliquis    Do you have an allergy to lidocaine or epinephrine? no    Do you have allergies to Iodine? no    Do you wear a lidocaine patch? no    Have you ever been diagnosed with HIV, AIDS, Hep B and Hep C? no    Do you use a cane, walker or wheelchair? yes: wheelchair, can transport out    Is the patient from a nursing home? no If yes, Is there any special accommodations that is needed for patient n/a    Do you smoke? no      If yes,  patient to try and stop 2 days before surgery and 7 days after the surgery. Minimizing smoking as much as possible during this time will improve healing and the cosmetic result after surgery.    Do you use supplemental oxygen? If so, how many liters and can you be off it for a short period of time? N/a    Date scheduled: 8/7/25 at 8 with Dr Jesus    Coordination of Care with other provider (Oculoplastics, Plastics, ENT) required? no   IF YES, PLEASE FORWARD TO APPROPRIATE PERSONNEL TO HELP COORDINATE.    Are there remaining tumors to be scheduled? no    Was Prior  Authorization obtained? No (please use [email or web address] to document prior auth)

## 2025-06-09 NOTE — LETTER
Home Walters     1945    [address] [place] [id number]    Dear Home Walters,    You are scheduled to have the Mohs procedure on August 7, 2025 at 8 am for right wrist with Dr. Andrew Jesus. Your surgery will be located in [facility] at [facility] our address is [address] [place] [address]. Once you arrive please check in with our front staff in [address] and then wait Mohs waiting room [address].  If you have someone bringing you to your appointment they may wait in the waiting room or accompany you in your visit.     Below you will find some pre-op instructions along with some information regarding the Mohs procedure.     If you have any questions please call our office at [phone].     Thank you,    [facility] Department         PRE-OPERATIVE INSTRUCTIONS - MOHS    Before your scheduled surgery, there are a number of important precautions and positive steps you should take to help prepare yourself for a successful treatment and speedy recovery.    Some of the steps, which are listed below, may seem unnecessary and inconvenient, but they are important. For example, when you stop smoking, you increase your ability to heal. Occasionally, there may be valid reasons, personal or medical, why you can't comply. In such cases, please call the office so we can discuss possible ways to overcome any obstacles you may be encountering.    If you have any questions about the surgery, or remember additional medical information that you forgot to mention to our staff, please contact the office prior to your surgery.    GENERAL INFORMATION REGARDING MOHS MICROGRAPHIC SURGERY    Mohs surgery is a specialized technique for the removal of skin cancer developed by Dr. Frederick Mohs over 50 years ago to improve the cure rates of skin cancer. Traditionally, skin cancers are treated by destructive methods (radiation, freezing, scraping, and  burning) or excision (cutting out the tissue with standards margins and sending it to an outside laboratory for testing). These methods all yield cure rates between 65%-94%. However, for cancers located in cosmetically sensitive areas, large tumors, or tumors unsuccessfully treated by other means, Mohs surgery offers a higher cure rate. In most cases, Mohs surgery provides you with a 99% cure rate for primary (previously untreated) basal cell cancer and a 95% cure rate for primary squamous cell cancer. In Mohs surgery, tissue is removed and processed in a way that we are able to check 100% of the margins, giving the highest cure rate for any method of treating skin cancers while providing maximal preservation of normal skin. This allows the surgeon to produce an optimal cosmetic result for the patient by maximizing the amount of tissue removed yielding as small a scar as possible    On the day of surgery, you will be given local anesthesia only (similar to what was given to you during your initial biopsy). You will remain awake. You will verify the location of the skin cancer prior to the onset of the surgery. Once the area is numb, the tissue containing the skin cancer will be removed, taking a small safety margin. This margin is usually smaller than what would be taken with a standard excision. Once the tissue is removed, it is marked and oriented. The first layer (“Stage I”) will be processed in our laboratory. The wound will be treated for bleeding and a bandage will be placed to keep you comfortable while you wait an approximate 45 minutes-1 hour (for the processing of the tissue) in your room. Your Mohs surgeon will examine the pathology in the lab, checking all the margins. If any tumor remains, you will need to take a second layer of skin (“Stage 2”). The area will be re-anesthetized and your Mohs surgeon will remove more skin only in the area where the tumor exists. This process will continue until all the  skin cancer is removed. Unfortunately, there is no method to predict how many layers or stages will be taken.    Once the tumor has been removed completely, we will discuss the best ways to close the defect. Most wounds may be closed with stitches. A larger wound may require a skin graft or a flap. In rare instances, especially for cancers around the eye or for larger cancers, we may work with another surgeon (oculoplastic, ENT, plastics) with special skills to assist with reconstruction.  If the surgery is coordinated with another specialist, you will have the Mohs portion of the procedure first and see the coordinated specialist after the skin cancer has been removed.  Always follow the pre-operative instructions of the surgeon doing the closure.    Medications: Please take all your normal medications the morning of your surgery. If you are a diabetic, please bring your insulin or medications with you, as well as a snack to avoid having low blood sugar during your day with us.    1) Blood Thinners  VERY IMPORTANT: We do NOT stop or hold prescribed blood thinners (such as Coumadin/Warfarin, Plavix, Eliquis, Pradaxa, Brilinta, Apixaban, Xarelto, Lovonox, Rivaroxaban, or Aggrenox) before Mohs surgery. Additionally, If you take aspirin because you have had a stroke, heart attack, heart disease, other condition, or your physician has prescribed you to take it, please continue your aspirin.    Although there is a risk of increased bruising and bleeding, we are still able to safely perform surgery while continuing these medications. Please NEVER stop your prescribed blood thinner without the managing doctor's (the doctor that prescribed the medication) permission or knowledge. If you have any concerns about not holding your blood thinner, please address these with your Mohs surgeon.    Most people should stop all non-steroidal anti-inflammatory medications (Motrin, Naproxen, Advil, Midol, Aleve, etc.) for 7 days prior to  your scheduled surgery and 2 days after (unless instructed otherwise after surgery).  You may take Tylenol for pain.     Vitamins and Supplements  Avoid taking any supplements with Vitamin E, Fish Oil, Gingko, Ginseng, and Garlic for 2 weeks before and 2 days after your surgery. These thin your blood.    Lidocaine Patches  Avoid wearing any over the counter or prescribed Lidocaine patches the day of the surgery.    Alcohol: Avoid drinking alcohol for 2 days prior to your surgery, and for 2 days afterwards (it thins the blood and causes more bruising and swelling).    Smoking: Try to STOP or reduce smoking significantly the week before your surgery, and especially the week afterwards (it greatly improves how well you heal). Tobacco smoke deprives the blood of oxygen, which is urgently needed by the wound during the healing process.    Contact Lenses: Do not wear them on the day of the surgery. Instead, wear glasses and bring your case, in case we need to remove them.    Clothing: Do not wear your nicest clothing on your surgery day. We recommend wearing a button down shirt that will not disrupt your post-operative dressing when changing later that night.    Bathing: On the morning of your surgery, you may bathe or shower normally. If you get your hair done on a weekly basis, remember to get your hair washed the day before surgery.   - You will need to keep your surgical site dry for a minimum of 48 hours after surgery.    Makeup: If your surgery is on the face, please do not wear any makeup on the day of the surgery.    Jewelry: Please try to avoid wearing jewelry on the day of surgery.    Food: On the morning of surgery, have breakfast but limit your intake of caffeinated beverages. They are diuretic and may inconvenience you during surgery. If you are following up with another surgeon the same day as your Mohs surgery, you must receive permission to eat breakfast from that surgeon.    What to bring with you on the  day of your surgery:  Bring snacks - Since you could be at the office long, you may bring snacks and/or lunch with you. Some snacks and drinks are available at the office as well.   Bring a sweater - Bring a sweater or jacket that buttons or zips down the front and will not disturb your wound dressing during removal.  Bring something to do - You will be spending much of the day in our office. There will be 45-60 minute waiting periods  between layers/stages, and while there is a television with cable in every room, it is nice to have something to keep you occupied such as books, magazines, knitting, music, or work.     Planning Ahead:  Appointment Length - Understand this procedure length is unpredictable, therefore, plan to be in the office for at least half a day. Depending on procedures scheduled prior to yours, there may be waiting prior to the start of your procedure.  Other Appointments - It is important to realize that no matter how small the skin cancer appears to be, looks can be deceiving. Since your surgery may last the entire day, you should not schedule any other appointments that day.  Special Occasions - Surgery often creates swelling and bruising. Also, the post-op dressing may be rather large and obvious. Keep this in mind as you arrange your social/work schedule. If an important event is already planned, please check with your referring physician or your Mohs surgeon to see if the surgery can be postponed.  Activity Limits after Surgery - If surgery was performed on your face, we recommend that you keep your activity level to a minimum for 2-3 days (the blood pressure elevation related to exercise can lead to bleeding). If you have stitches in an area that will be under tension or significant movement (neck, back, arms, legs), you will need to avoid heavy lifting (anything over 5 lbs) or exercise for at least 2 weeks and possibly longer. We also advise that you limit out of town travel for the  first 7 days after surgery. You should also wait at least 7 days before going into a pool or the ocean.  Housework - Since you will need to minimize activity after surgery, plan to do your groceries, laundry, gardening, and other heavy household chores prior to your surgery. Please make arrangements for assistance during the post-op period. If surgery is around your mouth area, you may need to eat soft foods, such as soup, milkshakes, or yogurt for 48 hours.    Purchasing bandage supplies: Prior to surgery, please purchase the following items to care for your surgical wound properly.  Cotton swabs (Q-tips)  Vaseline or Aquaphor  Telfa pads (or any non-stick dressing)  Paper tape or Hypafix tape  Gauze pads (3x3)    Transportation: It is often reassuring and comforting to have a  drive you to and from the surgery. He or she is welcome to wait in the office during the surgery. If you do not have a , you may drive to and from your procedure (unless stated otherwise). If the site being treated is near your eye, be aware that the final bandage may cause some obstruction of vision.     Rescheduling: If you need to reschedule your surgery, please notify the office as soon as possible.

## 2025-06-18 DIAGNOSIS — I48.11 LONGSTANDING PERSISTENT ATRIAL FIBRILLATION (HCC): ICD-10-CM

## 2025-06-18 RX ORDER — APIXABAN 5 MG/1
5 TABLET, FILM COATED ORAL 2 TIMES DAILY
Qty: 180 TABLET | Refills: 1 | Status: SHIPPED | OUTPATIENT
Start: 2025-06-18

## 2025-06-21 DIAGNOSIS — I50.9 CHF EXACERBATION (HCC): ICD-10-CM

## 2025-06-22 DIAGNOSIS — I50.9 CHF EXACERBATION (HCC): ICD-10-CM

## 2025-06-22 RX ORDER — TORSEMIDE 20 MG/1
TABLET ORAL
Qty: 120 TABLET | Refills: 0 | OUTPATIENT
Start: 2025-06-22

## 2025-06-22 RX ORDER — TORSEMIDE 20 MG/1
TABLET ORAL
Qty: 120 TABLET | Refills: 1 | Status: SHIPPED | OUTPATIENT
Start: 2025-06-22

## 2025-07-07 ENCOUNTER — PROCEDURE VISIT (OUTPATIENT)
Dept: PODIATRY | Facility: CLINIC | Age: 80
End: 2025-07-07
Payer: MEDICARE

## 2025-07-07 VITALS — HEART RATE: 72 BPM | OXYGEN SATURATION: 98 % | HEIGHT: 76 IN | BODY MASS INDEX: 33.84 KG/M2

## 2025-07-07 DIAGNOSIS — Z79.4 TYPE 2 DIABETES MELLITUS WITH HYPERGLYCEMIA, WITH LONG-TERM CURRENT USE OF INSULIN (HCC): ICD-10-CM

## 2025-07-07 DIAGNOSIS — I73.9 PAD (PERIPHERAL ARTERY DISEASE) (HCC): ICD-10-CM

## 2025-07-07 DIAGNOSIS — E11.42 DIABETIC PERIPHERAL NEUROPATHY (HCC): ICD-10-CM

## 2025-07-07 DIAGNOSIS — E11.65 TYPE 2 DIABETES MELLITUS WITH HYPERGLYCEMIA, WITH LONG-TERM CURRENT USE OF INSULIN (HCC): ICD-10-CM

## 2025-07-07 DIAGNOSIS — B35.1 ONYCHOMYCOSIS: Primary | ICD-10-CM

## 2025-07-07 PROCEDURE — 11721 DEBRIDE NAIL 6 OR MORE: CPT | Performed by: PODIATRIST

## 2025-07-08 ENCOUNTER — NURSE TRIAGE (OUTPATIENT)
Age: 80
End: 2025-07-08

## 2025-07-08 NOTE — PROGRESS NOTES
:  Assessment & Plan  Onychomycosis         PAD (peripheral artery disease) ([facility])         Type 2 diabetes mellitus with hyperglycemia, with long-term current use of insulin ([facility])    Lab Results   Component Value Date    HGBA1C 8.0 (H) 05/09/2025            Diabetic peripheral neuropathy (HCC) [E11.42]    Lab Results   Component Value Date    HGBA1C 8.0 (H) 05/09/2025            The patient's clinical examination today was significant for thickened and dystrophic pedal nail plates with brittleness, discoloration, and subungual debris consistent with onychomycosis x 10.  There are no open lesions. Pedal pulses are nonpalpable bilaterally.  Capillary fill time to the toes is greater than 3 seconds x 10.  Temperature gradient is mildly increased to the bilateral extremities.  Skin is thin with decreased turgor.  Dependent rubor is noted bilaterally.  There is absence of hair growth to the bilateral extremities.  The interdigital spaces are clear without maceration.  No pretrophic changes noted to either lower extremity.     The pedal nail plates are sharply debrided with a sterile nail clipper x 10 without complication.  The nails were then mechanically reduced in thickness and girth utilizing a rotary bur.  There are no other acute pedal issues noted today.  His most recent hemoglobin A1c from May 2025 was 8.0, prior to that was 7.6 in March 2025.     The patient will follow-up with me in 3 months for continued at risk diabetic footcare.    History of Present Illness     Home Walters is a 80 y.o. male   The patient presents today for follow up of painful elongated pedal nail plates.  The patient has a history of dense peripheral neuropathy stemming from his diabetes.  He notes no other pedal issues today.           PAST MEDICAL HISTORY:  Past Medical History[1]    PAST SURGICAL HISTORY:  Past Surgical History[2]     ALLERGIES:  Ciprofloxacin, Diltiazem, and Metronidazole    MEDICATIONS:  Current  "Medications[3]    SOCIAL HISTORY:  Social History[4]   Review of Systems   Constitutional: Negative.    Respiratory: Negative.     Cardiovascular: Negative.    Skin: Negative.    Psychiatric/Behavioral: Negative.       Objective   Pulse 72   Ht 6' 4\" (1.93 m)   SpO2 98%   BMI 33.84 kg/m²      Physical Exam  Constitutional:       Appearance: Normal appearance.   HENT:      Head: Normocephalic and atraumatic.     Cardiovascular:      Pulses:           Dorsalis pedis pulses are 0 on the right side and 0 on the left side.        Posterior tibial pulses are 0 on the right side and 0 on the left side.   Pulmonary:      Effort: Pulmonary effort is normal.   Feet:      Right foot:      Skin integrity: Skin integrity normal.      Toenail Condition: Right toenails are abnormally thick and long. Fungal disease present.     Left foot:      Skin integrity: Skin integrity normal.      Toenail Condition: Left toenails are abnormally thick and long. Fungal disease present.     Comments: The patient's clinical examination today was significant for thickened and dystrophic pedal nail plates with brittleness, discoloration, and subungual debris consistent with onychomycosis x 10.  There are no open lesions. Pedal pulses are nonpalpable bilaterally.  Capillary fill time to the toes is greater than 3 seconds x 10.  Temperature gradient is mildly increased to the bilateral extremities.  Skin is thin with decreased turgor.  Dependent rubor is noted bilaterally.  There is absence of hair growth to the bilateral extremities.  The interdigital spaces are clear without maceration.  No pretrophic changes noted to either lower extremity.    Skin:     General: Skin is warm and dry.      Capillary Refill: Capillary refill takes 2 to 3 seconds.     Neurological:      General: No focal deficit present.      Mental Status: He is alert and oriented to person, place, and time.     Psychiatric:         Mood and Affect: Mood normal.            "     [1]   Past Medical History:  Diagnosis Date    A-fib (HCC)     AAA (abdominal aortic aneurysm) (HCC)     Actinic keratosis of right temple 07/31/2020    Actinic keratosis of scalp 07/31/2020    Acute respiratory failure with hypoxia (HCC) 03/25/2021    Allergic 1960    Aneurysm (HCC) 11/2001    Aorta    Arrhythmia     Arthritis     Lay's esophagus     Bladder cancer (HCC)     Blister of left leg 04/28/2021    Blister of right leg 05/26/2021    BPH (benign prostatic hypertrophy) 02/2010    CAD (coronary artery disease)     Cancer (HCC) 02/2010    Bladder    CHF (congestive heart failure) (HCC)     Chronic low back pain     Chronic pain of both knees 07/31/2020    Colitis 12/04/2020    CPAP (continuous positive airway pressure) dependence     Diabetes (HCC)     Diabetes mellitus (HCC) 10/1993    Excessive gas 12/03/2020    Heart murmur     History of chemotherapy     Hydroureter on left 04/28/2021    Hyperlipemia     Hypertension     Immunization deficiency 10/30/2020    Hep a nonimmune    Kidney stone     Left lower quadrant abdominal pain 12/03/2020    Mass of right adrenal gland (HCC) 12/04/2020    4.1 cm    Myocardial infarction ([facility])     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    Obesity 2000    LUCIA (obstructive sleep apnea) 01/29/2021    Pressure ulcer of right leg, stage 1 05/26/2021    Squamous cell skin cancer 05/02/2024    central scalp, mohs    Squamous cell skin cancer 10/03/2024    scalp-Mohs    Urinary tract infection with hematuria 05/03/2021    u cx 4/2021=pseudomonas R to bactrim and cefdinir, S to cipro    Venous stasis dermatitis of both lower extremities 05/26/2021   [2]   Past Surgical History:  Procedure Laterality Date    BACK SURGERY      BLADDER SURGERY      CARDIAC CATHETERIZATION  04/2016    CARDIAC CATHETERIZATION N/A 01/19/2023    Procedure: Cardiac RHC;  Surgeon: Jose Carlos Shea MD;  [facility];  Service: Cardiology    CATARACT EXTRACTION, BILATERAL       COLONOSCOPY  01/29/2019    next 2022 [place]    CORONARY ARTERY BYPASS GRAFT  04/2016    Quadruple per [place]    CYSTOSCOPY  06/2019    EGD  06/2017    EYE SURGERY  11/2016    Cataracts    JOINT REPLACEMENT  [id number]    Hip and shoulder    KIDNEY STONE SURGERY      MOHS SURGERY  06/04/2024    [facility] central scalp, Dr Rodríguez    MOHS SURGERY N/A 11/22/2024    [facility] scalp;     FL CYSTO/URETERO W/LITHOTRIPSY &INDWELL STENT INSRT Left 05/21/2021    Procedure: CYSTOSCOPY URETEROSCOPY WITH LITHOTRIPSY HOLMIUM LASER, AND INSERTION STENT URETERAL/EXCHANGE;  Surgeon: Siddhartha Leslie MD;  Location: [facility];  Service: Urology    FL CYSTOURETHROSCOPY W/URETERAL CATHETERIZATION Left 04/24/2021    Procedure: CYSTOSCOPY  WITH INSERTION STENT URETERAL;  Surgeon: Siddhartha Leslie MD;  [facility];  Service: Urology    TOTAL HIP ARTHROPLASTY Right 2012    TOTAL SHOULDER REPLACEMENT Right 2013    VASECTOMY  1971   [3]   Current Outpatient Medications   Medication Sig Dispense Refill    apixaban (Eliquis) 5 mg TAKE ONE TABLET BY MOUTH TWICE DAILY 180 tablet 1    atorvastatin (LIPITOR) 40 mg tablet TAKE ONE TABLET BY MOUTH ONCE DAILY 90 tablet 1    cholecalciferol (VITAMIN D3) 1,000 units tablet Take 1,000 Units by mouth in the morning.      Continuous Blood Gluc Sensor (FreeStyle Peggy 2 Sensor) MISC Change it every 14 days 1 each 0    digoxin (LANOXIN) 0.125 mg tablet TAKE ONE TABLET BY MOUTH ONCE DAILY 90 tablet 3    Empagliflozin (Jardiance) 25 MG TABS TAKE ONE TABLET BY MOUTH DAILY IN MORNING 90 tablet 1    famotidine (PEPCID) 40 MG tablet TAKE ONE TABLET BY MOUTH ONCE DAILY AT BEDTIME 30 tablet 5    fluorouracil (EFUDEX) 5 % cream Twice a day for two weeks to scalp.  Wash hands before and after.  Keep out of reach from children/pets 40 g 0    insulin degludec (Tresiba FlexTouch) 200 units/mL CONCENTRATED U-200 injection pen Inject 95 units under the skin once daily 15 mL 1    insulin lispro (HumaLOG KwikPen) 100  units/mL injection pen Inject 20-25 units three times daily before each meal. 30 mL 5    Insulin Pen Needle (BD Pen Needle Micro U/F) 32G X 6 MM MISC Use 3-4 times per day with insulin pens 120 each 4    metoprolol succinate (TOPROL-XL) 50 mg 24 hr tablet TAKE ONE TABLET BY MOUTH ONCE DAILY 90 tablet 1    omeprazole (PriLOSEC) 20 mg delayed release capsule TAKE ONE CAPSULE BY MOUTH TWICE DAILY 180 capsule 1    potassium citrate (UROCIT-K) 10 mEq TAKE ONE TABLET BY MOUTH TWICE DAILY 180 tablet 1    torsemide (DEMADEX) 20 mg tablet take two tablets by mouth in the morning and one tablet  at 4pm 120 tablet 1    umeclidinium-vilanterol (Anoro Ellipta) 62.5-25 mcg/actuation inhaler inhale 1 puff by mouth daily 60 each 5     No current facility-administered medications for this visit.   [4]   Social History  Socioeconomic History    Marital status: /Civil Union     Spouse name: Gabriela    Number of children: 4    Highest education level: 12th grade   Tobacco Use    Smoking status: Former     Current packs/day: 0.00     Average packs/day: 0.3 packs/day for 66.0 years (22.0 ttl pk-yrs)     Types: Cigarettes     Start date: 1965     Quit date: 2010     Years since quitting: 15.5     Passive exposure: Past    Smokeless tobacco: Never    Tobacco comments:     Quit several times for up to 6 years.   Vaping Use    Vaping status: Never Used   Substance and Sexual Activity    Alcohol use: Not Currently     Comment: No use    Drug use: Never     Comment: No use    Sexual activity: Not Currently     Partners: Female   Social History Narrative    · Most recent tobacco use screenin2020      · Do you currently or have you served in the [employer]:   No      · Were you activated, into active duty, as a member of the [employer] or as a Reservist:   No      · Live alone or with others:   with others        · Caffeine intake:   Occasional      · Illicit drugs:   No      · Diet:   Regular      · Exercise  level:   Moderate      · Advance directive:   Yes      · Marital status:         · General stress level:   Medium      · Single or multi-level home/work:   multi level home      · Guns present in home:   No      · Seat belts used routinely:   Yes      · Sunscreen used routinely:   Yes      · Smoke alarm in home:   Yes      Social Drivers of Health     Financial Resource Strain: Low Risk  (6/21/2023)    Overall Financial Resource Strain (CARDIA)     Difficulty of Paying Living Expenses: Not hard at all   Food Insecurity: No Food Insecurity (7/4/2024)    Nursing - Inadequate Food Risk Classification     Worried About Running Out of Food in the Last Year: Never true     Ran Out of Food in the Last Year: Never true   Transportation Needs: No Transportation Needs (7/4/2024)    PRAPARE - Transportation     Lack of Transportation (Medical): No     Lack of Transportation (Non-Medical): No   Physical Activity: Insufficiently Active (4/13/2021)    Exercise Vital Sign     Days of Exercise per Week: 4 days     Minutes of Exercise per Session: 10 min   Stress: No Stress Concern Present (4/13/2021)    [employer] [place] [employer] - Occupational Stress Questionnaire     Feeling of Stress : Not at all   Social Connections: Moderately Isolated (4/13/2021)    Social Connection and Isolation Panel     Frequency of Communication with Friends and Family: More than three times a week     Frequency of Social Gatherings with Friends and Family: Twice a week     Attends Anabaptist Services: Never     Active Member of Clubs or Organizations: No     Attends Club or Organization Meetings: Never     Marital Status:    Intimate Partner Violence: Not At Risk (4/13/2021)    Humiliation, Afraid, Rape, and Kick questionnaire     Fear of Current or Ex-Partner: No     Emotionally Abused: No     Physically Abused: No     Sexually Abused: No   Housing Stability: Low Risk  (7/4/2024)    Housing Stability Vital Sign     Unable to  Pay for Housing in the Last Year: No     Number of Times Moved in the Last Year: 0     Homeless in the Last Year: No

## 2025-07-08 NOTE — TELEPHONE ENCOUNTER
"REASON FOR CONVERSATION: Ear Fullness    SYMPTOMS: Patient states, that for 6=7 weeks, he has had, \"a fullness in his ears, left worse than right.\"    Patient states he went to a urgent care, where they cleaned his ears and gave him antibiotic drops to complete.  He says it went a way for a while, but then came back.    He complains of ear fullness, like there is fluid.  Denies pain.  Feels swooshing when moving head side to side.  States that he does have nasal congestion and runny nose with yellow nasal drainage.  Denies fever or cough.  Sputum is clear.  Denies dizziness, lightheadedness or headache.      Appointment made for evaluation.      OTHER HEALTH INFORMATION:     PROTOCOL DISPOSITION: See Within 3 Days in Office    CARE ADVICE PROVIDED: Encouraged to call back with questions or concerns.      PRACTICE FOLLOW-UP: Patient has an appointment Thursday for evaluation.       Reason for Disposition   Ear congestion lasts > 3 days and no improvement after using Care Advice (Exception: Ear congestion is a chronic symptom.)    Answer Assessment - Initial Assessment Questions  1. LOCATION: \"Which ear is involved?\"        Left is worse than the right.  2. SENSATION: \"Describe how the ear feels.\" (e.g., stuffy, full, plugged).\"       Like there is fluid, swooshing and some decreased hearing.   3. ONSET:  \"When did the ear symptoms start?\"        6-7 weeks   4. PAIN: \"Do you also have an earache?\" If Yes, ask: \"How bad is it?\" (Scale 0-10; none, mild, moderate or severe)      Denies   5. CAUSE: \"What do you think is causing the ear congestion?\" (e.g., common cold, nasal allergies, recent flight, recent snorkeling)      Unknown   6. OTHER SYMPTOMS: \"Do you have any other symptoms?\" (e.g., ear drainage, hay fever symptoms such as sneezing or a clear nasal discharge; cold symptoms such as a cough or runny nose)      Nasal congestion, post nasal drip, sinus drainage is yellow, sputum is clear.    Protocols used: Ear - " Congestion-Adult-[place]

## 2025-07-08 NOTE — TELEPHONE ENCOUNTER
Regarding: Ear fluid  ----- Message from Rosina LEIGH sent at 7/8/2025  8:29 AM EDT -----  I have been fighting fluid in my ears for about 6 weeks and it still has not cleared up. I know you are scheduled  full but if you have a cancellation any day for 3:30 or later I would like to visit. The only transportation I have is after 3.   Please let me know .  Thank you  Maynor

## 2025-08-02 DIAGNOSIS — R82.991 HYPOCITRATURIA: ICD-10-CM

## 2025-08-02 DIAGNOSIS — Z79.4 TYPE 2 DIABETES MELLITUS WITH HYPERGLYCEMIA, WITH LONG-TERM CURRENT USE OF INSULIN (HCC): ICD-10-CM

## 2025-08-02 DIAGNOSIS — E11.65 TYPE 2 DIABETES MELLITUS WITH HYPERGLYCEMIA, WITH LONG-TERM CURRENT USE OF INSULIN (HCC): ICD-10-CM

## 2025-08-03 DIAGNOSIS — R82.991 HYPOCITRATURIA: ICD-10-CM

## 2025-08-04 RX ORDER — INSULIN DEGLUDEC 200 U/ML
INJECTION, SOLUTION SUBCUTANEOUS
Qty: 15 ML | Refills: 5 | Status: SHIPPED | OUTPATIENT
Start: 2025-08-04

## 2025-08-04 RX ORDER — POTASSIUM CITRATE 1080 MG/1
10 TABLET, EXTENDED RELEASE ORAL 2 TIMES DAILY
Qty: 60 TABLET | Refills: 0 | Status: SHIPPED | OUTPATIENT
Start: 2025-08-04

## 2025-08-05 RX ORDER — POTASSIUM CITRATE 1080 MG/1
10 TABLET, EXTENDED RELEASE ORAL 2 TIMES DAILY
Qty: 180 TABLET | Refills: 0 | OUTPATIENT
Start: 2025-08-05

## 2025-08-07 ENCOUNTER — PROCEDURE VISIT (OUTPATIENT)
Dept: DERMATOLOGY | Facility: CLINIC | Age: 80
End: 2025-08-07
Attending: NURSE PRACTITIONER

## 2025-08-07 VITALS
TEMPERATURE: 97.9 F | SYSTOLIC BLOOD PRESSURE: 138 MMHG | BODY MASS INDEX: 33.36 KG/M2 | HEART RATE: 83 BPM | OXYGEN SATURATION: 87 % | WEIGHT: 274 LBS | HEIGHT: 76 IN | DIASTOLIC BLOOD PRESSURE: 62 MMHG

## 2025-08-07 DIAGNOSIS — C44.92 SCCA (SQUAMOUS CELL CARCINOMA) OF SKIN: ICD-10-CM

## 2025-08-07 DIAGNOSIS — D04.61 SQUAMOUS CELL CARCINOMA IN SITU (SCCIS) OF SKIN OF RIGHT WRIST: Primary | ICD-10-CM

## 2025-08-07 RX ORDER — ACETAMINOPHEN 325 MG/1
650 TABLET ORAL ONCE
Status: COMPLETED | OUTPATIENT
Start: 2025-08-07 | End: 2025-08-07

## 2025-08-07 RX ADMIN — ACETAMINOPHEN 650 MG: 325 TABLET ORAL at 15:44

## 2025-08-14 ENCOUNTER — APPOINTMENT (OUTPATIENT)
Dept: LAB | Facility: AMBULARY SURGERY CENTER | Age: 80
End: 2025-08-14
Payer: MEDICARE

## 2025-08-21 ENCOUNTER — HOSPITAL ENCOUNTER (OUTPATIENT)
Dept: NON INVASIVE DIAGNOSTICS | Facility: CLINIC | Age: 80
Discharge: HOME | End: 2025-08-21
Attending: NURSE PRACTITIONER
Payer: MEDICARE

## 2025-08-21 DIAGNOSIS — I71.43 INFRARENAL ABDOMINAL AORTIC ANEURYSM (AAA) WITHOUT RUPTURE (HCC): ICD-10-CM

## 2025-08-21 DIAGNOSIS — I25.10 CORONARY ARTERY DISEASE DUE TO LIPID RICH PLAQUE: ICD-10-CM

## 2025-08-21 DIAGNOSIS — J44.9 CHRONIC OBSTRUCTIVE PULMONARY DISEASE, UNSPECIFIED COPD TYPE (HCC): ICD-10-CM

## 2025-08-21 DIAGNOSIS — I25.83 CORONARY ARTERY DISEASE DUE TO LIPID RICH PLAQUE: ICD-10-CM

## 2025-08-21 PROCEDURE — 93978 VASCULAR STUDY: CPT | Performed by: SURGERY

## 2025-08-21 PROCEDURE — 93978 VASCULAR STUDY: CPT

## 2025-08-21 RX ORDER — UMECLIDINIUM BROMIDE AND VILANTEROL TRIFENATATE 62.5; 25 UG/1; UG/1
1 POWDER RESPIRATORY (INHALATION) DAILY
Qty: 60 EACH | Refills: 5 | Status: SHIPPED | OUTPATIENT
Start: 2025-08-21

## 2025-08-22 RX ORDER — ATORVASTATIN CALCIUM 40 MG/1
40 TABLET, FILM COATED ORAL DAILY
Qty: 90 TABLET | Refills: 1 | Status: SHIPPED | OUTPATIENT
Start: 2025-08-22

## (undated) DEVICE — CATH URETERAL 5FR X 70 CM FLEX TIP POLYUR BARD

## (undated) DEVICE — SPECIMEN CONTAINER STERILE PEEL PACK

## (undated) DEVICE — CATH SWAN GANZ TD 7FR 110CM

## (undated) DEVICE — GLOVE SRG BIOGEL 7

## (undated) DEVICE — GUIDEWIRE STRGHT TIP 0.035 IN  SOLO PLUS

## (undated) DEVICE — PACK TUR

## (undated) DEVICE — ENDOSCOPIC VALVE WITH ADAPTER.: Brand: SURSEAL® II

## (undated) DEVICE — STERILE CYSTO PACK: Brand: CARDINAL HEALTH

## (undated) DEVICE — MICROPUNCTURE INTRODUCER SET SILHOUETTE TRANSITIONLESS PUSH-PLUS DESIGN - STIFFENED CANNULA WITH NITINOL WIRE GUIDE: Brand: MICROPUNCTURE

## (undated) DEVICE — BASKET STONE RTRVL 4 WIRE HELICAL

## (undated) DEVICE — PINNACLE INTRODUCER SHEATH: Brand: PINNACLE

## (undated) DEVICE — LASER FIBER HOLMIUM 272MICRON